# Patient Record
Sex: MALE | Race: WHITE | NOT HISPANIC OR LATINO | Employment: OTHER | URBAN - METROPOLITAN AREA
[De-identification: names, ages, dates, MRNs, and addresses within clinical notes are randomized per-mention and may not be internally consistent; named-entity substitution may affect disease eponyms.]

---

## 2017-01-29 ENCOUNTER — RESULTS ENCOUNTER (OUTPATIENT)
Dept: FAMILY MEDICINE CLINIC | Facility: CLINIC | Age: 62
End: 2017-01-29

## 2017-01-29 DIAGNOSIS — E78.5 HYPERLIPIDEMIA: ICD-10-CM

## 2017-01-29 DIAGNOSIS — I10 ESSENTIAL HYPERTENSION: ICD-10-CM

## 2017-01-29 DIAGNOSIS — R73.01 IMPAIRED FASTING GLUCOSE: ICD-10-CM

## 2017-02-26 DIAGNOSIS — I10 ESSENTIAL HYPERTENSION: ICD-10-CM

## 2017-02-27 RX ORDER — IRBESARTAN 300 MG/1
TABLET ORAL
Qty: 90 TABLET | Refills: 1 | OUTPATIENT
Start: 2017-02-27

## 2017-02-27 RX ORDER — METOPROLOL SUCCINATE 100 MG/1
TABLET, EXTENDED RELEASE ORAL
Qty: 180 TABLET | Refills: 1 | OUTPATIENT
Start: 2017-02-27

## 2017-03-05 LAB
ALBUMIN SERPL-MCNC: 4.2 G/DL (ref 3.6–4.8)
ALBUMIN/GLOB SERPL: 1.4 {RATIO} (ref 1.1–2.5)
ALP SERPL-CCNC: 97 IU/L (ref 39–117)
ALT SERPL-CCNC: 28 IU/L (ref 0–44)
AST SERPL-CCNC: 21 IU/L (ref 0–40)
BASOPHILS # BLD AUTO: 0 X10E3/UL (ref 0–0.2)
BASOPHILS NFR BLD AUTO: 1 %
BILIRUB SERPL-MCNC: 0.4 MG/DL (ref 0–1.2)
BUN SERPL-MCNC: 17 MG/DL (ref 8–27)
BUN/CREAT SERPL: 21 (ref 10–22)
CALCIUM SERPL-MCNC: 9.1 MG/DL (ref 8.6–10.2)
CHLORIDE SERPL-SCNC: 98 MMOL/L (ref 96–106)
CHOLEST SERPL-MCNC: 167 MG/DL (ref 100–199)
CO2 SERPL-SCNC: 23 MMOL/L (ref 18–29)
CREAT SERPL-MCNC: 0.82 MG/DL (ref 0.76–1.27)
EOSINOPHIL # BLD AUTO: 0.2 X10E3/UL (ref 0–0.4)
EOSINOPHIL NFR BLD AUTO: 3 %
ERYTHROCYTE [DISTWIDTH] IN BLOOD BY AUTOMATED COUNT: 13.6 % (ref 12.3–15.4)
GLOBULIN SER CALC-MCNC: 2.9 G/DL (ref 1.5–4.5)
GLUCOSE SERPL-MCNC: 108 MG/DL (ref 65–99)
HCT VFR BLD AUTO: 47.4 % (ref 37.5–51)
HDLC SERPL-MCNC: 29 MG/DL
HGB BLD-MCNC: 16.6 G/DL (ref 12.6–17.7)
IMM GRANULOCYTES # BLD: 0 X10E3/UL (ref 0–0.1)
IMM GRANULOCYTES NFR BLD: 0 %
LDLC SERPL CALC-MCNC: 90 MG/DL (ref 0–99)
LDLC/HDLC SERPL: 3.1 RATIO UNITS (ref 0–3.6)
LYMPHOCYTES # BLD AUTO: 2 X10E3/UL (ref 0.7–3.1)
LYMPHOCYTES NFR BLD AUTO: 26 %
MCH RBC QN AUTO: 32.4 PG (ref 26.6–33)
MCHC RBC AUTO-ENTMCNC: 35 G/DL (ref 31.5–35.7)
MCV RBC AUTO: 93 FL (ref 79–97)
MONOCYTES # BLD AUTO: 0.8 X10E3/UL (ref 0.1–0.9)
MONOCYTES NFR BLD AUTO: 10 %
NEUTROPHILS # BLD AUTO: 4.8 X10E3/UL (ref 1.4–7)
NEUTROPHILS NFR BLD AUTO: 60 %
PLATELET # BLD AUTO: 197 X10E3/UL (ref 150–379)
POTASSIUM SERPL-SCNC: 4.5 MMOL/L (ref 3.5–5.2)
PROT SERPL-MCNC: 7.1 G/DL (ref 6–8.5)
RBC # BLD AUTO: 5.12 X10E6/UL (ref 4.14–5.8)
SODIUM SERPL-SCNC: 135 MMOL/L (ref 134–144)
TRIGL SERPL-MCNC: 241 MG/DL (ref 0–149)
TSH SERPL DL<=0.005 MIU/L-ACNC: 1.93 UIU/ML (ref 0.45–4.5)
VLDLC SERPL CALC-MCNC: 48 MG/DL (ref 5–40)
WBC # BLD AUTO: 7.9 X10E3/UL (ref 3.4–10.8)

## 2017-03-08 ENCOUNTER — OFFICE VISIT (OUTPATIENT)
Dept: FAMILY MEDICINE CLINIC | Facility: CLINIC | Age: 62
End: 2017-03-08

## 2017-03-08 VITALS
RESPIRATION RATE: 16 BRPM | OXYGEN SATURATION: 96 % | TEMPERATURE: 97.7 F | DIASTOLIC BLOOD PRESSURE: 100 MMHG | WEIGHT: 214 LBS | SYSTOLIC BLOOD PRESSURE: 164 MMHG | BODY MASS INDEX: 31.6 KG/M2 | HEART RATE: 65 BPM

## 2017-03-08 DIAGNOSIS — R39.198 ABNORMALITY OF URINATION: ICD-10-CM

## 2017-03-08 DIAGNOSIS — E78.2 MIXED HYPERLIPIDEMIA: ICD-10-CM

## 2017-03-08 DIAGNOSIS — I10 ESSENTIAL HYPERTENSION: Primary | ICD-10-CM

## 2017-03-08 DIAGNOSIS — Z85.46 HISTORY OF PROSTATE CANCER: ICD-10-CM

## 2017-03-08 PROCEDURE — 99214 OFFICE O/P EST MOD 30 MIN: CPT | Performed by: NURSE PRACTITIONER

## 2017-03-08 RX ORDER — METOPROLOL SUCCINATE 100 MG/1
100 TABLET, EXTENDED RELEASE ORAL 2 TIMES DAILY
Qty: 180 TABLET | Refills: 1 | Status: SHIPPED | OUTPATIENT
Start: 2017-03-08 | End: 2017-09-05 | Stop reason: SDUPTHER

## 2017-03-08 RX ORDER — AMLODIPINE BESYLATE 5 MG/1
5 TABLET ORAL DAILY
Qty: 90 TABLET | Refills: 1 | Status: SHIPPED | OUTPATIENT
Start: 2017-03-08 | End: 2017-09-05 | Stop reason: SDUPTHER

## 2017-03-08 RX ORDER — ATORVASTATIN CALCIUM 10 MG/1
10 TABLET, FILM COATED ORAL NIGHTLY
Qty: 90 TABLET | Refills: 1 | Status: SHIPPED | OUTPATIENT
Start: 2017-03-08 | End: 2017-09-05 | Stop reason: SDUPTHER

## 2017-03-08 RX ORDER — LISINOPRIL AND HYDROCHLOROTHIAZIDE 20; 12.5 MG/1; MG/1
1 TABLET ORAL DAILY
Qty: 30 TABLET | Refills: 0 | Status: SHIPPED | OUTPATIENT
Start: 2017-03-08 | End: 2017-04-06 | Stop reason: SDUPTHER

## 2017-03-08 NOTE — PROGRESS NOTES
Subjective   Han Garcia is a 61 y.o. male.     History of Present Illness   Han Garcia 61 y.o. male who presents today for routine follow up check and medication refills.  he has a history of   Patient Active Problem List   Diagnosis   • Essential hypertension   • Hyperlipidemia   • Impaired fasting glucose   • History of prostate cancer   • Tobacco abuse   .  Since the last visit, he has overall felt well.  he has been compliant with current meds.  he denies medication side effects.  Patient's blood pressure not well controlled on current regimen. Blood pressure at home has remained elevated in the 150s/90s-100s.  He states he had stopped taking irbesartan and amlodipine for a week and felt better, but blood pressure remained about the same.  He restarted medications 3 days ago.  He has tried other medications in the past but cannot remember which ones.  He would like try changing medications.      Patient also reports unusual sensation after urination at times.  States it has been going on for a couple of weeks.  He denies pain but feels as if he pushes air out of the urethra after urinating.  This does not occur every time but occasionally.  He denies cloudiness to urine or foul odor. Denies increased frequency, urgency or difficulty with urination.  He has seen urologist Dr. Aparicio in the past for prostatectomy but has not followed up with anyone since Dr. Aparicio retired a few years ago.   The following portions of the patient's history were reviewed and updated as appropriate: allergies, current medications, past family history, past medical history, past social history, past surgical history and problem list.    Review of Systems   Constitutional: Positive for fatigue.   Respiratory: Negative for cough and shortness of breath.    Cardiovascular: Negative for chest pain and palpitations.   Skin: Negative for rash.   Psychiatric/Behavioral: Negative for dysphoric mood and sleep disturbance. The  patient is not nervous/anxious.        Objective   Physical Exam   Constitutional: He is oriented to person, place, and time. He appears well-developed and well-nourished.   Neck: Carotid bruit is not present.   Cardiovascular: Normal rate and regular rhythm.    Pulmonary/Chest: Effort normal and breath sounds normal.   Neurological: He is oriented to person, place, and time.   Skin: Skin is warm and dry.   Psychiatric: He has a normal mood and affect. His behavior is normal. Judgment and thought content normal.   Nursing note and vitals reviewed.      Assessment/Plan   Han was seen today for hypertension, hyperlipidemia and urine issues.    Diagnoses and all orders for this visit:    Essential hypertension  -     metoprolol succinate XL (TOPROL-XL) 100 MG 24 hr tablet; Take 1 tablet by mouth 2 (Two) Times a Day for 180 days.  -     lisinopril-hydrochlorothiazide (ZESTORETIC) 20-12.5 MG per tablet; Take 1 tablet by mouth Daily.  -     amLODIPine (NORVASC) 5 MG tablet; Take 1 tablet by mouth Daily.    Mixed hyperlipidemia  -     atorvastatin (LIPITOR) 10 MG tablet; Take 1 tablet by mouth Every Night for 180 days.    History of prostate cancer  -     PSA    Abnormality of urination  -     Urinalysis with microscope          Changed irbesartan to lisinopril/hctz.  Patient will keep on amlodipine and metoprolol.  Will f/u in 4 weeks for recheck.

## 2017-03-09 LAB
APPEARANCE UR: CLEAR
BACTERIA #/AREA URNS HPF: ABNORMAL /HPF
BILIRUB UR QL STRIP: NEGATIVE
CASTS URNS MICRO: ABNORMAL
COLOR UR: YELLOW
EPI CELLS #/AREA URNS HPF: ABNORMAL /HPF
GLUCOSE UR QL: NEGATIVE
HGB UR QL STRIP: (no result)
KETONES UR QL STRIP: NEGATIVE
LEUKOCYTE ESTERASE UR QL STRIP: (no result)
NITRITE UR QL STRIP: NEGATIVE
PH UR STRIP: 6 [PH] (ref 5–8)
PROT UR QL STRIP: NEGATIVE
PSA SERPL-MCNC: <0.014 NG/ML (ref 0–4)
RBC #/AREA URNS HPF: ABNORMAL /HPF
SP GR UR: 1.01 (ref 1–1.03)
UROBILINOGEN UR STRIP-MCNC: (no result) MG/DL
WBC #/AREA URNS HPF: ABNORMAL /HPF

## 2017-03-10 ENCOUNTER — TELEPHONE (OUTPATIENT)
Dept: FAMILY MEDICINE CLINIC | Facility: CLINIC | Age: 62
End: 2017-03-10

## 2017-03-10 DIAGNOSIS — N39.0 ACUTE UTI: Primary | ICD-10-CM

## 2017-03-10 RX ORDER — CIPROFLOXACIN 250 MG/1
250 TABLET, FILM COATED ORAL 2 TIMES DAILY
Qty: 14 TABLET | Refills: 0 | Status: SHIPPED | OUTPATIENT
Start: 2017-03-10 | End: 2017-04-06

## 2017-03-10 NOTE — TELEPHONE ENCOUNTER
----- Message from NIRU Richards sent at 3/9/2017  5:15 PM EST -----  Urine showed WBC and bacteria.  Will treat with cipro 250mg BID x 7 days.  Repeat urinalysis 1 week after finishing antibiotic.

## 2017-03-21 LAB
APPEARANCE UR: CLEAR
BACTERIA #/AREA URNS HPF: ABNORMAL /HPF
BILIRUB UR QL STRIP: NEGATIVE
CASTS URNS MICRO: ABNORMAL
COLOR UR: YELLOW
EPI CELLS #/AREA URNS HPF: ABNORMAL /HPF
GLUCOSE UR QL: NEGATIVE
HGB UR QL STRIP: NEGATIVE
KETONES UR QL STRIP: NEGATIVE
LEUKOCYTE ESTERASE UR QL STRIP: (no result)
NITRITE UR QL STRIP: NEGATIVE
PH UR STRIP: 5.5 [PH] (ref 5–8)
PROT UR QL STRIP: NEGATIVE
RBC #/AREA URNS HPF: ABNORMAL /HPF
SP GR UR: 1.02 (ref 1–1.03)
UROBILINOGEN UR STRIP-MCNC: (no result) MG/DL
WBC #/AREA URNS HPF: ABNORMAL /HPF

## 2017-03-22 ENCOUNTER — TELEPHONE (OUTPATIENT)
Dept: FAMILY MEDICINE CLINIC | Facility: CLINIC | Age: 62
End: 2017-03-22

## 2017-04-06 ENCOUNTER — OFFICE VISIT (OUTPATIENT)
Dept: FAMILY MEDICINE CLINIC | Facility: CLINIC | Age: 62
End: 2017-04-06

## 2017-04-06 VITALS
SYSTOLIC BLOOD PRESSURE: 130 MMHG | OXYGEN SATURATION: 97 % | RESPIRATION RATE: 16 BRPM | HEIGHT: 69 IN | DIASTOLIC BLOOD PRESSURE: 78 MMHG | TEMPERATURE: 97.9 F | WEIGHT: 211 LBS | BODY MASS INDEX: 31.25 KG/M2 | HEART RATE: 69 BPM

## 2017-04-06 DIAGNOSIS — I10 ESSENTIAL HYPERTENSION: ICD-10-CM

## 2017-04-06 PROCEDURE — 99213 OFFICE O/P EST LOW 20 MIN: CPT | Performed by: NURSE PRACTITIONER

## 2017-04-06 RX ORDER — LISINOPRIL AND HYDROCHLOROTHIAZIDE 20; 12.5 MG/1; MG/1
1 TABLET ORAL DAILY
Qty: 90 TABLET | Refills: 1 | Status: SHIPPED | OUTPATIENT
Start: 2017-04-06 | End: 2017-09-05 | Stop reason: SDUPTHER

## 2017-08-29 DIAGNOSIS — E78.2 MIXED HYPERLIPIDEMIA: ICD-10-CM

## 2017-08-29 DIAGNOSIS — I10 ESSENTIAL HYPERTENSION: ICD-10-CM

## 2017-08-29 RX ORDER — AMLODIPINE BESYLATE 5 MG/1
TABLET ORAL
Qty: 90 TABLET | Refills: 1 | OUTPATIENT
Start: 2017-08-29

## 2017-08-29 RX ORDER — METOPROLOL SUCCINATE 100 MG/1
TABLET, EXTENDED RELEASE ORAL
Qty: 180 TABLET | Refills: 1 | OUTPATIENT
Start: 2017-08-29

## 2017-08-29 RX ORDER — ATORVASTATIN CALCIUM 10 MG/1
TABLET, FILM COATED ORAL
Qty: 90 TABLET | Refills: 1 | OUTPATIENT
Start: 2017-08-29

## 2017-09-05 ENCOUNTER — OFFICE VISIT (OUTPATIENT)
Dept: FAMILY MEDICINE CLINIC | Facility: CLINIC | Age: 62
End: 2017-09-05

## 2017-09-05 VITALS
HEIGHT: 69 IN | HEART RATE: 70 BPM | BODY MASS INDEX: 29.47 KG/M2 | OXYGEN SATURATION: 97 % | RESPIRATION RATE: 18 BRPM | DIASTOLIC BLOOD PRESSURE: 71 MMHG | WEIGHT: 199 LBS | TEMPERATURE: 97.7 F | SYSTOLIC BLOOD PRESSURE: 125 MMHG

## 2017-09-05 DIAGNOSIS — E78.2 MIXED HYPERLIPIDEMIA: ICD-10-CM

## 2017-09-05 DIAGNOSIS — M25.561 CHRONIC PAIN OF RIGHT KNEE: Primary | ICD-10-CM

## 2017-09-05 DIAGNOSIS — I10 ESSENTIAL HYPERTENSION: ICD-10-CM

## 2017-09-05 DIAGNOSIS — G89.29 CHRONIC PAIN OF RIGHT KNEE: Primary | ICD-10-CM

## 2017-09-05 PROCEDURE — 99214 OFFICE O/P EST MOD 30 MIN: CPT | Performed by: NURSE PRACTITIONER

## 2017-09-05 RX ORDER — LISINOPRIL AND HYDROCHLOROTHIAZIDE 20; 12.5 MG/1; MG/1
1 TABLET ORAL DAILY
Qty: 90 TABLET | Refills: 1 | Status: SHIPPED | OUTPATIENT
Start: 2017-09-05 | End: 2018-03-08 | Stop reason: SDUPTHER

## 2017-09-05 RX ORDER — ATORVASTATIN CALCIUM 10 MG/1
10 TABLET, FILM COATED ORAL NIGHTLY
Qty: 90 TABLET | Refills: 1 | Status: SHIPPED | OUTPATIENT
Start: 2017-09-05 | End: 2018-03-08 | Stop reason: SDUPTHER

## 2017-09-05 RX ORDER — MELOXICAM 7.5 MG/1
7.5 TABLET ORAL DAILY
Qty: 30 TABLET | Refills: 1 | Status: SHIPPED | OUTPATIENT
Start: 2017-09-05 | End: 2019-02-01

## 2017-09-05 RX ORDER — AMLODIPINE BESYLATE 5 MG/1
5 TABLET ORAL DAILY
Qty: 90 TABLET | Refills: 1 | Status: SHIPPED | OUTPATIENT
Start: 2017-09-05 | End: 2018-03-08 | Stop reason: SDUPTHER

## 2017-09-05 RX ORDER — METOPROLOL SUCCINATE 100 MG/1
100 TABLET, EXTENDED RELEASE ORAL 2 TIMES DAILY
Qty: 180 TABLET | Refills: 1 | Status: SHIPPED | OUTPATIENT
Start: 2017-09-05 | End: 2018-03-08 | Stop reason: SDUPTHER

## 2017-09-05 NOTE — PROGRESS NOTES
"Subjective   Han Garcia is a 61 y.o. male.     History of Present Illness   Chief Complaint:   Chief Complaint   Patient presents with   • Hypertension     med refill   • Hyperlipidemia       Han Garcia 61 y.o. male who presents today for Medical Management of the below listed issues and medication refills.  he has a problem list of   Patient Active Problem List   Diagnosis   • Essential hypertension   • Hyperlipidemia   • Impaired fasting glucose   • History of prostate cancer   • Tobacco abuse   .  Since the last visit, he has overall felt well.  he has been compliant with   Current Outpatient Prescriptions:   •  amLODIPine (NORVASC) 5 MG tablet, Take 1 tablet by mouth Daily., Disp: 90 tablet, Rfl: 1  •  aspirin 81 MG tablet, Take 81 mg by mouth daily., Disp: , Rfl:   •  lisinopril-hydrochlorothiazide (ZESTORETIC) 20-12.5 MG per tablet, Take 1 tablet by mouth Daily., Disp: 90 tablet, Rfl: 1  •  atorvastatin (LIPITOR) 10 MG tablet, Take 1 tablet by mouth Every Night for 180 days., Disp: 90 tablet, Rfl: 1  •  meloxicam (MOBIC) 7.5 MG tablet, Take 1 tablet by mouth Daily., Disp: 30 tablet, Rfl: 1  •  metoprolol succinate XL (TOPROL-XL) 100 MG 24 hr tablet, Take 1 tablet by mouth 2 (Two) Times a Day for 180 days., Disp: 180 tablet, Rfl: 1.  he denies medication side effects.    All of the chronic condition(s) listed above are stable w/o issues.    /71  Pulse 70  Temp 97.7 °F (36.5 °C)  Resp 18  Ht 69\" (175.3 cm)  Wt 199 lb (90.3 kg)  SpO2 97%  BMI 29.39 kg/m2    Results for orders placed or performed in visit on 03/10/17   Microscopic Examination   Result Value Ref Range    WBC, UA 3-5 (A) /hpf    RBC, UA 0-2 /hpf    Epithelial Cells (non renal) 0-2 /hpf    Cast Type Comment     Bacteria, UA Comment None Seen /hpf   Urinalysis with microscopic   Result Value Ref Range    Specific Gravity, UA 1.019 1.005 - 1.030    pH, UA 5.5 5.0 - 8.0    Color, UA Yellow     Appearance, UA Clear Clear    " Leukocytes, UA See below: (A) Negative    Protein Negative Negative    Glucose, UA Negative Negative    Ketones Negative Negative    Blood, UA Negative Negative    Bilirubin, UA Negative Negative    Urobilinogen, UA Comment     Nitrite, UA Negative Negative     Patient also reports right knee pain x a few months.  Denies known injury but does a lot of walking and bending down for work.  He has taken motrin which has helped some.  Has seen [facility] for plantar fasciitis in the past but has not seen them for knee pain. Reports pain above the patella.   The following portions of the patient's history were reviewed and updated as appropriate: allergies, current medications, past family history, past medical history, past social history, past surgical history and problem list.    Review of Systems   Constitutional: Negative for fatigue.   Respiratory: Negative for cough and shortness of breath.    Cardiovascular: Negative for chest pain and palpitations.   Skin: Negative for rash.   Psychiatric/Behavioral: Negative for dysphoric mood and sleep disturbance. The patient is not nervous/anxious.        Objective   Physical Exam   Constitutional: He is oriented to person, place, and time. He appears well-developed and well-nourished.   Neck: Carotid bruit is not present.   Cardiovascular: Normal rate and regular rhythm.    Pulmonary/Chest: Effort normal and breath sounds normal.   Musculoskeletal:        Right knee: He exhibits normal range of motion, no swelling, no effusion and no bony tenderness. Tenderness found. No medial joint line, no lateral joint line, no MCL, no LCL and no patellar tendon tenderness noted.   Tenderness to fossa and at quadriceps tendon   Neurological: He is oriented to person, place, and time.   Skin: Skin is warm and dry.   Psychiatric: He has a normal mood and affect. His behavior is normal. Judgment and thought content normal.   Nursing note and vitals reviewed.      Assessment/Plan    Han was seen today for hypertension and hyperlipidemia.    Diagnoses and all orders for this visit:    Chronic pain of right knee  -     meloxicam (MOBIC) 7.5 MG tablet; Take 1 tablet by mouth Daily.    Essential hypertension  -     amLODIPine (NORVASC) 5 MG tablet; Take 1 tablet by mouth Daily.  -     lisinopril-hydrochlorothiazide (ZESTORETIC) 20-12.5 MG per tablet; Take 1 tablet by mouth Daily.  -     metoprolol succinate XL (TOPROL-XL) 100 MG 24 hr tablet; Take 1 tablet by mouth 2 (Two) Times a Day for 180 days.  -     Comprehensive metabolic panel  -     Lipid panel  -     CBC and Differential  -     TSH    Mixed hyperlipidemia  -     atorvastatin (LIPITOR) 10 MG tablet; Take 1 tablet by mouth Every Night for 180 days.  -     Comprehensive metabolic panel  -     Lipid panel  -     CBC and Differential  -     TSH

## 2017-09-24 LAB
ALBUMIN SERPL-MCNC: 4.5 G/DL (ref 3.6–4.8)
ALBUMIN/GLOB SERPL: 1.7 {RATIO} (ref 1.2–2.2)
ALP SERPL-CCNC: 85 IU/L (ref 39–117)
ALT SERPL-CCNC: 31 IU/L (ref 0–44)
AST SERPL-CCNC: 18 IU/L (ref 0–40)
BASOPHILS # BLD AUTO: 0 X10E3/UL (ref 0–0.2)
BASOPHILS NFR BLD AUTO: 1 %
BILIRUB SERPL-MCNC: 0.5 MG/DL (ref 0–1.2)
BUN SERPL-MCNC: 14 MG/DL (ref 8–27)
BUN/CREAT SERPL: 16 (ref 10–24)
CALCIUM SERPL-MCNC: 9.5 MG/DL (ref 8.6–10.2)
CHLORIDE SERPL-SCNC: 97 MMOL/L (ref 96–106)
CHOLEST SERPL-MCNC: 130 MG/DL (ref 100–199)
CO2 SERPL-SCNC: 23 MMOL/L (ref 18–29)
CREAT SERPL-MCNC: 0.86 MG/DL (ref 0.76–1.27)
EOSINOPHIL # BLD AUTO: 0.2 X10E3/UL (ref 0–0.4)
EOSINOPHIL NFR BLD AUTO: 2 %
ERYTHROCYTE [DISTWIDTH] IN BLOOD BY AUTOMATED COUNT: 13.8 % (ref 12.3–15.4)
GLOBULIN SER CALC-MCNC: 2.6 G/DL (ref 1.5–4.5)
GLUCOSE SERPL-MCNC: 104 MG/DL (ref 65–99)
HCT VFR BLD AUTO: 45.4 % (ref 37.5–51)
HDLC SERPL-MCNC: 33 MG/DL
HGB BLD-MCNC: 15.5 G/DL (ref 12.6–17.7)
IMM GRANULOCYTES # BLD: 0 X10E3/UL (ref 0–0.1)
IMM GRANULOCYTES NFR BLD: 0 %
LDLC SERPL CALC-MCNC: 55 MG/DL (ref 0–99)
LYMPHOCYTES # BLD AUTO: 1.9 X10E3/UL (ref 0.7–3.1)
LYMPHOCYTES NFR BLD AUTO: 23 %
MCH RBC QN AUTO: 32.1 PG (ref 26.6–33)
MCHC RBC AUTO-ENTMCNC: 34.1 G/DL (ref 31.5–35.7)
MCV RBC AUTO: 94 FL (ref 79–97)
MONOCYTES # BLD AUTO: 0.6 X10E3/UL (ref 0.1–0.9)
MONOCYTES NFR BLD AUTO: 8 %
NEUTROPHILS # BLD AUTO: 5.3 X10E3/UL (ref 1.4–7)
NEUTROPHILS NFR BLD AUTO: 66 %
PLATELET # BLD AUTO: 192 X10E3/UL (ref 150–379)
POTASSIUM SERPL-SCNC: 4.5 MMOL/L (ref 3.5–5.2)
PROT SERPL-MCNC: 7.1 G/DL (ref 6–8.5)
RBC # BLD AUTO: 4.83 X10E6/UL (ref 4.14–5.8)
SODIUM SERPL-SCNC: 137 MMOL/L (ref 134–144)
TRIGL SERPL-MCNC: 208 MG/DL (ref 0–149)
TSH SERPL DL<=0.005 MIU/L-ACNC: 1.46 UIU/ML (ref 0.45–4.5)
VLDLC SERPL CALC-MCNC: 42 MG/DL (ref 5–40)
WBC # BLD AUTO: 8.2 X10E3/UL (ref 3.4–10.8)

## 2018-03-08 ENCOUNTER — OFFICE VISIT (OUTPATIENT)
Dept: FAMILY MEDICINE CLINIC | Facility: CLINIC | Age: 63
End: 2018-03-08

## 2018-03-08 VITALS
SYSTOLIC BLOOD PRESSURE: 120 MMHG | DIASTOLIC BLOOD PRESSURE: 78 MMHG | HEART RATE: 73 BPM | RESPIRATION RATE: 16 BRPM | HEIGHT: 69 IN | BODY MASS INDEX: 31.4 KG/M2 | TEMPERATURE: 98.5 F | WEIGHT: 212 LBS | OXYGEN SATURATION: 95 %

## 2018-03-08 DIAGNOSIS — Z72.0 TOBACCO ABUSE: ICD-10-CM

## 2018-03-08 DIAGNOSIS — R30.0 DYSURIA: ICD-10-CM

## 2018-03-08 DIAGNOSIS — R73.01 IMPAIRED FASTING GLUCOSE: Primary | ICD-10-CM

## 2018-03-08 DIAGNOSIS — I10 ESSENTIAL HYPERTENSION: ICD-10-CM

## 2018-03-08 DIAGNOSIS — E78.2 MIXED HYPERLIPIDEMIA: ICD-10-CM

## 2018-03-08 DIAGNOSIS — Z85.46 HISTORY OF PROSTATE CANCER: ICD-10-CM

## 2018-03-08 PROCEDURE — 99214 OFFICE O/P EST MOD 30 MIN: CPT | Performed by: PHYSICIAN ASSISTANT

## 2018-03-08 RX ORDER — LISINOPRIL AND HYDROCHLOROTHIAZIDE 20; 12.5 MG/1; MG/1
1 TABLET ORAL DAILY
Qty: 90 TABLET | Refills: 1 | Status: SHIPPED | OUTPATIENT
Start: 2018-03-08 | End: 2018-08-28 | Stop reason: SDUPTHER

## 2018-03-08 RX ORDER — METOPROLOL SUCCINATE 100 MG/1
100 TABLET, EXTENDED RELEASE ORAL 2 TIMES DAILY
Qty: 180 TABLET | Refills: 1 | Status: SHIPPED | OUTPATIENT
Start: 2018-03-08 | End: 2018-08-28 | Stop reason: SDUPTHER

## 2018-03-08 RX ORDER — LISINOPRIL AND HYDROCHLOROTHIAZIDE 20; 12.5 MG/1; MG/1
TABLET ORAL
Qty: 90 TABLET | Refills: 1 | OUTPATIENT
Start: 2018-03-08

## 2018-03-08 RX ORDER — AMLODIPINE BESYLATE 5 MG/1
5 TABLET ORAL DAILY
Qty: 90 TABLET | Refills: 1 | Status: SHIPPED | OUTPATIENT
Start: 2018-03-08 | End: 2018-08-28 | Stop reason: SDUPTHER

## 2018-03-08 RX ORDER — ATORVASTATIN CALCIUM 10 MG/1
10 TABLET, FILM COATED ORAL NIGHTLY
Qty: 90 TABLET | Refills: 1 | Status: SHIPPED | OUTPATIENT
Start: 2018-03-08 | End: 2018-08-28 | Stop reason: SDUPTHER

## 2018-03-08 RX ORDER — LEVOFLOXACIN 500 MG/1
500 TABLET, FILM COATED ORAL DAILY
Qty: 10 TABLET | Refills: 0 | Status: SHIPPED | OUTPATIENT
Start: 2018-03-08 | End: 2019-01-02

## 2018-03-08 NOTE — PROGRESS NOTES
Subjective   Han Garcia is a 62 y.o. male.     History of Present Illness   Han Garcia 62 y.o. male who presents today for routine follow up check and medication refills.  he has a history of   Patient Active Problem List   Diagnosis   • Essential hypertension   • Hyperlipidemia   • Impaired fasting glucose   • History of prostate cancer   • Tobacco abuse   .  Since the last visit, he has overall felt well.  He has Hypertenision and is well controlled on medication, Impaired fasting glucose and will continue close lab follow up to watch for DMII and Hyperlipidemia and is well controlled on medication.  he has been compliant with current medications have reviewed them.  The patient denies medication side effects.    Also having dysuria and frequency; had Cipro last year for UTI; did not leave urine and will do Levaquin for this and sinus infx; also has penile d/c  Stop antibiotic if tendon pain develops.  This medication can cause tendon rupture, though rare.    Results for orders placed or performed in visit on 09/05/17   Comprehensive metabolic panel   Result Value Ref Range    Glucose 104 (H) 65 - 99 mg/dL    BUN 14 8 - 27 mg/dL    Creatinine 0.86 0.76 - 1.27 mg/dL    eGFR Non African Am 94 >59 mL/min/1.73    eGFR African Am 109 >59 mL/min/1.73    BUN/Creatinine Ratio 16 10 - 24    Sodium 137 134 - 144 mmol/L    Potassium 4.5 3.5 - 5.2 mmol/L    Chloride 97 96 - 106 mmol/L    Total CO2 23 18 - 29 mmol/L    Calcium 9.5 8.6 - 10.2 mg/dL    Total Protein 7.1 6.0 - 8.5 g/dL    Albumin 4.5 3.6 - 4.8 g/dL    Globulin 2.6 1.5 - 4.5 g/dL    A/G Ratio 1.7 1.2 - 2.2    Total Bilirubin 0.5 0.0 - 1.2 mg/dL    Alkaline Phosphatase 85 39 - 117 IU/L    AST (SGOT) 18 0 - 40 IU/L    ALT (SGPT) 31 0 - 44 IU/L   Lipid panel   Result Value Ref Range    Total Cholesterol 130 100 - 199 mg/dL    Triglycerides 208 (H) 0 - 149 mg/dL    HDL Cholesterol 33 (L) >39 mg/dL    VLDL Cholesterol 42 (H) 5 - 40 mg/dL    LDL Cholesterol   55 0 - 99 mg/dL   TSH   Result Value Ref Range    TSH 1.460 0.450 - 4.500 uIU/mL   CBC and Differential   Result Value Ref Range    WBC 8.2 3.4 - 10.8 x10E3/uL    RBC 4.83 4.14 - 5.80 x10E6/uL    Hemoglobin 15.5 12.6 - 17.7 g/dL    Hematocrit 45.4 37.5 - 51.0 %    MCV 94 79 - 97 fL    MCH 32.1 26.6 - 33.0 pg    MCHC 34.1 31.5 - 35.7 g/dL    RDW 13.8 12.3 - 15.4 %    Platelets 192 150 - 379 x10E3/uL    Neutrophil Rel % 66 %    Lymphocyte Rel % 23 %    Monocyte Rel % 8 %    Eosinophil Rel % 2 %    Basophil Rel % 1 %    Neutrophils Absolute 5.3 1.4 - 7.0 x10E3/uL    Lymphocytes Absolute 1.9 0.7 - 3.1 x10E3/uL    Monocytes Absolute 0.6 0.1 - 0.9 x10E3/uL    Eosinophils Absolute 0.2 0.0 - 0.4 x10E3/uL    Basophils Absolute 0.0 0.0 - 0.2 x10E3/uL    Immature Granulocyte Rel % 0 %    Immature Grans Absolute 0.0 0.0 - 0.1 x10E3/uL     PSA due  Needs A1Cl  Stop NSAID if GI upset or any signs tarry or blood in stools  Long term use of NSAIDs can lead to heart disease and strokes, as well as GI bleeding/ulcers, and cause kidney disease. Advise labs to monitor renal functions to be done at least every 6 months.  He needs Prevnar 13 and will get next time  Suggest colonoscopy  Han Garcia 62 y.o. male who presents for evaluation of upper respiratory congestion. Symptoms include sore throat, post nasal drip and cough described as productive of clear sputum.  Onset of symptoms was 3 weeks ago, unchanged since that time. Patient denies shortness of breath, wheezing, fever.   Evaluation to date: none Treatment to date:  none.       The following portions of the patient's history were reviewed and updated as appropriate: allergies, current medications, past family history, past medical history, past social history, past surgical history and problem list.    Review of Systems   Constitutional: Positive for fatigue. Negative for activity change and unexpected weight change.   HENT: Positive for congestion, postnasal drip and sore  throat. Negative for ear pain.    Eyes: Negative for pain and discharge.   Respiratory: Positive for cough. Negative for chest tightness, shortness of breath and wheezing.    Cardiovascular: Negative for chest pain and palpitations.   Gastrointestinal: Negative for abdominal pain, diarrhea and vomiting.   Endocrine: Negative.    Genitourinary: Positive for dysuria and enuresis.   Musculoskeletal: Negative for joint swelling.   Skin: Negative for color change, rash and wound.   Allergic/Immunologic: Negative.    Neurological: Negative for seizures and syncope.   Psychiatric/Behavioral: Negative.        Objective   Physical Exam   Constitutional: He is oriented to person, place, and time. He appears well-developed and well-nourished.  Non-toxic appearance. No distress.   HENT:   Head: Normocephalic and atraumatic. Hair is normal.   Right Ear: External ear normal. No drainage, swelling or tenderness. Tympanic membrane is retracted.   Left Ear: External ear normal. No drainage, swelling or tenderness. Tympanic membrane is retracted.   Nose: Mucosal edema present. No epistaxis.   Mouth/Throat: Uvula is midline and mucous membranes are normal. No oral lesions. No uvula swelling. Posterior oropharyngeal erythema present. No oropharyngeal exudate.   Eyes: Conjunctivae and EOM are normal. Pupils are equal, round, and reactive to light. Right eye exhibits no discharge. Left eye exhibits no discharge. No scleral icterus.   Neck: Normal range of motion. Neck supple.   Cardiovascular: Normal rate, regular rhythm and normal heart sounds.  Exam reveals no gallop.    No murmur heard.  Pulmonary/Chest: Breath sounds normal. No stridor. No respiratory distress. He has no wheezes. He has no rales. He exhibits no tenderness.   Abdominal: Soft. There is no tenderness.   Lymphadenopathy:     He has no cervical adenopathy.   Neurological: He is alert and oriented to person, place, and time. He exhibits normal muscle tone.   Skin: Skin is  warm and dry. No rash noted. He is not diaphoretic.   Psychiatric: He has a normal mood and affect. His behavior is normal. Judgment and thought content normal.   Nursing note and vitals reviewed.      Assessment/Plan   Han was seen today for hypertension.    Diagnoses and all orders for this visit:    Impaired fasting glucose    Mixed hyperlipidemia    Essential hypertension    History of prostate cancer    Tobacco abuse

## 2018-03-08 NOTE — PATIENT INSTRUCTIONS
Low glycemic index diet  Exercise 30 minutes most days of the week  Make sure you get results on any labs or tests we ordered today  We discussed medications and how to take them as prescribed  Sleep 6-8 hours each night if possible  If you have not signed up for [employer], please activate your code ASAP from your After Visit Summary today    LDL goal <100  LDL goal if heart disease <70  HDL goal >60  Triglyceride goal <150  BP goal =<130/80  Fasting glucose <100    For throat pain:  Can gargle with 1/2 Maalox and 1/2 Benadryl liquid ---mix, gargle and spit Take Tylenol for fever or aches  Rest as needed  Call not better in 7 days  Increase fluids  Call if worse  Can use generic Afrin nasal spray up to 5 days for nasal congestion  Finish antibiotic course of therapy  appt if any urinary C/o after Levaquin

## 2018-03-09 LAB
ALBUMIN SERPL-MCNC: 4.4 G/DL (ref 3.5–5.2)
ALBUMIN/GLOB SERPL: 1.6 G/DL
ALP SERPL-CCNC: 81 U/L (ref 39–117)
ALT SERPL-CCNC: 39 U/L (ref 1–41)
AST SERPL-CCNC: 21 U/L (ref 1–40)
BILIRUB SERPL-MCNC: 0.3 MG/DL (ref 0.1–1.2)
BUN SERPL-MCNC: 17 MG/DL (ref 8–23)
BUN/CREAT SERPL: 21.3 (ref 7–25)
CALCIUM SERPL-MCNC: 10 MG/DL (ref 8.6–10.5)
CHLORIDE SERPL-SCNC: 98 MMOL/L (ref 98–107)
CO2 SERPL-SCNC: 29.4 MMOL/L (ref 22–29)
CREAT SERPL-MCNC: 0.8 MG/DL (ref 0.76–1.27)
GFR SERPLBLD CREATININE-BSD FMLA CKD-EPI: 119 ML/MIN/1.73
GFR SERPLBLD CREATININE-BSD FMLA CKD-EPI: 98 ML/MIN/1.73
GLOBULIN SER CALC-MCNC: 2.8 GM/DL
GLUCOSE SERPL-MCNC: 109 MG/DL (ref 65–99)
HBA1C MFR BLD: 5.76 % (ref 4.8–5.6)
POTASSIUM SERPL-SCNC: 4.8 MMOL/L (ref 3.5–5.2)
PROT SERPL-MCNC: 7.2 G/DL (ref 6–8.5)
PSA SERPL-MCNC: <0.014 NG/ML (ref 0–4)
SODIUM SERPL-SCNC: 140 MMOL/L (ref 136–145)

## 2018-08-28 DIAGNOSIS — E78.2 MIXED HYPERLIPIDEMIA: ICD-10-CM

## 2018-08-28 DIAGNOSIS — I10 ESSENTIAL HYPERTENSION: ICD-10-CM

## 2018-08-28 RX ORDER — ATORVASTATIN CALCIUM 10 MG/1
10 TABLET, FILM COATED ORAL NIGHTLY
Qty: 90 TABLET | Refills: 1 | Status: SHIPPED | OUTPATIENT
Start: 2018-08-28 | End: 2019-01-02 | Stop reason: SDUPTHER

## 2018-08-28 RX ORDER — METOPROLOL SUCCINATE 100 MG/1
TABLET, EXTENDED RELEASE ORAL
Qty: 180 TABLET | Refills: 0 | Status: SHIPPED | OUTPATIENT
Start: 2018-08-28 | End: 2018-11-22 | Stop reason: SDUPTHER

## 2018-08-28 RX ORDER — AMLODIPINE BESYLATE 5 MG/1
5 TABLET ORAL DAILY
Qty: 90 TABLET | Refills: 0 | Status: SHIPPED | OUTPATIENT
Start: 2018-08-28 | End: 2018-11-22 | Stop reason: SDUPTHER

## 2018-08-28 RX ORDER — LISINOPRIL AND HYDROCHLOROTHIAZIDE 20; 12.5 MG/1; MG/1
1 TABLET ORAL DAILY
Qty: 90 TABLET | Refills: 0 | Status: SHIPPED | OUTPATIENT
Start: 2018-08-28 | End: 2018-11-22 | Stop reason: SDUPTHER

## 2018-11-22 DIAGNOSIS — I10 ESSENTIAL HYPERTENSION: ICD-10-CM

## 2018-11-22 RX ORDER — METOPROLOL SUCCINATE 100 MG/1
TABLET, EXTENDED RELEASE ORAL
Qty: 60 TABLET | Refills: 0 | Status: SHIPPED | OUTPATIENT
Start: 2018-11-22 | End: 2019-01-02 | Stop reason: SDUPTHER

## 2018-11-22 RX ORDER — LISINOPRIL AND HYDROCHLOROTHIAZIDE 20; 12.5 MG/1; MG/1
TABLET ORAL
Qty: 30 TABLET | Refills: 0 | Status: SHIPPED | OUTPATIENT
Start: 2018-11-22 | End: 2019-01-02 | Stop reason: SDUPTHER

## 2018-11-22 RX ORDER — AMLODIPINE BESYLATE 5 MG/1
TABLET ORAL
Qty: 90 TABLET | Refills: 0 | Status: SHIPPED | OUTPATIENT
Start: 2018-11-22 | End: 2019-01-02 | Stop reason: SDUPTHER

## 2019-01-02 ENCOUNTER — OFFICE VISIT (OUTPATIENT)
Dept: FAMILY MEDICINE CLINIC | Facility: CLINIC | Age: 64
End: 2019-01-02

## 2019-01-02 VITALS
TEMPERATURE: 98.7 F | HEART RATE: 64 BPM | RESPIRATION RATE: 16 BRPM | HEIGHT: 69 IN | WEIGHT: 212 LBS | DIASTOLIC BLOOD PRESSURE: 64 MMHG | OXYGEN SATURATION: 98 % | BODY MASS INDEX: 31.4 KG/M2 | SYSTOLIC BLOOD PRESSURE: 110 MMHG

## 2019-01-02 DIAGNOSIS — E78.2 MIXED HYPERLIPIDEMIA: ICD-10-CM

## 2019-01-02 DIAGNOSIS — G89.29 CHRONIC PAIN OF RIGHT KNEE: ICD-10-CM

## 2019-01-02 DIAGNOSIS — I10 ESSENTIAL HYPERTENSION: Primary | ICD-10-CM

## 2019-01-02 DIAGNOSIS — M25.561 CHRONIC PAIN OF RIGHT KNEE: ICD-10-CM

## 2019-01-02 PROCEDURE — 99214 OFFICE O/P EST MOD 30 MIN: CPT | Performed by: NURSE PRACTITIONER

## 2019-01-02 RX ORDER — LISINOPRIL AND HYDROCHLOROTHIAZIDE 20; 12.5 MG/1; MG/1
1 TABLET ORAL DAILY
Qty: 90 TABLET | Refills: 1 | Status: SHIPPED | OUTPATIENT
Start: 2019-01-02 | End: 2019-06-05

## 2019-01-02 RX ORDER — AMLODIPINE BESYLATE 5 MG/1
5 TABLET ORAL DAILY
Qty: 90 TABLET | Refills: 1 | Status: SHIPPED | OUTPATIENT
Start: 2019-01-02 | End: 2019-05-22

## 2019-01-02 RX ORDER — ATORVASTATIN CALCIUM 10 MG/1
10 TABLET, FILM COATED ORAL NIGHTLY
Qty: 90 TABLET | Refills: 1 | Status: SHIPPED | OUTPATIENT
Start: 2019-01-02 | End: 2019-07-09

## 2019-01-02 RX ORDER — METOPROLOL SUCCINATE 100 MG/1
100 TABLET, EXTENDED RELEASE ORAL 2 TIMES DAILY
Qty: 180 TABLET | Refills: 1 | Status: SHIPPED | OUTPATIENT
Start: 2019-01-02 | End: 2019-06-05

## 2019-01-02 NOTE — PROGRESS NOTES
Subjective   Han Garcia is a 63 y.o. male.     History of Present Illness   Han Garcia 63 y.o. male who presents today for routine follow up check and medication refills.  he has a history of   Patient Active Problem List   Diagnosis   • Essential hypertension   • Hyperlipidemia   • Impaired fasting glucose   • History of prostate cancer   • Tobacco abuse   .  Since the last visit, he has overall felt well.  He has Hypertenision and is well controlled on medication and Hyperlipidemia and is well controlled on medication.  he has been compliant with current medications have reviewed them.  The patient denies medication side effects.    Results for orders placed or performed in visit on 03/08/18   Comprehensive Metabolic Panel   Result Value Ref Range    Glucose 109 (H) 65 - 99 mg/dL    BUN 17 8 - 23 mg/dL    Creatinine 0.80 0.76 - 1.27 mg/dL    eGFR Non African Am 98 >60 mL/min/1.73    eGFR African Am 119 >60 mL/min/1.73    BUN/Creatinine Ratio 21.3 7.0 - 25.0    Sodium 140 136 - 145 mmol/L    Potassium 4.8 3.5 - 5.2 mmol/L    Chloride 98 98 - 107 mmol/L    Total CO2 29.4 (H) 22.0 - 29.0 mmol/L    Calcium 10.0 8.6 - 10.5 mg/dL    Total Protein 7.2 6.0 - 8.5 g/dL    Albumin 4.40 3.50 - 5.20 g/dL    Globulin 2.8 gm/dL    A/G Ratio 1.6 g/dL    Total Bilirubin 0.3 0.1 - 1.2 mg/dL    Alkaline Phosphatase 81 39 - 117 U/L    AST (SGOT) 21 1 - 40 U/L    ALT (SGPT) 39 1 - 41 U/L   PSA Screen   Result Value Ref Range    PSA <0.014 0.000 - 4.000 ng/mL   Hemoglobin A1c   Result Value Ref Range    Hemoglobin A1C 5.76 (H) 4.80 - 5.60 %     Refuses colorectal cancer screen including cologuard.      Patient states he is still having right knee pain.  He tried to contact ortho for appt but has not heard back. States meloxicam he was previously given has not helped so he is taking naproxen.   The following portions of the patient's history were reviewed and updated as appropriate: allergies, current medications, past family  history, past medical history, past social history, past surgical history and problem list.    Review of Systems   Constitutional: Negative for fatigue.   Respiratory: Negative for cough and shortness of breath.    Cardiovascular: Negative for chest pain and palpitations.   Endocrine: Negative for cold intolerance, heat intolerance, polydipsia, polyphagia and polyuria.   Musculoskeletal: Positive for arthralgias. Negative for joint swelling.   Skin: Negative for rash.   Psychiatric/Behavioral: Negative for dysphoric mood and sleep disturbance. The patient is not nervous/anxious.        Objective   Physical Exam   Constitutional: He is oriented to person, place, and time. He appears well-developed and well-nourished.   Neck: Carotid bruit is not present.   Cardiovascular: Normal rate and regular rhythm.   Pulmonary/Chest: Effort normal and breath sounds normal.   Musculoskeletal:        Right knee: He exhibits normal range of motion and no swelling. Tenderness found.   Neurological: He is oriented to person, place, and time.   Skin: Skin is warm and dry.   Psychiatric: He has a normal mood and affect. His behavior is normal. Judgment and thought content normal.   Nursing note and vitals reviewed.      Assessment/Plan   Han was seen today for hypertension, hyperlipidemia and knee pain.    Diagnoses and all orders for this visit:    Essential hypertension  -     lisinopril-hydrochlorothiazide (PRINZIDE,ZESTORETIC) 20-12.5 MG per tablet; Take 1 tablet by mouth Daily.  -     metoprolol succinate XL (TOPROL-XL) 100 MG 24 hr tablet; Take 1 tablet by mouth 2 (Two) Times a Day.  -     amLODIPine (NORVASC) 5 MG tablet; Take 1 tablet by mouth Daily.  -     Comprehensive metabolic panel  -     Lipid panel  -     CBC and Differential  -     TSH    Mixed hyperlipidemia  -     atorvastatin (LIPITOR) 10 MG tablet; Take 1 tablet by mouth Every Night for 180 days. For cholesterol  -     Comprehensive metabolic panel  -     Lipid  panel  -     CBC and Differential  -     TSH    Chronic pain of right knee  -     Ambulatory Referral to Orthopedic Surgery

## 2019-02-01 ENCOUNTER — OFFICE VISIT (OUTPATIENT)
Dept: ORTHOPEDIC SURGERY | Facility: CLINIC | Age: 64
End: 2019-02-01

## 2019-02-01 VITALS — HEIGHT: 69 IN | BODY MASS INDEX: 31.4 KG/M2 | TEMPERATURE: 98.6 F | WEIGHT: 212 LBS

## 2019-02-01 DIAGNOSIS — M25.561 CHRONIC PAIN OF RIGHT KNEE: Primary | ICD-10-CM

## 2019-02-01 DIAGNOSIS — G89.29 CHRONIC PAIN OF RIGHT KNEE: Primary | ICD-10-CM

## 2019-02-01 DIAGNOSIS — M17.11 ARTHRITIS OF RIGHT KNEE: ICD-10-CM

## 2019-02-01 PROCEDURE — 73562 X-RAY EXAM OF KNEE 3: CPT | Performed by: ORTHOPAEDIC SURGERY

## 2019-02-01 PROCEDURE — 20610 DRAIN/INJ JOINT/BURSA W/O US: CPT | Performed by: ORTHOPAEDIC SURGERY

## 2019-02-01 PROCEDURE — 99243 OFF/OP CNSLTJ NEW/EST LOW 30: CPT | Performed by: ORTHOPAEDIC SURGERY

## 2019-02-01 RX ORDER — LIDOCAINE HYDROCHLORIDE 10 MG/ML
4 INJECTION, SOLUTION EPIDURAL; INFILTRATION; INTRACAUDAL; PERINEURAL
Status: COMPLETED | OUTPATIENT
Start: 2019-02-01 | End: 2019-02-01

## 2019-02-01 RX ORDER — METHYLPREDNISOLONE ACETATE 80 MG/ML
80 INJECTION, SUSPENSION INTRA-ARTICULAR; INTRALESIONAL; INTRAMUSCULAR; SOFT TISSUE
Status: COMPLETED | OUTPATIENT
Start: 2019-02-01 | End: 2019-02-01

## 2019-02-01 RX ADMIN — METHYLPREDNISOLONE ACETATE 80 MG: 80 INJECTION, SUSPENSION INTRA-ARTICULAR; INTRALESIONAL; INTRAMUSCULAR; SOFT TISSUE at 10:28

## 2019-02-01 RX ADMIN — LIDOCAINE HYDROCHLORIDE 4 ML: 10 INJECTION, SOLUTION EPIDURAL; INFILTRATION; INTRACAUDAL; PERINEURAL at 10:28

## 2019-02-01 NOTE — PROGRESS NOTES
Patient Name: Han Garcia   YOB: 1955  Referring Primary Care Physician: Jaiden Hoover MD  BMI: Body mass index is 31.31 kg/m².    Chief Complaint:    Chief Complaint   Patient presents with   • Right Knee - Establish Care        Subjective:    HPI:   Han Garcia is a pleasant 63 y.o. year old who presents today for evaluation of   Chief Complaint   Patient presents with   • Right Knee - Establish Care    (Location). Duration: This has been going on for years. Timing: The pain is intermittent. and persistent. Context or causative factors: unknown.  Relieving Factors:  In the past has tried OTC meds/NSAIDS. Denies locking or catching    This problem is new to this examiner.     Medications:   Home Medications:  Current Outpatient Medications on File Prior to Visit   Medication Sig   • amLODIPine (NORVASC) 5 MG tablet Take 1 tablet by mouth Daily.   • aspirin 81 MG tablet Take 81 mg by mouth daily.   • atorvastatin (LIPITOR) 10 MG tablet Take 1 tablet by mouth Every Night for 180 days. For cholesterol   • lisinopril-hydrochlorothiazide (PRINZIDE,ZESTORETIC) 20-12.5 MG per tablet Take 1 tablet by mouth Daily.   • metoprolol succinate XL (TOPROL-XL) 100 MG 24 hr tablet Take 1 tablet by mouth 2 (Two) Times a Day.   • [DISCONTINUED] meloxicam (MOBIC) 7.5 MG tablet Take 1 tablet by mouth Daily.     No current facility-administered medications on file prior to visit.      Current Medications:  Scheduled Meds:  Continuous Infusions:  No current facility-administered medications for this visit.   PRN Meds:.    I have reviewed the patient's medical history in detail and updated the computerized patient record.  Review and summarization of old records includes:    Past Medical History:   Diagnosis Date   • Elevated PSA    • Hepatitis    • Hyperlipidemia    • Hypertension    • Prostate cancer (CMS/HCC)         Past Surgical History:   Procedure Laterality Date   • HERNIA REPAIR     • PROSTATE SURGERY    "       Social History     Occupational History   • Not on file   Tobacco Use   • Smoking status: Current Every Day Smoker   • Smokeless tobacco: Never Used   Substance and Sexual Activity   • Alcohol use: Yes   • Drug use: Not on file   • Sexual activity: Not on file      Social History     Social History Narrative   • Not on file        Family History   Problem Relation Age of Onset   • Hypertension Mother    • Stroke Mother        ROS: 14 point review of systems was performed and all other systems were reviewed and are negative except for documented findings in HPI and today's encounter.     Allergies: No Known Allergies  Constitutional:  Denies fever, shaking or chills   Eyes:  Denies change in visual acuity   HENT:  Denies nasal congestion or sore throat   Respiratory:  Denies cough or shortness of breath   Cardiovascular:  Denies chest pain or severe LE edema   GI:  Denies abdominal pain, nausea, vomiting, bloody stools or diarrhea   Musculoskeletal:  Numbness, tingling, pain, or loss of motor function only as noted above in history of present illness.  : Denies painful urination or hematuria  Integument:  Denies rash, lesion or ulceration   Neurologic:  Denies headache or focal weakness  Endocrine:  Denies lymphadenopathy  Psych:  Denies confusion or change in mental status   Hem:  Denies active bleeding    Subjective     Objective:    Physical Exam: 63 y.o. male  Wt Readings from Last 3 Encounters:   02/01/19 96.2 kg (212 lb)   01/02/19 96.2 kg (212 lb)   03/08/18 96.2 kg (212 lb)     Ht Readings from Last 3 Encounters:   02/01/19 175.3 cm (69\")   01/02/19 175.3 cm (69\")   03/08/18 175.3 cm (69\")     Body mass index is 31.31 kg/m².    Vitals:    02/01/19 1016   Temp: 98.6 °F (37 °C)       Vital signs reviewed.   General Appearance:    Alert, cooperative, in no acute distress                  Eyes: conjunctiva clear  ENT: external ears and nose atraumatic  CV: no peripheral edema  Resp: normal respiratory " effort  Skin: no rashes or wounds; normal turgor  Psych: mood and affect appropriate  Lymph: no nodes appreciated  Neuro: gross sensation intact  Vascular:  Palpable peripheral pulse in noted extremity  Musculoskeletal Extremities: KNEE Exam: medial joint line tenderness with crepitation, synovitis, swelling, and joint effusion right knee.mcm neg      Radiology:   Imaging done today and discussed at length with the patient:    Indication: pain related symptoms,  Views: 3V AP, LAT & 40 degree PA right knee(s)   Findings: advanced arthritis  Comparison views: not available      Assessment:     ICD-10-CM ICD-9-CM   1. Chronic pain of right knee M25.561 719.46    G89.29 338.29   2. Arthritis of right knee M17.11 716.96        Large Joint Arthrocentesis: R knee  Date/Time: 2/1/2019 10:28 AM  Consent given by: patient  Site marked: site marked  Timeout: Immediately prior to procedure a time out was called to verify the correct patient, procedure, equipment, support staff and site/side marked as required   Supporting Documentation  Indications: pain and joint swelling   Procedure Details  Location: knee - R knee  Preparation: Patient was prepped and draped in the usual sterile fashion  Needle size: 22 G  Approach: anterolateral  Medications administered: 80 mg methylPREDNISolone acetate 80 MG/ML; 4 mL lidocaine PF 1% 1 %  Patient tolerance: patient tolerated the procedure well with no immediate complications             Plan: Biomechanics of pertinent body area discussed.  Risks, benefits, alternatives, comparisons, and complications of accepted medicines, injections, recommendations, surgical procedures, and therapies explained and education provided in laymen's terms. The patient was given the opportunity to ask questions and they were answerved to their satisfaction.   Natural history and expected course of this patient's diagnosis discussed along with evaluation of therapies. Questions answered.  Cortisone Injection  for DIAGNOSTIC and THERAPUTIC purposes.  Cryotherapy/brachy therapy as indicated with instructions.   Biomechanics and proper use of ambulatory aids:  crutches, walker, cane, &/or trekking poles.   rec PT if not significantly better in about 3 weeks.  Could call.      2/1/2019

## 2019-02-03 LAB
ALBUMIN SERPL-MCNC: 4.4 G/DL (ref 3.6–4.8)
ALBUMIN/GLOB SERPL: 1.6 {RATIO} (ref 1.2–2.2)
ALP SERPL-CCNC: 96 IU/L (ref 39–117)
ALT SERPL-CCNC: 26 IU/L (ref 0–44)
AST SERPL-CCNC: 14 IU/L (ref 0–40)
BASOPHILS # BLD AUTO: 0.1 X10E3/UL (ref 0–0.2)
BASOPHILS NFR BLD AUTO: 1 %
BILIRUB SERPL-MCNC: 0.5 MG/DL (ref 0–1.2)
BUN SERPL-MCNC: 19 MG/DL (ref 8–27)
BUN/CREAT SERPL: 20 (ref 10–24)
CALCIUM SERPL-MCNC: 9.2 MG/DL (ref 8.6–10.2)
CHLORIDE SERPL-SCNC: 97 MMOL/L (ref 96–106)
CHOLEST SERPL-MCNC: 120 MG/DL (ref 100–199)
CO2 SERPL-SCNC: 25 MMOL/L (ref 20–29)
CREAT SERPL-MCNC: 0.94 MG/DL (ref 0.76–1.27)
EOSINOPHIL # BLD AUTO: 0.3 X10E3/UL (ref 0–0.4)
EOSINOPHIL NFR BLD AUTO: 2 %
ERYTHROCYTE [DISTWIDTH] IN BLOOD BY AUTOMATED COUNT: 13.7 % (ref 12.3–15.4)
GLOBULIN SER CALC-MCNC: 2.7 G/DL (ref 1.5–4.5)
GLUCOSE SERPL-MCNC: 109 MG/DL (ref 65–99)
HCT VFR BLD AUTO: 45.1 % (ref 37.5–51)
HDLC SERPL-MCNC: 31 MG/DL
HGB BLD-MCNC: 15.3 G/DL (ref 13–17.7)
IMM GRANULOCYTES # BLD AUTO: 0 X10E3/UL (ref 0–0.1)
IMM GRANULOCYTES NFR BLD AUTO: 0 %
LDLC SERPL CALC-MCNC: 59 MG/DL (ref 0–99)
LYMPHOCYTES # BLD AUTO: 2.1 X10E3/UL (ref 0.7–3.1)
LYMPHOCYTES NFR BLD AUTO: 16 %
MCH RBC QN AUTO: 31.8 PG (ref 26.6–33)
MCHC RBC AUTO-ENTMCNC: 33.9 G/DL (ref 31.5–35.7)
MCV RBC AUTO: 94 FL (ref 79–97)
MONOCYTES # BLD AUTO: 1.1 X10E3/UL (ref 0.1–0.9)
MONOCYTES NFR BLD AUTO: 8 %
NEUTROPHILS # BLD AUTO: 9.7 X10E3/UL (ref 1.4–7)
NEUTROPHILS NFR BLD AUTO: 73 %
PLATELET # BLD AUTO: 250 X10E3/UL (ref 150–379)
POTASSIUM SERPL-SCNC: 4.9 MMOL/L (ref 3.5–5.2)
PROT SERPL-MCNC: 7.1 G/DL (ref 6–8.5)
RBC # BLD AUTO: 4.81 X10E6/UL (ref 4.14–5.8)
SODIUM SERPL-SCNC: 138 MMOL/L (ref 134–144)
TRIGL SERPL-MCNC: 152 MG/DL (ref 0–149)
TSH SERPL DL<=0.005 MIU/L-ACNC: 1.4 UIU/ML (ref 0.45–4.5)
VLDLC SERPL CALC-MCNC: 30 MG/DL (ref 5–40)
WBC # BLD AUTO: 13.4 X10E3/UL (ref 3.4–10.8)

## 2019-05-22 ENCOUNTER — OFFICE VISIT (OUTPATIENT)
Dept: FAMILY MEDICINE CLINIC | Facility: CLINIC | Age: 64
End: 2019-05-22

## 2019-05-22 VITALS
RESPIRATION RATE: 16 BRPM | HEIGHT: 69 IN | WEIGHT: 197 LBS | HEART RATE: 64 BPM | OXYGEN SATURATION: 99 % | SYSTOLIC BLOOD PRESSURE: 118 MMHG | DIASTOLIC BLOOD PRESSURE: 74 MMHG | BODY MASS INDEX: 29.18 KG/M2

## 2019-05-22 DIAGNOSIS — I95.1 ORTHOSTATIC HYPOTENSION: ICD-10-CM

## 2019-05-22 DIAGNOSIS — J18.9 PNEUMONIA OF LEFT LOWER LOBE DUE TO INFECTIOUS ORGANISM: ICD-10-CM

## 2019-05-22 DIAGNOSIS — J01.90 ACUTE SINUSITIS, RECURRENCE NOT SPECIFIED, UNSPECIFIED LOCATION: Primary | ICD-10-CM

## 2019-05-22 PROCEDURE — 99214 OFFICE O/P EST MOD 30 MIN: CPT | Performed by: NURSE PRACTITIONER

## 2019-05-22 RX ORDER — AMOXICILLIN AND CLAVULANATE POTASSIUM 875; 125 MG/1; MG/1
1 TABLET, FILM COATED ORAL 2 TIMES DAILY
Qty: 20 TABLET | Refills: 0 | Status: SHIPPED | OUTPATIENT
Start: 2019-05-22 | End: 2019-06-01

## 2019-06-05 ENCOUNTER — OFFICE VISIT (OUTPATIENT)
Dept: FAMILY MEDICINE CLINIC | Facility: CLINIC | Age: 64
End: 2019-06-05

## 2019-06-05 VITALS
RESPIRATION RATE: 16 BRPM | HEART RATE: 86 BPM | HEIGHT: 69 IN | WEIGHT: 189 LBS | BODY MASS INDEX: 27.99 KG/M2 | SYSTOLIC BLOOD PRESSURE: 100 MMHG | DIASTOLIC BLOOD PRESSURE: 60 MMHG | OXYGEN SATURATION: 99 %

## 2019-06-05 DIAGNOSIS — J18.9 PNEUMONIA OF LEFT LOWER LOBE DUE TO INFECTIOUS ORGANISM: Primary | ICD-10-CM

## 2019-06-05 DIAGNOSIS — I10 ESSENTIAL HYPERTENSION: ICD-10-CM

## 2019-06-05 PROCEDURE — 99213 OFFICE O/P EST LOW 20 MIN: CPT | Performed by: NURSE PRACTITIONER

## 2019-06-05 RX ORDER — L. ACIDOPH/L. PLANTAR/L. RHAMN 1B CELL
1 CAPSULE,DELAYED RELEASE (ENTERIC COATED) ORAL DAILY
Qty: 30 CAPSULE | Refills: 0 | Status: SHIPPED | OUTPATIENT
Start: 2019-06-05 | End: 2019-07-09

## 2019-06-05 RX ORDER — LISINOPRIL AND HYDROCHLOROTHIAZIDE 20; 12.5 MG/1; MG/1
0.5 TABLET ORAL DAILY
Qty: 90 TABLET | Refills: 1
Start: 2019-06-05 | End: 2019-07-09

## 2019-06-05 RX ORDER — PREDNISONE 20 MG/1
20 TABLET ORAL 2 TIMES DAILY
Qty: 10 TABLET | Refills: 0 | Status: SHIPPED | OUTPATIENT
Start: 2019-06-05 | End: 2019-06-10

## 2019-06-05 NOTE — PROGRESS NOTES
Subjective   Han Garcia is a 63 y.o. male.     History of Present Illness   Patient presents to office to f/u on blood pressure. At last visit he was told to hold amlodipine due to low BP and f/u in 1-2 weeks. Patient states he has actually had to hold all his BP medications some days due to low readings. He had reading of 149/90 yesterday at 11 am so took lisinopril/hctz and metoprolol. His BP at 7 pm was 97/63.  He states he started to feel overly fatigued again about an hour later.     Reports cough is improved. Denies shortness of breath. Reports he still has some head congestion and some left ear pressure.   The following portions of the patient's history were reviewed and updated as appropriate: allergies, current medications, past family history, past medical history, past social history, past surgical history and problem list.    Review of Systems   Constitutional: Positive for fatigue. Negative for chills and fever.   HENT: Positive for congestion and sinus pressure. Negative for ear discharge, ear pain and sinus pain.    Respiratory: Negative for cough, chest tightness, shortness of breath and wheezing.    Neurological: Positive for dizziness and light-headedness. Negative for headaches.       Objective   Physical Exam   Constitutional: He is oriented to person, place, and time. He appears well-developed and well-nourished.   Cardiovascular: Normal rate and regular rhythm.   Pulmonary/Chest: Effort normal and breath sounds normal.   Neurological: He is oriented to person, place, and time.   Skin: Skin is warm and dry.   Psychiatric: He has a normal mood and affect. His behavior is normal. Judgment and thought content normal.   Nursing note and vitals reviewed.      Assessment/Plan   Han was seen today for follow-up, pneumonia, fatigue and weight loss.    Diagnoses and all orders for this visit:    Pneumonia of left lower lobe due to infectious organism (CMS/[facility])  Comments:  improved    Essential  hypertension  -     lisinopril-hydrochlorothiazide (PRINZIDE,ZESTORETIC) 20-12.5 MG per tablet; Take 0.5 tablets by mouth Daily.    Other orders  -     Probiotic Product (PROBIOTIC PEARLS) capsule; Take 1 capsule by mouth Daily.  -     predniSONE (DELTASONE) 20 MG tablet; Take 1 tablet by mouth 2 (Two) Times a Day for 5 days.        Take 1/2 tablet of lisinopril/hctz and hold all other BP medication. F/u in 1-2 weeks for recheck.

## 2019-06-14 ENCOUNTER — OFFICE VISIT (OUTPATIENT)
Dept: FAMILY MEDICINE CLINIC | Facility: CLINIC | Age: 64
End: 2019-06-14

## 2019-06-14 VITALS
RESPIRATION RATE: 16 BRPM | HEIGHT: 69 IN | DIASTOLIC BLOOD PRESSURE: 74 MMHG | WEIGHT: 196 LBS | HEART RATE: 112 BPM | OXYGEN SATURATION: 100 % | BODY MASS INDEX: 29.03 KG/M2 | SYSTOLIC BLOOD PRESSURE: 122 MMHG

## 2019-06-14 DIAGNOSIS — I10 ESSENTIAL HYPERTENSION: ICD-10-CM

## 2019-06-14 PROCEDURE — 99213 OFFICE O/P EST LOW 20 MIN: CPT | Performed by: NURSE PRACTITIONER

## 2019-06-14 NOTE — PROGRESS NOTES
Subjective   Han Garcia is a 63 y.o. male.     History of Present Illness   Patient presents to office to f/u on blood pressure. At last visit he was instructed to cut lisinopril/hctz in half.  Patient states he has been taking as prescribed. He reports feeling well and denies side effects.     Patient reports cough has resolved. Denies shortness of breath.  Reports feeling that his left ear is clogged again.   The following portions of the patient's history were reviewed and updated as appropriate: allergies, current medications, past family history, past medical history, past social history, past surgical history and problem list.    Review of Systems   Constitutional: Negative for chills, fatigue, fever and unexpected weight change.   HENT: Negative for congestion, ear discharge, ear pain, sinus pressure and sinus pain.    Eyes: Negative for visual disturbance.   Respiratory: Negative for cough, chest tightness, shortness of breath and wheezing.    Neurological: Negative for dizziness, light-headedness and headaches.       Objective   Physical Exam   Constitutional: He is oriented to person, place, and time. He appears well-developed and well-nourished.   HENT:   Right Ear: External ear and ear canal normal.   Left Ear: External ear and ear canal normal.   Bilateral cerumen impaction    Cardiovascular: Normal rate and regular rhythm.   Pulmonary/Chest: Effort normal and breath sounds normal.   Neurological: He is oriented to person, place, and time.   Skin: Skin is warm and dry.   Psychiatric: He has a normal mood and affect. His behavior is normal. Judgment and thought content normal.   Nursing note and vitals reviewed.      Assessment/Plan   Han was seen today for follow-up, pneumonia and hypertension.    Diagnoses and all orders for this visit:    Essential hypertension      Continue 1/2 tablet of lisinopril/hctz.  F/u in July for refills and labs.     Ears flushed but unable to remove cerumen. He will  use OTC wax softening drops and flush at home.  He will f/u if unable to remove wax.

## 2019-06-27 ENCOUNTER — OFFICE VISIT (OUTPATIENT)
Dept: FAMILY MEDICINE CLINIC | Facility: CLINIC | Age: 64
End: 2019-06-27

## 2019-06-27 VITALS
HEART RATE: 107 BPM | WEIGHT: 191 LBS | SYSTOLIC BLOOD PRESSURE: 98 MMHG | BODY MASS INDEX: 28.29 KG/M2 | TEMPERATURE: 97.5 F | HEIGHT: 69 IN | OXYGEN SATURATION: 97 % | RESPIRATION RATE: 16 BRPM | DIASTOLIC BLOOD PRESSURE: 54 MMHG

## 2019-06-27 DIAGNOSIS — I95.9 HYPOTENSION, UNSPECIFIED HYPOTENSION TYPE: ICD-10-CM

## 2019-06-27 DIAGNOSIS — R10.31 RLQ ABDOMINAL PAIN: Primary | ICD-10-CM

## 2019-06-27 DIAGNOSIS — R19.7 DIARRHEA, UNSPECIFIED TYPE: ICD-10-CM

## 2019-06-27 PROCEDURE — 99214 OFFICE O/P EST MOD 30 MIN: CPT | Performed by: NURSE PRACTITIONER

## 2019-06-27 NOTE — PROGRESS NOTES
Subjective   Han Garcia is a 63 y.o. male.     History of Present Illness   Han Garcia 63 y.o. male who presents for evaluation of diarrhea and reports having 4-5 stools in the last 24 hours, bloating and RLQ abdominal pain. Symptoms have been present for 4 days .  The condition is aggravated by coughing, straining or sitting up . he is experiencing diarrhea , abdominal pain and bloating.  Alleviating factors are lying still with some help, but still symptoms . Patient denies fever, chills, GI symptoms waking them up, melena, bright red blood in stool, fatty food intolerance, nausea, reflux and vomiting his past medical history is notable for no prior issures.  Patient denies recent travel.        BP low today.  Patient has been taking 1/2 tablet of lisinopril/hctz as instructed.  He has been checking BP at home. Most readings have been 110s/70s with two readings 80s-90s/60s and one reading in 140s.   He denies lightheadedness.   The following portions of the patient's history were reviewed and updated as appropriate: allergies, current medications, past family history, past medical history, past social history, past surgical history and problem list.    Review of Systems   Constitutional: Positive for fatigue. Negative for chills and fever.   Eyes: Negative for visual disturbance.   Respiratory: Negative for shortness of breath.    Cardiovascular: Negative for chest pain and palpitations.   Gastrointestinal: Positive for abdominal pain and diarrhea. Negative for abdominal distention, anal bleeding, blood in stool, constipation, nausea, rectal pain and vomiting.   Genitourinary: Negative for difficulty urinating and dysuria.   Musculoskeletal: Negative for back pain.   Neurological: Negative for dizziness, light-headedness and headaches.   Psychiatric/Behavioral: Negative for agitation and confusion.       Objective   Physical Exam   Constitutional: He is oriented to person, place, and time. He appears  well-developed and well-nourished.   Pulmonary/Chest: Effort normal.   Abdominal: Normal appearance and bowel sounds are normal. There is no tenderness. There is no rigidity, no rebound, no guarding, no tenderness at McBurney's point and negative Cantu's sign.   No tenderness to palpation. Negative McBurney's and Psoas sign.  Patient reported sharp pain when sitting up but not when lying down.    Neurological: He is oriented to person, place, and time.   Skin: Skin is warm and dry.   Psychiatric: He has a normal mood and affect. His behavior is normal. Judgment and thought content normal.   Nursing note and vitals reviewed.      Assessment/Plan   Han was seen today for diarrhea, bloated and rlq pain.    Diagnoses and all orders for this visit:    RLQ abdominal pain  -     US abdomen limited    Diarrhea, unspecified type  -     US abdomen limited    Hypotension, unspecified hypotension type        US RLQ ordered. To ER if symptoms worsen before US scheduled.   Hold lisinopril/hctz, hydrate well. F/u in 4-5 days for recheck.

## 2019-07-01 ENCOUNTER — OFFICE VISIT (OUTPATIENT)
Dept: FAMILY MEDICINE CLINIC | Facility: CLINIC | Age: 64
End: 2019-07-01

## 2019-07-01 VITALS
SYSTOLIC BLOOD PRESSURE: 124 MMHG | RESPIRATION RATE: 16 BRPM | HEART RATE: 109 BPM | WEIGHT: 190 LBS | DIASTOLIC BLOOD PRESSURE: 62 MMHG | HEIGHT: 69 IN | BODY MASS INDEX: 28.14 KG/M2 | OXYGEN SATURATION: 99 %

## 2019-07-01 DIAGNOSIS — R10.31 COLICKY RLQ ABDOMINAL PAIN: ICD-10-CM

## 2019-07-01 DIAGNOSIS — Z12.12 SCREENING FOR COLORECTAL CANCER: ICD-10-CM

## 2019-07-01 DIAGNOSIS — I10 ESSENTIAL HYPERTENSION: ICD-10-CM

## 2019-07-01 DIAGNOSIS — I95.1 ORTHOSTATIC HYPOTENSION: Primary | ICD-10-CM

## 2019-07-01 DIAGNOSIS — E78.2 MIXED HYPERLIPIDEMIA: ICD-10-CM

## 2019-07-01 DIAGNOSIS — Z12.11 SCREENING FOR COLORECTAL CANCER: ICD-10-CM

## 2019-07-01 PROCEDURE — 99213 OFFICE O/P EST LOW 20 MIN: CPT | Performed by: NURSE PRACTITIONER

## 2019-07-01 RX ORDER — AMLODIPINE BESYLATE 5 MG/1
TABLET ORAL
Qty: 90 TABLET | Refills: 1 | OUTPATIENT
Start: 2019-07-01

## 2019-07-01 RX ORDER — LISINOPRIL AND HYDROCHLOROTHIAZIDE 20; 12.5 MG/1; MG/1
TABLET ORAL
Qty: 90 TABLET | Refills: 1 | OUTPATIENT
Start: 2019-07-01

## 2019-07-01 RX ORDER — METOPROLOL SUCCINATE 100 MG/1
TABLET, EXTENDED RELEASE ORAL
Qty: 180 TABLET | Refills: 1 | OUTPATIENT
Start: 2019-07-01

## 2019-07-01 RX ORDER — ATORVASTATIN CALCIUM 10 MG/1
TABLET, FILM COATED ORAL
Qty: 90 TABLET | Refills: 1 | OUTPATIENT
Start: 2019-07-01

## 2019-07-01 NOTE — PROGRESS NOTES
Subjective   Han Garcia is a 63 y.o. male.     History of Present Illness   Patient presents to office for BP recheck. At last visit, his lisinopril/hctz was held due to low BP readings.  He has been checking at home and feels it has been gradually increasing since. His reading yesterday afternoon was 150s/80s.  He reports feeling overall well except for continued RLQ pain. States it is slightly improved but still present.  He has appt tomorrow with radiology at [facility].  Order will need to be changed to CT to r/o appendicitis.     Patient has declined colonoscopy thus far but finally agrees to get this done.   The following portions of the patient's history were reviewed and updated as appropriate: allergies, current medications, past family history, past medical history, past social history, past surgical history and problem list.    Review of Systems   Constitutional: Negative for chills, fatigue, fever and unexpected weight change.   Eyes: Negative for visual disturbance.   Respiratory: Negative for cough and shortness of breath.    Cardiovascular: Negative for chest pain and palpitations.   Gastrointestinal: Positive for abdominal pain and diarrhea. Negative for abdominal distention, anal bleeding, blood in stool, constipation, nausea, rectal pain and vomiting.   Skin: Negative for rash.   Neurological: Negative for dizziness, light-headedness and headaches.   Psychiatric/Behavioral: Negative for dysphoric mood and sleep disturbance. The patient is not nervous/anxious.        Objective   Physical Exam   Constitutional: He is oriented to person, place, and time. He appears well-developed and well-nourished.   Pulmonary/Chest: Effort normal.   Neurological: He is oriented to person, place, and time.   Skin: Skin is warm and dry.   Psychiatric: He has a normal mood and affect. His behavior is normal. Judgment and thought content normal.   Nursing note and vitals reviewed.      Assessment/Plan    Han was seen today for follow-up and diarrhea.    Diagnoses and all orders for this visit:    Orthostatic hypotension    Screening for colorectal cancer  -     Ambulatory Referral For Screening Colonoscopy    Colicky RLQ abdominal pain  -     CT Abdomen Pelvis With Contrast      Continue to hold lisinopril/hctz.     Check BP morning and evening at home. F/u in 2 weeks for recheck and bring BP monitor to visit.

## 2019-07-02 ENCOUNTER — HOSPITAL ENCOUNTER (OUTPATIENT)
Dept: CT IMAGING | Facility: HOSPITAL | Age: 64
Discharge: HOME | End: 2019-07-02
Admitting: NURSE PRACTITIONER

## 2019-07-02 ENCOUNTER — TELEPHONE (OUTPATIENT)
Dept: FAMILY MEDICINE CLINIC | Facility: CLINIC | Age: 64
End: 2019-07-02

## 2019-07-02 ENCOUNTER — HOSPITAL ENCOUNTER (OUTPATIENT)
Dept: ULTRASOUND IMAGING | Facility: HOSPITAL | Age: 64
End: 2019-07-02

## 2019-07-02 DIAGNOSIS — K22.89 ESOPHAGEAL MASS: ICD-10-CM

## 2019-07-02 DIAGNOSIS — K22.89 ESOPHAGEAL THICKENING: Primary | ICD-10-CM

## 2019-07-02 DIAGNOSIS — R16.0 MASS OF MULTIPLE SITES OF LIVER: ICD-10-CM

## 2019-07-02 PROCEDURE — 74177 CT ABD & PELVIS W/CONTRAST: CPT

## 2019-07-02 PROCEDURE — 25010000002 IOPAMIDOL 61 % SOLUTION: Performed by: NURSE PRACTITIONER

## 2019-07-02 PROCEDURE — 82565 ASSAY OF CREATININE: CPT

## 2019-07-02 RX ADMIN — IOPAMIDOL 85 ML: 612 INJECTION, SOLUTION INTRAVENOUS at 09:04

## 2019-07-02 NOTE — TELEPHONE ENCOUNTER
Attempted to contact patient about CT results.  No answer so left him a message to contact office back tomorrow to discuss.  He will need urgent referral to thoracic surgery and oncology.  I have placed these referrals.

## 2019-07-03 ENCOUNTER — TELEPHONE (OUTPATIENT)
Dept: FAMILY MEDICINE CLINIC | Facility: CLINIC | Age: 64
End: 2019-07-03

## 2019-07-03 DIAGNOSIS — R91.1 LUNG NODULE: Primary | ICD-10-CM

## 2019-07-03 LAB — CREAT BLDA-MCNC: 0.6 MG/DL (ref 0.6–1.3)

## 2019-07-03 NOTE — TELEPHONE ENCOUNTER
Tried calling the patient's cell phone twice.  He answered on the second phone call.  Patient was a little bit irritated that he got a call from Dr. Sanford's office early this morning.  He was not even sure what the phone call was about so he did not set up his appointment.  We discussed the results of the abnormal CT scan and emphasized to him that he needs to get that follow-up appointment.  He will call their office back on Friday and get in to see Dr. Sanford after CT scan of the chest.

## 2019-07-05 ENCOUNTER — TELEPHONE (OUTPATIENT)
Dept: FAMILY MEDICINE CLINIC | Facility: CLINIC | Age: 64
End: 2019-07-05

## 2019-07-08 ENCOUNTER — HOSPITAL ENCOUNTER (OUTPATIENT)
Dept: CT IMAGING | Facility: HOSPITAL | Age: 64
Discharge: HOME | End: 2019-07-08
Admitting: THORACIC SURGERY (CARDIOTHORACIC VASCULAR SURGERY)

## 2019-07-08 DIAGNOSIS — R91.1 LUNG NODULE: ICD-10-CM

## 2019-07-08 DIAGNOSIS — R91.1 LUNG NODULE: Primary | ICD-10-CM

## 2019-07-08 LAB — CREAT BLDA-MCNC: 0.6 MG/DL (ref 0.6–1.3)

## 2019-07-08 PROCEDURE — 82565 ASSAY OF CREATININE: CPT

## 2019-07-08 PROCEDURE — 71260 CT THORAX DX C+: CPT

## 2019-07-08 PROCEDURE — 25010000002 IOPAMIDOL 61 % SOLUTION: Performed by: NURSE PRACTITIONER

## 2019-07-08 RX ADMIN — IOPAMIDOL 75 ML: 612 INJECTION, SOLUTION INTRAVENOUS at 10:10

## 2019-07-09 ENCOUNTER — APPOINTMENT (OUTPATIENT)
Dept: ONCOLOGY | Facility: CLINIC | Age: 64
End: 2019-07-09

## 2019-07-09 ENCOUNTER — CONSULT (OUTPATIENT)
Dept: ONCOLOGY | Facility: CLINIC | Age: 64
End: 2019-07-09

## 2019-07-09 ENCOUNTER — LAB (OUTPATIENT)
Dept: LAB | Facility: HOSPITAL | Age: 64
End: 2019-07-09

## 2019-07-09 VITALS
DIASTOLIC BLOOD PRESSURE: 78 MMHG | OXYGEN SATURATION: 99 % | WEIGHT: 188.9 LBS | BODY MASS INDEX: 27.98 KG/M2 | RESPIRATION RATE: 16 BRPM | HEART RATE: 111 BPM | SYSTOLIC BLOOD PRESSURE: 117 MMHG | TEMPERATURE: 98.6 F | HEIGHT: 69 IN

## 2019-07-09 DIAGNOSIS — Z72.0 TOBACCO ABUSE: Primary | ICD-10-CM

## 2019-07-09 DIAGNOSIS — D49.0 ESOPHAGUS NEOPLASM: ICD-10-CM

## 2019-07-09 DIAGNOSIS — Z72.0 TOBACCO ABUSE: ICD-10-CM

## 2019-07-09 DIAGNOSIS — C78.7 LIVER METASTASES: Primary | ICD-10-CM

## 2019-07-09 DIAGNOSIS — E78.1 PURE HYPERGLYCERIDEMIA: Primary | ICD-10-CM

## 2019-07-09 LAB
ALBUMIN SERPL-MCNC: 3.5 G/DL (ref 3.5–5.2)
ALBUMIN/GLOB SERPL: 0.9 G/DL (ref 1.1–2.4)
ALP SERPL-CCNC: 210 U/L (ref 38–116)
ALT SERPL W P-5'-P-CCNC: 30 U/L (ref 0–41)
ANION GAP SERPL CALCULATED.3IONS-SCNC: 14.3 MMOL/L (ref 5–15)
APTT PPP: 35.6 SECONDS (ref 22.7–35.4)
AST SERPL-CCNC: 47 U/L (ref 0–40)
BASOPHILS # BLD AUTO: 0.04 10*3/MM3 (ref 0–0.2)
BASOPHILS NFR BLD AUTO: 0.3 % (ref 0–1.5)
BILIRUB SERPL-MCNC: 0.6 MG/DL (ref 0.2–1.2)
BUN BLD-MCNC: 11 MG/DL (ref 6–20)
BUN/CREAT SERPL: 17.2 (ref 7.3–30)
CALCIUM SPEC-SCNC: 9.3 MG/DL (ref 8.5–10.2)
CHLORIDE SERPL-SCNC: 96 MMOL/L (ref 98–107)
CO2 SERPL-SCNC: 25.7 MMOL/L (ref 22–29)
CREAT BLD-MCNC: 0.64 MG/DL (ref 0.7–1.3)
DEPRECATED RDW RBC AUTO: 43.1 FL (ref 37–54)
EOSINOPHIL # BLD AUTO: 0.02 10*3/MM3 (ref 0–0.4)
EOSINOPHIL NFR BLD AUTO: 0.1 % (ref 0.3–6.2)
ERYTHROCYTE [DISTWIDTH] IN BLOOD BY AUTOMATED COUNT: 14 % (ref 12.3–15.4)
ERYTHROCYTE [SEDIMENTATION RATE] IN BLOOD: 71 MM/HR (ref 0–20)
FERRITIN SERPL-MCNC: 1371.4 NG/ML (ref 30–400)
FOLATE SERPL-MCNC: 8.94 NG/ML (ref 4.78–24.2)
GFR SERPL CREATININE-BSD FRML MDRD: 127 ML/MIN/1.73
GLOBULIN UR ELPH-MCNC: 3.7 GM/DL (ref 1.8–3.5)
GLUCOSE BLD-MCNC: 100 MG/DL (ref 74–124)
HCT VFR BLD AUTO: 38.3 % (ref 37.5–51)
HGB BLD-MCNC: 12.3 G/DL (ref 13–17.7)
HGB RETIC QN AUTO: 27.2 PG (ref 29.8–36.1)
IMM GRANULOCYTES # BLD AUTO: 0.42 10*3/MM3 (ref 0–0.05)
IMM GRANULOCYTES NFR BLD AUTO: 2.7 % (ref 0–0.5)
IMM RETICS NFR: 14.4 % (ref 3–15.8)
INR PPP: 1.25 (ref 0.9–1.1)
IRON 24H UR-MRATE: 15 MCG/DL (ref 59–158)
IRON SATN MFR SERPL: 12 % (ref 14–48)
LDH SERPL-CCNC: 827 U/L (ref 99–259)
LYMPHOCYTES # BLD AUTO: 1.17 10*3/MM3 (ref 0.7–3.1)
LYMPHOCYTES NFR BLD AUTO: 7.5 % (ref 19.6–45.3)
MCH RBC QN AUTO: 27.2 PG (ref 26.6–33)
MCHC RBC AUTO-ENTMCNC: 32.1 G/DL (ref 31.5–35.7)
MCV RBC AUTO: 84.7 FL (ref 79–97)
MONOCYTES # BLD AUTO: 1.39 10*3/MM3 (ref 0.1–0.9)
MONOCYTES NFR BLD AUTO: 8.9 % (ref 5–12)
NEUTROPHILS # BLD AUTO: 12.64 10*3/MM3 (ref 1.7–7)
NEUTROPHILS NFR BLD AUTO: 80.5 % (ref 42.7–76)
NRBC BLD AUTO-RTO: 0 /100 WBC (ref 0–0.2)
PLATELET # BLD AUTO: 445 10*3/MM3 (ref 140–450)
PMV BLD AUTO: 9.1 FL (ref 6–12)
POTASSIUM BLD-SCNC: 3.8 MMOL/L (ref 3.5–4.7)
PROT SERPL-MCNC: 7.2 G/DL (ref 6.3–8)
PROTHROMBIN TIME: 15.4 SECONDS (ref 11.7–14.2)
RBC # BLD AUTO: 4.52 10*6/MM3 (ref 4.14–5.8)
RETICS/RBC NFR AUTO: 2.07 % (ref 0.7–1.9)
SODIUM BLD-SCNC: 136 MMOL/L (ref 134–145)
TIBC SERPL-MCNC: 127 MCG/DL (ref 249–505)
TRANSFERRIN SERPL-MCNC: 91 MG/DL (ref 200–360)
VIT B12 BLD-MCNC: >2000 PG/ML (ref 211–946)
WBC NRBC COR # BLD: 15.68 10*3/MM3 (ref 3.4–10.8)

## 2019-07-09 PROCEDURE — 82728 ASSAY OF FERRITIN: CPT | Performed by: INTERNAL MEDICINE

## 2019-07-09 PROCEDURE — 85046 RETICYTE/HGB CONCENTRATE: CPT | Performed by: INTERNAL MEDICINE

## 2019-07-09 PROCEDURE — 85730 THROMBOPLASTIN TIME PARTIAL: CPT | Performed by: INTERNAL MEDICINE

## 2019-07-09 PROCEDURE — 84466 ASSAY OF TRANSFERRIN: CPT | Performed by: INTERNAL MEDICINE

## 2019-07-09 PROCEDURE — 36415 COLL VENOUS BLD VENIPUNCTURE: CPT | Performed by: INTERNAL MEDICINE

## 2019-07-09 PROCEDURE — 85652 RBC SED RATE AUTOMATED: CPT | Performed by: INTERNAL MEDICINE

## 2019-07-09 PROCEDURE — 82746 ASSAY OF FOLIC ACID SERUM: CPT | Performed by: INTERNAL MEDICINE

## 2019-07-09 PROCEDURE — 99245 OFF/OP CONSLTJ NEW/EST HI 55: CPT | Performed by: INTERNAL MEDICINE

## 2019-07-09 PROCEDURE — 85610 PROTHROMBIN TIME: CPT | Performed by: INTERNAL MEDICINE

## 2019-07-09 PROCEDURE — 83540 ASSAY OF IRON: CPT | Performed by: INTERNAL MEDICINE

## 2019-07-09 PROCEDURE — 83615 LACTATE (LD) (LDH) ENZYME: CPT | Performed by: INTERNAL MEDICINE

## 2019-07-09 PROCEDURE — 36416 COLLJ CAPILLARY BLOOD SPEC: CPT | Performed by: INTERNAL MEDICINE

## 2019-07-09 PROCEDURE — 85025 COMPLETE CBC W/AUTO DIFF WBC: CPT | Performed by: INTERNAL MEDICINE

## 2019-07-09 PROCEDURE — 80053 COMPREHEN METABOLIC PANEL: CPT | Performed by: INTERNAL MEDICINE

## 2019-07-09 PROCEDURE — 82607 VITAMIN B-12: CPT | Performed by: INTERNAL MEDICINE

## 2019-07-09 NOTE — PROGRESS NOTES
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      .    History of Present Illness This patient is a 63-year-old white male who has been referred to us because he has had significant fatigue and almost 3 pounds weight loss since the time that he developed pneumonia several weeks ago. He was having also recently decrease in appetite and early satiety with no dysphagia or regurgitation or choking. Given these findings the patient was seen by nurse practitioner and subsequently by Neda Hoover MD who requested for the patient to have a CT scan of the abdomen that documented extensive liver metastasis. This also documented very dramatic thickening of the lower third of the esophagus and CT scan of the chest was done documenting similar findings. The patient was scheduled to be seen by me today and also to be seen by Mary Brito MD tomorrow. The patient besides the fatigue has had pain in the right upper quadrant of the abdomen intermittently mild to moderate in intensity not requiring any pain medicine. Recently the patient has developed hypotension and he required stopping all of his blood pressure medications. The patient denies any sputum production at this time but he has an element of shortness of breath upon exercise. He also has symptoms that suggest peripheral arterial disease. The patient has had loose bowel movements since the time that he took Ampicillin for his pneumonia and he has had some urethral discharge that very likely represents a form of urethritis. The patient has not had any fevers or chills. He has not had any rashes in the skin, no  adenopathies and no bone pain. He denies any neurological symptomatology. He has become hard of hearing because of dramatic amount of wax accumulation in the ear canals.         Past Medical History:   Diagnosis Date   • Elevated PSA    • H/O Lung nodule    • Hepatitis    • History of pneumonia    • Hyperlipidemia    • Hypertension    • Prostate cancer (CMS/HCC)         Past Surgical History:   Procedure Laterality Date   • HERNIA REPAIR  1999   • PROSTATE SURGERY  03/2008        Current Outpatient Medications on File Prior to Visit   Medication Sig Dispense Refill   • [DISCONTINUED] aspirin 81 MG tablet Take 81 mg by mouth daily.     • [DISCONTINUED] atorvastatin (LIPITOR) 10 MG tablet Take 1 tablet by mouth Every Night for 180 days. For cholesterol 90 tablet 1   • [DISCONTINUED] lisinopril-hydrochlorothiazide (PRINZIDE,ZESTORETIC) 20-12.5 MG per tablet Take 0.5 tablets by mouth Daily. 90 tablet 1   • [DISCONTINUED] Probiotic Product (PROBIOTIC PEARLS) capsule Take 1 capsule by mouth Daily. 30 capsule 0     No current facility-administered medications on file prior to visit.         ALLERGIES:  No Known Allergies     Social History     Socioeconomic History   • Marital status: Single     Spouse name: Not on file   • Number of children: Not on file   • Years of education: Not on file   • Highest education level: Not on file   Occupational History   • Occupation: [employer]     Employer: [email or web address]   Tobacco Use   • Smoking status: Current Every Day Smoker     Packs/day: 1.50     Years: 40.00     Pack years: 60.00     Types: Cigarettes   • Smokeless tobacco: Never Used   Substance and Sexual Activity   • Alcohol use: Yes     Alcohol/week: 16.8 oz     Types: 28 Cans of beer per week     Comment: 4 beers a day        Family History   Problem Relation Age of Onset   • Hypertension Mother    • Stroke Mother    • Hypertension Other    • Lung disease Other    • Prostate cancer Other         Review of Systems        General: no fever, no chills,  Fatigue,negative 30 lb weight changes,  lack of appetite.  Eyes: no epiphora, xerophthalmia,conjunctivitis, pain, glaucoma, blurred vision, blindness, secretion, photophobia, proptosis, diplopia.  Ears: no otorrhea, tinnitus, otorrhagia, deafness, pain, vertigo.  Nose: no rhinorrhea, no epistaxis, no alteration in perception of odors, no sinuses pressure.  Mouth: no alteration in gums or teeth,  No ulcers, no difficulty with mastication or deglut ion, no odynophagia.  Neck: no masses or pain, no thyroid alterations, no pain in muscles or arteries, no carotid odynia, no crepitation.  Respiratory: no cough, no sputum production,no dyspnea,no trepopnea, no pleuritic pain,no hemoptysis.  Heart: no syncope, no irregularity, no palpitations, no angina,no orthopnea,no paroxysmal nocturnal dyspnea.  Vascular Venous: no tenderness,no edema,no palpable cords,no postphlebitic syndrome, no skin changes no ulcerations.  Vascular Arterial: no distal ischemia, noclaudication, no gangrene, no neuropathic ischemic pain, no skin ulcers, no paleness no cyanosis.  GI: no dysphagia, no odynophagia, no regurgitation, no heartburn,no indigestion,no nausea,no vomiting,no hematemesis ,no melena,no jaundice,no distention, no obstipation,no enterorrhagia,no proctalgia,no anal  lesions, STATED changes in bowel habits.EARLY SATIETY  :  frequency, no hesitancy, no hematuria, STATED discharge,AND  pain.  Musculoskeletal: no muscle or tendon pain or inflammation,no  joint pain, no edema, no functional limitation,no fasciculations, no mass.  Neurologic: no headache, no seizures, noalterations on Craneal nerves, no motor deficit, no sensory deficit, normal coordination, no alteration in memory,normal orientation, calculation,normal writting, verbal and written language.  Skin: no rashes,no pruritus no localized lesions.  Psychiatric: no anxiety, no depression,no agitation, no delusions, proper  "insight.      Objective     Vitals:    07/09/19 1248   BP: 117/78   Pulse: 111   Resp: 16   Temp: 98.6 °F (37 °C)   SpO2: 99%   Weight: 85.7 kg (188 lb 14.4 oz)   Height: 174 cm (68.5\")   PainSc:   7   PainLoc: Comment: rt. side at times     Current Status 7/9/2019   ECOG score 0       Physical Exam    GENERAL:  Well-developed, well-nourished  Patient  in no acute distress.   SKIN:  Warm, dry ,NO rashes,NO purpura ,NO petechiae.  HEENT:  Pupils were equal and reactive to light and accomodation, conjunctivas non injected, no pterigion, normal extraocular movements, normal visual acuity.   Mouth mucosa was moist, no exudates in oropharynx, normal gum line, normal roof of the mouth and pillars, normal papillations of the tongue.EAR CANALS FULL OF WAX NO VISIBLE TM  NECK:  Supple with good range of motion; no thyromegaly or masses, no JVD or bruits, no cervical adenopathies.No carotid arteries pain, no carotid abnormal pulsation , NO arterial dance.  LYMPHATICS:  No cervical, NO supraclavicular, NO axillary,NO epitrochlear , NO inguinal adenopathy.  CHEST:  Normal excursion of both kailash thoraces, normal voice fremitus, no subcutaneous emphysema, normal axillas, no rashes or acanthosis nigricans. Lungs clear to percussion and auscultation, normal breath sounds bilaterally, no wheezing,NO crackles NO ronchi, NO stridor, NO rubs.  CARDIAC AND VASCULAR:  normal rate and regular rhythm, without murmurs,NO rubs NO S3 NO S4 right or left . Normal femoral, popliteal, pedis, brachial and carotid pulses.  ABDOMEN:  Soft, nontender with LIVER NODULAR TENDER 8 CM BRCM organomegaly NO OTHER masses, no ascites, no collateral circulation,no distention,no Brandon sign, MILD abdominal pain, no inguinal hernias,no umbilical hernia, no abdominal bruits. Normal bowel sounds.  GENITAL: Not  Performed.  EXTREMITIES  AND SPINE:  SEVERE FINGER clubbing, NO cyanosis or edema, no deformities or pain .No kyphosis, scoliosis, deformities or pain in " spine, ribs or pelvic bone.POOR CIRCULATION IN FEET MINIMAL PULSES POSTERIOR TIBIALIS AND PEDIS  NEUROLOGICAL:  Patient was awake, alert, oriented to time, person and place.          RECENT LABS:  Hematology WBC   Date Value Ref Range Status   07/09/2019 15.68 (H) 3.40 - 10.80 10*3/mm3 Final   02/02/2019 13.4 (H) 3.4 - 10.8 x10E3/uL Final     RBC   Date Value Ref Range Status   07/09/2019 4.52 4.14 - 5.80 10*6/mm3 Final   02/02/2019 4.81 4.14 - 5.80 x10E6/uL Final     Hemoglobin   Date Value Ref Range Status   07/09/2019 12.3 (L) 13.0 - 17.7 g/dL Final     Hematocrit   Date Value Ref Range Status   07/09/2019 38.3 37.5 - 51.0 % Final     Platelets   Date Value Ref Range Status   07/09/2019 445 140 - 450 10*3/mm3 Final      CT CHEST W CONTRAST-     Radiation dose reduction techniques were utilized, including automated  exposure control and exposure modulation based on body size.     CLINICAL: 63-year-old male with possible esophageal malignancy,  pulmonary nodule, lymphadenopathy and liver lesions.     FINDINGS: There is a 10 cm long segment of distal esophagus which  demonstrates thickening and luminal irregularity. This process does not  appear to extend into the stomach. At one point the wall demonstrates a  focal mass-like area with displacement of the lumen towards the left.  The findings are most worrisome for distal esophageal neoplasm,  infectious/inflammatory process is felt to be less likely.     On image #254 there is a periesophageal lymph node which measures 14 mm.  There are other smaller distal periesophageal lymph nodes with the  largest of these 9 mm.     Located within the right lower lobe on image #268 is an 8 mm  noncalcified pulmonary nodule. There are 2 tiny pleural plaques  demonstrated on the left major fissure, measuring only a few millimeters  in length each, seen on images 178 and 185. The lungs are otherwise  clear. There is bullae formation consistent with emphysema. No  pleural  effusion.     Limited images through the upper abdomen demonstrate numerous liver  lesions.     No lytic or sclerotic bone lesion.     CONCLUSION:  1. Concentric as well as eccentric wall thickening involving the distal  10 cm of the esophagus, just above the GE junction. Findings most  worrisome for esophageal neoplasm.  2. Tiny right lower lobe 8 mm pulmonary nodule and 2 tiny pleural  plaques seen on the left major fissure. These are indeterminant.  3. Paraesophageal lymph nodes adjacent to the distal esophagus.        This report was finalized on 7/9/2019 11:03 AM by Dr. Thom Godinez M.D.     CT ABDOMEN AND PELVIS WITH IV CONTRAST     HISTORY: 63-year-old male with right lower quadrant pain. Diarrhea.  Prostate cancer history.     TECHNIQUE: Radiation dose reduction techniques were utilized, including  automated exposure control and exposure modulation based on body size.   3 mm images were obtained through the abdomen and pelvis after the  administration of IV contrast. There is no previous CT for comparison.     FINDINGS: There is masslike circumferential thickening of the distal  esophagus and there are multiple enlarged nodes adjacently. There are  many variable sized metastases scattered throughout the liver. There is  a confluent lesion in the right hepatic lobe which is best seen in its  entirety on the coronal reconstruction series and measures approximately  12 x 6 cm. The gallbladder appears unremarkable and there is no biliary  dilatation. The spleen, pancreas, adrenals, and kidneys appear  unremarkable. There is extensive colonic diverticulosis without evidence  for diverticulitis. The appendix appears normal. There is a tiny amount  of free fluid within the dependent aspect of the pelvis. There are  extensive atherosclerotic changes throughout the abdominal aorta with  luminal narrowing. Within the visualized lower lung fields, there is an  8 mm right lower lobe pulmonary nodule.      IMPRESSION:  1. There is extensive liver metastases and the primary malignancy is  suspected to be at the distal esophagus. There is masslike  circumferential thickening of the distal esophagus with surrounding  lymphadenopathy. The 8 mm right basilar pulmonary nodule is suspected to  represent a metastasis.  2. There is extensive colonic diverticulosis without evidence for  diverticulitis and there is no appendicitis.     Discussed with NIRU Hernandez.     This report was finalized on 7/3/2019 11:05 AM by Dr. Almaz Rutledge M.D.         Assessment/Plan  In summary this patient is a 63-year-old white male who has been a chronic smoker who has had early satiety for several weeks associated with this lack of appetite, 30 pound weight loss and associated with this dry hacking cough. He had pneumonia not too long ago and he never recovered from this completely with the exception of the time that he was receiving some prednisone for the pneumonia. In any event the patient has developed now some abdominal pain in the right upper quadrant of the abdomen intermittently and he also is experiencing significant degree of fatigue. Given the persistency of the symptomatology a CT scan of the abdomen documented extensive liver metastasis and a very dramatic thickening of the wall of the esophagus in the gastroesophageal junction. A CT scan of the chest also documented very dramatic thickening of the wall of the esophagus in the last 10 cm of the intrathoracic esophagus. There is evidence of mediastinal adenopathies. Spleen, pancreas, kidneys were unremarkable. Bone windows were unremarkable.     The patient has history of prostate cancer in the past. He had prostatectomy, never required any other intervention. His PSA has remained negative normal for years and years. Prostate cancer has nothing to do with his problem.     The patient is a chronic smoker of close to 2 packs of cigarettes a day. He is still smoking heavily and  he has a component of COPD.    The other phenomenon on clinical grounds is that the patient has obvious changes in his legs consistent with peripheral arterial disease. He has very heavy calcification of the abdominal aorta and the iliac arteries. This is not surprising. He probably has an element of claudication.    RECOMMENDATIONS:   1. For cancer related pain given liver enlargement and metastasis the patient will not require any medicine so far but I pointed out to him that it will be okay for him to take a Tylenol here and there depending on needs.  2. I have placed a phone call to talk with Mary Brito MD, Thoracic Surgeon who will see the patient in consultation tomorrow. The question for her will be to proceed with the placement of a port for palliative chemotherapy administration and 2nd if she wants to pursue an upper GI endoscopy to take a tissue diagnosis from the esophagus. If the endoscopy is not her choice we will be glad to proceed with a CT guided liver biopsy.     After the biopsy is done and pathological diagnosis is confirmed of malignancy the patient will proceed with a PET scan.    I sent him back to the lab today to proceed with a chemistry profile, PT, PTT.  3. The patient has leukocytosis. Leads me to believe that this is a leukemoid reaction related to malignancy and I would not be surprised if what we get to see is a squamous cell carcinoma.   4. The patient has anemia. His hemoglobin has dropped 3 g and this is probably consistent with anemia neoplastic disease and why not blood loss. Anemia workup will be initiated today.    I discussed all of these facts with the patient, we will review the CT scan in the PAC system [facility] in detail including liver metastasis and esophageal thickening. We discussed all of the facts with his 2 sisters.    The patient has social isolation, he works for [employer], he lives by himself, he has no children, he is  and he is going to  require quite an amount of support in the future in regard to disability, time out of work for [employer] and so forth as well as rides to come and receive his medicines. He will require  assessment during the next visit.     Finally the patient keeps smoking. I have suggested for him smoking cessation and a referral will be placed for such.    The patient was ready to proceed after a lengthy conversation with him and his sisters.

## 2019-07-10 ENCOUNTER — DOCUMENTATION (OUTPATIENT)
Dept: NUTRITION | Facility: HOSPITAL | Age: 64
End: 2019-07-10

## 2019-07-10 ENCOUNTER — OFFICE VISIT (OUTPATIENT)
Dept: OTHER | Facility: HOSPITAL | Age: 64
End: 2019-07-10

## 2019-07-10 ENCOUNTER — PREP FOR SURGERY (OUTPATIENT)
Dept: SURGERY | Facility: CLINIC | Age: 64
End: 2019-07-10

## 2019-07-10 VITALS
DIASTOLIC BLOOD PRESSURE: 76 MMHG | HEIGHT: 69 IN | SYSTOLIC BLOOD PRESSURE: 124 MMHG | BODY MASS INDEX: 27.8 KG/M2 | TEMPERATURE: 97.2 F | OXYGEN SATURATION: 96 % | WEIGHT: 187.7 LBS | RESPIRATION RATE: 16 BRPM | HEART RATE: 114 BPM

## 2019-07-10 DIAGNOSIS — C15.9 MALIGNANT NEOPLASM OF ESOPHAGUS, UNSPECIFIED LOCATION (HCC): Primary | ICD-10-CM

## 2019-07-10 DIAGNOSIS — C15.5 MALIGNANT NEOPLASM OF LOWER THIRD OF ESOPHAGUS (HCC): Primary | ICD-10-CM

## 2019-07-10 DIAGNOSIS — C78.7 LIVER METASTASES: ICD-10-CM

## 2019-07-10 PROCEDURE — 99204 OFFICE O/P NEW MOD 45 MIN: CPT | Performed by: THORACIC SURGERY (CARDIOTHORACIC VASCULAR SURGERY)

## 2019-07-10 PROCEDURE — G0463 HOSPITAL OUTPT CLINIC VISIT: HCPCS

## 2019-07-10 NOTE — PROGRESS NOTES
"Oncology Nutrition     Patient Name:  Han Garcia  YOB: 1955  MRN: [id number]  Date:  07/10/19  Physician:  Dallas Brito    Type of Cancer Treatment:   To be determined, most likely chemotherapy and possible radiation    Patient Active Problem List   Diagnosis   • Essential hypertension   • Hyperlipidemia   • Impaired fasting glucose   • History of prostate cancer   • Tobacco abuse   • Liver metastases (CMS/HCC)   • Esophagus neoplasm   • Malignant neoplasm of esophagus (CMS/HCC)       No current outpatient medications on file.     No current facility-administered medications for this visit.        Glycemic Risk:   N/a, h/o elevated fasting glucose level    Weight:   Height: 68.5\"  Weight: 187 lbs.  Usual Body Weight: 187 lbs.   BMI: 28.1   Weight has decreased 30 pounds over last few months    Oral Food Intake:  Regular Diet - No Restrictions  Compared to normal intake, current food intake is less than normal    Hydration Status:   How many 8 ounce glass of water of fluid do you drink per day?  8    Enteral Feeding:   n/a    Nutrition Symptoms:   Altered Apetite    Activity:   Normal with no limitations    Needs: 4587-3686 calories,  grams protein, 2300cc fluid     reports that he has been smoking cigarettes.  He has a 60.00 pack-year smoking history. He has never used smokeless tobacco. He reports that he drinks about 16.8 oz of alcohol per week.    Evaluation of Nutritional Risk:   Patient identified to be at nutritional risk and/or for nutritional consultation; will follow patient through course of treatment.   Diagnosis  Weight Change  Nutrition Impact Symptoms  Patient Education     Met with patient and his sister in lung clinic today. He reports a weight loss of 30# in the last few months which has stabilized. He states that he has not had an appetite. He has been essentially forcing himself to eat. He is not having any difficulty swallowing.   He will have a port placed and will " receive chemotherapy, possible radiation.  I reviewed his current intake. He lives alone and cooks, shops for himself. I suggested that he eat snacks between meals and gave him examples. I also gave him samples of CIB and Boost plus to try to use between meals, emphasizing frequent intake of high calorie high protein foods, hydration.   I provided him with a booklet on nutrition during cancer treatment. I will plan to follow up with him during his treatment. Encouraged him to call with any questions.         Electronically signed by:  Vale Amado RD, LD  1:35 PM

## 2019-07-10 NOTE — H&P (VIEW-ONLY)
Subjective   Patient ID: Han Garcia is a 63 y.o. male is being seen for consultation today at the request of Jaiden Hoover MD    History of Present Illness  Dear Colleague,  Han Garcia was seen in our multidisciplinary cancer clinic today in consultation for a newly diagnosed liver metastasis of esophageal thickening on CT scan.  Mr. Sharma is a pleasant 63-year-old gentleman who reports approximately a 25 pound weight loss over the past 3 to 4 months.  He also reports some epigastric pain, decreased appetite, weakness.  He denies significant reflux history or dysphasia.  He states that he recognizes that his weight loss is associated with his loss of appetite and that no food really appeals to him.    The patient is able to perform all of his activities of daily living.  He is able to walk up a flight of stairs without significant shortness of breath.    The patient is a current smoker and has an approximately 60-pack-year history of tobacco abuse.  He has a personal history of prostate cancer status post resection in 2008.  He has a family history of lung cancer in his father who was a smoker.    The following portions of the patient's history were reviewed and updated as appropriate: allergies, current medications, past family history, past medical history, past social history, past surgical history and problem list.  Review of Systems   Constitution: Positive for decreased appetite and weakness.   HENT: Positive for hearing loss.    Eyes: Negative.    Cardiovascular: Negative.    Respiratory: Positive for cough, shortness of breath and snoring.    Endocrine: Positive for heat intolerance.   Hematologic/Lymphatic: Negative.    Skin: Negative.    Musculoskeletal: Positive for arthritis and back pain.   Gastrointestinal: Positive for abdominal pain, change in bowel habit and diarrhea.   Genitourinary: Positive for bladder incontinence and frequency.   Psychiatric/Behavioral: Negative.     Allergic/Immunologic: Negative.    All other systems reviewed and are negative.    Patient Active Problem List   Diagnosis   • Essential hypertension   • Hyperlipidemia   • Impaired fasting glucose   • History of prostate cancer   • Tobacco abuse   • Liver metastases (CMS/HCC)   • Esophagus neoplasm   • Malignant neoplasm of esophagus (CMS/HCC)     Past Medical History:   Diagnosis Date   • Elevated PSA    • Esophageal mass    • H/O Lung nodule    • Hepatitis    • History of pneumonia    • Hyperlipidemia    • Hypertension    • Prostate cancer (CMS/HCC)      Past Surgical History:   Procedure Laterality Date   • HERNIA REPAIR  1999   • PROSTATE SURGERY  03/2008     Family History   Problem Relation Age of Onset   • Hypertension Mother    • Stroke Mother    • Hypertension Other    • Lung disease Other    • Prostate cancer Other      Social History     Socioeconomic History   • Marital status: Single     Spouse name: Not on file   • Number of children: Not on file   • Years of education: Not on file   • Highest education level: Not on file   Occupational History   • Occupation: [employer]     Employer: [email or web address]   Tobacco Use   • Smoking status: Current Every Day Smoker     Packs/day: 1.50     Years: 40.00     Pack years: 60.00     Types: Cigarettes   • Smokeless tobacco: Never Used   Substance and Sexual Activity   • Alcohol use: Yes     Alcohol/week: 16.8 oz     Types: 28 Cans of beer per week     Comment: 4 beers a day     No current outpatient medications on file.  No Known Allergies     Objective   Vitals:    07/10/19 1137   BP: 124/76   Pulse: 114   Resp: 16   Temp: 97.2 °F (36.2 °C)   SpO2: 96%     Physical Exam   Constitutional: He is oriented to person, place, and time. He appears well-developed and well-nourished.   HENT:   Head: Normocephalic and atraumatic.   Nose: Nose normal.   Mouth/Throat: Oropharynx is clear and moist.   Eyes: Conjunctivae and EOM are normal. Pupils are equal, round, and reactive  to light.   Neck: Normal range of motion. Neck supple.   Cardiovascular: Normal rate, regular rhythm, normal heart sounds and intact distal pulses.   Pulmonary/Chest: Effort normal and breath sounds normal.   Abdominal: Soft. Bowel sounds are normal.   Musculoskeletal: Normal range of motion.   Neurological: He is alert and oriented to person, place, and time.   Skin: Skin is warm and dry. Capillary refill takes less than 2 seconds.   Psychiatric: He has a normal mood and affect. His behavior is normal. Judgment and thought content normal.   Nursing note and vitals reviewed.    Independent Review of Radiographic Studies:    I have independently reviewed the CT chest and abdomen performed on 7/8/2019 and 7/2/2019.  There is wall thickening of the distal 10 cm of the esophagus just above the GE junction.  There are small pulmonary nodules less than a centimeter in the right lower lobe.  There are multiple liver metastases.  There is some concerning lymphadenopathy associated with the distal esophagus.  Assessment/Plan   Mr. Sharma is a pleasant 63-year-old gentleman with what appears to be a stage IV esophageal malignancy.  He will need a tissue diagnosis, which will likely most be easily obtained with a upper endoscopy with biopsy of the esophagus.  He will also need a port placement to facilitate treatment with chemotherapy for his stage IV disease.  We discussed the risks and benefits of both EGD with biopsy and port placement today and will plan this on 7/16/2019.    He continues to lose weight and is having trouble with his appetite.  I have recommended that he see our dietitian, Vale, to discuss ways to improve his caloric intake.      Diagnoses and all orders for this visit:    Malignant neoplasm of lower third of esophagus (CMS/HCC)  -     Ambulatory Referral to Multi-Disciplinary Clinic    Liver metastases (CMS/HCC)  -     Ambulatory Referral to Multi-Disciplinary Clinic

## 2019-07-10 NOTE — PROGRESS NOTES
Subjective   Patient ID: Han Garcia is a 63 y.o. male is being seen for consultation today at the request of Jaiden Hoover MD    History of Present Illness  Dear Colleague,  Han Garcia was seen in our multidisciplinary cancer clinic today in consultation for a newly diagnosed liver metastasis of esophageal thickening on CT scan.  Mr. Sharma is a pleasant 63-year-old gentleman who reports approximately a 25 pound weight loss over the past 3 to 4 months.  He also reports some epigastric pain, decreased appetite, weakness.  He denies significant reflux history or dysphasia.  He states that he recognizes that his weight loss is associated with his loss of appetite and that no food really appeals to him.    The patient is able to perform all of his activities of daily living.  He is able to walk up a flight of stairs without significant shortness of breath.    The patient is a current smoker and has an approximately 60-pack-year history of tobacco abuse.  He has a personal history of prostate cancer status post resection in 2008.  He has a family history of lung cancer in his father who was a smoker.    The following portions of the patient's history were reviewed and updated as appropriate: allergies, current medications, past family history, past medical history, past social history, past surgical history and problem list.  Review of Systems   Constitution: Positive for decreased appetite and weakness.   HENT: Positive for hearing loss.    Eyes: Negative.    Cardiovascular: Negative.    Respiratory: Positive for cough, shortness of breath and snoring.    Endocrine: Positive for heat intolerance.   Hematologic/Lymphatic: Negative.    Skin: Negative.    Musculoskeletal: Positive for arthritis and back pain.   Gastrointestinal: Positive for abdominal pain, change in bowel habit and diarrhea.   Genitourinary: Positive for bladder incontinence and frequency.   Psychiatric/Behavioral: Negative.     Allergic/Immunologic: Negative.    All other systems reviewed and are negative.    Patient Active Problem List   Diagnosis   • Essential hypertension   • Hyperlipidemia   • Impaired fasting glucose   • History of prostate cancer   • Tobacco abuse   • Liver metastases (CMS/HCC)   • Esophagus neoplasm   • Malignant neoplasm of esophagus (CMS/HCC)     Past Medical History:   Diagnosis Date   • Elevated PSA    • Esophageal mass    • H/O Lung nodule    • Hepatitis    • History of pneumonia    • Hyperlipidemia    • Hypertension    • Prostate cancer (CMS/HCC)      Past Surgical History:   Procedure Laterality Date   • HERNIA REPAIR  1999   • PROSTATE SURGERY  03/2008     Family History   Problem Relation Age of Onset   • Hypertension Mother    • Stroke Mother    • Hypertension Other    • Lung disease Other    • Prostate cancer Other      Social History     Socioeconomic History   • Marital status: Single     Spouse name: Not on file   • Number of children: Not on file   • Years of education: Not on file   • Highest education level: Not on file   Occupational History   • Occupation: [employer]     Employer: [employer]   Tobacco Use   • Smoking status: Current Every Day Smoker     Packs/day: 1.50     Years: 40.00     Pack years: 60.00     Types: Cigarettes   • Smokeless tobacco: Never Used   Substance and Sexual Activity   • Alcohol use: Yes     Alcohol/week: 16.8 oz     Types: 28 Cans of beer per week     Comment: 4 beers a day     No current outpatient medications on file.  No Known Allergies     Objective   Vitals:    07/10/19 1137   BP: 124/76   Pulse: 114   Resp: 16   Temp: 97.2 °F (36.2 °C)   SpO2: 96%     Physical Exam   Constitutional: He is oriented to person, place, and time. He appears well-developed and well-nourished.   HENT:   Head: Normocephalic and atraumatic.   Nose: Nose normal.   Mouth/Throat: Oropharynx is clear and moist.   Eyes: Conjunctivae and EOM are normal. Pupils are equal, round, and reactive  to light.   Neck: Normal range of motion. Neck supple.   Cardiovascular: Normal rate, regular rhythm, normal heart sounds and intact distal pulses.   Pulmonary/Chest: Effort normal and breath sounds normal.   Abdominal: Soft. Bowel sounds are normal.   Musculoskeletal: Normal range of motion.   Neurological: He is alert and oriented to person, place, and time.   Skin: Skin is warm and dry. Capillary refill takes less than 2 seconds.   Psychiatric: He has a normal mood and affect. His behavior is normal. Judgment and thought content normal.   Nursing note and vitals reviewed.    Independent Review of Radiographic Studies:    I have independently reviewed the CT chest and abdomen performed on 7/8/2019 and 7/2/2019.  There is wall thickening of the distal 10 cm of the esophagus just above the GE junction.  There are small pulmonary nodules less than a centimeter in the right lower lobe.  There are multiple liver metastases.  There is some concerning lymphadenopathy associated with the distal esophagus.  Assessment/Plan   Mr. Sharma is a pleasant 63-year-old gentleman with what appears to be a stage IV esophageal malignancy.  He will need a tissue diagnosis, which will likely most be easily obtained with a upper endoscopy with biopsy of the esophagus.  He will also need a port placement to facilitate treatment with chemotherapy for his stage IV disease.  We discussed the risks and benefits of both EGD with biopsy and port placement today and will plan this on 7/16/2019.    He continues to lose weight and is having trouble with his appetite.  I have recommended that he see our dietitian, Vale, to discuss ways to improve his caloric intake.      Diagnoses and all orders for this visit:    Malignant neoplasm of lower third of esophagus (CMS/HCC)  -     Ambulatory Referral to Multi-Disciplinary Clinic    Liver metastases (CMS/HCC)  -     Ambulatory Referral to Multi-Disciplinary Clinic

## 2019-07-11 ENCOUNTER — TELEPHONE (OUTPATIENT)
Dept: FAMILY MEDICINE CLINIC | Facility: CLINIC | Age: 64
End: 2019-07-11

## 2019-07-11 NOTE — PATIENT INSTRUCTIONS
Pt seen by Dr. Brito and will be scheduled for an EGD and port placement. Pt informed Tanika would call him the information of his appointment date and time. Pt given NCCN  patient education quick guide for Esophageal Cancer and instructed to call nurse navigator with any questions or concerns. Pt given contact cards for Dr. Brito and nurse navigator.

## 2019-07-11 NOTE — TELEPHONE ENCOUNTER
Spoke with samson and stated the oncologist( since his cancer was confirmed ) will take over his [place] paper to disregard  Any fax.  Let pt know I will notified Dr. Carlota Gunderson .( Because fax was sent to her)

## 2019-07-15 ENCOUNTER — HOSPITAL ENCOUNTER (OUTPATIENT)
Dept: PET IMAGING | Facility: HOSPITAL | Age: 64
Discharge: HOME | End: 2019-07-15
Admitting: INTERNAL MEDICINE

## 2019-07-15 ENCOUNTER — HOSPITAL ENCOUNTER (OUTPATIENT)
Dept: PET IMAGING | Facility: HOSPITAL | Age: 64
Discharge: HOME | End: 2019-07-15

## 2019-07-15 ENCOUNTER — PREP FOR SURGERY (OUTPATIENT)
Dept: OTHER | Facility: HOSPITAL | Age: 64
End: 2019-07-15

## 2019-07-15 DIAGNOSIS — Z72.0 TOBACCO ABUSE: ICD-10-CM

## 2019-07-15 DIAGNOSIS — C78.7 LIVER METASTASES: ICD-10-CM

## 2019-07-15 DIAGNOSIS — D49.0 ESOPHAGUS NEOPLASM: ICD-10-CM

## 2019-07-15 DIAGNOSIS — C15.9 PRIMARY ESOPHAGEAL MALIGNANCY (HCC): Primary | ICD-10-CM

## 2019-07-15 LAB — GLUCOSE BLDC GLUCOMTR-MCNC: 111 MG/DL (ref 70–130)

## 2019-07-15 PROCEDURE — 82962 GLUCOSE BLOOD TEST: CPT

## 2019-07-15 PROCEDURE — A9552 F18 FDG: HCPCS | Performed by: INTERNAL MEDICINE

## 2019-07-15 PROCEDURE — 0 FLUDEOXYGLUCOSE F18 SOLUTION: Performed by: INTERNAL MEDICINE

## 2019-07-15 PROCEDURE — 78815 PET IMAGE W/CT SKULL-THIGH: CPT

## 2019-07-15 RX ORDER — SODIUM CHLORIDE 0.9 % (FLUSH) 0.9 %
3 SYRINGE (ML) INJECTION EVERY 12 HOURS SCHEDULED
Status: CANCELLED | OUTPATIENT
Start: 2019-07-16

## 2019-07-15 RX ORDER — SODIUM CHLORIDE 0.9 % (FLUSH) 0.9 %
3-10 SYRINGE (ML) INJECTION AS NEEDED
Status: CANCELLED | OUTPATIENT
Start: 2019-07-16

## 2019-07-15 RX ORDER — CEFAZOLIN SODIUM 2 G/100ML
2 INJECTION, SOLUTION INTRAVENOUS ONCE
Status: CANCELLED | OUTPATIENT
Start: 2019-07-16 | End: 2019-07-15

## 2019-07-15 RX ADMIN — FLUDEOXYGLUCOSE F18 1 DOSE: 300 INJECTION INTRAVENOUS at 09:59

## 2019-07-16 ENCOUNTER — HOSPITAL ENCOUNTER (OUTPATIENT)
Facility: HOSPITAL | Age: 64
Setting detail: HOSPITAL OUTPATIENT SURGERY
Discharge: HOME | End: 2019-07-16
Attending: THORACIC SURGERY (CARDIOTHORACIC VASCULAR SURGERY) | Admitting: THORACIC SURGERY (CARDIOTHORACIC VASCULAR SURGERY)

## 2019-07-16 ENCOUNTER — APPOINTMENT (OUTPATIENT)
Dept: GENERAL RADIOLOGY | Facility: HOSPITAL | Age: 64
End: 2019-07-16

## 2019-07-16 ENCOUNTER — ANESTHESIA EVENT (OUTPATIENT)
Dept: PERIOP | Facility: HOSPITAL | Age: 64
End: 2019-07-16

## 2019-07-16 ENCOUNTER — ANESTHESIA (OUTPATIENT)
Dept: PERIOP | Facility: HOSPITAL | Age: 64
End: 2019-07-16

## 2019-07-16 VITALS
TEMPERATURE: 97.8 F | DIASTOLIC BLOOD PRESSURE: 70 MMHG | RESPIRATION RATE: 16 BRPM | OXYGEN SATURATION: 97 % | WEIGHT: 187.13 LBS | HEART RATE: 94 BPM | HEIGHT: 69 IN | BODY MASS INDEX: 27.72 KG/M2 | SYSTOLIC BLOOD PRESSURE: 101 MMHG

## 2019-07-16 DIAGNOSIS — C15.9 MALIGNANT NEOPLASM OF ESOPHAGUS, UNSPECIFIED LOCATION (HCC): ICD-10-CM

## 2019-07-16 DIAGNOSIS — C15.9 PRIMARY ESOPHAGEAL MALIGNANCY (HCC): ICD-10-CM

## 2019-07-16 PROCEDURE — 43239 EGD BIOPSY SINGLE/MULTIPLE: CPT | Performed by: THORACIC SURGERY (CARDIOTHORACIC VASCULAR SURGERY)

## 2019-07-16 PROCEDURE — 88341 IMHCHEM/IMCYTCHM EA ADD ANTB: CPT | Performed by: THORACIC SURGERY (CARDIOTHORACIC VASCULAR SURGERY)

## 2019-07-16 PROCEDURE — C1788 PORT, INDWELLING, IMP: HCPCS | Performed by: THORACIC SURGERY (CARDIOTHORACIC VASCULAR SURGERY)

## 2019-07-16 PROCEDURE — 25010000002 PROPOFOL 10 MG/ML EMULSION: Performed by: ANESTHESIOLOGY

## 2019-07-16 PROCEDURE — 25010000003 LIDOCAINE 1 % SOLUTION: Performed by: THORACIC SURGERY (CARDIOTHORACIC VASCULAR SURGERY)

## 2019-07-16 PROCEDURE — 88360 TUMOR IMMUNOHISTOCHEM/MANUAL: CPT | Performed by: THORACIC SURGERY (CARDIOTHORACIC VASCULAR SURGERY)

## 2019-07-16 PROCEDURE — 77001 FLUOROGUIDE FOR VEIN DEVICE: CPT | Performed by: THORACIC SURGERY (CARDIOTHORACIC VASCULAR SURGERY)

## 2019-07-16 PROCEDURE — 36561 INSERT TUNNELED CV CATH: CPT | Performed by: THORACIC SURGERY (CARDIOTHORACIC VASCULAR SURGERY)

## 2019-07-16 PROCEDURE — 76000 FLUOROSCOPY <1 HR PHYS/QHP: CPT

## 2019-07-16 PROCEDURE — 25010000002 HEPARIN (PORCINE) PER 1000 UNITS: Performed by: THORACIC SURGERY (CARDIOTHORACIC VASCULAR SURGERY)

## 2019-07-16 PROCEDURE — 88305 TISSUE EXAM BY PATHOLOGIST: CPT | Performed by: THORACIC SURGERY (CARDIOTHORACIC VASCULAR SURGERY)

## 2019-07-16 PROCEDURE — 88342 IMHCHEM/IMCYTCHM 1ST ANTB: CPT | Performed by: THORACIC SURGERY (CARDIOTHORACIC VASCULAR SURGERY)

## 2019-07-16 PROCEDURE — 25010000002 SUCCINYLCHOLINE PER 20 MG: Performed by: ANESTHESIOLOGY

## 2019-07-16 DEVICE — POWERPORT M.R.I. IMPLANTABLE PORT WITH ATTACHABLE 8F CHRONOFLEX OPEN-ENDED SINGLE-LUMEN VENOUS CATHETER INTERMEDIATE KIT (WITH SUTURE PLUGS)
Type: IMPLANTABLE DEVICE | Site: CHEST | Status: FUNCTIONAL
Brand: POWERPORT M.R.I., CHRONOFLEX

## 2019-07-16 RX ORDER — MIDAZOLAM HYDROCHLORIDE 1 MG/ML
1 INJECTION INTRAMUSCULAR; INTRAVENOUS
Status: DISCONTINUED | OUTPATIENT
Start: 2019-07-16 | End: 2019-07-16 | Stop reason: HOSPADM

## 2019-07-16 RX ORDER — DIPHENHYDRAMINE HCL 25 MG
25 CAPSULE ORAL
Status: DISCONTINUED | OUTPATIENT
Start: 2019-07-16 | End: 2019-07-16 | Stop reason: HOSPADM

## 2019-07-16 RX ORDER — PROMETHAZINE HYDROCHLORIDE 25 MG/1
25 SUPPOSITORY RECTAL ONCE AS NEEDED
Status: DISCONTINUED | OUTPATIENT
Start: 2019-07-16 | End: 2019-07-16 | Stop reason: HOSPADM

## 2019-07-16 RX ORDER — PROMETHAZINE HYDROCHLORIDE 25 MG/1
25 TABLET ORAL ONCE AS NEEDED
Status: DISCONTINUED | OUTPATIENT
Start: 2019-07-16 | End: 2019-07-16 | Stop reason: HOSPADM

## 2019-07-16 RX ORDER — SODIUM CHLORIDE, SODIUM LACTATE, POTASSIUM CHLORIDE, CALCIUM CHLORIDE 600; 310; 30; 20 MG/100ML; MG/100ML; MG/100ML; MG/100ML
9 INJECTION, SOLUTION INTRAVENOUS CONTINUOUS
Status: DISCONTINUED | OUTPATIENT
Start: 2019-07-16 | End: 2019-07-16 | Stop reason: HOSPADM

## 2019-07-16 RX ORDER — ONDANSETRON 2 MG/ML
4 INJECTION INTRAMUSCULAR; INTRAVENOUS ONCE AS NEEDED
Status: DISCONTINUED | OUTPATIENT
Start: 2019-07-16 | End: 2019-07-16 | Stop reason: HOSPADM

## 2019-07-16 RX ORDER — FENTANYL CITRATE 50 UG/ML
50 INJECTION, SOLUTION INTRAMUSCULAR; INTRAVENOUS
Status: DISCONTINUED | OUTPATIENT
Start: 2019-07-16 | End: 2019-07-16 | Stop reason: HOSPADM

## 2019-07-16 RX ORDER — SODIUM CHLORIDE 0.9 % (FLUSH) 0.9 %
3-10 SYRINGE (ML) INJECTION AS NEEDED
Status: DISCONTINUED | OUTPATIENT
Start: 2019-07-16 | End: 2019-07-16 | Stop reason: HOSPADM

## 2019-07-16 RX ORDER — HYDRALAZINE HYDROCHLORIDE 20 MG/ML
5 INJECTION INTRAMUSCULAR; INTRAVENOUS
Status: DISCONTINUED | OUTPATIENT
Start: 2019-07-16 | End: 2019-07-16 | Stop reason: HOSPADM

## 2019-07-16 RX ORDER — SUCCINYLCHOLINE CHLORIDE 20 MG/ML
INJECTION INTRAMUSCULAR; INTRAVENOUS AS NEEDED
Status: DISCONTINUED | OUTPATIENT
Start: 2019-07-16 | End: 2019-07-16 | Stop reason: SURG

## 2019-07-16 RX ORDER — PROMETHAZINE HYDROCHLORIDE 25 MG/ML
6.25 INJECTION, SOLUTION INTRAMUSCULAR; INTRAVENOUS
Status: DISCONTINUED | OUTPATIENT
Start: 2019-07-16 | End: 2019-07-16 | Stop reason: HOSPADM

## 2019-07-16 RX ORDER — FAMOTIDINE 10 MG/ML
20 INJECTION, SOLUTION INTRAVENOUS ONCE
Status: COMPLETED | OUTPATIENT
Start: 2019-07-16 | End: 2019-07-16

## 2019-07-16 RX ORDER — HYDROCODONE BITARTRATE AND ACETAMINOPHEN 5; 325 MG/1; MG/1
1-2 TABLET ORAL EVERY 4 HOURS PRN
Qty: 10 TABLET | Refills: 0 | Status: SHIPPED | OUTPATIENT
Start: 2019-07-16 | End: 2019-07-16 | Stop reason: SDUPTHER

## 2019-07-16 RX ORDER — EPHEDRINE SULFATE 50 MG/ML
5 INJECTION, SOLUTION INTRAVENOUS ONCE AS NEEDED
Status: DISCONTINUED | OUTPATIENT
Start: 2019-07-16 | End: 2019-07-16 | Stop reason: HOSPADM

## 2019-07-16 RX ORDER — PROMETHAZINE HYDROCHLORIDE 25 MG/ML
12.5 INJECTION, SOLUTION INTRAMUSCULAR; INTRAVENOUS ONCE AS NEEDED
Status: DISCONTINUED | OUTPATIENT
Start: 2019-07-16 | End: 2019-07-16 | Stop reason: HOSPADM

## 2019-07-16 RX ORDER — HYDROCODONE BITARTRATE AND ACETAMINOPHEN 5; 325 MG/1; MG/1
1-2 TABLET ORAL EVERY 4 HOURS PRN
Qty: 10 TABLET | Refills: 0 | Status: SHIPPED | OUTPATIENT
Start: 2019-07-16 | End: 2019-09-25

## 2019-07-16 RX ORDER — LIDOCAINE HYDROCHLORIDE 10 MG/ML
0.5 INJECTION, SOLUTION EPIDURAL; INFILTRATION; INTRACAUDAL; PERINEURAL ONCE AS NEEDED
Status: DISCONTINUED | OUTPATIENT
Start: 2019-07-16 | End: 2019-07-16 | Stop reason: HOSPADM

## 2019-07-16 RX ORDER — SODIUM CHLORIDE 0.9 % (FLUSH) 0.9 %
1-10 SYRINGE (ML) INJECTION AS NEEDED
Status: DISCONTINUED | OUTPATIENT
Start: 2019-07-16 | End: 2019-07-16 | Stop reason: HOSPADM

## 2019-07-16 RX ORDER — DIPHENHYDRAMINE HYDROCHLORIDE 50 MG/ML
12.5 INJECTION INTRAMUSCULAR; INTRAVENOUS
Status: DISCONTINUED | OUTPATIENT
Start: 2019-07-16 | End: 2019-07-16 | Stop reason: HOSPADM

## 2019-07-16 RX ORDER — HYDROMORPHONE HYDROCHLORIDE 1 MG/ML
0.5 INJECTION, SOLUTION INTRAMUSCULAR; INTRAVENOUS; SUBCUTANEOUS
Status: DISCONTINUED | OUTPATIENT
Start: 2019-07-16 | End: 2019-07-16 | Stop reason: HOSPADM

## 2019-07-16 RX ORDER — LABETALOL HYDROCHLORIDE 5 MG/ML
5 INJECTION, SOLUTION INTRAVENOUS
Status: DISCONTINUED | OUTPATIENT
Start: 2019-07-16 | End: 2019-07-16 | Stop reason: HOSPADM

## 2019-07-16 RX ORDER — CEFAZOLIN SODIUM 2 G/100ML
2 INJECTION, SOLUTION INTRAVENOUS ONCE
Status: DISCONTINUED | OUTPATIENT
Start: 2019-07-16 | End: 2019-07-16 | Stop reason: HOSPADM

## 2019-07-16 RX ORDER — LIDOCAINE HYDROCHLORIDE 10 MG/ML
INJECTION, SOLUTION INFILTRATION; PERINEURAL AS NEEDED
Status: DISCONTINUED | OUTPATIENT
Start: 2019-07-16 | End: 2019-07-16 | Stop reason: HOSPADM

## 2019-07-16 RX ORDER — FLUMAZENIL 0.1 MG/ML
0.2 INJECTION INTRAVENOUS AS NEEDED
Status: DISCONTINUED | OUTPATIENT
Start: 2019-07-16 | End: 2019-07-16 | Stop reason: HOSPADM

## 2019-07-16 RX ORDER — OXYCODONE AND ACETAMINOPHEN 7.5; 325 MG/1; MG/1
1 TABLET ORAL ONCE AS NEEDED
Status: DISCONTINUED | OUTPATIENT
Start: 2019-07-16 | End: 2019-07-16 | Stop reason: HOSPADM

## 2019-07-16 RX ORDER — MIDAZOLAM HYDROCHLORIDE 1 MG/ML
2 INJECTION INTRAMUSCULAR; INTRAVENOUS
Status: DISCONTINUED | OUTPATIENT
Start: 2019-07-16 | End: 2019-07-16 | Stop reason: HOSPADM

## 2019-07-16 RX ORDER — ACETAMINOPHEN 325 MG/1
650 TABLET ORAL ONCE AS NEEDED
Status: DISCONTINUED | OUTPATIENT
Start: 2019-07-16 | End: 2019-07-16 | Stop reason: HOSPADM

## 2019-07-16 RX ORDER — SODIUM CHLORIDE 0.9 % (FLUSH) 0.9 %
3 SYRINGE (ML) INJECTION EVERY 12 HOURS SCHEDULED
Status: DISCONTINUED | OUTPATIENT
Start: 2019-07-16 | End: 2019-07-16 | Stop reason: HOSPADM

## 2019-07-16 RX ORDER — PROPOFOL 10 MG/ML
VIAL (ML) INTRAVENOUS AS NEEDED
Status: DISCONTINUED | OUTPATIENT
Start: 2019-07-16 | End: 2019-07-16 | Stop reason: SURG

## 2019-07-16 RX ORDER — NALOXONE HCL 0.4 MG/ML
0.2 VIAL (ML) INJECTION AS NEEDED
Status: DISCONTINUED | OUTPATIENT
Start: 2019-07-16 | End: 2019-07-16 | Stop reason: HOSPADM

## 2019-07-16 RX ORDER — HYDROCODONE BITARTRATE AND ACETAMINOPHEN 7.5; 325 MG/1; MG/1
1 TABLET ORAL ONCE AS NEEDED
Status: DISCONTINUED | OUTPATIENT
Start: 2019-07-16 | End: 2019-07-16 | Stop reason: HOSPADM

## 2019-07-16 RX ADMIN — SODIUM CHLORIDE, POTASSIUM CHLORIDE, SODIUM LACTATE AND CALCIUM CHLORIDE: 600; 310; 30; 20 INJECTION, SOLUTION INTRAVENOUS at 15:40

## 2019-07-16 RX ADMIN — SODIUM CHLORIDE, POTASSIUM CHLORIDE, SODIUM LACTATE AND CALCIUM CHLORIDE: 600; 310; 30; 20 INJECTION, SOLUTION INTRAVENOUS at 16:16

## 2019-07-16 RX ADMIN — PROPOFOL 200 MG: 10 INJECTION, EMULSION INTRAVENOUS at 15:42

## 2019-07-16 RX ADMIN — SODIUM CHLORIDE, POTASSIUM CHLORIDE, SODIUM LACTATE AND CALCIUM CHLORIDE 9 ML/HR: 600; 310; 30; 20 INJECTION, SOLUTION INTRAVENOUS at 13:51

## 2019-07-16 RX ADMIN — FAMOTIDINE 20 MG: 10 INJECTION INTRAVENOUS at 13:51

## 2019-07-16 RX ADMIN — SUCCINYLCHOLINE CHLORIDE 100 MG: 20 INJECTION, SOLUTION INTRAMUSCULAR; INTRAVENOUS; PARENTERAL at 15:43

## 2019-07-16 NOTE — ANESTHESIA POSTPROCEDURE EVALUATION
"Patient: Han Garcia    Procedure Summary     Date:  07/16/19 Room / Location:  [facility] OR 02 / [facility] MAIN OR    Anesthesia Start:  1527 Anesthesia Stop:  1654    Procedure:  INSERTION VENOUS ACCESS DEVICE WITH FLUORO AND EGD WITH BIOPSY (N/A ) Diagnosis:       Malignant neoplasm of esophagus, unspecified location (CMS/HCC)      (Malignant neoplasm of esophagus, unspecified location (CMS/HCC) [C15.9])    Surgeon:  Mary Brito MD Provider:  Ran Back MD    Anesthesia Type:  general ASA Status:  3          Anesthesia Type: general  Last vitals  BP   111/66 (07/16/19 1746)   Temp   36.6 °C (97.8 °F) (07/16/19 1735)   Pulse   86 (07/16/19 1746)   Resp   16 (07/16/19 1746)     SpO2   94 % (07/16/19 1746)     Post Anesthesia Care and Evaluation    Patient location during evaluation: bedside  Patient participation: complete - patient participated  Level of consciousness: awake and alert  Pain management: adequate  Airway patency: patent  Anesthetic complications: No anesthetic complications    Cardiovascular status: acceptable  Respiratory status: acceptable  Hydration status: acceptable    Comments: /66 (BP Location: Left arm, Patient Position: Lying)   Pulse 86   Temp 36.6 °C (97.8 °F) (Oral)   Resp 16   Ht 175.3 cm (69\")   Wt 84.9 kg (187 lb 2 oz)   SpO2 94%   BMI 27.63 kg/m²       "

## 2019-07-16 NOTE — DISCHARGE INSTRUCTIONS
**SEE PORT INSTRUCTION BOOKLET***    Outpatient Surgery Guidelines, Adult  Outpatient procedures are those for which the person having the procedure is allowed to go home the same day as the procedure. Various procedures are done on an outpatient basis. You should follow some general guidelines if you will be having an outpatient procedure.  AFTER THE  PROCEDURE  After surgery, you will be taken to a recovery area, where your progress will be monitored. If there are no complications, you will be allowed to go home when you are awake, stable, and taking fluids well. You may have numbness around the surgical site. Healing will take some time. You will have tenderness at the surgical site and may have some swelling and bruising. You may also have some nausea.  HOME CARE INSTRUCTIONS  · Do not drive for 24 hours, or as directed by your health care provider. Do not drive while taking prescription pain medicines.  · Do not drink alcohol for 24 hours.  · Do not make important decisions or sign legal documents for 24 hours.  · Plan on having a responsible adult stay with your for 24 hours following your procedure.  · You may resume a normal diet and activities as directed.  · Only take over-the-counter or prescription medicines as directed by your health care provider.  · Follow up with your health care provider as directed.  · If you have sleep apnea, surgery and certain medicines can increase your risk for breathing problems. Follow instructions from your health care provider about wearing your sleep device:  ? Anytime you are sleeping, including during daytime naps.  ? While taking prescription pain medicines, sleeping medicines, or medicines that make you drowsy.  ·   SEEK MEDICAL CARE IF:  · You have increased bleeding (more than a small spot) from the surgical site.  · You have redness, swelling, or increasing pain in the wound.  · You see pus coming from the wound.  · You have a fever > 101.  · You notice a bad smell  coming from the wound or dressing.  · You feel lightheaded or faint.  · You develop a rash.  · You have trouble breathing.  · You develop allergies.  MAKE SURE YOU:  · Understand these instructions.  · Will watch your condition.  · Will get help right away if you are not doing well or get worse.

## 2019-07-16 NOTE — ANESTHESIA PREPROCEDURE EVALUATION
Anesthesia Evaluation     Patient summary reviewed   no history of anesthetic complications:  NPO Solid Status: > 8 hours  NPO Liquid Status: > 2 hours           Airway   Mallampati: III  TM distance: >3 FB  Neck ROM: full  Dental    (+) poor dentition    Comment: Multiple chips/missing.  Denies any loose teeth.     Pulmonary     breath sounds clear to auscultation  (+) a smoker Current Smoked day of surgery,   (-) shortness of breath  Cardiovascular   Exercise tolerance: good (4-7 METS)    Rhythm: regular  Rate: normal    (+) hypertension, hyperlipidemia,   (-) angina, JESUS    ROS comment: 2014 echo: Mild global hypokinesis of the left ventricle is observed.  There is mildly decreased left ventricular systolic function.  The estimated ejection fraction is 45-50%.   There is a trace tricuspid regurgitation.   There is no pericardial effusion.    CT Chest:    CONCLUSION:  1. Concentric as well as eccentric wall thickening involving the distal  10 cm of the esophagus, just above the GE junction. Findings most  worrisome for esophageal neoplasm.  2. Tiny right lower lobe 8 mm pulmonary nodule and 2 tiny pleural  plaques seen on the left major fissure. These are indeterminant.  3. Paraesophageal lymph nodes adjacent to the distal esophagus.    Neuro/Psych  GI/Hepatic/Renal/Endo    (+)   hepatitis (Claims Hep A in childhood that has resolved. ), liver disease ([place]),     Musculoskeletal     Abdominal    Substance History      OB/GYN          Other      history of cancer                    Anesthesia Plan    ASA 3     general     intravenous induction   Anesthetic plan, all risks, benefits, and alternatives have been provided, discussed and informed consent has been obtained with: patient.

## 2019-07-16 NOTE — ANESTHESIA PROCEDURE NOTES
Airway  Urgency: elective    Date/Time: 7/16/2019 3:45 PM  End Time:7/16/2019 3:45 PM  Airway not difficult    General Information and Staff    Patient location during procedure: OR  Anesthesiologist: Karl Smith MD    Indications and Patient Condition  Indications for airway management: airway protection    Preoxygenated: yes  Mask difficulty assessment: 1 - vent by mask    Final Airway Details  Final airway type: endotracheal airway      Successful airway: ETT  Cuffed: yes   Successful intubation technique: direct laryngoscopy  Endotracheal tube insertion site: oral  Blade: Clare  Blade size: 4  ETT size (mm): 7.5  Cormack-Lehane Classification: grade III - view of epiglottis only  Placement verified by: chest auscultation   Measured from: lips  ETT to lips (cm): 22  Number of attempts at approach: 1

## 2019-07-16 NOTE — OP NOTE
INSERTION VENOUS ACCESS DEVICE  Procedure Report    Patient Name:  Han Garcia  YOB: 1955    Date of Surgery:  7/16/2019     Indications:  Mr. Garcia is a pleasant 62 yo gentleman with metastatic esophageal cancer that will need a port for treatment as well as an esophageal biopsy for definitive diagnosis.     Pre-op Diagnosis:   Malignant neoplasm of esophagus, unspecified location (CMS/HCC) [C15.9]       Post-Op Diagnosis Codes:     * Malignant neoplasm of esophagus, unspecified location (CMS/HCC) [C15.9]    Procedure/CPT® Codes:      Procedure(s):  INSERTION VENOUS ACCESS DEVICE WITH FLUORO AND EGD WITH BIOPSY    Staff:  Surgeon(s):  Mary Brito MD         Anesthesia: General    Estimated Blood Loss: minimal    Implants:    Implant Name Type Inv. Item Serial No.  Lot No. LRB No. Used   POWERPORT MRI CATH/POLY INTERMED 1L SCOTT/H 8F PRP - [id number] Implant POWERPORT MRI CATH/POLY INTERMED 1L SCOTT/H 8F PRP  [employer] [id number] Right 1       Specimen:          Specimens     ID Source Type Tests Collected By Collected At Frozen?      A Esophagus Tissue · TISSUE PATHOLOGY EXAM   Mary Brito MD 7/16/19 1630 No     Description: 35CM FROM THE LIP    B Esophagus Tissue · TISSUE PATHOLOGY EXAM   Mary Brito MD 7/16/19 1632 No             Findings: Large fungating tumor at the GE junction, occupying approximately 50% of the circumference.      Complications: None apparent    Description of Procedure: Han Garcia was identified in the preoperative holding area and again his consent for the procedure was verified.  He was transported to the operating room and placed on the operating room table in supine position.  A general anesthetic was successfully administered and an LMA was placed without difficulty.  A timeout was performed to verify correct patient, correct procedure, and correct administration of IV antibiotics per [facility] protocol.  The  patient was prepped and draped in standard sterile fashion.  An ultrasound probe was utilized to access the right IJ vein with an 18-gauge needle and a wire was placed into the vein.  Fluoroscopy was used to confirm placement.  Lidocaine was instilled into the incision site.  An approximately 2 cm incision was made 2 finger breaths below the right clavicle and dissection was carried down to the fascia.  A pocket was constructed using blunt dissection to be big enough for the PowerPort.  A small puncture was made at theIJ insertion site.  The tunneler was used to connect the infraclavicular port site to the right internal jugular puncture site.  The catheter and port were connected and flushed.  The port was secured to the underlying fascia with a silk suture.  The catheter was measured using fluoroscopy.  The introducer trocar was inserted over the wire via Seldinger technique.  The catheter was placed into the trocar under fluoroscopic guidance and the outer sheath of the trocar was removed.  The positioning was confirmed with fluoroscopy and the catheter tip was at the SVC/right atrial junction.  The port was flushed with heparinized saline and the incisions were closed with Vicryl and Monocryl in standard fashion.  Dermabond was applied as dressing and the counts were correct at the end of the case.      The Olympus video endoscope was introduced in the patient's esophagus.  No abnormalities were noted until 35 cm from the lip.  There was a large fungating tumor from 35 to 38 cm occupying approximately 50% of the lumen.  Multiple biopsies were taken of the grossly abnormal tissue.  The endoscope passed through the tumor into the stomach without difficulty, the duodenum was within normal limits as well as the stomach.  There did not appear to be tumor extension into the stomach.  The tumor itself was not bleeding after the biopsies.  The scope was slowly withdrawn and no other abnormalities were noted.    The  patient tolerated this procedure well, was extubated and transported to the recovery room in stable condition.        Mary Brito MD     Date: 7/16/2019  Time: 5:22 PM

## 2019-07-18 ENCOUNTER — OFFICE VISIT (OUTPATIENT)
Dept: ONCOLOGY | Facility: CLINIC | Age: 64
End: 2019-07-18

## 2019-07-18 ENCOUNTER — LAB (OUTPATIENT)
Dept: LAB | Facility: HOSPITAL | Age: 64
End: 2019-07-18

## 2019-07-18 VITALS
OXYGEN SATURATION: 98 % | HEIGHT: 69 IN | RESPIRATION RATE: 12 BRPM | DIASTOLIC BLOOD PRESSURE: 79 MMHG | SYSTOLIC BLOOD PRESSURE: 131 MMHG | WEIGHT: 190.4 LBS | TEMPERATURE: 98.6 F | HEART RATE: 115 BPM | BODY MASS INDEX: 28.2 KG/M2

## 2019-07-18 DIAGNOSIS — A49.9 BACTERIAL URINARY INFECTION: ICD-10-CM

## 2019-07-18 DIAGNOSIS — C78.7 LIVER METASTASES: ICD-10-CM

## 2019-07-18 DIAGNOSIS — C15.5 CANCER OF LOWER THIRD OF ESOPHAGUS (HCC): ICD-10-CM

## 2019-07-18 DIAGNOSIS — N39.0 BACTERIAL URINARY INFECTION: ICD-10-CM

## 2019-07-18 DIAGNOSIS — C15.5 MALIGNANT NEOPLASM OF LOWER THIRD OF ESOPHAGUS (HCC): ICD-10-CM

## 2019-07-18 DIAGNOSIS — N32.1 COLOVESICAL FISTULA: ICD-10-CM

## 2019-07-18 DIAGNOSIS — Z72.0 TOBACCO ABUSE: ICD-10-CM

## 2019-07-18 DIAGNOSIS — D49.0 ESOPHAGUS NEOPLASM: ICD-10-CM

## 2019-07-18 DIAGNOSIS — C78.7 LIVER METASTASES: Primary | ICD-10-CM

## 2019-07-18 LAB
ALBUMIN SERPL-MCNC: 3.1 G/DL (ref 3.5–5.2)
ALBUMIN/GLOB SERPL: 0.9 G/DL (ref 1.1–2.4)
ALP SERPL-CCNC: 187 U/L (ref 38–116)
ALT SERPL W P-5'-P-CCNC: 28 U/L (ref 0–41)
ANION GAP SERPL CALCULATED.3IONS-SCNC: 13.1 MMOL/L (ref 5–15)
AST SERPL-CCNC: 49 U/L (ref 0–40)
BASOPHILS # BLD AUTO: 0.02 10*3/MM3 (ref 0–0.2)
BASOPHILS NFR BLD AUTO: 0.2 % (ref 0–1.5)
BILIRUB SERPL-MCNC: 0.5 MG/DL (ref 0.2–1.2)
BUN BLD-MCNC: 11 MG/DL (ref 6–20)
BUN/CREAT SERPL: 16.9 (ref 7.3–30)
CALCIUM SPEC-SCNC: 8.9 MG/DL (ref 8.5–10.2)
CHLORIDE SERPL-SCNC: 94 MMOL/L (ref 98–107)
CO2 SERPL-SCNC: 25.9 MMOL/L (ref 22–29)
CREAT BLD-MCNC: 0.65 MG/DL (ref 0.7–1.3)
DEPRECATED RDW RBC AUTO: 47.3 FL (ref 37–54)
EOSINOPHIL # BLD AUTO: 0.01 10*3/MM3 (ref 0–0.4)
EOSINOPHIL NFR BLD AUTO: 0.1 % (ref 0.3–6.2)
ERYTHROCYTE [DISTWIDTH] IN BLOOD BY AUTOMATED COUNT: 14.7 % (ref 12.3–15.4)
GFR SERPL CREATININE-BSD FRML MDRD: 124 ML/MIN/1.73
GLOBULIN UR ELPH-MCNC: 3.4 GM/DL (ref 1.8–3.5)
GLUCOSE BLD-MCNC: 136 MG/DL (ref 74–124)
HCT VFR BLD AUTO: 35.2 % (ref 37.5–51)
HGB BLD-MCNC: 10.9 G/DL (ref 13–17.7)
IMM GRANULOCYTES # BLD AUTO: 0.19 10*3/MM3 (ref 0–0.05)
IMM GRANULOCYTES NFR BLD AUTO: 1.4 % (ref 0–0.5)
LYMPHOCYTES # BLD AUTO: 1.04 10*3/MM3 (ref 0.7–3.1)
LYMPHOCYTES NFR BLD AUTO: 7.9 % (ref 19.6–45.3)
MCH RBC QN AUTO: 27.1 PG (ref 26.6–33)
MCHC RBC AUTO-ENTMCNC: 31 G/DL (ref 31.5–35.7)
MCV RBC AUTO: 87.6 FL (ref 79–97)
MONOCYTES # BLD AUTO: 1.22 10*3/MM3 (ref 0.1–0.9)
MONOCYTES NFR BLD AUTO: 9.2 % (ref 5–12)
NEUTROPHILS # BLD AUTO: 10.76 10*3/MM3 (ref 1.7–7)
NEUTROPHILS NFR BLD AUTO: 81.2 % (ref 42.7–76)
NRBC BLD AUTO-RTO: 0 /100 WBC (ref 0–0.2)
PLATELET # BLD AUTO: 254 10*3/MM3 (ref 140–450)
PMV BLD AUTO: 8.7 FL (ref 6–12)
POTASSIUM BLD-SCNC: 4.1 MMOL/L (ref 3.5–4.7)
PROT SERPL-MCNC: 6.5 G/DL (ref 6.3–8)
RBC # BLD AUTO: 4.02 10*6/MM3 (ref 4.14–5.8)
SODIUM BLD-SCNC: 133 MMOL/L (ref 134–145)
WBC NRBC COR # BLD: 13.24 10*3/MM3 (ref 3.4–10.8)

## 2019-07-18 PROCEDURE — 36415 COLL VENOUS BLD VENIPUNCTURE: CPT | Performed by: INTERNAL MEDICINE

## 2019-07-18 PROCEDURE — 80053 COMPREHEN METABOLIC PANEL: CPT | Performed by: INTERNAL MEDICINE

## 2019-07-18 PROCEDURE — 85025 COMPLETE CBC W/AUTO DIFF WBC: CPT | Performed by: INTERNAL MEDICINE

## 2019-07-18 PROCEDURE — 99215 OFFICE O/P EST HI 40 MIN: CPT | Performed by: INTERNAL MEDICINE

## 2019-07-18 RX ORDER — DIPHENHYDRAMINE HYDROCHLORIDE 50 MG/ML
50 INJECTION INTRAMUSCULAR; INTRAVENOUS AS NEEDED
Status: CANCELLED | OUTPATIENT
Start: 2019-07-23

## 2019-07-18 RX ORDER — FAMOTIDINE 10 MG/ML
20 INJECTION, SOLUTION INTRAVENOUS AS NEEDED
Status: CANCELLED | OUTPATIENT
Start: 2019-07-23

## 2019-07-18 RX ORDER — PALONOSETRON 0.05 MG/ML
0.25 INJECTION, SOLUTION INTRAVENOUS ONCE
Status: CANCELLED | OUTPATIENT
Start: 2019-07-23

## 2019-07-18 RX ORDER — FLUOROURACIL 50 MG/ML
400 INJECTION, SOLUTION INTRAVENOUS ONCE
Status: CANCELLED | OUTPATIENT
Start: 2019-07-23

## 2019-07-18 RX ORDER — DEXTROSE MONOHYDRATE 50 MG/ML
250 INJECTION, SOLUTION INTRAVENOUS ONCE
Status: CANCELLED | OUTPATIENT
Start: 2019-07-23

## 2019-07-18 NOTE — PROGRESS NOTES
Subjective     REASON FOR follow up:1.  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    .2.COLOVESICAL FISTULA    History of Present Illness This patient is a 63-year-old white male who has been referred to us because he has had significant fatigue and almost 3 pounds weight loss since the time that he developed pneumonia several weeks ago. He was having also recently decrease in appetite and early satiety with no dysphagia or regurgitation or choking. Given these findings the patient was seen by nurse practitioner and subsequently by Jaiden Hoover MD who requested for the patient to have a CT scan of the abdomen that documented extensive liver metastasis. This also documented very dramatic thickening of the lower third of the esophagus and CT scan of the chest was done documenting similar findings. The patient was scheduled to be seen by me today and also to be seen by Mary Brito MD tomorrow. The patient besides the fatigue has had pain in the right upper quadrant of the abdomen intermittently mild to moderate in intensity not requiring any pain medicine. Recently the patient has developed hypotension and he required stopping all of his blood pressure medications. The patient denies any sputum production at this time but he has an element of shortness of breath upon exercise. He also has symptoms that suggest peripheral arterial disease. The patient has had loose bowel movements since the time that he took Ampicillin for his pneumonia and he has had some urethral discharge that very likely represents a form of urethritis. The patient has not had any fevers or chills. He has not had any rashes in the skin, no adenopathies and no bone pain. He denies any neurological symptomatology. He has become hard of hearing because of dramatic amount of wax accumulation in the ear canals.     The patient returned to the office in company of his 2 sisters in  "order to review the PET scan and the pathology report of his upper GI endoscopy. In the meantime, he has had his port placed. He is not having any major pain pertinent to this and actually the surgical site is healing extremely well. He had no difficulties with his upper GI endoscopy. He also had in the interim his PET scan that will be described below. The patient is not having any fevers or chills. Also we documented that the patient had a colovesical fistula in the CT scan READ by Almaz Rutledge MD. This is also visible in his PET scan. We will review and address this issue at this time given the circumstances and the urgency of his care.     Physically the patient feels about the same. He has not had any fevers or chills. His appetite is still acceptable. He has minimal abdominal pain. He has still bowel activity and he has now that we know probably passage of stool through his urinary tract. This is not pus. It is real fecal matter. He had not too long ago an episode of diverticulitis.     The patient has not had any swelling in his lower extremities. He feels fatigued. He has gone into short-term disability and I think that is a perfect option given the gravity of his situation.        Past Medical History:   Diagnosis Date   • Arthritis    • Elevated PSA    • Esophageal mass    • H/O Lung nodule    • Hepatitis     CHILD--PT STATED \"I THINK IT WAS A.\"   • History of pneumonia    • Hyperlipidemia    • Hypertension    • Liver cancer (CMS/HCC)    • Prostate cancer (CMS/HCC)         Past Surgical History:   Procedure Laterality Date   • HERNIA REPAIR  1999   • PROSTATE SURGERY  03/2008   • VENOUS ACCESS DEVICE (PORT) INSERTION N/A 7/16/2019    Procedure: INSERTION VENOUS ACCESS DEVICE WITH FLUORO AND EGD WITH BIOPSY;  Surgeon: Mary Birto MD;  Location: [facility] OR;  Service: Thoracic        Current Outpatient Medications on File Prior to Visit   Medication Sig Dispense Refill   • HYDROcodone-acetaminophen " (NORCO) 5-325 MG per tablet Take 1-2 tablets by mouth Every 4 (Four) Hours As Needed (Pain). 10 tablet 0     No current facility-administered medications on file prior to visit.         ALLERGIES:  No Known Allergies     Social History     Socioeconomic History   • Marital status: Single     Spouse name: Not on file   • Number of children: Not on file   • Years of education: High school   • Highest education level: Not on file   Occupational History   • Occupation: [employer] Grade     Employer: [employer]   Tobacco Use   • Smoking status: Current Every Day Smoker     Packs/day: 1.00     Years: 40.00     Pack years: 40.00     Types: Cigarettes   • Smokeless tobacco: Never Used   Substance and Sexual Activity   • Alcohol use: Yes     Alcohol/week: 16.8 oz     Types: 28 Cans of beer per week     Comment: NOT RECENTLY   • Drug use: No        Family History   Problem Relation Age of Onset   • Hypertension Mother    • Stroke Mother    • Hypertension Other    • Lung disease Other    • Prostate cancer Other    • Lung cancer Father    • Malig Hyperthermia Neg Hx         Review of Systems         General: no fever, no chills,  fatigue, weight changes,  lack of appetite.  Eyes: no epiphora, xerophthalmia,conjunctivitis, pain, glaucoma, blurred vision, blindness, secretion, photophobia, proptosis, diplopia.  Ears: no otorrhea, tinnitus, otorrhagia, deafness, pain, vertigo.  Nose: no rhinorrhea, no epistaxis, no alteration in perception of odors, no sinuses pressure.  Mouth: no alteration in gums or teeth,  No ulcers, no difficulty with mastication or deglut ion, no odynophagia.  Neck: no masses or pain, no thyroid alterations, no pain in muscles or arteries, no carotid odynia, no crepitation.  Respiratory: no cough, no sputum production,no dyspnea,no trepopnea, no pleuritic pain,no hemoptysis.  Heart: no syncope, no irregularity, no palpitations, no angina,no orthopnea,no paroxysmal nocturnal dyspnea.  Vascular Venous: no  "tenderness,no edema,no palpable cords,no postphlebitic syndrome, no skin changes no ulcerations.  Vascular Arterial: no distal ischemia, noclaudication, no gangrene, no neuropathic ischemic pain, no skin ulcers, no paleness no cyanosis.  GI: no dysphagia, no odynophagia, no regurgitation, no heartburn,no indigestion,no nausea,no vomiting,no hematemesis ,no melena,no jaundice,no distention, no obstipation,no enterorrhagia,no proctalgia,no anal  lesions, no changes in bowel habits.  : no frequency, no hesitancy, no hematuria,  discharge,no  pain.  Musculoskeletal: no muscle or tendon pain or inflammation,no  joint pain, no edema, no functional limitation,no fasciculations, no mass.  Neurologic: no headache, no seizures, noalterations on Craneal nerves, no motor deficit, no sensory deficit, normal coordination, no alteration in memory,normal orientation, calculation,normal writting, verbal and written language.  Skin: no rashes,no pruritus no localized lesions.  Psychiatric: no anxiety, no depression,no agitation, no delusions, proper insight.        Objective     Vitals:    07/18/19 1542   BP: 131/79   Pulse: 115   Resp: 12   Temp: 98.6 °F (37 °C)   TempSrc: Oral   SpO2: 98%   Weight: 86.4 kg (190 lb 6.4 oz)   Height: 174 cm (68.5\")   PainSc: 7  Comment: RT lower abdominal pain     Current Status 7/18/2019   ECOG score 0       Physical Exam    GENERAL:  Well-developed, well-nourished  Patient  in no acute distress.   SKIN:  Warm, dry ,NO rashes,NO purpura ,NO petechiae.  HEENT:  Pupils were equal and reactive to light and accomodation, conjunctivas non injected, no pterigion, normal extraocular movements, normal visual acuity.   Mouth mucosa was moist, no exudates in oropharynx, normal gum line, normal roof of the mouth and pillars, normal papillations of the tongue.  NECK:  Supple with good range of motion; no thyromegaly or masses, no JVD or bruits, no cervical adenopathies.No carotid arteries pain, no carotid " abnormal pulsation , NO arterial dance.  LYMPHATICS:  No cervical, NO supraclavicular, NO axillary,NO epitrochlear , NO inguinal adenopathy.  CHEST:  Normal excursion of both kailash thoraces, normal voice fremitus, no subcutaneous emphysema, normal axillas, no rashes or acanthosis nigricans. Lungs clear to percussion and auscultation, normal breath sounds bilaterally, no wheezing,NO crackles NO ronchi, NO stridor, NO rubs.  CARDIAC AND VASCULAR:  normal rate and regular rhythm, without murmurs,NO rubs NO S3 NO S4 right or left . Normal femoral, popliteal, pedis, brachial and carotid pulses.  ABDOMEN:  Soft, nontender with huge liver organomegaly no other masses, no ascites, no collateral circulation,no distention,no Dhruv sign, no abdominal pain, no inguinal hernias,no umbilical hernia, no abdominal bruits. Normal bowel sounds.  GENITAL: Not  Performed.  EXTREMITIES  AND SPINE:  No clubbing, cyanosis or edema, no deformities or pain .No kyphosis, scoliosis, deformities or pain in spine, ribs or pelvic bone.  NEUROLOGICAL:  Patient was awake, alert, oriented to time, person and place.              RECENT LABS:  Hematology WBC   Date Value Ref Range Status   07/18/2019 13.24 (H) 3.40 - 10.80 10*3/mm3 Final   02/02/2019 13.4 (H) 3.4 - 10.8 x10E3/uL Final     RBC   Date Value Ref Range Status   07/18/2019 4.02 (L) 4.14 - 5.80 10*6/mm3 Final   02/02/2019 4.81 4.14 - 5.80 x10E6/uL Final     Hemoglobin   Date Value Ref Range Status   07/18/2019 10.9 (L) 13.0 - 17.7 g/dL Final     Hematocrit   Date Value Ref Range Status   07/18/2019 35.2 (L) 37.5 - 51.0 % Final     Platelets   Date Value Ref Range Status   07/18/2019 254 140 - 450 10*3/mm3 Final      F-18 FDG PET FROM SKULL BASE TO MID THIGH WITH PET/CT FUSION     HISTORY: 63-year-old male with metastatic esophageal cancer. Initial  staging. Prostate cancer history.     TECHNIQUE: Radiation dose reduction techniques were utilized, including  automated exposure control and  exposure modulation based on body size.   Blood glucose level at time of injection was 111 mg/dL.  5.3 mCi of F-18  FDG were injected and PET was performed from skull base to mid thigh. CT  was obtained for localization and attenuation correction. Time at  injection 9:50 AM. PET start time 11:13 AM. Compared with CT of the  abdomen and pelvis from 07/02/2019 and chest CT from 07/08/2019.     FINDINGS: There is an intensely hypermetabolic mass at the distal  esophagus which measures approximately 8 cm in craniocaudad span which  has a maximal SUV of 19.1. There is very extensive metastatic disease  throughout the liver with large and confluent lesions scattered  throughout all lobes of the liver and the maximal SUV is 16.5. There are  shotty nodes at the madison hepatis and retroperitoneum which are  hypermetabolic. There is fairly intense hypermetabolic activity at a  thickened segment of colon with a maximal SUV of 17.2. There is  extensive sigmoid diverticulosis and there is diverticulosis scattered  throughout the entire colon. There is no evidence for acute  diverticulitis, but there is clearly a fistulous connection between the  thickened sigmoid colon and the urinary bladder. The fistulous tract  contains bubble of air and there is a small amount of air in the  nondependent aspect of the urinary bladder. There are shotty nodes  adjacent to the distal esophagus which have either low-level activity or  are photopenic. There is a subcentimeter left lower paratracheal node  which has a maximal SUV of 3.1. There is a subcentimeter right  paratracheal node which has a maximal SUV of 2.6. The 7 mm right basilar  pulmonary nodule has indeterminate activity. The nodule is in close  proximity to the liver dome where there is extensive hypermetabolic  activity and there is some artifact related to respiration. There is no  suspicious hypermetabolic activity within the neck.     IMPRESSION:  1. Approximately 8 cm distal  esophageal mass is intensely hypermetabolic  and there is very extensive metastatic disease throughout the liver.  There are also hypermetabolic nodes at the madison hepatis and  retroperitoneum which are suspicious for metastatic disease. The 7 mm  right basilar pulmonary nodule is indeterminate and follow-up is  recommended. There is no evidence for metastatic disease within the  neck.  2. There is extensive sigmoid diverticulosis and there is clearly a  colovesicular fistula which is likely related to previous diverticulitis  or chronic diverticulitis.     This report was finalized on 7/16/2019 3:38 PM by Dr. Almaz Rutledge M.D.         Assessment/Plan  In summary this patient is a 63-year-old white male who has been a chronic smoker who has had early satiety for several weeks associated with this lack of appetite, 30 pound weight loss and associated with this dry hacking cough. He had pneumonia not too long ago and he never recovered from this completely with the exception of the time that he was receiving some prednisone for the pneumonia. In any event the patient has developed now some abdominal pain in the right upper quadrant of the abdomen intermittently and he also is experiencing significant degree of fatigue. Given the persistency of the symptomatology a CT scan of the abdomen documented extensive liver metastasis and a very dramatic thickening of the wall of the esophagus in the gastroesophageal junction. A CT scan of the chest also documented very dramatic thickening of the wall of the esophagus in the last 10 cm of the intrathoracic esophagus. There is evidence of mediastinal adenopathies. Spleen, pancreas, kidneys were unremarkable. Bone windows were unremarkable.     The patient has history of prostate cancer in the past. He had prostatectomy, never required any other intervention. His PSA has remained negative normal for years and years. Prostate cancer has nothing to do with his problem.     The  patient is a chronic smoker of close to 2 packs of cigarettes a day. He is still smoking heavily and he has a component of COPD.    The other phenomenon on clinical grounds is that the patient has obvious changes in his legs consistent with peripheral arterial disease. He has very heavy calcification of the abdominal aorta and the iliac arteries. This is not surprising. He probably has an element of claudication.  The patient was reviewed on 07/18/2019. His port site has healed perfectly fine and his upper endoscopy after discussing the case with Dr. Munoz, Pathologist, has documented adenocarcinoma. HER2 analysis in this specimen is pending at this time.     The PET scan has been discussed and visualized in the PACS system at [facility] with the patient and his 2 sisters. Almost 90% of the liver parenchyma is compromised by malignancy and he has very hot activity in the lower third of the esophagus 8 cm with a high SUV. The same is applicable to the degree of his liver metastasis. It is obviously clearly visible the colovesical fistula as well.     At this time, my assessment on the patient is as follows:   1. 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 Almost 90% of the liver is replaced by tumor. The SUV activity in this tumor is very high and tells me that this is growing very fast. The 2nd issue is that the patient has a colovesical fistula. Typically, to correct a colovesical fistula, the patient will take 2 or 3 weeks to heal after he has this corrected by a urologist and general surgeon. In my personal opinion, we do not have 2 or 3 weeks to wait. I think we have to stay in the storm that he will have when he receives chemotherapy or palliation to see if we can get ahead of the game and give him enough time on the planet for the months to come. In absence of any chemotherapy in the next several weeks, this patient will be gone in liver insufficiency in a  relatively short period of time. Given this fact, I have chosen for the patient to come and see our nurse practitioner ASAP. Discuss in detail side effects of FOLFOX and initiate the treatment as early as Monday. Side effects were discussed with him including nausea, vomiting, anemia, leukopenia, thrombocytopenia, peripheral neuropathy and neuropathy with cold liquids. I discussed with him also the need for him to have the INFUSION DEVICE with infusional chemotherapy disconnected 48 hours later. He recognized that the treatment will be given to him every 2 weeks. We will plan at least 3-4 treatments, follow up a repeat CT scan or PET scan preferentially.     Once that we have the handle on the malignancy that he has and hopefully this will be the case, we will address the issue of the colovesical fistula. In the meantime, we will plan to do a urinalysis and culture once a week and if he needs to be on a permanent antibiotic to take care of this problem and minimize any systemic infection, we will take care of this. We do not have time for this patient to be seen by General Surgery and Urology. Again maybe down the road depending on the clinical circumstances this will be addressed. Maybe this will close on its own. I do not know the answer to this question.     I discussed this in detail with him and his 2 sisters. We reviewed in the PACS system at [facility] his PET scan and a copy of the report was provided to them.     The report of the pathology was verbally given to me by Dr. Munoz. Eventually this will be printed and shared with the patient if he wishes. Again, analysis of HER2/emeli is still in the process and this will be discussed with the patient once that the report becomes available. Dr. Munoz also was going to proceed with analysis of the tumor before PSA. Personally I do not think the PSA has anything to do with this and neither his previous history of prostate cancer. This is a  completely different malignancy.     I discussed this with the patient. He was ready to proceed.     The patient will be seen by nurse practitioner for toxicity check and he will be seen by me for his 2nd cycle of treatment.

## 2019-07-18 NOTE — PROGRESS NOTES
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      .    History of Present Illness       Past Medical History:   Diagnosis Date   • Arthritis    • Elevated PSA    • Esophageal mass    • H/O Lung nodule    • Hepatitis     CHILD--PT STATED \"I THINK IT WAS A.\"   • History of pneumonia    • Hyperlipidemia    • Hypertension    • Liver cancer (CMS/HCC)    • Prostate cancer (CMS/HCC)         Past Surgical History:   Procedure Laterality Date   • HERNIA REPAIR  1999   • PROSTATE SURGERY  03/2008   • VENOUS ACCESS DEVICE (PORT) INSERTION N/A 7/16/2019    Procedure: INSERTION VENOUS ACCESS DEVICE WITH FLUORO AND EGD WITH BIOPSY;  Surgeon: Mary Brito MD;  Location: [facility];  Service: Thoracic        Current Outpatient Medications on File Prior to Visit   Medication Sig Dispense Refill   • HYDROcodone-acetaminophen (NORCO) 5-325 MG per tablet Take 1-2 tablets by mouth Every 4 (Four) Hours As Needed (Pain). 10 tablet 0     No current facility-administered medications on file prior to visit.         ALLERGIES:  No Known Allergies     Social History     Socioeconomic History   • Marital status: Single     Spouse name: Not on file   • Number of children: Not on file   • Years of education: High school   • Highest education level: Not on file   Occupational History   • Occupation: [employer]     Employer: [employer]   Tobacco Use   • Smoking status: Current Every Day Smoker     Packs/day: 1.00     Years: 40.00     Pack years: 40.00     Types: Cigarettes   • Smokeless tobacco: Never Used   Substance and Sexual Activity   • Alcohol use: Yes     Alcohol/week: 16.8 " oz     Types: 28 Cans of beer per week     Comment: NOT RECENTLY   • Drug use: No        Family History   Problem Relation Age of Onset   • Hypertension Mother    • Stroke Mother    • Hypertension Other    • Lung disease Other    • Prostate cancer Other    • Lung cancer Father    • Malig Hyperthermia Neg Hx         Review of Systems         General: no fever, no chills, no fatigue,no weight changes, no lack of appetite.  Eyes: no epiphora, xerophthalmia,conjunctivitis, pain, glaucoma, blurred vision, blindness, secretion, photophobia, proptosis, diplopia.  Ears: no otorrhea, tinnitus, otorrhagia, deafness, pain, vertigo.  Nose: no rhinorrhea, no epistaxis, no alteration in perception of odors, no sinuses pressure.  Mouth: no alteration in gums or teeth,  No ulcers, no difficulty with mastication or deglut ion, no odynophagia.  Neck: no masses or pain, no thyroid alterations, no pain in muscles or arteries, no carotid odynia, no crepitation.  Respiratory: no cough, no sputum production,no dyspnea,no trepopnea, no pleuritic pain,no hemoptysis.  Heart: no syncope, no irregularity, no palpitations, no angina,no orthopnea,no paroxysmal nocturnal dyspnea.  Vascular Venous: no tenderness,no edema,no palpable cords,no postphlebitic syndrome, no skin changes no ulcerations.  Vascular Arterial: no distal ischemia, noclaudication, no gangrene, no neuropathic ischemic pain, no skin ulcers, no paleness no cyanosis.  GI: no dysphagia, no odynophagia, no regurgitation, no heartburn,no indigestion,no nausea,no vomiting,no hematemesis ,no melena,no jaundice,no distention, no obstipation,no enterorrhagia,no proctalgia,no anal  lesions, no changes in bowel habits.  : no frequency, no hesitancy, no hematuria, no discharge,no  pain.  Musculoskeletal: no muscle or tendon pain or inflammation,no  joint pain, no edema, no functional limitation,no fasciculations, no mass.  Neurologic: no headache, no seizures, noalterations on Craneal  nerves, no motor deficit, no sensory deficit, normal coordination, no alteration in memory,normal orientation, calculation,normal writting, verbal and written language.  Skin: no rashes,no pruritus no localized lesions.  Psychiatric: no anxiety, no depression,no agitation, no delusions, proper insight.        Objective     There were no vitals filed for this visit.  Current Status 7/9/2019   ECOG score 0       Physical Exam              RECENT LABS:  Hematology WBC   Date Value Ref Range Status   07/09/2019 15.68 (H) 3.40 - 10.80 10*3/mm3 Final   02/02/2019 13.4 (H) 3.4 - 10.8 x10E3/uL Final     RBC   Date Value Ref Range Status   07/09/2019 4.52 4.14 - 5.80 10*6/mm3 Final   02/02/2019 4.81 4.14 - 5.80 x10E6/uL Final     Hemoglobin   Date Value Ref Range Status   07/09/2019 12.3 (L) 13.0 - 17.7 g/dL Final     Hematocrit   Date Value Ref Range Status   07/09/2019 38.3 37.5 - 51.0 % Final     Platelets   Date Value Ref Range Status   07/09/2019 445 140 - 450 10*3/mm3 Final      CT CHEST W CONTRAST-     Radiation dose reduction techniques were utilized, including automated  exposure control and exposure modulation based on body size.     CLINICAL: 63-year-old male with possible esophageal malignancy,  pulmonary nodule, lymphadenopathy and liver lesions.     FINDINGS: There is a 10 cm long segment of distal esophagus which  demonstrates thickening and luminal irregularity. This process does not  appear to extend into the stomach. At one point the wall demonstrates a  focal mass-like area with displacement of the lumen towards the left.  The findings are most worrisome for distal esophageal neoplasm,  infectious/inflammatory process is felt to be less likely.     On image #254 there is a periesophageal lymph node which measures 14 mm.  There are other smaller distal periesophageal lymph nodes with the  largest of these 9 mm.     Located within the right lower lobe on image #268 is an 8 mm  noncalcified pulmonary nodule.  There are 2 tiny pleural plaques  demonstrated on the left major fissure, measuring only a few millimeters  in length each, seen on images 178 and 185. The lungs are otherwise  clear. There is bullae formation consistent with emphysema. No pleural  effusion.     Limited images through the upper abdomen demonstrate numerous liver  lesions.     No lytic or sclerotic bone lesion.     CONCLUSION:  1. Concentric as well as eccentric wall thickening involving the distal  10 cm of the esophagus, just above the GE junction. Findings most  worrisome for esophageal neoplasm.  2. Tiny right lower lobe 8 mm pulmonary nodule and 2 tiny pleural  plaques seen on the left major fissure. These are indeterminant.  3. Paraesophageal lymph nodes adjacent to the distal esophagus.        This report was finalized on 7/9/2019 11:03 AM by Dr. Thom Godinez M.D.     CT ABDOMEN AND PELVIS WITH IV CONTRAST     HISTORY: 63-year-old male with right lower quadrant pain. Diarrhea.  Prostate cancer history.     TECHNIQUE: Radiation dose reduction techniques were utilized, including  automated exposure control and exposure modulation based on body size.   3 mm images were obtained through the abdomen and pelvis after the  administration of IV contrast. There is no previous CT for comparison.     FINDINGS: There is masslike circumferential thickening of the distal  esophagus and there are multiple enlarged nodes adjacently. There are  many variable sized metastases scattered throughout the liver. There is  a confluent lesion in the right hepatic lobe which is best seen in its  entirety on the coronal reconstruction series and measures approximately  12 x 6 cm. The gallbladder appears unremarkable and there is no biliary  dilatation. The spleen, pancreas, adrenals, and kidneys appear  unremarkable. There is extensive colonic diverticulosis without evidence  for diverticulitis. The appendix appears normal. There is a tiny amount  of free fluid  within the dependent aspect of the pelvis. There are  extensive atherosclerotic changes throughout the abdominal aorta with  luminal narrowing. Within the visualized lower lung fields, there is an  8 mm right lower lobe pulmonary nodule.     IMPRESSION:  1. There is extensive liver metastases and the primary malignancy is  suspected to be at the distal esophagus. There is masslike  circumferential thickening of the distal esophagus with surrounding  lymphadenopathy. The 8 mm right basilar pulmonary nodule is suspected to  represent a metastasis.  2. There is extensive colonic diverticulosis without evidence for  diverticulitis and there is no appendicitis.     Discussed with NIRU Hernandez.     This report was finalized on 7/3/2019 11:05 AM by Dr. Almaz Rutledge M.D.         Assessment/Plan  In summary this patient is a 63-year-old white male who has been a chronic smoker who has had early satiety for several weeks associated with this lack of appetite, 30 pound weight loss and associated with this dry hacking cough. He had pneumonia not too long ago and he never recovered from this completely with the exception of the time that he was receiving some prednisone for the pneumonia. In any event the patient has developed now some abdominal pain in the right upper quadrant of the abdomen intermittently and he also is experiencing significant degree of fatigue. Given the persistency of the symptomatology a CT scan of the abdomen documented extensive liver metastasis and a very dramatic thickening of the wall of the esophagus in the gastroesophageal junction. A CT scan of the chest also documented very dramatic thickening of the wall of the esophagus in the last 10 cm of the intrathoracic esophagus. There is evidence of mediastinal adenopathies. Spleen, pancreas, kidneys were unremarkable. Bone windows were unremarkable.     The patient has history of prostate cancer in the past. He had prostatectomy, never required any  other intervention. His PSA has remained negative normal for years and years. Prostate cancer has nothing to do with his problem.     The patient is a chronic smoker of close to 2 packs of cigarettes a day. He is still smoking heavily and he has a component of COPD.    The other phenomenon on clinical grounds is that the patient has obvious changes in his legs consistent with peripheral arterial disease. He has very heavy calcification of the abdominal aorta and the iliac arteries. This is not surprising. He probably has an element of claudication.    RECOMMENDATIONS:   1. For cancer related pain given liver enlargement and metastasis the patient will not require any medicine so far but I pointed out to him that it will be okay for him to take a Tylenol here and there depending on needs.  2. I have placed a phone call to talk with Mary Brito MD, Thoracic Surgeon who will see the patient in consultation tomorrow. The question for her will be to proceed with the placement of a port for palliative chemotherapy administration and 2nd if she wants to pursue an upper GI endoscopy to take a tissue diagnosis from the esophagus. If the endoscopy is not her choice we will be glad to proceed with a CT guided liver biopsy.     After the biopsy is done and pathological diagnosis is confirmed of malignancy the patient will proceed with a PET scan.    I sent him back to the lab today to proceed with a chemistry profile, PT, PTT.  3. The patient has leukocytosis. Leads me to believe that this is a leukemoid reaction related to malignancy and I would not be surprised if what we get to see is a squamous cell carcinoma.   4. The patient has anemia. His hemoglobin has dropped 3 g and this is probably consistent with anemia neoplastic disease and why not blood loss. Anemia workup will be initiated today.    I discussed all of these facts with the patient, we will review the CT scan in the PAC system [facility] in  detail including liver metastasis and esophageal thickening. We discussed all of the facts with his 2 sisters.    The patient has social isolation, he works for [employer], he lives by himself, he has no children, he is  and he is going to require quite an amount of support in the future in regard to disability, time out of work for [employer] and so forth as well as rides to come and receive his medicines. He will require  assessment during the next visit.     Finally the patient keeps smoking. I have suggested for him smoking cessation and a referral will be placed for such.    The patient was ready to proceed after a lengthy conversation with him and his sisters.

## 2019-07-19 ENCOUNTER — OFFICE VISIT (OUTPATIENT)
Dept: ONCOLOGY | Facility: CLINIC | Age: 64
End: 2019-07-19

## 2019-07-19 ENCOUNTER — APPOINTMENT (OUTPATIENT)
Dept: OTHER | Facility: HOSPITAL | Age: 64
End: 2019-07-19

## 2019-07-19 DIAGNOSIS — C15.5 MALIGNANT NEOPLASM OF LOWER THIRD OF ESOPHAGUS (HCC): ICD-10-CM

## 2019-07-19 DIAGNOSIS — C78.7 LIVER METASTASES: Primary | ICD-10-CM

## 2019-07-19 PROCEDURE — G0463 HOSPITAL OUTPT CLINIC VISIT: HCPCS | Performed by: NURSE PRACTITIONER

## 2019-07-19 PROCEDURE — 99215 OFFICE O/P EST HI 40 MIN: CPT | Performed by: NURSE PRACTITIONER

## 2019-07-19 RX ORDER — ONDANSETRON 4 MG/1
4 TABLET, FILM COATED ORAL EVERY 8 HOURS PRN
Qty: 30 TABLET | Refills: 2 | Status: SHIPPED | OUTPATIENT
Start: 2019-07-19 | End: 2022-08-30

## 2019-07-19 NOTE — PROGRESS NOTES
____________________PATIENT EDUCATION____________________    PATIENT EDUCATION:  Today I met with the patient and his wife to discuss the chemotherapy regimen recommended for treatment of his disease.  The patient was given explanation of treatment premed side effects including office policy that prohibits patients to drive if sedating medications are administered, MD explanation given regarding benefits, side effects, toxicities and goals of treatment.  The patient received a Chemotherapy/Biotherapy Plan Summary including diagnosis and specific treatment plan.    SIDE EFFECTS:  Common side effects were discussed with the patient and/or significant other.  Discussion included hair loss/discoloration, anemia/fatigue, infection/chills/fever, appetite, bleeding risk/precautions, constipation, diarrhea, mouth sores, taste alteration, loss of appetite,nausea/vomiting, peripheral neuropathy, skin/nail changes, rash, muscle aches/weakness, photosensitivity, weight gain/loss, hearing loss, dizziness, menopausal symptoms, menstrrual irregularity, sterility, high blood pressure, heart damage, liver damage, lung damage, kidney damage, DVT/PE risk, fluid retention, pleural/pericardial effusion, somnolence, electrolyte/LFT imbalance, vein exercises and/or the possible need for vascular access/port placement.  The patient was advice that although uncommon, leakage of an infused medication from the vein or venous access device (port) may lead to skin breakdown and/or other tissue damage.  The patient was advised that he/she may have pain, bleeding, and/or bruising from the insertion of a needle in their vein or venous access device (port).  The patient was further advised that, in spite of proper technique, infection with redness and irritation may rarely occur at the site where the needle was inserted.  The patient was advised that if complications occur, additional medical treatment is available.    Discussion also included  side effects specific to drugs in the treatment plan, specifically Oxaliplatin, Leucovorin, and 5FU.     Reproductive risks were discussed, including appropriate use of birth control and protection during sexual relations.      A total of 40  minutes were spent with the patient, with 100% of time spent in education and counseling.

## 2019-07-22 DIAGNOSIS — C15.5 MALIGNANT NEOPLASM OF LOWER THIRD OF ESOPHAGUS (HCC): Primary | ICD-10-CM

## 2019-07-23 ENCOUNTER — OFFICE VISIT (OUTPATIENT)
Dept: ONCOLOGY | Facility: CLINIC | Age: 64
End: 2019-07-23

## 2019-07-23 ENCOUNTER — INFUSION (OUTPATIENT)
Dept: ONCOLOGY | Facility: HOSPITAL | Age: 64
End: 2019-07-23

## 2019-07-23 VITALS
SYSTOLIC BLOOD PRESSURE: 131 MMHG | BODY MASS INDEX: 28.21 KG/M2 | HEART RATE: 109 BPM | WEIGHT: 188.3 LBS | DIASTOLIC BLOOD PRESSURE: 77 MMHG | RESPIRATION RATE: 16 BRPM | OXYGEN SATURATION: 99 % | TEMPERATURE: 97.4 F

## 2019-07-23 DIAGNOSIS — N39.0 BACTERIAL URINARY INFECTION: Primary | ICD-10-CM

## 2019-07-23 DIAGNOSIS — C78.7 LIVER METASTASES: ICD-10-CM

## 2019-07-23 DIAGNOSIS — A49.9 BACTERIAL URINARY INFECTION: ICD-10-CM

## 2019-07-23 DIAGNOSIS — C15.5 MALIGNANT NEOPLASM OF LOWER THIRD OF ESOPHAGUS (HCC): Primary | ICD-10-CM

## 2019-07-23 DIAGNOSIS — N32.1 COLOVESICAL FISTULA: ICD-10-CM

## 2019-07-23 DIAGNOSIS — N39.0 BACTERIAL URINARY INFECTION: ICD-10-CM

## 2019-07-23 DIAGNOSIS — A49.9 BACTERIAL URINARY INFECTION: Primary | ICD-10-CM

## 2019-07-23 LAB
ALBUMIN SERPL-MCNC: 2.9 G/DL (ref 3.5–5.2)
ALBUMIN/GLOB SERPL: 0.9 G/DL (ref 1.1–2.4)
ALP SERPL-CCNC: 189 U/L (ref 38–116)
ALT SERPL W P-5'-P-CCNC: 23 U/L (ref 0–41)
ANION GAP SERPL CALCULATED.3IONS-SCNC: 15.7 MMOL/L (ref 5–15)
AST SERPL-CCNC: 40 U/L (ref 0–40)
BACTERIA UR QL AUTO: ABNORMAL /HPF
BASOPHILS # BLD AUTO: 0.03 10*3/MM3 (ref 0–0.2)
BASOPHILS NFR BLD AUTO: 0.2 % (ref 0–1.5)
BILIRUB SERPL-MCNC: 0.4 MG/DL (ref 0.2–1.2)
BILIRUB UR QL STRIP: ABNORMAL
BUN BLD-MCNC: 10 MG/DL (ref 6–20)
BUN/CREAT SERPL: 17.2 (ref 7.3–30)
CALCIUM SPEC-SCNC: 8.5 MG/DL (ref 8.5–10.2)
CHLORIDE SERPL-SCNC: 97 MMOL/L (ref 98–107)
CLARITY UR: ABNORMAL
CO2 SERPL-SCNC: 22.3 MMOL/L (ref 22–29)
COLOR UR: ABNORMAL
CREAT BLD-MCNC: 0.58 MG/DL (ref 0.7–1.3)
DEPRECATED RDW RBC AUTO: 47.1 FL (ref 37–54)
EOSINOPHIL # BLD AUTO: 0.02 10*3/MM3 (ref 0–0.4)
EOSINOPHIL NFR BLD AUTO: 0.2 % (ref 0.3–6.2)
ERYTHROCYTE [DISTWIDTH] IN BLOOD BY AUTOMATED COUNT: 15 % (ref 12.3–15.4)
GFR SERPL CREATININE-BSD FRML MDRD: 142 ML/MIN/1.73
GLOBULIN UR ELPH-MCNC: 3.3 GM/DL (ref 1.8–3.5)
GLUCOSE BLD-MCNC: 239 MG/DL (ref 74–124)
GLUCOSE UR STRIP-MCNC: ABNORMAL MG/DL
HCT VFR BLD AUTO: 34.8 % (ref 37.5–51)
HGB BLD-MCNC: 10.7 G/DL (ref 13–17.7)
HGB UR QL STRIP.AUTO: ABNORMAL
IMM GRANULOCYTES # BLD AUTO: 0.27 10*3/MM3 (ref 0–0.05)
IMM GRANULOCYTES NFR BLD AUTO: 2 % (ref 0–0.5)
KETONES UR QL STRIP: ABNORMAL
LEUKOCYTE ESTERASE UR QL STRIP.AUTO: ABNORMAL
LYMPHOCYTES # BLD AUTO: 1.02 10*3/MM3 (ref 0.7–3.1)
LYMPHOCYTES NFR BLD AUTO: 7.7 % (ref 19.6–45.3)
MCH RBC QN AUTO: 26.4 PG (ref 26.6–33)
MCHC RBC AUTO-ENTMCNC: 30.7 G/DL (ref 31.5–35.7)
MCV RBC AUTO: 85.9 FL (ref 79–97)
MONOCYTES # BLD AUTO: 0.79 10*3/MM3 (ref 0.1–0.9)
MONOCYTES NFR BLD AUTO: 6 % (ref 5–12)
NEUTROPHILS # BLD AUTO: 11.1 10*3/MM3 (ref 1.7–7)
NEUTROPHILS NFR BLD AUTO: 83.9 % (ref 42.7–76)
NITRITE UR QL STRIP: POSITIVE
NRBC BLD AUTO-RTO: 0 /100 WBC (ref 0–0.2)
PH UR STRIP.AUTO: 5.5 [PH] (ref 4.5–8)
PLATELET # BLD AUTO: 319 10*3/MM3 (ref 140–450)
PMV BLD AUTO: 8.9 FL (ref 6–12)
POTASSIUM BLD-SCNC: 3.7 MMOL/L (ref 3.5–4.7)
PROT SERPL-MCNC: 6.2 G/DL (ref 6.3–8)
PROT UR QL STRIP: ABNORMAL
RBC # BLD AUTO: 4.05 10*6/MM3 (ref 4.14–5.8)
RBC # UR: ABNORMAL /HPF
REF LAB TEST METHOD: ABNORMAL
SODIUM BLD-SCNC: 135 MMOL/L (ref 134–145)
SP GR UR STRIP: 1.02 (ref 1–1.03)
SQUAMOUS #/AREA URNS HPF: ABNORMAL /HPF
UROBILINOGEN UR QL STRIP: ABNORMAL
WBC NRBC COR # BLD: 13.23 10*3/MM3 (ref 3.4–10.8)
WBC UR QL AUTO: ABNORMAL /HPF

## 2019-07-23 PROCEDURE — 96368 THER/DIAG CONCURRENT INF: CPT | Performed by: NURSE PRACTITIONER

## 2019-07-23 PROCEDURE — 25010000002 DEXAMETHASONE SODIUM PHOSPHATE 100 MG/10ML SOLUTION: Performed by: INTERNAL MEDICINE

## 2019-07-23 PROCEDURE — 96415 CHEMO IV INFUSION ADDL HR: CPT | Performed by: NURSE PRACTITIONER

## 2019-07-23 PROCEDURE — 80053 COMPREHEN METABOLIC PANEL: CPT | Performed by: INTERNAL MEDICINE

## 2019-07-23 PROCEDURE — 25010000002 LEUCOVORIN CALCIUM PER 50 MG: Performed by: INTERNAL MEDICINE

## 2019-07-23 PROCEDURE — 25010000002 PALONOSETRON PER 25 MCG: Performed by: INTERNAL MEDICINE

## 2019-07-23 PROCEDURE — 25010000002 FLUOROURACIL PER 500 MG: Performed by: NURSE PRACTITIONER

## 2019-07-23 PROCEDURE — 96411 CHEMO IV PUSH ADDL DRUG: CPT | Performed by: NURSE PRACTITIONER

## 2019-07-23 PROCEDURE — 87088 URINE BACTERIA CULTURE: CPT | Performed by: NURSE PRACTITIONER

## 2019-07-23 PROCEDURE — 87186 SC STD MICRODIL/AGAR DIL: CPT | Performed by: NURSE PRACTITIONER

## 2019-07-23 PROCEDURE — 99214 OFFICE O/P EST MOD 30 MIN: CPT | Performed by: NURSE PRACTITIONER

## 2019-07-23 PROCEDURE — 25010000002 OXALIPLATIN PER 0.5 MG: Performed by: INTERNAL MEDICINE

## 2019-07-23 PROCEDURE — 96413 CHEMO IV INFUSION 1 HR: CPT | Performed by: NURSE PRACTITIONER

## 2019-07-23 PROCEDURE — 87086 URINE CULTURE/COLONY COUNT: CPT | Performed by: NURSE PRACTITIONER

## 2019-07-23 PROCEDURE — 85025 COMPLETE CBC W/AUTO DIFF WBC: CPT | Performed by: INTERNAL MEDICINE

## 2019-07-23 PROCEDURE — 96416 CHEMO PROLONG INFUSE W/PUMP: CPT | Performed by: NURSE PRACTITIONER

## 2019-07-23 PROCEDURE — 96375 TX/PRO/DX INJ NEW DRUG ADDON: CPT | Performed by: NURSE PRACTITIONER

## 2019-07-23 PROCEDURE — 81001 URINALYSIS AUTO W/SCOPE: CPT | Performed by: INTERNAL MEDICINE

## 2019-07-23 PROCEDURE — 25010000002 FLUOROURACIL PER 500 MG: Performed by: INTERNAL MEDICINE

## 2019-07-23 RX ORDER — CIPROFLOXACIN 250 MG/1
500 TABLET, FILM COATED ORAL 2 TIMES DAILY
Qty: 60 TABLET | Refills: 1 | Status: SHIPPED | OUTPATIENT
Start: 2019-07-23 | End: 2019-09-17 | Stop reason: SDUPTHER

## 2019-07-23 RX ORDER — DEXTROSE MONOHYDRATE 50 MG/ML
250 INJECTION, SOLUTION INTRAVENOUS ONCE
Status: COMPLETED | OUTPATIENT
Start: 2019-07-23 | End: 2019-07-23

## 2019-07-23 RX ORDER — FLUOROURACIL 50 MG/ML
400 INJECTION, SOLUTION INTRAVENOUS ONCE
Status: COMPLETED | OUTPATIENT
Start: 2019-07-23 | End: 2019-07-23

## 2019-07-23 RX ORDER — PALONOSETRON 0.05 MG/ML
0.25 INJECTION, SOLUTION INTRAVENOUS ONCE
Status: COMPLETED | OUTPATIENT
Start: 2019-07-23 | End: 2019-07-23

## 2019-07-23 RX ADMIN — PALONOSETRON HYDROCHLORIDE 0.25 MG: 0.25 INJECTION, SOLUTION INTRAVENOUS at 12:29

## 2019-07-23 RX ADMIN — FLUOROURACIL 800 MG: 50 INJECTION, SOLUTION INTRAVENOUS at 15:27

## 2019-07-23 RX ADMIN — DEXAMETHASONE SODIUM PHOSPHATE 12 MG: 10 INJECTION, SOLUTION INTRAMUSCULAR; INTRAVENOUS at 12:30

## 2019-07-23 RX ADMIN — DEXTROSE MONOHYDRATE 250 ML: 5 INJECTION, SOLUTION INTRAVENOUS at 12:27

## 2019-07-23 RX ADMIN — OXALIPLATIN 170 MG: 5 INJECTION, SOLUTION, CONCENTRATE INTRAVENOUS at 13:12

## 2019-07-23 RX ADMIN — DEXTROSE MONOHYDRATE 800 MG: 5 INJECTION, SOLUTION INTRAVENOUS at 13:13

## 2019-07-23 RX ADMIN — FLUOROURACIL 4820 MG: 50 INJECTION, SOLUTION INTRAVENOUS at 15:27

## 2019-07-23 NOTE — PROGRESS NOTES
"  Subjective     REASONS FOR FOLLOWUP:   1. 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  Colovesical fistula    HISTORY OF PRESENT ILLNESS:  The patient is a 63 y.o. year old male who is here for follow-up with the above-mentioned history.    History of Present Illness   Mr. Garcia returns to the office today, having undergone formal chemotherapy education, and ready to begin palliative FOLFOX therapy for metastatic esophageal cancer with extensive liver mets.    Thankfully he is having only minimal pain in his right side, noted when he coughs or moves certain directions.  He is not currently using any pain medication and states it is tolerable.  We discussed that hopefully he moves to therapy we will see response and the pain will improve.    He denies other concerns at this time.    Past Medical History:   Diagnosis Date   • Arthritis    • Elevated PSA    • Esophageal mass    • H/O Lung nodule    • Hepatitis     CHILD--PT STATED \"I THINK IT WAS A.\"   • History of pneumonia    • Hyperlipidemia    • Hypertension    • Liver cancer (CMS/HCC)    • Prostate cancer (CMS/HCC)        ONCOLOGIC HISTORY:  (History from previous dates can be found in the separate document.)    Current Outpatient Medications on File Prior to Visit   Medication Sig Dispense Refill   • HYDROcodone-acetaminophen (NORCO) 5-325 MG per tablet Take 1-2 tablets by mouth Every 4 (Four) Hours As Needed (Pain). 10 tablet 0   • ondansetron (ZOFRAN) 4 MG tablet Take 1 tablet by mouth Every 8 (Eight) Hours As Needed for Nausea or Vomiting. 30 tablet 2     No current facility-administered medications on file prior to visit.        ALLERGIES:   No Known Allergies    Social History     Socioeconomic History   • Marital status: Single     Spouse name: Not on file   • Number of children: Not on file   • Years of education: High school   • Highest education level: Not on file   Occupational History   • " Occupation: [email or web address]     Employer: [employer]   Tobacco Use   • Smoking status: Current Every Day Smoker     Packs/day: 1.00     Years: 40.00     Pack years: 40.00     Types: Cigarettes   • Smokeless tobacco: Never Used   Substance and Sexual Activity   • Alcohol use: Yes     Alcohol/week: 16.8 oz     Types: 28 Cans of beer per week     Comment: NOT RECENTLY   • Drug use: No         Cancer-related family history includes Lung cancer in his father; Prostate cancer in his other.     Review of Systems   Constitutional: Negative.    HENT: Negative.    Eyes: Negative.    Respiratory: Negative.    Cardiovascular: Negative.    Gastrointestinal: Positive for abdominal pain (mild, right side).   Genitourinary: Negative.    Musculoskeletal: Negative.    Skin: Negative.    Neurological: Negative.    Hematological: Negative.    Psychiatric/Behavioral: Negative.      A comprehensive 14 point review of systems was performed and was negative except as mentioned.    Objective      Vitals:    07/23/19 1115   BP: 131/77   Pulse: 109   Resp: 16   Temp: 97.4 °F (36.3 °C)   TempSrc: Oral   SpO2: 99%   Weight: 85.4 kg (188 lb 4.8 oz)   PainSc: 0-No pain     Current Status 7/23/2019   ECOG score 1       Physical Exam   Constitutional: He is oriented to person, place, and time. No distress.   Ill-appearing   HENT:   Head: Normocephalic and atraumatic.   Mouth/Throat: No oropharyngeal exudate.   Eyes: EOM are normal. Pupils are equal, round, and reactive to light. No scleral icterus.   Neck: Normal range of motion. Neck supple.   Cardiovascular: Normal rate and regular rhythm.   Pulmonary/Chest: Effort normal and breath sounds normal. No respiratory distress.   Abdominal: Soft. Bowel sounds are normal. He exhibits no ascites. There is hepatomegaly. There is no tenderness.       Liver easily palpable, roughly 11 cm below RCM   Musculoskeletal: Normal range of motion. He exhibits no edema or tenderness.   Neurological: He is alert and  oriented to person, place, and time.   Skin: Skin is warm and dry. He is not diaphoretic.   Psychiatric: He has a normal mood and affect. His behavior is normal.       RECENT LABS:  Results from last 7 days   Lab Units 07/23/19  1047 07/18/19  1525   WBC 10*3/mm3 13.23* 13.24*   NEUTROS ABS 10*3/mm3 11.10* 10.76*   HEMOGLOBIN g/dL 10.7* 10.9*   HEMATOCRIT % 34.8* 35.2*   PLATELETS 10*3/mm3 319 254     Results from last 7 days   Lab Units 07/23/19  1047 07/18/19  1525   SODIUM mmol/L 135 133*   POTASSIUM mmol/L 3.7 4.1   CHLORIDE mmol/L 97* 94*   CO2 mmol/L 22.3 25.9   BUN mg/dL 10 11   CREATININE mg/dL 0.58* 0.65*   CALCIUM mg/dL 8.5 8.9   ALBUMIN g/dL 2.90* 3.10*   BILIRUBIN mg/dL 0.4 0.5   ALK PHOS U/L 189* 187*   ALT (SGPT) U/L 23 28   AST (SGOT) U/L 40 49*   GLUCOSE mg/dL 239* 136*     Brief Urine Lab Results  (Last result in the past 365 days)      Color   Clarity   Blood   Leuk Est   Nitrite   Protein   CREAT   Urine HCG        07/23/19 1047 Dark Yellow Cloudy Moderate (2+) Trace Positive 30 mg/dL (1+)                     Assessment/Plan    1. 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  · Patient with liver easily palpable clinically. Hope to see improvement in this as he moves through treatment.  · Plan FOLFOX today, 7/23/2019.  · Tentatively plan repeat scans after 4 cycles.  2.  Colovesical fistula  · Once #1 under better control, we will address this further.  · In the meantime, per Dr. Haynes, after review of urinalysis today, we will send urine for culture, but for now start the patient on low-dose Cipro 250 mg twice a day at least for the next month.    · He may need referral to general surgery and urology at some point though it may close on its own.  3.  COPD. 2 PPD smoker still.  4.  Peripheral arterial disease with venous stasis changes to the legs. Also calcification of the abdominal aorta and iliac arteries per scans.    PLAN:  1. Proceed  with cycle 1 FOLFOX.  2. Patient to initiate low-dose Cipro 250 mg twice a day, unless culture should come back recommending otherwise.  3. Return in 1 week for NP follow-up and toxicity check with CBC and urinalysis.  4. Return in 2 weeks for NP follow-up and cycle 2 FOLFOX.  5. Review HER-2 status once available.  6. Patient scheduled to see Dr. Haynes back                    Cc:  Amanda Gunderson*

## 2019-07-24 ENCOUNTER — DOCUMENTATION (OUTPATIENT)
Dept: OTHER | Facility: HOSPITAL | Age: 64
End: 2019-07-24

## 2019-07-24 ENCOUNTER — TELEPHONE (OUTPATIENT)
Dept: ONCOLOGY | Facility: HOSPITAL | Age: 64
End: 2019-07-24

## 2019-07-24 NOTE — PROGRESS NOTES
Patient referred for smoking cessation. Message received from Maria L Jurado MA patient declines one on one counseling and declines enrollment in class at this time. He was enouraged to call in the future should he reconsider.

## 2019-07-25 ENCOUNTER — TELEPHONE (OUTPATIENT)
Dept: ONCOLOGY | Facility: HOSPITAL | Age: 64
End: 2019-07-25

## 2019-07-25 ENCOUNTER — INFUSION (OUTPATIENT)
Dept: ONCOLOGY | Facility: HOSPITAL | Age: 64
End: 2019-07-25

## 2019-07-25 ENCOUNTER — DOCUMENTATION (OUTPATIENT)
Dept: ONCOLOGY | Facility: HOSPITAL | Age: 64
End: 2019-07-25

## 2019-07-25 DIAGNOSIS — C78.7 LIVER METASTASES: ICD-10-CM

## 2019-07-25 DIAGNOSIS — C15.5 MALIGNANT NEOPLASM OF LOWER THIRD OF ESOPHAGUS (HCC): Primary | ICD-10-CM

## 2019-07-25 DIAGNOSIS — Z45.2 ENCOUNTER FOR ADJUSTMENT OR MANAGEMENT OF VASCULAR ACCESS DEVICE: ICD-10-CM

## 2019-07-25 LAB — BACTERIA SPEC AEROBE CULT: ABNORMAL

## 2019-07-25 PROCEDURE — 96523 IRRIG DRUG DELIVERY DEVICE: CPT | Performed by: INTERNAL MEDICINE

## 2019-07-25 RX ORDER — SODIUM CHLORIDE 0.9 % (FLUSH) 0.9 %
10 SYRINGE (ML) INJECTION AS NEEDED
Status: CANCELLED | OUTPATIENT
Start: 2019-07-25

## 2019-07-25 RX ORDER — SODIUM CHLORIDE 0.9 % (FLUSH) 0.9 %
10 SYRINGE (ML) INJECTION AS NEEDED
Status: DISCONTINUED | OUTPATIENT
Start: 2019-07-25 | End: 2019-07-25 | Stop reason: HOSPADM

## 2019-07-25 RX ADMIN — Medication 500 UNITS: at 13:39

## 2019-07-25 RX ADMIN — SODIUM CHLORIDE, PRESERVATIVE FREE 10 ML: 5 INJECTION INTRAVENOUS at 13:39

## 2019-07-25 NOTE — TELEPHONE ENCOUNTER
Received urine cultures from the lab.  Reviewed with brendan Young, pt is on cipro already.  No new orders.

## 2019-07-25 NOTE — PROGRESS NOTES
Patient has question re his cipro dose.  Bottle says 250 mg take 2, 2 times a day.  Per Deisi's note patient is to take 250 mg take 1, 2 times a day.   Clarified with Deisi SHANKAR, and patient is to take 1 tablet 2 times a day.  Patient v/u.

## 2019-07-26 DIAGNOSIS — C78.7 LIVER METASTASES: ICD-10-CM

## 2019-07-26 DIAGNOSIS — C15.5 MALIGNANT NEOPLASM OF LOWER THIRD OF ESOPHAGUS (HCC): ICD-10-CM

## 2019-07-26 LAB
CYTO UR: NORMAL
LAB AP CASE REPORT: NORMAL
LAB AP DIAGNOSIS COMMENT: NORMAL
Lab: NORMAL
PATH REPORT.ADDENDUM SPEC: NORMAL
PATH REPORT.FINAL DX SPEC: NORMAL
PATH REPORT.GROSS SPEC: NORMAL

## 2019-07-26 RX ORDER — DEXTROSE MONOHYDRATE 50 MG/ML
250 INJECTION, SOLUTION INTRAVENOUS ONCE
Status: CANCELLED | OUTPATIENT
Start: 2019-08-06

## 2019-07-26 RX ORDER — DIPHENHYDRAMINE HYDROCHLORIDE 50 MG/ML
50 INJECTION INTRAMUSCULAR; INTRAVENOUS AS NEEDED
Status: CANCELLED | OUTPATIENT
Start: 2019-08-06

## 2019-07-26 RX ORDER — FLUOROURACIL 50 MG/ML
400 INJECTION, SOLUTION INTRAVENOUS ONCE
Status: CANCELLED | OUTPATIENT
Start: 2019-08-06

## 2019-07-26 RX ORDER — FAMOTIDINE 10 MG/ML
20 INJECTION, SOLUTION INTRAVENOUS AS NEEDED
Status: CANCELLED | OUTPATIENT
Start: 2019-08-06

## 2019-07-26 RX ORDER — PALONOSETRON 0.05 MG/ML
0.25 INJECTION, SOLUTION INTRAVENOUS ONCE
Status: CANCELLED | OUTPATIENT
Start: 2019-08-06

## 2019-07-30 ENCOUNTER — OFFICE VISIT (OUTPATIENT)
Dept: ONCOLOGY | Facility: CLINIC | Age: 64
End: 2019-07-30

## 2019-07-30 ENCOUNTER — LAB (OUTPATIENT)
Dept: LAB | Facility: HOSPITAL | Age: 64
End: 2019-07-30

## 2019-07-30 ENCOUNTER — APPOINTMENT (OUTPATIENT)
Dept: ONCOLOGY | Facility: CLINIC | Age: 64
End: 2019-07-30

## 2019-07-30 ENCOUNTER — HOSPITAL ENCOUNTER (OUTPATIENT)
Dept: CARDIOLOGY | Facility: HOSPITAL | Age: 64
Discharge: HOME | End: 2019-07-30
Admitting: INTERNAL MEDICINE

## 2019-07-30 VITALS
HEIGHT: 69 IN | RESPIRATION RATE: 18 BRPM | SYSTOLIC BLOOD PRESSURE: 147 MMHG | TEMPERATURE: 98.2 F | HEART RATE: 105 BPM | WEIGHT: 188.4 LBS | DIASTOLIC BLOOD PRESSURE: 87 MMHG | OXYGEN SATURATION: 99 % | BODY MASS INDEX: 27.91 KG/M2

## 2019-07-30 VITALS — DIASTOLIC BLOOD PRESSURE: 62 MMHG | SYSTOLIC BLOOD PRESSURE: 103 MMHG | HEART RATE: 99 BPM

## 2019-07-30 DIAGNOSIS — C78.7 LIVER METASTASES: ICD-10-CM

## 2019-07-30 DIAGNOSIS — C15.5 CANCER OF LOWER THIRD OF ESOPHAGUS (HCC): ICD-10-CM

## 2019-07-30 DIAGNOSIS — Z79.899 HIGH RISK MEDICATION USE: ICD-10-CM

## 2019-07-30 DIAGNOSIS — C15.5 MALIGNANT NEOPLASM OF LOWER THIRD OF ESOPHAGUS (HCC): ICD-10-CM

## 2019-07-30 DIAGNOSIS — Z79.899 HIGH RISK MEDICATION USE: Primary | ICD-10-CM

## 2019-07-30 DIAGNOSIS — N32.1 COLOVESICAL FISTULA: ICD-10-CM

## 2019-07-30 LAB
ALBUMIN SERPL-MCNC: 2.8 G/DL (ref 3.5–5.2)
ALBUMIN/GLOB SERPL: 0.8 G/DL (ref 1.1–2.4)
ALP SERPL-CCNC: 138 U/L (ref 38–116)
ALT SERPL W P-5'-P-CCNC: 13 U/L (ref 0–41)
ANION GAP SERPL CALCULATED.3IONS-SCNC: 13 MMOL/L (ref 5–15)
AST SERPL-CCNC: 14 U/L (ref 0–40)
BACTERIA UR QL AUTO: NEGATIVE /HPF
BASOPHILS # BLD AUTO: 0.02 10*3/MM3 (ref 0–0.2)
BASOPHILS NFR BLD AUTO: 0.2 % (ref 0–1.5)
BILIRUB SERPL-MCNC: 0.4 MG/DL (ref 0.2–1.2)
BILIRUB UR QL STRIP: NEGATIVE
BUN BLD-MCNC: 10 MG/DL (ref 6–20)
BUN/CREAT SERPL: 17.2 (ref 7.3–30)
CALCIUM SPEC-SCNC: 8.4 MG/DL (ref 8.5–10.2)
CHLORIDE SERPL-SCNC: 98 MMOL/L (ref 98–107)
CLARITY UR: CLEAR
CO2 SERPL-SCNC: 24 MMOL/L (ref 22–29)
COLOR UR: YELLOW
CREAT BLD-MCNC: 0.58 MG/DL (ref 0.7–1.3)
DEPRECATED RDW RBC AUTO: 47.4 FL (ref 37–54)
EOSINOPHIL # BLD AUTO: 0.06 10*3/MM3 (ref 0–0.4)
EOSINOPHIL NFR BLD AUTO: 0.5 % (ref 0.3–6.2)
ERYTHROCYTE [DISTWIDTH] IN BLOOD BY AUTOMATED COUNT: 15.2 % (ref 12.3–15.4)
GFR SERPL CREATININE-BSD FRML MDRD: 142 ML/MIN/1.73
GLOBULIN UR ELPH-MCNC: 3.3 GM/DL (ref 1.8–3.5)
GLUCOSE BLD-MCNC: 122 MG/DL (ref 74–124)
GLUCOSE UR STRIP-MCNC: NEGATIVE MG/DL
HCT VFR BLD AUTO: 35.2 % (ref 37.5–51)
HGB BLD-MCNC: 10.8 G/DL (ref 13–17.7)
HGB UR QL STRIP.AUTO: NEGATIVE
HYALINE CASTS UR QL AUTO: NORMAL /LPF
IMM GRANULOCYTES # BLD AUTO: 0.32 10*3/MM3 (ref 0–0.05)
IMM GRANULOCYTES NFR BLD AUTO: 2.6 % (ref 0–0.5)
KETONES UR QL STRIP: NEGATIVE
LEUKOCYTE ESTERASE UR QL STRIP.AUTO: NEGATIVE
LYMPHOCYTES # BLD AUTO: 0.99 10*3/MM3 (ref 0.7–3.1)
LYMPHOCYTES NFR BLD AUTO: 8 % (ref 19.6–45.3)
MCH RBC QN AUTO: 26.2 PG (ref 26.6–33)
MCHC RBC AUTO-ENTMCNC: 30.7 G/DL (ref 31.5–35.7)
MCV RBC AUTO: 85.2 FL (ref 79–97)
MONOCYTES # BLD AUTO: 0.5 10*3/MM3 (ref 0.1–0.9)
MONOCYTES NFR BLD AUTO: 4 % (ref 5–12)
NEUTROPHILS # BLD AUTO: 10.53 10*3/MM3 (ref 1.7–7)
NEUTROPHILS NFR BLD AUTO: 84.7 % (ref 42.7–76)
NITRITE UR QL STRIP: NEGATIVE
NRBC BLD AUTO-RTO: 0 /100 WBC (ref 0–0.2)
PH UR STRIP.AUTO: 5.5 [PH] (ref 4.5–8)
PLATELET # BLD AUTO: 209 10*3/MM3 (ref 140–450)
PMV BLD AUTO: 9.5 FL (ref 6–12)
POTASSIUM BLD-SCNC: 3.6 MMOL/L (ref 3.5–4.7)
PROT SERPL-MCNC: 6.1 G/DL (ref 6.3–8)
PROT UR QL STRIP: ABNORMAL
RBC # BLD AUTO: 4.13 10*6/MM3 (ref 4.14–5.8)
RBC # UR: NORMAL /HPF
REF LAB TEST METHOD: NORMAL
SODIUM BLD-SCNC: 135 MMOL/L (ref 134–145)
SP GR UR STRIP: 1.01 (ref 1–1.03)
SQUAMOUS #/AREA URNS HPF: NORMAL /HPF
UROBILINOGEN UR QL STRIP: ABNORMAL
WBC NRBC COR # BLD: 12.42 10*3/MM3 (ref 3.4–10.8)
WBC UR QL AUTO: NORMAL /HPF

## 2019-07-30 PROCEDURE — 25010000002 PERFLUTREN (DEFINITY) 8.476 MG IN SODIUM CHLORIDE 0.9 % 10 ML INJECTION: Performed by: INTERNAL MEDICINE

## 2019-07-30 PROCEDURE — 93306 TTE W/DOPPLER COMPLETE: CPT

## 2019-07-30 PROCEDURE — 81001 URINALYSIS AUTO W/SCOPE: CPT | Performed by: NURSE PRACTITIONER

## 2019-07-30 PROCEDURE — 36415 COLL VENOUS BLD VENIPUNCTURE: CPT | Performed by: NURSE PRACTITIONER

## 2019-07-30 PROCEDURE — 99213 OFFICE O/P EST LOW 20 MIN: CPT | Performed by: NURSE PRACTITIONER

## 2019-07-30 PROCEDURE — 85025 COMPLETE CBC W/AUTO DIFF WBC: CPT | Performed by: NURSE PRACTITIONER

## 2019-07-30 PROCEDURE — 80053 COMPREHEN METABOLIC PANEL: CPT | Performed by: NURSE PRACTITIONER

## 2019-07-30 PROCEDURE — 0399T ADULT TRANSTHORACIC ECHO COMPLETE W/ CONT IF NECESSARY PER PROTOCOL: CPT | Performed by: INTERNAL MEDICINE

## 2019-07-30 PROCEDURE — 0399T HC MYOCARDL STRAIN IMAG QUAN ASSMT PER SESS: CPT

## 2019-07-30 PROCEDURE — 93306 TTE W/DOPPLER COMPLETE: CPT | Performed by: INTERNAL MEDICINE

## 2019-07-30 RX ADMIN — PERFLUTREN 1.5 ML: 6.52 INJECTION, SUSPENSION INTRAVENOUS at 14:44

## 2019-07-30 NOTE — PROGRESS NOTES
"  Subjective     REASONS FOR FOLLOWUP:   1. 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  Colovesical fistula    HISTORY OF PRESENT ILLNESS:  The patient is a 63 y.o. year old male who is here for follow-up with the above-mentioned history.    History of Present Illness   patient returns today for toxicity check after receiving his first cycle of FOLFOX on 7/23/2019.  Patient reports that overall he felt fairly well, except for Thursday, after he was disconnected,  he had a day of significant fatigue.  He denies issues with nausea, vomiting, diarrhea, or constipation.  He denies fevers or chills.  He continues on Cipro 250 mg twice daily.  Patient denies any urinary symptoms.  He has no other problems or concerns.    Past Medical History:   Diagnosis Date   • Arthritis    • Elevated PSA    • Esophageal mass    • H/O Lung nodule    • Hepatitis     CHILD--PT STATED \"I THINK IT WAS A.\"   • History of pneumonia    • Hyperlipidemia    • Hypertension    • Liver cancer (CMS/HCC)    • Prostate cancer (CMS/HCC)        ONCOLOGIC HISTORY:  (History from previous dates can be found in the separate document.)    Current Outpatient Medications on File Prior to Visit   Medication Sig Dispense Refill   • ciprofloxacin (CIPRO) 250 MG tablet Take 2 tablets by mouth 2 (Two) Times a Day. 1 tablet (Patient taking differently: Take 250 mg by mouth 2 (Two) Times a Day. 1 tablet) 60 tablet 1   • HYDROcodone-acetaminophen (NORCO) 5-325 MG per tablet Take 1-2 tablets by mouth Every 4 (Four) Hours As Needed (Pain). 10 tablet 0   • ondansetron (ZOFRAN) 4 MG tablet Take 1 tablet by mouth Every 8 (Eight) Hours As Needed for Nausea or Vomiting. 30 tablet 2     No current facility-administered medications on file prior to visit.        ALLERGIES:   No Known Allergies    Social History     Socioeconomic History   • Marital status: Single     Spouse name: Not on file   • Number of children: " "Not on file   • Years of education: High school   • Highest education level: Not on file   Occupational History   • Occupation: etaskr     Employer: [employer]   Tobacco Use   • Smoking status: Current Every Day Smoker     Packs/day: 1.00     Years: 40.00     Pack years: 40.00     Types: Cigarettes   • Smokeless tobacco: Never Used   Substance and Sexual Activity   • Alcohol use: Yes     Alcohol/week: 16.8 oz     Types: 28 Cans of beer per week     Comment: NOT RECENTLY   • Drug use: No         Cancer-related family history includes Lung cancer in his father; Prostate cancer in his other.     Review of Systems   Constitutional: Positive for fatigue. Negative for activity change, appetite change, chills and fever.   HENT: Negative for mouth sores, nosebleeds and trouble swallowing.    Respiratory: Negative for cough and shortness of breath.    Cardiovascular: Negative for chest pain and leg swelling.   Gastrointestinal: Positive for abdominal pain (mild right). Negative for constipation, diarrhea, nausea and vomiting.   Genitourinary: Negative for difficulty urinating.   Skin: Negative for rash.   Neurological: Negative for dizziness, weakness and numbness.   Hematological: Negative for adenopathy. Does not bruise/bleed easily.   Psychiatric/Behavioral: Negative for sleep disturbance.       Objective      Vitals:    07/30/19 0910   BP: 147/87   Pulse: 105   Resp: 18   Temp: 98.2 °F (36.8 °C)   TempSrc: Oral   SpO2: 99%   Weight: 85.5 kg (188 lb 6.4 oz)   Height: 174 cm (68.5\")   PainSc:   8   PainLoc: Abdomen  Comment: Rt side abd pain     Current Status 7/30/2019   ECOG score 0       Physical Exam   Constitutional: He is oriented to person, place, and time. No distress.   Ill-appearing   HENT:   Head: Normocephalic and atraumatic.   Mouth/Throat: No oropharyngeal exudate.   Eyes: EOM are normal. Pupils are equal, round, and reactive to light. No scleral icterus.   Neck: Normal range of motion. Neck supple. "   Cardiovascular: Normal rate and regular rhythm.   Pulmonary/Chest: Effort normal and breath sounds normal. No respiratory distress.   Abdominal: Soft. Bowel sounds are normal. He exhibits no ascites. There is hepatomegaly. There is no tenderness.       Liver easily palpable, roughly 11 cm below RCM   Musculoskeletal: Normal range of motion. He exhibits edema (1+ bilateral). He exhibits no tenderness.   Neurological: He is alert and oriented to person, place, and time.   Skin: Skin is warm and dry. He is not diaphoretic.   Psychiatric: He has a normal mood and affect. His behavior is normal.   7/30/2019 physical exam is unchanged from above.    RECENT LABS:  Results from last 7 days   Lab Units 07/30/19  0914 07/23/19  1047   WBC 10*3/mm3 12.42* 13.23*   NEUTROS ABS 10*3/mm3 10.53* 11.10*   HEMOGLOBIN g/dL 10.8* 10.7*   HEMATOCRIT % 35.2* 34.8*   PLATELETS 10*3/mm3 209 319     Results from last 7 days   Lab Units 07/23/19  1047   SODIUM mmol/L 135   POTASSIUM mmol/L 3.7   CHLORIDE mmol/L 97*   CO2 mmol/L 22.3   BUN mg/dL 10   CREATININE mg/dL 0.58*   CALCIUM mg/dL 8.5   ALBUMIN g/dL 2.90*   BILIRUBIN mg/dL 0.4   ALK PHOS U/L 189*   ALT (SGPT) U/L 23   AST (SGOT) U/L 40   GLUCOSE mg/dL 239*     Brief Urine Lab Results  (Last result in the past 365 days)      Color   Clarity   Blood   Leuk Est   Nitrite   Protein   CREAT   Urine HCG        07/30/19 0914 Yellow Clear Negative Negative Negative Trace                     Assessment/Plan    1. 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  · Patient with liver easily palpable clinically. Hope to see improvement in this as he moves through treatment.  · Plan FOLFOX today, 7/23/2019.  · Tentatively plan repeat scans after 4 cycles.  · HER-2 2+, after discussion with Dr. Haynes we will schedule the patient for an echocardiogram and consider adding Herceptin with cycle 2.     2.  Colovesical fistula  · Once #1 under  better control, we will address this further.  · In the meantime, per Dr. Haynes, after review of urinalysis today, we will send urine for culture, but for now start the patient on low-dose Cipro 250 mg twice a day at least for the next month.    · He may need referral to general surgery and urology at some point though it may close on its own.    3.  COPD. 2 PPD smoker still.  Patient reports today that he is trying to cut back and is considering taking a smoking cessation class.    4.  Peripheral arterial disease with venous stasis changes to the legs. Also calcification of the abdominal aorta and iliac arteries per scans.    PLAN:  1. Patient for an echocardiogram.  2. Return in 1 week for reevaluation by nurse practitioner prior to cycle 2 FOLFOX, with consideration of the addition of her Herceptin.  3. Continue Cipro 250 mg twice daily.  4. Return for follow-up visit with Dr. Haynes in 3 weeks for reevaluation when he is due for his third cycle.

## 2019-07-31 LAB
AORTIC ROOT ANNULUS: 2 CM
ASCENDING AORTA: 2.6 CM
BH CV ECHO MEAS - ACS: 2 CM
BH CV ECHO MEAS - AO MAX PG (FULL): 1.6 MMHG
BH CV ECHO MEAS - AO MAX PG: 7.7 MMHG
BH CV ECHO MEAS - AO MEAN PG (FULL): 1.2 MMHG
BH CV ECHO MEAS - AO MEAN PG: 4.2 MMHG
BH CV ECHO MEAS - AO ROOT AREA (BSA CORRECTED): 1.7
BH CV ECHO MEAS - AO ROOT AREA: 9.2 CM^2
BH CV ECHO MEAS - AO ROOT DIAM: 3.4 CM
BH CV ECHO MEAS - AO V2 MAX: 138.5 CM/SEC
BH CV ECHO MEAS - AO V2 MEAN: 94.6 CM/SEC
BH CV ECHO MEAS - AO V2 VTI: 25.6 CM
BH CV ECHO MEAS - AVA(I,A): 3.1 CM^2
BH CV ECHO MEAS - AVA(I,D): 3.1 CM^2
BH CV ECHO MEAS - AVA(V,A): 3 CM^2
BH CV ECHO MEAS - AVA(V,D): 3 CM^2
BH CV ECHO MEAS - BSA(HAYCOCK): 2 M^2
BH CV ECHO MEAS - BSA: 2 M^2
BH CV ECHO MEAS - BZI_BMI: 28.6 KILOGRAMS/M^2
BH CV ECHO MEAS - BZI_METRIC_HEIGHT: 172.7 CM
BH CV ECHO MEAS - BZI_METRIC_WEIGHT: 85.3 KG
BH CV ECHO MEAS - EDV(MOD-SP2): 88 ML
BH CV ECHO MEAS - EDV(MOD-SP4): 102 ML
BH CV ECHO MEAS - EDV(TEICH): 79.7 ML
BH CV ECHO MEAS - EF(CUBED): 79.4 %
BH CV ECHO MEAS - EF(MOD-BP): 63 %
BH CV ECHO MEAS - EF(MOD-SP2): 60.2 %
BH CV ECHO MEAS - EF(MOD-SP4): 65.7 %
BH CV ECHO MEAS - EF(TEICH): 72.1 %
BH CV ECHO MEAS - ESV(MOD-SP2): 35 ML
BH CV ECHO MEAS - ESV(MOD-SP4): 35 ML
BH CV ECHO MEAS - ESV(TEICH): 22.2 ML
BH CV ECHO MEAS - IVS/LVPW: 1
BH CV ECHO MEAS - IVSD: 1.2 CM
BH CV ECHO MEAS - LAT PEAK E' VEL: 8 CM/SEC
BH CV ECHO MEAS - LV DIASTOLIC VOL/BSA (35-75): 51.2 ML/M^2
BH CV ECHO MEAS - LV MASS(C)D: 173.6 GRAMS
BH CV ECHO MEAS - LV MASS(C)DI: 87.2 GRAMS/M^2
BH CV ECHO MEAS - LV MAX PG: 6 MMHG
BH CV ECHO MEAS - LV MEAN PG: 3 MMHG
BH CV ECHO MEAS - LV SYSTOLIC VOL/BSA (12-30): 17.6 ML/M^2
BH CV ECHO MEAS - LV V1 MAX: 122.7 CM/SEC
BH CV ECHO MEAS - LV V1 MEAN: 79.2 CM/SEC
BH CV ECHO MEAS - LV V1 VTI: 22.9 CM
BH CV ECHO MEAS - LVIDD: 4.2 CM
BH CV ECHO MEAS - LVIDS: 2.5 CM
BH CV ECHO MEAS - LVLD AP2: 6.9 CM
BH CV ECHO MEAS - LVLD AP4: 7 CM
BH CV ECHO MEAS - LVLS AP2: 6.2 CM
BH CV ECHO MEAS - LVLS AP4: 6.1 CM
BH CV ECHO MEAS - LVOT AREA (M): 3.5 CM^2
BH CV ECHO MEAS - LVOT AREA: 3.4 CM^2
BH CV ECHO MEAS - LVOT DIAM: 2.1 CM
BH CV ECHO MEAS - LVPWD: 1.2 CM
BH CV ECHO MEAS - MED PEAK E' VEL: 8 CM/SEC
BH CV ECHO MEAS - MV A DUR: 0.1 SEC
BH CV ECHO MEAS - MV A MAX VEL: 100.4 CM/SEC
BH CV ECHO MEAS - MV DEC SLOPE: 267.3 CM/SEC^2
BH CV ECHO MEAS - MV DEC TIME: 0.19 SEC
BH CV ECHO MEAS - MV E MAX VEL: 80.2 CM/SEC
BH CV ECHO MEAS - MV E/A: 0.8
BH CV ECHO MEAS - MV MAX PG: 5.7 MMHG
BH CV ECHO MEAS - MV MEAN PG: 2 MMHG
BH CV ECHO MEAS - MV P1/2T MAX VEL: 76 CM/SEC
BH CV ECHO MEAS - MV P1/2T: 83.3 MSEC
BH CV ECHO MEAS - MV V2 MAX: 119.4 CM/SEC
BH CV ECHO MEAS - MV V2 MEAN: 66.5 CM/SEC
BH CV ECHO MEAS - MV V2 VTI: 22.9 CM
BH CV ECHO MEAS - MVA P1/2T LCG: 2.9 CM^2
BH CV ECHO MEAS - MVA(P1/2T): 2.6 CM^2
BH CV ECHO MEAS - MVA(VTI): 3.4 CM^2
BH CV ECHO MEAS - PA MAX PG (FULL): 3.1 MMHG
BH CV ECHO MEAS - PA MAX PG: 5.6 MMHG
BH CV ECHO MEAS - PA V2 MAX: 118.1 CM/SEC
BH CV ECHO MEAS - PULM A REVS DUR: 0.11 SEC
BH CV ECHO MEAS - PULM A REVS VEL: 38.6 CM/SEC
BH CV ECHO MEAS - PULM DIAS VEL: 47 CM/SEC
BH CV ECHO MEAS - PULM S/D: 1.4
BH CV ECHO MEAS - PULM SYS VEL: 63.8 CM/SEC
BH CV ECHO MEAS - PVA(V,A): 2.2 CM^2
BH CV ECHO MEAS - PVA(V,D): 2.2 CM^2
BH CV ECHO MEAS - QP/QS: 0.61
BH CV ECHO MEAS - RV MAX PG: 2.5 MMHG
BH CV ECHO MEAS - RV MEAN PG: 1.5 MMHG
BH CV ECHO MEAS - RV V1 MAX: 79.1 CM/SEC
BH CV ECHO MEAS - RV V1 MEAN: 58 CM/SEC
BH CV ECHO MEAS - RV V1 VTI: 14.5 CM
BH CV ECHO MEAS - RVOT AREA: 3.3 CM^2
BH CV ECHO MEAS - RVOT DIAM: 2 CM
BH CV ECHO MEAS - SI(AO): 117.9 ML/M^2
BH CV ECHO MEAS - SI(CUBED): 30.1 ML/M^2
BH CV ECHO MEAS - SI(LVOT): 39.4 ML/M^2
BH CV ECHO MEAS - SI(MOD-SP2): 26.6 ML/M^2
BH CV ECHO MEAS - SI(MOD-SP4): 33.7 ML/M^2
BH CV ECHO MEAS - SI(TEICH): 28.9 ML/M^2
BH CV ECHO MEAS - SUP REN AO DIAM: 2.2 CM
BH CV ECHO MEAS - SV(AO): 234.8 ML
BH CV ECHO MEAS - SV(CUBED): 59.9 ML
BH CV ECHO MEAS - SV(LVOT): 78.5 ML
BH CV ECHO MEAS - SV(MOD-SP2): 53 ML
BH CV ECHO MEAS - SV(MOD-SP4): 67 ML
BH CV ECHO MEAS - SV(RVOT): 47.5 ML
BH CV ECHO MEAS - SV(TEICH): 57.4 ML
BH CV ECHO MEAS - TAPSE (>1.6): 1.9 CM2
BH CV ECHO MEASUREMENTS AVERAGE E/E' RATIO: 10.03
BH CV XLRA - RV BASE: 2.9 CM
BH CV XLRA - TDI S': 16 CM/SEC
LEFT ATRIUM VOLUME INDEX: 16 ML/M2
LV EF 2D ECHO EST: 63 %
MAXIMAL PREDICTED HEART RATE: 158 BPM
SINUS: 3.3 CM
STJ: 2.8 CM
STRESS TARGET HR: 134 BPM

## 2019-08-06 ENCOUNTER — OFFICE VISIT (OUTPATIENT)
Dept: ONCOLOGY | Facility: CLINIC | Age: 64
End: 2019-08-06

## 2019-08-06 ENCOUNTER — LAB (OUTPATIENT)
Dept: LAB | Facility: HOSPITAL | Age: 64
End: 2019-08-06

## 2019-08-06 ENCOUNTER — INFUSION (OUTPATIENT)
Dept: ONCOLOGY | Facility: HOSPITAL | Age: 64
End: 2019-08-06

## 2019-08-06 VITALS
SYSTOLIC BLOOD PRESSURE: 152 MMHG | HEIGHT: 69 IN | WEIGHT: 193.1 LBS | HEART RATE: 97 BPM | RESPIRATION RATE: 14 BRPM | DIASTOLIC BLOOD PRESSURE: 89 MMHG | OXYGEN SATURATION: 100 % | TEMPERATURE: 98.3 F | BODY MASS INDEX: 28.6 KG/M2

## 2019-08-06 DIAGNOSIS — C15.5 MALIGNANT NEOPLASM OF LOWER THIRD OF ESOPHAGUS (HCC): ICD-10-CM

## 2019-08-06 DIAGNOSIS — C15.5 MALIGNANT NEOPLASM OF LOWER THIRD OF ESOPHAGUS (HCC): Primary | ICD-10-CM

## 2019-08-06 DIAGNOSIS — C78.7 LIVER METASTASES: ICD-10-CM

## 2019-08-06 LAB
ALBUMIN SERPL-MCNC: 3 G/DL (ref 3.5–5.2)
ALBUMIN/GLOB SERPL: 1 G/DL (ref 1.1–2.4)
ALP SERPL-CCNC: 141 U/L (ref 38–116)
ALT SERPL W P-5'-P-CCNC: 7 U/L (ref 0–41)
ANION GAP SERPL CALCULATED.3IONS-SCNC: 9.6 MMOL/L (ref 5–15)
AST SERPL-CCNC: 15 U/L (ref 0–40)
BACTERIA UR QL AUTO: ABNORMAL /HPF
BASOPHILS # BLD AUTO: 0.02 10*3/MM3 (ref 0–0.2)
BASOPHILS NFR BLD AUTO: 0.3 % (ref 0–1.5)
BILIRUB SERPL-MCNC: 0.4 MG/DL (ref 0.2–1.2)
BILIRUB UR QL STRIP: ABNORMAL
BUN BLD-MCNC: 9 MG/DL (ref 6–20)
BUN/CREAT SERPL: 14.8 (ref 7.3–30)
CALCIUM SPEC-SCNC: 8.3 MG/DL (ref 8.5–10.2)
CHLORIDE SERPL-SCNC: 100 MMOL/L (ref 98–107)
CLARITY UR: ABNORMAL
CO2 SERPL-SCNC: 26.4 MMOL/L (ref 22–29)
COLOR UR: ABNORMAL
CREAT BLD-MCNC: 0.61 MG/DL (ref 0.7–1.3)
DEPRECATED RDW RBC AUTO: 50.4 FL (ref 37–54)
EOSINOPHIL # BLD AUTO: 0.06 10*3/MM3 (ref 0–0.4)
EOSINOPHIL NFR BLD AUTO: 0.8 % (ref 0.3–6.2)
ERYTHROCYTE [DISTWIDTH] IN BLOOD BY AUTOMATED COUNT: 16.8 % (ref 12.3–15.4)
GFR SERPL CREATININE-BSD FRML MDRD: 134 ML/MIN/1.73
GLOBULIN UR ELPH-MCNC: 3.1 GM/DL (ref 1.8–3.5)
GLUCOSE BLD-MCNC: 96 MG/DL (ref 74–124)
GLUCOSE UR STRIP-MCNC: NEGATIVE MG/DL
HCT VFR BLD AUTO: 35.4 % (ref 37.5–51)
HGB BLD-MCNC: 11 G/DL (ref 13–17.7)
HGB UR QL STRIP.AUTO: NEGATIVE
HYALINE CASTS UR QL AUTO: ABNORMAL /LPF
IMM GRANULOCYTES # BLD AUTO: 0.1 10*3/MM3 (ref 0–0.05)
IMM GRANULOCYTES NFR BLD AUTO: 1.3 % (ref 0–0.5)
KETONES UR QL STRIP: NEGATIVE
LEUKOCYTE ESTERASE UR QL STRIP.AUTO: NEGATIVE
LYMPHOCYTES # BLD AUTO: 1.1 10*3/MM3 (ref 0.7–3.1)
LYMPHOCYTES NFR BLD AUTO: 14.7 % (ref 19.6–45.3)
MCH RBC QN AUTO: 26.3 PG (ref 26.6–33)
MCHC RBC AUTO-ENTMCNC: 31.1 G/DL (ref 31.5–35.7)
MCV RBC AUTO: 84.5 FL (ref 79–97)
MONOCYTES # BLD AUTO: 0.78 10*3/MM3 (ref 0.1–0.9)
MONOCYTES NFR BLD AUTO: 10.4 % (ref 5–12)
NEUTROPHILS # BLD AUTO: 5.44 10*3/MM3 (ref 1.7–7)
NEUTROPHILS NFR BLD AUTO: 72.5 % (ref 42.7–76)
NITRITE UR QL STRIP: NEGATIVE
NRBC BLD AUTO-RTO: 0 /100 WBC (ref 0–0.2)
PH UR STRIP.AUTO: 6 [PH] (ref 4.5–8)
PLATELET # BLD AUTO: 202 10*3/MM3 (ref 140–450)
PMV BLD AUTO: 9.4 FL (ref 6–12)
POTASSIUM BLD-SCNC: 4 MMOL/L (ref 3.5–4.7)
PROT SERPL-MCNC: 6.1 G/DL (ref 6.3–8)
PROT UR QL STRIP: ABNORMAL
RBC # BLD AUTO: 4.19 10*6/MM3 (ref 4.14–5.8)
RBC # UR: ABNORMAL /HPF
REF LAB TEST METHOD: ABNORMAL
SODIUM BLD-SCNC: 136 MMOL/L (ref 134–145)
SP GR UR STRIP: 1.02 (ref 1–1.03)
SQUAMOUS #/AREA URNS HPF: ABNORMAL /HPF
UROBILINOGEN UR QL STRIP: ABNORMAL
WBC NRBC COR # BLD: 7.5 10*3/MM3 (ref 3.4–10.8)
WBC UR QL AUTO: ABNORMAL /HPF

## 2019-08-06 PROCEDURE — 25010000002 FLUOROURACIL PER 500 MG: Performed by: NURSE PRACTITIONER

## 2019-08-06 PROCEDURE — 96413 CHEMO IV INFUSION 1 HR: CPT | Performed by: NURSE PRACTITIONER

## 2019-08-06 PROCEDURE — 96415 CHEMO IV INFUSION ADDL HR: CPT | Performed by: NURSE PRACTITIONER

## 2019-08-06 PROCEDURE — 96375 TX/PRO/DX INJ NEW DRUG ADDON: CPT | Performed by: NURSE PRACTITIONER

## 2019-08-06 PROCEDURE — 99214 OFFICE O/P EST MOD 30 MIN: CPT | Performed by: NURSE PRACTITIONER

## 2019-08-06 PROCEDURE — 96416 CHEMO PROLONG INFUSE W/PUMP: CPT | Performed by: NURSE PRACTITIONER

## 2019-08-06 PROCEDURE — 25010000002 PALONOSETRON PER 25 MCG: Performed by: NURSE PRACTITIONER

## 2019-08-06 PROCEDURE — 80053 COMPREHEN METABOLIC PANEL: CPT | Performed by: NURSE PRACTITIONER

## 2019-08-06 PROCEDURE — 96417 CHEMO IV INFUS EACH ADDL SEQ: CPT | Performed by: NURSE PRACTITIONER

## 2019-08-06 PROCEDURE — 25010000002 OXALIPLATIN PER 0.5 MG: Performed by: NURSE PRACTITIONER

## 2019-08-06 PROCEDURE — 25010000002 TRASTUZUMAB PER 10 MG: Performed by: NURSE PRACTITIONER

## 2019-08-06 PROCEDURE — 36415 COLL VENOUS BLD VENIPUNCTURE: CPT | Performed by: NURSE PRACTITIONER

## 2019-08-06 PROCEDURE — 96411 CHEMO IV PUSH ADDL DRUG: CPT | Performed by: NURSE PRACTITIONER

## 2019-08-06 PROCEDURE — 81001 URINALYSIS AUTO W/SCOPE: CPT | Performed by: NURSE PRACTITIONER

## 2019-08-06 PROCEDURE — 25010000002 LEUCOVORIN CALCIUM PER 50 MG: Performed by: NURSE PRACTITIONER

## 2019-08-06 PROCEDURE — 96368 THER/DIAG CONCURRENT INF: CPT | Performed by: NURSE PRACTITIONER

## 2019-08-06 PROCEDURE — 25010000002 DIPHENHYDRAMINE 1 EACH BAG: Performed by: NURSE PRACTITIONER

## 2019-08-06 PROCEDURE — 25010000002 DEXAMETHASONE SODIUM PHOSPHATE 100 MG/10ML SOLUTION: Performed by: NURSE PRACTITIONER

## 2019-08-06 PROCEDURE — 85025 COMPLETE CBC W/AUTO DIFF WBC: CPT | Performed by: NURSE PRACTITIONER

## 2019-08-06 RX ORDER — SODIUM CHLORIDE 9 MG/ML
250 INJECTION, SOLUTION INTRAVENOUS ONCE
Status: COMPLETED | OUTPATIENT
Start: 2019-08-06 | End: 2019-08-06

## 2019-08-06 RX ORDER — FAMOTIDINE 10 MG/ML
20 INJECTION, SOLUTION INTRAVENOUS AS NEEDED
Status: CANCELLED | OUTPATIENT
Start: 2019-08-07

## 2019-08-06 RX ORDER — PALONOSETRON 0.05 MG/ML
0.25 INJECTION, SOLUTION INTRAVENOUS ONCE
Status: CANCELLED | OUTPATIENT
Start: 2019-08-07

## 2019-08-06 RX ORDER — DEXTROSE MONOHYDRATE 50 MG/ML
250 INJECTION, SOLUTION INTRAVENOUS ONCE
Status: CANCELLED | OUTPATIENT
Start: 2019-08-07

## 2019-08-06 RX ORDER — FAMOTIDINE 10 MG/ML
20 INJECTION, SOLUTION INTRAVENOUS 2 TIMES DAILY
Status: CANCELLED
Start: 2019-08-07

## 2019-08-06 RX ORDER — SODIUM CHLORIDE 9 MG/ML
250 INJECTION, SOLUTION INTRAVENOUS ONCE
Status: CANCELLED | OUTPATIENT
Start: 2019-08-07

## 2019-08-06 RX ORDER — DIPHENHYDRAMINE HYDROCHLORIDE 50 MG/ML
50 INJECTION INTRAMUSCULAR; INTRAVENOUS AS NEEDED
Status: CANCELLED | OUTPATIENT
Start: 2019-08-07

## 2019-08-06 RX ORDER — FLUOROURACIL 50 MG/ML
400 INJECTION, SOLUTION INTRAVENOUS ONCE
Status: COMPLETED | OUTPATIENT
Start: 2019-08-06 | End: 2019-08-06

## 2019-08-06 RX ORDER — FLUOROURACIL 50 MG/ML
400 INJECTION, SOLUTION INTRAVENOUS ONCE
Status: CANCELLED | OUTPATIENT
Start: 2019-08-07

## 2019-08-06 RX ORDER — DEXTROSE MONOHYDRATE 50 MG/ML
250 INJECTION, SOLUTION INTRAVENOUS ONCE
Status: COMPLETED | OUTPATIENT
Start: 2019-08-06 | End: 2019-08-06

## 2019-08-06 RX ORDER — PALONOSETRON 0.05 MG/ML
0.25 INJECTION, SOLUTION INTRAVENOUS ONCE
Status: COMPLETED | OUTPATIENT
Start: 2019-08-06 | End: 2019-08-06

## 2019-08-06 RX ORDER — FAMOTIDINE 10 MG/ML
20 INJECTION, SOLUTION INTRAVENOUS 2 TIMES DAILY
Status: DISCONTINUED | OUTPATIENT
Start: 2019-08-06 | End: 2019-08-06 | Stop reason: HOSPADM

## 2019-08-06 RX ADMIN — OXALIPLATIN 170 MG: 5 INJECTION, SOLUTION, CONCENTRATE INTRAVENOUS at 12:29

## 2019-08-06 RX ADMIN — DIPHENHYDRAMINE HYDROCHLORIDE 50 MG: 50 INJECTION INTRAMUSCULAR; INTRAVENOUS at 09:58

## 2019-08-06 RX ADMIN — SODIUM CHLORIDE 250 ML: 9 INJECTION, SOLUTION INTRAVENOUS at 09:55

## 2019-08-06 RX ADMIN — FLUOROURACIL 810 MG: 50 INJECTION, SOLUTION INTRAVENOUS at 14:39

## 2019-08-06 RX ADMIN — LEUCOVORIN CALCIUM 810 MG: 350 INJECTION, POWDER, LYOPHILIZED, FOR SOLUTION INTRAMUSCULAR; INTRAVENOUS at 12:29

## 2019-08-06 RX ADMIN — TRASTUZUMAB 530 MG: 150 INJECTION, POWDER, LYOPHILIZED, FOR SOLUTION INTRAVENOUS at 10:55

## 2019-08-06 RX ADMIN — FLUOROURACIL 4850 MG: 50 INJECTION, SOLUTION INTRAVENOUS at 14:40

## 2019-08-06 RX ADMIN — PALONOSETRON HYDROCHLORIDE 0.25 MG: 0.25 INJECTION, SOLUTION INTRAVENOUS at 10:37

## 2019-08-06 RX ADMIN — DEXAMETHASONE SODIUM PHOSPHATE 12 MG: 10 INJECTION, SOLUTION INTRAMUSCULAR; INTRAVENOUS at 10:16

## 2019-08-06 RX ADMIN — FAMOTIDINE 20 MG: 10 INJECTION INTRAVENOUS at 09:56

## 2019-08-06 RX ADMIN — DEXTROSE MONOHYDRATE 250 ML: 50 INJECTION, SOLUTION INTRAVENOUS at 12:29

## 2019-08-06 NOTE — PROGRESS NOTES
"  Subjective     REASONS FOR FOLLOWUP:   1. 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  Adding Herceptin with cycle 2, EF 63%.  2.  Colovesical fistula    HISTORY OF PRESENT ILLNESS:  The patient is a 63 y.o. year old male who is here for follow-up with the above-mentioned history.    History of Present Illness   patient returns today for follow-up visit and reevaluation in anticipation of his second cycle of FOLFOX.   Patient was found to be HER-2 positive at 2+.  He had an echocardiogram which showed an ejection fraction of 63%.  Patient is otherwise doing well.  He continues on Cipro 250 mg twice daily due to the colovesical fistula.  He continues on probiotics.     Past Medical History:   Diagnosis Date   • Arthritis    • Elevated PSA    • Esophageal mass    • H/O Lung nodule    • Hepatitis     CHILD--PT STATED \"I THINK IT WAS A.\"   • History of pneumonia    • Hyperlipidemia    • Hypertension    • Liver cancer (CMS/HCC)    • Prostate cancer (CMS/HCC)        ONCOLOGIC HISTORY:  (History from previous dates can be found in the separate document.)    Current Outpatient Medications on File Prior to Visit   Medication Sig Dispense Refill   • ciprofloxacin (CIPRO) 250 MG tablet Take 2 tablets by mouth 2 (Two) Times a Day. 1 tablet (Patient taking differently: Take 250 mg by mouth 2 (Two) Times a Day. 1 tablet) 60 tablet 1   • HYDROcodone-acetaminophen (NORCO) 5-325 MG per tablet Take 1-2 tablets by mouth Every 4 (Four) Hours As Needed (Pain). 10 tablet 0   • ondansetron (ZOFRAN) 4 MG tablet Take 1 tablet by mouth Every 8 (Eight) Hours As Needed for Nausea or Vomiting. 30 tablet 2   • Probiotic Product (PROBIOTIC DAILY PO) Take  by mouth 2 (Two) Times a Day.       Current Facility-Administered Medications on File Prior to Visit   Medication Dose Route Frequency Provider Last Rate Last Dose   • dexamethasone (DECADRON) IVPB 12 mg  12 mg Intravenous Once Isa, " NIRU Pratt       • dextrose (D5W) 5 % infusion 250 mL  250 mL Intravenous Once Kristin Moss APRN       • diphenhydrAMINE (BENADRYL) 50 mg in sodium chloride 0.9 % 50 mL IVPB  50 mg Intravenous Once Kristin Moss APRN       • famotidine (PEPCID) injection 20 mg  20 mg Intravenous BID Kristin Moss APRN       • fluorouracil (ADRUCIL) 4,850 mg, sodium chloride 0.9 % chemo infusion - FOR HOME USE  2,400 mg/m2 (Treatment Plan Recorded) Intravenous Once Kristin Moss APRN       • fluorouracil (ADRUCIL) chemo injection 810 mg  400 mg/m2 (Treatment Plan Recorded) Intravenous Once Kristin Moss APRN       • leucovorin 810 mg in dextrose (D5W) 5 % 331 mL IVPB  400 mg/m2 (Treatment Plan Recorded) Intravenous Once Kristin Moss APRN       • OXALIplatin (ELOXATIN) 170 mg in dextrose (D5W) 5 % 284 mL chemo IVPB  85 mg/m2 (Treatment Plan Recorded) Intravenous Once Kristin Moss APRN       • palonosetron (ALOXI) injection 0.25 mg  0.25 mg Intravenous Once Kristin Moss APRN       • sodium chloride 0.9 % infusion 250 mL  250 mL Intravenous Once Kristin Moss APRN       • Trastuzumab (HERCEPTIN) 530 mg in sodium chloride 0.9 % 250 mL chemo IVPB  6 mg/kg (Treatment Plan Recorded) Intravenous Once Kristin Moss APRN           ALLERGIES:   No Known Allergies    Social History     Socioeconomic History   • Marital status: Single     Spouse name: Not on file   • Number of children: Not on file   • Years of education: High school   • Highest education level: Not on file   Occupational History   • Occupation: [employer]     Employer: [employer]   Tobacco Use   • Smoking status: Current Every Day Smoker     Packs/day: 1.00     Years: 40.00     Pack years: 40.00     Types: Cigarettes   • Smokeless tobacco: Never Used   Substance and Sexual Activity   • Alcohol use: Yes     Alcohol/week: 16.8 oz     Types: 28 Cans of beer per week     Comment: NOT RECENTLY   • Drug  "use: No         Cancer-related family history includes Lung cancer in his father; Prostate cancer in his other.     Review of Systems   Constitutional: Positive for fatigue. Negative for activity change, appetite change, chills and fever.   HENT: Negative for mouth sores, nosebleeds and trouble swallowing.    Respiratory: Negative for cough and shortness of breath.    Cardiovascular: Negative for chest pain and leg swelling.   Gastrointestinal: Positive for abdominal pain (mild right). Negative for constipation, diarrhea, nausea and vomiting.   Genitourinary: Negative for difficulty urinating.   Skin: Negative for rash.   Neurological: Negative for dizziness, weakness and numbness.   Hematological: Negative for adenopathy. Does not bruise/bleed easily.   Psychiatric/Behavioral: Negative for sleep disturbance.   8/6/2019 ROS unchanged from above.     Objective      Vitals:    08/06/19 0850   BP: 152/89   Pulse: 97   Resp: 14   Temp: 98.3 °F (36.8 °C)   SpO2: 100%   Weight: 87.6 kg (193 lb 1.6 oz)   Height: 174 cm (68.5\")   PainSc: 0-No pain     Current Status 8/6/2019   ECOG score 0       Physical Exam   Constitutional: He is oriented to person, place, and time. No distress.   Ill-appearing   HENT:   Head: Normocephalic and atraumatic.   Mouth/Throat: No oropharyngeal exudate.   Eyes: EOM are normal. Pupils are equal, round, and reactive to light. No scleral icterus.   Neck: Normal range of motion. Neck supple.   Cardiovascular: Normal rate and regular rhythm.   Pulmonary/Chest: Effort normal and breath sounds normal. No respiratory distress.   Abdominal: Soft. Bowel sounds are normal. He exhibits no ascites. There is hepatomegaly. There is no tenderness.       Liver easily palpable, roughly 11 cm below RCM   Musculoskeletal: Normal range of motion. He exhibits edema (1+ bilateral). He exhibits no tenderness.   Neurological: He is alert and oriented to person, place, and time.   Skin: Skin is warm and dry. He is not " diaphoretic.   Psychiatric: He has a normal mood and affect. His behavior is normal.   8/6/2019 physical exam unchanged from above.    RECENT LABS:  Results from last 7 days   Lab Units 08/06/19  0837   WBC 10*3/mm3 7.50   NEUTROS ABS 10*3/mm3 5.44   HEMOGLOBIN g/dL 11.0*   HEMATOCRIT % 35.4*   PLATELETS 10*3/mm3 202         Brief Urine Lab Results  (Last result in the past 365 days)      Color   Clarity   Blood   Leuk Est   Nitrite   Protein   CREAT   Urine HCG        08/06/19 0837 Kathy Slightly Cloudy Negative Negative Negative 30 mg/dL (1+)               Echocardiogram 7/30/2019  · Left ventricular wall thickness is consistent with mild concentric hypertrophy.  · Estimated EF = 63%.  · Left ventricular systolic function is normal.  · There is mild calcification of the aortic valve.  · Global Longitudinal LV strain = -19.4%.    Assessment/Plan    1. 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  · Patient with liver easily palpable clinically. Hope to see improvement in this as he moves through treatment.  · Plan FOLFOX today, 7/23/2019.  · Tentatively plan repeat scans after 4 cycles.  · HER-2 2+, after discussion with Dr. Haynes we will schedule the patient for an echocardiogram and consider adding Herceptin with cycle 2.  · Echocardiogram with an ejection fraction of 63%.  We will proceed with cycle 2 FOLFOX today with the addition of Herceptin.    2.  Colovesical fistula  · Once #1 under better control, we will address this further.  · In the meantime, per Dr. Haynes, after review of urinalysis today, we will send urine for culture, but for now start the patient on low-dose Cipro 250 mg twice a day at least for the next month.    · He may need referral to general surgery and urology at some point though it may close on its own.    3.  COPD. 2 PPD smoker still.  Patient reports today that he is trying to cut back and is considering taking a smoking  cessation class.    4.  Peripheral arterial disease with venous stasis changes to the legs. Also calcification of the abdominal aorta and iliac arteries per scans.    PLAN:  1. Proceed with cycle 2 FOLFOX today, we will add Herceptin.  2. Continue Cipro 250 mg twice daily.  3. Return for follow-up visit with Dr. Haynes in 2 weeks for reevaluation prior to cycle 3 FOLFOX Herceptin.

## 2019-08-08 ENCOUNTER — DOCUMENTATION (OUTPATIENT)
Dept: NUTRITION | Facility: HOSPITAL | Age: 64
End: 2019-08-08

## 2019-08-08 ENCOUNTER — INFUSION (OUTPATIENT)
Dept: ONCOLOGY | Facility: HOSPITAL | Age: 64
End: 2019-08-08

## 2019-08-08 DIAGNOSIS — Z45.2 ENCOUNTER FOR ADJUSTMENT OR MANAGEMENT OF VASCULAR ACCESS DEVICE: Primary | ICD-10-CM

## 2019-08-08 PROCEDURE — 96523 IRRIG DRUG DELIVERY DEVICE: CPT

## 2019-08-08 RX ORDER — SODIUM CHLORIDE 0.9 % (FLUSH) 0.9 %
10 SYRINGE (ML) INJECTION AS NEEDED
Status: DISCONTINUED | OUTPATIENT
Start: 2019-08-08 | End: 2019-08-08 | Stop reason: HOSPADM

## 2019-08-08 RX ORDER — SODIUM CHLORIDE 0.9 % (FLUSH) 0.9 %
10 SYRINGE (ML) INJECTION AS NEEDED
Status: CANCELLED | OUTPATIENT
Start: 2019-08-08

## 2019-08-08 RX ADMIN — SODIUM CHLORIDE, PRESERVATIVE FREE 10 ML: 5 INJECTION INTRAVENOUS at 12:33

## 2019-08-08 RX ADMIN — Medication 500 UNITS: at 12:33

## 2019-08-08 NOTE — PROGRESS NOTES
[facility] Nutrition Follow Up:     Weight 193#. Patient reports good appetite. Feeling good, gained a few pounds.   Encouraged patient to call with any questions. Will return 8/20 for cycle 3 FOLFOX and herceptin.

## 2019-08-10 ENCOUNTER — TELEPHONE (OUTPATIENT)
Dept: ONCOLOGY | Facility: CLINIC | Age: 64
End: 2019-08-10

## 2019-08-10 ENCOUNTER — APPOINTMENT (OUTPATIENT)
Dept: CARDIOLOGY | Facility: HOSPITAL | Age: 64
End: 2019-08-10

## 2019-08-10 ENCOUNTER — HOSPITAL ENCOUNTER (INPATIENT)
Facility: HOSPITAL | Age: 64
LOS: 1 days | Discharge: HOME | End: 2019-08-11
Attending: EMERGENCY MEDICINE | Admitting: HOSPITALIST

## 2019-08-10 DIAGNOSIS — I82.431 ACUTE DEEP VEIN THROMBOSIS (DVT) OF POPLITEAL VEIN OF RIGHT LOWER EXTREMITY (HCC): ICD-10-CM

## 2019-08-10 DIAGNOSIS — I82.412 ACUTE DEEP VEIN THROMBOSIS (DVT) OF FEMORAL VEIN OF LEFT LOWER EXTREMITY (HCC): Primary | ICD-10-CM

## 2019-08-10 LAB
ALBUMIN SERPL-MCNC: 3.2 G/DL (ref 3.5–5.2)
ALBUMIN/GLOB SERPL: 1.1 G/DL
ALP SERPL-CCNC: 115 U/L (ref 39–117)
ALT SERPL W P-5'-P-CCNC: 12 U/L (ref 1–41)
ANION GAP SERPL CALCULATED.3IONS-SCNC: 10.5 MMOL/L (ref 5–15)
ANISOCYTOSIS BLD QL: NORMAL
APTT PPP: 23.8 SECONDS (ref 22.7–35.4)
AST SERPL-CCNC: 14 U/L (ref 1–40)
BASOPHILS # BLD MANUAL: 0.03 10*3/MM3 (ref 0–0.2)
BASOPHILS NFR BLD AUTO: 1 % (ref 0–1.5)
BH CV LOW VAS LEFT DISTAL FEMORAL SPONT: 1
BH CV LOW VAS LEFT GASTRONEMIUS VESSEL: 1
BH CV LOW VAS LEFT MID FEMORAL SPONT: 1
BH CV LOW VAS LEFT PERONEAL VESSEL: 1
BH CV LOW VAS LEFT POPLITEAL SPONT: 1
BH CV LOW VAS LEFT POSTERIOR TIBIAL VESSEL: 1
BH CV LOW VAS LEFT PROXIMAL FEMORAL SPONT: 1
BH CV LOW VAS RIGHT PERONEAL VESSEL: 1
BH CV LOW VAS RIGHT POPLITEAL SPONT: 1
BH CV LOW VAS RIGHT POSTERIOR TIBIAL VESSEL: 1
BH CV LOW VAS RIGHT VARICOSITY AK VESSEL: 1
BH CV LOWER VASCULAR LEFT COMMON FEMORAL AUGMENT: NORMAL
BH CV LOWER VASCULAR LEFT COMMON FEMORAL COMPETENT: NORMAL
BH CV LOWER VASCULAR LEFT COMMON FEMORAL COMPRESS: NORMAL
BH CV LOWER VASCULAR LEFT COMMON FEMORAL PHASIC: NORMAL
BH CV LOWER VASCULAR LEFT COMMON FEMORAL SPONT: NORMAL
BH CV LOWER VASCULAR LEFT DISTAL FEMORAL COMPRESS: NORMAL
BH CV LOWER VASCULAR LEFT DISTAL FEMORAL THROMBUS: NORMAL
BH CV LOWER VASCULAR LEFT GASTRONEMIUS COMPRESS: NORMAL
BH CV LOWER VASCULAR LEFT GASTRONEMIUS THROMBUS: NORMAL
BH CV LOWER VASCULAR LEFT GREATER SAPH AK COMPRESS: NORMAL
BH CV LOWER VASCULAR LEFT GREATER SAPH BK COMPRESS: NORMAL
BH CV LOWER VASCULAR LEFT MID FEMORAL AUGMENT: NORMAL
BH CV LOWER VASCULAR LEFT MID FEMORAL COMPRESS: NORMAL
BH CV LOWER VASCULAR LEFT MID FEMORAL PHASIC: NORMAL
BH CV LOWER VASCULAR LEFT MID FEMORAL SPONT: NORMAL
BH CV LOWER VASCULAR LEFT MID FEMORAL THROMBUS: NORMAL
BH CV LOWER VASCULAR LEFT PERONEAL COMPRESS: NORMAL
BH CV LOWER VASCULAR LEFT PERONEAL THROMBUS: NORMAL
BH CV LOWER VASCULAR LEFT POPLITEAL AUGMENT: NORMAL
BH CV LOWER VASCULAR LEFT POPLITEAL COMPRESS: NORMAL
BH CV LOWER VASCULAR LEFT POPLITEAL PHASIC: NORMAL
BH CV LOWER VASCULAR LEFT POPLITEAL SPONT: NORMAL
BH CV LOWER VASCULAR LEFT POPLITEAL THROMBUS: NORMAL
BH CV LOWER VASCULAR LEFT POSTERIOR TIBIAL COMPRESS: NORMAL
BH CV LOWER VASCULAR LEFT POSTERIOR TIBIAL THROMBUS: NORMAL
BH CV LOWER VASCULAR LEFT PROXIMAL FEMORAL COMPRESS: NORMAL
BH CV LOWER VASCULAR LEFT PROXIMAL FEMORAL THROMBUS: NORMAL
BH CV LOWER VASCULAR LEFT SAPHENOFEMORAL JUNCTION COMPRESS: NORMAL
BH CV LOWER VASCULAR RIGHT COMMON FEMORAL AUGMENT: NORMAL
BH CV LOWER VASCULAR RIGHT COMMON FEMORAL COMPETENT: NORMAL
BH CV LOWER VASCULAR RIGHT COMMON FEMORAL COMPRESS: NORMAL
BH CV LOWER VASCULAR RIGHT COMMON FEMORAL PHASIC: NORMAL
BH CV LOWER VASCULAR RIGHT COMMON FEMORAL SPONT: NORMAL
BH CV LOWER VASCULAR RIGHT DISTAL FEMORAL COMPRESS: NORMAL
BH CV LOWER VASCULAR RIGHT GASTRONEMIUS COMPRESS: NORMAL
BH CV LOWER VASCULAR RIGHT GREATER SAPH AK COMPRESS: NORMAL
BH CV LOWER VASCULAR RIGHT GREATER SAPH BK COMPRESS: NORMAL
BH CV LOWER VASCULAR RIGHT MID FEMORAL AUGMENT: NORMAL
BH CV LOWER VASCULAR RIGHT MID FEMORAL COMPETENT: NORMAL
BH CV LOWER VASCULAR RIGHT MID FEMORAL COMPRESS: NORMAL
BH CV LOWER VASCULAR RIGHT MID FEMORAL PHASIC: NORMAL
BH CV LOWER VASCULAR RIGHT MID FEMORAL SPONT: NORMAL
BH CV LOWER VASCULAR RIGHT PERONEAL COMPRESS: NORMAL
BH CV LOWER VASCULAR RIGHT PERONEAL THROMBUS: NORMAL
BH CV LOWER VASCULAR RIGHT POPLITEAL AUGMENT: NORMAL
BH CV LOWER VASCULAR RIGHT POPLITEAL COMPRESS: NORMAL
BH CV LOWER VASCULAR RIGHT POPLITEAL PHASIC: NORMAL
BH CV LOWER VASCULAR RIGHT POPLITEAL SPONT: NORMAL
BH CV LOWER VASCULAR RIGHT POPLITEAL THROMBUS: NORMAL
BH CV LOWER VASCULAR RIGHT POSTERIOR TIBIAL COMPRESS: NORMAL
BH CV LOWER VASCULAR RIGHT POSTERIOR TIBIAL THROMBUS: NORMAL
BH CV LOWER VASCULAR RIGHT PROXIMAL FEMORAL COMPRESS: NORMAL
BH CV LOWER VASCULAR RIGHT SAPHENOFEMORAL JUNCTION AUGMENT: NORMAL
BH CV LOWER VASCULAR RIGHT SAPHENOFEMORAL JUNCTION COMPETENT: NORMAL
BH CV LOWER VASCULAR RIGHT SAPHENOFEMORAL JUNCTION COMPRESS: NORMAL
BH CV LOWER VASCULAR RIGHT SAPHENOFEMORAL JUNCTION PHASIC: NORMAL
BH CV LOWER VASCULAR RIGHT SAPHENOFEMORAL JUNCTION SPONT: NORMAL
BH CV LOWER VASCULAR RIGHT VARICOSITY AK COMPRESS: NORMAL
BH CV LOWER VASCULAR RIGHT VARICOSITY AK THROMBUS: NORMAL
BILIRUB SERPL-MCNC: 0.3 MG/DL (ref 0.2–1.2)
BUN BLD-MCNC: 12 MG/DL (ref 8–23)
BUN/CREAT SERPL: 19.4 (ref 7–25)
CALCIUM SPEC-SCNC: 8.4 MG/DL (ref 8.6–10.5)
CHLORIDE SERPL-SCNC: 98 MMOL/L (ref 98–107)
CO2 SERPL-SCNC: 25.5 MMOL/L (ref 22–29)
CREAT BLD-MCNC: 0.62 MG/DL (ref 0.76–1.27)
DEPRECATED RDW RBC AUTO: 51.6 FL (ref 37–54)
ERYTHROCYTE [DISTWIDTH] IN BLOOD BY AUTOMATED COUNT: 16.7 % (ref 12.3–15.4)
GFR SERPL CREATININE-BSD FRML MDRD: 131 ML/MIN/1.73
GLOBULIN UR ELPH-MCNC: 2.9 GM/DL
GLUCOSE BLD-MCNC: 86 MG/DL (ref 65–99)
HCT VFR BLD AUTO: 35.7 % (ref 37.5–51)
HGB BLD-MCNC: 10.5 G/DL (ref 13–17.7)
INR PPP: 1.19 (ref 0.9–1.1)
LYMPHOCYTES # BLD MANUAL: 1.07 10*3/MM3 (ref 0.7–3.1)
LYMPHOCYTES NFR BLD MANUAL: 36.4 % (ref 19.6–45.3)
MCH RBC QN AUTO: 25.3 PG (ref 26.6–33)
MCHC RBC AUTO-ENTMCNC: 29.4 G/DL (ref 31.5–35.7)
MCV RBC AUTO: 86 FL (ref 79–97)
MICROCYTES BLD QL: NORMAL
NEUTROPHILS # BLD AUTO: 1.85 10*3/MM3 (ref 1.7–7)
NEUTROPHILS NFR BLD MANUAL: 62.6 % (ref 42.7–76)
NT-PROBNP SERPL-MCNC: 522.9 PG/ML (ref 5–900)
PLAT MORPH BLD: NORMAL
PLATELET # BLD AUTO: 191 10*3/MM3 (ref 140–450)
PMV BLD AUTO: 9 FL (ref 6–12)
POLYCHROMASIA BLD QL SMEAR: NORMAL
POTASSIUM BLD-SCNC: 3.9 MMOL/L (ref 3.5–5.2)
PROT SERPL-MCNC: 6.1 G/DL (ref 6–8.5)
PROTHROMBIN TIME: 14.8 SECONDS (ref 11.7–14.2)
RBC # BLD AUTO: 4.15 10*6/MM3 (ref 4.14–5.8)
SODIUM BLD-SCNC: 134 MMOL/L (ref 136–145)
WBC MORPH BLD: NORMAL
WBC NRBC COR # BLD: 2.95 10*3/MM3 (ref 3.4–10.8)

## 2019-08-10 PROCEDURE — 99284 EMERGENCY DEPT VISIT MOD MDM: CPT

## 2019-08-10 PROCEDURE — 85730 THROMBOPLASTIN TIME PARTIAL: CPT | Performed by: HOSPITALIST

## 2019-08-10 PROCEDURE — 36415 COLL VENOUS BLD VENIPUNCTURE: CPT | Performed by: HOSPITALIST

## 2019-08-10 PROCEDURE — 85610 PROTHROMBIN TIME: CPT | Performed by: EMERGENCY MEDICINE

## 2019-08-10 PROCEDURE — 85007 BL SMEAR W/DIFF WBC COUNT: CPT | Performed by: EMERGENCY MEDICINE

## 2019-08-10 PROCEDURE — 25010000002 HEPARIN (PORCINE) PER 1000 UNITS: Performed by: EMERGENCY MEDICINE

## 2019-08-10 PROCEDURE — 83880 ASSAY OF NATRIURETIC PEPTIDE: CPT | Performed by: EMERGENCY MEDICINE

## 2019-08-10 PROCEDURE — 85730 THROMBOPLASTIN TIME PARTIAL: CPT | Performed by: EMERGENCY MEDICINE

## 2019-08-10 PROCEDURE — 93970 EXTREMITY STUDY: CPT

## 2019-08-10 PROCEDURE — 85025 COMPLETE CBC W/AUTO DIFF WBC: CPT | Performed by: EMERGENCY MEDICINE

## 2019-08-10 PROCEDURE — 80053 COMPREHEN METABOLIC PANEL: CPT | Performed by: EMERGENCY MEDICINE

## 2019-08-10 RX ORDER — L.ACID,PARA/B.BIFIDUM/S.THERM 8B CELL
1 CAPSULE ORAL DAILY
Status: DISCONTINUED | OUTPATIENT
Start: 2019-08-11 | End: 2019-08-11 | Stop reason: HOSPADM

## 2019-08-10 RX ORDER — ACETAMINOPHEN 325 MG/1
650 TABLET ORAL EVERY 4 HOURS PRN
Status: DISCONTINUED | OUTPATIENT
Start: 2019-08-10 | End: 2019-08-11 | Stop reason: HOSPADM

## 2019-08-10 RX ORDER — HEPARIN SODIUM 5000 [USP'U]/ML
40-80 INJECTION, SOLUTION INTRAVENOUS; SUBCUTANEOUS EVERY 6 HOURS PRN
Status: DISCONTINUED | OUTPATIENT
Start: 2019-08-10 | End: 2019-08-11

## 2019-08-10 RX ORDER — SODIUM CHLORIDE 0.9 % (FLUSH) 0.9 %
10 SYRINGE (ML) INJECTION AS NEEDED
Status: DISCONTINUED | OUTPATIENT
Start: 2019-08-10 | End: 2019-08-10

## 2019-08-10 RX ORDER — HEPARIN SODIUM 10000 [USP'U]/100ML
17.8 INJECTION, SOLUTION INTRAVENOUS
Status: DISCONTINUED | OUTPATIENT
Start: 2019-08-10 | End: 2019-08-11

## 2019-08-10 RX ORDER — HEPARIN SODIUM 5000 [USP'U]/ML
80 INJECTION, SOLUTION INTRAVENOUS; SUBCUTANEOUS ONCE
Status: COMPLETED | OUTPATIENT
Start: 2019-08-10 | End: 2019-08-10

## 2019-08-10 RX ADMIN — HEPARIN SODIUM 17.8 UNITS/KG/HR: 10000 INJECTION, SOLUTION INTRAVENOUS at 18:09

## 2019-08-10 RX ADMIN — HEPARIN SODIUM 6700 UNITS: 5000 INJECTION INTRAVENOUS; SUBCUTANEOUS at 18:09

## 2019-08-10 NOTE — ED TRIAGE NOTES
Pt reports left leg swelling and pain x 2 days. Pt states he has esophageal cancer and had chemo on Tuesday. Pt states PCP told him to come to ER for rule out DVT.

## 2019-08-10 NOTE — TELEPHONE ENCOUNTER
On-call MD note.  Patient reports significant pain in the legs started yesterday patient voiced understanding. associated with swelling.  I advised the patient come to ER to check it out, suspect that he may have acute DVT.     WILDER INIGUEZ M.D., Ph.D.

## 2019-08-10 NOTE — ED PROVIDER NOTES
EMERGENCY DEPARTMENT ENCOUNTER    CHIEF COMPLAINT  Chief Complaint: Leg swelling  History given by: Patient   History limited by: Nothing  Room Number: 18/18  PMD: Jaiden Hoover MD      HPI:  Pt is a 63 y.o. male who presents complaining of worsening L leg swelling that began in the last several weeks. Pt is also complaining of worsening bilateral leg pain. Pt denies chest pain, SOA, fever, chills, change in bowel movements, and change in urine output. Pt reports he currently has esophageal and liver cancer for which he is undergoing chemo and vascular disease in his legs. Pt reports the leg swelling began after chemo but has been intermittent ever since but has worsened and become constant within the last several weeks. Pt reports he called his oncology office this morning due to worsening leg swelling and pain and was advised to come to the ED.     Duration:  Several weeks  Onset: Gradual  Timing: Constant  Location: LLE  Radiation: None  Quality: Swelling  Intensity/Severity: Moderate  Progression: Worsening  Associated Symptoms: Leg pain  Aggravating Factors: None  Alleviating Factors: None  Previous Episodes: Pt reports he has had the swelling since starting chemo but it has been worse in last several weeks  Treatment before arrival: None    PAST MEDICAL HISTORY  Active Ambulatory Problems     Diagnosis Date Noted   • Essential hypertension 03/03/2016   • Hyperlipidemia 03/03/2016   • Impaired fasting glucose 03/03/2016   • History of prostate cancer 03/03/2016   • Tobacco abuse 03/03/2016   • Liver metastases (CMS/HCC) 07/09/2019   • Esophagus neoplasm 07/09/2019   • Malignant neoplasm of esophagus (CMS/HCC) 07/10/2019   • Encounter for adjustment or management of vascular access device 07/25/2019     Resolved Ambulatory Problems     Diagnosis Date Noted   • No Resolved Ambulatory Problems     Past Medical History:   Diagnosis Date   • Arthritis    • Elevated PSA    • Esophageal mass    • H/O Lung nodule     • Hepatitis    • History of pneumonia    • Hyperlipidemia    • Hypertension    • Liver cancer (CMS/HCC)    • Prostate cancer (CMS/HCC)        PAST SURGICAL HISTORY  Past Surgical History:   Procedure Laterality Date   • HERNIA REPAIR  1999   • PROSTATE SURGERY  03/2008   • VENOUS ACCESS DEVICE (PORT) INSERTION N/A 7/16/2019    Procedure: INSERTION VENOUS ACCESS DEVICE WITH FLUORO AND EGD WITH BIOPSY;  Surgeon: Mary Brito MD;  Location: [facility] OR;  Service: Thoracic       FAMILY HISTORY  Family History   Problem Relation Age of Onset   • Hypertension Mother    • Stroke Mother    • Hypertension Other    • Lung disease Other    • Prostate cancer Other    • Lung cancer Father    • Malig Hyperthermia Neg Hx        SOCIAL HISTORY  Social History     Socioeconomic History   • Marital status: Single     Spouse name: Not on file   • Number of children: Not on file   • Years of education: High school   • Highest education level: Not on file   Occupational History   • Occupation: [employer]     Employer: [employer]   Tobacco Use   • Smoking status: Current Every Day Smoker     Packs/day: 1.00     Years: 40.00     Pack years: 40.00     Types: Cigarettes   • Smokeless tobacco: Never Used   Substance and Sexual Activity   • Alcohol use: Yes     Alcohol/week: 16.8 oz     Types: 28 Cans of beer per week     Comment: NOT RECENTLY   • Drug use: No       ALLERGIES  Patient has no known allergies.    REVIEW OF SYSTEMS  Review of Systems   Constitutional: Negative for activity change, appetite change, chills and fever.   HENT: Negative for congestion and sore throat.    Eyes: Negative.    Respiratory: Negative for cough and shortness of breath.    Cardiovascular: Positive for leg swelling (L leg). Negative for chest pain.   Gastrointestinal: Negative for abdominal pain, diarrhea and vomiting.   Endocrine: Negative.    Genitourinary: Negative for decreased urine volume and dysuria.   Musculoskeletal: Negative for neck pain.         Bilateral leg pain   Skin: Negative for rash and wound.   Allergic/Immunologic: Negative.    Neurological: Negative for weakness, numbness and headaches.   Hematological: Negative.    Psychiatric/Behavioral: Negative.    All other systems reviewed and are negative.      PHYSICAL EXAM  ED Triage Vitals [08/10/19 1440]   Temp Heart Rate Resp BP SpO2   98 °F (36.7 °C) 105 18 -- 100 %      Temp src Heart Rate Source Patient Position BP Location FiO2 (%)   Tympanic Monitor -- -- --       Physical Exam   Constitutional: He is oriented to person, place, and time. No distress.   HENT:   Head: Normocephalic and atraumatic.   Eyes: EOM are normal. Pupils are equal, round, and reactive to light.   Neck: Normal range of motion. Neck supple.   Cardiovascular: Normal rate, regular rhythm and normal heart sounds.   Pulmonary/Chest: Effort normal and breath sounds normal. No respiratory distress.   Abdominal: Soft. There is no tenderness. There is no rebound and no guarding.   Musculoskeletal: Normal range of motion. He exhibits no edema.   BLE edema worse on L than R   Neurological: He is alert and oriented to person, place, and time. He has normal sensation and normal strength.   Skin: Skin is warm and dry.   Psychiatric: Mood and affect normal.   Nursing note and vitals reviewed.      LAB RESULTS  Lab Results (last 24 hours)     Procedure Component Value Units Date/Time    CBC & Differential [[id number]] Collected:  08/10/19 1514    Specimen:  Blood Updated:  08/10/19 1551    Narrative:       The following orders were created for panel order CBC & Differential.  Procedure                               Abnormality         Status                     ---------                               -----------         ------                     CBC Auto Differential[[id number]]        Abnormal            Final result                 Please view results for these tests on the individual orders.    Comprehensive Metabolic Panel  [[id number]]  (Abnormal) Collected:  08/10/19 1514    Specimen:  Blood Updated:  08/10/19 1545     Glucose 86 mg/dL      BUN 12 mg/dL      Creatinine 0.62 mg/dL      Sodium 134 mmol/L      Potassium 3.9 mmol/L      Chloride 98 mmol/L      CO2 25.5 mmol/L      Calcium 8.4 mg/dL      Total Protein 6.1 g/dL      Albumin 3.20 g/dL      ALT (SGPT) 12 U/L      AST (SGOT) 14 U/L      Alkaline Phosphatase 115 U/L      Total Bilirubin 0.3 mg/dL      eGFR Non African Amer 131 mL/min/1.73      Globulin 2.9 gm/dL      A/G Ratio 1.1 g/dL      BUN/Creatinine Ratio 19.4     Anion Gap 10.5 mmol/L     Narrative:       GFR Normal >60  Chronic Kidney Disease <60  Kidney Failure <15    BNP [[id number]]  (Normal) Collected:  08/10/19 1514    Specimen:  Blood Updated:  08/10/19 1544     proBNP 522.9 pg/mL     Narrative:       Among patients with dyspnea, NT-proBNP is highly sensitive for the detection of acute congestive heart failure. In addition NT-proBNP of <300 pg/ml effectively rules out acute congestive heart failure with 99% negative predictive value.    CBC Auto Differential [[id number]]  (Abnormal) Collected:  08/10/19 1514    Specimen:  Blood Updated:  08/10/19 1551     WBC 2.95 10*3/mm3      RBC 4.15 10*6/mm3      Hemoglobin 10.5 g/dL      Hematocrit 35.7 %      MCV 86.0 fL      MCH 25.3 pg      MCHC 29.4 g/dL      RDW 16.7 %      RDW-SD 51.6 fl      MPV 9.0 fL      Platelets 191 10*3/mm3     Manual Differential [[id number]] Collected:  08/10/19 1514    Specimen:  Blood Updated:  08/10/19 1551     Neutrophil % 62.6 %      Lymphocyte % 36.4 %      Basophil % 1.0 %      Neutrophils Absolute 1.85 10*3/mm3      Lymphocytes Absolute 1.07 10*3/mm3      Basophils Absolute 0.03 10*3/mm3      Anisocytosis Slight/1+     Microcytes Slight/1+     Polychromasia Slight/1+     WBC Morphology Normal     Platelet Morphology Normal    Protime-INR [[id number]]  (Abnormal) Collected:  08/10/19 1740    Specimen:  Blood Updated:  08/10/19 1803      Protime 14.8 Seconds      INR 1.19    aPTT [[id number]]  (Normal) Collected:  08/10/19 1740    Specimen:  Blood Updated:  08/10/19 1806     PTT 23.8 seconds           I ordered the above labs and reviewed the results      PROCEDURES  Procedures      PROGRESS AND CONSULTS   1717: Spoke with doppler tech who reports pt has bilateral acute DVT's with R in popliteal of calf and R in proximal femoral popiliteal of calf.     1723: Rechecked pt who is resting comfortably and in NAD. Informed pt of doppler results which showed bilateral acute DVT's. Discussed plan to start on blood thinners. Pt understands and agrees with the plan, all questions answered.    1727: Spoke with  ([facility]) who stated he would like me to consult  (oncology) about further care and starting pt no blood thinner and agreed to admit.    1740: Spoke with  (oncology) who stated he would like pt started on heparin    MEDICAL DECISION MAKING  Results were reviewed/discussed with the patient and they were also made aware of online access. Pt also made aware that some labs, such as cultures, will not be resulted during ER visit and follow up with PMD is necessary.     MDM  Number of Diagnoses or Management Options     Amount and/or Complexity of Data Reviewed  Clinical lab tests: ordered and reviewed (Hemoglobin 10.5, otherwise unremarkable and baseline for pt)  Tests in the radiology section of CPT®: ordered and reviewed (Doppler results showed bilateral acute DVT's with R in popliteal of calf and L in proximal femoral popliteal of calf. )  Discussion of test results with the performing providers: yes (Doppler tech)  Decide to obtain previous medical records or to obtain history from someone other than the patient: yes (Epic)  Review and summarize past medical records: yes (Pt had an echo on 7/30/19. Findings:  · Left ventricular wall thickness is consistent with mild concentric   hypertrophy.  · Estimated EF = 63%.  · Left ventricular  systolic function is normal.  · There is mild calcification of the aortic valve.  · Global Longitudinal LV strain = -19.4%.)  Discuss the patient with other providers: yes ((A)  (Oncology))    Patient Progress  Patient progress: stable         DIAGNOSIS  Final diagnoses:   Acute deep vein thrombosis (DVT) of femoral vein of left lower extremity (CMS/HCC)   Acute deep vein thrombosis (DVT) of popliteal vein of right lower extremity (CMS/HCC)       DISPOSITION  ADMISSION    Discussed treatment plan and reason for admission with pt/family and admitting physician.  Pt/family voiced understanding of the plan for admission for further testing/treatment as needed.         Latest Documented Vital Signs:  As of 5:50 PM  BP- 143/78 HR- 86 Temp- 98 °F (36.7 °C) (Tympanic) O2 sat- 99%    --  Documentation assistance provided by suzie Kim for .  Information recorded by the scribe was done at my direction and has been verified and validated by me.            Beverly Kim  08/10/19 5743       Vernon Pierson MD  08/10/19 5834

## 2019-08-10 NOTE — H&P
"    Patient Name:  Han Garcia  YOB: 1955  MRN:  [id number]  Admit Date:  8/10/2019  Patient Care Team:  Jaiden Hoover MD as PCP - General (Family Medicine)  Amanda Gunderson APRN as Referring Physician (Family Medicine)  Alexey Haynes MD as Consulting Physician (Hematology and Oncology)  Mary Brito MD as Surgeon (Thoracic Surgery)      Subjective   History Present Illness     Chief Complaint   Patient presents with   • Leg Swelling       Mr. Garcia is a 63 y.o. smoker with a history of esophageal cancer that presents to [facility] complaining of pain and swelling in both legs.  Swelling is been present for several days but just started having discomfort over the last 1 to 2 days.  Pain is worse when he is standing and trying to ambulate.  He denies any injury to his lower extremities.  No prolonged periods of immobility.  No prior history of DVT.  He denies chest pain and shortness of breath.  No hemoptysis.  No presyncope or syncope.         History of Present Illness  Review of Systems   HENT: Negative.    Eyes: Negative.    Respiratory: Negative.    Cardiovascular: Positive for leg swelling.   Gastrointestinal: Negative.    Endocrine: Negative.    Genitourinary: Negative.    Musculoskeletal: Negative.    Skin: Negative.    Neurological: Positive for weakness.   Hematological: Negative.    Psychiatric/Behavioral: Negative.         Personal History     Past Medical History:   Diagnosis Date   • Arthritis    • Elevated PSA    • Esophageal mass    • H/O Lung nodule    • Hepatitis     CHILD--PT STATED \"I THINK IT WAS A.\"   • History of pneumonia    • Hyperlipidemia    • Hypertension    • Liver cancer (CMS/HCC)    • Prostate cancer (CMS/HCC)      Past Surgical History:   Procedure Laterality Date   • HERNIA REPAIR  1999   • PROSTATE SURGERY  03/2008   • VENOUS ACCESS DEVICE (PORT) INSERTION N/A 7/16/2019    Procedure: INSERTION VENOUS ACCESS DEVICE WITH FLUORO AND " EGD WITH BIOPSY;  Surgeon: Mary Brito MD;  Location: [facility];  Service: Thoracic     Family History   Problem Relation Age of Onset   • Hypertension Mother    • Stroke Mother    • Hypertension Other    • Lung disease Other    • Prostate cancer Other    • Lung cancer Father    • Malig Hyperthermia Neg Hx      Social History     Tobacco Use   • Smoking status: Current Every Day Smoker     Packs/day: 1.00     Years: 40.00     Pack years: 40.00     Types: Cigarettes   • Smokeless tobacco: Never Used   Substance Use Topics   • Alcohol use: Yes     Alcohol/week: 16.8 oz     Types: 28 Cans of beer per week     Comment: NOT RECENTLY   • Drug use: No       (Not in a hospital admission)  Allergies:  No Known Allergies    Objective    Objective     Vital Signs  Temp:  [98 °F (36.7 °C)] 98 °F (36.7 °C)  Heart Rate:  [] 86  Resp:  [18] 18  BP: (139-143)/(78-83) 143/78  SpO2:  [99 %-100 %] 99 %  on   ;   Device (Oxygen Therapy): room air  Body mass index is 27.3 kg/m².    Physical Exam   Constitutional: He is oriented to person, place, and time. He appears well-developed and well-nourished.   HENT:   Head: Normocephalic and atraumatic.   Eyes: Conjunctivae and EOM are normal. No scleral icterus.   Neck: Normal range of motion. Neck supple. No JVD present.   Cardiovascular: Normal rate and regular rhythm.   No murmur heard.  Pulmonary/Chest: Effort normal and breath sounds normal. No respiratory distress.   Abdominal: Soft. Bowel sounds are normal. He exhibits no distension. There is no tenderness.   Musculoskeletal: He exhibits edema.   Neurological: He is alert and oriented to person, place, and time. No cranial nerve deficit.   Skin: Skin is warm and dry.   Psychiatric: He has a normal mood and affect. His behavior is normal.   Vitals reviewed.      Results Review:  I reviewed the patient's new clinical results.  I reviewed the patient's new imaging results and agree with the interpretation.  I reviewed the  patient's other test results and agree with the interpretation  I personally viewed and interpreted the patient's EKG/Telemetry data  Discussed with ED provider.    Lab Results (last 24 hours)     Procedure Component Value Units Date/Time    CBC & Differential [[id number]] Collected:  08/10/19 1514    Specimen:  Blood Updated:  08/10/19 1551    Narrative:       The following orders were created for panel order CBC & Differential.  Procedure                               Abnormality         Status                     ---------                               -----------         ------                     CBC Auto Differential[[id number]]        Abnormal            Final result                 Please view results for these tests on the individual orders.    Comprehensive Metabolic Panel [[id number]]  (Abnormal) Collected:  08/10/19 1514    Specimen:  Blood Updated:  08/10/19 1545     Glucose 86 mg/dL      BUN 12 mg/dL      Creatinine 0.62 mg/dL      Sodium 134 mmol/L      Potassium 3.9 mmol/L      Chloride 98 mmol/L      CO2 25.5 mmol/L      Calcium 8.4 mg/dL      Total Protein 6.1 g/dL      Albumin 3.20 g/dL      ALT (SGPT) 12 U/L      AST (SGOT) 14 U/L      Alkaline Phosphatase 115 U/L      Total Bilirubin 0.3 mg/dL      eGFR Non African Amer 131 mL/min/1.73      Globulin 2.9 gm/dL      A/G Ratio 1.1 g/dL      BUN/Creatinine Ratio 19.4     Anion Gap 10.5 mmol/L     Narrative:       GFR Normal >60  Chronic Kidney Disease <60  Kidney Failure <15    BNP [[id number]]  (Normal) Collected:  08/10/19 1514    Specimen:  Blood Updated:  08/10/19 1544     proBNP 522.9 pg/mL     Narrative:       Among patients with dyspnea, NT-proBNP is highly sensitive for the detection of acute congestive heart failure. In addition NT-proBNP of <300 pg/ml effectively rules out acute congestive heart failure with 99% negative predictive value.    CBC Auto Differential [[id number]]  (Abnormal) Collected:  08/10/19 1514    Specimen:  Blood Updated:   08/10/19 1551     WBC 2.95 10*3/mm3      RBC 4.15 10*6/mm3      Hemoglobin 10.5 g/dL      Hematocrit 35.7 %      MCV 86.0 fL      MCH 25.3 pg      MCHC 29.4 g/dL      RDW 16.7 %      RDW-SD 51.6 fl      MPV 9.0 fL      Platelets 191 10*3/mm3     Manual Differential [[id number]] Collected:  08/10/19 1514    Specimen:  Blood Updated:  08/10/19 1551     Neutrophil % 62.6 %      Lymphocyte % 36.4 %      Basophil % 1.0 %      Neutrophils Absolute 1.85 10*3/mm3      Lymphocytes Absolute 1.07 10*3/mm3      Basophils Absolute 0.03 10*3/mm3      Anisocytosis Slight/1+     Microcytes Slight/1+     Polychromasia Slight/1+     WBC Morphology Normal     Platelet Morphology Normal    Protime-INR [[id number]] Collected:  08/10/19 1740    Specimen:  Blood Updated:  08/10/19 1745    aPTT [[id number]] Collected:  08/10/19 1740    Specimen:  Blood Updated:  08/10/19 1745          Imaging Results (last 24 hours)     ** No results found for the last 24 hours. **          Results for orders placed during the hospital encounter of 07/30/19   Adult Transthoracic Echo Complete W/ Cont if Necessary Per Protocol    Narrative · Left ventricular wall thickness is consistent with mild concentric   hypertrophy.  · Estimated EF = 63%.  · Left ventricular systolic function is normal.  · There is mild calcification of the aortic valve.  · Global Longitudinal LV strain = -19.4%.          No orders to display        Assessment/Plan     Active Hospital Problems    Diagnosis POA   • **Acute deep vein thrombosis (DVT) of femoral vein of left lower extremity (CMS/HCC) [I82.412] Yes   • Acute deep vein thrombosis (DVT) of right popliteal vein (CMS/HCC) [I82.431] Yes   • Malignant neoplasm of esophagus (CMS/HCC) [C15.9] Yes     Added automatically from request for surgery [id number]     • Liver metastases (CMS/HCC) [C78.7] Yes   • Tobacco abuse [Z72.0] Yes   • Essential hypertension [I10] Yes       63 y.o. male admitted with BLE DVT.  He has h/o esophageal  cancer.    · Heparin gtt per Dr. Clark recommendations.  · Oncology to follow.  · Defer further anticoagulation to them.  · Nothing at this point to suggest pulmonary embolus.  · Full code.  · Discussed with patient.      Isaac Pina MD  [facility]  08/10/19  5:57 PM

## 2019-08-10 NOTE — PROGRESS NOTES
Bilateral lower venous doppler complete and preliminary is positive for billateral acute dvt,and acute rt leg stp to Dr. Pierson

## 2019-08-11 VITALS
HEART RATE: 84 BPM | SYSTOLIC BLOOD PRESSURE: 142 MMHG | HEIGHT: 69 IN | TEMPERATURE: 97.6 F | WEIGHT: 184 LBS | BODY MASS INDEX: 27.25 KG/M2 | DIASTOLIC BLOOD PRESSURE: 79 MMHG | OXYGEN SATURATION: 97 % | RESPIRATION RATE: 18 BRPM

## 2019-08-11 PROBLEM — D70.1 CHEMOTHERAPY INDUCED NEUTROPENIA: Status: ACTIVE | Noted: 2019-08-11

## 2019-08-11 PROBLEM — D63.8 ANEMIA, CHRONIC DISEASE: Status: ACTIVE | Noted: 2019-08-11

## 2019-08-11 PROBLEM — T45.1X5A CHEMOTHERAPY INDUCED NEUTROPENIA: Status: ACTIVE | Noted: 2019-08-11

## 2019-08-11 PROBLEM — R52 PAIN: Status: ACTIVE | Noted: 2019-08-11

## 2019-08-11 LAB
ANION GAP SERPL CALCULATED.3IONS-SCNC: 13.2 MMOL/L (ref 5–15)
ANISOCYTOSIS BLD QL: ABNORMAL
ANISOCYTOSIS BLD QL: ABNORMAL
APTT PPP: 179.6 SECONDS (ref 22.7–35.4)
APTT PPP: 52.5 SECONDS (ref 22.7–35.4)
BASOPHILS # BLD MANUAL: 0.03 10*3/MM3 (ref 0–0.2)
BASOPHILS # BLD MANUAL: 0.1 10*3/MM3 (ref 0–0.2)
BASOPHILS NFR BLD AUTO: 1 % (ref 0–1.5)
BASOPHILS NFR BLD AUTO: 3.3 % (ref 0–1.5)
BUN BLD-MCNC: 11 MG/DL (ref 8–23)
BUN/CREAT SERPL: 19.3 (ref 7–25)
C3 FRG RBC-MCNC: ABNORMAL
CALCIUM SPEC-SCNC: 8.7 MG/DL (ref 8.6–10.5)
CHLORIDE SERPL-SCNC: 99 MMOL/L (ref 98–107)
CO2 SERPL-SCNC: 22.8 MMOL/L (ref 22–29)
CREAT BLD-MCNC: 0.57 MG/DL (ref 0.76–1.27)
DEPRECATED RDW RBC AUTO: 51.1 FL (ref 37–54)
DEPRECATED RDW RBC AUTO: 51.3 FL (ref 37–54)
EOSINOPHIL # BLD MANUAL: 0.06 10*3/MM3 (ref 0–0.4)
EOSINOPHIL # BLD MANUAL: 0.07 10*3/MM3 (ref 0–0.4)
EOSINOPHIL NFR BLD MANUAL: 2 % (ref 0.3–6.2)
EOSINOPHIL NFR BLD MANUAL: 2.2 % (ref 0.3–6.2)
ERYTHROCYTE [DISTWIDTH] IN BLOOD BY AUTOMATED COUNT: 16.6 % (ref 12.3–15.4)
ERYTHROCYTE [DISTWIDTH] IN BLOOD BY AUTOMATED COUNT: 16.7 % (ref 12.3–15.4)
GFR SERPL CREATININE-BSD FRML MDRD: 145 ML/MIN/1.73
GLUCOSE BLD-MCNC: 103 MG/DL (ref 65–99)
HCT VFR BLD AUTO: 32 % (ref 37.5–51)
HCT VFR BLD AUTO: 34.5 % (ref 37.5–51)
HGB BLD-MCNC: 10.4 G/DL (ref 13–17.7)
HGB BLD-MCNC: 9.8 G/DL (ref 13–17.7)
HYPOCHROMIA BLD QL: ABNORMAL
HYPOCHROMIA BLD QL: ABNORMAL
LYMPHOCYTES # BLD MANUAL: 1.05 10*3/MM3 (ref 0.7–3.1)
LYMPHOCYTES # BLD MANUAL: 1.65 10*3/MM3 (ref 0.7–3.1)
LYMPHOCYTES NFR BLD MANUAL: 2 % (ref 5–12)
LYMPHOCYTES NFR BLD MANUAL: 34.7 % (ref 19.6–45.3)
LYMPHOCYTES NFR BLD MANUAL: 52.7 % (ref 19.6–45.3)
MCH RBC QN AUTO: 25.5 PG (ref 26.6–33)
MCH RBC QN AUTO: 25.9 PG (ref 26.6–33)
MCHC RBC AUTO-ENTMCNC: 30.1 G/DL (ref 31.5–35.7)
MCHC RBC AUTO-ENTMCNC: 30.6 G/DL (ref 31.5–35.7)
MCV RBC AUTO: 84.6 FL (ref 79–97)
MCV RBC AUTO: 84.7 FL (ref 79–97)
MICROCYTES BLD QL: ABNORMAL
MICROCYTES BLD QL: ABNORMAL
MONOCYTES # BLD AUTO: 0.06 10*3/MM3 (ref 0.1–0.9)
NEUTROPHILS # BLD AUTO: 1.31 10*3/MM3 (ref 1.7–7)
NEUTROPHILS # BLD AUTO: 1.83 10*3/MM3 (ref 1.7–7)
NEUTROPHILS NFR BLD MANUAL: 41.8 % (ref 42.7–76)
NEUTROPHILS NFR BLD MANUAL: 60.2 % (ref 42.7–76)
PLAT MORPH BLD: NORMAL
PLAT MORPH BLD: NORMAL
PLATELET # BLD AUTO: 192 10*3/MM3 (ref 140–450)
PLATELET # BLD AUTO: 215 10*3/MM3 (ref 140–450)
PMV BLD AUTO: 9.3 FL (ref 6–12)
PMV BLD AUTO: 9.7 FL (ref 6–12)
POIKILOCYTOSIS BLD QL SMEAR: ABNORMAL
POLYCHROMASIA BLD QL SMEAR: ABNORMAL
POTASSIUM BLD-SCNC: 4 MMOL/L (ref 3.5–5.2)
RBC # BLD AUTO: 3.78 10*6/MM3 (ref 4.14–5.8)
RBC # BLD AUTO: 4.08 10*6/MM3 (ref 4.14–5.8)
SODIUM BLD-SCNC: 135 MMOL/L (ref 136–145)
WBC MORPH BLD: NORMAL
WBC MORPH BLD: NORMAL
WBC NRBC COR # BLD: 3.04 10*3/MM3 (ref 3.4–10.8)
WBC NRBC COR # BLD: 3.14 10*3/MM3 (ref 3.4–10.8)

## 2019-08-11 PROCEDURE — 80048 BASIC METABOLIC PNL TOTAL CA: CPT | Performed by: HOSPITALIST

## 2019-08-11 PROCEDURE — 97161 PT EVAL LOW COMPLEX 20 MIN: CPT

## 2019-08-11 PROCEDURE — 85025 COMPLETE CBC W/AUTO DIFF WBC: CPT | Performed by: EMERGENCY MEDICINE

## 2019-08-11 PROCEDURE — 85730 THROMBOPLASTIN TIME PARTIAL: CPT | Performed by: EMERGENCY MEDICINE

## 2019-08-11 PROCEDURE — 97110 THERAPEUTIC EXERCISES: CPT

## 2019-08-11 PROCEDURE — 25010000002 ENOXAPARIN PER 10 MG: Performed by: INTERNAL MEDICINE

## 2019-08-11 PROCEDURE — 99254 IP/OBS CNSLTJ NEW/EST MOD 60: CPT | Performed by: INTERNAL MEDICINE

## 2019-08-11 PROCEDURE — 25010000002 HEPARIN (PORCINE) PER 1000 UNITS: Performed by: EMERGENCY MEDICINE

## 2019-08-11 PROCEDURE — 85007 BL SMEAR W/DIFF WBC COUNT: CPT | Performed by: EMERGENCY MEDICINE

## 2019-08-11 RX ORDER — HYDROCODONE BITARTRATE AND ACETAMINOPHEN 5; 325 MG/1; MG/1
1 TABLET ORAL ONCE
Status: COMPLETED | OUTPATIENT
Start: 2019-08-11 | End: 2019-08-11

## 2019-08-11 RX ADMIN — HEPARIN SODIUM 6700 UNITS: 5000 INJECTION INTRAVENOUS; SUBCUTANEOUS at 01:13

## 2019-08-11 RX ADMIN — HYDROCODONE BITARTRATE AND ACETAMINOPHEN 1 TABLET: 5; 325 TABLET ORAL at 12:11

## 2019-08-11 RX ADMIN — Medication 1 CAPSULE: at 08:26

## 2019-08-11 RX ADMIN — ENOXAPARIN SODIUM 80 MG: 80 INJECTION SUBCUTANEOUS at 14:01

## 2019-08-11 RX ADMIN — HEPARIN SODIUM 18.8 UNITS/KG/HR: 10000 INJECTION, SOLUTION INTRAVENOUS at 11:38

## 2019-08-11 NOTE — CONSULTS
"    .     REASON FOR CONSULTATION:     Provide an opinion on any further workup or treatment of newly diagnosed bilateral lower extremity DVT.                             REQUESTING PHYSICIAN: Isaac Pina MD     RECORDS OBTAINED:  Records of the patients history including those obtained from the referring provider were reviewed and summarized in detail.    HISTORY OF PRESENT ILLNESS:  The patient is a 63 y.o. year old male     Who has metastatic esophageal cancer, history of pneumonia, hepatitis, hypertension and prostate cancer, presented to the emergency room yesterday because of swelling of both legs for several days and significant pain in his left foot and unable to walk for about a 2 days.  He denies chest pain no short of breath.He denies  Fever sweating or chills.  No change of bowel movement.  No nausea no vomiting.      This patient has metastatic esophageal cancer, for which he is being undergoing chemotherapy recently, most recent one cycle #2 FOLFOX 6+ Herceptin on 8/6/2019.     Laboratory study in the emergency room reported Hb 10.5 platelets 191,000 WBC 2950 including ANC 1850, normal CMP except marginally decreased sodium 134 and a calcium 8.4.  INR 1.19.  Patient had a venous Doppler study for both lower extremities which reported a significant acute left leg DVT from the proximal femoral vein down to the calf veins.  The right leg also has acute DVT from the popliteal vein down to the calf veins as well as the right leg superficial thrombophlebitis above the knee.      Patient was admitted to hospital and started on intravenous heparin infusion for anticoagulation.    This morning patient reports his leg edema has improved somewhat, however he still has pain in the left leg similar to yesterday.     Past Medical History:   Diagnosis Date   • Arthritis    • Elevated PSA    • Esophageal mass    • H/O Lung nodule    • Hepatitis     CHILD--PT STATED \"I THINK IT WAS A.\"   • History of pneumonia    • " Hyperlipidemia    • Hypertension    • Liver cancer (CMS/HCC)    • Prostate cancer (CMS/HCC)      Past Surgical History:   Procedure Laterality Date   • HERNIA REPAIR  1999   • PROSTATE SURGERY  03/2008   • VENOUS ACCESS DEVICE (PORT) INSERTION N/A 7/16/2019    Procedure: INSERTION VENOUS ACCESS DEVICE WITH FLUORO AND EGD WITH BIOPSY;  Surgeon: Mary Brito MD;  Location: [facility];  Service: Thoracic       HEMATOLOGIC/ONCOLOGIC HISTORY:  (History from previous dates can be found in the separate document.)  See Dr. Haynes note on 7/18/2019, and NIRU Marks's note 8/6/2019    MEDICATIONS    Current Facility-Administered Medications:   •  acetaminophen (TYLENOL) tablet 650 mg, 650 mg, Oral, Q4H PRN, Isaac Pina MD  •  heparin (porcine) 5000 UNIT/ML injection 3,400-6,700 Units, 40-80 Units/kg, Intravenous, Q6H PRN, Vernon Pierson MD, 6,700 Units at 08/11/19 0113  •  heparin 26582 units/250 mL (100 units/mL) in 0.45 % NaCl infusion, 17.8 Units/kg/hr, Intravenous, Titrated, Vernon Pierson MD, Last Rate: 15.77 mL/hr at 08/11/19 0645, 18.8 Units/kg/hr at 08/11/19 0645  •  lactobacillus acidophilus (RISAQUAD) capsule 1 capsule, 1 capsule, Oral, Daily, Isaac Pina MD, 1 capsule at 08/11/19 0826  •  magnesium hydroxide (MILK OF MAGNESIA) suspension 2400 mg/10mL 10 mL, 10 mL, Oral, Daily PRN, Isaac Pina MD    ALLERGIES:   No Known Allergies    SOCIAL HISTORY:       Social History     Socioeconomic History   • Marital status: Single     Spouse name: Not on file   • Number of children: Not on file   • Years of education: High school   • Highest education level: Not on file   Occupational History   • Occupation: [employer]     Employer: [employer]   Tobacco Use   • Smoking status: Current Every Day Smoker     Packs/day: 1.00     Years: 40.00     Pack years: 40.00     Types: Cigarettes   • Smokeless tobacco: Never Used   Substance and Sexual Activity   • Alcohol use: Yes     Alcohol/week: 16.8 oz     " Types: 28 Cans of beer per week     Comment: NOT RECENTLY   • Drug use: No         FAMILY HISTORY:  Family History   Problem Relation Age of Onset   • Hypertension Mother    • Stroke Mother    • Hypertension Other    • Lung disease Other    • Prostate cancer Other    • Lung cancer Father    • Malig Hyperthermia Neg Hx        REVIEW OF SYSTEMS:  Review of Systems   Constitutional: Positive for activity change (Unable to work due to pain in the left leg/foot for 2 days) and fatigue. Negative for appetite change, diaphoresis, fever and unexpected weight change.   HENT: Negative for facial swelling and nosebleeds.    Eyes: Negative for visual disturbance.   Respiratory: Negative for cough and shortness of breath.    Cardiovascular: Positive for leg swelling. Negative for chest pain and palpitations.   Gastrointestinal: Negative for abdominal pain, blood in stool and nausea.   Endocrine: Negative for cold intolerance.   Genitourinary: Negative for dysuria and hematuria.   Musculoskeletal: Positive for joint swelling.        Pain left leg and the foot, not able to walk   Skin: Negative.    Allergic/Immunologic: Positive for immunocompromised state (Undergoing chemotherapy for metastatic cancer).   Neurological: Negative for dizziness, syncope and headaches.   Hematological: Negative for adenopathy. Does not bruise/bleed easily.   Psychiatric/Behavioral: Negative for agitation and confusion.              Vitals:    08/10/19 1919 08/10/19 2032 08/10/19 2353 08/11/19 0738   BP:  176/88 110/62 148/90   BP Location:  Left arm Left arm Left arm   Patient Position:  Lying Lying Lying   Pulse: 81 79 75 80   Resp:  18 18 18   Temp:   98.7 °F (37.1 °C) 98.4 °F (36.9 °C)   TempSrc:  Oral Oral Oral   SpO2: 98% 94%  98%   Weight:  83.5 kg (184 lb)     Height:  175.3 cm (69\")       Current Status 8/6/2019   ECOG score 0      PHYSICAL EXAM:      CONSTITUTIONAL:  Vital signs reviewed.  Well-developed well-nourished male lying in bed.  " No distress, looks comfortable.  EYES:  Conjunctiva and lids unremarkable.  PERRLA  EARS,NOSE,MOUTH,THROAT:  Ears and nose appear unremarkable.  Lips appear unremarkable.  RESPIRATORY:  Normal respiratory effort.  Lungs clear to auscultation bilaterally.  CARDIOVASCULAR:  Normal S1, S2.  No murmurs rubs or gallops.  No significant lower extremity edema.  GASTROINTESTINAL: Abdomen appears unremarkable.  Nontender.  No hepatomegaly.  No splenomegaly.  LYMPHATIC:  No cervical, supraclavicular, axillary lymphadenopathy.  MUSCULOSKELETAL: Significant edema in the left leg below the knee level. There is no cyanosis of the left foot and the toes.  There is also trace edema in the right leg.   Unremarkable digits/nails.  No cyanosis or clubbing.  SKIN:  Warm.  No rashes.  PSYCHIATRIC:  Normal judgment and insight.  Normal mood and affect.      RECENT LABS:        WBC   Date Value Ref Range Status   08/11/2019 3.14 (L) 3.40 - 10.80 10*3/mm3 Final   08/10/2019 3.04 (L) 3.40 - 10.80 10*3/mm3 Final   08/10/2019 2.95 (L) 3.40 - 10.80 10*3/mm3 Final     Hemoglobin   Date Value Ref Range Status   08/11/2019 10.4 (L) 13.0 - 17.7 g/dL Final   08/10/2019 9.8 (L) 13.0 - 17.7 g/dL Final   08/10/2019 10.5 (L) 13.0 - 17.7 g/dL Final     Platelets   Date Value Ref Range Status   08/11/2019 215 140 - 450 10*3/mm3 Final   08/10/2019 192 140 - 450 10*3/mm3 Final   08/10/2019 191 140 - 450 10*3/mm3 Final     Lab Results   Component Value Date    NEUTROABS 1.31 (L) 08/11/2019     Results from last 7 days   Lab Units 08/11/19  0413 08/10/19  1514 08/06/19  0935   SODIUM mmol/L 135* 134* 136   POTASSIUM mmol/L 4.0 3.9 4.0   CHLORIDE mmol/L 99 98 100   CO2 mmol/L 22.8 25.5 26.4   BUN mg/dL 11 12 9   CREATININE mg/dL 0.57* 0.62* 0.61*   CALCIUM mg/dL 8.7 8.4* 8.3*   BILIRUBIN mg/dL  --  0.3 0.4   ALK PHOS U/L  --  115 141*   ALT (SGPT) U/L  --  12 7   AST (SGOT) U/L  --  14 15   GLUCOSE mg/dL 103* 86 96     Results from last 7 days   Lab Units  08/11/19  0413 08/10/19  2351 08/10/19  1740   INR   --   --  1.19*   APTT seconds 179.6* 52.5* 23.8     Lab Results   Component Value Date    CNVNCNKX78 >2,000 (H) 07/09/2019     Lab Results   Component Value Date    FOLATE 8.94 07/09/2019     Lab Results   Component Value Date    IRON 15 (L) 07/09/2019    TIBC 127 (L) 07/09/2019    FERRITIN 1,371.40 (H) 07/09/2019       Venous Doppler study on 8/10/2019.  Interpretation Summary     · Acute right lower extremity superficial thrombophlebitis noted in the varicosity (above knee).  · Acute right lower extremity deep vein thrombosis noted in the popliteal, posterior tibial and peroneal.  · Acute left lower extremity deep vein thrombosis noted in the proximal femoral, mid femoral, distal femoral, popliteal, posterial tibial, peroneal and gastrocnemius/soleal.  · All other veins appeared normal bilaterally.           Assessment/Plan     1.  Patient is 63 years of male who has metastatic esophageal cancer, undergoing chemotherapy with FOLFOX 6+ Herceptin, most recent one on 8/6/2019.     2.  Bilateral lower extremities acute DVT, more significant in the left leg.  This patient has Trousseau's syndrome, and needs lifelong anticoagulation.  Patient currently is on heparin IV infusion since admission.  I discussed with the patient for long-term anticoagulation using Lovenox versus NOAC.  I pointed out Lovenox has better results in cancer patient but he needs twice a day subcutaneous injection.  NOAC has not as much long-term data, but we do use for cancer patients.  Patient prefers to use oral anticoagulation medicine.  We recommended against Coumadin anticoagulation because its inferior efficacy in cancer patients.     After discussion, I recommended to start Eliquis 10 mg every 12 hours for 7 days then switch to lower dose at 5 mg every 12 hours for long-term use.    We will ask care coordinator to check the price of Eliquis to see whether patient can afford  this..    Patient also reports that he has pains in the left leg/foot  which has not improved.  I recommended as needed narcotics.  Patient has hydrocodone 10 tablets from previous portacatheter placement and a setting to use this.  I will give patient 1 dose of Lortab here in the hospital.     I asked patient to use elastic stocking at home.  He can buy at [employer].  His sister is aware of the retail store.    Patient wants to go home.  From hematology point of view, I think he could be discharged once we get that the price issue checked out.      3.  Mild neutropenia secondary to chemotherapy.  This will be followed as outpatient.  Patient is asymptomatic.     4.  Anemia secondary to his metastatic esophageal cancer.  Stable and will be followed as outpatient.     Plan:  1.  Continue heparin gtt for now.   2.  Consult CCP to check the price for Eliquis as outpatient, 10 mg every 12 hours for 7 days then decrease to 5 mg every 12 hours for long-term use.   3.  Patient can be discharged home later today.  At which time patient can be started on 1 dose Eliquis 10 mg before discharge.   4. One dose Norco for pain control.  5.  Patient has follow-up appointment with  8/20/2019 for reevaluation and ongoing chemotherapy.      I discussed with the patient and his sister today.  Patient does not want to do Lovenox injection every 12 hours.     I spoke to his nurse today for patient care.      Thanks for letting us participating in the care of this patient!       Addendum:  His insurance does not cover any of those NOAC, they do cover Coumadin, however I recommend against the Coumadin because it does not work well for patient having Trousseau's syndrome.  His insurance covers 100% of Lovenox so we will start him on weight-based Lovenox 80 mg subcu every 12 hours.  He will be taught here in the hospital for subcu injection before discharge.  We will switch his anticoagulant to Lovenox now.    I explained  to patient and he voiced understanding.      I discussed with care coordinated this morning, and to coordinate the treatment for his outpatient anticoagulation regimen.    Spoke with his nurse also.      WILDER INIGUEZ M.D., Ph.D.    8/11/2019    Dictated using Dragon Dictation.

## 2019-08-11 NOTE — PROGRESS NOTES
Continued Stay Note  [facility]     Patient Name: Han Garcia  MRN: [id number]  Today's Date: 8/11/2019    Admit Date: 8/10/2019    Discharge Plan     Row Name 08/11/19 0994       Plan    Plan  Lovenox covered at 100%, no co-pay. Script ready at [facility], partial fill as they had enough in stock to get through Tuesday's dose. Per Pharmacist Jose Eduardo they will get order in and have remainder of script tomorrow afternoon. CCP available should additional needs arise..........Samaria MCMULLEN     Patient/Family in Agreement with Plan  yes    Plan Comments  S/W Dr. Pelletier regarding medication coverage and MD would like CCP to check coverage for Lovenox, e-script sent to patient's pharmacy. S/W Jose Eduardo at [facility] who states Lovenox covered at 100%, no co-pay and they have enough in stock currently to get patient through Tuesday's dose and order will be in by then with the remainder of script. Called and updated RN, attempted to speak with patient to update and he was unavailable at this time. RN will update patient and notify CCP if any concerns/questions..............Samaria RN     Row Name 08/11/19 0022       Plan    Plan  Check cost of Eliquis- Med not covered by insurance. Per Pharmacist at [facility] a PA can be submitted to have insurance consider non-formulary. RN checking with MD to see how he would like to proceed.......Kiersten MCMULLEN     Patient/Family in Agreement with Plan  yes    Plan Comments  Consult to check coverage/cost for Eliquis. S/W Pharmacist at [facility] ([phone]), states patient's insurance does not cover this medication and covered med in this class is Warfarin. A Prior Auth could be submitted to insurance for them to consider as it is non-formulary, this process would not be completed today. Update to RN who states she will update MD to see how he would like to proceed. CCP available should additional needs arise..........Samaria MCMULLEN         Discharge Codes    No  documentation.             Sada Rothman, RN

## 2019-08-11 NOTE — PLAN OF CARE
Problem: Patient Care Overview  Goal: Plan of Care Review  Outcome: Ongoing (interventions implemented as appropriate)   08/10/19 0746   Coping/Psychosocial   Plan of Care Reviewed With patient   Plan of Care Review   Progress no change   OTHER   Outcome Summary Pt Admit with BLE DVT, Heparin Drip continuous, AO VSS on RA in NSR, on Bedrest,

## 2019-08-11 NOTE — DISCHARGE SUMMARY
[facility]    Date of Discharge:  8/11/2019    PCP: Jaiden Hoover MD    Discharge Diagnosis:   Active Hospital Problems    Diagnosis  POA   • **Acute deep vein thrombosis (DVT) of femoral vein of left lower extremity (CMS/HCC) [I82.412]  Yes   • Chemotherapy induced neutropenia (CMS/HCC) [D70.1, T45.1X5A]  Unknown   • Anemia, chronic disease [D63.8]  Unknown   • Pain [R52]  Unknown   • Acute deep vein thrombosis (DVT) of right popliteal vein (CMS/HCC) [I82.431]  Yes   • Malignant neoplasm of esophagus (CMS/HCC) [C15.9]  Yes   • Liver metastases (CMS/HCC) [C78.7]  Yes   • Tobacco abuse [Z72.0]  Yes   • Essential hypertension [I10]  Yes      Resolved Hospital Problems   No resolved problems to display.      Consults     Date and Time Order Name Status Description    8/10/2019 2034 Inpatient Hematology & Oncology Consult Completed     8/10/2019 1717 Hematology and Oncology (on-call MD unless specified) Completed     8/10/2019 1716 LHA (on-call MD unless specified) Details Completed         Hospital Course  Please see history and physical for details. Patient is a 63 y.o. male with a history of esophageal cancer presented with lower extremity leg pain and swelling.  He was found to have acute right lower extremity superficial thrombophlebitis above-the-knee, acute right lower extremity DVT in the popliteal posterior tibial and peroneal, acute left lower extremity DVT in the proximal femoral, mid femoral, distal femoral, popliteal, posterior tibial, peroneal, gastrocnemius/soleal.  He was initially started on heparin.  His insurance did not cover NOAC but did cover warfarin however given his Trousseau's syndrome hematology did not recommend it as it does not work well for that.  Lovenox was also covered and he is going to be started on weight-based Lovenox at 80 mg subcutaneous every 12 hours.  He is going to be taught in the hospital and be discharged  afterwards.    I discussed the patient's findings and my recommendations with patient, family and nursing staff and Dr. Pelletier    Condition on Discharge: Stable.  He would like to go home today.     Temp:  [97.6 °F (36.4 °C)-98.7 °F (37.1 °C)] 97.6 °F (36.4 °C)  Heart Rate:  [] 84  Resp:  [18] 18  BP: (110-176)/(62-92) 142/79  Body mass index is 27.17 kg/m².    Physical Exam   Constitutional: He is oriented to person, place, and time. No distress.   Cardiovascular: Normal rate and regular rhythm.   Pulmonary/Chest: Effort normal and breath sounds normal. No respiratory distress.   Abdominal: Soft. Bowel sounds are normal. There is no tenderness. There is no rebound and no guarding.   Musculoskeletal: He exhibits edema (trace).   Neurological: He is alert and oriented to person, place, and time.   Skin: Skin is warm and dry.   Psychiatric: He has a normal mood and affect. His behavior is normal.        Discharge Medications      New Medications      Instructions Start Date   enoxaparin 80 MG/0.8ML solution syringe  Commonly known as:  LOVENOX   80 mg, Subcutaneous, Every 12 Hours Scheduled         Changes to Medications      Instructions Start Date   ciprofloxacin 250 MG tablet  Commonly known as:  CIPRO  What changed:    · how much to take  · additional instructions   500 mg, Oral, 2 Times Daily, 1 tablet         Continue These Medications      Instructions Start Date   HYDROcodone-acetaminophen 5-325 MG per tablet  Commonly known as:  NORCO   1-2 tablets, Oral, Every 4 Hours PRN      ondansetron 4 MG tablet  Commonly known as:  ZOFRAN   4 mg, Oral, Every 8 Hours PRN      PROBIOTIC DAILY PO   Oral, Daily            Diet Instructions     Diet: Regular      Discharge Diet:  Regular         Activity Instructions     Activity as Tolerated           Additional Instructions for the Follow-ups that You Need to Schedule     Call MD for problems / concerns.   As directed        Follow-up Information     Jaiden Hoover,  MD Follow up.    Specialty:  Family Medicine  Contact information:  [address] [facility] [address]  [place] [address]  [phone]             Alexey Haynes MD Follow up.    Specialties:  Hematology and Oncology, Oncology, Hematology  Contact information:  [address] [facility] [address]  [place] [id number] [phone]                  Justin Pickens MD  08/11/19  2:14 PM    Discharge time spent greater than 30 minutes.

## 2019-08-11 NOTE — PLAN OF CARE
Problem: Patient Care Overview  Goal: Plan of Care Review   08/11/19 1341   OTHER   Outcome Summary Pt is a 63 y.o. male admitted to [facility] with c/o BLE swelling/ pain/ balance difficulties on 8/10/2019. Work up reveals B LE DVT. Pt presents today with decreased endurance and decreased functional mobility. Pt required supervision for bed mobility, CGA for STS transfers, and CGA for ambulation with RW for 150 ft. Pt will benefit from skilled PT to address the previous deficits and improve overall safety with functional mobility. Pt reports no AD use at baseline. Anticipate pt will D/C to home with some assist.

## 2019-08-11 NOTE — THERAPY EVALUATION
"Patient Name: Han Garcia  : 1955    MRN: [id number]                              Today's Date: 2019       Admit Date: 8/10/2019    Visit Dx:     ICD-10-CM ICD-9-CM   1. Acute deep vein thrombosis (DVT) of femoral vein of left lower extremity (CMS/HCC) I82.412 453.41   2. Acute deep vein thrombosis (DVT) of popliteal vein of right lower extremity (CMS/HCC) I82.431 453.41     Patient Active Problem List   Diagnosis   • Essential hypertension   • Hyperlipidemia   • Impaired fasting glucose   • History of prostate cancer   • Tobacco abuse   • Liver metastases (CMS/HCC)   • Esophagus neoplasm   • Malignant neoplasm of esophagus (CMS/HCC)   • Encounter for adjustment or management of vascular access device   • Acute deep vein thrombosis (DVT) of femoral vein of left lower extremity (CMS/HCC)   • Acute deep vein thrombosis (DVT) of right popliteal vein (CMS/HCC)   • Chemotherapy induced neutropenia (CMS/HCC)   • Anemia, chronic disease   • Pain     Past Medical History:   Diagnosis Date   • Arthritis    • Elevated PSA    • Esophageal mass    • H/O Lung nodule    • Hepatitis     CHILD--PT STATED \"I THINK IT WAS A.\"   • History of pneumonia    • Hyperlipidemia    • Hypertension    • Liver cancer (CMS/HCC)    • Prostate cancer (CMS/HCC)      Past Surgical History:   Procedure Laterality Date   • HERNIA REPAIR     • PROSTATE SURGERY  2008   • VENOUS ACCESS DEVICE (PORT) INSERTION N/A 2019    Procedure: INSERTION VENOUS ACCESS DEVICE WITH FLUORO AND EGD WITH BIOPSY;  Surgeon: Mary Brito MD;  Location: [facility] OR;  Service: Thoracic     General Information     Row Name 19 1333          PT Evaluation Time/Intention    Document Type  evaluation  -RS     Mode of Treatment  individual therapy;physical therapy  -RS     Row Name 19 1333          General Information    Patient Profile Reviewed?  yes  -RS     Prior Level of Function  independent:  -RS     Existing " Precautions/Restrictions  no known precautions/restrictions;other (see comments) pt has DVT, but per RN cleared for OOB activity (anticoagulated)  -RS     Barriers to Rehab  medically complex  -RS     Row Name 08/11/19 1333          Relationship/Environment    Lives With  alone  -RS     Row Name 08/11/19 1333          Resource/Environmental Concerns    Current Living Arrangements  home/apartment/condo  -RS     Row Name 08/11/19 1333          Home Main Entrance    Number of Stairs, Main Entrance  two  -RS     Stair Railings, Main Entrance  none  -RS     Row Name 08/11/19 1333          Stairs Within Home, Primary    Stairs, Within Home, Primary  1 flight to basement, single handrail, laundry in basement  -RS     Row Name 08/11/19 1333          Cognitive Assessment/Intervention- PT/OT    Orientation Status (Cognition)  oriented x 4  -RS     Row Name 08/11/19 1333          Safety Issues, Functional Mobility    Impairments Affecting Function (Mobility)  balance;endurance/activity tolerance;pain  -RS       User Key  (r) = Recorded By, (t) = Taken By, (c) = Cosigned By    Initials Name Provider Type    RS Ольга Bryan, PT Physical Therapist        Mobility     Row Name 08/11/19 1332          Bed Mobility Assessment/Treatment    Bed Mobility Assessment/Treatment  supine-sit;sit-supine  -RS     Supine-Sit [place] (Bed Mobility)  supervision  -RS     Sit-Supine [place] (Bed Mobility)  supervision  -RS     Assistive Device (Bed Mobility)  bed rails;head of bed elevated  -RS     Row Name 08/11/19 1331          Sit-Stand Transfer    Sit-Stand [place] (Transfers)  contact guard  -RS     Assistive Device (Sit-Stand Transfers)  walker, front-wheeled  -RS     Row Name 08/11/19 1335          Gait/Stairs Assessment/Training    [place] Level (Gait)  contact guard  -RS     Assistive Device (Gait)  walker, front-wheeled  -RS     Distance in Feet (Gait)  150 feet  -RS     Deviations/Abnormal Patterns (Gait)   bruce decreased;stride length decreased  -RS     Bilateral Gait Deviations  forward flexed posture;heel strike decreased  -RS     Comment (Gait/Stairs)  pt with decreased stance time L compared to R, reports plantar fascitis pain present at baseline  -RS       User Key  (r) = Recorded By, (t) = Taken By, (c) = Cosigned By    Initials Name Provider Type    RS Ольга Bryan PT Physical Therapist        Obj/Interventions     Row Name 08/11/19 1336          General ROM    GENERAL ROM COMMENTS  WFL BLE based on observed functional mobility  -RS     Row Name 08/11/19 1336          MMT (Manual Muscle Testing)    General MMT Comments  >/=4-/5 based on observed functional mobility  -RS     Row Name 08/11/19 1336          Therapeutic Exercise    Comment (Therapeutic Exercise)  Pt with questions regarding treatment for plantar fascia, discussed ankle pumps, seated gastroc stretch  -RS     Row Name 08/11/19 1336          Static Sitting Balance    Level of [place] (Unsupported Sitting, Static Balance)  independent  -RS     Sitting Position (Unsupported Sitting, Static Balance)  long sitting on bed  -RS     Time Able to Maintain Position (Unsupported Sitting, Static Balance)  more than 5 minutes  -RS     Row Name 08/11/19 1336          Static Standing Balance    Level of [place] (Supported Standing, Static Balance)  supervision  -RS     Time Able to Maintain Position (Supported Standing, Static Balance)  1 to 2 minutes  -RS     Assistive Device Utilized (Supported Standing, Static Balance)  walker, rolling  -RS       User Key  (r) = Recorded By, (t) = Taken By, (c) = Cosigned By    Initials Name Provider Type    RS Ольга Bryan PT Physical Therapist        Goals/Plan     Row Name 08/11/19 1340          Bed Mobility Goal 1 (PT)    Activity/Assistive Device (Bed Mobility Goal 1, PT)  bed mobility activities, all  -RS     [place] Level/Cues Needed (Bed Mobility Goal 1, PT)  independent  -RS     Time Frame  (Bed Mobility Goal 1, PT)  long term goal (LTG);1 week  -RS     Row Name 08/11/19 1340          Transfer Goal 1 (PT)    Activity/Assistive Device (Transfer Goal 1, PT)  transfers, all  -RS     [place] Level/Cues Needed (Transfer Goal 1, PT)  independent  -RS     Time Frame (Transfer Goal 1, PT)  1 week  -RS     Row Name 08/11/19 1340          Gait Training Goal 1 (PT)    Activity/Assistive Device (Gait Training Goal 1, PT)  gait (walking locomotion)  -RS     [place] Level (Gait Training Goal 1, PT)  independent  -RS     Distance (Gait Goal 1, PT)  200 feet, no AD  -RS     Time Frame (Gait Training Goal 1, PT)  long term goal (LTG);1 week  -RS       User Key  (r) = Recorded By, (t) = Taken By, (c) = Cosigned By    Initials Name Provider Type    RS Ольга Bryan, PT Physical Therapist        Clinical Impression     Row Name 08/11/19 0659          Pain Assessment    Additional Documentation  Pain Scale: Numbers Pre/Post-Treatment (Group)  -RS     Row Name 08/11/19 7416          Pain Scale: Numbers Pre/Post-Treatment    Pre/Post Treatment Pain Comment  pt does not rate pain, reports moderate in L ankle (present premorbidly, describes as plantar fascia)  -RS     Pain Intervention(s)  Repositioned;Ambulation/increased activity  -RS     Row Name 08/11/19 1451 08/11/19 0840       Plan of Care Review    Plan of Care Reviewed With  patient;family  -RS  patient  -[place]    Row Name 08/11/19 1339          Physical Therapy Clinical Impression    Patient/Family Goals Statement (PT Clinical Impression)  go home, return to OF  -RS     Criteria for Skilled Interventions Met (PT Clinical Impression)  yes;treatment indicated  -RS     Rehab Potential (PT Clinical Summary)  good, to achieve stated therapy goals  -RS     Predicted Duration of Therapy (PT)  1 week  -RS     Row Name 08/11/19 5701          Positioning and Restraints    Pre-Treatment Position  in bed  -RS     Post Treatment Position  bed  -RS     In Bed   supine;call light within reach;encouraged to call for assist;exit alarm on;with family/caregiver  -RS       User Key  (r) = Recorded By, (t) = Taken By, (c) = Cosigned By    Initials Name Provider Type    Gardenia Pineda, RN Registered Nurse    Ольга Lovell PT Physical Therapist        Outcome Measures     Row Name 08/11/19 1343          How much help from another person do you currently need...    Turning from your back to your side while in flat bed without using bedrails?  4  -RS     Moving from lying on back to sitting on the side of a flat bed without bedrails?  4  -RS     Moving to and from a bed to a chair (including a wheelchair)?  4  -RS     Standing up from a chair using your arms (e.g., wheelchair, bedside chair)?  4  -RS     Climbing 3-5 steps with a railing?  3  -RS     To walk in hospital room?  3  -RS     AM-PAC 6 Clicks Score (PT)  22  -RS     Row Name 08/11/19 1343          Functional Assessment    Outcome Measure Options  AM-PAC 6 Clicks Basic Mobility (PT)  -RS       User Key  (r) = Recorded By, (t) = Taken By, (c) = Cosigned By    Initials Name Provider Type    Ольга Lovell PT Physical Therapist        Physical Therapy Education     Title: PT OT SLP Therapies (Done)     Topic: Physical Therapy (Done)     Point: Mobility training (Done)     Learning Progress Summary           Patient Acceptance, E, VU by  at 8/11/2019  1:41 PM                   Point: Home exercise program (Done)     Learning Progress Summary           Patient Acceptance, E, VU by  at 8/11/2019  1:41 PM                   Point: Body mechanics (Done)     Learning Progress Summary           Patient Acceptance, E, VU by  at 8/11/2019  1:41 PM                   Point: Precautions (Done)     Learning Progress Summary           Patient Acceptance, E, VU by  at 8/11/2019  1:41 PM                               User Key     Initials Effective Dates Name Provider Type [facility] 04/03/19 -  Irvin  ALBERTO Rolon Physical Therapist PT              PT Recommendation and Plan     Outcome Summary/Treatment Plan (PT)  Anticipated Equipment Needs at Discharge (PT): other (see comments)(pt reports sister has straight cane he will be able to use at home)  Anticipated Discharge Disposition (PT): home with assist  Plan of Care Reviewed With: patient, family  Outcome Summary: Pt is a 63 y.o. male admitted to [facility] with c/o BLE swelling/ pain/ balance difficulties on 8/10/2019. Work up reveals B LE DVT. Pt presents today with decreased endurance and decreased functional mobility. Pt required supervision for bed mobility, CGA for STS transfers, and CGA for ambulation with RW for 150 ft. Pt will benefit from skilled PT to address the previous deficits and improve overall safety with functional mobility.  Pt reports no AD use at baseline. Anticipate pt will D/C to home with some assist.     Time Calculation:   PT Charges     Row Name 08/11/19 1343             Time Calculation    Start Time  1310  -RS      Stop Time  1335  -RS      Time Calculation (min)  25 min  -RS        User Key  (r) = Recorded By, (t) = Taken By, (c) = Cosigned By    Initials Name Provider Type    RS Ольга Bryan, ALBERTO Physical Therapist        Therapy Charges for Today     Code Description Service Date Service Provider Modifiers Qty    38945349815 HC PT THER PROC EA 15 MIN 8/11/2019 Ольга Bryan, PT GP 1    94195819244 HC PT EVAL LOW COMPLEXITY 1 8/11/2019 Ольга Bryan PT GP 1          PT G-Codes  Outcome Measure Options: AM-PAC 6 Clicks Basic Mobility (PT)  AM-PAC 6 Clicks Score (PT): 22    Ольга Bryan PT  8/11/2019

## 2019-08-12 ENCOUNTER — TELEPHONE (OUTPATIENT)
Dept: ONCOLOGY | Facility: HOSPITAL | Age: 64
End: 2019-08-12

## 2019-08-12 ENCOUNTER — TRANSITIONAL CARE MANAGEMENT TELEPHONE ENCOUNTER (OUTPATIENT)
Dept: FAMILY MEDICINE CLINIC | Facility: CLINIC | Age: 64
End: 2019-08-12

## 2019-08-12 ENCOUNTER — READMISSION MANAGEMENT (OUTPATIENT)
Dept: CALL CENTER | Facility: HOSPITAL | Age: 64
End: 2019-08-12

## 2019-08-12 NOTE — PAYOR COMM NOTE
"Urban Mota (63 y.o. Male)       PLEASE SEE ATTACHED CLINICAL REVIEW. PT. WAS ADMITTED TO [facility] OPN 8/10/19 TO A MONITORED TELEM FLOOR.    REF#[id number]    PLEASE CALL   OR  [phone] WITH INPT AUTH AND DAYS APPROVED. [facility] IS DRG WITH [facility].    THANK YOU    KIRILL WILKINSON LPN [facility]           Date of Birth Social Security Number Address Home Phone MRN    1955  [id number] [facility] [address] [phone] [id number]    Sabianist Marital Status          Unknown Single       Admission Date Admission Type Admitting Provider Attending Provider Department, Room/Bed    8/10/19 Emergency Isaac Pina MD  [address], S503/1    Discharge Date Discharge Disposition Discharge Destination        8/11/2019 Home or Self Care              Attending Provider:  (none)   Allergies:  No Known Allergies    Isolation:  None   Infection:  None   Code Status:  Prior    Ht:  175.3 cm (69\")   Wt:  83.5 kg (184 lb)    Admission Cmt:  None   Principal Problem:  Acute deep vein thrombosis (DVT) of femoral vein of left lower extremity (CMS/[facility]) [I82.412]                 Active Insurance as of 8/10/2019     Primary Coverage     Payor Plan Insurance Group Employer/Plan Group    [facility] BLUE [facility] PPO [id number]     Payor Plan Address Payor Plan Phone Number Payor Plan Fax Number Effective Dates    [address] [phone]  6/6/2016 - None Entered    [place] [address]       Subscriber Name Subscriber Birth Date Member ID       URBAN MOTA 1955 [id number]                 Emergency Contacts      (Rel.) Home Phone Work Phone Mobile Phone    Karen Costello (Sister) -- -- [phone]               History & Physical      Isaac Pina MD at 8/10/2019  5:57 PM              Patient Name:  Urban Mota  YOB: 1955  MRN:  [id number]  Admit Date:  8/10/2019  Patient Care Team:  Jaiden Hoover MD as PCP - " "General (Family Medicine)  Amanda Gunderson APRN as Referring Physician (Family Medicine)  Alexey Haynes MD as Consulting Physician (Hematology and Oncology)  Mary Brito MD as Surgeon (Thoracic Surgery)      Subjective   History Present Illness     Chief Complaint   Patient presents with   • Leg Swelling       Mr. Garcia is a 63 y.o. smoker with a history of esophageal cancer that presents to [facility] complaining of pain and swelling in both legs.  Swelling is been present for several days but just started having discomfort over the last 1 to 2 days.  Pain is worse when he is standing and trying to ambulate.  He denies any injury to his lower extremities.  No prolonged periods of immobility.  No prior history of DVT.  He denies chest pain and shortness of breath.  No hemoptysis.  No presyncope or syncope.         History of Present Illness  Review of Systems   HENT: Negative.    Eyes: Negative.    Respiratory: Negative.    Cardiovascular: Positive for leg swelling.   Gastrointestinal: Negative.    Endocrine: Negative.    Genitourinary: Negative.    Musculoskeletal: Negative.    Skin: Negative.    Neurological: Positive for weakness.   Hematological: Negative.    Psychiatric/Behavioral: Negative.         Personal History     Past Medical History:   Diagnosis Date   • Arthritis    • Elevated PSA    • Esophageal mass    • H/O Lung nodule    • Hepatitis     CHILD--PT STATED \"I THINK IT WAS A.\"   • History of pneumonia    • Hyperlipidemia    • Hypertension    • Liver cancer (CMS/HCC)    • Prostate cancer (CMS/HCC)      Past Surgical History:   Procedure Laterality Date   • HERNIA REPAIR  1999   • PROSTATE SURGERY  03/2008   • VENOUS ACCESS DEVICE (PORT) INSERTION N/A 7/16/2019    Procedure: INSERTION VENOUS ACCESS DEVICE WITH FLUORO AND EGD WITH BIOPSY;  Surgeon: Mary Brito MD;  Location: [facility];  Service: Thoracic     Family History   Problem Relation Age of Onset   • " Hypertension Mother    • Stroke Mother    • Hypertension Other    • Lung disease Other    • Prostate cancer Other    • Lung cancer Father    • Malig Hyperthermia Neg Hx      Social History     Tobacco Use   • Smoking status: Current Every Day Smoker     Packs/day: 1.00     Years: 40.00     Pack years: 40.00     Types: Cigarettes   • Smokeless tobacco: Never Used   Substance Use Topics   • Alcohol use: Yes     Alcohol/week: 16.8 oz     Types: 28 Cans of beer per week     Comment: NOT RECENTLY   • Drug use: No       (Not in a hospital admission)  Allergies:  No Known Allergies    Objective    Objective     Vital Signs  Temp:  [98 °F (36.7 °C)] 98 °F (36.7 °C)  Heart Rate:  [] 86  Resp:  [18] 18  BP: (139-143)/(78-83) 143/78  SpO2:  [99 %-100 %] 99 %  on   ;   Device (Oxygen Therapy): room air  Body mass index is 27.3 kg/m².    Physical Exam   Constitutional: He is oriented to person, place, and time. He appears well-developed and well-nourished.   HENT:   Head: Normocephalic and atraumatic.   Eyes: Conjunctivae and EOM are normal. No scleral icterus.   Neck: Normal range of motion. Neck supple. No JVD present.   Cardiovascular: Normal rate and regular rhythm.   No murmur heard.  Pulmonary/Chest: Effort normal and breath sounds normal. No respiratory distress.   Abdominal: Soft. Bowel sounds are normal. He exhibits no distension. There is no tenderness.   Musculoskeletal: He exhibits edema.   Neurological: He is alert and oriented to person, place, and time. No cranial nerve deficit.   Skin: Skin is warm and dry.   Psychiatric: He has a normal mood and affect. His behavior is normal.   Vitals reviewed.      Results Review:  I reviewed the patient's new clinical results.  I reviewed the patient's new imaging results and agree with the interpretation.  I reviewed the patient's other test results and agree with the interpretation  I personally viewed and interpreted the patient's EKG/Telemetry data  Discussed with  ED provider.     Assessment/Plan     Active Hospital Problems    Diagnosis POA   • **Acute deep vein thrombosis (DVT) of femoral vein of left lower extremity (CMS/HCC) [I82.412] Yes   • Acute deep vein thrombosis (DVT) of right popliteal vein (CMS/HCC) [I82.431] Yes   • Malignant neoplasm of esophagus (CMS/HCC) [C15.9] Yes     Added automatically from request for surgery [id number]     • Liver metastases (CMS/HCC) [C78.7] Yes   • Tobacco abuse [Z72.0] Yes   • Essential hypertension [I10] Yes       63 y.o. male admitted with BLE DVT.  He has h/o esophageal cancer.    · Heparin gtt per Dr. Clark recommendations.  · Oncology to follow.  · Defer further anticoagulation to them.  · Nothing at this point to suggest pulmonary embolus.  · Full code.  · Discussed with patient.      Isaac Pina MD  [facility]  08/10/19  5:57 PM        Electronically signed by Isaac Pina MD at 8/10/2019 10:47 PM          Emergency Department Notes      Wyatt Jose RN at 8/10/2019  2:41 PM        Pt reports left leg swelling and pain x 2 days. Pt states he has esophageal cancer and had chemo on Tuesday. Pt states PCP told him to come to ER for rule out DVT.     Electronically signed by Wyatt Jose RN at 8/10/2019  2:41 PM     Vernon Pierson MD at 8/10/2019  2:49 PM           EMERGENCY DEPARTMENT ENCOUNTER    CHIEF COMPLAINT  Chief Complaint: Leg swelling  History given by: Patient   History limited by: Nothing  Room Number: 18/18  PMD: Jaiden Hoover MD      HPI:  Pt is a 63 y.o. male who presents complaining of worsening L leg swelling that began in the last several weeks. Pt is also complaining of worsening bilateral leg pain. Pt denies chest pain, SOA, fever, chills, change in bowel movements, and change in urine output. Pt reports he currently has esophageal and liver cancer for which he is undergoing chemo and vascular disease in his legs. Pt reports the leg swelling began after chemo but has been  intermittent ever since but has worsened and become constant within the last several weeks. Pt reports he called his oncology office this morning due to worsening leg swelling and pain and was advised to come to the ED.     Duration:  Several weeks  Onset: Gradual  Timing: Constant  Location: LLE  Radiation: None  Quality: Swelling  Intensity/Severity: Moderate  Progression: Worsening  Associated Symptoms: Leg pain  Aggravating Factors: None  Alleviating Factors: None  Previous Episodes: Pt reports he has had the swelling since starting chemo but it has been worse in last several weeks  Treatment before arrival: None    PAST MEDICAL HISTORY  Active Ambulatory Problems     Diagnosis Date Noted   • Essential hypertension 03/03/2016   • Hyperlipidemia 03/03/2016   • Impaired fasting glucose 03/03/2016   • History of prostate cancer 03/03/2016   • Tobacco abuse 03/03/2016   • Liver metastases (CMS/HCC) 07/09/2019   • Esophagus neoplasm 07/09/2019   • Malignant neoplasm of esophagus (CMS/HCC) 07/10/2019   • Encounter for adjustment or management of vascular access device 07/25/2019     Resolved Ambulatory Problems     Diagnosis Date Noted   • No Resolved Ambulatory Problems     Past Medical History:   Diagnosis Date   • Arthritis    • Elevated PSA    • Esophageal mass    • H/O Lung nodule    • Hepatitis    • History of pneumonia    • Hyperlipidemia    • Hypertension    • Liver cancer (CMS/HCC)    • Prostate cancer (CMS/HCC)        PAST SURGICAL HISTORY  Past Surgical History:   Procedure Laterality Date   • HERNIA REPAIR  1999   • PROSTATE SURGERY  03/2008   • VENOUS ACCESS DEVICE (PORT) INSERTION N/A 7/16/2019    Procedure: INSERTION VENOUS ACCESS DEVICE WITH FLUORO AND EGD WITH BIOPSY;  Surgeon: Mary Brito MD;  Location: [facility];  Service: Thoracic       FAMILY HISTORY  Family History   Problem Relation Age of Onset   • Hypertension Mother    • Stroke Mother    • Hypertension Other    • Lung disease Other     • Prostate cancer Other    • Lung cancer Father    • Malig Hyperthermia Neg Hx        SOCIAL HISTORY  Social History     Socioeconomic History   • Marital status: Single     Spouse name: Not on file   • Number of children: Not on file   • Years of education: High school   • Highest education level: Not on file   Occupational History   • 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    REVIEW OF SYSTEMS  Review of Systems   Constitutional: Negative for activity change, appetite change, chills and fever.   HENT: Negative for congestion and sore throat.    Eyes: Negative.    Respiratory: Negative for cough and shortness of breath.    Cardiovascular: Positive for leg swelling (L leg). Negative for chest pain.   Gastrointestinal: Negative for abdominal pain, diarrhea and vomiting.   Endocrine: Negative.    Genitourinary: Negative for decreased urine volume and dysuria.   Musculoskeletal: Negative for neck pain.        Bilateral leg pain   Skin: Negative for rash and wound.   Allergic/Immunologic: Negative.    Neurological: Negative for weakness, numbness and headaches.   Hematological: Negative.    Psychiatric/Behavioral: Negative.    All other systems reviewed and are negative.      PHYSICAL EXAM  ED Triage Vitals [08/10/19 1440]   Temp Heart Rate Resp BP SpO2   98 °F (36.7 °C) 105 18 -- 100 %      Temp src Heart Rate Source Patient Position BP Location FiO2 (%)   Tympanic Monitor -- -- --       Physical Exam   Constitutional: He is oriented to person, place, and time. No distress.   HENT:   Head: Normocephalic and atraumatic.   Eyes: EOM  are normal. Pupils are equal, round, and reactive to light.   Neck: Normal range of motion. Neck supple.   Cardiovascular: Normal rate, regular rhythm and normal heart sounds.   Pulmonary/Chest: Effort normal and breath sounds normal. No respiratory distress.   Abdominal: Soft. There is no tenderness. There is no rebound and no guarding.   Musculoskeletal: Normal range of motion. He exhibits no edema.   BLE edema worse on L than R   Neurological: He is alert and oriented to person, place, and time. He has normal sensation and normal strength.   Skin: Skin is warm and dry.   Psychiatric: Mood and affect normal.   Nursing note and vitals reviewed.    PROCEDURES  Procedures      PROGRESS AND CONSULTS   1717: Spoke with doppler tech who reports pt has bilateral acute DVT's with R in popliteal of calf and R in proximal femoral popiliteal of calf.     1723: Rechecked pt who is resting comfortably and in NAD. Informed pt of doppler results which showed bilateral acute DVT's. Discussed plan to start on blood thinners. Pt understands and agrees with the plan, all questions answered.    1727: Spoke with  ([facility]) who stated he would like me to consult  (oncology) about further care and starting pt no blood thinner and agreed to admit.    1740: Spoke with  (oncology) who stated he would like pt started on heparin    MEDICAL DECISION MAKING  Results were reviewed/discussed with the patient and they were also made aware of online access. Pt also made aware that some labs, such as cultures, will not be resulted during ER visit and follow up with PMD is necessary.     MDM  Number of Diagnoses or Management Options     Amount and/or Complexity of Data Reviewed  Clinical lab tests: ordered and reviewed (Hemoglobin 10.5, otherwise unremarkable and baseline for pt)  Tests in the radiology section of CPT®:  ordered and reviewed (Doppler results showed bilateral acute DVT's with R in popliteal of calf and L in  proximal femoral popliteal of calf. )  Discussion of test results with the performing providers: yes (Doppler tech)  Decide to obtain previous medical records or to obtain history from someone other than the patient: yes (Epic)  Review and summarize past medical records: yes (Pt had an echo on 7/30/19. Findings:  · Left ventricular wall thickness is consistent with mild concentric   hypertrophy.  · Estimated EF = 63%.  · Left ventricular systolic function is normal.  · There is mild calcification of the aortic valve.  · Global Longitudinal LV strain = -19.4%.)  Discuss the patient with other providers: yes ((A)  (Oncology))    Patient Progress  Patient progress: stable         DIAGNOSIS  Final diagnoses:   Acute deep vein thrombosis (DVT) of femoral vein of left lower extremity (CMS/HCC)   Acute deep vein thrombosis (DVT) of popliteal vein of right lower extremity (CMS/HCC)       DISPOSITION  ADMISSION    Discussed treatment plan and reason for admission with pt/family and admitting physician.  Pt/family voiced understanding of the plan for admission for further testing/treatment as needed.         Latest Documented Vital Signs:  As of 5:50 PM  BP- 143/78 HR- 86 Temp- 98 °F (36.7 °C) (Tympanic) O2 sat- 99%    --  Documentation assistance provided by suzie Kim for .  Information recorded by the scribe was done at my direction and has been verified and validated by me.            Beverly Kim  08/10/19 1750       Vernon Pierson MD  08/10/19 1850      Electronically signed by Vernon Pierson MD at 8/10/2019  6:50 PM       Intake & Output (last 3 days)       08/09 0701 - 08/10 0700 08/10 0701 - 08/11 0700 08/11 0701 - 08/12 0700 08/12 0701 - 08/13 0700    Urine (mL/kg/hr)  1475      Total Output  1475      Net  -1475              Urine Unmeasured Occurrence   1 x     Stool Unmeasured Occurrence   1 x          Lines, Drains & Airways    Active LDAs     Name:   Placement date:    Placement time:   Site:   Days:    Single Lumen Implantable Port 07/16/19 Right Chest   07/16/19    1610    Chest   26         Inactive LDAs     Name:   Placement date:   Placement time:   Removal date:   Removal time:   Site:   Days:    [REMOVED] Peripheral IV 08/10/19 1514 Right Antecubital   08/10/19    1514    08/10/19    2113    Antecubital   less than 1    [REMOVED] Peripheral IV 08/10/19 2113 Left;Posterior Hand   08/10/19    2113    08/11/19    1417    Hand   less than 1                Lab Results (all)     Procedure Component Value Units Date/Time    aPTT [[id number]]  (Abnormal) Collected:  08/11/19 0413    Specimen:  Blood from Hand, Right Updated:  08/11/19 0539     .6 seconds     CBC & Differential [[id number]] Collected:  08/11/19 0413    Specimen:  Blood Updated:  08/11/19 0531    Narrative:       CBC Auto Differential [[id number]]  (Abnormal) Collected:  08/11/19 0413    Specimen:  Blood Updated:  08/11/19 0531     WBC 3.14 10*3/mm3      RBC 4.08 10*6/mm3      Hemoglobin 10.4 g/dL      Hematocrit 34.5 %      MCV 84.6 fL      MCH 25.5 pg      MCHC 30.1 g/dL      RDW 16.7 %      RDW-SD 51.1 fl      MPV 9.3 fL      Platelets 215 10*3/mm3     Manual Differential [[id number]]  (Abnormal) Collected:  08/11/19 0413    Specimen:  Blood Updated:  08/11/19 0531     Neutrophil % 41.8 %      Lymphocyte % 52.7 %      Eosinophil % 2.2 %      Basophil % 3.3 %      Neutrophils Absolute 1.31 10*3/mm3      Lymphocytes Absolute 1.65 10*3/mm3      Eosinophils Absolute 0.07 10*3/mm3      Basophils Absolute 0.10 10*3/mm3      Anisocytosis Slight/1+     Hypochromia Slight/1+     Microcytes Slight/1+     Poikilocytes Slight/1+     WBC Morphology Normal     Platelet Morphology Normal    Basic Metabolic Panel [[id number]]  (Abnormal) Collected:  08/11/19 0413    Specimen:  Blood Updated:  08/11/19 0507     Glucose 103 mg/dL      BUN 11 mg/dL      Creatinine 0.57 mg/dL      Sodium 135 mmol/L      Potassium 4.0 mmol/L       Chloride 99 mmol/L      CO2 22.8 mmol/L      Calcium 8.7 mg/dL      eGFR Non African Amer 145 mL/min/1.73      BUN/Creatinine Ratio 19.3     Anion Gap 13.2 mmol/L     Narrative:       CBC & Differential [[id number]] Collected:  08/10/19 2351    Specimen:  Blood Updated:  08/11/19 0037    Narrative:       CBC Auto Differential [[id number]]  (Abnormal) Collected:  08/10/19 2351    Specimen:  Blood Updated:  08/11/19 0037     WBC 3.04 10*3/mm3      RBC 3.78 10*6/mm3      Hemoglobin 9.8 g/dL      Hematocrit 32.0 %      MCV 84.7 fL      MCH 25.9 pg      MCHC 30.6 g/dL      RDW 16.6 %      RDW-SD 51.3 fl      MPV 9.7 fL      Platelets 192 10*3/mm3     Manual Differential [[id number]]  (Abnormal) Collected:  08/10/19 2351    Specimen:  Blood Updated:  08/11/19 0037     Neutrophil % 60.2 %      Lymphocyte % 34.7 %      Monocyte % 2.0 %      Eosinophil % 2.0 %      Basophil % 1.0 %      Neutrophils Absolute 1.83 10*3/mm3      Lymphocytes Absolute 1.05 10*3/mm3      Monocytes Absolute 0.06 10*3/mm3      Eosinophils Absolute 0.06 10*3/mm3      Basophils Absolute 0.03 10*3/mm3      Anisocytosis Slight/1+     Hypochromia Mod/2+     Microcytes Slight/1+     Polychromasia Mod/2+     RBC Fragments Slight/1+     WBC Morphology Normal     Platelet Morphology Normal    aPTT [[id number]]  (Abnormal) Collected:  08/10/19 2351    Specimen:  Blood Updated:  08/11/19 0034     PTT 52.5 seconds     aPTT [[id number]]  (Normal) Collected:  08/10/19 1740    Specimen:  Blood Updated:  08/10/19 1806     PTT 23.8 seconds     Protime-INR [[id number]]  (Abnormal) Collected:  08/10/19 1740    Specimen:  Blood Updated:  08/10/19 1806     Protime 14.8 Seconds      INR 1.19    CBC & Differential [[id number]] Collected:  08/10/19 1514    Specimen:  Blood Updated:  08/10/19 1551    Narrative:       CBC Auto Differential [[id number]]  (Abnormal) Collected:  08/10/19 1514    Specimen:  Blood Updated:  08/10/19 1551     WBC 2.95 10*3/mm3      RBC 4.15 10*6/mm3       Hemoglobin 10.5 g/dL      Hematocrit 35.7 %      MCV 86.0 fL      MCH 25.3 pg      MCHC 29.4 g/dL      RDW 16.7 %      RDW-SD 51.6 fl      MPV 9.0 fL      Platelets 191 10*3/mm3     Manual Differential [[id number]] Collected:  08/10/19 1514    Specimen:  Blood Updated:  08/10/19 1551     Neutrophil % 62.6 %      Lymphocyte % 36.4 %      Basophil % 1.0 %      Neutrophils Absolute 1.85 10*3/mm3      Lymphocytes Absolute 1.07 10*3/mm3      Basophils Absolute 0.03 10*3/mm3      Anisocytosis Slight/1+     Microcytes Slight/1+     Polychromasia Slight/1+     WBC Morphology Normal     Platelet Morphology Normal    Comprehensive Metabolic Panel [[id number]]  (Abnormal) Collected:  08/10/19 1514    Specimen:  Blood Updated:  08/10/19 1545     Glucose 86 mg/dL      BUN 12 mg/dL      Creatinine 0.62 mg/dL      Sodium 134 mmol/L      Potassium 3.9 mmol/L      Chloride 98 mmol/L      CO2 25.5 mmol/L      Calcium 8.4 mg/dL      Total Protein 6.1 g/dL      Albumin 3.20 g/dL      ALT (SGPT) 12 U/L      AST (SGOT) 14 U/L      Alkaline Phosphatase 115 U/L      Total Bilirubin 0.3 mg/dL      eGFR Non African Amer 131 mL/min/1.73      Globulin 2.9 gm/dL      A/G Ratio 1.1 g/dL      BUN/Creatinine Ratio 19.4     Anion Gap 10.5 mmol/L     Narrative:       BNP [[id number]]  (Normal) Collected:  08/10/19 1514    Specimen:  Blood Updated:  08/10/19 1544     proBNP 522.9 pg/mL     Narrative:               Orders (all)     Start     Ordered    08/11/19 1050  Inpatient Case Management  Consult  Once,   Status:  Canceled     Provider:  (Not yet assigned)    08/11/19 1049    08/11/19 0000  Vital Signs  Every 4 Hours,   Status:  Canceled      08/10/19 2034    08/11/19 0000  Call MD for problems / concerns.      08/11/19 1408    08/11/19 0000  Activity as Tolerated      08/11/19 1408    08/11/19 0000  Diet: Regular      08/11/19 1408    08/10/19 2345  aPTT  Once      08/10/19 1955    08/10/19 2248  PT Consult: Eval & Treat As  Tolerated; Epic said needed to  Once,   Status:  Canceled     Comments:  Reason Why PT Needed: Activity Intolerance    08/10/19 2248    08/10/19 2122  Inpatient Case Management  Consult  Once,   Status:  Canceled     Provider:  (Not yet assigned)    08/10/19 2122    08/10/19 2035  Activity - Ad Marcela  Until Discontinued,   Status:  Canceled      08/10/19 2034    08/10/19 2035  Intake & Output  Every Shift,   Status:  Canceled      08/10/19 2034    08/10/19 2035  Oxygen Therapy- Nasal Cannula; Titrate for SPO2: 90% - 95%  Continuous,   Status:  Canceled      08/10/19 2034    08/10/19 2035  VTE Prophylaxis Not Indicated:  Once      08/10/19 2034    08/10/19 2035  Diet Regular  Diet Effective Now,   Status:  Canceled      08/10/19 2034    08/10/19 2035  Inpatient Hematology & Oncology Consult  Once     Specialty:  Hematology and Oncology  Provider:  Wilberto Pelletier MD PhD    08/10/19 2034    08/10/19 1750  Inpatient Admission  Once      08/10/19 1749    08/10/19 1718  Hematology and Oncology (on-call MD unless specified)  Once     Specialty:  Hematology and Oncology  Provider:  Wilberto Pelletier MD PhD    08/10/19 1717    08/10/19 1717  [facility] (on-call MD unless specified) Details  Once     Specialty:  Hospitalist  Provider:  (Not yet assigned)    08/10/19 1716               Consult Notes (all)      Wilberto Pelletier MD PhD at 8/11/2019  9:53 AM      Consult Orders    1. Inpatient Hematology & Oncology Consult [[id number]] ordered by Isaac Pina MD at 08/10/19 1803                  Subjective  .     REASON FOR CONSULTATION:     Provide an opinion on any further workup or treatment of newly diagnosed bilateral lower extremity DVT.                             REQUESTING PHYSICIAN: Isaac Pina MD     RECORDS OBTAINED:  Records of the patients history including those obtained from the referring provider were reviewed and summarized in detail.    HISTORY OF PRESENT ILLNESS:  The patient is a 63 y.o. year old  "male     Who has metastatic esophageal cancer, history of pneumonia, hepatitis, hypertension and prostate cancer, presented to the emergency room yesterday because of swelling of both legs for several days and significant pain in his left foot and unable to walk for about a 2 days.  He denies chest pain no short of breath.He denies  Fever sweating or chills.  No change of bowel movement.  No nausea no vomiting.      This patient has metastatic esophageal cancer, for which he is being undergoing chemotherapy recently, most recent one cycle #2 FOLFOX 6+ Herceptin on 8/6/2019.     Laboratory study in the emergency room reported Hb 10.5 platelets 191,000 WBC 2950 including ANC 1850, normal CMP except marginally decreased sodium 134 and a calcium 8.4.  INR 1.19.  Patient had a venous Doppler study for both lower extremities which reported a significant acute left leg DVT from the proximal femoral vein down to the calf veins.  The right leg also has acute DVT from the popliteal vein down to the calf veins as well as the right leg superficial thrombophlebitis above the knee.      Patient was admitted to hospital and started on intravenous heparin infusion for anticoagulation.    This morning patient reports his leg edema has improved somewhat, however he still has pain in the left leg similar to yesterday.     Past Medical History:   Diagnosis Date   • Arthritis    • Elevated PSA    • Esophageal mass    • H/O Lung nodule    • Hepatitis     CHILD--PT STATED \"I THINK IT WAS A.\"   • History of pneumonia    • Hyperlipidemia    • Hypertension    • Liver cancer (CMS/HCC)    • Prostate cancer (CMS/HCC)      Past Surgical History:   Procedure Laterality Date   • HERNIA REPAIR  1999   • PROSTATE SURGERY  03/2008   • VENOUS ACCESS DEVICE (PORT) INSERTION N/A 7/16/2019    Procedure: INSERTION VENOUS ACCESS DEVICE WITH FLUORO AND EGD WITH BIOPSY;  Surgeon: Mary Brito MD;  Location: [facility];  Service: Thoracic "       HEMATOLOGIC/ONCOLOGIC HISTORY:  (History from previous dates can be found in the separate document.)  See Dr. Hanyes note on 7/18/2019, and NIRU Marks's note 8/6/2019    MEDICATIONS    Current Facility-Administered Medications:   •  acetaminophen (TYLENOL) tablet 650 mg, 650 mg, Oral, Q4H PRN, Isaac Pina MD  •  heparin (porcine) 5000 UNIT/ML injection 3,400-6,700 Units, 40-80 Units/kg, Intravenous, Q6H PRN, Vernon Pierson MD, 6,700 Units at 08/11/19 0113  •  heparin 49783 units/250 mL (100 units/mL) in 0.45 % NaCl infusion, 17.8 Units/kg/hr, Intravenous, Titrated, Vernon Pierson MD, Last Rate: 15.77 mL/hr at 08/11/19 0645, 18.8 Units/kg/hr at 08/11/19 0645  •  lactobacillus acidophilus (RISAQUAD) capsule 1 capsule, 1 capsule, Oral, Daily, Isaac Pina MD, 1 capsule at 08/11/19 0826  •  magnesium hydroxide (MILK OF MAGNESIA) suspension 2400 mg/10mL 10 mL, 10 mL, Oral, Daily PRN, Isaac Pina MD    ALLERGIES:   No Known Allergies    SOCIAL HISTORY:       Social History     Socioeconomic History   • Marital status: Single     Spouse name: Not on file   • Number of children: Not on file   • Years of education: High school   • Highest education level: Not on file   Occupational History   • Occupation: [employer]     Employer: [employer]   Tobacco Use   • Smoking status: Current Every Day Smoker     Packs/day: 1.00     Years: 40.00     Pack years: 40.00     Types: Cigarettes   • Smokeless tobacco: Never Used   Substance and Sexual Activity   • Alcohol use: Yes     Alcohol/week: 16.8 oz     Types: 28 Cans of beer per week     Comment: NOT RECENTLY   • Drug use: No         FAMILY HISTORY:  Family History   Problem Relation Age of Onset   • Hypertension Mother    • Stroke Mother    • Hypertension Other    • Lung disease Other    • Prostate cancer Other    • Lung cancer Father    • Malig Hyperthermia Neg Hx        REVIEW OF SYSTEMS:  Review of Systems   Constitutional: Positive for activity change  "(Unable to work due to pain in the left leg/foot for 2 days) and fatigue. Negative for appetite change, diaphoresis, fever and unexpected weight change.   HENT: Negative for facial swelling and nosebleeds.    Eyes: Negative for visual disturbance.   Respiratory: Negative for cough and shortness of breath.    Cardiovascular: Positive for leg swelling. Negative for chest pain and palpitations.   Gastrointestinal: Negative for abdominal pain, blood in stool and nausea.   Endocrine: Negative for cold intolerance.   Genitourinary: Negative for dysuria and hematuria.   Musculoskeletal: Positive for joint swelling.        Pain left leg and the foot, not able to walk   Skin: Negative.    Allergic/Immunologic: Positive for immunocompromised state (Undergoing chemotherapy for metastatic cancer).   Neurological: Negative for dizziness, syncope and headaches.   Hematological: Negative for adenopathy. Does not bruise/bleed easily.   Psychiatric/Behavioral: Negative for agitation and confusion.         Objective     Vitals:    08/10/19 1919 08/10/19 2032 08/10/19 2353 08/11/19 0738   BP:  176/88 110/62 148/90   BP Location:  Left arm Left arm Left arm   Patient Position:  Lying Lying Lying   Pulse: 81 79 75 80   Resp:  18 18 18   Temp:   98.7 °F (37.1 °C) 98.4 °F (36.9 °C)   TempSrc:  Oral Oral Oral   SpO2: 98% 94%  98%   Weight:  83.5 kg (184 lb)     Height:  175.3 cm (69\")       Current Status 8/6/2019   ECOG score 0      PHYSICAL EXAM:      CONSTITUTIONAL:  Vital signs reviewed.  Well-developed well-nourished male lying in bed.  No distress, looks comfortable.  EYES:  Conjunctiva and lids unremarkable.  PERRLA  EARS,NOSE,MOUTH,THROAT:  Ears and nose appear unremarkable.  Lips appear unremarkable.  RESPIRATORY:  Normal respiratory effort.  Lungs clear to auscultation bilaterally.  CARDIOVASCULAR:  Normal S1, S2.  No murmurs rubs or gallops.  No significant lower extremity edema.  GASTROINTESTINAL: Abdomen appears unremarkable.  " Nontender.  No hepatomegaly.  No splenomegaly.  LYMPHATIC:  No cervical, supraclavicular, axillary lymphadenopathy.  MUSCULOSKELETAL: Significant edema in the left leg below the knee level. There is no cyanosis of the left foot and the toes.  There is also trace edema in the right leg.   Unremarkable digits/nails.  No cyanosis or clubbing.  SKIN:  Warm.  No rashes.  PSYCHIATRIC:  Normal judgment and insight.  Normal mood and affect.    Venous Doppler study on 8/10/2019.  Interpretation Summary     · Acute right lower extremity superficial thrombophlebitis noted in the varicosity (above knee).  · Acute right lower extremity deep vein thrombosis noted in the popliteal, posterior tibial and peroneal.  · Acute left lower extremity deep vein thrombosis noted in the proximal femoral, mid femoral, distal femoral, popliteal, posterial tibial, peroneal and gastrocnemius/soleal.  · All other veins appeared normal bilaterally.           Assessment/Plan     1.  Patient is 63 years of male who has metastatic esophageal cancer, undergoing chemotherapy with FOLFOX 6+ Herceptin, most recent one on 8/6/2019.     2.  Bilateral lower extremities acute DVT, more significant in the left leg.  This patient has Trousseau's syndrome, and needs lifelong anticoagulation.  Patient currently is on heparin IV infusion since admission.  I discussed with the patient for long-term anticoagulation using Lovenox versus NOAC.  I pointed out Lovenox has better results in cancer patient but he needs twice a day subcutaneous injection.  NOAC has not as much long-term data, but we do use for cancer patients.  Patient prefers to use oral anticoagulation medicine.  We recommended against Coumadin anticoagulation because its inferior efficacy in cancer patients.     After discussion, I recommended to start Eliquis 10 mg every 12 hours for 7 days then switch to lower dose at 5 mg every 12 hours for long-term use.    We will ask care coordinator to check the  price of Eliquis to see whether patient can afford this..    Patient also reports that he has pains in the left leg/foot  which has not improved.  I recommended as needed narcotics.  Patient has hydrocodone 10 tablets from previous portacatheter placement and a setting to use this.  I will give patient 1 dose of Lortab here in the hospital.     I asked patient to use elastic stocking at home.  He can buy at medical supply store.  His sister is aware of the retail store.    Patient wants to go home.  From hematology point of view, I think he could be discharged once we get that the price issue checked out.      3.  Mild neutropenia secondary to chemotherapy.  This will be followed as outpatient.  Patient is asymptomatic.     4.  Anemia secondary to his metastatic esophageal cancer.  Stable and will be followed as outpatient.     Plan:  1.  Continue heparin gtt for now.   2.  Consult CCP to check the price for Eliquis as outpatient, 10 mg every 12 hours for 7 days then decrease to 5 mg every 12 hours for long-term use.   3.  Patient can be discharged home later today.  At which time patient can be started on 1 dose Eliquis 10 mg before discharge.   4. One dose Norco for pain control.  5.  Patient has follow-up appointment with  8/20/2019 for reevaluation and ongoing chemotherapy.      I discussed with the patient and his sister today.  Patient does not want to do Lovenox injection every 12 hours.     I spoke to his nurse today for patient care.      Thanks for letting us participating in the care of this patient!       Addendum:  His insurance does not cover any of those NOAC, they do cover Coumadin, however I recommend against the Coumadin because it does not work well for patient having Trousseau's syndrome.  His insurance covers 100% of Lovenox so we will start him on weight-based Lovenox 80 mg subcu every 12 hours.  He will be taught here in the hospital for subcu injection before discharge.  We will  switch his anticoagulant to Lovenox now.    I explained to patient and he voiced understanding.      I discussed with care coordinated this morning, and to coordinate the treatment for his outpatient anticoagulation regimen.    Spoke with his nurse also.      WILDER INIGUEZ M.D., Ph.D.    8/11/2019    Dictated using Dragon Dictation.       Electronically signed by Wilder Iniguez MD PhD at 8/11/2019 12:46 PM           All medication doses during the admission are shown, including meds that are no longer on order.   Scheduled Meds Sorted by Name   for Han Garcia as of 8/9/19 through 8/11/19     1 Day 3 Days 7 Days 10 Days < Today >    Legend:                          Inactive     Active     Other Encounter    Linked               Medications 08/09/19 08/10/19 08/11/19   apixaban (ELIQUIS) tablet 10 mg   Dose: 10 mg  Freq: Every 12 Hours Scheduled Route: PO  Indications of Use: DVT/PE (active thrombosis)  Start: 08/11/19 1300 End: 08/11/19 1209    Admin Instructions:   Tablet may be crushed and suspended in 60 mL of water or D5W and immediately delivered via NG tube.      1209-D/C'd          enoxaparin (LOVENOX) syringe 80 mg   Dose: 80 mg  Freq: Every 12 Hours Route: SC  Indications of Use: DVT/PE (active thrombosis)  Start: 08/11/19 1330 End: 08/11/19 1726    Admin Instructions:   Give subcutaneous in abdomen only. Do not massage site after injection.      1401   1726-D/C'd        heparin (porcine) 5000 UNIT/ML injection 6,700 Units   Dose: 80 Units/kg  Weight Dosing Info: 83.9 kg  Freq: Once Route: IV  Indications of Use: DVT/PE (active thrombosis)  Start: 08/10/19 1748 End: 08/10/19 1809    Admin Instructions:   **Max Dose of 10,000 units** Bolus for VTE / PE     1809             HYDROcodone-acetaminophen (NORCO) 5-325 MG per tablet 1 tablet   Dose: 1 tablet  Freq: Once Route: PO  Start: 08/11/19 1215 End: 08/11/19 1211    Admin Instructions:   [OSMAR]    Do not exceed 4 grams of acetaminophen in a 24 hr  period.    If given for pain, use the following pain scale:   Mild Pain = Pain Score of 1-3, CPOT 1-2  Moderate Pain = Pain Score of 4-6, CPOT 3-4  Severe Pain = Pain Score of 7-10, CPOT 5-8      1211            lactobacillus acidophilus (RISAQUAD) capsule 1 capsule   Dose: 1 capsule  Freq: Daily Route: PO  Start: 08/11/19 0900 End: 08/11/19 1723 2947   1726-D/C'd        Medications 08/09/19 08/10/19 08/11/19       Continuous Meds Sorted by Name   for Han Garcia as of 8/9/19 through 8/11/19    Legend:                          Inactive     Active     Other Encounter    Linked               Medications 08/09/19 08/10/19 08/11/19   heparin 47653 units/250 mL (100 units/mL) in 0.45 % NaCl infusion   Rate: 14.93 mL/hr Dose: 17.8 Units/kg/hr  Weight Dosing Info: 83.9 kg  Freq: Titrated Route: IV  Indications of Use: DVT/PE (active thrombosis)  Last Dose: 18.8 Units/kg/hr (08/11/19 1138)  Start: 08/10/19 1748 End: 08/11/19 1240    Admin Instructions:   Weight Based Heparin Nomogram: VTE / PE: Initial Heparin Infusion 18 units/kg/hr    aPTT Less Than 60: Bolus 80 units/kg & Increase Dose By 4 units/kg/hr.  aPTT 60-70: Bolus 40 units/kg & Increase Dose By 2 units/kg/hr.  aPTT 71-95: No Bolus, No Dose Change  aPTT : No Bolus, Decrease Dose By 2 units/kg/hr.  aPTT Greater Than 115: No Bolus. Hold Infusion For 1 hour.  Decrease Dose By 3 units/kg/hr. Repeat aPTT 6 Hours After Restarted.  If aPTT Remains Greater Than 115 Stop Heparin Drip & Contact Provider     [phone] 1477 6067 3429 9236-D/C'd          PRN Meds Sorted by Name   for Han Garcia as of 8/9/19 through 8/11/19    Legend:                          Inactive     Active     Other Encounter    Linked               Medications 08/09/19 08/10/19 08/11/19   acetaminophen (TYLENOL) tablet 650 mg   Dose: 650 mg  Freq: Every 4 Hours PRN Route: PO  PRN Reason: Mild Pain   Start: 08/10/19 2034 End: 08/11/19 1726      1726-D/C'd           heparin (porcine) 5000 UNIT/ML injection 3,400-6,700 Units   Dose: 40-80 Units/kg  Weight Dosing Info: 83.9 kg  Freq: Every 6 Hours PRN Route: IV  PRN Comment: Per Heparin Nomogram  Indications of Use: DVT/PE (active thrombosis)  Start: 08/10/19 1742 End: 08/11/19 1240    Admin Instructions:   **Max Dose of 10,000 units** Bolus for VTE / PE:  PTT Less Than 60 sec, Administer 80 units/kg Bolus   PTT 60 - 70 sec, Administer 40 units/kg Bolus      0113   1240-D/C'd        magnesium hydroxide (MILK OF MAGNESIA) suspension 2400 mg/10mL 10 mL   Dose: 10 mL  Freq: Daily PRN Route: PO  PRN Reason: Constipation  Start: 08/10/19 2034 End: 08/11/19 1726 1726-D/C'd          sodium chloride 0.9 % flush 10 mL   Dose: 10 mL  Freq: As Needed Route: IV  PRN Reason: Line Care  Start: 08/10/19 1458 End: 08/10/19 2034     2034-D/C'd

## 2019-08-12 NOTE — OUTREACH NOTE
Patient adamently refuses HOSP fwp with Dr Hoover. States he does not see him very often anymore, and in fact Dr Hoover had patient cancel his last follow up. I stressed to patient the importance of PCP being kept abreast of patient's condition especially after a hospital stay, but he would not schedule appt. I did go over meds and it is noted pt was unaware of change in Cipro regimin. Prior had been taking Cipro 250mg one bid, this has been increased to 250mg 2 bid. Pt did read this in d/c instructions while we were on phone. Pt is closely followed by Dr Haynes and does have appointment with him on 08/20/19. Pt  He is feeling better, legs less achy, wearing support stockings as recommended at [facility].

## 2019-08-12 NOTE — OUTREACH NOTE
Prep Survey      Responses   Facility patient discharged from?  [facility]   Is patient eligible?  Yes   Discharge diagnosis  **Acute deep vein thrombosis (DVT) of femoral vein of left lower    Does the patient have one of the following disease processes/diagnoses(primary or secondary)?  Other   Does the patient have Home health ordered?  No   Is there a DME ordered?  No   Medication alerts for this patient  Lovenox    Prep survey completed?  Yes          Tammie Watts RN

## 2019-08-12 NOTE — TELEPHONE ENCOUNTER
Clarified dose of cipro with pt. Should be on 250mg BID    ----- Message from Sho Dotson sent at 8/12/2019 11:55 AM EDT -----  [phone]    Ciprofloxacin is his med. Should he be taking 1, or 2 of these per day.  They are 250 mg.

## 2019-08-13 ENCOUNTER — TELEPHONE (OUTPATIENT)
Dept: ONCOLOGY | Facility: CLINIC | Age: 64
End: 2019-08-13

## 2019-08-13 ENCOUNTER — READMISSION MANAGEMENT (OUTPATIENT)
Dept: CALL CENTER | Facility: HOSPITAL | Age: 64
End: 2019-08-13

## 2019-08-13 NOTE — OUTREACH NOTE
Medical Week 1 Survey      Responses   Facility patient discharged from?  [place]   Does the patient have one of the following disease processes/diagnoses(primary or secondary)?  Other   Is there a successful TCM telephone encounter documented?  Yes          Aislinn Hernandez RN

## 2019-08-14 DIAGNOSIS — C78.7 LIVER METASTASES: ICD-10-CM

## 2019-08-14 DIAGNOSIS — C15.5 MALIGNANT NEOPLASM OF LOWER THIRD OF ESOPHAGUS (HCC): ICD-10-CM

## 2019-08-16 ENCOUNTER — TELEPHONE (OUTPATIENT)
Dept: ONCOLOGY | Facility: HOSPITAL | Age: 64
End: 2019-08-16

## 2019-08-16 NOTE — TELEPHONE ENCOUNTER
----- Message from Sho Dotson sent at 8/16/2019 11:35 AM EDT -----  [phone]   should he stop blood thinners before next chemo

## 2019-08-16 NOTE — TELEPHONE ENCOUNTER
PT CALLING ASKING IF HE IS TO HOLD HIS LOVENOX INJ'S BEFORE HE COMES HERE. TOLD PT THAT HE WOULD STAY ON LOVENOX INJ'S NOT TO HOLD BEFORE COMING HERE. PT V/U.     PT ALSO ASKING IF HE SHOULD WEAR HIS COMPRESSION STOCKINGS WHEN HE COMES HERE. TOLD PT HE COULD WEAR THEM IF HE WANTS TO. PT V/U.

## 2019-08-19 ENCOUNTER — READMISSION MANAGEMENT (OUTPATIENT)
Dept: CALL CENTER | Facility: HOSPITAL | Age: 64
End: 2019-08-19

## 2019-08-19 NOTE — OUTREACH NOTE
Medical Week 2 Survey      Responses   Facility patient discharged from?  [facility]   Does the patient have one of the following disease processes/diagnoses(primary or secondary)?  Other   Week 2 attempt successful?  Yes   Call start time  1825   Discharge diagnosis  **Acute deep vein thrombosis (DVT) of femoral vein of left lower    Call end time  1826   Meds reviewed with patient/caregiver?  Yes   Is the patient having any side effects they believe may be caused by any medication additions or changes?  No   Does the patient have all medications ordered at discharge?  Yes   Is the patient taking all medications as directed (includes completed medication regime)?  Yes   Does the patient have a primary care provider?   Yes   Has the patient kept scheduled appointments due by today?  Yes   Has home health visited the patient within 72 hours of discharge?  N/A   Psychosocial issues?  No   Comments  Patient reports he is doing much better.    Did the patient receive a copy of their discharge instructions?  Yes   Nursing interventions  Reviewed instructions with patient   What is the patient's perception of their health status since discharge?  Improving   Is the patient/caregiver able to teach back signs and symptoms related to disease process for when to call PCP?  Yes   Is the patient/caregiver able to teach back signs and symptoms related to disease process for when to call 911?  Yes   Is the patient/caregiver able to teach back the hierarchy of who to call/visit for symptoms/problems? PCP, Specialist, Home health nurse, Urgent Care, ED, 911  Yes   Week 2 Call Completed?  Yes          Nakul Hoover RN

## 2019-08-20 ENCOUNTER — OFFICE VISIT (OUTPATIENT)
Dept: ONCOLOGY | Facility: CLINIC | Age: 64
End: 2019-08-20

## 2019-08-20 ENCOUNTER — INFUSION (OUTPATIENT)
Dept: ONCOLOGY | Facility: HOSPITAL | Age: 64
End: 2019-08-20

## 2019-08-20 ENCOUNTER — LAB (OUTPATIENT)
Dept: LAB | Facility: HOSPITAL | Age: 64
End: 2019-08-20

## 2019-08-20 VITALS
HEIGHT: 69 IN | OXYGEN SATURATION: 98 % | SYSTOLIC BLOOD PRESSURE: 177 MMHG | DIASTOLIC BLOOD PRESSURE: 94 MMHG | TEMPERATURE: 97.5 F | HEART RATE: 99 BPM | BODY MASS INDEX: 27.15 KG/M2 | WEIGHT: 183.3 LBS | RESPIRATION RATE: 12 BRPM

## 2019-08-20 DIAGNOSIS — C78.7 LIVER METASTASES: ICD-10-CM

## 2019-08-20 DIAGNOSIS — C15.5 MALIGNANT NEOPLASM OF LOWER THIRD OF ESOPHAGUS (HCC): ICD-10-CM

## 2019-08-20 DIAGNOSIS — I82.431 ACUTE DEEP VEIN THROMBOSIS (DVT) OF RIGHT POPLITEAL VEIN (HCC): Primary | ICD-10-CM

## 2019-08-20 DIAGNOSIS — C15.5 MALIGNANT NEOPLASM OF LOWER THIRD OF ESOPHAGUS (HCC): Primary | ICD-10-CM

## 2019-08-20 LAB
ALBUMIN SERPL-MCNC: 3.7 G/DL (ref 3.5–5.2)
ALBUMIN/GLOB SERPL: 1.2 G/DL (ref 1.1–2.4)
ALP SERPL-CCNC: 110 U/L (ref 38–116)
ALT SERPL W P-5'-P-CCNC: 14 U/L (ref 0–41)
ANION GAP SERPL CALCULATED.3IONS-SCNC: 11 MMOL/L (ref 5–15)
AST SERPL-CCNC: 17 U/L (ref 0–40)
BASOPHILS # BLD AUTO: 0.04 10*3/MM3 (ref 0–0.2)
BASOPHILS NFR BLD AUTO: 0.6 % (ref 0–1.5)
BILIRUB SERPL-MCNC: 0.4 MG/DL (ref 0.2–1.2)
BILIRUB UR QL STRIP: ABNORMAL
BUN BLD-MCNC: 14 MG/DL (ref 6–20)
BUN/CREAT SERPL: 22.2 (ref 7.3–30)
CALCIUM SPEC-SCNC: 9 MG/DL (ref 8.5–10.2)
CHLORIDE SERPL-SCNC: 101 MMOL/L (ref 98–107)
CLARITY UR: CLEAR
CO2 SERPL-SCNC: 25 MMOL/L (ref 22–29)
COLOR UR: ABNORMAL
CREAT BLD-MCNC: 0.63 MG/DL (ref 0.7–1.3)
DEPRECATED RDW RBC AUTO: 60.5 FL (ref 37–54)
EOSINOPHIL # BLD AUTO: 0.12 10*3/MM3 (ref 0–0.4)
EOSINOPHIL NFR BLD AUTO: 1.7 % (ref 0.3–6.2)
ERYTHROCYTE [DISTWIDTH] IN BLOOD BY AUTOMATED COUNT: 20.6 % (ref 12.3–15.4)
GFR SERPL CREATININE-BSD FRML MDRD: 129 ML/MIN/1.73
GLOBULIN UR ELPH-MCNC: 3 GM/DL (ref 1.8–3.5)
GLUCOSE BLD-MCNC: 96 MG/DL (ref 74–124)
GLUCOSE UR STRIP-MCNC: NEGATIVE MG/DL
HCT VFR BLD AUTO: 40.4 % (ref 37.5–51)
HGB BLD-MCNC: 12.6 G/DL (ref 13–17.7)
HGB UR QL STRIP.AUTO: NEGATIVE
IMM GRANULOCYTES # BLD AUTO: 0.11 10*3/MM3 (ref 0–0.05)
IMM GRANULOCYTES NFR BLD AUTO: 1.6 % (ref 0–0.5)
KETONES UR QL STRIP: ABNORMAL
LEUKOCYTE ESTERASE UR QL STRIP.AUTO: NEGATIVE
LYMPHOCYTES # BLD AUTO: 1.37 10*3/MM3 (ref 0.7–3.1)
LYMPHOCYTES NFR BLD AUTO: 19.6 % (ref 19.6–45.3)
MCH RBC QN AUTO: 26.7 PG (ref 26.6–33)
MCHC RBC AUTO-ENTMCNC: 31.2 G/DL (ref 31.5–35.7)
MCV RBC AUTO: 85.6 FL (ref 79–97)
MONOCYTES # BLD AUTO: 0.63 10*3/MM3 (ref 0.1–0.9)
MONOCYTES NFR BLD AUTO: 9 % (ref 5–12)
NEUTROPHILS # BLD AUTO: 4.71 10*3/MM3 (ref 1.7–7)
NEUTROPHILS NFR BLD AUTO: 67.5 % (ref 42.7–76)
NITRITE UR QL STRIP: NEGATIVE
NRBC BLD AUTO-RTO: 0 /100 WBC (ref 0–0.2)
PH UR STRIP.AUTO: 5.5 [PH] (ref 4.5–8)
PLATELET # BLD AUTO: 172 10*3/MM3 (ref 140–450)
PMV BLD AUTO: 9 FL (ref 6–12)
POTASSIUM BLD-SCNC: 3.9 MMOL/L (ref 3.5–4.7)
PROT SERPL-MCNC: 6.7 G/DL (ref 6.3–8)
PROT UR QL STRIP: ABNORMAL
RBC # BLD AUTO: 4.72 10*6/MM3 (ref 4.14–5.8)
SODIUM BLD-SCNC: 137 MMOL/L (ref 134–145)
SP GR UR STRIP: >=1.03 (ref 1–1.03)
UROBILINOGEN UR QL STRIP: ABNORMAL
WBC NRBC COR # BLD: 6.98 10*3/MM3 (ref 3.4–10.8)

## 2019-08-20 PROCEDURE — 99215 OFFICE O/P EST HI 40 MIN: CPT | Performed by: INTERNAL MEDICINE

## 2019-08-20 PROCEDURE — 25010000002 PALONOSETRON PER 25 MCG: Performed by: INTERNAL MEDICINE

## 2019-08-20 PROCEDURE — 81003 URINALYSIS AUTO W/O SCOPE: CPT | Performed by: INTERNAL MEDICINE

## 2019-08-20 PROCEDURE — 96415 CHEMO IV INFUSION ADDL HR: CPT | Performed by: INTERNAL MEDICINE

## 2019-08-20 PROCEDURE — 25010000002 DEXAMETHASONE SODIUM PHOSPHATE 100 MG/10ML SOLUTION: Performed by: INTERNAL MEDICINE

## 2019-08-20 PROCEDURE — 25010000002 DIPHENHYDRAMINE 1 EACH BAG: Performed by: INTERNAL MEDICINE

## 2019-08-20 PROCEDURE — 96416 CHEMO PROLONG INFUSE W/PUMP: CPT | Performed by: INTERNAL MEDICINE

## 2019-08-20 PROCEDURE — 96413 CHEMO IV INFUSION 1 HR: CPT | Performed by: INTERNAL MEDICINE

## 2019-08-20 PROCEDURE — 96375 TX/PRO/DX INJ NEW DRUG ADDON: CPT | Performed by: INTERNAL MEDICINE

## 2019-08-20 PROCEDURE — 25010000002 OXALIPLATIN PER 0.5 MG: Performed by: INTERNAL MEDICINE

## 2019-08-20 PROCEDURE — 25010000002 FLUOROURACIL PER 500 MG: Performed by: INTERNAL MEDICINE

## 2019-08-20 PROCEDURE — 96368 THER/DIAG CONCURRENT INF: CPT | Performed by: INTERNAL MEDICINE

## 2019-08-20 PROCEDURE — 85025 COMPLETE CBC W/AUTO DIFF WBC: CPT

## 2019-08-20 PROCEDURE — 96417 CHEMO IV INFUS EACH ADDL SEQ: CPT | Performed by: INTERNAL MEDICINE

## 2019-08-20 PROCEDURE — 80053 COMPREHEN METABOLIC PANEL: CPT | Performed by: INTERNAL MEDICINE

## 2019-08-20 PROCEDURE — 25010000002 LEUCOVORIN CALCIUM PER 50 MG: Performed by: INTERNAL MEDICINE

## 2019-08-20 PROCEDURE — 25010000002 TRASTUZUMAB PER 10 MG: Performed by: INTERNAL MEDICINE

## 2019-08-20 PROCEDURE — 36415 COLL VENOUS BLD VENIPUNCTURE: CPT

## 2019-08-20 PROCEDURE — 96411 CHEMO IV PUSH ADDL DRUG: CPT | Performed by: INTERNAL MEDICINE

## 2019-08-20 RX ORDER — DEXTROSE MONOHYDRATE 50 MG/ML
250 INJECTION, SOLUTION INTRAVENOUS ONCE
Status: CANCELLED | OUTPATIENT
Start: 2019-08-21

## 2019-08-20 RX ORDER — SODIUM CHLORIDE 9 MG/ML
250 INJECTION, SOLUTION INTRAVENOUS ONCE
Status: CANCELLED | OUTPATIENT
Start: 2019-08-21

## 2019-08-20 RX ORDER — DEXTROSE MONOHYDRATE 50 MG/ML
250 INJECTION, SOLUTION INTRAVENOUS ONCE
Status: COMPLETED | OUTPATIENT
Start: 2019-08-20 | End: 2019-08-20

## 2019-08-20 RX ORDER — PALONOSETRON 0.05 MG/ML
0.25 INJECTION, SOLUTION INTRAVENOUS ONCE
Status: CANCELLED | OUTPATIENT
Start: 2019-08-21

## 2019-08-20 RX ORDER — SODIUM CHLORIDE 9 MG/ML
250 INJECTION, SOLUTION INTRAVENOUS ONCE
Status: COMPLETED | OUTPATIENT
Start: 2019-08-20 | End: 2019-08-20

## 2019-08-20 RX ORDER — DIPHENHYDRAMINE HYDROCHLORIDE 50 MG/ML
50 INJECTION INTRAMUSCULAR; INTRAVENOUS AS NEEDED
Status: CANCELLED | OUTPATIENT
Start: 2019-08-21

## 2019-08-20 RX ORDER — FLUOROURACIL 50 MG/ML
400 INJECTION, SOLUTION INTRAVENOUS ONCE
Status: COMPLETED | OUTPATIENT
Start: 2019-08-20 | End: 2019-08-20

## 2019-08-20 RX ORDER — FAMOTIDINE 10 MG/ML
20 INJECTION, SOLUTION INTRAVENOUS 2 TIMES DAILY
Status: DISCONTINUED | OUTPATIENT
Start: 2019-08-20 | End: 2019-08-20 | Stop reason: HOSPADM

## 2019-08-20 RX ORDER — PALONOSETRON 0.05 MG/ML
0.25 INJECTION, SOLUTION INTRAVENOUS ONCE
Status: COMPLETED | OUTPATIENT
Start: 2019-08-20 | End: 2019-08-20

## 2019-08-20 RX ORDER — FAMOTIDINE 10 MG/ML
20 INJECTION, SOLUTION INTRAVENOUS AS NEEDED
Status: CANCELLED | OUTPATIENT
Start: 2019-08-21

## 2019-08-20 RX ORDER — FAMOTIDINE 10 MG/ML
20 INJECTION, SOLUTION INTRAVENOUS 2 TIMES DAILY
Status: CANCELLED
Start: 2019-08-21

## 2019-08-20 RX ORDER — FLUOROURACIL 50 MG/ML
400 INJECTION, SOLUTION INTRAVENOUS ONCE
Status: CANCELLED | OUTPATIENT
Start: 2019-08-21

## 2019-08-20 RX ADMIN — TRASTUZUMAB 350 MG: 150 INJECTION, POWDER, LYOPHILIZED, FOR SOLUTION INTRAVENOUS at 11:13

## 2019-08-20 RX ADMIN — SODIUM CHLORIDE 250 ML: 9 INJECTION, SOLUTION INTRAVENOUS at 10:31

## 2019-08-20 RX ADMIN — FLUOROURACIL 810 MG: 50 INJECTION, SOLUTION INTRAVENOUS at 13:48

## 2019-08-20 RX ADMIN — DEXAMETHASONE SODIUM PHOSPHATE 12 MG: 10 INJECTION, SOLUTION INTRAMUSCULAR; INTRAVENOUS at 10:51

## 2019-08-20 RX ADMIN — LEUCOVORIN CALCIUM 810 MG: 350 INJECTION, POWDER, LYOPHILIZED, FOR SOLUTION INTRAMUSCULAR; INTRAVENOUS at 11:53

## 2019-08-20 RX ADMIN — OXALIPLATIN 170 MG: 5 INJECTION, SOLUTION, CONCENTRATE INTRAVENOUS at 11:53

## 2019-08-20 RX ADMIN — FAMOTIDINE 20 MG: 10 INJECTION, SOLUTION INTRAVENOUS at 10:48

## 2019-08-20 RX ADMIN — FLUOROURACIL 4850 MG: 50 INJECTION, SOLUTION INTRAVENOUS at 13:48

## 2019-08-20 RX ADMIN — PALONOSETRON HYDROCHLORIDE 0.25 MG: 0.25 INJECTION, SOLUTION INTRAVENOUS at 10:50

## 2019-08-20 RX ADMIN — DEXTROSE MONOHYDRATE 250 ML: 5 INJECTION, SOLUTION INTRAVENOUS at 11:50

## 2019-08-20 RX ADMIN — DIPHENHYDRAMINE HYDROCHLORIDE 50 MG: 50 INJECTION INTRAMUSCULAR; INTRAVENOUS at 10:31

## 2019-08-20 NOTE — PROGRESS NOTES
"  Subjective     REASON FOR follow up:1.  ADENOCARCINOMA  OF THE LOWER THIRD OF THE ESOPHAGUS WITH EXTENSIVE LIVER METASTASIS STAGE IV, HER 2 EMELI POSITIVE    .2.COLOVESICAL FISTULA    3. 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 The patient was recently in the hospital with an episode of deep vein thrombosis bilaterally. He was placed on Lovenox and he will remain on this medicine for the time being. No plan to switch him to oral anticoagulants. The patient has had a dramatic improvement in regard to the swelling in his lower extremities. He still has claudication related to arterial disease. He is still smoking half a pack of cigarettes a day. He has no difficulty swallowing, he has no nausea, vomiting, no peripheral neuropathy. Appetite and weight have remained stable. Bowel function is appropriate. Urination is appropriate. He remains on ciprofloxacin continuously to minimize any urinary tract infection related to his colovesical fistula. He has no cancer related pain and his ECOG performance status is 1. He is no longer working.                 Past Medical History:   Diagnosis Date   • Arthritis    • Elevated PSA    • Esophageal mass    • H/O Lung nodule    • Hepatitis     CHILD--PT STATED \"I THINK IT WAS A.\"   • History of pneumonia    • Hx of blood clots 08/10/2019   • Hyperlipidemia    • Hypertension    • Liver cancer (CMS/HCC)    • Prostate cancer (CMS/HCC)         Past Surgical History:   Procedure Laterality Date   • HERNIA REPAIR  1999   • PROSTATE SURGERY  03/2008   • VENOUS ACCESS DEVICE (PORT) INSERTION N/A 7/16/2019    Procedure: INSERTION VENOUS ACCESS DEVICE WITH FLUORO AND EGD " WITH BIOPSY;  Surgeon: Mary Brito MD;  Location: [facility] OR;  Service: Thoracic        Current Outpatient Medications on File Prior to Visit   Medication Sig Dispense Refill   • ciprofloxacin (CIPRO) 250 MG tablet Take 2 tablets by mouth 2 (Two) Times a Day. 1 tablet (Patient taking differently: Take 250 mg by mouth 2 (Two) Times a Day. 1 tablet) 60 tablet 1   • enoxaparin (LOVENOX) 80 MG/0.8ML solution syringe Inject 0.8 mL under the skin into the appropriate area as directed Every 12 (Twelve) Hours. 48 mL 11   • HYDROcodone-acetaminophen (NORCO) 5-325 MG per tablet Take 1-2 tablets by mouth Every 4 (Four) Hours As Needed (Pain). 10 tablet 0   • ondansetron (ZOFRAN) 4 MG tablet Take 1 tablet by mouth Every 8 (Eight) Hours As Needed for Nausea or Vomiting. 30 tablet 2   • Probiotic Product (PROBIOTIC DAILY PO) Take  by mouth Daily.       No current facility-administered medications on file prior to visit.         ALLERGIES:  No Known Allergies     Social History     Socioeconomic History   • Marital status: Single     Spouse name: Not on file   • Number of children: Not on file   • Years of education: High school   • Highest education level: Not on file   Occupational History   • Occupation: [employer]     Employer: [employer]   Tobacco Use   • Smoking status: Current Every Day Smoker     Packs/day: 1.00     Years: 40.00     Pack years: 40.00     Types: Cigarettes   • Smokeless tobacco: Never Used   Substance and Sexual Activity   • Alcohol use: Yes     Alcohol/week: 16.8 oz     Types: 28 Cans of beer per week     Comment: NOT RECENTLY   • Drug use: No        Family History   Problem Relation Age of Onset   • Hypertension Mother    • Stroke Mother    • Hypertension Other    • Lung disease Other    • Prostate cancer Other    • Lung cancer Father    • Malig Hyperthermia Neg Hx         Review of Systems       General: no fever, no chills, no fatigue,no weight changes, no lack of appetite.  Eyes: no epiphora,  xerophthalmia,conjunctivitis, pain, glaucoma, blurred vision, blindness, secretion, photophobia, proptosis, diplopia.  Ears: no otorrhea, tinnitus, otorrhagia, deafness, pain, vertigo.  Nose: no rhinorrhea, no epistaxis, no alteration in perception of odors, no sinuses pressure.  Mouth: no alteration in gums or teeth,  No ulcers, no difficulty with mastication or deglut ion, no odynophagia.  Neck: no masses or pain, no thyroid alterations, no pain in muscles or arteries, no carotid odynia, no crepitation.  Respiratory: no cough, no sputum production,no dyspnea,no trepopnea, no pleuritic pain,no hemoptysis.  Heart: no syncope, no irregularity, no palpitations, no angina,no orthopnea,no paroxysmal nocturnal dyspnea.  Vascular Venous: no tenderness,less edema,no palpable cords,no postphlebitic syndrome, no skin changes no ulcerations.  Vascular Arterial: no distal ischemia, stated claudication, no gangrene, no neuropathic ischemic pain, no skin ulcers, no paleness no cyanosis.  GI: no dysphagia, no odynophagia, no regurgitation, no heartburn,no indigestion,no nausea,no vomiting,no hematemesis ,no melena,no jaundice,no distention, no obstipation,no enterorrhagia,no proctalgia,no anal  lesions, no changes in bowel habits.  : no frequency, no hesitancy, no hematuria, no discharge,no  pain.  Musculoskeletal: no muscle or tendon pain or inflammation,no  joint pain, no edema, no functional limitation,no fasciculations, no mass.  Neurologic: no headache, no seizures, noalterations on Craneal nerves, no motor deficit, no sensory deficit, normal coordination, no alteration in memory,normal orientation, calculation,normal writting, verbal and written language.  Skin: no rashes,no pruritus no localized lesions.  Psychiatric: no anxiety, no depression,no agitation, no delusions, proper insight.          Objective     Vitals:    08/20/19 0901   BP: 177/94   Pulse: 99   Resp: 12   Temp: 97.5 °F (36.4 °C)   TempSrc: Oral   SpO2:  "98%   Weight: 83.1 kg (183 lb 4.8 oz)   Height: 174 cm (68.5\")   PainSc: 0-No pain     Current Status 8/20/2019   ECOG score 0       Physical Exam    GENERAL:  Well-developed, well-nourished  Patient  in no acute distress.   SKIN:  Warm, dry ,NO rashes,NO purpura ,NO petechiae.  HEENT:  Pupils were equal and reactive to light and accomodation, conjunctivas non injected, no pterigion, normal extraocular movements, normal visual acuity.   Mouth mucosa was moist, no exudates in oropharynx, normal gum line, normal roof of the mouth and pillars, normal papillations of the tongue.  NECK:  Supple with good range of motion; no thyromegaly or masses, no JVD or bruits, no cervical adenopathies.No carotid arteries pain, no carotid abnormal pulsation , NO arterial dance.  LYMPHATICS:  No cervical, NO supraclavicular, NO axillary,NO epitrochlear , NO inguinal adenopathy.  CHEST:  Normal excursion of both kailash thoraces, normal voice fremitus, no subcutaneous emphysema, normal axillas, no rashes or acanthosis nigricans. Lungs clear to percussion and auscultation, normal breath sounds bilaterally, no wheezing,NO crackles NO ronchi, NO stridor, NO rubs.  CARDIAC AND VASCULAR:  normal rate and regular rhythm, without murmurs,NO rubs NO S3 NO S4 right or left . decreased femoral, popliteal, pedis,normal brachial and carotid pulses.  ABDOMEN:  Soft, nontender with decreased liver organomegaly no masses, no ascites, no collateral circulation,no distention,no [place] sign, no abdominal pain, no inguinal hernias,no umbilical hernia, no abdominal bruits. Normal bowel sounds.  GENITAL: Not  Performed.  EXTREMITIES  AND SPINE:  No clubbing, cyanosis or edema, no deformities or pain .No kyphosis, scoliosis, deformities or pain in spine, ribs or pelvic bone.  NEUROLOGICAL:  Patient was awake, alert, oriented to time, person and place.                    RECENT LABS:  Hematology WBC   Date Value Ref Range Status   08/20/2019 6.98 3.40 - 10.80 " 10*3/mm3 Final   02/02/2019 13.4 (H) 3.4 - 10.8 x10E3/uL Final     RBC   Date Value Ref Range Status   08/20/2019 4.72 4.14 - 5.80 10*6/mm3 Final   02/02/2019 4.81 4.14 - 5.80 x10E6/uL Final     Hemoglobin   Date Value Ref Range Status   08/20/2019 12.6 (L) 13.0 - 17.7 g/dL Final     Hematocrit   Date Value Ref Range Status   08/20/2019 40.4 37.5 - 51.0 % Final     Platelets   Date Value Ref Range Status   08/20/2019 172 140 - 450 10*3/mm3 Final          Interpretation Summary DOPPLER LE    · Acute right lower extremity superficial thrombophlebitis noted in the varicosity (above knee).  · Acute right lower extremity deep vein thrombosis noted in the popliteal, posterior tibial and peroneal.  · Acute left lower extremity deep vein thrombosis noted in the proximal femoral, mid femoral, distal femoral, popliteal, posterial tibial, peroneal and gastrocnemius/soleal.  · All other veins appeared normal bilaterally.     Tissue Pathology Exam [[id number]] (Order [id number])   Order   Date: 7/16/2019 Department: [facility] MAIN OR Released By: Claudine Echeverria RN Authorizing: Mary Brito MD   Reprint Order Requisition     Tissue Pathology Exam (Order #[id number]) on 7/16/19       Tissue Pathology Exam: [id number]   Order: [id number]   Collected:  7/16/2019 16:30   Status:  Edited Result - FINAL   Visible to patient:  No (Not Released)   Dx:  Malignant neoplasm of esophagus, unsp...   Component    Addendum 2   Please see the completely scanned HER2 FISH analysis from [facility] below.       Addendum electronically signed by Taty Munoz MD on 7/26/2019 at 1220   Addendum   HER2 (by immunohistochemistry) performed on block 1A for tumor is equivocal (score 2+).      Methods:  [place] Food and Drug Administration (FDA) cleared (specify test/vendor): [place]   Primary Antibody: 4B5     HER2 by FISH will be performed and those results will be reported separately.             Addendum electronically signed by Bishop  Taty ANDUJAR MD on 7/22/2019 at 1509   Final Diagnosis   1. Esophagus, 35 Cm from the Lip, Biopsy:               A. INVASIVE MODERATELY DIFFERENTIATED ADENOCARCINOMA (see Comment).     jab/jse    Electronically signed by Taty Munoz MD on 7/19/2019 at 1642   Comment    Immunohistochemistry was performed on block 1A and the tumor is positive for CK7 (patchy) and negative for CK20 and NKX3.1. These results are consistent with a primary esophageal adenocarcinoma.      HER2 by immunohistochemistry will be performed and reported in an addendum.     This case was shared in internal consultation with Nasreen Lua, Korin, and Mary who concur.     jab/jse    Gross Description    1.  The specimen is received in formalin labeled with the patient's name and further designated '35cm from the lip biopsy' are multiple small fragments of gray-tan tissue. The specimen is submitted for embedding as received.      Microscopic Description    Performed, incorporated in diagnosis.    Resulting Agency [facility]         Specimen Collected: 07/16/19 16:30 Last Resulted: 07/26/19 12:20       Order Details      View Encounter      Lab and Collection Details      Routing      Result History            Scans on Order [id number]     Document on 7/26/2019 12:20 PM by Taty Munoz MD   Scan on 7/26/2019  8:25 AM by Telma Pinzon R: HER2 BY FISHScan on 7/26/2019  8:25 AM by Telma Pinzon R: HER2 BY FISH         7/26/2019 12:20 PM     Component   Addendum 2   Please see the completely scanned HER2 FISH analysis from [facility] below.       Addendum   HER2 (by immunohistochemistry) performed on block 1A for tumor is equivocal (score 2+).      Methods:  [place] Food and Drug Administration (FDA) cleared (specify test/vendor): [place]   Primary Antibody: 4B5     HER2 by FISH will be performed and those results will be reported separately.             Case Report   Surgical Pathology Report                         Case: [id number]                                    Authorizing Provider:  Mary Brito MD        Collected:           07/16/2019 04:30 PM           Ordering Location:     [facility]  Received:            07/16/2019 10:15 PM                                  MAIN OR                                                                       Pathologist:           Taty Munoz MD                                                           Specimen:    Esophagus, 35CM FROM THE LIP                                                            Final Diagnosis   1. Esophagus, 35 Cm from the Lip, Biopsy:               A. INVASIVE MODERATELY DIFFERENTIATED ADENOCARCINOMA (see Comment).     pilar/zan    Comment   Immunohistochemistry was performed on block 1A and the tumor is positive for CK7 (patchy) and negative for CK20 and NKX3.1. These results are consistent with a primary esophageal adenocarcinoma.      HER2 by immunohistochemistry will be performed and reported in an addendum.     This case was shared in internal consultation with Nasreen Lua, Korin, and Mary who concur.     pilar/zan    Gross Description   1.  The specimen is received in formalin labeled with the patient's name and further designated '35cm from the lip biopsy' are multiple small fragments of gray-tan tissue. The specimen is submitted for embedding as received.      Microscopic Description   Performed, incorporated in diagnosis.    Scans on Order [id number]     Document on 7/26/2019 12:20 PM by Taty Munoz MD   Scan on 7/26/2019  8:25 AM by Telma Pinzon R: HER2 BY FISHScan on 7/26/2019  8:25 AM by Telma Pinzon: HER2 BY FISH       Assessment/Plan  In summary this patient is a 63-year-old white male who has been a chronic smoker who has had early satiety for several weeks associated with this lack of appetite, 30 pound weight loss and associated with this dry hacking cough. He had pneumonia not too long ago and he never recovered from this completely with the  exception of the time that he was receiving some prednisone for the pneumonia. In any event the patient has developed now some abdominal pain in the right upper quadrant of the abdomen intermittently and he also is experiencing significant degree of fatigue. Given the persistency of the symptomatology a CT scan of the abdomen documented extensive liver metastasis and a very dramatic thickening of the wall of the esophagus in the gastroesophageal junction. A CT scan of the chest also documented very dramatic thickening of the wall of the esophagus in the last 10 cm of the intrathoracic esophagus. There is evidence of mediastinal adenopathies. Spleen, pancreas, kidneys were unremarkable. Bone windows were unremarkable.     The patient has history of prostate cancer in the past. He had prostatectomy, never required any other intervention. His PSA has remained negative normal for years and years. Prostate cancer has nothing to do with his problem.     The patient is a chronic smoker of close to 2 packs of cigarettes a day. He is still smoking heavily and he has a component of COPD.    The other phenomenon on clinical grounds is that the patient has obvious changes in his legs consistent with peripheral arterial disease. He has very heavy calcification of the abdominal aorta and the iliac arteries. This is not surprising. He probably has an element of claudication.  The patient was reviewed on 07/18/2019. His port site has healed perfectly fine and his upper endoscopy after discussing the case with Dr. Munoz, Pathologist, has documented adenocarcinoma.       The PET scan has been discussed and visualized in the PACS system at [facility] with the patient and his 2 sisters. Almost 90% of the liver parenchyma is compromised by malignancy and he has very hot activity in the lower third of the esophagus 8 cm with a high SUV. The same is applicable to the degree of his liver metastasis. It is obviously clearly  visible the colovesical fistula as well.     At this time, my assessment on the patient is as follows:   1. 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 Almost 90% of the liver is replaced by tumor. The SUV activity in this tumor is very high and tells me that this is growing very fast. The 2nd issue is that the patient has a colovesical fistula. Typically, to correct a colovesical fistula, the patient will take 2 or 3 weeks to heal after he has this corrected by a urologist and general surgeon. In my personal opinion, we do not have 2 or 3 weeks to wait. I think we have to stay in the storm that he will have when he receives chemotherapy or palliation to see if we can get ahead of the game and give him enough time on the planet for the months to come. In absence of any chemotherapy in the next several weeks, this patient will be gone in liver insufficiency in a relatively short period of time. Given this fact, I have chosen for the patient to come and see our nurse practitioner ASAP. Discuss in detail side effects of FOLFOX.     Since the previous visit by nurse practitioner the patient landed in the hospital with bilateral deep vein thrombosis in the lower extremities. He was anticoagulated with Lovenox and he will remain on this medicine for the time being. I discussed the case with Wilberto Pelletier MD in regard anticoagulation with Lovenox and we decided that in the long run the patient will be better off remaining on this medicine given the fact that he has thrombophilia associated with malignancy. It is documented that many patients with thrombophilia of malignancy they do better in the long run if they remain on Lovenox with lesser bleeding episodes and lesser thrombosis episodes.     So far the patient has not had any bleeding on the Lovenox and he has had very dramatic reduction in the swelling and discomfort in the lower extremities. He still has an element of the  peripheral arterial disease that is related to his chronic smoking. He has some element of claudication.     From the point of view of his liver enlargement it is very dramatic to see how the liver has decreased in size in comparison of how the liver was on the clinical examination at the time of the original diagnosis. The patient has no pain, he is eating well, his bowels are functioning, the urination is ongoing and he has not had any recrudescence of his urinary tract infection related to colovesical fistula.    Today his white count, hemoglobin and platelets are normal. His chemistry profile is pending.     RECOMMENDATIONS:  1. He will proceed today with his FOLFOX and Herceptin. He will return in 2 weeks for FOLFOX, 3 weeks for Herceptin and he will recycle the FOLFOX again in a month.  2. He will have a CBC and a CMP every 2 weeks.  3. He will see nurse practitioner in 2 weeks.  4. He will remain on ciprofloxacin continuously 250 mg by mouth twice a day.  5. He will remain on Lovenox at the present dose. He has refills available for a year on this medication. I discussed with him how to adjust the medication in regard administration sites and that way he does not do injection always in the same location.   6. I discussed with the patient the need to proceed with a CT scan of the chest and abdomen in 3 weeks to assess status of his liver metastasis and hopefully that will give us a hint in regard what goes on also in the esophagus.     I am not ready to modify the chemotherapy medicines yet. He probably needs to be on chemotherapy medicine for another 2-3 months before we can think about what to do with the colovesical fistula.    I discussed this with him and his sister. I discussed the case with Wilberto Pelletier MD

## 2019-08-22 ENCOUNTER — INFUSION (OUTPATIENT)
Dept: ONCOLOGY | Facility: HOSPITAL | Age: 64
End: 2019-08-22

## 2019-08-22 DIAGNOSIS — Z45.2 ENCOUNTER FOR ADJUSTMENT OR MANAGEMENT OF VASCULAR ACCESS DEVICE: ICD-10-CM

## 2019-08-22 DIAGNOSIS — C15.5 MALIGNANT NEOPLASM OF LOWER THIRD OF ESOPHAGUS (HCC): Primary | ICD-10-CM

## 2019-08-22 DIAGNOSIS — C78.7 LIVER METASTASES: ICD-10-CM

## 2019-08-22 RX ORDER — SODIUM CHLORIDE 0.9 % (FLUSH) 0.9 %
10 SYRINGE (ML) INJECTION AS NEEDED
Status: CANCELLED | OUTPATIENT
Start: 2019-08-22

## 2019-08-22 RX ORDER — SODIUM CHLORIDE 0.9 % (FLUSH) 0.9 %
10 SYRINGE (ML) INJECTION AS NEEDED
Status: DISCONTINUED | OUTPATIENT
Start: 2019-08-22 | End: 2019-08-22 | Stop reason: HOSPADM

## 2019-08-22 RX ADMIN — SODIUM CHLORIDE, PRESERVATIVE FREE 10 ML: 5 INJECTION INTRAVENOUS at 12:23

## 2019-08-22 RX ADMIN — Medication 500 UNITS: at 12:25

## 2019-08-26 ENCOUNTER — READMISSION MANAGEMENT (OUTPATIENT)
Dept: CALL CENTER | Facility: HOSPITAL | Age: 64
End: 2019-08-26

## 2019-08-26 NOTE — OUTREACH NOTE
Medical Week 3 Survey      Responses   Facility patient discharged from?  [place]   Does the patient have one of the following disease processes/diagnoses(primary or secondary)?  Other   Week 3 attempt successful?  Yes   Call start time  1727   Call end time  1731   Discharge diagnosis  **Acute deep vein thrombosis (DVT) of femoral vein of left lower    Meds reviewed with patient/caregiver?  Yes   Is the patient having any side effects they believe may be caused by any medication additions or changes?  No   Does the patient have all medications ordered at discharge?  Yes   Is the patient taking all medications as directed (includes completed medication regime)?  Yes   Does the patient have a primary care provider?   Yes   Does the patient have an appointment with their PCP within 7 days of discharge?  Yes   What is preventing the patient from scheduling follow up appointments within 7 days of discharge?  Earlier appointment not available   Has the patient kept scheduled appointments due by today?  Yes   Comments  seeing oncologist   Has home health visited the patient within 72 hours of discharge?  N/A   Psychosocial issues?  No   Did the patient receive a copy of their discharge instructions?  Yes   Nursing interventions  Reviewed instructions with patient   What is the patient's perception of their health status since discharge?  Improving   Is the patient/caregiver able to teach back signs and symptoms related to disease process for when to call PCP?  Yes   Is the patient/caregiver able to teach back signs and symptoms related to disease process for when to call 911?  Yes   Is the patient/caregiver able to teach back the hierarchy of who to call/visit for symptoms/problems? PCP, Specialist, Home health nurse, Urgent Care, ED, 911  Yes   Additional teach back comments  still having chemotherapy, has good energy level   Week 3 Call Completed?  Yes   Graduated  Yes   Did the patient feel the follow up calls were  helpful during their recovery period?  Yes   Was the number of calls appropriate?  Yes   Graduated/Revoked comments  states goals met, no need for further contact          Sirisha Davila RN

## 2019-09-04 ENCOUNTER — INFUSION (OUTPATIENT)
Dept: ONCOLOGY | Facility: HOSPITAL | Age: 64
End: 2019-09-04

## 2019-09-04 ENCOUNTER — OFFICE VISIT (OUTPATIENT)
Dept: ONCOLOGY | Facility: CLINIC | Age: 64
End: 2019-09-04

## 2019-09-04 ENCOUNTER — DOCUMENTATION (OUTPATIENT)
Dept: NUTRITION | Facility: HOSPITAL | Age: 64
End: 2019-09-04

## 2019-09-04 VITALS
DIASTOLIC BLOOD PRESSURE: 90 MMHG | HEART RATE: 87 BPM | OXYGEN SATURATION: 97 % | TEMPERATURE: 97.8 F | HEIGHT: 69 IN | SYSTOLIC BLOOD PRESSURE: 159 MMHG | BODY MASS INDEX: 27.15 KG/M2 | WEIGHT: 183.3 LBS | RESPIRATION RATE: 14 BRPM

## 2019-09-04 DIAGNOSIS — C78.7 LIVER METASTASES: ICD-10-CM

## 2019-09-04 DIAGNOSIS — C15.5 MALIGNANT NEOPLASM OF LOWER THIRD OF ESOPHAGUS (HCC): ICD-10-CM

## 2019-09-04 DIAGNOSIS — I82.431 ACUTE DEEP VEIN THROMBOSIS (DVT) OF RIGHT POPLITEAL VEIN (HCC): ICD-10-CM

## 2019-09-04 DIAGNOSIS — C15.5 MALIGNANT NEOPLASM OF LOWER THIRD OF ESOPHAGUS (HCC): Primary | ICD-10-CM

## 2019-09-04 LAB
ALBUMIN SERPL-MCNC: 3.8 G/DL (ref 3.5–5.2)
ALBUMIN/GLOB SERPL: 1.4 G/DL (ref 1.1–2.4)
ALP SERPL-CCNC: 99 U/L (ref 38–116)
ALT SERPL W P-5'-P-CCNC: 26 U/L (ref 0–41)
ANION GAP SERPL CALCULATED.3IONS-SCNC: 12.4 MMOL/L (ref 5–15)
AST SERPL-CCNC: 19 U/L (ref 0–40)
BACTERIA UR QL AUTO: ABNORMAL /HPF
BASOPHILS # BLD AUTO: 0.02 10*3/MM3 (ref 0–0.2)
BASOPHILS NFR BLD AUTO: 0.4 % (ref 0–1.5)
BILIRUB SERPL-MCNC: 0.3 MG/DL (ref 0.2–1.2)
BILIRUB UR QL STRIP: ABNORMAL
BUN BLD-MCNC: 10 MG/DL (ref 6–20)
BUN/CREAT SERPL: 15.9 (ref 7.3–30)
CALCIUM SPEC-SCNC: 9 MG/DL (ref 8.5–10.2)
CHLORIDE SERPL-SCNC: 100 MMOL/L (ref 98–107)
CLARITY UR: CLEAR
CO2 SERPL-SCNC: 25.6 MMOL/L (ref 22–29)
COD CRY URNS QL: ABNORMAL /HPF
COLOR UR: YELLOW
CREAT BLD-MCNC: 0.63 MG/DL (ref 0.7–1.3)
DEPRECATED RDW RBC AUTO: 74.4 FL (ref 37–54)
EOSINOPHIL # BLD AUTO: 0.1 10*3/MM3 (ref 0–0.4)
EOSINOPHIL NFR BLD AUTO: 1.9 % (ref 0.3–6.2)
ERYTHROCYTE [DISTWIDTH] IN BLOOD BY AUTOMATED COUNT: 22.5 % (ref 12.3–15.4)
GFR SERPL CREATININE-BSD FRML MDRD: 129 ML/MIN/1.73
GLOBULIN UR ELPH-MCNC: 2.7 GM/DL (ref 1.8–3.5)
GLUCOSE BLD-MCNC: 95 MG/DL (ref 74–124)
GLUCOSE UR STRIP-MCNC: NEGATIVE MG/DL
HCT VFR BLD AUTO: 42.2 % (ref 37.5–51)
HGB BLD-MCNC: 12.9 G/DL (ref 13–17.7)
HGB UR QL STRIP.AUTO: NEGATIVE
IMM GRANULOCYTES # BLD AUTO: 0.03 10*3/MM3 (ref 0–0.05)
IMM GRANULOCYTES NFR BLD AUTO: 0.6 % (ref 0–0.5)
KETONES UR QL STRIP: ABNORMAL
LEUKOCYTE ESTERASE UR QL STRIP.AUTO: NEGATIVE
LYMPHOCYTES # BLD AUTO: 1.29 10*3/MM3 (ref 0.7–3.1)
LYMPHOCYTES NFR BLD AUTO: 24.6 % (ref 19.6–45.3)
MCH RBC QN AUTO: 28.2 PG (ref 26.6–33)
MCHC RBC AUTO-ENTMCNC: 30.6 G/DL (ref 31.5–35.7)
MCV RBC AUTO: 92.3 FL (ref 79–97)
MONOCYTES # BLD AUTO: 0.54 10*3/MM3 (ref 0.1–0.9)
MONOCYTES NFR BLD AUTO: 10.3 % (ref 5–12)
MUCOUS THREADS URNS QL MICRO: ABNORMAL /HPF
NEUTROPHILS # BLD AUTO: 3.26 10*3/MM3 (ref 1.7–7)
NEUTROPHILS NFR BLD AUTO: 62.2 % (ref 42.7–76)
NITRITE UR QL STRIP: NEGATIVE
NRBC BLD AUTO-RTO: 0 /100 WBC (ref 0–0.2)
PH UR STRIP.AUTO: 5.5 [PH] (ref 4.5–8)
PLATELET # BLD AUTO: 163 10*3/MM3 (ref 140–450)
PMV BLD AUTO: 9.5 FL (ref 6–12)
POTASSIUM BLD-SCNC: 3.5 MMOL/L (ref 3.5–4.7)
PROT SERPL-MCNC: 6.5 G/DL (ref 6.3–8)
PROT UR QL STRIP: ABNORMAL
RBC # BLD AUTO: 4.57 10*6/MM3 (ref 4.14–5.8)
RBC # UR: ABNORMAL /HPF
REF LAB TEST METHOD: ABNORMAL
SODIUM BLD-SCNC: 138 MMOL/L (ref 134–145)
SP GR UR STRIP: 1.02 (ref 1–1.03)
SQUAMOUS #/AREA URNS HPF: ABNORMAL /HPF
UROBILINOGEN UR QL STRIP: ABNORMAL
WBC NRBC COR # BLD: 5.24 10*3/MM3 (ref 3.4–10.8)
WBC UR QL AUTO: ABNORMAL /HPF

## 2019-09-04 PROCEDURE — 99214 OFFICE O/P EST MOD 30 MIN: CPT | Performed by: NURSE PRACTITIONER

## 2019-09-04 PROCEDURE — 96417 CHEMO IV INFUS EACH ADDL SEQ: CPT | Performed by: NURSE PRACTITIONER

## 2019-09-04 PROCEDURE — 25010000002 FLUOROURACIL PER 500 MG: Performed by: NURSE PRACTITIONER

## 2019-09-04 PROCEDURE — 96416 CHEMO PROLONG INFUSE W/PUMP: CPT | Performed by: NURSE PRACTITIONER

## 2019-09-04 PROCEDURE — 85025 COMPLETE CBC W/AUTO DIFF WBC: CPT

## 2019-09-04 PROCEDURE — 96368 THER/DIAG CONCURRENT INF: CPT | Performed by: NURSE PRACTITIONER

## 2019-09-04 PROCEDURE — 96375 TX/PRO/DX INJ NEW DRUG ADDON: CPT | Performed by: NURSE PRACTITIONER

## 2019-09-04 PROCEDURE — 25010000002 DIPHENHYDRAMINE 1 EACH BAG: Performed by: NURSE PRACTITIONER

## 2019-09-04 PROCEDURE — 80053 COMPREHEN METABOLIC PANEL: CPT

## 2019-09-04 PROCEDURE — 96413 CHEMO IV INFUSION 1 HR: CPT | Performed by: NURSE PRACTITIONER

## 2019-09-04 PROCEDURE — 81001 URINALYSIS AUTO W/SCOPE: CPT | Performed by: NURSE PRACTITIONER

## 2019-09-04 PROCEDURE — 25010000002 PALONOSETRON PER 25 MCG: Performed by: NURSE PRACTITIONER

## 2019-09-04 PROCEDURE — 96411 CHEMO IV PUSH ADDL DRUG: CPT | Performed by: NURSE PRACTITIONER

## 2019-09-04 PROCEDURE — 25010000002 LEUCOVORIN CALCIUM PER 50 MG: Performed by: NURSE PRACTITIONER

## 2019-09-04 PROCEDURE — 25010000002 TRASTUZUMAB PER 10 MG: Performed by: NURSE PRACTITIONER

## 2019-09-04 PROCEDURE — 25010000002 OXALIPLATIN PER 0.5 MG: Performed by: NURSE PRACTITIONER

## 2019-09-04 PROCEDURE — 25010000002 DEXAMETHASONE SODIUM PHOSPHATE 100 MG/10ML SOLUTION: Performed by: NURSE PRACTITIONER

## 2019-09-04 PROCEDURE — 96415 CHEMO IV INFUSION ADDL HR: CPT | Performed by: NURSE PRACTITIONER

## 2019-09-04 RX ORDER — PALONOSETRON 0.05 MG/ML
0.25 INJECTION, SOLUTION INTRAVENOUS ONCE
Status: COMPLETED | OUTPATIENT
Start: 2019-09-04 | End: 2019-09-04

## 2019-09-04 RX ORDER — FAMOTIDINE 10 MG/ML
20 INJECTION, SOLUTION INTRAVENOUS AS NEEDED
Status: CANCELLED | OUTPATIENT
Start: 2019-09-04

## 2019-09-04 RX ORDER — DIPHENHYDRAMINE HYDROCHLORIDE 50 MG/ML
50 INJECTION INTRAMUSCULAR; INTRAVENOUS AS NEEDED
Status: CANCELLED | OUTPATIENT
Start: 2019-09-04

## 2019-09-04 RX ORDER — FAMOTIDINE 10 MG/ML
20 INJECTION, SOLUTION INTRAVENOUS 2 TIMES DAILY
Status: CANCELLED
Start: 2019-09-04

## 2019-09-04 RX ORDER — DEXTROSE MONOHYDRATE 50 MG/ML
250 INJECTION, SOLUTION INTRAVENOUS ONCE
Status: CANCELLED | OUTPATIENT
Start: 2019-09-04

## 2019-09-04 RX ORDER — SODIUM CHLORIDE 9 MG/ML
250 INJECTION, SOLUTION INTRAVENOUS ONCE
Status: CANCELLED | OUTPATIENT
Start: 2019-09-04

## 2019-09-04 RX ORDER — DEXTROSE MONOHYDRATE 50 MG/ML
250 INJECTION, SOLUTION INTRAVENOUS ONCE
Status: COMPLETED | OUTPATIENT
Start: 2019-09-04 | End: 2019-09-04

## 2019-09-04 RX ORDER — FLUOROURACIL 50 MG/ML
400 INJECTION, SOLUTION INTRAVENOUS ONCE
Status: CANCELLED | OUTPATIENT
Start: 2019-09-04

## 2019-09-04 RX ORDER — SODIUM CHLORIDE 9 MG/ML
250 INJECTION, SOLUTION INTRAVENOUS ONCE
Status: COMPLETED | OUTPATIENT
Start: 2019-09-04 | End: 2019-09-04

## 2019-09-04 RX ORDER — PALONOSETRON 0.05 MG/ML
0.25 INJECTION, SOLUTION INTRAVENOUS ONCE
Status: CANCELLED | OUTPATIENT
Start: 2019-09-04

## 2019-09-04 RX ORDER — FAMOTIDINE 10 MG/ML
20 INJECTION, SOLUTION INTRAVENOUS 2 TIMES DAILY
Status: DISCONTINUED | OUTPATIENT
Start: 2019-09-04 | End: 2019-09-04 | Stop reason: HOSPADM

## 2019-09-04 RX ORDER — FLUOROURACIL 50 MG/ML
400 INJECTION, SOLUTION INTRAVENOUS ONCE
Status: COMPLETED | OUTPATIENT
Start: 2019-09-04 | End: 2019-09-04

## 2019-09-04 RX ADMIN — SODIUM CHLORIDE 250 ML: 9 INJECTION, SOLUTION INTRAVENOUS at 10:59

## 2019-09-04 RX ADMIN — DIPHENHYDRAMINE HYDROCHLORIDE 50 MG: 50 INJECTION INTRAMUSCULAR; INTRAVENOUS at 11:01

## 2019-09-04 RX ADMIN — DEXTROSE MONOHYDRATE 250 ML: 5 INJECTION, SOLUTION INTRAVENOUS at 12:40

## 2019-09-04 RX ADMIN — TRASTUZUMAB 350 MG: 150 INJECTION, POWDER, LYOPHILIZED, FOR SOLUTION INTRAVENOUS at 12:08

## 2019-09-04 RX ADMIN — LEUCOVORIN CALCIUM 810 MG: 350 INJECTION, POWDER, LYOPHILIZED, FOR SOLUTION INTRAMUSCULAR; INTRAVENOUS at 13:00

## 2019-09-04 RX ADMIN — FAMOTIDINE 20 MG: 10 INJECTION, SOLUTION INTRAVENOUS at 11:00

## 2019-09-04 RX ADMIN — DEXAMETHASONE SODIUM PHOSPHATE 12 MG: 10 INJECTION, SOLUTION INTRAMUSCULAR; INTRAVENOUS at 12:43

## 2019-09-04 RX ADMIN — FLUOROURACIL 810 MG: 50 INJECTION, SOLUTION INTRAVENOUS at 15:14

## 2019-09-04 RX ADMIN — OXALIPLATIN 170 MG: 5 INJECTION, SOLUTION, CONCENTRATE INTRAVENOUS at 13:00

## 2019-09-04 RX ADMIN — FLUOROURACIL 4850 MG: 50 INJECTION, SOLUTION INTRAVENOUS at 15:18

## 2019-09-04 RX ADMIN — PALONOSETRON HYDROCHLORIDE 0.25 MG: 0.25 INJECTION, SOLUTION INTRAVENOUS at 12:40

## 2019-09-04 NOTE — PROGRESS NOTES
"  Subjective     REASON FOR follow up:1.  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  Currently receiving palliative FOLFOX therapy.    .2.COLOVESICAL FISTULA    3. DVT R AND LEFT LE ON ANTICOAGULANT: THROMBOPHILIA OF MALIGNANCY    History of Present Illness Mr. Meek is a 63-year-old gentleman with the above medical history here today for scheduled FOLFOX.  He reports feeling relatively well in the office today.    He denies fever, chills, shortness of breath, bleeding, bruising or swelling.  He does report losing 2 bottom teeth since starting treatment.  He explains that his dentist told him in the past that he would eventually lose these teeth.  He denies bleeding, pain or swelling of the site where the teeth fell out.  Denies mouth irritation or sores.  He denies thrush.    His CBC is stable.  His CMP is stable.  His vital signs are stable.    He continues to take Cipro daily as instructed.  He continues to take Lovenox twice daily as prescribed.    Past Medical History:   Diagnosis Date   • Arthritis    • Elevated PSA    • Esophageal mass    • H/O Lung nodule    • Hepatitis     CHILD--PT STATED \"I THINK IT WAS A.\"   • History of pneumonia    • Hx of blood clots 08/10/2019   • Hyperlipidemia    • Hypertension    • Liver cancer (CMS/HCC)    • Prostate cancer (CMS/HCC)         Past Surgical History:   Procedure Laterality Date   • HERNIA REPAIR  1999   • PROSTATE SURGERY  03/2008   • VENOUS ACCESS DEVICE (PORT) INSERTION N/A 7/16/2019    Procedure: INSERTION VENOUS ACCESS DEVICE WITH FLUORO AND EGD WITH BIOPSY;  Surgeon: Mary Brito MD;  Location: [facility];  Service: Thoracic        Current Outpatient Medications on File Prior to Visit   Medication Sig Dispense Refill   • ciprofloxacin (CIPRO) 250 MG tablet Take 2 tablets by mouth 2 (Two) Times a Day. 1 tablet (Patient taking differently: Take 250 mg by mouth 2 (Two) Times a Day. 1 " tablet) 60 tablet 1   • enoxaparin (LOVENOX) 80 MG/0.8ML solution syringe Inject 0.8 mL under the skin into the appropriate area as directed Every 12 (Twelve) Hours. 48 mL 11   • HYDROcodone-acetaminophen (NORCO) 5-325 MG per tablet Take 1-2 tablets by mouth Every 4 (Four) Hours As Needed (Pain). 10 tablet 0   • ondansetron (ZOFRAN) 4 MG tablet Take 1 tablet by mouth Every 8 (Eight) Hours As Needed for Nausea or Vomiting. 30 tablet 2   • Probiotic Product (PROBIOTIC DAILY PO) Take  by mouth Daily.       No current facility-administered medications on file prior to visit.         ALLERGIES:  No Known Allergies     Social History     Socioeconomic History   • Marital status: Single     Spouse name: Not on file   • Number of children: Not on file   • Years of education: High school   • Highest education level: Not on file   Occupational History   • Occupation: [employer]     Employer: [employer]   Tobacco Use   • Smoking status: Current Every Day Smoker     Packs/day: 1.00     Years: 40.00     Pack years: 40.00     Types: Cigarettes   • Smokeless tobacco: Never Used   Substance and Sexual Activity   • Alcohol use: Yes     Alcohol/week: 16.8 oz     Types: 28 Cans of beer per week     Comment: NOT RECENTLY   • Drug use: No        Family History   Problem Relation Age of Onset   • Hypertension Mother    • Stroke Mother    • Hypertension Other    • Lung disease Other    • Prostate cancer Other    • Lung cancer Father    • Malig Hyperthermia Neg Hx         Review of Systems   Constitutional: Negative.    HENT: Positive for dental problem.         See HPI   Eyes: Negative.    Respiratory: Negative.    Cardiovascular: Negative.    Gastrointestinal: Negative.    Endocrine: Negative.    Genitourinary: Negative.    Musculoskeletal: Negative.    Skin: Negative.    Allergic/Immunologic: Negative.    Neurological: Negative.    Hematological: Negative.    Psychiatric/Behavioral: Negative.                   Objective     There were  no vitals filed for this visit.  Current Status 8/20/2019   ECOG score 0       Physical Exam   Constitutional: He is oriented to person, place, and time. He appears well-developed and well-nourished.   HENT:   Poor dentition.  No ulceration, erythema or irritation of the mouth noted leading noted   Eyes: Conjunctivae and EOM are normal. Pupils are equal, round, and reactive to light.   Neck: Normal range of motion. Neck supple.   Cardiovascular: Normal rate and regular rhythm.   Pulmonary/Chest: Effort normal and breath sounds normal.   Abdominal: Soft. Bowel sounds are normal. He exhibits no mass. There is no tenderness. There is no guarding.   Musculoskeletal: Normal range of motion.   Neurological: He is alert and oriented to person, place, and time.   Skin: Skin is warm and dry. No rash noted.   Psychiatric: He has a normal mood and affect. His behavior is normal.   Nursing note and vitals reviewed.                          RECENT LABS:  Hematology WBC   Date Value Ref Range Status   08/20/2019 6.98 3.40 - 10.80 10*3/mm3 Final   02/02/2019 13.4 (H) 3.4 - 10.8 x10E3/uL Final     RBC   Date Value Ref Range Status   08/20/2019 4.72 4.14 - 5.80 10*6/mm3 Final   02/02/2019 4.81 4.14 - 5.80 x10E6/uL Final     Hemoglobin   Date Value Ref Range Status   08/20/2019 12.6 (L) 13.0 - 17.7 g/dL Final     Hematocrit   Date Value Ref Range Status   08/20/2019 40.4 37.5 - 51.0 % Final     Platelets   Date Value Ref Range Status   08/20/2019 172 140 - 450 10*3/mm3 Final          Interpretation Summary DOPPLER LE    · Acute right lower extremity superficial thrombophlebitis noted in the varicosity (above knee).  · Acute right lower extremity deep vein thrombosis noted in the popliteal, posterior tibial and peroneal.  · Acute left lower extremity deep vein thrombosis noted in the proximal femoral, mid femoral, distal femoral, popliteal, posterial tibial, peroneal and gastrocnemius/soleal.  · All other veins appeared normal  bilaterally.     Tissue Pathology Exam [[id number]] (Order [id number])   Order   Date: 7/16/2019 Department: [facility] MAIN OR Released By: Claudine Echeverria RN Authorizing: Mary Brito MD   Reprint Order Requisition     Tissue Pathology Exam (Order #[id number]) on 7/16/19       Tissue Pathology Exam: [id number]   Order: [id number]   Collected:  7/16/2019 16:30   Status:  Edited Result - FINAL   Visible to patient:  No (Not Released)   Dx:  Malignant neoplasm of esophagus, unsp...   Component    Addendum 2   Please see the completely scanned HER2 FISH analysis from [facility] below.       Addendum electronically signed by Taty Munoz MD on 7/26/2019 at 1220   Addendum   HER2 (by immunohistochemistry) performed on block 1A for tumor is equivocal (score 2+).      Methods:  [place] Food and Drug Administration (FDA) cleared (specify test/vendor): [employer]   Primary Antibody: 4B5     HER2 by FISH will be performed and those results will be reported separately.             Addendum electronically signed by Taty Munoz MD on 7/22/2019 at 1509   Final Diagnosis   1. Esophagus, 35 Cm from the Lip, Biopsy:               A. INVASIVE MODERATELY DIFFERENTIATED ADENOCARCINOMA (see Comment).     jab/jse    Electronically signed by Taty Munoz MD on 7/19/2019 at 1642   Comment    Immunohistochemistry was performed on block 1A and the tumor is positive for CK7 (patchy) and negative for CK20 and NKX3.1. These results are consistent with a primary esophageal adenocarcinoma.      HER2 by immunohistochemistry will be performed and reported in an addendum.     This case was shared in internal consultation with Nasreen Lua, Korin, and Mary who concur.     jab/jse    Gross Description    1.  The specimen is received in formalin labeled with the patient's name and further designated '35cm from the lip biopsy' are multiple small fragments of gray-tan tissue. The specimen is submitted for embedding as received.       Microscopic Description    Performed, incorporated in diagnosis.    Resulting Agency [facility]         Specimen Collected: 07/16/19 16:30 Last Resulted: 07/26/19 12:20       Order Details      View Encounter      Lab and Collection Details      Routing      Result History            Scans on Order [id number]     Document on 7/26/2019 12:20 PM by Taty Munoz MD   Scan on 7/26/2019  8:25 AM by Telma Pinzon R: HER2 BY FISHScan on 7/26/2019  8:25 AM by Telma Pinzon R: HER2 BY FISH         7/26/2019 12:20 PM     Component   Addendum 2   Please see the completely scanned HER2 FISH analysis from [facility] below.       Addendum   HER2 (by immunohistochemistry) performed on block 1A for tumor is equivocal (score 2+).      Methods:  US Food and Drug Administration (FDA) cleared (specify test/vendor): [employer]   Primary Antibody: 4B5     HER2 by FISH will be performed and those results will be reported separately.             Case Report   Surgical Pathology Report                         Case: [id number]                                   Authorizing Provider:  Mary Brito MD        Collected:           07/16/2019 04:30 PM           Ordering Location:     [facility]  Received:            07/16/2019 10:15 PM                                  MAIN OR                                                                       Pathologist:           Taty Munoz MD                                                           Specimen:    Esophagus, 35CM FROM THE LIP                                                            Final Diagnosis   1. Esophagus, 35 Cm from the Lip, Biopsy:               A. INVASIVE MODERATELY DIFFERENTIATED ADENOCARCINOMA (see Comment).     jab/zan    Comment   Immunohistochemistry was performed on block 1A and the tumor is positive for CK7 (patchy) and negative for CK20 and NKX3.1. These results are consistent with a primary esophageal adenocarcinoma.      HER2 by  immunohistochemistry will be performed and reported in an addendum.     This case was shared in internal consultation with Nasreen Lua, Korin, and Mary who concur.     pilar/zan    Gross Description   1.  The specimen is received in formalin labeled with the patient's name and further designated '35cm from the lip biopsy' are multiple small fragments of gray-tan tissue. The specimen is submitted for embedding as received.      Microscopic Description   Performed, incorporated in diagnosis.    Scans on Order [id number]     Document on 7/26/2019 12:20 PM by Taty Munoz MD   Scan on 7/26/2019  8:25 AM by Telma Pinzon R: HER2 BY FISHScan on 7/26/2019  8:25 AM by Telma Pinzon R: HER2 BY FISH       Assessment/Plan    1.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 Almost 90% of the liver is replaced by tumor.  He is here today 9/4/2019 for scheduled FOLFOX.      2. Bilateral deep vein thrombosis in the lower extremities. He was anticoagulated with Lovenox and continues twice daily.  Tolerating Lovenox well.    3.Colovesical fistula.  He has been prescribed Cipro 250 mg twice daily.      RECOMMENDATIONS:  1. He will proceed today with his FOLFOX and Herceptin. .  2. He is scheduled in 2 weeks to see NIRU Marks in anticipation of his next treatment.  3. Continue  Lovenox twice daily as instructed.  4. He will remain on ciprofloxacin continuously 250 mg by mouth twice a day.  5.  He has CT scan scheduled for 9/19/2019, to be reviewed with Dr. Keller on 9/25/2019.    I have asked the patient to call the office with any new or worsening symptoms before the scheduled visit.

## 2019-09-04 NOTE — PROGRESS NOTES
Outpatient Oncology Nutrition Follow Up:     Weight 183#. Stable.  Followed up with patient during infusion today. He is eating smaller frequent meals. He is using carnation instant breakfast, has a fair appetite. Not really having any side effects. Continue to be available as needed.

## 2019-09-06 ENCOUNTER — INFUSION (OUTPATIENT)
Dept: ONCOLOGY | Facility: HOSPITAL | Age: 64
End: 2019-09-06

## 2019-09-06 DIAGNOSIS — C15.5 MALIGNANT NEOPLASM OF LOWER THIRD OF ESOPHAGUS (HCC): Primary | ICD-10-CM

## 2019-09-06 DIAGNOSIS — C78.7 LIVER METASTASES: ICD-10-CM

## 2019-09-06 DIAGNOSIS — Z45.2 ENCOUNTER FOR ADJUSTMENT OR MANAGEMENT OF VASCULAR ACCESS DEVICE: ICD-10-CM

## 2019-09-06 RX ORDER — SODIUM CHLORIDE 0.9 % (FLUSH) 0.9 %
10 SYRINGE (ML) INJECTION AS NEEDED
Status: CANCELLED | OUTPATIENT
Start: 2019-09-06

## 2019-09-06 RX ORDER — SODIUM CHLORIDE 0.9 % (FLUSH) 0.9 %
10 SYRINGE (ML) INJECTION AS NEEDED
Status: DISCONTINUED | OUTPATIENT
Start: 2019-09-06 | End: 2019-09-06 | Stop reason: HOSPADM

## 2019-09-06 RX ADMIN — SODIUM CHLORIDE, PRESERVATIVE FREE 10 ML: 5 INJECTION INTRAVENOUS at 13:05

## 2019-09-06 RX ADMIN — Medication 500 UNITS: at 13:06

## 2019-09-12 DIAGNOSIS — C15.5 MALIGNANT NEOPLASM OF LOWER THIRD OF ESOPHAGUS (HCC): ICD-10-CM

## 2019-09-12 DIAGNOSIS — C78.7 LIVER METASTASES: ICD-10-CM

## 2019-09-17 ENCOUNTER — INFUSION (OUTPATIENT)
Dept: ONCOLOGY | Facility: HOSPITAL | Age: 64
End: 2019-09-17

## 2019-09-17 ENCOUNTER — OFFICE VISIT (OUTPATIENT)
Dept: ONCOLOGY | Facility: CLINIC | Age: 64
End: 2019-09-17

## 2019-09-17 VITALS
DIASTOLIC BLOOD PRESSURE: 88 MMHG | HEART RATE: 90 BPM | TEMPERATURE: 97.3 F | HEIGHT: 69 IN | SYSTOLIC BLOOD PRESSURE: 158 MMHG | RESPIRATION RATE: 16 BRPM | OXYGEN SATURATION: 98 % | WEIGHT: 183.1 LBS | BODY MASS INDEX: 27.12 KG/M2

## 2019-09-17 DIAGNOSIS — C15.5 MALIGNANT NEOPLASM OF LOWER THIRD OF ESOPHAGUS (HCC): Primary | ICD-10-CM

## 2019-09-17 DIAGNOSIS — C15.5 MALIGNANT NEOPLASM OF LOWER THIRD OF ESOPHAGUS (HCC): ICD-10-CM

## 2019-09-17 DIAGNOSIS — I82.412 ACUTE DEEP VEIN THROMBOSIS (DVT) OF FEMORAL VEIN OF LEFT LOWER EXTREMITY (HCC): ICD-10-CM

## 2019-09-17 DIAGNOSIS — C78.7 LIVER METASTASES: ICD-10-CM

## 2019-09-17 DIAGNOSIS — I82.431 ACUTE DEEP VEIN THROMBOSIS (DVT) OF RIGHT POPLITEAL VEIN (HCC): ICD-10-CM

## 2019-09-17 LAB
ALBUMIN SERPL-MCNC: 3.6 G/DL (ref 3.5–5.2)
ALBUMIN/GLOB SERPL: 1.2 G/DL (ref 1.1–2.4)
ALP SERPL-CCNC: 100 U/L (ref 38–116)
ALT SERPL W P-5'-P-CCNC: 21 U/L (ref 0–41)
ANION GAP SERPL CALCULATED.3IONS-SCNC: 11.9 MMOL/L (ref 5–15)
AST SERPL-CCNC: 19 U/L (ref 0–40)
BASOPHILS # BLD AUTO: 0.02 10*3/MM3 (ref 0–0.2)
BASOPHILS NFR BLD AUTO: 0.3 % (ref 0–1.5)
BILIRUB SERPL-MCNC: 0.2 MG/DL (ref 0.2–1.2)
BILIRUB UR QL STRIP: NEGATIVE
BUN BLD-MCNC: 6 MG/DL (ref 6–20)
BUN/CREAT SERPL: 9.8 (ref 7.3–30)
CALCIUM SPEC-SCNC: 8.9 MG/DL (ref 8.5–10.2)
CHLORIDE SERPL-SCNC: 102 MMOL/L (ref 98–107)
CLARITY UR: ABNORMAL
CO2 SERPL-SCNC: 24.1 MMOL/L (ref 22–29)
COLOR UR: YELLOW
CREAT BLD-MCNC: 0.61 MG/DL (ref 0.7–1.3)
DEPRECATED RDW RBC AUTO: 73.3 FL (ref 37–54)
EOSINOPHIL # BLD AUTO: 0.12 10*3/MM3 (ref 0–0.4)
EOSINOPHIL NFR BLD AUTO: 2.1 % (ref 0.3–6.2)
ERYTHROCYTE [DISTWIDTH] IN BLOOD BY AUTOMATED COUNT: 22.2 % (ref 12.3–15.4)
GFR SERPL CREATININE-BSD FRML MDRD: 134 ML/MIN/1.73
GLOBULIN UR ELPH-MCNC: 3.1 GM/DL (ref 1.8–3.5)
GLUCOSE BLD-MCNC: 97 MG/DL (ref 74–124)
GLUCOSE UR STRIP-MCNC: NEGATIVE MG/DL
HCT VFR BLD AUTO: 42 % (ref 37.5–51)
HGB BLD-MCNC: 13 G/DL (ref 13–17.7)
HGB UR QL STRIP.AUTO: NEGATIVE
IMM GRANULOCYTES # BLD AUTO: 0.03 10*3/MM3 (ref 0–0.05)
IMM GRANULOCYTES NFR BLD AUTO: 0.5 % (ref 0–0.5)
KETONES UR QL STRIP: NEGATIVE
LEUKOCYTE ESTERASE UR QL STRIP.AUTO: NEGATIVE
LYMPHOCYTES # BLD AUTO: 1.31 10*3/MM3 (ref 0.7–3.1)
LYMPHOCYTES NFR BLD AUTO: 22.6 % (ref 19.6–45.3)
MCH RBC QN AUTO: 28.5 PG (ref 26.6–33)
MCHC RBC AUTO-ENTMCNC: 31 G/DL (ref 31.5–35.7)
MCV RBC AUTO: 92.1 FL (ref 79–97)
MONOCYTES # BLD AUTO: 0.53 10*3/MM3 (ref 0.1–0.9)
MONOCYTES NFR BLD AUTO: 9.1 % (ref 5–12)
NEUTROPHILS # BLD AUTO: 3.79 10*3/MM3 (ref 1.7–7)
NEUTROPHILS NFR BLD AUTO: 65.4 % (ref 42.7–76)
NITRITE UR QL STRIP: NEGATIVE
NRBC BLD AUTO-RTO: 0 /100 WBC (ref 0–0.2)
PH UR STRIP.AUTO: 5.5 [PH] (ref 4.5–8)
PLATELET # BLD AUTO: 111 10*3/MM3 (ref 140–450)
PMV BLD AUTO: 8.9 FL (ref 6–12)
POTASSIUM BLD-SCNC: 3.8 MMOL/L (ref 3.5–4.7)
PROT SERPL-MCNC: 6.7 G/DL (ref 6.3–8)
PROT UR QL STRIP: ABNORMAL
RBC # BLD AUTO: 4.56 10*6/MM3 (ref 4.14–5.8)
SODIUM BLD-SCNC: 138 MMOL/L (ref 134–145)
SP GR UR STRIP: >=1.03 (ref 1–1.03)
UROBILINOGEN UR QL STRIP: ABNORMAL
WBC NRBC COR # BLD: 5.8 10*3/MM3 (ref 3.4–10.8)

## 2019-09-17 PROCEDURE — 25010000002 LEUCOVORIN CALCIUM PER 50 MG: Performed by: NURSE PRACTITIONER

## 2019-09-17 PROCEDURE — 96416 CHEMO PROLONG INFUSE W/PUMP: CPT | Performed by: NURSE PRACTITIONER

## 2019-09-17 PROCEDURE — 99213 OFFICE O/P EST LOW 20 MIN: CPT | Performed by: NURSE PRACTITIONER

## 2019-09-17 PROCEDURE — 96413 CHEMO IV INFUSION 1 HR: CPT | Performed by: NURSE PRACTITIONER

## 2019-09-17 PROCEDURE — 96415 CHEMO IV INFUSION ADDL HR: CPT | Performed by: NURSE PRACTITIONER

## 2019-09-17 PROCEDURE — 25010000002 OXALIPLATIN PER 0.5 MG: Performed by: NURSE PRACTITIONER

## 2019-09-17 PROCEDURE — 25010000002 DEXAMETHASONE SODIUM PHOSPHATE 100 MG/10ML SOLUTION: Performed by: NURSE PRACTITIONER

## 2019-09-17 PROCEDURE — 96368 THER/DIAG CONCURRENT INF: CPT | Performed by: NURSE PRACTITIONER

## 2019-09-17 PROCEDURE — 85025 COMPLETE CBC W/AUTO DIFF WBC: CPT

## 2019-09-17 PROCEDURE — 81003 URINALYSIS AUTO W/O SCOPE: CPT

## 2019-09-17 PROCEDURE — 25010000002 TRASTUZUMAB PER 10 MG: Performed by: NURSE PRACTITIONER

## 2019-09-17 PROCEDURE — 96411 CHEMO IV PUSH ADDL DRUG: CPT | Performed by: NURSE PRACTITIONER

## 2019-09-17 PROCEDURE — 25010000002 PALONOSETRON PER 25 MCG: Performed by: NURSE PRACTITIONER

## 2019-09-17 PROCEDURE — 80053 COMPREHEN METABOLIC PANEL: CPT

## 2019-09-17 PROCEDURE — 25010000002 DIPHENHYDRAMINE PER 50 MG: Performed by: NURSE PRACTITIONER

## 2019-09-17 PROCEDURE — 25010000002 FLUOROURACIL PER 500 MG: Performed by: NURSE PRACTITIONER

## 2019-09-17 PROCEDURE — 96417 CHEMO IV INFUS EACH ADDL SEQ: CPT | Performed by: NURSE PRACTITIONER

## 2019-09-17 PROCEDURE — 96375 TX/PRO/DX INJ NEW DRUG ADDON: CPT | Performed by: NURSE PRACTITIONER

## 2019-09-17 RX ORDER — PALONOSETRON 0.05 MG/ML
0.25 INJECTION, SOLUTION INTRAVENOUS ONCE
Status: COMPLETED | OUTPATIENT
Start: 2019-09-17 | End: 2019-09-17

## 2019-09-17 RX ORDER — FAMOTIDINE 10 MG/ML
20 INJECTION, SOLUTION INTRAVENOUS AS NEEDED
Status: CANCELLED | OUTPATIENT
Start: 2019-09-17

## 2019-09-17 RX ORDER — DEXTROSE MONOHYDRATE 50 MG/ML
250 INJECTION, SOLUTION INTRAVENOUS ONCE
Status: COMPLETED | OUTPATIENT
Start: 2019-09-17 | End: 2019-09-17

## 2019-09-17 RX ORDER — SODIUM CHLORIDE 9 MG/ML
250 INJECTION, SOLUTION INTRAVENOUS ONCE
Status: COMPLETED | OUTPATIENT
Start: 2019-09-17 | End: 2019-09-17

## 2019-09-17 RX ORDER — FLUOROURACIL 50 MG/ML
400 INJECTION, SOLUTION INTRAVENOUS ONCE
Status: COMPLETED | OUTPATIENT
Start: 2019-09-17 | End: 2019-09-17

## 2019-09-17 RX ORDER — FLUOROURACIL 50 MG/ML
400 INJECTION, SOLUTION INTRAVENOUS ONCE
Status: CANCELLED | OUTPATIENT
Start: 2019-09-17

## 2019-09-17 RX ORDER — CIPROFLOXACIN 250 MG/1
500 TABLET, FILM COATED ORAL 2 TIMES DAILY
Qty: 60 TABLET | Refills: 1 | Status: SHIPPED | OUTPATIENT
Start: 2019-09-17 | End: 2019-11-26 | Stop reason: SDUPTHER

## 2019-09-17 RX ORDER — DEXTROSE MONOHYDRATE 50 MG/ML
250 INJECTION, SOLUTION INTRAVENOUS ONCE
Status: CANCELLED | OUTPATIENT
Start: 2019-09-17

## 2019-09-17 RX ORDER — FAMOTIDINE 10 MG/ML
20 INJECTION, SOLUTION INTRAVENOUS 2 TIMES DAILY
Status: CANCELLED
Start: 2019-09-17

## 2019-09-17 RX ORDER — SODIUM CHLORIDE 9 MG/ML
250 INJECTION, SOLUTION INTRAVENOUS ONCE
Status: CANCELLED | OUTPATIENT
Start: 2019-09-17

## 2019-09-17 RX ORDER — PALONOSETRON 0.05 MG/ML
0.25 INJECTION, SOLUTION INTRAVENOUS ONCE
Status: CANCELLED | OUTPATIENT
Start: 2019-09-17

## 2019-09-17 RX ORDER — FAMOTIDINE 10 MG/ML
20 INJECTION, SOLUTION INTRAVENOUS 2 TIMES DAILY
Status: DISCONTINUED | OUTPATIENT
Start: 2019-09-17 | End: 2019-09-17 | Stop reason: HOSPADM

## 2019-09-17 RX ORDER — DIPHENHYDRAMINE HYDROCHLORIDE 50 MG/ML
50 INJECTION INTRAMUSCULAR; INTRAVENOUS AS NEEDED
Status: CANCELLED | OUTPATIENT
Start: 2019-09-17

## 2019-09-17 RX ADMIN — DEXTROSE MONOHYDRATE 250 ML: 5 INJECTION, SOLUTION INTRAVENOUS at 10:00

## 2019-09-17 RX ADMIN — DEXAMETHASONE SODIUM PHOSPHATE 12 MG: 10 INJECTION, SOLUTION INTRAMUSCULAR; INTRAVENOUS at 11:22

## 2019-09-17 RX ADMIN — OXALIPLATIN 170 MG: 5 INJECTION, SOLUTION, CONCENTRATE INTRAVENOUS at 11:47

## 2019-09-17 RX ADMIN — FLUOROURACIL 810 MG: 50 INJECTION, SOLUTION INTRAVENOUS at 13:54

## 2019-09-17 RX ADMIN — SODIUM CHLORIDE 250 ML: 9 INJECTION, SOLUTION INTRAVENOUS at 10:42

## 2019-09-17 RX ADMIN — FLUOROURACIL 4850 MG: 50 INJECTION, SOLUTION INTRAVENOUS at 13:54

## 2019-09-17 RX ADMIN — DIPHENHYDRAMINE HYDROCHLORIDE 50 MG: 50 INJECTION, SOLUTION INTRAMUSCULAR; INTRAVENOUS at 10:00

## 2019-09-17 RX ADMIN — PALONOSETRON 0.25 MG: 0.05 INJECTION, SOLUTION INTRAVENOUS at 11:19

## 2019-09-17 RX ADMIN — LEUCOVORIN CALCIUM 810 MG: 350 INJECTION, POWDER, LYOPHILIZED, FOR SOLUTION INTRAMUSCULAR; INTRAVENOUS at 11:47

## 2019-09-17 RX ADMIN — FAMOTIDINE 20 MG: 10 INJECTION INTRAVENOUS at 10:31

## 2019-09-17 RX ADMIN — TRASTUZUMAB 350 MG: 150 INJECTION, POWDER, LYOPHILIZED, FOR SOLUTION INTRAVENOUS at 10:40

## 2019-09-17 NOTE — PROGRESS NOTES
"  Subjective     REASON FOR follow up:1.  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  Currently receiving palliative FOLFOX therapy.    .2.COLOVESICAL FISTULA    3. DVT R AND LEFT LE ON ANTICOAGULANT: THROMBOPHILIA OF MALIGNANCY    History of Present Illness Mr. Garcia is a 63-year-old gentleman with the above-mentioned history is here today in anticipation of cycle 5 FOLFOX.  He reports that he has been doing fairly well.  He continues on Cipro 250 mg twice daily.  He also continues on Lovenox injections.  He denies any bleeding issues.  He denies fevers or chills.  He does report some intermittent tingling and numbness in his toes bilaterally.  He states he notices it more in the evenings, but has resolved by morning.  He denies any numbness or tingling in his fingers.  Denies issues with nausea, vomiting, diarrhea, or constipation.      Past Medical History:   Diagnosis Date   • Arthritis    • Elevated PSA    • Esophageal mass    • H/O Lung nodule    • Hepatitis     CHILD--PT STATED \"I THINK IT WAS A.\"   • History of pneumonia    • Hx of blood clots 08/10/2019   • Hyperlipidemia    • Hypertension    • Liver cancer (CMS/HCC)    • Prostate cancer (CMS/HCC)         Past Surgical History:   Procedure Laterality Date   • HERNIA REPAIR  1999   • PROSTATE SURGERY  03/2008   • VENOUS ACCESS DEVICE (PORT) INSERTION N/A 7/16/2019    Procedure: INSERTION VENOUS ACCESS DEVICE WITH FLUORO AND EGD WITH BIOPSY;  Surgeon: Mary Brito MD;  Location: [facility];  Service: Thoracic        Current Outpatient Medications on File Prior to Visit   Medication Sig Dispense Refill   • enoxaparin (LOVENOX) 80 MG/0.8ML solution syringe Inject 0.8 mL under the skin into the appropriate area as directed Every 12 (Twelve) Hours. 48 mL 11   • ondansetron (ZOFRAN) 4 MG tablet Take 1 tablet by mouth Every 8 (Eight) Hours As Needed for Nausea or Vomiting. 30 tablet 2 "   • Probiotic Product (PROBIOTIC DAILY PO) Take  by mouth Daily.     • [DISCONTINUED] ciprofloxacin (CIPRO) 250 MG tablet Take 2 tablets by mouth 2 (Two) Times a Day. 1 tablet (Patient taking differently: Take 250 mg by mouth 2 (Two) Times a Day. 1 tablet) 60 tablet 1   • HYDROcodone-acetaminophen (NORCO) 5-325 MG per tablet Take 1-2 tablets by mouth Every 4 (Four) Hours As Needed (Pain). 10 tablet 0     No current facility-administered medications on file prior to visit.         ALLERGIES:  No Known Allergies     Social History     Socioeconomic History   • Marital status: Single     Spouse name: Not on file   • Number of children: Not on file   • Years of education: High school   • Highest education level: Not on file   Occupational History   • Occupation: [employer]     Employer: [employer]   Tobacco Use   • Smoking status: Current Every Day Smoker     Packs/day: 1.00     Years: 40.00     Pack years: 40.00     Types: Cigarettes   • Smokeless tobacco: Never Used   Substance and Sexual Activity   • Alcohol use: Yes     Alcohol/week: 16.8 oz     Types: 28 Cans of beer per week     Comment: NOT RECENTLY   • Drug use: No        Family History   Problem Relation Age of Onset   • Hypertension Mother    • Stroke Mother    • Hypertension Other    • Lung disease Other    • Prostate cancer Other    • Lung cancer Father    • Malig Hyperthermia Neg Hx         Review of Systems   Constitutional: Negative for activity change, appetite change, chills, fatigue and fever.   HENT: Positive for dental problem. Negative for mouth sores, nosebleeds and trouble swallowing.    Respiratory: Negative for cough and shortness of breath.    Cardiovascular: Negative for chest pain and leg swelling.   Gastrointestinal: Negative for abdominal pain, constipation, diarrhea, nausea and vomiting.   Genitourinary: Negative for difficulty urinating.   Skin: Negative for rash.   Neurological: Negative for dizziness, weakness and numbness.  "  Hematological: Negative for adenopathy. Bruises/bleeds easily.   Psychiatric/Behavioral: Negative for sleep disturbance.                  Objective     Vitals:    09/17/19 0842   BP: 158/88   Pulse: 90   Resp: 16   Temp: 97.3 °F (36.3 °C)   TempSrc: Oral   SpO2: 98%   Weight: 83.1 kg (183 lb 1.6 oz)   Height: 174 cm (68.5\")   PainSc: 0-No pain     Current Status 9/17/2019   ECOG score 0       Physical Exam   Constitutional: He is oriented to person, place, and time. He appears well-developed and well-nourished. No distress.   HENT:   Head: Normocephalic and atraumatic.   Mouth/Throat: Oropharynx is clear and moist and mucous membranes are normal. No oropharyngeal exudate.   Poor dentition   Eyes: Pupils are equal, round, and reactive to light.   Neck: Normal range of motion.   Cardiovascular: Normal rate, regular rhythm and normal heart sounds.   No murmur heard.  Pulmonary/Chest: Effort normal and breath sounds normal. No respiratory distress. He has no wheezes. He has no rhonchi. He has no rales.   Benign right chest medi-port   Abdominal: Soft. Normal appearance and bowel sounds are normal. He exhibits no distension. There is no hepatosplenomegaly.   Musculoskeletal: Normal range of motion. He exhibits no edema.   Neurological: He is alert and oriented to person, place, and time.   Skin: Skin is warm and dry. No rash noted.   Psychiatric: He has a normal mood and affect.   Vitals reviewed.        RECENT LABS:  Hematology WBC   Date Value Ref Range Status   09/17/2019 5.80 3.40 - 10.80 10*3/mm3 Final   02/02/2019 13.4 (H) 3.4 - 10.8 x10E3/uL Final     RBC   Date Value Ref Range Status   09/17/2019 4.56 4.14 - 5.80 10*6/mm3 Final   02/02/2019 4.81 4.14 - 5.80 x10E6/uL Final     Hemoglobin   Date Value Ref Range Status   09/17/2019 13.0 13.0 - 17.7 g/dL Final     Hematocrit   Date Value Ref Range Status   09/17/2019 42.0 37.5 - 51.0 % Final     Platelets   Date Value Ref Range Status   09/17/2019 111 (L) 140 - 450 " 10*3/mm3 Final          Interpretation Summary DOPPLER LE    · Acute right lower extremity superficial thrombophlebitis noted in the varicosity (above knee).  · Acute right lower extremity deep vein thrombosis noted in the popliteal, posterior tibial and peroneal.  · Acute left lower extremity deep vein thrombosis noted in the proximal femoral, mid femoral, distal femoral, popliteal, posterial tibial, peroneal and gastrocnemius/soleal.  · All other veins appeared normal bilaterally.     Tissue Pathology Exam [[id number]] (Order [id number])   Order   Date: 7/16/2019 Department: [facility] MAIN OR Released By: Claudine Echeverria RN Authorizing: Mary Brito MD   Reprint Order Requisition     Tissue Pathology Exam (Order #[id number]) on 7/16/19       Tissue Pathology Exam: [id number]   Order: [id number]   Collected:  7/16/2019 16:30   Status:  Edited Result - FINAL   Visible to patient:  No (Not Released)   Dx:  Malignant neoplasm of esophagus, unsp...   Component    Addendum 2   Please see the completely scanned HER2 FISH analysis from [facility] below.       Addendum electronically signed by Taty Munoz MD on 7/26/2019 at 1220   Addendum   HER2 (by immunohistochemistry) performed on block 1A for tumor is equivocal (score 2+).      Methods:  [place] Food and Drug Administration (FDA) cleared (specify test/vendor): [employer]   Primary Antibody: 4B5     HER2 by FISH will be performed and those results will be reported separately.             Addendum electronically signed by Taty Munoz MD on 7/22/2019 at 1509   Final Diagnosis   1. Esophagus, 35 Cm from the Lip, Biopsy:               A. INVASIVE MODERATELY DIFFERENTIATED ADENOCARCINOMA (see Comment).     jab/jse    Electronically signed by Taty Munoz MD on 7/19/2019 at 1642   Comment    Immunohistochemistry was performed on block 1A and the tumor is positive for CK7 (patchy) and negative for CK20 and NKX3.1. These results are consistent with a  primary esophageal adenocarcinoma.      HER2 by immunohistochemistry will be performed and reported in an addendum.     This case was shared in internal consultation with Nasreen Lua, Korin, and Mary who concur.     pilar/zan    Gross Description    1.  The specimen is received in formalin labeled with the patient's name and further designated '35cm from the lip biopsy' are multiple small fragments of gray-tan tissue. The specimen is submitted for embedding as received.      Microscopic Description    Performed, incorporated in diagnosis.    Resulting Agency [facility]         Specimen Collected: 07/16/19 16:30 Last Resulted: 07/26/19 12:20       Order Details      View Encounter      Lab and Collection Details      Routing      Result History            Scans on Order [id number]     Document on 7/26/2019 12:20 PM by Taty Munoz MD   Scan on 7/26/2019  8:25 AM by Telma Pinzon R: HER2 BY FISHScan on 7/26/2019  8:25 AM by Telma Pinzon R: HER2 BY FISH         7/26/2019 12:20 PM     Component   Addendum 2   Please see the completely scanned HER2 FISH analysis from [facility] below.       Addendum   HER2 (by immunohistochemistry) performed on block 1A for tumor is equivocal (score 2+).      Methods:  [place] Food and Drug Administration (FDA) cleared (specify test/vendor): [place]   Primary Antibody: 4B5     HER2 by FISH will be performed and those results will be reported separately.             Case Report   Surgical Pathology Report                         Case: [id number]                                   Authorizing Provider:  Mary Brito MD        Collected:           07/16/2019 04:30 PM           Ordering Location:     [facility]  Received:            07/16/2019 10:15 PM                                  MAIN OR                                                                       Pathologist:           Taty Munoz MD                                                           Specimen:     Esophagus, 35CM FROM THE LIP                                                            Final Diagnosis   1. Esophagus, 35 Cm from the Lip, Biopsy:               A. INVASIVE MODERATELY DIFFERENTIATED ADENOCARCINOMA (see Comment).     pilar/zan    Comment   Immunohistochemistry was performed on block 1A and the tumor is positive for CK7 (patchy) and negative for CK20 and NKX3.1. These results are consistent with a primary esophageal adenocarcinoma.      HER2 by immunohistochemistry will be performed and reported in an addendum.     This case was shared in internal consultation with Nasreen Lua, Korin, and Mary who concur.     pilar/zan    Gross Description   1.  The specimen is received in formalin labeled with the patient's name and further designated '35cm from the lip biopsy' are multiple small fragments of gray-tan tissue. The specimen is submitted for embedding as received.      Microscopic Description   Performed, incorporated in diagnosis.    Scans on Order [id number]     Document on 7/26/2019 12:20 PM by Taty Munoz MD   Scan on 7/26/2019  8:25 AM by Telma Pinzon R: HER2 BY FISHScan on 7/26/2019  8:25 AM by Telma Pinzon R: HER2 BY FISH       Assessment/Plan    1.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 Almost 90% of the liver is replaced by tumor.  Patient is here 9/17/2019 in anticipation of cycle 5 FOLFOX.  He will be having scans later this week and follow-up with Dr. Haynes next week to review his scan results, and further discuss plan of care.  Overall, he has tolerated treatment quite well.     2. Bilateral deep vein thrombosis in the lower extremities. He was anticoagulated with Lovenox and continues twice daily.  Tolerating Lovenox well.    3. Colovesical fistula.  He has been prescribed Cipro 250 mg twice daily, which he continues.      RECOMMENDATIONS:  1. Proceed with cycle 5 FOLFOX Herceptin today.  2. Continue Lovenox twice daily.  3. Continue Cipro 250  mg twice daily.  4. Patient will have CT scans of the chest and abdomen to evaluate his response later this week.  5. Return in 1 week for follow-up visit with Dr. Haynes to review his scan results, and further discuss plan of care.

## 2019-09-19 ENCOUNTER — HOSPITAL ENCOUNTER (OUTPATIENT)
Dept: PET IMAGING | Facility: HOSPITAL | Age: 64
Discharge: HOME | End: 2019-09-19
Admitting: INTERNAL MEDICINE

## 2019-09-19 ENCOUNTER — INFUSION (OUTPATIENT)
Dept: ONCOLOGY | Facility: HOSPITAL | Age: 64
End: 2019-09-19

## 2019-09-19 DIAGNOSIS — I82.431 ACUTE DEEP VEIN THROMBOSIS (DVT) OF RIGHT POPLITEAL VEIN (HCC): ICD-10-CM

## 2019-09-19 DIAGNOSIS — C15.5 MALIGNANT NEOPLASM OF LOWER THIRD OF ESOPHAGUS (HCC): Primary | ICD-10-CM

## 2019-09-19 DIAGNOSIS — C15.5 MALIGNANT NEOPLASM OF LOWER THIRD OF ESOPHAGUS (HCC): ICD-10-CM

## 2019-09-19 DIAGNOSIS — C78.7 LIVER METASTASES: ICD-10-CM

## 2019-09-19 DIAGNOSIS — Z45.2 ENCOUNTER FOR ADJUSTMENT OR MANAGEMENT OF VASCULAR ACCESS DEVICE: ICD-10-CM

## 2019-09-19 LAB — CREAT BLDA-MCNC: 0.6 MG/DL (ref 0.6–1.3)

## 2019-09-19 PROCEDURE — 82565 ASSAY OF CREATININE: CPT

## 2019-09-19 PROCEDURE — 25010000002 IOPAMIDOL 61 % SOLUTION: Performed by: INTERNAL MEDICINE

## 2019-09-19 PROCEDURE — 74160 CT ABDOMEN W/CONTRAST: CPT

## 2019-09-19 PROCEDURE — 71260 CT THORAX DX C+: CPT

## 2019-09-19 RX ORDER — SODIUM CHLORIDE 0.9 % (FLUSH) 0.9 %
10 SYRINGE (ML) INJECTION AS NEEDED
Status: DISCONTINUED | OUTPATIENT
Start: 2019-09-19 | End: 2019-09-19 | Stop reason: HOSPADM

## 2019-09-19 RX ORDER — SODIUM CHLORIDE 0.9 % (FLUSH) 0.9 %
10 SYRINGE (ML) INJECTION AS NEEDED
Status: CANCELLED | OUTPATIENT
Start: 2019-09-19

## 2019-09-19 RX ADMIN — IOPAMIDOL 85 ML: 612 INJECTION, SOLUTION INTRAVENOUS at 13:39

## 2019-09-19 RX ADMIN — Medication 500 UNITS: at 14:17

## 2019-09-19 RX ADMIN — SODIUM CHLORIDE, PRESERVATIVE FREE 10 ML: 5 INJECTION INTRAVENOUS at 14:17

## 2019-09-25 ENCOUNTER — INFUSION (OUTPATIENT)
Dept: ONCOLOGY | Facility: HOSPITAL | Age: 64
End: 2019-09-25

## 2019-09-25 ENCOUNTER — OFFICE VISIT (OUTPATIENT)
Dept: ONCOLOGY | Facility: CLINIC | Age: 64
End: 2019-09-25

## 2019-09-25 VITALS
WEIGHT: 181.3 LBS | HEART RATE: 96 BPM | DIASTOLIC BLOOD PRESSURE: 108 MMHG | TEMPERATURE: 97.8 F | OXYGEN SATURATION: 98 % | RESPIRATION RATE: 12 BRPM | SYSTOLIC BLOOD PRESSURE: 198 MMHG | HEIGHT: 69 IN | BODY MASS INDEX: 26.85 KG/M2

## 2019-09-25 DIAGNOSIS — I82.412 ACUTE DEEP VEIN THROMBOSIS (DVT) OF FEMORAL VEIN OF LEFT LOWER EXTREMITY (HCC): ICD-10-CM

## 2019-09-25 DIAGNOSIS — C78.7 LIVER METASTASES: ICD-10-CM

## 2019-09-25 DIAGNOSIS — C15.5 MALIGNANT NEOPLASM OF LOWER THIRD OF ESOPHAGUS (HCC): ICD-10-CM

## 2019-09-25 DIAGNOSIS — N32.1 COLOVESICAL FISTULA: ICD-10-CM

## 2019-09-25 DIAGNOSIS — I82.431 ACUTE DEEP VEIN THROMBOSIS (DVT) OF RIGHT POPLITEAL VEIN (HCC): ICD-10-CM

## 2019-09-25 DIAGNOSIS — Z45.2 ENCOUNTER FOR ADJUSTMENT OR MANAGEMENT OF VASCULAR ACCESS DEVICE: ICD-10-CM

## 2019-09-25 DIAGNOSIS — Z45.2 ENCOUNTER FOR ADJUSTMENT OR MANAGEMENT OF VASCULAR ACCESS DEVICE: Primary | ICD-10-CM

## 2019-09-25 DIAGNOSIS — D63.8 ANEMIA, CHRONIC DISEASE: ICD-10-CM

## 2019-09-25 DIAGNOSIS — C15.5 MALIGNANT NEOPLASM OF LOWER THIRD OF ESOPHAGUS (HCC): Primary | ICD-10-CM

## 2019-09-25 LAB
ALBUMIN SERPL-MCNC: 3.9 G/DL (ref 3.5–5.2)
ALBUMIN/GLOB SERPL: 1.3 G/DL (ref 1.1–2.4)
ALP SERPL-CCNC: 96 U/L (ref 38–116)
ALT SERPL W P-5'-P-CCNC: 30 U/L (ref 0–41)
ANION GAP SERPL CALCULATED.3IONS-SCNC: 13.4 MMOL/L (ref 5–15)
AST SERPL-CCNC: 24 U/L (ref 0–40)
BASOPHILS # BLD AUTO: 0.03 10*3/MM3 (ref 0–0.2)
BASOPHILS NFR BLD AUTO: 0.7 % (ref 0–1.5)
BILIRUB SERPL-MCNC: 0.3 MG/DL (ref 0.2–1.2)
BUN BLD-MCNC: 9 MG/DL (ref 6–20)
BUN/CREAT SERPL: 15 (ref 7.3–30)
CALCIUM SPEC-SCNC: 9.2 MG/DL (ref 8.5–10.2)
CHLORIDE SERPL-SCNC: 99 MMOL/L (ref 98–107)
CO2 SERPL-SCNC: 24.6 MMOL/L (ref 22–29)
CREAT BLD-MCNC: 0.6 MG/DL (ref 0.7–1.3)
DEPRECATED RDW RBC AUTO: 69 FL (ref 37–54)
EOSINOPHIL # BLD AUTO: 0.22 10*3/MM3 (ref 0–0.4)
EOSINOPHIL NFR BLD AUTO: 4.8 % (ref 0.3–6.2)
ERYTHROCYTE [DISTWIDTH] IN BLOOD BY AUTOMATED COUNT: 21.4 % (ref 12.3–15.4)
GFR SERPL CREATININE-BSD FRML MDRD: 137 ML/MIN/1.73
GLOBULIN UR ELPH-MCNC: 3 GM/DL (ref 1.8–3.5)
GLUCOSE BLD-MCNC: 125 MG/DL (ref 74–124)
HCT VFR BLD AUTO: 43 % (ref 37.5–51)
HGB BLD-MCNC: 13.5 G/DL (ref 13–17.7)
IMM GRANULOCYTES # BLD AUTO: 0.12 10*3/MM3 (ref 0–0.05)
IMM GRANULOCYTES NFR BLD AUTO: 2.6 % (ref 0–0.5)
LYMPHOCYTES # BLD AUTO: 1.62 10*3/MM3 (ref 0.7–3.1)
LYMPHOCYTES NFR BLD AUTO: 35.2 % (ref 19.6–45.3)
MCH RBC QN AUTO: 28.5 PG (ref 26.6–33)
MCHC RBC AUTO-ENTMCNC: 31.4 G/DL (ref 31.5–35.7)
MCV RBC AUTO: 90.7 FL (ref 79–97)
MONOCYTES # BLD AUTO: 0.46 10*3/MM3 (ref 0.1–0.9)
MONOCYTES NFR BLD AUTO: 10 % (ref 5–12)
NEUTROPHILS # BLD AUTO: 2.15 10*3/MM3 (ref 1.7–7)
NEUTROPHILS NFR BLD AUTO: 46.7 % (ref 42.7–76)
NRBC BLD AUTO-RTO: 0 /100 WBC (ref 0–0.2)
PLATELET # BLD AUTO: 148 10*3/MM3 (ref 140–450)
PMV BLD AUTO: 8.9 FL (ref 6–12)
POTASSIUM BLD-SCNC: 3.4 MMOL/L (ref 3.5–4.7)
PROT SERPL-MCNC: 6.9 G/DL (ref 6.3–8)
RBC # BLD AUTO: 4.74 10*6/MM3 (ref 4.14–5.8)
SODIUM BLD-SCNC: 137 MMOL/L (ref 134–145)
WBC NRBC COR # BLD: 4.6 10*3/MM3 (ref 3.4–10.8)

## 2019-09-25 PROCEDURE — 99215 OFFICE O/P EST HI 40 MIN: CPT | Performed by: INTERNAL MEDICINE

## 2019-09-25 PROCEDURE — 85025 COMPLETE CBC W/AUTO DIFF WBC: CPT | Performed by: INTERNAL MEDICINE

## 2019-09-25 PROCEDURE — 80053 COMPREHEN METABOLIC PANEL: CPT | Performed by: INTERNAL MEDICINE

## 2019-09-25 PROCEDURE — 96523 IRRIG DRUG DELIVERY DEVICE: CPT | Performed by: INTERNAL MEDICINE

## 2019-09-25 PROCEDURE — 99406 BEHAV CHNG SMOKING 3-10 MIN: CPT | Performed by: INTERNAL MEDICINE

## 2019-09-25 RX ORDER — SODIUM CHLORIDE 0.9 % (FLUSH) 0.9 %
10 SYRINGE (ML) INJECTION AS NEEDED
Status: CANCELLED | OUTPATIENT
Start: 2019-09-25

## 2019-09-25 RX ORDER — FLUOROURACIL 50 MG/ML
400 INJECTION, SOLUTION INTRAVENOUS ONCE
Status: CANCELLED | OUTPATIENT
Start: 2019-10-01

## 2019-09-25 RX ORDER — SODIUM CHLORIDE 0.9 % (FLUSH) 0.9 %
10 SYRINGE (ML) INJECTION AS NEEDED
Status: DISCONTINUED | OUTPATIENT
Start: 2019-09-25 | End: 2019-09-25 | Stop reason: HOSPADM

## 2019-09-25 RX ORDER — FAMOTIDINE 10 MG/ML
20 INJECTION, SOLUTION INTRAVENOUS AS NEEDED
Status: CANCELLED | OUTPATIENT
Start: 2019-10-01

## 2019-09-25 RX ORDER — FAMOTIDINE 10 MG/ML
20 INJECTION, SOLUTION INTRAVENOUS 2 TIMES DAILY
Status: CANCELLED
Start: 2019-10-01

## 2019-09-25 RX ORDER — DIPHENHYDRAMINE HYDROCHLORIDE 50 MG/ML
50 INJECTION INTRAMUSCULAR; INTRAVENOUS AS NEEDED
Status: CANCELLED | OUTPATIENT
Start: 2019-10-01

## 2019-09-25 RX ORDER — PALONOSETRON 0.05 MG/ML
0.25 INJECTION, SOLUTION INTRAVENOUS ONCE
Status: CANCELLED | OUTPATIENT
Start: 2019-10-01

## 2019-09-25 RX ORDER — SODIUM CHLORIDE 9 MG/ML
250 INJECTION, SOLUTION INTRAVENOUS ONCE
Status: CANCELLED | OUTPATIENT
Start: 2019-10-01

## 2019-09-25 RX ORDER — DEXTROSE MONOHYDRATE 50 MG/ML
250 INJECTION, SOLUTION INTRAVENOUS ONCE
Status: CANCELLED | OUTPATIENT
Start: 2019-10-01

## 2019-09-25 RX ADMIN — Medication 500 UNITS: at 07:38

## 2019-09-25 RX ADMIN — SODIUM CHLORIDE, PRESERVATIVE FREE 10 ML: 5 INJECTION INTRAVENOUS at 07:38

## 2019-09-25 NOTE — PROGRESS NOTES
"  Subjective     REASON FOR follow up:1.  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  Currently receiving palliative FOLFOX AND HERCEPTIN therapy.    .2.COLOVESICAL FISTULA    3. 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  The patient also has had thrombophilia malignancy and colovesical fistula that was present at the time of diagnosis of his cancer.  He has had UTI related to this and has been chronically suppressed with ciprofloxacin.  In any event, the patient is here today stating he is still smoking a pack of cigarettes daily. His taste for food is not the best and he has minimal tingling in his toes and feet, but no sensory neuropathy in his hands or fingers.  His appetite and weight remain stable.  He has some ageusia.  He has occasional difficulty swallowing pieces of food not sufficiently processed in his mouth.  He has good bowel function, normal urination and no further swelling in his lower extremities.  He has not had any clinical bleeding on Lovenox and has had resolution of blood clots.  His port has not been an issue.  He has not had any cardiac toxicity.  He has had in the meantime CT scans for review today.       The patient otherwise has not had any other new problems.            Past Medical History:   Diagnosis Date   • Arthritis    • Elevated PSA    • Esophageal mass    • H/O Lung nodule    • Hepatitis     CHILD--PT STATED \"I THINK IT WAS A.\"   • History of pneumonia    • Hx of blood clots 08/10/2019   • Hyperlipidemia    • Hypertension    • Liver cancer (CMS/HCC)    • Prostate cancer (CMS/HCC)         Past Surgical History: "   Procedure Laterality Date   • HERNIA REPAIR  1999   • PROSTATE SURGERY  03/2008   • VENOUS ACCESS DEVICE (PORT) INSERTION N/A 7/16/2019    Procedure: INSERTION VENOUS ACCESS DEVICE WITH FLUORO AND EGD WITH BIOPSY;  Surgeon: Mary Brito MD;  Location: [facility] OR;  Service: Thoracic        Current Outpatient Medications on File Prior to Visit   Medication Sig Dispense Refill   • ciprofloxacin (CIPRO) 250 MG tablet Take 2 tablets by mouth 2 (Two) Times a Day. 1 tablet 60 tablet 1   • enoxaparin (LOVENOX) 80 MG/0.8ML solution syringe Inject 0.8 mL under the skin into the appropriate area as directed Every 12 (Twelve) Hours. 48 mL 11   • ondansetron (ZOFRAN) 4 MG tablet Take 1 tablet by mouth Every 8 (Eight) Hours As Needed for Nausea or Vomiting. 30 tablet 2   • Probiotic Product (PROBIOTIC DAILY PO) Take  by mouth Daily.     • [DISCONTINUED] HYDROcodone-acetaminophen (NORCO) 5-325 MG per tablet Take 1-2 tablets by mouth Every 4 (Four) Hours As Needed (Pain). 10 tablet 0     No current facility-administered medications on file prior to visit.         ALLERGIES:  No Known Allergies     Social History     Socioeconomic History   • Marital status: Single     Spouse name: Not on file   • Number of children: Not on file   • Years of education: High school   • Highest education level: Not on file   Occupational History   • Occupation: [employer]     Employer: [employer]   Tobacco Use   • Smoking status: Current Every Day Smoker     Packs/day: 1.00     Years: 40.00     Pack years: 40.00     Types: Cigarettes   • Smokeless tobacco: Never Used   Substance and Sexual Activity   • Alcohol use: Yes     Alcohol/week: 16.8 oz     Types: 28 Cans of beer per week     Comment: NOT RECENTLY   • Drug use: No        Family History   Problem Relation Age of Onset   • Hypertension Mother    • Stroke Mother    • Hypertension Other    • Lung disease Other    • Prostate cancer Other    • Lung cancer Father    • Malig Hyperthermia  Neg Hx          Review of Systems:        General: no fever, no chills, no fatigue,no weight changes, no lack of appetite.  Eyes: no epiphora, xerophthalmia,conjunctivitis, pain, glaucoma, blurred vision, blindness, secretion, photophobia, proptosis, diplopia.  Ears: no otorrhea, tinnitus, otorrhagia, deafness, pain, vertigo.  Nose: no rhinorrhea, no epistaxis, no alteration in perception of odors, no sinuses pressure.  Mouth: no alteration in gums or teeth,  No ulcers, no difficulty with mastication or deglut ion, no odynophagia.AGUSIA  Neck: no masses or pain, no thyroid alterations, no pain in muscles or arteries, no carotid odynia, no crepitation.  Respiratory: no cough, no sputum production,no dyspnea,no trepopnea, no pleuritic pain,no hemoptysis.  Heart: no syncope, no irregularity, no palpitations, no angina,no orthopnea,no paroxysmal nocturnal dyspnea.  Vascular Venous: no tenderness,no edema,no palpable cords,no postphlebitic syndrome, no skin changes no ulcerations.  Vascular Arterial: no distal ischemia, noclaudication, no gangrene, no neuropathic ischemic pain, no skin ulcers, no paleness no cyanosis.  GI: no dysphagia, no odynophagia, no regurgitation, no heartburn,no indigestion,no nausea,no vomiting,no hematemesis ,no melena,no jaundice,no distention, no obstipation,no enterorrhagia,no proctalgia,no anal  lesions, no changes in bowel habits.  : no frequency, no hesitancy, no hematuria, no discharge,no  pain.  Musculoskeletal: no muscle or tendon pain or inflammation,no  joint pain, no edema, no functional limitation,no fasciculations, no mass.  Neurologic: no headache, no seizures, noalterations on Craneal nerves, no motor deficit, no sensory deficit, normal coordination, no alteration in memory,normal orientation, calculation,normal writting, verbal and written language.  Skin: no rashes,no pruritus no localized lesions.  Psychiatric: no anxiety, no depression,no agitation, no delusions, proper  "insight.              Objective     Vitals:    09/25/19 0803   BP: (!) 198/108   Pulse: 96   Resp: 12   Temp: 97.8 °F (36.6 °C)   TempSrc: Oral   SpO2: 98%   Weight: 82.2 kg (181 lb 4.8 oz)   Height: 174 cm (68.5\")   PainSc: 0-No pain     Current Status 9/25/2019   ECOG score 0       Physical Exam:  GENERAL:  Well-developed, well-nourished  Patient  in no acute distress.   SKIN:  Warm, dry ,NO rashes,NO purpura ,NO petechiae.  HEENT:  Pupils were equal and reactive to light and accomodation, conjunctivas non injected, no pterigion, normal extraocular movements, normal visual acuity.   Mouth mucosa was moist, no exudates in oropharynx, normal gum line, normal roof of the mouth and pillars, normal papillations of the tongue.  NECK:  Supple with good range of motion; no thyromegaly or masses, no JVD or bruits, no cervical adenopathies.No carotid arteries pain, no carotid abnormal pulsation , NO arterial dance.  LYMPHATICS:  No cervical, NO supraclavicular, NO axillary,NO epitrochlear , NO inguinal adenopathy.  CHEST:  Normal excursion of both kailash thoraces, normal voice fremitus, no subcutaneous emphysema, normal axillas, no rashes or acanthosis nigricans. Lungs clear to percussion and auscultation, normal breath sounds bilaterally, no wheezing,NO crackles NO ronchi, NO stridor, NO rubs.  CARDIAC AND VASCULAR:  normal rate and regular rhythm, without murmurs,NO rubs NO S3 NO S4 right or left . Normal femoral, popliteal, pedis, brachial and carotid pulses.  ABDOMEN:  Soft, nontender with no organomegaly or masses, no ascites, no collateral circulation,no distention,no Dhruv sign, no abdominal pain, no inguinal hernias,no umbilical hernia, no abdominal bruits. Normal bowel sounds.LIVERNO LONGER PALPABLE  GENITAL: Not  Performed.  EXTREMITIES  AND SPINE:  No clubbing, cyanosis or edema, no deformities or pain .No kyphosis, scoliosis, deformities or pain in spine, ribs or pelvic bone.  NEUROLOGICAL:  Patient was awake, " alert, oriented to time, person and place.                RECENT LABS:  Hematology WBC   Date Value Ref Range Status   09/25/2019 4.60 3.40 - 10.80 10*3/mm3 Final   02/02/2019 13.4 (H) 3.4 - 10.8 x10E3/uL Final     RBC   Date Value Ref Range Status   09/25/2019 4.74 4.14 - 5.80 10*6/mm3 Final   02/02/2019 4.81 4.14 - 5.80 x10E6/uL Final     Hemoglobin   Date Value Ref Range Status   09/25/2019 13.5 13.0 - 17.7 g/dL Final     Hematocrit   Date Value Ref Range Status   09/25/2019 43.0 37.5 - 51.0 % Final     Platelets   Date Value Ref Range Status   09/25/2019 148 140 - 450 10*3/mm3 Final            CT CHEST W CONTRAST-, CT ABDOMEN W CONTRAST-     CLINICAL HISTORY: 63 year old male with history of metastatic esophageal  carcinoma. Patient also has history of prostate carcinoma.     TECHNIQUE: Spiral CT images were obtained through the chest and abdomen  with IV contrast only and were reconstructed in 3 mm thick slices.     Radiation dose reduction techniques were utilized, including automated  exposure control and exposure modulation based on body size.     COMPARISON: PET/CT imaging dated 07/15/2019. CT imaging of the chest  dated 07/08/2019. CT imaging of the abdomen and pelvis dated 07/02/2019.     FINDINGS: Again seen is moderate circumferential wall thickening  involving the distal esophagus immediately superior to the  gastroesophageal junction consistent with esophageal carcinoma. The  overall degree of wall thickening has diminished since the preceding  imaging studies. Multiple mildly enlarged lymph nodes within the  posterior mediastinum adjacent to the esophageal neoplasm of also  diminished in size. No lymphadenopathy is identified elsewhere within  the mediastinum or either hilar region or either axilla. The previous CT  scan demonstrated an 8 mm diameter noncalcified pulmonary nodule in the  inferior aspect of the right lower lobe. This has nearly completely  resolved, which would suggest it represents a  metastatic lesion  responding to therapy. No other lung nodules are identified. There are  no acute infiltrates or pleural effusions.     Numerous hepatic metastatic lesions show significant interval decrease  in size. For instance, a conglomerate mass of adjacent metastatic  lesions in the left lobe of the liver that measured 6.8 x 5.4 cm in  diameter on the previous study now measure only 3.8 x 3.2 cm in  diameter. A large metastatic lesion in the inferior aspect of the right  lobe of the liver that measured 12.2 x 6.3 cm in diameter on the  previous study now measures 8.2 x 5.2 cm. The spleen and pancreas and  kidneys and adrenal glands are unremarkable. A few mildly enlarged  periaortic and pericaval lymph nodes in the retroperitoneum appear  slightly smaller. The visualized small and large bowel are unremarkable  except for multiple diverticuli in the left side of the colon..     IMPRESSION:  Overall significant improvement since the preceding PET/CT  and CT imaging performed in July of this year. The primary esophageal  neoplasm has diminished in size, as have several mildly enlarged lymph  nodes in the mediastinum adjacent to the neoplasm. A single small right  lower lobe pulmonary nodule has nearly completely resolved. Numerous  hepatic metastases also appear significantly smaller.     This report was finalized on 9/20/2019 3:22 PM by Dr. Han Sheth M.D.      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 Almost 90% of the liver WAS replaced by tumor. The patient has been undergoing chemotherapy with FOLFOX and Herceptin and has had excellent response clinically and radiologically.  Today, the patient on clinical examination has had resolution of his giant hepatomegaly that was nodular and mildly tender in the right upper quadrant of his abdomen.  His liver has reduced in size very dramatically.  Most importantly, the patient has had resolution of his  cancer-related pain.  His performance status is ECOG 0.  He is no longer taking pain medicine and developed thrombophilia associated with malignancy that has been treated with Lovenox successfully.  He has some ageusia related to treatment and this will require modification in his diet.  He remains smoking 1 pack of cigarettes daily.  He feels very good.  Otherwise, he has had a rise in blood pressure.  He has been hypertensive since he was 31 years old and now that his liver is cleaning up from cancer his blood pressure is rising again.  I have taken his blood pressure personally and it was 170/105.  He has lisinopril 10 mg at home and he will initiate this at 10 mg daily for the time being.  He will continue rechecking his blood pressure.  If his blood pressure remains elevated he will add metoprolol also that he had at home previously.      From the point of view of his ageusia I asked him to modify his diet for food that has acidity to see if this makes a difference.    From the point of view of pain he is no longer taking pain medication.      From the point of view of his thrombophilia-associated malignancy and vein thrombosis he continues Lovenox twice daily and will remain on this medication for the time being.  I am not planning to change this medicine whatsoever.      I advised him regarding smoking cessation for more than 5 minutes.  As stated, we will be glad to send him to be seen by the smoking cessation nurse.  He does not want to do that, but I pointed out to him that it is crucial regarding treatment of his disease for him to discontinue cigarettes.      I discussed with him the need for him to continue his chemotherapy with FOLFOX/Herceptin every 2 weeks for 2 more months and then reassess him radiologically with a PET scan.    Depending on the circumstances then we will decide when to send him to be seen by general surgery regarding treatment of his colovesical fistula.  So far, urinalysis done  recently has not documented any new bacterial contamination of the urinary tract and he will remain on ciprofloxacin prophylactically 250 mg p.o. b.i.d.    He will resume his chemotherapy next week and in preparation for that and also in 4 weeks from now he will have an echocardiogram to followup on left ventricular ejection fraction while on Herceptin.     I discussed all these facts with the patient and his sisters, reviewed the CT scans in the [facility] and reviewed the pictures with them.  The amount of improvement is very dramatic and goes along with the clinical picture regarding resolution of his giant hepatomegaly.      I encouraged the patient regarding smoking cessation.

## 2019-10-01 ENCOUNTER — INFUSION (OUTPATIENT)
Dept: ONCOLOGY | Facility: HOSPITAL | Age: 64
End: 2019-10-01

## 2019-10-01 VITALS
DIASTOLIC BLOOD PRESSURE: 75 MMHG | SYSTOLIC BLOOD PRESSURE: 118 MMHG | TEMPERATURE: 97.7 F | WEIGHT: 181 LBS | BODY MASS INDEX: 27.12 KG/M2 | OXYGEN SATURATION: 96 % | HEART RATE: 96 BPM

## 2019-10-01 DIAGNOSIS — C78.7 LIVER METASTASES: Primary | ICD-10-CM

## 2019-10-01 DIAGNOSIS — C15.5 MALIGNANT NEOPLASM OF LOWER THIRD OF ESOPHAGUS (HCC): ICD-10-CM

## 2019-10-01 LAB
ALBUMIN SERPL-MCNC: 3.9 G/DL (ref 3.5–5.2)
ALBUMIN/GLOB SERPL: 1.3 G/DL (ref 1.1–2.4)
ALP SERPL-CCNC: 104 U/L (ref 38–116)
ALT SERPL W P-5'-P-CCNC: 30 U/L (ref 0–41)
ANION GAP SERPL CALCULATED.3IONS-SCNC: 11.3 MMOL/L (ref 5–15)
AST SERPL-CCNC: 24 U/L (ref 0–40)
BASOPHILS # BLD AUTO: 0.04 10*3/MM3 (ref 0–0.2)
BASOPHILS NFR BLD AUTO: 0.6 % (ref 0–1.5)
BILIRUB SERPL-MCNC: 0.3 MG/DL (ref 0.2–1.2)
BUN BLD-MCNC: 6 MG/DL (ref 6–20)
BUN/CREAT SERPL: 10.2 (ref 7.3–30)
CALCIUM SPEC-SCNC: 9.4 MG/DL (ref 8.5–10.2)
CHLORIDE SERPL-SCNC: 95 MMOL/L (ref 98–107)
CO2 SERPL-SCNC: 26.7 MMOL/L (ref 22–29)
CREAT BLD-MCNC: 0.59 MG/DL (ref 0.7–1.3)
DEPRECATED RDW RBC AUTO: 72.4 FL (ref 37–54)
EOSINOPHIL # BLD AUTO: 0.12 10*3/MM3 (ref 0–0.4)
EOSINOPHIL NFR BLD AUTO: 1.7 % (ref 0.3–6.2)
ERYTHROCYTE [DISTWIDTH] IN BLOOD BY AUTOMATED COUNT: 22.1 % (ref 12.3–15.4)
GFR SERPL CREATININE-BSD FRML MDRD: 139 ML/MIN/1.73
GLOBULIN UR ELPH-MCNC: 3 GM/DL (ref 1.8–3.5)
GLUCOSE BLD-MCNC: 91 MG/DL (ref 74–124)
HCT VFR BLD AUTO: 42.1 % (ref 37.5–51)
HGB BLD-MCNC: 13.7 G/DL (ref 13–17.7)
IMM GRANULOCYTES # BLD AUTO: 0.06 10*3/MM3 (ref 0–0.05)
IMM GRANULOCYTES NFR BLD AUTO: 0.8 % (ref 0–0.5)
LYMPHOCYTES # BLD AUTO: 1.9 10*3/MM3 (ref 0.7–3.1)
LYMPHOCYTES NFR BLD AUTO: 26.2 % (ref 19.6–45.3)
MCH RBC QN AUTO: 29.5 PG (ref 26.6–33)
MCHC RBC AUTO-ENTMCNC: 32.5 G/DL (ref 31.5–35.7)
MCV RBC AUTO: 90.7 FL (ref 79–97)
MONOCYTES # BLD AUTO: 0.92 10*3/MM3 (ref 0.1–0.9)
MONOCYTES NFR BLD AUTO: 12.7 % (ref 5–12)
NEUTROPHILS # BLD AUTO: 4.21 10*3/MM3 (ref 1.7–7)
NEUTROPHILS NFR BLD AUTO: 58 % (ref 42.7–76)
NRBC BLD AUTO-RTO: 0 /100 WBC (ref 0–0.2)
PLATELET # BLD AUTO: 106 10*3/MM3 (ref 140–450)
PMV BLD AUTO: 8.6 FL (ref 6–12)
POTASSIUM BLD-SCNC: 3.5 MMOL/L (ref 3.5–4.7)
PROT SERPL-MCNC: 6.9 G/DL (ref 6.3–8)
RBC # BLD AUTO: 4.64 10*6/MM3 (ref 4.14–5.8)
SODIUM BLD-SCNC: 133 MMOL/L (ref 134–145)
WBC NRBC COR # BLD: 7.25 10*3/MM3 (ref 3.4–10.8)

## 2019-10-01 PROCEDURE — 96415 CHEMO IV INFUSION ADDL HR: CPT | Performed by: INTERNAL MEDICINE

## 2019-10-01 PROCEDURE — 25010000002 DIPHENHYDRAMINE 1 EACH BAG: Performed by: INTERNAL MEDICINE

## 2019-10-01 PROCEDURE — 25010000002 PALONOSETRON PER 25 MCG: Performed by: INTERNAL MEDICINE

## 2019-10-01 PROCEDURE — 25010000002 DEXAMETHASONE SODIUM PHOSPHATE 100 MG/10ML SOLUTION: Performed by: INTERNAL MEDICINE

## 2019-10-01 PROCEDURE — 85025 COMPLETE CBC W/AUTO DIFF WBC: CPT | Performed by: INTERNAL MEDICINE

## 2019-10-01 PROCEDURE — 25010000002 TRASTUZUMAB PER 10 MG: Performed by: INTERNAL MEDICINE

## 2019-10-01 PROCEDURE — 25010000002 LEUCOVORIN CALCIUM PER 50 MG: Performed by: INTERNAL MEDICINE

## 2019-10-01 PROCEDURE — 25010000002 FLUOROURACIL PER 500 MG: Performed by: INTERNAL MEDICINE

## 2019-10-01 PROCEDURE — 80053 COMPREHEN METABOLIC PANEL: CPT | Performed by: INTERNAL MEDICINE

## 2019-10-01 PROCEDURE — 96375 TX/PRO/DX INJ NEW DRUG ADDON: CPT | Performed by: INTERNAL MEDICINE

## 2019-10-01 PROCEDURE — 25010000002 OXALIPLATIN PER 0.5 MG: Performed by: INTERNAL MEDICINE

## 2019-10-01 PROCEDURE — 96411 CHEMO IV PUSH ADDL DRUG: CPT | Performed by: INTERNAL MEDICINE

## 2019-10-01 PROCEDURE — 96417 CHEMO IV INFUS EACH ADDL SEQ: CPT | Performed by: INTERNAL MEDICINE

## 2019-10-01 PROCEDURE — 96413 CHEMO IV INFUSION 1 HR: CPT | Performed by: INTERNAL MEDICINE

## 2019-10-01 PROCEDURE — 96416 CHEMO PROLONG INFUSE W/PUMP: CPT | Performed by: INTERNAL MEDICINE

## 2019-10-01 PROCEDURE — 96368 THER/DIAG CONCURRENT INF: CPT | Performed by: INTERNAL MEDICINE

## 2019-10-01 RX ORDER — DEXTROSE MONOHYDRATE 50 MG/ML
250 INJECTION, SOLUTION INTRAVENOUS ONCE
Status: COMPLETED | OUTPATIENT
Start: 2019-10-01 | End: 2019-10-01

## 2019-10-01 RX ORDER — LISINOPRIL AND HYDROCHLOROTHIAZIDE 20; 12.5 MG/1; MG/1
1 TABLET ORAL DAILY
COMMUNITY
End: 2019-10-29 | Stop reason: SDUPTHER

## 2019-10-01 RX ORDER — FAMOTIDINE 10 MG/ML
20 INJECTION, SOLUTION INTRAVENOUS 2 TIMES DAILY
Status: DISCONTINUED | OUTPATIENT
Start: 2019-10-01 | End: 2019-10-01 | Stop reason: HOSPADM

## 2019-10-01 RX ORDER — PALONOSETRON 0.05 MG/ML
0.25 INJECTION, SOLUTION INTRAVENOUS ONCE
Status: COMPLETED | OUTPATIENT
Start: 2019-10-01 | End: 2019-10-01

## 2019-10-01 RX ORDER — FLUOROURACIL 50 MG/ML
400 INJECTION, SOLUTION INTRAVENOUS ONCE
Status: COMPLETED | OUTPATIENT
Start: 2019-10-01 | End: 2019-10-01

## 2019-10-01 RX ORDER — SODIUM CHLORIDE 9 MG/ML
250 INJECTION, SOLUTION INTRAVENOUS ONCE
Status: COMPLETED | OUTPATIENT
Start: 2019-10-01 | End: 2019-10-01

## 2019-10-01 RX ADMIN — LEUCOVORIN CALCIUM 810 MG: 350 INJECTION, POWDER, LYOPHILIZED, FOR SOLUTION INTRAMUSCULAR; INTRAVENOUS at 15:37

## 2019-10-01 RX ADMIN — DIPHENHYDRAMINE HYDROCHLORIDE 50 MG: 50 INJECTION INTRAMUSCULAR; INTRAVENOUS at 14:13

## 2019-10-01 RX ADMIN — FLUOROURACIL 4850 MG: 50 INJECTION, SOLUTION INTRAVENOUS at 17:39

## 2019-10-01 RX ADMIN — DEXAMETHASONE SODIUM PHOSPHATE 12 MG: 10 INJECTION, SOLUTION INTRAMUSCULAR; INTRAVENOUS at 15:20

## 2019-10-01 RX ADMIN — SODIUM CHLORIDE 250 ML: 9 INJECTION, SOLUTION INTRAVENOUS at 13:59

## 2019-10-01 RX ADMIN — TRASTUZUMAB 350 MG: 150 INJECTION, POWDER, LYOPHILIZED, FOR SOLUTION INTRAVENOUS at 14:39

## 2019-10-01 RX ADMIN — DEXTROSE MONOHYDRATE 250 ML: 5 INJECTION, SOLUTION INTRAVENOUS at 15:18

## 2019-10-01 RX ADMIN — FAMOTIDINE 20 MG: 10 INJECTION INTRAVENOUS at 14:10

## 2019-10-01 RX ADMIN — OXALIPLATIN 170 MG: 5 INJECTION, SOLUTION, CONCENTRATE INTRAVENOUS at 15:37

## 2019-10-01 RX ADMIN — PALONOSETRON 0.25 MG: 0.05 INJECTION, SOLUTION INTRAVENOUS at 15:18

## 2019-10-01 RX ADMIN — FLUOROURACIL 810 MG: 50 INJECTION, SOLUTION INTRAVENOUS at 17:38

## 2019-10-03 ENCOUNTER — INFUSION (OUTPATIENT)
Dept: ONCOLOGY | Facility: HOSPITAL | Age: 64
End: 2019-10-03

## 2019-10-03 DIAGNOSIS — C15.5 MALIGNANT NEOPLASM OF LOWER THIRD OF ESOPHAGUS (HCC): Primary | ICD-10-CM

## 2019-10-03 DIAGNOSIS — C78.7 LIVER METASTASES: ICD-10-CM

## 2019-10-03 DIAGNOSIS — Z45.2 ENCOUNTER FOR ADJUSTMENT OR MANAGEMENT OF VASCULAR ACCESS DEVICE: ICD-10-CM

## 2019-10-03 RX ORDER — SODIUM CHLORIDE 0.9 % (FLUSH) 0.9 %
10 SYRINGE (ML) INJECTION AS NEEDED
Status: DISCONTINUED | OUTPATIENT
Start: 2019-10-03 | End: 2019-10-03 | Stop reason: HOSPADM

## 2019-10-03 RX ORDER — SODIUM CHLORIDE 0.9 % (FLUSH) 0.9 %
10 SYRINGE (ML) INJECTION AS NEEDED
Status: CANCELLED | OUTPATIENT
Start: 2019-10-03

## 2019-10-03 RX ADMIN — Medication 500 UNITS: at 15:21

## 2019-10-03 RX ADMIN — SODIUM CHLORIDE, PRESERVATIVE FREE 10 ML: 5 INJECTION INTRAVENOUS at 15:21

## 2019-10-11 DIAGNOSIS — C78.7 LIVER METASTASES: ICD-10-CM

## 2019-10-11 DIAGNOSIS — C15.5 MALIGNANT NEOPLASM OF LOWER THIRD OF ESOPHAGUS (HCC): ICD-10-CM

## 2019-10-15 ENCOUNTER — INFUSION (OUTPATIENT)
Dept: ONCOLOGY | Facility: HOSPITAL | Age: 64
End: 2019-10-15

## 2019-10-15 ENCOUNTER — OFFICE VISIT (OUTPATIENT)
Dept: ONCOLOGY | Facility: CLINIC | Age: 64
End: 2019-10-15

## 2019-10-15 VITALS
BODY MASS INDEX: 26.98 KG/M2 | DIASTOLIC BLOOD PRESSURE: 79 MMHG | OXYGEN SATURATION: 97 % | RESPIRATION RATE: 16 BRPM | SYSTOLIC BLOOD PRESSURE: 134 MMHG | TEMPERATURE: 97.6 F | HEART RATE: 94 BPM | WEIGHT: 182.2 LBS | HEIGHT: 69 IN

## 2019-10-15 DIAGNOSIS — N32.1 COLOVESICAL FISTULA: ICD-10-CM

## 2019-10-15 DIAGNOSIS — C15.5 MALIGNANT NEOPLASM OF LOWER THIRD OF ESOPHAGUS (HCC): Primary | ICD-10-CM

## 2019-10-15 DIAGNOSIS — I82.431 ACUTE DEEP VEIN THROMBOSIS (DVT) OF RIGHT POPLITEAL VEIN (HCC): ICD-10-CM

## 2019-10-15 DIAGNOSIS — I82.412 ACUTE DEEP VEIN THROMBOSIS (DVT) OF FEMORAL VEIN OF LEFT LOWER EXTREMITY (HCC): ICD-10-CM

## 2019-10-15 DIAGNOSIS — I10 ESSENTIAL HYPERTENSION: ICD-10-CM

## 2019-10-15 DIAGNOSIS — C78.7 LIVER METASTASES: ICD-10-CM

## 2019-10-15 DIAGNOSIS — D69.6 THROMBOCYTOPENIA (HCC): ICD-10-CM

## 2019-10-15 DIAGNOSIS — C15.5 MALIGNANT NEOPLASM OF LOWER THIRD OF ESOPHAGUS (HCC): ICD-10-CM

## 2019-10-15 LAB
ALBUMIN SERPL-MCNC: 3.9 G/DL (ref 3.5–5.2)
ALBUMIN/GLOB SERPL: 1.3 G/DL (ref 1.1–2.4)
ALP SERPL-CCNC: 103 U/L (ref 38–116)
ALT SERPL W P-5'-P-CCNC: 34 U/L (ref 0–41)
ANION GAP SERPL CALCULATED.3IONS-SCNC: 13.4 MMOL/L (ref 5–15)
AST SERPL-CCNC: 31 U/L (ref 0–40)
BASOPHILS # BLD AUTO: 0.02 10*3/MM3 (ref 0–0.2)
BASOPHILS NFR BLD AUTO: 0.3 % (ref 0–1.5)
BILIRUB SERPL-MCNC: 0.3 MG/DL (ref 0.2–1.2)
BUN BLD-MCNC: 7 MG/DL (ref 6–20)
BUN/CREAT SERPL: 11.5 (ref 7.3–30)
CALCIUM SPEC-SCNC: 9.3 MG/DL (ref 8.5–10.2)
CHLORIDE SERPL-SCNC: 94 MMOL/L (ref 98–107)
CO2 SERPL-SCNC: 24.6 MMOL/L (ref 22–29)
CREAT BLD-MCNC: 0.61 MG/DL (ref 0.7–1.3)
DEPRECATED RDW RBC AUTO: 67.2 FL (ref 37–54)
EOSINOPHIL # BLD AUTO: 0.09 10*3/MM3 (ref 0–0.4)
EOSINOPHIL NFR BLD AUTO: 1.5 % (ref 0.3–6.2)
ERYTHROCYTE [DISTWIDTH] IN BLOOD BY AUTOMATED COUNT: 20.5 % (ref 12.3–15.4)
GFR SERPL CREATININE-BSD FRML MDRD: 134 ML/MIN/1.73
GLOBULIN UR ELPH-MCNC: 3.1 GM/DL (ref 1.8–3.5)
GLUCOSE BLD-MCNC: 104 MG/DL (ref 74–124)
HCT VFR BLD AUTO: 41.9 % (ref 37.5–51)
HGB BLD-MCNC: 14 G/DL (ref 13–17.7)
IMM GRANULOCYTES # BLD AUTO: 0.03 10*3/MM3 (ref 0–0.05)
IMM GRANULOCYTES NFR BLD AUTO: 0.5 % (ref 0–0.5)
LYMPHOCYTES # BLD AUTO: 1.55 10*3/MM3 (ref 0.7–3.1)
LYMPHOCYTES NFR BLD AUTO: 26.2 % (ref 19.6–45.3)
MCH RBC QN AUTO: 30.4 PG (ref 26.6–33)
MCHC RBC AUTO-ENTMCNC: 33.4 G/DL (ref 31.5–35.7)
MCV RBC AUTO: 91.1 FL (ref 79–97)
MONOCYTES # BLD AUTO: 0.63 10*3/MM3 (ref 0.1–0.9)
MONOCYTES NFR BLD AUTO: 10.6 % (ref 5–12)
NEUTROPHILS # BLD AUTO: 3.6 10*3/MM3 (ref 1.7–7)
NEUTROPHILS NFR BLD AUTO: 60.9 % (ref 42.7–76)
NRBC BLD AUTO-RTO: 0 /100 WBC (ref 0–0.2)
PLATELET # BLD AUTO: 91 10*3/MM3 (ref 140–450)
PMV BLD AUTO: 8.8 FL (ref 6–12)
POTASSIUM BLD-SCNC: 3.4 MMOL/L (ref 3.5–4.7)
PROT SERPL-MCNC: 7 G/DL (ref 6.3–8)
RBC # BLD AUTO: 4.6 10*6/MM3 (ref 4.14–5.8)
SODIUM BLD-SCNC: 132 MMOL/L (ref 134–145)
WBC NRBC COR # BLD: 5.92 10*3/MM3 (ref 3.4–10.8)

## 2019-10-15 PROCEDURE — 96417 CHEMO IV INFUS EACH ADDL SEQ: CPT | Performed by: NURSE PRACTITIONER

## 2019-10-15 PROCEDURE — 96416 CHEMO PROLONG INFUSE W/PUMP: CPT | Performed by: NURSE PRACTITIONER

## 2019-10-15 PROCEDURE — 25010000002 OXALIPLATIN PER 0.5 MG: Performed by: NURSE PRACTITIONER

## 2019-10-15 PROCEDURE — 25010000002 TRASTUZUMAB PER 10 MG: Performed by: NURSE PRACTITIONER

## 2019-10-15 PROCEDURE — 96375 TX/PRO/DX INJ NEW DRUG ADDON: CPT | Performed by: NURSE PRACTITIONER

## 2019-10-15 PROCEDURE — 25010000002 LEUCOVORIN CALCIUM PER 50 MG: Performed by: NURSE PRACTITIONER

## 2019-10-15 PROCEDURE — 85025 COMPLETE CBC W/AUTO DIFF WBC: CPT

## 2019-10-15 PROCEDURE — 25010000002 DEXAMETHASONE SODIUM PHOSPHATE 100 MG/10ML SOLUTION: Performed by: NURSE PRACTITIONER

## 2019-10-15 PROCEDURE — 80053 COMPREHEN METABOLIC PANEL: CPT

## 2019-10-15 PROCEDURE — 96411 CHEMO IV PUSH ADDL DRUG: CPT | Performed by: NURSE PRACTITIONER

## 2019-10-15 PROCEDURE — 99214 OFFICE O/P EST MOD 30 MIN: CPT | Performed by: NURSE PRACTITIONER

## 2019-10-15 PROCEDURE — 25010000002 FLUOROURACIL PER 500 MG: Performed by: NURSE PRACTITIONER

## 2019-10-15 PROCEDURE — 96368 THER/DIAG CONCURRENT INF: CPT | Performed by: NURSE PRACTITIONER

## 2019-10-15 PROCEDURE — 96415 CHEMO IV INFUSION ADDL HR: CPT | Performed by: NURSE PRACTITIONER

## 2019-10-15 PROCEDURE — 36415 COLL VENOUS BLD VENIPUNCTURE: CPT

## 2019-10-15 PROCEDURE — 96413 CHEMO IV INFUSION 1 HR: CPT | Performed by: NURSE PRACTITIONER

## 2019-10-15 PROCEDURE — 25010000002 PALONOSETRON PER 25 MCG: Performed by: NURSE PRACTITIONER

## 2019-10-15 PROCEDURE — 25010000002 DIPHENHYDRAMINE PER 50 MG: Performed by: NURSE PRACTITIONER

## 2019-10-15 RX ORDER — PALONOSETRON 0.05 MG/ML
0.25 INJECTION, SOLUTION INTRAVENOUS ONCE
Status: CANCELLED | OUTPATIENT
Start: 2019-10-15

## 2019-10-15 RX ORDER — DEXTROSE MONOHYDRATE 50 MG/ML
250 INJECTION, SOLUTION INTRAVENOUS ONCE
Status: COMPLETED | OUTPATIENT
Start: 2019-10-15 | End: 2019-10-15

## 2019-10-15 RX ORDER — DEXTROSE MONOHYDRATE 50 MG/ML
250 INJECTION, SOLUTION INTRAVENOUS ONCE
Status: CANCELLED | OUTPATIENT
Start: 2019-10-15

## 2019-10-15 RX ORDER — FAMOTIDINE 10 MG/ML
20 INJECTION, SOLUTION INTRAVENOUS AS NEEDED
Status: CANCELLED | OUTPATIENT
Start: 2019-10-15

## 2019-10-15 RX ORDER — FLUOROURACIL 50 MG/ML
400 INJECTION, SOLUTION INTRAVENOUS ONCE
Status: CANCELLED | OUTPATIENT
Start: 2019-10-15

## 2019-10-15 RX ORDER — PALONOSETRON 0.05 MG/ML
0.25 INJECTION, SOLUTION INTRAVENOUS ONCE
Status: COMPLETED | OUTPATIENT
Start: 2019-10-15 | End: 2019-10-15

## 2019-10-15 RX ORDER — FLUOROURACIL 50 MG/ML
400 INJECTION, SOLUTION INTRAVENOUS ONCE
Status: COMPLETED | OUTPATIENT
Start: 2019-10-15 | End: 2019-10-15

## 2019-10-15 RX ORDER — SODIUM CHLORIDE 9 MG/ML
250 INJECTION, SOLUTION INTRAVENOUS ONCE
Status: COMPLETED | OUTPATIENT
Start: 2019-10-15 | End: 2019-10-15

## 2019-10-15 RX ORDER — DIPHENHYDRAMINE HYDROCHLORIDE 50 MG/ML
50 INJECTION INTRAMUSCULAR; INTRAVENOUS AS NEEDED
Status: CANCELLED | OUTPATIENT
Start: 2019-10-15

## 2019-10-15 RX ORDER — SODIUM CHLORIDE 9 MG/ML
250 INJECTION, SOLUTION INTRAVENOUS ONCE
Status: CANCELLED | OUTPATIENT
Start: 2019-10-15

## 2019-10-15 RX ORDER — FAMOTIDINE 10 MG/ML
20 INJECTION, SOLUTION INTRAVENOUS 2 TIMES DAILY
Status: CANCELLED
Start: 2019-10-15

## 2019-10-15 RX ORDER — FAMOTIDINE 10 MG/ML
20 INJECTION, SOLUTION INTRAVENOUS 2 TIMES DAILY
Status: DISCONTINUED | OUTPATIENT
Start: 2019-10-15 | End: 2019-10-15 | Stop reason: HOSPADM

## 2019-10-15 RX ADMIN — PALONOSETRON HYDROCHLORIDE 0.25 MG: 0.25 INJECTION, SOLUTION INTRAVENOUS at 10:24

## 2019-10-15 RX ADMIN — SODIUM CHLORIDE 250 ML: 9 INJECTION, SOLUTION INTRAVENOUS at 09:15

## 2019-10-15 RX ADMIN — FAMOTIDINE 20 MG: 10 INJECTION INTRAVENOUS at 08:50

## 2019-10-15 RX ADMIN — LEUCOVORIN CALCIUM 810 MG: 350 INJECTION, POWDER, LYOPHILIZED, FOR SOLUTION INTRAMUSCULAR; INTRAVENOUS at 10:28

## 2019-10-15 RX ADMIN — DEXAMETHASONE SODIUM PHOSPHATE 12 MG: 10 INJECTION, SOLUTION INTRAMUSCULAR; INTRAVENOUS at 09:21

## 2019-10-15 RX ADMIN — TRASTUZUMAB 350 MG: 150 INJECTION, POWDER, LYOPHILIZED, FOR SOLUTION INTRAVENOUS at 09:45

## 2019-10-15 RX ADMIN — FLUOROURACIL 4850 MG: 50 INJECTION, SOLUTION INTRAVENOUS at 12:40

## 2019-10-15 RX ADMIN — OXALIPLATIN 170 MG: 5 INJECTION, SOLUTION, CONCENTRATE INTRAVENOUS at 10:29

## 2019-10-15 RX ADMIN — DIPHENHYDRAMINE HYDROCHLORIDE 50 MG: 50 INJECTION, SOLUTION INTRAMUSCULAR; INTRAVENOUS at 08:54

## 2019-10-15 RX ADMIN — FLUOROURACIL 810 MG: 50 INJECTION, SOLUTION INTRAVENOUS at 12:39

## 2019-10-15 RX ADMIN — DEXTROSE MONOHYDRATE 250 ML: 50 INJECTION, SOLUTION INTRAVENOUS at 08:42

## 2019-10-15 NOTE — PROGRESS NOTES
Pt to infusion room for treatment , labs noted  Lab Results   Component Value Date    WBC 5.92 10/15/2019    HGB 14.0 10/15/2019    HCT 41.9 10/15/2019    MCV 91.1 10/15/2019    PLT 91 (L) 10/15/2019     Platelet count 91,000  Discussed with Kristin Tuttle NP,  Okay to treat today.

## 2019-10-15 NOTE — PROGRESS NOTES
"  Subjective     REASON FOR follow up:1.  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  Currently receiving palliative FOLFOX / Herceptin therapy.    .2.COLOVESICAL FISTULA    3. DVT R AND LEFT LE ON ANTICOAGULANT: THROMBOPHILIA OF MALIGNANCY    History of Present Illness Mr. Garcia is a 63 year old male with the above mentioned history here today for reevaluation prior to cycle 7 FOLFOX/ Herceptin.  He reports worsening fatigue.  He continues to have issues with taste changes, which is affecting his appetite.  He denies fevers or chills.  He denies any bleeding issues, and continues on Lovenox 80 mg every 12 hours.  Patient also continues on Cipro 250 mg twice daily.    Past Medical History:   Diagnosis Date   • Arthritis    • Elevated PSA    • Esophageal mass    • H/O Lung nodule    • Hepatitis     CHILD--PT STATED \"I THINK IT WAS A.\"   • History of pneumonia    • Hx of blood clots 08/10/2019   • Hyperlipidemia    • Hypertension    • Liver cancer (CMS/HCC)    • Prostate cancer (CMS/HCC)         Past Surgical History:   Procedure Laterality Date   • HERNIA REPAIR  1999   • PROSTATE SURGERY  03/2008   • VENOUS ACCESS DEVICE (PORT) INSERTION N/A 7/16/2019    Procedure: INSERTION VENOUS ACCESS DEVICE WITH FLUORO AND EGD WITH BIOPSY;  Surgeon: Mary Brito MD;  Location: [facility];  Service: Thoracic        Current Outpatient Medications on File Prior to Visit   Medication Sig Dispense Refill   • ciprofloxacin (CIPRO) 250 MG tablet Take 2 tablets by mouth 2 (Two) Times a Day. 1 tablet 60 tablet 1   • enoxaparin (LOVENOX) 80 MG/0.8ML solution syringe Inject 0.8 mL under the skin into the appropriate area as directed Every 12 (Twelve) Hours. 48 mL 11   • lisinopril-hydrochlorothiazide (PRINZIDE,ZESTORETIC) 20-12.5 MG per tablet Take 1 tablet by mouth Daily.     • ondansetron (ZOFRAN) 4 MG tablet Take 1 tablet by mouth Every 8 (Eight) Hours As " Needed for Nausea or Vomiting. 30 tablet 2   • Probiotic Product (PROBIOTIC DAILY PO) Take  by mouth Daily.       Current Facility-Administered Medications on File Prior to Visit   Medication Dose Route Frequency Provider Last Rate Last Dose   • dexamethasone (DECADRON) IVPB 12 mg  12 mg Intravenous Once Kristin Moss APRN       • [COMPLETED] dextrose (D5W) 5 % infusion 250 mL  250 mL Intravenous Once Kristin Moss APRN 20 mL/hr at 10/15/19 0842 250 mL at 10/15/19 0842   • diphenhydrAMINE (BENADRYL) 50 mg in sodium chloride 0.9 % 50 mL IVPB  50 mg Intravenous Once Kristin Moss APRN       • famotidine (PEPCID) injection 20 mg  20 mg Intravenous BID Kristin Moss APRN       • fluorouracil (ADRUCIL) 4,850 mg, sodium chloride 0.9 % 143 mL chemo infusion - FOR HOME USE  2,400 mg/m2 (Treatment Plan Recorded) Intravenous Once Kristin Moss APRN       • fluorouracil (ADRUCIL) chemo injection 810 mg  400 mg/m2 (Treatment Plan Recorded) Intravenous Once Kristin Moss APRN       • leucovorin 810 mg in dextrose (D5W) 5 % 331 mL IVPB  400 mg/m2 (Treatment Plan Recorded) Intravenous Once Kristin Moss APRN       • OXALIplatin (ELOXATIN) 170 mg in dextrose (D5W) 5 % 284 mL chemo IVPB  85 mg/m2 (Treatment Plan Recorded) Intravenous Once Kristin Moss APRN       • palonosetron (ALOXI) injection 0.25 mg  0.25 mg Intravenous Once Kristin Moss APRN       • sodium chloride 0.9 % infusion 250 mL  250 mL Intravenous Once Kristin Moss APRN       • Trastuzumab (HERCEPTIN) 350 mg in sodium chloride 0.9 % 250 mL chemo IVPB  4 mg/kg (Treatment Plan Recorded) Intravenous Once Kristin Moss APRN            ALLERGIES:  No Known Allergies     Social History     Socioeconomic History   • Marital status: Single     Spouse name: Not on file   • Number of children: Not on file   • Years of education: High school   • Highest education level: Not on file   Occupational  "History   • Occupation: [employer]     Employer: [employer]   Tobacco Use   • Smoking status: Current Every Day Smoker     Packs/day: 1.00     Years: 40.00     Pack years: 40.00     Types: Cigarettes   • Smokeless tobacco: Never Used   Substance and Sexual Activity   • Alcohol use: Yes     Alcohol/week: 16.8 oz     Types: 28 Cans of beer per week     Comment: NOT RECENTLY   • Drug use: No        Family History   Problem Relation Age of Onset   • Hypertension Mother    • Stroke Mother    • Hypertension Other    • Lung disease Other    • Prostate cancer Other    • Lung cancer Father    • Malig Hyperthermia Neg Hx         Review of Systems   Constitutional: Positive for fatigue. Negative for activity change, appetite change, chills and fever.   HENT: Negative for mouth sores, nosebleeds and trouble swallowing.         Taste changes   Respiratory: Negative for cough and shortness of breath.    Cardiovascular: Negative for chest pain and leg swelling.   Gastrointestinal: Negative for abdominal pain, constipation, diarrhea, nausea and vomiting.   Genitourinary: Negative for difficulty urinating.   Skin: Negative for rash.   Neurological: Negative for dizziness, weakness and numbness.   Hematological: Negative for adenopathy. Does not bruise/bleed easily.   Psychiatric/Behavioral: Negative for sleep disturbance.        Objective     Vitals:    10/15/19 0755   BP: 134/79   Pulse: 94   Resp: 16   Temp: 97.6 °F (36.4 °C)   TempSrc: Oral   SpO2: 97%   Weight: 82.6 kg (182 lb 3.2 oz)   Height: 174 cm (68.5\")   PainSc: 0-No pain     Current Status 10/15/2019   ECOG score 1       Physical Exam   Constitutional: He is oriented to person, place, and time. He appears well-developed and well-nourished. No distress.   HENT:   Head: Normocephalic and atraumatic.   Mouth/Throat: Oropharynx is clear and moist and mucous membranes are normal. No oropharyngeal exudate.   Eyes: Pupils are equal, round, and reactive to light.   Neck: Normal " range of motion.   Cardiovascular: Normal rate, regular rhythm and normal heart sounds.   No murmur heard.  Pulmonary/Chest: Effort normal and breath sounds normal. No respiratory distress. He has no wheezes. He has no rhonchi. He has no rales.   Benign left chest mediport   Abdominal: Soft. Normal appearance and bowel sounds are normal. He exhibits no distension. There is no hepatosplenomegaly.   Musculoskeletal: Normal range of motion. He exhibits no edema.   Neurological: He is alert and oriented to person, place, and time.   Skin: Skin is warm and dry. No rash noted.   Psychiatric: He has a normal mood and affect.   Vitals reviewed.        RECENT LABS:  Hematology WBC   Date Value Ref Range Status   10/15/2019 5.92 3.40 - 10.80 10*3/mm3 Final   02/02/2019 13.4 (H) 3.4 - 10.8 x10E3/uL Final     RBC   Date Value Ref Range Status   10/15/2019 4.60 4.14 - 5.80 10*6/mm3 Final   02/02/2019 4.81 4.14 - 5.80 x10E6/uL Final     Hemoglobin   Date Value Ref Range Status   10/15/2019 14.0 13.0 - 17.7 g/dL Final     Hematocrit   Date Value Ref Range Status   10/15/2019 41.9 37.5 - 51.0 % Final     Platelets   Date Value Ref Range Status   10/15/2019 91 (L) 140 - 450 10*3/mm3 Final          Interpretation Summary DOPPLER LE    · Acute right lower extremity superficial thrombophlebitis noted in the varicosity (above knee).  · Acute right lower extremity deep vein thrombosis noted in the popliteal, posterior tibial and peroneal.  · Acute left lower extremity deep vein thrombosis noted in the proximal femoral, mid femoral, distal femoral, popliteal, posterial tibial, peroneal and gastrocnemius/soleal.  · All other veins appeared normal bilaterally.     Tissue Pathology Exam [[id number]] (Order [id number])   Order   Date: 7/16/2019 Department: [facility] OR Released By: Claudine Echeverria RN Authorizing: Mary Brito MD   Reprint Order Requisition     Tissue Pathology Exam (Order #[id number]) on 7/16/19        Tissue Pathology Exam: [id number]   Order: [id number]   Collected:  7/16/2019 16:30   Status:  Edited Result - FINAL   Visible to patient:  No (Not Released)   Dx:  Malignant neoplasm of esophagus, unsp...   Component    Addendum 2   Please see the completely scanned HER2 FISH analysis from [facility] below.       Addendum electronically signed by Taty Munoz MD on 7/26/2019 at 1220   Addendum   HER2 (by immunohistochemistry) performed on block 1A for tumor is equivocal (score 2+).      Methods:  [place] Food and Drug Administration (FDA) cleared (specify test/vendor): [employer]   Primary Antibody: 4B5     HER2 by FISH will be performed and those results will be reported separately.          Addendum electronically signed by Taty Munoz MD on 7/22/2019 at 1509   Final Diagnosis   1. Esophagus, 35 Cm from the Lip, Biopsy:               A. INVASIVE MODERATELY DIFFERENTIATED ADENOCARCINOMA (see Comment).     jab/jse    Electronically signed by Taty Munoz MD on 7/19/2019 at 1642   Comment    Immunohistochemistry was performed on block 1A and the tumor is positive for CK7 (patchy) and negative for CK20 and NKX3.1. These results are consistent with a primary esophageal adenocarcinoma.      HER2 by immunohistochemistry will be performed and reported in an addendum.     This case was shared in internal consultation with Nasreen Lua, Korin, and Mary who concur.     jab/jse    Gross Description    1.  The specimen is received in formalin labeled with the patient's name and further designated '35cm from the lip biopsy' are multiple small fragments of gray-tan tissue. The specimen is submitted for embedding as received.      Microscopic Description    Performed, incorporated in diagnosis.    Resulting Agency [facility]         Specimen Collected: 07/16/19 16:30 Last Resulted: 07/26/19 12:20       Order Details      View Encounter      Lab and Collection Details      Routing      Result History            Scans on Order  [id number]     Document on 7/26/2019 12:20 PM by Taty Munoz MD   Scan on 7/26/2019  8:25 AM by Telma Pinzon: HER2 BY FISHScan on 7/26/2019  8:25 AM by Telma Pinzon: HER2 BY FISH         7/26/2019 12:20 PM     Component   Addendum 2   Please see the completely scanned HER2 FISH analysis from [facility] below.       Addendum   HER2 (by immunohistochemistry) performed on block 1A for tumor is equivocal (score 2+).      Methods:  [place] Food and Drug Administration (FDA) cleared (specify test/vendor): [employer]   Primary Antibody: 4B5     HER2 by FISH will be performed and those results will be reported separately.             Case Report   Surgical Pathology Report                         Case: [id number]                                   Authorizing Provider:  Mary Brito MD        Collected:           07/16/2019 04:30 PM           Ordering Location:     [facility]  Received:            07/16/2019 10:15 PM                                  MAIN OR                                                                       Pathologist:           Taty Munoz MD                                                           Specimen:    Esophagus, 35CM FROM THE LIP                                                            Final Diagnosis   1. Esophagus, 35 Cm from the Lip, Biopsy:               A. INVASIVE MODERATELY DIFFERENTIATED ADENOCARCINOMA (see Comment).     jab/jse    Comment   Immunohistochemistry was performed on block 1A and the tumor is positive for CK7 (patchy) and negative for CK20 and NKX3.1. These results are consistent with a primary esophageal adenocarcinoma.      HER2 by immunohistochemistry will be performed and reported in an addendum.     This case was shared in internal consultation with Nasreen Lua, Korin, and Mary who concur.     pilar/isaace    Gross Description   1.  The specimen is received in formalin labeled with the patient's name and further designated '35cm from the lip  biopsy' are multiple small fragments of gray-tan tissue. The specimen is submitted for embedding as received.      Microscopic Description   Performed, incorporated in diagnosis.    Scans on Order [id number]     Document on 7/26/2019 12:20 PM by Taty Munoz MD   Scan on 7/26/2019  8:25 AM by Telma Pinzon R: HER2 BY FISHScan on 7/26/2019  8:25 AM by Telma Pinzon R: HER2 BY FISH       CT CHEST W CONTRAST-, CT ABDOMEN W CONTRAST-  IMPRESSION: 9/19/2019  Overall significant improvement since the preceding PET/CT  and CT imaging performed in July of this year. The primary esophageal neoplasm has diminished in size, as have several mildly enlarged lymph nodes in the mediastinum adjacent to the neoplasm. A single small right lower lobe pulmonary nodule has nearly completely resolved. Numerous hepatic metastases also appear significantly smaller.       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 Almost 90% of the liver is replaced by tumor.    · CT scan done 9/19/19 after 5 cycles of FOLFOX/ Herceptin patient is clinically and radiologically had a good response.    2. Bilateral deep vein thrombosis in the lower extremities. He was anticoagulated with Lovenox and continues twice daily.  Tolerating Lovenox well.    3. Colovesical fistula.  He has been prescribed Cipro 250 mg twice daily, which he continues.    4. Hypertension: BP a 9/25/2019 170/105.  Dr. Haynes recommended the patient resume his lisinopril 10 mg daily.  Blood pressures improved today to 134/79.  We will continue to closely monitor.     5.  Thrombocytopenia: Platelet count is 91,000 today.  He denies any issues with bleeding.  I have reviewed with Dr. Haynes, and we will proceed with treatment today.    RECOMMENDATIONS:  1. Proceed with cycle 7 FOLFOX Herceptin today.  2. Continue Lovenox twice daily.  3. Continue Cipro 250 mg twice daily.  4. Continue lisinopril 10 mg daily.  5. Patient scheduled  for echocardiogram next week.  6. Return in 2 weeks for follow-up visit with Dr. Haynes for evaluation prior to cycle 8 FOLFOX/Herceptin.  7. Patient will continue for 2 more months of treatment and then reassess him radiologically with a PET scan (last scans were done 9/19/2019).  Depending on the patient's response at that time we will decide whether to send him to general surgery regarding treatment of his colovesical fistula.    8. .

## 2019-10-17 ENCOUNTER — INFUSION (OUTPATIENT)
Dept: ONCOLOGY | Facility: HOSPITAL | Age: 64
End: 2019-10-17

## 2019-10-17 DIAGNOSIS — C78.7 LIVER METASTASES: ICD-10-CM

## 2019-10-17 DIAGNOSIS — C15.5 MALIGNANT NEOPLASM OF LOWER THIRD OF ESOPHAGUS (HCC): Primary | ICD-10-CM

## 2019-10-17 DIAGNOSIS — Z45.2 ENCOUNTER FOR ADJUSTMENT OR MANAGEMENT OF VASCULAR ACCESS DEVICE: ICD-10-CM

## 2019-10-17 RX ORDER — SODIUM CHLORIDE 0.9 % (FLUSH) 0.9 %
10 SYRINGE (ML) INJECTION AS NEEDED
Status: DISCONTINUED | OUTPATIENT
Start: 2019-10-17 | End: 2019-10-17 | Stop reason: HOSPADM

## 2019-10-17 RX ORDER — SODIUM CHLORIDE 0.9 % (FLUSH) 0.9 %
10 SYRINGE (ML) INJECTION AS NEEDED
Status: CANCELLED | OUTPATIENT
Start: 2019-10-17

## 2019-10-17 RX ADMIN — Medication 500 UNITS: at 10:22

## 2019-10-17 RX ADMIN — SODIUM CHLORIDE, PRESERVATIVE FREE 10 ML: 5 INJECTION INTRAVENOUS at 10:22

## 2019-10-23 ENCOUNTER — HOSPITAL ENCOUNTER (OUTPATIENT)
Dept: CARDIOLOGY | Facility: HOSPITAL | Age: 64
Discharge: HOME | End: 2019-10-23
Admitting: INTERNAL MEDICINE

## 2019-10-23 VITALS
WEIGHT: 182 LBS | DIASTOLIC BLOOD PRESSURE: 60 MMHG | OXYGEN SATURATION: 98 % | HEART RATE: 100 BPM | BODY MASS INDEX: 27.58 KG/M2 | HEIGHT: 68 IN | SYSTOLIC BLOOD PRESSURE: 116 MMHG

## 2019-10-23 DIAGNOSIS — D63.8 ANEMIA, CHRONIC DISEASE: ICD-10-CM

## 2019-10-23 DIAGNOSIS — I82.431 ACUTE DEEP VEIN THROMBOSIS (DVT) OF RIGHT POPLITEAL VEIN (HCC): ICD-10-CM

## 2019-10-23 DIAGNOSIS — I82.412 ACUTE DEEP VEIN THROMBOSIS (DVT) OF FEMORAL VEIN OF LEFT LOWER EXTREMITY (HCC): ICD-10-CM

## 2019-10-23 DIAGNOSIS — N32.1 COLOVESICAL FISTULA: ICD-10-CM

## 2019-10-23 DIAGNOSIS — Z45.2 ENCOUNTER FOR ADJUSTMENT OR MANAGEMENT OF VASCULAR ACCESS DEVICE: ICD-10-CM

## 2019-10-23 DIAGNOSIS — C78.7 LIVER METASTASES: ICD-10-CM

## 2019-10-23 DIAGNOSIS — C15.5 MALIGNANT NEOPLASM OF LOWER THIRD OF ESOPHAGUS (HCC): ICD-10-CM

## 2019-10-23 LAB
AORTIC ARCH: 3 CM
AORTIC ROOT ANNULUS: 2 CM
ASCENDING AORTA: 3.1 CM
BH CV ECHO MEAS - ACS: 2.3 CM
BH CV ECHO MEAS - AO MAX PG (FULL): 3.7 MMHG
BH CV ECHO MEAS - AO MAX PG: 7.5 MMHG
BH CV ECHO MEAS - AO MEAN PG (FULL): 1.5 MMHG
BH CV ECHO MEAS - AO MEAN PG: 3.2 MMHG
BH CV ECHO MEAS - AO V2 MAX: 137.1 CM/SEC
BH CV ECHO MEAS - AO V2 MEAN: 81.1 CM/SEC
BH CV ECHO MEAS - AO V2 VTI: 18.2 CM
BH CV ECHO MEAS - ASC AORTA: 3.1 CM
BH CV ECHO MEAS - AVA(I,A): 2.1 CM^2
BH CV ECHO MEAS - AVA(I,D): 2.1 CM^2
BH CV ECHO MEAS - AVA(V,A): 2.2 CM^2
BH CV ECHO MEAS - AVA(V,D): 2.2 CM^2
BH CV ECHO MEAS - BSA(HAYCOCK): 2 M^2
BH CV ECHO MEAS - BSA: 2 M^2
BH CV ECHO MEAS - BZI_BMI: 27.7 KILOGRAMS/M^2
BH CV ECHO MEAS - BZI_METRIC_HEIGHT: 172.7 CM
BH CV ECHO MEAS - BZI_METRIC_WEIGHT: 82.6 KG
BH CV ECHO MEAS - EDV(TEICH): 79.6 ML
BH CV ECHO MEAS - EF(CUBED): 70 %
BH CV ECHO MEAS - EF(MOD-BP): 63 %
BH CV ECHO MEAS - EF(TEICH): 62 %
BH CV ECHO MEAS - ESV(TEICH): 30.3 ML
BH CV ECHO MEAS - FS: 33.1 %
BH CV ECHO MEAS - IVS/LVPW: 0.97
BH CV ECHO MEAS - IVSD: 1.1 CM
BH CV ECHO MEAS - LAT PEAK E' VEL: 7 CM/SEC
BH CV ECHO MEAS - LV MASS(C)D: 171.7 GRAMS
BH CV ECHO MEAS - LV MASS(C)DI: 87.5 GRAMS/M^2
BH CV ECHO MEAS - LV MAX PG: 3.8 MMHG
BH CV ECHO MEAS - LV MEAN PG: 1.7 MMHG
BH CV ECHO MEAS - LV V1 MAX: 97.3 CM/SEC
BH CV ECHO MEAS - LV V1 MEAN: 59.4 CM/SEC
BH CV ECHO MEAS - LV V1 VTI: 12.5 CM
BH CV ECHO MEAS - LVIDD: 4.2 CM
BH CV ECHO MEAS - LVIDS: 2.8 CM
BH CV ECHO MEAS - LVOT AREA (M): 3.1 CM^2
BH CV ECHO MEAS - LVOT AREA: 3.1 CM^2
BH CV ECHO MEAS - LVOT DIAM: 2 CM
BH CV ECHO MEAS - LVPWD: 1.2 CM
BH CV ECHO MEAS - MED PEAK E' VEL: 7 CM/SEC
BH CV ECHO MEAS - MV A DUR: 0.11 SEC
BH CV ECHO MEAS - MV A MAX VEL: 96.4 CM/SEC
BH CV ECHO MEAS - MV DEC SLOPE: 205.7 CM/SEC^2
BH CV ECHO MEAS - MV DEC TIME: 0.29 SEC
BH CV ECHO MEAS - MV E MAX VEL: 61.3 CM/SEC
BH CV ECHO MEAS - MV E/A: 0.64
BH CV ECHO MEAS - MV MAX PG: 11.1 MMHG
BH CV ECHO MEAS - MV MEAN PG: 2.6 MMHG
BH CV ECHO MEAS - MV P1/2T MAX VEL: 61.3 CM/SEC
BH CV ECHO MEAS - MV P1/2T: 87.2 MSEC
BH CV ECHO MEAS - MV V2 MAX: 167 CM/SEC
BH CV ECHO MEAS - MV V2 MEAN: 72.5 CM/SEC
BH CV ECHO MEAS - MV V2 VTI: 23 CM
BH CV ECHO MEAS - MVA P1/2T LCG: 3.6 CM^2
BH CV ECHO MEAS - MVA(P1/2T): 2.5 CM^2
BH CV ECHO MEAS - MVA(VTI): 1.7 CM^2
BH CV ECHO MEAS - PA ACC TIME: 0.08 SEC
BH CV ECHO MEAS - PA MAX PG (FULL): 1.9 MMHG
BH CV ECHO MEAS - PA MAX PG: 4.5 MMHG
BH CV ECHO MEAS - PA PR(ACCEL): 43.3 MMHG
BH CV ECHO MEAS - PA V2 MAX: 105.9 CM/SEC
BH CV ECHO MEAS - PULM A REVS DUR: 0.11 SEC
BH CV ECHO MEAS - PULM A REVS VEL: 26.6 CM/SEC
BH CV ECHO MEAS - PULM DIAS VEL: 57 CM/SEC
BH CV ECHO MEAS - PULM S/D: 0.82
BH CV ECHO MEAS - PULM SYS VEL: 46.7 CM/SEC
BH CV ECHO MEAS - PVA(V,A): 2.5 CM^2
BH CV ECHO MEAS - PVA(V,D): 2.5 CM^2
BH CV ECHO MEAS - QP/QS: 1.2
BH CV ECHO MEAS - RAP SYSTOLE: 8 MMHG
BH CV ECHO MEAS - RV MAX PG: 2.6 MMHG
BH CV ECHO MEAS - RV MEAN PG: 1.7 MMHG
BH CV ECHO MEAS - RV V1 MAX: 81 CM/SEC
BH CV ECHO MEAS - RV V1 MEAN: 63.2 CM/SEC
BH CV ECHO MEAS - RV V1 VTI: 13.9 CM
BH CV ECHO MEAS - RVOT AREA: 3.3 CM^2
BH CV ECHO MEAS - RVOT DIAM: 2 CM
BH CV ECHO MEAS - RVSP: 30.3 MMHG
BH CV ECHO MEAS - SI(CUBED): 26.9 ML/M^2
BH CV ECHO MEAS - SI(LVOT): 19.7 ML/M^2
BH CV ECHO MEAS - SI(TEICH): 25.1 ML/M^2
BH CV ECHO MEAS - SUP REN AO DIAM: 1.7 CM
BH CV ECHO MEAS - SV(CUBED): 52.7 ML
BH CV ECHO MEAS - SV(LVOT): 38.7 ML
BH CV ECHO MEAS - SV(RVOT): 45.2 ML
BH CV ECHO MEAS - SV(TEICH): 49.4 ML
BH CV ECHO MEAS - TAPSE (>1.6): 1.9 CM2
BH CV ECHO MEAS - TR MAX VEL: 236.2 CM/SEC
BH CV ECHO MEASUREMENTS AVERAGE E/E' RATIO: 8.76
BH CV XLRA - RV BASE: 2.4 CM
BH CV XLRA - TDI S': 17 CM/SEC
LEFT ATRIUM VOLUME INDEX: 19 ML/M2
LV EF 2D ECHO EST: 63 %
MAXIMAL PREDICTED HEART RATE: 158 BPM
SINUS: 3.5 CM
STJ: 2.7 CM
STRESS TARGET HR: 134 BPM

## 2019-10-23 PROCEDURE — 93306 TTE W/DOPPLER COMPLETE: CPT

## 2019-10-23 PROCEDURE — 0399T HC MYOCARDL STRAIN IMAG QUAN ASSMT PER SESS: CPT

## 2019-10-23 PROCEDURE — 25010000002 PERFLUTREN (DEFINITY) 8.476 MG IN SODIUM CHLORIDE 0.9 % 10 ML INJECTION: Performed by: INTERNAL MEDICINE

## 2019-10-23 PROCEDURE — 93306 TTE W/DOPPLER COMPLETE: CPT | Performed by: INTERNAL MEDICINE

## 2019-10-23 PROCEDURE — 0399T ADULT TRANSTHORACIC ECHO COMPLETE W/ CONT IF NECESSARY PER PROTOCOL: CPT | Performed by: INTERNAL MEDICINE

## 2019-10-23 RX ADMIN — PERFLUTREN 1.5 ML: 6.52 INJECTION, SUSPENSION INTRAVENOUS at 13:23

## 2019-10-25 DIAGNOSIS — C15.5 MALIGNANT NEOPLASM OF LOWER THIRD OF ESOPHAGUS (HCC): ICD-10-CM

## 2019-10-25 DIAGNOSIS — C78.7 LIVER METASTASES: ICD-10-CM

## 2019-10-29 ENCOUNTER — OFFICE VISIT (OUTPATIENT)
Dept: ONCOLOGY | Facility: CLINIC | Age: 64
End: 2019-10-29

## 2019-10-29 ENCOUNTER — INFUSION (OUTPATIENT)
Dept: ONCOLOGY | Facility: HOSPITAL | Age: 64
End: 2019-10-29

## 2019-10-29 VITALS
BODY MASS INDEX: 26.17 KG/M2 | WEIGHT: 176.7 LBS | HEIGHT: 69 IN | TEMPERATURE: 98.3 F | DIASTOLIC BLOOD PRESSURE: 80 MMHG | SYSTOLIC BLOOD PRESSURE: 130 MMHG | HEART RATE: 98 BPM | RESPIRATION RATE: 12 BRPM | OXYGEN SATURATION: 98 %

## 2019-10-29 DIAGNOSIS — C78.7 LIVER METASTASES: ICD-10-CM

## 2019-10-29 DIAGNOSIS — T45.1X5A CHEMOTHERAPY INDUCED NEUTROPENIA (HCC): ICD-10-CM

## 2019-10-29 DIAGNOSIS — C15.5 MALIGNANT NEOPLASM OF LOWER THIRD OF ESOPHAGUS (HCC): Primary | ICD-10-CM

## 2019-10-29 DIAGNOSIS — I10 ESSENTIAL HYPERTENSION: ICD-10-CM

## 2019-10-29 DIAGNOSIS — Z72.0 TOBACCO ABUSE: ICD-10-CM

## 2019-10-29 DIAGNOSIS — N32.1 COLOVESICAL FISTULA: ICD-10-CM

## 2019-10-29 DIAGNOSIS — Z45.2 ENCOUNTER FOR ADJUSTMENT OR MANAGEMENT OF VASCULAR ACCESS DEVICE: ICD-10-CM

## 2019-10-29 DIAGNOSIS — C15.5 MALIGNANT NEOPLASM OF LOWER THIRD OF ESOPHAGUS (HCC): ICD-10-CM

## 2019-10-29 DIAGNOSIS — I82.431 ACUTE DEEP VEIN THROMBOSIS (DVT) OF RIGHT POPLITEAL VEIN (HCC): ICD-10-CM

## 2019-10-29 DIAGNOSIS — D63.8 ANEMIA, CHRONIC DISEASE: ICD-10-CM

## 2019-10-29 DIAGNOSIS — D70.1 CHEMOTHERAPY INDUCED NEUTROPENIA (HCC): ICD-10-CM

## 2019-10-29 LAB
ALBUMIN SERPL-MCNC: 3.8 G/DL (ref 3.5–5.2)
ALBUMIN/GLOB SERPL: 1.2 G/DL (ref 1.1–2.4)
ALP SERPL-CCNC: 105 U/L (ref 38–116)
ALT SERPL W P-5'-P-CCNC: 33 U/L (ref 0–41)
ANION GAP SERPL CALCULATED.3IONS-SCNC: 13.3 MMOL/L (ref 5–15)
AST SERPL-CCNC: 29 U/L (ref 0–40)
BASOPHILS # BLD AUTO: 0.02 10*3/MM3 (ref 0–0.2)
BASOPHILS NFR BLD AUTO: 0.4 % (ref 0–1.5)
BILIRUB SERPL-MCNC: 0.4 MG/DL (ref 0.2–1.2)
BUN BLD-MCNC: 6 MG/DL (ref 6–20)
BUN/CREAT SERPL: 9.8 (ref 7.3–30)
CALCIUM SPEC-SCNC: 9.5 MG/DL (ref 8.5–10.2)
CHLORIDE SERPL-SCNC: 94 MMOL/L (ref 98–107)
CO2 SERPL-SCNC: 24.7 MMOL/L (ref 22–29)
CREAT BLD-MCNC: 0.61 MG/DL (ref 0.7–1.3)
DEPRECATED RDW RBC AUTO: 66 FL (ref 37–54)
EOSINOPHIL # BLD AUTO: 0.07 10*3/MM3 (ref 0–0.4)
EOSINOPHIL NFR BLD AUTO: 1.5 % (ref 0.3–6.2)
ERYTHROCYTE [DISTWIDTH] IN BLOOD BY AUTOMATED COUNT: 19.9 % (ref 12.3–15.4)
GFR SERPL CREATININE-BSD FRML MDRD: 134 ML/MIN/1.73
GLOBULIN UR ELPH-MCNC: 3.1 GM/DL (ref 1.8–3.5)
GLUCOSE BLD-MCNC: 107 MG/DL (ref 74–124)
HCT VFR BLD AUTO: 40.6 % (ref 37.5–51)
HGB BLD-MCNC: 13.5 G/DL (ref 13–17.7)
IMM GRANULOCYTES # BLD AUTO: 0.04 10*3/MM3 (ref 0–0.05)
IMM GRANULOCYTES NFR BLD AUTO: 0.9 % (ref 0–0.5)
LYMPHOCYTES # BLD AUTO: 1.12 10*3/MM3 (ref 0.7–3.1)
LYMPHOCYTES NFR BLD AUTO: 24.1 % (ref 19.6–45.3)
MCH RBC QN AUTO: 30.7 PG (ref 26.6–33)
MCHC RBC AUTO-ENTMCNC: 33.3 G/DL (ref 31.5–35.7)
MCV RBC AUTO: 92.3 FL (ref 79–97)
MONOCYTES # BLD AUTO: 0.52 10*3/MM3 (ref 0.1–0.9)
MONOCYTES NFR BLD AUTO: 11.2 % (ref 5–12)
NEUTROPHILS # BLD AUTO: 2.88 10*3/MM3 (ref 1.7–7)
NEUTROPHILS NFR BLD AUTO: 61.9 % (ref 42.7–76)
NRBC BLD AUTO-RTO: 0 /100 WBC (ref 0–0.2)
PLATELET # BLD AUTO: 117 10*3/MM3 (ref 140–450)
PMV BLD AUTO: 8.7 FL (ref 6–12)
POTASSIUM BLD-SCNC: 3.6 MMOL/L (ref 3.5–4.7)
PROT SERPL-MCNC: 6.9 G/DL (ref 6.3–8)
RBC # BLD AUTO: 4.4 10*6/MM3 (ref 4.14–5.8)
SODIUM BLD-SCNC: 132 MMOL/L (ref 134–145)
WBC NRBC COR # BLD: 4.65 10*3/MM3 (ref 3.4–10.8)

## 2019-10-29 PROCEDURE — 25010000002 TRASTUZUMAB PER 10 MG: Performed by: INTERNAL MEDICINE

## 2019-10-29 PROCEDURE — 25010000002 LEUCOVORIN CALCIUM PER 50 MG: Performed by: INTERNAL MEDICINE

## 2019-10-29 PROCEDURE — 25010000002 DEXAMETHASONE SODIUM PHOSPHATE 100 MG/10ML SOLUTION: Performed by: INTERNAL MEDICINE

## 2019-10-29 PROCEDURE — 96375 TX/PRO/DX INJ NEW DRUG ADDON: CPT | Performed by: INTERNAL MEDICINE

## 2019-10-29 PROCEDURE — 99406 BEHAV CHNG SMOKING 3-10 MIN: CPT | Performed by: INTERNAL MEDICINE

## 2019-10-29 PROCEDURE — 25010000002 OXALIPLATIN PER 0.5 MG: Performed by: INTERNAL MEDICINE

## 2019-10-29 PROCEDURE — 96416 CHEMO PROLONG INFUSE W/PUMP: CPT | Performed by: INTERNAL MEDICINE

## 2019-10-29 PROCEDURE — 25010000002 PALONOSETRON PER 25 MCG: Performed by: INTERNAL MEDICINE

## 2019-10-29 PROCEDURE — 99215 OFFICE O/P EST HI 40 MIN: CPT | Performed by: INTERNAL MEDICINE

## 2019-10-29 PROCEDURE — 80053 COMPREHEN METABOLIC PANEL: CPT

## 2019-10-29 PROCEDURE — 96368 THER/DIAG CONCURRENT INF: CPT | Performed by: INTERNAL MEDICINE

## 2019-10-29 PROCEDURE — 96413 CHEMO IV INFUSION 1 HR: CPT | Performed by: INTERNAL MEDICINE

## 2019-10-29 PROCEDURE — 96411 CHEMO IV PUSH ADDL DRUG: CPT | Performed by: INTERNAL MEDICINE

## 2019-10-29 PROCEDURE — 25010000002 FLUOROURACIL PER 500 MG: Performed by: INTERNAL MEDICINE

## 2019-10-29 PROCEDURE — 85025 COMPLETE CBC W/AUTO DIFF WBC: CPT

## 2019-10-29 PROCEDURE — 96415 CHEMO IV INFUSION ADDL HR: CPT | Performed by: INTERNAL MEDICINE

## 2019-10-29 PROCEDURE — 25010000002 DIPHENHYDRAMINE 1 EACH BAG: Performed by: INTERNAL MEDICINE

## 2019-10-29 PROCEDURE — 96417 CHEMO IV INFUS EACH ADDL SEQ: CPT | Performed by: INTERNAL MEDICINE

## 2019-10-29 RX ORDER — FAMOTIDINE 10 MG/ML
20 INJECTION, SOLUTION INTRAVENOUS 2 TIMES DAILY
Status: DISCONTINUED | OUTPATIENT
Start: 2019-10-29 | End: 2019-10-29 | Stop reason: HOSPADM

## 2019-10-29 RX ORDER — PALONOSETRON 0.05 MG/ML
0.25 INJECTION, SOLUTION INTRAVENOUS ONCE
Status: COMPLETED | OUTPATIENT
Start: 2019-10-29 | End: 2019-10-29

## 2019-10-29 RX ORDER — CLOTRIMAZOLE 10 MG/1
10 LOZENGE ORAL; TOPICAL 3 TIMES DAILY
Qty: 90 TABLET | Refills: 1 | Status: SHIPPED | OUTPATIENT
Start: 2019-10-29 | End: 2019-11-26

## 2019-10-29 RX ORDER — DIPHENHYDRAMINE HYDROCHLORIDE 50 MG/ML
50 INJECTION INTRAMUSCULAR; INTRAVENOUS AS NEEDED
Status: CANCELLED | OUTPATIENT
Start: 2019-10-29

## 2019-10-29 RX ORDER — FLUOROURACIL 50 MG/ML
400 INJECTION, SOLUTION INTRAVENOUS ONCE
Status: COMPLETED | OUTPATIENT
Start: 2019-10-29 | End: 2019-10-29

## 2019-10-29 RX ORDER — DEXTROSE MONOHYDRATE 50 MG/ML
250 INJECTION, SOLUTION INTRAVENOUS ONCE
Status: CANCELLED | OUTPATIENT
Start: 2019-10-29

## 2019-10-29 RX ORDER — SODIUM CHLORIDE 9 MG/ML
250 INJECTION, SOLUTION INTRAVENOUS ONCE
Status: COMPLETED | OUTPATIENT
Start: 2019-10-29 | End: 2019-10-29

## 2019-10-29 RX ORDER — LISINOPRIL AND HYDROCHLOROTHIAZIDE 20; 12.5 MG/1; MG/1
1 TABLET ORAL DAILY
Qty: 90 TABLET | Refills: 2 | Status: SHIPPED | OUTPATIENT
Start: 2019-10-29 | End: 2020-07-14

## 2019-10-29 RX ORDER — SODIUM CHLORIDE 9 MG/ML
250 INJECTION, SOLUTION INTRAVENOUS ONCE
Status: CANCELLED | OUTPATIENT
Start: 2019-10-29

## 2019-10-29 RX ORDER — PALONOSETRON 0.05 MG/ML
0.25 INJECTION, SOLUTION INTRAVENOUS ONCE
Status: CANCELLED | OUTPATIENT
Start: 2019-10-29

## 2019-10-29 RX ORDER — DEXTROSE MONOHYDRATE 50 MG/ML
250 INJECTION, SOLUTION INTRAVENOUS ONCE
Status: COMPLETED | OUTPATIENT
Start: 2019-10-29 | End: 2019-10-29

## 2019-10-29 RX ORDER — FAMOTIDINE 10 MG/ML
20 INJECTION, SOLUTION INTRAVENOUS 2 TIMES DAILY
Status: CANCELLED
Start: 2019-10-29

## 2019-10-29 RX ORDER — FAMOTIDINE 10 MG/ML
20 INJECTION, SOLUTION INTRAVENOUS AS NEEDED
Status: CANCELLED | OUTPATIENT
Start: 2019-10-29

## 2019-10-29 RX ORDER — FLUOROURACIL 50 MG/ML
400 INJECTION, SOLUTION INTRAVENOUS ONCE
Status: CANCELLED | OUTPATIENT
Start: 2019-10-29

## 2019-10-29 RX ADMIN — SODIUM CHLORIDE 250 ML: 9 INJECTION, SOLUTION INTRAVENOUS at 09:08

## 2019-10-29 RX ADMIN — FLUOROURACIL 810 MG: 50 INJECTION, SOLUTION INTRAVENOUS at 12:46

## 2019-10-29 RX ADMIN — DIPHENHYDRAMINE HYDROCHLORIDE 50 MG: 50 INJECTION INTRAMUSCULAR; INTRAVENOUS at 09:13

## 2019-10-29 RX ADMIN — DEXAMETHASONE SODIUM PHOSPHATE 12 MG: 10 INJECTION, SOLUTION INTRAMUSCULAR; INTRAVENOUS at 10:34

## 2019-10-29 RX ADMIN — PALONOSETRON 0.25 MG: 0.05 INJECTION, SOLUTION INTRAVENOUS at 10:32

## 2019-10-29 RX ADMIN — FAMOTIDINE 20 MG: 10 INJECTION INTRAVENOUS at 09:11

## 2019-10-29 RX ADMIN — OXALIPLATIN 170 MG: 100 INJECTION, SOLUTION, CONCENTRATE INTRAVENOUS at 10:54

## 2019-10-29 RX ADMIN — LEUCOVORIN CALCIUM 810 MG: 350 INJECTION, POWDER, LYOPHILIZED, FOR SOLUTION INTRAMUSCULAR; INTRAVENOUS at 10:54

## 2019-10-29 RX ADMIN — FLUOROURACIL 4850 MG: 50 INJECTION, SOLUTION INTRAVENOUS at 12:47

## 2019-10-29 RX ADMIN — TRASTUZUMAB 350 MG: 150 INJECTION, POWDER, LYOPHILIZED, FOR SOLUTION INTRAVENOUS at 09:54

## 2019-10-29 RX ADMIN — DEXTROSE MONOHYDRATE 250 ML: 5 INJECTION, SOLUTION INTRAVENOUS at 10:51

## 2019-10-29 NOTE — PROGRESS NOTES
"  Subjective     REASON FOR follow up:1.  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  Currently receiving palliative FOLFOX AND HERCEPTIN therapy.    .2.COLOVESICAL FISTULA    3. 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 Overall the patient has not had any cancer related pain but he has some fatigue, not too much of an appetite. His mouth is sore and the food is not tasting right. He has continued therapy for his colovesical fistula that has not been acting up or producing any problems. The patient has had normal bowel activity, normal urination. He has some claudication in his lower extremities related to peripheral arterial disease. He has decreased hearing given the fact that he has wax impaction in both ear canals. The patient denies any issues pertinent to bleeding with exception of epistaxis through the right nostril from time to time. He has not had any new episodes of vein thrombosis. He remains on Lovenox. The patient has not had any infections. In the interim he has had his echocardiogram to evaluate left ventricular ejection fraction given the chronic use of Herceptin.                 Past Medical History:   Diagnosis Date   • Arthritis    • Elevated PSA    • Esophageal mass    • H/O Lung nodule    • Hepatitis     CHILD--PT STATED \"I THINK IT WAS A.\"   • History of pneumonia    • Hx of blood clots 08/10/2019   • Hyperlipidemia    • Hypertension    • Liver cancer (CMS/HCC)    • Prostate cancer (CMS/HCC)         Past Surgical History:   Procedure Laterality Date   • HERNIA REPAIR  1999   • PROSTATE SURGERY  03/2008   • VENOUS ACCESS DEVICE (PORT) " INSERTION N/A 7/16/2019    Procedure: INSERTION VENOUS ACCESS DEVICE WITH FLUORO AND EGD WITH BIOPSY;  Surgeon: Mary Brito MD;  Location: [facility];  Service: Thoracic        Current Outpatient Medications on File Prior to Visit   Medication Sig Dispense Refill   • ciprofloxacin (CIPRO) 250 MG tablet Take 2 tablets by mouth 2 (Two) Times a Day. 1 tablet 60 tablet 1   • enoxaparin (LOVENOX) 80 MG/0.8ML solution syringe Inject 0.8 mL under the skin into the appropriate area as directed Every 12 (Twelve) Hours. 48 mL 11   • lisinopril-hydrochlorothiazide (PRINZIDE,ZESTORETIC) 20-12.5 MG per tablet Take 1 tablet by mouth Daily.     • ondansetron (ZOFRAN) 4 MG tablet Take 1 tablet by mouth Every 8 (Eight) Hours As Needed for Nausea or Vomiting. 30 tablet 2   • Probiotic Product (PROBIOTIC DAILY PO) Take  by mouth Daily.       No current facility-administered medications on file prior to visit.         ALLERGIES:  No Known Allergies     Social History     Socioeconomic History   • Marital status: Single     Spouse name: Not on file   • Number of children: Not on file   • Years of education: High school   • Highest education level: Not on file   Occupational History   • Occupation: [employer]     Employer: [employer]   Tobacco Use   • Smoking status: Current Every Day Smoker     Packs/day: 1.00     Years: 40.00     Pack years: 40.00     Types: Cigarettes   • Smokeless tobacco: Never Used   Substance and Sexual Activity   • Alcohol use: Yes     Alcohol/week: 16.8 oz     Types: 28 Cans of beer per week     Comment: NOT RECENTLY   • Drug use: No        Family History   Problem Relation Age of Onset   • Hypertension Mother    • Stroke Mother    • Hypertension Other    • Lung disease Other    • Prostate cancer Other    • Lung cancer Father    • Malig Hyperthermia Neg Hx          Review of Systems:        General: no fever, no chills,  fatigue,no weight changes, no lack of appetite.  Eyes: no epiphora,  xerophthalmia,conjunctivitis, pain, glaucoma, blurred vision, blindness, secretion, photophobia, proptosis, diplopia.  Ears: no otorrhea, tinnitus, otorrhagia, deafness, pain, vertigo.decreased hearing  Nose: no rhinorrhea, no epistaxis, no alteration in perception of odors, no sinuses pressure.  Mouth: no alteration in gums or teeth,  No ulcers, no difficulty with mastication or deglut ion, no odynophagia.  Neck: no masses or pain, no thyroid alterations, no pain in muscles or arteries, no carotid odynia, no crepitation.  Respiratory: no cough, no sputum production,no dyspnea,no trepopnea, no pleuritic pain,no hemoptysis.  Heart: no syncope, no irregularity, no palpitations, no angina,no orthopnea,no paroxysmal nocturnal dyspnea.  Vascular Venous: no tenderness,no edema,no palpable cords,no postphlebitic syndrome, no skin changes no ulcerations.  Vascular Arterial: no distal ischemia, stated claudication, no gangrene, no neuropathic ischemic pain, no skin ulcers, no paleness no cyanosis.  GI: no dysphagia, no odynophagia, no regurgitation, no heartburn,no indigestion,no nausea,no vomiting,no hematemesis ,no melena,no jaundice,no distention, no obstipation,no enterorrhagia,no proctalgia,no anal  lesions, no changes in bowel habits.  : no frequency, no hesitancy, no hematuria, no discharge,no  pain.  Musculoskeletal: no muscle or tendon pain or inflammation,no  joint pain, no edema, no functional limitation,no fasciculations, no mass.  Neurologic: no headache, no seizures, noalterations on Craneal nerves, no motor deficit, feet sensory deficit, normal coordination, no alteration in memory,normal orientation, calculation,normal writting, verbal and written language.  Skin: no rashes,no pruritus no localized lesions.  Psychiatric: no anxiety, no depression,no agitation, no delusions, proper insight.              Objective     Vitals:    10/29/19 0758   BP: 130/80   Pulse: 98   Resp: 12   Temp: 98.3 °F (36.8 °C)  "  TempSrc: Oral   SpO2: 98%   Weight: 80.2 kg (176 lb 11.2 oz)   Height: 174 cm (68.5\")   PainSc: 0-No pain     Current Status 10/29/2019   ECOG score 0       Physical Exam:  GENERAL:  Well-developed, well-nourished  Patient  in no acute distress.   SKIN:  Warm, dry ,NO rashes,NO purpura ,NO petechiae.  HEENT:  Pupils were equal and reactive to light and accomodation, conjunctivas non injected, no pterigion, normal extraocular movements, normal visual acuity.   Mouth mucosa was moist, candida  exudates in oropharynx, normal gum line, normal roof of the mouth and pillars, normal papillations of the tongue.normal nasal mucosa no alteration in septum, bilateral wax accumulation in ear canals  NECK:  Supple with good range of motion; no thyromegaly or masses, no JVD or bruits, no cervical adenopathies.No carotid arteries pain, no carotid abnormal pulsation , NO arterial dance.  LYMPHATICS:  No cervical, NO supraclavicular, NO axillary,NO epitrochlear , NO inguinal adenopathy.  CHEST:  Normal excursion of both kailash thoraces, normal voice fremitus, no subcutaneous emphysema, normal axillas, no rashes or acanthosis nigricans. Lungs clear to percussion and auscultation, normal breath sounds bilaterally, no wheezing,NO crackles NO ronchi, NO stridor, NO rubs.  CARDIAC AND VASCULAR:  normal rate and regular rhythm, without murmurs,NO rubs NO S3 NO S4 right or left . decreased femoral, popliteal, pedis,normal brachial and carotid pulses.  ABDOMEN:  Soft, nontender with no organomegaly or masses, no ascites, no collateral circulation,no distention,no [place] sign, no abdominal pain, no inguinal hernias,no umbilical hernia, no abdominal bruits. Normal bowel sounds.  GENITAL: Not  Performed.  EXTREMITIES  AND SPINE:  hands clubbing, no cyanosis or edema, no deformities or pain .No kyphosis, scoliosis, deformities or pain in spine, ribs or pelvic bone.  NEUROLOGICAL:  Patient was awake, alert, oriented to time, person and " place.                    RECENT LABS:  Hematology WBC   Date Value Ref Range Status   10/29/2019 4.65 3.40 - 10.80 10*3/mm3 Final   02/02/2019 13.4 (H) 3.4 - 10.8 x10E3/uL Final     RBC   Date Value Ref Range Status   10/29/2019 4.40 4.14 - 5.80 10*6/mm3 Final   02/02/2019 4.81 4.14 - 5.80 x10E6/uL Final     Hemoglobin   Date Value Ref Range Status   10/29/2019 13.5 13.0 - 17.7 g/dL Final     Hematocrit   Date Value Ref Range Status   10/29/2019 40.6 37.5 - 51.0 % Final     Platelets   Date Value Ref Range Status   10/29/2019 117 (L) 140 - 450 10*3/mm3 Final              Narrator Report   Interpretation Summary echocardiogram    · Left ventricular wall thickness is consistent with mild concentric hypertrophy.  · Estimated EF = 63%.  · Left ventricular systolic function is normal.  · There is mild calcification of the aortic valve mainly affecting the non coronary cusp(s).  · Global Longitudinal LV strain = -19.6%.            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 Almost 90% of the liver WAS replaced by tumor. The patient has been undergoing chemotherapy with FOLFOX and Herceptin and has had excellent response clinically and radiologically.  Today, the patient on clinical examination has had resolution of his giant hepatomegaly that was nodular and mildly tender in the right upper quadrant of his abdomen.  His liver has reduced in size very dramatically.  Most importantly, the patient has had resolution of his cancer-related pain.  His performance status is ECOG 0.  He is no longer taking pain medicine and developed thrombophilia associated with malignancy that has been treated with Lovenox successfully.  He has some ageusia related to treatment and this will require modification in his diet.  He remains smoking 1 pack of cigarettes daily.  He feels very good.  1. It is very clear that his cancer has receded dramatically given the resolution of his large  hepatomegaly that was present at the time of his original diagnosis. The patient's overall performance status has improved. He is able to swallow with no difficulties and the cancer in the esophagus is not producing any obstruction.  2. The patient has developed candida mucositis. This is related to the chronic use of ciprofloxacin to keep him from developing a new urinary tract infection due to his colovesical fistula.   3. Chronic anticoagulation with Lovenox in the background of thrombophilia of malignancy, deep vein thrombosis in lower extremities. Resolution of the clots, no clinical bleeding.  4. Epistaxis related to dryness in the environment.  5. Bilateral wax accumulation in ear canals.  6. Chronic smoking with claudication in the lower extremities. Decreased pulses femoral, popliteal, posterior tibialis bilaterally.  7. Hypertension under good control. I am prescribing his blood pressure medication.  8. Cardiac function. Left ventricular ejection fraction remains appropriate and Herceptin is not effecting his heart function at all.     RECOMMENDATIONS:  1. The patient will continue his FOLFOX and Herceptin today and in 2 more weeks. He will proceed with a PET scan in 3 weeks and I will review him back in 4 weeks. Maybe at that point if the PET scan shows very dramatic improvement that I am sure that will be the case, we will start to tweak his treatment probably dropping the Oxaliplatin from his care plan and keeping him on 5FU/leucovorin and Herceptin as a form of maintenance.  2. The patient will use Mycelex troches for his oral candida 3 times a day.  3. The patient has been advised to continue taking his blood pressure medicine.   4. The patient has been advised to use saline nasal solution 3 times a day to decrease epistaxis risk.  5. He will be scheduled to be seen by ENT in regard to wax accumulation in his ear canals.   6. The patient has been advised to continue his ciprofloxacin along with  probiotic in the background of colovesical fistula. So far no new urinary tract infections.  7. I insisted in smoking cessation. The patient is not too much into willingness to listen.  5 MINUTES CONVERSATION ABOUT THIS ISSUE  8. I will review him back in 4 weeks as stated above with a PET scan available at that time.  9. CBC, CMP in 2 and 4 weeks.  10. CEA level today.   11. I discussed with the patient and his sister in detail.

## 2019-10-30 DIAGNOSIS — I10 ESSENTIAL HYPERTENSION: ICD-10-CM

## 2019-10-30 DIAGNOSIS — E78.2 MIXED HYPERLIPIDEMIA: ICD-10-CM

## 2019-10-30 RX ORDER — ATORVASTATIN CALCIUM 10 MG/1
TABLET, FILM COATED ORAL
Qty: 90 TABLET | Refills: 1 | OUTPATIENT
Start: 2019-10-30

## 2019-10-30 RX ORDER — AMLODIPINE BESYLATE 5 MG/1
TABLET ORAL
Qty: 90 TABLET | Refills: 1 | OUTPATIENT
Start: 2019-10-30

## 2019-10-30 RX ORDER — METOPROLOL SUCCINATE 100 MG/1
TABLET, EXTENDED RELEASE ORAL
Qty: 180 TABLET | Refills: 1 | OUTPATIENT
Start: 2019-10-30

## 2019-10-31 ENCOUNTER — INFUSION (OUTPATIENT)
Dept: ONCOLOGY | Facility: HOSPITAL | Age: 64
End: 2019-10-31

## 2019-10-31 DIAGNOSIS — C78.7 LIVER METASTASES: ICD-10-CM

## 2019-10-31 DIAGNOSIS — Z45.2 ENCOUNTER FOR ADJUSTMENT OR MANAGEMENT OF VASCULAR ACCESS DEVICE: ICD-10-CM

## 2019-10-31 DIAGNOSIS — C15.5 MALIGNANT NEOPLASM OF LOWER THIRD OF ESOPHAGUS (HCC): Primary | ICD-10-CM

## 2019-10-31 PROCEDURE — 96523 IRRIG DRUG DELIVERY DEVICE: CPT

## 2019-10-31 RX ORDER — SODIUM CHLORIDE 0.9 % (FLUSH) 0.9 %
10 SYRINGE (ML) INJECTION AS NEEDED
Status: DISCONTINUED | OUTPATIENT
Start: 2019-10-31 | End: 2019-10-31 | Stop reason: HOSPADM

## 2019-10-31 RX ORDER — SODIUM CHLORIDE 0.9 % (FLUSH) 0.9 %
10 SYRINGE (ML) INJECTION AS NEEDED
Status: CANCELLED | OUTPATIENT
Start: 2019-10-31

## 2019-10-31 RX ADMIN — SODIUM CHLORIDE, PRESERVATIVE FREE 500 UNITS: 5 INJECTION INTRAVENOUS at 11:01

## 2019-10-31 RX ADMIN — Medication 10 ML: at 11:01

## 2019-11-11 DIAGNOSIS — C78.7 LIVER METASTASES: ICD-10-CM

## 2019-11-11 DIAGNOSIS — C15.5 MALIGNANT NEOPLASM OF LOWER THIRD OF ESOPHAGUS (HCC): ICD-10-CM

## 2019-11-11 RX ORDER — SODIUM CHLORIDE 9 MG/ML
250 INJECTION, SOLUTION INTRAVENOUS ONCE
Status: CANCELLED | OUTPATIENT
Start: 2019-11-12

## 2019-11-11 RX ORDER — PALONOSETRON 0.05 MG/ML
0.25 INJECTION, SOLUTION INTRAVENOUS ONCE
Status: CANCELLED | OUTPATIENT
Start: 2019-11-12

## 2019-11-11 RX ORDER — DEXTROSE MONOHYDRATE 50 MG/ML
250 INJECTION, SOLUTION INTRAVENOUS ONCE
Status: CANCELLED | OUTPATIENT
Start: 2019-11-12

## 2019-11-11 RX ORDER — DIPHENHYDRAMINE HYDROCHLORIDE 50 MG/ML
50 INJECTION INTRAMUSCULAR; INTRAVENOUS AS NEEDED
Status: CANCELLED | OUTPATIENT
Start: 2019-11-12

## 2019-11-11 RX ORDER — FAMOTIDINE 10 MG/ML
20 INJECTION, SOLUTION INTRAVENOUS AS NEEDED
Status: CANCELLED | OUTPATIENT
Start: 2019-11-12

## 2019-11-11 RX ORDER — FLUOROURACIL 50 MG/ML
400 INJECTION, SOLUTION INTRAVENOUS ONCE
Status: CANCELLED | OUTPATIENT
Start: 2019-11-12

## 2019-11-11 RX ORDER — FAMOTIDINE 10 MG/ML
20 INJECTION, SOLUTION INTRAVENOUS 2 TIMES DAILY
Status: CANCELLED | OUTPATIENT
Start: 2019-11-12

## 2019-11-12 ENCOUNTER — INFUSION (OUTPATIENT)
Dept: ONCOLOGY | Facility: HOSPITAL | Age: 64
End: 2019-11-12

## 2019-11-12 VITALS
WEIGHT: 179.4 LBS | HEART RATE: 105 BPM | SYSTOLIC BLOOD PRESSURE: 113 MMHG | OXYGEN SATURATION: 100 % | TEMPERATURE: 97.5 F | DIASTOLIC BLOOD PRESSURE: 73 MMHG | BODY MASS INDEX: 26.88 KG/M2

## 2019-11-12 DIAGNOSIS — C15.5 MALIGNANT NEOPLASM OF LOWER THIRD OF ESOPHAGUS (HCC): Primary | ICD-10-CM

## 2019-11-12 DIAGNOSIS — C78.7 LIVER METASTASES: ICD-10-CM

## 2019-11-12 LAB
ALBUMIN SERPL-MCNC: 3.8 G/DL (ref 3.5–5.2)
ALBUMIN/GLOB SERPL: 1.2 G/DL (ref 1.1–2.4)
ALP SERPL-CCNC: 113 U/L (ref 38–116)
ALT SERPL W P-5'-P-CCNC: 32 U/L (ref 0–41)
ANION GAP SERPL CALCULATED.3IONS-SCNC: 16.6 MMOL/L (ref 5–15)
AST SERPL-CCNC: 29 U/L (ref 0–40)
BASOPHILS # BLD AUTO: 0.01 10*3/MM3 (ref 0–0.2)
BASOPHILS NFR BLD AUTO: 0.2 % (ref 0–1.5)
BILIRUB SERPL-MCNC: 0.5 MG/DL (ref 0.2–1.2)
BUN BLD-MCNC: 9 MG/DL (ref 6–20)
BUN/CREAT SERPL: 15.3 (ref 7.3–30)
CALCIUM SPEC-SCNC: 9.4 MG/DL (ref 8.5–10.2)
CHLORIDE SERPL-SCNC: 91 MMOL/L (ref 98–107)
CO2 SERPL-SCNC: 21.4 MMOL/L (ref 22–29)
CREAT BLD-MCNC: 0.59 MG/DL (ref 0.7–1.3)
DEPRECATED RDW RBC AUTO: 63.3 FL (ref 37–54)
EOSINOPHIL # BLD AUTO: 0.07 10*3/MM3 (ref 0–0.4)
EOSINOPHIL NFR BLD AUTO: 1.7 % (ref 0.3–6.2)
ERYTHROCYTE [DISTWIDTH] IN BLOOD BY AUTOMATED COUNT: 18.6 % (ref 12.3–15.4)
GFR SERPL CREATININE-BSD FRML MDRD: 139 ML/MIN/1.73
GLOBULIN UR ELPH-MCNC: 3.1 GM/DL (ref 1.8–3.5)
GLUCOSE BLD-MCNC: 121 MG/DL (ref 74–124)
HCT VFR BLD AUTO: 38.5 % (ref 37.5–51)
HGB BLD-MCNC: 13.2 G/DL (ref 13–17.7)
IMM GRANULOCYTES # BLD AUTO: 0.04 10*3/MM3 (ref 0–0.05)
IMM GRANULOCYTES NFR BLD AUTO: 1 % (ref 0–0.5)
LYMPHOCYTES # BLD AUTO: 1.07 10*3/MM3 (ref 0.7–3.1)
LYMPHOCYTES NFR BLD AUTO: 25.7 % (ref 19.6–45.3)
MCH RBC QN AUTO: 32 PG (ref 26.6–33)
MCHC RBC AUTO-ENTMCNC: 34.3 G/DL (ref 31.5–35.7)
MCV RBC AUTO: 93.4 FL (ref 79–97)
MONOCYTES # BLD AUTO: 0.63 10*3/MM3 (ref 0.1–0.9)
MONOCYTES NFR BLD AUTO: 15.1 % (ref 5–12)
NEUTROPHILS # BLD AUTO: 2.35 10*3/MM3 (ref 1.7–7)
NEUTROPHILS NFR BLD AUTO: 56.3 % (ref 42.7–76)
NRBC BLD AUTO-RTO: 0 /100 WBC (ref 0–0.2)
PLATELET # BLD AUTO: 103 10*3/MM3 (ref 140–450)
PMV BLD AUTO: 8.9 FL (ref 6–12)
POTASSIUM BLD-SCNC: 3.2 MMOL/L (ref 3.5–4.7)
PROT SERPL-MCNC: 6.9 G/DL (ref 6.3–8)
RBC # BLD AUTO: 4.12 10*6/MM3 (ref 4.14–5.8)
SODIUM BLD-SCNC: 129 MMOL/L (ref 134–145)
WBC NRBC COR # BLD: 4.17 10*3/MM3 (ref 3.4–10.8)

## 2019-11-12 PROCEDURE — 96416 CHEMO PROLONG INFUSE W/PUMP: CPT | Performed by: INTERNAL MEDICINE

## 2019-11-12 PROCEDURE — 25010000002 LEUCOVORIN CALCIUM PER 50 MG: Performed by: INTERNAL MEDICINE

## 2019-11-12 PROCEDURE — 85025 COMPLETE CBC W/AUTO DIFF WBC: CPT | Performed by: INTERNAL MEDICINE

## 2019-11-12 PROCEDURE — 25010000002 DEXAMETHASONE SODIUM PHOSPHATE 100 MG/10ML SOLUTION: Performed by: INTERNAL MEDICINE

## 2019-11-12 PROCEDURE — 96368 THER/DIAG CONCURRENT INF: CPT | Performed by: INTERNAL MEDICINE

## 2019-11-12 PROCEDURE — 25010000002 OXALIPLATIN PER 0.5 MG: Performed by: INTERNAL MEDICINE

## 2019-11-12 PROCEDURE — 25010000002 DIPHENHYDRAMINE 1 EACH BAG: Performed by: INTERNAL MEDICINE

## 2019-11-12 PROCEDURE — 25010000002 TRASTUZUMAB PER 10 MG: Performed by: INTERNAL MEDICINE

## 2019-11-12 PROCEDURE — 96417 CHEMO IV INFUS EACH ADDL SEQ: CPT | Performed by: INTERNAL MEDICINE

## 2019-11-12 PROCEDURE — 96415 CHEMO IV INFUSION ADDL HR: CPT | Performed by: INTERNAL MEDICINE

## 2019-11-12 PROCEDURE — 25010000002 FLUOROURACIL PER 500 MG: Performed by: INTERNAL MEDICINE

## 2019-11-12 PROCEDURE — 96413 CHEMO IV INFUSION 1 HR: CPT | Performed by: INTERNAL MEDICINE

## 2019-11-12 PROCEDURE — 80053 COMPREHEN METABOLIC PANEL: CPT | Performed by: INTERNAL MEDICINE

## 2019-11-12 PROCEDURE — 96411 CHEMO IV PUSH ADDL DRUG: CPT | Performed by: INTERNAL MEDICINE

## 2019-11-12 PROCEDURE — 96375 TX/PRO/DX INJ NEW DRUG ADDON: CPT | Performed by: INTERNAL MEDICINE

## 2019-11-12 PROCEDURE — 25010000002 PALONOSETRON PER 25 MCG: Performed by: INTERNAL MEDICINE

## 2019-11-12 RX ORDER — SODIUM CHLORIDE 9 MG/ML
250 INJECTION, SOLUTION INTRAVENOUS ONCE
Status: COMPLETED | OUTPATIENT
Start: 2019-11-12 | End: 2019-11-12

## 2019-11-12 RX ORDER — FLUOROURACIL 50 MG/ML
400 INJECTION, SOLUTION INTRAVENOUS ONCE
Status: COMPLETED | OUTPATIENT
Start: 2019-11-12 | End: 2019-11-12

## 2019-11-12 RX ORDER — PALONOSETRON 0.05 MG/ML
0.25 INJECTION, SOLUTION INTRAVENOUS ONCE
Status: COMPLETED | OUTPATIENT
Start: 2019-11-12 | End: 2019-11-12

## 2019-11-12 RX ORDER — DEXTROSE MONOHYDRATE 50 MG/ML
250 INJECTION, SOLUTION INTRAVENOUS ONCE
Status: COMPLETED | OUTPATIENT
Start: 2019-11-12 | End: 2019-11-12

## 2019-11-12 RX ORDER — POTASSIUM CHLORIDE 750 MG/1
20 TABLET, FILM COATED, EXTENDED RELEASE ORAL DAILY
Qty: 30 TABLET | Refills: 1 | Status: SHIPPED | OUTPATIENT
Start: 2019-11-12 | End: 2020-02-19

## 2019-11-12 RX ORDER — FAMOTIDINE 10 MG/ML
20 INJECTION, SOLUTION INTRAVENOUS 2 TIMES DAILY
Status: DISCONTINUED | OUTPATIENT
Start: 2019-11-12 | End: 2019-11-12 | Stop reason: HOSPADM

## 2019-11-12 RX ADMIN — FLUOROURACIL 810 MG: 50 INJECTION, SOLUTION INTRAVENOUS at 13:09

## 2019-11-12 RX ADMIN — DEXTROSE MONOHYDRATE 250 ML: 5 INJECTION, SOLUTION INTRAVENOUS at 11:04

## 2019-11-12 RX ADMIN — LEUCOVORIN CALCIUM 810 MG: 350 INJECTION, POWDER, LYOPHILIZED, FOR SOLUTION INTRAMUSCULAR; INTRAVENOUS at 11:04

## 2019-11-12 RX ADMIN — FLUOROURACIL 4850 MG: 50 INJECTION, SOLUTION INTRAVENOUS at 13:09

## 2019-11-12 RX ADMIN — TRASTUZUMAB 350 MG: 150 INJECTION, POWDER, LYOPHILIZED, FOR SOLUTION INTRAVENOUS at 10:29

## 2019-11-12 RX ADMIN — DEXAMETHASONE SODIUM PHOSPHATE 12 MG: 10 INJECTION, SOLUTION INTRAMUSCULAR; INTRAVENOUS at 09:13

## 2019-11-12 RX ADMIN — PALONOSETRON 0.25 MG: 0.05 INJECTION, SOLUTION INTRAVENOUS at 09:30

## 2019-11-12 RX ADMIN — SODIUM CHLORIDE 250 ML: 9 INJECTION, SOLUTION INTRAVENOUS at 09:10

## 2019-11-12 RX ADMIN — FAMOTIDINE 20 MG: 10 INJECTION INTRAVENOUS at 09:10

## 2019-11-12 RX ADMIN — OXALIPLATIN 170 MG: 5 INJECTION, SOLUTION, CONCENTRATE INTRAVENOUS at 11:04

## 2019-11-12 RX ADMIN — DIPHENHYDRAMINE HYDROCHLORIDE 50 MG: 50 INJECTION INTRAMUSCULAR; INTRAVENOUS at 09:30

## 2019-11-12 NOTE — PROGRESS NOTES
"CMP reviewed w/ Dr. Haynes. Pt to start Potassium 20 mEq daily and \"eat potato chips\" per Dr. Haynes. Potassium escribed to pt's pharmacy and pt v/u of all. Also reviewed precautions to take in cold weather following tx w/ oxaliplatin, pt v/u.  "

## 2019-11-14 ENCOUNTER — INFUSION (OUTPATIENT)
Dept: ONCOLOGY | Facility: HOSPITAL | Age: 64
End: 2019-11-14

## 2019-11-14 DIAGNOSIS — C78.7 LIVER METASTASES: ICD-10-CM

## 2019-11-14 DIAGNOSIS — C15.5 MALIGNANT NEOPLASM OF LOWER THIRD OF ESOPHAGUS (HCC): Primary | ICD-10-CM

## 2019-11-14 DIAGNOSIS — Z45.2 ENCOUNTER FOR ADJUSTMENT OR MANAGEMENT OF VASCULAR ACCESS DEVICE: ICD-10-CM

## 2019-11-14 RX ORDER — SODIUM CHLORIDE 0.9 % (FLUSH) 0.9 %
10 SYRINGE (ML) INJECTION AS NEEDED
Status: DISCONTINUED | OUTPATIENT
Start: 2019-11-14 | End: 2019-11-14 | Stop reason: HOSPADM

## 2019-11-14 RX ORDER — SODIUM CHLORIDE 0.9 % (FLUSH) 0.9 %
10 SYRINGE (ML) INJECTION AS NEEDED
Status: CANCELLED | OUTPATIENT
Start: 2019-11-14

## 2019-11-14 RX ADMIN — SODIUM CHLORIDE, PRESERVATIVE FREE 10 ML: 5 INJECTION INTRAVENOUS at 10:58

## 2019-11-14 RX ADMIN — Medication 500 UNITS: at 10:59

## 2019-11-15 ENCOUNTER — TELEPHONE (OUTPATIENT)
Dept: ONCOLOGY | Facility: HOSPITAL | Age: 64
End: 2019-11-15

## 2019-11-15 NOTE — TELEPHONE ENCOUNTER
----- Message from Sho Dotson sent at 11/15/2019  3:24 PM EST -----  [phone]   Had chemo  Tues. And Dr. Haynes  Prescribed  Potassium Cler 10MEQ tabs. They are tearing him up, heartburn.      Pt called stating since he started taking potassium supplements he is having lots of heartburn afterwards. Pt states he is eating when he takes the medication. He is taking [employer] brand zantac as needed only when he gets heartburn. I asked him to try taking zantac in the morning before he takes his pills or eats to see if that will prevent it. He asked if he could come off potassium and just eat more potassium in his diet. I asked him to try over the weekend to take the zantac in the morning, if that does not work he can stop taking it and eat more potassium in his diet and we will recheck on nov 26th. Pt did not want me send in another heart burn medication at this time.

## 2019-11-19 ENCOUNTER — HOSPITAL ENCOUNTER (OUTPATIENT)
Dept: PET IMAGING | Facility: HOSPITAL | Age: 64
Discharge: HOME | End: 2019-11-19

## 2019-11-19 ENCOUNTER — HOSPITAL ENCOUNTER (OUTPATIENT)
Dept: PET IMAGING | Facility: HOSPITAL | Age: 64
Discharge: HOME | End: 2019-11-19
Admitting: INTERNAL MEDICINE

## 2019-11-19 DIAGNOSIS — I82.431 ACUTE DEEP VEIN THROMBOSIS (DVT) OF RIGHT POPLITEAL VEIN (HCC): ICD-10-CM

## 2019-11-19 DIAGNOSIS — C15.5 MALIGNANT NEOPLASM OF LOWER THIRD OF ESOPHAGUS (HCC): ICD-10-CM

## 2019-11-19 DIAGNOSIS — T45.1X5A CHEMOTHERAPY INDUCED NEUTROPENIA (HCC): ICD-10-CM

## 2019-11-19 DIAGNOSIS — Z45.2 ENCOUNTER FOR ADJUSTMENT OR MANAGEMENT OF VASCULAR ACCESS DEVICE: ICD-10-CM

## 2019-11-19 DIAGNOSIS — C78.7 LIVER METASTASES: ICD-10-CM

## 2019-11-19 DIAGNOSIS — D70.1 CHEMOTHERAPY INDUCED NEUTROPENIA (HCC): ICD-10-CM

## 2019-11-19 DIAGNOSIS — N32.1 COLOVESICAL FISTULA: ICD-10-CM

## 2019-11-19 DIAGNOSIS — D63.8 ANEMIA, CHRONIC DISEASE: ICD-10-CM

## 2019-11-19 DIAGNOSIS — Z72.0 TOBACCO ABUSE: ICD-10-CM

## 2019-11-19 LAB — GLUCOSE BLDC GLUCOMTR-MCNC: 113 MG/DL (ref 70–130)

## 2019-11-19 PROCEDURE — 78815 PET IMAGE W/CT SKULL-THIGH: CPT

## 2019-11-19 PROCEDURE — 82962 GLUCOSE BLOOD TEST: CPT

## 2019-11-19 PROCEDURE — 0 FLUDEOXYGLUCOSE F18 SOLUTION: Performed by: INTERNAL MEDICINE

## 2019-11-19 PROCEDURE — A9552 F18 FDG: HCPCS | Performed by: INTERNAL MEDICINE

## 2019-11-19 RX ADMIN — FLUDEOXYGLUCOSE F18 1 DOSE: 300 INJECTION INTRAVENOUS at 07:45

## 2019-11-25 DIAGNOSIS — C78.7 LIVER METASTASES: ICD-10-CM

## 2019-11-25 DIAGNOSIS — C15.5 MALIGNANT NEOPLASM OF LOWER THIRD OF ESOPHAGUS (HCC): ICD-10-CM

## 2019-11-26 ENCOUNTER — OFFICE VISIT (OUTPATIENT)
Dept: ONCOLOGY | Facility: CLINIC | Age: 64
End: 2019-11-26

## 2019-11-26 ENCOUNTER — INFUSION (OUTPATIENT)
Dept: ONCOLOGY | Facility: HOSPITAL | Age: 64
End: 2019-11-26

## 2019-11-26 VITALS
WEIGHT: 173.7 LBS | RESPIRATION RATE: 16 BRPM | HEIGHT: 69 IN | TEMPERATURE: 97.5 F | BODY MASS INDEX: 25.73 KG/M2 | OXYGEN SATURATION: 99 % | DIASTOLIC BLOOD PRESSURE: 72 MMHG | SYSTOLIC BLOOD PRESSURE: 111 MMHG | HEART RATE: 101 BPM

## 2019-11-26 DIAGNOSIS — Z45.2 ENCOUNTER FOR ADJUSTMENT OR MANAGEMENT OF VASCULAR ACCESS DEVICE: ICD-10-CM

## 2019-11-26 DIAGNOSIS — C78.7 LIVER METASTASES: ICD-10-CM

## 2019-11-26 DIAGNOSIS — N32.1 COLOVESICAL FISTULA: ICD-10-CM

## 2019-11-26 DIAGNOSIS — I82.412 ACUTE DEEP VEIN THROMBOSIS (DVT) OF FEMORAL VEIN OF LEFT LOWER EXTREMITY (HCC): ICD-10-CM

## 2019-11-26 DIAGNOSIS — C15.5 MALIGNANT NEOPLASM OF LOWER THIRD OF ESOPHAGUS (HCC): ICD-10-CM

## 2019-11-26 DIAGNOSIS — Z72.0 TOBACCO ABUSE: ICD-10-CM

## 2019-11-26 DIAGNOSIS — Z45.2 ENCOUNTER FOR ADJUSTMENT OR MANAGEMENT OF VASCULAR ACCESS DEVICE: Primary | ICD-10-CM

## 2019-11-26 DIAGNOSIS — Z72.0 TOBACCO ABUSE: Primary | ICD-10-CM

## 2019-11-26 DIAGNOSIS — I82.431 ACUTE DEEP VEIN THROMBOSIS (DVT) OF RIGHT POPLITEAL VEIN (HCC): ICD-10-CM

## 2019-11-26 DIAGNOSIS — C15.5 MALIGNANT NEOPLASM OF LOWER THIRD OF ESOPHAGUS (HCC): Primary | ICD-10-CM

## 2019-11-26 LAB
ALBUMIN SERPL-MCNC: 3.6 G/DL (ref 3.5–5.2)
ALBUMIN/GLOB SERPL: 1.2 G/DL (ref 1.1–2.4)
ALP SERPL-CCNC: 112 U/L (ref 38–116)
ALT SERPL W P-5'-P-CCNC: 35 U/L (ref 0–41)
ANION GAP SERPL CALCULATED.3IONS-SCNC: 13.8 MMOL/L (ref 5–15)
AST SERPL-CCNC: 31 U/L (ref 0–40)
BASOPHILS # BLD AUTO: 0.01 10*3/MM3 (ref 0–0.2)
BASOPHILS NFR BLD AUTO: 0.3 % (ref 0–1.5)
BILIRUB SERPL-MCNC: 0.4 MG/DL (ref 0.2–1.2)
BUN BLD-MCNC: 6 MG/DL (ref 6–20)
BUN/CREAT SERPL: 9.7 (ref 7.3–30)
CALCIUM SPEC-SCNC: 9.3 MG/DL (ref 8.5–10.2)
CHLORIDE SERPL-SCNC: 94 MMOL/L (ref 98–107)
CO2 SERPL-SCNC: 23.2 MMOL/L (ref 22–29)
CREAT BLD-MCNC: 0.62 MG/DL (ref 0.7–1.3)
DEPRECATED RDW RBC AUTO: 64.6 FL (ref 37–54)
EOSINOPHIL # BLD AUTO: 0.08 10*3/MM3 (ref 0–0.4)
EOSINOPHIL NFR BLD AUTO: 2.8 % (ref 0.3–6.2)
ERYTHROCYTE [DISTWIDTH] IN BLOOD BY AUTOMATED COUNT: 18.4 % (ref 12.3–15.4)
GFR SERPL CREATININE-BSD FRML MDRD: 131 ML/MIN/1.73
GLOBULIN UR ELPH-MCNC: 3 GM/DL (ref 1.8–3.5)
GLUCOSE BLD-MCNC: 101 MG/DL (ref 74–124)
HCT VFR BLD AUTO: 36.1 % (ref 37.5–51)
HGB BLD-MCNC: 12.4 G/DL (ref 13–17.7)
IMM GRANULOCYTES # BLD AUTO: 0.04 10*3/MM3 (ref 0–0.05)
IMM GRANULOCYTES NFR BLD AUTO: 1.4 % (ref 0–0.5)
LYMPHOCYTES # BLD AUTO: 0.85 10*3/MM3 (ref 0.7–3.1)
LYMPHOCYTES NFR BLD AUTO: 29.6 % (ref 19.6–45.3)
MCH RBC QN AUTO: 33.2 PG (ref 26.6–33)
MCHC RBC AUTO-ENTMCNC: 34.3 G/DL (ref 31.5–35.7)
MCV RBC AUTO: 96.8 FL (ref 79–97)
MONOCYTES # BLD AUTO: 0.48 10*3/MM3 (ref 0.1–0.9)
MONOCYTES NFR BLD AUTO: 16.7 % (ref 5–12)
NEUTROPHILS # BLD AUTO: 1.41 10*3/MM3 (ref 1.7–7)
NEUTROPHILS NFR BLD AUTO: 49.2 % (ref 42.7–76)
NRBC BLD AUTO-RTO: 0 /100 WBC (ref 0–0.2)
PLATELET # BLD AUTO: 105 10*3/MM3 (ref 140–450)
PMV BLD AUTO: 9 FL (ref 6–12)
POTASSIUM BLD-SCNC: 3.6 MMOL/L (ref 3.5–4.7)
PROT SERPL-MCNC: 6.6 G/DL (ref 6.3–8)
RBC # BLD AUTO: 3.73 10*6/MM3 (ref 4.14–5.8)
SODIUM BLD-SCNC: 131 MMOL/L (ref 134–145)
WBC NRBC COR # BLD: 2.87 10*3/MM3 (ref 3.4–10.8)

## 2019-11-26 PROCEDURE — 80053 COMPREHEN METABOLIC PANEL: CPT

## 2019-11-26 PROCEDURE — 99215 OFFICE O/P EST HI 40 MIN: CPT | Performed by: INTERNAL MEDICINE

## 2019-11-26 PROCEDURE — 85025 COMPLETE CBC W/AUTO DIFF WBC: CPT

## 2019-11-26 RX ORDER — SODIUM CHLORIDE 0.9 % (FLUSH) 0.9 %
10 SYRINGE (ML) INJECTION AS NEEDED
Status: CANCELLED | OUTPATIENT
Start: 2019-11-26

## 2019-11-26 RX ORDER — CIPROFLOXACIN 250 MG/1
500 TABLET, FILM COATED ORAL 2 TIMES DAILY
Qty: 60 TABLET | Refills: 1 | Status: SHIPPED | OUTPATIENT
Start: 2019-11-26 | End: 2020-02-04

## 2019-11-26 RX ORDER — SODIUM CHLORIDE 0.9 % (FLUSH) 0.9 %
10 SYRINGE (ML) INJECTION AS NEEDED
Status: DISCONTINUED | OUTPATIENT
Start: 2019-11-26 | End: 2019-11-26 | Stop reason: HOSPADM

## 2019-11-26 RX ADMIN — Medication 10 ML: at 08:28

## 2019-11-26 RX ADMIN — SODIUM CHLORIDE, PRESERVATIVE FREE 500 UNITS: 5 INJECTION INTRAVENOUS at 08:29

## 2019-11-26 NOTE — PROGRESS NOTES
"  Subjective     REASON FOR follow up:1.  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  Currently receiving palliative FOLFOX AND HERCEPTIN therapy.    .2.COLOVESICAL FISTULA: chronic antibiotic therapy    3. 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 He has had extensive liver metastasis and he has been treated for FOLFOX/Herceptin for the last several weeks. The main problem today includes lack of taste for any food, decreased appetite. He is no longer having any cancer-related pain. 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 He has normal bowel activity, normal urination. No difficulty swallowing. He has not had any cardiac or respiratory issues. In regard to his colovesical fistula he has not had any further evidence of urinary tract infection. He remains on chronic antibiotic therapy. In regard to his thrombophilia associated with malignancy, he remains on Lovenox chronically with no new clots and clinical bleeding. He complains about the administration of the Lovenox but he knows that he has no other choice.                Past Medical History:   Diagnosis Date   • Arthritis    • Elevated PSA    • Esophageal mass    • H/O Lung nodule    • Hepatitis     CHILD--PT STATED \"I THINK IT WAS A.\"   • History of pneumonia    • Hx of blood clots 08/10/2019   • Hyperlipidemia    • Hypertension    • Liver cancer (CMS/HCC)    • Prostate cancer (CMS/HCC)         Past Surgical History:   Procedure Laterality Date   • HERNIA REPAIR  1999   • PROSTATE SURGERY  03/2008   • VENOUS ACCESS DEVICE (PORT) INSERTION N/A 7/16/2019 "    Procedure: INSERTION VENOUS ACCESS DEVICE WITH FLUORO AND EGD WITH BIOPSY;  Surgeon: Mary Brito MD;  Location: [facility] OR;  Service: Thoracic        Current Outpatient Medications on File Prior to Visit   Medication Sig Dispense Refill   • ciprofloxacin (CIPRO) 250 MG tablet Take 2 tablets by mouth 2 (Two) Times a Day. 1 tablet 60 tablet 1   • clotrimazole (MYCELEX) 10 MG obie Take 1 tablet by mouth 3 (Three) Times a Day. 90 tablet 1   • enoxaparin (LOVENOX) 80 MG/0.8ML solution syringe Inject 0.8 mL under the skin into the appropriate area as directed Every 12 (Twelve) Hours. 48 mL 11   • lisinopril-hydrochlorothiazide (PRINZIDE,ZESTORETIC) 20-12.5 MG per tablet Take 1 tablet by mouth Daily. 90 tablet 2   • ondansetron (ZOFRAN) 4 MG tablet Take 1 tablet by mouth Every 8 (Eight) Hours As Needed for Nausea or Vomiting. 30 tablet 2   • potassium chloride (K-DUR) 10 MEQ CR tablet Take 2 tablets by mouth Daily. 30 tablet 1   • Probiotic Product (PROBIOTIC DAILY PO) Take  by mouth Daily.       No current facility-administered medications on file prior to visit.         ALLERGIES:  No Known Allergies     Social History     Socioeconomic History   • Marital status: Single     Spouse name: Not on file   • Number of children: Not on file   • Years of education: High school   • Highest education level: Not on file   Occupational History   • Occupation: [employer]     Employer: [employer]   Tobacco Use   • Smoking status: Current Every Day Smoker     Packs/day: 1.00     Years: 40.00     Pack years: 40.00     Types: Cigarettes   • Smokeless tobacco: Never Used   Substance and Sexual Activity   • Alcohol use: Yes     Alcohol/week: 16.8 oz     Types: 28 Cans of beer per week     Comment: NOT RECENTLY   • Drug use: No        Family History   Problem Relation Age of Onset   • Hypertension Mother    • Stroke Mother    • Hypertension Other    • Lung disease Other    • Prostate cancer Other    • Lung cancer Father    •  Malig Hyperthermia Neg Hx          Review of Systems:            General: no fever, no chills,  fatigue,no weight changes, no lack of appetite.  Eyes: no epiphora, xerophthalmia,conjunctivitis, pain, glaucoma, blurred vision, blindness, secretion, photophobia, proptosis, diplopia.  Ears: no otorrhea, tinnitus, otorrhagia, deafness, pain, vertigo.  Nose: no rhinorrhea, no epistaxis, no alteration in perception of odors, no sinuses pressure.  Mouth: no alteration in gums or teeth,  No ulcers, no difficulty with mastication or deglut ion, no odynophagia.  Neck: no masses or pain, no thyroid alterations, no pain in muscles or arteries, no carotid odynia, no crepitation.  Respiratory: no cough, no sputum production,no dyspnea,no trepopnea, no pleuritic pain,no hemoptysis.  Heart: no syncope, no irregularity, no palpitations, no angina,no orthopnea,no paroxysmal nocturnal dyspnea.  Vascular Venous: no tenderness,no edema,no palpable cords,no postphlebitic syndrome, no skin changes no ulcerations.  Vascular Arterial: no distal ischemia, noclaudication, no gangrene, no neuropathic ischemic pain, no skin ulcers, no paleness no cyanosis.  GI: no dysphagia, no odynophagia, no regurgitation, no heartburn,no indigestion,no nausea,no vomiting,no hematemesis ,no melena,no jaundice,no distention, no obstipation,no enterorrhagia,no proctalgia,no anal  lesions, no changes in bowel habits.  : no frequency, no hesitancy, no hematuria, no discharge,no  pain.  Musculoskeletal: no muscle or tendon pain or inflammation,no  joint pain, no edema, no functional limitation,no fasciculations, no mass.  Neurologic: no headache, no seizures, noalterations on Craneal nerves, no motor deficit, le sensory deficit, normal coordination, no alteration in memory,normal orientation, calculation,normal writting, verbal and written language.  Skin: no rashes,no pruritus no localized lesions.  Psychiatric: no anxiety, no depression,no agitation, no  "delusions, proper insight.            Objective     Vitals:    11/26/19 0756   BP: 111/72   Pulse: 101   Resp: 16   Temp: 97.5 °F (36.4 °C)   SpO2: 99%   Weight: 78.8 kg (173 lb 11.2 oz)   Height: 174 cm (68.5\")   PainSc: 0-No pain     Current Status 11/26/2019   ECOG score 0       Physical Exam:      GENERAL:  Well-developed, well-nourished  Patient  in no acute distress.   SKIN:  Warm, dry ,NO rashes,NO purpura ,NO petechiae.  HEENT:  Pupils were equal and reactive to light and accomodation, conjunctivas non injected, no pterigion, normal extraocular movements, normal visual acuity.   Mouth mucosa was moist, no exudates in oropharynx, normal gum line, normal roof of the mouth and pillars, normal papillations of the tongue.  NECK:  Supple with good range of motion; no thyromegaly or masses, no JVD or bruits, no cervical adenopathies.No carotid arteries pain, no carotid abnormal pulsation , NO arterial dance.  LYMPHATICS:  No cervical, NO supraclavicular, NO axillary,NO epitrochlear , NO inguinal adenopathy.  CHEST:  Normal excursion of both kailash thoraces, normal voice fremitus, no subcutaneous emphysema, normal axillas, no rashes or acanthosis nigricans. Lungs clear to percussion and auscultation, normal breath sounds bilaterally, no wheezing,NO crackles NO ronchi, NO stridor, NO rubs.  CARDIAC AND VASCULAR:  normal rate and regular rhythm, without murmurs,NO rubs NO S3 NO S4 right or left . Normal femoral, popliteal, pedis, brachial and carotid pulses.  ABDOMEN:  Soft, nontender with no organomegaly or masses, no ascites, no collateral circulation,no distention,no Dhruv sign, no abdominal pain, no inguinal hernias,no umbilical hernia, no abdominal bruits. Normal bowel sounds.  GENITAL: Not  Performed.  EXTREMITIES  AND SPINE:  No clubbing, cyanosis or edema, no deformities or pain .No kyphosis, scoliosis, deformities or pain in spine, ribs or pelvic bone.  NEUROLOGICAL:  Patient was awake, alert, oriented to " time, person and place.                  RECENT LABS:  Hematology WBC   Date Value Ref Range Status   11/26/2019 2.87 (L) 3.40 - 10.80 10*3/mm3 Final   02/02/2019 13.4 (H) 3.4 - 10.8 x10E3/uL Final     RBC   Date Value Ref Range Status   11/26/2019 3.73 (L) 4.14 - 5.80 10*6/mm3 Final   02/02/2019 4.81 4.14 - 5.80 x10E6/uL Final     Hemoglobin   Date Value Ref Range Status   11/26/2019 12.4 (L) 13.0 - 17.7 g/dL Final     Hematocrit   Date Value Ref Range Status   11/26/2019 36.1 (L) 37.5 - 51.0 % Final     Platelets   Date Value Ref Range Status   11/26/2019 105 (L) 140 - 450 10*3/mm3 Final                F-18 FDG PET FROM SKULL BASE TO MID THIGH WITH PET/CT FUSION     HISTORY: 64-year-old male with metastatic distal esophageal cancer.  Restaging.     TECHNIQUE: Radiation dose reduction techniques were utilized, including  automated exposure control and exposure modulation based on body size.   Blood glucose level at time of injection was 113 mg/dL.  6.8 mCi of F-18  FDG were injected and PET was performed from skull base to mid thigh. CT  was obtained for localization and attenuation correction. Time at  injection 7:35 AM.  PET start time 9:09 AM.  Compared with CTs from  09/19/2019 and PET/CT from 07/15/2019.     FINDINGS: There is a short segment of distal esophagus which has low  level activity with a maximal SUV of 5.1, previously 19.1 and a  significantly longer segment. There is a heterogeneous pattern of  activity throughout the liver and there is a tiny relatively  hypermetabolic focus within the medial hepatic segment with a maximal  SUV of 5.2. The rest of the extensive hepatic metastases have  essentially resolved. There has been resolution of the hypermetabolic  nodes at the madison hepatis and retroperitoneum. There is a single tiny  focus of hypermetabolic activity at the sigmoid colon, but the rest of  the sigmoid colon has low level activity. Again seen is a colovesicular  fistula.     IMPRESSION:  1.  Dramatic improvement with near complete resolution of the hepatic  metastases, resolution of the hypermetabolic nodes at the [place] hepatis  and retroperitoneum, and there is a much shorter segment of  hypermetabolic activity at the distal esophagus and the activity is much  less intensely hypermetabolic.  2. Persisting colovesicular fistula. The focus of hypermetabolic  activity at the sigmoid colon may represent acute or chronic  diverticulitis. There is very extensive sigmoid diverticulosis.     This report was finalized on 11/21/2019 8:59 AM by Dr. Almaz Rutledge M.D.           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 Almost 90% of the liver WAS replaced by tumor. The patient has been undergoing chemotherapy with FOLFOX and Herceptin and has had excellent response clinically and radiologically.  Today, the patient on clinical examination has had resolution of his giant hepatomegaly that was nodular and mildly tender in the right upper quadrant of his abdomen.  His liver has reduced in size very dramatically.  Most importantly, the patient has had resolution of his cancer-related pain.  His performance status is ECOG 0.  He is no longer taking pain medicine and developed thrombophilia associated with malignancy that has been treated with Lovenox successfully.  He has some ageusia related to treatment and this will require modification in his diet.  He remains smoking 1 pack of cigarettes daily.    The patient was further reviewed on 11/26/2019. I discussed with him and his sister the dramatic changes and improvement in his PET scan. This is one for the book. He has near complete resolution of the abnormal SUV uptake in the lower part of the esophagus and he has near complete resolution of the dramatic uptake in his whole liver parenchyma. There is no other site of disease. Kidney anatomy remains normal as well as his bladder anatomy. The fissure is still visible.      The patient has not had any recent episode of uti. His urinalysis has remained with minimal sediment but no infection.    The patient has leukopenia today and mild anemia. His platelet count is normal.     The status of his cancer is dramatically better. This is very dramatic to see so much improvement both clinically and radiologically speaking. The patient understands that this does not mean a cure; means some improvement that he can take anytime.     Given his neutropenia I have advised him to postpone any form of chemotherapy for at least a week and then subsequently treatment will be given to him 3 weeks later. He will remain on his FOLFOX and Herceptin doses on the same schedule.    I am planning after 2 more treatments to give him a break from treatment and then that will be the time for me to call a urologist and a general surgeon to correct his colovesical fistula.     He will remain on his Lovenox for his thrombophilia at the same dosing.     He will remain on his ciprofloxacin for his colovesical fistula and he will require a new prescription today.     I pointed out to the patient that he must quit smoking. He understands that and he is now more committed. I took 5 minutes of my time to discuss these issues with him and he has more resolution. For this reason a referral has been done to the smoking cessation program.     The patient is aware that the dramatic improvement in his symptoms and signs is something he has to help himself to challenge and smoking is one of the things he needs to quit.     He will have nurse practitioner visit in 1 week and I will see him back in a month from now when he will have the next FOLFOX/Herceptin. He will continue having CBC, CMP next week and 3 weeks later.

## 2019-12-03 ENCOUNTER — INFUSION (OUTPATIENT)
Dept: ONCOLOGY | Facility: HOSPITAL | Age: 64
End: 2019-12-03

## 2019-12-03 VITALS
TEMPERATURE: 98 F | WEIGHT: 174 LBS | SYSTOLIC BLOOD PRESSURE: 110 MMHG | HEART RATE: 78 BPM | DIASTOLIC BLOOD PRESSURE: 71 MMHG | OXYGEN SATURATION: 96 % | BODY MASS INDEX: 26.07 KG/M2

## 2019-12-03 DIAGNOSIS — C15.5 MALIGNANT NEOPLASM OF LOWER THIRD OF ESOPHAGUS (HCC): Primary | ICD-10-CM

## 2019-12-03 DIAGNOSIS — I82.431 ACUTE DEEP VEIN THROMBOSIS (DVT) OF RIGHT POPLITEAL VEIN (HCC): ICD-10-CM

## 2019-12-03 DIAGNOSIS — N32.1 COLOVESICAL FISTULA: ICD-10-CM

## 2019-12-03 DIAGNOSIS — C78.7 LIVER METASTASES: ICD-10-CM

## 2019-12-03 DIAGNOSIS — Z72.0 TOBACCO ABUSE: ICD-10-CM

## 2019-12-03 DIAGNOSIS — I82.412 ACUTE DEEP VEIN THROMBOSIS (DVT) OF FEMORAL VEIN OF LEFT LOWER EXTREMITY (HCC): ICD-10-CM

## 2019-12-03 DIAGNOSIS — Z45.2 ENCOUNTER FOR ADJUSTMENT OR MANAGEMENT OF VASCULAR ACCESS DEVICE: ICD-10-CM

## 2019-12-03 LAB
ALBUMIN SERPL-MCNC: 3.5 G/DL (ref 3.5–5.2)
ALBUMIN/GLOB SERPL: 1.1 G/DL (ref 1.1–2.4)
ALP SERPL-CCNC: 128 U/L (ref 38–116)
ALT SERPL W P-5'-P-CCNC: 30 U/L (ref 0–41)
ANION GAP SERPL CALCULATED.3IONS-SCNC: 14.6 MMOL/L (ref 5–15)
AST SERPL-CCNC: 33 U/L (ref 0–40)
BASOPHILS # BLD MANUAL: 0.06 10*3/MM3 (ref 0–0.2)
BASOPHILS NFR BLD AUTO: 1 % (ref 0–1.5)
BILIRUB SERPL-MCNC: 0.4 MG/DL (ref 0.2–1.2)
BUN BLD-MCNC: 7 MG/DL (ref 6–20)
BUN/CREAT SERPL: 11.7 (ref 7.3–30)
CALCIUM SPEC-SCNC: 9.4 MG/DL (ref 8.5–10.2)
CEA SERPL-MCNC: 41.1 NG/ML
CHLORIDE SERPL-SCNC: 93 MMOL/L (ref 98–107)
CO2 SERPL-SCNC: 23.4 MMOL/L (ref 22–29)
CREAT BLD-MCNC: 0.6 MG/DL (ref 0.7–1.3)
DEPRECATED RDW RBC AUTO: 68 FL (ref 37–54)
EOSINOPHIL # BLD MANUAL: 0.17 10*3/MM3 (ref 0–0.4)
EOSINOPHIL NFR BLD MANUAL: 3 % (ref 0.3–6.2)
ERYTHROCYTE [DISTWIDTH] IN BLOOD BY AUTOMATED COUNT: 18.8 % (ref 12.3–15.4)
GFR SERPL CREATININE-BSD FRML MDRD: 136 ML/MIN/1.73
GLOBULIN UR ELPH-MCNC: 3.2 GM/DL (ref 1.8–3.5)
GLUCOSE BLD-MCNC: 93 MG/DL (ref 74–124)
HCT VFR BLD AUTO: 37.6 % (ref 37.5–51)
HGB BLD-MCNC: 12.7 G/DL (ref 13–17.7)
LYMPHOCYTES # BLD MANUAL: 2.21 10*3/MM3 (ref 0.7–3.1)
LYMPHOCYTES NFR BLD MANUAL: 13 % (ref 5–12)
LYMPHOCYTES NFR BLD MANUAL: 38 % (ref 19.6–45.3)
MCH RBC QN AUTO: 33.2 PG (ref 26.6–33)
MCHC RBC AUTO-ENTMCNC: 33.8 G/DL (ref 31.5–35.7)
MCV RBC AUTO: 98.2 FL (ref 79–97)
METAMYELOCYTES NFR BLD MANUAL: 3 % (ref 0–0)
MONOCYTES # BLD AUTO: 0.76 10*3/MM3 (ref 0.1–0.9)
NEUTROPHILS # BLD AUTO: 2.44 10*3/MM3 (ref 1.7–7)
NEUTROPHILS NFR BLD MANUAL: 37 % (ref 42.7–76)
NEUTS BAND NFR BLD MANUAL: 5 % (ref 0–5)
PLAT MORPH BLD: NORMAL
PLATELET # BLD AUTO: 157 10*3/MM3 (ref 140–450)
PMV BLD AUTO: 8.9 FL (ref 6–12)
POTASSIUM BLD-SCNC: 3.7 MMOL/L (ref 3.5–4.7)
PROT SERPL-MCNC: 6.7 G/DL (ref 6.3–8)
RBC # BLD AUTO: 3.83 10*6/MM3 (ref 4.14–5.8)
RBC MORPH BLD: NORMAL
SODIUM BLD-SCNC: 131 MMOL/L (ref 134–145)
WBC MORPH BLD: NORMAL
WBC NRBC COR # BLD: 5.81 10*3/MM3 (ref 3.4–10.8)

## 2019-12-03 PROCEDURE — 85007 BL SMEAR W/DIFF WBC COUNT: CPT | Performed by: NURSE PRACTITIONER

## 2019-12-03 PROCEDURE — 96416 CHEMO PROLONG INFUSE W/PUMP: CPT | Performed by: NURSE PRACTITIONER

## 2019-12-03 PROCEDURE — 96417 CHEMO IV INFUS EACH ADDL SEQ: CPT | Performed by: NURSE PRACTITIONER

## 2019-12-03 PROCEDURE — 25010000002 DEXAMETHASONE SODIUM PHOSPHATE 100 MG/10ML SOLUTION: Performed by: NURSE PRACTITIONER

## 2019-12-03 PROCEDURE — 25010000002 TRASTUZUMAB PER 10 MG: Performed by: NURSE PRACTITIONER

## 2019-12-03 PROCEDURE — 25010000002 FLUOROURACIL PER 500 MG: Performed by: NURSE PRACTITIONER

## 2019-12-03 PROCEDURE — 96415 CHEMO IV INFUSION ADDL HR: CPT | Performed by: NURSE PRACTITIONER

## 2019-12-03 PROCEDURE — 85025 COMPLETE CBC W/AUTO DIFF WBC: CPT | Performed by: NURSE PRACTITIONER

## 2019-12-03 PROCEDURE — 25010000002 OXALIPLATIN PER 0.5 MG: Performed by: NURSE PRACTITIONER

## 2019-12-03 PROCEDURE — 82378 CARCINOEMBRYONIC ANTIGEN: CPT | Performed by: INTERNAL MEDICINE

## 2019-12-03 PROCEDURE — 96411 CHEMO IV PUSH ADDL DRUG: CPT | Performed by: NURSE PRACTITIONER

## 2019-12-03 PROCEDURE — 96368 THER/DIAG CONCURRENT INF: CPT | Performed by: NURSE PRACTITIONER

## 2019-12-03 PROCEDURE — 25010000002 DIPHENHYDRAMINE 1 EACH BAG: Performed by: NURSE PRACTITIONER

## 2019-12-03 PROCEDURE — 96375 TX/PRO/DX INJ NEW DRUG ADDON: CPT | Performed by: NURSE PRACTITIONER

## 2019-12-03 PROCEDURE — 25010000002 PALONOSETRON PER 25 MCG: Performed by: NURSE PRACTITIONER

## 2019-12-03 PROCEDURE — 80053 COMPREHEN METABOLIC PANEL: CPT | Performed by: NURSE PRACTITIONER

## 2019-12-03 PROCEDURE — 25010000002 LEUCOVORIN CALCIUM PER 50 MG: Performed by: NURSE PRACTITIONER

## 2019-12-03 PROCEDURE — 96413 CHEMO IV INFUSION 1 HR: CPT | Performed by: NURSE PRACTITIONER

## 2019-12-03 RX ORDER — DIPHENHYDRAMINE HYDROCHLORIDE 50 MG/ML
50 INJECTION INTRAMUSCULAR; INTRAVENOUS AS NEEDED
Status: CANCELLED | OUTPATIENT
Start: 2019-12-03

## 2019-12-03 RX ORDER — FAMOTIDINE 10 MG/ML
20 INJECTION, SOLUTION INTRAVENOUS AS NEEDED
Status: CANCELLED | OUTPATIENT
Start: 2019-12-03

## 2019-12-03 RX ORDER — FLUOROURACIL 50 MG/ML
400 INJECTION, SOLUTION INTRAVENOUS ONCE
Status: COMPLETED | OUTPATIENT
Start: 2019-12-03 | End: 2019-12-03

## 2019-12-03 RX ORDER — FAMOTIDINE 10 MG/ML
20 INJECTION, SOLUTION INTRAVENOUS 2 TIMES DAILY
Status: DISCONTINUED | OUTPATIENT
Start: 2019-12-03 | End: 2019-12-03 | Stop reason: HOSPADM

## 2019-12-03 RX ORDER — DEXTROSE MONOHYDRATE 50 MG/ML
250 INJECTION, SOLUTION INTRAVENOUS ONCE
Status: COMPLETED | OUTPATIENT
Start: 2019-12-03 | End: 2019-12-03

## 2019-12-03 RX ORDER — PALONOSETRON 0.05 MG/ML
0.25 INJECTION, SOLUTION INTRAVENOUS ONCE
Status: COMPLETED | OUTPATIENT
Start: 2019-12-03 | End: 2019-12-03

## 2019-12-03 RX ORDER — SODIUM CHLORIDE 9 MG/ML
250 INJECTION, SOLUTION INTRAVENOUS ONCE
Status: COMPLETED | OUTPATIENT
Start: 2019-12-03 | End: 2019-12-03

## 2019-12-03 RX ADMIN — LEUCOVORIN CALCIUM 810 MG: 350 INJECTION, POWDER, LYOPHILIZED, FOR SOLUTION INTRAMUSCULAR; INTRAVENOUS at 10:58

## 2019-12-03 RX ADMIN — DIPHENHYDRAMINE HYDROCHLORIDE 50 MG: 50 INJECTION INTRAMUSCULAR; INTRAVENOUS at 10:03

## 2019-12-03 RX ADMIN — OXALIPLATIN 170 MG: 5 INJECTION, SOLUTION, CONCENTRATE INTRAVENOUS at 10:58

## 2019-12-03 RX ADMIN — PALONOSETRON 0.25 MG: 0.05 INJECTION, SOLUTION INTRAVENOUS at 09:45

## 2019-12-03 RX ADMIN — SODIUM CHLORIDE 250 ML: 9 INJECTION, SOLUTION INTRAVENOUS at 09:47

## 2019-12-03 RX ADMIN — FLUOROURACIL 4850 MG: 50 INJECTION, SOLUTION INTRAVENOUS at 13:07

## 2019-12-03 RX ADMIN — FLUOROURACIL 810 MG: 50 INJECTION, SOLUTION INTRAVENOUS at 13:07

## 2019-12-03 RX ADMIN — FAMOTIDINE 20 MG: 10 INJECTION INTRAVENOUS at 09:45

## 2019-12-03 RX ADMIN — TRASTUZUMAB 350 MG: 150 INJECTION, POWDER, LYOPHILIZED, FOR SOLUTION INTRAVENOUS at 10:21

## 2019-12-03 RX ADMIN — DEXAMETHASONE SODIUM PHOSPHATE 12 MG: 10 INJECTION, SOLUTION INTRAMUSCULAR; INTRAVENOUS at 09:47

## 2019-12-03 RX ADMIN — DEXTROSE MONOHYDRATE 250 ML: 50 INJECTION, SOLUTION INTRAVENOUS at 10:58

## 2019-12-05 ENCOUNTER — INFUSION (OUTPATIENT)
Dept: ONCOLOGY | Facility: HOSPITAL | Age: 64
End: 2019-12-05

## 2019-12-05 DIAGNOSIS — Z45.2 ENCOUNTER FOR ADJUSTMENT OR MANAGEMENT OF VASCULAR ACCESS DEVICE: ICD-10-CM

## 2019-12-05 DIAGNOSIS — C78.7 LIVER METASTASES: ICD-10-CM

## 2019-12-05 DIAGNOSIS — C15.5 MALIGNANT NEOPLASM OF LOWER THIRD OF ESOPHAGUS (HCC): Primary | ICD-10-CM

## 2019-12-05 RX ORDER — SODIUM CHLORIDE 0.9 % (FLUSH) 0.9 %
10 SYRINGE (ML) INJECTION AS NEEDED
Status: CANCELLED | OUTPATIENT
Start: 2019-12-05

## 2019-12-05 RX ORDER — SODIUM CHLORIDE 0.9 % (FLUSH) 0.9 %
10 SYRINGE (ML) INJECTION AS NEEDED
Status: DISCONTINUED | OUTPATIENT
Start: 2019-12-05 | End: 2019-12-05 | Stop reason: HOSPADM

## 2019-12-05 RX ADMIN — Medication 10 ML: at 11:57

## 2019-12-05 RX ADMIN — SODIUM CHLORIDE, PRESERVATIVE FREE 500 UNITS: 5 INJECTION INTRAVENOUS at 11:58

## 2019-12-05 NOTE — PROGRESS NOTES
Patient here for unhook and c/o rash on back with some itching. Some scabbing noted.  States that he has had this for 2 weeks but could see what it looked like.  Dr Haynes notified and assessed patient's rash.

## 2019-12-20 DIAGNOSIS — C78.7 LIVER METASTASES: ICD-10-CM

## 2019-12-20 DIAGNOSIS — C15.5 MALIGNANT NEOPLASM OF LOWER THIRD OF ESOPHAGUS (HCC): ICD-10-CM

## 2019-12-27 ENCOUNTER — OFFICE VISIT (OUTPATIENT)
Dept: ONCOLOGY | Facility: CLINIC | Age: 64
End: 2019-12-27

## 2019-12-27 ENCOUNTER — INFUSION (OUTPATIENT)
Dept: ONCOLOGY | Facility: HOSPITAL | Age: 64
End: 2019-12-27

## 2019-12-27 VITALS
SYSTOLIC BLOOD PRESSURE: 116 MMHG | TEMPERATURE: 98.2 F | DIASTOLIC BLOOD PRESSURE: 77 MMHG | RESPIRATION RATE: 12 BRPM | HEIGHT: 69 IN | HEART RATE: 96 BPM | WEIGHT: 172.2 LBS | OXYGEN SATURATION: 99 % | BODY MASS INDEX: 25.51 KG/M2

## 2019-12-27 DIAGNOSIS — I10 ESSENTIAL HYPERTENSION: ICD-10-CM

## 2019-12-27 DIAGNOSIS — C78.7 LIVER METASTASES: ICD-10-CM

## 2019-12-27 DIAGNOSIS — C15.5 MALIGNANT NEOPLASM OF LOWER THIRD OF ESOPHAGUS (HCC): Primary | ICD-10-CM

## 2019-12-27 DIAGNOSIS — N32.1 COLOVESICAL FISTULA: ICD-10-CM

## 2019-12-27 DIAGNOSIS — C15.5 MALIGNANT NEOPLASM OF LOWER THIRD OF ESOPHAGUS (HCC): ICD-10-CM

## 2019-12-27 DIAGNOSIS — Z72.0 TOBACCO ABUSE: ICD-10-CM

## 2019-12-27 DIAGNOSIS — I82.431 ACUTE DEEP VEIN THROMBOSIS (DVT) OF RIGHT POPLITEAL VEIN (HCC): ICD-10-CM

## 2019-12-27 DIAGNOSIS — Z45.2 ENCOUNTER FOR ADJUSTMENT OR MANAGEMENT OF VASCULAR ACCESS DEVICE: ICD-10-CM

## 2019-12-27 PROBLEM — R52 PAIN: Status: RESOLVED | Noted: 2019-08-11 | Resolved: 2019-12-27

## 2019-12-27 LAB
ALBUMIN SERPL-MCNC: 3.4 G/DL (ref 3.5–5.2)
ALBUMIN/GLOB SERPL: 1 G/DL (ref 1.1–2.4)
ALP SERPL-CCNC: 109 U/L (ref 38–116)
ALT SERPL W P-5'-P-CCNC: 25 U/L (ref 0–41)
ANION GAP SERPL CALCULATED.3IONS-SCNC: 13.7 MMOL/L (ref 5–15)
AST SERPL-CCNC: 31 U/L (ref 0–40)
BILIRUB SERPL-MCNC: 0.3 MG/DL (ref 0.2–1.2)
BUN BLD-MCNC: 7 MG/DL (ref 6–20)
BUN/CREAT SERPL: 11.5 (ref 7.3–30)
CALCIUM SPEC-SCNC: 9.3 MG/DL (ref 8.5–10.2)
CHLORIDE SERPL-SCNC: 97 MMOL/L (ref 98–107)
CO2 SERPL-SCNC: 23.3 MMOL/L (ref 22–29)
CREAT BLD-MCNC: 0.61 MG/DL (ref 0.7–1.3)
DEPRECATED RDW RBC AUTO: 64.3 FL (ref 37–54)
EOSINOPHIL # BLD MANUAL: 0.25 10*3/MM3 (ref 0–0.4)
EOSINOPHIL NFR BLD MANUAL: 3 % (ref 0.3–6.2)
ERYTHROCYTE [DISTWIDTH] IN BLOOD BY AUTOMATED COUNT: 17.1 % (ref 12.3–15.4)
GFR SERPL CREATININE-BSD FRML MDRD: 134 ML/MIN/1.73
GLOBULIN UR ELPH-MCNC: 3.4 GM/DL (ref 1.8–3.5)
GLUCOSE BLD-MCNC: 105 MG/DL (ref 74–124)
HCT VFR BLD AUTO: 34.2 % (ref 37.5–51)
HGB BLD-MCNC: 11.2 G/DL (ref 13–17.7)
LYMPHOCYTES # BLD MANUAL: 1.86 10*3/MM3 (ref 0.7–3.1)
LYMPHOCYTES NFR BLD MANUAL: 22 % (ref 19.6–45.3)
LYMPHOCYTES NFR BLD MANUAL: 5 % (ref 5–12)
MCH RBC QN AUTO: 33.2 PG (ref 26.6–33)
MCHC RBC AUTO-ENTMCNC: 32.7 G/DL (ref 31.5–35.7)
MCV RBC AUTO: 101.5 FL (ref 79–97)
METAMYELOCYTES NFR BLD MANUAL: 8 % (ref 0–0)
MONOCYTES # BLD AUTO: 0.42 10*3/MM3 (ref 0.1–0.9)
MYELOCYTES NFR BLD MANUAL: 2 % (ref 0–0)
NEUTROPHILS # BLD AUTO: 5.07 10*3/MM3 (ref 1.7–7)
NEUTROPHILS NFR BLD MANUAL: 56 % (ref 42.7–76)
NEUTS BAND NFR BLD MANUAL: 4 % (ref 0–5)
PLAT MORPH BLD: NORMAL
PLATELET # BLD AUTO: 186 10*3/MM3 (ref 140–450)
PMV BLD AUTO: 9.3 FL (ref 6–12)
POTASSIUM BLD-SCNC: 3.7 MMOL/L (ref 3.5–4.7)
PROT SERPL-MCNC: 6.8 G/DL (ref 6.3–8)
RBC # BLD AUTO: 3.37 10*6/MM3 (ref 4.14–5.8)
RBC MORPH BLD: NORMAL
SODIUM BLD-SCNC: 134 MMOL/L (ref 134–145)
WBC MORPH BLD: NORMAL
WBC NRBC COR # BLD: 8.45 10*3/MM3 (ref 3.4–10.8)

## 2019-12-27 PROCEDURE — 85007 BL SMEAR W/DIFF WBC COUNT: CPT

## 2019-12-27 PROCEDURE — 85025 COMPLETE CBC W/AUTO DIFF WBC: CPT

## 2019-12-27 PROCEDURE — 80053 COMPREHEN METABOLIC PANEL: CPT

## 2019-12-27 PROCEDURE — 99215 OFFICE O/P EST HI 40 MIN: CPT | Performed by: INTERNAL MEDICINE

## 2019-12-27 RX ORDER — CEPHALEXIN 500 MG/1
500 CAPSULE ORAL 4 TIMES DAILY
Qty: 28 CAPSULE | Refills: 0 | Status: SHIPPED | OUTPATIENT
Start: 2019-12-27 | End: 2020-01-07

## 2019-12-27 NOTE — PROGRESS NOTES
"  Subjective     REASON FOR follow up:1.  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  Currently receiving palliative FOLFOX AND HERCEPTIN therapy.    .2.COLOVESICAL FISTULA: chronic antibiotic therapy    3. DVT R AND LEFT LE ON ANTICOAGULANT LOVENOX: THROMBOPHILIA OF MALIGNANCY    History of Present Illness This patient returns today to the office for followup. In the interim he has developed some erythema and minimal pain in the anterior aspect of the left lower extremity in absence of any trauma. He has not had any fever or chills. 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 Otherwise the patient feels very well. He has no cancer related pain, good appetite, not too much taste, good bowel function, good urination with no urinary tract infections from his colovesical fistula. He has not had any cough or sputum production. Cigarettes are down to 6 cigarettes a day. He is not drinking any alcohol. He has no difficulty swallowing. He has not had any bleeding with the use of Lovenox besides minimal ecchymosis in his abdominal wall. No new clots.                     Past Medical History:   Diagnosis Date   • Arthritis    • Elevated PSA    • Esophageal mass    • H/O Lung nodule    • Hepatitis     CHILD--PT STATED \"I THINK IT WAS A.\"   • History of pneumonia    • Hx of blood clots 08/10/2019   • Hyperlipidemia    • Hypertension    • Liver cancer (CMS/HCC)    • Prostate cancer (CMS/HCC)         Past Surgical History:   Procedure Laterality Date   • HERNIA REPAIR  1999   • PROSTATE SURGERY  03/2008   • VENOUS ACCESS DEVICE (PORT) INSERTION N/A 7/16/2019    Procedure: INSERTION VENOUS ACCESS DEVICE WITH FLUORO AND EGD WITH BIOPSY;  Surgeon: Mary Brito MD;  Location: [facility];  Service: Thoracic        Current Outpatient Medications on File Prior to Visit   Medication Sig " Dispense Refill   • ciprofloxacin (CIPRO) 250 MG tablet Take 2 tablets by mouth 2 (Two) Times a Day. 1 tablet 60 tablet 1   • enoxaparin (LOVENOX) 80 MG/0.8ML solution syringe Inject 0.8 mL under the skin into the appropriate area as directed Every 12 (Twelve) Hours. 48 mL 11   • lisinopril-hydrochlorothiazide (PRINZIDE,ZESTORETIC) 20-12.5 MG per tablet Take 1 tablet by mouth Daily. 90 tablet 2   • ondansetron (ZOFRAN) 4 MG tablet Take 1 tablet by mouth Every 8 (Eight) Hours As Needed for Nausea or Vomiting. 30 tablet 2   • Probiotic Product (PROBIOTIC DAILY PO) Take  by mouth Daily.     • triamcinolone (KENALOG) 0.1 % ointment Apply  topically to the appropriate area as directed 2 (Two) Times a Day. 30 g 2   • potassium chloride (K-DUR) 10 MEQ CR tablet Take 2 tablets by mouth Daily. 30 tablet 1     No current facility-administered medications on file prior to visit.         ALLERGIES:  No Known Allergies     Social History     Socioeconomic History   • Marital status: Single     Spouse name: Not on file   • Number of children: Not on file   • Years of education: High school   • Highest education level: Not on file   Occupational History   • Occupation: [employer]     Employer: [employer]   Tobacco Use   • Smoking status: Current Every Day Smoker     Packs/day: 1.00     Years: 40.00     Pack years: 40.00     Types: Cigarettes   • Smokeless tobacco: Never Used   Substance and Sexual Activity   • Alcohol use: Yes     Alcohol/week: 28.0 standard drinks     Types: 28 Cans of beer per week     Comment: NOT RECENTLY   • Drug use: No        Family History   Problem Relation Age of Onset   • Hypertension Mother    • Stroke Mother    • Hypertension Other    • Lung disease Other    • Prostate cancer Other    • Lung cancer Father    • Malig Hyperthermia Neg Hx          Review of Systems:        General: no fever, no chills, fatigue,no weight changes, no lack of appetite.  Eyes: no epiphora, xerophthalmia,conjunctivitis,  pain, glaucoma, blurred vision, blindness, secretion, photophobia, proptosis, diplopia.  Ears: no otorrhea, tinnitus, otorrhagia, deafness, pain, vertigo.  Nose: no rhinorrhea, no epistaxis, no alteration in perception of odors, no sinuses pressure.  Mouth: no alteration in gums or teeth,  No ulcers, no difficulty with mastication or deglut ion, no odynophagia.  Neck: no masses or pain, no thyroid alterations, no pain in muscles or arteries, no carotid odynia, no crepitation.  Respiratory: no cough, no sputum production,no dyspnea,no trepopnea, no pleuritic pain,no hemoptysis.  Heart: no syncope, no irregularity, no palpitations, no angina,no orthopnea,no paroxysmal nocturnal dyspnea.  Vascular Venous: no tenderness,no edema,no palpable cords,no postphlebitic syndrome, no skin changes no ulcerations.  Vascular Arterial: no distal ischemia, noclaudication, no gangrene, no neuropathic ischemic pain, no skin ulcers, no paleness no cyanosis.  GI: no dysphagia, no odynophagia, no regurgitation, no heartburn,no indigestion,no nausea,no vomiting,no hematemesis ,no melena,no jaundice,no distention, no obstipation,no enterorrhagia,no proctalgia,no anal  lesions, no changes in bowel habits.  : no frequency, no hesitancy, no hematuria, no discharge,no  pain.  Musculoskeletal: no muscle or tendon pain or inflammation,no  joint pain, no edema, no functional limitation,no fasciculations, no mass.  Neurologic: no headache, no seizures, noalterations on Craneal nerves, no motor deficit, no sensory deficit, normal coordination, no alteration in memory,normal orientation, calculation,normal writting, verbal and written language.  Skin: no rashes,no pruritus lle localized lesions.  Psychiatric: no anxiety, no depression,no agitation, no delusions, proper insight.              Objective     Vitals:    12/27/19 0802   BP: 116/77   Pulse: 96   Resp: 12   Temp: 98.2 °F (36.8 °C)   TempSrc: Oral   SpO2: 99%   Weight: 78.1 kg (172 lb 3.2  "oz)   Height: 174 cm (68.5\")   PainSc: 0-No pain     Current Status 12/27/2019   ECOG score 0       Physical Exam:  GENERAL:  Well-developed, well-nourished  Patient  in no acute distress.   SKIN:  Warm, dry ,NO rashes,NO purpura ,NO petechiae.He has an area of erythema with increased local temperature and minor pain in the anterior aspect of the left leg inferiorly that measures close to 10 cm in diameter with dryness in the skin and minimal desquamation, no pustules or blister formation. There is no edema in the calf.       HEENT:  Pupils were equal and reactive to light and accomodation, conjunctivas non injected, no pterigion, normal extraocular movements, normal visual acuity.   Mouth mucosa was moist, no exudates in oropharynx, normal gum line, normal roof of the mouth and pillars, normal papillations of the tongue.  NECK:  Supple with good range of motion; no thyromegaly or masses, no JVD or bruits, no cervical adenopathies.No carotid arteries pain, no carotid abnormal pulsation , NO arterial dance.  LYMPHATICS:  No cervical, NO supraclavicular, NO axillary,NO epitrochlear , NO inguinal adenopathy.  CHEST:  Normal excursion of both kailash thoraces, normal voice fremitus, no subcutaneous emphysema, normal axillas, no rashes or acanthosis nigricans. Lungs clear to percussion and auscultation, normal breath sounds bilaterally, no wheezing,NO crackles NO ronchi, NO stridor, NO rubs.  CARDIAC AND VASCULAR:  normal rate and regular rhythm, without murmurs,NO rubs NO S3 NO S4 right or left . Normal femoral, popliteal, pedis, brachial and carotid pulses.  ABDOMEN:  Soft, nontender with no organomegaly or masses, no ascites, no collateral circulation,no distention,no Norco sign, no abdominal pain, no inguinal hernias,no umbilical hernia, no abdominal bruits. Normal bowel sounds.  GENITAL: Not  Performed.  EXTREMITIES  AND SPINE:  fingers clubbing, no cyanosis or edema, no deformities or pain .No kyphosis, scoliosis, " deformities or pain in spine, ribs or pelvic bone.  NEUROLOGICAL:  Patient was awake, alert, oriented to time, person and place.sensory neuropathy grade 1 toes                          RECENT LABS:  Hematology WBC   Date Value Ref Range Status   12/27/2019 8.45 3.40 - 10.80 10*3/mm3 Final   02/02/2019 13.4 (H) 3.4 - 10.8 x10E3/uL Final     RBC   Date Value Ref Range Status   12/27/2019 3.37 (L) 4.14 - 5.80 10*6/mm3 Final   02/02/2019 4.81 4.14 - 5.80 x10E6/uL Final     Hemoglobin   Date Value Ref Range Status   12/27/2019 11.2 (L) 13.0 - 17.7 g/dL Final     Hematocrit   Date Value Ref Range Status   12/27/2019 34.2 (L) 37.5 - 51.0 % Final     Platelets   Date Value Ref Range Status   12/27/2019 186 140 - 450 10*3/mm3 Final                    Assessment/Plan    1.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 Almost 90% of the liver WAS replaced by tumor. The patient has been undergoing chemotherapy with FOLFOX and Herceptin and has had excellent response clinically and radiologically.  Today, the patient on clinical examination has had resolution of his giant hepatomegaly that was nodular and mildly tender in the right upper quadrant of his abdomen.  His liver has reduced in size very dramatically.  Most importantly, the patient has had resolution of his cancer-related pain.  His performance status is ECOG 0.  He is no longer taking pain medicine and developed thrombophilia associated with malignancy that has been treated with Lovenox successfully.  He has some ageusia related to treatment and this will require modification in his diet.  He remains smoking 1 pack of cigarettes daily.    The patient was further reviewed on 11/26/2019. I discussed with him and his sister the dramatic changes and improvement in his PET scan. This is one for the book. He has near complete resolution of the abnormal SUV uptake in the lower part of the esophagus and he has near complete resolution of the  dramatic uptake in his whole liver parenchyma. There is no other site of disease. Kidney anatomy remains normal as well as his bladder anatomy. The fissure is still visible.     The patient was further reviewed on 12/27/2019. What calls my attention today is the minimal left shift in his white blood count that has been reviewed by the lab technicians and by me under the microscope. The patient has no fever, he has no leukocytosis, his hemoglobin is stable, his platelet count is normal but putting it altogether with the rash that he has in the left leg I do believe that this patient has cellulitis and maybe the white count has shifted related to this. There is nothing to suggest sinusitis, bronchitis, port infections, urinary tract infections, he has no diarrhea and otherwise the patient feels well.     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 This is becoming now permanent, is a grade 1, is not bothersome for him, has no functional implications but this is going to become an issue in the long run.    From the point of view of his cancer of the esophagus as we reviewed during the previous visit and reviewing again today he looks fine. He had a dramatic response to chemotherapy that seems to be that is keeping.     He remains on ciprofloxacin prophylactically for the treatment of his urinary tract infection associated with his colovesical fistula.    His blood pressure is under good control.    RECOMMENDATIONS:  1. We will put his chemotherapy treatment on hold at this time given the fact of the cellulitis in the left lower extremity.   2. He will hold off on the ciprofloxacin for the next 10 days.  3. He will initiate Keflex that will cover more gram positive bacteria in the set back of skin infection in the left lower extremity. He will take 500 mg 4 times a day for the next 7 days.   4. His blood pressure medication will remain ongoing.  5. His anticoagulation  with Lovenox for thrombophilia associated with malignancy will remain the same.  6. I will review him back in 10 days and depending on the clinical circumstances, blood counts and left lower extremity changes will decide how to pursue at that time.  7. Given his peripheral neuropathy very likely we will need to drop off the Eloxatin altogether from his care plan.  8. Disability papers are coming my way. I asked the patient to refer them to me from his work place.

## 2020-01-07 ENCOUNTER — INFUSION (OUTPATIENT)
Dept: ONCOLOGY | Facility: HOSPITAL | Age: 65
End: 2020-01-07

## 2020-01-07 ENCOUNTER — OFFICE VISIT (OUTPATIENT)
Dept: ONCOLOGY | Facility: CLINIC | Age: 65
End: 2020-01-07

## 2020-01-07 VITALS
WEIGHT: 172.2 LBS | HEIGHT: 69 IN | HEART RATE: 96 BPM | BODY MASS INDEX: 25.51 KG/M2 | RESPIRATION RATE: 16 BRPM | DIASTOLIC BLOOD PRESSURE: 71 MMHG | SYSTOLIC BLOOD PRESSURE: 105 MMHG | OXYGEN SATURATION: 96 % | TEMPERATURE: 97.5 F

## 2020-01-07 DIAGNOSIS — I10 ESSENTIAL HYPERTENSION: ICD-10-CM

## 2020-01-07 DIAGNOSIS — C15.5 MALIGNANT NEOPLASM OF LOWER THIRD OF ESOPHAGUS (HCC): Primary | ICD-10-CM

## 2020-01-07 DIAGNOSIS — Z45.2 ENCOUNTER FOR ADJUSTMENT OR MANAGEMENT OF VASCULAR ACCESS DEVICE: ICD-10-CM

## 2020-01-07 DIAGNOSIS — I82.431 ACUTE DEEP VEIN THROMBOSIS (DVT) OF RIGHT POPLITEAL VEIN (HCC): ICD-10-CM

## 2020-01-07 DIAGNOSIS — Z72.0 TOBACCO ABUSE: ICD-10-CM

## 2020-01-07 DIAGNOSIS — C78.7 LIVER METASTASES: ICD-10-CM

## 2020-01-07 DIAGNOSIS — C15.5 MALIGNANT NEOPLASM OF LOWER THIRD OF ESOPHAGUS (HCC): ICD-10-CM

## 2020-01-07 DIAGNOSIS — I82.412 ACUTE DEEP VEIN THROMBOSIS (DVT) OF FEMORAL VEIN OF LEFT LOWER EXTREMITY (HCC): ICD-10-CM

## 2020-01-07 DIAGNOSIS — N32.1 COLOVESICAL FISTULA: ICD-10-CM

## 2020-01-07 LAB
ALBUMIN SERPL-MCNC: 3.7 G/DL (ref 3.5–5.2)
ALBUMIN/GLOB SERPL: 1.1 G/DL (ref 1.1–2.4)
ALP SERPL-CCNC: 103 U/L (ref 38–116)
ALT SERPL W P-5'-P-CCNC: 21 U/L (ref 0–41)
ANION GAP SERPL CALCULATED.3IONS-SCNC: 14 MMOL/L (ref 5–15)
AST SERPL-CCNC: 26 U/L (ref 0–40)
BASOPHILS # BLD AUTO: 0.07 10*3/MM3 (ref 0–0.2)
BASOPHILS NFR BLD AUTO: 0.6 % (ref 0–1.5)
BILIRUB SERPL-MCNC: 0.5 MG/DL (ref 0.2–1.2)
BUN BLD-MCNC: 10 MG/DL (ref 6–20)
BUN/CREAT SERPL: 17.2 (ref 7.3–30)
CALCIUM SPEC-SCNC: 9.2 MG/DL (ref 8.5–10.2)
CHLORIDE SERPL-SCNC: 96 MMOL/L (ref 98–107)
CO2 SERPL-SCNC: 23 MMOL/L (ref 22–29)
CREAT BLD-MCNC: 0.58 MG/DL (ref 0.7–1.3)
DEPRECATED RDW RBC AUTO: 64.5 FL (ref 37–54)
EOSINOPHIL # BLD AUTO: 0.55 10*3/MM3 (ref 0–0.4)
EOSINOPHIL NFR BLD AUTO: 4.7 % (ref 0.3–6.2)
ERYTHROCYTE [DISTWIDTH] IN BLOOD BY AUTOMATED COUNT: 17 % (ref 12.3–15.4)
GFR SERPL CREATININE-BSD FRML MDRD: 142 ML/MIN/1.73
GLOBULIN UR ELPH-MCNC: 3.4 GM/DL (ref 1.8–3.5)
GLUCOSE BLD-MCNC: 100 MG/DL (ref 74–124)
HCT VFR BLD AUTO: 34.7 % (ref 37.5–51)
HGB BLD-MCNC: 11.6 G/DL (ref 13–17.7)
IMM GRANULOCYTES # BLD AUTO: 0.35 10*3/MM3 (ref 0–0.05)
IMM GRANULOCYTES NFR BLD AUTO: 3 % (ref 0–0.5)
LYMPHOCYTES # BLD AUTO: 1.52 10*3/MM3 (ref 0.7–3.1)
LYMPHOCYTES NFR BLD AUTO: 12.9 % (ref 19.6–45.3)
MCH RBC QN AUTO: 34.2 PG (ref 26.6–33)
MCHC RBC AUTO-ENTMCNC: 33.4 G/DL (ref 31.5–35.7)
MCV RBC AUTO: 102.4 FL (ref 79–97)
MONOCYTES # BLD AUTO: 0.94 10*3/MM3 (ref 0.1–0.9)
MONOCYTES NFR BLD AUTO: 8 % (ref 5–12)
NEUTROPHILS # BLD AUTO: 8.35 10*3/MM3 (ref 1.7–7)
NEUTROPHILS NFR BLD AUTO: 70.8 % (ref 42.7–76)
NRBC BLD AUTO-RTO: 0 /100 WBC (ref 0–0.2)
PLATELET # BLD AUTO: 144 10*3/MM3 (ref 140–450)
PMV BLD AUTO: 9.6 FL (ref 6–12)
POTASSIUM BLD-SCNC: 4.3 MMOL/L (ref 3.5–4.7)
PROT SERPL-MCNC: 7.1 G/DL (ref 6.3–8)
RBC # BLD AUTO: 3.39 10*6/MM3 (ref 4.14–5.8)
SODIUM BLD-SCNC: 133 MMOL/L (ref 134–145)
WBC NRBC COR # BLD: 11.78 10*3/MM3 (ref 3.4–10.8)

## 2020-01-07 PROCEDURE — 25010000002 FLUOROURACIL PER 500 MG: Performed by: INTERNAL MEDICINE

## 2020-01-07 PROCEDURE — 25010000002 DEXAMETHASONE SODIUM PHOSPHATE 100 MG/10ML SOLUTION: Performed by: INTERNAL MEDICINE

## 2020-01-07 PROCEDURE — 85025 COMPLETE CBC W/AUTO DIFF WBC: CPT

## 2020-01-07 PROCEDURE — 96375 TX/PRO/DX INJ NEW DRUG ADDON: CPT | Performed by: INTERNAL MEDICINE

## 2020-01-07 PROCEDURE — 25010000002 LEUCOVORIN CALCIUM PER 50 MG: Performed by: INTERNAL MEDICINE

## 2020-01-07 PROCEDURE — 96411 CHEMO IV PUSH ADDL DRUG: CPT | Performed by: INTERNAL MEDICINE

## 2020-01-07 PROCEDURE — 80053 COMPREHEN METABOLIC PANEL: CPT

## 2020-01-07 PROCEDURE — 96416 CHEMO PROLONG INFUSE W/PUMP: CPT | Performed by: INTERNAL MEDICINE

## 2020-01-07 PROCEDURE — 99215 OFFICE O/P EST HI 40 MIN: CPT | Performed by: INTERNAL MEDICINE

## 2020-01-07 PROCEDURE — 25010000002 DIPHENHYDRAMINE 1 EACH BAG: Performed by: INTERNAL MEDICINE

## 2020-01-07 PROCEDURE — 96367 TX/PROPH/DG ADDL SEQ IV INF: CPT | Performed by: INTERNAL MEDICINE

## 2020-01-07 PROCEDURE — 25010000002 TRASTUZUMAB PER 10 MG: Performed by: INTERNAL MEDICINE

## 2020-01-07 PROCEDURE — 25010000002 PALONOSETRON PER 25 MCG: Performed by: INTERNAL MEDICINE

## 2020-01-07 PROCEDURE — 96413 CHEMO IV INFUSION 1 HR: CPT | Performed by: INTERNAL MEDICINE

## 2020-01-07 RX ORDER — DIPHENHYDRAMINE HYDROCHLORIDE 50 MG/ML
50 INJECTION INTRAMUSCULAR; INTRAVENOUS AS NEEDED
Status: CANCELLED | OUTPATIENT
Start: 2020-01-07

## 2020-01-07 RX ORDER — DEXTROSE MONOHYDRATE 50 MG/ML
250 INJECTION, SOLUTION INTRAVENOUS ONCE
Status: DISCONTINUED | OUTPATIENT
Start: 2020-01-07 | End: 2020-01-07 | Stop reason: HOSPADM

## 2020-01-07 RX ORDER — FLUOROURACIL 50 MG/ML
400 INJECTION, SOLUTION INTRAVENOUS ONCE
Status: CANCELLED | OUTPATIENT
Start: 2020-01-07

## 2020-01-07 RX ORDER — DEXTROSE MONOHYDRATE 50 MG/ML
250 INJECTION, SOLUTION INTRAVENOUS ONCE
Status: CANCELLED | OUTPATIENT
Start: 2020-01-07

## 2020-01-07 RX ORDER — SODIUM CHLORIDE 9 MG/ML
250 INJECTION, SOLUTION INTRAVENOUS ONCE
Status: CANCELLED | OUTPATIENT
Start: 2020-01-07

## 2020-01-07 RX ORDER — PALONOSETRON 0.05 MG/ML
0.25 INJECTION, SOLUTION INTRAVENOUS ONCE
Status: CANCELLED | OUTPATIENT
Start: 2020-01-07

## 2020-01-07 RX ORDER — FAMOTIDINE 10 MG/ML
20 INJECTION, SOLUTION INTRAVENOUS 2 TIMES DAILY
Status: DISCONTINUED | OUTPATIENT
Start: 2020-01-07 | End: 2020-01-07 | Stop reason: HOSPADM

## 2020-01-07 RX ORDER — FAMOTIDINE 10 MG/ML
20 INJECTION, SOLUTION INTRAVENOUS AS NEEDED
Status: CANCELLED | OUTPATIENT
Start: 2020-01-07

## 2020-01-07 RX ORDER — SODIUM CHLORIDE 9 MG/ML
250 INJECTION, SOLUTION INTRAVENOUS ONCE
Status: COMPLETED | OUTPATIENT
Start: 2020-01-07 | End: 2020-01-07

## 2020-01-07 RX ORDER — FAMOTIDINE 10 MG/ML
20 INJECTION, SOLUTION INTRAVENOUS 2 TIMES DAILY
Status: CANCELLED | OUTPATIENT
Start: 2020-01-07

## 2020-01-07 RX ORDER — FLUOROURACIL 50 MG/ML
400 INJECTION, SOLUTION INTRAVENOUS ONCE
Status: COMPLETED | OUTPATIENT
Start: 2020-01-07 | End: 2020-01-07

## 2020-01-07 RX ORDER — CLOTRIMAZOLE 10 MG/1
10 LOZENGE ORAL; TOPICAL 3 TIMES DAILY PRN
COMMUNITY
Start: 2019-12-27 | End: 2020-03-17

## 2020-01-07 RX ORDER — PALONOSETRON 0.05 MG/ML
0.25 INJECTION, SOLUTION INTRAVENOUS ONCE
Status: COMPLETED | OUTPATIENT
Start: 2020-01-07 | End: 2020-01-07

## 2020-01-07 RX ADMIN — SODIUM CHLORIDE 250 ML: 9 INJECTION, SOLUTION INTRAVENOUS at 10:58

## 2020-01-07 RX ADMIN — TRASTUZUMAB 350 MG: 150 INJECTION, POWDER, LYOPHILIZED, FOR SOLUTION INTRAVENOUS at 11:34

## 2020-01-07 RX ADMIN — FLUOROURACIL 810 MG: 50 INJECTION, SOLUTION INTRAVENOUS at 12:44

## 2020-01-07 RX ADMIN — DEXAMETHASONE SODIUM PHOSPHATE 12 MG: 10 INJECTION, SOLUTION INTRAMUSCULAR; INTRAVENOUS at 11:01

## 2020-01-07 RX ADMIN — DIPHENHYDRAMINE HYDROCHLORIDE 50 MG: 50 INJECTION INTRAMUSCULAR; INTRAVENOUS at 11:17

## 2020-01-07 RX ADMIN — PALONOSETRON 0.25 MG: 0.05 INJECTION, SOLUTION INTRAVENOUS at 10:59

## 2020-01-07 RX ADMIN — FAMOTIDINE 20 MG: 10 INJECTION INTRAVENOUS at 10:58

## 2020-01-07 RX ADMIN — FLUOROURACIL 4850 MG: 50 INJECTION, SOLUTION INTRAVENOUS at 12:45

## 2020-01-07 RX ADMIN — LEUCOVORIN CALCIUM 810 MG: 350 INJECTION, POWDER, LYOPHILIZED, FOR SOLUTION INTRAMUSCULAR; INTRAVENOUS at 12:12

## 2020-01-07 NOTE — PROGRESS NOTES
"  Subjective     REASON FOR follow up:1.  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  Currently receiving palliative FOLFOX AND HERCEPTIN therapy.    .2.COLOVESICAL FISTULA: chronic antibiotic therapy cipro    3. DVT R AND LEFT LE ON ANTICOAGULANT LOVENOX: THROMBOPHILIA OF MALIGNANCY    History of Present Illness This patient returns today to the office for followup. He is here today in company of his sister stating that his area of cellulitis in the left leg has improved with Keflex that he took for a week. He has no pain, no local inflammation, no erythema. Other than that he has been feeling well, his appetite has been stable, his taste for food is bad because of chemotherapy effect but he has no difficulty swallowing. No abdominal pain. He has not had any issues in regard to his colovesical fistula, he is back on his ciprofloxacin since yesterday. He has not had any fever or chills. 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     Other than that the patient feels well and is able to function fine. He is going to request disability permanently in the long run long term and papers will be sent to me.                         Past Medical History:   Diagnosis Date   • Arthritis    • Elevated PSA    • Esophageal mass    • H/O Lung nodule    • Hepatitis     CHILD--PT STATED \"I THINK IT WAS A.\"   • History of pneumonia    • Hx of blood clots 08/10/2019   • Hyperlipidemia    • Hypertension    • Liver cancer (CMS/HCC)    • Prostate cancer (CMS/HCC)         Past Surgical History:   Procedure Laterality Date   • HERNIA REPAIR  1999   • PROSTATE SURGERY  03/2008   • VENOUS ACCESS DEVICE (PORT) INSERTION N/A 7/16/2019    Procedure: INSERTION VENOUS ACCESS DEVICE WITH FLUORO AND EGD WITH BIOPSY;  Surgeon: Mary Brito MD;  Location: [facility];  Service: " Thoracic        Current Outpatient Medications on File Prior to Visit   Medication Sig Dispense Refill   • ciprofloxacin (CIPRO) 250 MG tablet Take 2 tablets by mouth 2 (Two) Times a Day. 1 tablet 60 tablet 1   • clotrimazole (MYCELEX) 10 MG obie 3 TIMES A DAY     • enoxaparin (LOVENOX) 80 MG/0.8ML solution syringe Inject 0.8 mL under the skin into the appropriate area as directed Every 12 (Twelve) Hours. 48 mL 11   • lisinopril-hydrochlorothiazide (PRINZIDE,ZESTORETIC) 20-12.5 MG per tablet Take 1 tablet by mouth Daily. 90 tablet 2   • ondansetron (ZOFRAN) 4 MG tablet Take 1 tablet by mouth Every 8 (Eight) Hours As Needed for Nausea or Vomiting. 30 tablet 2   • Probiotic Product (PROBIOTIC DAILY PO) Take  by mouth Daily.     • triamcinolone (KENALOG) 0.1 % ointment Apply  topically to the appropriate area as directed 2 (Two) Times a Day. 30 g 2   • potassium chloride (K-DUR) 10 MEQ CR tablet Take 2 tablets by mouth Daily. 30 tablet 1   • [DISCONTINUED] cephalexin (KEFLEX) 500 MG capsule Take 1 capsule by mouth 4 (Four) Times a Day. 28 capsule 0     No current facility-administered medications on file prior to visit.         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     Employer: [employer]   Tobacco Use   • Smoking status: Current Every Day Smoker     Packs/day: 1.00     Years: 40.00     Pack years: 40.00     Types: Cigarettes   • Smokeless tobacco: Never Used   Substance and Sexual Activity   • Alcohol use: Yes     Alcohol/week: 28.0 standard drinks     Types: 28 Cans of beer per week     Comment: NOT RECENTLY   • Drug use: No        Family History   Problem Relation Age of Onset   • Hypertension Mother    • Stroke Mother    • Hypertension Other    • Lung disease  Other    • Prostate cancer Other    • Lung cancer Father    • Malig Hyperthermia Neg Hx          Review of Systems:        General: no fever, no chills, no fatigue,no weight changes, no lack of appetite.  Eyes: no epiphora, xerophthalmia,conjunctivitis, pain, glaucoma, blurred vision, blindness, secretion, photophobia, proptosis, diplopia.  Ears: no otorrhea, tinnitus, otorrhagia, deafness, pain, vertigo.  Nose: no rhinorrhea, no epistaxis, no alteration in perception of odors, no sinuses pressure.  Mouth: no alteration in gums or teeth,  No ulcers, no difficulty with mastication or deglut ion, no odynophagia.  Neck: no masses or pain, no thyroid alterations, no pain in muscles or arteries, no carotid odynia, no crepitation.  Respiratory: no cough, no sputum production,no dyspnea,no trepopnea, no pleuritic pain,no hemoptysis.  Heart: no syncope, no irregularity, no palpitations, no angina,no orthopnea,no paroxysmal nocturnal dyspnea.  Vascular Venous: no tenderness,no edema,no palpable cords,no postphlebitic syndrome, no skin changes no ulcerations.  Vascular Arterial: no distal ischemia, noclaudication, no gangrene, no neuropathic ischemic pain, no skin ulcers, no paleness no cyanosis.  GI: no dysphagia, no odynophagia, no regurgitation, no heartburn,no indigestion,no nausea,no vomiting,no hematemesis ,no melena,no jaundice,no distention, no obstipation,no enterorrhagia,no proctalgia,no anal  lesions, no changes in bowel habits.  : no frequency, no hesitancy, no hematuria, no discharge,no  pain.  Musculoskeletal: no muscle or tendon pain or inflammation,no  joint pain, no edema, no functional limitation,no fasciculations, no mass.  Neurologic: no headache, no seizures, noalterations on Craneal nerves, no motor deficit, feet sensory deficit grade I neuropathy, normal coordination, no alteration in memory,normal orientation, calculation,normal writting, verbal and written language.  Skin: no rashes,no pruritus  "improved localized lesions.  Psychiatric: no anxiety, no depression,no agitation, no delusions, proper insight.                Objective     Vitals:    01/07/20 1016   BP: 105/71   Pulse: 96   Resp: 16   Temp: 97.5 °F (36.4 °C)   TempSrc: Oral   SpO2: 96%   Weight: 78.1 kg (172 lb 3.2 oz)   Height: 174 cm (68.5\")   PainSc: 0-No pain     Current Status 1/7/2020   ECOG score 0       Physical Exam:  GENERAL:  Well-developed, well-nourished  Patient  in no acute distress.   SKIN:  Warm, dry ,NO rashes,NO purpura ,NO petechiae.resolution erythema and pain left leg  HEENT:  Pupils were equal and reactive to light and accomodation, conjunctivas non injected, no pterigion, normal extraocular movements, normal visual acuity.   Mouth mucosa was moist, no exudates in oropharynx, normal gum line, normal roof of the mouth and pillars, normal papillations of the tongue.  NECK:  Supple with good range of motion; no thyromegaly or masses, no JVD or bruits, no cervical adenopathies.No carotid arteries pain, no carotid abnormal pulsation , NO arterial dance.  LYMPHATICS:  No cervical, NO supraclavicular, NO axillary,NO epitrochlear , NO inguinal adenopathy.  CHEST:  Normal excursion of both kailash thoraces, normal voice fremitus, no subcutaneous emphysema, normal axillas, no rashes or acanthosis nigricans. Lungs clear to percussion and auscultation, normal breath sounds bilaterally, no wheezing,NO crackles NO ronchi, NO stridor, NO rubs.  CARDIAC AND VASCULAR:  normal rate and regular rhythm, without murmurs,NO rubs NO S3 NO S4 right or left . Normal femoral, popliteal, pedis, brachial and carotid pulses.  ABDOMEN:  Soft, nontender with no organomegaly or masses, no ascites, no collateral circulation,no distention,no Dhruv sign, no abdominal pain, no inguinal hernias,no umbilical hernia, no abdominal bruits. Normal bowel sounds.  GENITAL: Not  Performed.  EXTREMITIES  AND SPINE:  No clubbing, cyanosis or edema, no deformities or pain " .No kyphosis, scoliosis, deformities or pain in spine, ribs or pelvic bone.  NEUROLOGICAL:  Patient was awake, alert, oriented to time, person and place.grade 1 neuropathy in feet                              RECENT LABS:  Hematology WBC   Date Value Ref Range Status   01/07/2020 11.78 (H) 3.40 - 10.80 10*3/mm3 Final   02/02/2019 13.4 (H) 3.4 - 10.8 x10E3/uL Final     RBC   Date Value Ref Range Status   01/07/2020 3.39 (L) 4.14 - 5.80 10*6/mm3 Final   02/02/2019 4.81 4.14 - 5.80 x10E6/uL Final     Hemoglobin   Date Value Ref Range Status   01/07/2020 11.6 (L) 13.0 - 17.7 g/dL Final     Hematocrit   Date Value Ref Range Status   01/07/2020 34.7 (L) 37.5 - 51.0 % Final     Platelets   Date Value Ref Range Status   01/07/2020 144 140 - 450 10*3/mm3 Final                    Assessment/Plan    1.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 Almost 90% of the liver WAS replaced by tumor. The patient has been undergoing chemotherapy with FOLFOX and Herceptin and has had excellent response clinically and radiologically.  Today, the patient on clinical examination has had resolution of his giant hepatomegaly that was nodular and mildly tender in the right upper quadrant of his abdomen.  His liver has reduced in size very dramatically.  Most importantly, the patient has had resolution of his cancer-related pain.  His performance status is ECOG 0.  He is no longer taking pain medicine and developed thrombophilia associated with malignancy that has been treated with Lovenox successfully.  He has some ageusia related to treatment and this will require modification in his diet.  He remains smoking 1 pack of cigarettes daily.    The patient was further reviewed on 11/26/2019. I discussed with him and his sister the dramatic changes and improvement in his PET scan. This is one for the book. He has near complete resolution of the abnormal SUV uptake in the lower part of the esophagus and he has near  complete resolution of the dramatic uptake in his whole liver parenchyma. There is no other site of disease. Kidney anatomy remains normal as well as his bladder anatomy. The fissure is still visible.     The patient was further reviewed on 12/27/2019. What calls my attention today is the minimal left shift in his white blood count that has been reviewed by the lab technicians and by me under the microscope. The patient has no fever, he has no leukocytosis, his hemoglobin is stable, his platelet count is normal but putting it altogether with the rash that he has in the left leg I do believe that this patient has cellulitis and maybe the white count has shifted related to this. There is nothing to suggest sinusitis, bronchitis, port infections, urinary tract infections, he has no diarrhea and otherwise the patient feels well.     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 This is becoming now permanent, is a grade 1, is not bothersome for him, has no functional implications but this is going to become an issue in the long run.    From the point of view of his cancer of the esophagus as we reviewed during the previous visit and reviewing again today he looks fine. He had a dramatic response to chemotherapy that seems to be that is keeping.     He remains on ciprofloxacin prophylactically for the treatment of his urinary tract infection associated with his colovesical fistula.    His blood pressure is under good control.    RECOMMENDATIONS:  1. The patient will proceed with his chemotherapy today excluding completely from the care plan Eloxatin altogether from now on. The doses of the rest of the medicines will remain the same.   2. He will remain on ciprofloxacin for his colovesical fistula.  3. The patient was advised to proceed with an echocardiogram in a few days to monitor side effects of Herceptin. So far nothing on clinical grounds.  4. The patient was advised  to be seen by Guy Sears MD. I have placed a phone call to talk with him about the colovesical fistula. Probably in the next month or so the patient should be ready to proceed with surgery for this. Dr. Sears will require the help of a Urologist and I let him to make the decision about who he wants to work with.  5. I will review the patient back in 6 weeks, CBC, CMP and CEA level at that time.  6. The disability papers are coming my way. I will be glad to fill them out.  7. Discussed with the patient and his sister in detail.  8. Phone call placed to Dr. Sears to discuss.  9. In regard to his anticoagulation with Lovenox he will remain on this medicine for the time being. In preparation for surgery my recommendation will be to hold off on the Lovenox 24 hours before surgery and put him back on Lovenox 24 hours after surgery. I do not believe that the patient will require an IVC filter placed.

## 2020-01-09 ENCOUNTER — INFUSION (OUTPATIENT)
Dept: ONCOLOGY | Facility: HOSPITAL | Age: 65
End: 2020-01-09

## 2020-01-09 DIAGNOSIS — C15.5 MALIGNANT NEOPLASM OF LOWER THIRD OF ESOPHAGUS (HCC): Primary | ICD-10-CM

## 2020-01-09 DIAGNOSIS — C78.7 LIVER METASTASES: ICD-10-CM

## 2020-01-09 DIAGNOSIS — Z45.2 ENCOUNTER FOR ADJUSTMENT OR MANAGEMENT OF VASCULAR ACCESS DEVICE: ICD-10-CM

## 2020-01-09 RX ORDER — SODIUM CHLORIDE 0.9 % (FLUSH) 0.9 %
10 SYRINGE (ML) INJECTION AS NEEDED
Status: DISCONTINUED | OUTPATIENT
Start: 2020-01-09 | End: 2020-01-09 | Stop reason: HOSPADM

## 2020-01-09 RX ORDER — SODIUM CHLORIDE 0.9 % (FLUSH) 0.9 %
10 SYRINGE (ML) INJECTION AS NEEDED
Status: CANCELLED | OUTPATIENT
Start: 2020-01-09

## 2020-01-09 RX ORDER — HEPARIN SODIUM (PORCINE) LOCK FLUSH IV SOLN 100 UNIT/ML 100 UNIT/ML
500 SOLUTION INTRAVENOUS AS NEEDED
Status: CANCELLED | OUTPATIENT
Start: 2020-01-09

## 2020-01-09 RX ADMIN — Medication 10 ML: at 11:34

## 2020-01-09 RX ADMIN — SODIUM CHLORIDE, PRESERVATIVE FREE 500 UNITS: 5 INJECTION INTRAVENOUS at 11:34

## 2020-01-15 ENCOUNTER — OFFICE VISIT (OUTPATIENT)
Dept: SURGERY | Facility: CLINIC | Age: 65
End: 2020-01-15

## 2020-01-15 VITALS — OXYGEN SATURATION: 99 % | WEIGHT: 172 LBS | BODY MASS INDEX: 27.64 KG/M2 | HEIGHT: 66 IN | HEART RATE: 103 BPM

## 2020-01-15 DIAGNOSIS — N32.1 COLOVESICAL FISTULA: Primary | ICD-10-CM

## 2020-01-15 PROCEDURE — 99244 OFF/OP CNSLTJ NEW/EST MOD 40: CPT | Performed by: SURGERY

## 2020-01-15 NOTE — PROGRESS NOTES
"CC: Evaluation for colovesical fistula     HPI: The patient is a very pleasant 64-year-old male with metastatic esophageal cancer on chemotherapy that is here today referred by Dr. Haynes for evaluation of colovesical fistula.  The patient reports noticing some air while urinating that happen approximately a couple of months ago.  He has not been associated with any dysuria or hematuria.  He also had a UTI approximately a month ago that was treated with antibiotics.  Since then he has been on low-dose antibiotics for UTI prevention.  He reports no other symptoms and has been having bowel movements on a daily basis.  He never have a colonoscopy.  He reports no having episodes of acute diverticulitis.     PMH: Metastatic stage IV esophageal cancer, hypertension, prostate cancer, hepatitis A, DVT.      PSH: Right inguinal hernia repair, open prostatectomy, MediPort insertion    MEDS: Reviewed and reconciled in epic     ALL: No known drug allergies    FH and SH: The patient is a smoker of 1 pack of cigarettes per day and has been doing them for the last 45 years     ROS:   Constitutional: Reports activity and appetite change.  Reports fatigue and weight loss   HEENT: Denies hearing loss and rhinorrhea reports dysphasia  Cardiovascular: denies chest pain, palpitations, edemas.  Respiratory: denies cough, sputum, SOB.  Gastrointestinal: denies N&V, abd pain, diarrhea, constipation.  Genitourinary: denies dysuria, frequency.  Endocrine: denies cold intolerance, lethargy and flushing.  Hem: denies excessive bruising and postop bleeding.  Musculoskeletal: denies weakness, joint swelling, pain or stiffness.  Neuro: Reports dizziness, numbness and weakness  Skin: denies change in nevi, rashes, masses.     PE:   Vitals:    01/15/20 1052   Pulse: 103   SpO2: 99%   Weight: 78 kg (172 lb)   Height: 166.4 cm (65.5\")     Alert and oriented ×3, no acute distress.  Head is normocephalic and atraumatic.  Neck is supple there is no " thyromegaly or lymphadenopathy  Chest is clear bilaterally there is no added sounds  Regular rate and rhythm, no murmurs  Abdomen is soft and nontender, is nondistended, bowel sounds are positive.  There is no rebound or guarding is and there is no peritoneal signs.  Well-healed infraumbilical incision  No clubbing cyanosis or edema in lower or upper extremities    Diagnostic studies:   PET scan 10/29/2019: Almost complete resolution of hepatic metastases.  Persistent colovesical fistula from the mid sigmoid colon    Labs 1/7/2020:   Sodium 133, chloride 96, otherwise unremarkable CMP  White blood cell count 11.7, hemoglobin 11.6    Assessment and plan    The patient is a very pleasant 64-year-old male with a mildly symptomatic colovesical fistula.  I discussed with the patient about further step.  I think he should be evaluated with colonoscopy as well as with cystoscopy.  I placed a referral to urology for this.  I think that after colonoscopy and cystoscopy are done and if there is no evidence of bladder malignancy they will need to discuss with him whether performing sigmoid colectomy will be worth it.  Patient is minimally symptomatic and has stage IV esophageal cancer.  I discussed with him about the need to stop smoking.     -Plan for colonoscopy, patient will need to have a bowel preparation and clear liquid diet the day before  -Referred to urology for cystoscopy  -Continue antibiotics    Discussed with patient increased perioperative risks associated with obesity including increased risks of DVT, infection, seromas, poor wound healing and hernias (with abdominal surgery).    I advised the patient of the risks in continuing to use tobacco.  I provided the patient with smoking cessation educational materials and discussed how to quit smoking.  The patient has expressed the willingness to quit.  I spent approximately 3 minutes in duration providing smoking cessation counseling.     Guy Sears,  MD  General, Minimally Invasive and Endoscopic Surgery  [facility]     [address], [address]  [place], [place], [address]  P: [phone]  F: [phone]

## 2020-01-22 ENCOUNTER — HOSPITAL ENCOUNTER (OUTPATIENT)
Dept: CARDIOLOGY | Facility: HOSPITAL | Age: 65
Discharge: HOME | End: 2020-01-22
Admitting: INTERNAL MEDICINE

## 2020-01-22 VITALS
WEIGHT: 172 LBS | BODY MASS INDEX: 28.66 KG/M2 | HEIGHT: 65 IN | SYSTOLIC BLOOD PRESSURE: 98 MMHG | OXYGEN SATURATION: 98 % | DIASTOLIC BLOOD PRESSURE: 67 MMHG | HEART RATE: 103 BPM

## 2020-01-22 DIAGNOSIS — C78.7 LIVER METASTASES: ICD-10-CM

## 2020-01-22 DIAGNOSIS — Z72.0 TOBACCO ABUSE: ICD-10-CM

## 2020-01-22 DIAGNOSIS — C15.5 MALIGNANT NEOPLASM OF LOWER THIRD OF ESOPHAGUS (HCC): ICD-10-CM

## 2020-01-22 DIAGNOSIS — I82.412 ACUTE DEEP VEIN THROMBOSIS (DVT) OF FEMORAL VEIN OF LEFT LOWER EXTREMITY (HCC): ICD-10-CM

## 2020-01-22 DIAGNOSIS — N32.1 COLOVESICAL FISTULA: ICD-10-CM

## 2020-01-22 PROCEDURE — 93306 TTE W/DOPPLER COMPLETE: CPT

## 2020-01-22 PROCEDURE — 93356 MYOCRD STRAIN IMG SPCKL TRCK: CPT

## 2020-01-22 PROCEDURE — 93306 TTE W/DOPPLER COMPLETE: CPT | Performed by: INTERNAL MEDICINE

## 2020-01-22 PROCEDURE — 25010000002 PERFLUTREN (DEFINITY) 8.476 MG IN SODIUM CHLORIDE (PF) 0.9 % 10 ML INJECTION: Performed by: INTERNAL MEDICINE

## 2020-01-22 PROCEDURE — 93356 MYOCRD STRAIN IMG SPCKL TRCK: CPT | Performed by: INTERNAL MEDICINE

## 2020-01-22 RX ADMIN — PERFLUTREN 2 ML: 6.52 INJECTION, SUSPENSION INTRAVENOUS at 15:01

## 2020-01-23 ENCOUNTER — TELEPHONE (OUTPATIENT)
Dept: ONCOLOGY | Facility: HOSPITAL | Age: 65
End: 2020-01-23

## 2020-01-23 LAB
AORTIC ARCH: 2.7 CM
AORTIC ROOT ANNULUS: 1.9 CM
ASCENDING AORTA: 3 CM
BH CV ECHO MEAS - ACS: 2.3 CM
BH CV ECHO MEAS - AO MAX PG (FULL): 2.6 MMHG
BH CV ECHO MEAS - AO MAX PG: 7.6 MMHG
BH CV ECHO MEAS - AO MEAN PG (FULL): 1 MMHG
BH CV ECHO MEAS - AO MEAN PG: 4 MMHG
BH CV ECHO MEAS - AO ROOT AREA (BSA CORRECTED): 1.9
BH CV ECHO MEAS - AO ROOT AREA: 10.2 CM^2
BH CV ECHO MEAS - AO ROOT DIAM: 3.6 CM
BH CV ECHO MEAS - AO V2 MAX: 138 CM/SEC
BH CV ECHO MEAS - AO V2 MEAN: 95.2 CM/SEC
BH CV ECHO MEAS - AO V2 VTI: 22.7 CM
BH CV ECHO MEAS - ASC AORTA: 3 CM
BH CV ECHO MEAS - AVA(I,A): 2.3 CM^2
BH CV ECHO MEAS - AVA(I,D): 2.3 CM^2
BH CV ECHO MEAS - AVA(V,A): 2.1 CM^2
BH CV ECHO MEAS - AVA(V,D): 2.1 CM^2
BH CV ECHO MEAS - BSA(HAYCOCK): 1.9 M^2
BH CV ECHO MEAS - BSA: 1.9 M^2
BH CV ECHO MEAS - BZI_BMI: 28.6 KILOGRAMS/M^2
BH CV ECHO MEAS - BZI_METRIC_HEIGHT: 165.1 CM
BH CV ECHO MEAS - BZI_METRIC_WEIGHT: 78 KG
BH CV ECHO MEAS - EDV(MOD-SP2): 73 ML
BH CV ECHO MEAS - EDV(MOD-SP4): 93 ML
BH CV ECHO MEAS - EDV(TEICH): 70 ML
BH CV ECHO MEAS - EF(CUBED): 75.6 %
BH CV ECHO MEAS - EF(MOD-BP): 64 %
BH CV ECHO MEAS - EF(MOD-SP2): 60.3 %
BH CV ECHO MEAS - EF(MOD-SP4): 63.4 %
BH CV ECHO MEAS - EF(TEICH): 68.1 %
BH CV ECHO MEAS - ESV(MOD-SP2): 29 ML
BH CV ECHO MEAS - ESV(MOD-SP4): 34 ML
BH CV ECHO MEAS - ESV(TEICH): 22.3 ML
BH CV ECHO MEAS - FS: 37.5 %
BH CV ECHO MEAS - IVS/LVPW: 0.9
BH CV ECHO MEAS - IVSD: 0.9 CM
BH CV ECHO MEAS - LAT PEAK E' VEL: 7 CM/SEC
BH CV ECHO MEAS - LV DIASTOLIC VOL/BSA (35-75): 50.1 ML/M^2
BH CV ECHO MEAS - LV MASS(C)D: 118.2 GRAMS
BH CV ECHO MEAS - LV MASS(C)DI: 63.7 GRAMS/M^2
BH CV ECHO MEAS - LV MAX PG: 5 MMHG
BH CV ECHO MEAS - LV MEAN PG: 3 MMHG
BH CV ECHO MEAS - LV SYSTOLIC VOL/BSA (12-30): 18.3 ML/M^2
BH CV ECHO MEAS - LV V1 MAX: 112 CM/SEC
BH CV ECHO MEAS - LV V1 MEAN: 77.7 CM/SEC
BH CV ECHO MEAS - LV V1 VTI: 20.2 CM
BH CV ECHO MEAS - LVIDD: 4 CM
BH CV ECHO MEAS - LVIDS: 2.5 CM
BH CV ECHO MEAS - LVLD AP2: 6.3 CM
BH CV ECHO MEAS - LVLD AP4: 6.8 CM
BH CV ECHO MEAS - LVLS AP2: 5.4 CM
BH CV ECHO MEAS - LVLS AP4: 5.4 CM
BH CV ECHO MEAS - LVOT AREA (M): 2.5 CM^2
BH CV ECHO MEAS - LVOT AREA: 2.5 CM^2
BH CV ECHO MEAS - LVOT DIAM: 1.8 CM
BH CV ECHO MEAS - LVPWD: 1 CM
BH CV ECHO MEAS - MED PEAK E' VEL: 7 CM/SEC
BH CV ECHO MEAS - MV A DUR: 0.08 SEC
BH CV ECHO MEAS - MV A MAX VEL: 98.1 CM/SEC
BH CV ECHO MEAS - MV DEC SLOPE: 594 CM/SEC^2
BH CV ECHO MEAS - MV DEC TIME: 0.13 SEC
BH CV ECHO MEAS - MV E MAX VEL: 85.9 CM/SEC
BH CV ECHO MEAS - MV E/A: 0.88
BH CV ECHO MEAS - MV MAX PG: 3.6 MMHG
BH CV ECHO MEAS - MV MEAN PG: 2 MMHG
BH CV ECHO MEAS - MV P1/2T MAX VEL: 89.6 CM/SEC
BH CV ECHO MEAS - MV P1/2T: 44.2 MSEC
BH CV ECHO MEAS - MV V2 MAX: 94.7 CM/SEC
BH CV ECHO MEAS - MV V2 MEAN: 64 CM/SEC
BH CV ECHO MEAS - MV V2 VTI: 23.3 CM
BH CV ECHO MEAS - MVA P1/2T LCG: 2.5 CM^2
BH CV ECHO MEAS - MVA(P1/2T): 5 CM^2
BH CV ECHO MEAS - MVA(VTI): 2.2 CM^2
BH CV ECHO MEAS - PA ACC TIME: 0.06 SEC
BH CV ECHO MEAS - PA MAX PG (FULL): 0.97 MMHG
BH CV ECHO MEAS - PA MAX PG: 3.7 MMHG
BH CV ECHO MEAS - PA PR(ACCEL): 50.7 MMHG
BH CV ECHO MEAS - PA V2 MAX: 96.4 CM/SEC
BH CV ECHO MEAS - PULM A REVS DUR: 0.09 SEC
BH CV ECHO MEAS - PULM A REVS VEL: 73.3 CM/SEC
BH CV ECHO MEAS - PULM DIAS VEL: 41.1 CM/SEC
BH CV ECHO MEAS - PULM S/D: 1.5
BH CV ECHO MEAS - PULM SYS VEL: 60 CM/SEC
BH CV ECHO MEAS - PVA(V,A): 2.4 CM^2
BH CV ECHO MEAS - PVA(V,D): 2.4 CM^2
BH CV ECHO MEAS - QP/QS: 0.89
BH CV ECHO MEAS - RV MAX PG: 2.7 MMHG
BH CV ECHO MEAS - RV MEAN PG: 2 MMHG
BH CV ECHO MEAS - RV V1 MAX: 82.9 CM/SEC
BH CV ECHO MEAS - RV V1 MEAN: 59.6 CM/SEC
BH CV ECHO MEAS - RV V1 VTI: 16.2 CM
BH CV ECHO MEAS - RVOT AREA: 2.8 CM^2
BH CV ECHO MEAS - RVOT DIAM: 1.9 CM
BH CV ECHO MEAS - SI(AO): 124.5 ML/M^2
BH CV ECHO MEAS - SI(CUBED): 26.1 ML/M^2
BH CV ECHO MEAS - SI(LVOT): 27.7 ML/M^2
BH CV ECHO MEAS - SI(MOD-SP2): 23.7 ML/M^2
BH CV ECHO MEAS - SI(MOD-SP4): 31.8 ML/M^2
BH CV ECHO MEAS - SI(TEICH): 25.7 ML/M^2
BH CV ECHO MEAS - SUP REN AO DIAM: 1.9 CM
BH CV ECHO MEAS - SV(AO): 231.1 ML
BH CV ECHO MEAS - SV(CUBED): 48.4 ML
BH CV ECHO MEAS - SV(LVOT): 51.4 ML
BH CV ECHO MEAS - SV(MOD-SP2): 44 ML
BH CV ECHO MEAS - SV(MOD-SP4): 59 ML
BH CV ECHO MEAS - SV(RVOT): 45.9 ML
BH CV ECHO MEAS - SV(TEICH): 47.7 ML
BH CV ECHO MEAS - TAPSE (>1.6): 1.9 CM2
BH CV ECHO MEASUREMENTS AVERAGE E/E' RATIO: 12.27
BH CV VAS BP RIGHT ARM: NORMAL MMHG
BH CV XLRA - RV BASE: 2.8 CM
BH CV XLRA - RV LENGTH: 7.1 CM
BH CV XLRA - RV MID: 2.5 CM
BH CV XLRA - TDI S': 16 CM/SEC
LEFT ATRIUM VOLUME INDEX: 17 ML/M2
MAXIMAL PREDICTED HEART RATE: 157 BPM
SINUS: 3.1 CM
STJ: 3.1 CM
STRESS TARGET HR: 133 BPM

## 2020-01-23 NOTE — TELEPHONE ENCOUNTER
----- Message from Alexey Haynes MD sent at 1/23/2020 12:31 PM EST -----   CALL PT I NEED TO KNOW WHEN IS HIS CYSTOSCOPY AND SURGERY TO DECIDE ABOUT NEXT TREAMENT

## 2020-01-23 NOTE — TELEPHONE ENCOUNTER
SEE PREVIOUS NOTE. PER DR. OSBORNE CANCEL CHEMO 1/28. HE STILL WANTS TO SEE PT 2/18 BUT [facility] WON'T GET CHEMO THAT DAY EITHER. CALLED PT AND MADE HIM AWARE AND HE V/U. PT WILL CALL DR. CASTILLO TO SEE IF THEY CAN MOVE UP CYSTOSCOPY AND GO AHEAD AND GET SX SCHEDULED. WILL LET  RN KNOW TO HAVE CHEMO PLACED ON HOLD.

## 2020-01-23 NOTE — TELEPHONE ENCOUNTER
CALLED PT AS REQUESTED (SEE BELOW). PT IS SCHEDULED TO HAVE COLONOSCOPY ON 2/4 PER DR. REAL. HE IS SCHEDULED FOR CYSTOSCOPY ON 2/21 PER DR. CASTILLO. HIS FISTULA SX HAS NOT BEEN SCHEDULED YET BUT THE PT DOES KNOW THAT HE NEEDS TO BE OFF CHEMO FOR AT LEAST 4-6 WKS PRIOR TO THE SX. PT IS SCHEDULED HERE FOR CHEMO ON 1/28 AND TO SEE DR. OSBORNE ON 2/18. WILL LET DR. OSBORNE KNOW TO SEE IF CHEMO TUES SHOULD BE CANCELED. WILL GET BACK TO PT.

## 2020-01-28 ENCOUNTER — APPOINTMENT (OUTPATIENT)
Dept: ONCOLOGY | Facility: HOSPITAL | Age: 65
End: 2020-01-28

## 2020-02-04 ENCOUNTER — HOSPITAL ENCOUNTER (OUTPATIENT)
Facility: HOSPITAL | Age: 65
Setting detail: HOSPITAL OUTPATIENT SURGERY
Discharge: HOME | End: 2020-02-04
Attending: SURGERY | Admitting: SURGERY

## 2020-02-04 ENCOUNTER — ANESTHESIA EVENT (OUTPATIENT)
Dept: GASTROENTEROLOGY | Facility: HOSPITAL | Age: 65
End: 2020-02-04

## 2020-02-04 ENCOUNTER — ANESTHESIA (OUTPATIENT)
Dept: GASTROENTEROLOGY | Facility: HOSPITAL | Age: 65
End: 2020-02-04

## 2020-02-04 VITALS
SYSTOLIC BLOOD PRESSURE: 121 MMHG | HEART RATE: 82 BPM | OXYGEN SATURATION: 97 % | HEIGHT: 69 IN | TEMPERATURE: 97.7 F | WEIGHT: 167 LBS | BODY MASS INDEX: 24.73 KG/M2 | RESPIRATION RATE: 16 BRPM | DIASTOLIC BLOOD PRESSURE: 76 MMHG

## 2020-02-04 PROCEDURE — 25010000002 PROPOFOL 10 MG/ML EMULSION: Performed by: ANESTHESIOLOGY

## 2020-02-04 PROCEDURE — 45378 DIAGNOSTIC COLONOSCOPY: CPT | Performed by: SURGERY

## 2020-02-04 RX ORDER — SODIUM CHLORIDE 0.9 % (FLUSH) 0.9 %
3 SYRINGE (ML) INJECTION EVERY 12 HOURS SCHEDULED
Status: DISCONTINUED | OUTPATIENT
Start: 2020-02-04 | End: 2020-02-04 | Stop reason: HOSPADM

## 2020-02-04 RX ORDER — SODIUM CHLORIDE 0.9 % (FLUSH) 0.9 %
10 SYRINGE (ML) INJECTION AS NEEDED
Status: DISCONTINUED | OUTPATIENT
Start: 2020-02-04 | End: 2020-02-04 | Stop reason: HOSPADM

## 2020-02-04 RX ORDER — PROPOFOL 10 MG/ML
VIAL (ML) INTRAVENOUS CONTINUOUS PRN
Status: DISCONTINUED | OUTPATIENT
Start: 2020-02-04 | End: 2020-02-04 | Stop reason: SURG

## 2020-02-04 RX ORDER — PROPOFOL 10 MG/ML
VIAL (ML) INTRAVENOUS AS NEEDED
Status: DISCONTINUED | OUTPATIENT
Start: 2020-02-04 | End: 2020-02-04 | Stop reason: SURG

## 2020-02-04 RX ORDER — LIDOCAINE HYDROCHLORIDE 20 MG/ML
INJECTION, SOLUTION INFILTRATION; PERINEURAL AS NEEDED
Status: DISCONTINUED | OUTPATIENT
Start: 2020-02-04 | End: 2020-02-04 | Stop reason: SURG

## 2020-02-04 RX ORDER — CIPROFLOXACIN 250 MG/1
TABLET, FILM COATED ORAL
Qty: 60 TABLET | Refills: 1 | Status: SHIPPED | OUTPATIENT
Start: 2020-02-04 | End: 2020-05-05 | Stop reason: SDUPTHER

## 2020-02-04 RX ORDER — SODIUM CHLORIDE, SODIUM LACTATE, POTASSIUM CHLORIDE, CALCIUM CHLORIDE 600; 310; 30; 20 MG/100ML; MG/100ML; MG/100ML; MG/100ML
30 INJECTION, SOLUTION INTRAVENOUS CONTINUOUS PRN
Status: DISCONTINUED | OUTPATIENT
Start: 2020-02-04 | End: 2020-02-04 | Stop reason: HOSPADM

## 2020-02-04 RX ADMIN — PROPOFOL 50 MG: 10 INJECTION, EMULSION INTRAVENOUS at 10:43

## 2020-02-04 RX ADMIN — EPHEDRINE SULFATE 10 MG: 50 INJECTION INTRAMUSCULAR; INTRAVENOUS; SUBCUTANEOUS at 10:37

## 2020-02-04 RX ADMIN — PROPOFOL 50 MG: 10 INJECTION, EMULSION INTRAVENOUS at 10:27

## 2020-02-04 RX ADMIN — PROPOFOL 50 MG: 10 INJECTION, EMULSION INTRAVENOUS at 10:37

## 2020-02-04 RX ADMIN — PROPOFOL 75 MG: 10 INJECTION, EMULSION INTRAVENOUS at 10:20

## 2020-02-04 RX ADMIN — LIDOCAINE HYDROCHLORIDE 60 MG: 20 INJECTION, SOLUTION INFILTRATION; PERINEURAL at 10:19

## 2020-02-04 RX ADMIN — PROPOFOL 125 MCG/KG/MIN: 10 INJECTION, EMULSION INTRAVENOUS at 10:20

## 2020-02-04 RX ADMIN — SODIUM CHLORIDE, POTASSIUM CHLORIDE, SODIUM LACTATE AND CALCIUM CHLORIDE 30 ML/HR: 600; 310; 30; 20 INJECTION, SOLUTION INTRAVENOUS at 09:34

## 2020-02-04 RX ADMIN — EPHEDRINE SULFATE 10 MG: 50 INJECTION INTRAMUSCULAR; INTRAVENOUS; SUBCUTANEOUS at 10:27

## 2020-02-04 NOTE — ANESTHESIA POSTPROCEDURE EVALUATION
Patient: Han Garcia    Procedure Summary     Date:  02/04/20 Room / Location:   [facility]    Anesthesia Start:  1017 Anesthesia Stop:  1050    Procedure:  COLONOSCOPY (N/A ) Diagnosis:       Colovesical fistula      (Colovesical fistula [N32.1])    Surgeon:  Guy Sears MD Provider:  Eder Demarco MD    Anesthesia Type:  MAC ASA Status:  3          Anesthesia Type: MAC    Vitals  Vitals Value Taken Time   BP 89/52 2/4/2020 10:50 AM   Temp     Pulse 81 2/4/2020 10:50 AM   Resp 16 2/4/2020 10:50 AM   SpO2 100 % 2/4/2020 10:50 AM           Post Anesthesia Care and Evaluation    Patient location during evaluation: PACU  Patient participation: complete - patient participated  Level of consciousness: awake  Pain score: 1  Pain management: adequate  Airway patency: patent  Anesthetic complications: No anesthetic complications  PONV Status: none  Cardiovascular status: acceptable  Respiratory status: acceptable  Hydration status: acceptable

## 2020-02-04 NOTE — ANESTHESIA PREPROCEDURE EVALUATION
Anesthesia Evaluation                  Airway   No difficulty expected  Dental      Pulmonary    Cardiovascular     Rhythm: regular    (+) hypertension, DVT,       Neuro/Psych  GI/Hepatic/Renal/Endo    (+)   liver disease,     Musculoskeletal     Abdominal    Substance History      OB/GYN          Other      history of cancer active                    Anesthesia Plan    ASA 3     MAC       Anesthetic plan, all risks, benefits, and alternatives have been provided, discussed and informed consent has been obtained with: patient.

## 2020-02-12 ENCOUNTER — TELEPHONE (OUTPATIENT)
Dept: ONCOLOGY | Facility: OTHER | Age: 65
End: 2020-02-12

## 2020-02-12 NOTE — TELEPHONE ENCOUNTER
Alexey Haynes MD    Pt called about needing to speak to someone regarding his disability paperwork etc. Pt would like a call back @ [phone]    Thanks

## 2020-02-18 ENCOUNTER — INFUSION (OUTPATIENT)
Dept: ONCOLOGY | Facility: HOSPITAL | Age: 65
End: 2020-02-18

## 2020-02-18 ENCOUNTER — OFFICE VISIT (OUTPATIENT)
Dept: ONCOLOGY | Facility: CLINIC | Age: 65
End: 2020-02-18

## 2020-02-18 VITALS
HEIGHT: 68 IN | OXYGEN SATURATION: 98 % | DIASTOLIC BLOOD PRESSURE: 72 MMHG | RESPIRATION RATE: 12 BRPM | SYSTOLIC BLOOD PRESSURE: 113 MMHG | TEMPERATURE: 97.9 F | WEIGHT: 176.6 LBS | HEART RATE: 84 BPM | BODY MASS INDEX: 26.76 KG/M2

## 2020-02-18 DIAGNOSIS — Z72.0 TOBACCO ABUSE: ICD-10-CM

## 2020-02-18 DIAGNOSIS — N32.1 COLOVESICAL FISTULA: ICD-10-CM

## 2020-02-18 DIAGNOSIS — Z45.2 ENCOUNTER FOR ADJUSTMENT OR MANAGEMENT OF VASCULAR ACCESS DEVICE: ICD-10-CM

## 2020-02-18 DIAGNOSIS — I82.412 ACUTE DEEP VEIN THROMBOSIS (DVT) OF FEMORAL VEIN OF LEFT LOWER EXTREMITY (HCC): ICD-10-CM

## 2020-02-18 DIAGNOSIS — C78.7 LIVER METASTASES: ICD-10-CM

## 2020-02-18 DIAGNOSIS — C15.5 MALIGNANT NEOPLASM OF LOWER THIRD OF ESOPHAGUS (HCC): ICD-10-CM

## 2020-02-18 DIAGNOSIS — C15.5 MALIGNANT NEOPLASM OF LOWER THIRD OF ESOPHAGUS (HCC): Primary | ICD-10-CM

## 2020-02-18 DIAGNOSIS — Z45.2 ENCOUNTER FOR ADJUSTMENT OR MANAGEMENT OF VASCULAR ACCESS DEVICE: Primary | ICD-10-CM

## 2020-02-18 LAB
ALBUMIN SERPL-MCNC: 3.8 G/DL (ref 3.5–5.2)
ALBUMIN/GLOB SERPL: 1.2 G/DL (ref 1.1–2.4)
ALP SERPL-CCNC: 88 U/L (ref 38–116)
ALT SERPL W P-5'-P-CCNC: 35 U/L (ref 0–41)
ANION GAP SERPL CALCULATED.3IONS-SCNC: 12.5 MMOL/L (ref 5–15)
AST SERPL-CCNC: 31 U/L (ref 0–40)
BASOPHILS # BLD AUTO: 0.03 10*3/MM3 (ref 0–0.2)
BASOPHILS NFR BLD AUTO: 0.4 % (ref 0–1.5)
BILIRUB SERPL-MCNC: 0.2 MG/DL (ref 0.2–1.2)
BUN BLD-MCNC: 14 MG/DL (ref 6–20)
BUN/CREAT SERPL: 23.3 (ref 7.3–30)
CALCIUM SPEC-SCNC: 9.5 MG/DL (ref 8.5–10.2)
CEA SERPL-MCNC: 14.3 NG/ML
CHLORIDE SERPL-SCNC: 102 MMOL/L (ref 98–107)
CO2 SERPL-SCNC: 23.5 MMOL/L (ref 22–29)
CREAT BLD-MCNC: 0.6 MG/DL (ref 0.7–1.3)
DEPRECATED RDW RBC AUTO: 56.8 FL (ref 37–54)
EOSINOPHIL # BLD AUTO: 0.35 10*3/MM3 (ref 0–0.4)
EOSINOPHIL NFR BLD AUTO: 5.2 % (ref 0.3–6.2)
ERYTHROCYTE [DISTWIDTH] IN BLOOD BY AUTOMATED COUNT: 15.1 % (ref 12.3–15.4)
GFR SERPL CREATININE-BSD FRML MDRD: 136 ML/MIN/1.73
GLOBULIN UR ELPH-MCNC: 3.1 GM/DL (ref 1.8–3.5)
GLUCOSE BLD-MCNC: 101 MG/DL (ref 74–124)
HCT VFR BLD AUTO: 33.6 % (ref 37.5–51)
HGB BLD-MCNC: 10.9 G/DL (ref 13–17.7)
IMM GRANULOCYTES # BLD AUTO: 0.15 10*3/MM3 (ref 0–0.05)
IMM GRANULOCYTES NFR BLD AUTO: 2.2 % (ref 0–0.5)
LYMPHOCYTES # BLD AUTO: 1.53 10*3/MM3 (ref 0.7–3.1)
LYMPHOCYTES NFR BLD AUTO: 22.7 % (ref 19.6–45.3)
MCH RBC QN AUTO: 33.5 PG (ref 26.6–33)
MCHC RBC AUTO-ENTMCNC: 32.4 G/DL (ref 31.5–35.7)
MCV RBC AUTO: 103.4 FL (ref 79–97)
MONOCYTES # BLD AUTO: 0.5 10*3/MM3 (ref 0.1–0.9)
MONOCYTES NFR BLD AUTO: 7.4 % (ref 5–12)
NEUTROPHILS # BLD AUTO: 4.18 10*3/MM3 (ref 1.7–7)
NEUTROPHILS NFR BLD AUTO: 62.1 % (ref 42.7–76)
NRBC BLD AUTO-RTO: 0 /100 WBC (ref 0–0.2)
PLATELET # BLD AUTO: 143 10*3/MM3 (ref 140–450)
PMV BLD AUTO: 9.3 FL (ref 6–12)
POTASSIUM BLD-SCNC: 4 MMOL/L (ref 3.5–4.7)
PROT SERPL-MCNC: 6.9 G/DL (ref 6.3–8)
RBC # BLD AUTO: 3.25 10*6/MM3 (ref 4.14–5.8)
SODIUM BLD-SCNC: 138 MMOL/L (ref 134–145)
WBC NRBC COR # BLD: 6.74 10*3/MM3 (ref 3.4–10.8)

## 2020-02-18 PROCEDURE — 96523 IRRIG DRUG DELIVERY DEVICE: CPT

## 2020-02-18 PROCEDURE — 25010000003 HEPARIN LOCK FLUCH PER 10 UNITS: Performed by: INTERNAL MEDICINE

## 2020-02-18 PROCEDURE — 80053 COMPREHEN METABOLIC PANEL: CPT

## 2020-02-18 PROCEDURE — 36591 DRAW BLOOD OFF VENOUS DEVICE: CPT

## 2020-02-18 PROCEDURE — 99215 OFFICE O/P EST HI 40 MIN: CPT | Performed by: INTERNAL MEDICINE

## 2020-02-18 PROCEDURE — 82378 CARCINOEMBRYONIC ANTIGEN: CPT | Performed by: INTERNAL MEDICINE

## 2020-02-18 PROCEDURE — 85025 COMPLETE CBC W/AUTO DIFF WBC: CPT

## 2020-02-18 RX ORDER — GABAPENTIN 300 MG/1
300 CAPSULE ORAL
Qty: 30 CAPSULE | Refills: 2 | Status: SHIPPED | OUTPATIENT
Start: 2020-02-18 | End: 2020-03-10 | Stop reason: SDUPTHER

## 2020-02-18 RX ORDER — SODIUM CHLORIDE 0.9 % (FLUSH) 0.9 %
10 SYRINGE (ML) INJECTION AS NEEDED
Status: CANCELLED | OUTPATIENT
Start: 2020-02-18

## 2020-02-18 RX ORDER — HEPARIN SODIUM (PORCINE) LOCK FLUSH IV SOLN 100 UNIT/ML 100 UNIT/ML
500 SOLUTION INTRAVENOUS AS NEEDED
Status: CANCELLED | OUTPATIENT
Start: 2020-02-18

## 2020-02-18 RX ORDER — SODIUM CHLORIDE 0.9 % (FLUSH) 0.9 %
10 SYRINGE (ML) INJECTION AS NEEDED
Status: DISCONTINUED | OUTPATIENT
Start: 2020-02-18 | End: 2020-02-18 | Stop reason: HOSPADM

## 2020-02-18 RX ORDER — HEPARIN SODIUM (PORCINE) LOCK FLUSH IV SOLN 100 UNIT/ML 100 UNIT/ML
500 SOLUTION INTRAVENOUS AS NEEDED
Status: DISCONTINUED | OUTPATIENT
Start: 2020-02-18 | End: 2020-02-18 | Stop reason: HOSPADM

## 2020-02-18 RX ADMIN — Medication 10 ML: at 10:09

## 2020-02-18 RX ADMIN — SODIUM CHLORIDE, PRESERVATIVE FREE 500 UNITS: 5 INJECTION INTRAVENOUS at 10:09

## 2020-02-18 NOTE — PROGRESS NOTES
"  Subjective     REASON FOR follow up:1.  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  Currently receiving palliative FOLFOX AND HERCEPTIN therapy.    .2.COLOVESICAL FISTULA: chronic antibiotic therapy cipro    3. DVT R AND LEFT LE ON ANTICOAGULANT LOVENOX: THROMBOPHILIA OF MALIGNANCY    History of Present Illness This patient returns today to the office for followup. He is here in company of his sister, he has had a colonoscopy being negative and normal, a cystoscopy being negative normal and he will proceed with another cystoscopy putting dye through the bladder to see if this leaks into his colovesical fistula. Maybe the patient is not going to require any surgery and that is good news. He has not had any hematuria, urinary tract infections or passage of stool through his bladder. The patient's appetite is acceptable he states is back, his energy level is good and he has no difficulty swallowing. He has no abdominal pain. 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 The time for his next urological surgery is in a week from now. He has not had any new episodes of thrombophilia and he remains on Lovenox. He has not had any clinical bleeding. He has not had any cardiovascular or respiratory issues lately. His port remains fully functional.                             Past Medical History:   Diagnosis Date   • Arthritis    • Elevated PSA    • Esophageal cancer (CMS/HCC)    • Esophageal mass    • H/O Lung nodule    • Hepatitis     CHILD--PT STATED \"I THINK IT WAS A.\"   • History of pneumonia    • Hx of blood clots 08/10/2019   • Hyperlipidemia    • Hypertension    • Liver cancer (CMS/HCC)    • Prostate cancer (CMS/HCC)         Past Surgical History:   Procedure Laterality Date   • COLONOSCOPY N/A 2/4/2020    Procedure: COLONOSCOPY;  Surgeon: Orion " Guy Lloyd MD;  Location: [facility] ENDOSCOPY;  Service: General;  Laterality: N/A;  PRE-COLOVESICAL FISTULA  POST-- DIVERTICULOSIS   • CYSTOSCOPY  02/06/2020   • HERNIA REPAIR  1999   • PROSTATE SURGERY  03/2008   • VENOUS ACCESS DEVICE (PORT) INSERTION N/A 7/16/2019    Procedure: INSERTION VENOUS ACCESS DEVICE WITH FLUORO AND EGD WITH BIOPSY;  Surgeon: Mary Brito MD;  Location: [facility] MAIN OR;  Service: Thoracic        Current Outpatient Medications on File Prior to Visit   Medication Sig Dispense Refill   • ciprofloxacin (CIPRO) 250 MG tablet TAKE 2 TABLETS BY MOUTH 2 (TWO) TIMES A DAY. 60 tablet 1   • clotrimazole (MYCELEX) 10 MG obie Take 10 mg by mouth 3 (Three) Times a Day.     • enoxaparin (LOVENOX) 80 MG/0.8ML solution syringe Inject 0.8 mL under the skin into the appropriate area as directed Every 12 (Twelve) Hours. 48 mL 11   • lisinopril-hydrochlorothiazide (PRINZIDE,ZESTORETIC) 20-12.5 MG per tablet Take 1 tablet by mouth Daily. 90 tablet 2   • ondansetron (ZOFRAN) 4 MG tablet Take 1 tablet by mouth Every 8 (Eight) Hours As Needed for Nausea or Vomiting. 30 tablet 2   • Probiotic Product (PROBIOTIC DAILY PO) Take 1 tablet by mouth Daily.     • triamcinolone (KENALOG) 0.1 % ointment Apply  topically to the appropriate area as directed 2 (Two) Times a Day. 30 g 2   • potassium chloride (K-DUR) 10 MEQ CR tablet Take 2 tablets by mouth Daily. 30 tablet 1     No current facility-administered medications on file prior to visit.         ALLERGIES:  No Known Allergies     Social History     Socioeconomic History   • Marital status: Single     Spouse name: Not on file   • Number of children: Not on file   • Years of education: High school   • Highest education level: Not on file   Occupational History   • Occupation: [employer]     Employer: [id number]   Tobacco Use   • Smoking status: Current Every Day Smoker     Packs/day: 1.00     Years: 40.00     Pack years: 40.00     Types: Cigarettes   •  Smokeless tobacco: Never Used   Substance and Sexual Activity   • Alcohol use: Not Currently     Alcohol/week: 28.0 standard drinks     Types: 28 Cans of beer per week     Comment: NOT RECENTLY   • Drug use: No   • Sexual activity: Defer        Family History   Problem Relation Age of Onset   • Hypertension Mother    • Stroke Mother    • Hypertension Other    • Lung disease Other    • Prostate cancer Other    • Lung cancer Father    • Malig Hyperthermia Neg Hx          Review of Systems:        General: no fever, no chills, no fatigue,no weight changes, no lack of appetite.  Eyes: no epiphora, xerophthalmia,conjunctivitis, pain, glaucoma, blurred vision, blindness, secretion, photophobia, proptosis, diplopia.  Ears: no otorrhea, tinnitus, otorrhagia, deafness, pain, vertigo.  Nose: no rhinorrhea, no epistaxis, no alteration in perception of odors, no sinuses pressure.  Mouth: no alteration in gums or teeth,  No ulcers, no difficulty with mastication or deglut ion, no odynophagia.  Neck: no masses or pain, no thyroid alterations, no pain in muscles or arteries, no carotid odynia, no crepitation.  Respiratory: no cough, no sputum production,no dyspnea,no trepopnea, no pleuritic pain,no hemoptysis.  Heart: no syncope, no irregularity, no palpitations, no angina,no orthopnea,no paroxysmal nocturnal dyspnea.  Vascular Venous: no tenderness,no edema,no palpable cords,no postphlebitic syndrome, no skin changes no ulcerations.  Vascular Arterial: no distal ischemia, noclaudication, no gangrene, no neuropathic ischemic pain, no skin ulcers, no paleness no cyanosis.  GI: no dysphagia, no odynophagia, no regurgitation, no heartburn,no indigestion,no nausea,no vomiting,no hematemesis ,no melena,no jaundice,no distention, no obstipation,no enterorrhagia,no proctalgia,no anal  lesions, no changes in bowel habits.  : no frequency, no hesitancy, no hematuria, no discharge,no  pain.  Musculoskeletal: no muscle or tendon pain or  "inflammation,no  joint pain, no edema, no functional limitation,no fasciculations, no mass.  Neurologic: no headache, no seizures, noalterations on Craneal nerves, no motor deficit, stated sensory deficit, normal coordination, no alteration in memory,normal orientation, calculation,normal writting, verbal and written language.  Skin: no rashes,no pruritus no localized lesions.  Psychiatric: no anxiety, no depression,no agitation, no delusions, proper insight.                  Objective     Vitals:    02/18/20 0919   BP: 113/72   Pulse: 84   Resp: 12   Temp: 97.9 °F (36.6 °C)   TempSrc: Oral   SpO2: 98%   Weight: 80.1 kg (176 lb 9.6 oz)   Height: 173.5 cm (68.31\")  Comment: new height   PainSc: 0-No pain     Current Status 2/18/2020   ECOG score 0       Physical Exam:  GENERAL:  Well-developed, well-nourished  Patient  in no acute distress.   SKIN:  Warm, dry ,NO rashes,NO purpura ,NO petechiae.  HEENT:  Pupils were equal and reactive to light and accomodation, conjunctivas non injected, no pterigion, normal extraocular movements, normal visual acuity.   Mouth mucosa was moist, no exudates in oropharynx, normal gum line, normal roof of the mouth and pillars, normal papillations of the tongue.  NECK:  Supple with good range of motion; no thyromegaly or masses, no JVD or bruits, no cervical adenopathies.No carotid arteries pain, no carotid abnormal pulsation , NO arterial dance.  LYMPHATICS:  No cervical, NO supraclavicular, NO axillary,NO epitrochlear , NO inguinal adenopathy.  CHEST:  Normal excursion of both kailash thoraces, normal voice fremitus, no subcutaneous emphysema, normal axillas, no rashes or acanthosis nigricans. Lungs clear to percussion and auscultation, normal breath sounds bilaterally, no wheezing,NO crackles NO ronchi, NO stridor, NO rubs.  CARDIAC AND VASCULAR:  normal rate and regular rhythm, without murmurs,NO rubs NO S3 NO S4 right or left . Normal femoral, popliteal, pedis, brachial and carotid " pulses.  ABDOMEN:  Soft, nontender with no organomegaly or masses, no ascites, no collateral circulation,no distention,no [place] sign, no abdominal pain, no inguinal hernias,no umbilical hernia, no abdominal bruits. Normal bowel sounds.  GENITAL: Not  Performed.  EXTREMITIES  AND SPINE:  No clubbing, cyanosis or edema, no deformities or pain .No kyphosis, scoliosis, deformities or pain in spine, ribs or pelvic bone.  NEUROLOGICAL:  Patient was awake, alert, oriented to time, person and place.sensory neuropathy in hands and feet grade 1                                  RECENT LABS:  Hematology WBC   Date Value Ref Range Status   02/18/2020 6.74 3.40 - 10.80 10*3/mm3 Final   02/02/2019 13.4 (H) 3.4 - 10.8 x10E3/uL Final     RBC   Date Value Ref Range Status   02/18/2020 3.25 (L) 4.14 - 5.80 10*6/mm3 Final   02/02/2019 4.81 4.14 - 5.80 x10E6/uL Final     Hemoglobin   Date Value Ref Range Status   02/18/2020 10.9 (L) 13.0 - 17.7 g/dL Final     Hematocrit   Date Value Ref Range Status   02/18/2020 33.6 (L) 37.5 - 51.0 % Final     Platelets   Date Value Ref Range Status   02/18/2020 143 140 - 450 10*3/mm3 Final                    Assessment/Plan    1.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 Almost 90% of the liver WAS replaced by tumor. The patient has been undergoing chemotherapy with FOLFOX and Herceptin and has had excellent response clinically and radiologically.  Today, the patient on clinical examination has had resolution of his giant hepatomegaly that was nodular and mildly tender in the right upper quadrant of his abdomen.  His liver has reduced in size very dramatically.  Most importantly, the patient has had resolution of his cancer-related pain.  His performance status is ECOG 0.  He is no longer taking pain medicine and developed thrombophilia associated with malignancy that has been treated with Lovenox successfully.  He has some ageusia related to treatment and this will  require modification in his diet.  He remains smoking 1 pack of cigarettes daily.    The patient was further reviewed on 11/26/2019. I discussed with him and his sister the dramatic changes and improvement in his PET scan. This is one for the book. He has near complete resolution of the abnormal SUV uptake in the lower part of the esophagus and he has near complete resolution of the dramatic uptake in his whole liver parenchyma. There is no other site of disease. Kidney anatomy remains normal as well as his bladder anatomy. The fissure is still visible.       The patient returned on 02/18/2020 to be reviewed. He has had his colonoscopy by Guy Sears MD, on 02/04/2020 that has failed to document any opening into his bladder besides diverticula and no other alterations or tumoral lesions. The patient states that he has had a cystoscopy by Cm Witt MD, a report of this is not available, he was told that everything looked normal. He will proceed with another cystoscopy using dye to be placed in the bladder to see if there is any passage of this into the colon in some way or form. I wonder if this fistula has closed altogether, the fact that there are no bladder tumors and no passage of stool through his urination tells us that this has closed. Maybe by the end of the day the patient is not going to require any surgery of any kind. That is good news.     The patient has developed further neuropathy and he is going to require the initiation of Neurontin 300 mg by mouth nightly, prescription sent to his pharmacy today.     Obviously we discontinued Eloxatine from his care plan during the previous visit and he will remain on 5FU/leucovorin and Herceptin.     His white count, hemoglobin and platelets are doing fine. His chemistry profile is doing fine.     In preparation for his surgery next week the patient will hold off the Lovenox 24 hours before the procedure and he will go back on the Lovenox the evening  after the procedure. He will hold off on his blood pressure medication the day of the procedure and resume it the day after the procedure.     The patient has a lot of issues in regard disability and I will ask my  to discuss these issues with me. It seems to be that there is a lot of confrontation in regard to releasing the records of this patient. I find no reason why this cannot be released by the end of the day. The patient is the owner of the records because he is the patient in the first place.     I have copied the records in regard pathology report, PET scans, progress notes, laboratory workup to take to disability people.    Otherwise the patient will return in 2 weeks. I asked them to call next week after the procedure by Cm Witt MD, in case that he indeed needs to have surgery.    Hopefully that will not be the case and the patient will resume his chemotherapy with 5FU, leucovorin and Herceptin in 2 weeks.

## 2020-02-19 ENCOUNTER — APPOINTMENT (OUTPATIENT)
Dept: PREADMISSION TESTING | Facility: HOSPITAL | Age: 65
End: 2020-02-19

## 2020-02-19 VITALS
RESPIRATION RATE: 18 BRPM | SYSTOLIC BLOOD PRESSURE: 108 MMHG | WEIGHT: 178 LBS | DIASTOLIC BLOOD PRESSURE: 65 MMHG | HEART RATE: 95 BPM | HEIGHT: 69 IN | TEMPERATURE: 96.8 F | OXYGEN SATURATION: 100 % | BODY MASS INDEX: 26.36 KG/M2

## 2020-02-19 PROCEDURE — 93010 ELECTROCARDIOGRAM REPORT: CPT | Performed by: INTERNAL MEDICINE

## 2020-02-19 PROCEDURE — 93005 ELECTROCARDIOGRAM TRACING: CPT

## 2020-02-19 NOTE — DISCHARGE INSTRUCTIONS
Take the following medications the morning of surgery with a small sip of water: NONE    ARRIVE AT 9AM    General Instructions:  • Do not eat or drink anything after midnight the night before surgery.  • Infants may have breast milk up to four hours before surgery.  • Infants drinking formula may drink formula up to six hours before surgery.   • Patients who avoid smoking, chewing tobacco and alcohol for 4 weeks prior to surgery have a reduced risk of post-operative complications.  Quit smoking as many days before surgery as you can.  • Do not smoke, use chewing tobacco or drink alcohol the day of surgery.   • If applicable bring your C-PAP/ BI-PAP machine.  • Bring any papers given to you in the doctor’s office.  • Wear clean comfortable clothes.  • Do not wear contact lenses, false eyelashes or make-up.  Bring a case for your glasses.   • Bring crutches or walker if applicable.  • Remove all piercings.  Leave jewelry and any other valuables at home.  • Hair extensions with metal clips must be removed prior to surgery.  • The Pre-Admission Testing nurse will instruct you to bring medications if unable to obtain an accurate list in Pre-Admission Testing.            Preventing a Surgical Site Infection:  • For 2 to 3 days before surgery, avoid shaving with a razor because the razor can irritate skin and make it easier to develop an infection.    • Any areas of open skin can increase the risk of a post-operative wound infection by allowing bacteria to enter and travel throughout the body.  Notify your surgeon if you have any skin wounds / rashes even if it is not near the expected surgical site.  The area will need assessed to determine if surgery should be delayed until it is healed.  • The night prior to surgery sleep in a clean bed with clean clothing.  Do not allow pets to sleep with you.  • Shower on the morning of surgery using a fresh bar of anti-bacterial soap (such as Dial) and clean washcloth.  Dry with a  clean towel and dress in clean clothing.  • Ask your surgeon if you will be receiving antibiotics prior to surgery.  • Make sure you, your family, and all healthcare providers clean their hands with soap and water or an alcohol based hand  before caring for you or your wound.    Day of surgery:  Your arrival time is approximately two hours before your scheduled surgery time.  Upon arrival, a Pre-op nurse and Anesthesiologist will review your health history, obtain vital signs, and answer questions you may have.  The only belongings needed at this time will be your home medications and if applicable your C-PAP/BI-PAP machine.  If you are staying overnight your family can leave the rest of your belongings in the car and bring them to your room later.  A Pre-op nurse will start an IV and you may receive medication in preparation for surgery, including something to help you relax.  Your family will be able to see you in the Pre-op area.  Two visitors at a time will be allowed in the Pre-op room.  While you are in surgery your family should notify the waiting room  if they leave the waiting room area and provide a contact phone number.    Please be aware that surgery does come with discomfort.  We want to make every effort to control your discomfort so please discuss any uncontrolled symptoms with your nurse.   Your doctor will most likely have prescribed pain medications.      If you are going home after surgery you will receive individualized written care instructions before being discharged.  A responsible adult must drive you to and from the hospital on the day of your surgery and stay with you for 24 hours.    If you are staying overnight following surgery, you will be transported to your hospital room following the recovery period.  [facility] has all private rooms.    If you have any questions please call Pre-Admission Testing at [phone].  Deductibles and co-payments are  collected on the day of service. Please be prepared to pay the required co-pay, deductible or deposit on the day of service as defined by your plan.

## 2020-02-26 ENCOUNTER — HOSPITAL ENCOUNTER (EMERGENCY)
Facility: HOSPITAL | Age: 65
Discharge: HOME | End: 2020-02-26
Attending: EMERGENCY MEDICINE | Admitting: EMERGENCY MEDICINE

## 2020-02-26 ENCOUNTER — APPOINTMENT (OUTPATIENT)
Dept: GENERAL RADIOLOGY | Facility: HOSPITAL | Age: 65
End: 2020-02-26

## 2020-02-26 ENCOUNTER — ANESTHESIA EVENT (OUTPATIENT)
Dept: PERIOP | Facility: HOSPITAL | Age: 65
End: 2020-02-26

## 2020-02-26 ENCOUNTER — ANESTHESIA (OUTPATIENT)
Dept: PERIOP | Facility: HOSPITAL | Age: 65
End: 2020-02-26

## 2020-02-26 ENCOUNTER — HOSPITAL ENCOUNTER (OUTPATIENT)
Facility: HOSPITAL | Age: 65
Setting detail: HOSPITAL OUTPATIENT SURGERY
Discharge: HOME | End: 2020-02-26
Attending: UROLOGY | Admitting: UROLOGY

## 2020-02-26 VITALS
SYSTOLIC BLOOD PRESSURE: 128 MMHG | HEART RATE: 78 BPM | RESPIRATION RATE: 16 BRPM | HEIGHT: 69 IN | OXYGEN SATURATION: 100 % | DIASTOLIC BLOOD PRESSURE: 73 MMHG | TEMPERATURE: 98.9 F | BODY MASS INDEX: 26.43 KG/M2

## 2020-02-26 VITALS
HEIGHT: 69 IN | HEART RATE: 65 BPM | TEMPERATURE: 97.5 F | DIASTOLIC BLOOD PRESSURE: 76 MMHG | OXYGEN SATURATION: 100 % | WEIGHT: 179 LBS | SYSTOLIC BLOOD PRESSURE: 134 MMHG | BODY MASS INDEX: 26.51 KG/M2 | RESPIRATION RATE: 18 BRPM

## 2020-02-26 DIAGNOSIS — R33.8 ACUTE URINARY RETENTION: Primary | ICD-10-CM

## 2020-02-26 LAB
ALBUMIN SERPL-MCNC: 4.3 G/DL (ref 3.5–5.2)
ALBUMIN/GLOB SERPL: 1.3 G/DL
ALP SERPL-CCNC: 105 U/L (ref 39–117)
ALT SERPL W P-5'-P-CCNC: 30 U/L (ref 1–41)
ANION GAP SERPL CALCULATED.3IONS-SCNC: 12.1 MMOL/L (ref 5–15)
APTT PPP: 31.3 SECONDS (ref 22.7–35.4)
AST SERPL-CCNC: 25 U/L (ref 1–40)
BACTERIA UR QL AUTO: ABNORMAL /HPF
BASOPHILS # BLD AUTO: 0.05 10*3/MM3 (ref 0–0.2)
BASOPHILS NFR BLD AUTO: 0.5 % (ref 0–1.5)
BILIRUB SERPL-MCNC: 0.3 MG/DL (ref 0.2–1.2)
BILIRUB UR QL STRIP: NEGATIVE
BUN BLD-MCNC: 10 MG/DL (ref 8–23)
BUN/CREAT SERPL: 17.5 (ref 7–25)
CALCIUM SPEC-SCNC: 9.9 MG/DL (ref 8.6–10.5)
CHLORIDE SERPL-SCNC: 100 MMOL/L (ref 98–107)
CLARITY UR: CLEAR
CO2 SERPL-SCNC: 23.9 MMOL/L (ref 22–29)
COLOR UR: YELLOW
CREAT BLD-MCNC: 0.57 MG/DL (ref 0.76–1.27)
DEPRECATED RDW RBC AUTO: 50 FL (ref 37–54)
EOSINOPHIL # BLD AUTO: 0.34 10*3/MM3 (ref 0–0.4)
EOSINOPHIL NFR BLD AUTO: 3.2 % (ref 0.3–6.2)
ERYTHROCYTE [DISTWIDTH] IN BLOOD BY AUTOMATED COUNT: 14.2 % (ref 12.3–15.4)
GFR SERPL CREATININE-BSD FRML MDRD: 144 ML/MIN/1.73
GLOBULIN UR ELPH-MCNC: 3.2 GM/DL
GLUCOSE BLD-MCNC: 90 MG/DL (ref 65–99)
GLUCOSE UR STRIP-MCNC: NEGATIVE MG/DL
HCT VFR BLD AUTO: 36.6 % (ref 37.5–51)
HGB BLD-MCNC: 12.3 G/DL (ref 13–17.7)
HGB UR QL STRIP.AUTO: ABNORMAL
HYALINE CASTS UR QL AUTO: ABNORMAL /LPF
IMM GRANULOCYTES # BLD AUTO: 0.14 10*3/MM3 (ref 0–0.05)
IMM GRANULOCYTES NFR BLD AUTO: 1.3 % (ref 0–0.5)
KETONES UR QL STRIP: NEGATIVE
LEUKOCYTE ESTERASE UR QL STRIP.AUTO: ABNORMAL
LYMPHOCYTES # BLD AUTO: 1.27 10*3/MM3 (ref 0.7–3.1)
LYMPHOCYTES NFR BLD AUTO: 11.8 % (ref 19.6–45.3)
MCH RBC QN AUTO: 32.9 PG (ref 26.6–33)
MCHC RBC AUTO-ENTMCNC: 33.6 G/DL (ref 31.5–35.7)
MCV RBC AUTO: 97.9 FL (ref 79–97)
MONOCYTES # BLD AUTO: 0.6 10*3/MM3 (ref 0.1–0.9)
MONOCYTES NFR BLD AUTO: 5.6 % (ref 5–12)
NEUTROPHILS # BLD AUTO: 8.35 10*3/MM3 (ref 1.7–7)
NEUTROPHILS NFR BLD AUTO: 77.6 % (ref 42.7–76)
NITRITE UR QL STRIP: NEGATIVE
NRBC BLD AUTO-RTO: 0 /100 WBC (ref 0–0.2)
PH UR STRIP.AUTO: 6 [PH] (ref 5–8)
PLATELET # BLD AUTO: 163 10*3/MM3 (ref 140–450)
PMV BLD AUTO: 9.5 FL (ref 6–12)
POTASSIUM BLD-SCNC: 4.2 MMOL/L (ref 3.5–5.2)
PROT SERPL-MCNC: 7.5 G/DL (ref 6–8.5)
PROT UR QL STRIP: NEGATIVE
RBC # BLD AUTO: 3.74 10*6/MM3 (ref 4.14–5.8)
RBC # UR: ABNORMAL /HPF
REF LAB TEST METHOD: ABNORMAL
SODIUM BLD-SCNC: 136 MMOL/L (ref 136–145)
SP GR UR STRIP: 1.01 (ref 1–1.03)
SQUAMOUS #/AREA URNS HPF: ABNORMAL /HPF
UROBILINOGEN UR QL STRIP: ABNORMAL
WBC NRBC COR # BLD: 10.75 10*3/MM3 (ref 3.4–10.8)
WBC UR QL AUTO: ABNORMAL /HPF

## 2020-02-26 PROCEDURE — 25010000002 PHENYLEPHRINE PER 1 ML: Performed by: NURSE ANESTHETIST, CERTIFIED REGISTERED

## 2020-02-26 PROCEDURE — 25010000002 MIDAZOLAM PER 1 MG: Performed by: ANESTHESIOLOGY

## 2020-02-26 PROCEDURE — 99284 EMERGENCY DEPT VISIT MOD MDM: CPT

## 2020-02-26 PROCEDURE — 81001 URINALYSIS AUTO W/SCOPE: CPT | Performed by: NURSE PRACTITIONER

## 2020-02-26 PROCEDURE — 25010000002 LEVOFLOXACIN PER 250 MG: Performed by: UROLOGY

## 2020-02-26 PROCEDURE — 80053 COMPREHEN METABOLIC PANEL: CPT | Performed by: NURSE PRACTITIONER

## 2020-02-26 PROCEDURE — 25010000002 SUCCINYLCHOLINE PER 20 MG: Performed by: NURSE ANESTHETIST, CERTIFIED REGISTERED

## 2020-02-26 PROCEDURE — 25010000002 NEOSTIGMINE PER 0.5 MG: Performed by: NURSE ANESTHETIST, CERTIFIED REGISTERED

## 2020-02-26 PROCEDURE — 25010000002 PROPOFOL 10 MG/ML EMULSION: Performed by: NURSE ANESTHETIST, CERTIFIED REGISTERED

## 2020-02-26 PROCEDURE — 94770: CPT

## 2020-02-26 PROCEDURE — C1769 GUIDE WIRE: HCPCS | Performed by: UROLOGY

## 2020-02-26 PROCEDURE — 25010000002 FENTANYL CITRATE (PF) 100 MCG/2ML SOLUTION: Performed by: ANESTHESIOLOGY

## 2020-02-26 PROCEDURE — 0 IOTHALAMATE 60 % SOLUTION: Performed by: UROLOGY

## 2020-02-26 PROCEDURE — 25010000002 ONDANSETRON PER 1 MG: Performed by: NURSE ANESTHETIST, CERTIFIED REGISTERED

## 2020-02-26 PROCEDURE — 51798 US URINE CAPACITY MEASURE: CPT

## 2020-02-26 PROCEDURE — 85025 COMPLETE CBC W/AUTO DIFF WBC: CPT | Performed by: PHYSICIAN ASSISTANT

## 2020-02-26 PROCEDURE — 74420 UROGRAPHY RTRGR +-KUB: CPT

## 2020-02-26 PROCEDURE — 85730 THROMBOPLASTIN TIME PARTIAL: CPT | Performed by: PHYSICIAN ASSISTANT

## 2020-02-26 PROCEDURE — C1758 CATHETER, URETERAL: HCPCS | Performed by: UROLOGY

## 2020-02-26 PROCEDURE — 51702 INSERT TEMP BLADDER CATH: CPT

## 2020-02-26 RX ORDER — MIDAZOLAM HYDROCHLORIDE 1 MG/ML
1 INJECTION INTRAMUSCULAR; INTRAVENOUS
Status: DISCONTINUED | OUTPATIENT
Start: 2020-02-26 | End: 2020-02-26 | Stop reason: HOSPADM

## 2020-02-26 RX ORDER — LIDOCAINE HYDROCHLORIDE 10 MG/ML
0.5 INJECTION, SOLUTION EPIDURAL; INFILTRATION; INTRACAUDAL; PERINEURAL ONCE AS NEEDED
Status: DISCONTINUED | OUTPATIENT
Start: 2020-02-26 | End: 2020-02-26 | Stop reason: HOSPADM

## 2020-02-26 RX ORDER — ONDANSETRON 2 MG/ML
INJECTION INTRAMUSCULAR; INTRAVENOUS AS NEEDED
Status: DISCONTINUED | OUTPATIENT
Start: 2020-02-26 | End: 2020-02-26 | Stop reason: SURG

## 2020-02-26 RX ORDER — HYDRALAZINE HYDROCHLORIDE 20 MG/ML
5 INJECTION INTRAMUSCULAR; INTRAVENOUS
Status: DISCONTINUED | OUTPATIENT
Start: 2020-02-26 | End: 2020-02-26 | Stop reason: HOSPADM

## 2020-02-26 RX ORDER — PROMETHAZINE HYDROCHLORIDE 25 MG/ML
6.25 INJECTION, SOLUTION INTRAMUSCULAR; INTRAVENOUS
Status: DISCONTINUED | OUTPATIENT
Start: 2020-02-26 | End: 2020-02-26 | Stop reason: HOSPADM

## 2020-02-26 RX ORDER — SODIUM CHLORIDE 0.9 % (FLUSH) 0.9 %
3-10 SYRINGE (ML) INJECTION AS NEEDED
Status: DISCONTINUED | OUTPATIENT
Start: 2020-02-26 | End: 2020-02-26 | Stop reason: HOSPADM

## 2020-02-26 RX ORDER — HYDROMORPHONE HYDROCHLORIDE 1 MG/ML
0.5 INJECTION, SOLUTION INTRAMUSCULAR; INTRAVENOUS; SUBCUTANEOUS
Status: DISCONTINUED | OUTPATIENT
Start: 2020-02-26 | End: 2020-02-26 | Stop reason: HOSPADM

## 2020-02-26 RX ORDER — PROMETHAZINE HYDROCHLORIDE 25 MG/1
25 TABLET ORAL ONCE AS NEEDED
Status: DISCONTINUED | OUTPATIENT
Start: 2020-02-26 | End: 2020-02-26 | Stop reason: HOSPADM

## 2020-02-26 RX ORDER — FENTANYL CITRATE 50 UG/ML
50 INJECTION, SOLUTION INTRAMUSCULAR; INTRAVENOUS
Status: DISCONTINUED | OUTPATIENT
Start: 2020-02-26 | End: 2020-02-26 | Stop reason: HOSPADM

## 2020-02-26 RX ORDER — GLYCOPYRROLATE 0.2 MG/ML
INJECTION INTRAMUSCULAR; INTRAVENOUS AS NEEDED
Status: DISCONTINUED | OUTPATIENT
Start: 2020-02-26 | End: 2020-02-26 | Stop reason: SURG

## 2020-02-26 RX ORDER — NALOXONE HCL 0.4 MG/ML
0.2 VIAL (ML) INJECTION AS NEEDED
Status: DISCONTINUED | OUTPATIENT
Start: 2020-02-26 | End: 2020-02-26 | Stop reason: HOSPADM

## 2020-02-26 RX ORDER — PROPOFOL 10 MG/ML
VIAL (ML) INTRAVENOUS AS NEEDED
Status: DISCONTINUED | OUTPATIENT
Start: 2020-02-26 | End: 2020-02-26 | Stop reason: SURG

## 2020-02-26 RX ORDER — LEVOFLOXACIN 5 MG/ML
500 INJECTION, SOLUTION INTRAVENOUS ONCE
Status: COMPLETED | OUTPATIENT
Start: 2020-02-26 | End: 2020-02-26

## 2020-02-26 RX ORDER — PROMETHAZINE HYDROCHLORIDE 25 MG/ML
12.5 INJECTION, SOLUTION INTRAMUSCULAR; INTRAVENOUS ONCE AS NEEDED
Status: DISCONTINUED | OUTPATIENT
Start: 2020-02-26 | End: 2020-02-26 | Stop reason: HOSPADM

## 2020-02-26 RX ORDER — LABETALOL HYDROCHLORIDE 5 MG/ML
5 INJECTION, SOLUTION INTRAVENOUS
Status: DISCONTINUED | OUTPATIENT
Start: 2020-02-26 | End: 2020-02-26 | Stop reason: HOSPADM

## 2020-02-26 RX ORDER — OXYCODONE AND ACETAMINOPHEN 7.5; 325 MG/1; MG/1
1 TABLET ORAL ONCE AS NEEDED
Status: DISCONTINUED | OUTPATIENT
Start: 2020-02-26 | End: 2020-02-26 | Stop reason: HOSPADM

## 2020-02-26 RX ORDER — LIDOCAINE HYDROCHLORIDE 20 MG/ML
INJECTION, SOLUTION INFILTRATION; PERINEURAL AS NEEDED
Status: DISCONTINUED | OUTPATIENT
Start: 2020-02-26 | End: 2020-02-26 | Stop reason: SURG

## 2020-02-26 RX ORDER — SODIUM CHLORIDE 0.9 % (FLUSH) 0.9 %
10 SYRINGE (ML) INJECTION AS NEEDED
Status: DISCONTINUED | OUTPATIENT
Start: 2020-02-26 | End: 2020-02-27 | Stop reason: HOSPADM

## 2020-02-26 RX ORDER — FLUMAZENIL 0.1 MG/ML
0.2 INJECTION INTRAVENOUS AS NEEDED
Status: DISCONTINUED | OUTPATIENT
Start: 2020-02-26 | End: 2020-02-26 | Stop reason: HOSPADM

## 2020-02-26 RX ORDER — PROMETHAZINE HYDROCHLORIDE 25 MG/1
25 SUPPOSITORY RECTAL ONCE AS NEEDED
Status: DISCONTINUED | OUTPATIENT
Start: 2020-02-26 | End: 2020-02-26 | Stop reason: HOSPADM

## 2020-02-26 RX ORDER — ONDANSETRON 2 MG/ML
4 INJECTION INTRAMUSCULAR; INTRAVENOUS ONCE AS NEEDED
Status: DISCONTINUED | OUTPATIENT
Start: 2020-02-26 | End: 2020-02-26 | Stop reason: HOSPADM

## 2020-02-26 RX ORDER — SODIUM CHLORIDE, SODIUM LACTATE, POTASSIUM CHLORIDE, CALCIUM CHLORIDE 600; 310; 30; 20 MG/100ML; MG/100ML; MG/100ML; MG/100ML
9 INJECTION, SOLUTION INTRAVENOUS CONTINUOUS
Status: DISCONTINUED | OUTPATIENT
Start: 2020-02-26 | End: 2020-02-26 | Stop reason: HOSPADM

## 2020-02-26 RX ORDER — EPHEDRINE SULFATE 50 MG/ML
5 INJECTION, SOLUTION INTRAVENOUS ONCE AS NEEDED
Status: DISCONTINUED | OUTPATIENT
Start: 2020-02-26 | End: 2020-02-26 | Stop reason: HOSPADM

## 2020-02-26 RX ORDER — ACETAMINOPHEN 325 MG/1
650 TABLET ORAL ONCE AS NEEDED
Status: DISCONTINUED | OUTPATIENT
Start: 2020-02-26 | End: 2020-02-26 | Stop reason: HOSPADM

## 2020-02-26 RX ORDER — ROCURONIUM BROMIDE 10 MG/ML
INJECTION, SOLUTION INTRAVENOUS AS NEEDED
Status: DISCONTINUED | OUTPATIENT
Start: 2020-02-26 | End: 2020-02-26 | Stop reason: SURG

## 2020-02-26 RX ORDER — SODIUM CHLORIDE 0.9 % (FLUSH) 0.9 %
3 SYRINGE (ML) INJECTION EVERY 12 HOURS SCHEDULED
Status: DISCONTINUED | OUTPATIENT
Start: 2020-02-26 | End: 2020-02-26 | Stop reason: HOSPADM

## 2020-02-26 RX ORDER — HYDROCODONE BITARTRATE AND ACETAMINOPHEN 7.5; 325 MG/1; MG/1
1 TABLET ORAL ONCE AS NEEDED
Status: DISCONTINUED | OUTPATIENT
Start: 2020-02-26 | End: 2020-02-26 | Stop reason: HOSPADM

## 2020-02-26 RX ORDER — SUCCINYLCHOLINE CHLORIDE 20 MG/ML
INJECTION INTRAMUSCULAR; INTRAVENOUS AS NEEDED
Status: DISCONTINUED | OUTPATIENT
Start: 2020-02-26 | End: 2020-02-26 | Stop reason: SURG

## 2020-02-26 RX ORDER — MAGNESIUM HYDROXIDE 1200 MG/15ML
LIQUID ORAL AS NEEDED
Status: DISCONTINUED | OUTPATIENT
Start: 2020-02-26 | End: 2020-02-26 | Stop reason: HOSPADM

## 2020-02-26 RX ORDER — MIDAZOLAM HYDROCHLORIDE 1 MG/ML
2 INJECTION INTRAMUSCULAR; INTRAVENOUS
Status: DISCONTINUED | OUTPATIENT
Start: 2020-02-26 | End: 2020-02-26 | Stop reason: HOSPADM

## 2020-02-26 RX ORDER — DIPHENHYDRAMINE HCL 25 MG
25 CAPSULE ORAL
Status: DISCONTINUED | OUTPATIENT
Start: 2020-02-26 | End: 2020-02-26 | Stop reason: HOSPADM

## 2020-02-26 RX ORDER — FAMOTIDINE 10 MG/ML
20 INJECTION, SOLUTION INTRAVENOUS ONCE
Status: COMPLETED | OUTPATIENT
Start: 2020-02-26 | End: 2020-02-26

## 2020-02-26 RX ORDER — DIPHENHYDRAMINE HYDROCHLORIDE 50 MG/ML
12.5 INJECTION INTRAMUSCULAR; INTRAVENOUS
Status: DISCONTINUED | OUTPATIENT
Start: 2020-02-26 | End: 2020-02-26 | Stop reason: HOSPADM

## 2020-02-26 RX ADMIN — PROPOFOL 200 MG: 10 INJECTION, EMULSION INTRAVENOUS at 11:17

## 2020-02-26 RX ADMIN — SUCCINYLCHOLINE CHLORIDE 160 MG: 20 INJECTION, SOLUTION INTRAMUSCULAR; INTRAVENOUS; PARENTERAL at 11:17

## 2020-02-26 RX ADMIN — ONDANSETRON HYDROCHLORIDE 4 MG: 2 SOLUTION INTRAMUSCULAR; INTRAVENOUS at 11:15

## 2020-02-26 RX ADMIN — FENTANYL CITRATE 50 MCG: 50 INJECTION INTRAMUSCULAR; INTRAVENOUS at 11:25

## 2020-02-26 RX ADMIN — FENTANYL CITRATE 50 MCG: 50 INJECTION INTRAMUSCULAR; INTRAVENOUS at 11:17

## 2020-02-26 RX ADMIN — PROPOFOL 50 MG: 10 INJECTION, EMULSION INTRAVENOUS at 11:24

## 2020-02-26 RX ADMIN — LEVOFLOXACIN 500 MG: 5 INJECTION, SOLUTION INTRAVENOUS at 10:33

## 2020-02-26 RX ADMIN — SODIUM CHLORIDE, POTASSIUM CHLORIDE, SODIUM LACTATE AND CALCIUM CHLORIDE 9 ML/HR: 600; 310; 30; 20 INJECTION, SOLUTION INTRAVENOUS at 10:33

## 2020-02-26 RX ADMIN — PHENYLEPHRINE HYDROCHLORIDE 100 MCG: 10 INJECTION INTRAVENOUS at 11:24

## 2020-02-26 RX ADMIN — ROCURONIUM BROMIDE 15 MG: 10 INJECTION, SOLUTION INTRAVENOUS at 11:38

## 2020-02-26 RX ADMIN — LIDOCAINE HYDROCHLORIDE 40 MG: 20 INJECTION, SOLUTION INFILTRATION; PERINEURAL at 11:17

## 2020-02-26 RX ADMIN — FAMOTIDINE 20 MG: 10 INJECTION INTRAVENOUS at 10:57

## 2020-02-26 RX ADMIN — NEOSTIGMINE METHYLSULFATE 2.5 MG: 1 INJECTION INTRAMUSCULAR; INTRAVENOUS; SUBCUTANEOUS at 11:50

## 2020-02-26 RX ADMIN — PHENYLEPHRINE HYDROCHLORIDE 100 MCG: 10 INJECTION INTRAVENOUS at 11:30

## 2020-02-26 RX ADMIN — GLYCOPYRROLATE 0.2 MG: 0.2 INJECTION INTRAMUSCULAR; INTRAVENOUS at 11:15

## 2020-02-26 RX ADMIN — PHENYLEPHRINE HYDROCHLORIDE 100 MCG: 10 INJECTION INTRAVENOUS at 11:39

## 2020-02-26 RX ADMIN — GLYCOPYRROLATE 0.4 MG: 0.2 INJECTION INTRAMUSCULAR; INTRAVENOUS at 11:50

## 2020-02-26 RX ADMIN — SUGAMMADEX 200 MG: 100 INJECTION, SOLUTION INTRAVENOUS at 11:52

## 2020-02-26 RX ADMIN — MIDAZOLAM 1 MG: 1 INJECTION INTRAMUSCULAR; INTRAVENOUS at 10:58

## 2020-02-26 RX ADMIN — ROCURONIUM BROMIDE 5 MG: 10 INJECTION, SOLUTION INTRAVENOUS at 11:17

## 2020-02-26 NOTE — ANESTHESIA POSTPROCEDURE EVALUATION
Patient: Han Garcia    Procedure Summary     Date:  02/26/20 Room / Location:  [facility] OR 01 / [facility] MAIN OR    Anesthesia Start:  1113 Anesthesia Stop:  1206    Procedure:  CYSTOSCOPY RETROGRADE (Bilateral Urethra) Diagnosis:       Enterovesical fistula      (Enterovesical fistula)    Surgeon:  Cm Witt MD Provider:  Jackie Good MD    Anesthesia Type:  general ASA Status:  3          Anesthesia Type: general    Vitals  Vitals Value Taken Time   /69 2/26/2020 12:20 PM   Temp 36.8 °C (98.3 °F) 2/26/2020 12:03 PM   Pulse 67 2/26/2020 12:29 PM   Resp 16 2/26/2020 12:20 PM   SpO2 94 % 2/26/2020 12:29 PM   Vitals shown include unvalidated device data.        Post Anesthesia Care and Evaluation    Patient location during evaluation: PACU  Patient participation: complete - patient participated  Level of consciousness: awake and alert  Pain management: adequate  Airway patency: patent  Anesthetic complications: No anesthetic complications    Cardiovascular status: acceptable  Respiratory status: acceptable  Hydration status: acceptable    Comments: --------------------            02/26/20               1220     --------------------   BP:                  Pulse:               Resp:       16       Temp:                SpO2:               --------------------

## 2020-02-26 NOTE — ANESTHESIA PREPROCEDURE EVALUATION
Anesthesia Evaluation     Patient summary reviewed and Nursing notes reviewed                Airway   Mallampati: II  TM distance: <3 FB  Neck ROM: full  Possible difficult intubation  Dental    (+) poor dentition        Pulmonary - normal exam   (+) a smoker Current Abstained day of surgery,   Cardiovascular - normal exam    (+) hypertension less than 2 medications, PVD, DVT resolved, hyperlipidemia,       Neuro/Psych    ROS Comment: Neuropathy  GI/Hepatic/Renal/Endo    (+)   hepatitis A, liver disease,     ROS Comment: Esophageal CA with liver mets    Musculoskeletal     Abdominal  - normal exam   Substance History      OB/GYN          Other   arthritis,    history of cancer      Other Comment: Hx prostate CA and esophageal CA                  Anesthesia Plan    ASA 3     general     intravenous induction     Anesthetic plan, all risks, benefits, and alternatives have been provided, discussed and informed consent has been obtained with: patient.

## 2020-02-26 NOTE — ANESTHESIA PROCEDURE NOTES
Airway  Urgency: elective    Date/Time: 2/26/2020 11:18 AM  Difficult airway (anterior)    General Information and Staff    Patient location during procedure: OR  Anesthesiologist: Jackie Good MD  CRNA: Juliet Plaza CRNA    Indications and Patient Condition  Indications for airway management: airway protection    Preoxygenated: yes  Mask difficulty assessment: 0 - not attempted    Final Airway Details  Final airway type: endotracheal airway      Successful airway: ETT  Cuffed: yes   Successful intubation technique: direct laryngoscopy  Facilitating devices/methods: intubating stylet  Endotracheal tube insertion site: oral  Blade: Rodriguez  Blade size: 2  ETT size (mm): 7.5  Cormack-Lehane Classification: grade I - full view of glottis  Placement verified by: chest auscultation and capnometry   Cuff volume (mL): 8  Measured from: teeth  ETT/EBT  to teeth (cm): 20  Number of attempts at approach: 1  Assessment: lips, teeth, and gum same as pre-op and atraumatic intubation

## 2020-02-27 ENCOUNTER — TELEPHONE (OUTPATIENT)
Dept: ONCOLOGY | Facility: CLINIC | Age: 65
End: 2020-02-27

## 2020-02-27 ENCOUNTER — TELEPHONE (OUTPATIENT)
Dept: SURGERY | Facility: CLINIC | Age: 65
End: 2020-02-27

## 2020-02-27 NOTE — TELEPHONE ENCOUNTER
Pt called today stating that he has a enterovesical fistula and he is needing surgery. He is going to see Dr. Haynes on 3/3 with possible treatment. Pt has already been off treatment 7 weeks. This surgery is invasive and will require 4-6 weeks of recovery time. He is wanting Dr. Haynes' opinion on if he needs to do chemo first or should he go ahead with the surgery. Advised him that Dr. Haynes was off this week but I would send him a message letting him know and they can discuss at his visit next week. He v/u. Message sent to Dr. Haynes.

## 2020-02-27 NOTE — TELEPHONE ENCOUNTER
Patient wants to speak to Dr. Haynes. Said it was concerning his next chemo treatment and surgery. Callback at [phone]

## 2020-02-27 NOTE — TELEPHONE ENCOUNTER
I spoke with the patient regarding the cystoscopy results.  He has a colovesical fistula.  Is clear on the CT scan there is communication with the sigmoid colon.  The patient is minimally symptomatic.  Has had only couple of UTI episodes and some pneumaturia.  I discussed with him about further step.  He has chemotherapy scheduled for next Tuesday and I think he should continue with that for now.  I do not think he is fistula is an emergency that would prevent him to have chemotherapy already scheduled.  I had a very extensive discussion with him and his sister.  In an ideal situation patient to have laparoscopic sigmoid colectomy, possible open with possible bladder repair.  In a patient that needs to have chemotherapy in order to give surviving them probably a sigmoid colectomy with end colostomy will be a more conservative approach that will decrease the risk of having complications.    He now he has very advanced esophageal cancer and that he is he is doing great on chemotherapy right now but this may change.  I recommend that he discuss with Dr. Haynes about his overall prognosis regarding his cancer.  I think that is a prognosis is poor then risking having complications from a very extensive surgery is not worth the risk.  If the chances of surviving for at least 2 to 5 years are good then performing sigmoid colectomy may be worth.  He understand that he has the same risk of colonic perforation than anybody that has diverticulosis.  Colovesicular fistula just now increase the risk of bowel perforation.  Patient to call my office whenever ready to make a decision.  I will see him in the office and discuss further options.  I will try to contact Dr. Haynes to discuss further    He understood

## 2020-03-03 ENCOUNTER — INFUSION (OUTPATIENT)
Dept: ONCOLOGY | Facility: HOSPITAL | Age: 65
End: 2020-03-03

## 2020-03-03 ENCOUNTER — OFFICE VISIT (OUTPATIENT)
Dept: ONCOLOGY | Facility: CLINIC | Age: 65
End: 2020-03-03

## 2020-03-03 VITALS
HEIGHT: 69 IN | DIASTOLIC BLOOD PRESSURE: 69 MMHG | TEMPERATURE: 97.6 F | SYSTOLIC BLOOD PRESSURE: 106 MMHG | HEART RATE: 80 BPM | RESPIRATION RATE: 16 BRPM | WEIGHT: 177.3 LBS | BODY MASS INDEX: 26.26 KG/M2 | OXYGEN SATURATION: 98 %

## 2020-03-03 DIAGNOSIS — I82.412 ACUTE DEEP VEIN THROMBOSIS (DVT) OF FEMORAL VEIN OF LEFT LOWER EXTREMITY (HCC): ICD-10-CM

## 2020-03-03 DIAGNOSIS — D63.8 ANEMIA, CHRONIC DISEASE: ICD-10-CM

## 2020-03-03 DIAGNOSIS — D70.1 CHEMOTHERAPY INDUCED NEUTROPENIA (HCC): ICD-10-CM

## 2020-03-03 DIAGNOSIS — C15.5 MALIGNANT NEOPLASM OF LOWER THIRD OF ESOPHAGUS (HCC): Primary | ICD-10-CM

## 2020-03-03 DIAGNOSIS — T45.1X5A PERIPHERAL NEUROPATHY DUE TO CHEMOTHERAPY (HCC): ICD-10-CM

## 2020-03-03 DIAGNOSIS — Z45.2 ENCOUNTER FOR ADJUSTMENT OR MANAGEMENT OF VASCULAR ACCESS DEVICE: ICD-10-CM

## 2020-03-03 DIAGNOSIS — C15.5 MALIGNANT NEOPLASM OF LOWER THIRD OF ESOPHAGUS (HCC): ICD-10-CM

## 2020-03-03 DIAGNOSIS — I10 ESSENTIAL HYPERTENSION: ICD-10-CM

## 2020-03-03 DIAGNOSIS — G62.0 PERIPHERAL NEUROPATHY DUE TO CHEMOTHERAPY (HCC): ICD-10-CM

## 2020-03-03 DIAGNOSIS — N32.1 COLOVESICAL FISTULA: ICD-10-CM

## 2020-03-03 DIAGNOSIS — T45.1X5A CHEMOTHERAPY INDUCED NEUTROPENIA (HCC): ICD-10-CM

## 2020-03-03 DIAGNOSIS — I82.431 ACUTE DEEP VEIN THROMBOSIS (DVT) OF RIGHT POPLITEAL VEIN (HCC): ICD-10-CM

## 2020-03-03 DIAGNOSIS — C78.7 LIVER METASTASIS: ICD-10-CM

## 2020-03-03 DIAGNOSIS — Z72.0 TOBACCO ABUSE: ICD-10-CM

## 2020-03-03 DIAGNOSIS — C78.7 LIVER METASTASES: ICD-10-CM

## 2020-03-03 PROBLEM — G62.9 NEUROPATHY: Status: ACTIVE | Noted: 2020-03-03

## 2020-03-03 LAB
ALBUMIN SERPL-MCNC: 4.1 G/DL (ref 3.5–5.2)
ALBUMIN/GLOB SERPL: 1.3 G/DL (ref 1.1–2.4)
ALP SERPL-CCNC: 102 U/L (ref 38–116)
ALT SERPL W P-5'-P-CCNC: 17 U/L (ref 0–41)
ANION GAP SERPL CALCULATED.3IONS-SCNC: 14.5 MMOL/L (ref 5–15)
AST SERPL-CCNC: 16 U/L (ref 0–40)
BASOPHILS # BLD AUTO: 0.04 10*3/MM3 (ref 0–0.2)
BASOPHILS NFR BLD AUTO: 0.5 % (ref 0–1.5)
BILIRUB SERPL-MCNC: 0.3 MG/DL (ref 0.2–1.2)
BUN BLD-MCNC: 18 MG/DL (ref 6–20)
BUN/CREAT SERPL: 15.3 (ref 7.3–30)
CALCIUM SPEC-SCNC: 9.6 MG/DL (ref 8.5–10.2)
CEA SERPL-MCNC: 13.8 NG/ML
CHLORIDE SERPL-SCNC: 97 MMOL/L (ref 98–107)
CO2 SERPL-SCNC: 22.5 MMOL/L (ref 22–29)
CREAT BLD-MCNC: 1.18 MG/DL (ref 0.7–1.3)
DEPRECATED RDW RBC AUTO: 52.7 FL (ref 37–54)
EOSINOPHIL # BLD AUTO: 0.37 10*3/MM3 (ref 0–0.4)
EOSINOPHIL NFR BLD AUTO: 4.3 % (ref 0.3–6.2)
ERYTHROCYTE [DISTWIDTH] IN BLOOD BY AUTOMATED COUNT: 14.5 % (ref 12.3–15.4)
GFR SERPL CREATININE-BSD FRML MDRD: 62 ML/MIN/1.73
GLOBULIN UR ELPH-MCNC: 3.2 GM/DL (ref 1.8–3.5)
GLUCOSE BLD-MCNC: 92 MG/DL (ref 74–124)
HCT VFR BLD AUTO: 35.2 % (ref 37.5–51)
HGB BLD-MCNC: 11.7 G/DL (ref 13–17.7)
IMM GRANULOCYTES # BLD AUTO: 0.13 10*3/MM3 (ref 0–0.05)
IMM GRANULOCYTES NFR BLD AUTO: 1.5 % (ref 0–0.5)
LYMPHOCYTES # BLD AUTO: 1.99 10*3/MM3 (ref 0.7–3.1)
LYMPHOCYTES NFR BLD AUTO: 23.3 % (ref 19.6–45.3)
MCH RBC QN AUTO: 33.1 PG (ref 26.6–33)
MCHC RBC AUTO-ENTMCNC: 33.2 G/DL (ref 31.5–35.7)
MCV RBC AUTO: 99.7 FL (ref 79–97)
MONOCYTES # BLD AUTO: 0.62 10*3/MM3 (ref 0.1–0.9)
MONOCYTES NFR BLD AUTO: 7.3 % (ref 5–12)
NEUTROPHILS # BLD AUTO: 5.38 10*3/MM3 (ref 1.7–7)
NEUTROPHILS NFR BLD AUTO: 63.1 % (ref 42.7–76)
NRBC BLD AUTO-RTO: 0 /100 WBC (ref 0–0.2)
PLATELET # BLD AUTO: 168 10*3/MM3 (ref 140–450)
PMV BLD AUTO: 9.3 FL (ref 6–12)
POTASSIUM BLD-SCNC: 4.3 MMOL/L (ref 3.5–4.7)
PROT SERPL-MCNC: 7.3 G/DL (ref 6.3–8)
RBC # BLD AUTO: 3.53 10*6/MM3 (ref 4.14–5.8)
SODIUM BLD-SCNC: 134 MMOL/L (ref 134–145)
WBC NRBC COR # BLD: 8.53 10*3/MM3 (ref 3.4–10.8)

## 2020-03-03 PROCEDURE — 25010000002 LEUCOVORIN CALCIUM PER 50 MG: Performed by: INTERNAL MEDICINE

## 2020-03-03 PROCEDURE — 96411 CHEMO IV PUSH ADDL DRUG: CPT

## 2020-03-03 PROCEDURE — G0463 HOSPITAL OUTPT CLINIC VISIT: HCPCS

## 2020-03-03 PROCEDURE — 80053 COMPREHEN METABOLIC PANEL: CPT

## 2020-03-03 PROCEDURE — 85025 COMPLETE CBC W/AUTO DIFF WBC: CPT

## 2020-03-03 PROCEDURE — 96375 TX/PRO/DX INJ NEW DRUG ADDON: CPT

## 2020-03-03 PROCEDURE — 25010000002 TRASTUZUMAB PER 10 MG: Performed by: INTERNAL MEDICINE

## 2020-03-03 PROCEDURE — 96367 TX/PROPH/DG ADDL SEQ IV INF: CPT

## 2020-03-03 PROCEDURE — 96416 CHEMO PROLONG INFUSE W/PUMP: CPT

## 2020-03-03 PROCEDURE — 96413 CHEMO IV INFUSION 1 HR: CPT

## 2020-03-03 PROCEDURE — 82378 CARCINOEMBRYONIC ANTIGEN: CPT | Performed by: INTERNAL MEDICINE

## 2020-03-03 PROCEDURE — 25010000002 FLUOROURACIL PER 500 MG: Performed by: INTERNAL MEDICINE

## 2020-03-03 PROCEDURE — 25010000002 DEXAMETHASONE PER 1 MG: Performed by: INTERNAL MEDICINE

## 2020-03-03 PROCEDURE — 25010000002 DIPHENHYDRAMINE 1 EACH BAG: Performed by: INTERNAL MEDICINE

## 2020-03-03 PROCEDURE — 25010000002 PALONOSETRON PER 25 MCG: Performed by: INTERNAL MEDICINE

## 2020-03-03 PROCEDURE — 99215 OFFICE O/P EST HI 40 MIN: CPT | Performed by: INTERNAL MEDICINE

## 2020-03-03 RX ORDER — SODIUM CHLORIDE 9 MG/ML
250 INJECTION, SOLUTION INTRAVENOUS ONCE
Status: COMPLETED | OUTPATIENT
Start: 2020-03-03 | End: 2020-03-03

## 2020-03-03 RX ORDER — SODIUM CHLORIDE 9 MG/ML
250 INJECTION, SOLUTION INTRAVENOUS ONCE
Status: CANCELLED | OUTPATIENT
Start: 2020-03-03

## 2020-03-03 RX ORDER — FAMOTIDINE 10 MG/ML
20 INJECTION, SOLUTION INTRAVENOUS AS NEEDED
Status: CANCELLED | OUTPATIENT
Start: 2020-03-03

## 2020-03-03 RX ORDER — DEXTROSE MONOHYDRATE 50 MG/ML
250 INJECTION, SOLUTION INTRAVENOUS ONCE
Status: CANCELLED | OUTPATIENT
Start: 2020-03-03

## 2020-03-03 RX ORDER — FLUOROURACIL 50 MG/ML
400 INJECTION, SOLUTION INTRAVENOUS ONCE
Status: COMPLETED | OUTPATIENT
Start: 2020-03-03 | End: 2020-03-03

## 2020-03-03 RX ORDER — DEXTROSE MONOHYDRATE 50 MG/ML
250 INJECTION, SOLUTION INTRAVENOUS ONCE
Status: DISCONTINUED | OUTPATIENT
Start: 2020-03-03 | End: 2020-03-03 | Stop reason: HOSPADM

## 2020-03-03 RX ORDER — FAMOTIDINE 10 MG/ML
20 INJECTION, SOLUTION INTRAVENOUS 2 TIMES DAILY
Status: CANCELLED | OUTPATIENT
Start: 2020-03-03

## 2020-03-03 RX ORDER — PALONOSETRON 0.05 MG/ML
0.25 INJECTION, SOLUTION INTRAVENOUS ONCE
Status: CANCELLED | OUTPATIENT
Start: 2020-03-03

## 2020-03-03 RX ORDER — PALONOSETRON 0.05 MG/ML
0.25 INJECTION, SOLUTION INTRAVENOUS ONCE
Status: COMPLETED | OUTPATIENT
Start: 2020-03-03 | End: 2020-03-03

## 2020-03-03 RX ORDER — DIPHENHYDRAMINE HYDROCHLORIDE 50 MG/ML
50 INJECTION INTRAMUSCULAR; INTRAVENOUS AS NEEDED
Status: CANCELLED | OUTPATIENT
Start: 2020-03-03

## 2020-03-03 RX ORDER — FLUOROURACIL 50 MG/ML
400 INJECTION, SOLUTION INTRAVENOUS ONCE
Status: CANCELLED | OUTPATIENT
Start: 2020-03-03

## 2020-03-03 RX ORDER — FAMOTIDINE 10 MG/ML
20 INJECTION, SOLUTION INTRAVENOUS 2 TIMES DAILY
Status: DISCONTINUED | OUTPATIENT
Start: 2020-03-03 | End: 2020-03-03 | Stop reason: HOSPADM

## 2020-03-03 RX ADMIN — FLUOROURACIL 4850 MG: 50 INJECTION, SOLUTION INTRAVENOUS at 10:21

## 2020-03-03 RX ADMIN — DEXAMETHASONE SODIUM PHOSPHATE 4 MG: 10 INJECTION INTRAMUSCULAR; INTRAVENOUS at 08:48

## 2020-03-03 RX ADMIN — SODIUM CHLORIDE 250 ML: 9 INJECTION, SOLUTION INTRAVENOUS at 08:50

## 2020-03-03 RX ADMIN — FLUOROURACIL 810 MG: 50 INJECTION, SOLUTION INTRAVENOUS at 10:21

## 2020-03-03 RX ADMIN — PALONOSETRON 0.25 MG: 0.05 INJECTION, SOLUTION INTRAVENOUS at 08:47

## 2020-03-03 RX ADMIN — FAMOTIDINE 20 MG: 10 INJECTION INTRAVENOUS at 08:49

## 2020-03-03 RX ADMIN — TRASTUZUMAB 350 MG: 150 INJECTION, POWDER, LYOPHILIZED, FOR SOLUTION INTRAVENOUS at 09:18

## 2020-03-03 RX ADMIN — SODIUM CHLORIDE 810 MG: 900 INJECTION, SOLUTION INTRAVENOUS at 09:52

## 2020-03-03 RX ADMIN — DIPHENHYDRAMINE HYDROCHLORIDE 50 MG: 50 INJECTION INTRAMUSCULAR; INTRAVENOUS at 08:49

## 2020-03-03 NOTE — PROGRESS NOTES
Subjective     REASON FOR follow up:1.  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  Currently receiving palliative FOLFOX AND HERCEPTIN therapy.    2.COLOVESICAL FISTULA: chronic antibiotic therapy cipro, NO FURTHER PLANS FOR SURGERY AFTER VISIT AND PROCEDURE BY DR GM WITT 1/20    3. DVT R AND LEFT LE ON ANTICOAGULANT LOVENOX: THROMBOPHILIA OF MALIGNANCY    4. 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 NEURONTIN INITIATED.        History of Present Illness This patient returns today to the office for followup. He is here today after he has undergone cystoscopy by Gm Witt MD, and fistulogram that documented a very small fistula between the bladder and the colon. Dr. Witt has sent me a note stating that he does not believe that the patient requires any intervention and this was discussed with Dr. Witt and Guy Sears MD as well. In any event the patient is not having any fevers or chills, no passage of stool in his urination, no urinary tract infection. He had bladder retention after the surgical procedure that subsided after a Marc catheter was placed. He has not had any further problems with urination. The patient is not having any cancer related pain but he has peripheral neuropathy sensory in his feet and hands associated with Oxaliplatin use that obligated me to get of the Oxaliplatin altogether in his care plan. The patient continues doing Lovenox injections anticoagulation for his thrombophilia associated with malignancy with no new clots.     The patient has not had any difficulty swallowing, he has no cancer pain, his appetite is great, his bowel activity is normal. He has not had any cardiovascular issues. He is still smoking half a pack of cigarettes a day. He has not had any other new problems.     -                            Past Medical History:   Diagnosis  "Date   • Arthritis    • Elevated PSA    • Esophageal cancer (CMS/HCC)    • Esophageal mass    • Fistula    • H/O Lung nodule    • Hepatitis     CHILD--PT STATED \"I THINK IT WAS A.\"   • History of pneumonia    • Hx of blood clots 08/10/2019   • Hyperlipidemia    • Hypertension    • Neuropathy    • Prostate cancer (CMS/HCC)    • PVD (peripheral vascular disease) (CMS/HCC)         Past Surgical History:   Procedure Laterality Date   • COLONOSCOPY N/A 2/4/2020    Procedure: COLONOSCOPY;  Surgeon: Guy Sears MD;  Location: [facility] ENDOSCOPY;  Service: General;  Laterality: N/A;  PRE-COLOVESICAL FISTULA  POST-- DIVERTICULOSIS   • CYSTOSCOPY  02/06/2020   • CYSTOSCOPY Bilateral 2/26/2020    Procedure: CYSTOSCOPY RETROGRADE;  Surgeon: Cm Witt MD;  Location: [facility] OR;  Service: Urology;  Laterality: Bilateral;   • HERNIA REPAIR  1999   • PROSTATE SURGERY  03/2008   • VENOUS ACCESS DEVICE (PORT) INSERTION N/A 7/16/2019    Procedure: INSERTION VENOUS ACCESS DEVICE WITH FLUORO AND EGD WITH BIOPSY;  Surgeon: Mary Brito MD;  Location: [facility] OR;  Service: Thoracic        Current Outpatient Medications on File Prior to Visit   Medication Sig Dispense Refill   • ciprofloxacin (CIPRO) 250 MG tablet TAKE 2 TABLETS BY MOUTH 2 (TWO) TIMES A DAY. (Patient taking differently: Take 250 mg by mouth 2 (Two) Times a Day.) 60 tablet 1   • clotrimazole (MYCELEX) 10 MG obie Take 10 mg by mouth 3 (Three) Times a Day As Needed.     • enoxaparin (LOVENOX) 80 MG/0.8ML solution syringe Inject 0.8 mL under the skin into the appropriate area as directed Every 12 (Twelve) Hours. (Patient taking differently: Inject 80 mg under the skin into the appropriate area as directed Every 12 (Twelve) Hours. TO HOLD DAY PRIOR TO SURGERY) 48 mL 11   • gabapentin (NEURONTIN) 300 MG capsule Take 1 capsule by mouth every night at bedtime. 30 capsule 2   • lisinopril-hydrochlorothiazide (PRINZIDE,ZESTORETIC) 20-12.5 MG per " tablet Take 1 tablet by mouth Daily. 90 tablet 2   • ondansetron (ZOFRAN) 4 MG tablet Take 1 tablet by mouth Every 8 (Eight) Hours As Needed for Nausea or Vomiting. 30 tablet 2   • Probiotic Product (PROBIOTIC DAILY PO) Take 1 tablet by mouth Daily.     • triamcinolone (KENALOG) 0.1 % ointment Apply  topically to the appropriate area as directed 2 (Two) Times a Day. (Patient taking differently: Apply 1 application topically to the appropriate area as directed As Needed.) 30 g 2     No current facility-administered medications on file prior to visit.         ALLERGIES:  No Known Allergies     Social History     Socioeconomic History   • Marital status: Single     Spouse name: Not on file   • Number of children: Not on file   • Years of education: High school   • Highest education level: Not on file   Occupational History   • Occupation: [employer]     Employer: [employer]   Tobacco Use   • Smoking status: Current Every Day Smoker     Packs/day: 1.00     Years: 40.00     Pack years: 40.00     Types: Cigarettes   • Smokeless tobacco: Never Used   Substance and Sexual Activity   • Alcohol use: Not Currently     Comment: NOT RECENTLY- none since september   • Drug use: No   • Sexual activity: Defer        Family History   Problem Relation Age of Onset   • Hypertension Mother    • Stroke Mother    • Hypertension Other    • Lung disease Other    • Prostate cancer Other    • Lung cancer Father    • Malig Hyperthermia Neg Hx          Review of Systems:            General: no fever, no chills, no fatigue,no weight changes, no lack of appetite.  Eyes: no epiphora, xerophthalmia,conjunctivitis, pain, glaucoma, blurred vision, blindness, secretion, photophobia, proptosis, diplopia.  Ears: no otorrhea, tinnitus, otorrhagia, deafness, pain, vertigo.  Nose: no rhinorrhea, no epistaxis, no alteration in perception of odors, no sinuses pressure.  Mouth: no alteration in gums or teeth,  No ulcers, no difficulty with mastication or  "deglut ion, no odynophagia.  Neck: no masses or pain, no thyroid alterations, no pain in muscles or arteries, no carotid odynia, no crepitation.  Respiratory: no cough, no sputum production,no dyspnea,no trepopnea, no pleuritic pain,no hemoptysis.  Heart: no syncope, no irregularity, no palpitations, no angina,no orthopnea,no paroxysmal nocturnal dyspnea.  Vascular Venous: no tenderness,no edema,no palpable cords,no postphlebitic syndrome, no skin changes no ulcerations.  Vascular Arterial: no distal ischemia, noclaudication, no gangrene, no neuropathic ischemic pain, no skin ulcers, no paleness no cyanosis.  GI: no dysphagia, no odynophagia, no regurgitation, no heartburn,no indigestion,no nausea,no vomiting,no hematemesis ,no melena,no jaundice,no distention, no obstipation,no enterorrhagia,no proctalgia,no anal  lesions, no changes in bowel habits.  : no frequency, STATED RETENTION AND hesitancy, no hematuria, no discharge,no  pain.  Musculoskeletal: no muscle or tendon pain or inflammation,no  joint pain, no edema, no functional limitation,no fasciculations, no mass.  Neurologic: no headache, no seizures, noalterations on Craneal nerves, no motor deficit, STATED sensory deficit, normal coordination, no alteration in memory,normal orientation, calculation,normal writting, verbal and written language.  Skin: no rashes,no pruritus no localized lesions.  Psychiatric: no anxiety, no depression,no agitation, no delusions, proper insight.                Objective     Vitals:    03/03/20 0746   BP: 106/69   Pulse: 80   Resp: 16   Temp: 97.6 °F (36.4 °C)   TempSrc: Oral   SpO2: 98%   Weight: 80.4 kg (177 lb 4.8 oz)   Height: 175.3 cm (69.02\")   PainSc: 0-No pain     Current Status 3/3/2020   ECOG score 0       Physical Exam:      GENERAL:  Well-developed, well-nourished  Patient  in no acute distress.   SKIN:  Warm, dry ,NO rashes,NO purpura ,NO petechiae.  HEENT:  Pupils were equal and reactive to light and " accomodation, conjunctivas non injected, no pterigion, normal extraocular movements, normal visual acuity.   Mouth mucosa was moist, no exudates in oropharynx, normal gum line, normal roof of the mouth and pillars, normal papillations of the tongue.  NECK:  Supple with good range of motion; no thyromegaly or masses, no JVD or bruits, no cervical adenopathies.No carotid arteries pain, no carotid abnormal pulsation , NO arterial dance.  LYMPHATICS:  No cervical, NO supraclavicular, NO axillary,NO epitrochlear , NO inguinal adenopathy.  CHEST:  Normal excursion of both kailash thoraces, normal voice fremitus, no subcutaneous emphysema, normal axillas, no rashes or acanthosis nigricans. Lungs clear to percussion and auscultation, normal breath sounds bilaterally, no wheezing,NO crackles NO ronchi, NO stridor, NO rubs.  CARDIAC AND VASCULAR:  normal rate and regular rhythm, without murmurs,NO rubs NO S3 NO S4 right or left . Normal femoral, popliteal, pedis, brachial and carotid pulses.  ABDOMEN:  Soft, nontender with no organomegaly or masses, no ascites, no collateral circulation,no distention,no Dhruv sign, no abdominal pain, no inguinal hernias,no umbilical hernia, no abdominal bruits. Normal bowel sounds.  GENITAL: Not  Performed.  EXTREMITIES  AND SPINE:  No clubbing, cyanosis or edema, no deformities or pain .No kyphosis, scoliosis, deformities or pain in spine, ribs or pelvic bone.  NEUROLOGICAL:  Patient was awake, alert, oriented to time, person and place.GRADE 2 SENSORY NEUROPATHY IN HANDS AND FEET                            RECENT LABS:  Hematology WBC   Date Value Ref Range Status   03/03/2020 8.53 3.40 - 10.80 10*3/mm3 Final   02/02/2019 13.4 (H) 3.4 - 10.8 x10E3/uL Final     RBC   Date Value Ref Range Status   03/03/2020 3.53 (L) 4.14 - 5.80 10*6/mm3 Final   02/02/2019 4.81 4.14 - 5.80 x10E6/uL Final     Hemoglobin   Date Value Ref Range Status   03/03/2020 11.7 (L) 13.0 - 17.7 g/dL Final     Hematocrit    Date Value Ref Range Status   03/03/2020 35.2 (L) 37.5 - 51.0 % Final     Platelets   Date Value Ref Range Status   03/03/2020 168 140 - 450 10*3/mm3 Final            INTRAOPERATIVE PORTABLE VIEW ABDOMEN     CLINICAL INFORMATION: Post cystogram and bilateral retrograde pyelogram     FINDINGS: Findings in keeping with known colovesical fistula present  with contrast extravasation from the bladder into the large bowel  moderate to severe bilateral hip osteoporosis present.     Fluoroscopy time 1 minute, 8 images     This report was finalized on 2/26/2020 11:56 AM by Dr. Gaetano Varela M.D.    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 It will likley not close unless surgical resection done but this is in a very difficult place behind the bladder.   He is largely asymptomatic with just pneumaturia. I think repair will be difficult and high risk for bladder injury and need for colostomy I suspect.   I have suggested he f/u with Dr Sears and watch this due to his minimal symptoms.   We will scope his yearly in the office unless symptoms worsen.   I told him to see Dr Sears in 1-2 weeks.   He will likely proceed with chemo next week         Assessment/Plan    1.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 Almost 90% of the liver WAS replaced by tumor. The patient has been undergoing chemotherapy with FOLFOX and Herceptin and has had excellent response clinically and radiologically.  Today, the patient on clinical examination has had resolution of his giant hepatomegaly that was nodular and mildly tender in the right upper quadrant of his abdomen.  His liver has reduced in size very dramatically.  Most importantly, the patient has had resolution of his cancer-related pain.  His  performance status is ECOG 0.  He is no longer taking pain medicine and developed thrombophilia associated with malignancy that has been treated with Lovenox successfully.  He has some ageusia related to treatment and this will require modification in his diet.  He remains smoking 1 pack of cigarettes daily.    The patient was further reviewed on 11/26/2019. I discussed with him and his sister the dramatic changes and improvement in his PET scan. This is one for the book. He has near complete resolution of the abnormal SUV uptake in the lower part of the esophagus and he has near complete resolution of the dramatic uptake in his whole liver parenchyma. There is no other site of disease. Kidney anatomy remains normal as well as his bladder anatomy. The fissure is still visible.       The patient returned on 02/18/2020 to be reviewed. He has had his colonoscopy by Guy Sears MD, on 02/04/2020 that has failed to document any opening into his bladder besides diverticula and no other alterations or tumoral lesions. The patient states that he has had a cystoscopy by Cm Witt MD, a report of this is not available, he was told that everything looked normal. He will proceed with another cystoscopy using dye to be placed in the bladder to see if there is any passage of this into the colon in some way or form. I wonder if this fistula has closed altogether, the fact that there are no bladder tumors and no passage of stool through his urination tells us that this has closed. Maybe by the end of the day the patient is not going to require any surgery of any kind. That is good news.       The patient was further reviewed on 03/03/2020. At this time he has had a cystoscopy and fistulogram by Cm Witt MD, reviewed in the PAC system [facility] and now reviewed by Dr. Witt as well. He does not believe that there is any need for any surgical intervention in this patient at this time due to the  small size of the fistulogram and the lack of recent complications. This will delay the continuation of chemotherapy at least for 6-8 weeks and maybe by the end of the day this will allow the cancer to take back.    He developed a urinary retention after the surgical intervention. This was subsided with a Marc catheter. He has not had any further problems since then.    The patient has not had any new episodes of thrombophilia.    He is planning to have some dental work done and he will require holding off on the anticoagulant for 24 hours before the procedure and resuming this 24 hours after.     He has seen some benefit of the Neurontin in regard to his neuropathy and he still has insomnia and I asked him to raise the Neurontin to 600 mg in the evening only.     I insisted in regard to smoking cessation.     I do believe that the patient is ready for continuation of his chemotherapy with 5FU, leucovorin and Herceptin at this time. He will remain on this regimen every 3 weeks. We dropped off the Oxaliplatin altogether. We will reserve CPT-11 in case that the CEA level rises.     The patient is up date with his echocardiogram and this will be repeated in 5-6 weeks from now.     The patient will continue his chemotherapy as stated above returning for CBC, CMP in 3 weeks, CBC, CMP, CEA level  in 6 weeks and echocardiogram in 5 weeks.     I insisted in smoking cessation.     The patient will continue ciprofloxacin 1 tablet a day for prevention of new urinary tract infection.     As stated he will raise his dose of Neurontin to 2 tablets in the evening.

## 2020-03-05 ENCOUNTER — INFUSION (OUTPATIENT)
Dept: ONCOLOGY | Facility: HOSPITAL | Age: 65
End: 2020-03-05

## 2020-03-05 DIAGNOSIS — C15.5 MALIGNANT NEOPLASM OF LOWER THIRD OF ESOPHAGUS (HCC): Primary | ICD-10-CM

## 2020-03-05 DIAGNOSIS — Z45.2 ENCOUNTER FOR ADJUSTMENT OR MANAGEMENT OF VASCULAR ACCESS DEVICE: ICD-10-CM

## 2020-03-05 DIAGNOSIS — C78.7 LIVER METASTASIS: ICD-10-CM

## 2020-03-05 PROCEDURE — 25010000003 HEPARIN LOCK FLUCH PER 10 UNITS: Performed by: INTERNAL MEDICINE

## 2020-03-05 RX ORDER — SODIUM CHLORIDE 0.9 % (FLUSH) 0.9 %
10 SYRINGE (ML) INJECTION AS NEEDED
Status: DISCONTINUED | OUTPATIENT
Start: 2020-03-05 | End: 2020-03-05 | Stop reason: HOSPADM

## 2020-03-05 RX ORDER — HEPARIN SODIUM (PORCINE) LOCK FLUSH IV SOLN 100 UNIT/ML 100 UNIT/ML
500 SOLUTION INTRAVENOUS AS NEEDED
Status: CANCELLED | OUTPATIENT
Start: 2020-03-05

## 2020-03-05 RX ORDER — SODIUM CHLORIDE 0.9 % (FLUSH) 0.9 %
10 SYRINGE (ML) INJECTION AS NEEDED
Status: CANCELLED | OUTPATIENT
Start: 2020-03-05

## 2020-03-05 RX ORDER — HEPARIN SODIUM (PORCINE) LOCK FLUSH IV SOLN 100 UNIT/ML 100 UNIT/ML
500 SOLUTION INTRAVENOUS AS NEEDED
Status: DISCONTINUED | OUTPATIENT
Start: 2020-03-05 | End: 2020-03-05 | Stop reason: HOSPADM

## 2020-03-05 RX ADMIN — SODIUM CHLORIDE, PRESERVATIVE FREE 10 ML: 5 INJECTION INTRAVENOUS at 08:56

## 2020-03-05 RX ADMIN — Medication 500 UNITS: at 08:56

## 2020-03-10 DIAGNOSIS — C15.5 MALIGNANT NEOPLASM OF LOWER THIRD OF ESOPHAGUS (HCC): ICD-10-CM

## 2020-03-10 DIAGNOSIS — Z45.2 ENCOUNTER FOR ADJUSTMENT OR MANAGEMENT OF VASCULAR ACCESS DEVICE: ICD-10-CM

## 2020-03-10 DIAGNOSIS — N32.1 COLOVESICAL FISTULA: ICD-10-CM

## 2020-03-10 DIAGNOSIS — I82.412 ACUTE DEEP VEIN THROMBOSIS (DVT) OF FEMORAL VEIN OF LEFT LOWER EXTREMITY (HCC): ICD-10-CM

## 2020-03-10 DIAGNOSIS — Z72.0 TOBACCO ABUSE: ICD-10-CM

## 2020-03-10 RX ORDER — GABAPENTIN 300 MG/1
300 CAPSULE ORAL
Qty: 30 CAPSULE | Refills: 2 | Status: SHIPPED | OUTPATIENT
Start: 2020-03-10 | End: 2020-06-30 | Stop reason: SDUPTHER

## 2020-03-10 NOTE — TELEPHONE ENCOUNTER
Alexey Haynes MD    Pt called need a refill for :gabapentin (NEURONTIN) 300 MG capsule     Pt states he was told to take 2 capsules at night and needs a bigger quantity    Thanks

## 2020-03-11 NOTE — TELEPHONE ENCOUNTER
Called and LVM with correct directions on the gabapentin. Pt will now take 600mg (2 capsules in the evening). Called this correction into [facility].

## 2020-03-17 RX ORDER — CLOTRIMAZOLE 10 MG/1
LOZENGE ORAL; TOPICAL
Qty: 90 TABLET | Refills: 1 | Status: SHIPPED | OUTPATIENT
Start: 2020-03-17 | End: 2021-07-09

## 2020-03-19 RX ORDER — FAMOTIDINE 10 MG/ML
20 INJECTION, SOLUTION INTRAVENOUS 2 TIMES DAILY
Status: CANCELLED | OUTPATIENT
Start: 2020-03-19

## 2020-03-19 RX ORDER — DIPHENHYDRAMINE HYDROCHLORIDE 50 MG/ML
50 INJECTION INTRAMUSCULAR; INTRAVENOUS AS NEEDED
Status: CANCELLED | OUTPATIENT
Start: 2020-03-19

## 2020-03-19 RX ORDER — SODIUM CHLORIDE 9 MG/ML
250 INJECTION, SOLUTION INTRAVENOUS ONCE
Status: CANCELLED | OUTPATIENT
Start: 2020-03-19

## 2020-03-19 RX ORDER — DEXTROSE MONOHYDRATE 50 MG/ML
250 INJECTION, SOLUTION INTRAVENOUS ONCE
Status: CANCELLED | OUTPATIENT
Start: 2020-03-19

## 2020-03-19 RX ORDER — FLUOROURACIL 50 MG/ML
400 INJECTION, SOLUTION INTRAVENOUS ONCE
Status: CANCELLED | OUTPATIENT
Start: 2020-03-19

## 2020-03-19 RX ORDER — FAMOTIDINE 10 MG/ML
20 INJECTION, SOLUTION INTRAVENOUS AS NEEDED
Status: CANCELLED | OUTPATIENT
Start: 2020-03-19

## 2020-03-19 RX ORDER — PALONOSETRON 0.05 MG/ML
0.25 INJECTION, SOLUTION INTRAVENOUS ONCE
Status: CANCELLED | OUTPATIENT
Start: 2020-03-19

## 2020-03-24 ENCOUNTER — INFUSION (OUTPATIENT)
Dept: ONCOLOGY | Facility: HOSPITAL | Age: 65
End: 2020-03-24

## 2020-03-24 ENCOUNTER — TELEPHONE (OUTPATIENT)
Dept: ONCOLOGY | Facility: HOSPITAL | Age: 65
End: 2020-03-24

## 2020-03-24 VITALS
SYSTOLIC BLOOD PRESSURE: 111 MMHG | DIASTOLIC BLOOD PRESSURE: 69 MMHG | BODY MASS INDEX: 26.86 KG/M2 | TEMPERATURE: 98.1 F | HEART RATE: 86 BPM | OXYGEN SATURATION: 98 % | WEIGHT: 182 LBS

## 2020-03-24 DIAGNOSIS — C15.5 MALIGNANT NEOPLASM OF LOWER THIRD OF ESOPHAGUS (HCC): Primary | ICD-10-CM

## 2020-03-24 DIAGNOSIS — C78.7 LIVER METASTASES: ICD-10-CM

## 2020-03-24 DIAGNOSIS — C78.7 LIVER METASTASIS: ICD-10-CM

## 2020-03-24 LAB
ALBUMIN SERPL-MCNC: 4.1 G/DL (ref 3.5–5.2)
ALBUMIN/GLOB SERPL: 1.5 G/DL (ref 1.1–2.4)
ALP SERPL-CCNC: 91 U/L (ref 38–116)
ALT SERPL W P-5'-P-CCNC: 43 U/L (ref 0–41)
ANION GAP SERPL CALCULATED.3IONS-SCNC: 13.9 MMOL/L (ref 5–15)
AST SERPL-CCNC: 27 U/L (ref 0–40)
BASOPHILS # BLD AUTO: 0.04 10*3/MM3 (ref 0–0.2)
BASOPHILS NFR BLD AUTO: 0.6 % (ref 0–1.5)
BILIRUB SERPL-MCNC: 0.3 MG/DL (ref 0.2–1.2)
BUN BLD-MCNC: 10 MG/DL (ref 6–20)
BUN/CREAT SERPL: 12.8 (ref 7.3–30)
CALCIUM SPEC-SCNC: 9.5 MG/DL (ref 8.5–10.2)
CHLORIDE SERPL-SCNC: 95 MMOL/L (ref 98–107)
CO2 SERPL-SCNC: 24.1 MMOL/L (ref 22–29)
CREAT BLD-MCNC: 0.78 MG/DL (ref 0.7–1.3)
DEPRECATED RDW RBC AUTO: 51.1 FL (ref 37–54)
EOSINOPHIL # BLD AUTO: 0.2 10*3/MM3 (ref 0–0.4)
EOSINOPHIL NFR BLD AUTO: 3 % (ref 0.3–6.2)
ERYTHROCYTE [DISTWIDTH] IN BLOOD BY AUTOMATED COUNT: 14.5 % (ref 12.3–15.4)
GFR SERPL CREATININE-BSD FRML MDRD: 101 ML/MIN/1.73
GLOBULIN UR ELPH-MCNC: 2.8 GM/DL (ref 1.8–3.5)
GLUCOSE BLD-MCNC: 121 MG/DL (ref 74–124)
HCT VFR BLD AUTO: 34 % (ref 37.5–51)
HGB BLD-MCNC: 11.4 G/DL (ref 13–17.7)
IMM GRANULOCYTES # BLD AUTO: 0.12 10*3/MM3 (ref 0–0.05)
IMM GRANULOCYTES NFR BLD AUTO: 1.8 % (ref 0–0.5)
LYMPHOCYTES # BLD AUTO: 1.6 10*3/MM3 (ref 0.7–3.1)
LYMPHOCYTES NFR BLD AUTO: 23.8 % (ref 19.6–45.3)
MCH RBC QN AUTO: 32.6 PG (ref 26.6–33)
MCHC RBC AUTO-ENTMCNC: 33.5 G/DL (ref 31.5–35.7)
MCV RBC AUTO: 97.1 FL (ref 79–97)
MONOCYTES # BLD AUTO: 0.56 10*3/MM3 (ref 0.1–0.9)
MONOCYTES NFR BLD AUTO: 8.3 % (ref 5–12)
NEUTROPHILS # BLD AUTO: 4.2 10*3/MM3 (ref 1.7–7)
NEUTROPHILS NFR BLD AUTO: 62.5 % (ref 42.7–76)
NRBC BLD AUTO-RTO: 0 /100 WBC (ref 0–0.2)
PLATELET # BLD AUTO: 133 10*3/MM3 (ref 140–450)
PMV BLD AUTO: 9.2 FL (ref 6–12)
POTASSIUM BLD-SCNC: 4.2 MMOL/L (ref 3.5–4.7)
PROT SERPL-MCNC: 6.9 G/DL (ref 6.3–8)
RBC # BLD AUTO: 3.5 10*6/MM3 (ref 4.14–5.8)
SODIUM BLD-SCNC: 133 MMOL/L (ref 134–145)
WBC NRBC COR # BLD: 6.72 10*3/MM3 (ref 3.4–10.8)

## 2020-03-24 PROCEDURE — 25010000002 TRASTUZUMAB PER 10 MG: Performed by: INTERNAL MEDICINE

## 2020-03-24 PROCEDURE — 96413 CHEMO IV INFUSION 1 HR: CPT

## 2020-03-24 PROCEDURE — 25010000002 LEUCOVORIN CALCIUM PER 50 MG: Performed by: INTERNAL MEDICINE

## 2020-03-24 PROCEDURE — 25010000002 DEXAMETHASONE PER 1 MG: Performed by: INTERNAL MEDICINE

## 2020-03-24 PROCEDURE — 80053 COMPREHEN METABOLIC PANEL: CPT

## 2020-03-24 PROCEDURE — 96416 CHEMO PROLONG INFUSE W/PUMP: CPT

## 2020-03-24 PROCEDURE — 96411 CHEMO IV PUSH ADDL DRUG: CPT

## 2020-03-24 PROCEDURE — 25010000002 FLUOROURACIL PER 500 MG: Performed by: INTERNAL MEDICINE

## 2020-03-24 PROCEDURE — 25010000002 DIPHENHYDRAMINE 1 EACH BAG: Performed by: INTERNAL MEDICINE

## 2020-03-24 PROCEDURE — 96375 TX/PRO/DX INJ NEW DRUG ADDON: CPT

## 2020-03-24 PROCEDURE — 85025 COMPLETE CBC W/AUTO DIFF WBC: CPT

## 2020-03-24 PROCEDURE — 25010000002 PALONOSETRON PER 25 MCG: Performed by: INTERNAL MEDICINE

## 2020-03-24 PROCEDURE — 96367 TX/PROPH/DG ADDL SEQ IV INF: CPT

## 2020-03-24 RX ORDER — DEXTROSE MONOHYDRATE 50 MG/ML
250 INJECTION, SOLUTION INTRAVENOUS ONCE
Status: DISCONTINUED | OUTPATIENT
Start: 2020-03-24 | End: 2020-03-24 | Stop reason: HOSPADM

## 2020-03-24 RX ORDER — FAMOTIDINE 10 MG/ML
20 INJECTION, SOLUTION INTRAVENOUS 2 TIMES DAILY
Status: DISCONTINUED | OUTPATIENT
Start: 2020-03-24 | End: 2020-03-24 | Stop reason: HOSPADM

## 2020-03-24 RX ORDER — PALONOSETRON 0.05 MG/ML
0.25 INJECTION, SOLUTION INTRAVENOUS ONCE
Status: COMPLETED | OUTPATIENT
Start: 2020-03-24 | End: 2020-03-24

## 2020-03-24 RX ORDER — SODIUM CHLORIDE 9 MG/ML
250 INJECTION, SOLUTION INTRAVENOUS ONCE
Status: COMPLETED | OUTPATIENT
Start: 2020-03-24 | End: 2020-03-24

## 2020-03-24 RX ORDER — FLUOROURACIL 50 MG/ML
400 INJECTION, SOLUTION INTRAVENOUS ONCE
Status: COMPLETED | OUTPATIENT
Start: 2020-03-24 | End: 2020-03-24

## 2020-03-24 RX ADMIN — LEUCOVORIN CALCIUM 810 MG: 350 INJECTION, POWDER, LYOPHILIZED, FOR SOLUTION INTRAMUSCULAR; INTRAVENOUS at 14:39

## 2020-03-24 RX ADMIN — SODIUM CHLORIDE 250 ML: 9 INJECTION, SOLUTION INTRAVENOUS at 13:46

## 2020-03-24 RX ADMIN — PALONOSETRON 0.25 MG: 0.05 INJECTION, SOLUTION INTRAVENOUS at 13:44

## 2020-03-24 RX ADMIN — DEXAMETHASONE SODIUM PHOSPHATE 4 MG: 10 INJECTION INTRAMUSCULAR; INTRAVENOUS at 13:45

## 2020-03-24 RX ADMIN — FAMOTIDINE 20 MG: 10 INJECTION INTRAVENOUS at 13:45

## 2020-03-24 RX ADMIN — DIPHENHYDRAMINE HYDROCHLORIDE 50 MG: 50 INJECTION INTRAMUSCULAR; INTRAVENOUS at 13:46

## 2020-03-24 RX ADMIN — FLUOROURACIL 4850 MG: 50 INJECTION, SOLUTION INTRAVENOUS at 15:10

## 2020-03-24 RX ADMIN — TRASTUZUMAB 350 MG: 150 INJECTION, POWDER, LYOPHILIZED, FOR SOLUTION INTRAVENOUS at 14:07

## 2020-03-24 RX ADMIN — FLUOROURACIL 810 MG: 50 INJECTION, SOLUTION INTRAVENOUS at 15:10

## 2020-03-24 NOTE — TELEPHONE ENCOUNTER
----- Message from Alexey Haynes MD sent at 3/24/2020  4:15 PM EDT -----  I WILL CHANGE AFTER I SEE HIM THE NEXT TIME  ----- Message -----  From: Fanny Christy RN  Sent: 3/24/2020   1:57 PM EDT  To: Alexey Haynes MD    The patient mentioned that you were going to switch him from lovenox to an oral anticoagulant. I didn't see it mentioned in note. Is there something you want me to send in for the patient to take the place of lovenox or is he to stay on this?

## 2020-03-26 ENCOUNTER — INFUSION (OUTPATIENT)
Dept: ONCOLOGY | Facility: HOSPITAL | Age: 65
End: 2020-03-26

## 2020-03-26 DIAGNOSIS — C15.5 MALIGNANT NEOPLASM OF LOWER THIRD OF ESOPHAGUS (HCC): Primary | ICD-10-CM

## 2020-03-26 DIAGNOSIS — Z45.2 ENCOUNTER FOR ADJUSTMENT OR MANAGEMENT OF VASCULAR ACCESS DEVICE: ICD-10-CM

## 2020-03-26 DIAGNOSIS — C78.7 LIVER METASTASIS: ICD-10-CM

## 2020-03-26 PROCEDURE — 25010000003 HEPARIN LOCK FLUCH PER 10 UNITS: Performed by: INTERNAL MEDICINE

## 2020-03-26 RX ORDER — HEPARIN SODIUM (PORCINE) LOCK FLUSH IV SOLN 100 UNIT/ML 100 UNIT/ML
500 SOLUTION INTRAVENOUS AS NEEDED
Status: DISCONTINUED | OUTPATIENT
Start: 2020-03-26 | End: 2020-03-26 | Stop reason: HOSPADM

## 2020-03-26 RX ORDER — HEPARIN SODIUM (PORCINE) LOCK FLUSH IV SOLN 100 UNIT/ML 100 UNIT/ML
500 SOLUTION INTRAVENOUS AS NEEDED
Status: CANCELLED | OUTPATIENT
Start: 2020-03-26

## 2020-03-26 RX ORDER — SODIUM CHLORIDE 0.9 % (FLUSH) 0.9 %
10 SYRINGE (ML) INJECTION AS NEEDED
Status: CANCELLED | OUTPATIENT
Start: 2020-03-26

## 2020-03-26 RX ORDER — SODIUM CHLORIDE 0.9 % (FLUSH) 0.9 %
10 SYRINGE (ML) INJECTION AS NEEDED
Status: DISCONTINUED | OUTPATIENT
Start: 2020-03-26 | End: 2020-03-26 | Stop reason: HOSPADM

## 2020-03-26 RX ADMIN — SODIUM CHLORIDE, PRESERVATIVE FREE 10 ML: 5 INJECTION INTRAVENOUS at 13:05

## 2020-03-26 RX ADMIN — Medication 500 UNITS: at 13:05

## 2020-04-01 ENCOUNTER — TELEPHONE (OUTPATIENT)
Dept: ONCOLOGY | Facility: CLINIC | Age: 65
End: 2020-04-01

## 2020-04-01 NOTE — TELEPHONE ENCOUNTER
----- Message from Alexey Haynes MD sent at 4/1/2020  1:56 PM EDT -----  Regarding: FW: ECHO R/S?  2 m is fine  ----- Message -----  From: Eli Simms  Sent: 4/1/2020   1:47 PM EDT  To: Alexey Haynes MD  Subject: ECHO R/S?                                        The hospital is moving the patients ECHO that do no need to be right now, this patient is scheduled for 4/7/2020. Please let me know if this has to be this date or if it can be r/s to later & how far if so? Thank you!

## 2020-04-07 ENCOUNTER — APPOINTMENT (OUTPATIENT)
Dept: CARDIOLOGY | Facility: HOSPITAL | Age: 65
End: 2020-04-07

## 2020-04-14 ENCOUNTER — OFFICE VISIT (OUTPATIENT)
Dept: ONCOLOGY | Facility: CLINIC | Age: 65
End: 2020-04-14

## 2020-04-14 ENCOUNTER — INFUSION (OUTPATIENT)
Dept: ONCOLOGY | Facility: HOSPITAL | Age: 65
End: 2020-04-14

## 2020-04-14 ENCOUNTER — DOCUMENTATION (OUTPATIENT)
Dept: ONCOLOGY | Facility: CLINIC | Age: 65
End: 2020-04-14

## 2020-04-14 VITALS
DIASTOLIC BLOOD PRESSURE: 73 MMHG | TEMPERATURE: 97.5 F | HEART RATE: 76 BPM | BODY MASS INDEX: 26.67 KG/M2 | OXYGEN SATURATION: 100 % | SYSTOLIC BLOOD PRESSURE: 116 MMHG | HEIGHT: 69 IN | WEIGHT: 180.1 LBS | RESPIRATION RATE: 20 BRPM

## 2020-04-14 DIAGNOSIS — I10 ESSENTIAL HYPERTENSION: ICD-10-CM

## 2020-04-14 DIAGNOSIS — G62.0 PERIPHERAL NEUROPATHY DUE TO CHEMOTHERAPY (HCC): ICD-10-CM

## 2020-04-14 DIAGNOSIS — I82.412 ACUTE DEEP VEIN THROMBOSIS (DVT) OF FEMORAL VEIN OF LEFT LOWER EXTREMITY (HCC): ICD-10-CM

## 2020-04-14 DIAGNOSIS — T45.1X5A PERIPHERAL NEUROPATHY DUE TO CHEMOTHERAPY (HCC): ICD-10-CM

## 2020-04-14 DIAGNOSIS — T45.1X5A CHEMOTHERAPY INDUCED NEUTROPENIA (HCC): ICD-10-CM

## 2020-04-14 DIAGNOSIS — I82.431 ACUTE DEEP VEIN THROMBOSIS (DVT) OF RIGHT POPLITEAL VEIN (HCC): ICD-10-CM

## 2020-04-14 DIAGNOSIS — N32.1 COLOVESICAL FISTULA: ICD-10-CM

## 2020-04-14 DIAGNOSIS — C78.7 LIVER METASTASIS: ICD-10-CM

## 2020-04-14 DIAGNOSIS — Z72.0 TOBACCO ABUSE: ICD-10-CM

## 2020-04-14 DIAGNOSIS — Z45.2 ENCOUNTER FOR ADJUSTMENT OR MANAGEMENT OF VASCULAR ACCESS DEVICE: ICD-10-CM

## 2020-04-14 DIAGNOSIS — C15.5 MALIGNANT NEOPLASM OF LOWER THIRD OF ESOPHAGUS (HCC): Primary | ICD-10-CM

## 2020-04-14 DIAGNOSIS — D70.1 CHEMOTHERAPY INDUCED NEUTROPENIA (HCC): ICD-10-CM

## 2020-04-14 DIAGNOSIS — D63.8 ANEMIA, CHRONIC DISEASE: ICD-10-CM

## 2020-04-14 DIAGNOSIS — Z79.899 HIGH RISK MEDICATION USE: ICD-10-CM

## 2020-04-14 DIAGNOSIS — C15.5 MALIGNANT NEOPLASM OF LOWER THIRD OF ESOPHAGUS (HCC): ICD-10-CM

## 2020-04-14 DIAGNOSIS — C78.7 LIVER METASTASES: ICD-10-CM

## 2020-04-14 LAB
ALBUMIN SERPL-MCNC: 4.1 G/DL (ref 3.5–5.2)
ALBUMIN/GLOB SERPL: 1.5 G/DL (ref 1.1–2.4)
ALP SERPL-CCNC: 79 U/L (ref 38–116)
ALT SERPL W P-5'-P-CCNC: 44 U/L (ref 0–41)
ANION GAP SERPL CALCULATED.3IONS-SCNC: 12.9 MMOL/L (ref 5–15)
AST SERPL-CCNC: 27 U/L (ref 0–40)
BASOPHILS # BLD AUTO: 0.03 10*3/MM3 (ref 0–0.2)
BASOPHILS NFR BLD AUTO: 0.5 % (ref 0–1.5)
BILIRUB SERPL-MCNC: 0.2 MG/DL (ref 0.2–1.2)
BUN BLD-MCNC: 11 MG/DL (ref 6–20)
BUN/CREAT SERPL: 13.9 (ref 7.3–30)
CALCIUM SPEC-SCNC: 9.6 MG/DL (ref 8.5–10.2)
CEA SERPL-MCNC: 9.24 NG/ML
CHLORIDE SERPL-SCNC: 97 MMOL/L (ref 98–107)
CO2 SERPL-SCNC: 24.1 MMOL/L (ref 22–29)
CREAT BLD-MCNC: 0.79 MG/DL (ref 0.7–1.3)
DEPRECATED RDW RBC AUTO: 54.8 FL (ref 37–54)
EOSINOPHIL # BLD AUTO: 0.25 10*3/MM3 (ref 0–0.4)
EOSINOPHIL NFR BLD AUTO: 4.4 % (ref 0.3–6.2)
ERYTHROCYTE [DISTWIDTH] IN BLOOD BY AUTOMATED COUNT: 15.5 % (ref 12.3–15.4)
GFR SERPL CREATININE-BSD FRML MDRD: 99 ML/MIN/1.73
GLOBULIN UR ELPH-MCNC: 2.8 GM/DL (ref 1.8–3.5)
GLUCOSE BLD-MCNC: 100 MG/DL (ref 74–124)
HCT VFR BLD AUTO: 34.5 % (ref 37.5–51)
HGB BLD-MCNC: 11.4 G/DL (ref 13–17.7)
IMM GRANULOCYTES # BLD AUTO: 0.12 10*3/MM3 (ref 0–0.05)
IMM GRANULOCYTES NFR BLD AUTO: 2.1 % (ref 0–0.5)
LYMPHOCYTES # BLD AUTO: 1.45 10*3/MM3 (ref 0.7–3.1)
LYMPHOCYTES NFR BLD AUTO: 25.4 % (ref 19.6–45.3)
MCH RBC QN AUTO: 32.3 PG (ref 26.6–33)
MCHC RBC AUTO-ENTMCNC: 33 G/DL (ref 31.5–35.7)
MCV RBC AUTO: 97.7 FL (ref 79–97)
MONOCYTES # BLD AUTO: 0.52 10*3/MM3 (ref 0.1–0.9)
MONOCYTES NFR BLD AUTO: 9.1 % (ref 5–12)
NEUTROPHILS # BLD AUTO: 3.33 10*3/MM3 (ref 1.7–7)
NEUTROPHILS NFR BLD AUTO: 58.5 % (ref 42.7–76)
NRBC BLD AUTO-RTO: 0 /100 WBC (ref 0–0.2)
PLATELET # BLD AUTO: 131 10*3/MM3 (ref 140–450)
PMV BLD AUTO: 9.3 FL (ref 6–12)
POTASSIUM BLD-SCNC: 4.1 MMOL/L (ref 3.5–4.7)
PROT SERPL-MCNC: 6.9 G/DL (ref 6.3–8)
RBC # BLD AUTO: 3.53 10*6/MM3 (ref 4.14–5.8)
SODIUM BLD-SCNC: 134 MMOL/L (ref 134–145)
WBC NRBC COR # BLD: 5.7 10*3/MM3 (ref 3.4–10.8)

## 2020-04-14 PROCEDURE — 99214 OFFICE O/P EST MOD 30 MIN: CPT | Performed by: INTERNAL MEDICINE

## 2020-04-14 PROCEDURE — 96411 CHEMO IV PUSH ADDL DRUG: CPT

## 2020-04-14 PROCEDURE — 96413 CHEMO IV INFUSION 1 HR: CPT

## 2020-04-14 PROCEDURE — 25010000002 DEXAMETHASONE PER 1 MG: Performed by: INTERNAL MEDICINE

## 2020-04-14 PROCEDURE — 96375 TX/PRO/DX INJ NEW DRUG ADDON: CPT

## 2020-04-14 PROCEDURE — 80053 COMPREHEN METABOLIC PANEL: CPT

## 2020-04-14 PROCEDURE — 25010000002 DIPHENHYDRAMINE 1 EACH BAG: Performed by: INTERNAL MEDICINE

## 2020-04-14 PROCEDURE — 96367 TX/PROPH/DG ADDL SEQ IV INF: CPT

## 2020-04-14 PROCEDURE — 25010000002 LEUCOVORIN CALCIUM PER 50 MG: Performed by: INTERNAL MEDICINE

## 2020-04-14 PROCEDURE — 25010000002 PALONOSETRON PER 25 MCG: Performed by: INTERNAL MEDICINE

## 2020-04-14 PROCEDURE — 25010000002 TRASTUZUMAB PER 10 MG: Performed by: INTERNAL MEDICINE

## 2020-04-14 PROCEDURE — 25010000002 FLUOROURACIL PER 500 MG: Performed by: INTERNAL MEDICINE

## 2020-04-14 PROCEDURE — 96416 CHEMO PROLONG INFUSE W/PUMP: CPT

## 2020-04-14 PROCEDURE — 82378 CARCINOEMBRYONIC ANTIGEN: CPT | Performed by: INTERNAL MEDICINE

## 2020-04-14 PROCEDURE — 85025 COMPLETE CBC W/AUTO DIFF WBC: CPT

## 2020-04-14 PROCEDURE — 96417 CHEMO IV INFUS EACH ADDL SEQ: CPT

## 2020-04-14 RX ORDER — FAMOTIDINE 10 MG/ML
20 INJECTION, SOLUTION INTRAVENOUS AS NEEDED
Status: CANCELLED | OUTPATIENT
Start: 2020-04-14

## 2020-04-14 RX ORDER — FLUOROURACIL 50 MG/ML
400 INJECTION, SOLUTION INTRAVENOUS ONCE
Status: CANCELLED | OUTPATIENT
Start: 2020-04-14

## 2020-04-14 RX ORDER — SODIUM CHLORIDE 9 MG/ML
250 INJECTION, SOLUTION INTRAVENOUS ONCE
Status: CANCELLED | OUTPATIENT
Start: 2020-04-14

## 2020-04-14 RX ORDER — FLUOROURACIL 50 MG/ML
400 INJECTION, SOLUTION INTRAVENOUS ONCE
Status: COMPLETED | OUTPATIENT
Start: 2020-04-14 | End: 2020-04-14

## 2020-04-14 RX ORDER — FAMOTIDINE 10 MG/ML
20 INJECTION, SOLUTION INTRAVENOUS 2 TIMES DAILY
Status: CANCELLED | OUTPATIENT
Start: 2020-04-14

## 2020-04-14 RX ORDER — DIPHENHYDRAMINE HYDROCHLORIDE 50 MG/ML
50 INJECTION INTRAMUSCULAR; INTRAVENOUS AS NEEDED
Status: CANCELLED | OUTPATIENT
Start: 2020-04-14

## 2020-04-14 RX ORDER — SODIUM CHLORIDE 9 MG/ML
250 INJECTION, SOLUTION INTRAVENOUS ONCE
Status: COMPLETED | OUTPATIENT
Start: 2020-04-14 | End: 2020-04-14

## 2020-04-14 RX ORDER — PALONOSETRON 0.05 MG/ML
0.25 INJECTION, SOLUTION INTRAVENOUS ONCE
Status: COMPLETED | OUTPATIENT
Start: 2020-04-14 | End: 2020-04-14

## 2020-04-14 RX ORDER — PALONOSETRON 0.05 MG/ML
0.25 INJECTION, SOLUTION INTRAVENOUS ONCE
Status: CANCELLED | OUTPATIENT
Start: 2020-04-14

## 2020-04-14 RX ORDER — FAMOTIDINE 10 MG/ML
20 INJECTION, SOLUTION INTRAVENOUS 2 TIMES DAILY
Status: DISCONTINUED | OUTPATIENT
Start: 2020-04-14 | End: 2020-04-14 | Stop reason: HOSPADM

## 2020-04-14 RX ADMIN — DIPHENHYDRAMINE HYDROCHLORIDE 50 MG: 50 INJECTION INTRAMUSCULAR; INTRAVENOUS at 09:38

## 2020-04-14 RX ADMIN — PALONOSETRON 0.25 MG: 0.05 INJECTION, SOLUTION INTRAVENOUS at 09:34

## 2020-04-14 RX ADMIN — SODIUM CHLORIDE 250 ML: 9 INJECTION, SOLUTION INTRAVENOUS at 09:29

## 2020-04-14 RX ADMIN — FLUOROURACIL 4850 MG: 50 INJECTION, SOLUTION INTRAVENOUS at 11:12

## 2020-04-14 RX ADMIN — TRASTUZUMAB 350 MG: 150 INJECTION, POWDER, LYOPHILIZED, FOR SOLUTION INTRAVENOUS at 09:53

## 2020-04-14 RX ADMIN — FAMOTIDINE 20 MG: 10 INJECTION INTRAVENOUS at 09:31

## 2020-04-14 RX ADMIN — LEUCOVORIN CALCIUM 810 MG: 350 INJECTION, POWDER, LYOPHILIZED, FOR SOLUTION INTRAMUSCULAR; INTRAVENOUS at 10:32

## 2020-04-14 RX ADMIN — FLUOROURACIL 810 MG: 50 INJECTION, SOLUTION INTRAVENOUS at 11:12

## 2020-04-14 RX ADMIN — DEXAMETHASONE SODIUM PHOSPHATE 4 MG: 10 INJECTION INTRAMUSCULAR; INTRAVENOUS at 09:36

## 2020-04-14 NOTE — PROGRESS NOTES
Staff message rec fom Dr Haynes asking if pts insurance will cover Xarelto 20 mg.    Pt has an [employer] commercial plan. I contacted [facility] [phone] and spoke to Tangela. I inquired if Xarelto 20 mg would be covered under pts plan and she states the medication will require a PA.     I informed Dr Haynes of this and also let him know that I will work on the PA and once approved I will send the rx to [facility] pharmacy.    I have submitted the PA to [employer] through [employer].    Waiting for a decision.    [facility] [employer] stating the PA has been resolved, no further PA actions are needed.    I have sent the Xarelto rx to [facility] local [facility] pharmacy.

## 2020-04-14 NOTE — PROGRESS NOTES
Subjective     REASON FOR follow up:1.  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  Currently receiving palliative FOLFOX AND HERCEPTIN therapy.    2.COLOVESICAL FISTULA: chronic antibiotic therapy cipro, NO FURTHER PLANS FOR SURGERY AFTER VISIT AND PROCEDURE BY DR GM CASTILLO 1/20    3. DVT R AND LEFT LE ON ANTICOAGULANT LOVENOX: THROMBOPHILIA OF MALIGNANCY    4. 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 NEURONTIN INITIATED.        History of Present Illness This patient returns today to the office for followup. He is here today alone as expected given coronavirus issues and precautions. Overall he continues doing fantastic. The patient's appetite has been great, he has not had any difficulty swallowing, his weight remains stable, he has not had any nausea, vomiting, regurgitation, normal bowel activity, no passage of blood in the stool. He has not had any urinary tract infections or issues pertinent to his colovesical fistula. Since the discontinuation of Oxaliplatin in his chemotherapy regimen the patient has not developed any further peripheral neuropathy and he continues taking his Neurontin at bedtime that minimizes the symptoms. The patient remains fully functional. Unfortunately he continues smoking up to 6-10 cigarettes a day. His sisters have been instrumental in the consecution of food and he is able to go out wearing a mask to minimize the possibility of any coronavirus infection.    Overall his performance status is ECOG 0 and he has not had any other issues. He would like to return to work at some point late in May to minimize the potentiality to lose his insurance altogether.                                   Past Medical History:   Diagnosis Date   • Arthritis    • Elevated PSA    • Esophageal cancer (CMS/HCC)    • Esophageal mass    • Fistula    • H/O Lung nodule    •  "Hepatitis     CHILD--PT STATED \"I THINK IT WAS A.\"   • History of pneumonia    • Hx of blood clots 08/10/2019   • Hyperlipidemia    • Hypertension    • Neuropathy    • Prostate cancer (CMS/HCC)    • PVD (peripheral vascular disease) (CMS/HCC)         Past Surgical History:   Procedure Laterality Date   • COLONOSCOPY N/A 2/4/2020    Procedure: COLONOSCOPY;  Surgeon: Guy Sears MD;  Location: [facility] ENDOSCOPY;  Service: General;  Laterality: N/A;  PRE-COLOVESICAL FISTULA  POST-- DIVERTICULOSIS   • CYSTOSCOPY  02/06/2020   • CYSTOSCOPY Bilateral 2/26/2020    Procedure: CYSTOSCOPY RETROGRADE;  Surgeon: Cm Witt MD;  Location: [facility] MAIN OR;  Service: Urology;  Laterality: Bilateral;   • HERNIA REPAIR  1999   • PROSTATE SURGERY  03/2008   • VENOUS ACCESS DEVICE (PORT) INSERTION N/A 7/16/2019    Procedure: INSERTION VENOUS ACCESS DEVICE WITH FLUORO AND EGD WITH BIOPSY;  Surgeon: Mary Brito MD;  Location: [facility] MAIN OR;  Service: Thoracic        Current Outpatient Medications on File Prior to Visit   Medication Sig Dispense Refill   • ciprofloxacin (CIPRO) 250 MG tablet TAKE 2 TABLETS BY MOUTH 2 (TWO) TIMES A DAY. (Patient taking differently: Take 250 mg by mouth 2 (Two) Times a Day.) 60 tablet 1   • clotrimazole (MYCELEX) 10 MG obie 3 TIMES A DAY 90 tablet 1   • enoxaparin (LOVENOX) 80 MG/0.8ML solution syringe Inject 0.8 mL under the skin into the appropriate area as directed Every 12 (Twelve) Hours. (Patient taking differently: Inject 80 mg under the skin into the appropriate area as directed Every 12 (Twelve) Hours. TO HOLD DAY PRIOR TO SURGERY) 48 mL 11   • gabapentin (NEURONTIN) 300 MG capsule Take 1 capsule by mouth every night at bedtime. 30 capsule 2   • lisinopril-hydrochlorothiazide (PRINZIDE,ZESTORETIC) 20-12.5 MG per tablet Take 1 tablet by mouth Daily. 90 tablet 2   • ondansetron (ZOFRAN) 4 MG tablet Take 1 tablet by mouth Every 8 (Eight) Hours As Needed for Nausea or " Vomiting. 30 tablet 2   • Probiotic Product (PROBIOTIC DAILY PO) Take 1 tablet by mouth Daily.     • triamcinolone (KENALOG) 0.1 % ointment Apply  topically to the appropriate area as directed 2 (Two) Times a Day. (Patient taking differently: Apply 1 application topically to the appropriate area as directed As Needed.) 30 g 2     No current facility-administered medications on file prior to visit.         ALLERGIES:  No Known Allergies     Social History     Socioeconomic History   • Marital status: Single     Spouse name: Not on file   • Number of children: Not on file   • Years of education: High school   • Highest education level: Not on file   Occupational History   • Occupation: [employer]     Employer: [employer]   Tobacco Use   • Smoking status: Current Every Day Smoker     Packs/day: 1.00     Years: 40.00     Pack years: 40.00     Types: Cigarettes   • Smokeless tobacco: Never Used   Substance and Sexual Activity   • Alcohol use: Not Currently     Comment: NOT RECENTLY- none since september   • Drug use: No   • Sexual activity: Defer        Family History   Problem Relation Age of Onset   • Hypertension Mother    • Stroke Mother    • Hypertension Other    • Lung disease Other    • Prostate cancer Other    • Lung cancer Father    • Malig Hyperthermia Neg Hx          Review of Systems:                General: no fever, no chills, no fatigue,no weight changes, no lack of appetite.  Eyes: no epiphora, xerophthalmia,conjunctivitis, pain, glaucoma, blurred vision, blindness, secretion, photophobia, proptosis, diplopia.  Ears: no otorrhea, tinnitus, otorrhagia, deafness, pain, vertigo.  Nose: no rhinorrhea, no epistaxis, no alteration in perception of odors, no sinuses pressure.  Mouth: no alteration in gums or teeth,  No ulcers, no difficulty with mastication or deglut ion, no odynophagia.  Neck: no masses or pain, no thyroid alterations, no pain in muscles or arteries, no carotid odynia, no  "crepitation.  Respiratory: no cough, no sputum production,no dyspnea,no trepopnea, no pleuritic pain,no hemoptysis.  Heart: no syncope, no irregularity, no palpitations, no angina,no orthopnea,no paroxysmal nocturnal dyspnea.  Vascular Venous: no tenderness,no edema,no palpable cords,no postphlebitic syndrome, no skin changes no ulcerations.  Vascular Arterial: no distal ischemia, noclaudication, no gangrene, no neuropathic ischemic pain, no skin ulcers, no paleness no cyanosis.  GI: no dysphagia, no odynophagia, no regurgitation, no heartburn,no indigestion,no nausea,no vomiting,no hematemesis ,no melena,no jaundice,no distention, no obstipation,no enterorrhagia,no proctalgia,no anal  lesions, no changes in bowel habits.  : no frequency, no hesitancy, no hematuria, no discharge,no  pain.  Musculoskeletal: no muscle or tendon pain or inflammation,no  joint pain, no edema, no functional limitation,no fasciculations, no mass.  Neurologic: no headache, no seizures, noalterations on Craneal nerves, no motor deficit, no sensory deficit, normal coordination, no alteration in memory,normal orientation, calculation,normal writting, verbal and written language.  Skin: no rashes,no pruritus no localized lesions.  Psychiatric: no anxiety, no depression,no agitation, no delusions, proper insight.                Objective     Vitals:    04/14/20 0846   BP: 116/73   Pulse: 76   Resp: 20   Temp: 97.5 °F (36.4 °C)   TempSrc: Oral   SpO2: 100%   Weight: 81.7 kg (180 lb 1.6 oz)   Height: 175.3 cm (69.02\")   PainSc: 0-No pain     Current Status 4/14/2020   ECOG score 0       Physical Exam:          GENERAL:  Well-developed, well-nourished  Patient  in no acute distress. WEARING MASK  SKIN:  Warm, dry ,NO rashes,NO purpura ,NO petechiae.  HEENT:  Pupils were equal and reactive to light and accomodation, conjunctivas non injected, no pterigion, normal extraocular movements, normal visual acuity.   Mouth mucosa was moist, no exudates " in oropharynx, normal gum line, normal roof of the mouth and pillars, normal papillations of the tongue.  NECK:  Supple with good range of motion; no thyromegaly or masses, no JVD or bruits, no cervical adenopathies.No carotid arteries pain, no carotid abnormal pulsation , NO arterial dance.  LYMPHATICS:  No cervical, NO supraclavicular, NO axillary,NO epitrochlear , NO inguinal adenopathy.  CHEST:  Normal excursion of both kailash thoraces, normal voice fremitus, no subcutaneous emphysema, normal axillas, no rashes or acanthosis nigricans. normal breath sounds bilaterally, NO wheezing,NO crackles NO ronchi, NO stridor, NO rubs.  CARDIAC   normal rate and regular rhythm, without murmur,NO rubs NO S3 NO S4 right or left . Normal femoral, popliteal, pedis, brachial and carotid pulses.  VASCULAR ARTERIAL: normal carotids,brachial,radial,femoral,popliteal, pedis pulses , no bruits.no paleness or cyanosis, no pain, no edema, no numbness, no gangrene.  VASCULAR VENOUS: no cyanosis, collateral circulation, varicosities, edema, palpable cords, pain, erythema.  ABDOMEN:  Soft, nontender with no hepatomegaly, no splenomegaly,no masses, no ascites, no collateral circulation,no distention,no Pana sign, no abdominal pain, no inguinal hernias,no umbilical hernia, no abdominal bruits. Normal bowel sounds.  GENITAL: Not  Performed.  EXTREMITIES  AND SPINE:  No clubbing, cyanosis or edema, no deformities or pain .No kyphosis, scoliosis, deformities or pain in spine, ribs or pelvic bone.  NEUROLOGICAL:  Patient was awake, alert, oriented to time, person and place.                            RECENT LABS:  Hematology WBC   Date Value Ref Range Status   04/14/2020 5.70 3.40 - 10.80 10*3/mm3 Final   02/02/2019 13.4 (H) 3.4 - 10.8 x10E3/uL Final     RBC   Date Value Ref Range Status   04/14/2020 3.53 (L) 4.14 - 5.80 10*6/mm3 Final   02/02/2019 4.81 4.14 - 5.80 x10E6/uL Final     Hemoglobin   Date Value Ref Range Status   04/14/2020 11.4  (L) 13.0 - 17.7 g/dL Final     Hematocrit   Date Value Ref Range Status   04/14/2020 34.5 (L) 37.5 - 51.0 % Final     Platelets   Date Value Ref Range Status   04/14/2020 131 (L) 140 - 450 10*3/mm3 Final            Component      Latest Ref Rng & Units 12/3/2019 2/18/2020 3/3/2020   CEA      ng/mL 41.10 14.30 13.80             Assessment/Plan    1.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 Almost 90% of the liver WAS replaced by tumor. The patient has been undergoing chemotherapy with FOLFOX and Herceptin and has had excellent response clinically and radiologically.  Today, the patient on clinical examination has had resolution of his giant hepatomegaly that was nodular and mildly tender in the right upper quadrant of his abdomen.  His liver has reduced in size very dramatically.  Most importantly, the patient has had resolution of his cancer-related pain.  His performance status is ECOG 0.  He is no longer taking pain medicine and developed thrombophilia associated with malignancy that has been treated with Lovenox successfully.  He has some ageusia related to treatment and this will require modification in his diet.  He remains smoking 1 pack of cigarettes daily.    The patient was further reviewed on 11/26/2019. I discussed with him and his sister the dramatic changes and improvement in his PET scan. This is one for the book. He has near complete resolution of the abnormal SUV uptake in the lower part of the esophagus and he has near complete resolution of the dramatic uptake in his whole liver parenchyma. There is no other site of disease. Kidney anatomy remains normal as well as his bladder anatomy. The fissure is still visible.       The patient returned on 02/18/2020 to be reviewed. He has had his colonoscopy by Guy Sears MD, on 02/04/2020 that has failed to document any opening into his bladder besides diverticula and no other alterations or tumoral lesions. The patient  states that he has had a cystoscopy by Cm Witt MD, a report of this is not available, he was told that everything looked normal. He will proceed with another cystoscopy using dye to be placed in the bladder to see if there is any passage of this into the colon in some way or form. I wonder if this fistula has closed altogether, the fact that there are no bladder tumors and no passage of stool through his urination tells us that this has closed. Maybe by the end of the day the patient is not going to require any surgery of any kind. That is good news.       The patient was further reviewed on 03/03/2020. At this time he has had a cystoscopy and fistulogram by Cm Witt MD, reviewed in the PAC system [facility] and now reviewed by Dr. Witt as well. He does not believe that there is any need for any surgical intervention in this patient at this time due to the small size of the fistulogram and the lack of recent complications. This will delay the continuation of chemotherapy at least for 6-8 weeks and maybe by the end of the day this will allow the cancer to take back.      The patient was further reviewed on 04/14/2020 in an office visit. He continues doing fantastic and he has minimal symptomatology pertinent to his metastatic cancer to the esophagus. We modified his chemotherapy regimen getting rid of the Oxaliplatin and continued the 5FU/leucovorin and Herceptin with a schedule of every 3 weeks. This has made a big difference in regard to his ability to function.    I pointed out to him that his CEA level has further dropped in the previous visit and this is a good sign in regard to continued controlling of his cancer with the present regimen of medications.    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 He has no need for E-prescription at this time.     In regard to his  thrombophilia associated with malignancy he remains on Lovenox and we will ask Ms. Erin Woody if the patient could qualify for Xarelto to take 20 mg once a day and replace eventually the Lovenox.    In regard to his colovesical fistula he has no symptoms at this time, he has not had any infections and he remains on Cipro 1 tablet a day to minimize any potentiality for another urinary tract infection. I am providing this medicine to him.     In regard to the question if he could return back to work at  at some point in May depending on the clinical circumstances. I think he will be able to return, his work is not heavy, he is on the line of production and is not physically tolling to him. If that is the case he will be able to keep his insurance.    I am planning to see him back in 6 weeks and he will return to see us in 3, 6 and 9 weeks having CBC, CMP and CEA level on each one of those occasions.    Finally I am planning to do an echocardiogram at some point in the next 5 weeks. Everything depends on the coronavirus situation. I find no reason why this could not be done unless that there is limitations in the access given the coronavirus crisis. So far he has no symptoms that would suggest any cardiac decompensation. I will find no problems if the echocardiogram is not done to continue the therapy with Herceptin in the same way.     In regard to his overall health I still fussed at him as much as I could in regard to cigarette smoking. He is not willing to listen.     I DISCUSSED WITH PATIENT IN DETAIL FORMS TO DECREASE CHANCES OF CORONAVIRUS INFECTION INCLUDING ISOLATION, PROPER HAND HIGIENE, AVOID PUBLIC PLACES  WITH CROWDS, FOLLOW  CDC RECOMENDATIONS, AND KEEP PERSONAL AND SOCIAL RESPONSIBILITY.WARE A MASK IN PUBLIC.  PATIENT IS AWARE THIS INFECTION COULD HAVE SEVERE CONSEQUENCES TO PERSONAL HEALTH AND FAMILY RAMIFICATIONS OF THIS.

## 2020-04-16 ENCOUNTER — INFUSION (OUTPATIENT)
Dept: ONCOLOGY | Facility: HOSPITAL | Age: 65
End: 2020-04-16

## 2020-04-16 DIAGNOSIS — Z45.2 ENCOUNTER FOR ADJUSTMENT OR MANAGEMENT OF VASCULAR ACCESS DEVICE: ICD-10-CM

## 2020-04-16 DIAGNOSIS — C78.7 LIVER METASTASIS: ICD-10-CM

## 2020-04-16 DIAGNOSIS — C15.5 MALIGNANT NEOPLASM OF LOWER THIRD OF ESOPHAGUS (HCC): Primary | ICD-10-CM

## 2020-04-16 PROCEDURE — 25010000003 HEPARIN LOCK FLUCH PER 10 UNITS: Performed by: INTERNAL MEDICINE

## 2020-04-16 RX ORDER — HEPARIN SODIUM (PORCINE) LOCK FLUSH IV SOLN 100 UNIT/ML 100 UNIT/ML
500 SOLUTION INTRAVENOUS AS NEEDED
Status: CANCELLED | OUTPATIENT
Start: 2020-04-16

## 2020-04-16 RX ORDER — SODIUM CHLORIDE 0.9 % (FLUSH) 0.9 %
10 SYRINGE (ML) INJECTION AS NEEDED
Status: DISCONTINUED | OUTPATIENT
Start: 2020-04-16 | End: 2020-04-16 | Stop reason: HOSPADM

## 2020-04-16 RX ORDER — SODIUM CHLORIDE 0.9 % (FLUSH) 0.9 %
10 SYRINGE (ML) INJECTION AS NEEDED
Status: CANCELLED | OUTPATIENT
Start: 2020-04-16

## 2020-04-16 RX ORDER — HEPARIN SODIUM (PORCINE) LOCK FLUSH IV SOLN 100 UNIT/ML 100 UNIT/ML
500 SOLUTION INTRAVENOUS AS NEEDED
Status: DISCONTINUED | OUTPATIENT
Start: 2020-04-16 | End: 2020-04-16 | Stop reason: HOSPADM

## 2020-04-16 RX ADMIN — Medication 500 UNITS: at 09:17

## 2020-04-16 RX ADMIN — SODIUM CHLORIDE, PRESERVATIVE FREE 10 ML: 5 INJECTION INTRAVENOUS at 09:17

## 2020-05-05 ENCOUNTER — INFUSION (OUTPATIENT)
Dept: ONCOLOGY | Facility: HOSPITAL | Age: 65
End: 2020-05-05

## 2020-05-05 ENCOUNTER — OFFICE VISIT (OUTPATIENT)
Dept: ONCOLOGY | Facility: CLINIC | Age: 65
End: 2020-05-05

## 2020-05-05 VITALS
TEMPERATURE: 97.6 F | DIASTOLIC BLOOD PRESSURE: 74 MMHG | WEIGHT: 182.4 LBS | RESPIRATION RATE: 16 BRPM | OXYGEN SATURATION: 99 % | SYSTOLIC BLOOD PRESSURE: 128 MMHG | BODY MASS INDEX: 27.02 KG/M2 | HEIGHT: 69 IN | HEART RATE: 87 BPM

## 2020-05-05 DIAGNOSIS — I10 ESSENTIAL HYPERTENSION: ICD-10-CM

## 2020-05-05 DIAGNOSIS — C78.7 LIVER METASTASIS: ICD-10-CM

## 2020-05-05 DIAGNOSIS — C15.5 MALIGNANT NEOPLASM OF LOWER THIRD OF ESOPHAGUS (HCC): ICD-10-CM

## 2020-05-05 DIAGNOSIS — T45.1X5A PERIPHERAL NEUROPATHY DUE TO CHEMOTHERAPY (HCC): ICD-10-CM

## 2020-05-05 DIAGNOSIS — G62.0 PERIPHERAL NEUROPATHY DUE TO CHEMOTHERAPY (HCC): ICD-10-CM

## 2020-05-05 DIAGNOSIS — Z79.899 HIGH RISK MEDICATION USE: ICD-10-CM

## 2020-05-05 DIAGNOSIS — D70.1 CHEMOTHERAPY INDUCED NEUTROPENIA (HCC): ICD-10-CM

## 2020-05-05 DIAGNOSIS — N32.1 COLOVESICAL FISTULA: ICD-10-CM

## 2020-05-05 DIAGNOSIS — C78.7 LIVER METASTASES: ICD-10-CM

## 2020-05-05 DIAGNOSIS — C15.5 MALIGNANT NEOPLASM OF LOWER THIRD OF ESOPHAGUS (HCC): Primary | ICD-10-CM

## 2020-05-05 DIAGNOSIS — Z45.2 ENCOUNTER FOR ADJUSTMENT OR MANAGEMENT OF VASCULAR ACCESS DEVICE: ICD-10-CM

## 2020-05-05 DIAGNOSIS — Z72.0 TOBACCO ABUSE: ICD-10-CM

## 2020-05-05 DIAGNOSIS — I82.412 ACUTE DEEP VEIN THROMBOSIS (DVT) OF FEMORAL VEIN OF LEFT LOWER EXTREMITY (HCC): ICD-10-CM

## 2020-05-05 DIAGNOSIS — T45.1X5A CHEMOTHERAPY INDUCED NEUTROPENIA (HCC): ICD-10-CM

## 2020-05-05 LAB
ALBUMIN SERPL-MCNC: 4.3 G/DL (ref 3.5–5.2)
ALBUMIN/GLOB SERPL: 1.5 G/DL (ref 1.1–2.4)
ALP SERPL-CCNC: 92 U/L (ref 38–116)
ALT SERPL W P-5'-P-CCNC: 44 U/L (ref 0–41)
ANION GAP SERPL CALCULATED.3IONS-SCNC: 12.3 MMOL/L (ref 5–15)
AST SERPL-CCNC: 29 U/L (ref 0–40)
BASOPHILS # BLD AUTO: 0.03 10*3/MM3 (ref 0–0.2)
BASOPHILS NFR BLD AUTO: 0.5 % (ref 0–1.5)
BILIRUB SERPL-MCNC: 0.3 MG/DL (ref 0.2–1.2)
BUN BLD-MCNC: 11 MG/DL (ref 6–20)
BUN/CREAT SERPL: 14.1 (ref 7.3–30)
CALCIUM SPEC-SCNC: 9.6 MG/DL (ref 8.5–10.2)
CEA SERPL-MCNC: 8.66 NG/ML
CHLORIDE SERPL-SCNC: 101 MMOL/L (ref 98–107)
CO2 SERPL-SCNC: 22.7 MMOL/L (ref 22–29)
CREAT BLD-MCNC: 0.78 MG/DL (ref 0.7–1.3)
DEPRECATED RDW RBC AUTO: 58.2 FL (ref 37–54)
EOSINOPHIL # BLD AUTO: 0.34 10*3/MM3 (ref 0–0.4)
EOSINOPHIL NFR BLD AUTO: 5.2 % (ref 0.3–6.2)
ERYTHROCYTE [DISTWIDTH] IN BLOOD BY AUTOMATED COUNT: 16 % (ref 12.3–15.4)
GFR SERPL CREATININE-BSD FRML MDRD: 101 ML/MIN/1.73
GLOBULIN UR ELPH-MCNC: 2.8 GM/DL (ref 1.8–3.5)
GLUCOSE BLD-MCNC: 121 MG/DL (ref 74–124)
HCT VFR BLD AUTO: 35 % (ref 37.5–51)
HGB BLD-MCNC: 11.5 G/DL (ref 13–17.7)
IMM GRANULOCYTES # BLD AUTO: 0.26 10*3/MM3 (ref 0–0.05)
IMM GRANULOCYTES NFR BLD AUTO: 4 % (ref 0–0.5)
LYMPHOCYTES # BLD AUTO: 1.6 10*3/MM3 (ref 0.7–3.1)
LYMPHOCYTES NFR BLD AUTO: 24.4 % (ref 19.6–45.3)
MCH RBC QN AUTO: 32.1 PG (ref 26.6–33)
MCHC RBC AUTO-ENTMCNC: 32.9 G/DL (ref 31.5–35.7)
MCV RBC AUTO: 97.8 FL (ref 79–97)
MONOCYTES # BLD AUTO: 0.59 10*3/MM3 (ref 0.1–0.9)
MONOCYTES NFR BLD AUTO: 9 % (ref 5–12)
NEUTROPHILS # BLD AUTO: 3.73 10*3/MM3 (ref 1.7–7)
NEUTROPHILS NFR BLD AUTO: 56.9 % (ref 42.7–76)
NRBC BLD AUTO-RTO: 0 /100 WBC (ref 0–0.2)
PLATELET # BLD AUTO: 172 10*3/MM3 (ref 140–450)
PMV BLD AUTO: 9.5 FL (ref 6–12)
POTASSIUM BLD-SCNC: 4.1 MMOL/L (ref 3.5–4.7)
PROT SERPL-MCNC: 7.1 G/DL (ref 6.3–8)
RBC # BLD AUTO: 3.58 10*6/MM3 (ref 4.14–5.8)
SODIUM BLD-SCNC: 136 MMOL/L (ref 134–145)
WBC NRBC COR # BLD: 6.55 10*3/MM3 (ref 3.4–10.8)

## 2020-05-05 PROCEDURE — 25010000002 TRASTUZUMAB PER 10 MG: Performed by: NURSE PRACTITIONER

## 2020-05-05 PROCEDURE — 25010000002 DIPHENHYDRAMINE 1 EACH BAG: Performed by: NURSE PRACTITIONER

## 2020-05-05 PROCEDURE — 80053 COMPREHEN METABOLIC PANEL: CPT

## 2020-05-05 PROCEDURE — 25010000002 PALONOSETRON PER 25 MCG: Performed by: NURSE PRACTITIONER

## 2020-05-05 PROCEDURE — 25010000002 LEUCOVORIN CALCIUM PER 50 MG: Performed by: NURSE PRACTITIONER

## 2020-05-05 PROCEDURE — 25010000002 DEXAMETHASONE PER 1 MG: Performed by: NURSE PRACTITIONER

## 2020-05-05 PROCEDURE — 25010000002 FLUOROURACIL PER 500 MG: Performed by: NURSE PRACTITIONER

## 2020-05-05 PROCEDURE — 96375 TX/PRO/DX INJ NEW DRUG ADDON: CPT

## 2020-05-05 PROCEDURE — 82378 CARCINOEMBRYONIC ANTIGEN: CPT | Performed by: INTERNAL MEDICINE

## 2020-05-05 PROCEDURE — 96367 TX/PROPH/DG ADDL SEQ IV INF: CPT

## 2020-05-05 PROCEDURE — 96411 CHEMO IV PUSH ADDL DRUG: CPT

## 2020-05-05 PROCEDURE — 85025 COMPLETE CBC W/AUTO DIFF WBC: CPT

## 2020-05-05 PROCEDURE — 99214 OFFICE O/P EST MOD 30 MIN: CPT | Performed by: NURSE PRACTITIONER

## 2020-05-05 PROCEDURE — 96413 CHEMO IV INFUSION 1 HR: CPT

## 2020-05-05 PROCEDURE — 96416 CHEMO PROLONG INFUSE W/PUMP: CPT

## 2020-05-05 RX ORDER — FAMOTIDINE 10 MG/ML
20 INJECTION, SOLUTION INTRAVENOUS AS NEEDED
Status: CANCELLED | OUTPATIENT
Start: 2020-05-05

## 2020-05-05 RX ORDER — FLUOROURACIL 50 MG/ML
400 INJECTION, SOLUTION INTRAVENOUS ONCE
Status: CANCELLED | OUTPATIENT
Start: 2020-05-05

## 2020-05-05 RX ORDER — PALONOSETRON 0.05 MG/ML
0.25 INJECTION, SOLUTION INTRAVENOUS ONCE
Status: COMPLETED | OUTPATIENT
Start: 2020-05-05 | End: 2020-05-05

## 2020-05-05 RX ORDER — CIPROFLOXACIN 250 MG/1
250 TABLET, FILM COATED ORAL DAILY
Qty: 90 TABLET | Refills: 1 | Status: SHIPPED | OUTPATIENT
Start: 2020-05-05 | End: 2020-11-03 | Stop reason: SDUPTHER

## 2020-05-05 RX ORDER — SODIUM CHLORIDE 9 MG/ML
250 INJECTION, SOLUTION INTRAVENOUS ONCE
Status: COMPLETED | OUTPATIENT
Start: 2020-05-05 | End: 2020-05-05

## 2020-05-05 RX ORDER — FAMOTIDINE 10 MG/ML
20 INJECTION, SOLUTION INTRAVENOUS 2 TIMES DAILY
Status: DISCONTINUED | OUTPATIENT
Start: 2020-05-05 | End: 2020-05-05 | Stop reason: HOSPADM

## 2020-05-05 RX ORDER — FAMOTIDINE 10 MG/ML
20 INJECTION, SOLUTION INTRAVENOUS 2 TIMES DAILY
Status: CANCELLED | OUTPATIENT
Start: 2020-05-05

## 2020-05-05 RX ORDER — SODIUM CHLORIDE 9 MG/ML
250 INJECTION, SOLUTION INTRAVENOUS ONCE
Status: CANCELLED | OUTPATIENT
Start: 2020-05-05

## 2020-05-05 RX ORDER — DIPHENHYDRAMINE HYDROCHLORIDE 50 MG/ML
50 INJECTION INTRAMUSCULAR; INTRAVENOUS AS NEEDED
Status: CANCELLED | OUTPATIENT
Start: 2020-05-05

## 2020-05-05 RX ORDER — FLUOROURACIL 50 MG/ML
400 INJECTION, SOLUTION INTRAVENOUS ONCE
Status: COMPLETED | OUTPATIENT
Start: 2020-05-05 | End: 2020-05-05

## 2020-05-05 RX ORDER — PALONOSETRON 0.05 MG/ML
0.25 INJECTION, SOLUTION INTRAVENOUS ONCE
Status: CANCELLED | OUTPATIENT
Start: 2020-05-05

## 2020-05-05 RX ADMIN — TRASTUZUMAB 350 MG: 150 INJECTION, POWDER, LYOPHILIZED, FOR SOLUTION INTRAVENOUS at 10:11

## 2020-05-05 RX ADMIN — LEUCOVORIN CALCIUM 810 MG: 350 INJECTION, POWDER, LYOPHILIZED, FOR SOLUTION INTRAMUSCULAR; INTRAVENOUS at 10:45

## 2020-05-05 RX ADMIN — PALONSETRON HYDROCHLORIDE 0.25 MG: 0.25 INJECTION, SOLUTION INTRAVENOUS at 09:44

## 2020-05-05 RX ADMIN — SODIUM CHLORIDE 250 ML: 9 INJECTION, SOLUTION INTRAVENOUS at 09:44

## 2020-05-05 RX ADMIN — DEXAMETHASONE SODIUM PHOSPHATE 4 MG: 10 INJECTION INTRAMUSCULAR; INTRAVENOUS at 09:46

## 2020-05-05 RX ADMIN — FLUOROURACIL 4850 MG: 50 INJECTION, SOLUTION INTRAVENOUS at 11:23

## 2020-05-05 RX ADMIN — FLUOROURACIL 810 MG: 50 INJECTION, SOLUTION INTRAVENOUS at 11:17

## 2020-05-05 RX ADMIN — FAMOTIDINE 20 MG: 10 INJECTION INTRAVENOUS at 09:45

## 2020-05-05 RX ADMIN — DIPHENHYDRAMINE HYDROCHLORIDE 25 MG: 50 INJECTION INTRAMUSCULAR; INTRAVENOUS at 09:48

## 2020-05-05 NOTE — PROGRESS NOTES
Pt treatment plan has 50mg IV benadryl scheduled with every treatment. Pt has been tolerating treatment well. No c/o. Reviewed with Janett SHANKAR, OK to decrease to 25mg IV benadryl. Dana to change careplan.

## 2020-05-05 NOTE — PROGRESS NOTES
"  Subjective     REASON FOR follow up:1.    1. 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  Currently receiving palliative FOLFOX AND HERCEPTIN therapy.    2.COLOVESICAL FISTULA: chronic antibiotic therapy cipro, NO FURTHER PLANS FOR SURGERY AFTER VISIT AND PROCEDURE BY DR GM CASTILLO 1/20    3. DVT R AND LEFT LE ON ANTICOAGULANT LOVENOX: THROMBOPHILIA OF MALIGNANCY    4. 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 NEURONTIN INITIATED.        History of Present Illness   Patient is a 64-year-old gentleman with the above medical history here today for scheduled Herceptin FOLFOX.  He continues to tolerate treatment relatively well.  His neuropathy is stable.  He does continue to experience significant neuropathy however he reports his symptoms no worse.  He continues to take gabapentin as prescribed.  He is not requesting refills.    Reports no new symptoms such as fever, chills, shortness of breath, respiratory issues.  His appetite is good.  His vital signs are stable.    He continues to take Cipro 250 mg daily prophylactically for recurrent UTIs.  He has not experienced any infectious symptoms at this time.  He is requesting a refill of his Cipro.    Past Medical History:   Diagnosis Date   • Arthritis    • Elevated PSA    • Esophageal cancer (CMS/HCC)    • Esophageal mass    • Fistula    • H/O Lung nodule    • Hepatitis     CHILD--PT STATED \"I THINK IT WAS A.\"   • History of pneumonia    • Hx of blood clots 08/10/2019   • Hyperlipidemia    • Hypertension    • Neuropathy    • Prostate cancer (CMS/HCC)    • PVD (peripheral vascular disease) (CMS/HCC)         Past Surgical History:   Procedure Laterality Date   • COLONOSCOPY N/A 2/4/2020    Procedure: COLONOSCOPY;  Surgeon: Guy Sears MD;  Location: [facility] ENDOSCOPY;  Service: General;  Laterality: N/A;  PRE-COLOVESICAL " FISTULA  POST-- DIVERTICULOSIS   • CYSTOSCOPY  02/06/2020   • CYSTOSCOPY Bilateral 2/26/2020    Procedure: CYSTOSCOPY RETROGRADE;  Surgeon: Cm Witt MD;  Location: [facility];  Service: Urology;  Laterality: Bilateral;   • HERNIA REPAIR  1999   • PROSTATE SURGERY  03/2008   • VENOUS ACCESS DEVICE (PORT) INSERTION N/A 7/16/2019    Procedure: INSERTION VENOUS ACCESS DEVICE WITH FLUORO AND EGD WITH BIOPSY;  Surgeon: Mary Brito MD;  Location: [facility];  Service: Thoracic        Current Outpatient Medications on File Prior to Visit   Medication Sig Dispense Refill   • ciprofloxacin (CIPRO) 250 MG tablet TAKE 2 TABLETS BY MOUTH 2 (TWO) TIMES A DAY. (Patient taking differently: Take 250 mg by mouth 2 (Two) Times a Day.) 60 tablet 1   • clotrimazole (MYCELEX) 10 MG obie 3 TIMES A DAY 90 tablet 1   • gabapentin (NEURONTIN) 300 MG capsule Take 1 capsule by mouth every night at bedtime. 30 capsule 2   • lisinopril-hydrochlorothiazide (PRINZIDE,ZESTORETIC) 20-12.5 MG per tablet Take 1 tablet by mouth Daily. 90 tablet 2   • ondansetron (ZOFRAN) 4 MG tablet Take 1 tablet by mouth Every 8 (Eight) Hours As Needed for Nausea or Vomiting. 30 tablet 2   • Probiotic Product (PROBIOTIC DAILY PO) Take 1 tablet by mouth Daily.     • rivaroxaban (XARELTO) 20 MG tablet Take 1 tablet by mouth Daily. 30 tablet 6   • triamcinolone (KENALOG) 0.1 % ointment Apply  topically to the appropriate area as directed 2 (Two) Times a Day. (Patient taking differently: Apply 1 application topically to the appropriate area as directed As Needed.) 30 g 2     No current facility-administered medications on file prior to visit.         ALLERGIES:  No Known Allergies     Social History     Socioeconomic History   • Marital status: Single     Spouse name: Not on file   • Number of children: Not on file   • Years of education: High school   • Highest education level: Not on file   Occupational History   • Occupation: Assembly      Employer: [email or web address]   Tobacco Use   • Smoking status: Current Every Day Smoker     Packs/day: 1.00     Years: 40.00     Pack years: 40.00     Types: Cigarettes   • Smokeless tobacco: Never Used   Substance and Sexual Activity   • Alcohol use: Not Currently     Comment: NOT RECENTLY- none since september   • Drug use: No   • Sexual activity: Defer        Family History   Problem Relation Age of Onset   • Hypertension Mother    • Stroke Mother    • Hypertension Other    • Lung disease Other    • Prostate cancer Other    • Lung cancer Father    • Malig Hyperthermia Neg Hx          Review of Systems:  Review of Systems   Constitutional: Positive for fatigue.   HENT:  Negative.    Eyes: Negative.    Respiratory: Negative.    Cardiovascular: Negative.    Gastrointestinal: Negative.    Genitourinary:         See hpi   Musculoskeletal: Negative.    Skin: Negative.    Neurological: Positive for numbness.   Hematological: Negative.    Psychiatric/Behavioral: Negative.            Objective     There were no vitals filed for this visit.  Current Status 4/14/2020   ECOG score 0       Physical Exam   Constitutional: He is oriented to person, place, and time. He appears well-developed and well-nourished.   HENT:   Head: Normocephalic and atraumatic.   Eyes: Pupils are equal, round, and reactive to light. EOM are normal.   Neck: Normal range of motion.   Cardiovascular: Normal rate.   Pulmonary/Chest: Effort normal.   Abdominal: Soft.   Musculoskeletal: Normal range of motion.   Neurological: He is alert and oriented to person, place, and time.   Skin: Skin is warm and dry.   Skin changes secondary to peripheral vascular disease in the lower extremities bilaterally, no swelling or tenderness noted   Psychiatric: He has a normal mood and affect.                 RECENT LABS:  Hematology WBC   Date Value Ref Range Status   04/14/2020 5.70 3.40 - 10.80 10*3/mm3 Final   02/02/2019 13.4 (H) 3.4 - 10.8 x10E3/uL Final     RBC   Date  Value Ref Range Status   04/14/2020 3.53 (L) 4.14 - 5.80 10*6/mm3 Final   02/02/2019 4.81 4.14 - 5.80 x10E6/uL Final     Hemoglobin   Date Value Ref Range Status   04/14/2020 11.4 (L) 13.0 - 17.7 g/dL Final     Hematocrit   Date Value Ref Range Status   04/14/2020 34.5 (L) 37.5 - 51.0 % Final     Platelets   Date Value Ref Range Status   04/14/2020 131 (L) 140 - 450 10*3/mm3 Final            Component      Latest Ref Rng & Units 12/3/2019 2/18/2020 3/3/2020   CEA      ng/mL 41.10 14.30 13.80             Assessment/Plan    1.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 Almost 90% of the liver WAS replaced by tumor. The patient has been undergoing chemotherapy with FOLFOX and Herceptin and has had excellent response clinically and radiologically.  He is here today for scheduled treatment, 5/5/2020.  He continues to tolerate treatment quite well.  He no longer receives oxaliplatin secondary to neuropathy.      2.  Recurrent UTIs.  He does take Cipro 250 mg daily and is requesting a refill today.  He is followed by [facility]m Witt [facility].      3.  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  Oxaliplatin has been omitted from his treatment plan.  He takes gabapentin which provides him mild relief.  His symptoms are persistent but stable.    4.  Thrombophilia associated with malignancy.  He is currently taking Xarelto daily with good tolerance.    5.  Peripheral vascular disease of the lower extremities.  His symptoms are stable.    PLAN:  1.  We will proceed with treatment as scheduled today.  2.  He will return for echocardiogram as scheduled on the 19th 2020, he will then see Dr. Keller on May 26, 2020 in anticipation of his next treatment.  3.  We did discuss elevating his legs as much as possible.  Dr. Haynes has discussed potentially referring the patient to vascular surgery we hope to do as practices open up and he is not at such a severe risk.  4.  He will continue Xarelto as  described.  5.  I have placed a referral to see a  at the patient's request today.  He is very concerned about going back to work and if he is not able to physically return to work ultimately losing his insurance.  6.  I have E scribed Cipro 250 mg #90.    I have asked the patient to call the office with any new or worsening symptoms before his next scheduled visit.

## 2020-05-06 ENCOUNTER — DOCUMENTATION (OUTPATIENT)
Dept: ONCOLOGY | Facility: CLINIC | Age: 65
End: 2020-05-06

## 2020-05-06 NOTE — PROGRESS NOTES
CSW contacted patient regarding concerns related to work and insurance.  Patient asked if he could call CSW back when his sister is present because she understands the issues more than he does.  CSW provided patient with direct office line and encouraged him to call back at his convenience when sister is present.

## 2020-05-07 ENCOUNTER — INFUSION (OUTPATIENT)
Dept: ONCOLOGY | Facility: HOSPITAL | Age: 65
End: 2020-05-07

## 2020-05-07 DIAGNOSIS — C78.7 LIVER METASTASIS: ICD-10-CM

## 2020-05-07 DIAGNOSIS — C15.5 MALIGNANT NEOPLASM OF LOWER THIRD OF ESOPHAGUS (HCC): Primary | ICD-10-CM

## 2020-05-07 DIAGNOSIS — Z45.2 ENCOUNTER FOR ADJUSTMENT OR MANAGEMENT OF VASCULAR ACCESS DEVICE: ICD-10-CM

## 2020-05-07 PROCEDURE — 25010000003 HEPARIN LOCK FLUSH PER 10 UNITS: Performed by: INTERNAL MEDICINE

## 2020-05-07 RX ORDER — HEPARIN SODIUM (PORCINE) LOCK FLUSH IV SOLN 100 UNIT/ML 100 UNIT/ML
500 SOLUTION INTRAVENOUS AS NEEDED
Status: CANCELLED | OUTPATIENT
Start: 2020-05-07

## 2020-05-07 RX ORDER — SODIUM CHLORIDE 0.9 % (FLUSH) 0.9 %
10 SYRINGE (ML) INJECTION AS NEEDED
Status: DISCONTINUED | OUTPATIENT
Start: 2020-05-07 | End: 2020-05-07 | Stop reason: HOSPADM

## 2020-05-07 RX ORDER — HEPARIN SODIUM (PORCINE) LOCK FLUSH IV SOLN 100 UNIT/ML 100 UNIT/ML
500 SOLUTION INTRAVENOUS AS NEEDED
Status: DISCONTINUED | OUTPATIENT
Start: 2020-05-07 | End: 2020-05-07 | Stop reason: HOSPADM

## 2020-05-07 RX ORDER — SODIUM CHLORIDE 0.9 % (FLUSH) 0.9 %
10 SYRINGE (ML) INJECTION AS NEEDED
Status: CANCELLED | OUTPATIENT
Start: 2020-05-07

## 2020-05-07 RX ADMIN — SODIUM CHLORIDE, PRESERVATIVE FREE 10 ML: 5 INJECTION INTRAVENOUS at 09:47

## 2020-05-07 RX ADMIN — Medication 500 UNITS: at 09:47

## 2020-05-08 ENCOUNTER — DOCUMENTATION (OUTPATIENT)
Dept: ONCOLOGY | Facility: CLINIC | Age: 65
End: 2020-05-08

## 2020-05-08 NOTE — PROGRESS NOTES
CSW spoke with patient and his sister regarding questions related to health insurance.  Patient is currently on long term disability through his employer, [email or web address].  If he doesn't return to work after the disability expires, he will become unemployed per [email or web address]'s 12 month rule, at which point he will lose his health insurance.  CSW recommended patient contact [email or web address] HR department to determine what the cost of COBRA coverage will be following the loss of insurance.  Patient will be too young for Medicare and has not been collecting disability for at least 3 years.  Patient will not qualify for Medicaid.  CSW provided patient with information about how to explore plans through [facility] and compare prices.  CSW also recommended patient call his current health plan, [employer], and inquire about enrolling as an individual following lapse of the employer plan.  CSW also provided information for contacting insurance assisters/brokers in his area to assist him with investigating health plans through healthcare.gov.  In addition, CSW provided patient with the contact number for [employer] ([phone]), for when he is ready to enroll in Medicare.    CSW remains available to assist with support/resource as needed.

## 2020-05-19 ENCOUNTER — HOSPITAL ENCOUNTER (OUTPATIENT)
Dept: CARDIOLOGY | Facility: HOSPITAL | Age: 65
Discharge: HOME | End: 2020-05-19
Admitting: INTERNAL MEDICINE

## 2020-05-19 VITALS
BODY MASS INDEX: 26.96 KG/M2 | HEART RATE: 75 BPM | DIASTOLIC BLOOD PRESSURE: 40 MMHG | WEIGHT: 182 LBS | OXYGEN SATURATION: 98 % | HEIGHT: 69 IN | SYSTOLIC BLOOD PRESSURE: 80 MMHG

## 2020-05-19 DIAGNOSIS — I82.412 ACUTE DEEP VEIN THROMBOSIS (DVT) OF FEMORAL VEIN OF LEFT LOWER EXTREMITY (HCC): ICD-10-CM

## 2020-05-19 DIAGNOSIS — T45.1X5A PERIPHERAL NEUROPATHY DUE TO CHEMOTHERAPY (HCC): ICD-10-CM

## 2020-05-19 DIAGNOSIS — D70.1 CHEMOTHERAPY INDUCED NEUTROPENIA (HCC): ICD-10-CM

## 2020-05-19 DIAGNOSIS — N32.1 COLOVESICAL FISTULA: ICD-10-CM

## 2020-05-19 DIAGNOSIS — Z45.2 ENCOUNTER FOR ADJUSTMENT OR MANAGEMENT OF VASCULAR ACCESS DEVICE: ICD-10-CM

## 2020-05-19 DIAGNOSIS — Z72.0 TOBACCO ABUSE: ICD-10-CM

## 2020-05-19 DIAGNOSIS — C78.7 LIVER METASTASIS: ICD-10-CM

## 2020-05-19 DIAGNOSIS — Z79.899 HIGH RISK MEDICATION USE: ICD-10-CM

## 2020-05-19 DIAGNOSIS — I10 ESSENTIAL HYPERTENSION: ICD-10-CM

## 2020-05-19 DIAGNOSIS — T45.1X5A CHEMOTHERAPY INDUCED NEUTROPENIA (HCC): ICD-10-CM

## 2020-05-19 DIAGNOSIS — G62.0 PERIPHERAL NEUROPATHY DUE TO CHEMOTHERAPY (HCC): ICD-10-CM

## 2020-05-19 DIAGNOSIS — C15.5 MALIGNANT NEOPLASM OF LOWER THIRD OF ESOPHAGUS (HCC): ICD-10-CM

## 2020-05-19 LAB
AORTIC ARCH: 3 CM
AORTIC ROOT ANNULUS: 1.9 CM
ASCENDING AORTA: 3.1 CM
BH CV ECHO MEAS - ACS: 2.3 CM
BH CV ECHO MEAS - AO MAX PG (FULL): 2.9 MMHG
BH CV ECHO MEAS - AO MAX PG: 8.6 MMHG
BH CV ECHO MEAS - AO MEAN PG (FULL): 1.9 MMHG
BH CV ECHO MEAS - AO MEAN PG: 4.8 MMHG
BH CV ECHO MEAS - AO ROOT AREA (BSA CORRECTED): 1.8
BH CV ECHO MEAS - AO ROOT AREA: 9.6 CM^2
BH CV ECHO MEAS - AO ROOT DIAM: 3.5 CM
BH CV ECHO MEAS - AO V2 MAX: 146.6 CM/SEC
BH CV ECHO MEAS - AO V2 MEAN: 103.6 CM/SEC
BH CV ECHO MEAS - AO V2 VTI: 27.9 CM
BH CV ECHO MEAS - ASC AORTA: 3.1 CM
BH CV ECHO MEAS - AVA(I,A): 2 CM^2
BH CV ECHO MEAS - AVA(I,D): 2 CM^2
BH CV ECHO MEAS - AVA(V,A): 2 CM^2
BH CV ECHO MEAS - AVA(V,D): 2 CM^2
BH CV ECHO MEAS - BSA(HAYCOCK): 2 M^2
BH CV ECHO MEAS - BSA: 2 M^2
BH CV ECHO MEAS - BZI_BMI: 26.9 KILOGRAMS/M^2
BH CV ECHO MEAS - BZI_METRIC_HEIGHT: 175.3 CM
BH CV ECHO MEAS - BZI_METRIC_WEIGHT: 82.6 KG
BH CV ECHO MEAS - EDV(MOD-SP2): 86 ML
BH CV ECHO MEAS - EDV(MOD-SP4): 103 ML
BH CV ECHO MEAS - EDV(TEICH): 96.3 ML
BH CV ECHO MEAS - EF(CUBED): 63.5 %
BH CV ECHO MEAS - EF(MOD-BP): 63 %
BH CV ECHO MEAS - EF(MOD-SP2): 64 %
BH CV ECHO MEAS - EF(MOD-SP4): 61.2 %
BH CV ECHO MEAS - EF(TEICH): 55.1 %
BH CV ECHO MEAS - ESV(MOD-SP2): 31 ML
BH CV ECHO MEAS - ESV(MOD-SP4): 40 ML
BH CV ECHO MEAS - ESV(TEICH): 43.2 ML
BH CV ECHO MEAS - FS: 28.5 %
BH CV ECHO MEAS - IVS/LVPW: 0.95
BH CV ECHO MEAS - IVSD: 1 CM
BH CV ECHO MEAS - LAT PEAK E' VEL: 9 CM/SEC
BH CV ECHO MEAS - LV DIASTOLIC VOL/BSA (35-75): 51.9 ML/M^2
BH CV ECHO MEAS - LV MASS(C)D: 168.3 GRAMS
BH CV ECHO MEAS - LV MASS(C)DI: 84.8 GRAMS/M^2
BH CV ECHO MEAS - LV MAX PG: 5.7 MMHG
BH CV ECHO MEAS - LV MEAN PG: 2.9 MMHG
BH CV ECHO MEAS - LV SYSTOLIC VOL/BSA (12-30): 20.2 ML/M^2
BH CV ECHO MEAS - LV V1 MAX: 119.8 CM/SEC
BH CV ECHO MEAS - LV V1 MEAN: 79.7 CM/SEC
BH CV ECHO MEAS - LV V1 VTI: 22.5 CM
BH CV ECHO MEAS - LVIDD: 4.6 CM
BH CV ECHO MEAS - LVIDS: 3.3 CM
BH CV ECHO MEAS - LVLD AP2: 7.9 CM
BH CV ECHO MEAS - LVLD AP4: 7.8 CM
BH CV ECHO MEAS - LVLS AP2: 6.9 CM
BH CV ECHO MEAS - LVLS AP4: 6.6 CM
BH CV ECHO MEAS - LVOT AREA (M): 2.5 CM^2
BH CV ECHO MEAS - LVOT AREA: 2.4 CM^2
BH CV ECHO MEAS - LVOT DIAM: 1.8 CM
BH CV ECHO MEAS - LVPWD: 1.1 CM
BH CV ECHO MEAS - MED PEAK E' VEL: 10 CM/SEC
BH CV ECHO MEAS - MV A DUR: 0.08 SEC
BH CV ECHO MEAS - MV A MAX VEL: 83.7 CM/SEC
BH CV ECHO MEAS - MV DEC SLOPE: 528.1 CM/SEC^2
BH CV ECHO MEAS - MV DEC TIME: 0.17 SEC
BH CV ECHO MEAS - MV E MAX VEL: 98.5 CM/SEC
BH CV ECHO MEAS - MV E/A: 1.2
BH CV ECHO MEAS - MV MAX PG: 4.6 MMHG
BH CV ECHO MEAS - MV MEAN PG: 1.7 MMHG
BH CV ECHO MEAS - MV P1/2T MAX VEL: 106 CM/SEC
BH CV ECHO MEAS - MV P1/2T: 58.8 MSEC
BH CV ECHO MEAS - MV V2 MAX: 107.7 CM/SEC
BH CV ECHO MEAS - MV V2 MEAN: 61 CM/SEC
BH CV ECHO MEAS - MV V2 VTI: 32.2 CM
BH CV ECHO MEAS - MVA P1/2T LCG: 2.1 CM^2
BH CV ECHO MEAS - MVA(P1/2T): 3.7 CM^2
BH CV ECHO MEAS - MVA(VTI): 1.7 CM^2
BH CV ECHO MEAS - PA ACC TIME: 0.07 SEC
BH CV ECHO MEAS - PA MAX PG (FULL): 0.69 MMHG
BH CV ECHO MEAS - PA MAX PG: 3.3 MMHG
BH CV ECHO MEAS - PA PR(ACCEL): 45.7 MMHG
BH CV ECHO MEAS - PA V2 MAX: 90.9 CM/SEC
BH CV ECHO MEAS - PULM A REVS DUR: 0.08 SEC
BH CV ECHO MEAS - PULM A REVS VEL: 29.5 CM/SEC
BH CV ECHO MEAS - PULM DIAS VEL: 56.2 CM/SEC
BH CV ECHO MEAS - PULM S/D: 1.2
BH CV ECHO MEAS - PULM SYS VEL: 64.7 CM/SEC
BH CV ECHO MEAS - PVA(V,A): 4.6 CM^2
BH CV ECHO MEAS - PVA(V,D): 4.6 CM^2
BH CV ECHO MEAS - QP/QS: 1.9
BH CV ECHO MEAS - RAP SYSTOLE: 3 MMHG
BH CV ECHO MEAS - RV BASE (<4.1) - OBSOLETE: 3.1 CM
BH CV ECHO MEAS - RV LENGTH (<8.5) - OBSOLETE: 8.1 CM
BH CV ECHO MEAS - RV MAX PG: 2.6 MMHG
BH CV ECHO MEAS - RV MEAN PG: 1.5 MMHG
BH CV ECHO MEAS - RV V1 MAX: 81 CM/SEC
BH CV ECHO MEAS - RV V1 MEAN: 57.7 CM/SEC
BH CV ECHO MEAS - RV V1 VTI: 20.4 CM
BH CV ECHO MEAS - RVOT AREA: 5.2 CM^2
BH CV ECHO MEAS - RVOT DIAM: 2.6 CM
BH CV ECHO MEAS - RVSP: 19.4 MMHG
BH CV ECHO MEAS - SI(AO): 135.5 ML/M^2
BH CV ECHO MEAS - SI(CUBED): 30.7 ML/M^2
BH CV ECHO MEAS - SI(LVOT): 27.6 ML/M^2
BH CV ECHO MEAS - SI(MOD-SP2): 27.7 ML/M^2
BH CV ECHO MEAS - SI(MOD-SP4): 31.7 ML/M^2
BH CV ECHO MEAS - SI(TEICH): 26.7 ML/M^2
BH CV ECHO MEAS - SUP REN AO DIAM: 1.9 CM
BH CV ECHO MEAS - SV(AO): 269 ML
BH CV ECHO MEAS - SV(CUBED): 61 ML
BH CV ECHO MEAS - SV(LVOT): 54.8 ML
BH CV ECHO MEAS - SV(MOD-SP2): 55 ML
BH CV ECHO MEAS - SV(MOD-SP4): 63 ML
BH CV ECHO MEAS - SV(RVOT): 106.1 ML
BH CV ECHO MEAS - SV(TEICH): 53.1 ML
BH CV ECHO MEAS - TAPSE (>1.6): 2.1 CM2
BH CV ECHO MEAS - TR MAX VEL: 202.2 CM/SEC
BH CV ECHO MEASUREMENTS AVERAGE E/E' RATIO: 10.37
BH CV VAS BP RIGHT ARM: NORMAL MMHG
BH CV XLRA - RV BASE: 3.2 CM
BH CV XLRA - RV LENGTH: 8.1 CM
BH CV XLRA - RV MID: 2.8 CM
BH CV XLRA - TDI S': 15 CM/SEC
LEFT ATRIUM VOLUME INDEX: 26 ML/M2
SINUS: 3.2 CM
STJ: 3.2 CM

## 2020-05-19 PROCEDURE — 93306 TTE W/DOPPLER COMPLETE: CPT | Performed by: INTERNAL MEDICINE

## 2020-05-19 PROCEDURE — 93306 TTE W/DOPPLER COMPLETE: CPT

## 2020-05-19 PROCEDURE — 25010000002 PERFLUTREN (DEFINITY) 8.476 MG IN SODIUM CHLORIDE (PF) 0.9 % 10 ML INJECTION: Performed by: INTERNAL MEDICINE

## 2020-05-19 PROCEDURE — 93356 MYOCRD STRAIN IMG SPCKL TRCK: CPT | Performed by: INTERNAL MEDICINE

## 2020-05-19 PROCEDURE — 93356 MYOCRD STRAIN IMG SPCKL TRCK: CPT

## 2020-05-19 RX ADMIN — PERFLUTREN 1.5 ML: 6.52 INJECTION, SUSPENSION INTRAVENOUS at 13:24

## 2020-05-26 ENCOUNTER — INFUSION (OUTPATIENT)
Dept: ONCOLOGY | Facility: HOSPITAL | Age: 65
End: 2020-05-26

## 2020-05-26 ENCOUNTER — TELEMEDICINE (OUTPATIENT)
Dept: ONCOLOGY | Facility: CLINIC | Age: 65
End: 2020-05-26

## 2020-05-26 VITALS
HEART RATE: 74 BPM | HEIGHT: 69 IN | BODY MASS INDEX: 27.22 KG/M2 | TEMPERATURE: 97.7 F | WEIGHT: 183.8 LBS | RESPIRATION RATE: 18 BRPM | SYSTOLIC BLOOD PRESSURE: 111 MMHG | DIASTOLIC BLOOD PRESSURE: 68 MMHG | OXYGEN SATURATION: 99 %

## 2020-05-26 DIAGNOSIS — C78.7 LIVER METASTASES: ICD-10-CM

## 2020-05-26 DIAGNOSIS — T45.1X5A CHEMOTHERAPY INDUCED NEUTROPENIA (HCC): ICD-10-CM

## 2020-05-26 DIAGNOSIS — G62.0 PERIPHERAL NEUROPATHY DUE TO CHEMOTHERAPY (HCC): ICD-10-CM

## 2020-05-26 DIAGNOSIS — C15.5 MALIGNANT NEOPLASM OF LOWER THIRD OF ESOPHAGUS (HCC): Primary | ICD-10-CM

## 2020-05-26 DIAGNOSIS — T45.1X5A PERIPHERAL NEUROPATHY DUE TO CHEMOTHERAPY (HCC): ICD-10-CM

## 2020-05-26 DIAGNOSIS — C15.5 MALIGNANT NEOPLASM OF LOWER THIRD OF ESOPHAGUS (HCC): ICD-10-CM

## 2020-05-26 DIAGNOSIS — C78.7 LIVER METASTASIS: ICD-10-CM

## 2020-05-26 DIAGNOSIS — N32.1 COLOVESICAL FISTULA: ICD-10-CM

## 2020-05-26 DIAGNOSIS — D69.6 THROMBOCYTOPENIA (HCC): ICD-10-CM

## 2020-05-26 DIAGNOSIS — Z72.0 TOBACCO ABUSE: ICD-10-CM

## 2020-05-26 DIAGNOSIS — Z45.2 ENCOUNTER FOR ADJUSTMENT OR MANAGEMENT OF VASCULAR ACCESS DEVICE: ICD-10-CM

## 2020-05-26 DIAGNOSIS — I10 ESSENTIAL HYPERTENSION: ICD-10-CM

## 2020-05-26 DIAGNOSIS — D70.1 CHEMOTHERAPY INDUCED NEUTROPENIA (HCC): ICD-10-CM

## 2020-05-26 DIAGNOSIS — Z79.899 HIGH RISK MEDICATION USE: ICD-10-CM

## 2020-05-26 DIAGNOSIS — I82.412 ACUTE DEEP VEIN THROMBOSIS (DVT) OF FEMORAL VEIN OF LEFT LOWER EXTREMITY (HCC): ICD-10-CM

## 2020-05-26 LAB
ALBUMIN SERPL-MCNC: 4 G/DL (ref 3.5–5.2)
ALBUMIN/GLOB SERPL: 1.4 G/DL (ref 1.1–2.4)
ALP SERPL-CCNC: 92 U/L (ref 38–116)
ALT SERPL W P-5'-P-CCNC: 33 U/L (ref 0–41)
ANION GAP SERPL CALCULATED.3IONS-SCNC: 12.6 MMOL/L (ref 5–15)
AST SERPL-CCNC: 23 U/L (ref 0–40)
BASOPHILS # BLD AUTO: 0.03 10*3/MM3 (ref 0–0.2)
BASOPHILS NFR BLD AUTO: 0.5 % (ref 0–1.5)
BILIRUB SERPL-MCNC: 0.3 MG/DL (ref 0.2–1.2)
BUN BLD-MCNC: 17 MG/DL (ref 6–20)
BUN/CREAT SERPL: 18.1 (ref 7.3–30)
CALCIUM SPEC-SCNC: 9.3 MG/DL (ref 8.5–10.2)
CEA SERPL-MCNC: 6.97 NG/ML
CHLORIDE SERPL-SCNC: 101 MMOL/L (ref 98–107)
CO2 SERPL-SCNC: 22.4 MMOL/L (ref 22–29)
CREAT BLD-MCNC: 0.94 MG/DL (ref 0.7–1.3)
DEPRECATED RDW RBC AUTO: 58.6 FL (ref 37–54)
EOSINOPHIL # BLD AUTO: 0.39 10*3/MM3 (ref 0–0.4)
EOSINOPHIL NFR BLD AUTO: 6.1 % (ref 0.3–6.2)
ERYTHROCYTE [DISTWIDTH] IN BLOOD BY AUTOMATED COUNT: 16.5 % (ref 12.3–15.4)
GFR SERPL CREATININE-BSD FRML MDRD: 81 ML/MIN/1.73
GLOBULIN UR ELPH-MCNC: 2.8 GM/DL (ref 1.8–3.5)
GLUCOSE BLD-MCNC: 99 MG/DL (ref 74–124)
HCT VFR BLD AUTO: 31.7 % (ref 37.5–51)
HGB BLD-MCNC: 10.6 G/DL (ref 13–17.7)
IMM GRANULOCYTES # BLD AUTO: 0.18 10*3/MM3 (ref 0–0.05)
IMM GRANULOCYTES NFR BLD AUTO: 2.8 % (ref 0–0.5)
LYMPHOCYTES # BLD AUTO: 1.61 10*3/MM3 (ref 0.7–3.1)
LYMPHOCYTES NFR BLD AUTO: 25.2 % (ref 19.6–45.3)
MCH RBC QN AUTO: 32.4 PG (ref 26.6–33)
MCHC RBC AUTO-ENTMCNC: 33.4 G/DL (ref 31.5–35.7)
MCV RBC AUTO: 96.9 FL (ref 79–97)
MONOCYTES # BLD AUTO: 0.66 10*3/MM3 (ref 0.1–0.9)
MONOCYTES NFR BLD AUTO: 10.3 % (ref 5–12)
NEUTROPHILS # BLD AUTO: 3.52 10*3/MM3 (ref 1.7–7)
NEUTROPHILS NFR BLD AUTO: 55.1 % (ref 42.7–76)
NRBC BLD AUTO-RTO: 0 /100 WBC (ref 0–0.2)
PLATELET # BLD AUTO: 155 10*3/MM3 (ref 140–450)
PMV BLD AUTO: 8.7 FL (ref 6–12)
POTASSIUM BLD-SCNC: 4.4 MMOL/L (ref 3.5–4.7)
PROT SERPL-MCNC: 6.8 G/DL (ref 6.3–8)
RBC # BLD AUTO: 3.27 10*6/MM3 (ref 4.14–5.8)
SODIUM BLD-SCNC: 136 MMOL/L (ref 134–145)
WBC NRBC COR # BLD: 6.39 10*3/MM3 (ref 3.4–10.8)

## 2020-05-26 PROCEDURE — 85025 COMPLETE CBC W/AUTO DIFF WBC: CPT

## 2020-05-26 PROCEDURE — 25010000002 PALONOSETRON PER 25 MCG: Performed by: INTERNAL MEDICINE

## 2020-05-26 PROCEDURE — 99215 OFFICE O/P EST HI 40 MIN: CPT | Performed by: INTERNAL MEDICINE

## 2020-05-26 PROCEDURE — 80053 COMPREHEN METABOLIC PANEL: CPT

## 2020-05-26 PROCEDURE — 96411 CHEMO IV PUSH ADDL DRUG: CPT

## 2020-05-26 PROCEDURE — 25010000002 DEXAMETHASONE PER 1 MG: Performed by: INTERNAL MEDICINE

## 2020-05-26 PROCEDURE — 82378 CARCINOEMBRYONIC ANTIGEN: CPT | Performed by: INTERNAL MEDICINE

## 2020-05-26 PROCEDURE — 96416 CHEMO PROLONG INFUSE W/PUMP: CPT

## 2020-05-26 PROCEDURE — 25010000002 TRASTUZUMAB PER 10 MG: Performed by: INTERNAL MEDICINE

## 2020-05-26 PROCEDURE — 96375 TX/PRO/DX INJ NEW DRUG ADDON: CPT

## 2020-05-26 PROCEDURE — 25010000002 FLUOROURACIL PER 500 MG: Performed by: INTERNAL MEDICINE

## 2020-05-26 PROCEDURE — 96367 TX/PROPH/DG ADDL SEQ IV INF: CPT

## 2020-05-26 PROCEDURE — 96413 CHEMO IV INFUSION 1 HR: CPT

## 2020-05-26 PROCEDURE — 25010000002 LEUCOVORIN CALCIUM PER 50 MG: Performed by: INTERNAL MEDICINE

## 2020-05-26 PROCEDURE — 25010000002 DIPHENHYDRAMINE 1 EACH BAG: Performed by: INTERNAL MEDICINE

## 2020-05-26 RX ORDER — SODIUM CHLORIDE 9 MG/ML
250 INJECTION, SOLUTION INTRAVENOUS ONCE
Status: COMPLETED | OUTPATIENT
Start: 2020-05-26 | End: 2020-05-26

## 2020-05-26 RX ORDER — DIPHENHYDRAMINE HYDROCHLORIDE 50 MG/ML
50 INJECTION INTRAMUSCULAR; INTRAVENOUS AS NEEDED
Status: CANCELLED | OUTPATIENT
Start: 2020-05-26

## 2020-05-26 RX ORDER — PALONOSETRON 0.05 MG/ML
0.25 INJECTION, SOLUTION INTRAVENOUS ONCE
Status: CANCELLED | OUTPATIENT
Start: 2020-05-26

## 2020-05-26 RX ORDER — SODIUM CHLORIDE 9 MG/ML
250 INJECTION, SOLUTION INTRAVENOUS ONCE
Status: CANCELLED | OUTPATIENT
Start: 2020-05-26

## 2020-05-26 RX ORDER — FAMOTIDINE 10 MG/ML
20 INJECTION, SOLUTION INTRAVENOUS 2 TIMES DAILY
Status: DISCONTINUED | OUTPATIENT
Start: 2020-05-26 | End: 2020-05-26 | Stop reason: HOSPADM

## 2020-05-26 RX ORDER — FAMOTIDINE 10 MG/ML
20 INJECTION, SOLUTION INTRAVENOUS 2 TIMES DAILY
Status: CANCELLED | OUTPATIENT
Start: 2020-05-26

## 2020-05-26 RX ORDER — FLUOROURACIL 50 MG/ML
400 INJECTION, SOLUTION INTRAVENOUS ONCE
Status: COMPLETED | OUTPATIENT
Start: 2020-05-26 | End: 2020-05-26

## 2020-05-26 RX ORDER — FAMOTIDINE 10 MG/ML
20 INJECTION, SOLUTION INTRAVENOUS AS NEEDED
Status: CANCELLED | OUTPATIENT
Start: 2020-05-26

## 2020-05-26 RX ORDER — PALONOSETRON 0.05 MG/ML
0.25 INJECTION, SOLUTION INTRAVENOUS ONCE
Status: COMPLETED | OUTPATIENT
Start: 2020-05-26 | End: 2020-05-26

## 2020-05-26 RX ORDER — FLUOROURACIL 50 MG/ML
400 INJECTION, SOLUTION INTRAVENOUS ONCE
Status: CANCELLED | OUTPATIENT
Start: 2020-05-26

## 2020-05-26 RX ADMIN — TRASTUZUMAB 350 MG: 150 INJECTION, POWDER, LYOPHILIZED, FOR SOLUTION INTRAVENOUS at 09:34

## 2020-05-26 RX ADMIN — DIPHENHYDRAMINE HYDROCHLORIDE 25 MG: 50 INJECTION INTRAMUSCULAR; INTRAVENOUS at 09:14

## 2020-05-26 RX ADMIN — DEXAMETHASONE SODIUM PHOSPHATE 4 MG: 10 INJECTION INTRAMUSCULAR; INTRAVENOUS at 09:31

## 2020-05-26 RX ADMIN — FLUOROURACIL 4850 MG: 50 INJECTION, SOLUTION INTRAVENOUS at 10:42

## 2020-05-26 RX ADMIN — FLUOROURACIL 810 MG: 50 INJECTION, SOLUTION INTRAVENOUS at 10:42

## 2020-05-26 RX ADMIN — LEUCOVORIN CALCIUM 810 MG: 350 INJECTION, POWDER, LYOPHILIZED, FOR SOLUTION INTRAMUSCULAR; INTRAVENOUS at 10:06

## 2020-05-26 RX ADMIN — SODIUM CHLORIDE 250 ML: 9 INJECTION, SOLUTION INTRAVENOUS at 09:14

## 2020-05-26 RX ADMIN — PALONOSETRON HYDROCHLORIDE 0.25 MG: 0.25 INJECTION, SOLUTION INTRAVENOUS at 09:15

## 2020-05-26 RX ADMIN — FAMOTIDINE 20 MG: 10 INJECTION INTRAVENOUS at 09:15

## 2020-05-26 NOTE — PROGRESS NOTES
Subjective     REASON FOR follow up:1.  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  Currently receiving palliative FOLFOX AND HERCEPTIN therapy.    2.COLOVESICAL FISTULA: chronic antibiotic therapy cipro, NO FURTHER PLANS FOR SURGERY AFTER VISIT AND PROCEDURE BY DR GM CASTILLO 1/20    3. DVT R AND LEFT LE ON ANTICOAGULANT LOVENOX: THROMBOPHILIA OF MALIGNANCY    4. 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 NEURONTIN INITIATED.        History of Present Illness I HAVE CALLED THIS PATIENT ON THE PHONE TODAY AND VERBALLY HAS CONSENTED ABOUT VIDEOMEDICINE VISIT     This patient is in the office today to receive his next cycle of treatment with Herceptin, 5FU, and leucovorin in the background of metastatic cancer of the esophagus to the liver that has had a dramatic response. In the past the patient was receiving FOLFIRINOX and we discontinued the Oxaliplatin component of the regimen because of sensory neuropathy grade 2 in his lower extremities. In any event the patient continues having sensory that seems to be getting worsen. He has not had any problems with claudication neither vascular changes in his lower extremities. His appetite remains excellent. He has no difficulty swallowing. He has no abdominal pain or jaundice. His bowel activity remains normal. Urination is not an issue. There is no evidence of new urinary tract infections and there is no activity of the colovesical fistula. He remains on ciprofloxacin prophylactically.     In regard to his history of thrombosis of the veins in the lower extremity, he remains on Xarelto after we were able to obtain for him this medication and stopping the Lovenox. He has not had any clinical bleeding or new thrombosis. The patient has not had any infections. No respiratory problems. He remains into the debate in regard what he wants to do in regard to  "his disability and he will further investigate this through the office management at [employer]. The patient is not requiring any other pain medication to control his neuropathy and again he has no cancer pain. His ECOG performance status has remained 0.                                Past Medical History:   Diagnosis Date   • Arthritis    • Elevated PSA    • Esophageal cancer (CMS/HCC)    • Esophageal mass    • Fistula    • H/O Lung nodule    • Hepatitis     CHILD--PT STATED \"I THINK IT WAS A.\"   • History of pneumonia    • Hx of blood clots 08/10/2019   • Hyperlipidemia    • Hypertension    • Neuropathy    • Prostate cancer (CMS/HCC)    • PVD (peripheral vascular disease) (CMS/HCC)         Past Surgical History:   Procedure Laterality Date   • COLONOSCOPY N/A 2/4/2020    Procedure: COLONOSCOPY;  Surgeon: Guy Sears MD;  Location: [facility] ENDOSCOPY;  Service: General;  Laterality: N/A;  PRE-COLOVESICAL FISTULA  POST-- DIVERTICULOSIS   • CYSTOSCOPY  02/06/2020   • CYSTOSCOPY Bilateral 2/26/2020    Procedure: CYSTOSCOPY RETROGRADE;  Surgeon: Cm Witt MD;  Location: [facility] OR;  Service: Urology;  Laterality: Bilateral;   • HERNIA REPAIR  1999   • PROSTATE SURGERY  03/2008   • VENOUS ACCESS DEVICE (PORT) INSERTION N/A 7/16/2019    Procedure: INSERTION VENOUS ACCESS DEVICE WITH FLUORO AND EGD WITH BIOPSY;  Surgeon: Mary Brito MD;  Location: [facility] OR;  Service: Thoracic        Current Outpatient Medications on File Prior to Visit   Medication Sig Dispense Refill   • ciprofloxacin (CIPRO) 250 MG tablet Take 1 tablet by mouth Daily. 90 tablet 1   • clotrimazole (MYCELEX) 10 MG obie 3 TIMES A DAY 90 tablet 1   • gabapentin (NEURONTIN) 300 MG capsule Take 1 capsule by mouth every night at bedtime. 30 capsule 2   • lisinopril-hydrochlorothiazide (PRINZIDE,ZESTORETIC) 20-12.5 MG per tablet Take 1 tablet by mouth Daily. 90 tablet 2   • ondansetron (ZOFRAN) 4 MG tablet Take 1 " tablet by mouth Every 8 (Eight) Hours As Needed for Nausea or Vomiting. 30 tablet 2   • Probiotic Product (PROBIOTIC DAILY PO) Take 1 tablet by mouth Daily.     • rivaroxaban (XARELTO) 20 MG tablet Take 1 tablet by mouth Daily. 30 tablet 6   • triamcinolone (KENALOG) 0.1 % ointment Apply  topically to the appropriate area as directed 2 (Two) Times a Day. (Patient taking differently: Apply 1 application topically to the appropriate area as directed As Needed.) 30 g 2     No current facility-administered medications on file prior to visit.         ALLERGIES:  No Known Allergies     Social History     Socioeconomic History   • Marital status: Single     Spouse name: Not on file   • Number of children: Not on file   • Years of education: High school   • Highest education level: Not on file   Occupational History   • Occupation: [employer]     Employer: [employer]   Tobacco Use   • Smoking status: Current Every Day Smoker     Packs/day: 1.00     Years: 40.00     Pack years: 40.00     Types: Cigarettes   • Smokeless tobacco: Never Used   Substance and Sexual Activity   • Alcohol use: Not Currently     Comment: NOT RECENTLY- none since september   • Drug use: No   • Sexual activity: Defer        Family History   Problem Relation Age of Onset   • Hypertension Mother    • Stroke Mother    • Hypertension Other    • Lung disease Other    • Prostate cancer Other    • Lung cancer Father    • Malig Hyperthermia Neg Hx          Review of Systems:                General: no fever, no chills, no fatigue,no weight changes, no lack of appetite.  Eyes: no epiphora, xerophthalmia,conjunctivitis, pain, glaucoma, blurred vision, blindness, secretion, photophobia, proptosis, diplopia.  Ears: no otorrhea, tinnitus, otorrhagia, deafness, pain, vertigo.  Nose: no rhinorrhea, no epistaxis, no alteration in perception of odors, no sinuses pressure.  Mouth: no alteration in gums or teeth,  No ulcers, no difficulty with mastication or deglut  "ion, no odynophagia.  Neck: no masses or pain, no thyroid alterations, no pain in muscles or arteries, no carotid odynia, no crepitation.  Respiratory: no cough, no sputum production,no dyspnea,no trepopnea, no pleuritic pain,no hemoptysis.  Heart: no syncope, no irregularity, no palpitations, no angina,no orthopnea,no paroxysmal nocturnal dyspnea.  Vascular Venous: no tenderness,no edema,no palpable cords,no postphlebitic syndrome, no skin changes no ulcerations.  Vascular Arterial: no distal ischemia, noclaudication, no gangrene, no neuropathic ischemic pain, no skin ulcers, no paleness no cyanosis.  GI: no dysphagia, no odynophagia, no regurgitation, no heartburn,no indigestion,no nausea,no vomiting,no hematemesis ,no melena,no jaundice,no distention, no obstipation,no enterorrhagia,no proctalgia,no anal  lesions, no changes in bowel habits.  : no frequency, no hesitancy, no hematuria, no discharge,no  pain.  Musculoskeletal: no muscle or tendon pain or inflammation,no  joint pain, no edema, no functional limitation,no fasciculations, no mass.  Neurologic: no headache, no seizures, noalterations on Craneal nerves, no motor deficit, le pain and sensory deficit, normal coordination, no alteration in memory,normal orientation, calculation,normal writting, verbal and written language.  Skin: no rashes,no pruritus no localized lesions.  Psychiatric: no anxiety, no depression,no agitation, no delusions, proper insight.                Objective     Vitals:    05/26/20 0835   BP: 111/68   Pulse: 74   Resp: 18   Temp: 97.7 °F (36.5 °C)   TempSrc: Oral   SpO2: 99%   Weight: 83.4 kg (183 lb 12.8 oz)   Height: 175.3 cm (69.02\")   PainSc:   5   PainLoc: Leg  Comment: Bilateral legs     Current Status 5/26/2020   ECOG score 0       Physical Exam:        He looks normal on the video with normal color of the skin, no obvious visible adenopathies in the neck. His port is accessed with no difficulties. He has normal quiet " respiration. His weight remains stable. He has no difficulty with his walking. He has no swelling in his lower extremities. He has sensory neuropathy in both feet with pain.                               RECENT LABS:  Hematology WBC   Date Value Ref Range Status   05/26/2020 6.39 3.40 - 10.80 10*3/mm3 Final   02/02/2019 13.4 (H) 3.4 - 10.8 x10E3/uL Final     RBC   Date Value Ref Range Status   05/26/2020 3.27 (L) 4.14 - 5.80 10*6/mm3 Final   02/02/2019 4.81 4.14 - 5.80 x10E6/uL Final     Hemoglobin   Date Value Ref Range Status   05/26/2020 10.6 (L) 13.0 - 17.7 g/dL Final     Hematocrit   Date Value Ref Range Status   05/26/2020 31.7 (L) 37.5 - 51.0 % Final     Platelets   Date Value Ref Range Status   05/26/2020 155 140 - 450 10*3/mm3 Final            Sedation Narrator Report   Interpretation Summary     · Left ventricular systolic function is normal.  · There is calcification of the aortic valve.  · Calculated EF = 63%.  · Global Longitudinal LV strain = -18.2%.  · C/w study 1/7/20, there is no change             Assessment/Plan    1.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 Almost 90% of the liver WAS replaced by tumor. The patient has been undergoing chemotherapy with FOLFOX and Herceptin and has had excellent response clinically and radiologically.  Today, the patient on clinical examination has had resolution of his giant hepatomegaly that was nodular and mildly tender in the right upper quadrant of his abdomen.  His liver has reduced in size very dramatically.  Most importantly, the patient has had resolution of his cancer-related pain.  His performance status is ECOG 0.  He is no longer taking pain medicine and developed thrombophilia associated with malignancy that has been treated with Lovenox successfully.  He has some ageusia related to treatment and this will require modification in his diet.  He remains smoking 1 pack of cigarettes daily.    The patient was further  reviewed on 11/26/2019. I discussed with him and his sister the dramatic changes and improvement in his PET scan. This is one for the book. He has near complete resolution of the abnormal SUV uptake in the lower part of the esophagus and he has near complete resolution of the dramatic uptake in his whole liver parenchyma. There is no other site of disease. Kidney anatomy remains normal as well as his bladder anatomy. The fissure is still visible.       The patient returned on 02/18/2020 to be reviewed. He has had his colonoscopy by Guy Sears MD, on 02/04/2020 that has failed to document any opening into his bladder besides diverticula and no other alterations or tumoral lesions. The patient states that he has had a cystoscopy by Cm Witt MD, a report of this is not available, he was told that everything looked normal. He will proceed with another cystoscopy using dye to be placed in the bladder to see if there is any passage of this into the colon in some way or form. I wonder if this fistula has closed altogether, the fact that there are no bladder tumors and no passage of stool through his urination tells us that this has closed. Maybe by the end of the day the patient is not going to require any surgery of any kind. That is good news.       The patient was further reviewed on 03/03/2020. At this time he has had a cystoscopy and fistulogram by Cm Witt MD, reviewed in the PAC system [facility] and now reviewed by Dr. Witt as well. He does not believe that there is any need for any surgical intervention in this patient at this time due to the small size of the fistulogram and the lack of recent complications. This will delay the continuation of chemotherapy at least for 6-8 weeks and maybe by the end of the day this will allow the cancer to take back.      The patient was further reviewed by Giovanny on 05/26/2020. Overall his condition remains good. The only problem that he  has now is sensory neuropathy in his feet that is worse in absence of giving him anymore Oxaliplatin for a long time. This will require the need for modification of his Neurontin. Other than that he has no dysphagia associated with his cancer of the esophagus, his nutrition is appropriate. He has no nausea, no vomiting. He has some ageusia related to the treatment and he is still finding a food that is compatible with his taste. He thinks that sweets is the best option at this time.     He has not had any difficulty with his colovesical fistula. He has not had any new urinary tract infection, no hematuria and no difficulty with this at this time. He remains on ciprofloxacin.     The patient remains anticoagulated with Xarelto after stopping Lovenox with this with the purpose of taking care of thrombophilia associated with malignancy. He has not had any clinical bleeding.     In regard to his neuropathy in his lower extremities associated with Oxaliplatin the patient will require adjustment in the dose of Neurontin and he will require 300 mg twice a day. Hopefully this will give him some relief that is necessary at this time.     In regard to his echocardiogram recently done showed a left ventricular ejection fraction of 63%, calcification of the aortic valve, otherwise there was no modification with the previous study. Herceptin is not producing any cardiac toxicity.    In regard to his disability the patient is still thinking about what [employer] wants to do with him, if he wants to go on permanent disability or what he wants to do. He will let us know at the next visit in 3-6 weeks.     In regard to teeth extraction that he needs for the lower arcade. In preparation for this I recommended for him smoking cessation to allow the sockets to heal perfect further and more clear and also I advised him that he will need to hold the Xarelto the day before and resume the day after the procedure.     Other than  that the patient will be seen in 3 and 6 weeks to continue with his therapy.     I advised him to remain on his blood pressure medication in the same way.        I DISCUSSED WITH PATIENT IN DETAIL FORMS TO DECREASE CHANCES OF CORONAVIRUS INFECTION INCLUDING ISOLATION, PROPER HAND HIGIENE, AVOID PUBLIC PLACES  WITH CROWDS, FOLLOW  CDC RECOMENDATIONS, AND KEEP PERSONAL AND SOCIAL RESPONSIBILITY, WARE A MASK IN PUBLIC PLACES.  PATIENT IS AWARE THIS INFECTION COULD HAVE SEVERE CONSEQUENCES TO PERSONAL HEALTH AND FAMILY RAMIFICATIONS OF THIS.

## 2020-05-28 ENCOUNTER — INFUSION (OUTPATIENT)
Dept: ONCOLOGY | Facility: HOSPITAL | Age: 65
End: 2020-05-28

## 2020-05-28 DIAGNOSIS — C78.7 LIVER METASTASIS: ICD-10-CM

## 2020-05-28 DIAGNOSIS — C15.5 MALIGNANT NEOPLASM OF LOWER THIRD OF ESOPHAGUS (HCC): Primary | ICD-10-CM

## 2020-05-28 DIAGNOSIS — Z45.2 ENCOUNTER FOR ADJUSTMENT OR MANAGEMENT OF VASCULAR ACCESS DEVICE: ICD-10-CM

## 2020-05-28 PROCEDURE — 25010000003 HEPARIN LOCK FLUSH PER 10 UNITS: Performed by: INTERNAL MEDICINE

## 2020-05-28 RX ORDER — SODIUM CHLORIDE 0.9 % (FLUSH) 0.9 %
10 SYRINGE (ML) INJECTION AS NEEDED
Status: DISCONTINUED | OUTPATIENT
Start: 2020-05-28 | End: 2020-05-28 | Stop reason: HOSPADM

## 2020-05-28 RX ORDER — HEPARIN SODIUM (PORCINE) LOCK FLUSH IV SOLN 100 UNIT/ML 100 UNIT/ML
500 SOLUTION INTRAVENOUS AS NEEDED
Status: DISCONTINUED | OUTPATIENT
Start: 2020-05-28 | End: 2020-05-28 | Stop reason: HOSPADM

## 2020-05-28 RX ORDER — HEPARIN SODIUM (PORCINE) LOCK FLUSH IV SOLN 100 UNIT/ML 100 UNIT/ML
500 SOLUTION INTRAVENOUS AS NEEDED
Status: CANCELLED | OUTPATIENT
Start: 2020-05-28

## 2020-05-28 RX ORDER — SODIUM CHLORIDE 0.9 % (FLUSH) 0.9 %
10 SYRINGE (ML) INJECTION AS NEEDED
Status: CANCELLED | OUTPATIENT
Start: 2020-05-28

## 2020-05-28 RX ADMIN — Medication 500 UNITS: at 08:21

## 2020-05-28 RX ADMIN — SODIUM CHLORIDE, PRESERVATIVE FREE 10 ML: 5 INJECTION INTRAVENOUS at 08:21

## 2020-06-16 ENCOUNTER — TELEPHONE (OUTPATIENT)
Dept: ONCOLOGY | Facility: CLINIC | Age: 65
End: 2020-06-16

## 2020-06-16 ENCOUNTER — OFFICE VISIT (OUTPATIENT)
Dept: ONCOLOGY | Facility: CLINIC | Age: 65
End: 2020-06-16

## 2020-06-16 ENCOUNTER — INFUSION (OUTPATIENT)
Dept: ONCOLOGY | Facility: HOSPITAL | Age: 65
End: 2020-06-16

## 2020-06-16 VITALS
RESPIRATION RATE: 16 BRPM | HEART RATE: 79 BPM | WEIGHT: 186.1 LBS | TEMPERATURE: 97.5 F | BODY MASS INDEX: 28.2 KG/M2 | SYSTOLIC BLOOD PRESSURE: 123 MMHG | DIASTOLIC BLOOD PRESSURE: 73 MMHG | OXYGEN SATURATION: 100 % | HEIGHT: 68 IN

## 2020-06-16 DIAGNOSIS — Z79.899 HIGH RISK MEDICATION USE: ICD-10-CM

## 2020-06-16 DIAGNOSIS — C78.7 LIVER METASTASIS: ICD-10-CM

## 2020-06-16 DIAGNOSIS — C78.7 LIVER METASTASES: ICD-10-CM

## 2020-06-16 DIAGNOSIS — D69.6 THROMBOCYTOPENIA (HCC): ICD-10-CM

## 2020-06-16 DIAGNOSIS — C15.5 MALIGNANT NEOPLASM OF LOWER THIRD OF ESOPHAGUS (HCC): ICD-10-CM

## 2020-06-16 DIAGNOSIS — I10 ESSENTIAL HYPERTENSION: ICD-10-CM

## 2020-06-16 DIAGNOSIS — I82.412 ACUTE DEEP VEIN THROMBOSIS (DVT) OF FEMORAL VEIN OF LEFT LOWER EXTREMITY (HCC): ICD-10-CM

## 2020-06-16 DIAGNOSIS — C15.5 MALIGNANT NEOPLASM OF LOWER THIRD OF ESOPHAGUS (HCC): Primary | ICD-10-CM

## 2020-06-16 DIAGNOSIS — N32.1 COLOVESICAL FISTULA: ICD-10-CM

## 2020-06-16 LAB
ALBUMIN SERPL-MCNC: 3.9 G/DL (ref 3.5–5.2)
ALBUMIN/GLOB SERPL: 1.6 G/DL (ref 1.1–2.4)
ALP SERPL-CCNC: 93 U/L (ref 38–116)
ALT SERPL W P-5'-P-CCNC: 44 U/L (ref 0–41)
ANION GAP SERPL CALCULATED.3IONS-SCNC: 13.2 MMOL/L (ref 5–15)
AST SERPL-CCNC: 31 U/L (ref 0–40)
BASOPHILS # BLD AUTO: 0.03 10*3/MM3 (ref 0–0.2)
BASOPHILS NFR BLD AUTO: 0.5 % (ref 0–1.5)
BILIRUB SERPL-MCNC: 0.2 MG/DL (ref 0.2–1.2)
BUN BLD-MCNC: 9 MG/DL (ref 6–20)
BUN/CREAT SERPL: 12.2 (ref 7.3–30)
CALCIUM SPEC-SCNC: 9.3 MG/DL (ref 8.5–10.2)
CEA SERPL-MCNC: 6.36 NG/ML
CHLORIDE SERPL-SCNC: 101 MMOL/L (ref 98–107)
CO2 SERPL-SCNC: 21.8 MMOL/L (ref 22–29)
CREAT BLD-MCNC: 0.74 MG/DL (ref 0.7–1.3)
DEPRECATED RDW RBC AUTO: 62 FL (ref 37–54)
EOSINOPHIL # BLD AUTO: 0.24 10*3/MM3 (ref 0–0.4)
EOSINOPHIL NFR BLD AUTO: 4 % (ref 0.3–6.2)
ERYTHROCYTE [DISTWIDTH] IN BLOOD BY AUTOMATED COUNT: 17.2 % (ref 12.3–15.4)
GFR SERPL CREATININE-BSD FRML MDRD: 107 ML/MIN/1.73
GLOBULIN UR ELPH-MCNC: 2.4 GM/DL (ref 1.8–3.5)
GLUCOSE BLD-MCNC: 202 MG/DL (ref 74–124)
HCT VFR BLD AUTO: 30.1 % (ref 37.5–51)
HGB BLD-MCNC: 10.3 G/DL (ref 13–17.7)
IMM GRANULOCYTES # BLD AUTO: 0.15 10*3/MM3 (ref 0–0.05)
IMM GRANULOCYTES NFR BLD AUTO: 2.5 % (ref 0–0.5)
LYMPHOCYTES # BLD AUTO: 1.39 10*3/MM3 (ref 0.7–3.1)
LYMPHOCYTES NFR BLD AUTO: 23.4 % (ref 19.6–45.3)
MCH RBC QN AUTO: 33.6 PG (ref 26.6–33)
MCHC RBC AUTO-ENTMCNC: 34.2 G/DL (ref 31.5–35.7)
MCV RBC AUTO: 98 FL (ref 79–97)
MONOCYTES # BLD AUTO: 0.41 10*3/MM3 (ref 0.1–0.9)
MONOCYTES NFR BLD AUTO: 6.9 % (ref 5–12)
NEUTROPHILS # BLD AUTO: 3.72 10*3/MM3 (ref 1.7–7)
NEUTROPHILS NFR BLD AUTO: 62.7 % (ref 42.7–76)
NRBC BLD AUTO-RTO: 0 /100 WBC (ref 0–0.2)
PLATELET # BLD AUTO: 125 10*3/MM3 (ref 140–450)
PMV BLD AUTO: 9.4 FL (ref 6–12)
POTASSIUM BLD-SCNC: 3.9 MMOL/L (ref 3.5–4.7)
PROT SERPL-MCNC: 6.3 G/DL (ref 6.3–8)
RBC # BLD AUTO: 3.07 10*6/MM3 (ref 4.14–5.8)
SODIUM BLD-SCNC: 136 MMOL/L (ref 134–145)
WBC NRBC COR # BLD: 5.94 10*3/MM3 (ref 3.4–10.8)

## 2020-06-16 PROCEDURE — 96411 CHEMO IV PUSH ADDL DRUG: CPT

## 2020-06-16 PROCEDURE — 85025 COMPLETE CBC W/AUTO DIFF WBC: CPT

## 2020-06-16 PROCEDURE — 96367 TX/PROPH/DG ADDL SEQ IV INF: CPT

## 2020-06-16 PROCEDURE — 96416 CHEMO PROLONG INFUSE W/PUMP: CPT

## 2020-06-16 PROCEDURE — 25010000002 PALONOSETRON PER 25 MCG: Performed by: NURSE PRACTITIONER

## 2020-06-16 PROCEDURE — 96366 THER/PROPH/DIAG IV INF ADDON: CPT

## 2020-06-16 PROCEDURE — 96368 THER/DIAG CONCURRENT INF: CPT

## 2020-06-16 PROCEDURE — 99214 OFFICE O/P EST MOD 30 MIN: CPT | Performed by: NURSE PRACTITIONER

## 2020-06-16 PROCEDURE — 25010000002 TRASTUZUMAB PER 10 MG: Performed by: NURSE PRACTITIONER

## 2020-06-16 PROCEDURE — 96375 TX/PRO/DX INJ NEW DRUG ADDON: CPT

## 2020-06-16 PROCEDURE — 25010000002 FLUOROURACIL PER 500 MG: Performed by: NURSE PRACTITIONER

## 2020-06-16 PROCEDURE — 25010000002 DIPHENHYDRAMINE 1 EACH BAG: Performed by: NURSE PRACTITIONER

## 2020-06-16 PROCEDURE — 25010000002 DEXAMETHASONE PER 1 MG: Performed by: NURSE PRACTITIONER

## 2020-06-16 PROCEDURE — 82378 CARCINOEMBRYONIC ANTIGEN: CPT | Performed by: INTERNAL MEDICINE

## 2020-06-16 PROCEDURE — 96413 CHEMO IV INFUSION 1 HR: CPT

## 2020-06-16 PROCEDURE — 25010000002 LEUCOVORIN CALCIUM PER 50 MG: Performed by: NURSE PRACTITIONER

## 2020-06-16 PROCEDURE — 80053 COMPREHEN METABOLIC PANEL: CPT

## 2020-06-16 RX ORDER — FAMOTIDINE 10 MG/ML
20 INJECTION, SOLUTION INTRAVENOUS AS NEEDED
Status: CANCELLED | OUTPATIENT
Start: 2020-06-16

## 2020-06-16 RX ORDER — DIPHENHYDRAMINE HYDROCHLORIDE 50 MG/ML
50 INJECTION INTRAMUSCULAR; INTRAVENOUS AS NEEDED
Status: CANCELLED | OUTPATIENT
Start: 2020-06-16

## 2020-06-16 RX ORDER — PALONOSETRON 0.05 MG/ML
0.25 INJECTION, SOLUTION INTRAVENOUS ONCE
Status: COMPLETED | OUTPATIENT
Start: 2020-06-16 | End: 2020-06-16

## 2020-06-16 RX ORDER — FAMOTIDINE 10 MG/ML
20 INJECTION, SOLUTION INTRAVENOUS 2 TIMES DAILY
Status: DISCONTINUED | OUTPATIENT
Start: 2020-06-16 | End: 2020-06-16 | Stop reason: HOSPADM

## 2020-06-16 RX ORDER — SODIUM CHLORIDE 9 MG/ML
250 INJECTION, SOLUTION INTRAVENOUS ONCE
Status: CANCELLED | OUTPATIENT
Start: 2020-06-16

## 2020-06-16 RX ORDER — FAMOTIDINE 10 MG/ML
20 INJECTION, SOLUTION INTRAVENOUS 2 TIMES DAILY
Status: CANCELLED | OUTPATIENT
Start: 2020-06-16

## 2020-06-16 RX ORDER — FLUOROURACIL 50 MG/ML
400 INJECTION, SOLUTION INTRAVENOUS ONCE
Status: COMPLETED | OUTPATIENT
Start: 2020-06-16 | End: 2020-06-16

## 2020-06-16 RX ORDER — FLUOROURACIL 50 MG/ML
400 INJECTION, SOLUTION INTRAVENOUS ONCE
Status: CANCELLED | OUTPATIENT
Start: 2020-06-16

## 2020-06-16 RX ORDER — SODIUM CHLORIDE 9 MG/ML
250 INJECTION, SOLUTION INTRAVENOUS ONCE
Status: COMPLETED | OUTPATIENT
Start: 2020-06-16 | End: 2020-06-16

## 2020-06-16 RX ORDER — PALONOSETRON 0.05 MG/ML
0.25 INJECTION, SOLUTION INTRAVENOUS ONCE
Status: CANCELLED | OUTPATIENT
Start: 2020-06-16

## 2020-06-16 RX ADMIN — DIPHENHYDRAMINE HYDROCHLORIDE 25 MG: 50 INJECTION INTRAMUSCULAR; INTRAVENOUS at 11:44

## 2020-06-16 RX ADMIN — LEUCOVORIN CALCIUM 810 MG: 350 INJECTION, POWDER, LYOPHILIZED, FOR SOLUTION INTRAMUSCULAR; INTRAVENOUS at 12:39

## 2020-06-16 RX ADMIN — TRASTUZUMAB 350 MG: 150 INJECTION, POWDER, LYOPHILIZED, FOR SOLUTION INTRAVENOUS at 12:05

## 2020-06-16 RX ADMIN — SODIUM CHLORIDE 250 ML: 9 INJECTION, SOLUTION INTRAVENOUS at 11:44

## 2020-06-16 RX ADMIN — FLUOROURACIL 810 MG: 50 INJECTION, SOLUTION INTRAVENOUS at 14:36

## 2020-06-16 RX ADMIN — DEXAMETHASONE SODIUM PHOSPHATE 4 MG: 10 INJECTION INTRAMUSCULAR; INTRAVENOUS at 11:44

## 2020-06-16 RX ADMIN — FAMOTIDINE 20 MG: 10 INJECTION INTRAVENOUS at 11:42

## 2020-06-16 RX ADMIN — PALONOSETRON 0.25 MG: 0.05 INJECTION, SOLUTION INTRAVENOUS at 11:42

## 2020-06-16 RX ADMIN — FLUOROURACIL 4850 MG: 50 INJECTION, SOLUTION INTRAVENOUS at 14:35

## 2020-06-16 NOTE — PROGRESS NOTES
"  Subjective     REASON FOR follow up:1.    1. 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  Currently receiving palliative FOLFOX AND HERCEPTIN therapy.    2.COLOVESICAL FISTULA: chronic antibiotic therapy cipro, NO FURTHER PLANS FOR SURGERY AFTER VISIT AND PROCEDURE BY DR GM CASTILLO 1/20    3. DVT R AND LEFT LE ON ANTICOAGULANT LOVENOX: THROMBOPHILIA OF MALIGNANCY    4. 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 NEURONTIN INITIATED.        History of Present Illness   Patient is a 64-year-old gentleman with the above medical history here today for scheduled Herceptin FOLFOX.  He continues to tolerate treatment relatively well.  His neuropathy is stable.  He does continue to experience significant neuropathy however he reports his symptoms no worse.  He continues to take gabapentin as prescribed.  He is not requesting refills.    Reports no new symptoms such as fever, chills, shortness of breath, respiratory issues.  His appetite is good.  His vital signs are stable.    He continues to take Cipro 250 mg daily prophylactically for recurrent UTIs.  He has not experienced any infectious symptoms at this time.  He is requesting a refill of his Cipro.    Past Medical History:   Diagnosis Date   • Arthritis    • Elevated PSA    • Esophageal cancer (CMS/HCC)    • Esophageal mass    • Fistula    • H/O Lung nodule    • Hepatitis     CHILD--PT STATED \"I THINK IT WAS A.\"   • History of pneumonia    • Hx of blood clots 08/10/2019   • Hyperlipidemia    • Hypertension    • Neuropathy    • Prostate cancer (CMS/HCC)    • PVD (peripheral vascular disease) (CMS/HCC)         Past Surgical History:   Procedure Laterality Date   • COLONOSCOPY N/A 2/4/2020    Procedure: COLONOSCOPY;  Surgeon: Guy Sears MD;  Location: [facility] ENDOSCOPY;  Service: General;  Laterality: N/A;  PRE-COLOVESICAL " FISTULA  POST-- DIVERTICULOSIS   • CYSTOSCOPY  02/06/2020   • CYSTOSCOPY Bilateral 2/26/2020    Procedure: CYSTOSCOPY RETROGRADE;  Surgeon: Cm Witt MD;  Location: [facility];  Service: Urology;  Laterality: Bilateral;   • HERNIA REPAIR  1999   • PROSTATE SURGERY  03/2008   • VENOUS ACCESS DEVICE (PORT) INSERTION N/A 7/16/2019    Procedure: INSERTION VENOUS ACCESS DEVICE WITH FLUORO AND EGD WITH BIOPSY;  Surgeon: Mary Brito MD;  Location: [facility];  Service: Thoracic        Current Outpatient Medications on File Prior to Visit   Medication Sig Dispense Refill   • ciprofloxacin (CIPRO) 250 MG tablet Take 1 tablet by mouth Daily. 90 tablet 1   • clotrimazole (MYCELEX) 10 MG obie 3 TIMES A DAY 90 tablet 1   • gabapentin (NEURONTIN) 300 MG capsule Take 1 capsule by mouth every night at bedtime. 30 capsule 2   • lisinopril-hydrochlorothiazide (PRINZIDE,ZESTORETIC) 20-12.5 MG per tablet Take 1 tablet by mouth Daily. 90 tablet 2   • ondansetron (ZOFRAN) 4 MG tablet Take 1 tablet by mouth Every 8 (Eight) Hours As Needed for Nausea or Vomiting. 30 tablet 2   • Probiotic Product (PROBIOTIC DAILY PO) Take 1 tablet by mouth Daily.     • rivaroxaban (XARELTO) 20 MG tablet Take 1 tablet by mouth Daily. 30 tablet 6   • triamcinolone (KENALOG) 0.1 % ointment Apply  topically to the appropriate area as directed 2 (Two) Times a Day. (Patient taking differently: Apply 1 application topically to the appropriate area as directed As Needed.) 30 g 2     No current facility-administered medications on file prior to visit.         ALLERGIES:  No Known Allergies     Social History     Socioeconomic History   • Marital status: Single     Spouse name: Not on file   • Number of children: Not on file   • Years of education: High school   • Highest education level: Not on file   Occupational History   • Occupation: [employer]     Employer: [employer]   Tobacco Use   • Smoking status: Current Every Day Smoker      "Packs/day: 1.00     Years: 40.00     Pack years: 40.00     Types: Cigarettes   • Smokeless tobacco: Never Used   Substance and Sexual Activity   • Alcohol use: Not Currently     Comment: NOT RECENTLY- none since september   • Drug use: No   • Sexual activity: Defer        Family History   Problem Relation Age of Onset   • Hypertension Mother    • Stroke Mother    • Hypertension Other    • Lung disease Other    • Prostate cancer Other    • Lung cancer Father    • Malig Hyperthermia Neg Hx          Review of Systems:  Review of Systems   Constitutional: Positive for fatigue.   HENT:  Negative.    Eyes: Negative.    Respiratory: Negative.    Cardiovascular: Negative.    Gastrointestinal: Negative.    Genitourinary:         See hpi   Musculoskeletal: Negative.    Skin: Negative.    Neurological: Positive for numbness.   Hematological: Negative.    Psychiatric/Behavioral: Negative.            Objective     Vitals:    06/16/20 1040   BP: 123/73   Pulse: 79   Resp: 16   Temp: 97.5 °F (36.4 °C)   SpO2: 100%   Weight: 84.4 kg (186 lb 1.6 oz)   Height: 173.5 cm (68.31\")   PainSc:   7   PainLoc: Comment: legs and hands,feet     Current Status 6/16/2020   ECOG score 0       Physical Exam   Constitutional: He is oriented to person, place, and time. He appears well-developed and well-nourished.   HENT:   Head: Normocephalic and atraumatic.   Eyes: Pupils are equal, round, and reactive to light. EOM are normal.   Neck: Normal range of motion.   Cardiovascular: Normal rate.   Pulmonary/Chest: Effort normal.   Abdominal: Soft.   Musculoskeletal: Normal range of motion.   Neurological: He is alert and oriented to person, place, and time.   Skin: Skin is warm and dry.   Skin changes secondary to peripheral vascular disease in the lower extremities bilaterally, no swelling or tenderness noted   Psychiatric: He has a normal mood and affect.                 RECENT LABS:  Hematology WBC   Date Value Ref Range Status   06/16/2020 5.94 3.40 " - 10.80 10*3/mm3 Final   02/02/2019 13.4 (H) 3.4 - 10.8 x10E3/uL Final     RBC   Date Value Ref Range Status   06/16/2020 3.07 (L) 4.14 - 5.80 10*6/mm3 Final   02/02/2019 4.81 4.14 - 5.80 x10E6/uL Final     Hemoglobin   Date Value Ref Range Status   06/16/2020 10.3 (L) 13.0 - 17.7 g/dL Final     Hematocrit   Date Value Ref Range Status   06/16/2020 30.1 (L) 37.5 - 51.0 % Final     Platelets   Date Value Ref Range Status   06/16/2020 125 (L) 140 - 450 10*3/mm3 Final            Component      Latest Ref Rng & Units 12/3/2019 2/18/2020 3/3/2020   CEA      ng/mL 41.10 14.30 13.80             Assessment/Plan    1.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 Almost 90% of the liver WAS replaced by tumor. The patient has been undergoing chemotherapy with FOLFOX and Herceptin and has had excellent response clinically and radiologically.  He is here today for scheduled treatment, 5/5/2020.  He continues to tolerate treatment quite well.  He no longer receives oxaliplatin secondary to neuropathy.      2.  Recurrent UTIs.  He does take Cipro 250 mg daily and is requesting a refill today.  He is followed by Dr. Cm Witt of [facility].      3.  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  Oxaliplatin has been omitted from his treatment plan.  He takes gabapentin which provides him mild relief.  His symptoms are persistent but stable.    4.  Thrombophilia associated with malignancy.  He is currently taking Xarelto daily with good tolerance.    5.  Peripheral vascular disease of the lower extremities.  His symptoms are stable.    PLAN:  1.  We will proceed with treatment as scheduled today.  2.  He will return for echocardiogram as scheduled on the 19th 2020, he will then see Dr. Keller on May 26, 2020 in anticipation of his next treatment.  3.  We did discuss elevating his legs as much as possible.  Dr. Haynes has discussed potentially referring the patient to vascular surgery we hope to do as  practices open up and he is not at such a severe risk.  4.  He will continue Xarelto as described.  5.  I have placed a referral to see a  at the patient's request today.  He is very concerned about going back to work and if he is not able to physically return to work ultimately losing his insurance.  6.  I have E scribed Cipro 250 mg #90.    I have asked the patient to call the office with any new or worsening symptoms before his next scheduled visit.

## 2020-06-16 NOTE — TELEPHONE ENCOUNTER
----- Message from Bushra Aly RN sent at 6/16/2020  3:15 PM EDT -----  Pt needs disconnect appt on Thursday at 12:30pm.    Thanks,  Haleigh

## 2020-06-16 NOTE — TELEPHONE ENCOUNTER
Please forward this message to Dr Haynes as he will need to determine if the patient can go forward with dental work. Thank you.

## 2020-06-17 ENCOUNTER — DOCUMENTATION (OUTPATIENT)
Dept: ONCOLOGY | Facility: CLINIC | Age: 65
End: 2020-06-17

## 2020-06-17 NOTE — PROGRESS NOTES
I called Mr Elizondo to discuss proper anticoagulation before his tooth extraction.  I instructed him to discontinue his anticoagulation 2 days prior to procedure, he will then resume anticoagulation the following day.  He verbalizes understanding.

## 2020-06-18 ENCOUNTER — INFUSION (OUTPATIENT)
Dept: ONCOLOGY | Facility: HOSPITAL | Age: 65
End: 2020-06-18

## 2020-06-18 DIAGNOSIS — Z45.2 ENCOUNTER FOR ADJUSTMENT OR MANAGEMENT OF VASCULAR ACCESS DEVICE: ICD-10-CM

## 2020-06-18 DIAGNOSIS — C78.7 LIVER METASTASIS: ICD-10-CM

## 2020-06-18 DIAGNOSIS — C15.5 MALIGNANT NEOPLASM OF LOWER THIRD OF ESOPHAGUS (HCC): Primary | ICD-10-CM

## 2020-06-18 PROCEDURE — 25010000003 HEPARIN LOCK FLUSH PER 10 UNITS: Performed by: INTERNAL MEDICINE

## 2020-06-18 RX ORDER — HEPARIN SODIUM (PORCINE) LOCK FLUSH IV SOLN 100 UNIT/ML 100 UNIT/ML
500 SOLUTION INTRAVENOUS AS NEEDED
Status: CANCELLED | OUTPATIENT
Start: 2020-06-18

## 2020-06-18 RX ORDER — SODIUM CHLORIDE 0.9 % (FLUSH) 0.9 %
10 SYRINGE (ML) INJECTION AS NEEDED
Status: CANCELLED | OUTPATIENT
Start: 2020-06-18

## 2020-06-18 RX ORDER — SODIUM CHLORIDE 0.9 % (FLUSH) 0.9 %
10 SYRINGE (ML) INJECTION AS NEEDED
Status: DISCONTINUED | OUTPATIENT
Start: 2020-06-18 | End: 2020-06-18 | Stop reason: HOSPADM

## 2020-06-18 RX ORDER — HEPARIN SODIUM (PORCINE) LOCK FLUSH IV SOLN 100 UNIT/ML 100 UNIT/ML
500 SOLUTION INTRAVENOUS AS NEEDED
Status: DISCONTINUED | OUTPATIENT
Start: 2020-06-18 | End: 2020-06-18 | Stop reason: HOSPADM

## 2020-06-18 RX ADMIN — SODIUM CHLORIDE, PRESERVATIVE FREE 10 ML: 5 INJECTION INTRAVENOUS at 12:31

## 2020-06-18 RX ADMIN — Medication 500 UNITS: at 12:31

## 2020-06-30 DIAGNOSIS — N32.1 COLOVESICAL FISTULA: ICD-10-CM

## 2020-06-30 DIAGNOSIS — I82.412 ACUTE DEEP VEIN THROMBOSIS (DVT) OF FEMORAL VEIN OF LEFT LOWER EXTREMITY (HCC): ICD-10-CM

## 2020-06-30 DIAGNOSIS — Z45.2 ENCOUNTER FOR ADJUSTMENT OR MANAGEMENT OF VASCULAR ACCESS DEVICE: ICD-10-CM

## 2020-06-30 DIAGNOSIS — Z72.0 TOBACCO ABUSE: ICD-10-CM

## 2020-06-30 DIAGNOSIS — C15.5 MALIGNANT NEOPLASM OF LOWER THIRD OF ESOPHAGUS (HCC): ICD-10-CM

## 2020-06-30 RX ORDER — GABAPENTIN 300 MG/1
600 CAPSULE ORAL
Qty: 60 CAPSULE | Refills: 0 | Status: SHIPPED | OUTPATIENT
Start: 2020-06-30 | End: 2020-07-31 | Stop reason: SDUPTHER

## 2020-06-30 RX ORDER — GABAPENTIN 300 MG/1
CAPSULE ORAL
Qty: 60 CAPSULE | Refills: 2 | OUTPATIENT
Start: 2020-06-30

## 2020-07-07 ENCOUNTER — OFFICE VISIT (OUTPATIENT)
Dept: ONCOLOGY | Facility: CLINIC | Age: 65
End: 2020-07-07

## 2020-07-07 ENCOUNTER — INFUSION (OUTPATIENT)
Dept: ONCOLOGY | Facility: HOSPITAL | Age: 65
End: 2020-07-07

## 2020-07-07 ENCOUNTER — TELEPHONE (OUTPATIENT)
Dept: ONCOLOGY | Facility: CLINIC | Age: 65
End: 2020-07-07

## 2020-07-07 VITALS
BODY MASS INDEX: 28.3 KG/M2 | HEIGHT: 69 IN | DIASTOLIC BLOOD PRESSURE: 64 MMHG | SYSTOLIC BLOOD PRESSURE: 101 MMHG | RESPIRATION RATE: 18 BRPM | TEMPERATURE: 98 F | OXYGEN SATURATION: 97 % | WEIGHT: 191.1 LBS | HEART RATE: 80 BPM

## 2020-07-07 DIAGNOSIS — T45.1X5A CHEMOTHERAPY INDUCED NEUTROPENIA (HCC): ICD-10-CM

## 2020-07-07 DIAGNOSIS — I10 ESSENTIAL HYPERTENSION: ICD-10-CM

## 2020-07-07 DIAGNOSIS — C15.5 MALIGNANT NEOPLASM OF LOWER THIRD OF ESOPHAGUS (HCC): ICD-10-CM

## 2020-07-07 DIAGNOSIS — N32.1 COLOVESICAL FISTULA: ICD-10-CM

## 2020-07-07 DIAGNOSIS — Z72.0 TOBACCO ABUSE: ICD-10-CM

## 2020-07-07 DIAGNOSIS — I82.412 ACUTE DEEP VEIN THROMBOSIS (DVT) OF FEMORAL VEIN OF LEFT LOWER EXTREMITY (HCC): ICD-10-CM

## 2020-07-07 DIAGNOSIS — C78.7 LIVER METASTASES: ICD-10-CM

## 2020-07-07 DIAGNOSIS — Z79.899 HIGH RISK MEDICATION USE: ICD-10-CM

## 2020-07-07 DIAGNOSIS — C78.7 LIVER METASTASIS: ICD-10-CM

## 2020-07-07 DIAGNOSIS — Z45.2 ENCOUNTER FOR ADJUSTMENT OR MANAGEMENT OF VASCULAR ACCESS DEVICE: ICD-10-CM

## 2020-07-07 DIAGNOSIS — D70.1 CHEMOTHERAPY INDUCED NEUTROPENIA (HCC): ICD-10-CM

## 2020-07-07 DIAGNOSIS — T45.1X5A PERIPHERAL NEUROPATHY DUE TO CHEMOTHERAPY (HCC): ICD-10-CM

## 2020-07-07 DIAGNOSIS — C15.5 MALIGNANT NEOPLASM OF LOWER THIRD OF ESOPHAGUS (HCC): Primary | ICD-10-CM

## 2020-07-07 DIAGNOSIS — D69.6 THROMBOCYTOPENIA (HCC): ICD-10-CM

## 2020-07-07 DIAGNOSIS — I82.431 ACUTE DEEP VEIN THROMBOSIS (DVT) OF RIGHT POPLITEAL VEIN (HCC): ICD-10-CM

## 2020-07-07 DIAGNOSIS — G62.0 PERIPHERAL NEUROPATHY DUE TO CHEMOTHERAPY (HCC): ICD-10-CM

## 2020-07-07 LAB
ALBUMIN SERPL-MCNC: 3.9 G/DL (ref 3.5–5.2)
ALBUMIN/GLOB SERPL: 1.7 G/DL (ref 1.1–2.4)
ALP SERPL-CCNC: 96 U/L (ref 38–116)
ALT SERPL W P-5'-P-CCNC: 52 U/L (ref 0–41)
ANION GAP SERPL CALCULATED.3IONS-SCNC: 11.4 MMOL/L (ref 5–15)
AST SERPL-CCNC: 34 U/L (ref 0–40)
BASOPHILS # BLD AUTO: 0.04 10*3/MM3 (ref 0–0.2)
BASOPHILS NFR BLD AUTO: 0.7 % (ref 0–1.5)
BILIRUB SERPL-MCNC: 0.2 MG/DL (ref 0.2–1.2)
BUN SERPL-MCNC: 9 MG/DL (ref 6–20)
BUN/CREAT SERPL: 11.8 (ref 7.3–30)
CALCIUM SPEC-SCNC: 9.1 MG/DL (ref 8.5–10.2)
CEA SERPL-MCNC: 5.73 NG/ML
CHLORIDE SERPL-SCNC: 101 MMOL/L (ref 98–107)
CO2 SERPL-SCNC: 23.6 MMOL/L (ref 22–29)
CREAT SERPL-MCNC: 0.76 MG/DL (ref 0.7–1.3)
DEPRECATED RDW RBC AUTO: 59.6 FL (ref 37–54)
EOSINOPHIL # BLD AUTO: 0.33 10*3/MM3 (ref 0–0.4)
EOSINOPHIL NFR BLD AUTO: 5.9 % (ref 0.3–6.2)
ERYTHROCYTE [DISTWIDTH] IN BLOOD BY AUTOMATED COUNT: 16.5 % (ref 12.3–15.4)
GFR SERPL CREATININE-BSD FRML MDRD: 104 ML/MIN/1.73
GLOBULIN UR ELPH-MCNC: 2.3 GM/DL (ref 1.8–3.5)
GLUCOSE SERPL-MCNC: 133 MG/DL (ref 74–124)
HCT VFR BLD AUTO: 30.2 % (ref 37.5–51)
HGB BLD-MCNC: 10.3 G/DL (ref 13–17.7)
IMM GRANULOCYTES # BLD AUTO: 0.23 10*3/MM3 (ref 0–0.05)
IMM GRANULOCYTES NFR BLD AUTO: 4.1 % (ref 0–0.5)
LYMPHOCYTES # BLD AUTO: 1.39 10*3/MM3 (ref 0.7–3.1)
LYMPHOCYTES NFR BLD AUTO: 25 % (ref 19.6–45.3)
MCH RBC QN AUTO: 33.9 PG (ref 26.6–33)
MCHC RBC AUTO-ENTMCNC: 34.1 G/DL (ref 31.5–35.7)
MCV RBC AUTO: 99.3 FL (ref 79–97)
MONOCYTES # BLD AUTO: 0.54 10*3/MM3 (ref 0.1–0.9)
MONOCYTES NFR BLD AUTO: 9.7 % (ref 5–12)
NEUTROPHILS NFR BLD AUTO: 3.02 10*3/MM3 (ref 1.7–7)
NEUTROPHILS NFR BLD AUTO: 54.6 % (ref 42.7–76)
NRBC BLD AUTO-RTO: 0 /100 WBC (ref 0–0.2)
PLATELET # BLD AUTO: 161 10*3/MM3 (ref 140–450)
PMV BLD AUTO: 9.3 FL (ref 6–12)
POTASSIUM SERPL-SCNC: 3.9 MMOL/L (ref 3.5–4.7)
PROT SERPL-MCNC: 6.2 G/DL (ref 6.3–8)
RBC # BLD AUTO: 3.04 10*6/MM3 (ref 4.14–5.8)
SODIUM SERPL-SCNC: 136 MMOL/L (ref 134–145)
WBC # BLD AUTO: 5.55 10*3/MM3 (ref 3.4–10.8)

## 2020-07-07 PROCEDURE — 96375 TX/PRO/DX INJ NEW DRUG ADDON: CPT

## 2020-07-07 PROCEDURE — 85025 COMPLETE CBC W/AUTO DIFF WBC: CPT

## 2020-07-07 PROCEDURE — 96367 TX/PROPH/DG ADDL SEQ IV INF: CPT

## 2020-07-07 PROCEDURE — 96368 THER/DIAG CONCURRENT INF: CPT

## 2020-07-07 PROCEDURE — 25010000002 DIPHENHYDRAMINE 1 EACH BAG: Performed by: INTERNAL MEDICINE

## 2020-07-07 PROCEDURE — 96413 CHEMO IV INFUSION 1 HR: CPT

## 2020-07-07 PROCEDURE — 82378 CARCINOEMBRYONIC ANTIGEN: CPT | Performed by: INTERNAL MEDICINE

## 2020-07-07 PROCEDURE — 25010000002 FLUOROURACIL PER 500 MG: Performed by: INTERNAL MEDICINE

## 2020-07-07 PROCEDURE — 96416 CHEMO PROLONG INFUSE W/PUMP: CPT

## 2020-07-07 PROCEDURE — 96411 CHEMO IV PUSH ADDL DRUG: CPT

## 2020-07-07 PROCEDURE — 25010000002 PALONOSETRON PER 25 MCG: Performed by: INTERNAL MEDICINE

## 2020-07-07 PROCEDURE — 99215 OFFICE O/P EST HI 40 MIN: CPT | Performed by: INTERNAL MEDICINE

## 2020-07-07 PROCEDURE — 25010000002 LEUCOVORIN CALCIUM PER 50 MG: Performed by: INTERNAL MEDICINE

## 2020-07-07 PROCEDURE — 80053 COMPREHEN METABOLIC PANEL: CPT

## 2020-07-07 PROCEDURE — 25010000002 TRASTUZUMAB PER 10 MG: Performed by: INTERNAL MEDICINE

## 2020-07-07 PROCEDURE — 25010000002 DEXAMETHASONE PER 1 MG: Performed by: INTERNAL MEDICINE

## 2020-07-07 RX ORDER — CEVIMELINE HYDROCHLORIDE 30 MG/1
30 CAPSULE ORAL 3 TIMES DAILY
COMMUNITY
Start: 2020-06-27

## 2020-07-07 RX ORDER — SODIUM CHLORIDE 9 MG/ML
250 INJECTION, SOLUTION INTRAVENOUS ONCE
Status: CANCELLED | OUTPATIENT
Start: 2020-07-07

## 2020-07-07 RX ORDER — HEPARIN SODIUM (PORCINE) LOCK FLUSH IV SOLN 100 UNIT/ML 100 UNIT/ML
500 SOLUTION INTRAVENOUS AS NEEDED
Status: CANCELLED | OUTPATIENT
Start: 2020-07-07

## 2020-07-07 RX ORDER — FLUOROURACIL 50 MG/ML
400 INJECTION, SOLUTION INTRAVENOUS ONCE
Status: CANCELLED | OUTPATIENT
Start: 2020-07-07

## 2020-07-07 RX ORDER — FAMOTIDINE 10 MG/ML
20 INJECTION, SOLUTION INTRAVENOUS AS NEEDED
Status: CANCELLED | OUTPATIENT
Start: 2020-07-07

## 2020-07-07 RX ORDER — DIPHENHYDRAMINE HYDROCHLORIDE 50 MG/ML
50 INJECTION INTRAMUSCULAR; INTRAVENOUS AS NEEDED
Status: CANCELLED | OUTPATIENT
Start: 2020-07-07

## 2020-07-07 RX ORDER — DEXAMETHASONE SODIUM PHOSPHATE 4 MG/ML
4 INJECTION, SOLUTION INTRA-ARTICULAR; INTRALESIONAL; INTRAMUSCULAR; INTRAVENOUS; SOFT TISSUE ONCE
Status: COMPLETED | OUTPATIENT
Start: 2020-07-07 | End: 2020-07-07

## 2020-07-07 RX ORDER — SODIUM CHLORIDE 9 MG/ML
250 INJECTION, SOLUTION INTRAVENOUS ONCE
Status: COMPLETED | OUTPATIENT
Start: 2020-07-07 | End: 2020-07-07

## 2020-07-07 RX ORDER — PALONOSETRON 0.05 MG/ML
0.25 INJECTION, SOLUTION INTRAVENOUS ONCE
Status: COMPLETED | OUTPATIENT
Start: 2020-07-07 | End: 2020-07-07

## 2020-07-07 RX ORDER — FAMOTIDINE 10 MG/ML
20 INJECTION, SOLUTION INTRAVENOUS 2 TIMES DAILY
Status: DISCONTINUED | OUTPATIENT
Start: 2020-07-07 | End: 2020-07-07 | Stop reason: HOSPADM

## 2020-07-07 RX ORDER — FAMOTIDINE 10 MG/ML
20 INJECTION, SOLUTION INTRAVENOUS 2 TIMES DAILY
Status: CANCELLED | OUTPATIENT
Start: 2020-07-07

## 2020-07-07 RX ORDER — PALONOSETRON 0.05 MG/ML
0.25 INJECTION, SOLUTION INTRAVENOUS ONCE
Status: CANCELLED | OUTPATIENT
Start: 2020-07-07

## 2020-07-07 RX ORDER — FLUOROURACIL 50 MG/ML
400 INJECTION, SOLUTION INTRAVENOUS ONCE
Status: COMPLETED | OUTPATIENT
Start: 2020-07-07 | End: 2020-07-07

## 2020-07-07 RX ORDER — SODIUM CHLORIDE 0.9 % (FLUSH) 0.9 %
10 SYRINGE (ML) INJECTION AS NEEDED
Status: CANCELLED | OUTPATIENT
Start: 2020-07-07

## 2020-07-07 RX ADMIN — FAMOTIDINE 20 MG: 10 INJECTION INTRAVENOUS at 11:59

## 2020-07-07 RX ADMIN — LEUCOVORIN CALCIUM 810 MG: 350 INJECTION, POWDER, LYOPHILIZED, FOR SOLUTION INTRAMUSCULAR; INTRAVENOUS at 13:10

## 2020-07-07 RX ADMIN — DIPHENHYDRAMINE HYDROCHLORIDE 25 MG: 50 INJECTION INTRAMUSCULAR; INTRAVENOUS at 11:59

## 2020-07-07 RX ADMIN — FLUOROURACIL 4850 MG: 50 INJECTION, SOLUTION INTRAVENOUS at 13:44

## 2020-07-07 RX ADMIN — DEXAMETHASONE SODIUM PHOSPHATE 4 MG: 4 INJECTION, SOLUTION INTRA-ARTICULAR; INTRALESIONAL; INTRAMUSCULAR; INTRAVENOUS; SOFT TISSUE at 12:13

## 2020-07-07 RX ADMIN — TRASTUZUMAB 350 MG: 150 INJECTION, POWDER, LYOPHILIZED, FOR SOLUTION INTRAVENOUS at 12:33

## 2020-07-07 RX ADMIN — PALONOSETRON 0.25 MG: 0.05 INJECTION, SOLUTION INTRAVENOUS at 11:59

## 2020-07-07 RX ADMIN — FLUOROURACIL 810 MG: 50 INJECTION, SOLUTION INTRAVENOUS at 13:44

## 2020-07-07 RX ADMIN — SODIUM CHLORIDE 250 ML: 900 INJECTION, SOLUTION INTRAVENOUS at 11:58

## 2020-07-07 NOTE — TELEPHONE ENCOUNTER
----- Message from Shirley Ignacio RN sent at 7/7/2020  1:56 PM EDT -----  Regarding: Unhook Appointment  Needs unhook [facility] ball Thursday, 7/9/20, at 12:30 PM.

## 2020-07-07 NOTE — PROGRESS NOTES
Subjective     REASON FOR follow up:1.    1. 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  Currently receiving palliative FOLFOX AND HERCEPTIN therapy.    2.COLOVESICAL FISTULA: chronic antibiotic therapy cipro, NO FURTHER PLANS FOR SURGERY AFTER VISIT AND PROCEDURE BY DR GM CASTILLO 1/20    3. DVT R AND LEFT LE ON ANTICOAGULANT LOVENOX: THROMBOPHILIA OF MALIGNANCY    4. 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 NEURONTIN INITIATED.        History of Present Illness PATIENT WAS CALLED THE DAY BEFORE BY THE OFFICE TO ASK FOR SYMPTOMS THAT COULD BE CONSISTENT WITH CORONAVIRUS INFECTION, AND BEING NEGATIVE WAS SCHEDULED TO BE SEEN IN THE OFFICE TODAY. SIMILAR QUESTIONING TODAY INCLUDING, CHILLS, FEVER, NEW COUGH, SHORTNESS OF BREATH, DIARRHEA,DIFFUSE BODY ACHES  AND CHANGES IN SMELL OR TASTE WERE NEGATIVE.DURING THE VISIT WITH THE PATIENT TODAY , PATIENT HAD FACE MASK, I HAD PROPPER PROTECTIVE EQUIPMENT, AND I DID HAND HYGIENE WITH SOAP AND WATER BEFORE AND AFTER THE VISIT.      This patient returns today to the office after I talked with his dentist several weeks when anticoagulation was put on hold for 3 days and he had 4 teeth removed from the lower gum and a lot of different work in preparation for partial work that he will have further done to try to improve quality of his mastication. He has not had any clinical bleeding and the healing in his mouth is going fine. No infection. He has not had any new infections from his colovesical fistula. He has not had any bleeding or new clots in regard thrombophilia associated with malignancy. He has no difficulty swallowing. His weight remains stable. He has no cancer related pain, normal bowel activity. He has sensory peripheral neuropathy in hands and feet to the point that he is not able to work for [employer] anymore and he is going to go and take a term  "that is break of contract.    Otherwise the patient has not had any new issues.         Past Medical History:   Diagnosis Date   • Arthritis    • Elevated PSA    • Esophageal cancer (CMS/HCC)    • Esophageal mass    • Fistula    • H/O Lung nodule    • Hepatitis     CHILD--PT STATED \"I THINK IT WAS A.\"   • History of pneumonia    • Hx of blood clots 08/10/2019   • Hyperlipidemia    • Hypertension    • Neuropathy    • Prostate cancer (CMS/HCC)    • PVD (peripheral vascular disease) (CMS/HCC)         Past Surgical History:   Procedure Laterality Date   • COLONOSCOPY N/A 2/4/2020    Procedure: COLONOSCOPY;  Surgeon: Guy Sears MD;  Location: [facility] ENDOSCOPY;  Service: General;  Laterality: N/A;  PRE-COLOVESICAL FISTULA  POST-- DIVERTICULOSIS   • CYSTOSCOPY  02/06/2020   • CYSTOSCOPY Bilateral 2/26/2020    Procedure: CYSTOSCOPY RETROGRADE;  Surgeon: Cm Witt MD;  Location: [facility];  Service: Urology;  Laterality: Bilateral;   • HERNIA REPAIR  1999   • PROSTATE SURGERY  03/2008   • VENOUS ACCESS DEVICE (PORT) INSERTION N/A 7/16/2019    Procedure: INSERTION VENOUS ACCESS DEVICE WITH FLUORO AND EGD WITH BIOPSY;  Surgeon: Mary Brito MD;  Location: [facility] OR;  Service: Thoracic        Current Outpatient Medications on File Prior to Visit   Medication Sig Dispense Refill   • cevimeline (EVOXAC) 30 MG capsule Take 30 mg by mouth 3 (Three) Times a Day.     • ciprofloxacin (CIPRO) 250 MG tablet Take 1 tablet by mouth Daily. 90 tablet 1   • clotrimazole (MYCELEX) 10 MG obie 3 TIMES A DAY 90 tablet 1   • gabapentin (NEURONTIN) 300 MG capsule Take 2 capsules by mouth every night at bedtime. 60 capsule 0   • lisinopril-hydrochlorothiazide (PRINZIDE,ZESTORETIC) 20-12.5 MG per tablet Take 1 tablet by mouth Daily. 90 tablet 2   • ondansetron (ZOFRAN) 4 MG tablet Take 1 tablet by mouth Every 8 (Eight) Hours As Needed for Nausea or Vomiting. 30 tablet 2   • Probiotic Product (PROBIOTIC " DAILY PO) Take 1 tablet by mouth Daily.     • rivaroxaban (XARELTO) 20 MG tablet Take 1 tablet by mouth Daily. 30 tablet 6   • triamcinolone (KENALOG) 0.1 % ointment Apply  topically to the appropriate area as directed 2 (Two) Times a Day. (Patient taking differently: Apply 1 application topically to the appropriate area as directed As Needed.) 30 g 2     No current facility-administered medications on file prior to visit.         ALLERGIES:  No Known Allergies     Social History     Socioeconomic History   • Marital status: Single     Spouse name: Not on file   • Number of children: Not on file   • Years of education: High school   • Highest education level: Not on file   Occupational History   • Occupation: [employer]     Employer: [employer]   Tobacco Use   • Smoking status: Current Every Day Smoker     Packs/day: 1.00     Years: 40.00     Pack years: 40.00     Types: Cigarettes   • Smokeless tobacco: Never Used   Substance and Sexual Activity   • Alcohol use: Not Currently     Comment: NOT RECENTLY- none since september   • Drug use: No   • Sexual activity: Defer        Family History   Problem Relation Age of Onset   • Hypertension Mother    • Stroke Mother    • Hypertension Other    • Lung disease Other    • Prostate cancer Other    • Lung cancer Father    • Malig Hyperthermia Neg Hx          Review of Systems:      General: no fever, no chills, no fatigue,no weight changes, no lack of appetite.  Eyes: no epiphora, xerophthalmia,conjunctivitis, pain, glaucoma, blurred vision, blindness, secretion, photophobia, proptosis, diplopia.  Ears: no otorrhea, tinnitus, otorrhagia, deafness, pain, vertigo.  Nose: no rhinorrhea, no epistaxis, no alteration in perception of odors, no sinuses pressure.  Mouth: stated alteration in gums or teeth,  No ulcers, stated difficulty with mastication or deglut ion, no odynophagia.  Neck: no masses or pain, no thyroid alterations, no pain in muscles or arteries, no carotid odynia,  "no crepitation.  Respiratory: no cough, no sputum production,no dyspnea,no trepopnea, no pleuritic pain,no hemoptysis.  Heart: no syncope, no irregularity, no palpitations, no angina,no orthopnea,no paroxysmal nocturnal dyspnea.  Vascular Venous: no tenderness,no edema,no palpable cords,no postphlebitic syndrome, no skin changes no ulcerations.  Vascular Arterial: le distal ischemia, noclaudication, no gangrene, no neuropathic ischemic pain, no skin ulcers, no paleness no cyanosis.  GI: no dysphagia, no odynophagia, no regurgitation, no heartburn,no indigestion,no nausea,no vomiting,no hematemesis ,no melena,no jaundice,no distention, no obstipation,no enterorrhagia,no proctalgia,no anal  lesions, no changes in bowel habits.  : no frequency, no hesitancy, no hematuria, no discharge,no  pain.  Musculoskeletal: no muscle or tendon pain or inflammation,no  joint pain, no edema, no functional limitation,no fasciculations, no mass.  Neurologic: no headache, no seizures, noalterations on Craneal nerves, no motor deficit, le sensory deficit, normal coordination, no alteration in memory,normal orientation, calculation,normal writting, verbal and written language.  Skin: no rashes,no pruritus no localized lesions.  Psychiatric: no anxiety, no depression,no agitation, no delusions, proper insight.        Objective     Vitals:    07/07/20 1118   BP: 101/64   Pulse: 80   Resp: 18   Temp: 98 °F (36.7 °C)   TempSrc: Skin   SpO2: 97%   Weight: 86.7 kg (191 lb 1.6 oz)   Height: 175.3 cm (69.02\")   PainSc: 0-No pain     Current Status 7/7/2020   ECOG score 0       Exam Patient screened negative for depression based on a PHQ-9 score of 1 on 5/5/2020.         GENERAL ASPECT: IN GOOD HEALTH WALKING THROUGH TE OFFICE NO DIFFICULTIES, NO PAIN.PROPER NUTRITION.WELL KEPT PHYSICALLY.  EYES : NOT JAUNDICED, PUPILS WERE EQUAL.  HEART: REGULAR NO MURMURS , NO GALLOPS, NO RUBS.  ABDOMEN: NOT DISTENDED, NO PAIN, NO LIVER OR SPLEEN ENLARGEMENT, " NO ASCITES, NO COLLATERAL CIRCULATION.  EXTREMITIES: NO EDEMA, NO PAIN, finger CLUBBING, NO DEFORMITIES.decreased capillary refill in toes cold feet decreased pulses  NEUROLOGIC: NORMAL CONVERSATION, MEMORY , SPEECH AND GAIT.  SKIN: NO LESIONS.left hand nails onychorhexis        RECENT LABS:  Hematology WBC   Date Value Ref Range Status   07/07/2020 5.55 3.40 - 10.80 10*3/mm3 Final   02/02/2019 13.4 (H) 3.4 - 10.8 x10E3/uL Final     RBC   Date Value Ref Range Status   07/07/2020 3.04 (L) 4.14 - 5.80 10*6/mm3 Final   02/02/2019 4.81 4.14 - 5.80 x10E6/uL Final     Hemoglobin   Date Value Ref Range Status   07/07/2020 10.3 (L) 13.0 - 17.7 g/dL Final     Hematocrit   Date Value Ref Range Status   07/07/2020 30.2 (L) 37.5 - 51.0 % Final     Platelets   Date Value Ref Range Status   07/07/2020 161 140 - 450 10*3/mm3 Final          CEA   Order: [id number]   Status:  Final result   Visible to patient:  Yes (MyChart) Next appt:  None Dx:  Malignant neoplasm of lower third of ...   Specimen Information: Blood        Component  Ref Range & Units 3wk ago 1mo ago 2mo ago   CEA  ng/mL 6.36  6.97  8.66    Resulting Agency  [facility]      Narrative   Performed by:  [facility]   CEA Reference Range:    Non Smokers:   Less than 3 ng/mL  Smokers:       Less than 5 ng/mL  Results may be falsely decreased if patient taking Biotin.      Specimen Collected: 06/16/20 09:58 Last Resulted: 06/16/20 17:18           Interpretation Summary echo    · Left ventricular systolic function is normal.  · There is calcification of the aortic valve.  · Calculated EF = 63%.  · Global Longitudinal LV strain = -18.2%.  · C/w study 1/7/20, there is no change.            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 Almost 90% of the liver WAS replaced by tumor. The patient has been undergoing chemotherapy with FOLFOX and Herceptin and has had excellent response clinically and  radiologically.  He is here today for scheduled treatment, 5/5/2020.  He continues to tolerate treatment quite well.  He no longer receives oxaliplatin secondary to neuropathy.      2.  Recurrent UTI s due to colovesical fistula  He does take Cipro 250 mg daily     3.  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  Oxaliplatin has been omitted from his treatment plan.  He takes gabapentin which provides him mild relief.  His symptoms are persistent but stable.    4.  Thrombophilia associated with malignancy.  He is currently taking Xarelto daily with good tolerance, no new cloths    5.  Peripheral vascular disease of the lower extremities.  His symptoms are stable.    PLAN:  1. For his treatment of his stage IV carcinoma of the esophagus the patient will remain on FOLFIRI and he will proceed with his cycle today. He also will receive Herceptin. The left ventricular ejection fraction has not changed in the echocardiogram in 05/2020 and he will require eventually another echocardiogram in 09/2020. He has no symptoms related to heart issues at this time. He has no symptoms pertinent to the cancer with no dysphagia, no liver enlargement, no jaundice and his CEA level has been the lowest level that we ever measured 6.3 not too long ago.     The patient raised the question in regard when are we going to do another scan. I think under the present circumstances before I see him back in a month he will proceed with a PET scan in 3 weeks.     2. In regard to his sensory peripheral neuropathy not only in his feet but also in his hands and related to his previous use of Oxaliplatin the patient is completely incapacitated to work for any activity. He is able to do simple tasks but even tying his shoes, buttoning a shirt and writing has become a real task. He will not be able to use tools, machines, or computers of any kind. His employer, [employer] has called this a break of contract. He will be able to keep insurance for 18 months and I think  that will carry him until he gets to the time of Medicare. I agree with this completley.     3. From the point of view of his thrombophilia associated with malignancy he will remain on his Xarelto. He has not had any new clots and no clinical bleeding.    4. From the point of view of the neuropathy symptoms Neurontin is sufficient to control the pain and discomfort produced by this.     5. From the point of view of all of the issues pertinent to his mouth care, I discussed this with his dentist. He went off anticoagulants for 3 days, he is back on anticoagulant, he is healing well in his mouth. He has no further issues there. He will go back to see his dentist at some point next week.     6. In regard to his smoking and associated peripheral arterial disease he is not willing to quit. I pointed out to him that it is not going to be surprising if at some point he develops further deterioration in his lower extremities he will require angiogram, vascular surgery assessment and so forth and I pointed out to him that I would not be surprised if he ends up with some amputation or bypass at some point. Again he refuses to stop smoking cigarettes.    I went ahead and scheduled his appointments to come back to see me in a month and review all of these issues. It will be okay for his sister to come with him on that occasion.

## 2020-07-09 ENCOUNTER — INFUSION (OUTPATIENT)
Dept: ONCOLOGY | Facility: HOSPITAL | Age: 65
End: 2020-07-09

## 2020-07-09 DIAGNOSIS — C78.7 LIVER METASTASIS: ICD-10-CM

## 2020-07-09 DIAGNOSIS — Z45.2 ENCOUNTER FOR ADJUSTMENT OR MANAGEMENT OF VASCULAR ACCESS DEVICE: ICD-10-CM

## 2020-07-09 DIAGNOSIS — C15.5 MALIGNANT NEOPLASM OF LOWER THIRD OF ESOPHAGUS (HCC): Primary | ICD-10-CM

## 2020-07-09 PROCEDURE — 25010000003 HEPARIN LOCK FLUSH PER 10 UNITS: Performed by: INTERNAL MEDICINE

## 2020-07-09 RX ORDER — HEPARIN SODIUM (PORCINE) LOCK FLUSH IV SOLN 100 UNIT/ML 100 UNIT/ML
500 SOLUTION INTRAVENOUS AS NEEDED
Status: CANCELLED | OUTPATIENT
Start: 2020-07-09

## 2020-07-09 RX ORDER — SODIUM CHLORIDE 0.9 % (FLUSH) 0.9 %
10 SYRINGE (ML) INJECTION AS NEEDED
Status: CANCELLED | OUTPATIENT
Start: 2020-07-09

## 2020-07-09 RX ORDER — HEPARIN SODIUM (PORCINE) LOCK FLUSH IV SOLN 100 UNIT/ML 100 UNIT/ML
500 SOLUTION INTRAVENOUS AS NEEDED
Status: DISCONTINUED | OUTPATIENT
Start: 2020-07-09 | End: 2020-07-09 | Stop reason: HOSPADM

## 2020-07-09 RX ORDER — SODIUM CHLORIDE 0.9 % (FLUSH) 0.9 %
10 SYRINGE (ML) INJECTION AS NEEDED
Status: DISCONTINUED | OUTPATIENT
Start: 2020-07-09 | End: 2020-07-09 | Stop reason: HOSPADM

## 2020-07-09 RX ADMIN — Medication 10 ML: at 12:29

## 2020-07-09 RX ADMIN — SODIUM CHLORIDE, PRESERVATIVE FREE 500 UNITS: 5 INJECTION INTRAVENOUS at 12:30

## 2020-07-14 RX ORDER — LISINOPRIL AND HYDROCHLOROTHIAZIDE 20; 12.5 MG/1; MG/1
TABLET ORAL
Qty: 90 TABLET | Refills: 2 | Status: SHIPPED | OUTPATIENT
Start: 2020-07-14 | End: 2021-03-29

## 2020-07-31 DIAGNOSIS — N32.1 COLOVESICAL FISTULA: ICD-10-CM

## 2020-07-31 DIAGNOSIS — Z72.0 TOBACCO ABUSE: ICD-10-CM

## 2020-07-31 DIAGNOSIS — I82.412 ACUTE DEEP VEIN THROMBOSIS (DVT) OF FEMORAL VEIN OF LEFT LOWER EXTREMITY (HCC): ICD-10-CM

## 2020-07-31 DIAGNOSIS — C15.5 MALIGNANT NEOPLASM OF LOWER THIRD OF ESOPHAGUS (HCC): ICD-10-CM

## 2020-07-31 DIAGNOSIS — Z45.2 ENCOUNTER FOR ADJUSTMENT OR MANAGEMENT OF VASCULAR ACCESS DEVICE: ICD-10-CM

## 2020-07-31 RX ORDER — GABAPENTIN 300 MG/1
600 CAPSULE ORAL
Qty: 60 CAPSULE | Refills: 0 | Status: SHIPPED | OUTPATIENT
Start: 2020-07-31 | End: 2020-09-01 | Stop reason: SDUPTHER

## 2020-08-03 ENCOUNTER — HOSPITAL ENCOUNTER (OUTPATIENT)
Dept: PET IMAGING | Facility: HOSPITAL | Age: 65
Discharge: HOME | End: 2020-08-03

## 2020-08-03 ENCOUNTER — HOSPITAL ENCOUNTER (OUTPATIENT)
Dept: PET IMAGING | Facility: HOSPITAL | Age: 65
Discharge: HOME | End: 2020-08-03
Admitting: INTERNAL MEDICINE

## 2020-08-03 DIAGNOSIS — T45.1X5A CHEMOTHERAPY INDUCED NEUTROPENIA (HCC): ICD-10-CM

## 2020-08-03 DIAGNOSIS — T45.1X5A PERIPHERAL NEUROPATHY DUE TO CHEMOTHERAPY (HCC): ICD-10-CM

## 2020-08-03 DIAGNOSIS — Z72.0 TOBACCO ABUSE: ICD-10-CM

## 2020-08-03 DIAGNOSIS — D70.1 CHEMOTHERAPY INDUCED NEUTROPENIA (HCC): ICD-10-CM

## 2020-08-03 DIAGNOSIS — Z45.2 ENCOUNTER FOR ADJUSTMENT OR MANAGEMENT OF VASCULAR ACCESS DEVICE: ICD-10-CM

## 2020-08-03 DIAGNOSIS — I82.431 ACUTE DEEP VEIN THROMBOSIS (DVT) OF RIGHT POPLITEAL VEIN (HCC): ICD-10-CM

## 2020-08-03 DIAGNOSIS — G62.0 PERIPHERAL NEUROPATHY DUE TO CHEMOTHERAPY (HCC): ICD-10-CM

## 2020-08-03 DIAGNOSIS — N32.1 COLOVESICAL FISTULA: ICD-10-CM

## 2020-08-03 DIAGNOSIS — C15.5 MALIGNANT NEOPLASM OF LOWER THIRD OF ESOPHAGUS (HCC): ICD-10-CM

## 2020-08-03 DIAGNOSIS — I10 ESSENTIAL HYPERTENSION: ICD-10-CM

## 2020-08-03 DIAGNOSIS — C78.7 LIVER METASTASIS: ICD-10-CM

## 2020-08-03 LAB — GLUCOSE BLDC GLUCOMTR-MCNC: 108 MG/DL (ref 70–130)

## 2020-08-03 PROCEDURE — A9552 F18 FDG: HCPCS | Performed by: INTERNAL MEDICINE

## 2020-08-03 PROCEDURE — 78815 PET IMAGE W/CT SKULL-THIGH: CPT

## 2020-08-03 PROCEDURE — 0 FLUDEOXYGLUCOSE F18 SOLUTION: Performed by: INTERNAL MEDICINE

## 2020-08-03 PROCEDURE — 82962 GLUCOSE BLOOD TEST: CPT

## 2020-08-03 RX ADMIN — FLUDEOXYGLUCOSE F18 1 DOSE: 300 INJECTION INTRAVENOUS at 08:55

## 2020-08-10 ENCOUNTER — INFUSION (OUTPATIENT)
Dept: ONCOLOGY | Facility: HOSPITAL | Age: 65
End: 2020-08-10

## 2020-08-10 ENCOUNTER — OFFICE VISIT (OUTPATIENT)
Dept: ONCOLOGY | Facility: CLINIC | Age: 65
End: 2020-08-10

## 2020-08-10 VITALS
RESPIRATION RATE: 18 BRPM | OXYGEN SATURATION: 97 % | DIASTOLIC BLOOD PRESSURE: 73 MMHG | HEART RATE: 85 BPM | SYSTOLIC BLOOD PRESSURE: 113 MMHG | HEIGHT: 69 IN | TEMPERATURE: 97.7 F | WEIGHT: 193.2 LBS | BODY MASS INDEX: 28.61 KG/M2

## 2020-08-10 DIAGNOSIS — N32.1 COLOVESICAL FISTULA: ICD-10-CM

## 2020-08-10 DIAGNOSIS — T45.1X5A PERIPHERAL NEUROPATHY DUE TO CHEMOTHERAPY (HCC): ICD-10-CM

## 2020-08-10 DIAGNOSIS — I82.431 ACUTE DEEP VEIN THROMBOSIS (DVT) OF RIGHT POPLITEAL VEIN (HCC): ICD-10-CM

## 2020-08-10 DIAGNOSIS — C15.5 MALIGNANT NEOPLASM OF LOWER THIRD OF ESOPHAGUS (HCC): Primary | ICD-10-CM

## 2020-08-10 DIAGNOSIS — T45.1X5A CHEMOTHERAPY INDUCED NEUTROPENIA (HCC): ICD-10-CM

## 2020-08-10 DIAGNOSIS — C78.7 LIVER METASTASIS: ICD-10-CM

## 2020-08-10 DIAGNOSIS — Z79.01 CURRENT USE OF LONG TERM ANTICOAGULATION: ICD-10-CM

## 2020-08-10 DIAGNOSIS — C15.5 MALIGNANT NEOPLASM OF LOWER THIRD OF ESOPHAGUS (HCC): ICD-10-CM

## 2020-08-10 DIAGNOSIS — I10 ESSENTIAL HYPERTENSION: ICD-10-CM

## 2020-08-10 DIAGNOSIS — G62.0 PERIPHERAL NEUROPATHY DUE TO CHEMOTHERAPY (HCC): ICD-10-CM

## 2020-08-10 DIAGNOSIS — Z72.0 TOBACCO ABUSE: ICD-10-CM

## 2020-08-10 DIAGNOSIS — Z45.2 ENCOUNTER FOR ADJUSTMENT OR MANAGEMENT OF VASCULAR ACCESS DEVICE: ICD-10-CM

## 2020-08-10 DIAGNOSIS — C78.7 LIVER METASTASES: ICD-10-CM

## 2020-08-10 DIAGNOSIS — D70.1 CHEMOTHERAPY INDUCED NEUTROPENIA (HCC): ICD-10-CM

## 2020-08-10 LAB
ALBUMIN SERPL-MCNC: 4.1 G/DL (ref 3.5–5.2)
ALBUMIN/GLOB SERPL: 1.5 G/DL (ref 1.1–2.4)
ALP SERPL-CCNC: 99 U/L (ref 38–116)
ALT SERPL W P-5'-P-CCNC: 37 U/L (ref 0–41)
ANION GAP SERPL CALCULATED.3IONS-SCNC: 12.3 MMOL/L (ref 5–15)
AST SERPL-CCNC: 25 U/L (ref 0–40)
BASOPHILS # BLD AUTO: 0.06 10*3/MM3 (ref 0–0.2)
BASOPHILS NFR BLD AUTO: 0.6 % (ref 0–1.5)
BILIRUB SERPL-MCNC: 0.3 MG/DL (ref 0.2–1.2)
BUN SERPL-MCNC: 9 MG/DL (ref 6–20)
BUN/CREAT SERPL: 11.7 (ref 7.3–30)
CALCIUM SPEC-SCNC: 9.6 MG/DL (ref 8.5–10.2)
CEA SERPL-MCNC: 5.67 NG/ML
CHLORIDE SERPL-SCNC: 99 MMOL/L (ref 98–107)
CO2 SERPL-SCNC: 22.7 MMOL/L (ref 22–29)
CREAT SERPL-MCNC: 0.77 MG/DL (ref 0.7–1.3)
DEPRECATED RDW RBC AUTO: 53.7 FL (ref 37–54)
EOSINOPHIL # BLD AUTO: 0.39 10*3/MM3 (ref 0–0.4)
EOSINOPHIL NFR BLD AUTO: 3.8 % (ref 0.3–6.2)
ERYTHROCYTE [DISTWIDTH] IN BLOOD BY AUTOMATED COUNT: 15.1 % (ref 12.3–15.4)
GFR SERPL CREATININE-BSD FRML MDRD: 102 ML/MIN/1.73
GLOBULIN UR ELPH-MCNC: 2.7 GM/DL (ref 1.8–3.5)
GLUCOSE SERPL-MCNC: 117 MG/DL (ref 74–124)
HCT VFR BLD AUTO: 34.9 % (ref 37.5–51)
HGB BLD-MCNC: 11.8 G/DL (ref 13–17.7)
IMM GRANULOCYTES # BLD AUTO: 0.27 10*3/MM3 (ref 0–0.05)
IMM GRANULOCYTES NFR BLD AUTO: 2.6 % (ref 0–0.5)
LYMPHOCYTES # BLD AUTO: 1.54 10*3/MM3 (ref 0.7–3.1)
LYMPHOCYTES NFR BLD AUTO: 15.1 % (ref 19.6–45.3)
MCH RBC QN AUTO: 32.6 PG (ref 26.6–33)
MCHC RBC AUTO-ENTMCNC: 33.8 G/DL (ref 31.5–35.7)
MCV RBC AUTO: 96.4 FL (ref 79–97)
MONOCYTES # BLD AUTO: 0.63 10*3/MM3 (ref 0.1–0.9)
MONOCYTES NFR BLD AUTO: 6.2 % (ref 5–12)
NEUTROPHILS NFR BLD AUTO: 7.3 10*3/MM3 (ref 1.7–7)
NEUTROPHILS NFR BLD AUTO: 71.7 % (ref 42.7–76)
NRBC BLD AUTO-RTO: 0 /100 WBC (ref 0–0.2)
PLATELET # BLD AUTO: 140 10*3/MM3 (ref 140–450)
PMV BLD AUTO: 9.7 FL (ref 6–12)
POTASSIUM SERPL-SCNC: 4 MMOL/L (ref 3.5–4.7)
PROT SERPL-MCNC: 6.8 G/DL (ref 6.3–8)
PSA SERPL-MCNC: <0.014 NG/ML (ref 0–4)
RBC # BLD AUTO: 3.62 10*6/MM3 (ref 4.14–5.8)
SODIUM SERPL-SCNC: 134 MMOL/L (ref 134–145)
WBC # BLD AUTO: 10.19 10*3/MM3 (ref 3.4–10.8)

## 2020-08-10 PROCEDURE — 25010000002 DEXAMETHASONE PER 1 MG: Performed by: INTERNAL MEDICINE

## 2020-08-10 PROCEDURE — 96375 TX/PRO/DX INJ NEW DRUG ADDON: CPT

## 2020-08-10 PROCEDURE — 84153 ASSAY OF PSA TOTAL: CPT | Performed by: INTERNAL MEDICINE

## 2020-08-10 PROCEDURE — 99214 OFFICE O/P EST MOD 30 MIN: CPT | Performed by: INTERNAL MEDICINE

## 2020-08-10 PROCEDURE — 82378 CARCINOEMBRYONIC ANTIGEN: CPT | Performed by: INTERNAL MEDICINE

## 2020-08-10 PROCEDURE — 25010000002 LEUCOVORIN CALCIUM PER 50 MG: Performed by: INTERNAL MEDICINE

## 2020-08-10 PROCEDURE — 96411 CHEMO IV PUSH ADDL DRUG: CPT

## 2020-08-10 PROCEDURE — 80053 COMPREHEN METABOLIC PANEL: CPT

## 2020-08-10 PROCEDURE — 96416 CHEMO PROLONG INFUSE W/PUMP: CPT

## 2020-08-10 PROCEDURE — 25010000002 PALONOSETRON PER 25 MCG: Performed by: INTERNAL MEDICINE

## 2020-08-10 PROCEDURE — 25010000002 DIPHENHYDRAMINE 1 EACH BAG: Performed by: INTERNAL MEDICINE

## 2020-08-10 PROCEDURE — 96367 TX/PROPH/DG ADDL SEQ IV INF: CPT

## 2020-08-10 PROCEDURE — 96413 CHEMO IV INFUSION 1 HR: CPT

## 2020-08-10 PROCEDURE — 85025 COMPLETE CBC W/AUTO DIFF WBC: CPT

## 2020-08-10 PROCEDURE — 25010000002 FLUOROURACIL PER 500 MG: Performed by: INTERNAL MEDICINE

## 2020-08-10 PROCEDURE — 25010000002 TRASTUZUMAB PER 10 MG: Performed by: INTERNAL MEDICINE

## 2020-08-10 RX ORDER — FLUOROURACIL 50 MG/ML
400 INJECTION, SOLUTION INTRAVENOUS ONCE
Status: COMPLETED | OUTPATIENT
Start: 2020-08-10 | End: 2020-08-10

## 2020-08-10 RX ORDER — FAMOTIDINE 10 MG/ML
20 INJECTION, SOLUTION INTRAVENOUS AS NEEDED
Status: CANCELLED | OUTPATIENT
Start: 2020-08-10

## 2020-08-10 RX ORDER — SODIUM CHLORIDE 9 MG/ML
250 INJECTION, SOLUTION INTRAVENOUS ONCE
Status: CANCELLED | OUTPATIENT
Start: 2020-08-10

## 2020-08-10 RX ORDER — PALONOSETRON 0.05 MG/ML
0.25 INJECTION, SOLUTION INTRAVENOUS ONCE
Status: CANCELLED | OUTPATIENT
Start: 2020-08-10

## 2020-08-10 RX ORDER — FAMOTIDINE 10 MG/ML
20 INJECTION, SOLUTION INTRAVENOUS 2 TIMES DAILY
Status: CANCELLED | OUTPATIENT
Start: 2020-08-10

## 2020-08-10 RX ORDER — SODIUM CHLORIDE 9 MG/ML
250 INJECTION, SOLUTION INTRAVENOUS ONCE
Status: COMPLETED | OUTPATIENT
Start: 2020-08-10 | End: 2020-08-10

## 2020-08-10 RX ORDER — PALONOSETRON 0.05 MG/ML
0.25 INJECTION, SOLUTION INTRAVENOUS ONCE
Status: COMPLETED | OUTPATIENT
Start: 2020-08-10 | End: 2020-08-10

## 2020-08-10 RX ORDER — DIPHENHYDRAMINE HYDROCHLORIDE 50 MG/ML
50 INJECTION INTRAMUSCULAR; INTRAVENOUS AS NEEDED
Status: CANCELLED | OUTPATIENT
Start: 2020-08-10

## 2020-08-10 RX ORDER — FAMOTIDINE 10 MG/ML
20 INJECTION, SOLUTION INTRAVENOUS 2 TIMES DAILY
Status: DISCONTINUED | OUTPATIENT
Start: 2020-08-10 | End: 2020-08-10 | Stop reason: HOSPADM

## 2020-08-10 RX ORDER — FLUOROURACIL 50 MG/ML
400 INJECTION, SOLUTION INTRAVENOUS ONCE
Status: CANCELLED | OUTPATIENT
Start: 2020-08-10

## 2020-08-10 RX ADMIN — FLUOROURACIL 4850 MG: 50 INJECTION, SOLUTION INTRAVENOUS at 11:59

## 2020-08-10 RX ADMIN — DEXAMETHASONE SODIUM PHOSPHATE 4 MG: 4 INJECTION, SOLUTION INTRAMUSCULAR; INTRAVENOUS at 10:03

## 2020-08-10 RX ADMIN — LEUCOVORIN CALCIUM 810 MG: 350 INJECTION, POWDER, LYOPHILIZED, FOR SOLUTION INTRAMUSCULAR; INTRAVENOUS at 11:26

## 2020-08-10 RX ADMIN — FAMOTIDINE 20 MG: 10 INJECTION INTRAVENOUS at 09:44

## 2020-08-10 RX ADMIN — FLUOROURACIL 810 MG: 50 INJECTION, SOLUTION INTRAVENOUS at 11:59

## 2020-08-10 RX ADMIN — TRASTUZUMAB 350 MG: 150 INJECTION, POWDER, LYOPHILIZED, FOR SOLUTION INTRAVENOUS at 10:51

## 2020-08-10 RX ADMIN — DIPHENHYDRAMINE HYDROCHLORIDE 25 MG: 50 INJECTION INTRAMUSCULAR; INTRAVENOUS at 09:46

## 2020-08-10 RX ADMIN — PALONOSETRON 0.25 MG: 0.05 INJECTION, SOLUTION INTRAVENOUS at 09:43

## 2020-08-10 RX ADMIN — SODIUM CHLORIDE 250 ML: 9 INJECTION, SOLUTION INTRAVENOUS at 09:43

## 2020-08-10 NOTE — PROGRESS NOTES
"  Subjective     REASON FOR follow up:1.    1. 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  Currently receiving palliative FOLFOX AND HERCEPTIN therapy.    2.COLOVESICAL FISTULA: chronic antibiotic therapy cipro, NO FURTHER PLANS FOR SURGERY AFTER VISIT AND PROCEDURE BY DR GM CASTILLO 1/20    3. DVT R AND LEFT LE ON ANTICOAGULANT LOVENOX: THROMBOPHILIA OF MALIGNANCY    4. 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 NEURONTIN INITIATED.        History of Present Illness PATIENT WAS CALLED THE DAY BEFORE BY THE OFFICE TO ASK FOR SYMPTOMS THAT COULD BE CONSISTENT WITH CORONAVIRUS INFECTION, AND BEING NEGATIVE WAS SCHEDULED TO BE SEEN IN THE OFFICE TODAY. SIMILAR QUESTIONING TODAY INCLUDING, CHILLS, FEVER, NEW COUGH, SHORTNESS OF BREATH, DIARRHEA,DIFFUSE BODY ACHES  AND CHANGES IN SMELL OR TASTE WERE NEGATIVE.DURING THE VISIT WITH THE PATIENT TODAY , PATIENT HAD FACE MASK, I HAD PROPPER PROTECTIVE EQUIPMENT, AND I DID HAND HYGIENE WITH SOAP AND WATER BEFORE AND AFTER THE VISIT.  This patient returns today to the office for follow up stating that he continues doing well. The only problem that he has is minimal peripheral neuropathy sensory in his feet and his legs from time to time. He remains anticoagulated given his thrombophilia associated with malignancy with no clinical bleeding and no new clots. He has occasional hematuria from time to time associated with his colovesical fistula but he has not had any new urinary tract infections. His weight remains stable. He has no cancer related pain. He has no other new symptoms. He continues smoking a pack of cigarettes a day.             Past Medical History:   Diagnosis Date   • Arthritis    • Elevated PSA    • Esophageal cancer (CMS/HCC)    • Esophageal mass    • Fistula    • H/O Lung nodule    • Hepatitis     CHILD--PT STATED \"I THINK IT WAS A.\"   • " History of pneumonia    • Hx of blood clots 08/10/2019   • Hyperlipidemia    • Hypertension    • Neuropathy    • Prostate cancer (CMS/HCC)    • PVD (peripheral vascular disease) (CMS/HCC)         Past Surgical History:   Procedure Laterality Date   • COLONOSCOPY N/A 2/4/2020    Procedure: COLONOSCOPY;  Surgeon: Guy Sears MD;  Location: [facility] ENDOSCOPY;  Service: General;  Laterality: N/A;  PRE-COLOVESICAL FISTULA  POST-- DIVERTICULOSIS   • CYSTOSCOPY  02/06/2020   • CYSTOSCOPY Bilateral 2/26/2020    Procedure: CYSTOSCOPY RETROGRADE;  Surgeon: Cm Witt MD;  Location: [facility] OR;  Service: Urology;  Laterality: Bilateral;   • HERNIA REPAIR  1999   • PROSTATE SURGERY  03/2008   • VENOUS ACCESS DEVICE (PORT) INSERTION N/A 7/16/2019    Procedure: INSERTION VENOUS ACCESS DEVICE WITH FLUORO AND EGD WITH BIOPSY;  Surgeon: Mary Brito MD;  Location: [facility] OR;  Service: Thoracic        Current Outpatient Medications on File Prior to Visit   Medication Sig Dispense Refill   • cevimeline (EVOXAC) 30 MG capsule Take 30 mg by mouth 3 (Three) Times a Day.     • ciprofloxacin (CIPRO) 250 MG tablet Take 1 tablet by mouth Daily. 90 tablet 1   • clotrimazole (MYCELEX) 10 MG obie 3 TIMES A DAY 90 tablet 1   • gabapentin (NEURONTIN) 300 MG capsule Take 2 capsules by mouth every night at bedtime. 60 capsule 0   • lisinopril-hydrochlorothiazide (PRINZIDE,ZESTORETIC) 20-12.5 MG per tablet TAKE 1 TABLET BY MOUTH EVERY DAY 90 tablet 2   • ondansetron (ZOFRAN) 4 MG tablet Take 1 tablet by mouth Every 8 (Eight) Hours As Needed for Nausea or Vomiting. 30 tablet 2   • Probiotic Product (PROBIOTIC DAILY PO) Take 1 tablet by mouth Daily.     • rivaroxaban (XARELTO) 20 MG tablet Take 1 tablet by mouth Daily. 30 tablet 6   • triamcinolone (KENALOG) 0.1 % ointment Apply  topically to the appropriate area as directed 2 (Two) Times a Day. (Patient taking differently: Apply 1 application topically to the  appropriate area as directed As Needed.) 30 g 2     No current facility-administered medications on file prior to visit.         ALLERGIES:  No Known Allergies     Social History     Socioeconomic History   • Marital status: Single     Spouse name: Not on file   • Number of children: Not on file   • Years of education: High school   • Highest education level: Not on file   Occupational History   • Occupation: [employer]     Employer: [employer]   Tobacco Use   • Smoking status: Current Every Day Smoker     Packs/day: 1.00     Years: 40.00     Pack years: 40.00     Types: Cigarettes   • Smokeless tobacco: Never Used   Substance and Sexual Activity   • Alcohol use: Not Currently     Comment: NOT RECENTLY- none since september   • Drug use: No   • Sexual activity: Defer        Family History   Problem Relation Age of Onset   • Hypertension Mother    • Stroke Mother    • Hypertension Other    • Lung disease Other    • Prostate cancer Other    • Lung cancer Father    • Malig Hyperthermia Neg Hx       Current Outpatient Medications on File Prior to Visit   Medication Sig Dispense Refill   • cevimeline (EVOXAC) 30 MG capsule Take 30 mg by mouth 3 (Three) Times a Day.     • ciprofloxacin (CIPRO) 250 MG tablet Take 1 tablet by mouth Daily. 90 tablet 1   • clotrimazole (MYCELEX) 10 MG obie 3 TIMES A DAY 90 tablet 1   • gabapentin (NEURONTIN) 300 MG capsule Take 2 capsules by mouth every night at bedtime. 60 capsule 0   • lisinopril-hydrochlorothiazide (PRINZIDE,ZESTORETIC) 20-12.5 MG per tablet TAKE 1 TABLET BY MOUTH EVERY DAY 90 tablet 2   • ondansetron (ZOFRAN) 4 MG tablet Take 1 tablet by mouth Every 8 (Eight) Hours As Needed for Nausea or Vomiting. 30 tablet 2   • Probiotic Product (PROBIOTIC DAILY PO) Take 1 tablet by mouth Daily.     • rivaroxaban (XARELTO) 20 MG tablet Take 1 tablet by mouth Daily. 30 tablet 6   • triamcinolone (KENALOG) 0.1 % ointment Apply  topically to the appropriate area as directed 2 (Two)  Times a Day. (Patient taking differently: Apply 1 application topically to the appropriate area as directed As Needed.) 30 g 2     No current facility-administered medications on file prior to visit.    No Known Allergies     Review of Systems:  General: no fever, no chills, no fatigue,no weight changes, no lack of appetite.  Eyes: no epiphora, xerophthalmia,conjunctivitis, pain, glaucoma, blurred vision, blindness, secretion, photophobia, proptosis, diplopia.  Ears: no otorrhea, tinnitus, otorrhagia, deafness, pain, vertigo.  Nose: no rhinorrhea, no epistaxis, no alteration in perception of odors, no sinuses pressure.  Mouth: no alteration in gums or teeth,  No ulcers, no difficulty with mastication or deglut ion, no odynophagia.  Neck: no masses or pain, no thyroid alterations, no pain in muscles or arteries, no carotid odynia, no crepitation.  Respiratory: no cough, no sputum production,no dyspnea,no trepopnea, no pleuritic pain,no hemoptysis.  Heart: no syncope, no irregularity, no palpitations, no angina,no orthopnea,no paroxysmal nocturnal dyspnea.  Vascular Venous: no tenderness,no edema,no palpable cords,no postphlebitic syndrome, no skin changes no ulcerations.  Vascular Arterial: no distal ischemia, noclaudication, no gangrene, no neuropathic ischemic pain, no skin ulcers, no paleness no cyanosis.  GI: no dysphagia, no odynophagia, no regurgitation, no heartburn,no indigestion,no nausea,no vomiting,no hematemesis ,no melena,no jaundice,no distention, no obstipation,no enterorrhagia,no proctalgia,no anal  lesions, no changes in bowel habits.  : no frequency, no hesitancy, no hematuria, no discharge,no  pain.  Musculoskeletal: no muscle or tendon pain or inflammation,no  joint pain, no edema, no functional limitation,no fasciculations, no mass.  Neurologic: no headache, no seizures, noalterations on Craneal nerves, no motor deficit, mild le sensory deficit, normal coordination, no alteration in  "memory,normal orientation, calculation,normal writting, verbal and written language.  Skin: no rashes,no pruritus no localized lesions.  Psychiatric: no anxiety, no depression,no agitation, no delusions, proper insight.            Objective     Vitals:    08/10/20 0854   BP: 113/73   Pulse: 85   Resp: 18   Temp: 97.7 °F (36.5 °C)   TempSrc: Temporal   SpO2: 97%   Weight: 87.6 kg (193 lb 3.2 oz)   Height: 175.3 cm (69.02\")   PainSc: 0-No pain     Current Status 8/10/2020   ECOG score 0       Exam Patient screened negative for depression based on a PHQ-9 score of 1 on 5/5/2020.        GENERAL:  Well-developed, well-nourished  Patient  in no acute distress.   SKIN:  Warm, dry ,NO rashes,NO purpura ,NO petechiae.  HEENT:  Pupils were equal and reactive to light and accomodation, conjunctivas non injected, no pterigion, normal extraocular movements, normal visual acuity.   NECK:  Supple with good range of motion; no thyromegaly or masses, no JVD or bruits, no cervical adenopathies.No carotid arteries pain, no carotid abnormal pulsation , NO arterial dance.  LYMPHATICS:  No cervical, NO supraclavicular, NO axillary,NO epitrochlear , NO inguinal adenopathy.  CARDIAC   normal rate and regular rhythm, without murmur,NO rubs NO S3 NO S4 right or left . Normal femoral, popliteal, pedis, brachial and carotid pulses.  VASCULAR ARTERIAL: normal carotids,brachial,radial,femoral,popliteal, pedis pulses , no bruits.no paleness or cyanosis, no pain, no edema, no numbness, no gangrene.  VASCULAR VENOUS: no cyanosis, collateral circulation, varicosities, edema, palpable cords, pain, erythema.  ABDOMEN:  Soft, nontender with no hepatomegaly, no splenomegaly,no masses, no ascites, no collateral circulation,no distention,no Buford sign, no abdominal pain, no inguinal hernias,no umbilical hernia, no abdominal bruits. Normal bowel sounds.  GENITAL: Not  Performed.  EXTREMITIES  AND SPINE:  No clubbing, cyanosis or edema, no deformities or " pain .No kyphosis, scoliosis, deformities or pain in spine, ribs or pelvic bone.  NEUROLOGICAL:  Patient was awake, alert, oriented to time, person and place.          RECENT LABS:  Hematology WBC   Date Value Ref Range Status   08/10/2020 10.19 3.40 - 10.80 10*3/mm3 Final   02/02/2019 13.4 (H) 3.4 - 10.8 x10E3/uL Final     RBC   Date Value Ref Range Status   08/10/2020 3.62 (L) 4.14 - 5.80 10*6/mm3 Final   02/02/2019 4.81 4.14 - 5.80 x10E6/uL Final     Hemoglobin   Date Value Ref Range Status   08/10/2020 11.8 (L) 13.0 - 17.7 g/dL Final     Hematocrit   Date Value Ref Range Status   08/10/2020 34.9 (L) 37.5 - 51.0 % Final     Platelets   Date Value Ref Range Status   08/10/2020 140 140 - 450 10*3/mm3 Final                Component      Latest Ref Rng & Units 12/3/2019 2/18/2020 3/3/2020 4/14/2020           8:31 AM  9:00 AM  7:30 AM  8:27 AM   CEA      ng/mL 41.10 14.30 13.80 9.24     Component      Latest Ref Rng & Units 5/5/2020 5/26/2020 6/16/2020 7/7/2020           8:50 AM  8:14 AM  9:58 AM 10:55 AM   CEA      ng/mL 8.66 6.97 6.36 5.73       F-18 FDG PET FROM SKULL BASE TO MID THIGH WITH PET/CT FUSION     HISTORY: Esophageal cancer, restaging.     TECHNIQUE: Radiation dose reduction techniques were utilized, including  automated exposure control and exposure modulation based on body size.   Blood glucose level at time of injection was 108 mg/dL.  5.6 mCi of F-18  FDG were injected and PET was performed from skull base to mid thigh. CT  was obtained for localization and attenuation correction. Time at  injection 0855. PET start time 1009.      Compared with PET/CT 11/19/2019 and CT chest, abdomen and pelvis  09/19/2019.     FINDINGS:      There is no cervical adenopathy demonstrating FDG uptake significantly  above that of blood pool. The thyroid, submandibular and parotid glands  demonstrate roughly symmetric FDG uptake. Mild irregular thickening of  the distal esophagus and GE junction is present, as before.  The degree  of FDG uptake has decreased, with a max SUV of 4 (previously 5.1).     There is no hilar, mediastinal or axillary adenopathy demonstrating FDG  uptake significantly above that of blood pool.     Right Port-A-Cath tip terminates in the right atrium. Heart is normal in  size. Moderate to extensive coronary artery calcifications present.     Sub-6 mm pulmonary nodule in the right upper lobe are grossly unchanged  since 07/15/2019. There are no new suspicious FDG avid pulmonary  nodules. There is no pulmonary consolidation, pleural effusion or  pneumothorax.     There is no focal FDG avid abnormality within the gallbladder, pancreas,  spleen, adrenal glands or kidneys. The liver has a lobulated appearance  with ill-defined areas of hypoattenuation and heterogenous FDG uptake,  as before. No focal FDG avid abnormality is visualized within the liver.  There is no hydronephrosis.     There is no abdominopelvic adenopathy demonstrating FDG uptake  significantly above that of blood pool.     There is colonic diverticulosis. Long segment of increased FDG uptake is  present within a portion of the sigmoid colon which comes in close  approximation with the bladder with a discrete linear tract extending  from the colon to the bladder. Associated air layers non-dependently in  the bladder, as before. The appendix is unremarkable.     There is no free intraperitoneal air or fluid.     There are no suspicious FDG avid lytic or blastic osseous lesions.     IMPRESSION:  1.  Irregular thickening with mild-to-moderate FDG uptake within the  distal esophagus and GE junction, grossly similar to minimally decreased  in FDG uptake since 08/03/2020.  2.  No findings of FDG avid metastatic disease within the neck or chest.  No focal FDG uptake significantly above that of background liver is  visualized within the previously seen hepatic metastases.  3.  Findings of a colovesical fistula with long segment of moderate to  intense  FDG uptake within the sigmoid colon, as before.  4.  Other findings as above.     This report was finalized on 8/5/2020 7:59 AM by Dr. Gaetano Varela M.D.         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 Almost 90% of the liver WAS replaced by tumor. The patient has been undergoing chemotherapy with 5  fu and leucovorin  and Herceptin and has had excellent response clinically and radiologically. The patient was reviewed on 08/10/2020. I reviewed with the patient in the PAC system [facility] his PET scan that is dramatic. There is minimal uptake in the lower esophagus and most importantly complete resolution of his liver metastasis. Actually the only issue that is pertinent to the PET scan is still the visibility of his colovesical fistula.     In other words the patient has reached the lowest level of CEA level that he ever had. He has an excellent PET scan that shows near complete resolution of his tumoral bulk in the esophagus and in his liver, no lesions on any level and excellent tolerance to the regimen with 5FU/leucovorin and Herceptin.     I pointed out to the patient that I would like for him to remain on the treatment once a month. If he stops smoking I will be able to get rid of the 5FU and leucovorin and keep him on the Herceptin only.    He is due to have a new echocardiogram and this will be scheduled in 3 weeks from now before the next cycle in 4 weeks.     From the point of view of his colovesical fistula he continues having hematuria from time to time but he has not had any new urinary tract infections. He remains on chronic ciprofloxacin preventively. He will remain on this medicine for the time being.     In regard to thrombophilia associated with malignancy the patient remains anticoagulated. He has no clinical bleeding and no new thrombosis.     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     In regard to pain, he has no need for pain medication, he has no cancer pain.     In regard to peripheral arterial disease I pointed out to the patient every cigarette that he puts in the mouth is going to make him a risk of ending up with peripheral arterial disease that is worse, gangrene and the need for amputation. It is up to him what he wants to do with the cigarettes. He also realizes that this is the reason why he has cancer in the first place.    RECOMMENDATIONS: As stated above. Otherwise I will review him back in a couple of months. He will be seen by nurse practitioner in 1 month. We will plan to repeat another set of CT scans at some point in November.     If things continue going in the proper way and after the next CT scans are done if he remains with the same remission eventually we could get rid of the 5FU/leucovorin giving him Herceptin only once a month.     I DISCUSSED WITH PATIENT IN DETAIL FORMS TO DECREASE CHANCES OF CORONAVIRUS INFECTION INCLUDING ISOLATION, PROPER HAND HIGIENE, AVOID PUBLIC PLACES  WITH CROWDS, FOLLOW  CDC RECOMENDATIONS, AND KEEP PERSONAL AND SOCIAL RESPONSIBILITY, WARE A MASK IN PUBLIC PLACES.  PATIENT IS AWARE THIS INFECTION COULD HAVE SEVERE CONSEQUENCES TO PERSONAL HEALTH AND FAMILY RAMIFICATIONS OF THIS.

## 2020-08-12 ENCOUNTER — INFUSION (OUTPATIENT)
Dept: ONCOLOGY | Facility: HOSPITAL | Age: 65
End: 2020-08-12

## 2020-08-12 DIAGNOSIS — C78.7 LIVER METASTASIS: ICD-10-CM

## 2020-08-12 DIAGNOSIS — C15.5 MALIGNANT NEOPLASM OF LOWER THIRD OF ESOPHAGUS (HCC): Primary | ICD-10-CM

## 2020-08-12 PROCEDURE — 96523 IRRIG DRUG DELIVERY DEVICE: CPT

## 2020-09-01 DIAGNOSIS — N32.1 COLOVESICAL FISTULA: ICD-10-CM

## 2020-09-01 DIAGNOSIS — Z45.2 ENCOUNTER FOR ADJUSTMENT OR MANAGEMENT OF VASCULAR ACCESS DEVICE: ICD-10-CM

## 2020-09-01 DIAGNOSIS — I82.412 ACUTE DEEP VEIN THROMBOSIS (DVT) OF FEMORAL VEIN OF LEFT LOWER EXTREMITY (HCC): ICD-10-CM

## 2020-09-01 DIAGNOSIS — Z72.0 TOBACCO ABUSE: ICD-10-CM

## 2020-09-01 DIAGNOSIS — C15.5 MALIGNANT NEOPLASM OF LOWER THIRD OF ESOPHAGUS (HCC): ICD-10-CM

## 2020-09-01 RX ORDER — GABAPENTIN 300 MG/1
600 CAPSULE ORAL
Qty: 60 CAPSULE | Refills: 0 | Status: SHIPPED | OUTPATIENT
Start: 2020-09-01 | End: 2020-09-08 | Stop reason: SDUPTHER

## 2020-09-03 ENCOUNTER — HOSPITAL ENCOUNTER (OUTPATIENT)
Dept: CARDIOLOGY | Facility: HOSPITAL | Age: 65
Discharge: HOME | End: 2020-09-03
Admitting: INTERNAL MEDICINE

## 2020-09-03 VITALS
SYSTOLIC BLOOD PRESSURE: 113 MMHG | HEIGHT: 69 IN | WEIGHT: 193 LBS | DIASTOLIC BLOOD PRESSURE: 73 MMHG | BODY MASS INDEX: 28.58 KG/M2 | HEART RATE: 68 BPM

## 2020-09-03 DIAGNOSIS — C78.7 LIVER METASTASIS: ICD-10-CM

## 2020-09-03 DIAGNOSIS — T45.1X5A PERIPHERAL NEUROPATHY DUE TO CHEMOTHERAPY (HCC): ICD-10-CM

## 2020-09-03 DIAGNOSIS — C15.5 MALIGNANT NEOPLASM OF LOWER THIRD OF ESOPHAGUS (HCC): ICD-10-CM

## 2020-09-03 DIAGNOSIS — N32.1 COLOVESICAL FISTULA: ICD-10-CM

## 2020-09-03 DIAGNOSIS — Z72.0 TOBACCO ABUSE: ICD-10-CM

## 2020-09-03 DIAGNOSIS — G62.0 PERIPHERAL NEUROPATHY DUE TO CHEMOTHERAPY (HCC): ICD-10-CM

## 2020-09-03 DIAGNOSIS — I82.431 ACUTE DEEP VEIN THROMBOSIS (DVT) OF RIGHT POPLITEAL VEIN (HCC): ICD-10-CM

## 2020-09-03 DIAGNOSIS — Z79.01 CURRENT USE OF LONG TERM ANTICOAGULATION: ICD-10-CM

## 2020-09-03 DIAGNOSIS — Z45.2 ENCOUNTER FOR ADJUSTMENT OR MANAGEMENT OF VASCULAR ACCESS DEVICE: ICD-10-CM

## 2020-09-03 LAB
AORTIC ARCH: 2.3 CM
ASCENDING AORTA: 3.9 CM
BH CV ECHO MEAS - ACS: 2.3 CM
BH CV ECHO MEAS - AO MAX PG (FULL): 3.8 MMHG
BH CV ECHO MEAS - AO MAX PG: 8 MMHG
BH CV ECHO MEAS - AO MEAN PG (FULL): 1 MMHG
BH CV ECHO MEAS - AO MEAN PG: 3 MMHG
BH CV ECHO MEAS - AO V2 MAX: 141 CM/SEC
BH CV ECHO MEAS - AO V2 MEAN: 81.2 CM/SEC
BH CV ECHO MEAS - AO V2 VTI: 29.3 CM
BH CV ECHO MEAS - ASC AORTA: 3.9 CM
BH CV ECHO MEAS - AVA(I,A): 1.9 CM^2
BH CV ECHO MEAS - AVA(I,D): 1.9 CM^2
BH CV ECHO MEAS - AVA(V,A): 1.8 CM^2
BH CV ECHO MEAS - AVA(V,D): 1.8 CM^2
BH CV ECHO MEAS - BSA(HAYCOCK): 2.1 M^2
BH CV ECHO MEAS - BSA: 2 M^2
BH CV ECHO MEAS - BZI_BMI: 28.5 KILOGRAMS/M^2
BH CV ECHO MEAS - BZI_METRIC_HEIGHT: 175.3 CM
BH CV ECHO MEAS - BZI_METRIC_WEIGHT: 87.5 KG
BH CV ECHO MEAS - EDV(MOD-SP2): 94 ML
BH CV ECHO MEAS - EDV(MOD-SP4): 95 ML
BH CV ECHO MEAS - EDV(TEICH): 118.2 ML
BH CV ECHO MEAS - EF(CUBED): 80.5 %
BH CV ECHO MEAS - EF(MOD-BP): 62.4 %
BH CV ECHO MEAS - EF(MOD-SP2): 61.7 %
BH CV ECHO MEAS - EF(MOD-SP4): 62.1 %
BH CV ECHO MEAS - EF(TEICH): 72.8 %
BH CV ECHO MEAS - ESV(MOD-SP2): 36 ML
BH CV ECHO MEAS - ESV(MOD-SP4): 36 ML
BH CV ECHO MEAS - ESV(TEICH): 32.2 ML
BH CV ECHO MEAS - FS: 42 %
BH CV ECHO MEAS - IVS/LVPW: 1.1
BH CV ECHO MEAS - IVSD: 1.2 CM
BH CV ECHO MEAS - LAT PEAK E' VEL: 6.7 CM/SEC
BH CV ECHO MEAS - LV DIASTOLIC VOL/BSA (35-75): 46.7 ML/M^2
BH CV ECHO MEAS - LV MASS(C)D: 220.3 GRAMS
BH CV ECHO MEAS - LV MASS(C)DI: 108.3 GRAMS/M^2
BH CV ECHO MEAS - LV MAX PG: 4.2 MMHG
BH CV ECHO MEAS - LV MEAN PG: 2 MMHG
BH CV ECHO MEAS - LV SYSTOLIC VOL/BSA (12-30): 17.7 ML/M^2
BH CV ECHO MEAS - LV V1 MAX: 102 CM/SEC
BH CV ECHO MEAS - LV V1 MEAN: 57.6 CM/SEC
BH CV ECHO MEAS - LV V1 VTI: 22.4 CM
BH CV ECHO MEAS - LVIDD: 5 CM
BH CV ECHO MEAS - LVIDS: 2.9 CM
BH CV ECHO MEAS - LVLD AP2: 7.9 CM
BH CV ECHO MEAS - LVLD AP4: 8.5 CM
BH CV ECHO MEAS - LVLS AP2: 6.7 CM
BH CV ECHO MEAS - LVLS AP4: 6.9 CM
BH CV ECHO MEAS - LVOT AREA (M): 2.5 CM^2
BH CV ECHO MEAS - LVOT AREA: 2.5 CM^2
BH CV ECHO MEAS - LVOT DIAM: 1.8 CM
BH CV ECHO MEAS - LVPWD: 1.1 CM
BH CV ECHO MEAS - MED PEAK E' VEL: 9.9 CM/SEC
BH CV ECHO MEAS - MR MAX PG: 34.1 MMHG
BH CV ECHO MEAS - MR MAX VEL: 292 CM/SEC
BH CV ECHO MEAS - MV A DUR: 0.14 SEC
BH CV ECHO MEAS - MV A MAX VEL: 100 CM/SEC
BH CV ECHO MEAS - MV DEC SLOPE: 510 CM/SEC^2
BH CV ECHO MEAS - MV DEC TIME: 0.2 SEC
BH CV ECHO MEAS - MV E MAX VEL: 117 CM/SEC
BH CV ECHO MEAS - MV E/A: 1.2
BH CV ECHO MEAS - MV MAX PG: 6.4 MMHG
BH CV ECHO MEAS - MV MEAN PG: 3 MMHG
BH CV ECHO MEAS - MV P1/2T MAX VEL: 114 CM/SEC
BH CV ECHO MEAS - MV P1/2T: 65.5 MSEC
BH CV ECHO MEAS - MV V2 MAX: 126 CM/SEC
BH CV ECHO MEAS - MV V2 MEAN: 83.5 CM/SEC
BH CV ECHO MEAS - MV V2 VTI: 34.7 CM
BH CV ECHO MEAS - MVA P1/2T LCG: 1.9 CM^2
BH CV ECHO MEAS - MVA(P1/2T): 3.4 CM^2
BH CV ECHO MEAS - MVA(VTI): 1.6 CM^2
BH CV ECHO MEAS - PA MAX PG (FULL): 1.7 MMHG
BH CV ECHO MEAS - PA MAX PG: 3 MMHG
BH CV ECHO MEAS - PA V2 MAX: 86.9 CM/SEC
BH CV ECHO MEAS - PULM A REVS DUR: 0.11 SEC
BH CV ECHO MEAS - PULM A REVS VEL: 32.6 CM/SEC
BH CV ECHO MEAS - PULM DIAS VEL: 44.1 CM/SEC
BH CV ECHO MEAS - PULM S/D: 1.9
BH CV ECHO MEAS - PULM SYS VEL: 84.2 CM/SEC
BH CV ECHO MEAS - PVA(V,A): 2.3 CM^2
BH CV ECHO MEAS - PVA(V,D): 2.3 CM^2
BH CV ECHO MEAS - QP/QS: 0.69
BH CV ECHO MEAS - RAP SYSTOLE: 3 MMHG
BH CV ECHO MEAS - RV MAX PG: 1.3 MMHG
BH CV ECHO MEAS - RV MEAN PG: 1 MMHG
BH CV ECHO MEAS - RV V1 MAX: 57.4 CM/SEC
BH CV ECHO MEAS - RV V1 MEAN: 36.7 CM/SEC
BH CV ECHO MEAS - RV V1 VTI: 11.4 CM
BH CV ECHO MEAS - RVOT AREA: 3.5 CM^2
BH CV ECHO MEAS - RVOT DIAM: 2.1 CM
BH CV ECHO MEAS - RVSP: 22 MMHG
BH CV ECHO MEAS - SI(CUBED): 49.4 ML/M^2
BH CV ECHO MEAS - SI(LVOT): 28 ML/M^2
BH CV ECHO MEAS - SI(MOD-SP2): 28.5 ML/M^2
BH CV ECHO MEAS - SI(MOD-SP4): 29 ML/M^2
BH CV ECHO MEAS - SI(TEICH): 42.3 ML/M^2
BH CV ECHO MEAS - SUP REN AO DIAM: 1.9 CM
BH CV ECHO MEAS - SV(CUBED): 100.6 ML
BH CV ECHO MEAS - SV(LVOT): 57 ML
BH CV ECHO MEAS - SV(MOD-SP2): 58 ML
BH CV ECHO MEAS - SV(MOD-SP4): 59 ML
BH CV ECHO MEAS - SV(RVOT): 39.5 ML
BH CV ECHO MEAS - SV(TEICH): 86 ML
BH CV ECHO MEAS - TAPSE (>1.6): 2.1 CM
BH CV ECHO MEAS - TR MAX VEL: 216 CM/SEC
BH CV ECHO MEASUREMENTS AVERAGE E/E' RATIO: 14.1
BH CV XLRA - RV BASE: 3.4 CM
BH CV XLRA - RV LENGTH: 7.4 CM
BH CV XLRA - RV MID: 2.2 CM
BH CV XLRA - TDI S': 10.3 CM/SEC
LEFT ATRIUM VOLUME INDEX: 25 ML/M2
MAXIMAL PREDICTED HEART RATE: 157 BPM
SINUS: 3.7 CM
STJ: 3.1 CM
STRESS TARGET HR: 133 BPM

## 2020-09-03 PROCEDURE — 93356 MYOCRD STRAIN IMG SPCKL TRCK: CPT

## 2020-09-03 PROCEDURE — 93306 TTE W/DOPPLER COMPLETE: CPT | Performed by: INTERNAL MEDICINE

## 2020-09-03 PROCEDURE — 93356 MYOCRD STRAIN IMG SPCKL TRCK: CPT | Performed by: INTERNAL MEDICINE

## 2020-09-03 PROCEDURE — 25010000002 PERFLUTREN (DEFINITY) 8.476 MG IN SODIUM CHLORIDE 0.9 % 10 ML INJECTION: Performed by: INTERNAL MEDICINE

## 2020-09-03 PROCEDURE — 93306 TTE W/DOPPLER COMPLETE: CPT

## 2020-09-03 RX ORDER — SODIUM CHLORIDE 0.9 % (FLUSH) 0.9 %
10 SYRINGE (ML) INJECTION AS NEEDED
Status: CANCELLED | OUTPATIENT
Start: 2020-09-03

## 2020-09-03 RX ORDER — HEPARIN SODIUM (PORCINE) LOCK FLUSH IV SOLN 100 UNIT/ML 100 UNIT/ML
500 SOLUTION INTRAVENOUS AS NEEDED
Status: CANCELLED | OUTPATIENT
Start: 2020-09-03

## 2020-09-03 RX ADMIN — PERFLUTREN 1.5 ML: 6.52 INJECTION, SUSPENSION INTRAVENOUS at 10:05

## 2020-09-08 ENCOUNTER — INFUSION (OUTPATIENT)
Dept: ONCOLOGY | Facility: HOSPITAL | Age: 65
End: 2020-09-08

## 2020-09-08 ENCOUNTER — OFFICE VISIT (OUTPATIENT)
Dept: ONCOLOGY | Facility: CLINIC | Age: 65
End: 2020-09-08

## 2020-09-08 VITALS
TEMPERATURE: 97.5 F | SYSTOLIC BLOOD PRESSURE: 117 MMHG | HEART RATE: 88 BPM | RESPIRATION RATE: 20 BRPM | HEIGHT: 69 IN | OXYGEN SATURATION: 97 % | DIASTOLIC BLOOD PRESSURE: 71 MMHG | BODY MASS INDEX: 28.88 KG/M2 | WEIGHT: 195 LBS

## 2020-09-08 DIAGNOSIS — C78.7 LIVER METASTASIS: ICD-10-CM

## 2020-09-08 DIAGNOSIS — I82.412 ACUTE DEEP VEIN THROMBOSIS (DVT) OF FEMORAL VEIN OF LEFT LOWER EXTREMITY (HCC): ICD-10-CM

## 2020-09-08 DIAGNOSIS — N32.1 COLOVESICAL FISTULA: ICD-10-CM

## 2020-09-08 DIAGNOSIS — Z72.0 TOBACCO ABUSE: ICD-10-CM

## 2020-09-08 DIAGNOSIS — G62.0 PERIPHERAL NEUROPATHY DUE TO CHEMOTHERAPY (HCC): ICD-10-CM

## 2020-09-08 DIAGNOSIS — C15.5 MALIGNANT NEOPLASM OF LOWER THIRD OF ESOPHAGUS (HCC): ICD-10-CM

## 2020-09-08 DIAGNOSIS — D70.1 CHEMOTHERAPY INDUCED NEUTROPENIA (HCC): ICD-10-CM

## 2020-09-08 DIAGNOSIS — T45.1X5A CHEMOTHERAPY INDUCED NEUTROPENIA (HCC): ICD-10-CM

## 2020-09-08 DIAGNOSIS — Z79.01 CURRENT USE OF LONG TERM ANTICOAGULATION: ICD-10-CM

## 2020-09-08 DIAGNOSIS — T45.1X5A PERIPHERAL NEUROPATHY DUE TO CHEMOTHERAPY (HCC): ICD-10-CM

## 2020-09-08 DIAGNOSIS — C78.7 LIVER METASTASES: ICD-10-CM

## 2020-09-08 DIAGNOSIS — C15.5 MALIGNANT NEOPLASM OF LOWER THIRD OF ESOPHAGUS (HCC): Primary | ICD-10-CM

## 2020-09-08 DIAGNOSIS — Z45.2 ENCOUNTER FOR ADJUSTMENT OR MANAGEMENT OF VASCULAR ACCESS DEVICE: ICD-10-CM

## 2020-09-08 DIAGNOSIS — I82.431 ACUTE DEEP VEIN THROMBOSIS (DVT) OF RIGHT POPLITEAL VEIN (HCC): ICD-10-CM

## 2020-09-08 LAB
ALBUMIN SERPL-MCNC: 4.1 G/DL (ref 3.5–5.2)
ALBUMIN/GLOB SERPL: 1.6 G/DL (ref 1.1–2.4)
ALP SERPL-CCNC: 99 U/L (ref 38–116)
ALT SERPL W P-5'-P-CCNC: 38 U/L (ref 0–41)
ANION GAP SERPL CALCULATED.3IONS-SCNC: 10.1 MMOL/L (ref 5–15)
AST SERPL-CCNC: 25 U/L (ref 0–40)
BASOPHILS # BLD AUTO: 0.05 10*3/MM3 (ref 0–0.2)
BASOPHILS NFR BLD AUTO: 0.5 % (ref 0–1.5)
BILIRUB SERPL-MCNC: 0.3 MG/DL (ref 0.2–1.2)
BUN SERPL-MCNC: 9 MG/DL (ref 6–20)
BUN/CREAT SERPL: 11.7 (ref 7.3–30)
CALCIUM SPEC-SCNC: 9.3 MG/DL (ref 8.5–10.2)
CHLORIDE SERPL-SCNC: 101 MMOL/L (ref 98–107)
CO2 SERPL-SCNC: 22.9 MMOL/L (ref 22–29)
CREAT SERPL-MCNC: 0.77 MG/DL (ref 0.7–1.3)
DEPRECATED RDW RBC AUTO: 52.9 FL (ref 37–54)
EOSINOPHIL # BLD AUTO: 0.27 10*3/MM3 (ref 0–0.4)
EOSINOPHIL NFR BLD AUTO: 2.7 % (ref 0.3–6.2)
ERYTHROCYTE [DISTWIDTH] IN BLOOD BY AUTOMATED COUNT: 15.2 % (ref 12.3–15.4)
GFR SERPL CREATININE-BSD FRML MDRD: 102 ML/MIN/1.73
GLOBULIN UR ELPH-MCNC: 2.6 GM/DL (ref 1.8–3.5)
GLUCOSE SERPL-MCNC: 112 MG/DL (ref 74–124)
HCT VFR BLD AUTO: 37.4 % (ref 37.5–51)
HGB BLD-MCNC: 12.8 G/DL (ref 13–17.7)
IMM GRANULOCYTES # BLD AUTO: 0.32 10*3/MM3 (ref 0–0.05)
IMM GRANULOCYTES NFR BLD AUTO: 3.2 % (ref 0–0.5)
LYMPHOCYTES # BLD AUTO: 1.64 10*3/MM3 (ref 0.7–3.1)
LYMPHOCYTES NFR BLD AUTO: 16.2 % (ref 19.6–45.3)
MCH RBC QN AUTO: 32.7 PG (ref 26.6–33)
MCHC RBC AUTO-ENTMCNC: 34.2 G/DL (ref 31.5–35.7)
MCV RBC AUTO: 95.4 FL (ref 79–97)
MONOCYTES # BLD AUTO: 0.67 10*3/MM3 (ref 0.1–0.9)
MONOCYTES NFR BLD AUTO: 6.6 % (ref 5–12)
NEUTROPHILS NFR BLD AUTO: 7.18 10*3/MM3 (ref 1.7–7)
NEUTROPHILS NFR BLD AUTO: 70.8 % (ref 42.7–76)
NRBC BLD AUTO-RTO: 0 /100 WBC (ref 0–0.2)
PLATELET # BLD AUTO: 133 10*3/MM3 (ref 140–450)
PMV BLD AUTO: 9.3 FL (ref 6–12)
POTASSIUM SERPL-SCNC: 3.8 MMOL/L (ref 3.5–4.7)
PROT SERPL-MCNC: 6.7 G/DL (ref 6.3–8)
RBC # BLD AUTO: 3.92 10*6/MM3 (ref 4.14–5.8)
SODIUM SERPL-SCNC: 134 MMOL/L (ref 134–145)
WBC # BLD AUTO: 10.13 10*3/MM3 (ref 3.4–10.8)

## 2020-09-08 PROCEDURE — 25010000002 DIPHENHYDRAMINE 1 EACH BAG: Performed by: INTERNAL MEDICINE

## 2020-09-08 PROCEDURE — 96375 TX/PRO/DX INJ NEW DRUG ADDON: CPT

## 2020-09-08 PROCEDURE — 96411 CHEMO IV PUSH ADDL DRUG: CPT

## 2020-09-08 PROCEDURE — 25010000002 LEUCOVORIN CALCIUM PER 50 MG: Performed by: INTERNAL MEDICINE

## 2020-09-08 PROCEDURE — 25010000002 DEXAMETHASONE PER 1 MG: Performed by: INTERNAL MEDICINE

## 2020-09-08 PROCEDURE — 25010000002 TRASTUZUMAB PER 10 MG: Performed by: INTERNAL MEDICINE

## 2020-09-08 PROCEDURE — 25010000002 PALONOSETRON PER 25 MCG: Performed by: INTERNAL MEDICINE

## 2020-09-08 PROCEDURE — 25010000002 FLUOROURACIL PER 500 MG: Performed by: INTERNAL MEDICINE

## 2020-09-08 PROCEDURE — 80053 COMPREHEN METABOLIC PANEL: CPT

## 2020-09-08 PROCEDURE — 96367 TX/PROPH/DG ADDL SEQ IV INF: CPT

## 2020-09-08 PROCEDURE — 99214 OFFICE O/P EST MOD 30 MIN: CPT | Performed by: INTERNAL MEDICINE

## 2020-09-08 PROCEDURE — 96416 CHEMO PROLONG INFUSE W/PUMP: CPT

## 2020-09-08 PROCEDURE — 96413 CHEMO IV INFUSION 1 HR: CPT

## 2020-09-08 PROCEDURE — 85025 COMPLETE CBC W/AUTO DIFF WBC: CPT

## 2020-09-08 RX ORDER — FLUOROURACIL 50 MG/ML
400 INJECTION, SOLUTION INTRAVENOUS ONCE
Status: CANCELLED | OUTPATIENT
Start: 2020-09-08

## 2020-09-08 RX ORDER — FAMOTIDINE 10 MG/ML
20 INJECTION, SOLUTION INTRAVENOUS 2 TIMES DAILY
Status: DISCONTINUED | OUTPATIENT
Start: 2020-09-08 | End: 2020-09-08 | Stop reason: HOSPADM

## 2020-09-08 RX ORDER — GABAPENTIN 300 MG/1
600 CAPSULE ORAL
Qty: 60 CAPSULE | Refills: 0 | Status: SHIPPED | OUTPATIENT
Start: 2020-09-08 | End: 2020-11-03 | Stop reason: SDUPTHER

## 2020-09-08 RX ORDER — FAMOTIDINE 10 MG/ML
20 INJECTION, SOLUTION INTRAVENOUS 2 TIMES DAILY
Status: CANCELLED | OUTPATIENT
Start: 2020-09-08

## 2020-09-08 RX ORDER — PALONOSETRON 0.05 MG/ML
0.25 INJECTION, SOLUTION INTRAVENOUS ONCE
Status: CANCELLED | OUTPATIENT
Start: 2020-09-08

## 2020-09-08 RX ORDER — DIPHENHYDRAMINE HYDROCHLORIDE 50 MG/ML
50 INJECTION INTRAMUSCULAR; INTRAVENOUS AS NEEDED
Status: CANCELLED | OUTPATIENT
Start: 2020-09-08

## 2020-09-08 RX ORDER — SODIUM CHLORIDE 9 MG/ML
250 INJECTION, SOLUTION INTRAVENOUS ONCE
Status: CANCELLED | OUTPATIENT
Start: 2020-09-08

## 2020-09-08 RX ORDER — SODIUM CHLORIDE 9 MG/ML
250 INJECTION, SOLUTION INTRAVENOUS ONCE
Status: COMPLETED | OUTPATIENT
Start: 2020-09-08 | End: 2020-09-08

## 2020-09-08 RX ORDER — FAMOTIDINE 10 MG/ML
20 INJECTION, SOLUTION INTRAVENOUS AS NEEDED
Status: CANCELLED | OUTPATIENT
Start: 2020-09-08

## 2020-09-08 RX ORDER — FLUOROURACIL 50 MG/ML
400 INJECTION, SOLUTION INTRAVENOUS ONCE
Status: COMPLETED | OUTPATIENT
Start: 2020-09-08 | End: 2020-09-08

## 2020-09-08 RX ORDER — PALONOSETRON 0.05 MG/ML
0.25 INJECTION, SOLUTION INTRAVENOUS ONCE
Status: COMPLETED | OUTPATIENT
Start: 2020-09-08 | End: 2020-09-08

## 2020-09-08 RX ADMIN — TRASTUZUMAB 350 MG: 150 INJECTION, POWDER, LYOPHILIZED, FOR SOLUTION INTRAVENOUS at 09:51

## 2020-09-08 RX ADMIN — FAMOTIDINE 20 MG: 10 INJECTION INTRAVENOUS at 09:18

## 2020-09-08 RX ADMIN — FLUOROURACIL 810 MG: 50 INJECTION, SOLUTION INTRAVENOUS at 11:01

## 2020-09-08 RX ADMIN — FLUOROURACIL 4850 MG: 50 INJECTION, SOLUTION INTRAVENOUS at 11:01

## 2020-09-08 RX ADMIN — SODIUM CHLORIDE 810 MG: 900 INJECTION, SOLUTION INTRAVENOUS at 10:26

## 2020-09-08 RX ADMIN — DIPHENHYDRAMINE HYDROCHLORIDE 25 MG: 50 INJECTION INTRAMUSCULAR; INTRAVENOUS at 09:26

## 2020-09-08 RX ADMIN — PALONOSETRON 0.25 MG: 0.05 INJECTION, SOLUTION INTRAVENOUS at 09:20

## 2020-09-08 RX ADMIN — DEXAMETHASONE SODIUM PHOSPHATE 4 MG: 10 INJECTION INTRAMUSCULAR; INTRAVENOUS at 09:24

## 2020-09-08 RX ADMIN — SODIUM CHLORIDE 250 ML: 9 INJECTION, SOLUTION INTRAVENOUS at 09:17

## 2020-09-08 NOTE — PROGRESS NOTES
Subjective     REASON FOR follow up:1.    1. 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  Currently receiving palliative FOLFOX AND HERCEPTIN therapy.    2.COLOVESICAL FISTULA: chronic antibiotic therapy cipro, NO FURTHER PLANS FOR SURGERY AFTER VISIT AND PROCEDURE BY DR GM CASTILLO 1/20    3. 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 NEURONTIN INITIATED.        History of Present Illness PATIENT WAS CALLED THE DAY BEFORE BY THE OFFICE TO ASK FOR SYMPTOMS THAT COULD BE CONSISTENT WITH CORONAVIRUS INFECTION, AND BEING NEGATIVE WAS SCHEDULED TO BE SEEN IN THE OFFICE TODAY. SIMILAR QUESTIONING TODAY INCLUDING, CHILLS, FEVER, NEW COUGH, SHORTNESS OF BREATH, DIARRHEA,DIFFUSE BODY ACHES  AND CHANGES IN SMELL OR TASTE WERE NEGATIVE.THE PATIENT DENIED ANY CONTACT WITH PERSONS WHO WERE POSITIVE FOR COVID, AND PATIENT IS NOT IN CATEGORY OF HIGH RISK BEHAVIOR TO ACQUIRE COVID.    DURING THE VISIT WITH THE PATIENT TODAY , PATIENT HAD FACE MASK, MY MEDICAL ASSISTANT AND I  HAD PROPPER PROTECTIVE EQUIPMENT, AND I DID HAND HYGIENE WITH SOAP AND WATER BEFORE AND AFTER THE VISIT.  This patient returns today to the office for followup. He is here today with no specific complaints besides his neuropathy in his lower extremities, grade 1, for which he takes Neurontin. He has not had any new episodes of thrombosis. In regard to his smoking, he has decreased now to 3 cigarettes a day. That is a major accomplishment. He has not had any issues in regard to his rectovesical fistula and he has not had any new fever or infection. His appetite is excellent. He has no difficulty swallowing. He has no cancer-related pain. He has an ECOG performance status of 0. He has normal bowel activity. Normal urination. No swelling in his lower extremities. No  "clinical bleeding. No new thrombosis.              Past Medical History:   Diagnosis Date   • Arthritis    • Elevated PSA    • Esophageal cancer (CMS/HCC)    • Esophageal mass    • Fistula    • H/O Lung nodule    • Hepatitis     CHILD--PT STATED \"I THINK IT WAS A.\"   • History of pneumonia    • Hx of blood clots 08/10/2019   • Hyperlipidemia    • Hypertension    • Neuropathy    • Prostate cancer (CMS/HCC)    • PVD (peripheral vascular disease) (CMS/HCC)         Past Surgical History:   Procedure Laterality Date   • COLONOSCOPY N/A 2/4/2020    Procedure: COLONOSCOPY;  Surgeon: Guy Sears MD;  Location: [facility] ENDOSCOPY;  Service: General;  Laterality: N/A;  PRE-COLOVESICAL FISTULA  POST-- DIVERTICULOSIS   • CYSTOSCOPY  02/06/2020   • CYSTOSCOPY Bilateral 2/26/2020    Procedure: CYSTOSCOPY RETROGRADE;  Surgeon: Cm Witt MD;  Location: [facility] OR;  Service: Urology;  Laterality: Bilateral;   • HERNIA REPAIR  1999   • PROSTATE SURGERY  03/2008   • VENOUS ACCESS DEVICE (PORT) INSERTION N/A 7/16/2019    Procedure: INSERTION VENOUS ACCESS DEVICE WITH FLUORO AND EGD WITH BIOPSY;  Surgeon: Mary Brito MD;  Location: [facility] OR;  Service: Thoracic        Current Outpatient Medications on File Prior to Visit   Medication Sig Dispense Refill   • cevimeline (EVOXAC) 30 MG capsule Take 30 mg by mouth 3 (Three) Times a Day.     • ciprofloxacin (CIPRO) 250 MG tablet Take 1 tablet by mouth Daily. 90 tablet 1   • clotrimazole (MYCELEX) 10 MG obie 3 TIMES A DAY 90 tablet 1   • gabapentin (NEURONTIN) 300 MG capsule Take 2 capsules by mouth every night at bedtime. 60 capsule 0   • lisinopril-hydrochlorothiazide (PRINZIDE,ZESTORETIC) 20-12.5 MG per tablet TAKE 1 TABLET BY MOUTH EVERY DAY 90 tablet 2   • ondansetron (ZOFRAN) 4 MG tablet Take 1 tablet by mouth Every 8 (Eight) Hours As Needed for Nausea or Vomiting. 30 tablet 2   • Probiotic Product (PROBIOTIC DAILY PO) Take 1 tablet by mouth " Daily.     • rivaroxaban (XARELTO) 20 MG tablet Take 1 tablet by mouth Daily. 30 tablet 6   • triamcinolone (KENALOG) 0.1 % ointment Apply  topically to the appropriate area as directed 2 (Two) Times a Day. (Patient taking differently: Apply 1 application topically to the appropriate area as directed As Needed.) 30 g 2     No current facility-administered medications on file prior to visit.         ALLERGIES:  No Known Allergies     Social History     Socioeconomic History   • Marital status: Single     Spouse name: Not on file   • Number of children: Not on file   • Years of education: High school   • Highest education level: Not on file   Occupational History   • Occupation: Social Recruiting     Employer: [employer]   Tobacco Use   • Smoking status: Current Every Day Smoker     Packs/day: 1.00     Years: 40.00     Pack years: 40.00     Types: Cigarettes   • Smokeless tobacco: Never Used   Substance and Sexual Activity   • Alcohol use: Not Currently     Comment: NOT RECENTLY- none since september   • Drug use: No   • Sexual activity: Defer        Family History   Problem Relation Age of Onset   • Hypertension Mother    • Stroke Mother    • Hypertension Other    • Lung disease Other    • Prostate cancer Other    • Lung cancer Father    • Malig Hyperthermia Neg Hx       Current Outpatient Medications on File Prior to Visit   Medication Sig Dispense Refill   • cevimeline (EVOXAC) 30 MG capsule Take 30 mg by mouth 3 (Three) Times a Day.     • ciprofloxacin (CIPRO) 250 MG tablet Take 1 tablet by mouth Daily. 90 tablet 1   • clotrimazole (MYCELEX) 10 MG obie 3 TIMES A DAY 90 tablet 1   • gabapentin (NEURONTIN) 300 MG capsule Take 2 capsules by mouth every night at bedtime. 60 capsule 0   • lisinopril-hydrochlorothiazide (PRINZIDE,ZESTORETIC) 20-12.5 MG per tablet TAKE 1 TABLET BY MOUTH EVERY DAY 90 tablet 2   • ondansetron (ZOFRAN) 4 MG tablet Take 1 tablet by mouth Every 8 (Eight) Hours As Needed for Nausea or Vomiting. 30  tablet 2   • Probiotic Product (PROBIOTIC DAILY PO) Take 1 tablet by mouth Daily.     • rivaroxaban (XARELTO) 20 MG tablet Take 1 tablet by mouth Daily. 30 tablet 6   • triamcinolone (KENALOG) 0.1 % ointment Apply  topically to the appropriate area as directed 2 (Two) Times a Day. (Patient taking differently: Apply 1 application topically to the appropriate area as directed As Needed.) 30 g 2     No current facility-administered medications on file prior to visit.    No Known Allergies     Review of Systems:  General: no fever, no chills, no fatigue,no weight changes, no lack of appetite.  Eyes: no epiphora, xerophthalmia,conjunctivitis, pain, glaucoma, blurred vision, blindness, secretion, photophobia, proptosis, diplopia.  Ears: no otorrhea, tinnitus, otorrhagia, deafness, pain, vertigo.  Nose: no rhinorrhea, no epistaxis, no alteration in perception of odors, no sinuses pressure.  Mouth: no alteration in gums or teeth,  No ulcers, no difficulty with mastication or deglut ion, no odynophagia.  Neck: no masses or pain, no thyroid alterations, no pain in muscles or arteries, no carotid odynia, no crepitation.  Respiratory: no cough, no sputum production,no dyspnea,no trepopnea, no pleuritic pain,no hemoptysis.  Heart: no syncope, no irregularity, no palpitations, no angina,no orthopnea,no paroxysmal nocturnal dyspnea.  Vascular Venous: no tenderness,no edema,no palpable cords,no postphlebitic syndrome, no skin changes no ulcerations.  Vascular Arterial: no distal ischemia, noclaudication, no gangrene, no neuropathic ischemic pain, no skin ulcers, no paleness no cyanosis.  GI: no dysphagia, no odynophagia, no regurgitation, no heartburn,no indigestion,no nausea,no vomiting,no hematemesis ,no melena,no jaundice,no distention, no obstipation,no enterorrhagia,no proctalgia,no anal  lesions, no changes in bowel habits.  : no frequency, no hesitancy, no hematuria, no discharge,no  pain.  Musculoskeletal: no muscle or  "tendon pain or inflammation,no  joint pain, no edema, no functional limitation,no fasciculations, no mass.  Neurologic: no headache, no seizures, noalterations on Craneal nerves, no motor deficit, le sensory deficit, normal coordination, no alteration in memory,normal orientation, calculation,normal writting, verbal and written language.  Skin: no rashes,no pruritus no localized lesions.  Psychiatric: no anxiety, no depression,no agitation, no delusions, proper insight.              Objective     Vitals:    09/08/20 0815   BP: 117/71   Pulse: 88   Resp: 20   Temp: 97.5 °F (36.4 °C)   TempSrc: Temporal   SpO2: 97%   Weight: 88.5 kg (195 lb)   Height: 175.3 cm (69.02\")   PainSc: 0-No pain     Current Status 9/8/2020   ECOG score 0       Exam   I HAVE PERSONALLY REVIEWED THE HISTORY OF THE PRESENT ILLNESS, PAST MEDICAL HISTORY, FAMILY HISTORY, SOCIAL HISTORY, ALLERGIES, MEDICATIONS STATED ABOVE IN THE OFFICE NOTE FROM TODAY.        GENERAL:  Well-developed, well-nourished  Patient  in no acute distress.   SKIN:  Warm, dry ,NO rashes,NO purpura ,NO petechiae.  HEENT:  Pupils were equal and reactive to light and accomodation, conjunctivas non injected, no pterigion, normal extraocular movements, normal visual acuity.   NECK:  Supple with good range of motion; no thyromegaly or masses, no JVD or bruits, no cervical adenopathies.No carotid arteries pain, no carotid abnormal pulsation , NO arterial dance.  LYMPHATICS:  No cervical, NO supraclavicular, NO axillary,NO epitrochlear , NO inguinal adenopathy.  CARDIAC   normal rate and regular rhythm, without murmur,NO rubs NO S3 NO S4 right or left . Normal femoral, popliteal, pedis, brachial and carotid pulses.  VASCULAR ARTERIAL: normal carotids,brachial,radial,femoral,popliteal, pedis pulses , no bruits.no paleness or cyanosis, no pain, no edema, no numbness, no gangrene.  VASCULAR VENOUS: no cyanosis, collateral circulation, varicosities, edema, palpable cords, pain, " erythema.  ABDOMEN:  Soft, nontender with no hepatomegaly, no splenomegaly,no masses, no ascites, no collateral circulation,no distention,no Monroe sign, no abdominal pain, no inguinal hernias,no umbilical hernia, no abdominal bruits. Normal bowel sounds.  GENITAL: Not  Performed.  EXTREMITIES  AND SPINE:  No clubbing, cyanosis or edema, no deformities or pain .No kyphosis, scoliosis, deformities or pain in spine, ribs or pelvic bone.  NEUROLOGICAL:  Patient was awake, alert, oriented to time, person and place.sensory deficit le                RECENT LABS:  Hematology WBC   Date Value Ref Range Status   09/08/2020 10.13 3.40 - 10.80 10*3/mm3 Final   02/02/2019 13.4 (H) 3.4 - 10.8 x10E3/uL Final     RBC   Date Value Ref Range Status   09/08/2020 3.92 (L) 4.14 - 5.80 10*6/mm3 Final   02/02/2019 4.81 4.14 - 5.80 x10E6/uL Final     Hemoglobin   Date Value Ref Range Status   09/08/2020 12.8 (L) 13.0 - 17.7 g/dL Final     Hematocrit   Date Value Ref Range Status   09/08/2020 37.4 (L) 37.5 - 51.0 % Final     Platelets   Date Value Ref Range Status   09/08/2020 133 (L) 140 - 450 10*3/mm3 Final            Interpretation Summary echo    · Calculated right ventricular systolic pressure from tricuspid regurgitation is 22 mmHg.  · Left ventricular wall thickness is consistent with mild concentric hypertrophy.  · Left ventricular systolic function is normal.  · There is calcification of the aortic valve.  · Calculated EF = 62.4%.  · Global Longitudinal LV strain = -21.5%.  · C/w baseline study 7/30/2019, there is no change. There is no change from study done 5/19/2020.              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 Almost 90% of the liver WAS replaced by tumor. The patient has been undergoing chemotherapy with 5  fu and leucovorin  and Herceptin and has had excellent response clinically and radiologically. The patient was reviewed on 08/10/2020. I reviewed with the  patient in the PAC system [facility] his PET scan that is dramatic. There is minimal uptake in the lower esophagus and most importantly complete resolution of his liver metastasis. Actually the only issue that is pertinent to the PET scan is still the visibility of his colovesical fistula.     Therefore from the point of view of his cancer of the esophagus, metastatic to the liver, he has no symptoms from the primary tumor in the esophagus and he has no symptoms related to his liver metastasis that has resolved altogether. His CEA level is low at 5.7.     Tolerance to the regimen of 5-FU, leucovorin and Herceptin is excellent. He is receiving this once a month and this will remain ongoing for the time being. Somebody will raise the question if eventually we could get rid of the 5-FU and leucovorin and maybe that is a possibility down the road.     In regard to cardiac toxicity from Herceptin, the patient has not developed any. New echocardiogram documents a normal left ventricular ejection fraction. These numbers have not changed in comparison with the original baseline and the one done in 05/2020.     In regard to the sensory peripheral neuropathy of his lower extremities, he remains on Neurontin. The dose that he is receiving is appropriate. He has no need for dose adjustment.     In regard to his thrombophilia associated with malignancy, the patient remains on anticoagulation orally after being on Lovenox for a long time. He has not had any clinical bleeding or new thrombosis.     In regard to his colovesical fistula, he remains on a minimal dose of ciprofloxacin to keep infections from happening. So far he has not encountered any problems with the medicine and neither developed any new symptoms that suggests activation of the fistula.     In regard to his history of prostate cancer, we measured during the previous visit a PSA that was 0.01. This is a very low number that means that his prostate  cancer is not an issue at all.     In regard to disability, I think the patient is completely and permanently disabled. Given his Stage IV malignancy, he will decide what to do along with [employer] about disability from now on.     RECOMMENDATIONS: As stated above. The patient will remain on monthly basis on 5-FU, leucovorin and Herceptin. CBC, CMP and CEA level on monthly basis. Otherwise, we will repeat an echocardiogram in 3 months from now in 12/2020.     The patient was advised in regard to further adjustment in smoking cessation and challenged him to try to get rid of the remaining 3 cigarettes in the next visit or so.        I DISCUSSED WITH PATIENT IN DETAIL FORMS TO DECREASE CHANCES OF CORONAVIRUS INFECTION INCLUDING ISOLATION, PROPER HAND HIGIENE, AVOID PUBLIC PLACES  WITH CROWDS, FOLLOW  CDC RECOMENDATIONS, AND KEEP PERSONAL AND SOCIAL RESPONSIBILITY, WARE A MASK IN PUBLIC PLACES.  PATIENT IS AWARE THIS INFECTION COULD HAVE SEVERE CONSEQUENCES TO PERSONAL HEALTH AND FAMILY RAMIFICATIONS OF THIS.

## 2020-09-10 ENCOUNTER — INFUSION (OUTPATIENT)
Dept: ONCOLOGY | Facility: HOSPITAL | Age: 65
End: 2020-09-10

## 2020-09-10 DIAGNOSIS — C15.5 MALIGNANT NEOPLASM OF LOWER THIRD OF ESOPHAGUS (HCC): Primary | ICD-10-CM

## 2020-09-10 DIAGNOSIS — C78.7 LIVER METASTASIS: ICD-10-CM

## 2020-09-10 DIAGNOSIS — Z45.2 ENCOUNTER FOR ADJUSTMENT OR MANAGEMENT OF VASCULAR ACCESS DEVICE: ICD-10-CM

## 2020-09-10 PROCEDURE — 25010000003 HEPARIN LOCK FLUSH PER 10 UNITS: Performed by: INTERNAL MEDICINE

## 2020-09-10 RX ORDER — SODIUM CHLORIDE 0.9 % (FLUSH) 0.9 %
10 SYRINGE (ML) INJECTION AS NEEDED
Status: CANCELLED | OUTPATIENT
Start: 2020-09-10

## 2020-09-10 RX ORDER — HEPARIN SODIUM (PORCINE) LOCK FLUSH IV SOLN 100 UNIT/ML 100 UNIT/ML
500 SOLUTION INTRAVENOUS AS NEEDED
Status: DISCONTINUED | OUTPATIENT
Start: 2020-09-10 | End: 2020-09-10 | Stop reason: HOSPADM

## 2020-09-10 RX ORDER — SODIUM CHLORIDE 0.9 % (FLUSH) 0.9 %
10 SYRINGE (ML) INJECTION AS NEEDED
Status: DISCONTINUED | OUTPATIENT
Start: 2020-09-10 | End: 2020-09-10 | Stop reason: HOSPADM

## 2020-09-10 RX ORDER — HEPARIN SODIUM (PORCINE) LOCK FLUSH IV SOLN 100 UNIT/ML 100 UNIT/ML
500 SOLUTION INTRAVENOUS AS NEEDED
Status: CANCELLED | OUTPATIENT
Start: 2020-09-10

## 2020-09-10 RX ADMIN — Medication 500 UNITS: at 09:06

## 2020-09-10 RX ADMIN — SODIUM CHLORIDE, PRESERVATIVE FREE 10 ML: 5 INJECTION INTRAVENOUS at 09:06

## 2020-09-11 ENCOUNTER — APPOINTMENT (OUTPATIENT)
Dept: ONCOLOGY | Facility: HOSPITAL | Age: 65
End: 2020-09-11

## 2020-09-16 ENCOUNTER — HOSPITAL ENCOUNTER (EMERGENCY)
Facility: HOSPITAL | Age: 65
Discharge: HOME | End: 2020-09-16
Attending: EMERGENCY MEDICINE | Admitting: EMERGENCY MEDICINE

## 2020-09-16 VITALS
DIASTOLIC BLOOD PRESSURE: 64 MMHG | HEIGHT: 69 IN | SYSTOLIC BLOOD PRESSURE: 116 MMHG | BODY MASS INDEX: 28.88 KG/M2 | HEART RATE: 73 BPM | OXYGEN SATURATION: 96 % | RESPIRATION RATE: 16 BRPM | WEIGHT: 195 LBS | TEMPERATURE: 97.8 F

## 2020-09-16 DIAGNOSIS — R10.13 EPIGASTRIC PAIN: Primary | ICD-10-CM

## 2020-09-16 DIAGNOSIS — D49.0 ESOPHAGEAL NEOPLASM: ICD-10-CM

## 2020-09-16 DIAGNOSIS — R11.2 NON-INTRACTABLE VOMITING WITH NAUSEA, UNSPECIFIED VOMITING TYPE: ICD-10-CM

## 2020-09-16 LAB
ALBUMIN SERPL-MCNC: 4.3 G/DL (ref 3.5–5.2)
ALBUMIN/GLOB SERPL: 1.7 G/DL
ALP SERPL-CCNC: 100 U/L (ref 39–117)
ALT SERPL W P-5'-P-CCNC: 58 U/L (ref 1–41)
ANION GAP SERPL CALCULATED.3IONS-SCNC: 13.6 MMOL/L (ref 5–15)
AST SERPL-CCNC: 36 U/L (ref 1–40)
BASOPHILS # BLD AUTO: 0.04 10*3/MM3 (ref 0–0.2)
BASOPHILS NFR BLD AUTO: 0.4 % (ref 0–1.5)
BILIRUB SERPL-MCNC: 0.3 MG/DL (ref 0–1.2)
BUN SERPL-MCNC: 10 MG/DL (ref 8–23)
BUN/CREAT SERPL: 13.7 (ref 7–25)
CALCIUM SPEC-SCNC: 9.2 MG/DL (ref 8.6–10.5)
CHLORIDE SERPL-SCNC: 97 MMOL/L (ref 98–107)
CO2 SERPL-SCNC: 23.4 MMOL/L (ref 22–29)
CREAT SERPL-MCNC: 0.73 MG/DL (ref 0.76–1.27)
DEPRECATED RDW RBC AUTO: 50.2 FL (ref 37–54)
EOSINOPHIL # BLD AUTO: 0.25 10*3/MM3 (ref 0–0.4)
EOSINOPHIL NFR BLD AUTO: 2.5 % (ref 0.3–6.2)
ERYTHROCYTE [DISTWIDTH] IN BLOOD BY AUTOMATED COUNT: 15 % (ref 12.3–15.4)
GFR SERPL CREATININE-BSD FRML MDRD: 109 ML/MIN/1.73
GLOBULIN UR ELPH-MCNC: 2.6 GM/DL
GLUCOSE SERPL-MCNC: 128 MG/DL (ref 65–99)
HCT VFR BLD AUTO: 35.9 % (ref 37.5–51)
HGB BLD-MCNC: 12.6 G/DL (ref 13–17.7)
HOLD SPECIMEN: NORMAL
HOLD SPECIMEN: NORMAL
IMM GRANULOCYTES # BLD AUTO: 0.2 10*3/MM3 (ref 0–0.05)
IMM GRANULOCYTES NFR BLD AUTO: 2 % (ref 0–0.5)
LIPASE SERPL-CCNC: 12 U/L (ref 13–60)
LYMPHOCYTES # BLD AUTO: 1.01 10*3/MM3 (ref 0.7–3.1)
LYMPHOCYTES NFR BLD AUTO: 10 % (ref 19.6–45.3)
MCH RBC QN AUTO: 32.6 PG (ref 26.6–33)
MCHC RBC AUTO-ENTMCNC: 35.1 G/DL (ref 31.5–35.7)
MCV RBC AUTO: 92.8 FL (ref 79–97)
MONOCYTES # BLD AUTO: 0.48 10*3/MM3 (ref 0.1–0.9)
MONOCYTES NFR BLD AUTO: 4.8 % (ref 5–12)
NEUTROPHILS NFR BLD AUTO: 8.1 10*3/MM3 (ref 1.7–7)
NEUTROPHILS NFR BLD AUTO: 80.3 % (ref 42.7–76)
NRBC BLD AUTO-RTO: 0 /100 WBC (ref 0–0.2)
PLATELET # BLD AUTO: 177 10*3/MM3 (ref 140–450)
PMV BLD AUTO: 10.1 FL (ref 6–12)
POTASSIUM SERPL-SCNC: 3.9 MMOL/L (ref 3.5–5.2)
PROT SERPL-MCNC: 6.9 G/DL (ref 6–8.5)
RBC # BLD AUTO: 3.87 10*6/MM3 (ref 4.14–5.8)
SODIUM SERPL-SCNC: 134 MMOL/L (ref 136–145)
WBC # BLD AUTO: 10.08 10*3/MM3 (ref 3.4–10.8)
WHOLE BLOOD HOLD SPECIMEN: NORMAL
WHOLE BLOOD HOLD SPECIMEN: NORMAL

## 2020-09-16 PROCEDURE — 99284 EMERGENCY DEPT VISIT MOD MDM: CPT

## 2020-09-16 PROCEDURE — 83690 ASSAY OF LIPASE: CPT | Performed by: PHYSICIAN ASSISTANT

## 2020-09-16 PROCEDURE — 80053 COMPREHEN METABOLIC PANEL: CPT | Performed by: PHYSICIAN ASSISTANT

## 2020-09-16 PROCEDURE — 85025 COMPLETE CBC W/AUTO DIFF WBC: CPT | Performed by: PHYSICIAN ASSISTANT

## 2020-09-16 NOTE — ED NOTES
"Patient to er from home with c/o he was eating and felt like something got stuck in lower throat. Patient stated he got sick and started to vomit. Patient reported he vomited up blood. Patient reported hx of liver and throat CA. Patient reported he is getting chemo for this. Patient reported he is on blood thinners.\" patient has mask on in triage along with staff.      Amauri Boggs RN  09/16/20 9701    "

## 2020-09-16 NOTE — ED PROVIDER NOTES
EMERGENCY DEPARTMENT ENCOUNTER    Room Number:  04/04  Date seen:  9/16/2020  Time seen: 16:21 EDT  PCP: Jaiden Hoover MD  Historian: Patient    HPI:  Chief complaint: Abdominal pain  A complete HPI/ROS/PMH/PSH/SH/FH are unobtainable due to:   Context:Han Garcia is a 64 y.o. male, hx of malignant neoplasm of the third esophagus, who presents to the ED with c/o eating a hot dog around 2 PM today when he developed epigastric pressure.  Associated symptoms include nausea and 3 episodes of vomiting.  He says his last episode of vomiting completely resolved his epigastric pain.  In addition, patient reports he noticed blood-tinged phlegm during his third episode of emesis. En route to ED, patient was able to drink a bottle of water with no complications.  Patient denies chest pain, shortness of breath, fever, cough.    Dr. Haynes is patient's oncologist, receives chemotherapy every 4 weeks, last received chemotherapy September 8.    Patient was placed in face mask in first look. Patient was wearing facemask when I entered the room and throughout our encounter. I wore full protective equipment throughout this patient encounter including a face mask, and gloves. Hand hygiene was performed before donning protective equipment and after removal when leaving the room.      MEDICAL RECORD REVIEW      ALLERGIES  Patient has no known allergies.    PAST MEDICAL HISTORY  Active Ambulatory Problems     Diagnosis Date Noted   • Essential hypertension 03/03/2016   • Hyperlipidemia 03/03/2016   • Impaired fasting glucose 03/03/2016   • History of prostate cancer 03/03/2016   • Tobacco abuse 03/03/2016   • Liver metastasis (CMS/HCC) 07/09/2019   • Esophagus neoplasm 07/09/2019   • Malignant neoplasm of lower third of esophagus (CMS/HCC) 07/10/2019   • Encounter for adjustment or management of vascular access device 07/25/2019   • Acute deep vein thrombosis (DVT) of femoral vein of left lower extremity (CMS/HCC) 08/10/2019   •  Acute deep vein thrombosis (DVT) of right popliteal vein (CMS/HCC) 08/10/2019   • Chemotherapy induced neutropenia (CMS/HCC) 08/11/2019   • Anemia, chronic disease 08/11/2019   • Colovesical fistula 09/25/2019   • 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) 03/03/2020   • Current use of long term anticoagulation 08/10/2020     Resolved Ambulatory Problems     Diagnosis Date Noted   • Pain 08/11/2019     Past Medical History:   Diagnosis Date   • Arthritis    • Elevated PSA    • Esophageal cancer (CMS/HCC)    • Esophageal mass    • Fistula    • H/O Lung nodule    • Hepatitis    • History of pneumonia    • Hx of blood clots 08/10/2019   • Hypertension    • Neuropathy    • Prostate cancer (CMS/HCC)    • PVD (peripheral vascular disease) (CMS/HCC)        PAST SURGICAL HISTORY  Past Surgical History:   Procedure Laterality Date   • COLONOSCOPY N/A 2/4/2020    Procedure: COLONOSCOPY;  Surgeon: Guy Sears MD;  Location: [facility] ENDOSCOPY;  Service: General;  Laterality: N/A;  PRE-COLOVESICAL FISTULA  POST-- DIVERTICULOSIS   • CYSTOSCOPY  02/06/2020   • CYSTOSCOPY Bilateral 2/26/2020    Procedure: CYSTOSCOPY RETROGRADE;  Surgeon: Cm Witt MD;  Location: [facility] OR;  Service: Urology;  Laterality: Bilateral;   • HERNIA REPAIR  1999   • PROSTATE SURGERY  03/2008   • VENOUS ACCESS DEVICE (PORT) INSERTION N/A 7/16/2019    Procedure: INSERTION VENOUS ACCESS DEVICE WITH FLUORO AND EGD WITH BIOPSY;  Surgeon: Mary Brito MD;  Location: [facility] OR;  Service: Thoracic       FAMILY HISTORY  Family History   Problem Relation Age of Onset   • Hypertension Mother    • Stroke Mother    • Hypertension Other    • Lung disease Other    • Prostate cancer Other    • Lung cancer Father    • Malig Hyperthermia Neg Hx        SOCIAL HISTORY  Social History     Socioeconomic History   • Marital status: Single     Spouse name: Not on file   • Number of children: Not on file   • Years of education: High  school   • Highest education level: Not on file   Occupational History   • Occupation: [employer]     Employer: [employer]   Tobacco Use   • Smoking status: Current Every Day Smoker     Packs/day: 1.00     Years: 40.00     Pack years: 40.00     Types: Cigarettes   • Smokeless tobacco: Never Used   Substance and Sexual Activity   • Alcohol use: Not Currently     Comment: NOT RECENTLY- none since september   • Drug use: No   • Sexual activity: Defer       REVIEW OF SYSTEMS  Review of Systems    All systems reviewed and negative except for those discussed in HPI.     PHYSICAL EXAM    ED Triage Vitals   Temp Heart Rate Resp BP SpO2   09/16/20 1606 09/16/20 1606 09/16/20 1614 09/16/20 1614 09/16/20 1606   97.8 °F (36.6 °C) 98 16 130/79 99 %      Temp src Heart Rate Source Patient Position BP Location FiO2 (%)   09/16/20 1606 -- 09/16/20 1614 09/16/20 1614 --   Tympanic  Lying Left arm      Physical Exam    I have reviewed the triage vital signs and nursing notes.      GENERAL: not distressed  HENT: nares patent  EYES: no scleral icterus  NECK: no ROM limitations  CV: regular rhythm, regular rate  RESPIRATORY: normal effort, clear to auscultation bilaterally   aBDOMEN: soft; no abdominal tenderness  : deferred  MUSCULOSKELETAL: no deformity  NEURO: alert, moves all extremities, follows commands  SKIN: warm, dry    LAB RESULTS  Recent Results (from the past 24 hour(s))   Light Blue Top    Collection Time: 09/16/20  4:17 PM   Result Value Ref Range    Extra Tube hold for add-on    Green Top (Gel)    Collection Time: 09/16/20  4:17 PM   Result Value Ref Range    Extra Tube Hold for add-ons.    Lavender Top    Collection Time: 09/16/20  4:17 PM   Result Value Ref Range    Extra Tube hold for add-on    Gold Top - SST    Collection Time: 09/16/20  4:17 PM   Result Value Ref Range    Extra Tube Hold for add-ons.    Comprehensive Metabolic Panel    Collection Time: 09/16/20  4:17 PM    Specimen: Blood   Result Value Ref Range     Glucose 128 (H) 65 - 99 mg/dL    BUN 10 8 - 23 mg/dL    Creatinine 0.73 (L) 0.76 - 1.27 mg/dL    Sodium 134 (L) 136 - 145 mmol/L    Potassium 3.9 3.5 - 5.2 mmol/L    Chloride 97 (L) 98 - 107 mmol/L    CO2 23.4 22.0 - 29.0 mmol/L    Calcium 9.2 8.6 - 10.5 mg/dL    Total Protein 6.9 6.0 - 8.5 g/dL    Albumin 4.30 3.50 - 5.20 g/dL    ALT (SGPT) 58 (H) 1 - 41 U/L    AST (SGOT) 36 1 - 40 U/L    Alkaline Phosphatase 100 39 - 117 U/L    Total Bilirubin 0.3 0.0 - 1.2 mg/dL    eGFR Non African Amer 109 >60 mL/min/1.73    Globulin 2.6 gm/dL    A/G Ratio 1.7 g/dL    BUN/Creatinine Ratio 13.7 7.0 - 25.0    Anion Gap 13.6 5.0 - 15.0 mmol/L   Lipase    Collection Time: 09/16/20  4:17 PM    Specimen: Blood   Result Value Ref Range    Lipase 12 (L) 13 - 60 U/L   CBC Auto Differential    Collection Time: 09/16/20  4:17 PM    Specimen: Blood   Result Value Ref Range    WBC 10.08 3.40 - 10.80 10*3/mm3    RBC 3.87 (L) 4.14 - 5.80 10*6/mm3    Hemoglobin 12.6 (L) 13.0 - 17.7 g/dL    Hematocrit 35.9 (L) 37.5 - 51.0 %    MCV 92.8 79.0 - 97.0 fL    MCH 32.6 26.6 - 33.0 pg    MCHC 35.1 31.5 - 35.7 g/dL    RDW 15.0 12.3 - 15.4 %    RDW-SD 50.2 37.0 - 54.0 fl    MPV 10.1 6.0 - 12.0 fL    Platelets 177 140 - 450 10*3/mm3    Neutrophil % 80.3 (H) 42.7 - 76.0 %    Lymphocyte % 10.0 (L) 19.6 - 45.3 %    Monocyte % 4.8 (L) 5.0 - 12.0 %    Eosinophil % 2.5 0.3 - 6.2 %    Basophil % 0.4 0.0 - 1.5 %    Immature Grans % 2.0 (H) 0.0 - 0.5 %    Neutrophils, Absolute 8.10 (H) 1.70 - 7.00 10*3/mm3    Lymphocytes, Absolute 1.01 0.70 - 3.10 10*3/mm3    Monocytes, Absolute 0.48 0.10 - 0.90 10*3/mm3    Eosinophils, Absolute 0.25 0.00 - 0.40 10*3/mm3    Basophils, Absolute 0.04 0.00 - 0.20 10*3/mm3    Immature Grans, Absolute 0.20 (H) 0.00 - 0.05 10*3/mm3    nRBC 0.0 0.0 - 0.2 /100 WBC         RADIOLOGY RESULTS  No orders to display         PROGRESS, DATA ANALYSIS, CONSULTS AND MEDICAL DECISION MAKING  All labs have been independently reviewed by me.  All  radiology studies have been reviewed by me and discussed with radiologist dictating the report. Discussion below represents my analysis of pertinent findings related to patient's condition, differential diagnosis, treatment plan and final disposition.     ED Course as of Sep 16 2311   Wed Sep 16, 2020   1852 I discussed with BIJU Curry on-call for Dr. Keller.  She advised for patient to call Dr. Arora's office tomorrow for close follow-up.  Recommended liquids for the rest the day and no solid foods until he sees Dr. Keller tomorrow.    [SS]   1856 Patient has been in the emergency room for approximately 3 hours and has not had any epigastric pain or nausea vomiting here.  Believe he is safe to be discharged home.  I advised him to call Dr. Haynes office tomorrow and to only have a liquid diet today.  He expresses understanding and all questions addressed at this time.    [SS]      ED Course User Index  [SS] Saundra Truong PA-C       The differential diagnosis include but are not limited to Anmrata-Frankel tear, esophageal spasm, esophageal mass.    Recheck patient and patient has not had any pain or nausea or vomiting here in the emergency room.       Reviewed pt's history and workup with Dr. Stearns.  After a bedside evaluation, Dr. Stearns agrees with the plan of care.    (FOR DISCHARGE)The patient's history, physical exam, and lab findings were discussed with the physician, who also performed a face to face history and physical exam.  I discussed all results and noted any abnormalities with patient.  Discussed absoute need to recheck abnormalities with their family physician.  I answered any of the patient's questions.  Discussed plan for discharge, as there is no emergent indication for admission.  Pt is agreeable and understands need for follow up and repeat testing.  Pt is aware that discharge does not mean that nothing is wrong but it indicates no emergency is present and they must continue care  "with their family physician.  Pt is discharged with instructions to follow up with primary care doctor to have their blood pressure rechecked.           Disposition vitals:  /64   Pulse 73   Temp 97.8 °F (36.6 °C) (Tympanic)   Resp 16   Ht 175.3 cm (69\")   Wt 88.5 kg (195 lb)   SpO2 96%   BMI 28.80 kg/m²       DIAGNOSIS  Final diagnoses:   Epigastric pain   Non-intractable vomiting with nausea, unspecified vomiting type   Esophageal neoplasm       FOLLOW UP   Alexey Haynes MD  [address] [id number] [phone]    Call in 1 day  For a close follow-up    [facility] Emergency Department  [address] [facility] [address] [phone]    As needed, If symptoms worsen         Saundra Truong PA-C  09/16/20 [id number]    "

## 2020-09-16 NOTE — ED PROVIDER NOTES
17:17 EDT  Patient seen and examined with physician assistant.  Briefly patient presents for evaluation of esophageal foreign body.  Patient states he was eating hot dog and then had epigastric pressure and was unable to drink.  Patient then had 3 episodes of vomiting in which he had some blood-tinged sputum.  Patient now able to drink without difficulty.          On exam patient is alert and cooperative in no distress.  Patient's abdomen is soft and nontender.            Most likely has passed esophageal foreign body.  Will discuss with Dr. Haynes.  Drinking fluids here without difficulty.  No vomiting here.      Patient was wearing a face mask when I entered the room and they continued to wear a mask throughout their stay in the ED.  I wore PPE, including gloves, face mask with shield or face mask with goggles whenever I was in the room with patient.       MD ATTESTATION NOTE    The KELL and I have discussed this patient's history, physical exam, and treatment plan.  I have reviewed the documentation and personally had a face to face interaction with the patient. I affirm the documentation and agree with the treatment and plan.  The attached note describes my personal findings.       Mac Stearns MD  09/16/20 0722

## 2020-09-16 NOTE — DISCHARGE INSTRUCTIONS
Please continue with a liquid diet for the rest of the day and avoid solid foods today.  Call Dr. Keller's office tomorrow for close follow-up.

## 2020-09-17 ENCOUNTER — TELEPHONE (OUTPATIENT)
Dept: ONCOLOGY | Facility: CLINIC | Age: 65
End: 2020-09-17

## 2020-09-17 NOTE — TELEPHONE ENCOUNTER
Patient called to let Dr. Haynes know that yesterday when he was eating, he got food lodged in his esophagus, threw up, and threw up blood so he went to the ER and was advised by Dr. Sellers to stay on a liquid diet for the night and to call today to update Dr. Haynes.     Pt's phone# is [phone]

## 2020-09-17 NOTE — TELEPHONE ENCOUNTER
PT. STATES HE GOT CHOKED ON A HOTDOG LAST EVENING AND STARTING SPITTING UP BLOOD SO HE WENT TO THE ER TO BE EVALUATED.  HE WAS TOLD DURING THE EPISODE HE MOST LIKELY BROKE A TINY BLOOD VESSEL AND THAT IS WHY HE HAD SOME BLOOD.  PT. STATES HE DID FINE OVER NIGHT WITH THE CLEAR LQDS AND IS DOING FINE THIS MORNING.  PT. SEES DR. SOBORNE ON 10/6/20.  PT. ADVISED TO CHEW HIS FOOD VERY SLOW AND TO TAKE LITTLE BITES.  ALSO ADVISED PT. TO START ON SOME OTC PRILOSEC ONCE A DAY 30 MINUTES PRIOR TO EATING BREAKFAST.  WILL S/W DR. OSBORNE NEXT WEEK TO SEE IF HE WANTS TO REFER PT. TO GI.  PT. VERBALIZES UNDERSTANDING.

## 2020-09-22 ENCOUNTER — TELEPHONE (OUTPATIENT)
Dept: ONCOLOGY | Facility: CLINIC | Age: 65
End: 2020-09-22

## 2020-09-22 NOTE — TELEPHONE ENCOUNTER
INFORMED PT. I S/W DR. OSBORNE ABOUT THE CHOKING EPISODE HE HAD LAST WEEK AND IF DR. OSBORNE FELT HE NEEDED TO SEE GI.  PT. HASN'T HAD ANYTHING SINCE THAT DAY.  PT. SEES STEPHEN GRANDE NP ON 10/6/20.  STATES HE IS DOING FINE.  HAS BEEN TAKING A PPI SINCE THAT DAY.  DR. OSBORNE DOESN'T FEEL AT THIS TIME HE NEEDS TO SEE GI.  PT V/U.

## 2020-10-05 RX ORDER — RIVAROXABAN 20 MG/1
TABLET, FILM COATED ORAL
Qty: 90 TABLET | Refills: 1 | Status: SHIPPED | OUTPATIENT
Start: 2020-10-05 | End: 2021-03-26

## 2020-10-06 ENCOUNTER — OFFICE VISIT (OUTPATIENT)
Dept: ONCOLOGY | Facility: CLINIC | Age: 65
End: 2020-10-06

## 2020-10-06 ENCOUNTER — INFUSION (OUTPATIENT)
Dept: ONCOLOGY | Facility: HOSPITAL | Age: 65
End: 2020-10-06

## 2020-10-06 VITALS
BODY MASS INDEX: 29.95 KG/M2 | SYSTOLIC BLOOD PRESSURE: 132 MMHG | HEART RATE: 82 BPM | WEIGHT: 202.2 LBS | RESPIRATION RATE: 19 BRPM | HEIGHT: 69 IN | TEMPERATURE: 97 F | DIASTOLIC BLOOD PRESSURE: 78 MMHG | OXYGEN SATURATION: 99 %

## 2020-10-06 DIAGNOSIS — C15.5 MALIGNANT NEOPLASM OF LOWER THIRD OF ESOPHAGUS (HCC): Primary | ICD-10-CM

## 2020-10-06 DIAGNOSIS — Z79.01 CURRENT USE OF LONG TERM ANTICOAGULATION: ICD-10-CM

## 2020-10-06 DIAGNOSIS — C78.7 LIVER METASTASIS: ICD-10-CM

## 2020-10-06 DIAGNOSIS — N32.1 COLOVESICAL FISTULA: ICD-10-CM

## 2020-10-06 DIAGNOSIS — D70.1 CHEMOTHERAPY INDUCED NEUTROPENIA (HCC): ICD-10-CM

## 2020-10-06 DIAGNOSIS — T45.1X5A PERIPHERAL NEUROPATHY DUE TO CHEMOTHERAPY (HCC): ICD-10-CM

## 2020-10-06 DIAGNOSIS — I82.431 ACUTE DEEP VEIN THROMBOSIS (DVT) OF RIGHT POPLITEAL VEIN (HCC): ICD-10-CM

## 2020-10-06 DIAGNOSIS — Z45.2 ENCOUNTER FOR ADJUSTMENT OR MANAGEMENT OF VASCULAR ACCESS DEVICE: ICD-10-CM

## 2020-10-06 DIAGNOSIS — T45.1X5A CHEMOTHERAPY INDUCED NEUTROPENIA (HCC): ICD-10-CM

## 2020-10-06 DIAGNOSIS — Z79.899 HIGH RISK MEDICATION USE: Primary | ICD-10-CM

## 2020-10-06 DIAGNOSIS — C78.7 LIVER METASTASES: ICD-10-CM

## 2020-10-06 DIAGNOSIS — C15.5 MALIGNANT NEOPLASM OF LOWER THIRD OF ESOPHAGUS (HCC): ICD-10-CM

## 2020-10-06 DIAGNOSIS — G62.0 PERIPHERAL NEUROPATHY DUE TO CHEMOTHERAPY (HCC): ICD-10-CM

## 2020-10-06 DIAGNOSIS — Z72.0 TOBACCO ABUSE: ICD-10-CM

## 2020-10-06 LAB
ALBUMIN SERPL-MCNC: 4.1 G/DL (ref 3.5–5.2)
ALBUMIN/GLOB SERPL: 1.5 G/DL (ref 1.1–2.4)
ALP SERPL-CCNC: 106 U/L (ref 38–116)
ALT SERPL W P-5'-P-CCNC: 46 U/L (ref 0–41)
ANION GAP SERPL CALCULATED.3IONS-SCNC: 8.8 MMOL/L (ref 5–15)
AST SERPL-CCNC: 27 U/L (ref 0–40)
BASOPHILS # BLD AUTO: 0.07 10*3/MM3 (ref 0–0.2)
BASOPHILS NFR BLD AUTO: 0.8 % (ref 0–1.5)
BILIRUB SERPL-MCNC: 0.2 MG/DL (ref 0.2–1.2)
BUN SERPL-MCNC: 12 MG/DL (ref 6–20)
BUN/CREAT SERPL: 14.8 (ref 7.3–30)
CALCIUM SPEC-SCNC: 9.4 MG/DL (ref 8.5–10.2)
CEA SERPL-MCNC: 5.74 NG/ML
CHLORIDE SERPL-SCNC: 102 MMOL/L (ref 98–107)
CO2 SERPL-SCNC: 25.2 MMOL/L (ref 22–29)
CREAT SERPL-MCNC: 0.81 MG/DL (ref 0.7–1.3)
DEPRECATED RDW RBC AUTO: 51.7 FL (ref 37–54)
EOSINOPHIL # BLD AUTO: 0.38 10*3/MM3 (ref 0–0.4)
EOSINOPHIL NFR BLD AUTO: 4.1 % (ref 0.3–6.2)
ERYTHROCYTE [DISTWIDTH] IN BLOOD BY AUTOMATED COUNT: 14.7 % (ref 12.3–15.4)
GFR SERPL CREATININE-BSD FRML MDRD: 96 ML/MIN/1.73
GLOBULIN UR ELPH-MCNC: 2.7 GM/DL (ref 1.8–3.5)
GLUCOSE SERPL-MCNC: 110 MG/DL (ref 74–124)
HCT VFR BLD AUTO: 35.1 % (ref 37.5–51)
HGB BLD-MCNC: 11.9 G/DL (ref 13–17.7)
IMM GRANULOCYTES # BLD AUTO: 0.5 10*3/MM3 (ref 0–0.05)
IMM GRANULOCYTES NFR BLD AUTO: 5.4 % (ref 0–0.5)
LYMPHOCYTES # BLD AUTO: 1.76 10*3/MM3 (ref 0.7–3.1)
LYMPHOCYTES NFR BLD AUTO: 19 % (ref 19.6–45.3)
MCH RBC QN AUTO: 32.3 PG (ref 26.6–33)
MCHC RBC AUTO-ENTMCNC: 33.9 G/DL (ref 31.5–35.7)
MCV RBC AUTO: 95.4 FL (ref 79–97)
MONOCYTES # BLD AUTO: 0.78 10*3/MM3 (ref 0.1–0.9)
MONOCYTES NFR BLD AUTO: 8.4 % (ref 5–12)
NEUTROPHILS NFR BLD AUTO: 5.77 10*3/MM3 (ref 1.7–7)
NEUTROPHILS NFR BLD AUTO: 62.3 % (ref 42.7–76)
NRBC BLD AUTO-RTO: 0 /100 WBC (ref 0–0.2)
PLATELET # BLD AUTO: 136 10*3/MM3 (ref 140–450)
PMV BLD AUTO: 9.8 FL (ref 6–12)
POTASSIUM SERPL-SCNC: 4.1 MMOL/L (ref 3.5–4.7)
PROT SERPL-MCNC: 6.8 G/DL (ref 6.3–8)
RBC # BLD AUTO: 3.68 10*6/MM3 (ref 4.14–5.8)
SODIUM SERPL-SCNC: 136 MMOL/L (ref 134–145)
WBC # BLD AUTO: 9.26 10*3/MM3 (ref 3.4–10.8)

## 2020-10-06 PROCEDURE — 82378 CARCINOEMBRYONIC ANTIGEN: CPT | Performed by: INTERNAL MEDICINE

## 2020-10-06 PROCEDURE — 25010000002 FLUOROURACIL PER 500 MG: Performed by: NURSE PRACTITIONER

## 2020-10-06 PROCEDURE — 96413 CHEMO IV INFUSION 1 HR: CPT

## 2020-10-06 PROCEDURE — 25010000002 DIPHENHYDRAMINE 1 EACH BAG: Performed by: NURSE PRACTITIONER

## 2020-10-06 PROCEDURE — 96411 CHEMO IV PUSH ADDL DRUG: CPT

## 2020-10-06 PROCEDURE — 25010000002 LEUCOVORIN CALCIUM PER 50 MG: Performed by: NURSE PRACTITIONER

## 2020-10-06 PROCEDURE — 96375 TX/PRO/DX INJ NEW DRUG ADDON: CPT

## 2020-10-06 PROCEDURE — 96367 TX/PROPH/DG ADDL SEQ IV INF: CPT

## 2020-10-06 PROCEDURE — 85025 COMPLETE CBC W/AUTO DIFF WBC: CPT

## 2020-10-06 PROCEDURE — 96416 CHEMO PROLONG INFUSE W/PUMP: CPT

## 2020-10-06 PROCEDURE — 25010000002 DEXAMETHASONE PER 1 MG: Performed by: INTERNAL MEDICINE

## 2020-10-06 PROCEDURE — 96368 THER/DIAG CONCURRENT INF: CPT

## 2020-10-06 PROCEDURE — 25010000002 TRASTUZUMAB PER 10 MG: Performed by: NURSE PRACTITIONER

## 2020-10-06 PROCEDURE — 80053 COMPREHEN METABOLIC PANEL: CPT

## 2020-10-06 PROCEDURE — 99214 OFFICE O/P EST MOD 30 MIN: CPT | Performed by: NURSE PRACTITIONER

## 2020-10-06 PROCEDURE — 25010000002 PALONOSETRON PER 25 MCG: Performed by: NURSE PRACTITIONER

## 2020-10-06 RX ORDER — FAMOTIDINE 10 MG/ML
20 INJECTION, SOLUTION INTRAVENOUS 2 TIMES DAILY
Status: CANCELLED | OUTPATIENT
Start: 2020-10-06

## 2020-10-06 RX ORDER — SODIUM CHLORIDE 9 MG/ML
250 INJECTION, SOLUTION INTRAVENOUS ONCE
Status: COMPLETED | OUTPATIENT
Start: 2020-10-06 | End: 2020-10-06

## 2020-10-06 RX ORDER — SODIUM CHLORIDE 9 MG/ML
250 INJECTION, SOLUTION INTRAVENOUS ONCE
Status: CANCELLED | OUTPATIENT
Start: 2020-10-06

## 2020-10-06 RX ORDER — DEXAMETHASONE SODIUM PHOSPHATE 4 MG/ML
4 INJECTION, SOLUTION INTRA-ARTICULAR; INTRALESIONAL; INTRAMUSCULAR; INTRAVENOUS; SOFT TISSUE ONCE
Status: COMPLETED | OUTPATIENT
Start: 2020-10-06 | End: 2020-10-06

## 2020-10-06 RX ORDER — FLUOROURACIL 50 MG/ML
400 INJECTION, SOLUTION INTRAVENOUS ONCE
Status: COMPLETED | OUTPATIENT
Start: 2020-10-06 | End: 2020-10-06

## 2020-10-06 RX ORDER — PALONOSETRON 0.05 MG/ML
0.25 INJECTION, SOLUTION INTRAVENOUS ONCE
Status: COMPLETED | OUTPATIENT
Start: 2020-10-06 | End: 2020-10-06

## 2020-10-06 RX ORDER — FAMOTIDINE 10 MG/ML
20 INJECTION, SOLUTION INTRAVENOUS 2 TIMES DAILY
Status: DISCONTINUED | OUTPATIENT
Start: 2020-10-06 | End: 2020-10-06 | Stop reason: HOSPADM

## 2020-10-06 RX ORDER — FLUOROURACIL 50 MG/ML
400 INJECTION, SOLUTION INTRAVENOUS ONCE
Status: CANCELLED | OUTPATIENT
Start: 2020-10-06

## 2020-10-06 RX ORDER — FAMOTIDINE 10 MG/ML
20 INJECTION, SOLUTION INTRAVENOUS AS NEEDED
Status: CANCELLED | OUTPATIENT
Start: 2020-10-06

## 2020-10-06 RX ORDER — PALONOSETRON 0.05 MG/ML
0.25 INJECTION, SOLUTION INTRAVENOUS ONCE
Status: CANCELLED | OUTPATIENT
Start: 2020-10-06

## 2020-10-06 RX ORDER — DIPHENHYDRAMINE HYDROCHLORIDE 50 MG/ML
50 INJECTION INTRAMUSCULAR; INTRAVENOUS AS NEEDED
Status: CANCELLED | OUTPATIENT
Start: 2020-10-06

## 2020-10-06 RX ADMIN — DEXAMETHASONE SODIUM PHOSPHATE 4 MG: 4 INJECTION, SOLUTION INTRAMUSCULAR; INTRAVENOUS at 09:36

## 2020-10-06 RX ADMIN — PALONOSETRON 0.25 MG: 0.05 INJECTION, SOLUTION INTRAVENOUS at 09:19

## 2020-10-06 RX ADMIN — FAMOTIDINE 20 MG: 10 INJECTION INTRAVENOUS at 09:19

## 2020-10-06 RX ADMIN — SODIUM CHLORIDE 250 ML: 9 INJECTION, SOLUTION INTRAVENOUS at 09:18

## 2020-10-06 RX ADMIN — FLUOROURACIL 810 MG: 50 INJECTION, SOLUTION INTRAVENOUS at 10:49

## 2020-10-06 RX ADMIN — SODIUM CHLORIDE 810 MG: 900 INJECTION, SOLUTION INTRAVENOUS at 10:14

## 2020-10-06 RX ADMIN — TRASTUZUMAB 350 MG: 150 INJECTION, POWDER, LYOPHILIZED, FOR SOLUTION INTRAVENOUS at 09:41

## 2020-10-06 RX ADMIN — DIPHENHYDRAMINE HYDROCHLORIDE 25 MG: 50 INJECTION INTRAMUSCULAR; INTRAVENOUS at 09:20

## 2020-10-06 RX ADMIN — FLUOROURACIL 4850 MG: 50 INJECTION, SOLUTION INTRAVENOUS at 10:49

## 2020-10-08 ENCOUNTER — INFUSION (OUTPATIENT)
Dept: ONCOLOGY | Facility: HOSPITAL | Age: 65
End: 2020-10-08

## 2020-10-08 DIAGNOSIS — C78.7 LIVER METASTASIS: ICD-10-CM

## 2020-10-08 DIAGNOSIS — Z45.2 ENCOUNTER FOR ADJUSTMENT OR MANAGEMENT OF VASCULAR ACCESS DEVICE: ICD-10-CM

## 2020-10-08 DIAGNOSIS — C15.5 MALIGNANT NEOPLASM OF LOWER THIRD OF ESOPHAGUS (HCC): Primary | ICD-10-CM

## 2020-10-08 PROCEDURE — 25010000003 HEPARIN LOCK FLUSH PER 10 UNITS: Performed by: INTERNAL MEDICINE

## 2020-10-08 RX ORDER — HEPARIN SODIUM (PORCINE) LOCK FLUSH IV SOLN 100 UNIT/ML 100 UNIT/ML
500 SOLUTION INTRAVENOUS AS NEEDED
Status: CANCELLED | OUTPATIENT
Start: 2020-10-08

## 2020-10-08 RX ORDER — SODIUM CHLORIDE 0.9 % (FLUSH) 0.9 %
10 SYRINGE (ML) INJECTION AS NEEDED
Status: CANCELLED | OUTPATIENT
Start: 2020-10-08

## 2020-10-08 RX ORDER — SODIUM CHLORIDE 0.9 % (FLUSH) 0.9 %
10 SYRINGE (ML) INJECTION AS NEEDED
Status: DISCONTINUED | OUTPATIENT
Start: 2020-10-08 | End: 2020-10-08 | Stop reason: HOSPADM

## 2020-10-08 RX ORDER — HEPARIN SODIUM (PORCINE) LOCK FLUSH IV SOLN 100 UNIT/ML 100 UNIT/ML
500 SOLUTION INTRAVENOUS AS NEEDED
Status: DISCONTINUED | OUTPATIENT
Start: 2020-10-08 | End: 2020-10-08 | Stop reason: HOSPADM

## 2020-10-08 RX ADMIN — SODIUM CHLORIDE, PRESERVATIVE FREE 10 ML: 5 INJECTION INTRAVENOUS at 09:02

## 2020-10-08 RX ADMIN — Medication 500 UNITS: at 09:02

## 2020-10-26 ENCOUNTER — APPOINTMENT (OUTPATIENT)
Dept: CARDIOLOGY | Facility: HOSPITAL | Age: 65
End: 2020-10-26

## 2020-10-27 ENCOUNTER — HOSPITAL ENCOUNTER (OUTPATIENT)
Dept: PET IMAGING | Facility: HOSPITAL | Age: 65
Discharge: HOME | End: 2020-10-27

## 2020-10-27 DIAGNOSIS — C15.5 MALIGNANT NEOPLASM OF LOWER THIRD OF ESOPHAGUS (HCC): ICD-10-CM

## 2020-10-27 LAB — GLUCOSE BLDC GLUCOMTR-MCNC: 113 MG/DL (ref 70–130)

## 2020-10-27 PROCEDURE — A9552 F18 FDG: HCPCS | Performed by: INTERNAL MEDICINE

## 2020-10-27 PROCEDURE — 78815 PET IMAGE W/CT SKULL-THIGH: CPT

## 2020-10-27 PROCEDURE — 0 FLUDEOXYGLUCOSE F18 SOLUTION: Performed by: INTERNAL MEDICINE

## 2020-10-27 PROCEDURE — 82962 GLUCOSE BLOOD TEST: CPT

## 2020-10-27 RX ADMIN — FLUDEOXYGLUCOSE F18 1 DOSE: 300 INJECTION INTRAVENOUS at 08:15

## 2020-11-03 ENCOUNTER — INFUSION (OUTPATIENT)
Dept: ONCOLOGY | Facility: HOSPITAL | Age: 65
End: 2020-11-03

## 2020-11-03 ENCOUNTER — OFFICE VISIT (OUTPATIENT)
Dept: ONCOLOGY | Facility: CLINIC | Age: 65
End: 2020-11-03

## 2020-11-03 VITALS
SYSTOLIC BLOOD PRESSURE: 105 MMHG | WEIGHT: 201.1 LBS | HEIGHT: 69 IN | HEART RATE: 87 BPM | BODY MASS INDEX: 29.79 KG/M2 | RESPIRATION RATE: 19 BRPM | OXYGEN SATURATION: 98 % | TEMPERATURE: 97.3 F | DIASTOLIC BLOOD PRESSURE: 64 MMHG

## 2020-11-03 DIAGNOSIS — Z45.2 ENCOUNTER FOR ADJUSTMENT OR MANAGEMENT OF VASCULAR ACCESS DEVICE: ICD-10-CM

## 2020-11-03 DIAGNOSIS — I82.412 ACUTE DEEP VEIN THROMBOSIS (DVT) OF FEMORAL VEIN OF LEFT LOWER EXTREMITY (HCC): ICD-10-CM

## 2020-11-03 DIAGNOSIS — C78.7 LIVER METASTASIS: ICD-10-CM

## 2020-11-03 DIAGNOSIS — C78.7 LIVER METASTASES: ICD-10-CM

## 2020-11-03 DIAGNOSIS — C15.5 MALIGNANT NEOPLASM OF LOWER THIRD OF ESOPHAGUS (HCC): Primary | ICD-10-CM

## 2020-11-03 DIAGNOSIS — G62.0 PERIPHERAL NEUROPATHY DUE TO CHEMOTHERAPY (HCC): ICD-10-CM

## 2020-11-03 DIAGNOSIS — D70.1 CHEMOTHERAPY INDUCED NEUTROPENIA (HCC): ICD-10-CM

## 2020-11-03 DIAGNOSIS — Z79.01 CURRENT USE OF LONG TERM ANTICOAGULATION: ICD-10-CM

## 2020-11-03 DIAGNOSIS — N32.1 COLOVESICAL FISTULA: ICD-10-CM

## 2020-11-03 DIAGNOSIS — Z72.0 TOBACCO ABUSE: ICD-10-CM

## 2020-11-03 DIAGNOSIS — T45.1X5A CHEMOTHERAPY INDUCED NEUTROPENIA (HCC): ICD-10-CM

## 2020-11-03 DIAGNOSIS — T45.1X5A PERIPHERAL NEUROPATHY DUE TO CHEMOTHERAPY (HCC): ICD-10-CM

## 2020-11-03 DIAGNOSIS — I82.431 ACUTE DEEP VEIN THROMBOSIS (DVT) OF RIGHT POPLITEAL VEIN (HCC): ICD-10-CM

## 2020-11-03 DIAGNOSIS — C15.5 MALIGNANT NEOPLASM OF LOWER THIRD OF ESOPHAGUS (HCC): ICD-10-CM

## 2020-11-03 LAB
ALBUMIN SERPL-MCNC: 4.2 G/DL (ref 3.5–5.2)
ALBUMIN/GLOB SERPL: 1.6 G/DL (ref 1.1–2.4)
ALP SERPL-CCNC: 110 U/L (ref 38–116)
ALT SERPL W P-5'-P-CCNC: 43 U/L (ref 0–41)
ANION GAP SERPL CALCULATED.3IONS-SCNC: 12.7 MMOL/L (ref 5–15)
AST SERPL-CCNC: 24 U/L (ref 0–40)
BASOPHILS # BLD AUTO: 0.08 10*3/MM3 (ref 0–0.2)
BASOPHILS NFR BLD AUTO: 0.8 % (ref 0–1.5)
BILIRUB SERPL-MCNC: 0.2 MG/DL (ref 0.2–1.2)
BUN SERPL-MCNC: 14 MG/DL (ref 6–20)
BUN/CREAT SERPL: 16.3 (ref 7.3–30)
CALCIUM SPEC-SCNC: 9.3 MG/DL (ref 8.5–10.2)
CEA SERPL-MCNC: 5.81 NG/ML
CHLORIDE SERPL-SCNC: 99 MMOL/L (ref 98–107)
CO2 SERPL-SCNC: 22.3 MMOL/L (ref 22–29)
CREAT SERPL-MCNC: 0.86 MG/DL (ref 0.7–1.3)
DEPRECATED RDW RBC AUTO: 51.4 FL (ref 37–54)
EOSINOPHIL # BLD AUTO: 0.38 10*3/MM3 (ref 0–0.4)
EOSINOPHIL NFR BLD AUTO: 3.9 % (ref 0.3–6.2)
ERYTHROCYTE [DISTWIDTH] IN BLOOD BY AUTOMATED COUNT: 14.8 % (ref 12.3–15.4)
GFR SERPL CREATININE-BSD FRML MDRD: 90 ML/MIN/1.73
GLOBULIN UR ELPH-MCNC: 2.7 GM/DL (ref 1.8–3.5)
GLUCOSE SERPL-MCNC: 135 MG/DL (ref 74–124)
HCT VFR BLD AUTO: 36.6 % (ref 37.5–51)
HGB BLD-MCNC: 12.4 G/DL (ref 13–17.7)
IMM GRANULOCYTES # BLD AUTO: 0.4 10*3/MM3 (ref 0–0.05)
IMM GRANULOCYTES NFR BLD AUTO: 4.1 % (ref 0–0.5)
LYMPHOCYTES # BLD AUTO: 1.91 10*3/MM3 (ref 0.7–3.1)
LYMPHOCYTES NFR BLD AUTO: 19.4 % (ref 19.6–45.3)
MCH RBC QN AUTO: 31.6 PG (ref 26.6–33)
MCHC RBC AUTO-ENTMCNC: 33.9 G/DL (ref 31.5–35.7)
MCV RBC AUTO: 93.1 FL (ref 79–97)
MONOCYTES # BLD AUTO: 0.81 10*3/MM3 (ref 0.1–0.9)
MONOCYTES NFR BLD AUTO: 8.2 % (ref 5–12)
NEUTROPHILS NFR BLD AUTO: 6.27 10*3/MM3 (ref 1.7–7)
NEUTROPHILS NFR BLD AUTO: 63.6 % (ref 42.7–76)
NRBC BLD AUTO-RTO: 0 /100 WBC (ref 0–0.2)
PLATELET # BLD AUTO: 169 10*3/MM3 (ref 140–450)
PMV BLD AUTO: 9.4 FL (ref 6–12)
POTASSIUM SERPL-SCNC: 4.1 MMOL/L (ref 3.5–4.7)
PROT SERPL-MCNC: 6.9 G/DL (ref 6.3–8)
RBC # BLD AUTO: 3.93 10*6/MM3 (ref 4.14–5.8)
SODIUM SERPL-SCNC: 134 MMOL/L (ref 134–145)
WBC # BLD AUTO: 9.85 10*3/MM3 (ref 3.4–10.8)

## 2020-11-03 PROCEDURE — 99215 OFFICE O/P EST HI 40 MIN: CPT | Performed by: INTERNAL MEDICINE

## 2020-11-03 PROCEDURE — 85025 COMPLETE CBC W/AUTO DIFF WBC: CPT

## 2020-11-03 PROCEDURE — 25010000002 TRASTUZUMAB PER 10 MG: Performed by: INTERNAL MEDICINE

## 2020-11-03 PROCEDURE — 96411 CHEMO IV PUSH ADDL DRUG: CPT

## 2020-11-03 PROCEDURE — 96416 CHEMO PROLONG INFUSE W/PUMP: CPT

## 2020-11-03 PROCEDURE — 96413 CHEMO IV INFUSION 1 HR: CPT

## 2020-11-03 PROCEDURE — 25010000002 FLUOROURACIL PER 500 MG: Performed by: INTERNAL MEDICINE

## 2020-11-03 PROCEDURE — 96367 TX/PROPH/DG ADDL SEQ IV INF: CPT

## 2020-11-03 PROCEDURE — 25010000002 LEUCOVORIN CALCIUM PER 50 MG: Performed by: INTERNAL MEDICINE

## 2020-11-03 PROCEDURE — 80053 COMPREHEN METABOLIC PANEL: CPT

## 2020-11-03 PROCEDURE — 82378 CARCINOEMBRYONIC ANTIGEN: CPT | Performed by: INTERNAL MEDICINE

## 2020-11-03 PROCEDURE — 25010000002 DIPHENHYDRAMINE 1 EACH BAG: Performed by: INTERNAL MEDICINE

## 2020-11-03 PROCEDURE — 25010000002 DEXAMETHASONE SODIUM PHOSPHATE 100 MG/10ML SOLUTION 10 ML VIAL: Performed by: INTERNAL MEDICINE

## 2020-11-03 PROCEDURE — 25010000002 PALONOSETRON PER 25 MCG: Performed by: INTERNAL MEDICINE

## 2020-11-03 PROCEDURE — 96375 TX/PRO/DX INJ NEW DRUG ADDON: CPT

## 2020-11-03 RX ORDER — CIPROFLOXACIN 250 MG/1
250 TABLET, FILM COATED ORAL DAILY
Qty: 90 TABLET | Refills: 1 | Status: SHIPPED | OUTPATIENT
Start: 2020-11-03 | End: 2021-09-30 | Stop reason: SDUPTHER

## 2020-11-03 RX ORDER — PALONOSETRON 0.05 MG/ML
0.25 INJECTION, SOLUTION INTRAVENOUS ONCE
Status: CANCELLED | OUTPATIENT
Start: 2020-11-03

## 2020-11-03 RX ORDER — SODIUM CHLORIDE 9 MG/ML
250 INJECTION, SOLUTION INTRAVENOUS ONCE
Status: COMPLETED | OUTPATIENT
Start: 2020-11-03 | End: 2020-11-03

## 2020-11-03 RX ORDER — FAMOTIDINE 10 MG/ML
20 INJECTION, SOLUTION INTRAVENOUS 2 TIMES DAILY
Status: CANCELLED | OUTPATIENT
Start: 2020-11-03

## 2020-11-03 RX ORDER — FLUOROURACIL 50 MG/ML
400 INJECTION, SOLUTION INTRAVENOUS ONCE
Status: COMPLETED | OUTPATIENT
Start: 2020-11-03 | End: 2020-11-03

## 2020-11-03 RX ORDER — PALONOSETRON 0.05 MG/ML
0.25 INJECTION, SOLUTION INTRAVENOUS ONCE
Status: COMPLETED | OUTPATIENT
Start: 2020-11-03 | End: 2020-11-03

## 2020-11-03 RX ORDER — FAMOTIDINE 10 MG/ML
20 INJECTION, SOLUTION INTRAVENOUS 2 TIMES DAILY
Status: DISCONTINUED | OUTPATIENT
Start: 2020-11-03 | End: 2020-11-03 | Stop reason: HOSPADM

## 2020-11-03 RX ORDER — FAMOTIDINE 10 MG/ML
20 INJECTION, SOLUTION INTRAVENOUS AS NEEDED
Status: CANCELLED | OUTPATIENT
Start: 2020-11-03

## 2020-11-03 RX ORDER — FLUOROURACIL 50 MG/ML
400 INJECTION, SOLUTION INTRAVENOUS ONCE
Status: CANCELLED | OUTPATIENT
Start: 2020-11-03

## 2020-11-03 RX ORDER — DIPHENHYDRAMINE HYDROCHLORIDE 50 MG/ML
50 INJECTION INTRAMUSCULAR; INTRAVENOUS AS NEEDED
Status: CANCELLED | OUTPATIENT
Start: 2020-11-03

## 2020-11-03 RX ORDER — GABAPENTIN 300 MG/1
600 CAPSULE ORAL
Qty: 60 CAPSULE | Refills: 4 | Status: SHIPPED | OUTPATIENT
Start: 2020-11-03 | End: 2021-04-08

## 2020-11-03 RX ORDER — SODIUM CHLORIDE 9 MG/ML
250 INJECTION, SOLUTION INTRAVENOUS ONCE
Status: CANCELLED | OUTPATIENT
Start: 2020-11-03

## 2020-11-03 RX ADMIN — FAMOTIDINE 20 MG: 10 INJECTION INTRAVENOUS at 09:09

## 2020-11-03 RX ADMIN — PALONOSETRON 0.25 MG: 0.05 INJECTION, SOLUTION INTRAVENOUS at 09:09

## 2020-11-03 RX ADMIN — TRASTUZUMAB 350 MG: 150 INJECTION, POWDER, LYOPHILIZED, FOR SOLUTION INTRAVENOUS at 09:44

## 2020-11-03 RX ADMIN — FLUOROURACIL 810 MG: 50 INJECTION, SOLUTION INTRAVENOUS at 11:06

## 2020-11-03 RX ADMIN — DEXAMETHASONE SODIUM PHOSPHATE 4 MG: 10 INJECTION, SOLUTION INTRAMUSCULAR; INTRAVENOUS at 09:26

## 2020-11-03 RX ADMIN — SODIUM CHLORIDE 810 MG: 900 INJECTION, SOLUTION INTRAVENOUS at 10:34

## 2020-11-03 RX ADMIN — SODIUM CHLORIDE 250 ML: 9 INJECTION, SOLUTION INTRAVENOUS at 09:08

## 2020-11-03 RX ADMIN — FLUOROURACIL 4850 MG: 50 INJECTION, SOLUTION INTRAVENOUS at 11:26

## 2020-11-03 RX ADMIN — DIPHENHYDRAMINE HYDROCHLORIDE 25 MG: 50 INJECTION INTRAMUSCULAR; INTRAVENOUS at 09:10

## 2020-11-03 NOTE — PROGRESS NOTES
Subjective     REASON FOR follow up:1.    1. 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  Currently receiving palliative FOLFOX AND HERCEPTIN therapy.    2.COLOVESICAL FISTULA: chronic antibiotic therapy cipro, NO FURTHER PLANS FOR SURGERY AFTER VISIT AND PROCEDURE BY DR GM CASTILLO 1/20    3. 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 NEURONTIN INITIATED.        History of Present Illness PATIENT WAS CALLED THE DAY BEFORE BY THE OFFICE TO ASK FOR SYMPTOMS THAT COULD BE CONSISTENT WITH CORONAVIRUS INFECTION, AND BEING NEGATIVE WAS SCHEDULED TO BE SEEN IN THE OFFICE TODAY. SIMILAR QUESTIONING TODAY INCLUDING, CHILLS, FEVER, NEW COUGH, SHORTNESS OF BREATH, DIARRHEA,DIFFUSE BODY ACHES  AND CHANGES IN SMELL OR TASTE WERE NEGATIVE.THE PATIENT DENIED ANY CONTACT WITH PERSONS WHO WERE POSITIVE FOR COVID, AND PATIENT IS NOT IN CATEGORY OF HIGH RISK BEHAVIOR TO ACQUIRE COVID.    DURING THE VISIT WITH THE PATIENT TODAY , PATIENT HAD FACE MASK, MY MEDICAL ASSISTANT AND I  HAD PROPPER PROTECTIVE EQUIPMENT, AND I DID HAND HYGIENE WITH SOAP AND WATER BEFORE AND AFTER THE VISIT.    This patient returns today to the office for follow up. He was in the emergency room a few weeks ago with sensation of food obstructed in his esophagus. Finally things went through, he had nausea and vomiting on that occasion and he has not had any other episodes since then. He was eating food that was extremely dry at that time. Because of the concern that maybe his cancer of the esophagus was progressing he has had a new PET scan for review today.  Again he has not had any further issues swallowing, he is chewing his food slowly, he is swallowing slowly and he has not had any regurgitation, heartburn or indigestion. He has not had any cancer related pain. He  "continues taking gabapentin for neuropathy induced by Oxaliplatin. He remains anticoagulated given his previous history of thrombophilia and deep vein thrombosis. His weight remains stable. His bowel function is normal. He has not had any other new issues at this time. He has an ECOG performance status 0 and he has no cancer related pain.     Unfortunately the patient continues smoking sometimes up to 1-1/2 pack of cigarettes a day.                 Past Medical History:   Diagnosis Date   • Arthritis    • Elevated PSA    • Esophageal cancer (CMS/HCC)    • Esophageal mass    • Fistula    • H/O Lung nodule    • Hepatitis     CHILD--PT STATED \"I THINK IT WAS A.\"   • History of pneumonia    • Hx of blood clots 08/10/2019   • Hyperlipidemia    • Hypertension    • Neuropathy    • Prostate cancer (CMS/HCC)    • PVD (peripheral vascular disease) (CMS/HCC)         Past Surgical History:   Procedure Laterality Date   • COLONOSCOPY N/A 2/4/2020    Procedure: COLONOSCOPY;  Surgeon: Guy Sears MD;  Location: [facility] ENDOSCOPY;  Service: General;  Laterality: N/A;  PRE-COLOVESICAL FISTULA  POST-- DIVERTICULOSIS   • CYSTOSCOPY  02/06/2020   • CYSTOSCOPY Bilateral 2/26/2020    Procedure: CYSTOSCOPY RETROGRADE;  Surgeon: Cm Witt MD;  Location: [facility] OR;  Service: Urology;  Laterality: Bilateral;   • HERNIA REPAIR  1999   • PROSTATE SURGERY  03/2008   • VENOUS ACCESS DEVICE (PORT) INSERTION N/A 7/16/2019    Procedure: INSERTION VENOUS ACCESS DEVICE WITH FLUORO AND EGD WITH BIOPSY;  Surgeon: Mary Brito MD;  Location: [facility] OR;  Service: Thoracic        Current Outpatient Medications on File Prior to Visit   Medication Sig Dispense Refill   • cevimeline (EVOXAC) 30 MG capsule Take 30 mg by mouth 3 (Three) Times a Day.     • ciprofloxacin (CIPRO) 250 MG tablet Take 1 tablet by mouth Daily. 90 tablet 1   • clotrimazole (MYCELEX) 10 MG obie 3 TIMES A DAY 90 tablet 1   • gabapentin " (NEURONTIN) 300 MG capsule Take 2 capsules by mouth every night at bedtime. 60 capsule 0   • lisinopril-hydrochlorothiazide (PRINZIDE,ZESTORETIC) 20-12.5 MG per tablet TAKE 1 TABLET BY MOUTH EVERY DAY 90 tablet 2   • ondansetron (ZOFRAN) 4 MG tablet Take 1 tablet by mouth Every 8 (Eight) Hours As Needed for Nausea or Vomiting. 30 tablet 2   • Probiotic Product (PROBIOTIC DAILY PO) Take 1 tablet by mouth Daily.     • triamcinolone (KENALOG) 0.1 % ointment Apply  topically to the appropriate area as directed 2 (Two) Times a Day. (Patient taking differently: Apply 1 application topically to the appropriate area as directed As Needed.) 30 g 2   • Xarelto 20 MG tablet TAKE 1 TABLET BY MOUTH EVERY DAY**STOP LOVENOX** 90 tablet 1     No current facility-administered medications on file prior to visit.         ALLERGIES:  No Known Allergies     Social History     Socioeconomic History   • Marital status: Single     Spouse name: Not on file   • Number of children: Not on file   • Years of education: High school   • Highest education level: Not on file   Occupational History   • Occupation: [employer]     Employer: [employer]   Tobacco Use   • Smoking status: Current Every Day Smoker     Packs/day: 1.00     Years: 40.00     Pack years: 40.00     Types: Cigarettes   • Smokeless tobacco: Never Used   Substance and Sexual Activity   • Alcohol use: Not Currently     Comment: NOT RECENTLY- none since september   • Drug use: No   • Sexual activity: Defer        Family History   Problem Relation Age of Onset   • Hypertension Mother    • Stroke Mother    • Hypertension Other    • Lung disease Other    • Prostate cancer Other    • Lung cancer Father    • Malig Hyperthermia Neg Hx       Current Outpatient Medications on File Prior to Visit   Medication Sig Dispense Refill   • cevimeline (EVOXAC) 30 MG capsule Take 30 mg by mouth 3 (Three) Times a Day.     • ciprofloxacin (CIPRO) 250 MG tablet Take 1 tablet by mouth Daily. 90 tablet 1    • clotrimazole (MYCELEX) 10 MG obie 3 TIMES A DAY 90 tablet 1   • gabapentin (NEURONTIN) 300 MG capsule Take 2 capsules by mouth every night at bedtime. 60 capsule 0   • lisinopril-hydrochlorothiazide (PRINZIDE,ZESTORETIC) 20-12.5 MG per tablet TAKE 1 TABLET BY MOUTH EVERY DAY 90 tablet 2   • ondansetron (ZOFRAN) 4 MG tablet Take 1 tablet by mouth Every 8 (Eight) Hours As Needed for Nausea or Vomiting. 30 tablet 2   • Probiotic Product (PROBIOTIC DAILY PO) Take 1 tablet by mouth Daily.     • triamcinolone (KENALOG) 0.1 % ointment Apply  topically to the appropriate area as directed 2 (Two) Times a Day. (Patient taking differently: Apply 1 application topically to the appropriate area as directed As Needed.) 30 g 2   • Xarelto 20 MG tablet TAKE 1 TABLET BY MOUTH EVERY DAY**STOP LOVENOX** 90 tablet 1     No current facility-administered medications on file prior to visit.    No Known Allergies     Review of Systems:  General: no fever, no chills, no fatigue,no weight changes, no lack of appetite.  Eyes: no epiphora, xerophthalmia,conjunctivitis, pain, glaucoma, blurred vision, blindness, secretion, photophobia, proptosis, diplopia.  Ears: no otorrhea, tinnitus, otorrhagia, deafness, pain, vertigo.  Nose: no rhinorrhea, no epistaxis, no alteration in perception of odors, no sinuses pressure.  Mouth: no alteration in gums or teeth,  No ulcers, no difficulty with mastication or deglut ion, no odynophagia.  Neck: no masses or pain, no thyroid alterations, no pain in muscles or arteries, no carotid odynia, no crepitation.  Respiratory: no cough, no sputum production,no dyspnea,no trepopnea, no pleuritic pain,no hemoptysis.  Heart: no syncope, no irregularity, no palpitations, no angina,no orthopnea,no paroxysmal nocturnal dyspnea.  Vascular Venous: no tenderness,no edema,no palpable cords,no postphlebitic syndrome, no skin changes no ulcerations.  Vascular Arterial: no distal ischemia, noclaudication, no gangrene, no  "neuropathic ischemic pain, no skin ulcers, no paleness no cyanosis.  GI: stated transitory dysphagia, no odynophagia, no regurgitation, no heartburn,no indigestion,no nausea,no vomiting,no hematemesis ,no melena,no jaundice,no distention, no obstipation,no enterorrhagia,no proctalgia,no anal  lesions, no changes in bowel habits.  : no frequency, no hesitancy, no hematuria, no discharge,no  pain.  Musculoskeletal: no muscle or tendon pain or inflammation,no  joint pain, no edema, no functional limitation,no fasciculations, no mass.  Neurologic: no headache, no seizures, noalterations on Craneal nerves, no motor deficit, no sensory deficit, normal coordination, no alteration in memory,normal orientation, calculation,normal writting, verbal and written language.  Skin: no rashes,no pruritus no localized lesions.  Psychiatric: no anxiety, no depression,no agitation, no delusions, proper insight.          Objective     Vitals:    11/03/20 0834   BP: 105/64   Pulse: 87   Resp: 19   Temp: 97.3 °F (36.3 °C)   TempSrc: Temporal   SpO2: 98%   Weight: 91.2 kg (201 lb 1.6 oz)   Height: 175.3 cm (69.02\")   PainSc: 0-No pain     Current Status 11/3/2020   ECOG score 0           Physical exam   I HAVE PERSONALLY REVIEWED THE HISTORY OF THE PRESENT ILLNESS, PAST MEDICAL HISTORY, FAMILY HISTORY, SOCIAL HISTORY, ALLERGIES, MEDICATIONS STATED ABOVE IN THE OFFICE NOTE FROM TODAY.        GENERAL:  Well-developed, well-nourished  Patient  in no acute distress.   SKIN:  Warm, dry ,NO rashes,NO purpura ,NO petechiae.  HEENT:  Pupils were equal and reactive to light and accomodation, conjunctivae noninjected, no pterigion, normal extraocular movements, normal visual acuity.   NECK:  Supple with good range of motion; no thyromegaly or masses, no JVD or bruits, no cervical adenopathies.No carotid artery pain, no carotid abnormal pulsation , NO arterial dance.  LYMPHATICS:  No cervical, NO supraclavicular, NO axillary,NO epitrochlear , NO " inguinal adenopathy.  CARDIAC   normal rate and regular rhythm, without murmur,NO rubs NO S3 NO S4 right or left . Normal femoral, popliteal, pedis, brachial and carotid pulses.  VASCULAR ARTERIAL: normal carotids,brachial,radial,femoral,popliteal, pedis pulses , no bruits.no paleness or cyanosis, no pain, no edema, no numbness, no gangrene.  VASCULAR VENOUS: no cyanosis, collateral circulation, varicosities, edema, palpable cords, pain, erythema.  ABDOMEN:  Soft, nontender with no hepatomegaly, no splenomegaly,no masses, no ascites, no collateral circulation,no distention,no Dhruv sign, no abdominal pain, no inguinal hernias,no umbilical hernia, no abdominal bruits. Normal bowel sounds.  GENITAL: Not  Performed.  EXTREMITIES  AND SPINE:  finger clubbing,no cyanosis or edema, no deformities or pain .No kyphosis, scoliosis, deformities or pain in spine, ribs or pelvic bone.  NEUROLOGICAL:  Patient was awake, alert, oriented to time, person and place.          RECENT LABS:  Hematology WBC   Date Value Ref Range Status   11/03/2020 9.85 3.40 - 10.80 10*3/mm3 Final   02/02/2019 13.4 (H) 3.4 - 10.8 x10E3/uL Final     RBC   Date Value Ref Range Status   11/03/2020 3.93 (L) 4.14 - 5.80 10*6/mm3 Final   02/02/2019 4.81 4.14 - 5.80 x10E6/uL Final     Hemoglobin   Date Value Ref Range Status   11/03/2020 12.4 (L) 13.0 - 17.7 g/dL Final     Hematocrit   Date Value Ref Range Status   11/03/2020 36.6 (L) 37.5 - 51.0 % Final     Platelets   Date Value Ref Range Status   11/03/2020 169 140 - 450 10*3/mm3 Final            F-18 FDG PET FROM SKULL BASE TO MID THIGH WITH PET/CT FUSION     HISTORY: 64-year-old male with metastatic distal esophageal cancer.  Colovesicular fistula. Restaging.     TECHNIQUE: Radiation dose reduction techniques were utilized, including  automated exposure control and exposure modulation based on body size.   Blood glucose level at time of injection was 113 mg/dL.  6.3 mCi of F-18  FDG were injected and  PET was performed from skull base to mid thigh. CT  was obtained for localization and attenuation correction. Time at  injection 8:08 AM. PET start time 9:41 AM. Compared with PET/CT from  08/03/2020.     FINDINGS: There is slightly more intense hypermetabolic activity at the  distal 3rd of the esophagus than previously with a current maximal SUV  of 5.3, previously 3.6. There is no hypermetabolic lymphadenopathy along  the esophagus or within the chest. There is no change in the speckled  pattern of activity throughout the liver. No suspicious hypermetabolic  activity seen within the liver. There is no suspicious hypermetabolic  activity within the abdomen or pelvis. A colovesicular fistula is again  seen between the left posterior bladder wall in the sigmoid colon. There  is no hypermetabolic lymphadenopathy within the pelvis. There is no  suspicious hypermetabolic activity within the neck. There has been  development of hypermetabolic mucosal thickening within the left  maxillary sinus with a maximal SUV of 7.5.     IMPRESSION:  1. The more intense metabolic activity at the distal esophagus may be  radiation related, but this is indeterminate. Correlation with endoscopy  is recommended. There is no hypermetabolic lymphadenopathy or convincing  evidence for metastatic disease.  2. Development of hypermetabolic mucosal thickening within the left  maxillary sinus. Please correlate clinically for acute sinusitis.  3. There is no change in the appearance of the colovesicular fistula.     This report was finalized on 10/29/2020 3:27 PM by Dr. Almaz Rutledge M.D.               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 Almost 90% of the liver WAS replaced by tumor. The patient has been undergoing chemotherapy with 5  fu and leucovorin  and Herceptin and has had excellent response clinically and radiologically. The patient was reviewed on 08/10/2020. I reviewed with the  patient in the PAC system [facility] his PET scan that is dramatic. There is minimal uptake in the lower esophagus and most importantly complete resolution of his liver metastasis. Actually the only issue that is pertinent to the PET scan is still the visibility of his colovesical fistula.     Therefore from the point of view of his cancer of the esophagus, metastatic to the liver, he has no symptoms from the primary tumor in the esophagus and he has no symptoms related to his liver metastasis that has resolved altogether. His CEA level is low at 5.7.     Tolerance to the regimen of 5-FU, leucovorin and Herceptin is excellent. He is receiving this once a month and this will remain ongoing for the time being. Somebody will raise the question if eventually we could get rid of the 5-FU and leucovorin and maybe that is a possibility down the road.     In regard to cardiac toxicity from Herceptin, the patient has not developed any. New echocardiogram documents a normal left ventricular ejection fraction. These numbers have not changed in comparison with the original baseline and the one done in 05/2020.     In regard to the sensory peripheral neuropathy of his lower extremities, he remains on Neurontin. The dose that he is receiving is appropriate. He has no need for dose adjustment.     In regard to his thrombophilia associated with malignancy, the patient remains on anticoagulation orally after being on Lovenox for a long time. He has not had any clinical bleeding or new thrombosis.     In regard to his colovesical fistula, he remains on a minimal dose of ciprofloxacin to keep infections from happening. So far he has not encountered any problems with the medicine and neither developed any new symptoms that suggests activation of the fistula.     In regard to his history of prostate cancer, we measured during the previous visit a PSA that was 0.01. This is a very low number that means that his prostate  cancer is not an issue at all.     In regard to disability, I think the patient is completely and permanently disabled. Given his Stage IV malignancy, he will decide what to do along with [employer] about disability from now on.       The patient was further reviewed on 11/03/2020. I personally reviewed in the PAC system [facility] the PET scan that the patient had and compared this with the PET scan from 08/2020. To my eyes most importantly there is no evidence of liver, pulmonary or bone metastasis. He has minimal activity in the lower 1/3 of the esophagus unchanged from before. He has no pulmonary uptake. Colovesical fistula remains in place. There is no aggravation of this.     The rest of the radiological analysis was negative normal.     The patient is up to date in regard to his echocardiogram.     His neuropathy remains about the same.     The patient continues smoking exaggeratedly. He is not able to quit.     RECOMMENDATIONS:  1. He will proceed today with his chemotherapy 5FU, leucovorin and Herceptin and he will remain on this regimen every 4 weeks.   2. He will require CBC, CMP, CEA level every 4 weeks.   3. He will require at some point before the end of the year new echocardiogram.  4. The patient has been referred to the multidisciplinary clinic for smoking cessation.  5. The patient will remain anticoagulated with his Xarelto for his vein thrombosis and thrombophilia associated with malignancy.     We have renewed today his ciprofloxacin and we have renewed as well today his Neurontin.     I will review him back on monthly basis.     The question has risen if at some point we could get rid of the 5FU and the leucovorin and give him Herceptin maintenance and maybe something to contemplate at some point after the 1st of the year.     I DISCUSSED WITH PATIENT IN DETAIL FORMS TO DECREASE CHANCES OF CORONAVIRUS INFECTION INCLUDING ISOLATION, PROPER HAND HIGIENE, AVOID PUBLIC PLACES   WITH CROWDS, FOLLOW  CDC RECOMENDATIONS, AND KEEP PERSONAL AND SOCIAL RESPONSIBILITY, WARE A MASK IN PUBLIC PLACES.  PATIENT IS AWARE THIS INFECTION COULD HAVE SEVERE CONSEQUENCES TO PERSONAL HEALTH AND FAMILY RAMIFICATIONS OF THIS.

## 2020-11-05 ENCOUNTER — INFUSION (OUTPATIENT)
Dept: ONCOLOGY | Facility: HOSPITAL | Age: 65
End: 2020-11-05

## 2020-11-05 DIAGNOSIS — C78.7 LIVER METASTASIS: ICD-10-CM

## 2020-11-05 DIAGNOSIS — Z45.2 ENCOUNTER FOR ADJUSTMENT OR MANAGEMENT OF VASCULAR ACCESS DEVICE: ICD-10-CM

## 2020-11-05 DIAGNOSIS — C15.5 MALIGNANT NEOPLASM OF LOWER THIRD OF ESOPHAGUS (HCC): Primary | ICD-10-CM

## 2020-11-05 PROCEDURE — 25010000003 HEPARIN LOCK FLUSH PER 10 UNITS: Performed by: INTERNAL MEDICINE

## 2020-11-05 RX ORDER — HEPARIN SODIUM (PORCINE) LOCK FLUSH IV SOLN 100 UNIT/ML 100 UNIT/ML
500 SOLUTION INTRAVENOUS AS NEEDED
Status: DISCONTINUED | OUTPATIENT
Start: 2020-11-05 | End: 2020-11-05 | Stop reason: HOSPADM

## 2020-11-05 RX ORDER — HEPARIN SODIUM (PORCINE) LOCK FLUSH IV SOLN 100 UNIT/ML 100 UNIT/ML
500 SOLUTION INTRAVENOUS AS NEEDED
Status: CANCELLED | OUTPATIENT
Start: 2020-11-05

## 2020-11-05 RX ORDER — SODIUM CHLORIDE 0.9 % (FLUSH) 0.9 %
10 SYRINGE (ML) INJECTION AS NEEDED
Status: CANCELLED | OUTPATIENT
Start: 2020-11-05

## 2020-11-05 RX ORDER — SODIUM CHLORIDE 0.9 % (FLUSH) 0.9 %
10 SYRINGE (ML) INJECTION AS NEEDED
Status: DISCONTINUED | OUTPATIENT
Start: 2020-11-05 | End: 2020-11-05 | Stop reason: HOSPADM

## 2020-11-05 RX ADMIN — SODIUM CHLORIDE, PRESERVATIVE FREE 10 ML: 5 INJECTION INTRAVENOUS at 09:39

## 2020-11-05 RX ADMIN — Medication 500 UNITS: at 09:39

## 2020-11-12 ENCOUNTER — OFFICE VISIT (OUTPATIENT)
Dept: OTHER | Facility: HOSPITAL | Age: 65
End: 2020-11-12

## 2020-11-12 VITALS — RESPIRATION RATE: 20 BRPM

## 2020-11-12 DIAGNOSIS — Z72.0 TOBACCO ABUSE: ICD-10-CM

## 2020-11-12 PROCEDURE — 99214 OFFICE O/P EST MOD 30 MIN: CPT | Performed by: NURSE PRACTITIONER

## 2020-11-12 PROCEDURE — 99407 BEHAV CHNG SMOKING > 10 MIN: CPT | Performed by: NURSE PRACTITIONER

## 2020-11-12 RX ORDER — NICOTINE 21 MG/24HR
1 PATCH, TRANSDERMAL 24 HOURS TRANSDERMAL EVERY 24 HOURS
COMMUNITY
Start: 2020-11-16 | End: 2020-12-17 | Stop reason: SDUPTHER

## 2020-11-12 NOTE — PROGRESS NOTES
[facility] MULTIDISCIPLINARY CLINIC  SMOKING CESSATION TELEHEALTH VISIT    Han Garcia is a pleasant 65 y.o. male referred by Alexey Haynes MD to our Multidisciplinary Clinic for smoking cessation counseling. Being reviewed today by VIDEO to review treatment summary and survivorship care plan.    Patient consent obtained for our telehealth visit today.  You have chosen to receive care through a telehealth visit.  Do you consent to use a video/audio connection for your medical care today? 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 Patient is a current every day smoker. Reports currently smoking 20 cigarettes per day. Has smoked as much as 40 cigarettes per day in the past. Approximately 38 pack year history.   E-cigarette use NONE  Other combustible tobacco use NONE  Smokeless tobacco use NONE  Caffeine 4-5 CUPS/DAY    Social History     Socioeconomic History   • Marital status: Single     Spouse name: Not on file   • Number of children: Not on file   • Years of education: High school   • Highest education level: Not on file   Occupational History   • Occupation: [employer]     Employer: [employer]   Tobacco Use   • Smoking status: Current Every Day Smoker     Packs/day: 1.00     Years: 38.00     Pack years: 38.00     Types: Cigarettes     Start date: 1974   • Smokeless tobacco: Never Used   • Tobacco comment: Quit for a period of 8 years   Substance and Sexual Activity   • Alcohol use: Not Currently     Frequency: Monthly or less     Drinks per session: 1 or 2     Binge frequency: Never     Comment: NOT RECENTLY- none since september   • Drug use: No   • Sexual activity: Defer        Cancer treatment plan: 5-FU, leucovorin and Herceptin   Past medical history   Past Medical History:   Diagnosis Date   • Arthritis    • Elevated PSA    • Esophageal cancer (CMS/HCC)    • Esophageal mass    • Fistula   "  • H/O Lung nodule    • Hepatitis     CHILD--PT STATED \"I THINK IT WAS A.\"   • History of pneumonia    • Hx of blood clots 08/10/2019   • Hyperlipidemia    • Hypertension    • Neuropathy    • Prostate cancer (CMS/HCC)    • PVD (peripheral vascular disease) (CMS/HCC)         Lab Results   Component Value Date    GLUCOSE 135 (H) 11/03/2020    BUN 14 11/03/2020    CREATININE 0.86 11/03/2020    EGFRIFNONA 90 11/03/2020    EGFRIFAFRI 100 02/02/2019    BCR 16.3 11/03/2020    K 4.1 11/03/2020    CO2 22.3 11/03/2020    CALCIUM 9.3 11/03/2020    PROTENTOTREF 7.1 02/02/2019    ALBUMIN 4.20 11/03/2020    LABIL2 1.6 02/02/2019    AST 24 11/03/2020    ALT 43 (H) 11/03/2020     History of seizures NO  Psychiatric History NONE  Patient has quit smoking several times in the past. Quit for about 8 years early 90's. Was going thru divorce and  Relapsed with a friend who was also abstinent for many years and going through a divorce.    Has quit at other times for hours or days. Withdrawal symptoms difficult to describe, just feels like \"he has to have one\".     Patient importance of quitting smoking 10/10. He has had multiple meaningful conversations with Dr Haynes about how smoking continues to impact his health. Patient describes leg aching when walking due to peripheral vascular compromise. Extremely confident he will be able to maintain abstinence once he is able to stop. They are ready to quit smoking within the next 2-4 weeks.  Patient is willing to set a quit date.    Patient identified motivating factors for quitting include preserving his health.     Patient identified potential support resources available including his two sisters, one of which is also a smoker and has expressed interest in quitting smoking herself.     Patient anticipates future challenges/barriers including managing free time.      Strongly encouraged patient to stop smoking. Explained that multiple attempts to quit are often needed before patients can " achieve prolonged periods of time free from tobacco.    I advised the patient of the risks in continuing to use tobacco. In addition to cardiovascular risk there is an increased risk for cancers of the oral cavity and pharynx, larynx, lung, esophagus, pancreas, uterine, cervix, kidney, bladder, stomach, colon, rectum and liver as well as myeloid leukemia. We also discussed that this risk may also extend to development of female breast cancer and advanced stage prostate cancer.       QUIT PLAN (STAR):  Set quit date: Patient is highly motivated and sets his date for Monday 11/16/2020    Tell friends/family: He plans to enlist the support of his sisters    Anticipate challenges: Is able to identify smoking more when driving or reading. Has been putting cigarettes in the kitchen when watching tv and is abstinent for 2-3 hours at a time and will employ this strategy for reading/driving as well.    Remove tobacco from environment: Discussed preparing home by collecting all cigarettes and paraphernalia and removing. Explored ways to ask friends/family to avoid smoking around patient.     We discussed evidence-based approaches to quit smoking including options for pharmacotherapy, nicotine replacement therapy, and supportive counseling in group, individual or phone/web based settings. Discussed that counseling or medications used alone are effective but effectiveness is increased when both methods are used.  Advised that e-cigarettes and/or electronic cigarettes are not recommended for smoking cessation or as a safe alternative to smoking.    Medication plan: He has not used any medications or nicotine replacement in the past. He has been having some intermittent oral candida infections and I recommended avoiding any medication routes that may exacerbate oral or esophageal irritation. He would like to avoid adding another oral medication. We spent some time discussing Nicotine transdermal patch. Based on his current level  of smoking recommend 21 mg transdermal patch daily. Reviewed possible side effects of nicotine patch including local skin irritation, sleep disturbances (insomnia/vivid dreams), headache. Patient plans to purchase this weekend OTC in anticipation of quit date.    Practical counseling: Focused on strategies of distract and delay, mindfulness of need/desire for next cigarette vs habit, exploring alternative activities to smoking. He does enjoy getting outside and thinks this will be a good place to start.    Patient provided with resources to assist with smoking cessation including the 1-800-Quit Now line and Health Department programs.     Follow-up: Scheduled for one week. I have encouraged them to call me with any questions or as needed in the interm.      ICD-10-CM ICD-9-CM   1. Tobacco abuse  Z72.0 305.1       No orders of the defined types were placed in this encounter.        A total of 30 minutes spent with patient, >50% of that time by VIDEO including discussion around symptom management related to cancer treatment-related side effects.  A total of 30 minutes was spent engaged in discussion of cessation of tobacco, setting a quit date of 11/16/20, pharmacotherapy nicotine patch 21 mg, side effects, behavioral counseling.

## 2020-11-19 ENCOUNTER — OFFICE VISIT (OUTPATIENT)
Dept: OTHER | Facility: HOSPITAL | Age: 65
End: 2020-11-19

## 2020-11-19 DIAGNOSIS — Z72.0 TOBACCO ABUSE: Primary | ICD-10-CM

## 2020-11-19 PROCEDURE — 99407 BEHAV CHNG SMOKING > 10 MIN: CPT | Performed by: NURSE PRACTITIONER

## 2020-11-19 NOTE — PROGRESS NOTES
[facility] MULTIDISCIPLINARY CLINIC  SMOKING CESSATION TELEHEALTH VISIT    Han Garcia is a pleasant 65 y.o. male seen today by VIDEO for smoking cessation counseling follow up.    Patient consent obtained for our telehealth visit today.  You have chosen to receive care through a telehealth visit.  Do you consent to use a video/audio connection for your medical care today? YES     HPI:   Han has had some difficulties finding nicotine patches to purchase and several stores he has visited have been completely out. He is discouraged but has begun to reduce total daily cigarettes to about 10 for the last three days (this is down from 20 cigarettes per day). He is doing this through delay and distract techniques and in general has not felt like smoking as much.     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 Patient is a current every day smoker. Reports currently smoking 20 cigarettes per day. Has smoked as much as 40 cigarettes per day in the past. Approximately 38 pack year history.   E-cigarette use NONE  Other combustible tobacco use NONE  Smokeless tobacco use NONE  Caffeine 4-5 CUPS/DAY      Social History     Socioeconomic History   • Marital status: Single     Spouse name: Not on file   • Number of children: Not on file   • Years of education: High school   • Highest education level: Not on file   Occupational History   • Occupation: [employer]     Employer: [employer]   Tobacco Use   • Smoking status: Current Every Day Smoker     Packs/day: 1.00     Years: 38.00     Pack years: 38.00     Types: Cigarettes     Start date: 1974   • Smokeless tobacco: Never Used   • Tobacco comment: Quit for a period of 8 years   Substance and Sexual Activity   • Alcohol use: Not Currently     Frequency: Monthly or less     Drinks per session: 1 or 2     Binge frequency: Never     Comment: NOT RECENTLY-  "none since september   • Drug use: No   • Sexual activity: Defer      Past Medical History:   Diagnosis Date   • Arthritis    • Elevated PSA    • Esophageal cancer (CMS/HCC)    • Esophageal mass    • Fistula    • H/O Lung nodule    • Hepatitis     CHILD--PT STATED \"I THINK IT WAS A.\"   • History of pneumonia    • Hx of blood clots 08/10/2019   • Hyperlipidemia    • Hypertension    • Neuropathy    • Prostate cancer (CMS/HCC)    • PVD (peripheral vascular disease) (CMS/HCC)        Cancer treatment plan: 5-FU, leucovorin and Herceptin   History of seizures NO  Psychiatric History NONE  Patient has quit smoking several times in the past. Quit for about 8 years early 90's. Was going thru divorce and  Relapsed with a friend who was also abstinent for many years and going through a divorce.     Has quit at other times for hours or days. Withdrawal symptoms difficult to describe, just feels like \"he has to have one\".      Patient importance of quitting smoking 10/10. He has had multiple meaningful conversations with Dr Haynes about how smoking continues to impact his health. Patient describes leg aching when walking due to peripheral vascular compromise. Extremely confident he will be able to maintain abstinence once he is able to stop. They are ready to quit smoking within the next 2-4 weeks.  Patient is willing to set a quit date.     Patient identified motivating factors for quitting include preserving his health.      Patient identified potential support resources available including his two sisters, one of which is also a smoker and has expressed interest in quitting smoking herself.      Patient anticipates future challenges/barriers including managing free time.       Strongly encouraged patient to stop smoking. Explained that multiple attempts to quit are often needed before patients can achieve prolonged periods of time free from tobacco.     I advised the patient of the risks in continuing to use tobacco. In " addition to cardiovascular risk there is an increased risk for cancers of the oral cavity and pharynx, larynx, lung, esophagus, pancreas, uterine, cervix, kidney, bladder, stomach, colon, rectum and liver as well as myeloid leukemia. We also discussed that this risk may also extend to development of female breast cancer and advanced stage prostate cancer.        QUIT PLAN (STAR):  Set quit date: Patient set his date for Monday 11/16/2020. He has begun gradually reducing daily cigarettes without medication assistance as he has not been able to find nicotine patches to purchase.     Tell friends/family: He has enlisted the support of his sisters     Anticipate challenges: Is able to identify smoking more when driving or reading. Has been putting cigarettes in the kitchen when watching tv and is abstinent for 2-3 hours at a time. This week he left his cigarettes at home and took an hour drive and felt this was successful.     Remove tobacco from environment: Discussed preparing home by collecting all cigarettes and paraphernalia and removing. Explored ways to ask friends/family to avoid smoking around patient.      We discussed evidence-based approaches to quit smoking including options for pharmacotherapy, nicotine replacement therapy, and supportive counseling in group, individual or phone/web based settings. Discussed that counseling or medications used alone are effective but effectiveness is increased when both methods are used.  Advised that e-cigarettes and/or electronic cigarettes are not recommended for smoking cessation or as a safe alternative to smoking.     Medication plan: The demand is high for nicotine replacement products and he has not yet found patches to purchase. He wants to try a few more stores this weekend. We did discuss an alternate plan with Nicotine gum 4 mg. We reviewed proper use of nicotine gum (chew and park technique, avoid eating and drinking while using gum). Reviewed potential side  effects of nicotine gum including mouth/jaw soreness, dyspepsia, hiccups and hypersalivation. Reviewed side effects typically associated with incorrect chewing technique including lightheadedness, nausea/vomiting and throat/mouth irritation. He will consider this for a back up plan.     Practical counseling: Focused on strategies of distract and delay, mindfulness of need/desire for next cigarette vs habit, exploring alternative activities to smoking. He does enjoy getting outside and thinks this will be a good place to start.     Patient provided with resources to assist with smoking cessation including the 1-800-Quit Now line and Health Department programs.      Follow-up: Scheduled for two weeks. I have encouraged them to call me with any questions or as needed in the interm.      ICD-10-CM ICD-9-CM   1. Tobacco abuse  Z72.0 305.1       No orders of the defined types were placed in this encounter.        A total of 15 minutes spent with patient, 100% of that time in face to face counseling via TELEHEALTH including discussion around symptom management related to cancer treatment-related side effects.

## 2020-12-01 ENCOUNTER — OFFICE VISIT (OUTPATIENT)
Dept: ONCOLOGY | Facility: CLINIC | Age: 65
End: 2020-12-01

## 2020-12-01 ENCOUNTER — INFUSION (OUTPATIENT)
Dept: ONCOLOGY | Facility: HOSPITAL | Age: 65
End: 2020-12-01

## 2020-12-01 ENCOUNTER — TELEPHONE (OUTPATIENT)
Dept: ONCOLOGY | Facility: CLINIC | Age: 65
End: 2020-12-01

## 2020-12-01 VITALS
OXYGEN SATURATION: 98 % | HEART RATE: 87 BPM | RESPIRATION RATE: 16 BRPM | BODY MASS INDEX: 29.93 KG/M2 | WEIGHT: 202.1 LBS | HEIGHT: 69 IN | DIASTOLIC BLOOD PRESSURE: 73 MMHG | TEMPERATURE: 97.5 F | SYSTOLIC BLOOD PRESSURE: 115 MMHG

## 2020-12-01 DIAGNOSIS — Z79.01 CURRENT USE OF LONG TERM ANTICOAGULATION: ICD-10-CM

## 2020-12-01 DIAGNOSIS — Z72.0 TOBACCO ABUSE: ICD-10-CM

## 2020-12-01 DIAGNOSIS — T45.1X5A CHEMOTHERAPY INDUCED NEUTROPENIA (HCC): ICD-10-CM

## 2020-12-01 DIAGNOSIS — Z45.2 ENCOUNTER FOR ADJUSTMENT OR MANAGEMENT OF VASCULAR ACCESS DEVICE: ICD-10-CM

## 2020-12-01 DIAGNOSIS — G62.0 PERIPHERAL NEUROPATHY DUE TO CHEMOTHERAPY (HCC): ICD-10-CM

## 2020-12-01 DIAGNOSIS — T45.1X5A PERIPHERAL NEUROPATHY DUE TO CHEMOTHERAPY (HCC): ICD-10-CM

## 2020-12-01 DIAGNOSIS — C15.5 MALIGNANT NEOPLASM OF LOWER THIRD OF ESOPHAGUS (HCC): Primary | ICD-10-CM

## 2020-12-01 DIAGNOSIS — C15.5 MALIGNANT NEOPLASM OF LOWER THIRD OF ESOPHAGUS (HCC): ICD-10-CM

## 2020-12-01 DIAGNOSIS — N32.1 COLOVESICAL FISTULA: ICD-10-CM

## 2020-12-01 DIAGNOSIS — C78.7 LIVER METASTASIS: ICD-10-CM

## 2020-12-01 DIAGNOSIS — C78.7 LIVER METASTASES: ICD-10-CM

## 2020-12-01 DIAGNOSIS — D70.1 CHEMOTHERAPY INDUCED NEUTROPENIA (HCC): ICD-10-CM

## 2020-12-01 LAB
ALBUMIN SERPL-MCNC: 4.2 G/DL (ref 3.5–5.2)
ALBUMIN/GLOB SERPL: 1.6 G/DL (ref 1.1–2.4)
ALP SERPL-CCNC: 102 U/L (ref 38–116)
ALT SERPL W P-5'-P-CCNC: 47 U/L (ref 0–41)
ANION GAP SERPL CALCULATED.3IONS-SCNC: 9.8 MMOL/L (ref 5–15)
AST SERPL-CCNC: 27 U/L (ref 0–40)
BASOPHILS # BLD AUTO: 0.08 10*3/MM3 (ref 0–0.2)
BASOPHILS NFR BLD AUTO: 0.8 % (ref 0–1.5)
BILIRUB SERPL-MCNC: 0.2 MG/DL (ref 0.2–1.2)
BUN SERPL-MCNC: 14 MG/DL (ref 6–20)
BUN/CREAT SERPL: 17.1 (ref 7.3–30)
CALCIUM SPEC-SCNC: 9.5 MG/DL (ref 8.5–10.2)
CEA SERPL-MCNC: 6.31 NG/ML
CHLORIDE SERPL-SCNC: 98 MMOL/L (ref 98–107)
CO2 SERPL-SCNC: 24.2 MMOL/L (ref 22–29)
CREAT SERPL-MCNC: 0.82 MG/DL (ref 0.7–1.3)
DEPRECATED RDW RBC AUTO: 50.8 FL (ref 37–54)
EOSINOPHIL # BLD AUTO: 0.29 10*3/MM3 (ref 0–0.4)
EOSINOPHIL NFR BLD AUTO: 2.8 % (ref 0.3–6.2)
ERYTHROCYTE [DISTWIDTH] IN BLOOD BY AUTOMATED COUNT: 14.7 % (ref 12.3–15.4)
GFR SERPL CREATININE-BSD FRML MDRD: 95 ML/MIN/1.73
GLOBULIN UR ELPH-MCNC: 2.7 GM/DL (ref 1.8–3.5)
GLUCOSE SERPL-MCNC: 127 MG/DL (ref 74–124)
HCT VFR BLD AUTO: 37.2 % (ref 37.5–51)
HGB BLD-MCNC: 12.6 G/DL (ref 13–17.7)
IMM GRANULOCYTES # BLD AUTO: 0.28 10*3/MM3 (ref 0–0.05)
IMM GRANULOCYTES NFR BLD AUTO: 2.7 % (ref 0–0.5)
LYMPHOCYTES # BLD AUTO: 1.93 10*3/MM3 (ref 0.7–3.1)
LYMPHOCYTES NFR BLD AUTO: 18.9 % (ref 19.6–45.3)
MCH RBC QN AUTO: 31.7 PG (ref 26.6–33)
MCHC RBC AUTO-ENTMCNC: 33.9 G/DL (ref 31.5–35.7)
MCV RBC AUTO: 93.5 FL (ref 79–97)
MONOCYTES # BLD AUTO: 0.75 10*3/MM3 (ref 0.1–0.9)
MONOCYTES NFR BLD AUTO: 7.3 % (ref 5–12)
NEUTROPHILS NFR BLD AUTO: 6.88 10*3/MM3 (ref 1.7–7)
NEUTROPHILS NFR BLD AUTO: 67.5 % (ref 42.7–76)
NRBC BLD AUTO-RTO: 0 /100 WBC (ref 0–0.2)
PLATELET # BLD AUTO: 177 10*3/MM3 (ref 140–450)
PMV BLD AUTO: 9.4 FL (ref 6–12)
POTASSIUM SERPL-SCNC: 4 MMOL/L (ref 3.5–4.7)
PROT SERPL-MCNC: 6.9 G/DL (ref 6.3–8)
RBC # BLD AUTO: 3.98 10*6/MM3 (ref 4.14–5.8)
SODIUM SERPL-SCNC: 132 MMOL/L (ref 134–145)
WBC # BLD AUTO: 10.21 10*3/MM3 (ref 3.4–10.8)

## 2020-12-01 PROCEDURE — 96411 CHEMO IV PUSH ADDL DRUG: CPT

## 2020-12-01 PROCEDURE — 25010000002 TRASTUZUMAB PER 10 MG: Performed by: NURSE PRACTITIONER

## 2020-12-01 PROCEDURE — 25010000002 FLUOROURACIL PER 500 MG: Performed by: NURSE PRACTITIONER

## 2020-12-01 PROCEDURE — 96375 TX/PRO/DX INJ NEW DRUG ADDON: CPT

## 2020-12-01 PROCEDURE — 25010000002 PALONOSETRON PER 25 MCG: Performed by: NURSE PRACTITIONER

## 2020-12-01 PROCEDURE — 25010000002 DIPHENHYDRAMINE 1 EACH BAG: Performed by: NURSE PRACTITIONER

## 2020-12-01 PROCEDURE — 96413 CHEMO IV INFUSION 1 HR: CPT

## 2020-12-01 PROCEDURE — 85025 COMPLETE CBC W/AUTO DIFF WBC: CPT

## 2020-12-01 PROCEDURE — 80053 COMPREHEN METABOLIC PANEL: CPT

## 2020-12-01 PROCEDURE — 99213 OFFICE O/P EST LOW 20 MIN: CPT | Performed by: NURSE PRACTITIONER

## 2020-12-01 PROCEDURE — 25010000002 LEUCOVORIN CALCIUM PER 50 MG: Performed by: NURSE PRACTITIONER

## 2020-12-01 PROCEDURE — 82378 CARCINOEMBRYONIC ANTIGEN: CPT | Performed by: INTERNAL MEDICINE

## 2020-12-01 PROCEDURE — 25010000002 DEXAMETHASONE PER 1 MG: Performed by: NURSE PRACTITIONER

## 2020-12-01 PROCEDURE — 96367 TX/PROPH/DG ADDL SEQ IV INF: CPT

## 2020-12-01 RX ORDER — FAMOTIDINE 10 MG/ML
20 INJECTION, SOLUTION INTRAVENOUS 2 TIMES DAILY
Status: DISCONTINUED | OUTPATIENT
Start: 2020-12-01 | End: 2020-12-01 | Stop reason: HOSPADM

## 2020-12-01 RX ORDER — PALONOSETRON 0.05 MG/ML
0.25 INJECTION, SOLUTION INTRAVENOUS ONCE
Status: CANCELLED | OUTPATIENT
Start: 2020-12-01

## 2020-12-01 RX ORDER — DIPHENHYDRAMINE HYDROCHLORIDE 50 MG/ML
50 INJECTION INTRAMUSCULAR; INTRAVENOUS AS NEEDED
Status: CANCELLED | OUTPATIENT
Start: 2020-12-01

## 2020-12-01 RX ORDER — SODIUM CHLORIDE 9 MG/ML
250 INJECTION, SOLUTION INTRAVENOUS ONCE
Status: COMPLETED | OUTPATIENT
Start: 2020-12-01 | End: 2020-12-01

## 2020-12-01 RX ORDER — FAMOTIDINE 10 MG/ML
20 INJECTION, SOLUTION INTRAVENOUS 2 TIMES DAILY
Status: CANCELLED | OUTPATIENT
Start: 2020-12-01

## 2020-12-01 RX ORDER — FAMOTIDINE 10 MG/ML
20 INJECTION, SOLUTION INTRAVENOUS AS NEEDED
Status: CANCELLED | OUTPATIENT
Start: 2020-12-01

## 2020-12-01 RX ORDER — FLUOROURACIL 50 MG/ML
400 INJECTION, SOLUTION INTRAVENOUS ONCE
Status: CANCELLED | OUTPATIENT
Start: 2020-12-01

## 2020-12-01 RX ORDER — PALONOSETRON 0.05 MG/ML
0.25 INJECTION, SOLUTION INTRAVENOUS ONCE
Status: COMPLETED | OUTPATIENT
Start: 2020-12-01 | End: 2020-12-01

## 2020-12-01 RX ORDER — SODIUM CHLORIDE 9 MG/ML
250 INJECTION, SOLUTION INTRAVENOUS ONCE
Status: CANCELLED | OUTPATIENT
Start: 2020-12-01

## 2020-12-01 RX ORDER — DEXAMETHASONE SODIUM PHOSPHATE 4 MG/ML
4 INJECTION, SOLUTION INTRA-ARTICULAR; INTRALESIONAL; INTRAMUSCULAR; INTRAVENOUS; SOFT TISSUE ONCE
Status: COMPLETED | OUTPATIENT
Start: 2020-12-01 | End: 2020-12-01

## 2020-12-01 RX ORDER — FLUOROURACIL 50 MG/ML
400 INJECTION, SOLUTION INTRAVENOUS ONCE
Status: COMPLETED | OUTPATIENT
Start: 2020-12-01 | End: 2020-12-01

## 2020-12-01 RX ADMIN — DEXAMETHASONE SODIUM PHOSPHATE 4 MG: 4 INJECTION, SOLUTION INTRAMUSCULAR; INTRAVENOUS at 09:33

## 2020-12-01 RX ADMIN — PALONOSETRON 0.25 MG: 0.05 INJECTION, SOLUTION INTRAVENOUS at 09:31

## 2020-12-01 RX ADMIN — TRASTUZUMAB 350 MG: 150 INJECTION, POWDER, LYOPHILIZED, FOR SOLUTION INTRAVENOUS at 10:01

## 2020-12-01 RX ADMIN — SODIUM CHLORIDE 250 ML: 9 INJECTION, SOLUTION INTRAVENOUS at 09:29

## 2020-12-01 RX ADMIN — FAMOTIDINE 20 MG: 10 INJECTION INTRAVENOUS at 09:29

## 2020-12-01 RX ADMIN — FLUOROURACIL 4850 MG: 50 INJECTION, SOLUTION INTRAVENOUS at 11:07

## 2020-12-01 RX ADMIN — DIPHENHYDRAMINE HYDROCHLORIDE 25 MG: 50 INJECTION INTRAMUSCULAR; INTRAVENOUS at 09:35

## 2020-12-01 RX ADMIN — SODIUM CHLORIDE 810 MG: 900 INJECTION, SOLUTION INTRAVENOUS at 10:35

## 2020-12-01 RX ADMIN — FLUOROURACIL 810 MG: 50 INJECTION, SOLUTION INTRAVENOUS at 11:07

## 2020-12-01 NOTE — TELEPHONE ENCOUNTER
----- Message from Gardenia Salazar RN sent at 12/1/2020 11:11 AM EST -----  Pt needs disconnect on Thursday at 11 AM

## 2020-12-03 ENCOUNTER — INFUSION (OUTPATIENT)
Dept: ONCOLOGY | Facility: HOSPITAL | Age: 65
End: 2020-12-03

## 2020-12-03 ENCOUNTER — OFFICE VISIT (OUTPATIENT)
Dept: OTHER | Facility: HOSPITAL | Age: 65
End: 2020-12-03

## 2020-12-03 DIAGNOSIS — C78.7 LIVER METASTASIS: ICD-10-CM

## 2020-12-03 DIAGNOSIS — Z45.2 ENCOUNTER FOR ADJUSTMENT OR MANAGEMENT OF VASCULAR ACCESS DEVICE: ICD-10-CM

## 2020-12-03 DIAGNOSIS — C15.5 MALIGNANT NEOPLASM OF LOWER THIRD OF ESOPHAGUS (HCC): Primary | ICD-10-CM

## 2020-12-03 DIAGNOSIS — Z72.0 TOBACCO ABUSE: Primary | ICD-10-CM

## 2020-12-03 PROCEDURE — 25010000003 HEPARIN LOCK FLUSH PER 10 UNITS: Performed by: INTERNAL MEDICINE

## 2020-12-03 PROCEDURE — 99212-NC PR NO CHARGE CBC OFFICE OUTPATIENT VISIT 10 MINUTES: Performed by: NURSE PRACTITIONER

## 2020-12-03 RX ORDER — SODIUM CHLORIDE 0.9 % (FLUSH) 0.9 %
10 SYRINGE (ML) INJECTION AS NEEDED
Status: CANCELLED | OUTPATIENT
Start: 2020-12-03

## 2020-12-03 RX ORDER — SODIUM CHLORIDE 0.9 % (FLUSH) 0.9 %
10 SYRINGE (ML) INJECTION AS NEEDED
Status: DISCONTINUED | OUTPATIENT
Start: 2020-12-03 | End: 2020-12-03 | Stop reason: HOSPADM

## 2020-12-03 RX ORDER — HEPARIN SODIUM (PORCINE) LOCK FLUSH IV SOLN 100 UNIT/ML 100 UNIT/ML
500 SOLUTION INTRAVENOUS AS NEEDED
Status: DISCONTINUED | OUTPATIENT
Start: 2020-12-03 | End: 2020-12-03 | Stop reason: HOSPADM

## 2020-12-03 RX ORDER — HEPARIN SODIUM (PORCINE) LOCK FLUSH IV SOLN 100 UNIT/ML 100 UNIT/ML
500 SOLUTION INTRAVENOUS AS NEEDED
Status: CANCELLED | OUTPATIENT
Start: 2020-12-03

## 2020-12-03 RX ADMIN — Medication 500 UNITS: at 09:00

## 2020-12-03 RX ADMIN — SODIUM CHLORIDE, PRESERVATIVE FREE 10 ML: 5 INJECTION INTRAVENOUS at 09:00

## 2020-12-03 NOTE — PROGRESS NOTES
"[facility] MULTIDISCIPLINARY CLINIC  SMOKING CESSATION TELEHEALTH FOLLOW-UP VISIT    Han Garcia is a pleasant 65 y.o. male seen today by PHONE for smoking cessation counseling follow up. We were unable to connect by video after several attempts    Patient consent obtained for our telehealth visit today:  You have chosen to receive care through a telehealth visit.  Do you consent to use a video/audio connection for your medical care today? YES    HPI:  Han reports he has not made much progress with further reductions or abstinence from smoking. He is back to smoking 20 cigarettes per day. He has not had much success finding nicotine patches however also he has been minimizing trips to the store related to the pandemic. Finding treatments harder to recover from, feeling fatigued and tired most of the time. Reports he is still eating and drinking without difficulty.    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 Patient is a current every day smoker. Reports currently smoking 20 cigarettes per day. Has smoked as much as 40 cigarettes per day in the past. Approximately 38 pack year history.   E-cigarette use NONE  Other combustible tobacco use NONE  Smokeless tobacco use NONE  Caffeine 4-5 CUPS/DAY    Cancer treatment plan: 5-FU, leucovorin and Herceptin   History of seizures NO  Psychiatric History NONE  Patient has quit smoking several times in the past. Quit for about 8 years early 90's. Was going thru divorce and  Relapsed with a friend who was also abstinent for many years and going through a divorce.    Has quit at other times for hours or days. Withdrawal symptoms difficult to describe, just feels like \"he has to have one\".      Patient importance of quitting smoking 10/10. He has had multiple meaningful conversations with Dr Haynes about how smoking continues to impact his health. " Patient describes leg aching when walking due to peripheral vascular compromise. Extremely confident he will be able to maintain abstinence once he is able to stop. They are ready to quit smoking within the next 2-4 weeks.  Patient is willing to set a quit date.     Patient identified motivating factors for quitting include preserving his health.      Patient identified potential support resources available including his two sisters, one of which is also a smoker and has expressed interest in quitting smoking herself.      Patient anticipates future challenges/barriers including managing free time.       Strongly encouraged patient to stop smoking. Explained that multiple attempts to quit are often needed before patients can achieve prolonged periods of time free from tobacco.     I advised the patient of the risks in continuing to use tobacco. In addition to cardiovascular risk there is an increased risk for cancers of the oral cavity and pharynx, larynx, lung, esophagus, pancreas, uterine, cervix, kidney, bladder, stomach, colon, rectum and liver as well as myeloid leukemia. We also discussed that this risk may also extend to development of female breast cancer and advanced stage prostate cancer.      Social History     Socioeconomic History   • Marital status: Single     Spouse name: Not on file   • Number of children: Not on file   • Years of education: High school   • Highest education level: Not on file   Occupational History   • Occupation: [employer]     Employer: [employer]   Tobacco Use   • Smoking status: Current Every Day Smoker     Packs/day: 1.00     Years: 38.00     Pack years: 38.00     Types: Cigarettes     Start date: 1974   • Smokeless tobacco: Never Used   • Tobacco comment: Quit for a period of 8 years   Substance and Sexual Activity   • Alcohol use: Not Currently     Frequency: Monthly or less     Drinks per session: 1 or 2     Binge frequency: Never     Comment: NOT RECENTLY- none since  "september   • Drug use: No   • Sexual activity: Defer          Lab Results   Component Value Date    GLUCOSE 127 (H) 12/01/2020    BUN 14 12/01/2020    CREATININE 0.82 12/01/2020    EGFRIFNONA 95 12/01/2020    EGFRIFAFRI 100 02/02/2019    BCR 17.1 12/01/2020    K 4.0 12/01/2020    CO2 24.2 12/01/2020    CALCIUM 9.5 12/01/2020    PROTENTOTREF 7.1 02/02/2019    ALBUMIN 4.20 12/01/2020    LABIL2 1.6 02/02/2019    AST 27 12/01/2020    ALT 47 (H) 12/01/2020        Past Medical History:   Diagnosis Date   • Arthritis    • Elevated PSA    • Esophageal cancer (CMS/HCC)    • Esophageal mass    • Fistula    • H/O Lung nodule    • Hepatitis     CHILD--PT STATED \"I THINK IT WAS A.\"   • History of pneumonia    • Hx of blood clots 08/10/2019   • Hyperlipidemia    • Hypertension    • Neuropathy    • Prostate cancer (CMS/HCC)    • PVD (peripheral vascular disease) (CMS/HCC)          QUIT PLAN (STAR):  Set quit date: I asked him if he was ready to re-set a quit date. He is agreeable to a date of 12/18/2020    Tell friends/family: He mentions his sister, also a smoker and considering cessation, has acquired nicotine patches however has not started using them. I suggested he might ask her to share some patches with him to get started. He has follow up scheduled with Dr Haynes on 12/29/2020 and his goal is to be able to report cessation of smoking at that time.    Anticipate challenges: Boredom and isolation are the biggest challenges. Thanksgiving plans with sister and brother in law were cancelled as his brother in law learned of a COVID positive coworker. He recalls smoking less when he was still working. We discussed possibly getting involved in some web based support though [employer] and I will also direct him to [email or web address] offerings of text based support.    Remove tobacco from environment: Strongly encouraged him to round up all tobacco products, lighters, ashtrays into one location and only keep what he needs the next " two week with having these items disposed of before bed the night before the quit attempt    We discussed evidence-based approaches to quit smoking including options for pharmacotherapy, nicotine replacement therapy, and supportive counseling in group, individual or phone/web based settings. Discussed that counseling or medications used alone are effective but effectiveness is increased when both methods are used.  Advised that e-cigarettes and/or electronic cigarettes are not recommended for smoking cessation or as a safe alternative to smoking.    Medication plan: The demand is high for nicotine replacement products and he has not yet found patches to purchase. He wants to try a few more stores this weekend. We did discuss an alternate plan with Nicotine gum 4 mg. We reviewed proper use of nicotine gum (chew and park technique, avoid eating and drinking while using gum). Reviewed potential side effects of nicotine gum including mouth/jaw soreness, dyspepsia, hiccups and hypersalivation. Reviewed side effects typically associated with incorrect chewing technique including lightheadedness, nausea/vomiting and throat/mouth irritation. He will consider this for a back up plan.    Practical counseling: Reviewed strategies for his toolbox including use of hard candy, calling [place] quit line for additional layer of support.    Patient provided with resources to assist with smoking cessation including the 1-800-Quit Now line and Health Department programs.     Follow-up: Two weeks on 12/17/2020. [employer] quit date 12/28/2020. I have encouraged them to call me with any questions or as needed in the interm.      ICD-10-CM ICD-9-CM   1. Tobacco abuse  Z72.0 305.1       No orders of the defined types were placed in this encounter.        A total of 30 minutes was spent engaged in one to one discussion by TELEHEALTH of cessation of tobacco, setting a quit date of 12/18/20, pharmacotherapy nicotine gum, nicotine transdermal  patch, side effects, behavioral counseling and community resources.

## 2020-12-15 ENCOUNTER — HOSPITAL ENCOUNTER (OUTPATIENT)
Dept: CARDIOLOGY | Facility: HOSPITAL | Age: 65
Discharge: HOME | End: 2020-12-15
Admitting: NURSE PRACTITIONER

## 2020-12-15 VITALS
HEIGHT: 69 IN | DIASTOLIC BLOOD PRESSURE: 64 MMHG | WEIGHT: 202 LBS | HEART RATE: 78 BPM | OXYGEN SATURATION: 99 % | SYSTOLIC BLOOD PRESSURE: 142 MMHG | BODY MASS INDEX: 29.92 KG/M2

## 2020-12-15 DIAGNOSIS — C15.5 MALIGNANT NEOPLASM OF LOWER THIRD OF ESOPHAGUS (HCC): ICD-10-CM

## 2020-12-15 DIAGNOSIS — Z79.899 HIGH RISK MEDICATION USE: ICD-10-CM

## 2020-12-15 PROCEDURE — 93356 MYOCRD STRAIN IMG SPCKL TRCK: CPT

## 2020-12-15 PROCEDURE — 93306 TTE W/DOPPLER COMPLETE: CPT

## 2020-12-15 PROCEDURE — 93306 TTE W/DOPPLER COMPLETE: CPT | Performed by: INTERNAL MEDICINE

## 2020-12-15 PROCEDURE — 25010000002 PERFLUTREN (DEFINITY) 8.476 MG IN SODIUM CHLORIDE 0.9 % 10 ML INJECTION: Performed by: NURSE PRACTITIONER

## 2020-12-15 PROCEDURE — 93356 MYOCRD STRAIN IMG SPCKL TRCK: CPT | Performed by: INTERNAL MEDICINE

## 2020-12-15 RX ADMIN — PERFLUTREN 1.5 ML: 6.52 INJECTION, SUSPENSION INTRAVENOUS at 13:09

## 2020-12-16 LAB
AORTIC ARCH: 2.2 CM
ASCENDING AORTA: 3.4 CM
BH CV ECHO MEAS - ACS: 1.9 CM
BH CV ECHO MEAS - AO MAX PG (FULL): 2.9 MMHG
BH CV ECHO MEAS - AO MAX PG: 8.3 MMHG
BH CV ECHO MEAS - AO MEAN PG (FULL): 1.9 MMHG
BH CV ECHO MEAS - AO MEAN PG: 4.4 MMHG
BH CV ECHO MEAS - AO ROOT AREA (BSA CORRECTED): 1.7
BH CV ECHO MEAS - AO ROOT AREA: 10.2 CM^2
BH CV ECHO MEAS - AO ROOT DIAM: 3.6 CM
BH CV ECHO MEAS - AO V2 MAX: 143.9 CM/SEC
BH CV ECHO MEAS - AO V2 MEAN: 100 CM/SEC
BH CV ECHO MEAS - AO V2 VTI: 24.6 CM
BH CV ECHO MEAS - ASC AORTA: 3.4 CM
BH CV ECHO MEAS - BSA(HAYCOCK): 2.1 M^2
BH CV ECHO MEAS - BSA: 2.1 M^2
BH CV ECHO MEAS - BZI_BMI: 29.8 KILOGRAMS/M^2
BH CV ECHO MEAS - BZI_METRIC_HEIGHT: 175.3 CM
BH CV ECHO MEAS - BZI_METRIC_WEIGHT: 91.6 KG
BH CV ECHO MEAS - EDV(MOD-SP2): 84 ML
BH CV ECHO MEAS - EDV(MOD-SP4): 100 ML
BH CV ECHO MEAS - EDV(TEICH): 81.2 ML
BH CV ECHO MEAS - EF(CUBED): 71.8 %
BH CV ECHO MEAS - EF(MOD-BP): 60.6 %
BH CV ECHO MEAS - EF(MOD-SP2): 60.7 %
BH CV ECHO MEAS - EF(MOD-SP4): 61 %
BH CV ECHO MEAS - EF(TEICH): 63.9 %
BH CV ECHO MEAS - ESV(MOD-SP2): 33 ML
BH CV ECHO MEAS - ESV(MOD-SP4): 39 ML
BH CV ECHO MEAS - ESV(TEICH): 29.3 ML
BH CV ECHO MEAS - FS: 34.4 %
BH CV ECHO MEAS - IVS/LVPW: 1.1
BH CV ECHO MEAS - IVSD: 1.2 CM
BH CV ECHO MEAS - LAT PEAK E' VEL: 8.1 CM/SEC
BH CV ECHO MEAS - LV DIASTOLIC VOL/BSA (35-75): 48.2 ML/M^2
BH CV ECHO MEAS - LV MASS(C)D: 178.8 GRAMS
BH CV ECHO MEAS - LV MASS(C)DI: 86.2 GRAMS/M^2
BH CV ECHO MEAS - LV MAX PG: 5.4 MMHG
BH CV ECHO MEAS - LV MEAN PG: 2.5 MMHG
BH CV ECHO MEAS - LV SYSTOLIC VOL/BSA (12-30): 18.8 ML/M^2
BH CV ECHO MEAS - LV V1 MAX: 115.7 CM/SEC
BH CV ECHO MEAS - LV V1 MEAN: 73.9 CM/SEC
BH CV ECHO MEAS - LV V1 VTI: 19.5 CM
BH CV ECHO MEAS - LVIDD: 4.3 CM
BH CV ECHO MEAS - LVIDS: 2.8 CM
BH CV ECHO MEAS - LVLD AP2: 7.3 CM
BH CV ECHO MEAS - LVLD AP4: 7.4 CM
BH CV ECHO MEAS - LVLS AP2: 6.2 CM
BH CV ECHO MEAS - LVLS AP4: 6.5 CM
BH CV ECHO MEAS - LVPWD: 1.2 CM
BH CV ECHO MEAS - MED PEAK E' VEL: 7.6 CM/SEC
BH CV ECHO MEAS - MV A DUR: 0.16 SEC
BH CV ECHO MEAS - MV A MAX VEL: 111.8 CM/SEC
BH CV ECHO MEAS - MV DEC SLOPE: 198.6 CM/SEC^2
BH CV ECHO MEAS - MV DEC TIME: 0.23 SEC
BH CV ECHO MEAS - MV E MAX VEL: 79.3 CM/SEC
BH CV ECHO MEAS - MV E/A: 0.71
BH CV ECHO MEAS - MV MAX PG: 11.5 MMHG
BH CV ECHO MEAS - MV MEAN PG: 2.7 MMHG
BH CV ECHO MEAS - MV P1/2T MAX VEL: 76 CM/SEC
BH CV ECHO MEAS - MV P1/2T: 112.1 MSEC
BH CV ECHO MEAS - MV V2 MAX: 169.7 CM/SEC
BH CV ECHO MEAS - MV V2 MEAN: 73.8 CM/SEC
BH CV ECHO MEAS - MV V2 VTI: 30.2 CM
BH CV ECHO MEAS - MVA P1/2T LCG: 2.9 CM^2
BH CV ECHO MEAS - MVA(P1/2T): 2 CM^2
BH CV ECHO MEAS - PA ACC TIME: 0.07 SEC
BH CV ECHO MEAS - PA MAX PG (FULL): 0.34 MMHG
BH CV ECHO MEAS - PA MAX PG: 3.3 MMHG
BH CV ECHO MEAS - PA PR(ACCEL): 49.3 MMHG
BH CV ECHO MEAS - PA V2 MAX: 90.9 CM/SEC
BH CV ECHO MEAS - PULM A REVS DUR: 0.12 SEC
BH CV ECHO MEAS - PULM A REVS VEL: 38.1 CM/SEC
BH CV ECHO MEAS - PULM DIAS VEL: 35.2 CM/SEC
BH CV ECHO MEAS - PULM S/D: 1.6
BH CV ECHO MEAS - PULM SYS VEL: 57.1 CM/SEC
BH CV ECHO MEAS - PVA(V,A): 3 CM^2
BH CV ECHO MEAS - PVA(V,D): 3 CM^2
BH CV ECHO MEAS - RAP SYSTOLE: 8 MMHG
BH CV ECHO MEAS - RV MAX PG: 3 MMHG
BH CV ECHO MEAS - RV MEAN PG: 1.6 MMHG
BH CV ECHO MEAS - RV V1 MAX: 86.1 CM/SEC
BH CV ECHO MEAS - RV V1 MEAN: 61.3 CM/SEC
BH CV ECHO MEAS - RV V1 VTI: 13.3 CM
BH CV ECHO MEAS - RVOT AREA: 3.2 CM^2
BH CV ECHO MEAS - RVOT DIAM: 2 CM
BH CV ECHO MEAS - RVSP: 19.3 MMHG
BH CV ECHO MEAS - SI(AO): 120.5 ML/M^2
BH CV ECHO MEAS - SI(CUBED): 26.7 ML/M^2
BH CV ECHO MEAS - SI(MOD-SP2): 24.6 ML/M^2
BH CV ECHO MEAS - SI(MOD-SP4): 29.4 ML/M^2
BH CV ECHO MEAS - SI(TEICH): 25 ML/M^2
BH CV ECHO MEAS - SUP REN AO DIAM: 1.9 CM
BH CV ECHO MEAS - SV(AO): 249.9 ML
BH CV ECHO MEAS - SV(CUBED): 55.4 ML
BH CV ECHO MEAS - SV(MOD-SP2): 51 ML
BH CV ECHO MEAS - SV(MOD-SP4): 61 ML
BH CV ECHO MEAS - SV(RVOT): 42.7 ML
BH CV ECHO MEAS - SV(TEICH): 51.8 ML
BH CV ECHO MEAS - TAPSE (>1.6): 1.8 CM
BH CV ECHO MEAS - TR MAX VEL: 168.3 CM/SEC
BH CV ECHO MEASUREMENTS AVERAGE E/E' RATIO: 10.1
BH CV XLRA - RV BASE: 2.2 CM
BH CV XLRA - RV LENGTH: 6.2 CM
BH CV XLRA - RV MID: 2.8 CM
BH CV XLRA - TDI S': 13.3 CM/SEC
LEFT ATRIUM VOLUME INDEX: 21 ML/M2
MAXIMAL PREDICTED HEART RATE: 156 BPM
SINUS: 3.1 CM
STJ: 3.1 CM
STRESS TARGET HR: 133 BPM

## 2020-12-17 ENCOUNTER — OFFICE VISIT (OUTPATIENT)
Dept: OTHER | Facility: HOSPITAL | Age: 65
End: 2020-12-17

## 2020-12-17 VITALS — RESPIRATION RATE: 18 BRPM

## 2020-12-17 DIAGNOSIS — Z72.0 TOBACCO ABUSE: Primary | ICD-10-CM

## 2020-12-17 RX ORDER — NICOTINE 21 MG/24HR
1 PATCH, TRANSDERMAL 24 HOURS TRANSDERMAL EVERY 24 HOURS
Qty: 28 EACH | Refills: 1 | Status: SHIPPED | OUTPATIENT
Start: 2020-12-17 | End: 2022-08-30

## 2020-12-17 NOTE — PROGRESS NOTES
"[facility] MULTIDISCIPLINARY CLINIC  SMOKING CESSATION TELEHEALTH FOLLOW-UP VISIT    Han Garcia is a pleasant 65 y.o. male seen today by VIDEO for smoking cessation counseling follow up.      Patient consent obtained for our telehealth visit today:  You have chosen to receive care through a telehealth visit.  Do you consent to use a video/audio connection for your medical care today? YES    HPI:  Feels he is making progress. Has been counting daily cigarettes. Now smoking 10-12 cigarettes a day for the last week. Bought hard candies and has been using delay and distract techniques successfully. Has begun leaving cigarettes    Tobacco History:  Patient currently undergoing treatment with Dr Haynes for 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 Patient is a current every day smoker. Reports currently smoking 20 cigarettes per day. Has smoked as much as 40 cigarettes per day in the past. Approximately 38 pack year history.   E-cigarette use NONE  Other combustible tobacco use NONE  Smokeless tobacco use NONE  Caffeine 4-5 CUPS/DAY     Cancer treatment plan: 5-FU, leucovorin and Herceptin   History of seizures NO  Psychiatric History NONE  Patient has quit smoking several times in the past. Quit for about 8 years early 90's. Was going thru divorce and  Relapsed with a friend who was also abstinent for many years and going through a divorce.     Has quit at other times for hours or days. Withdrawal symptoms difficult to describe, just feels like \"he has to have one\".      Patient importance of quitting smoking 10/10. He has had multiple meaningful conversations with Dr Haynes about how smoking continues to impact his health. Patient describes leg aching when walking due to peripheral vascular compromise. Extremely confident he will be able to maintain abstinence once he is able to stop. They are ready to quit smoking within the next 2-4 weeks.  Patient is willing to set " a quit date.     Patient identified motivating factors for quitting include preserving his health.      Patient identified potential support resources available including his two sisters, one of which is also a smoker and has expressed interest in quitting smoking herself.      Patient anticipates future challenges/barriers including managing free time.       Strongly encouraged patient to stop smoking. Explained that multiple attempts to quit are often needed before patients can achieve prolonged periods of time free from tobacco.     I advised the patient of the risks in continuing to use tobacco. In addition to cardiovascular risk there is an increased risk for cancers of the oral cavity and pharynx, larynx, lung, esophagus, pancreas, uterine, cervix, kidney, bladder, stomach, colon, rectum and liver as well as myeloid leukemia. We also discussed that this risk may also extend to development of female breast cancer and advanced stage prostate cancer.    Social History     Socioeconomic History   • Marital status: Single     Spouse name: Not on file   • Number of children: Not on file   • Years of education: High school   • Highest education level: Not on file   Occupational History   • Occupation: [employer]     Employer: [employer]   Tobacco Use   • Smoking status: Current Every Day Smoker     Packs/day: 1.00     Years: 38.00     Pack years: 38.00     Types: Cigarettes     Start date: 1974   • Smokeless tobacco: Never Used   • Tobacco comment: Quit for a period of 8 years   Substance and Sexual Activity   • Alcohol use: Not Currently     Frequency: Monthly or less     Drinks per session: 1 or 2     Binge frequency: Never     Comment: NOT RECENTLY- none since september   • Drug use: No   • Sexual activity: Defer          Lab Results   Component Value Date    GLUCOSE 127 (H) 12/01/2020    BUN 14 12/01/2020    CREATININE 0.82 12/01/2020    EGFRIFNONA 95 12/01/2020    EGFRIFAFRI 100 02/02/2019    BCR 17.1  "12/01/2020    K 4.0 12/01/2020    CO2 24.2 12/01/2020    CALCIUM 9.5 12/01/2020    PROTENTOTREF 7.1 02/02/2019    ALBUMIN 4.20 12/01/2020    LABIL2 1.6 02/02/2019    AST 27 12/01/2020    ALT 47 (H) 12/01/2020        Past Medical History:   Diagnosis Date   • Arthritis    • Elevated PSA    • Esophageal cancer (CMS/HCC)    • Esophageal mass    • Fistula    • H/O Lung nodule    • Hepatitis     CHILD--PT STATED \"I THINK IT WAS A.\"   • History of pneumonia    • Hx of blood clots 08/10/2019   • Hyperlipidemia    • Hypertension    • Neuropathy    • Prostate cancer (CMS/HCC)    • PVD (peripheral vascular disease) (CMS/HCC)        QUIT PLAN (STAR):  Set quit date: He had set a quit date of 11/18/2020 but now wants to continue a gradual taper approach each day smoking one cigarette less.     Tell friends/family: He mentions his sister, also a smoker and considering cessation, has acquired nicotine patches however has not started using them. I suggested he might ask her to share some patches with him to get started. He has follow up scheduled with Dr Haynes on 12/29/2020 and his goal is to be able to report cessation of smoking at that time.    Anticipate challenges: Boredom and isolation are the biggest challenges. Thanksgiving plans with sister and brother in law were cancelled as his brother in law learned of a COVID positive coworker. He recalls smoking less when he was still working. We discussed possibly getting involved in some web based support though [employer] and I will also direct him to smokefree.gov's offerings of text based support.    Remove tobacco from environment: Again we reviewed importance of removing cigarettes and triggers from the environment. We discussed that the quit process is difficult enough without temptation.    We discussed evidence-based approaches to quit smoking including options for pharmacotherapy, nicotine replacement therapy, and supportive counseling in group, individual or phone/web " based settings. Discussed that counseling or medications used alone are effective but effectiveness is increased when both methods are used.  Advised that e-cigarettes and/or electronic cigarettes are not recommended for smoking cessation or as a safe alternative to smoking.    Medication plan: He is still having a hard time finding patches reasonably priced. He has not pursued nicotine gum. We talked today about prescribing nicotine transdermal patches 21 mg, I think his insurance will cover. We discussed an alternate plan of utilizing GoodRx, estimated generic price $25 for 28 nicotine patches, 4 mg strength at [facility]. Escribed to pharmacy.    Practical counseling: He is using the hard candy and feels it is helpful. He has been leaving his cigarettes at home to avoid smoking in the car when he goes out.    Patient provided with resources to assist with smoking cessation including the 1-800-Quit Now line and Health Department programs.     Follow-up: January 4. I let him know I will be out of the office over the holiday but checking voicemail and to please call in the meantime if he has any questions.      ICD-10-CM ICD-9-CM   1. Tobacco abuse  Z72.0 305.1       No orders of the defined types were placed in this encounter.        A total of 15 minutes was spent engaged in one to one discussion by TELEHEALTH of cessation of tobacco, setting a quit date of gradual reduction, pharmacotherapy nicotine transdermal patches 21 mg, side effects, behavioral counseling.

## 2020-12-29 ENCOUNTER — INFUSION (OUTPATIENT)
Dept: ONCOLOGY | Facility: HOSPITAL | Age: 65
End: 2020-12-29

## 2020-12-29 ENCOUNTER — OFFICE VISIT (OUTPATIENT)
Dept: ONCOLOGY | Facility: CLINIC | Age: 65
End: 2020-12-29

## 2020-12-29 VITALS
HEART RATE: 88 BPM | HEIGHT: 69 IN | WEIGHT: 203.5 LBS | OXYGEN SATURATION: 97 % | SYSTOLIC BLOOD PRESSURE: 111 MMHG | BODY MASS INDEX: 30.14 KG/M2 | TEMPERATURE: 97.9 F | RESPIRATION RATE: 20 BRPM | DIASTOLIC BLOOD PRESSURE: 69 MMHG

## 2020-12-29 DIAGNOSIS — I10 ESSENTIAL HYPERTENSION: ICD-10-CM

## 2020-12-29 DIAGNOSIS — I82.431 ACUTE DEEP VEIN THROMBOSIS (DVT) OF RIGHT POPLITEAL VEIN (HCC): ICD-10-CM

## 2020-12-29 DIAGNOSIS — C78.7 LIVER METASTASIS: ICD-10-CM

## 2020-12-29 DIAGNOSIS — R73.01 IMPAIRED FASTING GLUCOSE: ICD-10-CM

## 2020-12-29 DIAGNOSIS — Z79.01 CURRENT USE OF LONG TERM ANTICOAGULATION: ICD-10-CM

## 2020-12-29 DIAGNOSIS — Z45.2 ENCOUNTER FOR ADJUSTMENT OR MANAGEMENT OF VASCULAR ACCESS DEVICE: ICD-10-CM

## 2020-12-29 DIAGNOSIS — T45.1X5A PERIPHERAL NEUROPATHY DUE TO CHEMOTHERAPY (HCC): ICD-10-CM

## 2020-12-29 DIAGNOSIS — C15.5 MALIGNANT NEOPLASM OF LOWER THIRD OF ESOPHAGUS (HCC): Primary | ICD-10-CM

## 2020-12-29 DIAGNOSIS — N32.1 COLOVESICAL FISTULA: ICD-10-CM

## 2020-12-29 DIAGNOSIS — Z72.0 TOBACCO ABUSE: ICD-10-CM

## 2020-12-29 DIAGNOSIS — G62.0 PERIPHERAL NEUROPATHY DUE TO CHEMOTHERAPY (HCC): ICD-10-CM

## 2020-12-29 DIAGNOSIS — C15.5 MALIGNANT NEOPLASM OF LOWER THIRD OF ESOPHAGUS (HCC): ICD-10-CM

## 2020-12-29 DIAGNOSIS — C78.7 LIVER METASTASES: ICD-10-CM

## 2020-12-29 LAB
ALBUMIN SERPL-MCNC: 4.2 G/DL (ref 3.5–5.2)
ALBUMIN/GLOB SERPL: 1.4 G/DL (ref 1.1–2.4)
ALP SERPL-CCNC: 99 U/L (ref 38–116)
ALT SERPL W P-5'-P-CCNC: 40 U/L (ref 0–41)
ANION GAP SERPL CALCULATED.3IONS-SCNC: 11.5 MMOL/L (ref 5–15)
AST SERPL-CCNC: 28 U/L (ref 0–40)
BASOPHILS # BLD AUTO: 0.06 10*3/MM3 (ref 0–0.2)
BASOPHILS NFR BLD AUTO: 0.6 % (ref 0–1.5)
BILIRUB SERPL-MCNC: 0.3 MG/DL (ref 0.2–1.2)
BUN SERPL-MCNC: 11 MG/DL (ref 6–20)
BUN/CREAT SERPL: 14.1 (ref 7.3–30)
CALCIUM SPEC-SCNC: 9.4 MG/DL (ref 8.5–10.2)
CHLORIDE SERPL-SCNC: 99 MMOL/L (ref 98–107)
CO2 SERPL-SCNC: 23.5 MMOL/L (ref 22–29)
CREAT SERPL-MCNC: 0.78 MG/DL (ref 0.7–1.3)
DEPRECATED RDW RBC AUTO: 50.1 FL (ref 37–54)
EOSINOPHIL # BLD AUTO: 0.36 10*3/MM3 (ref 0–0.4)
EOSINOPHIL NFR BLD AUTO: 3.4 % (ref 0.3–6.2)
ERYTHROCYTE [DISTWIDTH] IN BLOOD BY AUTOMATED COUNT: 15.1 % (ref 12.3–15.4)
GFR SERPL CREATININE-BSD FRML MDRD: 100 ML/MIN/1.73
GLOBULIN UR ELPH-MCNC: 2.9 GM/DL (ref 1.8–3.5)
GLUCOSE SERPL-MCNC: 160 MG/DL (ref 74–124)
HCT VFR BLD AUTO: 37.4 % (ref 37.5–51)
HGB BLD-MCNC: 12.7 G/DL (ref 13–17.7)
IMM GRANULOCYTES # BLD AUTO: 0.26 10*3/MM3 (ref 0–0.05)
IMM GRANULOCYTES NFR BLD AUTO: 2.5 % (ref 0–0.5)
LYMPHOCYTES # BLD AUTO: 1.76 10*3/MM3 (ref 0.7–3.1)
LYMPHOCYTES NFR BLD AUTO: 16.9 % (ref 19.6–45.3)
MCH RBC QN AUTO: 31.2 PG (ref 26.6–33)
MCHC RBC AUTO-ENTMCNC: 34 G/DL (ref 31.5–35.7)
MCV RBC AUTO: 91.9 FL (ref 79–97)
MONOCYTES # BLD AUTO: 0.71 10*3/MM3 (ref 0.1–0.9)
MONOCYTES NFR BLD AUTO: 6.8 % (ref 5–12)
NEUTROPHILS NFR BLD AUTO: 69.8 % (ref 42.7–76)
NEUTROPHILS NFR BLD AUTO: 7.29 10*3/MM3 (ref 1.7–7)
NRBC BLD AUTO-RTO: 0 /100 WBC (ref 0–0.2)
PLATELET # BLD AUTO: 168 10*3/MM3 (ref 140–450)
PMV BLD AUTO: 9.5 FL (ref 6–12)
POTASSIUM SERPL-SCNC: 3.9 MMOL/L (ref 3.5–4.7)
PROT SERPL-MCNC: 7.1 G/DL (ref 6.3–8)
RBC # BLD AUTO: 4.07 10*6/MM3 (ref 4.14–5.8)
SODIUM SERPL-SCNC: 134 MMOL/L (ref 134–145)
WBC # BLD AUTO: 10.44 10*3/MM3 (ref 3.4–10.8)

## 2020-12-29 PROCEDURE — 96367 TX/PROPH/DG ADDL SEQ IV INF: CPT

## 2020-12-29 PROCEDURE — 85025 COMPLETE CBC W/AUTO DIFF WBC: CPT

## 2020-12-29 PROCEDURE — 25010000002 DIPHENHYDRAMINE 1 EACH BAG: Performed by: INTERNAL MEDICINE

## 2020-12-29 PROCEDURE — 96413 CHEMO IV INFUSION 1 HR: CPT

## 2020-12-29 PROCEDURE — 96411 CHEMO IV PUSH ADDL DRUG: CPT

## 2020-12-29 PROCEDURE — 25010000002 FLUOROURACIL PER 500 MG: Performed by: INTERNAL MEDICINE

## 2020-12-29 PROCEDURE — 25010000002 PALONOSETRON PER 25 MCG: Performed by: INTERNAL MEDICINE

## 2020-12-29 PROCEDURE — 25010000002 DEXAMETHASONE PER 1 MG: Performed by: INTERNAL MEDICINE

## 2020-12-29 PROCEDURE — 99215 OFFICE O/P EST HI 40 MIN: CPT | Performed by: INTERNAL MEDICINE

## 2020-12-29 PROCEDURE — 80053 COMPREHEN METABOLIC PANEL: CPT

## 2020-12-29 PROCEDURE — 25010000002 TRASTUZUMAB PER 10 MG: Performed by: INTERNAL MEDICINE

## 2020-12-29 PROCEDURE — 96416 CHEMO PROLONG INFUSE W/PUMP: CPT

## 2020-12-29 PROCEDURE — 96375 TX/PRO/DX INJ NEW DRUG ADDON: CPT

## 2020-12-29 PROCEDURE — 25010000002 LEUCOVORIN CALCIUM PER 50 MG: Performed by: INTERNAL MEDICINE

## 2020-12-29 RX ORDER — FAMOTIDINE 10 MG/ML
20 INJECTION, SOLUTION INTRAVENOUS 2 TIMES DAILY
Status: CANCELLED | OUTPATIENT
Start: 2020-12-29

## 2020-12-29 RX ORDER — FLUOROURACIL 50 MG/ML
400 INJECTION, SOLUTION INTRAVENOUS ONCE
Status: COMPLETED | OUTPATIENT
Start: 2020-12-29 | End: 2020-12-29

## 2020-12-29 RX ORDER — SODIUM CHLORIDE 9 MG/ML
250 INJECTION, SOLUTION INTRAVENOUS ONCE
Status: COMPLETED | OUTPATIENT
Start: 2020-12-29 | End: 2020-12-29

## 2020-12-29 RX ORDER — DIPHENHYDRAMINE HYDROCHLORIDE 50 MG/ML
50 INJECTION INTRAMUSCULAR; INTRAVENOUS AS NEEDED
Status: CANCELLED | OUTPATIENT
Start: 2020-12-29

## 2020-12-29 RX ORDER — PALONOSETRON 0.05 MG/ML
0.25 INJECTION, SOLUTION INTRAVENOUS ONCE
Status: CANCELLED | OUTPATIENT
Start: 2020-12-29

## 2020-12-29 RX ORDER — PALONOSETRON 0.05 MG/ML
0.25 INJECTION, SOLUTION INTRAVENOUS ONCE
Status: COMPLETED | OUTPATIENT
Start: 2020-12-29 | End: 2020-12-29

## 2020-12-29 RX ORDER — FAMOTIDINE 10 MG/ML
20 INJECTION, SOLUTION INTRAVENOUS 2 TIMES DAILY
Status: DISCONTINUED | OUTPATIENT
Start: 2020-12-29 | End: 2020-12-29 | Stop reason: HOSPADM

## 2020-12-29 RX ORDER — FLUOROURACIL 50 MG/ML
400 INJECTION, SOLUTION INTRAVENOUS ONCE
Status: CANCELLED | OUTPATIENT
Start: 2020-12-29

## 2020-12-29 RX ORDER — FAMOTIDINE 10 MG/ML
20 INJECTION, SOLUTION INTRAVENOUS AS NEEDED
Status: CANCELLED | OUTPATIENT
Start: 2020-12-29

## 2020-12-29 RX ORDER — DEXAMETHASONE SODIUM PHOSPHATE 4 MG/ML
4 INJECTION, SOLUTION INTRA-ARTICULAR; INTRALESIONAL; INTRAMUSCULAR; INTRAVENOUS; SOFT TISSUE ONCE
Status: COMPLETED | OUTPATIENT
Start: 2020-12-29 | End: 2020-12-29

## 2020-12-29 RX ORDER — SODIUM CHLORIDE 9 MG/ML
250 INJECTION, SOLUTION INTRAVENOUS ONCE
Status: CANCELLED | OUTPATIENT
Start: 2020-12-29

## 2020-12-29 RX ADMIN — FAMOTIDINE 20 MG: 10 INJECTION, SOLUTION INTRAVENOUS at 10:41

## 2020-12-29 RX ADMIN — FLUOROURACIL 810 MG: 50 INJECTION, SOLUTION INTRAVENOUS at 12:19

## 2020-12-29 RX ADMIN — TRASTUZUMAB 350 MG: 150 INJECTION, POWDER, LYOPHILIZED, FOR SOLUTION INTRAVENOUS at 11:13

## 2020-12-29 RX ADMIN — PALONOSETRON HYDROCHLORIDE 0.25 MG: 0.25 INJECTION, SOLUTION INTRAVENOUS at 10:39

## 2020-12-29 RX ADMIN — DEXAMETHASONE SODIUM PHOSPHATE 4 MG: 4 INJECTION, SOLUTION INTRAMUSCULAR; INTRAVENOUS at 10:39

## 2020-12-29 RX ADMIN — SODIUM CHLORIDE 250 ML: 9 INJECTION, SOLUTION INTRAVENOUS at 10:18

## 2020-12-29 RX ADMIN — SODIUM CHLORIDE 810 MG: 900 INJECTION, SOLUTION INTRAVENOUS at 11:47

## 2020-12-29 RX ADMIN — FLUOROURACIL 4850 MG: 50 INJECTION, SOLUTION INTRAVENOUS at 12:19

## 2020-12-29 RX ADMIN — DIPHENHYDRAMINE HYDROCHLORIDE 25 MG: 50 INJECTION INTRAMUSCULAR; INTRAVENOUS at 10:43

## 2020-12-29 NOTE — PROGRESS NOTES
Subjective     REASON FOR follow up:1.    1. 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  Currently receiving palliative FOLFOX AND HERCEPTIN therapy.    2.COLOVESICAL FISTULA: chronic antibiotic therapy cipro, NO FURTHER PLANS FOR SURGERY AFTER VISIT AND PROCEDURE BY DR GM CASTILLO 1/20    3. 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 NEURONTIN INITIATED.        History of Present Illness PATIENT WAS CALLED THE DAY BEFORE BY THE OFFICE TO ASK FOR SYMPTOMS THAT COULD BE CONSISTENT WITH CORONAVIRUS INFECTION, AND BEING NEGATIVE WAS SCHEDULED TO BE SEEN IN THE OFFICE TODAY. SIMILAR QUESTIONING TODAY INCLUDING, CHILLS, FEVER, NEW COUGH, SHORTNESS OF BREATH, DIARRHEA,DIFFUSE BODY ACHES  AND CHANGES IN SMELL OR TASTE WERE NEGATIVE.THE PATIENT DENIED ANY CONTACT WITH PERSONS WHO WERE POSITIVE FOR COVID, AND PATIENT IS NOT IN CATEGORY OF HIGH RISK BEHAVIOR TO ACQUIRE COVID.    DURING THE VISIT WITH THE PATIENT TODAY , PATIENT HAD FACE MASK, MY MEDICAL ASSISTANT AND I  HAD PROPPER PROTECTIVE EQUIPMENT, AND I DID HAND HYGIENE WITH SOAP AND WATER BEFORE AND AFTER THE VISIT.    This patient returns today to the office for follow up stating that he has not had any difficulty swallowing, his appetite remains good, his weight is stable and he has no cancer related pain. Bowel activity is normal and no pneumaturia. He has not had any fever or urinary tract infections. His peripheral neuropathy from Oxaliplatin is unchanged. His blood pressure is under good control. He is down to 6 cigarettes a day after talking with Anh Felder RN, in regard to smoking cessation. He already has bought patches for nicotine to start to use them in the next few days. He has still cough, clear sputum production and some shortness of breath associated with  "fatigue. He has no swelling in his lower extremities. No clinical bleeding related to his anticoagulant use and no new thrombosis.                   Past Medical History:   Diagnosis Date   • Arthritis    • Elevated PSA    • Esophageal cancer (CMS/HCC)    • Esophageal mass    • Fistula    • H/O Lung nodule    • Hepatitis     CHILD--PT STATED \"I THINK IT WAS A.\"   • History of pneumonia    • Hx of blood clots 08/10/2019   • Hyperlipidemia    • Hypertension    • Neuropathy    • Prostate cancer (CMS/HCC)    • PVD (peripheral vascular disease) (CMS/HCC)         Past Surgical History:   Procedure Laterality Date   • COLONOSCOPY N/A 2/4/2020    Procedure: COLONOSCOPY;  Surgeon: Guy Sears MD;  Location: [facility] ENDOSCOPY;  Service: General;  Laterality: N/A;  PRE-COLOVESICAL FISTULA  POST-- DIVERTICULOSIS   • CYSTOSCOPY  02/06/2020   • CYSTOSCOPY Bilateral 2/26/2020    Procedure: CYSTOSCOPY RETROGRADE;  Surgeon: Cm Witt MD;  Location: [facility] OR;  Service: Urology;  Laterality: Bilateral;   • HERNIA REPAIR  1999   • PROSTATE SURGERY  03/2008   • VENOUS ACCESS DEVICE (PORT) INSERTION N/A 7/16/2019    Procedure: INSERTION VENOUS ACCESS DEVICE WITH FLUORO AND EGD WITH BIOPSY;  Surgeon: Mary Brito MD;  Location: [facility] OR;  Service: Thoracic        Current Outpatient Medications on File Prior to Visit   Medication Sig Dispense Refill   • cevimeline (EVOXAC) 30 MG capsule Take 30 mg by mouth 3 (Three) Times a Day.     • ciprofloxacin (CIPRO) 250 MG tablet Take 1 tablet by mouth Daily. 90 tablet 1   • clotrimazole (MYCELEX) 10 MG obie 3 TIMES A DAY 90 tablet 1   • gabapentin (NEURONTIN) 300 MG capsule Take 2 capsules by mouth every night at bedtime. 60 capsule 4   • lisinopril-hydrochlorothiazide (PRINZIDE,ZESTORETIC) 20-12.5 MG per tablet TAKE 1 TABLET BY MOUTH EVERY DAY 90 tablet 2   • nicotine (NICODERM CQ) 21 MG/24HR patch Place 1 patch on the skin as directed by provider Daily. " 28 each 1   • ondansetron (ZOFRAN) 4 MG tablet Take 1 tablet by mouth Every 8 (Eight) Hours As Needed for Nausea or Vomiting. 30 tablet 2   • Probiotic Product (PROBIOTIC DAILY PO) Take 1 tablet by mouth Daily.     • triamcinolone (KENALOG) 0.1 % ointment Apply  topically to the appropriate area as directed 2 (Two) Times a Day. (Patient taking differently: Apply 1 application topically to the appropriate area as directed As Needed.) 30 g 2   • Xarelto 20 MG tablet TAKE 1 TABLET BY MOUTH EVERY DAY**STOP LOVENOX** 90 tablet 1     No current facility-administered medications on file prior to visit.         ALLERGIES:  No Known Allergies     Social History     Socioeconomic History   • Marital status: Single     Spouse name: Not on file   • Number of children: Not on file   • Years of education: High school   • Highest education level: Not on file   Occupational History   • Occupation: [employer] - [employer]     Employer: [employer]   Tobacco Use   • Smoking status: Current Every Day Smoker     Packs/day: 1.00     Years: 38.00     Pack years: 38.00     Types: Cigarettes     Start date: 1974   • Smokeless tobacco: Never Used   • Tobacco comment: Quit for a period of 8 years   Substance and Sexual Activity   • Alcohol use: Not Currently     Frequency: Monthly or less     Drinks per session: 1 or 2     Binge frequency: Never     Comment: NOT RECENTLY- none since september   • Drug use: No   • Sexual activity: Defer        Family History   Problem Relation Age of Onset   • Hypertension Mother    • Stroke Mother    • Hypertension Other    • Lung disease Other    • Prostate cancer Other    • Lung cancer Father    • Malig Hyperthermia Neg Hx       Current Outpatient Medications on File Prior to Visit   Medication Sig Dispense Refill   • cevimeline (EVOXAC) 30 MG capsule Take 30 mg by mouth 3 (Three) Times a Day.     • ciprofloxacin (CIPRO) 250 MG tablet Take 1 tablet by mouth Daily. 90 tablet 1   • clotrimazole (MYCELEX) 10 MG obie 3  TIMES A DAY 90 tablet 1   • gabapentin (NEURONTIN) 300 MG capsule Take 2 capsules by mouth every night at bedtime. 60 capsule 4   • lisinopril-hydrochlorothiazide (PRINZIDE,ZESTORETIC) 20-12.5 MG per tablet TAKE 1 TABLET BY MOUTH EVERY DAY 90 tablet 2   • nicotine (NICODERM CQ) 21 MG/24HR patch Place 1 patch on the skin as directed by provider Daily. 28 each 1   • ondansetron (ZOFRAN) 4 MG tablet Take 1 tablet by mouth Every 8 (Eight) Hours As Needed for Nausea or Vomiting. 30 tablet 2   • Probiotic Product (PROBIOTIC DAILY PO) Take 1 tablet by mouth Daily.     • triamcinolone (KENALOG) 0.1 % ointment Apply  topically to the appropriate area as directed 2 (Two) Times a Day. (Patient taking differently: Apply 1 application topically to the appropriate area as directed As Needed.) 30 g 2   • Xarelto 20 MG tablet TAKE 1 TABLET BY MOUTH EVERY DAY**STOP LOVENOX** 90 tablet 1     No current facility-administered medications on file prior to visit.    No Known Allergies     Review of Systems:      General: no fever, no chills,  fatigue,no weight changes, no lack of appetite.  Eyes: no epiphora, xerophthalmia,conjunctivitis, pain, glaucoma, blurred vision, blindness, secretion, photophobia, proptosis, diplopia.  Ears: no otorrhea, tinnitus, otorrhagia, deafness, pain, vertigo.  Nose: no rhinorrhea, no epistaxis, no alteration in perception of odors, no sinuses pressure.  Mouth: no alteration in gums or teeth,  No ulcers, no difficulty with mastication or deglut ion, no odynophagia.  Neck: no masses or pain, no thyroid alterations, no pain in muscles or arteries, no carotid odynia, no crepitation.  Respiratory:  cough, no sputum production, dyspnea,no trepopnea, no pleuritic pain,no hemoptysis.  Heart: no syncope, no irregularity, no palpitations, no angina,no orthopnea,no paroxysmal nocturnal dyspnea.  Vascular Venous: no tenderness,no edema,no palpable cords,no postphlebitic syndrome, no skin changes no  "ulcerations.  Vascular Arterial: no distal ischemia, noclaudication, no gangrene, no neuropathic ischemic pain, no skin ulcers, no paleness no cyanosis.  GI: no dysphagia, no odynophagia, no regurgitation, no heartburn,no indigestion,no nausea,no vomiting,no hematemesis ,no melena,no jaundice,no distention, no obstipation,no enterorrhagia,no proctalgia,no anal  lesions, no changes in bowel habits.  : no frequency, no hesitancy, no hematuria, no discharge,no  pain.  Musculoskeletal: no muscle or tendon pain or inflammation,no  joint pain, no edema, no functional limitation,no fasciculations, no mass.  Neurologic: no headache, no seizures, noalterations on Craneal nerves, no motor deficit, no sensory deficit, normal coordination, no alteration in memory,normal orientation, calculation,normal writting, verbal and written language.  Skin: no rashes,no pruritus no localized lesions.  Psychiatric: no anxiety, no depression,no agitation, no delusions, proper insight.        Objective     Vitals:    12/29/20 0929   BP: 111/69   Pulse: 88   Resp: 20   Temp: 97.9 °F (36.6 °C)   TempSrc: Temporal   SpO2: 97%   Weight: 92.3 kg (203 lb 8 oz)   Height: 175.3 cm (69.02\")   PainSc: 0-No pain     Current Status 12/29/2020   ECOG score 0           Physical exam    I HAVE PERSONALLY REVIEWED THE HISTORY OF THE PRESENT ILLNESS, PAST MEDICAL HISTORY, FAMILY HISTORY, SOCIAL HISTORY, ALLERGIES, MEDICATIONS STATED ABOVE IN THE OFFICE NOTE FROM TODAY.        GENERAL:  Well-developed, well-nourished  Patient  in no acute distress.   SKIN:  Warm, dry ,NO rashes,NO purpura ,NO petechiae.  HEENT:  Pupils were equal and reactive to light and accomodation, conjunctivae noninjected, no pterygium, normal extraocular movements, normal visual acuity.   NECK:  Supple with good range of motion; no thyromegaly or masses, no JVD or bruits, no cervical adenopathies.No carotid artery pain, no carotid abnormal pulsation , NO arterial dance.  LYMPHATICS:  " No cervical, NO supraclavicular, NO axillary,NO epitrochlear , NO inguinal adenopathy.  CARDIAC   normal rate and regular rhythm, without murmur,NO rubs NO S3 NO S4 right or left . Normal femoral, popliteal, pedis, brachial and carotid pulses.  VASCULAR ARTERIAL: normal carotids,brachial,radial,femoral,popliteal, pedis pulses , no bruits.no paleness or cyanosis, no pain, no edema, no numbness, no gangrene.  VASCULAR VENOUS: no cyanosis, collateral circulation, varicosities, edema, palpable cords, pain, erythema.  ABDOMEN:  Soft, nontender with no hepatomegaly, no splenomegaly,no masses, no ascites, no collateral circulation,no distention,no Dhruv sign, no abdominal pain, no inguinal hernias,no umbilical hernia, no abdominal bruits. Normal bowel sounds.  GENITAL: Not  Performed.  EXTREMITIES  AND SPINE:  No clubbing, cyanosis or edema, no deformities or pain .No kyphosis, scoliosis, deformities or pain in spine, ribs or pelvic bone.  NEUROLOGICAL:  Patient was awake, alert, oriented to time, person and place. Sensory neuropathy if toes          RECENT LABS:  Hematology WBC   Date Value Ref Range Status   12/29/2020 10.44 3.40 - 10.80 10*3/mm3 Final   02/02/2019 13.4 (H) 3.4 - 10.8 x10E3/uL Final     RBC   Date Value Ref Range Status   12/29/2020 4.07 (L) 4.14 - 5.80 10*6/mm3 Final   02/02/2019 4.81 4.14 - 5.80 x10E6/uL Final     Hemoglobin   Date Value Ref Range Status   12/29/2020 12.7 (L) 13.0 - 17.7 g/dL Final     Hematocrit   Date Value Ref Range Status   12/29/2020 37.4 (L) 37.5 - 51.0 % Final     Platelets   Date Value Ref Range Status   12/29/2020 168 140 - 450 10*3/mm3 Final              Interpretation Summary echo    · Estimated right ventricular systolic pressure from tricuspid regurgitation is normal (<35 mmHg).  · Left ventricular diastolic function is consistent with (grade I) impaired relaxation.  · Calculated left ventricular EF = 60.6% Estimated left ventricular EF was in agreement with the  calculated left ventricular EF. Left ventricular systolic function is normal.  · Left ventricular wall thickness is consistent with concentric hypertrophy.  · C/w with baseline echo study from 7/30/19, there is no change.     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 Almost 90% of the liver WAS replaced by tumor. The patient has been undergoing chemotherapy with 5  fu and leucovorin  and Herceptin and has had excellent response clinically and radiologically. The patient was reviewed on 08/10/2020. I reviewed with the patient in the PAC system [facility] his PET scan that is dramatic. There is minimal uptake in the lower esophagus and most importantly complete resolution of his liver metastasis. Actually the only issue that is pertinent to the PET scan is still the visibility of his colovesical fistula.     Therefore from the point of view of his cancer of the esophagus, metastatic to the liver, he has no symptoms from the primary tumor in the esophagus and he has no symptoms related to his liver metastasis that has resolved altogether. His CEA level is low at 6.The patient raised the question about the CEA fluctuation. I pointed out to him that a lot of this is also related to his smoking. Smoking per se elevates CEA level. It is up to him if he wants to stop smoking maybe that will be helpful in regard to CEA measurement in the long run.     In regard to cardiac toxicity from Herceptin the patient has not developed any new problems. The echocardiogram documents no changes in ejection fraction.  The numbers are very similar to the original baseline and he has not had any cardiac toxicity from Herceptin.     In regard to his sensory peripheral neuropathy in his feet he remains on Neurontin. The symptoms are minimal at this time. There is no need for modification in the dose of this medicine at this point.     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     In regard to his colovesical fistula he remains on a minimal dose of ciprofloxacin prophylactically and he has not had any new episode of pneumaturia or urinary tract infection with no fever.    In regard to smoking cessation we had a conversation for 10 minutes about this issue. He already has been seen by Anh Felder RN, in this regard. He is down to 6 cigarettes a day and my insistence in this regard to the patient calls for the need for him to have complete smoking cessation given his malignancy, his hypertension, his coronary risk factors, his thrombophilia and all of the comorbidities that he has. He has recognized this already and he is working on the process to be able to initiate his nicotine patches the first of the year.     The total duration of counseling in regard smoking cessation was 7 minutes.    The patient will return to the office on a monthly basis for his 5FU, leucovorin and Herceptin, nurse practitioner visit in 1 and 3 months, visit with me in 2 and 4 months and planning eventually in 3-1/2 months with another radiological analysis including PET scan. We will continue monitoring his CEA level.     He has no need for refills of any of his medicines at this time.                     I DISCUSSED WITH PATIENT IN DETAIL FORMS TO DECREASE CHANCES OF CORONAVIRUS INFECTION INCLUDING ISOLATION, PROPER HAND HIGIENE, AVOID PUBLIC PLACES  WITH CROWDS, FOLLOW  CDC RECOMENDATIONS, AND KEEP PERSONAL AND SOCIAL RESPONSIBILITY, WARE A MASK IN PUBLIC  PLACES.  PATIENT IS AWARE THIS INFECTION COULD HAVE SEVERE CONSEQUENCES TO PERSONAL HEALTH AND FAMILY RAMIFICATIONS OF THIS.

## 2020-12-31 ENCOUNTER — INFUSION (OUTPATIENT)
Dept: ONCOLOGY | Facility: HOSPITAL | Age: 65
End: 2020-12-31

## 2020-12-31 DIAGNOSIS — C15.5 MALIGNANT NEOPLASM OF LOWER THIRD OF ESOPHAGUS (HCC): Primary | ICD-10-CM

## 2020-12-31 DIAGNOSIS — C78.7 LIVER METASTASIS: ICD-10-CM

## 2020-12-31 DIAGNOSIS — Z45.2 ENCOUNTER FOR ADJUSTMENT OR MANAGEMENT OF VASCULAR ACCESS DEVICE: ICD-10-CM

## 2020-12-31 PROCEDURE — 25010000003 HEPARIN LOCK FLUSH PER 10 UNITS: Performed by: INTERNAL MEDICINE

## 2020-12-31 RX ORDER — SODIUM CHLORIDE 0.9 % (FLUSH) 0.9 %
10 SYRINGE (ML) INJECTION AS NEEDED
Status: CANCELLED | OUTPATIENT
Start: 2020-12-31

## 2020-12-31 RX ORDER — SODIUM CHLORIDE 0.9 % (FLUSH) 0.9 %
10 SYRINGE (ML) INJECTION AS NEEDED
Status: DISCONTINUED | OUTPATIENT
Start: 2020-12-31 | End: 2020-12-31 | Stop reason: HOSPADM

## 2020-12-31 RX ORDER — HEPARIN SODIUM (PORCINE) LOCK FLUSH IV SOLN 100 UNIT/ML 100 UNIT/ML
500 SOLUTION INTRAVENOUS AS NEEDED
Status: CANCELLED | OUTPATIENT
Start: 2020-12-31

## 2020-12-31 RX ORDER — HEPARIN SODIUM (PORCINE) LOCK FLUSH IV SOLN 100 UNIT/ML 100 UNIT/ML
500 SOLUTION INTRAVENOUS AS NEEDED
Status: DISCONTINUED | OUTPATIENT
Start: 2020-12-31 | End: 2020-12-31 | Stop reason: HOSPADM

## 2020-12-31 RX ADMIN — Medication 500 UNITS: at 10:19

## 2020-12-31 RX ADMIN — SODIUM CHLORIDE, PRESERVATIVE FREE 10 ML: 5 INJECTION INTRAVENOUS at 10:19

## 2021-01-04 ENCOUNTER — OFFICE VISIT (OUTPATIENT)
Dept: OTHER | Facility: HOSPITAL | Age: 66
End: 2021-01-04

## 2021-01-04 DIAGNOSIS — Z72.0 TOBACCO ABUSE: Primary | ICD-10-CM

## 2021-01-04 PROCEDURE — 99212-NC PR NO CHARGE CBC OFFICE OUTPATIENT VISIT 10 MINUTES: Performed by: NURSE PRACTITIONER

## 2021-01-06 NOTE — PROGRESS NOTES
"[facility] MULTIDISCIPLINARY CLINIC  SMOKING CESSATION TELEHEALTH FOLLOW-UP VISIT    Han Garcia is a pleasant 65 y.o. male seen today by VIDEO for smoking cessation counseling follow up.      Patient consent obtained for our telehealth visit today:  You have chosen to receive care through a telehealth visit.  Do you consent to use a video/audio connection for your medical care today? YES    HPI:  Continues to smoke 10 cigarettes or less per day.  He did  the 21 mg nicotine patches from the pharmacy and thankfully this was covered by his health insurance.  He continues to use daily and distract techniques.  In general feeling well with some mild fatigue.    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 Patient is a current every day smoker. Reports currently smoking 20 cigarettes per day. Has smoked as much as 40 cigarettes per day in the past. Approximately 38 pack year history.   E-cigarette use NONE  Other combustible tobacco use NONE  Smokeless tobacco use NONE  Caffeine 4-5 CUPS/DAY     Cancer treatment plan: 5-FU, leucovorin and Herceptin   History of seizures NO  Psychiatric History NONE  Patient has quit smoking several times in the past. Quit for about 8 years early 90's. Was going thru divorce and  Relapsed with a friend who was also abstinent for many years and going through a divorce.     Has quit at other times for hours or days. Withdrawal symptoms difficult to describe, just feels like \"he has to have one\".      Patient importance of quitting smoking 10/10. He has had multiple meaningful conversations with Dr Haynes about how smoking continues to impact his health. Patient describes leg aching when walking due to peripheral vascular compromise. Extremely confident he will be able to maintain abstinence once he is able to stop. They are ready to quit smoking within the next " 2-4 weeks.  Patient is willing to set a quit date.     Patient identified motivating factors for quitting include preserving his health.      Patient identified potential support resources available including his two sisters, one of which is also a smoker and has expressed interest in quitting smoking herself.      Patient anticipates future challenges/barriers including managing free time.       Strongly encouraged patient to stop smoking. Explained that multiple attempts to quit are often needed before patients can achieve prolonged periods of time free from tobacco.     I advised the patient of the risks in continuing to use tobacco. In addition to cardiovascular risk there is an increased risk for cancers of the oral cavity and pharynx, larynx, lung, esophagus, pancreas, uterine, cervix, kidney, bladder, stomach, colon, rectum and liver as well as myeloid leukemia. We also discussed that this risk may also extend to development of female breast cancer and advanced stage prostate cancer.    Social History     Socioeconomic History   • Marital status: Single     Spouse name: Not on file   • Number of children: Not on file   • Years of education: High school   • Highest education level: Not on file   Occupational History   • Occupation: [employer]     Employer: [employer]   Tobacco Use   • Smoking status: Current Every Day Smoker     Packs/day: 1.00     Years: 38.00     Pack years: 38.00     Types: Cigarettes     Start date: 1974   • Smokeless tobacco: Never Used   • Tobacco comment: Quit for a period of 8 years   Substance and Sexual Activity   • Alcohol use: Not Currently     Frequency: Monthly or less     Drinks per session: 1 or 2     Binge frequency: Never     Comment: NOT RECENTLY- none since september   • Drug use: No   • Sexual activity: Defer          Lab Results   Component Value Date    GLUCOSE 160 (H) 12/29/2020    BUN 11 12/29/2020    CREATININE 0.78 12/29/2020    EGFRIFNONA 100 12/29/2020     "EGFRIFAFRI 100 02/02/2019    BCR 14.1 12/29/2020    K 3.9 12/29/2020    CO2 23.5 12/29/2020    CALCIUM 9.4 12/29/2020    PROTENTOTREF 7.1 02/02/2019    ALBUMIN 4.20 12/29/2020    LABIL2 1.6 02/02/2019    AST 28 12/29/2020    ALT 40 12/29/2020        Past Medical History:   Diagnosis Date   • Arthritis    • Elevated PSA    • Esophageal cancer (CMS/HCC)    • Esophageal mass    • Fistula    • H/O Lung nodule    • Hepatitis     CHILD--PT STATED \"I THINK IT WAS A.\"   • History of pneumonia    • Hx of blood clots 08/10/2019   • Hyperlipidemia    • Hypertension    • Neuropathy    • Prostate cancer (CMS/HCC)    • PVD (peripheral vascular disease) (CMS/HCC)          QUIT PLAN (STAR):  Set quit date: He feels ready to set a quit date of 1/7/2021    Tell friends/family: We again discussed his sister as a potential support and accountability ashley.     Anticipate challenges: Boredom and isolation are the biggest challenges.  We talked about connecting to [employer] again today.    Remove tobacco from environment: We talked about preparing his home ahead of his target quit date including throwing away ashtrays lighters and other triggers.    We discussed evidence-based approaches to quit smoking including options for pharmacotherapy, nicotine replacement therapy, and supportive counseling in group, individual or phone/web based settings. Discussed that counseling or medications used alone are effective but effectiveness is increased when both methods are used.  Advised that e-cigarettes and/or electronic cigarettes are not recommended for smoking cessation or as a safe alternative to smoking.    Medication plan: Does have nicotine transdermal patches 21 mg.  We reviewed use today.    Practical counseling: He will continue cessation coaching in the survivorship clinic.    Patient provided with resources to assist with smoking cessation including the 1-800-Quit Now line and Health Department programs.     Follow-up: 1 week. I " have encouraged them to call me with any questions or as needed in the interm.      ICD-10-CM ICD-9-CM   1. Tobacco abuse  Z72.0 305.1       No orders of the defined types were placed in this encounter.          A total of 15 minutes was spent engaged in one to one discussion by TELEHEALTH of cessation of tobacco, setting a quit date of 1/7/21, pharmacotherapy nicotine patches, side effects, behavioral counseling.

## 2021-01-11 ENCOUNTER — OFFICE VISIT (OUTPATIENT)
Dept: OTHER | Facility: HOSPITAL | Age: 66
End: 2021-01-11

## 2021-01-11 DIAGNOSIS — Z72.0 TOBACCO ABUSE: Primary | ICD-10-CM

## 2021-01-11 PROCEDURE — 99212-NC PR NO CHARGE CBC OFFICE OUTPATIENT VISIT 10 MINUTES: Performed by: NURSE PRACTITIONER

## 2021-01-11 NOTE — PROGRESS NOTES
"[facility] MULTIDISCIPLINARY CLINIC  SMOKING CESSATION TELEHEALTH FOLLOW-UP VISIT    Han Garcia is a pleasant 65 y.o. male seen today by VIDEO for smoking cessation counseling follow up.     Patient consent obtained for our telehealth visit today:  You have chosen to receive care through a telehealth visit.  Do you consent to use a video/audio connection for your medical care today? YES    HPI:  Overall he reports feeling pleased with his progress.  He did initiate nicotine patch 21 mg on 1/7/2021.  He has been smoking about 2 cigarettes a day, typically feeling the increased urge to smoke in the morning time.  He did has some difficulty sleeping and so has been taking the patch off at night.  He feels his chemotherapy treatments are going generally well.  He feels his appetite is fair however he is navigating taste changes.  Denies constipation or diarrhea.  Was seen by the dentist this week who noted some redness around the gumline.  His dentist is apparently to send notes to Dr. Haynes.    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 Patient is a current every day smoker. Reports currently smoking 20 cigarettes per day. Has smoked as much as 40 cigarettes per day in the past. Approximately 38 pack year history.   E-cigarette use NONE  Other combustible tobacco use NONE  Smokeless tobacco use NONE  Caffeine 4-5 CUPS/DAY     Cancer treatment plan: 5-FU, leucovorin and Herceptin   History of seizures NO  Psychiatric History NONE  Patient has quit smoking several times in the past. Quit for about 8 years early 90's. Was going thru divorce and  Relapsed with a friend who was also abstinent for many years and going through a divorce.     Has quit at other times for hours or days. Withdrawal symptoms difficult to describe, just feels like \"he has to have one\".      Patient importance of quitting " smoking 10/10. He has had multiple meaningful conversations with Dr Haynes about how smoking continues to impact his health. Patient describes leg aching when walking due to peripheral vascular compromise. Extremely confident he will be able to maintain abstinence once he is able to stop. They are ready to quit smoking within the next 2-4 weeks.  Patient is willing to set a quit date.     Patient identified motivating factors for quitting include preserving his health.      Patient identified potential support resources available including his two sisters, one of which is also a smoker and has expressed interest in quitting smoking herself.      Patient anticipates future challenges/barriers including managing free time.       Strongly encouraged patient to stop smoking. Explained that multiple attempts to quit are often needed before patients can achieve prolonged periods of time free from tobacco.     I advised the patient of the risks in continuing to use tobacco. In addition to cardiovascular risk there is an increased risk for cancers of the oral cavity and pharynx, larynx, lung, esophagus, pancreas, uterine, cervix, kidney, bladder, stomach, colon, rectum and liver as well as myeloid leukemia. We also discussed that this risk may also extend to development of female breast cancer and advanced stage prostate cancer.    Social History     Socioeconomic History   • Marital status: Single     Spouse name: Not on file   • Number of children: Not on file   • Years of education: High school   • Highest education level: Not on file   Occupational History   • Occupation: [employer]     Employer: [employer]   Tobacco Use   • Smoking status: Current Every Day Smoker     Packs/day: 1.00     Years: 38.00     Pack years: 38.00     Types: Cigarettes     Start date: 1974   • Smokeless tobacco: Never Used   • Tobacco comment: Quit for a period of 8 years   Substance and Sexual Activity   • Alcohol use: Not Currently      "Frequency: Monthly or less     Drinks per session: 1 or 2     Binge frequency: Never     Comment: NOT RECENTLY- none since september   • Drug use: No   • Sexual activity: Defer        Lab Results   Component Value Date    GLUCOSE 160 (H) 12/29/2020    BUN 11 12/29/2020    CREATININE 0.78 12/29/2020    EGFRIFNONA 100 12/29/2020    EGFRIFAFRI 100 02/02/2019    BCR 14.1 12/29/2020    K 3.9 12/29/2020    CO2 23.5 12/29/2020    CALCIUM 9.4 12/29/2020    PROTENTOTREF 7.1 02/02/2019    ALBUMIN 4.20 12/29/2020    LABIL2 1.6 02/02/2019    AST 28 12/29/2020    ALT 40 12/29/2020        Past Medical History:   Diagnosis Date   • Arthritis    • Elevated PSA    • Esophageal cancer (CMS/HCC)    • Esophageal mass    • Fistula    • H/O Lung nodule    • Hepatitis     CHILD--PT STATED \"I THINK IT WAS A.\"   • History of pneumonia    • Hx of blood clots 08/10/2019   • Hyperlipidemia    • Hypertension    • Neuropathy    • Prostate cancer (CMS/HCC)    • PVD (peripheral vascular disease) (CMS/HCC)          QUIT PLAN (STAR):  Set quit date: 1/7/2021 initiated use of nicotine transdermal patches 21 mg    Tell friends/family: We again discussed his sister as a potential support and accountability ashley.     Anticipate challenges: Boredom and isolation are the biggest challenges.  We talked about connecting to [employer] again today.    Remove tobacco from environment: We reviewed again the importance of eliminating all cigarettes ashtrays lighters and other triggers with continued progress towards this goal    We discussed evidence-based approaches to quit smoking including options for pharmacotherapy, nicotine replacement therapy, and supportive counseling in group, individual or phone/web based settings. Discussed that counseling or medications used alone are effective but effectiveness is increased when both methods are used.  Advised that e-cigarettes and/or electronic cigarettes are not recommended for smoking cessation or as a safe " alternative to smoking.    Medication plan: Using nicotine transdermal patches 21 mg.  He is taking them off at night due to sleep disturbances.  Today we talked about using a short acting nicotine replacement first thing in the morning as it will take about an hour after application of the patch to begin to derive any benefit to stave off cravings.    Practical counseling: We will continue cessation coaching in the survivorship clinic.  We reviewed availability of 1 800 quit now.    Patient provided with resources to assist with smoking cessation including the 1-800-Quit Now line and Health Department programs.     Follow-up: 2 weeks. I have encouraged them to call me with any questions or as needed in the interm.      ICD-10-CM ICD-9-CM   1. Tobacco abuse  Z72.0 305.1       No orders of the defined types were placed in this encounter.        A total of 25 minutes was spent engaged in one to one discussion by TELEHEALTH of cessation of tobacco, setting a quit date of 1/7/21, pharmacotherapy nicotine transdermal patch, side effects, behavioral counseling.

## 2021-01-25 ENCOUNTER — OFFICE VISIT (OUTPATIENT)
Dept: OTHER | Facility: HOSPITAL | Age: 66
End: 2021-01-25

## 2021-01-25 VITALS — RESPIRATION RATE: 18 BRPM

## 2021-01-25 DIAGNOSIS — Z72.0 TOBACCO ABUSE: Primary | ICD-10-CM

## 2021-01-25 PROCEDURE — 99212-NC PR NO CHARGE CBC OFFICE OUTPATIENT VISIT 10 MINUTES: Performed by: NURSE PRACTITIONER

## 2021-01-25 NOTE — PROGRESS NOTES
"[facility] MULTIDISCIPLINARY CLINIC  SMOKING CESSATION TELEHEALTH FOLLOW-UP VISIT    Han Garcia is a pleasant 65 y.o. male seen today by VIDEO for smoking cessation counseling follow up    Patient consent obtained for our telehealth visit today:  You have chosen to receive care through a telehealth visit.  Do you consent to use a video/audio connection for your medical care today? YES    HPI:  Feel positively about progress. Continues to smoke < 10 cigarettes/day and continues to have this 50% reduction in smoking. He is trying to get some more walking in his routine to work on managing chemotherapy related fatigue. He made a trip to [place] a few weeks ago and made the entire trip without a cigarette. He is no longer letting himself smoke in his living room and limiting himself to smoking in the kitchen.    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 Patient is a current every day smoker. Reports currently smoking 20 cigarettes per day. Has smoked as much as 40 cigarettes per day in the past. Approximately 38 pack year history.   E-cigarette use NONE  Other combustible tobacco use NONE  Smokeless tobacco use NONE  Caffeine 4-5 CUPS/DAY     Cancer treatment plan: 5-FU, leucovorin and Herceptin   History of seizures NO  Psychiatric History NONE  Patient has quit smoking several times in the past. Quit for about 8 years early 90's. Was going thru divorce and  Relapsed with a friend who was also abstinent for many years and going through a divorce.     Has quit at other times for hours or days. Withdrawal symptoms difficult to describe, just feels like \"he has to have one\".      Patient importance of quitting smoking 10/10. He has had multiple meaningful conversations with Dr Haynes about how smoking continues to impact his health. Patient describes leg aching when walking due to peripheral " vascular compromise. Extremely confident he will be able to maintain abstinence once he is able to stop. They are ready to quit smoking within the next 2-4 weeks.  Patient is willing to set a quit date.     Patient identified motivating factors for quitting include preserving his health.      Patient identified potential support resources available including his two sisters, one of which is also a smoker and has expressed interest in quitting smoking herself.      Patient anticipates future challenges/barriers including managing free time.       Strongly encouraged patient to stop smoking. Explained that multiple attempts to quit are often needed before patients can achieve prolonged periods of time free from tobacco.     I advised the patient of the risks in continuing to use tobacco. In addition to cardiovascular risk there is an increased risk for cancers of the oral cavity and pharynx, larynx, lung, esophagus, pancreas, uterine, cervix, kidney, bladder, stomach, colon, rectum and liver as well as myeloid leukemia. We also discussed that this risk may also extend to development of female breast cancer and advanced stage prostate cancer.    Social History     Socioeconomic History   • Marital status: Single     Spouse name: Not on file   • Number of children: Not on file   • Years of education: High school   • Highest education level: Not on file   Occupational History   • Occupation: [employer]     Employer: [employer]   Tobacco Use   • Smoking status: Current Every Day Smoker     Packs/day: 1.00     Years: 38.00     Pack years: 38.00     Types: Cigarettes     Start date: 1974   • Smokeless tobacco: Never Used   • Tobacco comment: Quit for a period of 8 years   Substance and Sexual Activity   • Alcohol use: Not Currently     Frequency: Monthly or less     Drinks per session: 1 or 2     Binge frequency: Never     Comment: NOT RECENTLY- none since september   • Drug use: No   • Sexual activity: Defer          Lab  "Results   Component Value Date    GLUCOSE 160 (H) 12/29/2020    BUN 11 12/29/2020    CREATININE 0.78 12/29/2020    EGFRIFNONA 100 12/29/2020    EGFRIFAFRI 100 02/02/2019    BCR 14.1 12/29/2020    K 3.9 12/29/2020    CO2 23.5 12/29/2020    CALCIUM 9.4 12/29/2020    PROTENTOTREF 7.1 02/02/2019    ALBUMIN 4.20 12/29/2020    LABIL2 1.6 02/02/2019    AST 28 12/29/2020    ALT 40 12/29/2020        Past Medical History:   Diagnosis Date   • Arthritis    • Elevated PSA    • Esophageal cancer (CMS/HCC)    • Esophageal mass    • Fistula    • H/O Lung nodule    • Hepatitis     CHILD--PT STATED \"I THINK IT WAS A.\"   • History of pneumonia    • Hx of blood clots 08/10/2019   • Hyperlipidemia    • Hypertension    • Neuropathy    • Prostate cancer (CMS/HCC)    • PVD (peripheral vascular disease) (CMS/HCC)          QUIT PLAN (STAR):  Set quit date: 1/7/2021 initiated use of nicotine transdermal patches 21 mg    Tell friends/family: his sister has been a support and accountability ashley.     Anticipate challenges: Boredom and isolation are the biggest challenges.  We talked about connecting to [employer] again today.    Remove tobacco from environment: We reviewed again the importance of eliminating all cigarettes ashtrays lighters and other triggers with continued progress towards this goal, particulalry when ready to set a new quit date, emphasized importance of abstinence. He has recently made the kitchen the only place in his home where smoking is forbidden and we talked about working towards eliminating all smoking inside of the house. His car is also a smoke free environment and he continues to use a car ride as a distraction.    We discussed evidence-based approaches to quit smoking including options for pharmacotherapy, nicotine replacement therapy, and supportive counseling in group, individual or phone/web based settings. Discussed that counseling or medications used alone are effective but effectiveness is increased when " both methods are used.  Advised that e-cigarettes and/or electronic cigarettes are not recommended for smoking cessation or as a safe alternative to smoking.    Medication plan: Discussed current guidelines recommend 21 mg patch based on patient smoking history however he is worried about getting too much nicotine and I told him switching to 14 mg nicotine patch is reasonable but he may still consider using a short acting nicotine replacement first thing in the morning as it will take about an hour after application of the patch to begin to derive any benefit to stave off cravings.    Practical counseling: We will continue cessation coaching in the survivorship clinic.  We reviewed availability of 1 800 quit now.    Patient provided with resources to assist with smoking cessation including the 1-800-Quit Now line and Health Department programs.     Follow-up: 4 weeks. He will continue working on behavioral techniques and consider a new quit date. I have encouraged them to call me with any questions or as needed in the interm.      ICD-10-CM ICD-9-CM   1. Tobacco abuse  Z72.0 305.1       No orders of the defined types were placed in this encounter.          A total of 20 minutes was spent engaged in one to one discussion by TELEHEALTH of cessation of tobacco, setting a quit date pharmacotherapy nicotine transdermal patch, nicotine gum and lozenge, side effects, behavioral counseling.

## 2021-01-26 ENCOUNTER — TELEPHONE (OUTPATIENT)
Dept: ONCOLOGY | Facility: CLINIC | Age: 66
End: 2021-01-26

## 2021-01-26 ENCOUNTER — INFUSION (OUTPATIENT)
Dept: ONCOLOGY | Facility: HOSPITAL | Age: 66
End: 2021-01-26

## 2021-01-26 ENCOUNTER — OFFICE VISIT (OUTPATIENT)
Dept: ONCOLOGY | Facility: CLINIC | Age: 66
End: 2021-01-26

## 2021-01-26 VITALS
HEIGHT: 69 IN | HEART RATE: 85 BPM | WEIGHT: 204.9 LBS | DIASTOLIC BLOOD PRESSURE: 72 MMHG | TEMPERATURE: 97.1 F | BODY MASS INDEX: 30.35 KG/M2 | RESPIRATION RATE: 16 BRPM | SYSTOLIC BLOOD PRESSURE: 117 MMHG | OXYGEN SATURATION: 99 %

## 2021-01-26 DIAGNOSIS — I82.431 ACUTE DEEP VEIN THROMBOSIS (DVT) OF RIGHT POPLITEAL VEIN (HCC): ICD-10-CM

## 2021-01-26 DIAGNOSIS — R73.01 IMPAIRED FASTING GLUCOSE: ICD-10-CM

## 2021-01-26 DIAGNOSIS — Z79.899 HIGH RISK MEDICATION USE: ICD-10-CM

## 2021-01-26 DIAGNOSIS — C15.5 MALIGNANT NEOPLASM OF LOWER THIRD OF ESOPHAGUS (HCC): Primary | ICD-10-CM

## 2021-01-26 DIAGNOSIS — I10 ESSENTIAL HYPERTENSION: ICD-10-CM

## 2021-01-26 DIAGNOSIS — C78.7 LIVER METASTASIS: ICD-10-CM

## 2021-01-26 DIAGNOSIS — C78.7 LIVER METASTASES: ICD-10-CM

## 2021-01-26 DIAGNOSIS — N32.1 COLOVESICAL FISTULA: ICD-10-CM

## 2021-01-26 DIAGNOSIS — C15.5 MALIGNANT NEOPLASM OF LOWER THIRD OF ESOPHAGUS (HCC): ICD-10-CM

## 2021-01-26 DIAGNOSIS — Z45.2 ENCOUNTER FOR ADJUSTMENT OR MANAGEMENT OF VASCULAR ACCESS DEVICE: ICD-10-CM

## 2021-01-26 DIAGNOSIS — G62.0 PERIPHERAL NEUROPATHY DUE TO CHEMOTHERAPY (HCC): ICD-10-CM

## 2021-01-26 DIAGNOSIS — Z72.0 TOBACCO ABUSE: ICD-10-CM

## 2021-01-26 DIAGNOSIS — T45.1X5A PERIPHERAL NEUROPATHY DUE TO CHEMOTHERAPY (HCC): ICD-10-CM

## 2021-01-26 DIAGNOSIS — Z79.01 CURRENT USE OF LONG TERM ANTICOAGULATION: ICD-10-CM

## 2021-01-26 LAB
ALBUMIN SERPL-MCNC: 4.1 G/DL (ref 3.5–5.2)
ALBUMIN/GLOB SERPL: 1.6 G/DL (ref 1.1–2.4)
ALP SERPL-CCNC: 103 U/L (ref 38–116)
ALT SERPL W P-5'-P-CCNC: 42 U/L (ref 0–41)
ANION GAP SERPL CALCULATED.3IONS-SCNC: 12.8 MMOL/L (ref 5–15)
AST SERPL-CCNC: 27 U/L (ref 0–40)
BASOPHILS # BLD AUTO: 0.1 10*3/MM3 (ref 0–0.2)
BASOPHILS NFR BLD AUTO: 0.9 % (ref 0–1.5)
BILIRUB SERPL-MCNC: 0.3 MG/DL (ref 0.2–1.2)
BUN SERPL-MCNC: 13 MG/DL (ref 6–20)
BUN/CREAT SERPL: 16 (ref 7.3–30)
CALCIUM SPEC-SCNC: 9.4 MG/DL (ref 8.5–10.2)
CEA SERPL-MCNC: 7.32 NG/ML
CHLORIDE SERPL-SCNC: 100 MMOL/L (ref 98–107)
CO2 SERPL-SCNC: 22.2 MMOL/L (ref 22–29)
CREAT SERPL-MCNC: 0.81 MG/DL (ref 0.7–1.3)
DEPRECATED RDW RBC AUTO: 51 FL (ref 37–54)
EOSINOPHIL # BLD AUTO: 0.4 10*3/MM3 (ref 0–0.4)
EOSINOPHIL NFR BLD AUTO: 3.5 % (ref 0.3–6.2)
ERYTHROCYTE [DISTWIDTH] IN BLOOD BY AUTOMATED COUNT: 15.1 % (ref 12.3–15.4)
GFR SERPL CREATININE-BSD FRML MDRD: 96 ML/MIN/1.73
GLOBULIN UR ELPH-MCNC: 2.6 GM/DL (ref 1.8–3.5)
GLUCOSE SERPL-MCNC: 134 MG/DL (ref 74–124)
HCT VFR BLD AUTO: 36.8 % (ref 37.5–51)
HGB BLD-MCNC: 12.5 G/DL (ref 13–17.7)
IMM GRANULOCYTES # BLD AUTO: 0.56 10*3/MM3 (ref 0–0.05)
IMM GRANULOCYTES NFR BLD AUTO: 4.9 % (ref 0–0.5)
LYMPHOCYTES # BLD AUTO: 2.13 10*3/MM3 (ref 0.7–3.1)
LYMPHOCYTES NFR BLD AUTO: 18.7 % (ref 19.6–45.3)
MCH RBC QN AUTO: 31.6 PG (ref 26.6–33)
MCHC RBC AUTO-ENTMCNC: 34 G/DL (ref 31.5–35.7)
MCV RBC AUTO: 92.9 FL (ref 79–97)
MONOCYTES # BLD AUTO: 0.84 10*3/MM3 (ref 0.1–0.9)
MONOCYTES NFR BLD AUTO: 7.4 % (ref 5–12)
NEUTROPHILS NFR BLD AUTO: 64.6 % (ref 42.7–76)
NEUTROPHILS NFR BLD AUTO: 7.34 10*3/MM3 (ref 1.7–7)
NRBC BLD AUTO-RTO: 0 /100 WBC (ref 0–0.2)
PLATELET # BLD AUTO: 166 10*3/MM3 (ref 140–450)
PMV BLD AUTO: 9.4 FL (ref 6–12)
POTASSIUM SERPL-SCNC: 4 MMOL/L (ref 3.5–4.7)
PROT SERPL-MCNC: 6.7 G/DL (ref 6.3–8)
RBC # BLD AUTO: 3.96 10*6/MM3 (ref 4.14–5.8)
SODIUM SERPL-SCNC: 135 MMOL/L (ref 134–145)
WBC # BLD AUTO: 11.37 10*3/MM3 (ref 3.4–10.8)

## 2021-01-26 PROCEDURE — 99214 OFFICE O/P EST MOD 30 MIN: CPT | Performed by: NURSE PRACTITIONER

## 2021-01-26 PROCEDURE — 25010000002 PALONOSETRON PER 25 MCG: Performed by: NURSE PRACTITIONER

## 2021-01-26 PROCEDURE — 85025 COMPLETE CBC W/AUTO DIFF WBC: CPT

## 2021-01-26 PROCEDURE — 96375 TX/PRO/DX INJ NEW DRUG ADDON: CPT

## 2021-01-26 PROCEDURE — 96411 CHEMO IV PUSH ADDL DRUG: CPT

## 2021-01-26 PROCEDURE — 25010000002 FLUOROURACIL PER 500 MG: Performed by: NURSE PRACTITIONER

## 2021-01-26 PROCEDURE — 96367 TX/PROPH/DG ADDL SEQ IV INF: CPT

## 2021-01-26 PROCEDURE — 25010000002 LEUCOVORIN CALCIUM PER 50 MG: Performed by: NURSE PRACTITIONER

## 2021-01-26 PROCEDURE — 96413 CHEMO IV INFUSION 1 HR: CPT

## 2021-01-26 PROCEDURE — 25010000002 DEXAMETHASONE PER 1 MG: Performed by: INTERNAL MEDICINE

## 2021-01-26 PROCEDURE — 25010000002 DIPHENHYDRAMINE 1 EACH BAG: Performed by: NURSE PRACTITIONER

## 2021-01-26 PROCEDURE — 25010000002 TRASTUZUMAB PER 10 MG: Performed by: NURSE PRACTITIONER

## 2021-01-26 PROCEDURE — 82378 CARCINOEMBRYONIC ANTIGEN: CPT | Performed by: INTERNAL MEDICINE

## 2021-01-26 PROCEDURE — 96416 CHEMO PROLONG INFUSE W/PUMP: CPT

## 2021-01-26 PROCEDURE — 80053 COMPREHEN METABOLIC PANEL: CPT

## 2021-01-26 RX ORDER — PALONOSETRON 0.05 MG/ML
0.25 INJECTION, SOLUTION INTRAVENOUS ONCE
Status: COMPLETED | OUTPATIENT
Start: 2021-01-26 | End: 2021-01-26

## 2021-01-26 RX ORDER — PALONOSETRON 0.05 MG/ML
0.25 INJECTION, SOLUTION INTRAVENOUS ONCE
Status: CANCELLED | OUTPATIENT
Start: 2021-01-26

## 2021-01-26 RX ORDER — FLUOROURACIL 50 MG/ML
400 INJECTION, SOLUTION INTRAVENOUS ONCE
Status: COMPLETED | OUTPATIENT
Start: 2021-01-26 | End: 2021-01-26

## 2021-01-26 RX ORDER — FLUOROURACIL 50 MG/ML
400 INJECTION, SOLUTION INTRAVENOUS ONCE
Status: CANCELLED | OUTPATIENT
Start: 2021-01-26

## 2021-01-26 RX ORDER — DEXAMETHASONE SODIUM PHOSPHATE 4 MG/ML
4 INJECTION, SOLUTION INTRA-ARTICULAR; INTRALESIONAL; INTRAMUSCULAR; INTRAVENOUS; SOFT TISSUE ONCE
Status: COMPLETED | OUTPATIENT
Start: 2021-01-26 | End: 2021-01-26

## 2021-01-26 RX ORDER — SODIUM CHLORIDE 9 MG/ML
250 INJECTION, SOLUTION INTRAVENOUS ONCE
Status: COMPLETED | OUTPATIENT
Start: 2021-01-26 | End: 2021-01-26

## 2021-01-26 RX ORDER — SODIUM CHLORIDE 9 MG/ML
250 INJECTION, SOLUTION INTRAVENOUS ONCE
Status: CANCELLED | OUTPATIENT
Start: 2021-01-26

## 2021-01-26 RX ORDER — FAMOTIDINE 10 MG/ML
20 INJECTION, SOLUTION INTRAVENOUS 2 TIMES DAILY
Status: CANCELLED | OUTPATIENT
Start: 2021-01-26

## 2021-01-26 RX ORDER — FAMOTIDINE 10 MG/ML
20 INJECTION, SOLUTION INTRAVENOUS AS NEEDED
Status: CANCELLED | OUTPATIENT
Start: 2021-01-26

## 2021-01-26 RX ORDER — FAMOTIDINE 10 MG/ML
20 INJECTION, SOLUTION INTRAVENOUS 2 TIMES DAILY
Status: DISCONTINUED | OUTPATIENT
Start: 2021-01-26 | End: 2021-01-26 | Stop reason: HOSPADM

## 2021-01-26 RX ORDER — DIPHENHYDRAMINE HYDROCHLORIDE 50 MG/ML
50 INJECTION INTRAMUSCULAR; INTRAVENOUS AS NEEDED
Status: CANCELLED | OUTPATIENT
Start: 2021-01-26

## 2021-01-26 RX ADMIN — FLUOROURACIL 810 MG: 50 INJECTION, SOLUTION INTRAVENOUS at 11:10

## 2021-01-26 RX ADMIN — DIPHENHYDRAMINE HYDROCHLORIDE 25 MG: 50 INJECTION INTRAMUSCULAR; INTRAVENOUS at 09:27

## 2021-01-26 RX ADMIN — DEXAMETHASONE SODIUM PHOSPHATE 4 MG: 4 INJECTION, SOLUTION INTRAMUSCULAR; INTRAVENOUS at 09:26

## 2021-01-26 RX ADMIN — PALONOSETRON HYDROCHLORIDE 0.25 MG: 0.25 INJECTION INTRAVENOUS at 09:29

## 2021-01-26 RX ADMIN — SODIUM CHLORIDE 810 MG: 9 INJECTION, SOLUTION INTRAVENOUS at 10:25

## 2021-01-26 RX ADMIN — FLUOROURACIL 4850 MG: 50 INJECTION, SOLUTION INTRAVENOUS at 11:10

## 2021-01-26 RX ADMIN — FAMOTIDINE 20 MG: 10 INJECTION INTRAVENOUS at 09:26

## 2021-01-26 RX ADMIN — TRASTUZUMAB 350 MG: 150 INJECTION, POWDER, LYOPHILIZED, FOR SOLUTION INTRAVENOUS at 09:47

## 2021-01-26 NOTE — PROGRESS NOTES
"  Subjective     REASON FOR follow up:1.    1. 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  Currently receiving palliative FOLFOX AND HERCEPTIN therapy.    2.COLOVESICAL FISTULA: chronic antibiotic therapy cipro, NO FURTHER PLANS FOR SURGERY AFTER VISIT AND PROCEDURE BY DR CM WITT 1/20    3. 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 NEURONTIN INITIATED.        History of Present Illness   Patient returns today overall doing well.  He denies any new pain.  He denies fevers, burning with urination, or swelling.  He is smoking 6 to 10 cigarettes/day and did just talk with NIRU Jolly yesterday.  He was having some lightheadedness with the nicotine patches in the Medicaid plan for this he reports.  He is continue to work on modifications to help him stop smoking.  He denies any increased shortness of breath or change in his cough.  His peripheral neuropathy is unchanged he reports.      Past Medical History:   Diagnosis Date   • Arthritis    • Elevated PSA    • Esophageal cancer (CMS/HCC)    • Esophageal mass    • Fistula    • H/O Lung nodule    • Hepatitis     CHILD--PT STATED \"I THINK IT WAS A.\"   • History of pneumonia    • Hx of blood clots 08/10/2019   • Hyperlipidemia    • Hypertension    • Neuropathy    • Prostate cancer (CMS/HCC)    • PVD (peripheral vascular disease) (CMS/HCC)         Past Surgical History:   Procedure Laterality Date   • COLONOSCOPY N/A 2/4/2020    Procedure: COLONOSCOPY;  Surgeon: Guy Sears MD;  Location: [facility] ENDOSCOPY;  Service: General;  Laterality: N/A;  PRE-COLOVESICAL FISTULA  POST-- DIVERTICULOSIS   • CYSTOSCOPY  02/06/2020   • CYSTOSCOPY Bilateral 2/26/2020    Procedure: CYSTOSCOPY RETROGRADE;  Surgeon: Cm Witt MD;  Location: [facility] OR;  Service: " Urology;  Laterality: Bilateral;   • HERNIA REPAIR  1999   • PROSTATE SURGERY  03/2008   • VENOUS ACCESS DEVICE (PORT) INSERTION N/A 7/16/2019    Procedure: INSERTION VENOUS ACCESS DEVICE WITH FLUORO AND EGD WITH BIOPSY;  Surgeon: Mary Brito MD;  Location: [facility] OR;  Service: Thoracic        Current Outpatient Medications on File Prior to Visit   Medication Sig Dispense Refill   • cevimeline (EVOXAC) 30 MG capsule Take 30 mg by mouth 3 (Three) Times a Day.     • ciprofloxacin (CIPRO) 250 MG tablet Take 1 tablet by mouth Daily. 90 tablet 1   • clotrimazole (MYCELEX) 10 MG obie 3 TIMES A DAY 90 tablet 1   • econazole nitrate (SPECTAZOLE) 1 % cream APPLY TO ALL AFFECTED NAILS AT BEDTIME     • gabapentin (NEURONTIN) 300 MG capsule Take 2 capsules by mouth every night at bedtime. 60 capsule 4   • lisinopril-hydrochlorothiazide (PRINZIDE,ZESTORETIC) 20-12.5 MG per tablet TAKE 1 TABLET BY MOUTH EVERY DAY 90 tablet 2   • nicotine (NICODERM CQ) 21 MG/24HR patch Place 1 patch on the skin as directed by provider Daily. 28 each 1   • ondansetron (ZOFRAN) 4 MG tablet Take 1 tablet by mouth Every 8 (Eight) Hours As Needed for Nausea or Vomiting. 30 tablet 2   • Probiotic Product (PROBIOTIC DAILY PO) Take 1 tablet by mouth Daily.     • triamcinolone (KENALOG) 0.1 % ointment Apply  topically to the appropriate area as directed 2 (Two) Times a Day. (Patient taking differently: Apply 1 application topically to the appropriate area as directed As Needed.) 30 g 2   • Xarelto 20 MG tablet TAKE 1 TABLET BY MOUTH EVERY DAY**STOP LOVENOX** 90 tablet 1     No current facility-administered medications on file prior to visit.         ALLERGIES:  No Known Allergies     Social History     Socioeconomic History   • Marital status: Single     Spouse name: Not on file   • Number of children: Not on file   • Years of education: High school   • Highest education level: Not on file   Occupational History   • Occupation: Assembly      Employer: [employer]   Tobacco Use   • Smoking status: Current Every Day Smoker     Packs/day: 1.00     Years: 38.00     Pack years: 38.00     Types: Cigarettes     Start date: 1974   • Smokeless tobacco: Never Used   • Tobacco comment: Quit for a period of 8 years   Substance and Sexual Activity   • Alcohol use: Not Currently     Frequency: Monthly or less     Drinks per session: 1 or 2     Binge frequency: Never     Comment: NOT RECENTLY- none since september   • Drug use: No   • Sexual activity: Defer        Family History   Problem Relation Age of Onset   • Hypertension Mother    • Stroke Mother    • Hypertension Other    • Lung disease Other    • Prostate cancer Other    • Lung cancer Father    • Malig Hyperthermia Neg Hx       Current Outpatient Medications on File Prior to Visit   Medication Sig Dispense Refill   • cevimeline (EVOXAC) 30 MG capsule Take 30 mg by mouth 3 (Three) Times a Day.     • ciprofloxacin (CIPRO) 250 MG tablet Take 1 tablet by mouth Daily. 90 tablet 1   • clotrimazole (MYCELEX) 10 MG obie 3 TIMES A DAY 90 tablet 1   • econazole nitrate (SPECTAZOLE) 1 % cream APPLY TO ALL AFFECTED NAILS AT BEDTIME     • gabapentin (NEURONTIN) 300 MG capsule Take 2 capsules by mouth every night at bedtime. 60 capsule 4   • lisinopril-hydrochlorothiazide (PRINZIDE,ZESTORETIC) 20-12.5 MG per tablet TAKE 1 TABLET BY MOUTH EVERY DAY 90 tablet 2   • nicotine (NICODERM CQ) 21 MG/24HR patch Place 1 patch on the skin as directed by provider Daily. 28 each 1   • ondansetron (ZOFRAN) 4 MG tablet Take 1 tablet by mouth Every 8 (Eight) Hours As Needed for Nausea or Vomiting. 30 tablet 2   • Probiotic Product (PROBIOTIC DAILY PO) Take 1 tablet by mouth Daily.     • triamcinolone (KENALOG) 0.1 % ointment Apply  topically to the appropriate area as directed 2 (Two) Times a Day. (Patient taking differently: Apply 1 application topically to the appropriate area as directed As Needed.) 30 g 2   • Xarelto 20 MG tablet  "TAKE 1 TABLET BY MOUTH EVERY DAY**STOP LOVENOX** 90 tablet 1     No current facility-administered medications on file prior to visit.    No Known Allergies         Objective     Vitals:    01/26/21 0843   BP: 117/72   Pulse: 85   Resp: 16   Temp: 97.1 °F (36.2 °C)   TempSrc: Temporal   SpO2: 99%   Weight: 92.9 kg (204 lb 14.4 oz)   Height: 175.3 cm (69.02\")   PainSc: 0-No pain     Current Status 12/29/2020   ECOG score 0           Physical exam    GENERAL:  Well-developed, well-nourished  Patient  in no acute distress.   SKIN:  Warm, dry ,NO rashes,NO purpura ,NO petechiae.  HEENT:  Pupils were equal and reactive to light and accomodation, conjunctivae noninjected, normal extraocular movements, normal visual acuity.   NECK:  Supple with good range of motion; no thyromegaly or masses, no JVD or bruits, no cervical adenopathies.  LYMPHATICS:  No cervical, NO supraclavicular adenopathy.  CARDIAC   normal rate and regular rhythm, without murmur,NO rubs   ABDOMEN:  Soft, nontender with no hepatomegaly, no splenomegaly,no masses, no ascites,  Normal bowel sounds.  EXTREMITIES  AND SPINE:  No clubbing, cyanosis or edema, no deformities or pain.  NEUROLOGICAL:  Patient was awake, alert, oriented to time, person and place.           RECENT LABS:  Hematology WBC   Date Value Ref Range Status   01/26/2021 11.37 (H) 3.40 - 10.80 10*3/mm3 Final   02/02/2019 13.4 (H) 3.4 - 10.8 x10E3/uL Final     RBC   Date Value Ref Range Status   01/26/2021 3.96 (L) 4.14 - 5.80 10*6/mm3 Final   02/02/2019 4.81 4.14 - 5.80 x10E6/uL Final     Hemoglobin   Date Value Ref Range Status   01/26/2021 12.5 (L) 13.0 - 17.7 g/dL Final     Hematocrit   Date Value Ref Range Status   01/26/2021 36.8 (L) 37.5 - 51.0 % Final     Platelets   Date Value Ref Range Status   01/26/2021 166 140 - 450 10*3/mm3 Final                    Assessment/Plan    1.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 Almost 90% of the liver WAS replaced " by tumor. The patient has been undergoing chemotherapy with 5  fu and leucovorin  and Herceptin and has had excellent response clinically and radiologically. The patient was reviewed on 08/10/2020. I reviewed with the patient in the PAC system [facility] his PET scan that is dramatic. There is minimal uptake in the lower esophagus and most importantly complete resolution of his liver metastasis. Actually the only issue that is pertinent to the PET scan is still the visibility of his colovesical fistula.     Therefore from the point of view of his cancer of the esophagus, metastatic to the liver, he has no symptoms from the primary tumor in the esophagus and he has no symptoms related to his liver metastasis that has resolved altogether. His CEA level is low at 6.The patient raised the question about the CEA fluctuation. I pointed out to him that a lot of this is also related to his smoking. Smoking per se elevates CEA level. It is up to him if he wants to stop smoking maybe that will be helpful in regard to CEA measurement in the long run.     In regard to cardiac toxicity from Herceptin the patient has not developed any new problems. The echocardiogram documents no changes in ejection fraction. The numbers are very similar to the original baseline and he has not had any cardiac toxicity from Herceptin.     In regard to his sensory peripheral neuropathy in his feet he remains on Neurontin.  Patient reports the symptoms are fairly stable at this time and he does not feel the need to increase his dose.    In regard to his thrombophilia associated with his malignancy he continues on Xarelto daily without signs of bleeding.    In regard to his colovesical fistula he remains on a minimal dose of ciprofloxacin prophylactically and he has not had any new episode of pneumaturia or urinary tract infection with no fever.    Patient reports he is smoking anywhere from 6 to 10 cigarettes/day.  This slightly  increases the last visit.  He reports he followed up with NIRU Jolly yesterday regarding this.  He was having some lightheadedness with the nicotine patches and he reports they have made a game plan for this.  He has made modifications in his daily habits to help reduce the exposure to his cigarettes.    He continues to follow-up on a monthly basis for 5-FU, leucovorin, and Herceptin.  He will follow up with him 1 month with Dr. Haynes in treatment, 2 months with nurse practitioner in treatment, in 3 months with Dr. Haynes once again and treatment.  We will consider repeat scans in 2-1/2 months including a PET scan.    Patient CEA did result following her visit at 7.32, slightly up from 12/1/2020 at 6.31.  This will be reviewed with Dr. Keller.

## 2021-01-26 NOTE — TELEPHONE ENCOUNTER
----- Message from Marga Nova RN sent at 1/26/2021 10:53 AM EST -----  Disconnect Thursday at 1000

## 2021-01-28 ENCOUNTER — INFUSION (OUTPATIENT)
Dept: ONCOLOGY | Facility: HOSPITAL | Age: 66
End: 2021-01-28

## 2021-01-28 ENCOUNTER — TELEPHONE (OUTPATIENT)
Dept: ONCOLOGY | Facility: CLINIC | Age: 66
End: 2021-01-28

## 2021-01-28 DIAGNOSIS — C15.5 MALIGNANT NEOPLASM OF LOWER THIRD OF ESOPHAGUS (HCC): Primary | ICD-10-CM

## 2021-01-28 DIAGNOSIS — C78.7 LIVER METASTASIS: ICD-10-CM

## 2021-01-28 DIAGNOSIS — Z45.2 ENCOUNTER FOR ADJUSTMENT OR MANAGEMENT OF VASCULAR ACCESS DEVICE: ICD-10-CM

## 2021-01-28 PROCEDURE — 25010000003 HEPARIN LOCK FLUSH PER 10 UNITS: Performed by: INTERNAL MEDICINE

## 2021-01-28 RX ORDER — SODIUM CHLORIDE 0.9 % (FLUSH) 0.9 %
10 SYRINGE (ML) INJECTION AS NEEDED
Status: CANCELLED | OUTPATIENT
Start: 2021-01-28

## 2021-01-28 RX ORDER — HEPARIN SODIUM (PORCINE) LOCK FLUSH IV SOLN 100 UNIT/ML 100 UNIT/ML
500 SOLUTION INTRAVENOUS AS NEEDED
Status: CANCELLED | OUTPATIENT
Start: 2021-01-28

## 2021-01-28 RX ORDER — HEPARIN SODIUM (PORCINE) LOCK FLUSH IV SOLN 100 UNIT/ML 100 UNIT/ML
500 SOLUTION INTRAVENOUS AS NEEDED
Status: DISCONTINUED | OUTPATIENT
Start: 2021-01-28 | End: 2021-01-28 | Stop reason: HOSPADM

## 2021-01-28 RX ORDER — SODIUM CHLORIDE 0.9 % (FLUSH) 0.9 %
10 SYRINGE (ML) INJECTION AS NEEDED
Status: DISCONTINUED | OUTPATIENT
Start: 2021-01-28 | End: 2021-01-28 | Stop reason: HOSPADM

## 2021-01-28 RX ADMIN — Medication 500 UNITS: at 10:01

## 2021-01-28 RX ADMIN — SODIUM CHLORIDE, PRESERVATIVE FREE 10 ML: 5 INJECTION INTRAVENOUS at 09:59

## 2021-01-28 NOTE — TELEPHONE ENCOUNTER
Called patient to let him know that his CEA has elevated since the last reading.  It is up to 7.3 currently, most recently 6.3.  Previously however it was in the 5 range.  This was discussed with Dr. Haynes we will repeat the CEA level in 2 months.  If we see its further rising we could consider changing back to every 2-week treatment.  Patient verbalized understanding.  Message sent to scheduling.

## 2021-02-11 ENCOUNTER — APPOINTMENT (OUTPATIENT)
Dept: LAB | Facility: HOSPITAL | Age: 66
End: 2021-02-11

## 2021-02-12 ENCOUNTER — LAB (OUTPATIENT)
Dept: LAB | Facility: HOSPITAL | Age: 66
End: 2021-02-12

## 2021-02-12 DIAGNOSIS — C15.5 MALIGNANT NEOPLASM OF LOWER THIRD OF ESOPHAGUS (HCC): ICD-10-CM

## 2021-02-12 LAB — CEA SERPL-MCNC: 6.68 NG/ML

## 2021-02-12 PROCEDURE — 82378 CARCINOEMBRYONIC ANTIGEN: CPT | Performed by: NURSE PRACTITIONER

## 2021-02-12 PROCEDURE — 36415 COLL VENOUS BLD VENIPUNCTURE: CPT

## 2021-02-22 ENCOUNTER — OFFICE VISIT (OUTPATIENT)
Dept: OTHER | Facility: HOSPITAL | Age: 66
End: 2021-02-22

## 2021-02-22 DIAGNOSIS — Z72.0 TOBACCO ABUSE: Primary | ICD-10-CM

## 2021-02-22 NOTE — PROGRESS NOTES
"[facility] MULTIDISCIPLINARY CLINIC  SMOKING CESSATION TELEHEALTH FOLLOW-UP VISIT    Han Garcia is a pleasant 65 y.o. male seen today by VIDEO for smoking cessation counseling follow up       Patient consent obtained for our telehealth visit today:  You have chosen to receive care through a telehealth visit.  Do you consent to use a video/audio connection for your medical care today? YES    HPI:  Patient continues to smoke cigarettes daily currently smoking about 15 cigarettes a day (up from 5 to 10/day from last visit).  Especially challenged the last 2 weeks due to severe winter weather and lack of access to get out of the house.  Using nicotine patch but not daily.    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 Patient is a current every day smoker. Reports currently smoking 20 cigarettes per day. Has smoked as much as 40 cigarettes per day in the past. Approximately 38 pack year history.   E-cigarette use NONE  Other combustible tobacco use NONE  Smokeless tobacco use NONE  Caffeine 4-5 CUPS/DAY     Cancer treatment plan: 5-FU, leucovorin and Herceptin   History of seizures NO  Psychiatric History NONE  Patient has quit smoking several times in the past. Quit for about 8 years early 90's. Was going thru divorce and  Relapsed with a friend who was also abstinent for many years and going through a divorce.     Has quit at other times for hours or days. Withdrawal symptoms difficult to describe, just feels like \"he has to have one\".      Patient importance of quitting smoking 10/10. He has had multiple meaningful conversations with Dr Haynes about how smoking continues to impact his health. Patient describes leg aching when walking due to peripheral vascular compromise. Extremely confident he will be able to maintain abstinence once he is able to stop. They are ready to quit smoking within " the next 2-4 weeks.  Patient is willing to set a quit date.     Patient identified motivating factors for quitting include preserving his health.      Patient identified potential support resources available including his two sisters, one of which is also a smoker and has expressed interest in quitting smoking herself.      Patient anticipates future challenges/barriers including managing free time.       Strongly encouraged patient to stop smoking. Explained that multiple attempts to quit are often needed before patients can achieve prolonged periods of time free from tobacco.     I advised the patient of the risks in continuing to use tobacco. In addition to cardiovascular risk there is an increased risk for cancers of the oral cavity and pharynx, larynx, lung, esophagus, pancreas, uterine, cervix, kidney, bladder, stomach, colon, rectum and liver as well as myeloid leukemia. We also discussed that this risk may also extend to development of female breast cancer and advanced stage prostate cancer.       Social History     Socioeconomic History   • Marital status: Single     Spouse name: Not on file   • Number of children: Not on file   • Years of education: High school   • Highest education level: Not on file   Occupational History   • Occupation: [employer]     Employer: [employer]   Tobacco Use   • Smoking status: Current Every Day Smoker     Packs/day: 1.00     Years: 38.00     Pack years: 38.00     Types: Cigarettes     Start date: 1974   • Smokeless tobacco: Never Used   • Tobacco comment: Quit for a period of 8 years   Substance and Sexual Activity   • Alcohol use: Not Currently     Frequency: Monthly or less     Drinks per session: 1 or 2     Binge frequency: Never     Comment: NOT RECENTLY- none since september   • Drug use: No   • Sexual activity: Defer            Lab Results   Component Value Date    GLUCOSE 134 (H) 01/26/2021    BUN 13 01/26/2021    CREATININE 0.81 01/26/2021    EGFRIFNONA 96  "01/26/2021    EGFRIFAFRI 100 02/02/2019    BCR 16.0 01/26/2021    K 4.0 01/26/2021    CO2 22.2 01/26/2021    CALCIUM 9.4 01/26/2021    PROTENTOTREF 7.1 02/02/2019    ALBUMIN 4.10 01/26/2021    LABIL2 1.6 02/02/2019    AST 27 01/26/2021    ALT 42 (H) 01/26/2021        Past Medical History:   Diagnosis Date   • Arthritis    • Elevated PSA    • Esophageal cancer (CMS/HCC)    • Esophageal mass    • Fistula    • H/O Lung nodule    • Hepatitis     CHILD--PT STATED \"I THINK IT WAS A.\"   • History of pneumonia    • Hx of blood clots 08/10/2019   • Hyperlipidemia    • Hypertension    • Neuropathy    • Prostate cancer (CMS/HCC)    • PVD (peripheral vascular disease) (CMS/HCC)          QUIT PLAN (STAR):  Set quit date: 1/7/2021 initiated use of nicotine transdermal patches 21 mg.  Today we reviewed the importance of setting a quit date at least 2 weeks into the future to allow ample time for preparations for quit attempt.    Tell friends/family: His sister is been a support and accountability ashley.    Anticipate challenges: Boredom, isolation and the severe winter weather.    Remove tobacco from environment: Today we reviewed the importance of total abstinence for a quit attempt.  His car continues to be a smoke-free environment and he has utilize car rides as a distraction in the past.    We discussed evidence-based approaches to quit smoking including options for pharmacotherapy, nicotine replacement therapy, and supportive counseling in group, individual or phone/web based settings. Discussed that counseling or medications used alone are effective but effectiveness is increased when both methods are used.  Advised that e-cigarettes and/or electronic cigarettes are not recommended for smoking cessation or as a safe alternative to smoking.    Medication plan: Reviewed use of nicotine patch and that for it to to have best efficacy he should be wearing it daily.  We talked again about difficulties he had sleeping overnight " with the patch on.  He has noticed that mornings are more difficult for him to resist the temptation to smoke and so is reconsidering taking the patch off at bedtime and thinking he will try to sleep with the patch on again.  We reviewed today use of a short acting nicotine replacement such as nicotine gum or lozenge immediately upon waking that will provide some coverage for cravings while the nicotine patch delivers therapeutic levels of nicotine.    Practical counseling: We will continue cessation coaching in the survivorship clinic.  Reviewed availability of 1 800 quit now and offered a referral, declines today.  Provided encouragement patient, is taken him 40 years or more to learn how to smoke daily and so he is not going to magically stop.  We talked about the power of writing down a quit date and placing it somewhat prominent to view as well as writing down strategies that he is employing that are helpful for delaying or avoiding smoking.     Patient provided with resources to assist with smoking cessation including the 1-800-Quit Now line and Health Department programs.     Follow-up: 1 month, we are scheduled for March 22 telehealth. I have encouraged them to call me with any questions or as needed in the interm.      ICD-10-CM ICD-9-CM   1. Tobacco abuse  Z72.0 305.1       No orders of the defined types were placed in this encounter.        A total of 30 minutes was spent engaged in one to one discussion by TELEHEALTH of cessation of tobacco, setting a quit date, pharmacotherapy, side effects, behavioral counseling.

## 2021-02-23 ENCOUNTER — TELEPHONE (OUTPATIENT)
Dept: ONCOLOGY | Facility: CLINIC | Age: 66
End: 2021-02-23

## 2021-02-23 ENCOUNTER — INFUSION (OUTPATIENT)
Dept: ONCOLOGY | Facility: HOSPITAL | Age: 66
End: 2021-02-23

## 2021-02-23 ENCOUNTER — OFFICE VISIT (OUTPATIENT)
Dept: ONCOLOGY | Facility: CLINIC | Age: 66
End: 2021-02-23

## 2021-02-23 VITALS
TEMPERATURE: 97.5 F | RESPIRATION RATE: 18 BRPM | SYSTOLIC BLOOD PRESSURE: 106 MMHG | WEIGHT: 207.2 LBS | BODY MASS INDEX: 30.69 KG/M2 | OXYGEN SATURATION: 98 % | HEART RATE: 84 BPM | DIASTOLIC BLOOD PRESSURE: 70 MMHG | HEIGHT: 69 IN

## 2021-02-23 DIAGNOSIS — C78.7 LIVER METASTASES: ICD-10-CM

## 2021-02-23 DIAGNOSIS — C15.5 MALIGNANT NEOPLASM OF LOWER THIRD OF ESOPHAGUS (HCC): Primary | ICD-10-CM

## 2021-02-23 DIAGNOSIS — C15.5 MALIGNANT NEOPLASM OF LOWER THIRD OF ESOPHAGUS (HCC): ICD-10-CM

## 2021-02-23 DIAGNOSIS — C78.7 LIVER METASTASIS: ICD-10-CM

## 2021-02-23 DIAGNOSIS — Z79.01 CURRENT USE OF LONG TERM ANTICOAGULATION: ICD-10-CM

## 2021-02-23 DIAGNOSIS — T45.1X5A PERIPHERAL NEUROPATHY DUE TO CHEMOTHERAPY (HCC): ICD-10-CM

## 2021-02-23 DIAGNOSIS — G62.0 PERIPHERAL NEUROPATHY DUE TO CHEMOTHERAPY (HCC): ICD-10-CM

## 2021-02-23 DIAGNOSIS — N32.1 COLOVESICAL FISTULA: ICD-10-CM

## 2021-02-23 DIAGNOSIS — I82.431 ACUTE DEEP VEIN THROMBOSIS (DVT) OF RIGHT POPLITEAL VEIN (HCC): ICD-10-CM

## 2021-02-23 DIAGNOSIS — Z45.2 ENCOUNTER FOR ADJUSTMENT OR MANAGEMENT OF VASCULAR ACCESS DEVICE: ICD-10-CM

## 2021-02-23 DIAGNOSIS — Z72.0 TOBACCO ABUSE: ICD-10-CM

## 2021-02-23 LAB
ALBUMIN SERPL-MCNC: 4.2 G/DL (ref 3.5–5.2)
ALBUMIN/GLOB SERPL: 1.6 G/DL (ref 1.1–2.4)
ALP SERPL-CCNC: 99 U/L (ref 38–116)
ALT SERPL W P-5'-P-CCNC: 53 U/L (ref 0–41)
ANION GAP SERPL CALCULATED.3IONS-SCNC: 11.8 MMOL/L (ref 5–15)
AST SERPL-CCNC: 33 U/L (ref 0–40)
BASOPHILS # BLD AUTO: 0.07 10*3/MM3 (ref 0–0.2)
BASOPHILS NFR BLD AUTO: 0.7 % (ref 0–1.5)
BILIRUB SERPL-MCNC: 0.3 MG/DL (ref 0.2–1.2)
BUN SERPL-MCNC: 14 MG/DL (ref 6–20)
BUN/CREAT SERPL: 16.7 (ref 7.3–30)
CALCIUM SPEC-SCNC: 9.4 MG/DL (ref 8.5–10.2)
CHLORIDE SERPL-SCNC: 98 MMOL/L (ref 98–107)
CO2 SERPL-SCNC: 23.2 MMOL/L (ref 22–29)
CREAT SERPL-MCNC: 0.84 MG/DL (ref 0.7–1.3)
DEPRECATED RDW RBC AUTO: 52.6 FL (ref 37–54)
EOSINOPHIL # BLD AUTO: 0.28 10*3/MM3 (ref 0–0.4)
EOSINOPHIL NFR BLD AUTO: 2.7 % (ref 0.3–6.2)
ERYTHROCYTE [DISTWIDTH] IN BLOOD BY AUTOMATED COUNT: 15.2 % (ref 12.3–15.4)
GFR SERPL CREATININE-BSD FRML MDRD: 92 ML/MIN/1.73
GLOBULIN UR ELPH-MCNC: 2.7 GM/DL (ref 1.8–3.5)
GLUCOSE SERPL-MCNC: 170 MG/DL (ref 74–124)
HCT VFR BLD AUTO: 37.5 % (ref 37.5–51)
HGB BLD-MCNC: 13.2 G/DL (ref 13–17.7)
IMM GRANULOCYTES # BLD AUTO: 0.39 10*3/MM3 (ref 0–0.05)
IMM GRANULOCYTES NFR BLD AUTO: 3.7 % (ref 0–0.5)
LYMPHOCYTES # BLD AUTO: 1.84 10*3/MM3 (ref 0.7–3.1)
LYMPHOCYTES NFR BLD AUTO: 17.5 % (ref 19.6–45.3)
MCH RBC QN AUTO: 33 PG (ref 26.6–33)
MCHC RBC AUTO-ENTMCNC: 35.2 G/DL (ref 31.5–35.7)
MCV RBC AUTO: 93.8 FL (ref 79–97)
MONOCYTES # BLD AUTO: 0.76 10*3/MM3 (ref 0.1–0.9)
MONOCYTES NFR BLD AUTO: 7.2 % (ref 5–12)
NEUTROPHILS NFR BLD AUTO: 68.2 % (ref 42.7–76)
NEUTROPHILS NFR BLD AUTO: 7.19 10*3/MM3 (ref 1.7–7)
NRBC BLD AUTO-RTO: 0 /100 WBC (ref 0–0.2)
PLATELET # BLD AUTO: 187 10*3/MM3 (ref 140–450)
PMV BLD AUTO: 9.8 FL (ref 6–12)
POTASSIUM SERPL-SCNC: 4.1 MMOL/L (ref 3.5–4.7)
PROT SERPL-MCNC: 6.9 G/DL (ref 6.3–8)
RBC # BLD AUTO: 4 10*6/MM3 (ref 4.14–5.8)
SODIUM SERPL-SCNC: 133 MMOL/L (ref 134–145)
WBC # BLD AUTO: 10.53 10*3/MM3 (ref 3.4–10.8)

## 2021-02-23 PROCEDURE — 25010000002 FLUOROURACIL PER 500 MG: Performed by: INTERNAL MEDICINE

## 2021-02-23 PROCEDURE — 85025 COMPLETE CBC W/AUTO DIFF WBC: CPT

## 2021-02-23 PROCEDURE — 96413 CHEMO IV INFUSION 1 HR: CPT

## 2021-02-23 PROCEDURE — 25010000002 DEXAMETHASONE PER 1 MG: Performed by: INTERNAL MEDICINE

## 2021-02-23 PROCEDURE — 96411 CHEMO IV PUSH ADDL DRUG: CPT

## 2021-02-23 PROCEDURE — 96367 TX/PROPH/DG ADDL SEQ IV INF: CPT

## 2021-02-23 PROCEDURE — 25010000002 LEUCOVORIN CALCIUM PER 50 MG: Performed by: INTERNAL MEDICINE

## 2021-02-23 PROCEDURE — 25010000002 PALONOSETRON PER 25 MCG: Performed by: INTERNAL MEDICINE

## 2021-02-23 PROCEDURE — 25010000002 DIPHENHYDRAMINE 1 EACH BAG: Performed by: INTERNAL MEDICINE

## 2021-02-23 PROCEDURE — 96416 CHEMO PROLONG INFUSE W/PUMP: CPT

## 2021-02-23 PROCEDURE — 99215 OFFICE O/P EST HI 40 MIN: CPT | Performed by: INTERNAL MEDICINE

## 2021-02-23 PROCEDURE — 96375 TX/PRO/DX INJ NEW DRUG ADDON: CPT

## 2021-02-23 PROCEDURE — 25010000002 TRASTUZUMAB PER 10 MG: Performed by: INTERNAL MEDICINE

## 2021-02-23 PROCEDURE — 80053 COMPREHEN METABOLIC PANEL: CPT

## 2021-02-23 PROCEDURE — 96368 THER/DIAG CONCURRENT INF: CPT

## 2021-02-23 RX ORDER — FAMOTIDINE 10 MG/ML
20 INJECTION, SOLUTION INTRAVENOUS 2 TIMES DAILY
Status: DISCONTINUED | OUTPATIENT
Start: 2021-02-23 | End: 2021-02-23 | Stop reason: HOSPADM

## 2021-02-23 RX ORDER — PALONOSETRON 0.05 MG/ML
0.25 INJECTION, SOLUTION INTRAVENOUS ONCE
Status: COMPLETED | OUTPATIENT
Start: 2021-02-23 | End: 2021-02-23

## 2021-02-23 RX ORDER — FLUOROURACIL 50 MG/ML
400 INJECTION, SOLUTION INTRAVENOUS ONCE
Status: COMPLETED | OUTPATIENT
Start: 2021-02-23 | End: 2021-02-23

## 2021-02-23 RX ORDER — DEXAMETHASONE SODIUM PHOSPHATE 4 MG/ML
4 INJECTION, SOLUTION INTRA-ARTICULAR; INTRALESIONAL; INTRAMUSCULAR; INTRAVENOUS; SOFT TISSUE ONCE
Status: COMPLETED | OUTPATIENT
Start: 2021-02-23 | End: 2021-02-23

## 2021-02-23 RX ORDER — FAMOTIDINE 10 MG/ML
20 INJECTION, SOLUTION INTRAVENOUS AS NEEDED
Status: CANCELLED | OUTPATIENT
Start: 2021-02-23

## 2021-02-23 RX ORDER — SODIUM CHLORIDE 9 MG/ML
250 INJECTION, SOLUTION INTRAVENOUS ONCE
Status: COMPLETED | OUTPATIENT
Start: 2021-02-23 | End: 2021-02-23

## 2021-02-23 RX ORDER — FLUOROURACIL 50 MG/ML
400 INJECTION, SOLUTION INTRAVENOUS ONCE
Status: CANCELLED | OUTPATIENT
Start: 2021-02-23

## 2021-02-23 RX ORDER — PALONOSETRON 0.05 MG/ML
0.25 INJECTION, SOLUTION INTRAVENOUS ONCE
Status: CANCELLED | OUTPATIENT
Start: 2021-02-23

## 2021-02-23 RX ORDER — FAMOTIDINE 10 MG/ML
20 INJECTION, SOLUTION INTRAVENOUS 2 TIMES DAILY
Status: CANCELLED | OUTPATIENT
Start: 2021-02-23

## 2021-02-23 RX ORDER — DIPHENHYDRAMINE HYDROCHLORIDE 50 MG/ML
50 INJECTION INTRAMUSCULAR; INTRAVENOUS AS NEEDED
Status: CANCELLED | OUTPATIENT
Start: 2021-02-23

## 2021-02-23 RX ORDER — SODIUM CHLORIDE 9 MG/ML
250 INJECTION, SOLUTION INTRAVENOUS ONCE
Status: CANCELLED | OUTPATIENT
Start: 2021-02-23

## 2021-02-23 RX ADMIN — FLUOROURACIL 810 MG: 50 INJECTION, SOLUTION INTRAVENOUS at 12:36

## 2021-02-23 RX ADMIN — SODIUM CHLORIDE 250 ML: 9 INJECTION, SOLUTION INTRAVENOUS at 10:53

## 2021-02-23 RX ADMIN — SODIUM CHLORIDE 810 MG: 900 INJECTION, SOLUTION INTRAVENOUS at 12:01

## 2021-02-23 RX ADMIN — PALONOSETRON 0.25 MG: 0.05 INJECTION, SOLUTION INTRAVENOUS at 10:53

## 2021-02-23 RX ADMIN — DEXAMETHASONE SODIUM PHOSPHATE 4 MG: 4 INJECTION, SOLUTION INTRAMUSCULAR; INTRAVENOUS at 10:53

## 2021-02-23 RX ADMIN — DIPHENHYDRAMINE HYDROCHLORIDE 25 MG: 50 INJECTION INTRAMUSCULAR; INTRAVENOUS at 10:58

## 2021-02-23 RX ADMIN — FLUOROURACIL 4850 MG: 50 INJECTION, SOLUTION INTRAVENOUS at 12:36

## 2021-02-23 RX ADMIN — FAMOTIDINE 20 MG: 10 INJECTION INTRAVENOUS at 10:53

## 2021-02-23 RX ADMIN — TRASTUZUMAB 350 MG: 150 INJECTION, POWDER, LYOPHILIZED, FOR SOLUTION INTRAVENOUS at 11:22

## 2021-02-23 NOTE — PROGRESS NOTES
Subjective     REASON FOR follow up:1.    1. 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  Currently receiving palliative 5 fu leucovorin  AND HERCEPTIN therapy once a month    2.COLOVESICAL FISTULA: chronic antibiotic therapy cipro, NO FURTHER PLANS FOR SURGERY AFTER VISIT AND PROCEDURE BY DR GM CASTILLO 1/20    3. 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 NEURONTIN INITIATED.        History of Present Illness PATIENT WAS CALLED THE DAY BEFORE BY THE OFFICE TO ASK FOR SYMPTOMS THAT COULD BE CONSISTENT WITH CORONAVIRUS INFECTION, AND BEING NEGATIVE WAS SCHEDULED TO BE SEEN IN THE OFFICE TODAY. SIMILAR QUESTIONING TODAY INCLUDING, CHILLS, FEVER, NEW COUGH, SHORTNESS OF BREATH, DIARRHEA,DIFFUSE BODY ACHES  AND CHANGES IN SMELL OR TASTE WERE NEGATIVE.THE PATIENT DENIED ANY CONTACT WITH PERSONS WHO WERE POSITIVE FOR COVID, AND PATIENT IS NOT IN CATEGORY OF HIGH RISK BEHAVIOR TO ACQUIRE COVID.    DURING THE VISIT WITH THE PATIENT TODAY , PATIENT HAD FACE MASK, MY MEDICAL ASSISTANT AND I  HAD PROPPER PROTECTIVE EQUIPMENT, AND I DID HAND HYGIENE WITH SOAP AND WATER BEFORE AND AFTER THE VISIT.    This patient returns today to the office for follow up. He is in the process of stopping smoking and he is down to 10 cigarettes a day and directed by Anh Felder RN, at the multidisciplinary clinic. He feels gratified that he has been able to accomplish a lot of this. He was smoking almost 2 packs of cigarettes a day 2-3 months ago. Overall he has no difficulty swallowing, appetite remains very good, weight is stable. He has no cancer related pain, normal bowel activity, no diarrhea and no urinary tract infections from the colovesical fistula. No bleeding on any level with the use of anticoagulants in the background of thrombophilia  "associated with malignancy, no new blood clots. He has mild hacking cough from cigarettes, no sputum production, no shortness of breath. No fever or infections. No rashes. Overall he feels very well. He is now on disability.                     Past Medical History:   Diagnosis Date   • Arthritis    • Elevated PSA    • Esophageal cancer (CMS/HCC)    • Esophageal mass    • Fistula    • H/O Lung nodule    • Hepatitis     CHILD--PT STATED \"I THINK IT WAS A.\"   • History of pneumonia    • Hx of blood clots 08/10/2019   • Hyperlipidemia    • Hypertension    • Neuropathy    • Prostate cancer (CMS/HCC)    • PVD (peripheral vascular disease) (CMS/HCC)         Past Surgical History:   Procedure Laterality Date   • COLONOSCOPY N/A 2/4/2020    Procedure: COLONOSCOPY;  Surgeon: Guy Sears MD;  Location: [facility] ENDOSCOPY;  Service: General;  Laterality: N/A;  PRE-COLOVESICAL FISTULA  POST-- DIVERTICULOSIS   • CYSTOSCOPY  02/06/2020   • CYSTOSCOPY Bilateral 2/26/2020    Procedure: CYSTOSCOPY RETROGRADE;  Surgeon: Cm Witt MD;  Location: [facility] OR;  Service: Urology;  Laterality: Bilateral;   • HERNIA REPAIR  1999   • PROSTATE SURGERY  03/2008   • VENOUS ACCESS DEVICE (PORT) INSERTION N/A 7/16/2019    Procedure: INSERTION VENOUS ACCESS DEVICE WITH FLUORO AND EGD WITH BIOPSY;  Surgeon: Mary Brito MD;  Location: [facility];  Service: Thoracic        Current Outpatient Medications on File Prior to Visit   Medication Sig Dispense Refill   • cevimeline (EVOXAC) 30 MG capsule Take 30 mg by mouth 3 (Three) Times a Day.     • ciprofloxacin (CIPRO) 250 MG tablet Take 1 tablet by mouth Daily. 90 tablet 1   • clotrimazole (MYCELEX) 10 MG obie 3 TIMES A DAY 90 tablet 1   • econazole nitrate (SPECTAZOLE) 1 % cream APPLY TO ALL AFFECTED NAILS AT BEDTIME     • gabapentin (NEURONTIN) 300 MG capsule Take 2 capsules by mouth every night at bedtime. 60 capsule 4   • lisinopril-hydrochlorothiazide " (PRINZIDE,ZESTORETIC) 20-12.5 MG per tablet TAKE 1 TABLET BY MOUTH EVERY DAY 90 tablet 2   • nicotine (NICODERM CQ) 21 MG/24HR patch Place 1 patch on the skin as directed by provider Daily. 28 each 1   • ondansetron (ZOFRAN) 4 MG tablet Take 1 tablet by mouth Every 8 (Eight) Hours As Needed for Nausea or Vomiting. 30 tablet 2   • Probiotic Product (PROBIOTIC DAILY PO) Take 1 tablet by mouth Daily.     • triamcinolone (KENALOG) 0.1 % ointment Apply  topically to the appropriate area as directed 2 (Two) Times a Day. (Patient taking differently: Apply 1 application topically to the appropriate area as directed As Needed.) 30 g 2   • Xarelto 20 MG tablet TAKE 1 TABLET BY MOUTH EVERY DAY**STOP LOVENOX** 90 tablet 1     No current facility-administered medications on file prior to visit.         ALLERGIES:  No Known Allergies     Social History     Socioeconomic History   • Marital status: Single     Spouse name: Not on file   • Number of children: Not on file   • Years of education: High school   • Highest education level: Not on file   Occupational History   • Occupation: [employer]     Employer: [email or web address]   Tobacco Use   • Smoking status: Current Every Day Smoker     Packs/day: 1.00     Years: 38.00     Pack years: 38.00     Types: Cigarettes     Start date: 1974   • Smokeless tobacco: Never Used   • Tobacco comment: Quit for a period of 8 years   Substance and Sexual Activity   • Alcohol use: Not Currently     Frequency: Monthly or less     Drinks per session: 1 or 2     Binge frequency: Never     Comment: NOT RECENTLY- none since september   • Drug use: No   • Sexual activity: Defer        Family History   Problem Relation Age of Onset   • Hypertension Mother    • Stroke Mother    • Hypertension Other    • Lung disease Other    • Prostate cancer Other    • Lung cancer Father    • Malig Hyperthermia Neg Hx       Current Outpatient Medications on File Prior to Visit   Medication Sig Dispense Refill   • cevimeline  "(EVOXAC) 30 MG capsule Take 30 mg by mouth 3 (Three) Times a Day.     • ciprofloxacin (CIPRO) 250 MG tablet Take 1 tablet by mouth Daily. 90 tablet 1   • clotrimazole (MYCELEX) 10 MG obie 3 TIMES A DAY 90 tablet 1   • econazole nitrate (SPECTAZOLE) 1 % cream APPLY TO ALL AFFECTED NAILS AT BEDTIME     • gabapentin (NEURONTIN) 300 MG capsule Take 2 capsules by mouth every night at bedtime. 60 capsule 4   • lisinopril-hydrochlorothiazide (PRINZIDE,ZESTORETIC) 20-12.5 MG per tablet TAKE 1 TABLET BY MOUTH EVERY DAY 90 tablet 2   • nicotine (NICODERM CQ) 21 MG/24HR patch Place 1 patch on the skin as directed by provider Daily. 28 each 1   • ondansetron (ZOFRAN) 4 MG tablet Take 1 tablet by mouth Every 8 (Eight) Hours As Needed for Nausea or Vomiting. 30 tablet 2   • Probiotic Product (PROBIOTIC DAILY PO) Take 1 tablet by mouth Daily.     • triamcinolone (KENALOG) 0.1 % ointment Apply  topically to the appropriate area as directed 2 (Two) Times a Day. (Patient taking differently: Apply 1 application topically to the appropriate area as directed As Needed.) 30 g 2   • Xarelto 20 MG tablet TAKE 1 TABLET BY MOUTH EVERY DAY**STOP LOVENOX** 90 tablet 1     No current facility-administered medications on file prior to visit.    No Known Allergies         Objective     Vitals:    02/23/21 0945   BP: 106/70   Pulse: 84   Resp: 18   Temp: 97.5 °F (36.4 °C)   TempSrc: Temporal   SpO2: 98%   Weight: 94 kg (207 lb 3.2 oz)   Height: 175.3 cm (69.02\")   PainSc: 0-No pain     Current Status 2/23/2021   ECOG score 0           Physical exam    I HAVE PERSONALLY REVIEWED THE HISTORY OF THE PRESENT ILLNESS, PAST MEDICAL HISTORY, FAMILY HISTORY, SOCIAL HISTORY, ALLERGIES, MEDICATIONS STATED ABOVE IN THE OFFICE NOTE FROM TODAY.    Patient screened for depression based on a PHQ-9 score of 0 on 1/26/2021.  GENERAL:  Well-developed, well-nourished  Patient  in no acute distress.   SKIN:  Warm, dry ,NO rashes,NO purpura ,NO petechiae.  HEENT:  " Pupils were equal and reactive to light and accomodation, conjunctivae noninjected, no pterygium, normal extraocular movements, normal visual acuity.   NECK:  Supple with good range of motion; no thyromegaly or masses, no JVD or bruits, no cervical adenopathies.No carotid artery pain, no carotid abnormal pulsation , NO arterial dance.  LYMPHATICS:  No cervical, NO supraclavicular, NO axillary,NO epitrochlear , NO inguinal adenopathy.  CARDIAC   normal rate and regular rhythm, without murmur,NO rubs NO S3 NO S4 right or left . Normal femoral, popliteal, pedis, brachial and carotid pulses.  VASCULAR ARTERIAL: normal carotids,brachial,radial,femoral,popliteal, pedis pulses , no bruits.no paleness or cyanosis, no pain, no edema, no numbness, no gangrene.  VASCULAR VENOUS: no cyanosis, collateral circulation, varicosities, edema, palpable cords, pain, erythema.  ABDOMEN:  Soft, nontender with no hepatomegaly, no splenomegaly,no masses, no ascites, no collateral circulation,no distention,no Wayland sign, no abdominal pain, no inguinal hernias,no umbilical hernia, no abdominal bruits. Normal bowel sounds.  GENITAL: Not  Performed.  EXTREMITIES  AND SPINE:  No clubbing, cyanosis or edema, no deformities or pain .No kyphosis, scoliosis, deformities or pain in spine, ribs or pelvic bone.  NEUROLOGICAL:  Patient was awake, alert, oriented to time, person and place.          RECENT LABS:  Hematology WBC   Date Value Ref Range Status   02/23/2021 10.53 3.40 - 10.80 10*3/mm3 Final   02/02/2019 13.4 (H) 3.4 - 10.8 x10E3/uL Final     RBC   Date Value Ref Range Status   02/23/2021 4.00 (L) 4.14 - 5.80 10*6/mm3 Final   02/02/2019 4.81 4.14 - 5.80 x10E6/uL Final     Hemoglobin   Date Value Ref Range Status   02/23/2021 13.2 13.0 - 17.7 g/dL Final     Hematocrit   Date Value Ref Range Status   02/23/2021 37.5 37.5 - 51.0 % Final     Platelets   Date Value Ref Range Status   02/23/2021 187 140 - 450 10*3/mm3 Final                 Assessment/Plan    1.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 Almost 90% of the liver WAS replaced by tumor. The patient has been undergoing chemotherapy with 5  fu and leucovorin  and Herceptin and has had excellent response clinically and radiologically. The patient was reviewed on 08/10/2020. I reviewed with the patient in the PAC system [facility] his PET scan that is dramatic. There is minimal uptake in the lower esophagus and most importantly complete resolution of his liver metastasis. Actually the only issue that is pertinent to the PET scan is still the visibility of his colovesical fistula.     Therefore from the point of view of his cancer of the esophagus, metastatic to the liver, he has no symptoms from the primary tumor in the esophagus and he has no symptoms related to his liver metastasis that has resolved altogether. His CEA level is stable.The patient raised the question about the CEA fluctuation. I pointed out to him that a lot of this is also related to his smoking. Smoking per se elevates CEA level. It is up to him if he wants to stop smoking maybe that will be helpful in regard to CEA measurement in the long run.     In regard to cardiac toxicity from Herceptin the patient has not developed any new problems. The echocardiogram will be requested in 3 weeks      In regard to his sensory peripheral neuropathy in his feet he remains on Neurontin. The symptoms are minimal at this time. There is no need for modification in the dose of this medicine at this point.     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     In regard to his colovesical fistula he remains on a minimal dose of ciprofloxacin prophylactic.He has been taking ciprofloxacin for a long time and I asked him to hold off on this medicine at this time and use it only for 3-4 days at the  most in case that he develops any new symptoms that suggest a new urinary tract infection. I want to minimize the exposure of this medicine to him for a long time.     In regard to smoking cessation the patient has seen Anh Felder RN. He has been able to cut smoking down big time from almost 2 packs of cigarettes a day to 10 cigarettes a day and he continues working on further decline of cigarettes.     I went ahead and scheduled him to have his echocardiogram and CT scan of the chest, abdomen and pelvis in 3 weeks and review with me with a CBC, CMP and a CEA level in 4 weeks,    The patient went ahead and proceed with his chemotherapy treatment today with 5FU/leucovorin and Herceptin. He will remain on the same treatment return in 1 month.     In regard to COVID vaccination I advised him that whenever he signs up and is ready to go to go ahead and proceed independent of the timing of his chemotherapy medicine.    In regard to his blood pressure medication he remains on lisinopril, hydrochlorothiazide, potassium level has been appropriate and blood pressure is under good control.                    I DISCUSSED WITH PATIENT IN DETAIL FORMS TO DECREASE CHANCES OF CORONAVIRUS INFECTION INCLUDING ISOLATION, PROPER HAND HIGIENE, AVOID PUBLIC PLACES  WITH CROWDS, FOLLOW  CDC RECOMENDATIONS, AND KEEP PERSONAL AND SOCIAL RESPONSIBILITY, WARE A MASK IN PUBLIC PLACES.  PATIENT IS AWARE THIS INFECTION COULD HAVE SEVERE CONSEQUENCES TO PERSONAL HEALTH AND FAMILY RAMIFICATIONS OF THIS.

## 2021-02-23 NOTE — TELEPHONE ENCOUNTER
Han is asking for a call back from Linnea regarding his echo and CT. He says he may need to change the times.     # [phone]

## 2021-02-25 ENCOUNTER — INFUSION (OUTPATIENT)
Dept: ONCOLOGY | Facility: HOSPITAL | Age: 66
End: 2021-02-25

## 2021-02-25 DIAGNOSIS — Z45.2 ENCOUNTER FOR ADJUSTMENT OR MANAGEMENT OF VASCULAR ACCESS DEVICE: ICD-10-CM

## 2021-02-25 DIAGNOSIS — C78.7 LIVER METASTASIS: ICD-10-CM

## 2021-02-25 DIAGNOSIS — C15.5 MALIGNANT NEOPLASM OF LOWER THIRD OF ESOPHAGUS (HCC): Primary | ICD-10-CM

## 2021-02-25 PROCEDURE — 25010000003 HEPARIN LOCK FLUSH PER 10 UNITS: Performed by: INTERNAL MEDICINE

## 2021-02-25 RX ORDER — SODIUM CHLORIDE 0.9 % (FLUSH) 0.9 %
10 SYRINGE (ML) INJECTION AS NEEDED
Status: CANCELLED | OUTPATIENT
Start: 2021-02-25

## 2021-02-25 RX ORDER — SODIUM CHLORIDE 0.9 % (FLUSH) 0.9 %
10 SYRINGE (ML) INJECTION AS NEEDED
Status: DISCONTINUED | OUTPATIENT
Start: 2021-02-25 | End: 2021-02-25 | Stop reason: HOSPADM

## 2021-02-25 RX ORDER — HEPARIN SODIUM (PORCINE) LOCK FLUSH IV SOLN 100 UNIT/ML 100 UNIT/ML
500 SOLUTION INTRAVENOUS AS NEEDED
Status: DISCONTINUED | OUTPATIENT
Start: 2021-02-25 | End: 2021-02-25 | Stop reason: HOSPADM

## 2021-02-25 RX ORDER — HEPARIN SODIUM (PORCINE) LOCK FLUSH IV SOLN 100 UNIT/ML 100 UNIT/ML
500 SOLUTION INTRAVENOUS AS NEEDED
Status: CANCELLED | OUTPATIENT
Start: 2021-02-25

## 2021-02-25 RX ADMIN — SODIUM CHLORIDE, PRESERVATIVE FREE 10 ML: 5 INJECTION INTRAVENOUS at 10:33

## 2021-02-25 RX ADMIN — Medication 500 UNITS: at 10:33

## 2021-03-16 ENCOUNTER — HOSPITAL ENCOUNTER (OUTPATIENT)
Dept: CARDIOLOGY | Facility: HOSPITAL | Age: 66
Discharge: HOME | End: 2021-03-16

## 2021-03-16 ENCOUNTER — BULK ORDERING (OUTPATIENT)
Dept: CASE MANAGEMENT | Facility: OTHER | Age: 66
End: 2021-03-16

## 2021-03-16 ENCOUNTER — HOSPITAL ENCOUNTER (OUTPATIENT)
Dept: CT IMAGING | Facility: HOSPITAL | Age: 66
Discharge: HOME | End: 2021-03-16

## 2021-03-16 VITALS
DIASTOLIC BLOOD PRESSURE: 74 MMHG | WEIGHT: 207.23 LBS | BODY MASS INDEX: 30.69 KG/M2 | HEART RATE: 76 BPM | HEIGHT: 69 IN | SYSTOLIC BLOOD PRESSURE: 134 MMHG

## 2021-03-16 DIAGNOSIS — T45.1X5A PERIPHERAL NEUROPATHY DUE TO CHEMOTHERAPY (HCC): ICD-10-CM

## 2021-03-16 DIAGNOSIS — I82.431 ACUTE DEEP VEIN THROMBOSIS (DVT) OF RIGHT POPLITEAL VEIN (HCC): ICD-10-CM

## 2021-03-16 DIAGNOSIS — C78.7 LIVER METASTASIS: ICD-10-CM

## 2021-03-16 DIAGNOSIS — Z72.0 TOBACCO ABUSE: ICD-10-CM

## 2021-03-16 DIAGNOSIS — G62.0 PERIPHERAL NEUROPATHY DUE TO CHEMOTHERAPY (HCC): ICD-10-CM

## 2021-03-16 DIAGNOSIS — N32.1 COLOVESICAL FISTULA: ICD-10-CM

## 2021-03-16 DIAGNOSIS — Z45.2 ENCOUNTER FOR ADJUSTMENT OR MANAGEMENT OF VASCULAR ACCESS DEVICE: ICD-10-CM

## 2021-03-16 DIAGNOSIS — Z79.01 CURRENT USE OF LONG TERM ANTICOAGULATION: ICD-10-CM

## 2021-03-16 DIAGNOSIS — C15.5 MALIGNANT NEOPLASM OF LOWER THIRD OF ESOPHAGUS (HCC): ICD-10-CM

## 2021-03-16 DIAGNOSIS — Z23 IMMUNIZATION DUE: ICD-10-CM

## 2021-03-16 LAB — CREAT BLDA-MCNC: 0.8 MG/DL (ref 0.6–1.3)

## 2021-03-16 PROCEDURE — 74177 CT ABD & PELVIS W/CONTRAST: CPT

## 2021-03-16 PROCEDURE — 93356 MYOCRD STRAIN IMG SPCKL TRCK: CPT | Performed by: INTERNAL MEDICINE

## 2021-03-16 PROCEDURE — 71260 CT THORAX DX C+: CPT

## 2021-03-16 PROCEDURE — 82565 ASSAY OF CREATININE: CPT

## 2021-03-16 PROCEDURE — 93306 TTE W/DOPPLER COMPLETE: CPT | Performed by: INTERNAL MEDICINE

## 2021-03-16 PROCEDURE — 25010000002 IOPAMIDOL 61 % SOLUTION: Performed by: INTERNAL MEDICINE

## 2021-03-16 PROCEDURE — 93356 MYOCRD STRAIN IMG SPCKL TRCK: CPT

## 2021-03-16 PROCEDURE — 25010000002 PERFLUTREN (DEFINITY) 8.476 MG IN SODIUM CHLORIDE (PF) 0.9 % 10 ML INJECTION: Performed by: INTERNAL MEDICINE

## 2021-03-16 PROCEDURE — 93306 TTE W/DOPPLER COMPLETE: CPT

## 2021-03-16 RX ADMIN — IOPAMIDOL 100 ML: 612 INJECTION, SOLUTION INTRAVENOUS at 11:18

## 2021-03-16 RX ADMIN — PERFLUTREN 1.5 ML: 6.52 INJECTION, SUSPENSION INTRAVENOUS at 08:53

## 2021-03-17 LAB
ASCENDING AORTA: 2.9 CM
BH CV ECHO MEAS - ACS: 1.6 CM
BH CV ECHO MEAS - AO MAX PG (FULL): 4.6 MMHG
BH CV ECHO MEAS - AO MAX PG: 9.1 MMHG
BH CV ECHO MEAS - AO MEAN PG (FULL): 2 MMHG
BH CV ECHO MEAS - AO MEAN PG: 4 MMHG
BH CV ECHO MEAS - AO ROOT AREA (BSA CORRECTED): 1.5
BH CV ECHO MEAS - AO ROOT AREA: 8 CM^2
BH CV ECHO MEAS - AO ROOT DIAM: 3.2 CM
BH CV ECHO MEAS - AO V2 MAX: 151 CM/SEC
BH CV ECHO MEAS - AO V2 MEAN: 89.3 CM/SEC
BH CV ECHO MEAS - AO V2 VTI: 26.8 CM
BH CV ECHO MEAS - ASC AORTA: 2.9 CM
BH CV ECHO MEAS - AVA(I,A): 2.2 CM^2
BH CV ECHO MEAS - AVA(I,D): 2.2 CM^2
BH CV ECHO MEAS - AVA(V,A): 2.4 CM^2
BH CV ECHO MEAS - AVA(V,D): 2.4 CM^2
BH CV ECHO MEAS - BSA(HAYCOCK): 2.1 M^2
BH CV ECHO MEAS - BSA: 2.1 M^2
BH CV ECHO MEAS - BZI_BMI: 30.1 KILOGRAMS/M^2
BH CV ECHO MEAS - BZI_METRIC_HEIGHT: 175.3 CM
BH CV ECHO MEAS - BZI_METRIC_WEIGHT: 92.5 KG
BH CV ECHO MEAS - EDV(MOD-SP2): 91 ML
BH CV ECHO MEAS - EDV(MOD-SP4): 104 ML
BH CV ECHO MEAS - EDV(TEICH): 92.4 ML
BH CV ECHO MEAS - EF(CUBED): 73.2 %
BH CV ECHO MEAS - EF(MOD-BP): 60.1 %
BH CV ECHO MEAS - EF(MOD-SP2): 60.4 %
BH CV ECHO MEAS - EF(MOD-SP4): 59.6 %
BH CV ECHO MEAS - EF(TEICH): 65.2 %
BH CV ECHO MEAS - ESV(MOD-SP2): 36 ML
BH CV ECHO MEAS - ESV(MOD-SP4): 42 ML
BH CV ECHO MEAS - ESV(TEICH): 32.2 ML
BH CV ECHO MEAS - FS: 35.6 %
BH CV ECHO MEAS - IVS/LVPW: 1
BH CV ECHO MEAS - IVSD: 1.1 CM
BH CV ECHO MEAS - LAT PEAK E' VEL: 6.6 CM/SEC
BH CV ECHO MEAS - LV DIASTOLIC VOL/BSA (35-75): 49.9 ML/M^2
BH CV ECHO MEAS - LV MASS(C)D: 175 GRAMS
BH CV ECHO MEAS - LV MASS(C)DI: 84 GRAMS/M^2
BH CV ECHO MEAS - LV MAX PG: 4.5 MMHG
BH CV ECHO MEAS - LV MEAN PG: 2 MMHG
BH CV ECHO MEAS - LV SYSTOLIC VOL/BSA (12-30): 20.2 ML/M^2
BH CV ECHO MEAS - LV V1 MAX: 106 CM/SEC
BH CV ECHO MEAS - LV V1 MEAN: 59.7 CM/SEC
BH CV ECHO MEAS - LV V1 VTI: 16.8 CM
BH CV ECHO MEAS - LVIDD: 4.5 CM
BH CV ECHO MEAS - LVIDS: 2.9 CM
BH CV ECHO MEAS - LVLD AP2: 7.1 CM
BH CV ECHO MEAS - LVLD AP4: 6.9 CM
BH CV ECHO MEAS - LVLS AP2: 6.3 CM
BH CV ECHO MEAS - LVLS AP4: 6.1 CM
BH CV ECHO MEAS - LVOT AREA (M): 3.5 CM^2
BH CV ECHO MEAS - LVOT AREA: 3.5 CM^2
BH CV ECHO MEAS - LVOT DIAM: 2.1 CM
BH CV ECHO MEAS - LVPWD: 1.1 CM
BH CV ECHO MEAS - MED PEAK E' VEL: 7.4 CM/SEC
BH CV ECHO MEAS - MV A DUR: 0.11 SEC
BH CV ECHO MEAS - MV A MAX VEL: 94.1 CM/SEC
BH CV ECHO MEAS - MV DEC SLOPE: 514 CM/SEC^2
BH CV ECHO MEAS - MV DEC TIME: 0.24 SEC
BH CV ECHO MEAS - MV E MAX VEL: 88.1 CM/SEC
BH CV ECHO MEAS - MV E/A: 0.94
BH CV ECHO MEAS - MV MAX PG: 5.1 MMHG
BH CV ECHO MEAS - MV MEAN PG: 3 MMHG
BH CV ECHO MEAS - MV P1/2T MAX VEL: 122 CM/SEC
BH CV ECHO MEAS - MV P1/2T: 69.5 MSEC
BH CV ECHO MEAS - MV V2 MAX: 113 CM/SEC
BH CV ECHO MEAS - MV V2 MEAN: 77 CM/SEC
BH CV ECHO MEAS - MV V2 VTI: 30.2 CM
BH CV ECHO MEAS - MVA P1/2T LCG: 1.8 CM^2
BH CV ECHO MEAS - MVA(P1/2T): 3.2 CM^2
BH CV ECHO MEAS - MVA(VTI): 1.9 CM^2
BH CV ECHO MEAS - PA MAX PG (FULL): 4.5 MMHG
BH CV ECHO MEAS - PA MAX PG: 6.2 MMHG
BH CV ECHO MEAS - PA V2 MAX: 124 CM/SEC
BH CV ECHO MEAS - PULM A REVS DUR: 0.08 SEC
BH CV ECHO MEAS - PULM A REVS VEL: 33.1 CM/SEC
BH CV ECHO MEAS - PULM DIAS VEL: 60.8 CM/SEC
BH CV ECHO MEAS - PULM S/D: 1.1
BH CV ECHO MEAS - PULM SYS VEL: 68.2 CM/SEC
BH CV ECHO MEAS - PVA(V,A): 2.4 CM^2
BH CV ECHO MEAS - PVA(V,D): 2.4 CM^2
BH CV ECHO MEAS - QP/QS: 0.86
BH CV ECHO MEAS - RV MAX PG: 1.7 MMHG
BH CV ECHO MEAS - RV MEAN PG: 1 MMHG
BH CV ECHO MEAS - RV V1 MAX: 64.6 CM/SEC
BH CV ECHO MEAS - RV V1 MEAN: 44.5 CM/SEC
BH CV ECHO MEAS - RV V1 VTI: 11.1 CM
BH CV ECHO MEAS - RVOT AREA: 4.5 CM^2
BH CV ECHO MEAS - RVOT DIAM: 2.4 CM
BH CV ECHO MEAS - SI(AO): 103.5 ML/M^2
BH CV ECHO MEAS - SI(CUBED): 32 ML/M^2
BH CV ECHO MEAS - SI(LVOT): 27.9 ML/M^2
BH CV ECHO MEAS - SI(MOD-SP2): 26.4 ML/M^2
BH CV ECHO MEAS - SI(MOD-SP4): 29.8 ML/M^2
BH CV ECHO MEAS - SI(TEICH): 28.9 ML/M^2
BH CV ECHO MEAS - SV(AO): 215.5 ML
BH CV ECHO MEAS - SV(CUBED): 66.7 ML
BH CV ECHO MEAS - SV(LVOT): 58.2 ML
BH CV ECHO MEAS - SV(MOD-SP2): 55 ML
BH CV ECHO MEAS - SV(MOD-SP4): 62 ML
BH CV ECHO MEAS - SV(RVOT): 50.2 ML
BH CV ECHO MEAS - SV(TEICH): 60.2 ML
BH CV ECHO MEAS - TAPSE (>1.6): 1.9 CM
BH CV ECHO MEASUREMENTS AVERAGE E/E' RATIO: 12.59
BH CV XLRA - RV BASE: 3.6 CM
BH CV XLRA - RV LENGTH: 5.7 CM
BH CV XLRA - RV MID: 2.7 CM
BH CV XLRA - TDI S': 14.1 CM/SEC
LEFT ATRIUM VOLUME INDEX: 16 ML/M2
MAXIMAL PREDICTED HEART RATE: 156 BPM
SINUS: 3.2 CM
STJ: 3.2 CM
STRESS TARGET HR: 133 BPM

## 2021-03-22 ENCOUNTER — OFFICE VISIT (OUTPATIENT)
Dept: OTHER | Facility: HOSPITAL | Age: 66
End: 2021-03-22

## 2021-03-22 DIAGNOSIS — Z72.0 TOBACCO ABUSE: Primary | ICD-10-CM

## 2021-03-22 NOTE — PROGRESS NOTES
"  Subjective     REASON FOR follow up:    1. 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  Currently receiving palliative 5 fu leucovorin  AND HERCEPTIN therapy once a month    2.COLOVESICAL FISTULA: chronic antibiotic therapy cipro, NO FURTHER PLANS FOR SURGERY AFTER VISIT AND PROCEDURE BY DR GM CASTILLO 1/20    3. 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 NEURONTIN INITIATED.      History of Present Illness Mr. Garcia is a 65 y.o. with above medical problems who returns today for follow-up with imaging for review.  Specifically patient had repeat CT scans as well as repeat echocardiogram in the setting of ongoing 5-FU/leucovorin plus Herceptin therapy.  Imaging as detailed below.    We did discuss incidental finding on CT scans of significant atherosclerotic disease notably with occlusion of bilateral common femoral arteries with associated significant stenosis within the superficial femoral arteries.  Patient questioned about symptoms.  States he does get pain in his legs when he walks for too long.  Otherwise denies any numbness or tingling or loss of feeling in the legs.  Denies his legs feeling cool.  Denies any pain in the legs.    We also reviewed his echocardiogram showing stable ejection fraction of 60%.    Patient does continue to smoke but has decreased his overall usage greatly, down to 10 cigarettes a day roughly.  He continues to work with [facility] with the smoking cessation clinic.    Patient denies other new concerns today.    Past Medical History:   Diagnosis Date   • Arthritis    • Elevated PSA    • Esophageal cancer (CMS/HCC)    • Esophageal mass    • Fistula    • H/O Lung nodule    • Hepatitis     CHILD--PT STATED \"I THINK IT WAS A.\"   • History of pneumonia    • Hx of blood clots 08/10/2019 "   • Hyperlipidemia    • Hypertension    • Neuropathy    • Prostate cancer (CMS/HCC)    • PVD (peripheral vascular disease) (CMS/HCC)         Past Surgical History:   Procedure Laterality Date   • COLONOSCOPY N/A 2/4/2020    Procedure: COLONOSCOPY;  Surgeon: Guy Sears MD;  Location: [facility] ENDOSCOPY;  Service: General;  Laterality: N/A;  PRE-COLOVESICAL FISTULA  POST-- DIVERTICULOSIS   • CYSTOSCOPY  02/06/2020   • CYSTOSCOPY Bilateral 2/26/2020    Procedure: CYSTOSCOPY RETROGRADE;  Surgeon: Cm Witt MD;  Location: [facility] OR;  Service: Urology;  Laterality: Bilateral;   • HERNIA REPAIR  1999   • PROSTATE SURGERY  03/2008   • VENOUS ACCESS DEVICE (PORT) INSERTION N/A 7/16/2019    Procedure: INSERTION VENOUS ACCESS DEVICE WITH FLUORO AND EGD WITH BIOPSY;  Surgeon: Mary Brito MD;  Location: [facility] OR;  Service: Thoracic        Current Outpatient Medications on File Prior to Visit   Medication Sig Dispense Refill   • cevimeline (EVOXAC) 30 MG capsule Take 30 mg by mouth 3 (Three) Times a Day.     • ciprofloxacin (CIPRO) 250 MG tablet Take 1 tablet by mouth Daily. 90 tablet 1   • clotrimazole (MYCELEX) 10 MG obie 3 TIMES A DAY 90 tablet 1   • econazole nitrate (SPECTAZOLE) 1 % cream APPLY TO ALL AFFECTED NAILS AT BEDTIME     • gabapentin (NEURONTIN) 300 MG capsule Take 2 capsules by mouth every night at bedtime. 60 capsule 4   • lisinopril-hydrochlorothiazide (PRINZIDE,ZESTORETIC) 20-12.5 MG per tablet TAKE 1 TABLET BY MOUTH EVERY DAY 90 tablet 2   • nicotine (NICODERM CQ) 21 MG/24HR patch Place 1 patch on the skin as directed by provider Daily. 28 each 1   • ondansetron (ZOFRAN) 4 MG tablet Take 1 tablet by mouth Every 8 (Eight) Hours As Needed for Nausea or Vomiting. 30 tablet 2   • Probiotic Product (PROBIOTIC DAILY PO) Take 1 tablet by mouth Daily.     • triamcinolone (KENALOG) 0.1 % ointment Apply  topically to the appropriate area as directed 2 (Two) Times a Day.  (Patient taking differently: Apply 1 application topically to the appropriate area as directed As Needed.) 30 g 2   • Xarelto 20 MG tablet TAKE 1 TABLET BY MOUTH EVERY DAY**STOP LOVENOX** 90 tablet 1     Current Facility-Administered Medications on File Prior to Visit   Medication Dose Route Frequency Provider Last Rate Last Admin   • [COMPLETED] dexamethasone (DECADRON) injection 4 mg  4 mg Intravenous Once Bushra Patrick APRN   4 mg at 03/23/21 0846   • [COMPLETED] diphenhydrAMINE (BENADRYL) 25 mg IVPB  25 mg Intravenous Once Bushra Patrick APRN   Stopped at 03/23/21 0858   • [COMPLETED] fluorouracil (ADRUCIL) chemo injection 810 mg  400 mg/m2 (Treatment Plan Recorded) Intravenous Once Bushra Patrick APRN   Stopped at 03/23/21 1013   • [COMPLETED] leucovorin 810 mg in sodium chloride 0.9 % 290.5 mL IVPB  400 mg/m2 (Treatment Plan Recorded) Intravenous Once Bushra Patrick APRN   Stopped at 03/23/21 1009   • [COMPLETED] palonosetron (ALOXI) injection 0.25 mg  0.25 mg Intravenous Once Bushra Patrick APRN   0.25 mg at 03/23/21 0843   • [COMPLETED] sodium chloride 0.9 % infusion 250 mL  250 mL Intravenous Once Bushra Patrick APRN   Stopped at 03/23/21 1013   • [COMPLETED] Trastuzumab (HERCEPTIN) 350 mg in sodium chloride 0.9 % 250 mL chemo IVPB  4 mg/kg (Treatment Plan Recorded) Intravenous Once Bushra Patrick APRN   Stopped at 03/23/21 0931   • [DISCONTINUED] famotidine (PEPCID) injection 20 mg  20 mg Intravenous BID Bushra Patrick APRN   20 mg at 03/23/21 0843   • [DISCONTINUED] fluorouracil (ADRUCIL) 4,850 mg, sodium chloride 0.9 % 143 mL 240 mL chemo infusion - FOR HOME USE  2,400 mg/m2 (Treatment Plan Recorded) Intravenous Once Bushra Patrick APRN   4,850 mg at 03/23/21 1010        ALLERGIES:  No Known Allergies     Social History     Socioeconomic History   • Marital status: Single     Spouse name: Not on file   • Number of children:  Not on file   • Years of education: High school   • Highest education level: Not on file   Tobacco Use   • Smoking status: Current Every Day Smoker     Packs/day: 1.00     Years: 38.00     Pack years: 38.00     Types: Cigarettes     Start date: 1974   • Smokeless tobacco: Never Used   • Tobacco comment: Quit for a period of 8 years   Vaping Use   • Vaping Use: Never used   Substance and Sexual Activity   • Alcohol use: Not Currently     Comment: NOT RECENTLY- none since september   • Drug use: No   • Sexual activity: Defer        Family History   Problem Relation Age of Onset   • Hypertension Mother    • Stroke Mother    • Hypertension Other    • Lung disease Other    • Prostate cancer Other    • Lung cancer Father    • Malig Hyperthermia Neg Hx       Current Outpatient Medications on File Prior to Visit   Medication Sig Dispense Refill   • cevimeline (EVOXAC) 30 MG capsule Take 30 mg by mouth 3 (Three) Times a Day.     • ciprofloxacin (CIPRO) 250 MG tablet Take 1 tablet by mouth Daily. 90 tablet 1   • clotrimazole (MYCELEX) 10 MG obie 3 TIMES A DAY 90 tablet 1   • econazole nitrate (SPECTAZOLE) 1 % cream APPLY TO ALL AFFECTED NAILS AT BEDTIME     • gabapentin (NEURONTIN) 300 MG capsule Take 2 capsules by mouth every night at bedtime. 60 capsule 4   • lisinopril-hydrochlorothiazide (PRINZIDE,ZESTORETIC) 20-12.5 MG per tablet TAKE 1 TABLET BY MOUTH EVERY DAY 90 tablet 2   • nicotine (NICODERM CQ) 21 MG/24HR patch Place 1 patch on the skin as directed by provider Daily. 28 each 1   • ondansetron (ZOFRAN) 4 MG tablet Take 1 tablet by mouth Every 8 (Eight) Hours As Needed for Nausea or Vomiting. 30 tablet 2   • Probiotic Product (PROBIOTIC DAILY PO) Take 1 tablet by mouth Daily.     • triamcinolone (KENALOG) 0.1 % ointment Apply  topically to the appropriate area as directed 2 (Two) Times a Day. (Patient taking differently: Apply 1 application topically to the appropriate area as directed As Needed.) 30 g 2   •  "Xarelto 20 MG tablet TAKE 1 TABLET BY MOUTH EVERY DAY**STOP LOVENOX** 90 tablet 1     Current Facility-Administered Medications on File Prior to Visit   Medication Dose Route Frequency Provider Last Rate Last Admin   • [COMPLETED] dexamethasone (DECADRON) injection 4 mg  4 mg Intravenous Once Bushra Patrick APRN   4 mg at 03/23/21 0846   • [COMPLETED] diphenhydrAMINE (BENADRYL) 25 mg IVPB  25 mg Intravenous Once Bushra Patrick APRLEIDY   Stopped at 03/23/21 0858   • [COMPLETED] fluorouracil (ADRUCIL) chemo injection 810 mg  400 mg/m2 (Treatment Plan Recorded) Intravenous Once Bushra Patrick APRN   Stopped at 03/23/21 1013   • [COMPLETED] leucovorin 810 mg in sodium chloride 0.9 % 290.5 mL IVPB  400 mg/m2 (Treatment Plan Recorded) Intravenous Once Bushra Patrick APRN   Stopped at 03/23/21 1009   • [COMPLETED] palonosetron (ALOXI) injection 0.25 mg  0.25 mg Intravenous Once Bushra Patrick APRN   0.25 mg at 03/23/21 0843   • [COMPLETED] sodium chloride 0.9 % infusion 250 mL  250 mL Intravenous Once Bushra Patrick APRN   Stopped at 03/23/21 1013   • [COMPLETED] Trastuzumab (HERCEPTIN) 350 mg in sodium chloride 0.9 % 250 mL chemo IVPB  4 mg/kg (Treatment Plan Recorded) Intravenous Once Bushra Patrick APRN   Stopped at 03/23/21 0931   • [DISCONTINUED] famotidine (PEPCID) injection 20 mg  20 mg Intravenous BID Bushra Patrick APRN   20 mg at 03/23/21 0843   • [DISCONTINUED] fluorouracil (ADRUCIL) 4,850 mg, sodium chloride 0.9 % 143 mL 240 mL chemo infusion - FOR HOME USE  2,400 mg/m2 (Treatment Plan Recorded) Intravenous Once Bushra Patrick APRN   4,850 mg at 03/23/21 1010   No Known Allergies         Objective     Vitals:    03/23/21 0756   BP: 125/72   Pulse: 92   Resp: 16   Temp: 97.5 °F (36.4 °C)   TempSrc: Temporal   SpO2: 100%   Weight: 94.9 kg (209 lb 4.8 oz)   Height: 175.3 cm (69.02\")   PainSc: 0-No pain     Current Status 3/23/2021   ECOG " score 0       PHYSICAL EXAM:  Patient screened for depression based on a PHQ-9 score of 0 on 1/26/2021.  Constitutional: He is oriented to person, place, and time. He appears well-developed and well-nourished. No distress. Smells strongly of cigarette smoke.  HENT:   Head: Normocephalic and atraumatic.   Mouth/Throat: Oropharynx is clear and moist and mucous membranes are normal. No oropharyngeal exudate.   Eyes: Pupils are equal, round, and reactive to light.   Neck: Normal range of motion.   Cardiovascular: Normal rate, regular rhythm and normal heart sounds.   No murmur heard.  Pulmonary/Chest: Effort normal and lungs clear to auscultation bilaterally.  No respiratory distress. Benign left chest mediport   Abdominal: Soft. Normal appearance and bowel sounds are normal. He exhibits no distension. There is no hepatosplenomegaly.   Musculoskeletal: Normal range of motion. He exhibits no edema.   Neurological: He is alert and oriented to person, place, and time.   Skin: Skin is warm and dry. No rash noted.   Psychiatric: He has a normal mood and affect.   Vitals reviewed.    I have reexamined the patient and the results are consistent with the previously documented exam. Bushra Patrick, [facility]       RECENT LABS:  Results from last 7 days   Lab Units 03/23/21  0741   WBC 10*3/mm3 11.58*   NEUTROS ABS 10*3/mm3 7.40*   HEMOGLOBIN g/dL 12.0*   HEMATOCRIT % 35.5*   PLATELETS 10*3/mm3 170     Results from last 7 days   Lab Units 03/23/21  0741   SODIUM mmol/L 134   POTASSIUM mmol/L 4.1   CHLORIDE mmol/L 99   CO2 mmol/L 23.4   BUN mg/dL 14   CREATININE mg/dL 0.77   CALCIUM mg/dL 9.5   ALBUMIN g/dL 4.10   BILIRUBIN mg/dL 0.2   ALK PHOS U/L 97   ALT (SGPT) U/L 56*   AST (SGOT) U/L 33   GLUCOSE mg/dL 192*         IMAGING personally reviewed:  CT Chest With Contrast Diagnostic (03/16/2021 11:20)  Adult Transthoracic Echo Complete W/ Cont if Necessary Per Protocol (03/16/2021 08:52)        Assessment/Plan    1.Stage IV  adenocarcinoma of the esophagus with extensive liver metastasis.     ? Patient undergoing therapy with 5-FU/leucovorin plus Herceptin currently.  ? PET scan October 2020 showing minimal uptake in the lower esophagus but more importantly complete resolution of liver metastases.  There is visibility still of colovesicular fistula.  ? Continuing 5-FU/leucovorin plus Herceptin monthly.   ? Repeat CT C/A/P 3/16/2021 reviewed with patient today. No evidence of progressive of disease.  Plan to continue same therapy with 5-FU/leucovorin plus Herceptin.     2.  Peripheral neuropathy secondary to previous oxaliplatin therapy.  This is currently stable.  Patient continues with gabapentin 600 mg nightly.     3. Colovesical fistula.    Patient remains on low-dose prophylaxis with Cipro 250 mg 3x/week.  No current issues with this.       4.  Cardiac monitoring due to high risk therapy, specifically Herceptin.  Repeat echocardiogram reviewed today, stable EF.       5.  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  Continues on Xarelto with good tolerance.     6.  History of prostate cancer.  Most recent PSA was 8/10/2020 was <0.014.     7.  Tobacco abuse.  Patient has decreased smoking down from 2 packs a day to around 10 cigarettes most recently through working with smoking cessation clinic, [facility].     8.  Atherosclerotic disease.  CT imaging 3/16/2021 noting occlusion of bilateral common femoral arteries and significant stenosis with in the superficial femoral arteries.  Patient without significant symptoms, without claudication, numbness or tingling.  Does note his legs will hurt if he walks for a lengthy period of time.  Per review with Dr. Keller we will refer [facility] to [facility] for further evaluation.    PLAN:  1.  CT scans and echocardiogram personally reviewed and discussed with patient in detail today.  2.  [facility] for  significant atherosclerotic disease in lower extremities noted on imaging.  3.  Proceed with cycle 26 5-FU/leucovorin plus Herceptin.  4.  Return in 4 weeks for follow-up with Dr. Keller and ongoing therapy.  5.  Continue follow-up with with smoking cessation clinic.    This patient is on drug therapy requiring intensive monitoring for toxicity.  We did more than just assess side effects of treatment today.

## 2021-03-22 NOTE — PROGRESS NOTES
"[facility] MULTIDISCIPLINARY CLINIC  SMOKING CESSATION TELEHEALTH FOLLOW-UP VISIT    Han Garcia is a pleasant 65 y.o. male seen today by [facility] for smoking cessation counseling follow up    Patient consent obtained for our telehealth visit today:  You have chosen to receive care through a telehealth visit.  Do you consent to use a video/audio connection for your medical care today? YES    HPI:  Patient continues to smoke cigarettes daily, currently smoking about 10 cigarettes a day (down from 15/day at last visit).  Continues to struggle with consistent use of patch.    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 Patient is a current every day smoker. Reports currently smoking 20 cigarettes per day. Has smoked as much as 40 cigarettes per day in the past. Approximately 38 pack year history.   E-cigarette use NONE  Other combustible tobacco use NONE  Smokeless tobacco use NONE  Caffeine 4-5 CUPS/DAY     Cancer treatment plan: 5-FU, leucovorin and Herceptin   History of seizures NO  Psychiatric History NONE  Patient has quit smoking several times in the past. Quit for about 8 years early 90's. Was going thru divorce and  Relapsed with a friend who was also abstinent for many years and going through a divorce.     Has quit at other times for hours or days. Withdrawal symptoms difficult to describe, just feels like \"he has to have one\".      Patient importance of quitting smoking 10/10. He has had multiple meaningful conversations with Dr Haynes about how smoking continues to impact his health. Patient describes leg aching when walking due to peripheral vascular compromise. Extremely confident he will be able to maintain abstinence once he is able to stop. They are ready to quit smoking within the next 2-4 weeks.  Patient is willing to set a quit date.     Patient identified motivating factors for " "quitting include preserving his health.      Patient identified potential support resources available including his two sisters, one of which is also a smoker and has expressed interest in quitting smoking herself.      Patient anticipates future challenges/barriers including managing free time.       Social History     Socioeconomic History   • Marital status: Single     Spouse name: Not on file   • Number of children: Not on file   • Years of education: High school   • Highest education level: Not on file   Tobacco Use   • Smoking status: Current Every Day Smoker     Packs/day: 1.00     Years: 38.00     Pack years: 38.00     Types: Cigarettes     Start date: 1974   • Smokeless tobacco: Never Used   • Tobacco comment: Quit for a period of 8 years   Vaping Use   • Vaping Use: Never used   Substance and Sexual Activity   • Alcohol use: Not Currently     Comment: NOT RECENTLY- none since september   • Drug use: No   • Sexual activity: Defer            Lab Results   Component Value Date    GLUCOSE 170 (H) 02/23/2021    BUN 14 02/23/2021    CREATININE 0.80 03/16/2021    EGFRIFNONA 92 02/23/2021    EGFRIFAFRI 100 02/02/2019    BCR 16.7 02/23/2021    K 4.1 02/23/2021    CO2 23.2 02/23/2021    CALCIUM 9.4 02/23/2021    PROTENTOTREF 7.1 02/02/2019    ALBUMIN 4.20 02/23/2021    LABIL2 1.6 02/02/2019    AST 33 02/23/2021    ALT 53 (H) 02/23/2021        Past Medical History:   Diagnosis Date   • Arthritis    • Elevated PSA    • Esophageal cancer (CMS/HCC)    • Esophageal mass    • Fistula    • H/O Lung nodule    • Hepatitis     CHILD--PT STATED \"I THINK IT WAS A.\"   • History of pneumonia    • Hx of blood clots 08/10/2019   • Hyperlipidemia    • Hypertension    • Neuropathy    • Prostate cancer (CMS/HCC)    • PVD (peripheral vascular disease) (CMS/HCC)          QUIT PLAN (STAR):  Set quit date: Today we reviewed the importance of setting a quit date at least 2 weeks into the future to allow ample time for preparations for quit " attempt.  He is not ready to set up another quit date at this time.    Tell friends/family: His sister is been a support and accountability buddy.    Anticipate challenges: Boredom, isolation.  He acknowledges that with the improvements and whether he is getting some time outside    Remove tobacco from environment: Today we reviewed the importance of total abstinence for a quit attempt.  His car continues to be a smoke-free environment and he has utilize car rides as a distraction in the past.    Medication plan: Reviewed use of nicotine patch and that for it to to have best efficacy he should be wearing it daily    Practical counseling: We will continue cessation coaching in the survivorship clinic.  Reviewed availability of 1 800 quit now and offered a referral, declines today.  Provided encouragement particularly around taking advantage of the improved weather getting out of the house and leaving the cigarettes behind.    Patient provided with resources to assist with smoking cessation including the 1-800-Quit Now line and Health Department programs.     Follow-up: 1 month, we are scheduled for a telehealth visit.. I have encouraged them to call me with any questions or as needed in the interm.    No diagnosis found.    No orders of the defined types were placed in this encounter.        A total of 20 minutes was spent engaged in one to one discussion by TELEHEALTH of cessation of tobacco, setting a quit date of, pharmacotherapy, side effects, behavioral counseling.

## 2021-03-23 ENCOUNTER — INFUSION (OUTPATIENT)
Dept: ONCOLOGY | Facility: HOSPITAL | Age: 66
End: 2021-03-23

## 2021-03-23 ENCOUNTER — OFFICE VISIT (OUTPATIENT)
Dept: ONCOLOGY | Facility: CLINIC | Age: 66
End: 2021-03-23

## 2021-03-23 ENCOUNTER — TELEPHONE (OUTPATIENT)
Dept: GENERAL RADIOLOGY | Facility: HOSPITAL | Age: 66
End: 2021-03-23

## 2021-03-23 VITALS
TEMPERATURE: 97.5 F | RESPIRATION RATE: 16 BRPM | HEIGHT: 69 IN | BODY MASS INDEX: 31 KG/M2 | DIASTOLIC BLOOD PRESSURE: 72 MMHG | HEART RATE: 92 BPM | OXYGEN SATURATION: 100 % | SYSTOLIC BLOOD PRESSURE: 125 MMHG | WEIGHT: 209.3 LBS

## 2021-03-23 DIAGNOSIS — G62.0 PERIPHERAL NEUROPATHY DUE TO CHEMOTHERAPY (HCC): ICD-10-CM

## 2021-03-23 DIAGNOSIS — C15.5 MALIGNANT NEOPLASM OF LOWER THIRD OF ESOPHAGUS (HCC): Primary | ICD-10-CM

## 2021-03-23 DIAGNOSIS — T45.1X5A PERIPHERAL NEUROPATHY DUE TO CHEMOTHERAPY (HCC): ICD-10-CM

## 2021-03-23 DIAGNOSIS — Z79.01 CURRENT USE OF LONG TERM ANTICOAGULATION: ICD-10-CM

## 2021-03-23 DIAGNOSIS — C78.7 LIVER METASTASES: ICD-10-CM

## 2021-03-23 DIAGNOSIS — C78.7 LIVER METASTASIS: ICD-10-CM

## 2021-03-23 DIAGNOSIS — Z72.0 TOBACCO ABUSE: ICD-10-CM

## 2021-03-23 DIAGNOSIS — C15.5 MALIGNANT NEOPLASM OF LOWER THIRD OF ESOPHAGUS (HCC): ICD-10-CM

## 2021-03-23 DIAGNOSIS — Z45.2 ENCOUNTER FOR ADJUSTMENT OR MANAGEMENT OF VASCULAR ACCESS DEVICE: ICD-10-CM

## 2021-03-23 DIAGNOSIS — Z79.899 HIGH RISK MEDICATION USE: ICD-10-CM

## 2021-03-23 DIAGNOSIS — N32.1 COLOVESICAL FISTULA: ICD-10-CM

## 2021-03-23 DIAGNOSIS — I82.431 ACUTE DEEP VEIN THROMBOSIS (DVT) OF RIGHT POPLITEAL VEIN (HCC): ICD-10-CM

## 2021-03-23 DIAGNOSIS — I70.90 ATHEROSCLEROTIC OCCLUSIVE DISEASE: Primary | ICD-10-CM

## 2021-03-23 LAB
ALBUMIN SERPL-MCNC: 4.1 G/DL (ref 3.5–5.2)
ALBUMIN/GLOB SERPL: 1.6 G/DL (ref 1.1–2.4)
ALP SERPL-CCNC: 97 U/L (ref 38–116)
ALT SERPL W P-5'-P-CCNC: 56 U/L (ref 0–41)
ANION GAP SERPL CALCULATED.3IONS-SCNC: 11.6 MMOL/L (ref 5–15)
AST SERPL-CCNC: 33 U/L (ref 0–40)
BASOPHILS # BLD AUTO: 0.1 10*3/MM3 (ref 0–0.2)
BASOPHILS NFR BLD AUTO: 0.9 % (ref 0–1.5)
BILIRUB SERPL-MCNC: 0.2 MG/DL (ref 0.2–1.2)
BUN SERPL-MCNC: 14 MG/DL (ref 6–20)
BUN/CREAT SERPL: 18.2 (ref 7.3–30)
CALCIUM SPEC-SCNC: 9.5 MG/DL (ref 8.5–10.2)
CEA SERPL-MCNC: 8.93 NG/ML
CHLORIDE SERPL-SCNC: 99 MMOL/L (ref 98–107)
CO2 SERPL-SCNC: 23.4 MMOL/L (ref 22–29)
CREAT SERPL-MCNC: 0.77 MG/DL (ref 0.7–1.3)
DEPRECATED RDW RBC AUTO: 51.5 FL (ref 37–54)
EOSINOPHIL # BLD AUTO: 0.42 10*3/MM3 (ref 0–0.4)
EOSINOPHIL NFR BLD AUTO: 3.6 % (ref 0.3–6.2)
ERYTHROCYTE [DISTWIDTH] IN BLOOD BY AUTOMATED COUNT: 15 % (ref 12.3–15.4)
GFR SERPL CREATININE-BSD FRML MDRD: 102 ML/MIN/1.73
GLOBULIN UR ELPH-MCNC: 2.6 GM/DL (ref 1.8–3.5)
GLUCOSE SERPL-MCNC: 192 MG/DL (ref 74–124)
HCT VFR BLD AUTO: 35.5 % (ref 37.5–51)
HGB BLD-MCNC: 12 G/DL (ref 13–17.7)
IMM GRANULOCYTES # BLD AUTO: 0.64 10*3/MM3 (ref 0–0.05)
IMM GRANULOCYTES NFR BLD AUTO: 5.5 % (ref 0–0.5)
LYMPHOCYTES # BLD AUTO: 2.27 10*3/MM3 (ref 0.7–3.1)
LYMPHOCYTES NFR BLD AUTO: 19.6 % (ref 19.6–45.3)
MCH RBC QN AUTO: 32.2 PG (ref 26.6–33)
MCHC RBC AUTO-ENTMCNC: 33.8 G/DL (ref 31.5–35.7)
MCV RBC AUTO: 95.2 FL (ref 79–97)
MONOCYTES # BLD AUTO: 0.75 10*3/MM3 (ref 0.1–0.9)
MONOCYTES NFR BLD AUTO: 6.5 % (ref 5–12)
NEUTROPHILS NFR BLD AUTO: 63.9 % (ref 42.7–76)
NEUTROPHILS NFR BLD AUTO: 7.4 10*3/MM3 (ref 1.7–7)
NRBC BLD AUTO-RTO: 0 /100 WBC (ref 0–0.2)
PLATELET # BLD AUTO: 170 10*3/MM3 (ref 140–450)
PMV BLD AUTO: 9.4 FL (ref 6–12)
POTASSIUM SERPL-SCNC: 4.1 MMOL/L (ref 3.5–4.7)
PROT SERPL-MCNC: 6.7 G/DL (ref 6.3–8)
RBC # BLD AUTO: 3.73 10*6/MM3 (ref 4.14–5.8)
SODIUM SERPL-SCNC: 134 MMOL/L (ref 134–145)
WBC # BLD AUTO: 11.58 10*3/MM3 (ref 3.4–10.8)

## 2021-03-23 PROCEDURE — 85025 COMPLETE CBC W/AUTO DIFF WBC: CPT

## 2021-03-23 PROCEDURE — 25010000002 LEUCOVORIN CALCIUM PER 50 MG: Performed by: NURSE PRACTITIONER

## 2021-03-23 PROCEDURE — 96375 TX/PRO/DX INJ NEW DRUG ADDON: CPT

## 2021-03-23 PROCEDURE — 96416 CHEMO PROLONG INFUSE W/PUMP: CPT

## 2021-03-23 PROCEDURE — 96367 TX/PROPH/DG ADDL SEQ IV INF: CPT

## 2021-03-23 PROCEDURE — 25010000002 FLUOROURACIL PER 500 MG: Performed by: NURSE PRACTITIONER

## 2021-03-23 PROCEDURE — 25010000002 DIPHENHYDRAMINE 1 EACH BAG: Performed by: NURSE PRACTITIONER

## 2021-03-23 PROCEDURE — 25010000002 PALONOSETRON PER 25 MCG: Performed by: NURSE PRACTITIONER

## 2021-03-23 PROCEDURE — 96368 THER/DIAG CONCURRENT INF: CPT

## 2021-03-23 PROCEDURE — 82378 CARCINOEMBRYONIC ANTIGEN: CPT | Performed by: INTERNAL MEDICINE

## 2021-03-23 PROCEDURE — 80053 COMPREHEN METABOLIC PANEL: CPT

## 2021-03-23 PROCEDURE — 25010000002 TRASTUZUMAB PER 10 MG: Performed by: NURSE PRACTITIONER

## 2021-03-23 PROCEDURE — 25010000002 DEXAMETHASONE PER 1 MG: Performed by: NURSE PRACTITIONER

## 2021-03-23 PROCEDURE — 99215 OFFICE O/P EST HI 40 MIN: CPT | Performed by: NURSE PRACTITIONER

## 2021-03-23 PROCEDURE — 96413 CHEMO IV INFUSION 1 HR: CPT

## 2021-03-23 PROCEDURE — 96411 CHEMO IV PUSH ADDL DRUG: CPT

## 2021-03-23 RX ORDER — FAMOTIDINE 10 MG/ML
20 INJECTION, SOLUTION INTRAVENOUS AS NEEDED
Status: CANCELLED | OUTPATIENT
Start: 2021-03-23

## 2021-03-23 RX ORDER — DIPHENHYDRAMINE HYDROCHLORIDE 50 MG/ML
50 INJECTION INTRAMUSCULAR; INTRAVENOUS AS NEEDED
Status: CANCELLED | OUTPATIENT
Start: 2021-03-23

## 2021-03-23 RX ORDER — FLUOROURACIL 50 MG/ML
400 INJECTION, SOLUTION INTRAVENOUS ONCE
Status: COMPLETED | OUTPATIENT
Start: 2021-03-23 | End: 2021-03-23

## 2021-03-23 RX ORDER — PALONOSETRON 0.05 MG/ML
0.25 INJECTION, SOLUTION INTRAVENOUS ONCE
Status: CANCELLED | OUTPATIENT
Start: 2021-03-23

## 2021-03-23 RX ORDER — FLUOROURACIL 50 MG/ML
400 INJECTION, SOLUTION INTRAVENOUS ONCE
Status: CANCELLED | OUTPATIENT
Start: 2021-03-23

## 2021-03-23 RX ORDER — DEXAMETHASONE SODIUM PHOSPHATE 4 MG/ML
4 INJECTION, SOLUTION INTRA-ARTICULAR; INTRALESIONAL; INTRAMUSCULAR; INTRAVENOUS; SOFT TISSUE ONCE
Status: CANCELLED
Start: 2021-03-23

## 2021-03-23 RX ORDER — SODIUM CHLORIDE 9 MG/ML
250 INJECTION, SOLUTION INTRAVENOUS ONCE
Status: COMPLETED | OUTPATIENT
Start: 2021-03-23 | End: 2021-03-23

## 2021-03-23 RX ORDER — SODIUM CHLORIDE 9 MG/ML
250 INJECTION, SOLUTION INTRAVENOUS ONCE
Status: CANCELLED | OUTPATIENT
Start: 2021-03-23

## 2021-03-23 RX ORDER — FAMOTIDINE 10 MG/ML
20 INJECTION, SOLUTION INTRAVENOUS 2 TIMES DAILY
Status: DISCONTINUED | OUTPATIENT
Start: 2021-03-23 | End: 2021-03-23 | Stop reason: HOSPADM

## 2021-03-23 RX ORDER — FAMOTIDINE 10 MG/ML
20 INJECTION, SOLUTION INTRAVENOUS 2 TIMES DAILY
Status: CANCELLED | OUTPATIENT
Start: 2021-03-23

## 2021-03-23 RX ORDER — PALONOSETRON 0.05 MG/ML
0.25 INJECTION, SOLUTION INTRAVENOUS ONCE
Status: COMPLETED | OUTPATIENT
Start: 2021-03-23 | End: 2021-03-23

## 2021-03-23 RX ORDER — DEXAMETHASONE SODIUM PHOSPHATE 4 MG/ML
4 INJECTION, SOLUTION INTRA-ARTICULAR; INTRALESIONAL; INTRAMUSCULAR; INTRAVENOUS; SOFT TISSUE ONCE
Status: COMPLETED | OUTPATIENT
Start: 2021-03-23 | End: 2021-03-23

## 2021-03-23 RX ADMIN — FAMOTIDINE 20 MG: 10 INJECTION INTRAVENOUS at 08:43

## 2021-03-23 RX ADMIN — DIPHENHYDRAMINE HYDROCHLORIDE 25 MG: 50 INJECTION INTRAMUSCULAR; INTRAVENOUS at 08:43

## 2021-03-23 RX ADMIN — SODIUM CHLORIDE 250 ML: 9 INJECTION, SOLUTION INTRAVENOUS at 08:42

## 2021-03-23 RX ADMIN — PALONOSETRON 0.25 MG: 0.05 INJECTION, SOLUTION INTRAVENOUS at 08:43

## 2021-03-23 RX ADMIN — FLUOROURACIL 810 MG: 50 INJECTION, SOLUTION INTRAVENOUS at 10:10

## 2021-03-23 RX ADMIN — FLUOROURACIL 4850 MG: 50 INJECTION, SOLUTION INTRAVENOUS at 10:10

## 2021-03-23 RX ADMIN — DEXAMETHASONE SODIUM PHOSPHATE 4 MG: 4 INJECTION, SOLUTION INTRAMUSCULAR; INTRAVENOUS at 08:46

## 2021-03-23 RX ADMIN — SODIUM CHLORIDE 810 MG: 900 INJECTION, SOLUTION INTRAVENOUS at 09:38

## 2021-03-23 RX ADMIN — TRASTUZUMAB 350 MG: 150 INJECTION, POWDER, LYOPHILIZED, FOR SOLUTION INTRAVENOUS at 09:01

## 2021-03-23 NOTE — TELEPHONE ENCOUNTER
----- Message from Gardenia Salazar RN sent at 3/23/2021 10:14 AM EDT -----  Regarding: unhook  Please schedule this patient for an unhook on Thursday at 10am.    Thanks

## 2021-03-25 ENCOUNTER — INFUSION (OUTPATIENT)
Dept: ONCOLOGY | Facility: HOSPITAL | Age: 66
End: 2021-03-25

## 2021-03-25 DIAGNOSIS — Z45.2 ENCOUNTER FOR ADJUSTMENT OR MANAGEMENT OF VASCULAR ACCESS DEVICE: ICD-10-CM

## 2021-03-25 DIAGNOSIS — C78.7 LIVER METASTASIS: ICD-10-CM

## 2021-03-25 DIAGNOSIS — C15.5 MALIGNANT NEOPLASM OF LOWER THIRD OF ESOPHAGUS (HCC): Primary | ICD-10-CM

## 2021-03-25 PROCEDURE — 25010000003 HEPARIN LOCK FLUSH PER 10 UNITS: Performed by: INTERNAL MEDICINE

## 2021-03-25 RX ORDER — SODIUM CHLORIDE 0.9 % (FLUSH) 0.9 %
10 SYRINGE (ML) INJECTION AS NEEDED
Status: DISCONTINUED | OUTPATIENT
Start: 2021-03-25 | End: 2021-03-25 | Stop reason: HOSPADM

## 2021-03-25 RX ORDER — HEPARIN SODIUM (PORCINE) LOCK FLUSH IV SOLN 100 UNIT/ML 100 UNIT/ML
500 SOLUTION INTRAVENOUS AS NEEDED
Status: DISCONTINUED | OUTPATIENT
Start: 2021-03-25 | End: 2021-03-25 | Stop reason: HOSPADM

## 2021-03-25 RX ORDER — HEPARIN SODIUM (PORCINE) LOCK FLUSH IV SOLN 100 UNIT/ML 100 UNIT/ML
500 SOLUTION INTRAVENOUS AS NEEDED
Status: CANCELLED | OUTPATIENT
Start: 2021-03-25

## 2021-03-25 RX ORDER — SODIUM CHLORIDE 0.9 % (FLUSH) 0.9 %
10 SYRINGE (ML) INJECTION AS NEEDED
Status: CANCELLED | OUTPATIENT
Start: 2021-03-25

## 2021-03-25 RX ADMIN — Medication 500 UNITS: at 09:57

## 2021-03-25 RX ADMIN — SODIUM CHLORIDE, PRESERVATIVE FREE 10 ML: 5 INJECTION INTRAVENOUS at 09:57

## 2021-03-26 RX ORDER — RIVAROXABAN 20 MG/1
TABLET, FILM COATED ORAL
Qty: 90 TABLET | Refills: 1 | Status: SHIPPED | OUTPATIENT
Start: 2021-03-26 | End: 2021-08-07 | Stop reason: HOSPADM

## 2021-03-29 RX ORDER — LISINOPRIL AND HYDROCHLOROTHIAZIDE 20; 12.5 MG/1; MG/1
TABLET ORAL
Qty: 90 TABLET | Refills: 0 | Status: SHIPPED | OUTPATIENT
Start: 2021-03-29 | End: 2021-06-22

## 2021-04-07 DIAGNOSIS — Z45.2 ENCOUNTER FOR ADJUSTMENT OR MANAGEMENT OF VASCULAR ACCESS DEVICE: ICD-10-CM

## 2021-04-07 DIAGNOSIS — N32.1 COLOVESICAL FISTULA: ICD-10-CM

## 2021-04-07 DIAGNOSIS — I82.412 ACUTE DEEP VEIN THROMBOSIS (DVT) OF FEMORAL VEIN OF LEFT LOWER EXTREMITY (HCC): ICD-10-CM

## 2021-04-07 DIAGNOSIS — Z72.0 TOBACCO ABUSE: ICD-10-CM

## 2021-04-07 DIAGNOSIS — C15.5 MALIGNANT NEOPLASM OF LOWER THIRD OF ESOPHAGUS (HCC): ICD-10-CM

## 2021-04-08 RX ORDER — GABAPENTIN 300 MG/1
600 CAPSULE ORAL
Qty: 60 CAPSULE | Refills: 4 | Status: SHIPPED | OUTPATIENT
Start: 2021-04-08 | End: 2021-09-09 | Stop reason: SDUPTHER

## 2021-04-19 ENCOUNTER — OFFICE VISIT (OUTPATIENT)
Dept: OTHER | Facility: HOSPITAL | Age: 66
End: 2021-04-19

## 2021-04-19 DIAGNOSIS — Z72.0 TOBACCO ABUSE: Primary | ICD-10-CM

## 2021-04-19 PROCEDURE — 99212-NC PR NO CHARGE CBC OFFICE OUTPATIENT VISIT 10 MINUTES: Performed by: NURSE PRACTITIONER

## 2021-04-19 NOTE — PROGRESS NOTES
[facility] MULTIDISCIPLINARY CLINIC  SMOKING CESSATION TELEHEALTH FOLLOW-UP VISIT     Han Garcia is a pleasant 65 y.o. male seen today by VIDEO for smoking cessation counseling follow up        Patient consent obtained for our telehealth visit today:  You have chosen to receive care through a telehealth visit.  Do you consent to use a video/audio connection for your medical care today? YES    HPI:  Patient continues to smoke cigarettes daily, currently smoking about 15-20 cigarettes a day. Continues to struggle with consistent use of patch. Discouraged with duration of current cancer treatment. Has been trying to take advantage of nicer temps and get outside more.     QUIT PLAN (STAR):  Set quit date: Today we explored patient concerns around treatment. He is not ready to set up another quit date at this time.     Tell friends/family: His sister is been a support and accountability buddy.     Anticipate challenges: Boredom, isolation.  He acknowledges that with the improvements in whether he is getting some time outside     Remove tobacco from environment: Today we reviewed the importance of total abstinence for a quit attempt.  His car continues to be a smoke-free environment and he has utilize car rides as a distraction in the past.     Medication plan: Reviewed use of nicotine patch and that for it to to have best efficacy he should be wearing it daily     Practical counseling: We will continue cessation coaching in the survivorship clinic.  Reviewed availability of 1 800 quit now. Provided encouragement particularly around taking advantage of the improved weather getting out of the house and leaving the cigarettes behind.     Follow-up: 1 month, we are scheduled for a telehealth visit. I will plan to meet him tomorrow in the infusion room to provide some written materials regarding resources at [employer] and [facility]. I have encouraged them to call me with any questions or as needed  in the interm.     No diagnosis found.     No orders of the defined types were placed in this encounter.           A total of 20 minutes was spent engaged in one to one discussion by TELEHEALTH of cessation of tobacco, setting a quit date of, pharmacotherapy, side effects, behavioral counseling.

## 2021-04-20 ENCOUNTER — INFUSION (OUTPATIENT)
Dept: ONCOLOGY | Facility: HOSPITAL | Age: 66
End: 2021-04-20

## 2021-04-20 ENCOUNTER — OFFICE VISIT (OUTPATIENT)
Dept: ONCOLOGY | Facility: CLINIC | Age: 66
End: 2021-04-20

## 2021-04-20 VITALS
RESPIRATION RATE: 19 BRPM | DIASTOLIC BLOOD PRESSURE: 68 MMHG | BODY MASS INDEX: 30.88 KG/M2 | SYSTOLIC BLOOD PRESSURE: 109 MMHG | TEMPERATURE: 97.6 F | WEIGHT: 208.5 LBS | HEIGHT: 69 IN | HEART RATE: 99 BPM | OXYGEN SATURATION: 98 %

## 2021-04-20 DIAGNOSIS — G62.0 PERIPHERAL NEUROPATHY DUE TO CHEMOTHERAPY (HCC): ICD-10-CM

## 2021-04-20 DIAGNOSIS — I82.412 ACUTE DEEP VEIN THROMBOSIS (DVT) OF FEMORAL VEIN OF LEFT LOWER EXTREMITY (HCC): ICD-10-CM

## 2021-04-20 DIAGNOSIS — C78.7 LIVER METASTASIS: ICD-10-CM

## 2021-04-20 DIAGNOSIS — Z79.01 CURRENT USE OF LONG TERM ANTICOAGULATION: ICD-10-CM

## 2021-04-20 DIAGNOSIS — C15.5 MALIGNANT NEOPLASM OF LOWER THIRD OF ESOPHAGUS (HCC): Primary | ICD-10-CM

## 2021-04-20 DIAGNOSIS — C15.5 MALIGNANT NEOPLASM OF LOWER THIRD OF ESOPHAGUS (HCC): ICD-10-CM

## 2021-04-20 DIAGNOSIS — C78.7 LIVER METASTASES: ICD-10-CM

## 2021-04-20 DIAGNOSIS — I10 ESSENTIAL HYPERTENSION: ICD-10-CM

## 2021-04-20 DIAGNOSIS — N32.1 COLOVESICAL FISTULA: ICD-10-CM

## 2021-04-20 DIAGNOSIS — R73.01 IMPAIRED FASTING GLUCOSE: ICD-10-CM

## 2021-04-20 DIAGNOSIS — T45.1X5A PERIPHERAL NEUROPATHY DUE TO CHEMOTHERAPY (HCC): ICD-10-CM

## 2021-04-20 DIAGNOSIS — Z72.0 TOBACCO ABUSE: ICD-10-CM

## 2021-04-20 DIAGNOSIS — Z45.2 ENCOUNTER FOR ADJUSTMENT OR MANAGEMENT OF VASCULAR ACCESS DEVICE: ICD-10-CM

## 2021-04-20 LAB
ALBUMIN SERPL-MCNC: 4 G/DL (ref 3.5–5.2)
ALBUMIN/GLOB SERPL: 1.3 G/DL (ref 1.1–2.4)
ALP SERPL-CCNC: 94 U/L (ref 38–116)
ALT SERPL W P-5'-P-CCNC: 54 U/L (ref 0–41)
ANION GAP SERPL CALCULATED.3IONS-SCNC: 12 MMOL/L (ref 5–15)
AST SERPL-CCNC: 32 U/L (ref 0–40)
BASOPHILS # BLD AUTO: 0.11 10*3/MM3 (ref 0–0.2)
BASOPHILS NFR BLD AUTO: 0.9 % (ref 0–1.5)
BILIRUB SERPL-MCNC: 0.3 MG/DL (ref 0.2–1.2)
BUN SERPL-MCNC: 15 MG/DL (ref 6–20)
BUN/CREAT SERPL: 13 (ref 7.3–30)
CALCIUM SPEC-SCNC: 9.4 MG/DL (ref 8.5–10.2)
CEA SERPL-MCNC: 12.3 NG/ML
CHLORIDE SERPL-SCNC: 99 MMOL/L (ref 98–107)
CHOLEST SERPL-MCNC: 174 MG/DL (ref 0–200)
CO2 SERPL-SCNC: 23 MMOL/L (ref 22–29)
CREAT SERPL-MCNC: 1.15 MG/DL (ref 0.7–1.3)
DEPRECATED RDW RBC AUTO: 51.1 FL (ref 37–54)
EOSINOPHIL # BLD AUTO: 0.42 10*3/MM3 (ref 0–0.4)
EOSINOPHIL NFR BLD AUTO: 3.3 % (ref 0.3–6.2)
ERYTHROCYTE [DISTWIDTH] IN BLOOD BY AUTOMATED COUNT: 14.9 % (ref 12.3–15.4)
GFR SERPL CREATININE-BSD FRML MDRD: 64 ML/MIN/1.73
GLOBULIN UR ELPH-MCNC: 3.1 GM/DL (ref 1.8–3.5)
GLUCOSE SERPL-MCNC: 162 MG/DL (ref 74–124)
HCT VFR BLD AUTO: 35.4 % (ref 37.5–51)
HDLC SERPL-MCNC: 30 MG/DL (ref 40–60)
HGB BLD-MCNC: 11.9 G/DL (ref 13–17.7)
IMM GRANULOCYTES # BLD AUTO: 0.54 10*3/MM3 (ref 0–0.05)
IMM GRANULOCYTES NFR BLD AUTO: 4.3 % (ref 0–0.5)
LDLC SERPL CALC-MCNC: 104 MG/DL (ref 0–100)
LDLC/HDLC SERPL: 3.27 {RATIO}
LYMPHOCYTES # BLD AUTO: 2.14 10*3/MM3 (ref 0.7–3.1)
LYMPHOCYTES NFR BLD AUTO: 17.1 % (ref 19.6–45.3)
MCH RBC QN AUTO: 31.3 PG (ref 26.6–33)
MCHC RBC AUTO-ENTMCNC: 33.6 G/DL (ref 31.5–35.7)
MCV RBC AUTO: 93.2 FL (ref 79–97)
MONOCYTES # BLD AUTO: 0.9 10*3/MM3 (ref 0.1–0.9)
MONOCYTES NFR BLD AUTO: 7.2 % (ref 5–12)
NEUTROPHILS NFR BLD AUTO: 67.2 % (ref 42.7–76)
NEUTROPHILS NFR BLD AUTO: 8.44 10*3/MM3 (ref 1.7–7)
NRBC BLD AUTO-RTO: 0 /100 WBC (ref 0–0.2)
PLATELET # BLD AUTO: 199 10*3/MM3 (ref 140–450)
PMV BLD AUTO: 9.3 FL (ref 6–12)
POTASSIUM SERPL-SCNC: 4.2 MMOL/L (ref 3.5–4.7)
PROT SERPL-MCNC: 7.1 G/DL (ref 6.3–8)
RBC # BLD AUTO: 3.8 10*6/MM3 (ref 4.14–5.8)
SODIUM SERPL-SCNC: 134 MMOL/L (ref 134–145)
TRIGL SERPL-MCNC: 229 MG/DL (ref 0–150)
TSH SERPL DL<=0.05 MIU/L-ACNC: 2.01 UIU/ML (ref 0.27–4.2)
VLDLC SERPL-MCNC: 40 MG/DL (ref 5–40)
WBC # BLD AUTO: 12.55 10*3/MM3 (ref 3.4–10.8)

## 2021-04-20 PROCEDURE — 25010000002 DIPHENHYDRAMINE 1 EACH BAG: Performed by: INTERNAL MEDICINE

## 2021-04-20 PROCEDURE — 25010000002 TRASTUZUMAB PER 10 MG: Performed by: INTERNAL MEDICINE

## 2021-04-20 PROCEDURE — 96411 CHEMO IV PUSH ADDL DRUG: CPT

## 2021-04-20 PROCEDURE — 25010000002 PALONOSETRON PER 25 MCG: Performed by: INTERNAL MEDICINE

## 2021-04-20 PROCEDURE — 96375 TX/PRO/DX INJ NEW DRUG ADDON: CPT

## 2021-04-20 PROCEDURE — 25010000002 DEXAMETHASONE PER 1 MG: Performed by: INTERNAL MEDICINE

## 2021-04-20 PROCEDURE — 82378 CARCINOEMBRYONIC ANTIGEN: CPT | Performed by: INTERNAL MEDICINE

## 2021-04-20 PROCEDURE — 36591 DRAW BLOOD OFF VENOUS DEVICE: CPT

## 2021-04-20 PROCEDURE — 99215 OFFICE O/P EST HI 40 MIN: CPT | Performed by: INTERNAL MEDICINE

## 2021-04-20 PROCEDURE — 96413 CHEMO IV INFUSION 1 HR: CPT

## 2021-04-20 PROCEDURE — 25010000002 FLUOROURACIL PER 500 MG: Performed by: INTERNAL MEDICINE

## 2021-04-20 PROCEDURE — 96416 CHEMO PROLONG INFUSE W/PUMP: CPT

## 2021-04-20 PROCEDURE — 85025 COMPLETE CBC W/AUTO DIFF WBC: CPT

## 2021-04-20 PROCEDURE — 80061 LIPID PANEL: CPT | Performed by: INTERNAL MEDICINE

## 2021-04-20 PROCEDURE — 36415 COLL VENOUS BLD VENIPUNCTURE: CPT | Performed by: INTERNAL MEDICINE

## 2021-04-20 PROCEDURE — 25010000002 LEUCOVORIN CALCIUM PER 50 MG: Performed by: INTERNAL MEDICINE

## 2021-04-20 PROCEDURE — 96367 TX/PROPH/DG ADDL SEQ IV INF: CPT

## 2021-04-20 PROCEDURE — 84443 ASSAY THYROID STIM HORMONE: CPT | Performed by: INTERNAL MEDICINE

## 2021-04-20 PROCEDURE — 80053 COMPREHEN METABOLIC PANEL: CPT

## 2021-04-20 RX ORDER — DEXAMETHASONE SODIUM PHOSPHATE 4 MG/ML
4 INJECTION, SOLUTION INTRA-ARTICULAR; INTRALESIONAL; INTRAMUSCULAR; INTRAVENOUS; SOFT TISSUE ONCE
Status: CANCELLED | OUTPATIENT
Start: 2021-04-20

## 2021-04-20 RX ORDER — FLUOROURACIL 50 MG/ML
400 INJECTION, SOLUTION INTRAVENOUS ONCE
Status: CANCELLED | OUTPATIENT
Start: 2021-04-20

## 2021-04-20 RX ORDER — PALONOSETRON 0.05 MG/ML
0.25 INJECTION, SOLUTION INTRAVENOUS ONCE
Status: CANCELLED | OUTPATIENT
Start: 2021-04-20

## 2021-04-20 RX ORDER — DEXAMETHASONE SODIUM PHOSPHATE 4 MG/ML
4 INJECTION, SOLUTION INTRA-ARTICULAR; INTRALESIONAL; INTRAMUSCULAR; INTRAVENOUS; SOFT TISSUE ONCE
Status: COMPLETED | OUTPATIENT
Start: 2021-04-20 | End: 2021-04-20

## 2021-04-20 RX ORDER — FAMOTIDINE 10 MG/ML
20 INJECTION, SOLUTION INTRAVENOUS AS NEEDED
Status: CANCELLED | OUTPATIENT
Start: 2021-04-20

## 2021-04-20 RX ORDER — SODIUM CHLORIDE 9 MG/ML
250 INJECTION, SOLUTION INTRAVENOUS ONCE
Status: COMPLETED | OUTPATIENT
Start: 2021-04-20 | End: 2021-04-20

## 2021-04-20 RX ORDER — FLUOROURACIL 50 MG/ML
400 INJECTION, SOLUTION INTRAVENOUS ONCE
Status: COMPLETED | OUTPATIENT
Start: 2021-04-20 | End: 2021-04-20

## 2021-04-20 RX ORDER — FAMOTIDINE 10 MG/ML
20 INJECTION, SOLUTION INTRAVENOUS 2 TIMES DAILY
Status: CANCELLED | OUTPATIENT
Start: 2021-04-20

## 2021-04-20 RX ORDER — FAMOTIDINE 10 MG/ML
20 INJECTION, SOLUTION INTRAVENOUS 2 TIMES DAILY
Status: DISCONTINUED | OUTPATIENT
Start: 2021-04-20 | End: 2021-04-20 | Stop reason: HOSPADM

## 2021-04-20 RX ORDER — DIPHENHYDRAMINE HYDROCHLORIDE 50 MG/ML
50 INJECTION INTRAMUSCULAR; INTRAVENOUS AS NEEDED
Status: CANCELLED | OUTPATIENT
Start: 2021-04-20

## 2021-04-20 RX ORDER — PALONOSETRON 0.05 MG/ML
0.25 INJECTION, SOLUTION INTRAVENOUS ONCE
Status: COMPLETED | OUTPATIENT
Start: 2021-04-20 | End: 2021-04-20

## 2021-04-20 RX ORDER — CILOSTAZOL 50 MG/1
50 TABLET ORAL 2 TIMES DAILY
COMMUNITY
Start: 2021-03-24 | End: 2021-08-07 | Stop reason: HOSPADM

## 2021-04-20 RX ORDER — SODIUM CHLORIDE 9 MG/ML
250 INJECTION, SOLUTION INTRAVENOUS ONCE
Status: CANCELLED | OUTPATIENT
Start: 2021-04-20

## 2021-04-20 RX ADMIN — PALONOSETRON 0.25 MG: 0.05 INJECTION, SOLUTION INTRAVENOUS at 09:24

## 2021-04-20 RX ADMIN — DIPHENHYDRAMINE HYDROCHLORIDE 25 MG: 50 INJECTION INTRAMUSCULAR; INTRAVENOUS at 09:28

## 2021-04-20 RX ADMIN — SODIUM CHLORIDE 810 MG: 900 INJECTION, SOLUTION INTRAVENOUS at 10:38

## 2021-04-20 RX ADMIN — FAMOTIDINE 20 MG: 10 INJECTION INTRAVENOUS at 09:24

## 2021-04-20 RX ADMIN — DEXAMETHASONE SODIUM PHOSPHATE 4 MG: 4 INJECTION, SOLUTION INTRA-ARTICULAR; INTRALESIONAL; INTRAMUSCULAR; INTRAVENOUS; SOFT TISSUE at 09:24

## 2021-04-20 RX ADMIN — FLUOROURACIL 4850 MG: 50 INJECTION, SOLUTION INTRAVENOUS at 11:11

## 2021-04-20 RX ADMIN — SODIUM CHLORIDE 250 ML: 9 INJECTION, SOLUTION INTRAVENOUS at 09:28

## 2021-04-20 RX ADMIN — TRASTUZUMAB 350 MG: 150 INJECTION, POWDER, LYOPHILIZED, FOR SOLUTION INTRAVENOUS at 10:04

## 2021-04-20 RX ADMIN — FLUOROURACIL 810 MG: 50 INJECTION, SOLUTION INTRAVENOUS at 11:11

## 2021-04-20 NOTE — PROGRESS NOTES
Subjective     REASON FOR follow up:1.    1. 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  Currently receiving palliative 5 fu leucovorin  AND HERCEPTIN therapy once a month    2.COLOVESICAL FISTULA: chronic antibiotic therapy cipro, NO FURTHER PLANS FOR SURGERY AFTER VISIT AND PROCEDURE BY DR GM CASTILLO 1/20    3. 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 NEURONTIN INITIATED.        History of Present Illness       DURING THE VISIT WITH THE PATIENT TODAY , PATIENT HAD FACE MASK, MY MEDICAL ASSISTANT AND I  HAD PROPPER PROTECTIVE EQUIPMENT, AND I DID HAND HYGIENE WITH SOAP AND WATER BEFORE AND AFTER THE VISIT.      This patient returns today to the office for followup complaining that food does not taste correctly, especially for the meat products. He states that sweets, vegetables and fruits have no problem but he has been living all his life on meat products and he is very upset because he is not able to taste this anymore. He is wondering about what he can eat. His smell is still appropriate. Other than that, he has no difficulty swallowing. His weight is stable. He has no nausea or vomiting and he has no difficulty with bowel function. He had a slight urinary tract infection given his fistula last week and he took 3 days of ciprofloxacin that corrected the problem. His urine is completely clear today. He has not had any abdominal pain or jaundice. He has not had any skeletal pain. He remains anticoagulated with no clinical bleeding and he has not had any new thrombosis. His blood pressure is under good control. He is struggling to quit cigarettes and he knows that he has peripheral arterial disease. He has been seen by vascular surgeon and he has been advised that eventually he will require a bypass if symptoms get any  "worse. Medication, Pletal, was provided. He has not noticed any difference in regard to circulation to his legs. Because he is so fatigued and tired, he is not doing a lot of walking. Obviously this does not trigger claudication in short distances.                  Past Medical History:   Diagnosis Date   • Arthritis    • Elevated PSA    • Esophageal cancer (CMS/HCC)    • Esophageal mass    • Fistula    • H/O Lung nodule    • Hepatitis     CHILD--PT STATED \"I THINK IT WAS A.\"   • History of pneumonia    • Hx of blood clots 08/10/2019   • Hyperlipidemia    • Hypertension    • Neuropathy    • Prostate cancer (CMS/HCC)    • PVD (peripheral vascular disease) (CMS/HCC)         Past Surgical History:   Procedure Laterality Date   • COLONOSCOPY N/A 2/4/2020    Procedure: COLONOSCOPY;  Surgeon: Guy Sears MD;  Location: [facility] ENDOSCOPY;  Service: General;  Laterality: N/A;  PRE-COLOVESICAL FISTULA  POST-- DIVERTICULOSIS   • CYSTOSCOPY  02/06/2020   • CYSTOSCOPY Bilateral 2/26/2020    Procedure: CYSTOSCOPY RETROGRADE;  Surgeon: Cm Witt MD;  Location: [facility] OR;  Service: Urology;  Laterality: Bilateral;   • HERNIA REPAIR  1999   • PROSTATE SURGERY  03/2008   • VENOUS ACCESS DEVICE (PORT) INSERTION N/A 7/16/2019    Procedure: INSERTION VENOUS ACCESS DEVICE WITH FLUORO AND EGD WITH BIOPSY;  Surgeon: Mary Brito MD;  Location: [facility];  Service: Thoracic        Current Outpatient Medications on File Prior to Visit   Medication Sig Dispense Refill   • cevimeline (EVOXAC) 30 MG capsule Take 30 mg by mouth 3 (Three) Times a Day.     • cilostazol (PLETAL) 50 MG tablet      • ciprofloxacin (CIPRO) 250 MG tablet Take 1 tablet by mouth Daily. 90 tablet 1   • clotrimazole (MYCELEX) 10 MG obie 3 TIMES A DAY 90 tablet 1   • econazole nitrate (SPECTAZOLE) 1 % cream APPLY TO ALL AFFECTED NAILS AT BEDTIME     • gabapentin (NEURONTIN) 300 MG capsule TAKE 2 CAPSULES BY MOUTH EVERY NIGHT AT " BEDTIME 60 capsule 4   • lisinopril-hydrochlorothiazide (PRINZIDE,ZESTORETIC) 20-12.5 MG per tablet TAKE 1 TABLET BY MOUTH EVERY DAY 90 tablet 0   • nicotine (NICODERM CQ) 21 MG/24HR patch Place 1 patch on the skin as directed by provider Daily. 28 each 1   • ondansetron (ZOFRAN) 4 MG tablet Take 1 tablet by mouth Every 8 (Eight) Hours As Needed for Nausea or Vomiting. 30 tablet 2   • Probiotic Product (PROBIOTIC DAILY PO) Take 1 tablet by mouth Daily.     • triamcinolone (KENALOG) 0.1 % ointment Apply  topically to the appropriate area as directed 2 (Two) Times a Day. (Patient taking differently: Apply 1 application topically to the appropriate area as directed As Needed.) 30 g 2   • Xarelto 20 MG tablet TAKE 1 TABLET BY MOUTH EVERY DAY**STOP LOVENOX** 90 tablet 1     No current facility-administered medications on file prior to visit.        ALLERGIES:  No Known Allergies     Social History     Socioeconomic History   • Marital status: Single     Spouse name: Not on file   • Number of children: Not on file   • Years of education: High school   • Highest education level: Not on file   Tobacco Use   • Smoking status: Current Every Day Smoker     Packs/day: 1.00     Years: 38.00     Pack years: 38.00     Types: Cigarettes     Start date: 1974   • Smokeless tobacco: Never Used   • Tobacco comment: Quit for a period of 8 years   Vaping Use   • Vaping Use: Never used   Substance and Sexual Activity   • Alcohol use: Not Currently     Comment: NOT RECENTLY- none since september   • Drug use: No   • Sexual activity: Defer        Family History   Problem Relation Age of Onset   • Hypertension Mother    • Stroke Mother    • Hypertension Other    • Lung disease Other    • Prostate cancer Other    • Lung cancer Father    • Malig Hyperthermia Neg Hx       Current Outpatient Medications on File Prior to Visit   Medication Sig Dispense Refill   • cevimeline (EVOXAC) 30 MG capsule Take 30 mg by mouth 3 (Three) Times a Day.     •  "cilostazol (PLETAL) 50 MG tablet      • ciprofloxacin (CIPRO) 250 MG tablet Take 1 tablet by mouth Daily. 90 tablet 1   • clotrimazole (MYCELEX) 10 MG obie 3 TIMES A DAY 90 tablet 1   • econazole nitrate (SPECTAZOLE) 1 % cream APPLY TO ALL AFFECTED NAILS AT BEDTIME     • gabapentin (NEURONTIN) 300 MG capsule TAKE 2 CAPSULES BY MOUTH EVERY NIGHT AT BEDTIME 60 capsule 4   • lisinopril-hydrochlorothiazide (PRINZIDE,ZESTORETIC) 20-12.5 MG per tablet TAKE 1 TABLET BY MOUTH EVERY DAY 90 tablet 0   • nicotine (NICODERM CQ) 21 MG/24HR patch Place 1 patch on the skin as directed by provider Daily. 28 each 1   • ondansetron (ZOFRAN) 4 MG tablet Take 1 tablet by mouth Every 8 (Eight) Hours As Needed for Nausea or Vomiting. 30 tablet 2   • Probiotic Product (PROBIOTIC DAILY PO) Take 1 tablet by mouth Daily.     • triamcinolone (KENALOG) 0.1 % ointment Apply  topically to the appropriate area as directed 2 (Two) Times a Day. (Patient taking differently: Apply 1 application topically to the appropriate area as directed As Needed.) 30 g 2   • Xarelto 20 MG tablet TAKE 1 TABLET BY MOUTH EVERY DAY**STOP LOVENOX** 90 tablet 1     No current facility-administered medications on file prior to visit.   No Known Allergies         Objective     Vitals:    04/20/21 0812   BP: 109/68   Pulse: 99   Resp: 19   Temp: 97.6 °F (36.4 °C)   TempSrc: Temporal   SpO2: 98%   Weight: 94.6 kg (208 lb 8 oz)   Height: 175.3 cm (69.02\")   PainSc: 0-No pain     Current Status 4/20/2021   ECOG score 0           Physical exam      I HAVE PERSONALLY REVIEWED THE HISTORY OF THE PRESENT ILLNESS, PAST MEDICAL HISTORY, FAMILY HISTORY, SOCIAL HISTORY, ALLERGIES, MEDICATIONS STATED ABOVE IN THE OFFICE NOTE FROM TODAY.        GENERAL:  Well-developed, well-nourished  Patient  in no acute distress.   SKIN:  Warm, dry ,NO rashes,NO purpura ,NO petechiae.  HEENT:  Pupils were equal and reactive to light and accomodation, conjunctivae noninjected, no pterygium, normal " extraocular movements, normal visual acuity.   NECK:  Supple with good range of motion; no thyromegaly or masses, no JVD or bruits, no cervical adenopathies.No carotid artery pain, no carotid abnormal pulsation , NO arterial dance.  LYMPHATICS:  No cervical, NO supraclavicular, NO axillary,NO epitrochlear , NO inguinal adenopathy.  CARDIAC   normal rate and regular rhythm, without murmur,NO rubs NO S3 NO S4 right or left . Normal femoral, popliteal, pedis, brachial and carotid pulses.   VASCULAR ARTERIAL: DECREASED femoral,popliteal, pedis pulses , BILATERAL FEMORAL bruits.no paleness or cyanosis, no pain, no edema, no numbness, no gangrene.  VASCULAR VENOUS: no cyanosis, collateral circulation, varicosities, edema, palpable cords, pain, erythema.  ABDOMEN:  Soft, nontender with no hepatomegaly, no splenomegaly,no masses, no ascites, no collateral circulation,no distention,no Carpinteria sign, no abdominal pain, no inguinal hernias,no umbilical hernia, no abdominal bruits. Normal bowel sounds.  GENITAL: Not  Performed.  EXTREMITIES  AND SPINE:  No clubbing, cyanosis or edema, no deformities or pain .No kyphosis, scoliosis, deformities or pain in spine, ribs or pelvic bone.  NEUROLOGICAL:  Patient was awake, alert, oriented to time, person and place.            RECENT LABS:  Hematology WBC   Date Value Ref Range Status   04/20/2021 12.55 (H) 3.40 - 10.80 10*3/mm3 Final   02/02/2019 13.4 (H) 3.4 - 10.8 x10E3/uL Final     RBC   Date Value Ref Range Status   04/20/2021 3.80 (L) 4.14 - 5.80 10*6/mm3 Final   02/02/2019 4.81 4.14 - 5.80 x10E6/uL Final     Hemoglobin   Date Value Ref Range Status   04/20/2021 11.9 (L) 13.0 - 17.7 g/dL Final     Hematocrit   Date Value Ref Range Status   04/20/2021 35.4 (L) 37.5 - 51.0 % Final     Platelets   Date Value Ref Range Status   04/20/2021 199 140 - 450 10*3/mm3 Final        Interpretation Summary echo    · Left ventricular wall thickness is consistent with borderline concentric  hypertrophy.  · Calculated left ventricular EF = 60.1% Estimated left ventricular EF was in agreement with the calculated left ventricular EF. Left ventricular systolic function is normal.  · Left ventricular diastolic function is consistent with (grade II w/high LAP) pseudonormalization.  · C/w baseline study 7/2019 and her most recent study 12/2020, there is no change        CT CHEST, ABDOMEN AND PELVIS WITH CONTRAST     HISTORY: 65-year-old male with follow-up for esophageal neoplasm. The  patient had hepatic metastatic disease and previously has had DVT. For  follow-up.     TECHNIQUE: Axial CT images of the chest abdomen and pelvis were obtained  following administration of intravenous and oral contrast. Coronal and  sagittal reconstructions were then obtained.     COMPARISON: PET/CT dated 10/27/2020     FINDINGS:     CT CHEST: Circumferential low-density wall thickening involving the  distal third of the esophagus extending at the level of the GE junction  is present. There are small periesophageal lymph nodes present with  index node measuring 6 mm on image 78. There is no dilatation of the  proximal esophagus. A superior mediastinal lymph node on image 23 is  without interim change. Subcentimeter subcarinal lymph nodes are also  without interim change. No pleural or pericardial effusion is seen.  Calcified coronary artery disease is present. Mild ectasia of the  ascending thoracic aorta measuring up to 3.5 cm in AP diameter. The  central airways are patent without endobronchial lesion. Mild bronchial  wall thickening is demonstrated bilaterally. Background upper lobe  predominant emphysematous changes are present. No new or suspicious  pulmonary nodules are seen. No pathological axillary lymphadenopathy.     CT ABDOMEN AND PELVIS:Mild diffuse low attenuation of liver most  consistent with steatosis. Several hypoattenuating foci are seen within  the liver, index lesions are seen on image 94, 99, the largest  lesion  within the inferior right hepatic lobe on image 113. Many of these are  associated with associated capsular retraction. I suspect these  represent treated hepatic metastases with fibrosis. The lesions were  compared to prior CT from 09/2019. The spleen is normal. Pancreas is  normal without ductal dilatation. The gallbladder is unremarkable.  Subcentimeter retroperitoneal lymph nodes are without interim change.  Bilateral adrenal gland and kidneys are stable. The urinary bladder is  partially distended and contains nondependent air. Colonic  diverticulosis is again demonstrated with a track seen extending from  the sigmoid colon to the posterior bladder wall. No evidence of bowel  obstruction. Appendix is normal. Marked calcified atherosclerotic plaque  is seen within the abdominal aorta and its branches. Moderate narrowing  of the SMA at its origin caused by predominantly calcified plaque. Large  calcification at the level of the right common femoral artery which  appears to be completely occluded at its origin. The air is seen to  recanalize with occlusion of the right superficial femoral artery. On  the left there is multifocal stenosis within the left external iliac  artery with occlusion of the left common femoral artery as well as  hemodynamically significant stenosis of the left superficial femoral  artery. A lipoma is seen within the third portion of the duodenum.     IMPRESSION:  1. Distal esophageal wall thickening does not appear significantly  changed from the PET/CT with small periesophageal lymph nodes.  Subcentimeter retroperitoneal lymph nodes are without interim change.  The liver demonstrates evidence of treated hepatic metastatic disease  without convincing evidence of new disease.  2. Marked atherosclerotic disease with occlusion of bilateral common  femoral arteries and significant stenosis within the superficial femoral  arteries as above.  3. Colonic diverticulosis with stable linear  track extending from the  sigmoid to the posterior wall of the urinary bladder. Air is again  demonstrated within the bladder lumen.   4. Additional findings as above.     Radiation dose reduction techniques were utilized, including automated  exposure control and exposure modulation based on body size.     This report was finalized on 3/17/2021 1:30 PM by Dr. Court Hairston M.D.        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 Almost 90% of the liver WAS replaced by tumor. The patient has been undergoing chemotherapy with 5  fu and leucovorin  and Herceptin and has had excellent response clinically and radiologically. The patient was reviewed on 08/10/2020. I reviewed with the patient in the PAC system [facility] his PET scan that is dramatic. There is minimal uptake in the lower esophagus and most importantly complete resolution of his liver metastasis. Actually the only issue that is pertinent to the PET scan is still the visibility of his colovesical fistula.     Therefore from the point of view of his cancer of the esophagus, metastatic to the liver, he has no symptoms from the primary tumor in the esophagus and he has no symptoms related to his liver metastasis that has resolved altogether. His CEA level is stable.The patient raised the question about the CEA fluctuation. I pointed out to him that a lot of this is also related to his smoking. Smoking per se elevates CEA level.  Upon further reviewing the patient on 04/20/2021, he has no symptoms pertinent to his metastatic cancer of the esophagus to the liver and he has no difficulty swallowing. There are no side effects of the 5-FU, Leucovorin and Herceptin. The patient's cardiac function remains stable and he has not had any drop in the ejection fraction.     From the point of view of hypertension and cardiac toxicity, again the echocardiogram remains stable. His blood pressure is in the  excellent range and he remains on medicine for this by me.     From the point of view of neuropathy related to previous chemotherapy with oxaliplatin, he remains on Neurontin and he takes this medicine at bedtime. This will remain ongoing.     From the point of view of his deep vein thrombosis, he remains on anticoagulant, Xarelto, and no clinical bleeding and no new thrombosis.     From the point of view of his peripheral arterial disease, the patient has not had any claudication because he does not trigger walking longer distances to trigger symptomatology. He has been seen by Vascular Surgery, recommendation in regard to Pletal utilization and consideration in the future to bypass surgery and stent placement. He is not ready to proceed with this at this point.     In regard to smoking cessation, in spite of being seen by the smoking cessation nurse specialist, NIRU Jacobson, the patient has not quit smoking. Actually he is smoking now more because he suffers severely with his diet and the taste for food. I do not know how much the cigarettes is raising his CEA level, it is difficult to know whether recent radiological assessment of his abdomen documented a relatively quiet cancer in the esophagus and the liver.      The patient is very poor in spirit given the fact that he is not able to eat meat. All his life the most important food product is meat. None of the meats available to him are tasting correctly.  The rest of the other foods are tasting correctly. I cannot correct this problem and I pointed out to him. Getting the benefit of knowing some other patients with taste issues especially during the COVID pandemia, it has been suggested that foods that contain cinnamon, joanna, garlic, mint maybe could add some flavor to things. I suggested for him to work and see and see how far he can go but by the end of the day I think the cigarette also plays a big role in this in my opinion. Again, he is not  willing to stop his smoking. Therefore what can I do? It is up to him how he wants to modify his lifestyle. I suggested some other forms of proteins that are not animal proteins like beans, garbanzos, pea protein, dairy foods, cheese, among others as well as nuts. He will work on this and maybe he will have some satisfaction and comfort with these.     I went ahead and scheduled him to continue his chemotherapy back in 1 month instead of 2 weeks and give him enough time to disperse his anger and frustration about what goes on in the life of him at this point. He says that he will get over it on his own.    ·

## 2021-04-22 ENCOUNTER — INFUSION (OUTPATIENT)
Dept: ONCOLOGY | Facility: HOSPITAL | Age: 66
End: 2021-04-22

## 2021-04-22 DIAGNOSIS — C15.5 MALIGNANT NEOPLASM OF LOWER THIRD OF ESOPHAGUS (HCC): Primary | ICD-10-CM

## 2021-04-22 DIAGNOSIS — Z45.2 ENCOUNTER FOR ADJUSTMENT OR MANAGEMENT OF VASCULAR ACCESS DEVICE: ICD-10-CM

## 2021-04-22 DIAGNOSIS — C78.7 LIVER METASTASIS: ICD-10-CM

## 2021-04-22 PROCEDURE — 25010000002 HEPARIN LOCK FLUSH PER 10 UNITS: Performed by: INTERNAL MEDICINE

## 2021-04-22 RX ORDER — HEPARIN SODIUM (PORCINE) LOCK FLUSH IV SOLN 100 UNIT/ML 100 UNIT/ML
500 SOLUTION INTRAVENOUS AS NEEDED
Status: CANCELLED | OUTPATIENT
Start: 2021-04-22

## 2021-04-22 RX ORDER — HEPARIN SODIUM (PORCINE) LOCK FLUSH IV SOLN 100 UNIT/ML 100 UNIT/ML
500 SOLUTION INTRAVENOUS AS NEEDED
Status: DISCONTINUED | OUTPATIENT
Start: 2021-04-22 | End: 2021-04-22 | Stop reason: HOSPADM

## 2021-04-22 RX ORDER — SODIUM CHLORIDE 0.9 % (FLUSH) 0.9 %
10 SYRINGE (ML) INJECTION AS NEEDED
Status: DISCONTINUED | OUTPATIENT
Start: 2021-04-22 | End: 2021-04-22 | Stop reason: HOSPADM

## 2021-04-22 RX ORDER — SODIUM CHLORIDE 0.9 % (FLUSH) 0.9 %
10 SYRINGE (ML) INJECTION AS NEEDED
Status: CANCELLED | OUTPATIENT
Start: 2021-04-22

## 2021-04-22 RX ADMIN — Medication 500 UNITS: at 09:20

## 2021-04-22 RX ADMIN — SODIUM CHLORIDE, PRESERVATIVE FREE 10 ML: 5 INJECTION INTRAVENOUS at 09:20

## 2021-05-17 ENCOUNTER — OFFICE VISIT (OUTPATIENT)
Dept: OTHER | Facility: HOSPITAL | Age: 66
End: 2021-05-17

## 2021-05-17 DIAGNOSIS — Z72.0 TOBACCO ABUSE: Primary | ICD-10-CM

## 2021-05-17 PROCEDURE — 99212-NC PR NO CHARGE CBC OFFICE OUTPATIENT VISIT 10 MINUTES: Performed by: NURSE PRACTITIONER

## 2021-05-17 NOTE — PROGRESS NOTES
[facility] MULTIDISCIPLINARY CLINIC  SMOKING CESSATION TELEHEALTH FOLLOW-UP VISIT     Han Garcia is a pleasant 65 y.o. male seen today by VIDEO for smoking cessation counseling follow up        Patient consent obtained for our telehealth visit today:  You have chosen to receive care through a telehealth visit.  Do you consent to use a video/audio connection for your medical care today? YES     HPI:  Patient continues to smoke cigarettes daily, has been working on reducing daily cigarette intake.  Currently smoking about 10 cigarettes a day (down from 20 cigarettes a day at last visit).  He reports having had a chance to spend some time outside over the weekend.  He is able to work in 5 to 10-minute increments before vascular pain becomes intolerable.  He is able to rest then resume activities.  Typically does not experience claudication with walking around the store for example.  Anticipating anticancer infusions monthly per Dr. Keller.     QUIT PLAN (STAR):  Set quit date: Today we explored patient concerns around treatment. He is not ready to set up another quit date at this time.  He wants to maintain this most recent reduction in cigarettes and will be willing to revisit the idea of a quit date.     Tell friends/family: His sister is been a support and accountability ashley.     Anticipate challenges: Boredom, isolation.  He has been spending more time outside with the nicer weather.  He has been focusing on delaying cigarettes and trying to distract himself through cravings.  He has found some word search puzzle books at the [employer] which he has found helpful for distracting himself.  We also discussed other items like coloring pages for their benefits of relaxation, distraction, having something to do with his hands.  Has also been playing solitaire on his computer and on his phone.     Remove tobacco from environment: His car continues to be a smoke-free environment and he has utilize  car rides as a distraction in the past.     Medication plan: Reviewed use of nicotine patch and that for it to to have best efficacy he should be wearing it daily.  Currently is not using nicotine patch or any other quit smoking medications but he does have this available when he is ready for another attempt.     Practical counseling: We will continue cessation coaching in the survivorship clinic.  Provided encouragement particularly around taking advantage of the improved weather getting out of the house and leaving the cigarettes behind.     Follow-up: 1 month, we are scheduled for a telehealth visit. I have encouraged them to call me with any questions or as needed in the interm.     No diagnosis found.     No orders of the defined types were placed in this encounter.           A total of 20 minutes was spent engaged in one to one discussion by TELEHEALTH of cessation of tobacco, setting a quit date of, pharmacotherapy, side effects, behavioral counseling.

## 2021-05-18 ENCOUNTER — OFFICE VISIT (OUTPATIENT)
Dept: ONCOLOGY | Facility: CLINIC | Age: 66
End: 2021-05-18

## 2021-05-18 ENCOUNTER — INFUSION (OUTPATIENT)
Dept: ONCOLOGY | Facility: HOSPITAL | Age: 66
End: 2021-05-18

## 2021-05-18 VITALS
HEART RATE: 95 BPM | TEMPERATURE: 98 F | WEIGHT: 207.6 LBS | SYSTOLIC BLOOD PRESSURE: 106 MMHG | HEIGHT: 69 IN | RESPIRATION RATE: 18 BRPM | BODY MASS INDEX: 30.75 KG/M2 | OXYGEN SATURATION: 98 % | DIASTOLIC BLOOD PRESSURE: 68 MMHG

## 2021-05-18 DIAGNOSIS — I10 ESSENTIAL HYPERTENSION: ICD-10-CM

## 2021-05-18 DIAGNOSIS — Z79.01 CHRONIC ANTICOAGULATION: ICD-10-CM

## 2021-05-18 DIAGNOSIS — C78.7 LIVER METASTASIS: ICD-10-CM

## 2021-05-18 DIAGNOSIS — C15.5 MALIGNANT NEOPLASM OF LOWER THIRD OF ESOPHAGUS (HCC): ICD-10-CM

## 2021-05-18 DIAGNOSIS — C78.7 LIVER METASTASES: ICD-10-CM

## 2021-05-18 DIAGNOSIS — I82.412 ACUTE DEEP VEIN THROMBOSIS (DVT) OF FEMORAL VEIN OF LEFT LOWER EXTREMITY (HCC): ICD-10-CM

## 2021-05-18 DIAGNOSIS — Z72.0 TOBACCO ABUSE: ICD-10-CM

## 2021-05-18 DIAGNOSIS — C15.5 MALIGNANT NEOPLASM OF LOWER THIRD OF ESOPHAGUS (HCC): Primary | ICD-10-CM

## 2021-05-18 DIAGNOSIS — I82.431 ACUTE DEEP VEIN THROMBOSIS (DVT) OF RIGHT POPLITEAL VEIN (HCC): ICD-10-CM

## 2021-05-18 DIAGNOSIS — G62.0 PERIPHERAL NEUROPATHY DUE TO CHEMOTHERAPY (HCC): ICD-10-CM

## 2021-05-18 DIAGNOSIS — Z79.01 CURRENT USE OF LONG TERM ANTICOAGULATION: ICD-10-CM

## 2021-05-18 DIAGNOSIS — T45.1X5A PERIPHERAL NEUROPATHY DUE TO CHEMOTHERAPY (HCC): ICD-10-CM

## 2021-05-18 DIAGNOSIS — N32.1 COLOVESICAL FISTULA: ICD-10-CM

## 2021-05-18 DIAGNOSIS — D63.8 ANEMIA, CHRONIC DISEASE: ICD-10-CM

## 2021-05-18 DIAGNOSIS — L98.8 FISTULA: ICD-10-CM

## 2021-05-18 DIAGNOSIS — Z45.2 ENCOUNTER FOR ADJUSTMENT OR MANAGEMENT OF VASCULAR ACCESS DEVICE: ICD-10-CM

## 2021-05-18 DIAGNOSIS — R73.01 IMPAIRED FASTING GLUCOSE: ICD-10-CM

## 2021-05-18 LAB
ALBUMIN SERPL-MCNC: 3.9 G/DL (ref 3.5–5.2)
ALBUMIN/GLOB SERPL: 1.4 G/DL (ref 1.1–2.4)
ALP SERPL-CCNC: 97 U/L (ref 38–116)
ALT SERPL W P-5'-P-CCNC: 52 U/L (ref 0–41)
ANION GAP SERPL CALCULATED.3IONS-SCNC: 11.5 MMOL/L (ref 5–15)
AST SERPL-CCNC: 35 U/L (ref 0–40)
BASOPHILS # BLD AUTO: 0.07 10*3/MM3 (ref 0–0.2)
BASOPHILS NFR BLD AUTO: 0.6 % (ref 0–1.5)
BILIRUB SERPL-MCNC: 0.3 MG/DL (ref 0.2–1.2)
BUN SERPL-MCNC: 13 MG/DL (ref 6–20)
BUN/CREAT SERPL: 14.1 (ref 7.3–30)
CALCIUM SPEC-SCNC: 9.3 MG/DL (ref 8.5–10.2)
CEA SERPL-MCNC: 13.7 NG/ML
CHLORIDE SERPL-SCNC: 99 MMOL/L (ref 98–107)
CO2 SERPL-SCNC: 22.5 MMOL/L (ref 22–29)
CREAT SERPL-MCNC: 0.92 MG/DL (ref 0.7–1.3)
DEPRECATED RDW RBC AUTO: 49.9 FL (ref 37–54)
EOSINOPHIL # BLD AUTO: 0.35 10*3/MM3 (ref 0–0.4)
EOSINOPHIL NFR BLD AUTO: 3.2 % (ref 0.3–6.2)
ERYTHROCYTE [DISTWIDTH] IN BLOOD BY AUTOMATED COUNT: 14.9 % (ref 12.3–15.4)
FERRITIN SERPL-MCNC: 126 NG/ML (ref 30–400)
FOLATE SERPL-MCNC: >20 NG/ML (ref 4.78–24.2)
GFR SERPL CREATININE-BSD FRML MDRD: 83 ML/MIN/1.73
GLOBULIN UR ELPH-MCNC: 2.8 GM/DL (ref 1.8–3.5)
GLUCOSE SERPL-MCNC: 165 MG/DL (ref 74–124)
HCT VFR BLD AUTO: 34 % (ref 37.5–51)
HGB BLD-MCNC: 11.8 G/DL (ref 13–17.7)
HGB RETIC QN AUTO: 34.7 PG (ref 29.8–36.1)
IMM GRANULOCYTES # BLD AUTO: 0.31 10*3/MM3 (ref 0–0.05)
IMM GRANULOCYTES NFR BLD AUTO: 2.8 % (ref 0–0.5)
IMM RETICS NFR: 14.1 % (ref 3–15.8)
IRON 24H UR-MRATE: 51 MCG/DL (ref 59–158)
IRON SATN MFR SERPL: 25 % (ref 14–48)
LYMPHOCYTES # BLD AUTO: 2.02 10*3/MM3 (ref 0.7–3.1)
LYMPHOCYTES NFR BLD AUTO: 18.5 % (ref 19.6–45.3)
MCH RBC QN AUTO: 32 PG (ref 26.6–33)
MCHC RBC AUTO-ENTMCNC: 34.7 G/DL (ref 31.5–35.7)
MCV RBC AUTO: 92.1 FL (ref 79–97)
MONOCYTES # BLD AUTO: 0.75 10*3/MM3 (ref 0.1–0.9)
MONOCYTES NFR BLD AUTO: 6.9 % (ref 5–12)
NEUTROPHILS NFR BLD AUTO: 68 % (ref 42.7–76)
NEUTROPHILS NFR BLD AUTO: 7.42 10*3/MM3 (ref 1.7–7)
NRBC BLD AUTO-RTO: 0 /100 WBC (ref 0–0.2)
PLATELET # BLD AUTO: 175 10*3/MM3 (ref 140–450)
PMV BLD AUTO: 9 FL (ref 6–12)
POTASSIUM SERPL-SCNC: 4.1 MMOL/L (ref 3.5–4.7)
PROT SERPL-MCNC: 6.7 G/DL (ref 6.3–8)
RBC # BLD AUTO: 3.69 10*6/MM3 (ref 4.14–5.8)
RETICS # AUTO: 0.09 10*6/MM3 (ref 0.02–0.13)
RETICS/RBC NFR AUTO: 2.31 % (ref 0.7–1.9)
SODIUM SERPL-SCNC: 133 MMOL/L (ref 134–145)
TIBC SERPL-MCNC: 202 MCG/DL (ref 249–505)
TRANSFERRIN SERPL-MCNC: 144 MG/DL (ref 200–360)
VIT B12 BLD-MCNC: 601 PG/ML (ref 211–946)
WBC # BLD AUTO: 10.92 10*3/MM3 (ref 3.4–10.8)

## 2021-05-18 PROCEDURE — 99214 OFFICE O/P EST MOD 30 MIN: CPT | Performed by: INTERNAL MEDICINE

## 2021-05-18 PROCEDURE — 25010000002 DIPHENHYDRAMINE 1 EACH BAG: Performed by: INTERNAL MEDICINE

## 2021-05-18 PROCEDURE — 96413 CHEMO IV INFUSION 1 HR: CPT

## 2021-05-18 PROCEDURE — 83540 ASSAY OF IRON: CPT | Performed by: INTERNAL MEDICINE

## 2021-05-18 PROCEDURE — 36415 COLL VENOUS BLD VENIPUNCTURE: CPT | Performed by: INTERNAL MEDICINE

## 2021-05-18 PROCEDURE — 25010000002 TRASTUZUMAB PER 10 MG: Performed by: INTERNAL MEDICINE

## 2021-05-18 PROCEDURE — 96367 TX/PROPH/DG ADDL SEQ IV INF: CPT

## 2021-05-18 PROCEDURE — 96368 THER/DIAG CONCURRENT INF: CPT

## 2021-05-18 PROCEDURE — 85025 COMPLETE CBC W/AUTO DIFF WBC: CPT

## 2021-05-18 PROCEDURE — 96375 TX/PRO/DX INJ NEW DRUG ADDON: CPT

## 2021-05-18 PROCEDURE — 82378 CARCINOEMBRYONIC ANTIGEN: CPT | Performed by: INTERNAL MEDICINE

## 2021-05-18 PROCEDURE — 82607 VITAMIN B-12: CPT | Performed by: INTERNAL MEDICINE

## 2021-05-18 PROCEDURE — 96416 CHEMO PROLONG INFUSE W/PUMP: CPT

## 2021-05-18 PROCEDURE — 25010000002 DEXAMETHASONE PER 1 MG: Performed by: INTERNAL MEDICINE

## 2021-05-18 PROCEDURE — 82746 ASSAY OF FOLIC ACID SERUM: CPT | Performed by: INTERNAL MEDICINE

## 2021-05-18 PROCEDURE — 80053 COMPREHEN METABOLIC PANEL: CPT

## 2021-05-18 PROCEDURE — 25010000002 LEUCOVORIN CALCIUM PER 50 MG: Performed by: INTERNAL MEDICINE

## 2021-05-18 PROCEDURE — 96411 CHEMO IV PUSH ADDL DRUG: CPT

## 2021-05-18 PROCEDURE — 25010000002 FLUOROURACIL PER 500 MG: Performed by: INTERNAL MEDICINE

## 2021-05-18 PROCEDURE — 85046 RETICYTE/HGB CONCENTRATE: CPT | Performed by: INTERNAL MEDICINE

## 2021-05-18 PROCEDURE — 25010000002 PALONOSETRON PER 25 MCG: Performed by: INTERNAL MEDICINE

## 2021-05-18 PROCEDURE — 82728 ASSAY OF FERRITIN: CPT | Performed by: INTERNAL MEDICINE

## 2021-05-18 PROCEDURE — 84466 ASSAY OF TRANSFERRIN: CPT | Performed by: INTERNAL MEDICINE

## 2021-05-18 RX ORDER — FAMOTIDINE 10 MG/ML
20 INJECTION, SOLUTION INTRAVENOUS ONCE
Status: COMPLETED | OUTPATIENT
Start: 2021-05-18 | End: 2021-05-18

## 2021-05-18 RX ORDER — FAMOTIDINE 10 MG/ML
20 INJECTION, SOLUTION INTRAVENOUS AS NEEDED
Status: CANCELLED | OUTPATIENT
Start: 2021-05-18

## 2021-05-18 RX ORDER — PALONOSETRON 0.05 MG/ML
0.25 INJECTION, SOLUTION INTRAVENOUS ONCE
Status: COMPLETED | OUTPATIENT
Start: 2021-05-18 | End: 2021-05-18

## 2021-05-18 RX ORDER — DEXAMETHASONE SODIUM PHOSPHATE 4 MG/ML
4 INJECTION, SOLUTION INTRA-ARTICULAR; INTRALESIONAL; INTRAMUSCULAR; INTRAVENOUS; SOFT TISSUE ONCE
Status: CANCELLED | OUTPATIENT
Start: 2021-05-18

## 2021-05-18 RX ORDER — FAMOTIDINE 10 MG/ML
20 INJECTION, SOLUTION INTRAVENOUS 2 TIMES DAILY
Status: CANCELLED | OUTPATIENT
Start: 2021-05-18

## 2021-05-18 RX ORDER — DIPHENHYDRAMINE HYDROCHLORIDE 50 MG/ML
50 INJECTION INTRAMUSCULAR; INTRAVENOUS AS NEEDED
Status: CANCELLED | OUTPATIENT
Start: 2021-05-18

## 2021-05-18 RX ORDER — PALONOSETRON 0.05 MG/ML
0.25 INJECTION, SOLUTION INTRAVENOUS ONCE
Status: CANCELLED | OUTPATIENT
Start: 2021-05-18

## 2021-05-18 RX ORDER — SODIUM CHLORIDE 9 MG/ML
250 INJECTION, SOLUTION INTRAVENOUS ONCE
Status: CANCELLED | OUTPATIENT
Start: 2021-05-18

## 2021-05-18 RX ORDER — FLUOROURACIL 50 MG/ML
400 INJECTION, SOLUTION INTRAVENOUS ONCE
Status: COMPLETED | OUTPATIENT
Start: 2021-05-18 | End: 2021-05-18

## 2021-05-18 RX ORDER — FLUOROURACIL 50 MG/ML
400 INJECTION, SOLUTION INTRAVENOUS ONCE
Status: CANCELLED | OUTPATIENT
Start: 2021-05-18

## 2021-05-18 RX ORDER — SODIUM CHLORIDE 9 MG/ML
250 INJECTION, SOLUTION INTRAVENOUS ONCE
Status: COMPLETED | OUTPATIENT
Start: 2021-05-18 | End: 2021-05-18

## 2021-05-18 RX ORDER — DEXAMETHASONE SODIUM PHOSPHATE 4 MG/ML
4 INJECTION, SOLUTION INTRA-ARTICULAR; INTRALESIONAL; INTRAMUSCULAR; INTRAVENOUS; SOFT TISSUE ONCE
Status: COMPLETED | OUTPATIENT
Start: 2021-05-18 | End: 2021-05-18

## 2021-05-18 RX ADMIN — FLUOROURACIL 4850 MG: 50 INJECTION, SOLUTION INTRAVENOUS at 10:43

## 2021-05-18 RX ADMIN — TRASTUZUMAB 350 MG: 150 INJECTION, POWDER, LYOPHILIZED, FOR SOLUTION INTRAVENOUS at 09:34

## 2021-05-18 RX ADMIN — SODIUM CHLORIDE 810 MG: 900 INJECTION, SOLUTION INTRAVENOUS at 10:09

## 2021-05-18 RX ADMIN — FLUOROURACIL 810 MG: 50 INJECTION, SOLUTION INTRAVENOUS at 10:43

## 2021-05-18 RX ADMIN — PALONOSETRON 0.25 MG: 0.05 INJECTION, SOLUTION INTRAVENOUS at 09:11

## 2021-05-18 RX ADMIN — SODIUM CHLORIDE 250 ML: 9 INJECTION, SOLUTION INTRAVENOUS at 09:11

## 2021-05-18 RX ADMIN — FAMOTIDINE 20 MG: 10 INJECTION, SOLUTION INTRAVENOUS at 09:11

## 2021-05-18 RX ADMIN — DEXAMETHASONE SODIUM PHOSPHATE 4 MG: 4 INJECTION, SOLUTION INTRAMUSCULAR; INTRAVENOUS at 09:11

## 2021-05-18 RX ADMIN — DIPHENHYDRAMINE HYDROCHLORIDE 25 MG: 50 INJECTION INTRAMUSCULAR; INTRAVENOUS at 09:13

## 2021-05-18 NOTE — PROGRESS NOTES
Subjective     REASON FOR follow up:1.    1. 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  Currently receiving palliative 5 fu leucovorin  AND HERCEPTIN therapy once a month    2.COLOVESICAL FISTULA: chronic antibiotic therapy cipro, NO FURTHER PLANS FOR SURGERY AFTER VISIT AND PROCEDURE BY DR GM CASTILLO 1/20    3. 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 NEURONTIN INITIATED.        History of Present Illness       DURING THE VISIT WITH THE PATIENT TODAY , PATIENT HAD FACE MASK, MY MEDICAL ASSISTANT AND I  HAD PROPPER PROTECTIVE EQUIPMENT, AND I DID HAND HYGIENE WITH SOAP AND WATER BEFORE AND AFTER THE VISIT.      This patient returns today to the office for follow up. He is here today in much better spirits than how he was a month ago and his depression and the blues that he had from the wintertime are very much gone. The patient states that he has finally found meat that he is able to eat being the most important element of his diet and is tasty and he is enjoying that part. He has not had any difficulty swallowing, no nausea or vomiting, no abdominal pain, distention, normal bowel activity, no diarrhea, no sores in his mouth, no photosensitivity. In regard to his hypertension he has not had any new issues, he has not had any new urinary tract infections associated with his colovesical fistula, and he has not had any new episodes of vein thrombosis or clinical bleeding undergoing chronic anticoagulation. His peripheral arterial disease is quiet and he is able to function and get to [employer] with no leg pain or claudication. He is starting to have decreased vision and he is going to request a new appointment to be seen by ophthalmologist. He has had cataracts in the past. He remains functional with no  "pain.                        Past Medical History:   Diagnosis Date   • Arthritis    • Elevated PSA    • Esophageal cancer (CMS/HCC)    • Esophageal mass    • Fistula    • H/O Lung nodule    • Hepatitis     CHILD--PT STATED \"I THINK IT WAS A.\"   • History of pneumonia    • Hx of blood clots 08/10/2019   • Hyperlipidemia    • Hypertension    • Neuropathy    • Prostate cancer (CMS/HCC)    • PVD (peripheral vascular disease) (CMS/HCC)         Past Surgical History:   Procedure Laterality Date   • COLONOSCOPY N/A 2/4/2020    Procedure: COLONOSCOPY;  Surgeon: Guy Sears MD;  Location: [facility] ENDOSCOPY;  Service: General;  Laterality: N/A;  PRE-COLOVESICAL FISTULA  POST-- DIVERTICULOSIS   • CYSTOSCOPY  02/06/2020   • CYSTOSCOPY Bilateral 2/26/2020    Procedure: CYSTOSCOPY RETROGRADE;  Surgeon: Cm Witt MD;  Location: [facility] OR;  Service: Urology;  Laterality: Bilateral;   • HERNIA REPAIR  1999   • PROSTATE SURGERY  03/2008   • VENOUS ACCESS DEVICE (PORT) INSERTION N/A 7/16/2019    Procedure: INSERTION VENOUS ACCESS DEVICE WITH FLUORO AND EGD WITH BIOPSY;  Surgeon: Mary Brito MD;  Location: [facility] OR;  Service: Thoracic        Current Outpatient Medications on File Prior to Visit   Medication Sig Dispense Refill   • cevimeline (EVOXAC) 30 MG capsule Take 30 mg by mouth 3 (Three) Times a Day.     • cilostazol (PLETAL) 50 MG tablet      • ciprofloxacin (CIPRO) 250 MG tablet Take 1 tablet by mouth Daily. 90 tablet 1   • clotrimazole (MYCELEX) 10 MG obie 3 TIMES A DAY 90 tablet 1   • econazole nitrate (SPECTAZOLE) 1 % cream APPLY TO ALL AFFECTED NAILS AT BEDTIME     • gabapentin (NEURONTIN) 300 MG capsule TAKE 2 CAPSULES BY MOUTH EVERY NIGHT AT BEDTIME 60 capsule 4   • lisinopril-hydrochlorothiazide (PRINZIDE,ZESTORETIC) 20-12.5 MG per tablet TAKE 1 TABLET BY MOUTH EVERY DAY 90 tablet 0   • nicotine (NICODERM CQ) 21 MG/24HR patch Place 1 patch on the skin as directed by provider " Daily. 28 each 1   • ondansetron (ZOFRAN) 4 MG tablet Take 1 tablet by mouth Every 8 (Eight) Hours As Needed for Nausea or Vomiting. 30 tablet 2   • Probiotic Product (PROBIOTIC DAILY PO) Take 1 tablet by mouth Daily.     • triamcinolone (KENALOG) 0.1 % ointment Apply  topically to the appropriate area as directed 2 (Two) Times a Day. (Patient taking differently: Apply 1 application topically to the appropriate area as directed As Needed.) 30 g 2   • Xarelto 20 MG tablet TAKE 1 TABLET BY MOUTH EVERY DAY**STOP LOVENOX** 90 tablet 1     No current facility-administered medications on file prior to visit.        ALLERGIES:  No Known Allergies     Social History     Socioeconomic History   • Marital status: Single     Spouse name: Not on file   • Number of children: Not on file   • Years of education: High school   • Highest education level: Not on file   Tobacco Use   • Smoking status: Current Every Day Smoker     Packs/day: 1.00     Years: 38.00     Pack years: 38.00     Types: Cigarettes     Start date: 1974   • Smokeless tobacco: Never Used   • Tobacco comment: Quit for a period of 8 years   Vaping Use   • Vaping Use: Never used   Substance and Sexual Activity   • Alcohol use: Not Currently     Comment: NOT RECENTLY- none since september   • Drug use: No   • Sexual activity: Defer        Family History   Problem Relation Age of Onset   • Hypertension Mother    • Stroke Mother    • Hypertension Other    • Lung disease Other    • Prostate cancer Other    • Lung cancer Father    • Malig Hyperthermia Neg Hx       Current Outpatient Medications on File Prior to Visit   Medication Sig Dispense Refill   • cevimeline (EVOXAC) 30 MG capsule Take 30 mg by mouth 3 (Three) Times a Day.     • cilostazol (PLETAL) 50 MG tablet      • ciprofloxacin (CIPRO) 250 MG tablet Take 1 tablet by mouth Daily. 90 tablet 1   • clotrimazole (MYCELEX) 10 MG obie 3 TIMES A DAY 90 tablet 1   • econazole nitrate (SPECTAZOLE) 1 % cream APPLY TO  "ALL AFFECTED NAILS AT BEDTIME     • gabapentin (NEURONTIN) 300 MG capsule TAKE 2 CAPSULES BY MOUTH EVERY NIGHT AT BEDTIME 60 capsule 4   • lisinopril-hydrochlorothiazide (PRINZIDE,ZESTORETIC) 20-12.5 MG per tablet TAKE 1 TABLET BY MOUTH EVERY DAY 90 tablet 0   • nicotine (NICODERM CQ) 21 MG/24HR patch Place 1 patch on the skin as directed by provider Daily. 28 each 1   • ondansetron (ZOFRAN) 4 MG tablet Take 1 tablet by mouth Every 8 (Eight) Hours As Needed for Nausea or Vomiting. 30 tablet 2   • Probiotic Product (PROBIOTIC DAILY PO) Take 1 tablet by mouth Daily.     • triamcinolone (KENALOG) 0.1 % ointment Apply  topically to the appropriate area as directed 2 (Two) Times a Day. (Patient taking differently: Apply 1 application topically to the appropriate area as directed As Needed.) 30 g 2   • Xarelto 20 MG tablet TAKE 1 TABLET BY MOUTH EVERY DAY**STOP LOVENOX** 90 tablet 1     No current facility-administered medications on file prior to visit.   No Known Allergies         Objective     Vitals:    05/18/21 0757   BP: 106/68   Pulse: 95   Resp: 18   Temp: 98 °F (36.7 °C)   TempSrc: Temporal   SpO2: 98%   Weight: 94.2 kg (207 lb 9.6 oz)   Height: 175.3 cm (69.02\")   PainSc: 0-No pain     Current Status 5/18/2021   ECOG score 0           Physical exam        I HAVE PERSONALLY REVIEWED THE HISTORY OF THE PRESENT ILLNESS, PAST MEDICAL HISTORY, FAMILY HISTORY, SOCIAL HISTORY, ALLERGIES, MEDICATIONS STATED ABOVE IN THE OFFICE NOTE FROM TODAY.        GENERAL:  Well-developed, well-nourished  Patient  in no acute distress.   SKIN:  Warm, dry ,NO rashes,NO purpura ,NO petechiae.  HEENT:  Pupils were equal and reactive to light and accomodation, conjunctivae noninjected, no pterygium, normal extraocular movements, bilateral cataracts   NECK:  Supple with good range of motion; no thyromegaly or masses, no JVD or bruits, no cervical adenopathies.No carotid artery pain, no carotid abnormal pulsation , NO arterial " dance.  LYMPHATICS:  No cervical, NO supraclavicular, NO axillary,NO epitrochlear , NO inguinal adenopathy.  CARDIAC   normal rate and regular rhythm, without murmur,NO rubs NO S3 NO S4 right or left .  VASCULAR VENOUS: no cyanosis, collateral circulation, varicosities, edema, palpable cords, pain, erythema.  ABDOMEN:  Soft, nontender with no hepatomegaly, no splenomegaly,no masses, no ascites, no collateral circulation,no distention,no Hdruv sign, no abdominal pain, no inguinal hernias,no umbilical hernia, no abdominal bruits. Normal bowel sounds.  GENITAL: Not  Performed.  EXTREMITIES  AND SPINE:  No clubbing, cyanosis or edema, no deformities or pain .No kyphosis, scoliosis, deformities or pain in spine, ribs or pelvic bone.  NEUROLOGICAL:  Patient was awake, alert, oriented to time, person and place.          RECENT LABS:  Hematology WBC   Date Value Ref Range Status   05/18/2021 10.92 (H) 3.40 - 10.80 10*3/mm3 Final   02/02/2019 13.4 (H) 3.4 - 10.8 x10E3/uL Final     RBC   Date Value Ref Range Status   05/18/2021 3.69 (L) 4.14 - 5.80 10*6/mm3 Final   02/02/2019 4.81 4.14 - 5.80 x10E6/uL Final     Hemoglobin   Date Value Ref Range Status   05/18/2021 11.8 (L) 13.0 - 17.7 g/dL Final     Hematocrit   Date Value Ref Range Status   05/18/2021 34.0 (L) 37.5 - 51.0 % Final     Platelets   Date Value Ref Range Status   05/18/2021 175 140 - 450 10*3/mm3 Final        Component      Latest Ref Rng & Units 11/3/2020 12/1/2020 1/26/2021 2/12/2021   CEA      ng/mL 5.81 6.31 7.32 6.68     Component      Latest Ref Rng & Units 3/23/2021 4/20/2021   CEA      ng/mL 8.93 12.30     Assessment/Plan    1.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 Almost 90% of the liver WAS replaced by tumor. The patient has been undergoing chemotherapy with 5  fu and leucovorin  and Herceptin and has had excellent response clinically and radiologically. The patient was reviewed on 08/10/2020. I reviewed with the  patient in the PAC system [facility] his PET scan that is dramatic. There is minimal uptake in the lower esophagus and most importantly complete resolution of his liver metastasis. Actually the only issue that is pertinent to the PET scan is still the visibility of his colovesical fistula.     Therefore from the point of view of his cancer of the esophagus, metastatic to the liver, he has no symptoms from the primary tumor in the esophagus and he has no symptoms related to his liver metastasis that has resolved altogether. His CEA level is stable.The patient raised the question about the CEA fluctuation. I pointed out to him that a lot of this is also related to his smoking. Smoking per se elevates CEA level.  Upon further reviewing the patient on 04/20/2021, he has no symptoms pertinent to his metastatic cancer of the esophagus to the liver and he has no difficulty swallowing. There are no side effects of the 5-FU, Leucovorin and Herceptin. The patient's cardiac function remains stable and he has not had any drop in the ejection fraction.   I discussed with him on 05/18/2021 the fact that his cancer clinically is very quiet but I am afraid of the rise in his CEA level to 12. This could be an indicator of all of the cigarettes that he is smoking on a daily basis of indicator of cancer escaping control and not visible radiologically through his CT scan. I pointed out to him that he needs to continue making an effort to decrease his smoking and he is now down to 10 cigarettes a day. We will recheck another CEA level today. Given the circumstances of the previous CT scan I do not believe that I need to change the doses or plan of care in regard to his chemotherapy medication administration for the time being. I recommended for him to remain on his 5FU/leucovorin and Herceptin on a monthly basis for now.    ·   From the point of view of hypertension and cardiac toxicity, again the echocardiogram remains  stable. His blood pressure is in the excellent range and he remains on medicine for this by me.   He will remain on his blood pressure medication given by me. He has no need for prescription at this time.    ·   From the point of view of neuropathy related to previous chemotherapy with oxaliplatin, he remains on Neurontin and he takes this medicine at bedtime. This will remain ongoing.   He remains on Neurontin that he takes once or twice a day and being controlled with some element of lesser neuropathy in his feet and able to sleep appropriately.     ·   From the point of view of his deep vein thrombosis, he remains on anticoagulant, Xarelto, and no clinical bleeding and no new thrombosis.   He was advised to remain on his anticoagulant Xarelto. So far no new arterial or venous vascular events. No clinical bleeding, no trauma.     ·   From the point of view of his peripheral arterial disease, the patient has not had any claudication because he does not trigger walking longer distances to trigger symptomatology. He has been seen by Vascular Surgery, recommendation in regard to Pletal utilization and consideration in the future to bypass surgery and stent placement. He is not ready to proceed with this at this point.     In regard to smoking cessation, in spite of being seen by the smoking cessation nurse specialist, NIRU Jacobson, the patient has not quit smoking. Actually he is smoking now more because he suffers severely with his diet and the taste for food. I do not know how much the cigarettes is raising his CEA level, it is difficult to know whether recent radiological assessment of his abdomen documented a relatively quiet cancer in the esophagus and the liver.    In regard to his smoking he is down to 10 cigarettes a day. We will see if this is reflected in some way or the other in his CEA level. He is over the winter blues, he is smoking less, he is eating more because he founds meats that please him  and give him some peace and I think that maybe this will be a way to think about from now on and in the future require finding diet that is appropriate for him that is suitable to his taste and is not going to make him grumpy if he can not have his favorites.     ·   I discussed with him the need to continue his chemotherapy exactly the same way on a monthly basis. He will be seen by nurse practitioner in 1 month and by me in 2 months. Eventually we will plan to repeat his echocardiogram as well.    If the CEA level keeps raising in spite of smoking less this will trigger the need for us to proceed with a PET scan.     I pointed out to him that always we can add medication to his regimen of medicines that he is receiving now including add CPT-11 to what he is receiving or strong consideration will be to discontinue the regimen and give him Enhertu that has been approved for the treatment of his condition.     ·

## 2021-05-19 ENCOUNTER — TELEPHONE (OUTPATIENT)
Dept: ONCOLOGY | Facility: CLINIC | Age: 66
End: 2021-05-19

## 2021-05-19 NOTE — TELEPHONE ENCOUNTER
13.70  12.30  8.93      ABOVE IS CEA LEVEL.  I called the patient today to notify him that his CEA level continues raising from 8.9 to 13.7.  That means that the tumor is taking control and I think we need to do a PET scan before the next treatment.  He agrees with that.  He will be in the office tomorrow to to removal the infusor  of his chemotherapy medicine and then a PET scan will be scheduled to be discussed with him the day that he comes for the next visit.  More than likely, he will require modification of his treatment regimen.

## 2021-05-20 ENCOUNTER — INFUSION (OUTPATIENT)
Dept: ONCOLOGY | Facility: HOSPITAL | Age: 66
End: 2021-05-20

## 2021-05-20 DIAGNOSIS — D49.0 ESOPHAGUS NEOPLASM: ICD-10-CM

## 2021-05-20 DIAGNOSIS — Z85.46 HISTORY OF PROSTATE CANCER: ICD-10-CM

## 2021-05-20 DIAGNOSIS — C78.7 LIVER METASTASIS: Primary | ICD-10-CM

## 2021-05-20 DIAGNOSIS — Z45.2 ENCOUNTER FOR ADJUSTMENT OR MANAGEMENT OF VASCULAR ACCESS DEVICE: ICD-10-CM

## 2021-05-20 DIAGNOSIS — C15.5 MALIGNANT NEOPLASM OF LOWER THIRD OF ESOPHAGUS (HCC): Primary | ICD-10-CM

## 2021-05-20 DIAGNOSIS — C78.7 LIVER METASTASIS: ICD-10-CM

## 2021-05-20 DIAGNOSIS — C15.5 MALIGNANT NEOPLASM OF LOWER THIRD OF ESOPHAGUS (HCC): ICD-10-CM

## 2021-05-20 DIAGNOSIS — R97.0 ELEVATED CEA: ICD-10-CM

## 2021-05-20 PROCEDURE — 25010000002 HEPARIN LOCK FLUSH PER 10 UNITS: Performed by: INTERNAL MEDICINE

## 2021-05-20 RX ORDER — HEPARIN SODIUM (PORCINE) LOCK FLUSH IV SOLN 100 UNIT/ML 100 UNIT/ML
500 SOLUTION INTRAVENOUS AS NEEDED
Status: CANCELLED | OUTPATIENT
Start: 2021-05-20

## 2021-05-20 RX ORDER — SODIUM CHLORIDE 0.9 % (FLUSH) 0.9 %
10 SYRINGE (ML) INJECTION AS NEEDED
Status: DISCONTINUED | OUTPATIENT
Start: 2021-05-20 | End: 2021-05-20 | Stop reason: HOSPADM

## 2021-05-20 RX ORDER — HEPARIN SODIUM (PORCINE) LOCK FLUSH IV SOLN 100 UNIT/ML 100 UNIT/ML
500 SOLUTION INTRAVENOUS AS NEEDED
Status: DISCONTINUED | OUTPATIENT
Start: 2021-05-20 | End: 2021-05-20 | Stop reason: HOSPADM

## 2021-05-20 RX ORDER — SODIUM CHLORIDE 0.9 % (FLUSH) 0.9 %
10 SYRINGE (ML) INJECTION AS NEEDED
Status: CANCELLED | OUTPATIENT
Start: 2021-05-20

## 2021-05-20 RX ADMIN — SODIUM CHLORIDE, PRESERVATIVE FREE 10 ML: 5 INJECTION INTRAVENOUS at 10:55

## 2021-05-20 RX ADMIN — Medication 500 UNITS: at 10:55

## 2021-06-09 ENCOUNTER — HOSPITAL ENCOUNTER (OUTPATIENT)
Dept: PET IMAGING | Facility: HOSPITAL | Age: 66
Discharge: HOME | End: 2021-06-09

## 2021-06-09 DIAGNOSIS — C78.7 LIVER METASTASIS: ICD-10-CM

## 2021-06-09 DIAGNOSIS — C15.5 MALIGNANT NEOPLASM OF LOWER THIRD OF ESOPHAGUS (HCC): ICD-10-CM

## 2021-06-09 DIAGNOSIS — Z85.46 HISTORY OF PROSTATE CANCER: ICD-10-CM

## 2021-06-09 DIAGNOSIS — D49.0 ESOPHAGUS NEOPLASM: ICD-10-CM

## 2021-06-09 DIAGNOSIS — R97.0 ELEVATED CEA: ICD-10-CM

## 2021-06-09 LAB — GLUCOSE BLDC GLUCOMTR-MCNC: 136 MG/DL (ref 70–130)

## 2021-06-09 PROCEDURE — 0 FLUDEOXYGLUCOSE F18 SOLUTION: Performed by: INTERNAL MEDICINE

## 2021-06-09 PROCEDURE — A9552 F18 FDG: HCPCS | Performed by: INTERNAL MEDICINE

## 2021-06-09 PROCEDURE — 78815 PET IMAGE W/CT SKULL-THIGH: CPT

## 2021-06-09 PROCEDURE — 82962 GLUCOSE BLOOD TEST: CPT

## 2021-06-09 RX ADMIN — FLUDEOXYGLUCOSE F18 1 DOSE: 300 INJECTION INTRAVENOUS at 07:43

## 2021-06-14 ENCOUNTER — OFFICE VISIT (OUTPATIENT)
Dept: OTHER | Facility: HOSPITAL | Age: 66
End: 2021-06-14

## 2021-06-14 DIAGNOSIS — Z72.0 TOBACCO ABUSE: Primary | ICD-10-CM

## 2021-06-14 PROCEDURE — 99212-NC PR NO CHARGE CBC OFFICE OUTPATIENT VISIT 10 MINUTES: Performed by: NURSE PRACTITIONER

## 2021-06-14 NOTE — PROGRESS NOTES
"[facility] MULTIDISCIPLINARY CLINIC  SMOKING CESSATION TELEHEALTH FOLLOW-UP VISIT     Han Garcia is a pleasant 65 y.o. male seen today by VIDEO for smoking cessation counseling follow up        Patient consent obtained for our telehealth visit today:  You have chosen to receive care through a telehealth visit.  Do you consent to use a video/audio connection for your medical care today? YES     HPI:  Patient continues to smoke cigarettes daily. Currently smoking about 10 cigarettes a day (down from 20 cigarettes a day at last visit).  He is able to work in 5 to 10-minute increments before vascular pain becomes intolerable.  He is able to rest then resume activities.  Typically does not experience claudication with walking around [employer]. Has been using a foot peddler at home for exercise and is able to do this without pain. Recently learned that CEA levels rising and recent findings on last PET/CT which showed (per report) \"Two adjacent oblong foci of moderately intense hypermetabolism involving the distal esophagus at the GE junction showing significant increase in FDG avidity since the most recent PET/CT study dated 10/27/2020.\"   He has been nauseated. However no difficulties with swallowing or food getting stuck. Denies reflux. No change in bowel movements. Denies pain.     QUIT PLAN (STAR):  Set quit date: Today we explored patient concerns around treatment. He is not ready to set up another quit date at this time.  He wants to maintain this most recent reduction in cigarettes and will be willing to revisit the idea of a quit date once his new treatment recommendations are understood and implemented     Tell friends/family: His sister is been a support and accountability ashley. He has a neighbor whom he enjoys speaking with.      Anticipate challenges: Biggest challenge to day is some worry around implications of PET/CT findings.      Remove tobacco from environment: His car continues to be a " smoke-free environment and he has utilize car rides as a distraction in the past     Medication plan: sabas is not using nicotine patch or any other quit smoking medications but he does have nicotine patches available when he is ready for another attempt.    Practical counseling: We will continue cessation coaching and supportive counseling in the survivorship clinic.  Provided encouragement particularly around taking advantage of the improved weather getting out of the house and leaving the cigarettes behind.     Follow-up: 1 month, we are scheduled for a telehealth visit. I have encouraged them to call me with any questions or as needed in the interm.

## 2021-06-15 ENCOUNTER — OFFICE VISIT (OUTPATIENT)
Dept: ONCOLOGY | Facility: CLINIC | Age: 66
End: 2021-06-15

## 2021-06-15 ENCOUNTER — INFUSION (OUTPATIENT)
Dept: ONCOLOGY | Facility: HOSPITAL | Age: 66
End: 2021-06-15

## 2021-06-15 VITALS
RESPIRATION RATE: 18 BRPM | HEART RATE: 96 BPM | OXYGEN SATURATION: 98 % | DIASTOLIC BLOOD PRESSURE: 66 MMHG | HEIGHT: 69 IN | BODY MASS INDEX: 30.63 KG/M2 | SYSTOLIC BLOOD PRESSURE: 109 MMHG | TEMPERATURE: 98.2 F | WEIGHT: 206.8 LBS

## 2021-06-15 DIAGNOSIS — I82.412 ACUTE DEEP VEIN THROMBOSIS (DVT) OF FEMORAL VEIN OF LEFT LOWER EXTREMITY (HCC): ICD-10-CM

## 2021-06-15 DIAGNOSIS — Z72.0 TOBACCO ABUSE: ICD-10-CM

## 2021-06-15 DIAGNOSIS — C78.7 LIVER METASTASIS: ICD-10-CM

## 2021-06-15 DIAGNOSIS — T45.1X5A PERIPHERAL NEUROPATHY DUE TO CHEMOTHERAPY (HCC): ICD-10-CM

## 2021-06-15 DIAGNOSIS — I10 ESSENTIAL HYPERTENSION: ICD-10-CM

## 2021-06-15 DIAGNOSIS — C15.5 MALIGNANT NEOPLASM OF LOWER THIRD OF ESOPHAGUS (HCC): Primary | ICD-10-CM

## 2021-06-15 DIAGNOSIS — T45.1X5A CHEMOTHERAPY INDUCED NEUTROPENIA (HCC): ICD-10-CM

## 2021-06-15 DIAGNOSIS — Z79.01 CHRONIC ANTICOAGULATION: ICD-10-CM

## 2021-06-15 DIAGNOSIS — Z45.2 ENCOUNTER FOR ADJUSTMENT OR MANAGEMENT OF VASCULAR ACCESS DEVICE: ICD-10-CM

## 2021-06-15 DIAGNOSIS — G62.0 PERIPHERAL NEUROPATHY DUE TO CHEMOTHERAPY (HCC): ICD-10-CM

## 2021-06-15 DIAGNOSIS — D70.1 CHEMOTHERAPY INDUCED NEUTROPENIA (HCC): ICD-10-CM

## 2021-06-15 DIAGNOSIS — C15.5 MALIGNANT NEOPLASM OF LOWER THIRD OF ESOPHAGUS (HCC): ICD-10-CM

## 2021-06-15 DIAGNOSIS — I82.431 ACUTE DEEP VEIN THROMBOSIS (DVT) OF RIGHT POPLITEAL VEIN (HCC): ICD-10-CM

## 2021-06-15 DIAGNOSIS — L98.8 FISTULA: ICD-10-CM

## 2021-06-15 DIAGNOSIS — D63.8 ANEMIA, CHRONIC DISEASE: ICD-10-CM

## 2021-06-15 PROBLEM — N32.1 COLOVESICAL FISTULA: Status: RESOLVED | Noted: 2019-09-25 | Resolved: 2021-06-15

## 2021-06-15 LAB
ALBUMIN SERPL-MCNC: 4.1 G/DL (ref 3.5–5.2)
ALBUMIN/GLOB SERPL: 1.5 G/DL (ref 1.1–2.4)
ALP SERPL-CCNC: 95 U/L (ref 38–116)
ALT SERPL W P-5'-P-CCNC: 37 U/L (ref 0–41)
ANION GAP SERPL CALCULATED.3IONS-SCNC: 11.5 MMOL/L (ref 5–15)
AST SERPL-CCNC: 25 U/L (ref 0–40)
BASOPHILS # BLD AUTO: 0.09 10*3/MM3 (ref 0–0.2)
BASOPHILS NFR BLD AUTO: 0.8 % (ref 0–1.5)
BILIRUB SERPL-MCNC: 0.4 MG/DL (ref 0.2–1.2)
BUN SERPL-MCNC: 12 MG/DL (ref 6–20)
BUN/CREAT SERPL: 13 (ref 7.3–30)
CALCIUM SPEC-SCNC: 9.3 MG/DL (ref 8.5–10.2)
CEA SERPL-MCNC: 15.7 NG/ML
CHLORIDE SERPL-SCNC: 99 MMOL/L (ref 98–107)
CO2 SERPL-SCNC: 23.5 MMOL/L (ref 22–29)
CREAT SERPL-MCNC: 0.92 MG/DL (ref 0.7–1.3)
DEPRECATED RDW RBC AUTO: 49.6 FL (ref 37–54)
EOSINOPHIL # BLD AUTO: 0.41 10*3/MM3 (ref 0–0.4)
EOSINOPHIL NFR BLD AUTO: 3.5 % (ref 0.3–6.2)
ERYTHROCYTE [DISTWIDTH] IN BLOOD BY AUTOMATED COUNT: 14.8 % (ref 12.3–15.4)
GFR SERPL CREATININE-BSD FRML MDRD: 83 ML/MIN/1.73
GLOBULIN UR ELPH-MCNC: 2.7 GM/DL (ref 1.8–3.5)
GLUCOSE SERPL-MCNC: 137 MG/DL (ref 74–124)
HCT VFR BLD AUTO: 34.4 % (ref 37.5–51)
HGB BLD-MCNC: 12.3 G/DL (ref 13–17.7)
IMM GRANULOCYTES # BLD AUTO: 0.5 10*3/MM3 (ref 0–0.05)
IMM GRANULOCYTES NFR BLD AUTO: 4.3 % (ref 0–0.5)
LYMPHOCYTES # BLD AUTO: 2.2 10*3/MM3 (ref 0.7–3.1)
LYMPHOCYTES NFR BLD AUTO: 18.7 % (ref 19.6–45.3)
MCH RBC QN AUTO: 32.6 PG (ref 26.6–33)
MCHC RBC AUTO-ENTMCNC: 35.8 G/DL (ref 31.5–35.7)
MCV RBC AUTO: 91.2 FL (ref 79–97)
MONOCYTES # BLD AUTO: 0.76 10*3/MM3 (ref 0.1–0.9)
MONOCYTES NFR BLD AUTO: 6.5 % (ref 5–12)
NEUTROPHILS NFR BLD AUTO: 66.2 % (ref 42.7–76)
NEUTROPHILS NFR BLD AUTO: 7.78 10*3/MM3 (ref 1.7–7)
NRBC BLD AUTO-RTO: 0 /100 WBC (ref 0–0.2)
PLATELET # BLD AUTO: 187 10*3/MM3 (ref 140–450)
PMV BLD AUTO: 8.9 FL (ref 6–12)
POTASSIUM SERPL-SCNC: 4 MMOL/L (ref 3.5–4.7)
PROT SERPL-MCNC: 6.8 G/DL (ref 6.3–8)
RBC # BLD AUTO: 3.77 10*6/MM3 (ref 4.14–5.8)
SODIUM SERPL-SCNC: 134 MMOL/L (ref 134–145)
WBC # BLD AUTO: 11.74 10*3/MM3 (ref 3.4–10.8)

## 2021-06-15 PROCEDURE — 80053 COMPREHEN METABOLIC PANEL: CPT

## 2021-06-15 PROCEDURE — 85025 COMPLETE CBC W/AUTO DIFF WBC: CPT

## 2021-06-15 PROCEDURE — 36591 DRAW BLOOD OFF VENOUS DEVICE: CPT

## 2021-06-15 PROCEDURE — 82378 CARCINOEMBRYONIC ANTIGEN: CPT | Performed by: INTERNAL MEDICINE

## 2021-06-15 PROCEDURE — 87086 URINE CULTURE/COLONY COUNT: CPT | Performed by: INTERNAL MEDICINE

## 2021-06-15 PROCEDURE — 99215 OFFICE O/P EST HI 40 MIN: CPT | Performed by: INTERNAL MEDICINE

## 2021-06-15 NOTE — PROGRESS NOTES
Subjective     REASON FOR follow up:1.    1. 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  Currently receiving palliative 5 fu leucovorin  AND HERCEPTIN therapy once a month    2.COLOVESICAL FISTULA: chronic antibiotic therapy cipro, NO FURTHER PLANS FOR SURGERY AFTER VISIT AND PROCEDURE BY DR GM CASTILLO 1/20    3. 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 NEURONTIN INITIATED.        History of Present Illness       DURING THE VISIT WITH THE PATIENT TODAY , PATIENT HAD FACE MASK, MY MEDICAL ASSISTANT AND I  HAD PROPPER PROTECTIVE EQUIPMENT, AND I DID HAND HYGIENE WITH SOAP AND WATER BEFORE AND AFTER THE VISIT.      This patient returns to the office today for followup in the company of his sister. He is here to review a PET scan that has been done in the background of carcinoma of the esophagus, metastatic to the liver that has been undergoing treatment with 5-FU, leucovorin and Herceptin. The original treatment included FOLFOX but oxaliplatin was discontinued, given the development of peripheral neuropathy.     At this time the patient is asymptomatic. He has no difficulty swallowing. His appetite is good. He has no regurgitation, heartburn, nausea, or vomiting. The urine is not completely clear today and given the presence of the fistula in the colovesical, he will require a urinalysis and culture today. He is not having any fever or chills. He has no abdominal pain. He continues smoking half a pack of cigarettes a day. He has no swelling in his lower extremities. He remains on Xarelto for his previous history of thrombophilia associated with malignancy and DVT. He has not had any clinical bleeding. Energy level is acceptable. He is able to function independently. He has no requirement for pain medicine. His blood pressure  "is under good control. The depression that he had several weeks ago is mostly over.                           Past Medical History:   Diagnosis Date   • Arthritis    • Elevated PSA    • Esophageal cancer (CMS/HCC)    • Esophageal mass    • Fistula    • H/O Lung nodule    • Hepatitis     CHILD--PT STATED \"I THINK IT WAS A.\"   • History of pneumonia    • Hx of blood clots 08/10/2019   • Hyperlipidemia    • Hypertension    • Neuropathy    • Prostate cancer (CMS/HCC)    • PVD (peripheral vascular disease) (CMS/HCC)         Past Surgical History:   Procedure Laterality Date   • COLONOSCOPY N/A 2/4/2020    Procedure: COLONOSCOPY;  Surgeon: Guy Sears MD;  Location: [facility] ENDOSCOPY;  Service: General;  Laterality: N/A;  PRE-COLOVESICAL FISTULA  POST-- DIVERTICULOSIS   • CYSTOSCOPY  02/06/2020   • CYSTOSCOPY Bilateral 2/26/2020    Procedure: CYSTOSCOPY RETROGRADE;  Surgeon: Cm Witt MD;  Location: [facility] OR;  Service: Urology;  Laterality: Bilateral;   • HERNIA REPAIR  1999   • PROSTATE SURGERY  03/2008   • VENOUS ACCESS DEVICE (PORT) INSERTION N/A 7/16/2019    Procedure: INSERTION VENOUS ACCESS DEVICE WITH FLUORO AND EGD WITH BIOPSY;  Surgeon: Mray Brito MD;  Location: [facility] OR;  Service: Thoracic        Current Outpatient Medications on File Prior to Visit   Medication Sig Dispense Refill   • cevimeline (EVOXAC) 30 MG capsule Take 30 mg by mouth 3 (Three) Times a Day.     • cilostazol (PLETAL) 50 MG tablet      • ciprofloxacin (CIPRO) 250 MG tablet Take 1 tablet by mouth Daily. (Patient taking differently: Take 250 mg by mouth Daily As Needed.) 90 tablet 1   • clotrimazole (MYCELEX) 10 MG obie 3 TIMES A DAY 90 tablet 1   • econazole nitrate (SPECTAZOLE) 1 % cream APPLY TO ALL AFFECTED NAILS AT BEDTIME     • gabapentin (NEURONTIN) 300 MG capsule TAKE 2 CAPSULES BY MOUTH EVERY NIGHT AT BEDTIME 60 capsule 4   • lisinopril-hydrochlorothiazide (PRINZIDE,ZESTORETIC) 20-12.5 MG per " tablet TAKE 1 TABLET BY MOUTH EVERY DAY 90 tablet 0   • ondansetron (ZOFRAN) 4 MG tablet Take 1 tablet by mouth Every 8 (Eight) Hours As Needed for Nausea or Vomiting. 30 tablet 2   • Probiotic Product (PROBIOTIC DAILY PO) Take 1 tablet by mouth Daily.     • triamcinolone (KENALOG) 0.1 % ointment Apply  topically to the appropriate area as directed 2 (Two) Times a Day. (Patient taking differently: Apply 1 application topically to the appropriate area as directed As Needed.) 30 g 2   • Xarelto 20 MG tablet TAKE 1 TABLET BY MOUTH EVERY DAY**STOP LOVENOX** 90 tablet 1   • nicotine (NICODERM CQ) 21 MG/24HR patch Place 1 patch on the skin as directed by provider Daily. 28 each 1     No current facility-administered medications on file prior to visit.        ALLERGIES:  No Known Allergies     Social History     Socioeconomic History   • Marital status: Single     Spouse name: Not on file   • Number of children: Not on file   • Years of education: High school   • Highest education level: Not on file   Tobacco Use   • Smoking status: Current Every Day Smoker     Packs/day: 1.00     Years: 38.00     Pack years: 38.00     Types: Cigarettes     Start date: 1974   • Smokeless tobacco: Never Used   • Tobacco comment: Quit for a period of 8 years. 10-15 per day   Vaping Use   • Vaping Use: Never used   Substance and Sexual Activity   • Alcohol use: Not Currently     Comment: NOT RECENTLY- none since september   • Drug use: No   • Sexual activity: Defer        Family History   Problem Relation Age of Onset   • Hypertension Mother    • Stroke Mother    • Hypertension Other    • Lung disease Other    • Prostate cancer Other    • Lung cancer Father    • Malig Hyperthermia Neg Hx       Current Outpatient Medications on File Prior to Visit   Medication Sig Dispense Refill   • cevimeline (EVOXAC) 30 MG capsule Take 30 mg by mouth 3 (Three) Times a Day.     • cilostazol (PLETAL) 50 MG tablet      • ciprofloxacin (CIPRO) 250 MG tablet  "Take 1 tablet by mouth Daily. (Patient taking differently: Take 250 mg by mouth Daily As Needed.) 90 tablet 1   • clotrimazole (MYCELEX) 10 MG obie 3 TIMES A DAY 90 tablet 1   • econazole nitrate (SPECTAZOLE) 1 % cream APPLY TO ALL AFFECTED NAILS AT BEDTIME     • gabapentin (NEURONTIN) 300 MG capsule TAKE 2 CAPSULES BY MOUTH EVERY NIGHT AT BEDTIME 60 capsule 4   • lisinopril-hydrochlorothiazide (PRINZIDE,ZESTORETIC) 20-12.5 MG per tablet TAKE 1 TABLET BY MOUTH EVERY DAY 90 tablet 0   • ondansetron (ZOFRAN) 4 MG tablet Take 1 tablet by mouth Every 8 (Eight) Hours As Needed for Nausea or Vomiting. 30 tablet 2   • Probiotic Product (PROBIOTIC DAILY PO) Take 1 tablet by mouth Daily.     • triamcinolone (KENALOG) 0.1 % ointment Apply  topically to the appropriate area as directed 2 (Two) Times a Day. (Patient taking differently: Apply 1 application topically to the appropriate area as directed As Needed.) 30 g 2   • Xarelto 20 MG tablet TAKE 1 TABLET BY MOUTH EVERY DAY**STOP LOVENOX** 90 tablet 1   • nicotine (NICODERM CQ) 21 MG/24HR patch Place 1 patch on the skin as directed by provider Daily. 28 each 1     No current facility-administered medications on file prior to visit.   No Known Allergies         Objective     Vitals:    06/15/21 0804   BP: 109/66   Pulse: 96   Resp: 18   Temp: 98.2 °F (36.8 °C)   TempSrc: Temporal   SpO2: 98%   Weight: 93.8 kg (206 lb 12.8 oz)   Height: 175.3 cm (69.02\")   PainSc: 0-No pain     Current Status 6/15/2021   ECOG score 0           Physical exam        I HAVE PERSONALLY REVIEWED THE HISTORY OF THE PRESENT ILLNESS, PAST MEDICAL HISTORY, FAMILY HISTORY, SOCIAL HISTORY, ALLERGIES, MEDICATIONS STATED ABOVE IN THE OFFICE NOTE FROM TODAY.        GENERAL:  Well-developed, well-nourished  Patient  in no acute distress.   SKIN:  Warm, dry ,NO rashes,NO purpura ,NO petechiae.  HEENT:  Pupils were equal and reactive to light and accomodation, conjunctivae noninjected, no pterygium, normal " extraocular movements, bilateral cataracts   NECK:  Supple with good range of motion; no thyromegaly or masses, no JVD or bruits, no cervical adenopathies.No carotid artery pain, no carotid abnormal pulsation , NO arterial dance.  LYMPHATICS:  No cervical, NO supraclavicular, NO axillary,NO epitrochlear , NO inguinal adenopathy.  CARDIAC   normal rate and regular rhythm, without murmur,NO rubs NO S3 NO S4 right or left .  VASCULAR VENOUS: no cyanosis, collateral circulation, varicosities, edema, palpable cords, pain, erythema.  ABDOMEN:  Soft, nontender with no hepatomegaly, no splenomegaly,no masses, no ascites, no collateral circulation,no distention,no Dhruv sign, no abdominal pain, no inguinal hernias,no umbilical hernia, no abdominal bruits. Normal bowel sounds.  GENITAL: Not  Performed.  EXTREMITIES  AND SPINE:  No clubbing, cyanosis or edema, no deformities or pain .No kyphosis, scoliosis, deformities or pain in spine, ribs or pelvic bone.  NEUROLOGICAL:  Patient was awake, alert, oriented to time, person and place.          RECENT LABS:  Hematology WBC   Date Value Ref Range Status   06/15/2021 11.74 (H) 3.40 - 10.80 10*3/mm3 Final   02/02/2019 13.4 (H) 3.4 - 10.8 x10E3/uL Final     RBC   Date Value Ref Range Status   06/15/2021 3.77 (L) 4.14 - 5.80 10*6/mm3 Final   02/02/2019 4.81 4.14 - 5.80 x10E6/uL Final     Hemoglobin   Date Value Ref Range Status   06/15/2021 12.3 (L) 13.0 - 17.7 g/dL Final     Hematocrit   Date Value Ref Range Status   06/15/2021 34.4 (L) 37.5 - 51.0 % Final     Platelets   Date Value Ref Range Status   06/15/2021 187 140 - 450 10*3/mm3 Final         FDG PET/CT IMAGING SKULL BASE TO MID THIGH      HISTORY: 65-year-old male with history of esophageal carcinoma with  known liver metastases. Restaging.     TECHNIQUE: Blood glucose level at time of injection of FDG was 136  mg/dl.  6.1 mCi of FDG was injected. Approximately 90 minutes later, PET  images were obtained. CT images were  acquired for attenuation correction  and anatomic localization.     COMPARISON: CT scan of the chest, abdomen and pelvis dated 03/16/2021.  PET/CT imaging dated 10/27/2020.     FINDINGS: There are 2 adjacent oblong foci of moderately intense  hypermetabolism within the distal esophagus at the GE junction  corresponding to the patient's biopsy-proven esophageal neoplasm that  show significant increase in FDG avidity since the preceding PET/CT  study consistent with enlarging residual neoplasm. These have maximum  measured SUV of up to 11.2 g/mL. Previously there was only mild  hypermetabolism at the GE junction with maximum measured SUV of 5.2  g/mL. In addition, circumferential wall thickening within the distal  esophagus appears more prominent. No foci of pathologic hypermetabolism  are identified elsewhere within the chest. In particular, there is no  hypermetabolic mediastinal or hilar lymphadenopathy. Lung window images  demonstrate no parenchymal nodules. No foci of pathologic  hypermetabolism are identified within the abdomen or pelvis. Multiple  treated hepatic metastases continue to show no discernible  hypermetabolism. There is no hypermetabolic lymphadenopathy in the  abdomen. There is physiologic distribution of the radiopharmaceutical  within the neck that is unchanged.     IMPRESSION:  Two adjacent oblong foci of moderately intense  hypermetabolism involving the distal esophagus at the GE junction  showing significant increase in FDG avidity since the most recent PET/CT  study dated 10/27/2020. Soft tissue thickening involving the distal  esophagus is also more prominent. Enlarging residual neoplasm is  suspected. No foci of pathologic hypermetabolism are identified  elsewhere within the neck, chest, abdomen and pelvis. Known extensive  treated hepatic metastatic disease continues to show no significant  hypermetabolism.     This report was finalized on 6/10/2021 8:50 AM by Dr. Short  SANTOSH Sheth.    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 Almost 90% of the liver WAS replaced by tumor. The patient has been undergoing chemotherapy with 5  fu and leucovorin  and Herceptin and has had excellent response clinically and radiologically. The patient was reviewed on 08/10/2020. I reviewed with the patient in the PAC system [facility] his PET scan that is dramatic. There is minimal uptake in the lower esophagus and most importantly complete resolution of his liver metastasis. Actually the only issue that is pertinent to the PET scan is still the visibility of his colovesical fistula.     Therefore from the point of view of his cancer of the esophagus, metastatic to the liver, he has no symptoms from the primary tumor in the esophagus and he has no symptoms related to his liver metastasis that has resolved altogether. His CEA level is stable.The patient raised the question about the CEA fluctuation. I pointed out to him that a lot of this is also related to his smoking. Smoking per se elevates CEA level.  Upon further reviewing the patient on 04/20/2021, he has no symptoms pertinent to his metastatic cancer of the esophagus to the liver and he has no difficulty swallowing. There are no side effects of the 5-FU, Leucovorin and Herceptin. The patient's cardiac function remains stable and he has not had any drop in the ejection fraction.   I discussed with him on 05/18/2021 the fact that his cancer clinically is very quiet but I am afraid of the rise in his CEA level to 12. This could be an indicator of all of the cigarettes that he is smoking on a daily basis of indicator of cancer escaping control and not visible radiologically through his CT scan. I pointed out to him that he needs to continue making an effort to decrease his smoking and he is now down to 10 cigarettes a day. We will recheck another CEA level today. Given the  circumstances of the previous CT scan I do not believe that I need to change the doses or plan of care in regard to his chemotherapy medication administration for the time being. I recommended for him to remain on his 5FU/leucovorin and Herceptin on a monthly basis for now.  The patient was further reviewed on 06/15/2021 with a new PET scan that documented regrowth of the tumor in the lower esophagus without any new symptomatology, for example dysphagia or odynophagia. No regurgitation. The liver metastasis remains in remission and there are no new areas of disease in the bones or lungs or any other site. Given the excellent performance status I discussed with the patient and his sister today many options of therapy, including going back to FOLFOX regimen along with Herceptin, taking another sample of the esophagus with a new endoscopy and doing analysis of the tissue for [facility] Now that will be my favorite choice, or palliative treatment with radiation therapy and 5-FU continuous infusion that will be toxic to him and he does not prefer to have.     I had a discussion with Mary Brito MD, thoracic surgeon, who has agreed to see the patient tomorrow. I think the endoscopy, the new tissue analysis, and sending the tissue for [facility] will be the way to go. Depending on what we find there, we can modify his treatment altogether. I do not think the patient will have any consequences missing chemotherapy for a couple of weeks or so.      In preparation for the endoscopy and biopsies I asked him to hold off on the Xarelto until he is seen by Dr. Brito tomorrow.     In regard to his colovesical fistula, he has had minimal symptomatology this week, maybe some activity there and I asked him to go back to the lab and take a urine specimen and culture. He knows that if this is abnormal he will need to initiate ciprofloxacin that he has at home.    In regard to his hypertension, this is under good control and I  advised him to remain on his lisinopril and hydrochlorothiazide combination.    In regard to his smoking cessation, he has had conversations with NIRU Jacobson about this. He is using some nicotine CQ patch, here and there and also trying to work with nicotine gum and things of this nature but he has not been able to shake this issue altogether.     Otherwise I will review him back in a couple of weeks with a CBC, CMP, and a CEA level.    This case will be presented in the multidisciplinary thoracic conference by me this Thursday, and I will discuss any further advice to the patient.     I also mentioned to the patient that on the background of HER2-positive cancer of the esophagus the possibility of using a new medicine for HER2-based disease that is called Enhertu is a real possibility and maybe that will be the next step to go through.

## 2021-06-16 ENCOUNTER — OFFICE VISIT (OUTPATIENT)
Dept: OTHER | Facility: HOSPITAL | Age: 66
End: 2021-06-16

## 2021-06-16 ENCOUNTER — PREP FOR SURGERY (OUTPATIENT)
Dept: OTHER | Facility: HOSPITAL | Age: 66
End: 2021-06-16

## 2021-06-16 ENCOUNTER — MDT ASSESSMENT (OUTPATIENT)
Dept: OTHER | Facility: HOSPITAL | Age: 66
End: 2021-06-16

## 2021-06-16 VITALS
HEART RATE: 92 BPM | HEIGHT: 69 IN | WEIGHT: 206 LBS | TEMPERATURE: 98 F | OXYGEN SATURATION: 96 % | BODY MASS INDEX: 30.51 KG/M2 | RESPIRATION RATE: 16 BRPM | DIASTOLIC BLOOD PRESSURE: 69 MMHG | SYSTOLIC BLOOD PRESSURE: 110 MMHG

## 2021-06-16 DIAGNOSIS — C15.5 MALIGNANT NEOPLASM OF LOWER THIRD OF ESOPHAGUS (HCC): Primary | ICD-10-CM

## 2021-06-16 DIAGNOSIS — C78.7 LIVER METASTASES: ICD-10-CM

## 2021-06-16 PROCEDURE — 99214 OFFICE O/P EST MOD 30 MIN: CPT | Performed by: THORACIC SURGERY (CARDIOTHORACIC VASCULAR SURGERY)

## 2021-06-16 RX ORDER — SODIUM CHLORIDE 0.9 % (FLUSH) 0.9 %
3 SYRINGE (ML) INJECTION EVERY 12 HOURS SCHEDULED
Status: CANCELLED | OUTPATIENT
Start: 2021-06-19

## 2021-06-16 RX ORDER — SODIUM CHLORIDE 0.9 % (FLUSH) 0.9 %
3-10 SYRINGE (ML) INJECTION AS NEEDED
Status: CANCELLED | OUTPATIENT
Start: 2021-06-19

## 2021-06-16 NOTE — PROGRESS NOTES
MULTIDISCIPLINARY TREATMENT PLANNING CONFERENCE  DATE: 6/17/2021      SPECIALTY: Thoracic Conference    PRESENTER: Alexey Haynes MD    SITE: Esophagus         Please discuss clinical/working stage, TNM, Stage Group, National Treatment Guidelines, and Prognostic Indicators.       CONFERENCE SUMMARY:  proceed with upper gi endoscopy and biopsy, tissue for path and NGS CARIS target now                          AJCC STAGE: IV        REFERRAL SUPPORT   Psychosocial Assessment:  []                Clinical Trials:  []                Genetic Testing:  []                Geriatric Assessment:  []                Smoking Cessation:  [x]                Nutrition Assessment:  []                Social Work Evaluation/Barriers to Care:  []                Behavioral Oncology Evaluation:  []                Palliative Care:  []      EVIDENCE BASED NATIONAL TREATMENT GUIDELINES:  [x]                   SOCIAL HISTORY:   reports that he has been smoking cigarettes. He started smoking about 47 years ago. He has a 38.00 pack-year smoking history. He has never used smokeless tobacco. He reports previous alcohol use. He reports that he does not use drugs.                  PAST MEDICAL HISTORY:   has a past medical history of Arthritis, Elevated PSA, Esophageal cancer (CMS/HCC), Esophageal mass, Fistula, H/O Lung nodule, Hepatitis, History of pneumonia, blood clots (08/10/2019), Hyperlipidemia, Hypertension, Neuropathy, Prostate cancer (CMS/HCC), and PVD (peripheral vascular disease) (CMS/HCC).                  PAST SURGICAL HISTORY:  @                 IMAGING:  NM Pet Skull Base To Mid Thigh    Result Date: 6/10/2021  Two adjacent oblong foci of moderately intense hypermetabolism involving the distal esophagus at the GE junction showing significant increase in FDG avidity since the most recent PET/CT study dated 10/27/2020. Soft tissue thickening involving the distal esophagus is also more prominent. Enlarging residual neoplasm is  suspected. No foci of pathologic hypermetabolism are identified elsewhere within the neck, chest, abdomen and pelvis. Known extensive treated hepatic metastatic disease continues to show no significant hypermetabolism.  This report was finalized on 6/10/2021 8:50 AM by Dr. Han Sheth M.D.                      SURGICAL PROCEDURE / PATHOLOGY:     7/16/2019: [id number] ([id number])    Final Diagnosis  1. Esophagus, 35 Cm from the Lip, Biopsy:  A. INVASIVE MODERATELY DIFFERENTIATED ADENOCARCINOMA.                 Labs & Biomarkers:      7/16/2019:

## 2021-06-17 ENCOUNTER — TELEPHONE (OUTPATIENT)
Dept: ONCOLOGY | Facility: CLINIC | Age: 66
End: 2021-06-17

## 2021-06-17 ENCOUNTER — PRE-ADMISSION TESTING (OUTPATIENT)
Dept: PREADMISSION TESTING | Facility: HOSPITAL | Age: 66
End: 2021-06-17

## 2021-06-17 VITALS
TEMPERATURE: 98.2 F | DIASTOLIC BLOOD PRESSURE: 69 MMHG | BODY MASS INDEX: 30.66 KG/M2 | RESPIRATION RATE: 20 BRPM | OXYGEN SATURATION: 97 % | HEART RATE: 109 BPM | SYSTOLIC BLOOD PRESSURE: 107 MMHG | WEIGHT: 207 LBS | HEIGHT: 69 IN

## 2021-06-17 DIAGNOSIS — C15.5 MALIGNANT NEOPLASM OF LOWER THIRD OF ESOPHAGUS (HCC): ICD-10-CM

## 2021-06-17 LAB
APTT PPP: 35.3 SECONDS (ref 22.7–35.4)
BACTERIA SPEC AEROBE CULT: ABNORMAL
INR PPP: 1.48 (ref 0.9–1.1)
PROTHROMBIN TIME: 17.7 SECONDS (ref 11.7–14.2)
QT INTERVAL: 351 MS
SARS-COV-2 ORF1AB RESP QL NAA+PROBE: NOT DETECTED

## 2021-06-17 PROCEDURE — 93005 ELECTROCARDIOGRAM TRACING: CPT

## 2021-06-17 PROCEDURE — 93010 ELECTROCARDIOGRAM REPORT: CPT | Performed by: INTERNAL MEDICINE

## 2021-06-17 PROCEDURE — U0004 COV-19 TEST NON-CDC HGH THRU: HCPCS | Performed by: NURSE PRACTITIONER

## 2021-06-17 PROCEDURE — 85610 PROTHROMBIN TIME: CPT

## 2021-06-17 PROCEDURE — 36415 COLL VENOUS BLD VENIPUNCTURE: CPT

## 2021-06-17 PROCEDURE — 85730 THROMBOPLASTIN TIME PARTIAL: CPT

## 2021-06-17 PROCEDURE — C9803 HOPD COVID-19 SPEC COLLECT: HCPCS | Performed by: NURSE PRACTITIONER

## 2021-06-17 RX ORDER — CHLORHEXIDINE GLUCONATE 500 MG/1
1 CLOTH TOPICAL
COMMUNITY
End: 2021-07-09

## 2021-06-17 RX ORDER — ACETAMINOPHEN 500 MG
500 TABLET ORAL EVERY 6 HOURS PRN
COMMUNITY

## 2021-06-17 NOTE — TELEPHONE ENCOUNTER
Call to Mr. Garcia to let him know there is some bacteria in urine and Dr. Haynes was giving instructions for him to take Cipro for 5 days.  Patient verbalized understanding and confirmed that he has Cipro.

## 2021-06-18 ENCOUNTER — ANESTHESIA EVENT (OUTPATIENT)
Dept: PERIOP | Facility: HOSPITAL | Age: 66
End: 2021-06-18

## 2021-06-19 ENCOUNTER — ANESTHESIA (OUTPATIENT)
Dept: PERIOP | Facility: HOSPITAL | Age: 66
End: 2021-06-19

## 2021-06-19 ENCOUNTER — HOSPITAL ENCOUNTER (OUTPATIENT)
Facility: HOSPITAL | Age: 66
Setting detail: HOSPITAL OUTPATIENT SURGERY
Discharge: HOME | End: 2021-06-19
Attending: THORACIC SURGERY (CARDIOTHORACIC VASCULAR SURGERY) | Admitting: THORACIC SURGERY (CARDIOTHORACIC VASCULAR SURGERY)

## 2021-06-19 VITALS
RESPIRATION RATE: 18 BRPM | HEART RATE: 82 BPM | TEMPERATURE: 98 F | SYSTOLIC BLOOD PRESSURE: 133 MMHG | WEIGHT: 202.6 LBS | DIASTOLIC BLOOD PRESSURE: 85 MMHG | BODY MASS INDEX: 30.01 KG/M2 | OXYGEN SATURATION: 95 % | HEIGHT: 69 IN

## 2021-06-19 DIAGNOSIS — C15.5 MALIGNANT NEOPLASM OF LOWER THIRD OF ESOPHAGUS (HCC): ICD-10-CM

## 2021-06-19 PROCEDURE — 25010000002 PROPOFOL 10 MG/ML EMULSION: Performed by: NURSE ANESTHETIST, CERTIFIED REGISTERED

## 2021-06-19 PROCEDURE — 25010000002 ONDANSETRON PER 1 MG: Performed by: NURSE ANESTHETIST, CERTIFIED REGISTERED

## 2021-06-19 PROCEDURE — 25010000002 FENTANYL CITRATE (PF) 50 MCG/ML SOLUTION: Performed by: NURSE ANESTHETIST, CERTIFIED REGISTERED

## 2021-06-19 PROCEDURE — 25010000002 DEXAMETHASONE PER 1 MG: Performed by: NURSE ANESTHETIST, CERTIFIED REGISTERED

## 2021-06-19 PROCEDURE — 43239 EGD BIOPSY SINGLE/MULTIPLE: CPT | Performed by: THORACIC SURGERY (CARDIOTHORACIC VASCULAR SURGERY)

## 2021-06-19 PROCEDURE — C1713 ANCHOR/SCREW BN/BN,TIS/BN: HCPCS | Performed by: THORACIC SURGERY (CARDIOTHORACIC VASCULAR SURGERY)

## 2021-06-19 PROCEDURE — 88305 TISSUE EXAM BY PATHOLOGIST: CPT | Performed by: THORACIC SURGERY (CARDIOTHORACIC VASCULAR SURGERY)

## 2021-06-19 PROCEDURE — 25010000002 SUCCINYLCHOLINE PER 20 MG: Performed by: NURSE ANESTHETIST, CERTIFIED REGISTERED

## 2021-06-19 RX ORDER — EPHEDRINE SULFATE 50 MG/ML
5 INJECTION, SOLUTION INTRAVENOUS ONCE AS NEEDED
Status: DISCONTINUED | OUTPATIENT
Start: 2021-06-19 | End: 2021-06-19 | Stop reason: HOSPADM

## 2021-06-19 RX ORDER — PROMETHAZINE HYDROCHLORIDE 25 MG/1
25 TABLET ORAL ONCE AS NEEDED
Status: DISCONTINUED | OUTPATIENT
Start: 2021-06-19 | End: 2021-06-19 | Stop reason: HOSPADM

## 2021-06-19 RX ORDER — SODIUM CHLORIDE 0.9 % (FLUSH) 0.9 %
3-10 SYRINGE (ML) INJECTION AS NEEDED
Status: DISCONTINUED | OUTPATIENT
Start: 2021-06-19 | End: 2021-06-19 | Stop reason: HOSPADM

## 2021-06-19 RX ORDER — SODIUM CHLORIDE 0.9 % (FLUSH) 0.9 %
3 SYRINGE (ML) INJECTION EVERY 12 HOURS SCHEDULED
Status: DISCONTINUED | OUTPATIENT
Start: 2021-06-19 | End: 2021-06-19 | Stop reason: HOSPADM

## 2021-06-19 RX ORDER — NALOXONE HCL 0.4 MG/ML
0.2 VIAL (ML) INJECTION AS NEEDED
Status: DISCONTINUED | OUTPATIENT
Start: 2021-06-19 | End: 2021-06-19 | Stop reason: HOSPADM

## 2021-06-19 RX ORDER — HYDROCODONE BITARTRATE AND ACETAMINOPHEN 7.5; 325 MG/1; MG/1
1 TABLET ORAL ONCE AS NEEDED
Status: DISCONTINUED | OUTPATIENT
Start: 2021-06-19 | End: 2021-06-19 | Stop reason: HOSPADM

## 2021-06-19 RX ORDER — PROPOFOL 10 MG/ML
VIAL (ML) INTRAVENOUS AS NEEDED
Status: DISCONTINUED | OUTPATIENT
Start: 2021-06-19 | End: 2021-06-19 | Stop reason: SURG

## 2021-06-19 RX ORDER — FAMOTIDINE 10 MG/ML
20 INJECTION, SOLUTION INTRAVENOUS ONCE
Status: DISCONTINUED | OUTPATIENT
Start: 2021-06-19 | End: 2021-06-19

## 2021-06-19 RX ORDER — FLUMAZENIL 0.1 MG/ML
0.2 INJECTION INTRAVENOUS AS NEEDED
Status: DISCONTINUED | OUTPATIENT
Start: 2021-06-19 | End: 2021-06-19 | Stop reason: HOSPADM

## 2021-06-19 RX ORDER — IBUPROFEN 600 MG/1
600 TABLET ORAL ONCE AS NEEDED
Status: DISCONTINUED | OUTPATIENT
Start: 2021-06-19 | End: 2021-06-19 | Stop reason: HOSPADM

## 2021-06-19 RX ORDER — MIDAZOLAM HYDROCHLORIDE 1 MG/ML
1 INJECTION INTRAMUSCULAR; INTRAVENOUS
Status: DISCONTINUED | OUTPATIENT
Start: 2021-06-19 | End: 2021-06-19 | Stop reason: HOSPADM

## 2021-06-19 RX ORDER — FAMOTIDINE 10 MG/ML
20 INJECTION, SOLUTION INTRAVENOUS ONCE
Status: COMPLETED | OUTPATIENT
Start: 2021-06-19 | End: 2021-06-19

## 2021-06-19 RX ORDER — ONDANSETRON 2 MG/ML
INJECTION INTRAMUSCULAR; INTRAVENOUS AS NEEDED
Status: DISCONTINUED | OUTPATIENT
Start: 2021-06-19 | End: 2021-06-19 | Stop reason: SURG

## 2021-06-19 RX ORDER — PROMETHAZINE HYDROCHLORIDE 25 MG/1
25 SUPPOSITORY RECTAL ONCE AS NEEDED
Status: DISCONTINUED | OUTPATIENT
Start: 2021-06-19 | End: 2021-06-19 | Stop reason: HOSPADM

## 2021-06-19 RX ORDER — DIPHENHYDRAMINE HYDROCHLORIDE 50 MG/ML
12.5 INJECTION INTRAMUSCULAR; INTRAVENOUS
Status: DISCONTINUED | OUTPATIENT
Start: 2021-06-19 | End: 2021-06-19 | Stop reason: HOSPADM

## 2021-06-19 RX ORDER — DIPHENHYDRAMINE HCL 25 MG
25 CAPSULE ORAL
Status: DISCONTINUED | OUTPATIENT
Start: 2021-06-19 | End: 2021-06-19 | Stop reason: HOSPADM

## 2021-06-19 RX ORDER — ROCURONIUM BROMIDE 10 MG/ML
INJECTION, SOLUTION INTRAVENOUS AS NEEDED
Status: DISCONTINUED | OUTPATIENT
Start: 2021-06-19 | End: 2021-06-19 | Stop reason: SURG

## 2021-06-19 RX ORDER — OXYCODONE AND ACETAMINOPHEN 10; 325 MG/1; MG/1
1 TABLET ORAL EVERY 4 HOURS PRN
Status: DISCONTINUED | OUTPATIENT
Start: 2021-06-19 | End: 2021-06-19 | Stop reason: HOSPADM

## 2021-06-19 RX ORDER — FENTANYL CITRATE 50 UG/ML
INJECTION, SOLUTION INTRAMUSCULAR; INTRAVENOUS AS NEEDED
Status: DISCONTINUED | OUTPATIENT
Start: 2021-06-19 | End: 2021-06-19 | Stop reason: SURG

## 2021-06-19 RX ORDER — SODIUM CHLORIDE, SODIUM LACTATE, POTASSIUM CHLORIDE, CALCIUM CHLORIDE 600; 310; 30; 20 MG/100ML; MG/100ML; MG/100ML; MG/100ML
9 INJECTION, SOLUTION INTRAVENOUS CONTINUOUS
Status: DISCONTINUED | OUTPATIENT
Start: 2021-06-19 | End: 2021-06-19 | Stop reason: HOSPADM

## 2021-06-19 RX ORDER — FENTANYL CITRATE 50 UG/ML
50 INJECTION, SOLUTION INTRAMUSCULAR; INTRAVENOUS
Status: DISCONTINUED | OUTPATIENT
Start: 2021-06-19 | End: 2021-06-19 | Stop reason: HOSPADM

## 2021-06-19 RX ORDER — ONDANSETRON 2 MG/ML
4 INJECTION INTRAMUSCULAR; INTRAVENOUS ONCE AS NEEDED
Status: DISCONTINUED | OUTPATIENT
Start: 2021-06-19 | End: 2021-06-19 | Stop reason: HOSPADM

## 2021-06-19 RX ORDER — LIDOCAINE HYDROCHLORIDE 20 MG/ML
INJECTION, SOLUTION INFILTRATION; PERINEURAL AS NEEDED
Status: DISCONTINUED | OUTPATIENT
Start: 2021-06-19 | End: 2021-06-19 | Stop reason: SURG

## 2021-06-19 RX ORDER — SUCCINYLCHOLINE CHLORIDE 20 MG/ML
INJECTION INTRAMUSCULAR; INTRAVENOUS AS NEEDED
Status: DISCONTINUED | OUTPATIENT
Start: 2021-06-19 | End: 2021-06-19 | Stop reason: SURG

## 2021-06-19 RX ORDER — DEXAMETHASONE SODIUM PHOSPHATE 10 MG/ML
INJECTION INTRAMUSCULAR; INTRAVENOUS AS NEEDED
Status: DISCONTINUED | OUTPATIENT
Start: 2021-06-19 | End: 2021-06-19 | Stop reason: SURG

## 2021-06-19 RX ORDER — HYDROMORPHONE HYDROCHLORIDE 1 MG/ML
0.5 INJECTION, SOLUTION INTRAMUSCULAR; INTRAVENOUS; SUBCUTANEOUS
Status: DISCONTINUED | OUTPATIENT
Start: 2021-06-19 | End: 2021-06-19 | Stop reason: HOSPADM

## 2021-06-19 RX ORDER — LABETALOL HYDROCHLORIDE 5 MG/ML
5 INJECTION, SOLUTION INTRAVENOUS
Status: DISCONTINUED | OUTPATIENT
Start: 2021-06-19 | End: 2021-06-19 | Stop reason: HOSPADM

## 2021-06-19 RX ADMIN — PROPOFOL 150 MG: 10 INJECTION, EMULSION INTRAVENOUS at 10:50

## 2021-06-19 RX ADMIN — ONDANSETRON 4 MG: 2 INJECTION INTRAMUSCULAR; INTRAVENOUS at 11:05

## 2021-06-19 RX ADMIN — ROCURONIUM BROMIDE 10 MG: 50 INJECTION INTRAVENOUS at 10:50

## 2021-06-19 RX ADMIN — FENTANYL CITRATE 50 MCG: 50 INJECTION INTRAMUSCULAR; INTRAVENOUS at 10:50

## 2021-06-19 RX ADMIN — SODIUM CHLORIDE, POTASSIUM CHLORIDE, SODIUM LACTATE AND CALCIUM CHLORIDE: 600; 310; 30; 20 INJECTION, SOLUTION INTRAVENOUS at 11:25

## 2021-06-19 RX ADMIN — PROPOFOL 50 MG: 10 INJECTION, EMULSION INTRAVENOUS at 11:08

## 2021-06-19 RX ADMIN — SUCCINYLCHOLINE CHLORIDE 160 MG: 20 INJECTION, SOLUTION INTRAMUSCULAR; INTRAVENOUS; PARENTERAL at 10:50

## 2021-06-19 RX ADMIN — PROPOFOL 50 MG: 10 INJECTION, EMULSION INTRAVENOUS at 10:53

## 2021-06-19 RX ADMIN — FAMOTIDINE 20 MG: 10 INJECTION INTRAVENOUS at 10:05

## 2021-06-19 RX ADMIN — FENTANYL CITRATE 50 MCG: 50 INJECTION INTRAMUSCULAR; INTRAVENOUS at 11:04

## 2021-06-19 RX ADMIN — LIDOCAINE HYDROCHLORIDE 60 MG: 20 INJECTION, SOLUTION INFILTRATION; PERINEURAL at 10:50

## 2021-06-19 RX ADMIN — DEXAMETHASONE SODIUM PHOSPHATE 6 MG: 10 INJECTION INTRAMUSCULAR; INTRAVENOUS at 11:05

## 2021-06-19 RX ADMIN — SODIUM CHLORIDE, POTASSIUM CHLORIDE, SODIUM LACTATE AND CALCIUM CHLORIDE 9 ML/HR: 600; 310; 30; 20 INJECTION, SOLUTION INTRAVENOUS at 10:04

## 2021-06-19 NOTE — ANESTHESIA PROCEDURE NOTES
Airway  Urgency: elective    Date/Time: 6/19/2021 10:53 AM  Airway not difficult    General Information and Staff    Patient location during procedure: OR  Anesthesiologist: Adonay Chance MD  CRNA: Naga Apodaca CRNA    Indications and Patient Condition  Indications for airway management: airway protection    Preoxygenated: yes  MILS maintained throughout  Mask difficulty assessment: 2 - vent by mask + OA or adjuvant +/- NMBA    Final Airway Details  Final airway type: endotracheal airway      Successful airway: ETT  Cuffed: yes   Successful intubation technique: direct laryngoscopy  Endotracheal tube insertion site: oral  Blade: Clare  Blade size: 4  ETT size (mm): 7.5  Cormack-Lehane Classification: grade IIa - partial view of glottis  Placement verified by: chest auscultation and capnometry   Measured from: teeth  ETT/EBT  to teeth (cm): 21  Number of attempts at approach: 1  Assessment: lips, teeth, and gum same as pre-op and atraumatic intubation

## 2021-06-19 NOTE — ADDENDUM NOTE
Addendum  created 06/19/21 1313 by Naga Apodaca CRNA    Flowsheet accepted, Intraprocedure Flowsheets edited

## 2021-06-19 NOTE — ANESTHESIA POSTPROCEDURE EVALUATION
"Patient: Han Garcia    Procedure Summary     Date: 06/19/21 Room / Location: [facility] OR 05 / [facility] MAIN OR    Anesthesia Start: 1046 Anesthesia Stop: 1128    Procedure: ESOPHAGOGASTRODUODENOSCOPY WITH BIOPSY (N/A Esophagus) Diagnosis:       Malignant neoplasm of lower third of esophagus (CMS/HCC)      (Malignant neoplasm of lower third of esophagus (CMS/HCC) [C15.5])    Surgeons: Mary Brito MD Provider: Adonay Chance MD    Anesthesia Type: general ASA Status: 3          Anesthesia Type: general    Vitals  Vitals Value Taken Time   /82 06/19/21 1215   Temp 36.6 °C (97.8 °F) 06/19/21 1130   Pulse 83 06/19/21 1215   Resp 18 06/19/21 1215   SpO2 98 % 06/19/21 1220   Vitals shown include unvalidated device data.        Post Anesthesia Care and Evaluation    Patient location during evaluation: bedside  Patient participation: complete - patient participated  Level of consciousness: awake  Pain management: adequate  Airway patency: patent  Anesthetic complications: No anesthetic complications    Cardiovascular status: acceptable  Respiratory status: acceptable  Hydration status: acceptable    Comments: */85   Pulse 82   Temp 36.6 °C (97.8 °F) (Oral)   Resp 18   Ht 175.3 cm (69\")   Wt 91.9 kg (202 lb 9.6 oz)   SpO2 95%   BMI 29.92 kg/m²         "

## 2021-06-19 NOTE — PROGRESS NOTES
"Chief Complaint  Esophageal Cancer    Subjective          Han Garcia presents to [facility] in followup.  History of Present Illness  Mr. 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  He has been undergoing treatment since that time with Dr. Haynes.  He is doing reasonably well.  He denies any significant dysphagia.  He is tolerating a diet and maintaining his weight without difficulty.  He presents after undergoing a repeat PET CT scan for surveillance.  Objective   Vital Signs:   /69   Pulse 92   Temp 98 °F (36.7 °C) (Temporal)   Resp 16   Ht 175.3 cm (69.02\")   Wt 93.4 kg (206 lb)   SpO2 96% Comment: room air  BMI 30.41 kg/m²     Physical Exam  Vitals and nursing note reviewed.   Constitutional:       Appearance: He is well-developed.   HENT:      Head: Normocephalic and atraumatic.      Nose: Nose normal.   Eyes:      Conjunctiva/sclera: Conjunctivae normal.   Pulmonary:      Effort: Pulmonary effort is normal.   Musculoskeletal:         General: Normal range of motion.      Cervical back: Normal range of motion and neck supple.   Skin:     General: Skin is warm and dry.   Neurological:      Mental Status: He is alert and oriented to person, place, and time.   Psychiatric:         Behavior: Behavior normal.         Thought Content: Thought content normal.         Judgment: Judgment normal.        Result Review :       Data reviewed: Radiologic studies :     I have independently reviewed the PET CT scan performed on 6/9/2021 which demonstrates hypermetabolism in the distal esophagus at the GE junction concerning for cancer recurrence.  No other evidence of metastatic disease.     Assessment and Plan      Mr. Garcia is a pleasant 65-year-old gentleman with stage IV esophageal adenocarcinoma status post chemoradiotherapy ending in 2018.  He has a repeat PET CT scan " which demonstrates new hypermetabolism in the distal esophagus with distal esophageal thickening concerning for recurrence of his primary tumor.  He will need an upper endoscopy with biopsy to reevaluate his distal esophagus and for repeat pathology with molecular studies.  This was discussed with the patient today and he would like to proceed as soon as possible.  Diagnoses and all orders for this visit:    1. Malignant neoplasm of lower third of esophagus (CMS/HCC) (Primary)    2. Liver metastases (CMS/HCC)      I spent 38 minutes caring for Han on this date of service. This time includes time spent by me in the following activities:preparing for the visit, reviewing tests, obtaining and/or reviewing a separately obtained history, performing a medically appropriate examination and/or evaluation , counseling and educating the patient/family/caregiver, ordering medications, tests, or procedures, referring and communicating with other health care professionals  and independently interpreting results and communicating that information with the patient/family/caregiver  Follow Up   No follow-ups on file.  Patient was given instructions and counseling regarding his condition or for health maintenance advice. Please see specific information pulled into the AVS if appropriate.

## 2021-06-22 RX ORDER — LISINOPRIL AND HYDROCHLOROTHIAZIDE 20; 12.5 MG/1; MG/1
TABLET ORAL
Qty: 90 TABLET | Refills: 0 | Status: SHIPPED | OUTPATIENT
Start: 2021-06-22 | End: 2021-09-14

## 2021-06-30 ENCOUNTER — OFFICE VISIT (OUTPATIENT)
Dept: OTHER | Facility: HOSPITAL | Age: 66
End: 2021-06-30

## 2021-06-30 VITALS
TEMPERATURE: 98 F | RESPIRATION RATE: 18 BRPM | OXYGEN SATURATION: 96 % | BODY MASS INDEX: 30.38 KG/M2 | HEIGHT: 69 IN | WEIGHT: 205.1 LBS | SYSTOLIC BLOOD PRESSURE: 101 MMHG | HEART RATE: 109 BPM | DIASTOLIC BLOOD PRESSURE: 62 MMHG

## 2021-06-30 DIAGNOSIS — C15.5 MALIGNANT NEOPLASM OF LOWER THIRD OF ESOPHAGUS (HCC): Primary | ICD-10-CM

## 2021-06-30 PROCEDURE — 99215 OFFICE O/P EST HI 40 MIN: CPT | Performed by: THORACIC SURGERY (CARDIOTHORACIC VASCULAR SURGERY)

## 2021-07-01 ENCOUNTER — INFUSION (OUTPATIENT)
Dept: ONCOLOGY | Facility: HOSPITAL | Age: 66
End: 2021-07-01

## 2021-07-01 ENCOUNTER — OFFICE VISIT (OUTPATIENT)
Dept: ONCOLOGY | Facility: CLINIC | Age: 66
End: 2021-07-01

## 2021-07-01 VITALS
SYSTOLIC BLOOD PRESSURE: 113 MMHG | RESPIRATION RATE: 16 BRPM | DIASTOLIC BLOOD PRESSURE: 71 MMHG | HEIGHT: 69 IN | HEART RATE: 97 BPM | TEMPERATURE: 97.3 F | BODY MASS INDEX: 30.2 KG/M2 | OXYGEN SATURATION: 98 % | WEIGHT: 203.9 LBS

## 2021-07-01 DIAGNOSIS — C78.7 LIVER METASTASIS: ICD-10-CM

## 2021-07-01 DIAGNOSIS — C78.7 LIVER METASTASES: ICD-10-CM

## 2021-07-01 DIAGNOSIS — G62.0 PERIPHERAL NEUROPATHY DUE TO CHEMOTHERAPY (HCC): ICD-10-CM

## 2021-07-01 DIAGNOSIS — Z79.01 CHRONIC ANTICOAGULATION: ICD-10-CM

## 2021-07-01 DIAGNOSIS — T45.1X5A PERIPHERAL NEUROPATHY DUE TO CHEMOTHERAPY (HCC): ICD-10-CM

## 2021-07-01 DIAGNOSIS — C15.5 MALIGNANT NEOPLASM OF LOWER THIRD OF ESOPHAGUS (HCC): Primary | ICD-10-CM

## 2021-07-01 DIAGNOSIS — Z45.2 ENCOUNTER FOR ADJUSTMENT OR MANAGEMENT OF VASCULAR ACCESS DEVICE: ICD-10-CM

## 2021-07-01 DIAGNOSIS — D70.1 CHEMOTHERAPY INDUCED NEUTROPENIA (HCC): ICD-10-CM

## 2021-07-01 DIAGNOSIS — I82.412 ACUTE DEEP VEIN THROMBOSIS (DVT) OF FEMORAL VEIN OF LEFT LOWER EXTREMITY (HCC): ICD-10-CM

## 2021-07-01 DIAGNOSIS — T45.1X5A CHEMOTHERAPY INDUCED NEUTROPENIA (HCC): ICD-10-CM

## 2021-07-01 DIAGNOSIS — D63.8 ANEMIA, CHRONIC DISEASE: ICD-10-CM

## 2021-07-01 DIAGNOSIS — N32.1 COLOVESICAL FISTULA: ICD-10-CM

## 2021-07-01 DIAGNOSIS — Z72.0 TOBACCO ABUSE: ICD-10-CM

## 2021-07-01 DIAGNOSIS — E78.2 MIXED HYPERLIPIDEMIA: ICD-10-CM

## 2021-07-01 DIAGNOSIS — I82.431 ACUTE DEEP VEIN THROMBOSIS (DVT) OF RIGHT POPLITEAL VEIN (HCC): ICD-10-CM

## 2021-07-01 LAB
ALBUMIN SERPL-MCNC: 4 G/DL (ref 3.5–5.2)
ALBUMIN/GLOB SERPL: 1.4 G/DL (ref 1.1–2.4)
ALP SERPL-CCNC: 88 U/L (ref 38–116)
ALT SERPL W P-5'-P-CCNC: 27 U/L (ref 0–41)
ANION GAP SERPL CALCULATED.3IONS-SCNC: 11 MMOL/L (ref 5–15)
AST SERPL-CCNC: 19 U/L (ref 0–40)
BASOPHILS # BLD AUTO: 0.06 10*3/MM3 (ref 0–0.2)
BASOPHILS NFR BLD AUTO: 0.6 % (ref 0–1.5)
BILIRUB SERPL-MCNC: 0.4 MG/DL (ref 0.2–1.2)
BILIRUB UR QL STRIP: NEGATIVE
BUN SERPL-MCNC: 17 MG/DL (ref 6–20)
BUN/CREAT SERPL: 17.2 (ref 7.3–30)
CALCIUM SPEC-SCNC: 9.1 MG/DL (ref 8.5–10.2)
CEA SERPL-MCNC: 16.5 NG/ML
CHLORIDE SERPL-SCNC: 99 MMOL/L (ref 98–107)
CLARITY UR: CLEAR
CO2 SERPL-SCNC: 23 MMOL/L (ref 22–29)
COLOR UR: YELLOW
CREAT SERPL-MCNC: 0.99 MG/DL (ref 0.7–1.3)
DEPRECATED RDW RBC AUTO: 49.9 FL (ref 37–54)
EOSINOPHIL # BLD AUTO: 0.41 10*3/MM3 (ref 0–0.4)
EOSINOPHIL NFR BLD AUTO: 4.2 % (ref 0.3–6.2)
ERYTHROCYTE [DISTWIDTH] IN BLOOD BY AUTOMATED COUNT: 14.5 % (ref 12.3–15.4)
GFR SERPL CREATININE-BSD FRML MDRD: 76 ML/MIN/1.73
GLOBULIN UR ELPH-MCNC: 2.8 GM/DL (ref 1.8–3.5)
GLUCOSE SERPL-MCNC: 146 MG/DL (ref 74–124)
GLUCOSE UR STRIP-MCNC: NEGATIVE MG/DL
HCT VFR BLD AUTO: 35.5 % (ref 37.5–51)
HGB BLD-MCNC: 11.7 G/DL (ref 13–17.7)
HGB UR QL STRIP.AUTO: ABNORMAL
IMM GRANULOCYTES # BLD AUTO: 0.15 10*3/MM3 (ref 0–0.05)
IMM GRANULOCYTES NFR BLD AUTO: 1.5 % (ref 0–0.5)
KETONES UR QL STRIP: NEGATIVE
LEUKOCYTE ESTERASE UR QL STRIP.AUTO: NEGATIVE
LYMPHOCYTES # BLD AUTO: 1.77 10*3/MM3 (ref 0.7–3.1)
LYMPHOCYTES NFR BLD AUTO: 18.2 % (ref 19.6–45.3)
MCH RBC QN AUTO: 31 PG (ref 26.6–33)
MCHC RBC AUTO-ENTMCNC: 33 G/DL (ref 31.5–35.7)
MCV RBC AUTO: 93.9 FL (ref 79–97)
MONOCYTES # BLD AUTO: 0.72 10*3/MM3 (ref 0.1–0.9)
MONOCYTES NFR BLD AUTO: 7.4 % (ref 5–12)
NEUTROPHILS NFR BLD AUTO: 6.59 10*3/MM3 (ref 1.7–7)
NEUTROPHILS NFR BLD AUTO: 68.1 % (ref 42.7–76)
NITRITE UR QL STRIP: NEGATIVE
NRBC BLD AUTO-RTO: 0 /100 WBC (ref 0–0.2)
PH UR STRIP.AUTO: 6 [PH] (ref 4.5–8)
PLATELET # BLD AUTO: 191 10*3/MM3 (ref 140–450)
PMV BLD AUTO: 9.5 FL (ref 6–12)
POTASSIUM SERPL-SCNC: 4 MMOL/L (ref 3.5–4.7)
PROT SERPL-MCNC: 6.8 G/DL (ref 6.3–8)
PROT UR QL STRIP: NEGATIVE
RBC # BLD AUTO: 3.78 10*6/MM3 (ref 4.14–5.8)
SODIUM SERPL-SCNC: 133 MMOL/L (ref 134–145)
SP GR UR STRIP: 1.01 (ref 1–1.03)
UROBILINOGEN UR QL STRIP: ABNORMAL
WBC # BLD AUTO: 9.7 10*3/MM3 (ref 3.4–10.8)

## 2021-07-01 PROCEDURE — 96416 CHEMO PROLONG INFUSE W/PUMP: CPT

## 2021-07-01 PROCEDURE — 80053 COMPREHEN METABOLIC PANEL: CPT

## 2021-07-01 PROCEDURE — 96413 CHEMO IV INFUSION 1 HR: CPT

## 2021-07-01 PROCEDURE — 25010000002 PALONOSETRON PER 25 MCG: Performed by: INTERNAL MEDICINE

## 2021-07-01 PROCEDURE — 25010000002 DEXAMETHASONE PER 1 MG: Performed by: INTERNAL MEDICINE

## 2021-07-01 PROCEDURE — 25010000002 TRASTUZUMAB PER 10 MG: Performed by: INTERNAL MEDICINE

## 2021-07-01 PROCEDURE — 96367 TX/PROPH/DG ADDL SEQ IV INF: CPT

## 2021-07-01 PROCEDURE — 82378 CARCINOEMBRYONIC ANTIGEN: CPT | Performed by: INTERNAL MEDICINE

## 2021-07-01 PROCEDURE — 96411 CHEMO IV PUSH ADDL DRUG: CPT

## 2021-07-01 PROCEDURE — 81003 URINALYSIS AUTO W/O SCOPE: CPT | Performed by: INTERNAL MEDICINE

## 2021-07-01 PROCEDURE — 25010000002 LEUCOVORIN CALCIUM PER 50 MG: Performed by: INTERNAL MEDICINE

## 2021-07-01 PROCEDURE — 85025 COMPLETE CBC W/AUTO DIFF WBC: CPT

## 2021-07-01 PROCEDURE — 25010000002 DIPHENHYDRAMINE 1 EACH BAG: Performed by: INTERNAL MEDICINE

## 2021-07-01 PROCEDURE — 96375 TX/PRO/DX INJ NEW DRUG ADDON: CPT

## 2021-07-01 PROCEDURE — 25010000002 FLUOROURACIL PER 500 MG: Performed by: INTERNAL MEDICINE

## 2021-07-01 PROCEDURE — 99215 OFFICE O/P EST HI 40 MIN: CPT | Performed by: INTERNAL MEDICINE

## 2021-07-01 RX ORDER — FLUOROURACIL 50 MG/ML
400 INJECTION, SOLUTION INTRAVENOUS ONCE
Status: COMPLETED | OUTPATIENT
Start: 2021-07-01 | End: 2021-07-01

## 2021-07-01 RX ORDER — DEXAMETHASONE SODIUM PHOSPHATE 4 MG/ML
4 INJECTION, SOLUTION INTRA-ARTICULAR; INTRALESIONAL; INTRAMUSCULAR; INTRAVENOUS; SOFT TISSUE ONCE
Status: CANCELLED | OUTPATIENT
Start: 2021-07-01

## 2021-07-01 RX ORDER — FAMOTIDINE 10 MG/ML
20 INJECTION, SOLUTION INTRAVENOUS ONCE
Status: CANCELLED | OUTPATIENT
Start: 2021-07-01 | End: 2021-07-01

## 2021-07-01 RX ORDER — DIPHENHYDRAMINE HYDROCHLORIDE 50 MG/ML
50 INJECTION INTRAMUSCULAR; INTRAVENOUS AS NEEDED
Status: CANCELLED | OUTPATIENT
Start: 2021-07-01

## 2021-07-01 RX ORDER — FAMOTIDINE 10 MG/ML
20 INJECTION, SOLUTION INTRAVENOUS ONCE
Status: COMPLETED | OUTPATIENT
Start: 2021-07-01 | End: 2021-07-01

## 2021-07-01 RX ORDER — PALONOSETRON 0.05 MG/ML
0.25 INJECTION, SOLUTION INTRAVENOUS ONCE
Status: COMPLETED | OUTPATIENT
Start: 2021-07-01 | End: 2021-07-01

## 2021-07-01 RX ORDER — DEXAMETHASONE SODIUM PHOSPHATE 4 MG/ML
4 INJECTION, SOLUTION INTRA-ARTICULAR; INTRALESIONAL; INTRAMUSCULAR; INTRAVENOUS; SOFT TISSUE ONCE
Status: COMPLETED | OUTPATIENT
Start: 2021-07-01 | End: 2021-07-01

## 2021-07-01 RX ORDER — SODIUM CHLORIDE 9 MG/ML
250 INJECTION, SOLUTION INTRAVENOUS ONCE
Status: CANCELLED | OUTPATIENT
Start: 2021-07-01

## 2021-07-01 RX ORDER — PALONOSETRON 0.05 MG/ML
0.25 INJECTION, SOLUTION INTRAVENOUS ONCE
Status: CANCELLED | OUTPATIENT
Start: 2021-07-01

## 2021-07-01 RX ORDER — SODIUM CHLORIDE 0.9 % (FLUSH) 0.9 %
10 SYRINGE (ML) INJECTION AS NEEDED
Status: CANCELLED | OUTPATIENT
Start: 2021-07-01

## 2021-07-01 RX ORDER — HEPARIN SODIUM (PORCINE) LOCK FLUSH IV SOLN 100 UNIT/ML 100 UNIT/ML
500 SOLUTION INTRAVENOUS AS NEEDED
Status: DISCONTINUED | OUTPATIENT
Start: 2021-07-01 | End: 2021-07-01 | Stop reason: HOSPADM

## 2021-07-01 RX ORDER — HEPARIN SODIUM (PORCINE) LOCK FLUSH IV SOLN 100 UNIT/ML 100 UNIT/ML
500 SOLUTION INTRAVENOUS AS NEEDED
Status: CANCELLED | OUTPATIENT
Start: 2021-07-01

## 2021-07-01 RX ORDER — SODIUM CHLORIDE 9 MG/ML
250 INJECTION, SOLUTION INTRAVENOUS ONCE
Status: COMPLETED | OUTPATIENT
Start: 2021-07-01 | End: 2021-07-01

## 2021-07-01 RX ORDER — FLUOROURACIL 50 MG/ML
400 INJECTION, SOLUTION INTRAVENOUS ONCE
Status: CANCELLED | OUTPATIENT
Start: 2021-07-01

## 2021-07-01 RX ORDER — FAMOTIDINE 10 MG/ML
20 INJECTION, SOLUTION INTRAVENOUS AS NEEDED
Status: CANCELLED | OUTPATIENT
Start: 2021-07-01

## 2021-07-01 RX ADMIN — FLUOROURACIL 810 MG: 50 INJECTION, SOLUTION INTRAVENOUS at 11:25

## 2021-07-01 RX ADMIN — FLUOROURACIL 4850 MG: 50 INJECTION, SOLUTION INTRAVENOUS at 11:25

## 2021-07-01 RX ADMIN — FAMOTIDINE 20 MG: 10 INJECTION INTRAVENOUS at 09:20

## 2021-07-01 RX ADMIN — SODIUM CHLORIDE 810 MG: 900 INJECTION, SOLUTION INTRAVENOUS at 10:49

## 2021-07-01 RX ADMIN — PALONOSETRON HYDROCHLORIDE 0.25 MG: 0.25 INJECTION, SOLUTION INTRAVENOUS at 09:20

## 2021-07-01 RX ADMIN — SODIUM CHLORIDE 250 ML: 9 INJECTION, SOLUTION INTRAVENOUS at 09:20

## 2021-07-01 RX ADMIN — DEXAMETHASONE SODIUM PHOSPHATE 4 MG: 4 INJECTION, SOLUTION INTRAMUSCULAR; INTRAVENOUS at 09:20

## 2021-07-01 RX ADMIN — TRASTUZUMAB 350 MG: 150 INJECTION, POWDER, LYOPHILIZED, FOR SOLUTION INTRAVENOUS at 10:14

## 2021-07-01 RX ADMIN — DIPHENHYDRAMINE HYDROCHLORIDE 25 MG: 50 INJECTION INTRAMUSCULAR; INTRAVENOUS at 09:22

## 2021-07-01 NOTE — PROGRESS NOTES
Subjective     REASON FOR follow up:1.    1. 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  Currently receiving palliative 5 fu leucovorin  AND HERCEPTIN therapy once a month    2.COLOVESICAL FISTULA: chronic antibiotic therapy cipro, NO FURTHER PLANS FOR SURGERY AFTER VISIT AND PROCEDURE BY DR GM CASTILLO 1/20    3. 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 NEURONTIN INITIATED.        History of Present Illness       DURING THE VISIT WITH THE PATIENT TODAY , PATIENT HAD FACE MASK, MY MEDICAL ASSISTANT AND I  HAD PROPPER PROTECTIVE EQUIPMENT, AND I DID HAND HYGIENE WITH SOAP AND WATER BEFORE AND AFTER THE VISIT.      This patient returns today to the office in company of his sister after he has undergone an upper GI endoscopy and biopsy of the esophagus in order to document the status of his cancer in that location and to review tissue that has been sent for Next Generation Sequencing. The patient is here today. He is still having no difficulty swallowing. No pain in the retrosternal area and no regurgitation. We discussed with Dr. Brito, Thoracic Surgery, yesterday approaches to therapy for this local disease in the esophagus including consideration of radiation therapy. He has not been open to this idea too highly. Today it seems he is a little bit more open to this.     The patient was also treated during the previous visit for a UTI by me. We will need to obtain a new urinalysis today. He has not had any manifestations in the aspect of the urine discoloration or frequency or urgency.     The patient also has no pain. He remains on his anticoagulant given his thrombophilia and he has not had any neurological problem. His mind has been clear and clean including benefits that his physical activity has on his overall  "performance status.                              Past Medical History:   Diagnosis Date   • Arthritis    • Cancer (CMS/HCC)     prostate cancer 2008   • Cancer (CMS/HCC)     esophageal   • Chronic anticoagulation     on xarelto   • Elevated PSA    • Esophageal mass    • Fistula     colon and bladder   • H/O Lung nodule    • Hepatitis     CHILD--PT STATED \"I THINK IT WAS A.\"   • History of chemotherapy    • History of pneumonia    • Hx of blood clots 08/10/2019   • Hyperlipidemia    • Hypertension    • Nail fungus    • Neuropathy    • PVD (peripheral vascular disease) (CMS/HCC)    • Recto-bladderneck fistula     on poab for recurrent uti        Past Surgical History:   Procedure Laterality Date   • COLONOSCOPY N/A 2/4/2020    Procedure: COLONOSCOPY;  Surgeon: Guy Sears MD;  Location: [facility]COPY;  Service: General;  Laterality: N/A;  PRE-COLOVESICAL FISTULA  POST-- DIVERTICULOSIS   • CYSTOSCOPY  02/06/2020   • CYSTOSCOPY Bilateral 2/26/2020    Procedure: CYSTOSCOPY RETROGRADE;  Surgeon: Cm Witt MD;  Location: [facility] OR;  Service: Urology;  Laterality: Bilateral;   • ENDOSCOPY     • ENDOSCOPY N/A 6/19/2021    Procedure: ESOPHAGOGASTRODUODENOSCOPY WITH BIOPSY;  Surgeon: Mary Brito MD;  Location: [facility] OR;  Service: Gastroenterology;  Laterality: N/A;   • HERNIA REPAIR Right 1999    inguinal hernia   • PROSTATE SURGERY  03/2008    prostatectectomy   • VENOUS ACCESS DEVICE (PORT) INSERTION N/A 7/16/2019    Procedure: INSERTION VENOUS ACCESS DEVICE WITH FLUORO AND EGD WITH BIOPSY;  Surgeon: Mary Brito MD;  Location: [facility] OR;  Service: Thoracic        Current Outpatient Medications on File Prior to Visit   Medication Sig Dispense Refill   • acetaminophen (TYLENOL) 500 MG tablet Take 500 mg by mouth Every 6 (Six) Hours As Needed for Mild Pain .     • cevimeline (EVOXAC) 30 MG capsule Take 30 mg by mouth 3 (Three) Times a Day.     • Chlorhexidine Gluconate " Cloth 2 % pads Apply 1 application topically. USE AS DIRECTED PREOP     • cilostazol (PLETAL) 50 MG tablet Take 50 mg by mouth 2 (Two) Times a Day.     • ciprofloxacin (CIPRO) 250 MG tablet Take 1 tablet by mouth Daily. 90 tablet 1   • clotrimazole (MYCELEX) 10 MG obie 3 TIMES A DAY 90 tablet 1   • econazole nitrate (SPECTAZOLE) 1 % cream APPLY TO ALL AFFECTED NAILS AT BEDTIME     • gabapentin (NEURONTIN) 300 MG capsule TAKE 2 CAPSULES BY MOUTH EVERY NIGHT AT BEDTIME (Patient taking differently: Take 300 mg by mouth 2 (two) times a day.) 60 capsule 4   • lisinopril-hydrochlorothiazide (PRINZIDE,ZESTORETIC) 20-12.5 MG per tablet TAKE 1 TABLET BY MOUTH EVERY DAY 90 tablet 0   • nicotine (NICODERM CQ) 21 MG/24HR patch Place 1 patch on the skin as directed by provider Daily. 28 each 1   • ondansetron (ZOFRAN) 4 MG tablet Take 1 tablet by mouth Every 8 (Eight) Hours As Needed for Nausea or Vomiting. 30 tablet 2   • Probiotic Product (PROBIOTIC DAILY PO) Take 1 tablet by mouth Daily.     • triamcinolone (KENALOG) 0.1 % ointment Apply  topically to the appropriate area as directed 2 (Two) Times a Day. (Patient taking differently: Apply 1 application topically to the appropriate area as directed As Needed.) 30 g 2   • Xarelto 20 MG tablet TAKE 1 TABLET BY MOUTH EVERY DAY**STOP LOVENOX** (Patient taking differently: Take 20 mg by mouth Every Morning.) 90 tablet 1     No current facility-administered medications on file prior to visit.        ALLERGIES:  No Known Allergies     Social History     Socioeconomic History   • Marital status: Single     Spouse name: Not on file   • Number of children: Not on file   • Years of education: High school   • Highest education level: Not on file   Tobacco Use   • Smoking status: Current Every Day Smoker     Packs/day: 0.50     Years: 38.00     Pack years: 19.00     Types: Cigarettes     Start date: 1974   • Smokeless tobacco: Never Used   • Tobacco comment: Quit for a period of 8 years.  10-15 per day   Vaping Use   • Vaping Use: Never used   Substance and Sexual Activity   • Alcohol use: Not Currently   • Drug use: Never   • Sexual activity: Defer        Family History   Problem Relation Age of Onset   • Hypertension Mother    • Stroke Mother    • Hypertension Other    • Lung disease Other    • Prostate cancer Other    • Lung cancer Father    • Malig Hyperthermia Neg Hx       Current Outpatient Medications on File Prior to Visit   Medication Sig Dispense Refill   • acetaminophen (TYLENOL) 500 MG tablet Take 500 mg by mouth Every 6 (Six) Hours As Needed for Mild Pain .     • cevimeline (EVOXAC) 30 MG capsule Take 30 mg by mouth 3 (Three) Times a Day.     • Chlorhexidine Gluconate Cloth 2 % pads Apply 1 application topically. USE AS DIRECTED PREOP     • cilostazol (PLETAL) 50 MG tablet Take 50 mg by mouth 2 (Two) Times a Day.     • ciprofloxacin (CIPRO) 250 MG tablet Take 1 tablet by mouth Daily. 90 tablet 1   • clotrimazole (MYCELEX) 10 MG obie 3 TIMES A DAY 90 tablet 1   • econazole nitrate (SPECTAZOLE) 1 % cream APPLY TO ALL AFFECTED NAILS AT BEDTIME     • gabapentin (NEURONTIN) 300 MG capsule TAKE 2 CAPSULES BY MOUTH EVERY NIGHT AT BEDTIME (Patient taking differently: Take 300 mg by mouth 2 (two) times a day.) 60 capsule 4   • lisinopril-hydrochlorothiazide (PRINZIDE,ZESTORETIC) 20-12.5 MG per tablet TAKE 1 TABLET BY MOUTH EVERY DAY 90 tablet 0   • nicotine (NICODERM CQ) 21 MG/24HR patch Place 1 patch on the skin as directed by provider Daily. 28 each 1   • ondansetron (ZOFRAN) 4 MG tablet Take 1 tablet by mouth Every 8 (Eight) Hours As Needed for Nausea or Vomiting. 30 tablet 2   • Probiotic Product (PROBIOTIC DAILY PO) Take 1 tablet by mouth Daily.     • triamcinolone (KENALOG) 0.1 % ointment Apply  topically to the appropriate area as directed 2 (Two) Times a Day. (Patient taking differently: Apply 1 application topically to the appropriate area as directed As Needed.) 30 g 2   • Xarelto 20  "MG tablet TAKE 1 TABLET BY MOUTH EVERY DAY**STOP LOVENOX** (Patient taking differently: Take 20 mg by mouth Every Morning.) 90 tablet 1     No current facility-administered medications on file prior to visit.   No Known Allergies         Objective     Vitals:    07/01/21 0827   BP: 113/71   Pulse: 97   Resp: 16   Temp: 97.3 °F (36.3 °C)   TempSrc: Temporal   SpO2: 98%   Weight: 92.5 kg (203 lb 14.4 oz)   Height: 175.3 cm (69.02\")   PainSc: 0-No pain     Current Status 6/15/2021   ECOG score 0           Physical exam        I HAVE PERSONALLY REVIEWED THE HISTORY OF THE PRESENT ILLNESS, PAST MEDICAL HISTORY, FAMILY HISTORY, SOCIAL HISTORY, ALLERGIES, MEDICATIONS STATED ABOVE IN THE OFFICE NOTE FROM TODAY.        GENERAL:  Well-developed, well-nourished  Patient  in no acute distress.   SKIN:  Warm, dry ,NO rashes,NO purpura ,NO petechiae.  HEENT:  Pupils were equal and reactive to light and accomodation, conjunctivae noninjected, no pterygium, normal extraocular movements, normal visual acuity.   NECK:  Supple with good range of motion; no thyromegaly or masses, no JVD or bruits, no cervical adenopathies.No carotid artery pain, no carotid abnormal pulsation , NO arterial dance.  LYMPHATICS:  No cervical, NO supraclavicular, NO axillary,NO epitrochlear , NO inguinal adenopathy.  CARDIAC   normal rate and regular rhythm, without murmur,NO rubs NO S3 NO S4 right or left .   VASCULAR VENOUS: no cyanosis, collateral circulation, varicosities, edema, palpable cords, pain, erythema.  ABDOMEN:  Soft, nontender with no hepatomegaly, no splenomegaly,no masses, no ascites, no collateral circulation,no distention,no [place] sign, no abdominal pain, no inguinal hernias,no umbilical hernia, no abdominal bruits. Normal bowel sounds.  GENITAL: Not  Performed.  EXTREMITIES  AND SPINE:  No clubbing, cyanosis or edema, no deformities or pain .No kyphosis, scoliosis, deformities or pain in spine, ribs or pelvic bone.  NEUROLOGICAL:  " Patient was awake, alert, oriented to time, person and place.        RECENT LABS:  Hematology WBC   Date Value Ref Range Status   07/01/2021 9.70 3.40 - 10.80 10*3/mm3 Final   02/02/2019 13.4 (H) 3.4 - 10.8 x10E3/uL Final     RBC   Date Value Ref Range Status   07/01/2021 3.78 (L) 4.14 - 5.80 10*6/mm3 Final   02/02/2019 4.81 4.14 - 5.80 x10E6/uL Final     Hemoglobin   Date Value Ref Range Status   07/01/2021 11.7 (L) 13.0 - 17.7 g/dL Final     Hematocrit   Date Value Ref Range Status   07/01/2021 35.5 (L) 37.5 - 51.0 % Final     Platelets   Date Value Ref Range Status   07/01/2021 191 140 - 450 10*3/mm3 Final               Assessment/Plan    1.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 Almost 90% of the liver WAS replaced by tumor. The patient has been undergoing chemotherapy with 5  fu and leucovorin  and Herceptin and has had excellent response clinically and radiologically. The patient was reviewed on 08/10/2020. I reviewed with the patient in the PAC system [facility] his PET scan that is dramatic. There is minimal uptake in the lower esophagus and most importantly complete resolution of his liver metastasis. Actually the only issue that is pertinent to the PET scan is still the visibility of his colovesical fistula.     Therefore from the point of view of his cancer of the esophagus, metastatic to the liver, he has no symptoms from the primary tumor in the esophagus and he has no symptoms related to his liver metastasis that has resolved altogether. His CEA level is stable.The patient raised the question about the CEA fluctuation. I pointed out to him that a lot of this is also related to his smoking. Smoking per se elevates CEA level.  Upon further reviewing the patient on 04/20/2021, he has no symptoms pertinent to his metastatic cancer of the esophagus to the liver and he has no difficulty swallowing. There are no side effects of the 5-FU, Leucovorin and  Herceptin. The patient's cardiac function remains stable and he has not had any drop in the ejection fraction.   I discussed with him on 05/18/2021 the fact that his cancer clinically is very quiet but I am afraid of the rise in his CEA level to 12. This could be an indicator of all of the cigarettes that he is smoking on a daily basis of indicator of cancer escaping control and not visible radiologically through his CT scan. I pointed out to him that he needs to continue making an effort to decrease his smoking and he is now down to 10 cigarettes a day. We will recheck another CEA level today. Given the circumstances of the previous CT scan I do not believe that I need to change the doses or plan of care in regard to his chemotherapy medication administration for the time being. I recommended for him to remain on his 5FU/leucovorin and Herceptin on a monthly basis for now.  The patient was further reviewed on 06/15/2021 with a new PET scan that documented regrowth of the tumor in the lower esophagus without any new symptomatology, for example dysphagia or odynophagia. No regurgitation. The liver metastasis remains in remission and there are no new areas of disease in the bones or lungs or any other site. Given the excellent performance status I discussed with the patient and his sister today many options of therapy, including going back to FOLFOX regimen along with Herceptin, taking another sample of the esophagus with a new endoscopy and doing analysis of the tissue for [employer] [facility] that will be my favorite choice, or palliative treatment with radiation therapy and 5-FU continuous infusion that will be toxic to him and he does not prefer to have.     I had a discussion with Mary Brito MD, thoracic surgeon, who has agreed to see the patient tomorrow. I think the endoscopy, the new tissue analysis, and sending the tissue for [employer] [facility] will be the way to go. Depending on what we find there, we can  modify his treatment altogether. I do not think the patient will have any consequences missing chemotherapy for a couple of weeks or so.      In preparation for the endoscopy and biopsies I asked him to hold off on the Xarelto until he is seen by Dr. Brito tomorrow.     In regard to his colovesical fistula, he has had minimal symptomatology this week, maybe some activity there and I asked him to go back to the lab and take a urine specimen and culture. He knows that if this is abnormal he will need to initiate ciprofloxacin that he has at home.    In regard to his hypertension, this is under good control and I advised him to remain on his lisinopril and hydrochlorothiazide combination.    In regard to his smoking cessation, he has had conversations with NIRU Jacobson about this. He is using some nicotine CQ patch, here and there and also trying to work with nicotine gum and things of this nature but he has not been able to shake this issue altogether.     Otherwise I will review him back in a couple of weeks with a CBC, CMP, and a CEA level.    This case will be presented in the multidisciplinary thoracic conference by me this Thursday, and I will discuss any further advice to the patient.     I also mentioned to the patient that on the background of HER2-positive cancer of the esophagus the possibility of using a new medicine for HER2-based disease that is called Enhertu is a real possibility and maybe that will be the next step to go through.    The patient was further reviewed on 07/01/2021. I reviewed with Dr. Brito the endoscopy that shows a noncircumferential abnormality in the lower esophagus that encompasses almost 6 cm in length. It is not blocking off the esophagus and that is why the patient has no symptoms. The pathology shows at least atypical cells consistent with cancer. Further Next Generation Sequencing has been sent for [employer] [facility].     I discussed with the patient the fact that we  have many other modalities of treatment that he could encounter. He prefers to continue his general chemotherapy to control the cancer in his liver using 5-FU, leucovorin and Herceptin today and agree with that. In consideration to be seen by radiation therapy, the patient is willing to listen to the possibility of undergoing continuous 5-FU infusion along with radiation therapy to the esophagus knowing that he will experiencing esophagitis that can give him significant issues for 2-3 weeks. I went ahead and made the appointment for him to be seen by radiation oncology for this purpose only.     Obviously if the Next Generation Sequencing gives us any other hints in regard to how to treat him this could be also utilized including the consideration of using Enhertu in the long run for achieving better control.     In regard to his smoking cessation he is not willing to change this phenomenon at this time. We have had NIRU Jacobson talking with him in this regard but he is not ready to make a decision when to quit.     Finally, I pointed out to him that during the previous visit we documented a urinary tract infection with streptococcus 50,000 colonies that in my appreciation was significant given the fact that he has a colovesical fistula. This was treated with antibiotics. He has not had any discomfort issues pertinent to this anymore. The urine today is completely clear. Nevertheless, I went ahead and sent a urinalysis at least to be sure that there is no need for any other repetitive infection therapy on him.     The patient also will remain on his anticoagulation at this time, his Xarelto for the time being. I have renewed as well his Neurontin. He has no need for pain medicine.

## 2021-07-03 ENCOUNTER — INFUSION (OUTPATIENT)
Dept: ONCOLOGY | Facility: HOSPITAL | Age: 66
End: 2021-07-03

## 2021-07-03 VITALS — SYSTOLIC BLOOD PRESSURE: 116 MMHG | HEART RATE: 95 BPM | DIASTOLIC BLOOD PRESSURE: 67 MMHG

## 2021-07-03 DIAGNOSIS — C78.7 LIVER METASTASIS: ICD-10-CM

## 2021-07-03 DIAGNOSIS — Z45.2 ENCOUNTER FOR ADJUSTMENT OR MANAGEMENT OF VASCULAR ACCESS DEVICE: ICD-10-CM

## 2021-07-03 DIAGNOSIS — C15.5 MALIGNANT NEOPLASM OF LOWER THIRD OF ESOPHAGUS (HCC): Primary | ICD-10-CM

## 2021-07-03 PROCEDURE — 96523 IRRIG DRUG DELIVERY DEVICE: CPT

## 2021-07-03 PROCEDURE — 25010000002 HEPARIN LOCK FLUSH PER 10 UNITS: Performed by: INTERNAL MEDICINE

## 2021-07-03 RX ORDER — HEPARIN SODIUM (PORCINE) LOCK FLUSH IV SOLN 100 UNIT/ML 100 UNIT/ML
500 SOLUTION INTRAVENOUS AS NEEDED
Status: CANCELLED | OUTPATIENT
Start: 2021-07-03

## 2021-07-03 RX ORDER — SODIUM CHLORIDE 0.9 % (FLUSH) 0.9 %
10 SYRINGE (ML) INJECTION AS NEEDED
Status: CANCELLED | OUTPATIENT
Start: 2021-07-03

## 2021-07-03 RX ORDER — HEPARIN SODIUM (PORCINE) LOCK FLUSH IV SOLN 100 UNIT/ML 100 UNIT/ML
500 SOLUTION INTRAVENOUS AS NEEDED
Status: DISCONTINUED | OUTPATIENT
Start: 2021-07-03 | End: 2021-07-03 | Stop reason: HOSPADM

## 2021-07-03 RX ORDER — SODIUM CHLORIDE 0.9 % (FLUSH) 0.9 %
10 SYRINGE (ML) INJECTION AS NEEDED
Status: DISCONTINUED | OUTPATIENT
Start: 2021-07-03 | End: 2021-07-03 | Stop reason: HOSPADM

## 2021-07-03 RX ADMIN — SODIUM CHLORIDE, PRESERVATIVE FREE 10 ML: 5 INJECTION INTRAVENOUS at 09:13

## 2021-07-03 RX ADMIN — Medication 500 UNITS: at 09:13

## 2021-07-09 ENCOUNTER — APPOINTMENT (OUTPATIENT)
Dept: RADIATION ONCOLOGY | Facility: HOSPITAL | Age: 66
End: 2021-07-09

## 2021-07-09 ENCOUNTER — CONSULT (OUTPATIENT)
Dept: RADIATION ONCOLOGY | Facility: HOSPITAL | Age: 66
End: 2021-07-09

## 2021-07-09 VITALS
SYSTOLIC BLOOD PRESSURE: 98 MMHG | OXYGEN SATURATION: 97 % | DIASTOLIC BLOOD PRESSURE: 59 MMHG | HEART RATE: 107 BPM | BODY MASS INDEX: 29.96 KG/M2 | WEIGHT: 203 LBS

## 2021-07-09 DIAGNOSIS — C15.5 MALIGNANT NEOPLASM OF LOWER THIRD OF ESOPHAGUS (HCC): Primary | ICD-10-CM

## 2021-07-09 PROCEDURE — 99205 OFFICE O/P NEW HI 60 MIN: CPT | Performed by: RADIOLOGY

## 2021-07-09 PROCEDURE — G0463 HOSPITAL OUTPT CLINIC VISIT: HCPCS | Performed by: RADIOLOGY

## 2021-07-09 NOTE — PROGRESS NOTES
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   The patient is a 65 y.o.  male with a history of stage IV esphogeal cancer first diagnosed in July 2019 at which time he had extensive liver mets.  He was started on palliative chemotherapy with 5FU leucovorin and herceptin with excellent response including complete resolution of liver metastases on PET scan in August 2020.  He continued chemotherapy. He had a PET scan on 7/1/21 which showed 2 adjacent foci of uptake in distal esophagus at GE junction with significant increase in FDG activity compared to PET from 10/27/20 and increased thickening of distal esophagus and no signficant hypermetabolism in liver or elsewhere to suggest active metastatic disease.      He underwent an EGD with Dr. Brito on 6/19/21 which showed partially circumferential tumor occupying 50% of distal esophagus extending from 34-40cm.  Biopsies were positive for high grade dysplasia with foci suspicious for invasive adenocarcinoma.     He is referred today for evaluation for possible concurrent 5Fu and radiation to the pet positive disease in the esophagus given the good response in the liver. He denies any pain or difficulty with swallowing.  He denies any recent weight loss.  Overall he is feeling well and remains fairly active.        Review of Systems   Constitutional: Positive for activity change, appetite change, fatigue and unexpected weight change.   HENT: Negative.    Eyes: Negative.    Respiratory: Positive for cough.    Cardiovascular: Negative.    Gastrointestinal: Positive for nausea.   Endocrine: Negative.    Genitourinary: Positive for frequency.  "  Musculoskeletal: Positive for arthralgias, back pain, myalgias, neck pain and neck stiffness.   Skin: Negative.    Neurological: Positive for dizziness, weakness, light-headedness and numbness.   Hematological: Bruises/bleeds easily.   Psychiatric/Behavioral: Positive for sleep disturbance.         Past Medical History:   Diagnosis Date   • Arthritis    • Cancer (CMS/HCC)     prostate cancer 2008   • Cancer (CMS/HCC)     esophageal   • Chronic anticoagulation     on xarelto   • Elevated PSA    • Esophageal mass    • Fistula     colon and bladder   • H/O Lung nodule    • Hepatitis     CHILD--PT STATED \"I THINK IT WAS A.\"   • History of chemotherapy    • History of pneumonia    • Hx of blood clots 08/10/2019   • Hyperlipidemia    • Hypertension    • Nail fungus    • Neuropathy    • PVD (peripheral vascular disease) (CMS/HCC)    • Recto-bladderneck fistula     on poab for recurrent uti         Past Surgical History:   Procedure Laterality Date   • COLONOSCOPY N/A 2/4/2020    Procedure: COLONOSCOPY;  Surgeon: Guy Sears MD;  Location: [facility] ENDOSCOPY;  Service: General;  Laterality: N/A;  PRE-COLOVESICAL FISTULA  POST-- DIVERTICULOSIS   • CYSTOSCOPY  02/06/2020   • CYSTOSCOPY Bilateral 2/26/2020    Procedure: CYSTOSCOPY RETROGRADE;  Surgeon: Cm Witt MD;  Location: [facility];  Service: Urology;  Laterality: Bilateral;   • ENDOSCOPY     • ENDOSCOPY N/A 6/19/2021    Procedure: ESOPHAGOGASTRODUODENOSCOPY WITH BIOPSY;  Surgeon: Mary Brito MD;  Location: [facility] OR;  Service: Gastroenterology;  Laterality: N/A;   • HERNIA REPAIR Right 1999    inguinal hernia   • PROSTATE SURGERY  03/2008    prostatectectomy   • VENOUS ACCESS DEVICE (PORT) INSERTION N/A 7/16/2019    Procedure: INSERTION VENOUS ACCESS DEVICE WITH FLUORO AND EGD WITH BIOPSY;  Surgeon: Mary Brito MD;  Location: [facility] OR;  Service: Thoracic         Social History     Socioeconomic History   • Marital " status: Single     Spouse name: Not on file   • Number of children: Not on file   • Years of education: High school   • Highest education level: Not on file   Tobacco Use   • Smoking status: Current Every Day Smoker     Packs/day: 0.50     Years: 38.00     Pack years: 19.00     Types: Cigarettes     Start date: 1974   • Smokeless tobacco: Never Used   • Tobacco comment: Quit for a period of 8 years. 10-15 per day   Vaping Use   • Vaping Use: Never used   Substance and Sexual Activity   • Alcohol use: Not Currently   • Drug use: Never   • Sexual activity: Defer         Family History   Problem Relation Age of Onset   • Hypertension Mother    • Stroke Mother    • Hypertension Other    • Lung disease Other    • Prostate cancer Other    • Lung cancer Father    • Malig Hyperthermia Neg Hx           Objective    Physical Exam  Constitutional:       Appearance: Normal appearance.   HENT:      Head: Atraumatic.   Eyes:      Extraocular Movements: Extraocular movements intact.   Pulmonary:      Effort: Pulmonary effort is normal.   Abdominal:      Palpations: Abdomen is soft.   Musculoskeletal:         General: Normal range of motion.      Cervical back: Normal range of motion.   Neurological:      General: No focal deficit present.      Mental Status: He is alert and oriented to person, place, and time.   Psychiatric:         Mood and Affect: Mood normal.         Behavior: Behavior normal.           Current Outpatient Medications on File Prior to Visit   Medication Sig Dispense Refill   • acetaminophen (TYLENOL) 500 MG tablet Take 500 mg by mouth Every 6 (Six) Hours As Needed for Mild Pain .     • cevimeline (EVOXAC) 30 MG capsule Take 30 mg by mouth 3 (Three) Times a Day.     • Chlorhexidine Gluconate Cloth 2 % pads Apply 1 application topically. USE AS DIRECTED PREOP     • cilostazol (PLETAL) 50 MG tablet Take 50 mg by mouth 2 (Two) Times a Day.     • ciprofloxacin (CIPRO) 250 MG tablet Take 1 tablet by mouth Daily. 90  tablet 1   • clotrimazole (MYCELEX) 10 MG obie 3 TIMES A DAY 90 tablet 1   • econazole nitrate (SPECTAZOLE) 1 % cream APPLY TO ALL AFFECTED NAILS AT BEDTIME     • gabapentin (NEURONTIN) 300 MG capsule TAKE 2 CAPSULES BY MOUTH EVERY NIGHT AT BEDTIME (Patient taking differently: Take 300 mg by mouth 2 (two) times a day.) 60 capsule 4   • lisinopril-hydrochlorothiazide (PRINZIDE,ZESTORETIC) 20-12.5 MG per tablet TAKE 1 TABLET BY MOUTH EVERY DAY 90 tablet 0   • nicotine (NICODERM CQ) 21 MG/24HR patch Place 1 patch on the skin as directed by provider Daily. 28 each 1   • ondansetron (ZOFRAN) 4 MG tablet Take 1 tablet by mouth Every 8 (Eight) Hours As Needed for Nausea or Vomiting. 30 tablet 2   • Probiotic Product (PROBIOTIC DAILY PO) Take 1 tablet by mouth Daily.     • triamcinolone (KENALOG) 0.1 % ointment Apply  topically to the appropriate area as directed 2 (Two) Times a Day. (Patient taking differently: Apply 1 application topically to the appropriate area as directed As Needed.) 30 g 2   • Xarelto 20 MG tablet TAKE 1 TABLET BY MOUTH EVERY DAY**STOP LOVENOX** (Patient taking differently: Take 20 mg by mouth Every Morning.) 90 tablet 1     No current facility-administered medications on file prior to visit.       ALLERGIES:  No Known Allergies    There were no vitals taken for this visit.     Current Status 6/15/2021   ECOG score 0         Assessment/Plan     Han Garcia is a 65 y.o. male with a hx of stage IV esophageal adenocarcinoma with liver mets at diagnosis in 2019 with excellent response to initial chemotherapy now with local recurrence in the lower esophagus and no pet positive disease elsewhere.  I discussed with him my recommendation for concurrent chemo and radiation therapy with continuous infusion 5FU given his excellent response elsewhere.  He is not a candidate for surgery. I discussed with him today the risks, benefits and rationale of radiation therapy to the esophagus/ge junction.   I  discussed both the acute and long term side effects to include but not limited to the following:    Acute:  skin erythema, fatigue, lowered blood counts, pneumonitis resulting in shortness of breath, cough or pain wtih inspiration, esophagitiis resulting in pain or difficulty with swallowing    Late:  permanent skin changes, late pneumonitis or pulmonary fibrosis, esophageal stricture, late cardiac damage and the remote risk of second malignancies.    Han Garcia  voiced understanding.  He is still unsure if he wants to proceed and would like to think about it over the weekend.  I have scheduled him to return next Wednesday, July 14 for re-evaluation and possible CT simulation for treatment planning purposes if he decided to proceed with radiation.  I plan to deliver a dose of approximately 50-55Gy in 25-28 fractions. He knows he can cancel this appointment if he decides not to pursue radiation.     I personally spent greater than 60 minutes today assessing, managing, discussing and documenting my visit with the patient. That time includes review of records, imaging and pathology reports, obtaining my own history, performing a medically appropriate evaluation, counseling and educating the patient, discussing goals, logistics, alternatives and risks of my recommendations, surveillance options and potential outcomes. It also includes the time documenting the clinical information in the EMR and communicating my recommendations to the other involved physicians.               Thank you very much for allowing me to participate in the care of this very pleasant patient.    Sincerely,      Anh Garcia MD

## 2021-07-12 ENCOUNTER — OFFICE VISIT (OUTPATIENT)
Dept: OTHER | Facility: HOSPITAL | Age: 66
End: 2021-07-12

## 2021-07-12 DIAGNOSIS — Z72.0 TOBACCO ABUSE: Primary | ICD-10-CM

## 2021-07-12 PROCEDURE — 99212-NC PR NO CHARGE CBC OFFICE OUTPATIENT VISIT 10 MINUTES: Performed by: NURSE PRACTITIONER

## 2021-07-12 NOTE — PROGRESS NOTES
[facility] MULTIDISCIPLINARY CLINIC  SMOKING CESSATION TELEHEALTH FOLLOW-UP VISIT     Han Garcia is a pleasant 65 y.o. male seen today by VIDEO for smoking cessation counseling follow up        Patient consent obtained for our telehealth visit today:  You have chosen to receive care through a telehealth visit.  Do you consent to use a video/audio connection for your medical care today? YES     HPI:  Patient was seen by his treatment team last week and plans to begin concurrent chemoradiation for local recurrence of adenocarcinoma in the lower esophagus. He has carefully considered his options and tells me today that he has elected to proceed. Patient continues to smoke cigarettes daily. Currently smoking about 10-20 cigarettes a day (down from 20 cigarettes a day at last visit).       QUIT PLAN (STAR):  Set quit date: Today we explored patient concerns around treatment. He is not ready to set up another quit date at this time.  He wants to maintain this most recent reduction in cigarettes and will be willing to revisit the idea of a quit date once his new treatment recommendations are understood and implemented.      Tell friends/family: His sister is been a support and accountability ashley. He has a neighbor whom he enjoys speaking with.      Anticipate challenges: Biggest challenge today is some worry around how well he will tolerate upcoming treatment..      Remove tobacco from environment: His car continues to be a smoke-free environment and he has utilize car rides as a distraction in the past     Medication plan: Currently is not using nicotine patch or any other quit smoking medications but he does have nicotine patches available when he is ready for another attempt. We reviewed today that this would still be a viable option for him when he is ready to resume his efforts, rather than attempt a cold turkey quit.     Practical counseling: He is aware that side effects such as oropharyngeal  irritation and dryness may be worsened by continued smoking as he discussed this with Dr Garcia. We will continue cessation coaching and supportive counseling in the survivorship clinic.  Provided encouragement particularly around taking advantage of the improved weather getting out of the house and leaving the cigarettes behind.     Follow-up: He is scheduled for planning scans with radiation oncology tomorrow. We will plan to schedule next follow up on campus to coincide with his radiation treatment schedule and I will call him next week to set this up. I have encouraged them to call me with any questions or as needed in the interm.

## 2021-07-13 ENCOUNTER — APPOINTMENT (OUTPATIENT)
Dept: ONCOLOGY | Facility: HOSPITAL | Age: 66
End: 2021-07-13

## 2021-07-13 ENCOUNTER — MDT ASSESSMENT (OUTPATIENT)
Dept: OTHER | Facility: HOSPITAL | Age: 66
End: 2021-07-13

## 2021-07-14 ENCOUNTER — OFFICE VISIT (OUTPATIENT)
Dept: RADIATION ONCOLOGY | Facility: HOSPITAL | Age: 66
End: 2021-07-14

## 2021-07-14 VITALS
OXYGEN SATURATION: 98 % | SYSTOLIC BLOOD PRESSURE: 115 MMHG | BODY MASS INDEX: 30.11 KG/M2 | WEIGHT: 204 LBS | HEART RATE: 102 BPM | DIASTOLIC BLOOD PRESSURE: 63 MMHG

## 2021-07-14 DIAGNOSIS — C15.5 MALIGNANT NEOPLASM OF LOWER THIRD OF ESOPHAGUS (HCC): Primary | ICD-10-CM

## 2021-07-14 PROCEDURE — 99213 OFFICE O/P EST LOW 20 MIN: CPT | Performed by: RADIOLOGY

## 2021-07-14 NOTE — PROGRESS NOTES
Subjective     No ref. 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   The patient is a 65 y.o.  male with a history of stage IV esphogeal cancer first diagnosed in July 2019 at which time he had extensive liver mets.  He was started on palliative chemotherapy with 5FU leucovorin and herceptin with excellent response including complete resolution of liver metastases on PET scan in August 2020.  He continued chemotherapy. He had a PET scan on 7/1/21 which showed 2 adjacent foci of uptake in distal esophagus at GE junction with significant increase in FDG activity compared to PET from 10/27/20 and increased thickening of distal esophagus and no signficant hypermetabolism in liver or elsewhere to suggest active metastatic disease.       He underwent an EGD with Dr. Brito on 6/19/21 which showed partially circumferential tumor occupying 50% of distal esophagus extending from 34-40cm.  Biopsies were positive for high grade dysplasia with foci suspicious for invasive adenocarcinoma.      He is referred today for evaluation for possible concurrent 5Fu and radiation to the pet positive disease in the esophagus given the good response in the liver. He denies any pain or difficulty with swallowing.  He denies any recent weight loss.  Overall he is feeling well and remains fairly active.    Interval history 7/14/21:  We discussed his case this morning at our multidisciplinary lung conference.  On review of the PET scan, Dr. Aiken thought there was one area of uptake in the liver suspicious for possible recurrence in the liver as well as the local recurrence in the esophagus.  His tumor was also sent off  "several markers and is still Her 2 positive which makes him a good candidate for enhertu and this was discussed as well.   .      Review of Systems   Constitutional: Positive for fatigue.   Respiratory: Negative.    Gastrointestinal: Negative.    Musculoskeletal: Positive for arthralgias.   Psychiatric/Behavioral: Negative.          Past Medical History:   Diagnosis Date   • Arthritis    • Cancer (CMS/HCC)     prostate cancer 2008   • Cancer (CMS/HCC)     esophageal   • Chronic anticoagulation     on xarelto   • Elevated PSA    • Esophageal mass    • Fistula     colon and bladder   • H/O Lung nodule    • Hepatitis     CHILD--PT STATED \"I THINK IT WAS A.\"   • History of chemotherapy    • History of pneumonia    • Hx of blood clots 08/10/2019   • Hyperlipidemia    • Hypertension    • Nail fungus    • Neuropathy    • PVD (peripheral vascular disease) (CMS/HCC)    • Recto-bladderneck fistula     on poab for recurrent uti         Past Surgical History:   Procedure Laterality Date   • COLONOSCOPY N/A 2/4/2020    Procedure: COLONOSCOPY;  Surgeon: Guy Sears MD;  Location: [facility] ENDOSCOPY;  Service: General;  Laterality: N/A;  PRE-COLOVESICAL FISTULA  POST-- DIVERTICULOSIS   • CYSTOSCOPY  02/06/2020   • CYSTOSCOPY Bilateral 2/26/2020    Procedure: CYSTOSCOPY RETROGRADE;  Surgeon: Cm Witt MD;  Location: [facility] OR;  Service: Urology;  Laterality: Bilateral;   • ENDOSCOPY     • ENDOSCOPY N/A 6/19/2021    Procedure: ESOPHAGOGASTRODUODENOSCOPY WITH BIOPSY;  Surgeon: Mary Brito MD;  Location: [facility] OR;  Service: Gastroenterology;  Laterality: N/A;   • HERNIA REPAIR Right 1999    inguinal hernia   • PROSTATE SURGERY  03/2008    prostatectectomy   • VENOUS ACCESS DEVICE (PORT) INSERTION N/A 7/16/2019    Procedure: INSERTION VENOUS ACCESS DEVICE WITH FLUORO AND EGD WITH BIOPSY;  Surgeon: Mary Brito MD;  Location: [facility] OR;  Service: Thoracic         Social History "     Socioeconomic History   • Marital status: Single     Spouse name: Not on file   • Number of children: Not on file   • Years of education: High school   • Highest education level: Not on file   Tobacco Use   • Smoking status: Current Every Day Smoker     Packs/day: 0.50     Years: 38.00     Pack years: 19.00     Types: Cigarettes     Start date: 1974   • Smokeless tobacco: Never Used   • Tobacco comment: Quit for a period of 8 years. 10-15 per day   Vaping Use   • Vaping Use: Never used   Substance and Sexual Activity   • Alcohol use: Not Currently   • Drug use: Never   • Sexual activity: Defer         Family History   Problem Relation Age of Onset   • Hypertension Mother    • Stroke Mother    • Hypertension Other    • Lung disease Other    • Prostate cancer Other    • Lung cancer Father    • Malig Hyperthermia Neg Hx           Objective    Physical Exam  Constitutional:       Appearance: Normal appearance.   Abdominal:      General: There is distension.   Musculoskeletal:      Cervical back: Normal range of motion.   Neurological:      General: No focal deficit present.      Mental Status: He is alert.   Psychiatric:         Mood and Affect: Mood normal.         Thought Content: Thought content normal.         Judgment: Judgment normal.           Current Outpatient Medications on File Prior to Visit   Medication Sig Dispense Refill   • acetaminophen (TYLENOL) 500 MG tablet Take 500 mg by mouth Every 6 (Six) Hours As Needed for Mild Pain .     • cevimeline (EVOXAC) 30 MG capsule Take 30 mg by mouth 3 (Three) Times a Day.     • cilostazol (PLETAL) 50 MG tablet Take 50 mg by mouth 2 (Two) Times a Day.     • ciprofloxacin (CIPRO) 250 MG tablet Take 1 tablet by mouth Daily. 90 tablet 1   • econazole nitrate (SPECTAZOLE) 1 % cream APPLY TO ALL AFFECTED NAILS AT BEDTIME     • gabapentin (NEURONTIN) 300 MG capsule TAKE 2 CAPSULES BY MOUTH EVERY NIGHT AT BEDTIME (Patient taking differently: Take 300 mg by mouth 2 (two)  times a day.) 60 capsule 4   • lisinopril-hydrochlorothiazide (PRINZIDE,ZESTORETIC) 20-12.5 MG per tablet TAKE 1 TABLET BY MOUTH EVERY DAY 90 tablet 0   • nicotine (NICODERM CQ) 21 MG/24HR patch Place 1 patch on the skin as directed by provider Daily. 28 each 1   • ondansetron (ZOFRAN) 4 MG tablet Take 1 tablet by mouth Every 8 (Eight) Hours As Needed for Nausea or Vomiting. 30 tablet 2   • Probiotic Product (PROBIOTIC DAILY PO) Take 1 tablet by mouth Daily.     • Xarelto 20 MG tablet TAKE 1 TABLET BY MOUTH EVERY DAY**STOP LOVENOX** (Patient taking differently: Take 20 mg by mouth Every Morning.) 90 tablet 1     No current facility-administered medications on file prior to visit.       ALLERGIES:  No Known Allergies    There were no vitals taken for this visit.     Current Status 6/15/2021   ECOG score 0         Assessment/Plan     Han Garcia is a 65 y.o. male with a hx of stage IV esophageal adenocarcinoma with liver mets at diagnosis in 2019 with excellent response to initial chemotherapy, now with local recurrence in the lower esophagus and possibly liver with one hypermetabolic area noted.  After much discussion in our multidisciplinary conference this morning, the consensus recommendation was to proceed with further systemic therapy, possibly enhertu, and evaluate his response.  We can add radiation down the road if he becomes symptomatic. I explained these recommendations to Mr. Garcia and he voiced understanding.   He will see Dr. Haynes back on July 23 to further discuss his systemic options.  I have not scheduled a follow up with me but if he should become symptomatic down the road, he or Dr. Haynes can always contact us and we will get him started asap.          Thank you very much for allowing me to participate in the care of this very pleasant patient.    Sincerely,      Anh Garcia MD

## 2021-07-23 ENCOUNTER — OFFICE VISIT (OUTPATIENT)
Dept: ONCOLOGY | Facility: CLINIC | Age: 66
End: 2021-07-23

## 2021-07-23 ENCOUNTER — OFFICE VISIT (OUTPATIENT)
Dept: OTHER | Facility: HOSPITAL | Age: 66
End: 2021-07-23

## 2021-07-23 ENCOUNTER — APPOINTMENT (OUTPATIENT)
Dept: ONCOLOGY | Facility: HOSPITAL | Age: 66
End: 2021-07-23

## 2021-07-23 ENCOUNTER — LAB (OUTPATIENT)
Dept: LAB | Facility: HOSPITAL | Age: 66
End: 2021-07-23

## 2021-07-23 VITALS
BODY MASS INDEX: 30.6 KG/M2 | SYSTOLIC BLOOD PRESSURE: 113 MMHG | HEART RATE: 99 BPM | RESPIRATION RATE: 16 BRPM | WEIGHT: 206.6 LBS | OXYGEN SATURATION: 98 % | HEIGHT: 69 IN | DIASTOLIC BLOOD PRESSURE: 72 MMHG | TEMPERATURE: 98 F

## 2021-07-23 DIAGNOSIS — I82.431 ACUTE DEEP VEIN THROMBOSIS (DVT) OF RIGHT POPLITEAL VEIN (HCC): ICD-10-CM

## 2021-07-23 DIAGNOSIS — Z72.0 TOBACCO ABUSE: ICD-10-CM

## 2021-07-23 DIAGNOSIS — C78.7 LIVER METASTASIS: ICD-10-CM

## 2021-07-23 DIAGNOSIS — T45.1X5A CHEMOTHERAPY INDUCED NEUTROPENIA (HCC): ICD-10-CM

## 2021-07-23 DIAGNOSIS — Z45.2 ENCOUNTER FOR ADJUSTMENT OR MANAGEMENT OF VASCULAR ACCESS DEVICE: ICD-10-CM

## 2021-07-23 DIAGNOSIS — T45.1X5A PERIPHERAL NEUROPATHY DUE TO CHEMOTHERAPY (HCC): ICD-10-CM

## 2021-07-23 DIAGNOSIS — G62.0 PERIPHERAL NEUROPATHY DUE TO CHEMOTHERAPY (HCC): ICD-10-CM

## 2021-07-23 DIAGNOSIS — E78.2 MIXED HYPERLIPIDEMIA: ICD-10-CM

## 2021-07-23 DIAGNOSIS — I82.412 ACUTE DEEP VEIN THROMBOSIS (DVT) OF FEMORAL VEIN OF LEFT LOWER EXTREMITY (HCC): ICD-10-CM

## 2021-07-23 DIAGNOSIS — L98.8 FISTULA: ICD-10-CM

## 2021-07-23 DIAGNOSIS — N32.1 COLOVESICAL FISTULA: ICD-10-CM

## 2021-07-23 DIAGNOSIS — I10 ESSENTIAL HYPERTENSION: ICD-10-CM

## 2021-07-23 DIAGNOSIS — R73.01 IMPAIRED FASTING GLUCOSE: ICD-10-CM

## 2021-07-23 DIAGNOSIS — D63.8 ANEMIA, CHRONIC DISEASE: ICD-10-CM

## 2021-07-23 DIAGNOSIS — D70.1 CHEMOTHERAPY INDUCED NEUTROPENIA (HCC): ICD-10-CM

## 2021-07-23 DIAGNOSIS — Z79.01 CHRONIC ANTICOAGULATION: ICD-10-CM

## 2021-07-23 DIAGNOSIS — D49.0 ESOPHAGUS NEOPLASM: ICD-10-CM

## 2021-07-23 DIAGNOSIS — Z79.01 CURRENT USE OF LONG TERM ANTICOAGULATION: ICD-10-CM

## 2021-07-23 DIAGNOSIS — C15.5 MALIGNANT NEOPLASM OF LOWER THIRD OF ESOPHAGUS (HCC): ICD-10-CM

## 2021-07-23 DIAGNOSIS — C15.5 MALIGNANT NEOPLASM OF LOWER THIRD OF ESOPHAGUS (HCC): Primary | ICD-10-CM

## 2021-07-23 LAB
ALBUMIN SERPL-MCNC: 3.9 G/DL (ref 3.5–5.2)
ALBUMIN/GLOB SERPL: 1.6 G/DL (ref 1.1–2.4)
ALP SERPL-CCNC: 92 U/L (ref 38–116)
ALT SERPL W P-5'-P-CCNC: 40 U/L (ref 0–41)
ANION GAP SERPL CALCULATED.3IONS-SCNC: 10 MMOL/L (ref 5–15)
AST SERPL-CCNC: 26 U/L (ref 0–40)
BASOPHILS # BLD AUTO: 0.05 10*3/MM3 (ref 0–0.2)
BASOPHILS NFR BLD AUTO: 0.7 % (ref 0–1.5)
BILIRUB SERPL-MCNC: 0.3 MG/DL (ref 0.2–1.2)
BUN SERPL-MCNC: 13 MG/DL (ref 6–20)
BUN/CREAT SERPL: 12.6 (ref 7.3–30)
CALCIUM SPEC-SCNC: 9.1 MG/DL (ref 8.5–10.2)
CEA SERPL-MCNC: 20.4 NG/ML
CHLORIDE SERPL-SCNC: 97 MMOL/L (ref 98–107)
CO2 SERPL-SCNC: 26 MMOL/L (ref 22–29)
CREAT SERPL-MCNC: 1.03 MG/DL (ref 0.7–1.3)
DEPRECATED RDW RBC AUTO: 50.9 FL (ref 37–54)
EOSINOPHIL # BLD AUTO: 0.15 10*3/MM3 (ref 0–0.4)
EOSINOPHIL NFR BLD AUTO: 2 % (ref 0.3–6.2)
ERYTHROCYTE [DISTWIDTH] IN BLOOD BY AUTOMATED COUNT: 15.1 % (ref 12.3–15.4)
GFR SERPL CREATININE-BSD FRML MDRD: 73 ML/MIN/1.73
GLOBULIN UR ELPH-MCNC: 2.4 GM/DL (ref 1.8–3.5)
GLUCOSE SERPL-MCNC: 193 MG/DL (ref 74–124)
HCT VFR BLD AUTO: 32.7 % (ref 37.5–51)
HGB BLD-MCNC: 11 G/DL (ref 13–17.7)
IMM GRANULOCYTES # BLD AUTO: 0.34 10*3/MM3 (ref 0–0.05)
IMM GRANULOCYTES NFR BLD AUTO: 4.4 % (ref 0–0.5)
LYMPHOCYTES # BLD AUTO: 1.46 10*3/MM3 (ref 0.7–3.1)
LYMPHOCYTES NFR BLD AUTO: 19.1 % (ref 19.6–45.3)
MCH RBC QN AUTO: 31.5 PG (ref 26.6–33)
MCHC RBC AUTO-ENTMCNC: 33.6 G/DL (ref 31.5–35.7)
MCV RBC AUTO: 93.7 FL (ref 79–97)
MONOCYTES # BLD AUTO: 0.54 10*3/MM3 (ref 0.1–0.9)
MONOCYTES NFR BLD AUTO: 7.1 % (ref 5–12)
NEUTROPHILS NFR BLD AUTO: 5.11 10*3/MM3 (ref 1.7–7)
NEUTROPHILS NFR BLD AUTO: 66.7 % (ref 42.7–76)
NRBC BLD AUTO-RTO: 0 /100 WBC (ref 0–0.2)
PLATELET # BLD AUTO: 215 10*3/MM3 (ref 140–450)
PMV BLD AUTO: 8.7 FL (ref 6–12)
POTASSIUM SERPL-SCNC: 3.7 MMOL/L (ref 3.5–4.7)
PROT SERPL-MCNC: 6.3 G/DL (ref 6.3–8)
RBC # BLD AUTO: 3.49 10*6/MM3 (ref 4.14–5.8)
SODIUM SERPL-SCNC: 133 MMOL/L (ref 134–145)
TSH SERPL DL<=0.05 MIU/L-ACNC: 1.36 UIU/ML (ref 0.27–4.2)
WBC # BLD AUTO: 7.65 10*3/MM3 (ref 3.4–10.8)

## 2021-07-23 PROCEDURE — 36415 COLL VENOUS BLD VENIPUNCTURE: CPT

## 2021-07-23 PROCEDURE — 85025 COMPLETE CBC W/AUTO DIFF WBC: CPT

## 2021-07-23 PROCEDURE — 82378 CARCINOEMBRYONIC ANTIGEN: CPT | Performed by: INTERNAL MEDICINE

## 2021-07-23 PROCEDURE — 99212-NC PR NO CHARGE CBC OFFICE OUTPATIENT VISIT 10 MINUTES: Performed by: NURSE PRACTITIONER

## 2021-07-23 PROCEDURE — 99214 OFFICE O/P EST MOD 30 MIN: CPT | Performed by: INTERNAL MEDICINE

## 2021-07-23 PROCEDURE — 80053 COMPREHEN METABOLIC PANEL: CPT

## 2021-07-23 PROCEDURE — 84443 ASSAY THYROID STIM HORMONE: CPT | Performed by: INTERNAL MEDICINE

## 2021-07-23 RX ORDER — SODIUM CHLORIDE 9 MG/ML
250 INJECTION, SOLUTION INTRAVENOUS ONCE
Status: CANCELLED | OUTPATIENT
Start: 2021-07-28

## 2021-07-23 NOTE — PROGRESS NOTES
Subjective     REASON FOR follow up:1.    1. 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  Currently receiving palliative 5 fu leucovorin  AND HERCEPTIN therapy once a month    2.COLOVESICAL FISTULA: chronic antibiotic therapy cipro, NO FURTHER PLANS FOR SURGERY AFTER VISIT AND PROCEDURE BY DR GM CASTILLO 1/20    3. 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 NEURONTIN INITIATED.        History of Present Illness       DURING THE VISIT WITH THE PATIENT TODAY , PATIENT HAD FACE MASK, MY MEDICAL ASSISTANT AND I  HAD PROPPER PROTECTIVE EQUIPMENT, AND I DID HAND HYGIENE WITH SOAP AND WATER BEFORE AND AFTER THE VISIT.      This patient returns today to the office for follow up after he has been seen in the multidisciplinary clinic as well by Radiation Oncology in the background of carcinoma of the lower 1/3 of the esophagus with liver metastasis that has started to fail chemotherapy with Herceptin as a single agent. He already has had toxicity from Oxaliplatin when he was taking FOLFOX and this toxicity was a grade 1 neuropathy. In any event the patient has had a new endoscopy and biopsy of the esophagus that documented a tumor that had actually active mutations that are amenable for treatment including the fact that the tumor is PD-L1 positive and has high mutation burden. For this reason Keytruda will be initiated given the fact that he has no contraindications for immunotherapy and he has been advised to stop smoking. Herceptin and 5FU/leucovorin will be discontinued at this point.     Most importantly the patient is not having any pain in the abdomen, no jaundice, no chills, no difficulty swallowing. His appetite is still good, his weight is stable. He had a small urinary tract infection last week that was treated with  "ciprofloxacin by himself for 4 days. The symptoms of this have resolved. He has not had any alterations or new blood clots and he remains on Xarelto with no clinical bleeding. He remains on probiotics.     His neuropathic symptomatology is minimal taking gabapentin at bedtime.                          Past Medical History:   Diagnosis Date   • Arthritis    • Cancer (CMS/HCC)     prostate cancer 2008   • Cancer (CMS/HCC)     esophageal   • Chronic anticoagulation     on xarelto   • Elevated PSA    • Esophageal mass    • Fistula     colon and bladder   • H/O Lung nodule    • Hepatitis     CHILD--PT STATED \"I THINK IT WAS A.\"   • History of chemotherapy    • History of pneumonia    • Hx of blood clots 08/10/2019   • Hyperlipidemia    • Hypertension    • Nail fungus    • Neuropathy    • PVD (peripheral vascular disease) (CMS/HCC)    • Recto-bladderneck fistula     on poab for recurrent uti        Past Surgical History:   Procedure Laterality Date   • COLONOSCOPY N/A 2/4/2020    Procedure: COLONOSCOPY;  Surgeon: Guy Sears MD;  Location: [facility] ENDOSCOPY;  Service: General;  Laterality: N/A;  PRE-COLOVESICAL FISTULA  POST-- DIVERTICULOSIS   • CYSTOSCOPY  02/06/2020   • CYSTOSCOPY Bilateral 2/26/2020    Procedure: CYSTOSCOPY RETROGRADE;  Surgeon: Cm Witt MD;  Location: [facility] OR;  Service: Urology;  Laterality: Bilateral;   • ENDOSCOPY     • ENDOSCOPY N/A 6/19/2021    Procedure: ESOPHAGOGASTRODUODENOSCOPY WITH BIOPSY;  Surgeon: Mary Brito MD;  Location: [facility] OR;  Service: Gastroenterology;  Laterality: N/A;   • HERNIA REPAIR Right 1999    inguinal hernia   • PROSTATE SURGERY  03/2008    prostatectectomy   • VENOUS ACCESS DEVICE (PORT) INSERTION N/A 7/16/2019    Procedure: INSERTION VENOUS ACCESS DEVICE WITH FLUORO AND EGD WITH BIOPSY;  Surgeon: Mary Brito MD;  Location: [facility] OR;  Service: Thoracic        Current Outpatient Medications on File Prior to Visit "   Medication Sig Dispense Refill   • acetaminophen (TYLENOL) 500 MG tablet Take 500 mg by mouth Every 6 (Six) Hours As Needed for Mild Pain .     • cevimeline (EVOXAC) 30 MG capsule Take 30 mg by mouth 3 (Three) Times a Day.     • cilostazol (PLETAL) 50 MG tablet Take 50 mg by mouth 2 (Two) Times a Day.     • ciprofloxacin (CIPRO) 250 MG tablet Take 1 tablet by mouth Daily. 90 tablet 1   • econazole nitrate (SPECTAZOLE) 1 % cream APPLY TO ALL AFFECTED NAILS AT BEDTIME     • gabapentin (NEURONTIN) 300 MG capsule TAKE 2 CAPSULES BY MOUTH EVERY NIGHT AT BEDTIME (Patient taking differently: Take 300 mg by mouth 2 (two) times a day.) 60 capsule 4   • lisinopril-hydrochlorothiazide (PRINZIDE,ZESTORETIC) 20-12.5 MG per tablet TAKE 1 TABLET BY MOUTH EVERY DAY 90 tablet 0   • nicotine (NICODERM CQ) 21 MG/24HR patch Place 1 patch on the skin as directed by provider Daily. 28 each 1   • ondansetron (ZOFRAN) 4 MG tablet Take 1 tablet by mouth Every 8 (Eight) Hours As Needed for Nausea or Vomiting. 30 tablet 2   • Probiotic Product (PROBIOTIC DAILY PO) Take 1 tablet by mouth Daily.     • Xarelto 20 MG tablet TAKE 1 TABLET BY MOUTH EVERY DAY**STOP LOVENOX** (Patient taking differently: Take 20 mg by mouth Every Morning.) 90 tablet 1     No current facility-administered medications on file prior to visit.        ALLERGIES:  No Known Allergies     Social History     Socioeconomic History   • Marital status: Single     Spouse name: Not on file   • Number of children: Not on file   • Years of education: High school   • Highest education level: Not on file   Tobacco Use   • Smoking status: Current Every Day Smoker     Packs/day: 0.50     Years: 38.00     Pack years: 19.00     Types: Cigarettes     Start date: 1974   • Smokeless tobacco: Never Used   • Tobacco comment: Quit for a period of 8 years. 10-15 per day   Vaping Use   • Vaping Use: Never used   Substance and Sexual Activity   • Alcohol use: Not Currently   • Drug use: Never  "  • Sexual activity: Defer        Family History   Problem Relation Age of Onset   • Hypertension Mother    • Stroke Mother    • Hypertension Other    • Lung disease Other    • Prostate cancer Other    • Lung cancer Father    • Malig Hyperthermia Neg Hx       Current Outpatient Medications on File Prior to Visit   Medication Sig Dispense Refill   • acetaminophen (TYLENOL) 500 MG tablet Take 500 mg by mouth Every 6 (Six) Hours As Needed for Mild Pain .     • cevimeline (EVOXAC) 30 MG capsule Take 30 mg by mouth 3 (Three) Times a Day.     • cilostazol (PLETAL) 50 MG tablet Take 50 mg by mouth 2 (Two) Times a Day.     • ciprofloxacin (CIPRO) 250 MG tablet Take 1 tablet by mouth Daily. 90 tablet 1   • econazole nitrate (SPECTAZOLE) 1 % cream APPLY TO ALL AFFECTED NAILS AT BEDTIME     • gabapentin (NEURONTIN) 300 MG capsule TAKE 2 CAPSULES BY MOUTH EVERY NIGHT AT BEDTIME (Patient taking differently: Take 300 mg by mouth 2 (two) times a day.) 60 capsule 4   • lisinopril-hydrochlorothiazide (PRINZIDE,ZESTORETIC) 20-12.5 MG per tablet TAKE 1 TABLET BY MOUTH EVERY DAY 90 tablet 0   • nicotine (NICODERM CQ) 21 MG/24HR patch Place 1 patch on the skin as directed by provider Daily. 28 each 1   • ondansetron (ZOFRAN) 4 MG tablet Take 1 tablet by mouth Every 8 (Eight) Hours As Needed for Nausea or Vomiting. 30 tablet 2   • Probiotic Product (PROBIOTIC DAILY PO) Take 1 tablet by mouth Daily.     • Xarelto 20 MG tablet TAKE 1 TABLET BY MOUTH EVERY DAY**STOP LOVENOX** (Patient taking differently: Take 20 mg by mouth Every Morning.) 90 tablet 1     No current facility-administered medications on file prior to visit.   No Known Allergies         Objective     Vitals:    07/23/21 1247   BP: 113/72   Pulse: 99   Resp: 16   Temp: 98 °F (36.7 °C)   TempSrc: Skin   SpO2: 98%   Weight: 93.7 kg (206 lb 9.6 oz)   Height: 175.3 cm (69.02\")   PainSc: 0-No pain     Current Status 7/23/2021   ECOG score 0           Physical exam        I HAVE " PERSONALLY REVIEWED THE HISTORY OF THE PRESENT ILLNESS, PAST MEDICAL HISTORY, FAMILY HISTORY, SOCIAL HISTORY, ALLERGIES, MEDICATIONS STATED ABOVE IN THE OFFICE NOTE FROM TODAY.        GENERAL:  Well-developed, well-nourished  Patient  in no acute distress.   SKIN:  Warm, dry ,NO rashes,NO purpura ,NO petechiae.  HEENT:  Pupils were equal and reactive to light and accomodation, conjunctivae noninjected, no pterygium, normal extraocular movements, normal visual acuity.   NECK:  Supple with good range of motion; no thyromegaly , no other masses, no JVD or bruits, no cervical adenopathies.No carotid artery pain, no carotid abnormal pulsation , NO arterial dance.  LYMPHATICS:  No cervical, NO supraclavicular, NO axillary,NO epitrochlear , NO inguinal adenopathy.  CARDIAC   normal rate and regular rhythm, without murmur,NO rubs NO S3 NO S4 right or left .  LUNGS: normal breath sounds bilateral, no wheezing, rhonchi, crackles or rubs.  VASCULAR VENOUS: no cyanosis, collateral circulation, varicosities, edema, palpable cords, pain, erythema.  ABDOMEN:  Soft, nontender with no hepatomegaly, no splenomegaly,no masses, no ascites, no collateral circulation,no distention,no [place] sign.  EXTREMITIES  AND SPINE:  No clubbing, cyanosis or edema, no deformities , no pain .No kyphosis, scoliosis, no other deformities, no pain in spine, no pain in ribs , no pain inpelvic bone.  NEUROLOGICAL:  Patient was awake, alert, oriented to time, person and place.            RECENT LABS:  Hematology WBC   Date Value Ref Range Status   07/23/2021 7.65 3.40 - 10.80 10*3/mm3 Final   02/02/2019 13.4 (H) 3.4 - 10.8 x10E3/uL Final     RBC   Date Value Ref Range Status   07/23/2021 3.49 (L) 4.14 - 5.80 10*6/mm3 Final   02/02/2019 4.81 4.14 - 5.80 x10E6/uL Final     Hemoglobin   Date Value Ref Range Status   07/23/2021 11.0 (L) 13.0 - 17.7 g/dL Final     Hematocrit   Date Value Ref Range Status   07/23/2021 32.7 (L) 37.5 - 51.0 % Final     Platelets    Date Value Ref Range Status   07/23/2021 215 140 - 450 10*3/mm3 Final         Tissue Pathology Exam [[id number]] (Order [id number])  Order  Date: 6/19/2021 Department: [facility] MAIN OR Released By: Melodie Barrientos RN Authorizing: Mary Brito MD   Reprint Order Requisition    Tissue Pathology Exam (Order #[id number]) on 6/19/21       Tissue Pathology Exam: [id number]  Order: [id number]  Status:  Edited Result - FINAL   Visible to patient:  Yes (MyChart)   Next appt:  Today at 02:00 PM in Multidisciplinary (Anh Felder, NIRU)   Dx:  Malignant neoplasm of lower third of ...  Specimen Information: A: GE Junction; Tissue    B: Esophagus; Tissue    C: Esophagus; Tissue         0 Result Notes  Component    Addendum 2   Please see the completely scanned [employer] report from [employer] below.    Addendum electronically signed by Taty Munoz MD on 7/12/2021 at 0813   Addendum   A request for [employer]' MI Profile was received on 6-28-21 for patient Han Garcia from Dr. Haynes.  The test is to be performed on tissue from case [id number].  The case report, slides, and blocks for the cited accession were retrieved from archives.  The pathologist whose signature appears below reviewed the original pathology report, examined candidate H&E slides, and selected the block 1A and appropriate to the specifications of the ordered molecular analysis.   An addendum report will be issued when the results of this molecular test are available.     CPT Code: 78174      Addendum electronically signed by Abdi Hernandez MD on 6/28/2021 at 1117   Case Report   Surgical Pathology Report                         Case: [id number]                                   Authorizing Provider:  Mary Brito MD        Collected:           06/19/2021 11:01 AM           Ordering Location:     [facility]  Received:            06/21/2021 05:32 AM                                   MAIN OR                                                                       Pathologist:           Taty Munoz MD                                                           Specimens:   1) - GE Junction, ge junction biopsy at 38 cm                                                        2) - Esophagus, biopsy at 40cm                                                                       3) - Esophagus, biopsy at 34cm                                                             Final Diagnosis   1. Gastroesophageal Junction, 38 cm, Biopsy:                A. SQUAMOCOLUMNAR MUCOSA WITH AT LEAST HIGH-GRADE DYSPLASIA AND FOCI SUSPICIOUS                    FOR INVASIVE ADENOCARCINOMA (SEE COMMENT).     2. Esophagus, 40 cm, Biopsy:                A. COLUMNAR MUCOSA WITH A RARE MINUTE FRAGMENT OF DETACHED        FRAGMENT OF HIGH-GRADE DYSPLASTIC EPIHTELIUM AND ULCERATION       (SEE COMMENT).     3. Esophagus, 34 cm, Biopsy:               A. SQUAMOCOLUMNAR MUCOSA WITH AT LEAST HIGH-GRADE DYSPLASIA AND FOCI SUSPICIOUS                    FOR INVASIVE ADENOCARCINOMA (SEE COMMENT).     pilar/pkm    Electronically signed by Taty Munoz MD on 6/23/2021 at 1644   Comment    All three biopsies have a similar appearances which show predominantly superficial fragments of high grade dysplasia with rare foci suspicious for invasive adenocarcinoma. There is background Gaston's esophagus.     This case was compared to the patient's prior biopsy ([id number]) and the two specimens have similar cytologic appearances, however, this case lacks the deep infiltrative invasive pattern seen in the prior biopsy.     Part 3 of this case was shared in internal consultation with Dr. Hernandez, who concurs.      germanb/pkm    Gross Description    1.The specimen is received in formalin labeled with the patient's name and further designated 'g e junction at 38 cm biopsy' are multiple small fragments of gray-tan tissue. The specimen is submitted  for embedding as received.     2.The specimen is received in formalin labeled with the patient's name and further designated 'esophagus at 40 cm biopsy' are multiple small fragments of gray-tan tissue. The specimen is submitted for embedding as received.     3.The specimen is received in formalin labeled with the patient's name and further designated 'esophagus at 34 cm biopsy' are multiple small fragments of gray-tan tissue. The specimen is submitted for embedding as received.      Microscopic Description    Performed, incorporated in diagnosis.   Resulting Agency [facility]         Specimen Collected: 06/19/21 11:01   Last Resulted: 07/12/21 08:13       Order Details     View Encounter     Lab and Collection Details     Routing     Result History           Scans on Order [id number]    Scan on 7/12/2021 0736 by Emmie Welsh: [employer] [facility] [employer]      Document on 7/12/2021 0813 by Taty Munoz MD          Routing History    Priority Sent On From To Message Type    6/23/2021  4:44 PM Lab, Background User Mary Brito MD Results    6/23/2021  4:44 PM Lab, Background User Jaiden Hoover MD Results   All Reviewers List    Mary Brito MD on 7/12/2021 10:01   Jaiden Hoover MD on 7/12/2021 08:46   Lab Component SmartPhrase Guide    Tissue Pathology Exam (Order #[id number]) on 6/19/21           Assessment/Plan    1.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 Almost 90% of the liver WAS replaced by tumor. The patient has been undergoing chemotherapy with 5  fu and leucovorin  and Herceptin and has had excellent response clinically and radiologically. The patient was reviewed on 08/10/2020. I reviewed with the patient in the PAC system [facility] his PET scan that is dramatic. There is minimal uptake in the lower esophagus and most importantly complete resolution of his liver metastasis. Actually the only issue that is pertinent to the  PET scan is still the visibility of his colovesical fistula.     Therefore from the point of view of his cancer of the esophagus, metastatic to the liver, he has no symptoms from the primary tumor in the esophagus and he has no symptoms related to his liver metastasis that has resolved altogether. His CEA level is stable.The patient raised the question about the CEA fluctuation. I pointed out to him that a lot of this is also related to his smoking. Smoking per se elevates CEA level.  Upon further reviewing the patient on 04/20/2021, he has no symptoms pertinent to his metastatic cancer of the esophagus to the liver and he has no difficulty swallowing. There are no side effects of the 5-FU, Leucovorin and Herceptin. The patient's cardiac function remains stable and he has not had any drop in the ejection fraction.   I discussed with him on 05/18/2021 the fact that his cancer clinically is very quiet but I am afraid of the rise in his CEA level to 12. This could be an indicator of all of the cigarettes that he is smoking on a daily basis of indicator of cancer escaping control and not visible radiologically through his CT scan. I pointed out to him that he needs to continue making an effort to decrease his smoking and he is now down to 10 cigarettes a day. We will recheck another CEA level today. Given the circumstances of the previous CT scan I do not believe that I need to change the doses or plan of care in regard to his chemotherapy medication administration for the time being. I recommended for him to remain on his 5FU/leucovorin and Herceptin on a monthly basis for now.  The patient was further reviewed on 06/15/2021 with a new PET scan that documented regrowth of the tumor in the lower esophagus without any new symptomatology, for example dysphagia or odynophagia. No regurgitation. The liver metastasis remains in remission and there are no new areas of disease in the bones or lungs or any other site. Given the  excellent performance status I discussed with the patient and his sister today many options of therapy, including going back to FOLFOX regimen along with Herceptin, taking another sample of the esophagus with a new endoscopy and doing analysis of the tissue for [employer] that will be my favorite choice, or palliative treatment with radiation therapy and 5-FU continuous infusion that will be toxic to him and he does not prefer to have.     I had a discussion with Mary Brito MD, thoracic surgeon, who has agreed to see the patient tomorrow. I think the endoscopy, the new tissue analysis, and sending the tissue for [employer] will be the way to go. Depending on what we find there, we can modify his treatment altogether. I do not think the patient will have any consequences missing chemotherapy for a couple of weeks or so.      In preparation for the endoscopy and biopsies I asked him to hold off on the Xarelto until he is seen by Dr. Brito tomorrow.     In regard to his colovesical fistula, he has had minimal symptomatology this week, maybe some activity there and I asked him to go back to the lab and take a urine specimen and culture. He knows that if this is abnormal he will need to initiate ciprofloxacin that he has at home.    In regard to his hypertension, this is under good control and I advised him to remain on his lisinopril and hydrochlorothiazide combination.    In regard to his smoking cessation, he has had conversations with [facility] Jacobson about this. He is using some nicotine CQ patch, here and there and also trying to work with nicotine gum and things of this nature but he has not been able to shake this issue altogether.     Otherwise I will review him back in a couple of weeks with a CBC, CMP, and a CEA level.    This case will be presented in the multidisciplinary thoracic conference by me this Thursday, and I will discuss any further advice to the patient.     I also mentioned to  the patient that on the background of HER2-positive cancer of the esophagus the possibility of using a new medicine for HER2-based disease that is called Enhertu is a real possibility and maybe that will be the next step to go through.    The patient was further reviewed on 07/01/2021. I reviewed with Dr. Brito the endoscopy that shows a noncircumferential abnormality in the lower esophagus that encompasses almost 6 cm in length. It is not blocking off the esophagus and that is why the patient has no symptoms. The pathology shows at least atypical cells consistent with cancer. Further Next Generation Sequencing has been sent for [employer].     I discussed with the patient the fact that we have many other modalities of treatment that he could encounter. He prefers to continue his general chemotherapy to control the cancer in his liver using 5-FU, leucovorin and Herceptin today and agree with that. In consideration to be seen by radiation therapy, the patient is willing to listen to the possibility of undergoing continuous 5-FU infusion along with radiation therapy to the esophagus knowing that he will experiencing esophagitis that can give him significant issues for 2-3 weeks. I went ahead and made the appointment for him to be seen by radiation oncology for this purpose only.     Obviously if the Next Generation Sequencing gives us any other hints in regard to how to treat him this could be also utilized including the consideration of using Enhertu in the long run for achieving better control.     In regard to his smoking cessation he is not willing to change this phenomenon at this time. We have had [facility]U Jacobson talking with him in this regard but he is not ready to make a decision when to quit.     Finally, I pointed out to him that during the previous visit we documented a urinary tract infection with streptococcus 50,000 colonies that in my appreciation was significant given the fact that he has  a colovesical fistula. This was treated with antibiotics. He has not had any discomfort issues pertinent to this anymore. The urine today is completely clear. Nevertheless, I went ahead and sent a urinalysis at least to be sure that there is no need for any other repetitive infection therapy on him.     The patient also will remain on his anticoagulation at this time, his Xarelto for the time being. I have renewed as well his Neurontin. He has no need for pain medicine.  The patient was further reviewed on 07/23/2021. He has no new symptomatology pertinent to his cancer of the esophagus with liver metastasis. He has no difficulty swallowing. He has been presented in the multidisciplinary clinic on a couple of occasions and he has been seen by Radiation Oncology. Finally the analysis of [employer] is available now showing that the patient's tumor is PD-L1 positive and has high mutation burden. The tumor remains HER/2 positive. Given these findings I think it is very easy to make a choice in regard stopping the present regimen of chemotherapy and proceed with therapy with Keytruda every 3 weeks for at least 4 cycles and reassess him at that point. In preparation for Keytruda initiation next week and we will get the approval of the medicine by his insurance company the patient will require smoking cessation altogether to minimize modification of cigarettes on his immune system. I insisted in this fact to the patient.     Other than that he will remain on probiotics. We will discontinue the 5FU/leucovorin and Herceptin and will move onto Keytruda. I discussed with him side effects of the Keytruda in detail as below. He will require treatment every 3 weeks with CBC, CMP, TSH every 3 weeks and he will require formal chemotherapy teaching, education and consent for this medicine.     After 4 cycles of this, in other words 3 months he will be reassessed with a new PET scan.     The chances under the present  circumstances that he will improve as long as he quit smoking, it is very hard and maybe he could have long term survivorship.    I begged for him to have smoking cessation. If any time during his time with me we have been talking about this and this is absolutely necessary now. We know that cigarettes kill immune system cells and minimize the benefit of treatments with these medicines.     I discussed this in detail with the patient. I provided him a copy of the report of his [employer], I educated him about the significance of this and I asked him to discuss this with his sister. He will be seen by me again in 1 month. He will come for his infusion next week.     I DISCUSSED WITH PATIENT SIDE EFFECTS OF IMMUNOTHERAPY MEDICATIONS INCLUDING EYE INFLAMMATION, BRAIN EDEMA AND INFLAMMATION, PARALYSIS, NEUROPATHY,MUCOSITIS, ENDOCRINOPATHY MOST COMMONLY THYROID DYSFUNCTION IN THE FORM OF HYPOTHYROIDISM, DYSFUNCTION OF THE ADRENAL GLANDS IN THE FORM OF ADRENAL INSUFFICIENCY, DYSFUNCTION OF THE HYPOPHYSIS GLAND IN THE FORM OF HYPOPITUITARISM, PNEUMONITIS, CARDITIS, HEPATITIS, COLITIS, PANCREATITIS, ARTHRITIS, RASH AMONG OTHERS.    THIS PATIENT DOES NOT HAVE ANY FORM OF AUTOIMMUNE DISEASE LIKE SYSTEMIC LUPUS ERYTHEMATOSUS, RHEUMATOID ARTHRITIS, SCLERODERMA, SJOGREN'S SYNDROME, MULTIPLE SCLEROSIS, PSORIASIS, WEGENER'S GRANULOMATOSIS, GLOMERULONEPHRITIS OF AUTOIMMUNE DISORDERS TO CONTRAINDICATE THE USE OF THESE MEDICATIONS.

## 2021-07-23 NOTE — PROGRESS NOTES
[facility] MULTIDISCIPLINARY CLINIC  SMOKING CESSATION TELEHEALTH FOLLOW-UP VISIT     Han Garcia is a pleasant 65 y.o. male seen today in multidisciplinary clinic for smoking cessation counseling follow up             HPI:  Patient was seen by Dr Haynes just prior to our appointment. Reviewed his plan of care is now every three weeks Keytruda to begin next Thursday. Patient continues to smoke cigarettes daily. Currently smoking about 20 cigarettes a day. Discussed with Dr Haynes at length importance of smoking cessation to derive maximum benefit from treatment regimen.     QUIT PLAN (STAR):  Set quit date: I've asked him to go ahead and set a tentative date, either for total cessation or a plan for gradual reduction and we will discuss next time.     Tell friends/family: His sister is been a support and accountability ashley. He has a neighbor whom he enjoys speaking with.      Anticipate challenges: Biggest challenge today is some worry around how well he will tolerate upcoming treatment..      Remove tobacco from environment: His car continues to be a smoke-free environment and he has utilize car rides as a distraction in the past     Medication plan: Currently is not using nicotine patch or any other quit smoking medications but he does have nicotine patches available when he is ready for another attempt. We reviewed today that this would still be a viable option for him when he is ready to resume his efforts, rather than attempt a cold turkey quit.     Practical counseling: We will continue cessation coaching and supportive counseling in the survivorship clinic. We reviewed availability of 1-800-QUIT-NOW.  Declines web referral today. I did provide him with the five step quit plan work sheet and encouraged him to start jotting down ideas for his quit and place in a prominent place in his house. We will plan to review at next appointment     Follow-up: Next week immediately following chemo  teaching visit on 7/28 in clinic.  I have encouraged them to call me with any questions or as needed in the interm.

## 2021-07-28 ENCOUNTER — OFFICE VISIT (OUTPATIENT)
Dept: OTHER | Facility: HOSPITAL | Age: 66
End: 2021-07-28

## 2021-07-28 ENCOUNTER — APPOINTMENT (OUTPATIENT)
Dept: LAB | Facility: HOSPITAL | Age: 66
End: 2021-07-28

## 2021-07-28 ENCOUNTER — OFFICE VISIT (OUTPATIENT)
Dept: ONCOLOGY | Facility: CLINIC | Age: 66
End: 2021-07-28

## 2021-07-28 VITALS
SYSTOLIC BLOOD PRESSURE: 101 MMHG | BODY MASS INDEX: 30.1 KG/M2 | OXYGEN SATURATION: 98 % | TEMPERATURE: 97.3 F | DIASTOLIC BLOOD PRESSURE: 64 MMHG | WEIGHT: 203.2 LBS | HEIGHT: 69 IN | HEART RATE: 102 BPM | RESPIRATION RATE: 16 BRPM

## 2021-07-28 DIAGNOSIS — C15.5 MALIGNANT NEOPLASM OF LOWER THIRD OF ESOPHAGUS (HCC): Primary | ICD-10-CM

## 2021-07-28 DIAGNOSIS — C78.7 LIVER METASTASIS: ICD-10-CM

## 2021-07-28 DIAGNOSIS — Z72.0 TOBACCO ABUSE: Primary | ICD-10-CM

## 2021-07-28 PROCEDURE — 99407 BEHAV CHNG SMOKING > 10 MIN: CPT | Performed by: NURSE PRACTITIONER

## 2021-07-28 PROCEDURE — 99214 OFFICE O/P EST MOD 30 MIN: CPT | Performed by: NURSE PRACTITIONER

## 2021-07-28 NOTE — PROGRESS NOTES
Subjective     PATIENT NAME:  Han Garcia  YOB: 1955  PATIENTS AGE:  65 y.o.  PATIENTS SEX:  male  DATE OF SERVICE:  07/28/2021  PROVIDER:  NIRU Okeefe      ____________________PATIENT EDUCATION____________________    PATIENT EDUCATION:  Today I met with the patient to discuss the chemotherapy regimen recommended for treatment of his metastatic esophageal cancer.  The patient was given explanation of treatment premed side effects including office policy that prohibits patients to drive if sedating medications are administered, MD explanation given regarding benefits, side effects, toxicities and goals of treatment.  The patient received a Chemotherapy/Biotherapy Plan Summary including diagnosis and specific treatment plan.    SIDE EFFECTS:  Common side effects were discussed with the patient.  Discussion included hair loss/discoloration, anemia/fatigue, infection/chills/fever, appetite, bleeding risk/precautions, constipation, diarrhea,taste alteration, loss of appetite,nausea/vomiting, skin/nail changes, rash, muscle aches/weakness, liver damage, lung damage, kidney damage, DVT/PE risk, fluid retention, pleural/pericardial effusion, somnolence, electrolyte/LFT imbalance, vein exercises and/or the possible need for vascular access/port placement.  The patient was advice that although uncommon, leakage of an infused medication from the vein or venous access device (port) may lead to skin breakdown and/or other tissue damage.  The patient was advised that he/she may have pain, bleeding, and/or bruising from the insertion of a needle in their vein or venous access device (port).  The patient was further advised that, in spite of proper technique, infection with redness and irritation may rarely occur at the site where the needle was inserted.  The patient was advised that if complications occur, additional medical treatment is available.    Discussion also included side effects  specific to drugs in the treatment plan, specifically Keytruda.    We have reviewed rare, but potential immune mediated side effects including shortness of breath, cough, chest pain (pneumonitis), abdominal pain, diarrhea (colitis), thyroiditis (hypothyroid or hyperthyroid), hepatitis and liver dysfunction, nephritis and renal dysfunction.    PHYSICAL EXAM:  The patient is alert and oriented no acute distress, breathing is unlabored.  He has a benign Mediport in his right chest wall without significant lower extremity swelling    I spent 33 minutes caring for Han on this date of service. This time includes time spent by me in the following activities: preparing for the visit, reviewing tests, obtaining and/or reviewing a separately obtained history, counseling and educating the patient/family/caregiver, documenting information in the medical record and care coordination.     Dana Paniagua, APRN   07/28/2021

## 2021-07-28 NOTE — PROGRESS NOTES
[facility] MULTIDISCIPLINARY CLINIC  SMOKING CESSATION FOLLOW-UP VISIT     Han Garcia is a pleasant 65 y.o. male seen today in multidisciplinary clinic for smoking cessation counseling follow up              HPI:  Patient was seen by CBC NP just prior to our appointment for drug therapy teaching appt. Reviewed his plan of care is now every three weeks Keytruda to begin tomorrow. Patient continues to smoke cigarettes daily. Currently smoking about 20 cigarettes a day. He has set a quit date for Monday 8/2/21       QUIT PLAN (STAR):  Set quit date: 8/2/21     Tell friends/family: His sister is been a support and accountability buddy. He has a neighbor whom he enjoys speaking with.      Anticipate challenges: Biggest challenge today is some worry around how well he will tolerate upcoming treatment..      Remove tobacco from environment: We discussed getting rid of all cigarettes from the house on Sunday in preparation. His car continues to be a smoke-free environment and he has utilize car rides as a distraction in the past      Medication plan: Planning to use 21mg nicotine patch. He is planning to reduce the amount of cigarettes over the next few days from 20 down to 10 or less. He thinks he will smoke his last cigarette Monday morning. I've asked him to place the patch as soon as he wakes up that morning, as he will not experience any therapeutic benefit for at least an hour after application. I've advised that is he needs to smoke one cigarette after that is fine, however we did review today my preference is for nicotine replacement (gum or lozenge).     Practical counseling: We will continue cessation coaching and supportive counseling in the survivorship clinic.      Follow-up: By phone on target quit date 8/2/21.  I have encouraged them to call me with any questions or as needed in the interm.    I did introduce concept of survivorship/treatment summary visit so that he can have his records  together in once place and to discuss ongoing concerns around long term effects of treatment, illness and coping. I told he we don't need to schedule that today but we can discuss further at our next appointment.    ADDEND: A total of 20 minutes was spent engaged in one to one discussion face to face of cessation of tobacco, setting a quit date, pharmacotherapy, side effects, behavioral counseling.

## 2021-07-28 NOTE — PROGRESS NOTES
[facility] Hematology/Oncology Treatment Plan Summary    Name: Han Garcia  Acct# [id number]  MD: Dr. Haynes    Diagnosis:     ICD-10-CM ICD-9-CM   1. Malignant neoplasm of lower third of esophagus (CMS/HCC)  C15.5 150.5   2. Liver metastasis (CMS/HCC)  C78.7 197.7     Stage: IV      Goal of chemotherapy: palliative    Treatment Medication(s):   1. Keytruda    Frequency: every 3 weeks    Number of cycles: 4    Starting on: 7/29/2021     Repeat after 4 cycles: CT Scan and PET Scan    Items for home use: Thermometer    Completing Provider: NIRU Okeefe           Date/time: 07/28/2021      Please note: You will be seen by a provider frequently with your treatment plan. This plan may change depending on many factors, if so, this will be discussed with you by your physician.  Last update 12/2020.

## 2021-07-29 ENCOUNTER — INFUSION (OUTPATIENT)
Dept: ONCOLOGY | Facility: HOSPITAL | Age: 66
End: 2021-07-29

## 2021-07-29 VITALS
DIASTOLIC BLOOD PRESSURE: 60 MMHG | HEART RATE: 103 BPM | BODY MASS INDEX: 29.96 KG/M2 | SYSTOLIC BLOOD PRESSURE: 97 MMHG | WEIGHT: 203 LBS | OXYGEN SATURATION: 97 % | TEMPERATURE: 96.9 F

## 2021-07-29 DIAGNOSIS — C15.5 MALIGNANT NEOPLASM OF LOWER THIRD OF ESOPHAGUS (HCC): Primary | ICD-10-CM

## 2021-07-29 DIAGNOSIS — C78.7 LIVER METASTASIS: ICD-10-CM

## 2021-07-29 DIAGNOSIS — D49.0 ESOPHAGUS NEOPLASM: ICD-10-CM

## 2021-07-29 LAB
ALBUMIN SERPL-MCNC: 3.9 G/DL (ref 3.5–5.2)
ALBUMIN/GLOB SERPL: 1.6 G/DL (ref 1.1–2.4)
ALP SERPL-CCNC: 81 U/L (ref 38–116)
ALT SERPL W P-5'-P-CCNC: 29 U/L (ref 0–41)
ANION GAP SERPL CALCULATED.3IONS-SCNC: 11.5 MMOL/L (ref 5–15)
AST SERPL-CCNC: 21 U/L (ref 0–40)
BASOPHILS # BLD AUTO: 0.08 10*3/MM3 (ref 0–0.2)
BASOPHILS NFR BLD AUTO: 0.7 % (ref 0–1.5)
BILIRUB SERPL-MCNC: 0.3 MG/DL (ref 0.2–1.2)
BUN SERPL-MCNC: 23 MG/DL (ref 6–20)
BUN/CREAT SERPL: 24.2 (ref 7.3–30)
CALCIUM SPEC-SCNC: 8.9 MG/DL (ref 8.5–10.2)
CHLORIDE SERPL-SCNC: 98 MMOL/L (ref 98–107)
CO2 SERPL-SCNC: 23.5 MMOL/L (ref 22–29)
CREAT SERPL-MCNC: 0.95 MG/DL (ref 0.7–1.3)
DEPRECATED RDW RBC AUTO: 52.8 FL (ref 37–54)
EOSINOPHIL # BLD AUTO: 0.25 10*3/MM3 (ref 0–0.4)
EOSINOPHIL NFR BLD AUTO: 2.2 % (ref 0.3–6.2)
ERYTHROCYTE [DISTWIDTH] IN BLOOD BY AUTOMATED COUNT: 15.3 % (ref 12.3–15.4)
GFR SERPL CREATININE-BSD FRML MDRD: 80 ML/MIN/1.73
GLOBULIN UR ELPH-MCNC: 2.4 GM/DL (ref 1.8–3.5)
GLUCOSE SERPL-MCNC: 116 MG/DL (ref 74–124)
HCT VFR BLD AUTO: 27.6 % (ref 37.5–51)
HGB BLD-MCNC: 9.5 G/DL (ref 13–17.7)
IMM GRANULOCYTES # BLD AUTO: 0.59 10*3/MM3 (ref 0–0.05)
IMM GRANULOCYTES NFR BLD AUTO: 5.2 % (ref 0–0.5)
LYMPHOCYTES # BLD AUTO: 1.95 10*3/MM3 (ref 0.7–3.1)
LYMPHOCYTES NFR BLD AUTO: 17.2 % (ref 19.6–45.3)
MCH RBC QN AUTO: 32.4 PG (ref 26.6–33)
MCHC RBC AUTO-ENTMCNC: 34.4 G/DL (ref 31.5–35.7)
MCV RBC AUTO: 94.2 FL (ref 79–97)
MONOCYTES # BLD AUTO: 0.81 10*3/MM3 (ref 0.1–0.9)
MONOCYTES NFR BLD AUTO: 7.1 % (ref 5–12)
NEUTROPHILS NFR BLD AUTO: 67.6 % (ref 42.7–76)
NEUTROPHILS NFR BLD AUTO: 7.66 10*3/MM3 (ref 1.7–7)
NRBC BLD AUTO-RTO: 0 /100 WBC (ref 0–0.2)
PLATELET # BLD AUTO: 184 10*3/MM3 (ref 140–450)
PMV BLD AUTO: 8.7 FL (ref 6–12)
POTASSIUM SERPL-SCNC: 4.2 MMOL/L (ref 3.5–4.7)
PROT SERPL-MCNC: 6.3 G/DL (ref 6.3–8)
RBC # BLD AUTO: 2.93 10*6/MM3 (ref 4.14–5.8)
SODIUM SERPL-SCNC: 133 MMOL/L (ref 134–145)
T4 FREE SERPL-MCNC: 1.14 NG/DL (ref 0.93–1.7)
TSH SERPL DL<=0.05 MIU/L-ACNC: 2.06 UIU/ML (ref 0.27–4.2)
WBC # BLD AUTO: 11.34 10*3/MM3 (ref 3.4–10.8)

## 2021-07-29 PROCEDURE — 80053 COMPREHEN METABOLIC PANEL: CPT

## 2021-07-29 PROCEDURE — 84443 ASSAY THYROID STIM HORMONE: CPT | Performed by: INTERNAL MEDICINE

## 2021-07-29 PROCEDURE — 85025 COMPLETE CBC W/AUTO DIFF WBC: CPT

## 2021-07-29 PROCEDURE — 96413 CHEMO IV INFUSION 1 HR: CPT

## 2021-07-29 PROCEDURE — 84439 ASSAY OF FREE THYROXINE: CPT | Performed by: INTERNAL MEDICINE

## 2021-07-29 PROCEDURE — 25010000002 PEMBROLIZUMAB 100 MG/4ML SOLUTION 4 ML VIAL: Performed by: INTERNAL MEDICINE

## 2021-07-29 RX ORDER — SODIUM CHLORIDE 9 MG/ML
250 INJECTION, SOLUTION INTRAVENOUS ONCE
Status: COMPLETED | OUTPATIENT
Start: 2021-07-29 | End: 2021-07-29

## 2021-07-29 RX ADMIN — SODIUM CHLORIDE 200 MG: 9 INJECTION, SOLUTION INTRAVENOUS at 10:08

## 2021-07-29 RX ADMIN — SODIUM CHLORIDE 250 ML: 9 INJECTION, SOLUTION INTRAVENOUS at 10:07

## 2021-07-29 NOTE — PROGRESS NOTES
"Chief Complaint  Esophageal cancer  Subjective          Han Garcia presents to [facility] MULTI-DISCIPLINARY CLINIC in follow-up.  History of Present Illness  Mr. 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  He has been undergoing treatment and recently and presented with hypermetabolism of the distal esophagus requiring EGD with biopsy.  He presents today to discuss the biopsy results.  Objective   Vital Signs:   /62   Pulse 109   Temp 98 °F (36.7 °C) (Temporal)   Resp 18   Ht 175.3 cm (69.02\")   Wt 93 kg (205 lb 1.6 oz)   SpO2 96% Comment: room air  BMI 30.27 kg/m²     Physical Exam  Vitals and nursing note reviewed.   Constitutional:       Appearance: He is well-developed.   HENT:      Head: Normocephalic and atraumatic.      Nose: Nose normal.   Eyes:      Conjunctiva/sclera: Conjunctivae normal.   Pulmonary:      Effort: Pulmonary effort is normal.   Musculoskeletal:         General: Normal range of motion.      Cervical back: Normal range of motion and neck supple.   Skin:     General: Skin is warm and dry.   Neurological:      Mental Status: He is alert and oriented to person, place, and time.   Psychiatric:         Behavior: Behavior normal.         Thought Content: Thought content normal.         Judgment: Judgment normal.        Result Review :     Biopsies with squamocolumnar mucosa with at least high-grade dysplasia and foci suspicious for invasive adenocarcinoma            Assessment and Plan      Mr. Garcia is a pleasant 65-year-old gentleman with an esophageal adenocarcinoma recurrence at the GE junction.  He will plan to follow-up with Dr. Haynes to discuss further treatment options.  I will also plan to refer him to radiation oncology for consideration of radiation to his GE junction where his recurrences.  He will plan to follow-up with me on an as-needed " basis.  Diagnoses and all orders for this visit:    1. Malignant neoplasm of lower third of esophagus (CMS/HCC) (Primary)  -     Ambulatory Referral to Radiation Oncology         I spent approximately 45 minutes caring for the patient today, reviewing his images, pathology results, discussing his case.    Follow Up   No follow-ups on file.  Patient was given instructions and counseling regarding his condition or for health maintenance advice. Please see specific information pulled into the AVS if appropriate.

## 2021-08-02 ENCOUNTER — TELEPHONE (OUTPATIENT)
Dept: ONCOLOGY | Facility: CLINIC | Age: 66
End: 2021-08-02

## 2021-08-02 ENCOUNTER — TELEPHONE (OUTPATIENT)
Dept: OTHER | Facility: HOSPITAL | Age: 66
End: 2021-08-02

## 2021-08-02 ENCOUNTER — OFFICE VISIT (OUTPATIENT)
Dept: ONCOLOGY | Facility: CLINIC | Age: 66
End: 2021-08-02

## 2021-08-02 ENCOUNTER — HOSPITAL ENCOUNTER (INPATIENT)
Facility: HOSPITAL | Age: 66
LOS: 5 days | Discharge: HOME | End: 2021-08-07
Attending: INTERNAL MEDICINE | Admitting: INTERNAL MEDICINE

## 2021-08-02 ENCOUNTER — INFUSION (OUTPATIENT)
Dept: ONCOLOGY | Facility: HOSPITAL | Age: 66
End: 2021-08-02

## 2021-08-02 VITALS
HEART RATE: 102 BPM | DIASTOLIC BLOOD PRESSURE: 74 MMHG | BODY MASS INDEX: 30.42 KG/M2 | WEIGHT: 205.4 LBS | RESPIRATION RATE: 16 BRPM | OXYGEN SATURATION: 99 % | TEMPERATURE: 98.2 F | HEIGHT: 69 IN | SYSTOLIC BLOOD PRESSURE: 129 MMHG

## 2021-08-02 DIAGNOSIS — G62.0 PERIPHERAL NEUROPATHY DUE TO CHEMOTHERAPY (HCC): ICD-10-CM

## 2021-08-02 DIAGNOSIS — Z45.2 ENCOUNTER FOR ADJUSTMENT OR MANAGEMENT OF VASCULAR ACCESS DEVICE: Primary | ICD-10-CM

## 2021-08-02 DIAGNOSIS — K92.2 ACUTE GI BLEEDING: Primary | ICD-10-CM

## 2021-08-02 DIAGNOSIS — I82.431 ACUTE DEEP VEIN THROMBOSIS (DVT) OF RIGHT POPLITEAL VEIN (HCC): ICD-10-CM

## 2021-08-02 DIAGNOSIS — T45.1X5A PERIPHERAL NEUROPATHY DUE TO CHEMOTHERAPY (HCC): ICD-10-CM

## 2021-08-02 DIAGNOSIS — I10 ESSENTIAL HYPERTENSION: ICD-10-CM

## 2021-08-02 DIAGNOSIS — K92.1 GASTROINTESTINAL HEMORRHAGE WITH MELENA: Primary | ICD-10-CM

## 2021-08-02 DIAGNOSIS — C78.7 LIVER METASTASIS: ICD-10-CM

## 2021-08-02 DIAGNOSIS — C15.5 MALIGNANT NEOPLASM OF LOWER THIRD OF ESOPHAGUS (HCC): ICD-10-CM

## 2021-08-02 DIAGNOSIS — Z79.01 CHRONIC ANTICOAGULATION: ICD-10-CM

## 2021-08-02 DIAGNOSIS — Z72.0 TOBACCO ABUSE: ICD-10-CM

## 2021-08-02 DIAGNOSIS — D62 ANEMIA DUE TO ACUTE BLOOD LOSS: ICD-10-CM

## 2021-08-02 LAB
ABO GROUP BLD: NORMAL
ALBUMIN SERPL-MCNC: 3.5 G/DL (ref 3.5–5.2)
ALBUMIN/GLOB SERPL: 1.8 G/DL (ref 1.1–2.4)
ALP SERPL-CCNC: 73 U/L (ref 38–116)
ALT SERPL W P-5'-P-CCNC: 25 U/L (ref 0–41)
ANION GAP SERPL CALCULATED.3IONS-SCNC: 12.6 MMOL/L (ref 5–15)
AST SERPL-CCNC: 21 U/L (ref 0–40)
BASOPHILS # BLD AUTO: 0.06 10*3/MM3 (ref 0–0.2)
BASOPHILS NFR BLD AUTO: 0.4 % (ref 0–1.5)
BILIRUB SERPL-MCNC: 0.2 MG/DL (ref 0.2–1.2)
BLD GP AB SCN SERPL QL: NEGATIVE
BUN SERPL-MCNC: 14 MG/DL (ref 6–20)
BUN/CREAT SERPL: 18.2 (ref 7.3–30)
CALCIUM SPEC-SCNC: 8.1 MG/DL (ref 8.5–10.2)
CHLORIDE SERPL-SCNC: 98 MMOL/L (ref 98–107)
CO2 SERPL-SCNC: 21.4 MMOL/L (ref 22–29)
CREAT SERPL-MCNC: 0.77 MG/DL (ref 0.7–1.3)
DEPRECATED RDW RBC AUTO: 56.2 FL (ref 37–54)
EOSINOPHIL # BLD AUTO: 0.27 10*3/MM3 (ref 0–0.4)
EOSINOPHIL NFR BLD AUTO: 1.9 % (ref 0.3–6.2)
ERYTHROCYTE [DISTWIDTH] IN BLOOD BY AUTOMATED COUNT: 16.5 % (ref 12.3–15.4)
FERRITIN SERPL-MCNC: 36.3 NG/ML (ref 30–400)
GFR SERPL CREATININE-BSD FRML MDRD: 102 ML/MIN/1.73
GLOBULIN UR ELPH-MCNC: 2 GM/DL (ref 1.8–3.5)
GLUCOSE SERPL-MCNC: 116 MG/DL (ref 74–124)
HCT VFR BLD AUTO: 16.8 % (ref 37.5–51)
HGB BLD-MCNC: 5.7 G/DL (ref 13–17.7)
IMM GRANULOCYTES # BLD AUTO: 1.02 10*3/MM3 (ref 0–0.05)
IMM GRANULOCYTES NFR BLD AUTO: 7.2 % (ref 0–0.5)
IRON 24H UR-MRATE: 41 MCG/DL (ref 59–158)
IRON SATN MFR SERPL: 18 % (ref 20–50)
LYMPHOCYTES # BLD AUTO: 1.71 10*3/MM3 (ref 0.7–3.1)
LYMPHOCYTES NFR BLD AUTO: 12.1 % (ref 19.6–45.3)
MCH RBC QN AUTO: 33.5 PG (ref 26.6–33)
MCHC RBC AUTO-ENTMCNC: 33.9 G/DL (ref 31.5–35.7)
MCV RBC AUTO: 98.8 FL (ref 79–97)
MONOCYTES # BLD AUTO: 0.82 10*3/MM3 (ref 0.1–0.9)
MONOCYTES NFR BLD AUTO: 5.8 % (ref 5–12)
NEUTROPHILS NFR BLD AUTO: 10.31 10*3/MM3 (ref 1.7–7)
NEUTROPHILS NFR BLD AUTO: 72.6 % (ref 42.7–76)
NRBC BLD AUTO-RTO: 0.5 /100 WBC (ref 0–0.2)
PLATELET # BLD AUTO: 203 10*3/MM3 (ref 140–450)
PMV BLD AUTO: 9.1 FL (ref 6–12)
POTASSIUM SERPL-SCNC: 3.5 MMOL/L (ref 3.5–4.7)
PROT SERPL-MCNC: 5.5 G/DL (ref 6.3–8)
RBC # BLD AUTO: 1.7 10*6/MM3 (ref 4.14–5.8)
RH BLD: POSITIVE
SARS-COV-2 ORF1AB RESP QL NAA+PROBE: NOT DETECTED
SODIUM SERPL-SCNC: 132 MMOL/L (ref 134–145)
T&S EXPIRATION DATE: NORMAL
TIBC SERPL-MCNC: 231 MCG/DL (ref 298–536)
TRANSFERRIN SERPL-MCNC: 155 MG/DL (ref 200–360)
WBC # BLD AUTO: 14.19 10*3/MM3 (ref 3.4–10.8)

## 2021-08-02 PROCEDURE — 36591 DRAW BLOOD OFF VENOUS DEVICE: CPT

## 2021-08-02 PROCEDURE — 86900 BLOOD TYPING SEROLOGIC ABO: CPT | Performed by: INTERNAL MEDICINE

## 2021-08-02 PROCEDURE — 83540 ASSAY OF IRON: CPT | Performed by: INTERNAL MEDICINE

## 2021-08-02 PROCEDURE — 86900 BLOOD TYPING SEROLOGIC ABO: CPT

## 2021-08-02 PROCEDURE — 86901 BLOOD TYPING SEROLOGIC RH(D): CPT | Performed by: INTERNAL MEDICINE

## 2021-08-02 PROCEDURE — 84466 ASSAY OF TRANSFERRIN: CPT | Performed by: INTERNAL MEDICINE

## 2021-08-02 PROCEDURE — 36430 TRANSFUSION BLD/BLD COMPNT: CPT

## 2021-08-02 PROCEDURE — U0004 COV-19 TEST NON-CDC HGH THRU: HCPCS | Performed by: INTERNAL MEDICINE

## 2021-08-02 PROCEDURE — 86923 COMPATIBILITY TEST ELECTRIC: CPT

## 2021-08-02 PROCEDURE — 25010000002 HEPARIN LOCK FLUSH PER 10 UNITS: Performed by: INTERNAL MEDICINE

## 2021-08-02 PROCEDURE — 86850 RBC ANTIBODY SCREEN: CPT | Performed by: INTERNAL MEDICINE

## 2021-08-02 PROCEDURE — 80053 COMPREHEN METABOLIC PANEL: CPT

## 2021-08-02 PROCEDURE — 99254 IP/OBS CNSLTJ NEW/EST MOD 60: CPT | Performed by: INTERNAL MEDICINE

## 2021-08-02 PROCEDURE — 82728 ASSAY OF FERRITIN: CPT | Performed by: INTERNAL MEDICINE

## 2021-08-02 PROCEDURE — P9016 RBC LEUKOCYTES REDUCED: HCPCS

## 2021-08-02 PROCEDURE — 86920 COMPATIBILITY TEST SPIN: CPT

## 2021-08-02 PROCEDURE — 99215 OFFICE O/P EST HI 40 MIN: CPT | Performed by: NURSE PRACTITIONER

## 2021-08-02 PROCEDURE — 85025 COMPLETE CBC W/AUTO DIFF WBC: CPT

## 2021-08-02 RX ORDER — SODIUM CHLORIDE 0.9 % (FLUSH) 0.9 %
10 SYRINGE (ML) INJECTION AS NEEDED
Status: CANCELLED | OUTPATIENT
Start: 2021-08-02

## 2021-08-02 RX ORDER — UREA 10 %
3 LOTION (ML) TOPICAL NIGHTLY PRN
Status: DISCONTINUED | OUTPATIENT
Start: 2021-08-02 | End: 2021-08-07 | Stop reason: HOSPADM

## 2021-08-02 RX ORDER — ACETAMINOPHEN 650 MG/1
650 SUPPOSITORY RECTAL ONCE
Status: COMPLETED | OUTPATIENT
Start: 2021-08-02 | End: 2021-08-02

## 2021-08-02 RX ORDER — SODIUM CHLORIDE 0.9 % (FLUSH) 0.9 %
10 SYRINGE (ML) INJECTION AS NEEDED
Status: DISCONTINUED | OUTPATIENT
Start: 2021-08-02 | End: 2021-08-02 | Stop reason: HOSPADM

## 2021-08-02 RX ORDER — ACETAMINOPHEN 325 MG/1
650 TABLET ORAL ONCE
Status: COMPLETED | OUTPATIENT
Start: 2021-08-02 | End: 2021-08-02

## 2021-08-02 RX ORDER — NITROGLYCERIN 0.4 MG/1
0.4 TABLET SUBLINGUAL
Status: DISCONTINUED | OUTPATIENT
Start: 2021-08-02 | End: 2021-08-07 | Stop reason: HOSPADM

## 2021-08-02 RX ORDER — HEPARIN SODIUM (PORCINE) LOCK FLUSH IV SOLN 100 UNIT/ML 100 UNIT/ML
500 SOLUTION INTRAVENOUS AS NEEDED
Status: DISCONTINUED | OUTPATIENT
Start: 2021-08-02 | End: 2021-08-02 | Stop reason: HOSPADM

## 2021-08-02 RX ORDER — GABAPENTIN 300 MG/1
300 CAPSULE ORAL EVERY 12 HOURS SCHEDULED
Status: DISCONTINUED | OUTPATIENT
Start: 2021-08-02 | End: 2021-08-07 | Stop reason: HOSPADM

## 2021-08-02 RX ORDER — ONDANSETRON 4 MG/1
4 TABLET, FILM COATED ORAL EVERY 6 HOURS PRN
Status: DISCONTINUED | OUTPATIENT
Start: 2021-08-02 | End: 2021-08-07 | Stop reason: HOSPADM

## 2021-08-02 RX ORDER — HEPARIN SODIUM (PORCINE) LOCK FLUSH IV SOLN 100 UNIT/ML 100 UNIT/ML
500 SOLUTION INTRAVENOUS AS NEEDED
Status: CANCELLED | OUTPATIENT
Start: 2021-08-02

## 2021-08-02 RX ORDER — SODIUM CHLORIDE 9 MG/ML
75 INJECTION, SOLUTION INTRAVENOUS CONTINUOUS
Status: DISCONTINUED | OUTPATIENT
Start: 2021-08-02 | End: 2021-08-06

## 2021-08-02 RX ORDER — ACETAMINOPHEN 325 MG/1
650 TABLET ORAL EVERY 4 HOURS PRN
Status: DISCONTINUED | OUTPATIENT
Start: 2021-08-02 | End: 2021-08-07 | Stop reason: HOSPADM

## 2021-08-02 RX ORDER — ONDANSETRON 2 MG/ML
4 INJECTION INTRAMUSCULAR; INTRAVENOUS EVERY 6 HOURS PRN
Status: DISCONTINUED | OUTPATIENT
Start: 2021-08-02 | End: 2021-08-07 | Stop reason: HOSPADM

## 2021-08-02 RX ORDER — L.ACID,PARA/B.BIFIDUM/S.THERM 8B CELL
1 CAPSULE ORAL DAILY
Status: DISCONTINUED | OUTPATIENT
Start: 2021-08-02 | End: 2021-08-07 | Stop reason: HOSPADM

## 2021-08-02 RX ORDER — ACETAMINOPHEN 160 MG/5ML
650 SOLUTION ORAL ONCE
Status: COMPLETED | OUTPATIENT
Start: 2021-08-02 | End: 2021-08-02

## 2021-08-02 RX ADMIN — Medication 1 CAPSULE: at 23:59

## 2021-08-02 RX ADMIN — ACETAMINOPHEN 650 MG: 325 TABLET, FILM COATED ORAL at 20:08

## 2021-08-02 RX ADMIN — PANTOPRAZOLE SODIUM 8 MG/HR: 40 INJECTION, POWDER, FOR SOLUTION INTRAVENOUS at 23:59

## 2021-08-02 RX ADMIN — SODIUM CHLORIDE, PRESERVATIVE FREE 10 ML: 5 INJECTION INTRAVENOUS at 14:48

## 2021-08-02 RX ADMIN — GABAPENTIN 300 MG: 300 CAPSULE ORAL at 20:09

## 2021-08-02 RX ADMIN — Medication 500 UNITS: at 14:48

## 2021-08-02 NOTE — H&P
"HISTORY AND PHYSICAL   [facility]        Patient Identification:  Name: Han Garcia  Age: 65 y.o.  Sex: male  :  1955  MRN: [id number]                     Primary Care Physician: Jaiden Hoover MD    Chief Complaint:  65 year old gentleman who was seen by oncology today; he has been feeling tired and weak for the last two days and had had dark tarry stools; he takes xarelto because of a history of blood clots; he is being treated for stage 4 esophageal cancer; no fever ro chills    History of Present Illness:   As above    Past Medical History:  Past Medical History:   Diagnosis Date   • Arthritis    • Cancer (CMS/HCC)     prostate cancer    • Cancer (CMS/HCC)     esophageal   • Chronic anticoagulation     on xarelto   • Elevated PSA    • Esophageal mass    • Fistula     colon and bladder   • H/O Lung nodule    • Hepatitis     CHILD--PT STATED \"I THINK IT WAS A.\"   • History of chemotherapy    • History of pneumonia    • Hx of blood clots 08/10/2019   • Hyperlipidemia    • Hypertension    • Nail fungus    • Neuropathy    • PVD (peripheral vascular disease) (CMS/HCC)    • Recto-bladderneck fistula     on poab for recurrent uti     Past Surgical History:  Past Surgical History:   Procedure Laterality Date   • COLONOSCOPY N/A 2020    Procedure: COLONOSCOPY;  Surgeon: Guy Sears MD;  Location: [facility] ENDOSCOPY;  Service: General;  Laterality: N/A;  PRE-COLOVESICAL FISTULA  POST-- DIVERTICULOSIS   • CYSTOSCOPY  2020   • CYSTOSCOPY Bilateral 2020    Procedure: CYSTOSCOPY RETROGRADE;  Surgeon: Cm Wtit MD;  Location: [facility] OR;  Service: Urology;  Laterality: Bilateral;   • ENDOSCOPY     • ENDOSCOPY N/A 2021    Procedure: ESOPHAGOGASTRODUODENOSCOPY WITH BIOPSY;  Surgeon: Mary Brito MD;  Location: [facility] OR;  Service: Gastroenterology;  Laterality: N/A;   • HERNIA REPAIR Right 1999    inguinal hernia   • PROSTATE SURGERY  2008 "    prostatectectomy   • VENOUS ACCESS DEVICE (PORT) INSERTION N/A 7/16/2019    Procedure: INSERTION VENOUS ACCESS DEVICE WITH FLUORO AND EGD WITH BIOPSY;  Surgeon: Mary rBito MD;  Location: [facility];  Service: Thoracic      Home Meds:  Medications Prior to Admission   Medication Sig Dispense Refill Last Dose   • acetaminophen (TYLENOL) 500 MG tablet Take 500 mg by mouth Every 6 (Six) Hours As Needed for Mild Pain .      • cevimeline (EVOXAC) 30 MG capsule Take 30 mg by mouth 3 (Three) Times a Day.      • cilostazol (PLETAL) 50 MG tablet Take 50 mg by mouth 2 (Two) Times a Day.      • ciprofloxacin (CIPRO) 250 MG tablet Take 1 tablet by mouth Daily. 90 tablet 1    • econazole nitrate (SPECTAZOLE) 1 % cream APPLY TO ALL AFFECTED NAILS AT BEDTIME      • gabapentin (NEURONTIN) 300 MG capsule TAKE 2 CAPSULES BY MOUTH EVERY NIGHT AT BEDTIME (Patient taking differently: Take 300 mg by mouth 2 (two) times a day.) 60 capsule 4    • lisinopril-hydrochlorothiazide (PRINZIDE,ZESTORETIC) 20-12.5 MG per tablet TAKE 1 TABLET BY MOUTH EVERY DAY 90 tablet 0    • nicotine (NICODERM CQ) 21 MG/24HR patch Place 1 patch on the skin as directed by provider Daily. 28 each 1    • ondansetron (ZOFRAN) 4 MG tablet Take 1 tablet by mouth Every 8 (Eight) Hours As Needed for Nausea or Vomiting. 30 tablet 2    • Probiotic Product (PROBIOTIC DAILY PO) Take 1 tablet by mouth Daily.      • Xarelto 20 MG tablet TAKE 1 TABLET BY MOUTH EVERY DAY**STOP LOVENOX** (Patient taking differently: Take 20 mg by mouth Every Morning.) 90 tablet 1        Allergies:  No Known Allergies  Immunizations:  Immunization History   Administered Date(s) Administered   • COVID-19 (MODERNA) 03/09/2021, 03/09/2021, 04/06/2021   • Flu Vaccine Intradermal Quad 18-64YR 10/23/2019   • Flucelvax Quad Vial =>4yrs 10/23/2019   • Flulaval/Fluarix/Fluzone Quad 10/20/2020   • Fluzone High Dose =>65 Years ([employer] ONLY) 10/23/2019   • Influenza, Unspecified 10/20/2020   •  Pneumococcal Polysaccharide (PPSV23) 2014   • Pneumococcal, Unspecified 2014   • Tdap 2016     Social History:   Social History     Social History Narrative   • Not on file     Social History     Socioeconomic History   • Marital status: Single     Spouse name: Not on file   • Number of children: Not on file   • Years of education: High school   • Highest education level: Not on file   Tobacco Use   • Smoking status: Current Every Day Smoker     Packs/day: 0.50     Years: 38.00     Pack years: 19.00     Types: Cigarettes     Start date:    • Smokeless tobacco: Never Used   • Tobacco comment: PLANNED QUIT DATE 21; Quit for a period of 8 years. 10-15 per day   Vaping Use   • Vaping Use: Never used   Substance and Sexual Activity   • Alcohol use: Not Currently   • Drug use: Never   • Sexual activity: Defer       Family History:  Family History   Problem Relation Age of Onset   • Hypertension Mother    • Stroke Mother    • Hypertension Other    • Lung disease Other    • Prostate cancer Other    • Lung cancer Father    • Malig Hyperthermia Neg Hx         Review of Systems  See history of present illness and past medical history.  Patient denies headache, dizziness, syncope, falls, trauma, change in vision, change in hearing, change in taste, changes in weight, changes in appetite, focal weakness, numbness, or paresthesia.  Patient denies chest pain, palpitations, dyspnea, orthopnea, PND, cough, sinus pressure, rhinorrhea, epistaxis, hemoptysis, nausea, vomiting,hematemesis, diarrhea, constipation or hematchezia.  Denies cold or heat intolerance, polydipsia, polyuria, polyphagia. Denies hematuria, pyuria, dysuria, hesitancy, frequency or urgency. Denies consumption of raw and under cooked meats foods or change in water source.  Denies fever, chills, sweats, night sweats.  Denies missing any routine medications. Remainder of ROS is negative.    Objective:  T Max 24 hrs: Temp (24hrs), Av.9 °F  "(36.6 °C), Min:97.6 °F (36.4 °C), Max:98.2 °F (36.8 °C)    Vitals Ranges:   Temp:  [97.6 °F (36.4 °C)-98.2 °F (36.8 °C)] 97.6 °F (36.4 °C)  Heart Rate:  [102] 102  Resp:  [16] 16  BP: (129-142)/(65-74) 142/65      Exam:  /65 (BP Location: Left arm, Patient Position: Sitting)   Pulse 102   Temp 97.6 °F (36.4 °C) (Oral)   Resp 16   Ht 175.3 cm (69\")   Wt 93 kg (205 lb)   SpO2 100%   BMI 30.27 kg/m²     General Appearance:    Alert, cooperative, no distress, appears stated age   Head:    Normocephalic, without obvious abnormality, atraumatic   Eyes:    PERRL, conjunctivae/corneas clear, EOM's intact, both eyes   Ears:    Normal external ear canals, both ears   Nose:   Nares normal, septum midline, mucosa normal, no drainage    or sinus tenderness   Throat:   Lips, mucosa, and tongue normal   Neck:   Supple, symmetrical, trachea midline, no adenopathy;     thyroid:  no enlargement/tenderness/nodules; no carotid    bruit or JVD   Back:     Symmetric, no curvature, ROM normal, no CVA tenderness   Lungs:     Decreased breath sounds bilaterally, respirations unlabored   Chest Wall:    No tenderness or deformity    Heart:    Regular rate and rhythm, S1 and S2 normal, no murmur, rub   or gallop   Abdomen:     Soft, nontender, bowel sounds active all four quadrants,     no masses, no hepatomegaly, no splenomegaly   Extremities:   Extremities normal, atraumatic, no cyanosis or edema   Pulses:   2+ and symmetric all extremities   Skin:   Skin color, texture, turgor normal, no rashes or lesions   Lymph nodes:   Cervical, supraclavicular, and axillary nodes normal   Neurologic:   CNII-XII intact, normal strength, sensation intact throughout      .    Data Review:  Labs in chart were reviewed.  WBC   Date Value Ref Range Status   08/02/2021 14.19 (H) 3.40 - 10.80 10*3/mm3 Final     Hemoglobin   Date Value Ref Range Status   08/02/2021 5.7 (C) 13.0 - 17.7 g/dL Final     Hematocrit   Date Value Ref Range Status "   08/02/2021 16.8 (L) 37.5 - 51.0 % Final     Platelets   Date Value Ref Range Status   08/02/2021 203 140 - 450 10*3/mm3 Final     Sodium   Date Value Ref Range Status   08/02/2021 132 (L) 134 - 145 mmol/L Final     Potassium   Date Value Ref Range Status   08/02/2021 3.5 3.5 - 4.7 mmol/L Final     Chloride   Date Value Ref Range Status   08/02/2021 98 98 - 107 mmol/L Final     CO2   Date Value Ref Range Status   08/02/2021 21.4 (L) 22.0 - 29.0 mmol/L Final     BUN   Date Value Ref Range Status   08/02/2021 14 6 - 20 mg/dL Final     Creatinine   Date Value Ref Range Status   08/02/2021 0.77 0.70 - 1.30 mg/dL Final     Glucose   Date Value Ref Range Status   08/02/2021 116 74 - 124 mg/dL Final     Calcium   Date Value Ref Range Status   08/02/2021 8.1 (L) 8.5 - 10.2 mg/dL Final     AST (SGOT)   Date Value Ref Range Status   08/02/2021 21 0 - 40 U/L Final     ALT (SGPT)   Date Value Ref Range Status   08/02/2021 25 0 - 41 U/L Final     Alkaline Phosphatase   Date Value Ref Range Status   08/02/2021 73 38 - 116 U/L Final       Results from last 7 days   Lab Units 07/29/21  0834   TSH uIU/mL 2.060   FREE T4 ng/dL 1.14          Imaging Results (All)     None        Patient Active Problem List   Diagnosis Code   • Essential hypertension I10   • Hyperlipidemia E78.5   • Impaired fasting glucose R73.01   • History of prostate cancer Z85.46   • Tobacco abuse Z72.0   • Liver metastasis (CMS/HCC) C78.7   • Esophagus neoplasm D49.0   • Malignant neoplasm of lower third of esophagus (CMS/HCC) C15.5   • Encounter for adjustment or management of vascular access device Z45.2   • Acute deep vein thrombosis (DVT) of femoral vein of left lower extremity (CMS/HCC) I82.412   • Acute deep vein thrombosis (DVT) of right popliteal vein (CMS/HCC) I82.431   • Chemotherapy induced neutropenia (CMS/HCC) D70.1, T45.1X5A   • Anemia, chronic disease D63.8   • 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

## 2021-08-02 NOTE — PROGRESS NOTES
"  Subjective     REASON FOR FOLLOW UP:    1.  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  2. Colovesical fistula, chronic antibiotic therapy with Cipro.  3.  DVT of the right and left lower extremity, anticoagulated with Xarelto.  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  Acute GI bleed      History of Present Illness    The patient is a 65 y.o. male with the above-mentioned street, who presents the office today for triage visit regarding lightheadedness and dizziness, black stool.  He received his first dose of palliative Keytruda 7/29/2021.  He reports his stool became dark on Thursday of last week.  He currently continues on Xarelto 20 mg daily.  He reports his stool is black in color.  He has some lower upset stomach.  He does have shortness of breath on exertion and recovers with rest.  He also has lightheadedness and dizziness.  He has a chronic cough which is unchanged from his baseline.    Past Medical History:   Diagnosis Date   • Arthritis    • Cancer (CMS/HCC)     prostate cancer 2008   • Cancer (CMS/HCC)     esophageal   • Chronic anticoagulation     on xarelto   • Elevated PSA    • Esophageal mass    • Fistula     colon and bladder   • H/O Lung nodule    • Hepatitis     CHILD--PT STATED \"I THINK IT WAS A.\"   • History of chemotherapy    • History of pneumonia    • Hx of blood clots 08/10/2019   • Hyperlipidemia    • Hypertension    • Nail fungus    • Neuropathy    • PVD (peripheral vascular disease) (CMS/HCC)    • Recto-bladderneck fistula     on poab for recurrent uti        Past Surgical History:   Procedure Laterality Date   • COLONOSCOPY N/A 2/4/2020    Procedure: COLONOSCOPY;  Surgeon: Guy Sears MD;  Location: [facility] ENDOSCOPY;  Service: General;  Laterality: N/A;  PRE-COLOVESICAL FISTULA  POST-- DIVERTICULOSIS   • CYSTOSCOPY  02/06/2020   • CYSTOSCOPY Bilateral 2/26/2020    Procedure: CYSTOSCOPY RETROGRADE;  Surgeon: Eduar" Cm MITCHELL MD;  Location: [facility];  Service: Urology;  Laterality: Bilateral;   • ENDOSCOPY     • ENDOSCOPY N/A 6/19/2021    Procedure: ESOPHAGOGASTRODUODENOSCOPY WITH BIOPSY;  Surgeon: Mary Brito MD;  Location: [facility] OR;  Service: Gastroenterology;  Laterality: N/A;   • HERNIA REPAIR Right 1999    inguinal hernia   • PROSTATE SURGERY  03/2008    prostatectectomy   • VENOUS ACCESS DEVICE (PORT) INSERTION N/A 7/16/2019    Procedure: INSERTION VENOUS ACCESS DEVICE WITH FLUORO AND EGD WITH BIOPSY;  Surgeon: Mary Brito MD;  Location: [facility];  Service: Thoracic        Current Outpatient Medications on File Prior to Visit   Medication Sig Dispense Refill   • acetaminophen (TYLENOL) 500 MG tablet Take 500 mg by mouth Every 6 (Six) Hours As Needed for Mild Pain .     • cevimeline (EVOXAC) 30 MG capsule Take 30 mg by mouth 3 (Three) Times a Day.     • cilostazol (PLETAL) 50 MG tablet Take 50 mg by mouth 2 (Two) Times a Day.     • ciprofloxacin (CIPRO) 250 MG tablet Take 1 tablet by mouth Daily. 90 tablet 1   • econazole nitrate (SPECTAZOLE) 1 % cream APPLY TO ALL AFFECTED NAILS AT BEDTIME     • gabapentin (NEURONTIN) 300 MG capsule TAKE 2 CAPSULES BY MOUTH EVERY NIGHT AT BEDTIME (Patient taking differently: Take 300 mg by mouth 2 (two) times a day.) 60 capsule 4   • lisinopril-hydrochlorothiazide (PRINZIDE,ZESTORETIC) 20-12.5 MG per tablet TAKE 1 TABLET BY MOUTH EVERY DAY 90 tablet 0   • nicotine (NICODERM CQ) 21 MG/24HR patch Place 1 patch on the skin as directed by provider Daily. 28 each 1   • ondansetron (ZOFRAN) 4 MG tablet Take 1 tablet by mouth Every 8 (Eight) Hours As Needed for Nausea or Vomiting. 30 tablet 2   • Probiotic Product (PROBIOTIC DAILY PO) Take 1 tablet by mouth Daily.     • Xarelto 20 MG tablet TAKE 1 TABLET BY MOUTH EVERY DAY**STOP LOVENOX** (Patient taking differently: Take 20 mg by mouth Every Morning.) 90 tablet 1     No current facility-administered medications  on file prior to visit.        ALLERGIES:  No Known Allergies     Social History     Socioeconomic History   • Marital status: Single     Spouse name: Not on file   • Number of children: Not on file   • Years of education: High school   • Highest education level: Not on file   Tobacco Use   • Smoking status: Current Every Day Smoker     Packs/day: 0.50     Years: 38.00     Pack years: 19.00     Types: Cigarettes     Start date: 1974   • Smokeless tobacco: Never Used   • Tobacco comment: PLANNED QUIT DATE 8/2/21; Quit for a period of 8 years. 10-15 per day   Vaping Use   • Vaping Use: Never used   Substance and Sexual Activity   • Alcohol use: Not Currently   • Drug use: Never   • Sexual activity: Defer        Family History   Problem Relation Age of Onset   • Hypertension Mother    • Stroke Mother    • Hypertension Other    • Lung disease Other    • Prostate cancer Other    • Lung cancer Father    • Malig Hyperthermia Neg Hx       Current Outpatient Medications on File Prior to Visit   Medication Sig Dispense Refill   • acetaminophen (TYLENOL) 500 MG tablet Take 500 mg by mouth Every 6 (Six) Hours As Needed for Mild Pain .     • cevimeline (EVOXAC) 30 MG capsule Take 30 mg by mouth 3 (Three) Times a Day.     • cilostazol (PLETAL) 50 MG tablet Take 50 mg by mouth 2 (Two) Times a Day.     • ciprofloxacin (CIPRO) 250 MG tablet Take 1 tablet by mouth Daily. 90 tablet 1   • econazole nitrate (SPECTAZOLE) 1 % cream APPLY TO ALL AFFECTED NAILS AT BEDTIME     • gabapentin (NEURONTIN) 300 MG capsule TAKE 2 CAPSULES BY MOUTH EVERY NIGHT AT BEDTIME (Patient taking differently: Take 300 mg by mouth 2 (two) times a day.) 60 capsule 4   • lisinopril-hydrochlorothiazide (PRINZIDE,ZESTORETIC) 20-12.5 MG per tablet TAKE 1 TABLET BY MOUTH EVERY DAY 90 tablet 0   • nicotine (NICODERM CQ) 21 MG/24HR patch Place 1 patch on the skin as directed by provider Daily. 28 each 1   • ondansetron (ZOFRAN) 4 MG tablet Take 1 tablet by mouth Every  "8 (Eight) Hours As Needed for Nausea or Vomiting. 30 tablet 2   • Probiotic Product (PROBIOTIC DAILY PO) Take 1 tablet by mouth Daily.     • Xarelto 20 MG tablet TAKE 1 TABLET BY MOUTH EVERY DAY**STOP LOVENOX** (Patient taking differently: Take 20 mg by mouth Every Morning.) 90 tablet 1     No current facility-administered medications on file prior to visit.   No Known Allergies       Objective     Vitals:    08/02/21 1513   BP: 129/74   Pulse: 102   Resp: 16   Temp: 98.2 °F (36.8 °C)   SpO2: 99%   Weight: 93.2 kg (205 lb 6.4 oz)   Height: 175 cm (68.91\")     Current Status 7/28/2021   ECOG score 1     Physical exam    GENERAL:  Well-developed, well-nourished in no acute distress. Pallor noted.  SKIN:  Warm, dry without rashes, purpura or petechiae.  HEAD:  Normocephalic.  EYES:  Pupils equal, round.  EOMs intact.  Conjunctivae normal.  EARS:  Hearing intact.  NOSE:  Septum midline.  No excoriations or nasal discharge.  CHEST:  Lungs clear to auscultation. Good airflow.  CARDIAC:  Regular rate and rhythm without murmurs. Normal S1,S2.  ABDOMEN:  Soft, nontender. Bowel sounds present  EXTREMITIES:  No clubbing, cyanosis or edema.  NEUROLOGICAL: No focal neurological deficits.  PSYCHIATRIC:  Normal affect and mood.    I have reexamined the patient and the results are consistent with the previously documented exam. NIRU Okeefe       RECENT LABS:  Results from last 7 days   Lab Units 08/02/21  1449 07/29/21  0834   WBC 10*3/mm3 14.19* 11.34*   NEUTROS ABS 10*3/mm3 10.31* 7.66*   HEMOGLOBIN g/dL 5.7* 9.5*   HEMATOCRIT % 16.8* 27.6*   PLATELETS 10*3/mm3 203 184     Results from last 7 days   Lab Units 08/02/21  1449 07/29/21  0834   SODIUM mmol/L 132* 133*   POTASSIUM mmol/L 3.5 4.2   CHLORIDE mmol/L 98 98   CO2 mmol/L 21.4* 23.5   BUN mg/dL 14 23*   CREATININE mg/dL 0.77 0.95   CALCIUM mg/dL 8.1* 8.9   ALBUMIN g/dL 3.50 3.90   BILIRUBIN mg/dL 0.2 0.3   ALK PHOS U/L 73 81   ALT (SGPT) U/L 25 29   AST " (SGOT) U/L 21 21   GLUCOSE mg/dL 116 116           Assessment/Plan    1.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 Almost 90% of the liver was replaced by tumor.  · HER-2 positive  · Initially treated with FOLFOX initiated July 2019  · Herceptin added with cycle 2  · Following 8 cycles of FOLFOX, oxaliplatin discontinued with continuation of 5-FU, leucovorin, Herceptin. This was continued through 7/7/2020  · Increasing CEA led to PET scan 6/9/2021.  Disease progression noted with growth of the tumor in the lower esophagus.  Continued stability of hepatic metastasis  · Repeat EGD 6/19/2021 for tissue specimen and Next Generation Sequencing.  · Endoscopy that shows a noncircumferential abnormality in the lower esophagus that encompasses almost 6 cm in length. It is not blocking off the esophagus and that is why the patient has no symptoms. The pathology shows at least atypical cells consistent with cancer.  · NGS showing PD-L1 positive and high mutation burden.  The tumor remains HER-2 positive.  Treatment plan for Keytruda every 3 weeks x 4 cycles followed by repeat imaging  · Keytruda initiated 7/29/2021      2. Colovesical fistula.   · He requires intermittent Cipro when he notes stool in his urine  · He is currently without progressive symptoms, reporting his urine is normal in color    3.  Thrombophilia secondary malignancy, DVT  · Currently anticoagulated with Xarelto 20 mg daily    4. Acute GI bleeding  · Dark tarry stool initially began 7/29/2021  · 7/23/2021, hemoglobin of 11.0.  Today, hemoglobin of 5.7  · We will proceed with direct admission to [facility] for evaluation of acute GI blood loss.  Xarelto will clearly be held      PLAN:  1. I have spoken with Dr. Houser, [facility] we will proceed with direct admission to [facility] for acute GI bleeding.  The patient is in need of transfusion support.  2. Xarelto will clearly be held  3. The patient is  currently scheduled for follow-up 8/19/2021 for a second dose of Keytruda.  Follow-up will be pending hospital course      Dana Paniagua, NIRU  08/02/2021

## 2021-08-02 NOTE — TELEPHONE ENCOUNTER
Pt called c/o dizziness, SOA, and a pounding in his ears. States this started Friday and has persisted over the weekend. Also, c/o dark colored stools that began Thursday evening (7/29) Pt denies diarrhea, nausea or vomiting. No fevers and states appetite has been good. Pt is on xarelto. Pt also states he stopped BP meds on 7/29 due to low BP while in office for keytruda. BP this morning was 119/54. Reviewed with Dana MARRUFO. Per Dana, pt needs seen today. Pt agreeable to that. Message sent to appt desk to add pt on to NP schedule today and call pt with appt time.

## 2021-08-02 NOTE — TELEPHONE ENCOUNTER
[facility] MULTIDISCIPLINARY CLINIC  SMOKING CESSATION FOLLOW UP    Follow-up call placed to patient today for scheduled supportive counseling for smoking cessation.    Patient reports he is on the way in to see Dana SHANKAR in [facility] office. Started cipro last Thursday for treatment of UTI. Urinary symptoms resolved but stools now very dark/black. Has pounding headache. Feeling SOB at times. Has noted blood pressures have been progressively lower in office. Has not taken antihypertension medications since Thursday. BP at home today 119/54. He will have an exam and some blood work done today. Advised patient I can call tomorrow to follow up and he is agreeable to this.

## 2021-08-02 NOTE — CONSULTS
"[facility]  Initial Inpatient Consult Note    Referring Provider: A    Reason for Consultation: GI bleed  Subjective     History of present illness:      Thank you for requesting my opinion.    This is a 66yo M, previously unknown to the GI service, directly admitted from his oncologist for melenic stool, dizziness, and lab findings of worsened anemia.  His history is notable for metastatic esophageal cancer and DVT on anticoag.  GI is consulted for GI bleeding.  He had his first dose of Keytruday 7/29 and that day started to notice some dark but formed stool.  This has occurred periodically for him and he says he takes cipro which clears it up, however it did not this time (he has a colovesicular fistula).  He did have looser stool yesterday but denies excessive melenic stool, hematemesis, or other overt bleeding.      Hemoglobin 7/23 was 11, then 7/29 was 9.5, and today is 5.7.  He feels symptomatically dizzy and has dyspnea on exertion    He does take Xarelto for a history of DVT and did take his dose this morning    Last EGD was 6/19/21 with Dr Brito showing progression of his esophageal tumor from 34cm to 40cm from the incisors.      Past Medical History:  Past Medical History:   Diagnosis Date   • Arthritis    • Cancer (CMS/HCC)     prostate cancer 2008   • Cancer (CMS/HCC)     esophageal   • Chronic anticoagulation     on xarelto   • Elevated PSA    • Esophageal mass    • Fistula     colon and bladder   • H/O Lung nodule    • Hepatitis     CHILD--PT STATED \"I THINK IT WAS A.\"   • History of chemotherapy    • History of pneumonia    • Hx of blood clots 08/10/2019   • Hyperlipidemia    • Hypertension    • Nail fungus    • Neuropathy    • PVD (peripheral vascular disease) (CMS/HCC)    • Recto-bladderneck fistula     on poab for recurrent uti       Past Surgical History:  Past Surgical History:   Procedure Laterality Date   • COLONOSCOPY N/A 2/4/2020    Procedure: COLONOSCOPY;  Surgeon: " Guy Sears MD;  Location: [facility] ENDOSCOPY;  Service: General;  Laterality: N/A;  PRE-COLOVESICAL FISTULA  POST-- DIVERTICULOSIS   • CYSTOSCOPY  02/06/2020   • CYSTOSCOPY Bilateral 2/26/2020    Procedure: CYSTOSCOPY RETROGRADE;  Surgeon: Cm Witt MD;  Location: [facility] OR;  Service: Urology;  Laterality: Bilateral;   • ENDOSCOPY     • ENDOSCOPY N/A 6/19/2021    Procedure: ESOPHAGOGASTRODUODENOSCOPY WITH BIOPSY;  Surgeon: Mary Brito MD;  Location: [facility] MAIN OR;  Service: Gastroenterology;  Laterality: N/A;   • HERNIA REPAIR Right 1999    inguinal hernia   • PROSTATE SURGERY  03/2008    prostatectectomy   • VENOUS ACCESS DEVICE (PORT) INSERTION N/A 7/16/2019    Procedure: INSERTION VENOUS ACCESS DEVICE WITH FLUORO AND EGD WITH BIOPSY;  Surgeon: Mary Brito MD;  Location: [facility] OR;  Service: Thoracic        Social History:   Social History     Tobacco Use   • Smoking status: Current Every Day Smoker     Packs/day: 0.50     Years: 38.00     Pack years: 19.00     Types: Cigarettes     Start date: 1974   • Smokeless tobacco: Never Used   • Tobacco comment: PLANNED QUIT DATE 8/2/21; Quit for a period of 8 years. 10-15 per day   Substance Use Topics   • Alcohol use: Not Currently        Family History:  Family History   Problem Relation Age of Onset   • Hypertension Mother    • Stroke Mother    • Hypertension Other    • Lung disease Other    • Prostate cancer Other    • Lung cancer Father    • Malig Hyperthermia Neg Hx        Home Meds:  Medications Prior to Admission   Medication Sig Dispense Refill Last Dose   • acetaminophen (TYLENOL) 500 MG tablet Take 500 mg by mouth Every 6 (Six) Hours As Needed for Mild Pain .      • cevimeline (EVOXAC) 30 MG capsule Take 30 mg by mouth 3 (Three) Times a Day.      • cilostazol (PLETAL) 50 MG tablet Take 50 mg by mouth 2 (Two) Times a Day.      • ciprofloxacin (CIPRO) 250 MG tablet Take 1 tablet by mouth Daily. 90 tablet 1    •  econazole nitrate (SPECTAZOLE) 1 % cream APPLY TO ALL AFFECTED NAILS AT BEDTIME      • gabapentin (NEURONTIN) 300 MG capsule TAKE 2 CAPSULES BY MOUTH EVERY NIGHT AT BEDTIME (Patient taking differently: Take 300 mg by mouth 2 (two) times a day.) 60 capsule 4    • lisinopril-hydrochlorothiazide (PRINZIDE,ZESTORETIC) 20-12.5 MG per tablet TAKE 1 TABLET BY MOUTH EVERY DAY 90 tablet 0    • nicotine (NICODERM CQ) 21 MG/24HR patch Place 1 patch on the skin as directed by provider Daily. 28 each 1    • ondansetron (ZOFRAN) 4 MG tablet Take 1 tablet by mouth Every 8 (Eight) Hours As Needed for Nausea or Vomiting. 30 tablet 2    • Probiotic Product (PROBIOTIC DAILY PO) Take 1 tablet by mouth Daily.      • Xarelto 20 MG tablet TAKE 1 TABLET BY MOUTH EVERY DAY**STOP LOVENOX** (Patient taking differently: Take 20 mg by mouth Every Morning.) 90 tablet 1        Current Meds:        Allergies:  No Known Allergies    Review of Systems  All systems were reviewed and negative except for:  Constitution:  positive for fevers and malaise  Gastrointestinal: positive for  melena     Objective     Vital Signs  Temp:  [97.6 °F (36.4 °C)-98.2 °F (36.8 °C)] 97.6 °F (36.4 °C)  Heart Rate:  [102] 102  Resp:  [16] 16  BP: (129-142)/(65-74) 142/65    Physical Exam:  Constitutional:   Alert, cooperative, in no acute distress, appears stated age, mildly ill appearing but not in extremis   Eyes:           Lids and lashes normal, conjunctivae and sclerae normal,   no icterus   Ears, nose, mouth and throat:  Normal appearance of external ears and nose, no oral l  lesions, no thrush, oral mucosa moist   Respiratory:    Clear to auscultation, respirations regular, even and             unlabored    Cardiovascular:   Regular rhythm and normal rate, normal S1 and S2, no        murmur, no gallop, palpable distal pulses, no lower extremity edema   Gastrointestinal:    Soft, obese, nondistended, nontender to palpation, no guarding, no rebound tenderness,  normal bowel sounds, no palpable masses or organomegaly  Rectal exam: deferred   Musculoskeletal:  Normal station, no atrophy, no tenderness to palpation, normal digits and nails   Skin:  Pale color, no bleeding, bruising, rashes or lesions   Lymphatics:  No palpable cervical or supraclavicular adenopathy   Psychiatric:  Judgement and insight: normal   Orientation to person, place and time: normal   Mood and affect: normal       Results Review:   I reviewed the patient's new clinical results.    Results from last 7 days   Lab Units 08/02/21  1449 07/29/21  0834   WBC 10*3/mm3 14.19* 11.34*   HEMOGLOBIN g/dL 5.7* 9.5*   HEMATOCRIT % 16.8* 27.6*   PLATELETS 10*3/mm3 203 184       Results from last 7 days   Lab Units 08/02/21  1449 07/29/21  0834   SODIUM mmol/L 132* 133*   POTASSIUM mmol/L 3.5 4.2   CHLORIDE mmol/L 98 98   CO2 mmol/L 21.4* 23.5   BUN mg/dL 14 23*   CREATININE mg/dL 0.77 0.95   CALCIUM mg/dL 8.1* 8.9   BILIRUBIN mg/dL 0.2 0.3   ALK PHOS U/L 73 81   ALT (SGPT) U/L 25 29   AST (SGOT) U/L 21 21   GLUCOSE mg/dL 116 116             Lab Results   Lab Value Date/Time    LIPASE 12 (L) 09/16/2020 1617    LIPASE 8 (L) 01/11/2014 1430       Radiology:  Imaging Results (Last 72 Hours)     ** No results found for the last 72 hours. **          Assessment/Plan       GI bleed      Impression  1. Symptomatic acute on chronic blood loss anemia with melena: in this setting, highly suspicious for bleeding from his esophageal cancer that demonstrated ulceration on the biopsies    2. Anticoagulated: for hx of DVT; last dose of xarelto was this morning    3. Esophageal adenocarcinoma with mets to liver    4. Colovesicular fistula: followed by general surgery    Plan  Transfuse as per admitting  Consult to thoracic surgery as they have performed his endoscopy previously; will defer to their preference on performing his EGD  Start ppi gtt  Hold xarelto  Follow Hb  Monitor for further bleeding      I discussed the patients  findings and my recommendations with patient, family and nursing staff    All necessary PPE, including face mask and eye protection, were worn during this encounter.  Hand sanitization was performed both before and after the patient interaction.    Nahomi Villavicencio MD  [facility]      Dictated utilizing Dragon dictation

## 2021-08-03 ENCOUNTER — DOCUMENTATION (OUTPATIENT)
Dept: OTHER | Facility: HOSPITAL | Age: 66
End: 2021-08-03

## 2021-08-03 LAB
ANION GAP SERPL CALCULATED.3IONS-SCNC: 7.8 MMOL/L (ref 5–15)
BH BB BLOOD EXPIRATION DATE: NORMAL
BH BB BLOOD EXPIRATION DATE: NORMAL
BH BB BLOOD TYPE BARCODE: 5100
BH BB BLOOD TYPE BARCODE: 5100
BH BB DISPENSE STATUS: NORMAL
BH BB DISPENSE STATUS: NORMAL
BH BB PRODUCT CODE: NORMAL
BH BB PRODUCT CODE: NORMAL
BH BB UNIT NUMBER: NORMAL
BH BB UNIT NUMBER: NORMAL
BUN SERPL-MCNC: 11 MG/DL (ref 8–23)
BUN/CREAT SERPL: 15.1 (ref 7–25)
CALCIUM SPEC-SCNC: 7.8 MG/DL (ref 8.6–10.5)
CHLORIDE SERPL-SCNC: 106 MMOL/L (ref 98–107)
CO2 SERPL-SCNC: 20.2 MMOL/L (ref 22–29)
CREAT SERPL-MCNC: 0.73 MG/DL (ref 0.76–1.27)
CROSSMATCH INTERPRETATION: NORMAL
CROSSMATCH INTERPRETATION: NORMAL
DEPRECATED RDW RBC AUTO: 62.8 FL (ref 37–54)
ERYTHROCYTE [DISTWIDTH] IN BLOOD BY AUTOMATED COUNT: 18.9 % (ref 12.3–15.4)
GFR SERPL CREATININE-BSD FRML MDRD: 108 ML/MIN/1.73
GLUCOSE SERPL-MCNC: 105 MG/DL (ref 65–99)
HCT VFR BLD AUTO: 22.4 % (ref 37.5–51)
HGB BLD-MCNC: 7.3 G/DL (ref 13–17.7)
MCH RBC QN AUTO: 29.8 PG (ref 26.6–33)
MCHC RBC AUTO-ENTMCNC: 32.6 G/DL (ref 31.5–35.7)
MCV RBC AUTO: 91.4 FL (ref 79–97)
PLATELET # BLD AUTO: 160 10*3/MM3 (ref 140–450)
PMV BLD AUTO: 9.6 FL (ref 6–12)
POTASSIUM SERPL-SCNC: 3.6 MMOL/L (ref 3.5–5.2)
RBC # BLD AUTO: 2.45 10*6/MM3 (ref 4.14–5.8)
SODIUM SERPL-SCNC: 134 MMOL/L (ref 136–145)
UNIT  ABO: NORMAL
UNIT  ABO: NORMAL
UNIT  RH: NORMAL
UNIT  RH: NORMAL
WBC # BLD AUTO: 10.23 10*3/MM3 (ref 3.4–10.8)

## 2021-08-03 PROCEDURE — 86900 BLOOD TYPING SEROLOGIC ABO: CPT

## 2021-08-03 PROCEDURE — 85027 COMPLETE CBC AUTOMATED: CPT | Performed by: INTERNAL MEDICINE

## 2021-08-03 PROCEDURE — 86901 BLOOD TYPING SEROLOGIC RH(D): CPT

## 2021-08-03 PROCEDURE — 25010000002 IRON SUCROSE PER 1 MG: Performed by: INTERNAL MEDICINE

## 2021-08-03 PROCEDURE — 85014 HEMATOCRIT: CPT | Performed by: INTERNAL MEDICINE

## 2021-08-03 PROCEDURE — 80048 BASIC METABOLIC PNL TOTAL CA: CPT | Performed by: INTERNAL MEDICINE

## 2021-08-03 PROCEDURE — 63710000001 DIPHENHYDRAMINE PER 50 MG: Performed by: INTERNAL MEDICINE

## 2021-08-03 PROCEDURE — P9016 RBC LEUKOCYTES REDUCED: HCPCS

## 2021-08-03 PROCEDURE — 85018 HEMOGLOBIN: CPT | Performed by: INTERNAL MEDICINE

## 2021-08-03 PROCEDURE — 99222 1ST HOSP IP/OBS MODERATE 55: CPT | Performed by: THORACIC SURGERY (CARDIOTHORACIC VASCULAR SURGERY)

## 2021-08-03 PROCEDURE — 99253 IP/OBS CNSLTJ NEW/EST LOW 45: CPT | Performed by: INTERNAL MEDICINE

## 2021-08-03 PROCEDURE — 25010000002 FUROSEMIDE PER 20 MG: Performed by: INTERNAL MEDICINE

## 2021-08-03 PROCEDURE — 99232 SBSQ HOSP IP/OBS MODERATE 35: CPT | Performed by: INTERNAL MEDICINE

## 2021-08-03 PROCEDURE — 36430 TRANSFUSION BLD/BLD COMPNT: CPT

## 2021-08-03 RX ORDER — ACETAMINOPHEN 160 MG/5ML
650 SOLUTION ORAL ONCE
Status: COMPLETED | OUTPATIENT
Start: 2021-08-03 | End: 2021-08-03

## 2021-08-03 RX ORDER — ACETAMINOPHEN 650 MG/1
650 SUPPOSITORY RECTAL ONCE
Status: COMPLETED | OUTPATIENT
Start: 2021-08-03 | End: 2021-08-03

## 2021-08-03 RX ORDER — FUROSEMIDE 10 MG/ML
20 INJECTION INTRAMUSCULAR; INTRAVENOUS ONCE
Status: COMPLETED | OUTPATIENT
Start: 2021-08-03 | End: 2021-08-03

## 2021-08-03 RX ORDER — FAMOTIDINE 10 MG/ML
20 INJECTION, SOLUTION INTRAVENOUS DAILY
Status: COMPLETED | OUTPATIENT
Start: 2021-08-03 | End: 2021-08-05

## 2021-08-03 RX ORDER — DIPHENHYDRAMINE HYDROCHLORIDE 50 MG/ML
25 INJECTION INTRAMUSCULAR; INTRAVENOUS ONCE
Status: COMPLETED | OUTPATIENT
Start: 2021-08-03 | End: 2021-08-03

## 2021-08-03 RX ORDER — DIPHENHYDRAMINE HCL 25 MG
25 CAPSULE ORAL ONCE
Status: COMPLETED | OUTPATIENT
Start: 2021-08-03 | End: 2021-08-03

## 2021-08-03 RX ORDER — ACETAMINOPHEN 325 MG/1
650 TABLET ORAL ONCE
Status: COMPLETED | OUTPATIENT
Start: 2021-08-03 | End: 2021-08-03

## 2021-08-03 RX ADMIN — DIPHENHYDRAMINE HYDROCHLORIDE 25 MG: 25 CAPSULE ORAL at 13:16

## 2021-08-03 RX ADMIN — PANTOPRAZOLE SODIUM 8 MG/HR: 40 INJECTION, POWDER, FOR SOLUTION INTRAVENOUS at 21:22

## 2021-08-03 RX ADMIN — PANTOPRAZOLE SODIUM 8 MG/HR: 40 INJECTION, POWDER, FOR SOLUTION INTRAVENOUS at 16:52

## 2021-08-03 RX ADMIN — IRON SUCROSE 300 MG: 20 INJECTION, SOLUTION INTRAVENOUS at 21:36

## 2021-08-03 RX ADMIN — PANTOPRAZOLE SODIUM 8 MG/HR: 40 INJECTION, POWDER, FOR SOLUTION INTRAVENOUS at 10:37

## 2021-08-03 RX ADMIN — FAMOTIDINE 20 MG: 10 INJECTION INTRAVENOUS at 17:17

## 2021-08-03 RX ADMIN — GABAPENTIN 300 MG: 300 CAPSULE ORAL at 08:21

## 2021-08-03 RX ADMIN — FUROSEMIDE 20 MG: 10 INJECTION, SOLUTION INTRAMUSCULAR; INTRAVENOUS at 17:17

## 2021-08-03 RX ADMIN — SODIUM CHLORIDE 75 ML/HR: 9 INJECTION, SOLUTION INTRAVENOUS at 13:22

## 2021-08-03 RX ADMIN — PANTOPRAZOLE SODIUM 8 MG/HR: 40 INJECTION, POWDER, FOR SOLUTION INTRAVENOUS at 04:36

## 2021-08-03 RX ADMIN — Medication 1 CAPSULE: at 08:21

## 2021-08-03 RX ADMIN — ACETAMINOPHEN 650 MG: 325 TABLET, FILM COATED ORAL at 13:16

## 2021-08-03 RX ADMIN — GABAPENTIN 300 MG: 300 CAPSULE ORAL at 20:46

## 2021-08-03 NOTE — PAYOR COMM NOTE
"Urban Mota (65 y.o. Male)    Please see attached clinical review.     REF#[id number]    PLEASE CALL  OR  [phone] WITH INPT AUTH.     THANK YOU    KIRILL WILKINSON LPN CCP    Date of Birth Social Security Number Address Home Phone MRN    1955  [id number] [facility] [phone] [id number]    Anglican Marital Status          None Single       Admission Date Admission Type Admitting Provider Attending Provider Department, Room/Bed    21 Urgent Cathy Houser MD Shah, Aakash, MD [address], [address]    Discharge Date Discharge Disposition Discharge Destination                       Attending Provider: Bulmaro Mcgrath MD    Allergies: No Known Allergies    Isolation: None   Infection: None   Code Status: CPR    Ht: 175.3 cm (69\")   Wt: 93 kg (205 lb)    Admission Cmt: None   Principal Problem: None                Active Insurance as of 2021     Primary Coverage     Payor Plan Insurance Group Employer/Plan Group    [employer] BLUE [employer] BLUE [employer]OSS BLUE SHIELD PPO [id number]     Payor Plan Address Payor Plan Phone Number Payor Plan Fax Number Effective Dates    [address] [phone]  2020 - None Entered    [place] [address]       Subscriber Name Subscriber Birth Date Member ID       URBAN MOTA 1955 [id number]                 Emergency Contacts      (Rel.) Home Phone Work Phone Mobile Phone    Karen Costello (Sister) -- -- [phone]    SAEID PETERSON (Sister) [phone] -- --               History & Physical      Cathy Houser MD at 21 1834          HISTORY AND PHYSICAL   [facility]        Patient Identification:  Name: Urban Mota  Age: 65 y.o.  Sex: male  :  1955  MRN: [id number]                     Primary Care Physician: Jaiden Hoover MD    Chief Complaint:  65 year old gentleman who was seen by oncology today; he has been feeling tired and " "weak for the last two days and had had dark tarry stools; he takes xarelto because of a history of blood clots; he is being treated for stage 4 esophageal cancer; no fever ro chills    History of Present Illness:   As above    Past Medical History:  Past Medical History:   Diagnosis Date   • Arthritis    • Cancer (CMS/HCC)     prostate cancer 2008   • Cancer (CMS/HCC)     esophageal   • Chronic anticoagulation     on xarelto   • Elevated PSA    • Esophageal mass    • Fistula     colon and bladder   • H/O Lung nodule    • Hepatitis     CHILD--PT STATED \"I THINK IT WAS A.\"   • History of chemotherapy    • History of pneumonia    • Hx of blood clots 08/10/2019   • Hyperlipidemia    • Hypertension    • Nail fungus    • Neuropathy    • PVD (peripheral vascular disease) (CMS/HCC)    • Recto-bladderneck fistula     on poab for recurrent uti     Past Surgical History:  Past Surgical History:   Procedure Laterality Date   • COLONOSCOPY N/A 2/4/2020    Procedure: COLONOSCOPY;  Surgeon: Guy Sears MD;  Location: [facility] ENDOSCOPY;  Service: General;  Laterality: N/A;  PRE-COLOVESICAL FISTULA  POST-- DIVERTICULOSIS   • CYSTOSCOPY  02/06/2020   • CYSTOSCOPY Bilateral 2/26/2020    Procedure: CYSTOSCOPY RETROGRADE;  Surgeon: Cm Witt MD;  Location: [facility];  Service: Urology;  Laterality: Bilateral;   • ENDOSCOPY     • ENDOSCOPY N/A 6/19/2021    Procedure: ESOPHAGOGASTRODUODENOSCOPY WITH BIOPSY;  Surgeon: Mary Brito MD;  Location: [facility] OR;  Service: Gastroenterology;  Laterality: N/A;   • HERNIA REPAIR Right 1999    inguinal hernia   • PROSTATE SURGERY  03/2008    prostatectectomy   • VENOUS ACCESS DEVICE (PORT) INSERTION N/A 7/16/2019    Procedure: INSERTION VENOUS ACCESS DEVICE WITH FLUORO AND EGD WITH BIOPSY;  Surgeon: Mary Brito MD;  Location: [facility] OR;  Service: Thoracic      Home Meds:  Medications Prior to Admission   Medication Sig Dispense Refill Last Dose   • " acetaminophen (TYLENOL) 500 MG tablet Take 500 mg by mouth Every 6 (Six) Hours As Needed for Mild Pain .      • cevimeline (EVOXAC) 30 MG capsule Take 30 mg by mouth 3 (Three) Times a Day.      • cilostazol (PLETAL) 50 MG tablet Take 50 mg by mouth 2 (Two) Times a Day.      • ciprofloxacin (CIPRO) 250 MG tablet Take 1 tablet by mouth Daily. 90 tablet 1    • econazole nitrate (SPECTAZOLE) 1 % cream APPLY TO ALL AFFECTED NAILS AT BEDTIME      • gabapentin (NEURONTIN) 300 MG capsule TAKE 2 CAPSULES BY MOUTH EVERY NIGHT AT BEDTIME (Patient taking differently: Take 300 mg by mouth 2 (two) times a day.) 60 capsule 4    • lisinopril-hydrochlorothiazide (PRINZIDE,ZESTORETIC) 20-12.5 MG per tablet TAKE 1 TABLET BY MOUTH EVERY DAY 90 tablet 0    • nicotine (NICODERM CQ) 21 MG/24HR patch Place 1 patch on the skin as directed by provider Daily. 28 each 1    • ondansetron (ZOFRAN) 4 MG tablet Take 1 tablet by mouth Every 8 (Eight) Hours As Needed for Nausea or Vomiting. 30 tablet 2    • Probiotic Product (PROBIOTIC DAILY PO) Take 1 tablet by mouth Daily.      • Xarelto 20 MG tablet TAKE 1 TABLET BY MOUTH EVERY DAY**STOP LOVENOX** (Patient taking differently: Take 20 mg by mouth Every Morning.) 90 tablet 1        Allergies:  No Known Allergies  Immunizations:  Immunization History   Administered Date(s) Administered   • COVID-19 (MODERNA) 03/09/2021, 03/09/2021, 04/06/2021   • Flu Vaccine Intradermal Quad 18-64YR 10/23/2019   • Flucelvax Quad Vial =>4yrs 10/23/2019   • Flulaval/Fluarix/Fluzone Quad 10/20/2020   • Fluzone High Dose =>65 Years ([employer] ONLY) 10/23/2019   • Influenza, Unspecified 10/20/2020   • Pneumococcal Polysaccharide (PPSV23) 01/06/2014   • Pneumococcal, Unspecified 01/06/2014   • Tdap 03/03/2016     Social History:   Social History     Social History Narrative   • Not on file     Social History     Socioeconomic History   • Marital status: Single     Spouse name: Not on file   • Number of children: Not on file    • Years of education: High school   • Highest education level: Not on file   Tobacco Use   • Smoking status: Current Every Day Smoker     Packs/day: 0.50     Years: 38.00     Pack years: 19.00     Types: Cigarettes     Start date:    • Smokeless tobacco: Never Used   • Tobacco comment: PLANNED QUIT DATE 21; Quit for a period of 8 years. 10-15 per day   Vaping Use   • Vaping Use: Never used   Substance and Sexual Activity   • Alcohol use: Not Currently   • Drug use: Never   • Sexual activity: Defer       Family History:  Family History   Problem Relation Age of Onset   • Hypertension Mother    • Stroke Mother    • Hypertension Other    • Lung disease Other    • Prostate cancer Other    • Lung cancer Father    • Malig Hyperthermia Neg Hx         Review of Systems  See history of present illness and past medical history.  Patient denies headache, dizziness, syncope, falls, trauma, change in vision, change in hearing, change in taste, changes in weight, changes in appetite, focal weakness, numbness, or paresthesia.  Patient denies chest pain, palpitations, dyspnea, orthopnea, PND, cough, sinus pressure, rhinorrhea, epistaxis, hemoptysis, nausea, vomiting,hematemesis, diarrhea, constipation or hematchezia.  Denies cold or heat intolerance, polydipsia, polyuria, polyphagia. Denies hematuria, pyuria, dysuria, hesitancy, frequency or urgency. Denies consumption of raw and under cooked meats foods or change in water source.  Denies fever, chills, sweats, night sweats.  Denies missing any routine medications. Remainder of ROS is negative.    Objective:  T Max 24 hrs: Temp (24hrs), Av.9 °F (36.6 °C), Min:97.6 °F (36.4 °C), Max:98.2 °F (36.8 °C)    Vitals Ranges:   Temp:  [97.6 °F (36.4 °C)-98.2 °F (36.8 °C)] 97.6 °F (36.4 °C)  Heart Rate:  [102] 102  Resp:  [16] 16  BP: (129-142)/(65-74) 142/65      Exam:  /65 (BP Location: Left arm, Patient Position: Sitting)   Pulse 102   Temp 97.6 °F (36.4 °C) (Oral)   " Resp 16   Ht 175.3 cm (69\")   Wt 93 kg (205 lb)   SpO2 100%   BMI 30.27 kg/m²     General Appearance:    Alert, cooperative, no distress, appears stated age   Head:    Normocephalic, without obvious abnormality, atraumatic   Eyes:    PERRL, conjunctivae/corneas clear, EOM's intact, both eyes   Ears:    Normal external ear canals, both ears   Nose:   Nares normal, septum midline, mucosa normal, no drainage    or sinus tenderness   Throat:   Lips, mucosa, and tongue normal   Neck:   Supple, symmetrical, trachea midline, no adenopathy;     thyroid:  no enlargement/tenderness/nodules; no carotid    bruit or JVD   Back:     Symmetric, no curvature, ROM normal, no CVA tenderness   Lungs:     Decreased breath sounds bilaterally, respirations unlabored   Chest Wall:    No tenderness or deformity    Heart:    Regular rate and rhythm, S1 and S2 normal, no murmur, rub   or gallop   Abdomen:     Soft, nontender, bowel sounds active all four quadrants,     no masses, no hepatomegaly, no splenomegaly   Extremities:   Extremities normal, atraumatic, no cyanosis or edema   Pulses:   2+ and symmetric all extremities   Skin:   Skin color, texture, turgor normal, no rashes or lesions   Lymph nodes:   Cervical, supraclavicular, and axillary nodes normal   Neurologic:   CNII-XII intact, normal strength, sensation intact throughout      .      Imaging Results (All)     None        Patient Active Problem List   Diagnosis Code   • Essential hypertension I10   • Hyperlipidemia E78.5   • Impaired fasting glucose R73.01   • History of prostate cancer Z85.46   • Tobacco abuse Z72.0   • Liver metastasis (CMS/HCC) C78.7   • Esophagus neoplasm D49.0   • Malignant neoplasm of lower third of esophagus (CMS/HCC) C15.5   • Encounter for adjustment or management of vascular access device Z45.2   • Acute deep vein thrombosis (DVT) of femoral vein of left lower extremity (CMS/HCC) I82.412   • Acute deep vein thrombosis (DVT) of right popliteal " vein (CMS/[facility]) I82.431   • Chemotherapy induced neutropenia (CMS/[facility]) D70.1, T45.1X5A   • Anemia, chronic disease D63.8   • Peripheral neuropathy due to chemotherapy (CMS/[facility]) G62.0, T45.1X5A   • Chronic anticoagulation Z79.01   • GI bleed K92.2       Assessment:    GI bleed  acute blood loss anemia  H.o dvt  Esophageal cancer  Hypertension  Hyponatremia      Plan:  Will transfuse  Ask to see him  Hold xarelto  Trend hgb  Monitor on telemetry  D.w patient and wife as well as oncology arnp    Cathy Houser MD  8/2/2021  18:34 EDT      Electronically signed by Cathy Houser MD at 08/02/21 1838       Emergency Department Notes    No notes of this type exist for this encounter.         Oxygen Therapy (since admission)     Date/Time   SpO2   Device (Oxygen Therapy)   Flow (L/min)   Oxygen Concentration (%)   ETCO2 (mmHg)    08/03/21 0820   --   room air   --   --   --    08/03/21 0716   --   room air   --   --   --    08/03/21 0500   97   --   --   --   --    08/03/21 0212   98   room air   --   --   --    08/03/21 0145   98   room air   --   --   --    08/02/21 2257   99   room air   --   --   --    08/02/21 2227   99   room air   --   --   --    08/02/21 2107   100   room air   --   --   --    08/02/21 2002   --   room air   --   --   --    08/02/21 1717   100   room air   --   --   --            Intake & Output (last 2 days)       08/01 0701 - 08/02 0700 08/02 0701 - 08/03 0700 08/03 0701 - 08/04 0700    P.O.   0    Blood  600     Total Intake(mL/kg)  600 (6.5) 0 (0)    Urine (mL/kg/hr)   550 (1.8)    Total Output   550    Net  +600 -550           Urine Unmeasured Occurrence  0 x         Lines, Drains & Airways    Active LDAs     Name:   Placement date:   Placement time:   Site:   Days:    Peripheral IV 08/02/21 1844 Left Antecubital   08/02/21    1844    Antecubital   less than 1    Single Lumen Implantable Port 07/16/19 Right Chest   07/16/19    1610    Chest   748         Inactive LDAs     None                   Medication Administration Report for Han Garcia as of 08/03/21 1022    Legend:    Given Hold Not Given Due Canceled Entry Other Actions    Time Time (Time) Time  Time-Action       Discontinued     Completed     Future     MAR Hold     Linked           Medications 08/01/21 08/02/21 08/03/21    acetaminophen (TYLENOL) tablet 650 mg  Dose: 650 mg  Freq: Every 4 Hours PRN Route: PO  PRN Reason: Mild Pain   Start: 08/02/21 1717    Admin Instructions:   Do not exceed 4 grams of acetaminophen in a 24 hr period.    If given for pain, use the following pain scale:   Mild Pain = Pain Score of 1-3, CPOT 1-2  Moderate Pain = Pain Score of 4-6, CPOT 3-4  Severe Pain = Pain Score of 7-10, CPOT 5-8  Do not exceed 4 grams of acetaminophen in a 24 hr period. Max dose of 2gm for AST/ALT greater than 120 units/L      If given for pain, use the following pain scale:   Mild Pain = Pain Score of 1-3, CPOT 1-2  Moderate Pain = Pain Score of 4-6, CPOT 3-4  Severe Pain = Pain Score of 7-10, CPOT 5-8           gabapentin (NEURONTIN) capsule 300 mg  Dose: 300 mg  Freq: Every 12 Hours Scheduled Route: PO  Start: 08/02/21 2100    Admin Instructions:        2009 0821 2100            lactobacillus acidophilus (RISAQUAD) capsule 1 capsule  Dose: 1 capsule  Freq: Daily Route: PO  Start: 08/02/21 1945 2359 0821             lisinopril (PRINIVIL,ZESTRIL) 20 mg, hydroCHLOROthiazide (HYDRODIURIL) 12.5 mg for ZESTORETIC 20-12.5  Freq: Every 24 Hours Scheduled Route: PO  Start: 08/02/21 1945    Admin Instructions:   Give both components      (1659)          (0821) [C]             melatonin tablet 3 mg  Dose: 3 mg  Freq: Nightly PRN Route: PO  PRN Reason: Sleep  Start: 08/02/21 1717          nitroglycerin (NITROSTAT) SL tablet 0.4 mg  Dose: 0.4 mg  Freq: Every 5 Minutes PRN Route: SL  PRN Reason: Chest Pain  PRN Comment: Only if SBP Greater Than 100  Start: 08/02/21 1717    Admin Instructions:   If Pain  Unrelieved After 3 Doses Notify MD           ondansetron (ZOFRAN) tablet 4 mg  Dose: 4 mg  Freq: Every 6 Hours PRN Route: PO  PRN Reasons: Nausea,Vomiting  Start: 08/02/21 1717         Or  ondansetron (ZOFRAN) injection 4 mg  Dose: 4 mg  Freq: Every 6 Hours PRN Route: IV  PRN Reasons: Nausea,Vomiting  Start: 08/02/21 1717          pantoprazole (PROTONIX) 40 mg in 100mL NS IVPB  Rate: 20 mL/hr Dose: 8 mg/hr  Freq: Continuous Route: IV  Indications of Use: Gastrointestinal (GI) Bleed  Start: 08/02/21 2015    Admin Instructions:   Break seal and mix to activate vial before use      [id number]             sodium chloride 0.9 % infusion  Rate: 75 mL/hr Dose: 75 mL/hr  Freq: Continuous Route: IV  Start: 08/02/21 8075     9207             Completed Medications  Medications 08/01/21 08/02/21 08/03/21       acetaminophen (TYLENOL) tablet 650 mg  Dose: 650 mg  Freq: Once Route: PO  Start: 08/02/21 1930   End: 08/02/21 2008    Admin Instructions:   Give Prior to First Transfusion  Do not exceed 4 grams of acetaminophen in a 24 hr period. Max dose of 2gm for AST/ALT greater than 120 units/L      If given for pain, use the following pain scale:   Mild Pain = Pain Score of 1-3, CPOT 1-2  Moderate Pain = Pain Score of 4-6, CPOT 3-4  Severe Pain = Pain Score of 7-10, CPOT 5-8      2008             Or  acetaminophen (TYLENOL) 160 MG/5ML solution 650 mg  Dose: 650 mg  Freq: Once Route: PO  Start: 08/02/21 1930   End: 08/02/21 2008    Admin Instructions:   Give Prior to First Transfusion  Do not exceed 4 grams of acetaminophen in a 24 hr period. Max dose of 2gm for AST/ALT greater than 120 units/L      If given for pain, use the following pain scale:   Mild Pain = Pain Score of 1-3, CPOT 1-2  Moderate Pain = Pain Score of 4-6, CPOT 3-4  Severe Pain = Pain Score of 7-10, CPOT 5-8                   Or  acetaminophen (TYLENOL) suppository 650 mg  Dose: 650 mg  Freq: Once Route: RE  Start: 08/02/21 1930   End: 08/02/21 2008     Admin Instructions:   Give Prior to First Transfusion  Do not exceed 4 grams of acetaminophen in a 24 hr period. Max dose of 2gm for AST/ALT greater than 120 units/L      If given for pain, use the following pain scale:   Mild Pain = Pain Score of 1-3, CPOT 1-2  Moderate Pain = Pain Score of 4-6, CPOT 3-4  Severe Pain = Pain Score of 7-10, CPOT 5-8                           Blood Administration Record (From admission, onward)    Completed transfusions     Ordered     Start    08/02/21 1834  Transfuse RBC, 2 Units Infuse Each Unit Over: 3.5H  Transfusion     Released Time Blood Unit Number Status   08/02/21 2236   21  [id number]  [id number] Completed 08/03/21 0148   08/03/21 0150   21  [id number]  [id number] Completed 08/03/21 0505       08/02/21 1833                Lab Results (all)     Procedure Component Value Units Date/Time    Basic Metabolic Panel [[id number]]  (Abnormal) Collected: 08/03/21 0748    Specimen: Blood Updated: 08/03/21 0851     Glucose 105 mg/dL      BUN 11 mg/dL      Creatinine 0.73 mg/dL      Sodium 134 mmol/L      Potassium 3.6 mmol/L      Chloride 106 mmol/L      CO2 20.2 mmol/L      Calcium 7.8 mg/dL      eGFR Non African Amer 108 mL/min/1.73      BUN/Creatinine Ratio 15.1     Anion Gap 7.8 mmol/L     Narrative:      CBC (No Diff) [[id number]]  (Abnormal) Collected: 08/03/21 0748    Specimen: Blood Updated: 08/03/21 0832     WBC 10.23 10*3/mm3      RBC 2.45 10*6/mm3      Hemoglobin 7.3 g/dL      Hematocrit 22.4 %      MCV 91.4 fL      MCH 29.8 pg      MCHC 32.6 g/dL      RDW 18.9 %      RDW-SD 62.8 fl      MPV 9.6 fL      Platelets 160 10*3/mm3     COVID PRE-OP / PRE-PROCEDURE SCREENING ORDER (NO ISOLATION) - Swab, Nasopharynx [[id number]]  (Normal) Collected: 08/02/21 1734    Specimen: Swab from Nasopharynx Updated: 08/02/21 2319    Narrative:      COVID-19,[facility]TIMA PANTHERMATT IN-HOUSE, NP/OP SWAB IN UTM/VTM/SALINE TRANSPORT MEDIA,24 HR TAT - Swab, Nasopharynx [[id number]]  (Normal) Collected:  08/02/21 1734    Specimen: Swab from Nasopharynx Updated: 08/02/21 2312     COVID19 Not Detected    Narrative:      Ferritin [[id number]]  (Normal) Collected: 08/02/21 1832    Specimen: Blood Updated: 08/02/21 1957     Ferritin 36.30 ng/mL     Narrative:      Results may be falsely decreased if patient taking Biotin.      Iron Profile [[id number]]  (Abnormal) Collected: 08/02/21 1832    Specimen: Blood Updated: 08/02/21 1954     Iron 41 mcg/dL      Iron Saturation 18 %      Transferrin 155 mg/dL      TIBC 231 mcg/dL           Orders (all)      Start     Ordered    08/03/21 0001  NPO Diet  Diet Effective Midnight      08/02/21 1718    08/03/21 0000  Hemoglobin & Hematocrit, Blood  Every 8 Hours      08/02/21 1718 08/02/21 2000  Vital Signs  Every 4 Hours     Comments: Per per hospital policy    08/02/21 1718 08/02/21 1945  lisinopril (PRINIVIL,ZESTRIL) 20 mg, hydroCHLOROthiazide (HYDRODIURIL) 12.5 mg for ZESTORETIC 20-12.5  Every 24 Hours Scheduled      08/02/21 1851 08/02/21 1945  lactobacillus acidophilus (RISAQUAD) capsule 1 capsule  Daily      08/02/21 1851 08/02/21 1931  Inpatient Thoracic Surgery Consult  Once     Specialty:  Thoracic Surgery  Provider:  Mary Brito MD    08/02/21 1932 08/02/21 1930  acetaminophen (TYLENOL) tablet 650 mg  Once      08/02/21 1834 08/02/21 1930  acetaminophen (TYLENOL) 160 MG/5ML solution 650 mg  Once      08/02/21 1834 08/02/21 1930  acetaminophen (TYLENOL) suppository 650 mg  Once      08/02/21 1834 08/02/21 1908  Access Port  Once     Comments: If needed for iv access.    08/02/21 1907 08/02/21 1834  Prepare RBC, 2 Units  Blood - Once      08/02/21 1834 08/02/21 1719  Inpatient Gastroenterology Consult  Once     Specialty:  Gastroenterology  Provider:  Kaiser Hodges MD    08/02/21 1718    08/02/21 1718  Inpatient Admission  Once      08/02/21 1718 08/02/21 1718  Code Status and Medical Interventions:  Continuous      08/02/21 8542     08/02/21 1718  Cardiac Monitoring  Continuous,   Status:  Canceled      08/02/21 1718    08/02/21 1718  Telemetry - Maintain IV Access  Continuous      08/02/21 1718    08/02/21 1718  Continuous Cardiac Monitoring  Continuous      08/02/21 1718    08/02/21 1718  May Be Off Telemetry for Tests  Continuous      08/02/21 1718    08/02/21 1718  ACLS Protocol For Life Threatening Dysrhythmias (Unless Code Status Indicates Otherwise)  Continuous      08/02/21 1718    08/02/21 1718  Notify Provider if ACLS Protocol Activated  Until Discontinued      08/02/21 1718    08/02/21 1718  Diet Clear Liquid  Diet Effective Now,   Status:  Canceled      08/02/21 1718    08/02/21 1718  Intake & Output  Every Shift      08/02/21 1718    08/02/21 1718  Inpatient Gastroenterology Consult  Once     Specialty:  Gastroenterology  Provider:  Kaiser Hodges MD    08/02/21 1718    08/02/21 1718  Place Sequential Compression Device  Once      08/02/21 1718    08/02/21 1718  Maintain Sequential Compression Device  Continuous      08/02/21 1718    08/02/21 1718  Inpatient Hematology & Oncology Consult  Once     Specialty:  Hematology and Oncology  Provider:  Alexey Haynes MD    08/02/21 1718    Unscheduled  Telemetry - Pulse Oximetry  Continuous PRN     Comments: If Patient Develops Unresponsiveness, Acute Dyspnea, Cyanosis or Suspected Hypoxemia Start Continuous Pulse Ox Monitoring, Apply Oxygen & Notify Provider    08/02/21 1718    Unscheduled  Oxygen Therapy- Nasal Cannula; Titrate for SPO2: 90% - 95%  Continuous PRN     Comments: If Patient Develops Unresponsiveness, Acute Dyspnea, Cyanosis or Suspected Hypoxemia Start Continuous Pulse Ox Monitoring, Apply Oxygen & Notify Provider    08/02/21 1718    Unscheduled  Up with assistance  As Needed      08/02/21 1718                       Physician Progress Notes (all)      Hang Knapp MD at 08/03/21 0957          [facility]  Inpatient Progress  Note    Reason for Follow Up: GI bleed    Subjective     Interval History:   H&H improved posttransfusion.  Still notes episodes of diarrhea with black liquid stools.  Awaiting thoracic service input given his history of metastatic esophageal cancer and progression of disease.  Suspect anticoagulant therapy for DVT is also a contributing factor.  Unclear as to what his options will be.    Current Facility-Administered Medications:   •  acetaminophen (TYLENOL) tablet 650 mg, 650 mg, Oral, Q4H PRN, Cathy Houser MD  •  gabapentin (NEURONTIN) capsule 300 mg, 300 mg, Oral, Q12H, Cathy Houser MD, 300 mg at 08/03/21 0821  •  lactobacillus acidophilus (RISAQUAD) capsule 1 capsule, 1 capsule, Oral, Daily, Cathy Houser MD, 1 capsule at 08/03/21 0821  •  lisinopril (PRINIVIL,ZESTRIL) 20 mg, hydroCHLOROthiazide (HYDRODIURIL) 12.5 mg for ZESTORETIC 20-12.5, , Oral, Q24H, Cathy Houser MD  •  melatonin tablet 3 mg, 3 mg, Oral, Nightly PRN, Cathy Houser MD  •  nitroglycerin (NITROSTAT) SL tablet 0.4 mg, 0.4 mg, Sublingual, Q5 Min PRN, Cathy Houser MD  •  ondansetron (ZOFRAN) tablet 4 mg, 4 mg, Oral, Q6H PRN **OR** ondansetron (ZOFRAN) injection 4 mg, 4 mg, Intravenous, Q6H PRN, Cathy Houser MD  •  pantoprazole (PROTONIX) 40 mg in 100mL NS IVPB, 8 mg/hr, Intravenous, Continuous, Nahomi Villavicencio MD, Last Rate: 20 mL/hr at 08/03/21 0436, 8 mg/hr at 08/03/21 0436  •  sodium chloride 0.9 % infusion, 75 mL/hr, Intravenous, Continuous, Cathy Houser MD, Last Rate: 75 mL/hr at 08/02/21 1903, 75 mL/hr at 08/02/21 1903  Review of Systems:    All systems were reviewed and negative except for:  Gastrointestinal: positive for  See HPI    Objective     Vital Signs  Temp:  [97.6 °F (36.4 °C)-98.6 °F (37 °C)] 98 °F (36.7 °C)  Heart Rate:  [] 93  Resp:  [16-20] 18  BP: (102-142)/(57-74) 102/63  Body mass index is 30.27 kg/m².    Intake/Output Summary (Last 24  hours) at 8/3/2021 0957  Last data filed at 8/3/2021 0902  Gross per 24 hour   Intake 600 ml   Output 550 ml   Net 50 ml     I/O this shift:  In: 0   Out: 550 [Urine:550]     Physical Exam:   General: patient awake, alert and cooperative   Eyes: Normal lids and lashes, no scleral icterus   Neck: supple, normal ROM   Skin: warm and dry, not jaundiced   Cardiovascular: regular rhythm and rate, no murmurs auscultated   Pulm: clear to auscultation bilaterally, regular and unlabored   Abdomen: soft, nontender, nondistended; normal bowel sounds   Extremities: no rash or edema   Psychiatric: Normal mood and behavior; memory intact     Results Review:     I reviewed the patient's new clinical results.    Results from last 7 days   Lab Units 08/03/21  0748 08/02/21  1449 07/29/21  0834   WBC 10*3/mm3 10.23 14.19* 11.34*   HEMOGLOBIN g/dL 7.3* 5.7* 9.5*   HEMATOCRIT % 22.4* 16.8* 27.6*   PLATELETS 10*3/mm3 160 203 184     Results from last 7 days   Lab Units 08/03/21  0748 08/02/21  1449 07/29/21  0834   SODIUM mmol/L 134* 132* 133*   POTASSIUM mmol/L 3.6 3.5 4.2   CHLORIDE mmol/L 106 98 98   CO2 mmol/L 20.2* 21.4* 23.5   BUN mg/dL 11 14 23*   CREATININE mg/dL 0.73* 0.77 0.95   CALCIUM mg/dL 7.8* 8.1* 8.9   BILIRUBIN mg/dL  --  0.2 0.3   ALK PHOS U/L  --  73 81   ALT (SGPT) U/L  --  25 29   AST (SGOT) U/L  --  21 21   GLUCOSE mg/dL 105* 116 116         Lab Results   Lab Value Date/Time    LIPASE 12 (L) 09/16/2020 1617    LIPASE 8 (L) 01/11/2014 1430       Radiology:  No orders to display       Assessment/Plan     Patient Active Problem List   Diagnosis   • Essential hypertension   • Hyperlipidemia   • Impaired fasting glucose   • History of prostate cancer   • Tobacco abuse   • Liver metastasis (CMS/HCC)   • Esophagus neoplasm   • Malignant neoplasm of lower third of esophagus (CMS/HCC)   • Encounter for adjustment or management of vascular access device   • Acute deep vein thrombosis (DVT) of femoral vein of left lower  extremity (CMS/HCC)   • Acute deep vein thrombosis (DVT) of right popliteal vein (CMS/HCC)   • Chemotherapy induced neutropenia (CMS/HCC)   • Anemia, chronic disease   • 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 Acute blood loss anemia with melena  2. Esophageal adenocarcinoma with liver metastases  3. Anticoagulation therapy secondary to DVTs  4. Colovesicular fistula      Plan:  · Continue PPI  · Hold Xarelto  · Monitor H&H  · Await thoracic input regarding options  I discussed the patients findings and my recommendations with patient.    aHng Knapp MD                Electronically signed by Hang Knapp MD at 08/03/21 1005          Consult Notes (all)      Nahomi Villavicencio MD at 08/02/21 2259      Consult Orders    1. Inpatient Gastroenterology Consult [[id number]] ordered by Cathy Houser MD at 08/02/21 1712               [facility]  Initial Inpatient Consult Note    Referring Provider: [facility]    Reason for Consultation: GI bleed  Subjective     History of present illness:      Thank you for requesting my opinion.    This is a 66yo M, previously unknown to the GI service, directly admitted from his oncologist for melenic stool, dizziness, and lab findings of worsened anemia.  His history is notable for metastatic esophageal cancer and DVT on anticoag.  GI is consulted for GI bleeding.  He had his first dose of Keytruday 7/29 and that day started to notice some dark but formed stool.  This has occurred periodically for him and he says he takes cipro which clears it up, however it did not this time (he has a colovesicular fistula).  He did have looser stool yesterday but denies excessive melenic stool, hematemesis, or other overt bleeding.      Hemoglobin 7/23 was 11, then 7/29 was 9.5, and today is 5.7.  He feels symptomatically dizzy and has dyspnea on exertion    He does take Xarelto for a  "history of DVT and did take his dose this morning    Last EGD was 6/19/21 with Dr Brito showing progression of his esophageal tumor from 34cm to 40cm from the incisors.      Past Medical History:  Past Medical History:   Diagnosis Date   • Arthritis    • Cancer (CMS/HCC)     prostate cancer 2008   • Cancer (CMS/HCC)     esophageal   • Chronic anticoagulation     on xarelto   • Elevated PSA    • Esophageal mass    • Fistula     colon and bladder   • H/O Lung nodule    • Hepatitis     CHILD--PT STATED \"I THINK IT WAS A.\"   • History of chemotherapy    • History of pneumonia    • Hx of blood clots 08/10/2019   • Hyperlipidemia    • Hypertension    • Nail fungus    • Neuropathy    • PVD (peripheral vascular disease) (CMS/HCC)    • Recto-bladderneck fistula     on poab for recurrent uti       Past Surgical History:  Past Surgical History:   Procedure Laterality Date   • COLONOSCOPY N/A 2/4/2020    Procedure: COLONOSCOPY;  Surgeon: Guy Sears MD;  Location: [facility] ENDOSCOPY;  Service: General;  Laterality: N/A;  PRE-COLOVESICAL FISTULA  POST-- DIVERTICULOSIS   • CYSTOSCOPY  02/06/2020   • CYSTOSCOPY Bilateral 2/26/2020    Procedure: CYSTOSCOPY RETROGRADE;  Surgeon: Cm Witt MD;  Location: [facility] OR;  Service: Urology;  Laterality: Bilateral;   • ENDOSCOPY     • ENDOSCOPY N/A 6/19/2021    Procedure: ESOPHAGOGASTRODUODENOSCOPY WITH BIOPSY;  Surgeon: Mary Brito MD;  Location: [facility] OR;  Service: Gastroenterology;  Laterality: N/A;   • HERNIA REPAIR Right 1999    inguinal hernia   • PROSTATE SURGERY  03/2008    prostatectectomy   • VENOUS ACCESS DEVICE (PORT) INSERTION N/A 7/16/2019    Procedure: INSERTION VENOUS ACCESS DEVICE WITH FLUORO AND EGD WITH BIOPSY;  Surgeon: Mary Brito MD;  Location: [facility] OR;  Service: Thoracic        Social History:   Social History     Tobacco Use   • Smoking status: Current Every Day Smoker     Packs/day: 0.50     Years: 38.00     Pack " years: 19.00     Types: Cigarettes     Start date: 1974   • Smokeless tobacco: Never Used   • Tobacco comment: PLANNED QUIT DATE 8/2/21; Quit for a period of 8 years. 10-15 per day   Substance Use Topics   • Alcohol use: Not Currently        Family History:  Family History   Problem Relation Age of Onset   • Hypertension Mother    • Stroke Mother    • Hypertension Other    • Lung disease Other    • Prostate cancer Other    • Lung cancer Father    • Malig Hyperthermia Neg Hx        Home Meds:  Medications Prior to Admission   Medication Sig Dispense Refill Last Dose   • acetaminophen (TYLENOL) 500 MG tablet Take 500 mg by mouth Every 6 (Six) Hours As Needed for Mild Pain .      • cevimeline (EVOXAC) 30 MG capsule Take 30 mg by mouth 3 (Three) Times a Day.      • cilostazol (PLETAL) 50 MG tablet Take 50 mg by mouth 2 (Two) Times a Day.      • ciprofloxacin (CIPRO) 250 MG tablet Take 1 tablet by mouth Daily. 90 tablet 1    • econazole nitrate (SPECTAZOLE) 1 % cream APPLY TO ALL AFFECTED NAILS AT BEDTIME      • gabapentin (NEURONTIN) 300 MG capsule TAKE 2 CAPSULES BY MOUTH EVERY NIGHT AT BEDTIME (Patient taking differently: Take 300 mg by mouth 2 (two) times a day.) 60 capsule 4    • lisinopril-hydrochlorothiazide (PRINZIDE,ZESTORETIC) 20-12.5 MG per tablet TAKE 1 TABLET BY MOUTH EVERY DAY 90 tablet 0    • nicotine (NICODERM CQ) 21 MG/24HR patch Place 1 patch on the skin as directed by provider Daily. 28 each 1    • ondansetron (ZOFRAN) 4 MG tablet Take 1 tablet by mouth Every 8 (Eight) Hours As Needed for Nausea or Vomiting. 30 tablet 2    • Probiotic Product (PROBIOTIC DAILY PO) Take 1 tablet by mouth Daily.      • Xarelto 20 MG tablet TAKE 1 TABLET BY MOUTH EVERY DAY**STOP LOVENOX** (Patient taking differently: Take 20 mg by mouth Every Morning.) 90 tablet 1        Current Meds:        Allergies:  No Known Allergies    Review of Systems  All systems were reviewed and negative except for:  Constitution:  positive for  fevers and malaise  Gastrointestinal: positive for  melena     Objective     Vital Signs  Temp:  [97.6 °F (36.4 °C)-98.2 °F (36.8 °C)] 97.6 °F (36.4 °C)  Heart Rate:  [102] 102  Resp:  [16] 16  BP: (129-142)/(65-74) 142/65    Physical Exam:  Constitutional:   Alert, cooperative, in no acute distress, appears stated age, mildly ill appearing but not in extremis   Eyes:           Lids and lashes normal, conjunctivae and sclerae normal,   no icterus   Ears, nose, mouth and throat:  Normal appearance of external ears and nose, no oral l  lesions, no thrush, oral mucosa moist   Respiratory:    Clear to auscultation, respirations regular, even and             unlabored    Cardiovascular:   Regular rhythm and normal rate, normal S1 and S2, no        murmur, no gallop, palpable distal pulses, no lower extremity edema   Gastrointestinal:    Soft, obese, nondistended, nontender to palpation, no guarding, no rebound tenderness, normal bowel sounds, no palpable masses or organomegaly  Rectal exam: deferred   Musculoskeletal:  Normal station, no atrophy, no tenderness to palpation, normal digits and nails   Skin:  Pale color, no bleeding, bruising, rashes or lesions   Lymphatics:  No palpable cervical or supraclavicular adenopathy   Psychiatric:  Judgement and insight: normal   Orientation to person, place and time: normal   Mood and affect: normal       Results Review:   I reviewed the patient's new clinical results.    Results from last 7 days   Lab Units 08/02/21  1449 07/29/21  0834   WBC 10*3/mm3 14.19* 11.34*   HEMOGLOBIN g/dL 5.7* 9.5*   HEMATOCRIT % 16.8* 27.6*   PLATELETS 10*3/mm3 203 184       Results from last 7 days   Lab Units 08/02/21  1449 07/29/21  0834   SODIUM mmol/L 132* 133*   POTASSIUM mmol/L 3.5 4.2   CHLORIDE mmol/L 98 98   CO2 mmol/L 21.4* 23.5   BUN mg/dL 14 23*   CREATININE mg/dL 0.77 0.95   CALCIUM mg/dL 8.1* 8.9   BILIRUBIN mg/dL 0.2 0.3   ALK PHOS U/L 73 81   ALT (SGPT) U/L 25 29   AST (SGOT) U/L 21  21   GLUCOSE mg/dL 116 116             Lab Results   Lab Value Date/Time    LIPASE 12 (L) 09/16/2020 1617    LIPASE 8 (L) 01/11/2014 1430       Radiology:  Imaging Results (Last 72 Hours)     ** No results found for the last 72 hours. **          Assessment/Plan       GI bleed      Impression  1. Symptomatic acute on chronic blood loss anemia with melena: in this setting, highly suspicious for bleeding from his esophageal cancer that demonstrated ulceration on the biopsies    2. Anticoagulated: for hx of DVT; last dose of xarelto was this morning    3. Esophageal adenocarcinoma with mets to liver    4. Colovesicular fistula: followed by general surgery    Plan  Transfuse as per admitting  Consult to thoracic surgery as they have performed his endoscopy previously; will defer to their preference on performing his EGD  Start ppi gtt  Hold xarelto  Follow Hb  Monitor for further bleeding      I discussed the patients findings and my recommendations with patient, family and nursing staff    All necessary PPE, including face mask and eye protection, were worn during this encounter.  Hand sanitization was performed both before and after the patient interaction.    Nahomi Villavicencio MD  [facility]      Dictated utilizing Dragon dictation      Electronically signed by Nahomi Villavicencio MD at 08/02/21 1396

## 2021-08-03 NOTE — CASE MANAGEMENT/SOCIAL WORK
Discharge Planning Assessment  [facility]     Patient Name: Han Garcia  MRN: [id number]  Today's Date: 8/3/2021    Admit Date: 8/2/2021    Discharge Needs Assessment     Row Name 08/03/21 1829       Living Environment    Lives With  alone    Current Living Arrangements  home/apartment/condo    Primary Care Provided by  self    Provides Primary Care For  no one    Family Caregiver if Needed  sibling(s)    Family Caregiver Names  Brother and sister    Quality of Family Relationships  involved;helpful;supportive    Able to Return to Prior Arrangements  yes       Resource/Environmental Concerns    Resource/Environmental Concerns  none    Transportation Concerns  car, none       Transition Planning    Patient/Family Anticipates Transition to  home    Patient/Family Anticipated Services at Transition  none    Transportation Anticipated  family or friend will provide       Discharge Needs Assessment    Readmission Within the Last 30 Days  no previous admission in last 30 days    Equipment Currently Used at Home  none    Concerns to be Addressed  denies needs/concerns at this time    Anticipated Changes Related to Illness  none    Equipment Needed After Discharge  none    Provided Post Acute Provider List?  N/A    N/A Provider List Comment  patient denies discharge planning needs at time of screening        Discharge Plan     Row Name 08/03/21 2859       Plan    Plan  Return home via sister. Denies needs. Family can assist    Patient/Family in Agreement with Plan  yes    Plan Comments  CCP met with patient at bedside. introduced self, explained role, and verified face sheet. Patient has not seen Jaiden Hoover for PCP in about 2 years as most of his medical needs are addressed via Dr. Haynes but states he can and would see him if needed. He fills prescriptions at [facility] on [address] Patient lives alone in a home with 2 exterior steps and his bedroom is on the main level. His laundry is in the basement but he states he  does not have trouble with the stairs. Patient drives and is IADL. He has no DME, HH or SNF history. At discharge he plans to return home via his sister. He does not anticipate any discharge planning needs and states his brother and sister live about 10 minutes from him and can help accordingly. Deisi Seals RN/CCP        Continued Care and Services - Admitted Since 8/2/2021    Coordination has not been started for this encounter.         Demographic Summary     Row Name 08/03/21 1825       General Information    Admission Type  inpatient    Arrived From  home    Referral Source  admission list    Reason for Consult  discharge planning    Preferred Language  English        Functional Status     Row Name 08/03/21 1829       Functional Status    Usual Activity Tolerance  good    Current Activity Tolerance  moderate       Functional Status, IADL    Medications  independent    Meal Preparation  independent    Housekeeping  independent    Laundry  independent    Shopping  independent        Psychosocial    No documentation.       Abuse/Neglect    No documentation.       Legal    No documentation.       Substance Abuse    No documentation.       Patient Forms    No documentation.           Nahomi Seals

## 2021-08-03 NOTE — PROGRESS NOTES
Name: Han Garcia ADMIT: 2021   : 1955  PCP: Jaiden Hoover MD    MRN: [id number] LOS: 1 days   AGE/SEX: 65 y.o. male  ROOM: [facility]/     Subjective   Subjective   This is a 65-year-old male with a history of esophageal cancer, liver cancer DVT on Xarelto who presented to the ER with GI bleeding dyspnea on exertion, and dizziness.  Evaluation in the ER showed a hemoglobin of 5.7, he was given 2 units PRBCs.    He had his first dose of Keytruda on .    Review of Systems   Constitutional: Positive for fatigue. Negative for chills and fever.   Respiratory: Positive for shortness of breath. Negative for cough.    Cardiovascular: Negative for chest pain and palpitations.   Gastrointestinal: Positive for blood in stool. Negative for constipation.   Neurological: Positive for dizziness and light-headedness.        Objective   Objective   Vital Signs  Temp:  [97.6 °F (36.4 °C)-98.6 °F (37 °C)] 98 °F (36.7 °C)  Heart Rate:  [] 75  Resp:  [16-20] 18  BP: (102-142)/(56-74) 115/69  SpO2:  [97 %-100 %] 98 %  on   ;   Device (Oxygen Therapy): room air  Body mass index is 30.27 kg/m².  Physical Exam  Constitutional:       Appearance: He is ill-appearing.   HENT:      Head: Normocephalic and atraumatic.   Eyes:      Pupils: Pupils are equal, round, and reactive to light.   Cardiovascular:      Rate and Rhythm: Normal rate and regular rhythm.   Pulmonary:      Effort: Pulmonary effort is normal. No respiratory distress.   Abdominal:      General: There is no distension.      Palpations: Abdomen is soft.      Tenderness: There is no abdominal tenderness.   Skin:     General: Skin is warm and dry.      Coloration: Skin is pale.   Neurological:      Mental Status: He is alert and oriented to person, place, and time.         Results Review     I reviewed the patient's new clinical results.  Results from last 7 days   Lab Units 21  0748 21  1449 21  0834   WBC 10*3/mm3 10.23 14.19* 11.34*    HEMOGLOBIN g/dL 7.3* 5.7* 9.5*   PLATELETS 10*3/mm3 160 203 184     Results from last 7 days   Lab Units 08/03/21  0748 08/02/21  1449 07/29/21  0834   SODIUM mmol/L 134* 132* 133*   POTASSIUM mmol/L 3.6 3.5 4.2   CHLORIDE mmol/L 106 98 98   CO2 mmol/L 20.2* 21.4* 23.5   BUN mg/dL 11 14 23*   CREATININE mg/dL 0.73* 0.77 0.95   GLUCOSE mg/dL 105* 116 116   Estimated Creatinine Clearance: 115.1 mL/min (A) (by C-G formula based on SCr of 0.73 mg/dL (L)).  Results from last 7 days   Lab Units 08/02/21  1449 07/29/21  0834   ALBUMIN g/dL 3.50 3.90   BILIRUBIN mg/dL 0.2 0.3   ALK PHOS U/L 73 81   AST (SGOT) U/L 21 21   ALT (SGPT) U/L 25 29     Results from last 7 days   Lab Units 08/03/21  0748 08/02/21  1449 07/29/21  0834   CALCIUM mg/dL 7.8* 8.1* 8.9   ALBUMIN g/dL  --  3.50 3.90       COVID19   Date Value Ref Range Status   08/02/2021 Not Detected Not Detected - Ref. Range Final   06/17/2021 Not Detected Not Detected - Ref. Range Final     No results found for: HGBA1C, POCGLU    NM Pet Skull Base To Mid Thigh  Narrative:  FDG PET/CT IMAGING SKULL BASE TO MID THIGH      HISTORY: 65-year-old male with history of esophageal carcinoma with  known liver metastases. Restaging.     TECHNIQUE: Blood glucose level at time of injection of FDG was 136  mg/dl.  6.1 mCi of FDG was injected. Approximately 90 minutes later, PET  images were obtained. CT images were acquired for attenuation correction  and anatomic localization.     COMPARISON: CT scan of the chest, abdomen and pelvis dated 03/16/2021.  PET/CT imaging dated 10/27/2020.     FINDINGS: There are 2 adjacent oblong foci of moderately intense  hypermetabolism within the distal esophagus at the GE junction  corresponding to the patient's biopsy-proven esophageal neoplasm that  show significant increase in FDG avidity since the preceding PET/CT  study consistent with enlarging residual neoplasm. These have maximum  measured SUV of up to 11.2 g/mL. Previously there was only  mild  hypermetabolism at the GE junction with maximum measured SUV of 5.2  g/mL. In addition, circumferential wall thickening within the distal  esophagus appears more prominent. No foci of pathologic hypermetabolism  are identified elsewhere within the chest. In particular, there is no  hypermetabolic mediastinal or hilar lymphadenopathy. Lung window images  demonstrate no parenchymal nodules. No foci of pathologic  hypermetabolism are identified within the abdomen or pelvis. Multiple  treated hepatic metastases continue to show no discernible  hypermetabolism. There is no hypermetabolic lymphadenopathy in the  abdomen. There is physiologic distribution of the radiopharmaceutical  within the neck that is unchanged.     Impression: Two adjacent oblong foci of moderately intense  hypermetabolism involving the distal esophagus at the GE junction  showing significant increase in FDG avidity since the most recent PET/CT  study dated 10/27/2020. Soft tissue thickening involving the distal  esophagus is also more prominent. Enlarging residual neoplasm is  suspected. No foci of pathologic hypermetabolism are identified  elsewhere within the neck, chest, abdomen and pelvis. Known extensive  treated hepatic metastatic disease continues to show no significant  hypermetabolism.     This report was finalized on 6/10/2021 8:50 AM by Dr. Han Sheth M.D.       Scheduled Medications  famotidine, 20 mg, Intravenous, Daily  furosemide, 20 mg, Intravenous, Once  gabapentin, 300 mg, Oral, Q12H  iron sucrose, 300 mg, Intravenous, Daily  lactobacillus acidophilus, 1 capsule, Oral, Daily  lisinopril-hydroCHLOROthiazide (ZESTORETIC) 20-12.5 mg combo dose, , Oral, Q24H    Infusions  pantoprazole, 8 mg/hr, Last Rate: 8 mg/hr (08/03/21 1037)  sodium chloride, 75 mL/hr, Last Rate: 75 mL/hr (08/03/21 1322)    Diet  NPO Diet       Assessment/Plan     Active Hospital Problems    Diagnosis  POA   • GI bleed [K92.2]  Yes   • Chronic  anticoagulation [Z79.01]  Not Applicable   • 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) [G62.0, T45.1X5A]  Yes   • Anemia, chronic disease [D63.8]  Yes   • Chemotherapy induced neutropenia (CMS/HCC) [D70.1, T45.1X5A]  Yes   • Acute deep vein thrombosis (DVT) of femoral vein of left lower extremity (CMS/HCC) [I82.412]  Yes   • Acute deep vein thrombosis (DVT) of right popliteal vein (CMS/HCC) [I82.431]  Yes   • Esophagus neoplasm [D49.0]  Yes   • Liver metastasis (CMS/HCC) [C78.7]  Yes   • Essential hypertension [I10]  Yes   • History of prostate cancer [Z85.46]  Not Applicable   • Hyperlipidemia [E78.5]  Yes   • Tobacco abuse [Z72.0]  Yes      Resolved Hospital Problems   No resolved problems to display.       65 y.o. male admitted with <principal problem not specified>.    GI bleed/melena  Acute on chronic anemia  Complains of several bouts of dark stools.  Hemoglobin 7/23/2021 was 11.  Hemoglobin is 5.7 upon presentation ER.  Was transfused 2u PRBCs with a dose of Lasix after.  Has a history of esophageal cancer and EGD is typically performed by thoracic surgery.  GI consulted, awaiting thoracic input  IV iron started    Esophageal cancer with mets to liver  Peripheral neuropathy secondary to chemotherapy  Oncology has been consulted  Has undergone several cycles of chemotherapy  Keytruda was started on 7/29/2021  Continue gabapentin    Colovesical fistula  Use intermittent Cipro when you notice since stool in his urine  Currently reports that his urine is normal.    DVT with Xarelto  Last dose on 8/2 AM    · SCDs for DVT prophylaxis.  · Full code.  · Discussed with patient and nursing staff.        Bulmaro Mcgrath MD  [facility]  08/03/21  13:39 EDT  I wore protective equipment throughout this patient encounter including a face mask, gloves and protective eyewear.  Hand hygiene was performed before donning protective equipment and after removal when leaving the room.

## 2021-08-03 NOTE — CONSULTS
"    .     REASON FOR CONSULTATION:     Provide an opinion on any further workup or treatment metastatic esophageal cancer, post Keytruda therapy on 7/29/2021, with acute anemia on top of chronic anemia due to GI bleeding.                             REQUESTING PHYSICIAN: Cathy Houser MD     RECORDS OBTAINED:  Records of the patient's history including those obtained from the referring provider were reviewed and summarized in detail.    HISTORY OF PRESENT ILLNESS:  The patient is a 65 y.o. year old male who is a patient of ours having metastatic esophageal cancer for which he had disease progression and recently started on immunotherapy with Keytruda on 07/29/2021. This patient also has history of hypertension, hyperlipidemia, DVT associated with malignancy, on Xarelto anticoagulation, who presented yesterday to our clinic because of feeling progressive weakness and melena. He was found having severe anemia with hemoglobin of 5.7. He was admitted to [facility] for further evaluation and management.     The patient was given 2 units PRBC transfusion overnight, and this morning hemoglobin improved at 7.3. The patient reports he continues to have dark colored stool this morning. The patient already has been seen by GI service and currently is waiting for thoracic surgery consultation.    The patient reports no fever, sweating or chills. He does have significant fatigue and lightheadedness however no fainting or syncope. Denies fever, sweating, chills.           Past Medical History:   Diagnosis Date   • Arthritis    • Cancer (CMS/HCC)     prostate cancer 2008   • Cancer (CMS/HCC)     esophageal   • Chronic anticoagulation     on xarelto   • Elevated PSA    • Esophageal mass    • Fistula     colon and bladder   • H/O Lung nodule    • Hepatitis     CHILD--PT STATED \"I THINK IT WAS A.\"   • History of chemotherapy    • History of pneumonia    • Hx of blood clots 08/10/2019   • Hyperlipidemia    • " Hypertension    • Nail fungus    • Neuropathy    • PVD (peripheral vascular disease) (CMS/HCC)    • Recto-bladderneck fistula     on poab for recurrent uti     Past Surgical History:   Procedure Laterality Date   • COLONOSCOPY N/A 2/4/2020    Procedure: COLONOSCOPY;  Surgeon: Guy Sears MD;  Location: [facility] ENDOSCOPY;  Service: General;  Laterality: N/A;  PRE-COLOVESICAL FISTULA  POST-- DIVERTICULOSIS   • CYSTOSCOPY  02/06/2020   • CYSTOSCOPY Bilateral 2/26/2020    Procedure: CYSTOSCOPY RETROGRADE;  Surgeon: Cm Witt MD;  Location: [facility] OR;  Service: Urology;  Laterality: Bilateral;   • ENDOSCOPY     • ENDOSCOPY N/A 6/19/2021    Procedure: ESOPHAGOGASTRODUODENOSCOPY WITH BIOPSY;  Surgeon: Mary Brito MD;  Location: [facility] OR;  Service: Gastroenterology;  Laterality: N/A;   • HERNIA REPAIR Right 1999    inguinal hernia   • PROSTATE SURGERY  03/2008    prostatectectomy   • VENOUS ACCESS DEVICE (PORT) INSERTION N/A 7/16/2019    Procedure: INSERTION VENOUS ACCESS DEVICE WITH FLUORO AND EGD WITH BIOPSY;  Surgeon: Mary Brito MD;  Location: [facility] OR;  Service: Thoracic       MEDICATIONS    Current Facility-Administered Medications:   •  acetaminophen (TYLENOL) tablet 650 mg, 650 mg, Oral, Q4H PRN, Cathy Houser MD  •  gabapentin (NEURONTIN) capsule 300 mg, 300 mg, Oral, Q12H, Cathy Houser MD, 300 mg at 08/03/21 0821  •  lactobacillus acidophilus (RISAQUAD) capsule 1 capsule, 1 capsule, Oral, Daily, Cathy Houser MD, 1 capsule at 08/03/21 0821  •  lisinopril (PRINIVIL,ZESTRIL) 20 mg, hydroCHLOROthiazide (HYDRODIURIL) 12.5 mg for ZESTORETIC 20-12.5, , Oral, Q24H, Cathy Houser MD  •  melatonin tablet 3 mg, 3 mg, Oral, Nightly PRN, Cathy Houser MD  •  nitroglycerin (NITROSTAT) SL tablet 0.4 mg, 0.4 mg, Sublingual, Q5 Min PRN, Cathy Houser MD  •  ondansetron (ZOFRAN) tablet 4 mg, 4 mg, Oral, Q6H PRN **OR**  ondansetron (ZOFRAN) injection 4 mg, 4 mg, Intravenous, Q6H PRN, Cathy Houser MD  •  pantoprazole (PROTONIX) 40 mg in 100mL NS IVPB, 8 mg/hr, Intravenous, Continuous, Nahomi Villavicencio MD, Last Rate: 20 mL/hr at 08/03/21 0436, 8 mg/hr at 08/03/21 0436  •  sodium chloride 0.9 % infusion, 75 mL/hr, Intravenous, Continuous, Cathy Houser MD, Last Rate: 75 mL/hr at 08/02/21 1903, 75 mL/hr at 08/02/21 1903    ALLERGIES:   No Known Allergies    SOCIAL HISTORY:       Social History     Socioeconomic History   • Marital status: Single     Spouse name: Not on file   • Number of children: Not on file   • Years of education: High school   • Highest education level: Not on file   Tobacco Use   • Smoking status: Current Every Day Smoker     Packs/day: 0.50     Years: 38.00     Pack years: 19.00     Types: Cigarettes     Start date: 1974   • Smokeless tobacco: Never Used   • Tobacco comment: PLANNED QUIT DATE 8/2/21; Quit for a period of 8 years. 10-15 per day   Vaping Use   • Vaping Use: Never used   Substance and Sexual Activity   • Alcohol use: Not Currently   • Drug use: Never   • Sexual activity: Defer         FAMILY HISTORY:  Family History   Problem Relation Age of Onset   • Hypertension Mother    • Stroke Mother    • Hypertension Other    • Lung disease Other    • Prostate cancer Other    • Lung cancer Father    • Malig Hyperthermia Neg Hx        REVIEW OF SYSTEMS:  Review of Systems   Constitutional: Positive for activity change, appetite change, fatigue and unexpected weight change. Negative for fever.   HENT: Negative for facial swelling, nosebleeds and sore throat.    Eyes: Negative for visual disturbance.   Respiratory: Positive for shortness of breath. Negative for cough.    Cardiovascular: Negative for chest pain and leg swelling.   Gastrointestinal: Positive for blood in stool (Melena). Negative for abdominal distention, abdominal pain, diarrhea, nausea and vomiting.   Endocrine: Negative for  cold intolerance.   Genitourinary: Negative for dysuria and hematuria.   Musculoskeletal: Negative for arthralgias and joint swelling.   Skin: Positive for pallor. Negative for color change.   Allergic/Immunologic: Positive for immunocompromised state (Metastatic esophageal cancer, had a chemotherapy).   Neurological: Positive for light-headedness. Negative for dizziness, syncope and headaches.   Hematological: Negative for adenopathy.   Psychiatric/Behavioral: Negative for agitation and confusion.              Vitals:    08/03/21 0145 08/03/21 0212 08/03/21 0500 08/03/21 0716   BP: 111/60 102/62 107/57 102/63   BP Location: Right arm Right arm  Left arm   Patient Position: Lying Lying  Lying   Pulse: 84 80 81 93   Resp: 18 18 18 18   Temp: 98.4 °F (36.9 °C) 98.6 °F (37 °C) 98.2 °F (36.8 °C) 98 °F (36.7 °C)   TempSrc: Oral Oral  Oral   SpO2: 98% 98% 97%    Weight:       Height:         Current Status 7/28/2021   ECOG score 1      PHYSICAL EXAM:      CONSTITUTIONAL:  Vital signs reviewed.  Well-developed well-nourished male, looks pale lying in bed.  No distress, looks comfortable.  EYES:  Conjunctivae and lids unremarkable.  PERRLA  EARS,NOSE,MOUTH,THROAT:  Ears and nose appear unremarkable.  Lips appear unremarkable.  RESPIRATORY:  Normal respiratory effort.  Lungs clear to auscultation bilaterally.  CARDIOVASCULAR: Regular rhythm and rate.  Normal S1, S2.  No murmurs rubs or gallops.  No significant lower extremity edema.  GASTROINTESTINAL: Abdomen appears unremarkable.  Nontender.  No hepatomegaly.  No splenomegaly.  LYMPHATIC:  No cervical, supraclavicular, axillary lymphadenopathy.  MUSCULOSKELETAL: Unremarkable digits/nails.  No cyanosis or clubbing.  No calf tenderness.  SKIN:  Warm.  No rashes.  PSYCHIATRIC:  Normal judgment and insight.  Normal mood and affect.      RECENT LABS:  Results from last 7 days   Lab Units 08/03/21  0748 08/02/21  1449 07/29/21  0834   WBC 10*3/mm3 10.23 14.19* 11.34*   HEMOGLOBIN  g/dL 7.3* 5.7* 9.5*   HEMATOCRIT % 22.4* 16.8* 27.6*   PLATELETS 10*3/mm3 160 203 184     Results from last 7 days   Lab Units 08/03/21  0748 08/02/21  1449 07/29/21  0834   SODIUM mmol/L 134* 132* 133*   POTASSIUM mmol/L 3.6 3.5 4.2   CHLORIDE mmol/L 106 98 98   CO2 mmol/L 20.2* 21.4* 23.5   BUN mg/dL 11 14 23*   CREATININE mg/dL 0.73* 0.77 0.95   CALCIUM mg/dL 7.8* 8.1* 8.9   BILIRUBIN mg/dL  --  0.2 0.3   ALK PHOS U/L  --  73 81   ALT (SGPT) U/L  --  25 29   AST (SGOT) U/L  --  21 21   GLUCOSE mg/dL 105* 116 116       Lab Results   Component Value Date    IRON 41 (L) 08/02/2021    TIBC 231 (L) 08/02/2021    FERRITIN 36.30 08/02/2021   Iron saturation 18% on 8/2/2021        Assessment/Plan   *  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 Almost 90% of the liver was replaced by tumor.  · HER-2 positive  · Initially treated with FOLFOX initiated July 2019  · Herceptin added with cycle 2  · Following 8 cycles of FOLFOX, oxaliplatin discontinued with continuation of 5-FU, leucovorin, Herceptin. This was continued through 7/7/2020  · Increasing CEA led to PET scan 6/9/2021.  Disease progression noted with growth of the tumor in the lower esophagus.  Continued stability of hepatic metastasis  · Repeat EGD 6/19/2021 for tissue specimen and Next Generation Sequencing.  · Endoscopy that shows a noncircumferential abnormality in the lower esophagus that encompasses almost 6 cm in length. It is not blocking off the esophagus and that is why the patient has no symptoms. The pathology shows at least atypical cells consistent with cancer.  · NGS showing PD-L1 positive and high mutation burden.  The tumor remains HER-2 positive.  Treatment plan for Keytruda every 3 weeks x 4 cycles followed by repeat imaging  · Keytruda initiated 7/29/2021      * Acute GI bleeding  · Dark tarry stool initially began 7/29/2021  · 7/23/2021, hemoglobin of 11.0.     ·  On 8/2/2021 hemoglobin of 5.7  ·  Xarelto has been  on hold.    · The patient is waiting for thoracic surgery consultation, and I expect that this patient will need to have EGD examination to assess his bleeding situation.   · If indeed he has evidence of bleeding from esophageal cancer, we would consider palliative radiation therapy to stop bleeding.       * Anemia:    · Acute GI bleeding on top of chronic bleeding.   · Received 2 units PRBC transfusion, improved hemoglobin 7.3 on 8/3/2021.  The patient already has improved hemoglobin after 2 units PRBC transfusion however he is still symptomatic with fatigue.   · I recommended to transfuse 2 more units PRBC today. We will give the patient Lasix at least after the 1st unit of PRBC to avoid volume overload.     * Iron deficiency. This clearly is due to his GI bleeding. His laboratory study last night on 08/02/2021 reported ferritin 36.3 ng/mL and iron saturation 18% with free iron 41, TIBC 231. This patient had normal iron study on 05/18/2021 with ferritin 126 and iron saturation 25%. He had super therapeutic folate > 20 ng/mL on that day and normal vitamin B12 of 601 pg/mL. I think this patient also will benefit from intravenous iron therapy with all things considered. Certainly he is not a good candidate for oral iron treatment due to his esophageal cancer with active bleeding, and likely will have more issue with nausea, vomiting and abdomen pain.     *  Thrombophilia secondary malignancy, DVT  · Was on anticoagulated with Xarelto 20 mg daily.  · Currently on hold due to acute GI bleeding.      *  Colovesical fistula.   · He requires intermittent Cipro when he notes stool in his urine  · He is currently without progressive symptoms, reporting his urine is normal in color         PLAN:    · Transfuse 2 more units PRBC today.  · Continue to hold Xarelto anticoagulation.  · Give Lasix 20 mg after first unit of PRBC transfusion today.  · Pending thoracic surgery consultation.  Expecting EGD examination.  · If patient  has indeed bleeding from his esophageal cancer, will request radiation oncology consultation for palliative radiation.  · Arrange intravenous iron treatment with Venofer 300 mg daily for 3 days.   · Monitor CBC per protocol.      Discussed with his treating oncologist Dr. Haynes.      I spoke with thoracic surgery team NIRU Greer.      WILDER INIGUEZ M.D., Ph.D.    8/3/2021

## 2021-08-03 NOTE — PROGRESS NOTES
[facility]  Inpatient Progress Note    Reason for Follow Up: GI bleed    Subjective     Interval History:   H&H improved posttransfusion.  Still notes episodes of diarrhea with black liquid stools.  Awaiting thoracic service input given his history of metastatic esophageal cancer and progression of disease.  Suspect anticoagulant therapy for DVT is also a contributing factor.  Unclear as to what his options will be.    Current Facility-Administered Medications:   •  acetaminophen (TYLENOL) tablet 650 mg, 650 mg, Oral, Q4H PRN, Cathy Houser MD  •  gabapentin (NEURONTIN) capsule 300 mg, 300 mg, Oral, Q12H, Cathy Houser MD, 300 mg at 08/03/21 0821  •  lactobacillus acidophilus (RISAQUAD) capsule 1 capsule, 1 capsule, Oral, Daily, Cathy Houser MD, 1 capsule at 08/03/21 0821  •  lisinopril (PRINIVIL,ZESTRIL) 20 mg, hydroCHLOROthiazide (HYDRODIURIL) 12.5 mg for ZESTORETIC 20-12.5, , Oral, Q24H, Cathy Houser MD  •  melatonin tablet 3 mg, 3 mg, Oral, Nightly PRN, Cathy Houser MD  •  nitroglycerin (NITROSTAT) SL tablet 0.4 mg, 0.4 mg, Sublingual, Q5 Min PRN, Cathy Houser MD  •  ondansetron (ZOFRAN) tablet 4 mg, 4 mg, Oral, Q6H PRN **OR** ondansetron (ZOFRAN) injection 4 mg, 4 mg, Intravenous, Q6H PRN, Cathy Houser MD  •  pantoprazole (PROTONIX) 40 mg in 100mL NS IVPB, 8 mg/hr, Intravenous, Continuous, Nahomi Villavicencio MD, Last Rate: 20 mL/hr at 08/03/21 0436, 8 mg/hr at 08/03/21 0436  •  sodium chloride 0.9 % infusion, 75 mL/hr, Intravenous, Continuous, Cathy Houser MD, Last Rate: 75 mL/hr at 08/02/21 1903, 75 mL/hr at 08/02/21 1903  Review of Systems:    All systems were reviewed and negative except for:  Gastrointestinal: positive for  See HPI    Objective     Vital Signs  Temp:  [97.6 °F (36.4 °C)-98.6 °F (37 °C)] 98 °F (36.7 °C)  Heart Rate:  [] 93  Resp:  [16-20] 18  BP: (102-142)/(57-74) 102/63  Body mass  index is 30.27 kg/m².    Intake/Output Summary (Last 24 hours) at 8/3/2021 0957  Last data filed at 8/3/2021 0902  Gross per 24 hour   Intake 600 ml   Output 550 ml   Net 50 ml     I/O this shift:  In: 0   Out: 550 [Urine:550]     Physical Exam:   General: patient awake, alert and cooperative   Eyes: Normal lids and lashes, no scleral icterus   Neck: supple, normal ROM   Skin: warm and dry, not jaundiced   Cardiovascular: regular rhythm and rate, no murmurs auscultated   Pulm: clear to auscultation bilaterally, regular and unlabored   Abdomen: soft, nontender, nondistended; normal bowel sounds   Extremities: no rash or edema   Psychiatric: Normal mood and behavior; memory intact     Results Review:     I reviewed the patient's new clinical results.    Results from last 7 days   Lab Units 08/03/21  0748 08/02/21  1449 07/29/21  0834   WBC 10*3/mm3 10.23 14.19* 11.34*   HEMOGLOBIN g/dL 7.3* 5.7* 9.5*   HEMATOCRIT % 22.4* 16.8* 27.6*   PLATELETS 10*3/mm3 160 203 184     Results from last 7 days   Lab Units 08/03/21  0748 08/02/21  1449 07/29/21  0834   SODIUM mmol/L 134* 132* 133*   POTASSIUM mmol/L 3.6 3.5 4.2   CHLORIDE mmol/L 106 98 98   CO2 mmol/L 20.2* 21.4* 23.5   BUN mg/dL 11 14 23*   CREATININE mg/dL 0.73* 0.77 0.95   CALCIUM mg/dL 7.8* 8.1* 8.9   BILIRUBIN mg/dL  --  0.2 0.3   ALK PHOS U/L  --  73 81   ALT (SGPT) U/L  --  25 29   AST (SGOT) U/L  --  21 21   GLUCOSE mg/dL 105* 116 116         Lab Results   Lab Value Date/Time    LIPASE 12 (L) 09/16/2020 1617    LIPASE 8 (L) 01/11/2014 1430       Radiology:  No orders to display       Assessment/Plan     Patient Active Problem List   Diagnosis   • Essential hypertension   • Hyperlipidemia   • Impaired fasting glucose   • History of prostate cancer   • Tobacco abuse   • Liver metastasis (CMS/HCC)   • Esophagus neoplasm   • Malignant neoplasm of lower third of esophagus (CMS/HCC)   • Encounter for adjustment or management of vascular access device   • Acute deep  vein thrombosis (DVT) of femoral vein of left lower extremity (CMS/HCC)   • Acute deep vein thrombosis (DVT) of right popliteal vein (CMS/HCC)   • Chemotherapy induced neutropenia (CMS/HCC)   • Anemia, chronic disease   • 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 Acute blood loss anemia with melena  2. Esophageal adenocarcinoma with liver metastases  3. Anticoagulation therapy secondary to DVTs  4. Colovesicular fistula      Plan:  · Continue PPI  · Hold Xarelto  · Monitor H&H  · Await thoracic input regarding options  I discussed the patients findings and my recommendations with patient.    Hang Knapp MD

## 2021-08-03 NOTE — CONSULTS
Inpatient Thoracic Surgery Consult  Consult performed by: Nahomi Carson APRN  Consult ordered by: Nahomi Villavicencio MD  Reason for consult: Adenocarcinoma of the esophagus          Patient Care Team:  Jaiden Hoover MD as PCP - General (Family Medicine)  Amanda Gunderson APRN as Referring Physician (Family Medicine)  Alexey Haynes MD as Consulting Physician (Hematology and Oncology)  Mary Brito MD as Surgeon (Thoracic Surgery)  Anh Garcia MD as Consulting Physician (Radiation Oncology)    Chief Complaint: GI Bleed    Subjective     History of Present Illness     Han Garcia is a 65-year-old gentleman who is known to our service due to a 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  He was initially diagnosed in 2018 and has been undergoing treatment.  He recently presented with hypermetabolism of the distal esophagus requiring an EGD with biopsy performed by Dr. Mary Brito.  Biopsies were indicative of squamocolumnar mucosa with at least high-grade dysplasia and foci suspicious of invasive adenocarcinoma.  Dr. Brito refer the patient back to his oncologist to discuss further treatment options as well as radiation oncology for consideration of radiation to his GE junction where his recurrence is.  Patient was seen in his oncology office on 8-21 and presented with lightheadedness, dizziness and dark stool.  He recently received his initial dose of palliative Keytruda on 7/29/2021.  He is currently on Xarelto 20 mg daily.  Patient reports some upset stomach with some shortness of breath on exertion that recovers with rest.  He has a chronic cough.  He was directly admitted from the CBC office by the hospitalist service secondary to acute GI bleeding.  The patient has undergone transfusion.  Xarelto has been held and his been placed on a PPI drip.  We have been consulted for further evaluation of the GI bleed likely secondary to esophageal malignancy.    Review of  "Systems   Constitutional: Positive for fatigue. Negative for fever.   HENT: Negative for trouble swallowing.    Respiratory: Positive for shortness of breath.    Gastrointestinal: Positive for blood in stool and diarrhea. Negative for vomiting.   Endocrine: Negative.    Musculoskeletal: Negative.    Skin: Positive for pallor.   Allergic/Immunologic: Negative.    Neurological: Positive for weakness.   Hematological: Negative.    Psychiatric/Behavioral: Negative.    All other systems reviewed and are negative.       Patient Active Problem List   Diagnosis   • Essential hypertension   • Hyperlipidemia   • Impaired fasting glucose   • History of prostate cancer   • Tobacco abuse   • Liver metastasis (CMS/HCC)   • Esophagus neoplasm   • Malignant neoplasm of lower third of esophagus (CMS/HCC)   • Encounter for adjustment or management of vascular access device   • Acute deep vein thrombosis (DVT) of femoral vein of left lower extremity (CMS/HCC)   • Acute deep vein thrombosis (DVT) of right popliteal vein (CMS/HCC)   • Chemotherapy induced neutropenia (CMS/HCC)   • Anemia, chronic disease   • 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)   • Chronic anticoagulation   • GI bleed     Past Medical History:   Diagnosis Date   • Arthritis    • Cancer (CMS/HCC)     prostate cancer 2008   • Cancer (CMS/HCC)     esophageal   • Chronic anticoagulation     on xarelto   • Elevated PSA    • Esophageal mass    • Fistula     colon and bladder   • H/O Lung nodule    • Hepatitis     CHILD--PT STATED \"I THINK IT WAS A.\"   • History of chemotherapy    • History of pneumonia    • Hx of blood clots 08/10/2019   • Hyperlipidemia    • Hypertension    • Nail fungus    • Neuropathy    • PVD (peripheral vascular disease) (CMS/HCC)    • Recto-bladderneck fistula     on poab for recurrent uti     Past Surgical History:   Procedure Laterality Date   • COLONOSCOPY N/A 2/4/2020    Procedure: COLONOSCOPY;  Surgeon: Guy Sears MD;  " Location: [facility] ENDOSCOPY;  Service: General;  Laterality: N/A;  PRE-COLOVESICAL FISTULA  POST-- DIVERTICULOSIS   • CYSTOSCOPY  02/06/2020   • CYSTOSCOPY Bilateral 2/26/2020    Procedure: CYSTOSCOPY RETROGRADE;  Surgeon: mC Witt MD;  Location: [facility];  Service: Urology;  Laterality: Bilateral;   • ENDOSCOPY     • ENDOSCOPY N/A 6/19/2021    Procedure: ESOPHAGOGASTRODUODENOSCOPY WITH BIOPSY;  Surgeon: Mary Brito MD;  Location: [facility];  Service: Gastroenterology;  Laterality: N/A;   • HERNIA REPAIR Right 1999    inguinal hernia   • PROSTATE SURGERY  03/2008    prostatectectomy   • VENOUS ACCESS DEVICE (PORT) INSERTION N/A 7/16/2019    Procedure: INSERTION VENOUS ACCESS DEVICE WITH FLUORO AND EGD WITH BIOPSY;  Surgeon: Mary Brito MD;  Location: [facility];  Service: Thoracic     Family History   Problem Relation Age of Onset   • Hypertension Mother    • Stroke Mother    • Hypertension Other    • Lung disease Other    • Prostate cancer Other    • Lung cancer Father    • Malig Hyperthermia Neg Hx      Social History     Socioeconomic History   • Marital status: Single     Spouse name: Not on file   • Number of children: Not on file   • Years of education: High school   • Highest education level: Not on file   Tobacco Use   • Smoking status: Current Every Day Smoker     Packs/day: 0.50     Years: 38.00     Pack years: 19.00     Types: Cigarettes     Start date: 1974   • Smokeless tobacco: Never Used   • Tobacco comment: PLANNED QUIT DATE 8/2/21; Quit for a period of 8 years. 10-15 per day   Vaping Use   • Vaping Use: Never used   Substance and Sexual Activity   • Alcohol use: Not Currently   • Drug use: Never   • Sexual activity: Defer     Medications Prior to Admission   Medication Sig Dispense Refill Last Dose   • acetaminophen (TYLENOL) 500 MG tablet Take 500 mg by mouth Every 6 (Six) Hours As Needed for Mild Pain .      • cevimeline (EVOXAC) 30 MG capsule Take 30 mg by  mouth 3 (Three) Times a Day.      • cilostazol (PLETAL) 50 MG tablet Take 50 mg by mouth 2 (Two) Times a Day.      • ciprofloxacin (CIPRO) 250 MG tablet Take 1 tablet by mouth Daily. 90 tablet 1    • econazole nitrate (SPECTAZOLE) 1 % cream 2 (Two) Times a Day.      • gabapentin (NEURONTIN) 300 MG capsule TAKE 2 CAPSULES BY MOUTH EVERY NIGHT AT BEDTIME (Patient taking differently: Take 300 mg by mouth 2 (two) times a day.) 60 capsule 4    • lisinopril-hydrochlorothiazide (PRINZIDE,ZESTORETIC) 20-12.5 MG per tablet TAKE 1 TABLET BY MOUTH EVERY DAY 90 tablet 0    • nicotine (NICODERM CQ) 21 MG/24HR patch Place 1 patch on the skin as directed by provider Daily. (Patient taking differently: Place 1 patch on the skin as directed by provider Daily. Has at home but hasn't started taking yet) 28 each 1    • ondansetron (ZOFRAN) 4 MG tablet Take 1 tablet by mouth Every 8 (Eight) Hours As Needed for Nausea or Vomiting. 30 tablet 2    • Probiotic Product (PROBIOTIC DAILY PO) Take 1 tablet by mouth Daily.      • Xarelto 20 MG tablet TAKE 1 TABLET BY MOUTH EVERY DAY**STOP LOVENOX** (Patient taking differently: Take 20 mg by mouth Every Morning.) 90 tablet 1      No Known Allergies    Objective      Vital Signs  Temp:  [97.6 °F (36.4 °C)-98.6 °F (37 °C)] 98 °F (36.7 °C)  Heart Rate:  [] 93  Resp:  [16-20] 18  BP: (102-142)/(57-74) 102/63    Intake & Output (last day)       08/02 0701 - 08/03 0700 08/03 0701 - 08/04 0700    P.O.  0    Blood 600     Total Intake(mL/kg) 600 (6.5) 0 (0)    Urine (mL/kg/hr)  550 (1.5)    Total Output  550    Net +600 -550          Urine Unmeasured Occurrence 0 x           Physical Exam  Vitals and nursing note reviewed. Exam conducted with a chaperone present.   Constitutional:       Appearance: Normal appearance. He is ill-appearing.   HENT:      Head: Normocephalic and atraumatic.      Mouth/Throat:      Pharynx: Oropharynx is clear.   Eyes:      General: No scleral icterus.      Conjunctiva/sclera: Conjunctivae normal.   Cardiovascular:      Rate and Rhythm: Normal rate and regular rhythm.      Pulses: Normal pulses.      Heart sounds: Normal heart sounds.   Pulmonary:      Effort: Pulmonary effort is normal.      Breath sounds: Normal breath sounds. No wheezing, rhonchi or rales.   Chest:      Chest wall: No tenderness.   Abdominal:      General: Bowel sounds are normal.      Palpations: Abdomen is soft.   Musculoskeletal:      Cervical back: Neck supple.   Skin:     General: Skin is warm and dry.   Neurological:      General: No focal deficit present.      Mental Status: He is alert and oriented to person, place, and time. Mental status is at baseline.   Psychiatric:         Behavior: Behavior normal.         Thought Content: Thought content normal.         Judgment: Judgment normal.         Results Review:    I reviewed the patient's new clinical results.  I reviewed the patient's new imaging results and agree with the interpretation.  I reviewed the patient's other test results and agree with the interpretation  Discussed with patient, RN and Dr. Brito.    Imaging Results (Last 24 Hours)     ** No results found for the last 24 hours. **          Lab Results:  Lab Results (last 24 hours)     Procedure Component Value Units Date/Time    Basic Metabolic Panel [[id number]]  (Abnormal) Collected: 08/03/21 0748    Specimen: Blood Updated: 08/03/21 0851     Glucose 105 mg/dL      BUN 11 mg/dL      Creatinine 0.73 mg/dL      Sodium 134 mmol/L      Potassium 3.6 mmol/L      Chloride 106 mmol/L      CO2 20.2 mmol/L      Calcium 7.8 mg/dL      eGFR Non African Amer 108 mL/min/1.73      BUN/Creatinine Ratio 15.1     Anion Gap 7.8 mmol/L     Narrative:      GFR Normal >60  Chronic Kidney Disease <60  Kidney Failure <15      CBC (No Diff) [[id number]]  (Abnormal) Collected: 08/03/21 0748    Specimen: Blood Updated: 08/03/21 0832     WBC 10.23 10*3/mm3      RBC 2.45 10*6/mm3      Hemoglobin 7.3 g/dL       Hematocrit 22.4 %      MCV 91.4 fL      MCH 29.8 pg      MCHC 32.6 g/dL      RDW 18.9 %      RDW-SD 62.8 fl      MPV 9.6 fL      Platelets 160 10*3/mm3     COVID PRE-OP / PRE-PROCEDURE SCREENING ORDER (NO ISOLATION) - Swab, Nasopharynx [[id number]]  (Normal) Collected: 08/02/21 1734    Specimen: Swab from Nasopharynx Updated: 08/02/21 2312    Narrative:      The following orders were created for panel order COVID PRE-OP / PRE-PROCEDURE SCREENING ORDER (NO ISOLATION) - Swab, Nasopharynx.  Procedure                               Abnormality         Status                     ---------                               -----------         ------                     COVID-19,APTIMA PANTHER,...[[id number]]  Normal              Final result                 Please view results for these tests on the individual orders.    COVID-19,APTIMA PANTHER,MATT IN-HOUSE, NP/OP SWAB IN UTM/VTM/SALINE TRANSPORT MEDIA,24 HR TAT - Swab, Nasopharynx [[id number]]  (Normal) Collected: 08/02/21 1734    Specimen: Swab from Nasopharynx Updated: 08/02/21 2312     COVID19 Not Detected    Narrative:      Fact sheet for providers: https://www.fda.gov/media/167936/download     Fact sheet for patients: https://www.fda.gov/media/745040/download    Test performed by RT PCR.    Ferritin [[id number]]  (Normal) Collected: 08/02/21 1832    Specimen: Blood Updated: 08/02/21 1957     Ferritin 36.30 ng/mL     Narrative:      Results may be falsely decreased if patient taking Biotin.      Iron Profile [[id number]]  (Abnormal) Collected: 08/02/21 1832    Specimen: Blood Updated: 08/02/21 1954     Iron 41 mcg/dL      Iron Saturation 18 %      Transferrin 155 mg/dL      TIBC 231 mcg/dL               Assessment/Plan       GI bleed      Assessment & Plan    I reviewed the patient's PET scan performed on 6/9/2021.  There is moderately intense hypermetabolism along the distal esophagus at the GE junction.  Some soft tissue thickening involving the distal esophagus is more  prominent than previously.  There is treated known extensive hepatic metastatic disease which shows no significant hypermetabolism.    Stage IV adenocarcinoma of the esophagus with extensive liver metastases: Patient is undergoing palliative Keytruda per medical oncology.  Recent EGD by Dr. Brito demonstrated recurrence at the GE junction.    Acute GI bleed: Secondary to #1.  Transfusion per medical oncology team.  Continue PPI. Patient needs EGD for acute GI bleed. Recommend procedure by gastroenterology as patient may need injections, etc for active bleeding.    I discussed the patients findings and our recommendations with patient, nursing staff and Dr. Brito.    Thank you for this consult and allowing us to participate in the care of your patient.  We will follow along with you during this hospitalization.       NIRU Kruse  [facility]  08/03/21  11:03 EDT    Patient was seen and assessed while wearing personal protective equipment including facemask, protective eyewear and gloves.  Hand hygiene performed prior to entering the room and upon exiting with doffing of gloves.

## 2021-08-03 NOTE — PLAN OF CARE
Goal Outcome Evaluation:  Plan of Care Reviewed With: patient            Pt received 2 units of PRBC's this shift and tolerated well, no acute distress noted, safety maintained, continue plan of care

## 2021-08-03 NOTE — PROGRESS NOTES
[facility] MULTIDISCIPLINARY CLINIC  SMOKING CESSATION FOLLOW UP    Follow-up with patient in hospital today to offer supportive counseling for smoking cessation. Discussion around discharge planning as this may be an opportunity to jump start his quit. He has been offered nicotine patch since admission but has declined. I did offer that wearing nicotine patch prior to discharge may offer him better management of craving associated with triggers when returning home to familiar environment and he will consider. Will continue to follow patient progress through inpatient stay.

## 2021-08-04 LAB
ANION GAP SERPL CALCULATED.3IONS-SCNC: 9.2 MMOL/L (ref 5–15)
BASOPHILS # BLD AUTO: 0.05 10*3/MM3 (ref 0–0.2)
BASOPHILS NFR BLD AUTO: 0.6 % (ref 0–1.5)
BH BB BLOOD EXPIRATION DATE: NORMAL
BH BB BLOOD EXPIRATION DATE: NORMAL
BH BB BLOOD TYPE BARCODE: 5100
BH BB BLOOD TYPE BARCODE: 5100
BH BB DISPENSE STATUS: NORMAL
BH BB DISPENSE STATUS: NORMAL
BH BB PRODUCT CODE: NORMAL
BH BB PRODUCT CODE: NORMAL
BH BB UNIT NUMBER: NORMAL
BH BB UNIT NUMBER: NORMAL
BUN SERPL-MCNC: 12 MG/DL (ref 8–23)
BUN/CREAT SERPL: 15.8 (ref 7–25)
CALCIUM SPEC-SCNC: 8.1 MG/DL (ref 8.6–10.5)
CHLORIDE SERPL-SCNC: 110 MMOL/L (ref 98–107)
CO2 SERPL-SCNC: 19.8 MMOL/L (ref 22–29)
CREAT SERPL-MCNC: 0.76 MG/DL (ref 0.76–1.27)
CROSSMATCH INTERPRETATION: NORMAL
CROSSMATCH INTERPRETATION: NORMAL
DEPRECATED RDW RBC AUTO: 57.1 FL (ref 37–54)
EOSINOPHIL # BLD AUTO: 0.25 10*3/MM3 (ref 0–0.4)
EOSINOPHIL NFR BLD AUTO: 3 % (ref 0.3–6.2)
ERYTHROCYTE [DISTWIDTH] IN BLOOD BY AUTOMATED COUNT: 17.6 % (ref 12.3–15.4)
GFR SERPL CREATININE-BSD FRML MDRD: 103 ML/MIN/1.73
GLUCOSE SERPL-MCNC: 85 MG/DL (ref 65–99)
HCT VFR BLD AUTO: 26.3 % (ref 37.5–51)
HCT VFR BLD AUTO: 26.9 % (ref 37.5–51)
HCT VFR BLD AUTO: 27 % (ref 37.5–51)
HGB BLD-MCNC: 9 G/DL (ref 13–17.7)
HGB BLD-MCNC: 9 G/DL (ref 13–17.7)
HGB BLD-MCNC: 9.1 G/DL (ref 13–17.7)
IMM GRANULOCYTES # BLD AUTO: 0.41 10*3/MM3 (ref 0–0.05)
IMM GRANULOCYTES NFR BLD AUTO: 5 % (ref 0–0.5)
LYMPHOCYTES # BLD AUTO: 1.47 10*3/MM3 (ref 0.7–3.1)
LYMPHOCYTES NFR BLD AUTO: 17.8 % (ref 19.6–45.3)
MCH RBC QN AUTO: 30.7 PG (ref 26.6–33)
MCHC RBC AUTO-ENTMCNC: 34.2 G/DL (ref 31.5–35.7)
MCV RBC AUTO: 89.8 FL (ref 79–97)
MONOCYTES # BLD AUTO: 0.49 10*3/MM3 (ref 0.1–0.9)
MONOCYTES NFR BLD AUTO: 5.9 % (ref 5–12)
NEUTROPHILS NFR BLD AUTO: 5.57 10*3/MM3 (ref 1.7–7)
NEUTROPHILS NFR BLD AUTO: 67.7 % (ref 42.7–76)
NRBC BLD AUTO-RTO: 0.4 /100 WBC (ref 0–0.2)
PLATELET # BLD AUTO: 150 10*3/MM3 (ref 140–450)
PMV BLD AUTO: 9.9 FL (ref 6–12)
POTASSIUM SERPL-SCNC: 3.6 MMOL/L (ref 3.5–5.2)
RBC # BLD AUTO: 2.93 10*6/MM3 (ref 4.14–5.8)
SODIUM SERPL-SCNC: 139 MMOL/L (ref 136–145)
UNIT  ABO: NORMAL
UNIT  ABO: NORMAL
UNIT  RH: NORMAL
UNIT  RH: NORMAL
WBC # BLD AUTO: 8.24 10*3/MM3 (ref 3.4–10.8)

## 2021-08-04 PROCEDURE — 77295 3-D RADIOTHERAPY PLAN: CPT | Performed by: RADIOLOGY

## 2021-08-04 PROCEDURE — 99221 1ST HOSP IP/OBS SF/LOW 40: CPT | Performed by: RADIOLOGY

## 2021-08-04 PROCEDURE — 77263 THER RADIOLOGY TX PLNG CPLX: CPT | Performed by: RADIOLOGY

## 2021-08-04 PROCEDURE — 99232 SBSQ HOSP IP/OBS MODERATE 35: CPT | Performed by: THORACIC SURGERY (CARDIOTHORACIC VASCULAR SURGERY)

## 2021-08-04 PROCEDURE — 25010000002 IRON SUCROSE PER 1 MG: Performed by: INTERNAL MEDICINE

## 2021-08-04 PROCEDURE — 85014 HEMATOCRIT: CPT | Performed by: INTERNAL MEDICINE

## 2021-08-04 PROCEDURE — 85025 COMPLETE CBC W/AUTO DIFF WBC: CPT | Performed by: INTERNAL MEDICINE

## 2021-08-04 PROCEDURE — 99232 SBSQ HOSP IP/OBS MODERATE 35: CPT | Performed by: INTERNAL MEDICINE

## 2021-08-04 PROCEDURE — 85018 HEMOGLOBIN: CPT | Performed by: INTERNAL MEDICINE

## 2021-08-04 PROCEDURE — 80048 BASIC METABOLIC PNL TOTAL CA: CPT | Performed by: INTERNAL MEDICINE

## 2021-08-04 PROCEDURE — 77334 RADIATION TREATMENT AID(S): CPT | Performed by: RADIOLOGY

## 2021-08-04 PROCEDURE — 77300 RADIATION THERAPY DOSE PLAN: CPT | Performed by: RADIOLOGY

## 2021-08-04 PROCEDURE — 77290 THER RAD SIMULAJ FIELD CPLX: CPT | Performed by: RADIOLOGY

## 2021-08-04 RX ADMIN — Medication 1 CAPSULE: at 08:20

## 2021-08-04 RX ADMIN — SODIUM CHLORIDE 75 ML/HR: 9 INJECTION, SOLUTION INTRAVENOUS at 14:44

## 2021-08-04 RX ADMIN — PANTOPRAZOLE SODIUM 8 MG/HR: 40 INJECTION, POWDER, FOR SOLUTION INTRAVENOUS at 22:26

## 2021-08-04 RX ADMIN — GABAPENTIN 300 MG: 300 CAPSULE ORAL at 22:25

## 2021-08-04 RX ADMIN — PANTOPRAZOLE SODIUM 8 MG/HR: 40 INJECTION, POWDER, FOR SOLUTION INTRAVENOUS at 17:16

## 2021-08-04 RX ADMIN — IRON SUCROSE 300 MG: 20 INJECTION, SOLUTION INTRAVENOUS at 08:20

## 2021-08-04 RX ADMIN — PANTOPRAZOLE SODIUM 8 MG/HR: 40 INJECTION, POWDER, FOR SOLUTION INTRAVENOUS at 12:34

## 2021-08-04 RX ADMIN — GABAPENTIN 300 MG: 300 CAPSULE ORAL at 08:20

## 2021-08-04 RX ADMIN — PANTOPRAZOLE SODIUM 8 MG/HR: 40 INJECTION, POWDER, FOR SOLUTION INTRAVENOUS at 04:41

## 2021-08-04 RX ADMIN — FAMOTIDINE 20 MG: 10 INJECTION INTRAVENOUS at 08:20

## 2021-08-04 NOTE — PROGRESS NOTES
LOS: 2 days   Patient Care Team:  Jaiden Hoover MD as PCP - General (Family Medicine)  Amanda Gunderson APRN as Referring Physician (Family Medicine)  Alexey Haynes MD as Consulting Physician (Hematology and Oncology)  Mary Brito MD as Surgeon (Thoracic Surgery)  Anh Garcia MD as Consulting Physician (Radiation Oncology)    Chief Complaint: Esophageal cancer, acute GI bleeding.  Iron deficiency anemia.    Interval History:  Patient was started on IV Venofer 8/3/2021.    8/4/2021  Patient reports tolerating IV iron therapy.  Continues to have dark-colored stool.  Hemoglobin improved 9.0 posttransfusion.    A comprehensive 14 point review of systems was performed and was negative except as mentioned.    Vital Signs:  Temp:  [97.3 °F (36.3 °C)-98.2 °F (36.8 °C)] 97.6 °F (36.4 °C)  Heart Rate:  [64-78] 78  Resp:  [18] 18  BP: ()/(52-73) 121/56    Intake/Output Summary (Last 24 hours) at 8/4/2021 1554  Last data filed at 8/4/2021 1444  Gross per 24 hour   Intake 1470 ml   Output 2050 ml   Net -580 ml       Physical Exam:   General Appearance:    No acute distress, alert and oriented x4   Lungs:     Clear to auscultation bilaterally     Heart:    Regular rhythm and normal rate.  No murmurs, gallops, or       rubs.   Abdomen:     Soft, non-tender, non-distended.    Extremities:   Moves all extremities well.  No clubbing, cyanosis, or edema.     Results Review:     Results from last 7 days   Lab Units 08/04/21  0515 08/03/21  2357 08/03/21  0748 08/02/21  1449 08/02/21  1449   WBC 10*3/mm3 8.24  --  10.23  --  14.19*   HEMOGLOBIN g/dL 9.0* 9.0* 7.3*   < > 5.7*   HEMATOCRIT % 26.3* 26.9* 22.4*   < > 16.8*   PLATELETS 10*3/mm3 150  --  160  --  203    < > = values in this interval not displayed.     Lab Results   Component Value Date    NEUTROABS 5.57 08/04/2021     Results from last 7 days   Lab Units 08/04/21  0515 08/03/21  0748 08/02/21  1449 07/29/21  0834 07/29/21  0834   SODIUM mmol/L  139 134* 132*   < > 133*   POTASSIUM mmol/L 3.6 3.6 3.5   < > 4.2   CHLORIDE mmol/L 110* 106 98   < > 98   CO2 mmol/L 19.8* 20.2* 21.4*   < > 23.5   BUN mg/dL 12 11 14   < > 23*   CREATININE mg/dL 0.76 0.73* 0.77   < > 0.95   CALCIUM mg/dL 8.1* 7.8* 8.1*   < > 8.9   BILIRUBIN mg/dL  --   --  0.2  --  0.3   ALK PHOS U/L  --   --  73  --  81   ALT (SGPT) U/L  --   --  25  --  29   AST (SGOT) U/L  --   --  21  --  21   GLUCOSE mg/dL 85 105* 116   < > 116    < > = values in this interval not displayed.                           I reviewed the patient's new clinical results.        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 Almost 90% of the liver was replaced by tumor.  · HER-2 positive  · Initially treated with FOLFOX initiated July 2019  · Herceptin added with cycle 2  · Following 8 cycles of FOLFOX, oxaliplatin discontinued with continuation of 5-FU, leucovorin, Herceptin. This was continued through 7/7/2020  · Increasing CEA led to PET scan 6/9/2021.  Disease progression noted with growth of the tumor in the lower esophagus.  Continued stability of hepatic metastasis  · Repeat EGD 6/19/2021 for tissue specimen and Next Generation Sequencing.  · Endoscopy that shows a noncircumferential abnormality in the lower esophagus that encompasses almost 6 cm in length. It is not blocking off the esophagus and that is why the patient has no symptoms. The pathology shows at least atypical cells consistent with cancer.  · NGS showing PD-L1 positive and high mutation burden.  The tumor remains HER-2 positive.  Treatment plan for Keytruda every 3 weeks x 4 cycles followed by repeat imaging  · Keytruda initiated 7/29/2021.         * Acute GI bleeding  · Dark tarry stool initially began 7/29/2021  · 7/23/2021, hemoglobin of 11.0.     ·  On 8/2/2021 hemoglobin of 5.7  ·  Xarelto has been on hold.    · The patient is waiting for thoracic surgery consultation, and I expect that this  patient will need to have EGD examination to assess his bleeding situation.   · If indeed he has evidence of bleeding from esophageal cancer, we would consider palliative radiation therapy to stop bleeding.  · Patient reports today 8/4/2021, he continues to have dark-colored stool.  Patient will have EGD examination scheduled on 8/5/2021.  Appreciate GI consult.   · Patient was seen by radiation oncology Dr. Wiseman, who is ready to treat patient once EGD confirms bleeding from the esophagus tumor.      * Anemia:    · Acute GI bleeding on top of chronic bleeding.   · Received 2 units PRBC transfusion, improved hemoglobin 7.3 on 8/3/2021.  The patient already has improved hemoglobin after 2 units PRBC transfusion however he is still symptomatic with fatigue.   · I recommended to transfuse 2 more units PRBC 8/3/2021. We will give the patient Lasix at least after the 1st unit of PRBC to avoid volume overload.   · Posttransfusion hemoglobin 9.0 on 8/3/2021.   · Stable Hb 9.0 on 8/4/2021.     * Iron deficiency. This clearly is due to his GI bleeding. His laboratory study last night on 08/02/2021 reported ferritin 36.3 ng/mL and iron saturation 18% with free iron 41, TIBC 231. This patient had normal iron study on 05/18/2021 with ferritin 126 and iron saturation 25%. He had super therapeutic folate > 20 ng/mL on that day and normal vitamin B12 of 601 pg/mL. I think this patient also will benefit from intravenous iron therapy with all things considered. Certainly he is not a good candidate for oral iron treatment due to his esophageal cancer with active bleeding, and likely will have more issue with nausea, vomiting and abdomen pain.   · Intravenous Venofer was started on 8/3/2021.  Patient has been tolerating well with no reaction.     *  Thrombophilia secondary malignancy, DVT  · Was on anticoagulated with Xarelto 20 mg daily.  · Currently on hold due to acute GI bleeding.   · Discussed with the patient again 8/4/2021,  because he is nervous about recurrent DVT.  Nevertheless because of ongoing GI bleeding, I recommended continue to hold Xarelto and he voiced understanding.     *  Colovesical fistula.   · He requires intermittent Cipro when he notes stool in his urine  · He is currently without progressive symptoms, reporting his urine is normal in color           PLAN:    · Continue to hold Xarelto anticoagulation.  · Patient is scheduled to have EGD examination 8/5/2021 by Dr. Shelby.  · If patient has indeed bleeding from his esophageal cancer, Possible started radiation therapy 8/5/2021.    · Continue intravenous iron treatment with Venofer 300 mg daily for 3 days, today 2/3.   · Monitor CBC per protocol.     Discussed with patient at bedside.  Spoke with nursing staff.     I discussed with radiation oncology Dr. Wiseman about her treatment plan.      Wilberto Pelletier MD PhD  08/04/21  15:54 EDT

## 2021-08-04 NOTE — PLAN OF CARE
Goal Outcome Evaluation:  Plan of Care Reviewed With: patient        Progress: improving  Outcome Summary: VSS. Up ad monisha. Pt went to radition to discuss plan. EGD scheuled tomorrow, consent signed, NPO at midnight. If EGD negative, plan for radiaiton. Safety maintained. Will CTM.

## 2021-08-04 NOTE — CONSULTS
DIAGNOSIS and REASON FOR CONSULTATION: metastatic Esophageal Ca, local recurrence in distal esophagus, now with melena and low hemoglobin -  for advice and recommendations for this diagnosis    Referring Provider:  Cathy Houser MD    HISTORY OF PRESENT ILLNESS:  The patient is a 65 y.o. year old male who has been seeing Dr. Garcia in our group for consideration of local radiation therapy to the esophagus.     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  He was started on palliative chemotherapy with 5FU leucovorin and herceptin with excellent response including complete resolution of liver metastases on PET scan in August 2020.  He did well and continued chemotherapy and recent PET scan on 7/1/21 showed 2 adjacent foci of uptake in distal esophagus at GE junction with significant increase in FDG activity compared to PET from 10/27/20 and increased thickening of distal esophagus and no signficant hypermetabolism in liver or elsewhere to suggest active metastatic disease.       He underwent an EGD with Dr. Brito on 6/19/21 which showed partially circumferential tumor occupying 50% of distal esophagus extending from 34-40cm.  Biopsies were positive for high grade dysplasia with foci suspicious for invasive adenocarcinoma. He was considered then for  possible concurrent 5FU and radiation to the pet positive disease in the esophagus given the good response in the liver; however multidisciplinary discussion leaned toward further systemic therapy and holding radiation until patient became symptomatic.     He started treatment with Keytruda on July 29, 2021 and unfortunately presented to the ER after having dark tarry stools and was found to have a hemoglobin of 5.7 at CBC. He has been seen by Dr. Sanford who feels the GI bleed is due to the esophageal mass recently seen on EGD and biopsied. The patient tells me he will have an EGD  tomorrow and I was asked to see the patient at the request of the referring provider noted above for advice and recommendations regarding this diagnosis and consideration of radiation for bleeding.    Clinically, he is doing well. He denies any significant pain and feels much better post transfusion. He tells me his BM last night was still black and tarry. He denies any abdominal pain nor dysphagia, chest pain.     Objective     Past Medical History: he  has a past medical history of Arthritis, Cancer (CMS/HCC), Cancer (CMS/HCC), Chronic anticoagulation, Elevated PSA, Esophageal mass, Fistula, H/O Lung nodule, Hepatitis, History of chemotherapy, History of pneumonia, blood clots (08/10/2019), Hyperlipidemia, Hypertension, Nail fungus, Neuropathy, PVD (peripheral vascular disease) (CMS/HCC), and Recto-bladderneck fistula.    Past Surgical History:  he has a past surgical history that includes Prostate surgery (03/2008); Venous Access Device (Port) (N/A, 7/16/2019); Colonoscopy (N/A, 2/4/2020); Cystoscopy (02/06/2020); Cystoscopy (Bilateral, 2/26/2020); Hernia repair (Right, 1999); Esophagogastroduodenoscopy; and Esophagogastroduodenoscopy (N/A, 6/19/2021).    Meds:    Current Facility-Administered Medications:   •  acetaminophen (TYLENOL) tablet 650 mg, 650 mg, Oral, Q4H PRN, Cathy Houser MD  •  famotidine (PEPCID) injection 20 mg, 20 mg, Intravenous, Daily, Wilberto Pelletier MD PhD, 20 mg at 08/04/21 0820  •  gabapentin (NEURONTIN) capsule 300 mg, 300 mg, Oral, Q12H, Cathy Houser MD, 300 mg at 08/04/21 0820  •  iron sucrose (VENOFER) 300 mg in sodium chloride 0.9 % 250 mL IVPB, 300 mg, Intravenous, Daily, Wilberto Pelletier MD PhD, 300 mg at 08/04/21 0820  •  lactobacillus acidophilus (RISAQUAD) capsule 1 capsule, 1 capsule, Oral, Daily, Cathy Houser MD, 1 capsule at 08/04/21 0820  •  lisinopril (PRINIVIL,ZESTRIL) 20 mg, hydroCHLOROthiazide (HYDRODIURIL) 12.5 mg for ZESTORETIC 20-12.5, ,  Oral, Q24H, Cathy Houser MD  •  melatonin tablet 3 mg, 3 mg, Oral, Nightly PRN, Cathy Houser MD  •  nitroglycerin (NITROSTAT) SL tablet 0.4 mg, 0.4 mg, Sublingual, Q5 Min PRN, Cathy Houser MD  •  ondansetron (ZOFRAN) tablet 4 mg, 4 mg, Oral, Q6H PRN **OR** ondansetron (ZOFRAN) injection 4 mg, 4 mg, Intravenous, Q6H PRN, Cathy Houser MD  •  pantoprazole (PROTONIX) 40 mg in 100mL NS IVPB, 8 mg/hr, Intravenous, Continuous, Nahomi Villavicencio MD, Last Rate: 20 mL/hr at 08/04/21 0441, 8 mg/hr at 08/04/21 0441  •  sodium chloride 0.9 % infusion, 75 mL/hr, Intravenous, Continuous, Cathy Houser MD, Last Rate: 75 mL/hr at 08/03/21 1322, 75 mL/hr at 08/03/21 1322    Allergies:  No Known Allergies    Family History:  his family history includes Hypertension in his mother and another family member; Lung cancer in his father; Lung disease in an other family member; Prostate cancer in an other family member; Stroke in his mother.    Social History:  he  reports that he has been smoking cigarettes. He started smoking about 47 years ago. He has a 19.00 pack-year smoking history. He has never used smokeless tobacco. He reports previous alcohol use. He reports that he does not use drugs.    Pertinent Findings on   Review of Systems   Constitutional: Positive for fatigue. Negative for appetite change, chills, diaphoresis, fever and unexpected weight change.   HENT:   Negative for hearing loss, lump/mass, mouth sores, nosebleeds, sore throat, tinnitus, trouble swallowing and voice change.    Eyes: Negative for eye problems.   Respiratory: Negative for chest tightness, cough, hemoptysis, shortness of breath and wheezing.    Cardiovascular: Negative for chest pain, leg swelling and palpitations.   Gastrointestinal: Positive for blood in stool. Negative for abdominal distention, abdominal pain, constipation, diarrhea, nausea, rectal pain and vomiting.   Endocrine: Negative for hot flashes.    Genitourinary: Negative for bladder incontinence, difficulty urinating, dysuria, frequency, hematuria, nocturia and pelvic pain.    Musculoskeletal: Negative for arthralgias, back pain, flank pain, gait problem, myalgias, neck pain and neck stiffness.   Skin: Negative for itching and rash.   Neurological: Negative for dizziness, extremity weakness, gait problem, headaches, light-headedness, numbness, seizures and speech difficulty.   Hematological: Negative for adenopathy. Does not bruise/bleed easily.   Psychiatric/Behavioral: Negative for confusion, decreased concentration, depression, sleep disturbance and suicidal ideas. The patient is not nervous/anxious.    :  Subjective     Vitals:    08/03/21 2123 08/03/21 2233 08/04/21 0710 08/04/21 0839   BP: 118/62 94/52 112/65 103/58   BP Location:  Right arm Left arm Right arm   Patient Position:  Lying Lying Lying   Pulse: 64 66 71    Resp: 18 18 18    Temp: 97.3 °F (36.3 °C) 97.7 °F (36.5 °C) 97.6 °F (36.4 °C)    TempSrc:  Oral Oral    SpO2: 99% 99%     Weight:       Height:           Performance Status: (1) Restricted in physically strenuous activity, ambulatory and able to do work of light nature    Pertinent Findings on  Physical Exam  Vitals reviewed.   Constitutional:       Appearance: He is well-developed.   HENT:      Head: Normocephalic and atraumatic.   Pulmonary:      Effort: Pulmonary effort is normal.   Abdominal:      Palpations: Abdomen is soft.   Musculoskeletal:         General: Normal range of motion.      Cervical back: Normal range of motion.   Skin:     General: Skin is warm and dry.   Neurological:      Mental Status: He is alert and oriented to person, place, and time.   Psychiatric:         Behavior: Behavior normal.         Thought Content: Thought content normal.         Judgment: Judgment normal.     :       Assessment: Locally recurrent distal esophageal ca now with active bleeding, Hgb on admit of 5 - EGD pending tomorrow    Plan:   The  patient tells me he will have an EGD tomorrow to evaluate esophagus as site of bleeding. Given his history, that is the most likely site. We talked thru the role of the radiation in stopping the blood loss and he expressed understanding. I discussed with the patient a course of palliative radiation therapy directed at the lower esophagus if confirmed on EGD, hoping to deliver approximately 3000 cGy in 10 treatments initially to the area in hopes of stopping the bleeding.     We discussed the acute side effects of nausea, vomiting, decrease in appetite, significant fatigue, dysphagia and reflux, sore throat and weight loss.  We discussed the long-term possibility of esophageal stricture, the effect of radiation therapy on the lung and heart as well as the expected response of the tumor and the options if no treatment is pursued. All questions were answered.    We will await the findings of the EGD tomorrow if patient remains stable but will go ahead and obtain a treatment planning today so we will be ready to begin treatments as soon as area confirmed.       Objective   I spent greater than 30 mins (10910) on the unit and of that time 20 minutes  in counseling and coordination of care, including my review of imaging and pathology; indications, goals, logistics, alternatives and risks - both common and rare - for my recommendations as well as surveillance and potential outcomes.

## 2021-08-04 NOTE — PLAN OF CARE
Goal Outcome Evaluation:  Plan of Care Reviewed With: patient      No acute distress noted this shift, safety maintained, continue plan of care

## 2021-08-04 NOTE — PROGRESS NOTES
[facility]  Inpatient Progress Note    Reason for Follow Up: Melena    Subjective     Interval History:   His last bowel movement was yesterday about 5 PM and it was black.  His hemoglobin is 9.0 today.  He has no abdominal pain or chest pain.    Current Facility-Administered Medications:   •  acetaminophen (TYLENOL) tablet 650 mg, 650 mg, Oral, Q4H PRN, Cathy Houser MD  •  famotidine (PEPCID) injection 20 mg, 20 mg, Intravenous, Daily, Wilberto Pelletier MD PhD, 20 mg at 08/03/21 1717  •  gabapentin (NEURONTIN) capsule 300 mg, 300 mg, Oral, Q12H, Cathy Houser MD, 300 mg at 08/03/21 2046  •  iron sucrose (VENOFER) 300 mg in sodium chloride 0.9 % 250 mL IVPB, 300 mg, Intravenous, Daily, Wilberto Pelletier MD PhD, 300 mg at 08/03/21 2136  •  lactobacillus acidophilus (RISAQUAD) capsule 1 capsule, 1 capsule, Oral, Daily, Cathy Houser MD, 1 capsule at 08/03/21 0821  •  lisinopril (PRINIVIL,ZESTRIL) 20 mg, hydroCHLOROthiazide (HYDRODIURIL) 12.5 mg for ZESTORETIC 20-12.5, , Oral, Q24H, Cathy Houser MD  •  melatonin tablet 3 mg, 3 mg, Oral, Nightly PRN, Cathy Houser MD  •  nitroglycerin (NITROSTAT) SL tablet 0.4 mg, 0.4 mg, Sublingual, Q5 Min PRN, Cathy Houser MD  •  ondansetron (ZOFRAN) tablet 4 mg, 4 mg, Oral, Q6H PRN **OR** ondansetron (ZOFRAN) injection 4 mg, 4 mg, Intravenous, Q6H PRN, Cathy Houser MD  •  pantoprazole (PROTONIX) 40 mg in 100mL NS IVPB, 8 mg/hr, Intravenous, Continuous, Nahomi Villavicencio MD, Last Rate: 20 mL/hr at 08/04/21 0441, 8 mg/hr at 08/04/21 0441  •  sodium chloride 0.9 % infusion, 75 mL/hr, Intravenous, Continuous, StingCathy velasquez MD, Last Rate: 75 mL/hr at 08/03/21 1322, 75 mL/hr at 08/03/21 1322  Review of Systems:    The following systems were reviewed and negative;  constitution, ENT, respiratory, cardiovascular, genitourinary, musculoskeletal and neurological    Objective     Vital Signs  Temp:   [97.3 °F (36.3 °C)-98.2 °F (36.8 °C)] 97.6 °F (36.4 °C)  Heart Rate:  [64-85] 71  Resp:  [18] 18  BP: ()/(52-73) 112/65  Body mass index is 30.27 kg/m².    Intake/Output Summary (Last 24 hours) at 8/4/2021 0757  Last data filed at 8/4/2021 0710  Gross per 24 hour   Intake 750 ml   Output 2050 ml   Net -1300 ml     I/O this shift:  In: -   Out: 500 [Urine:500]     Physical Exam:   General: patient awake, alert and cooperative   Eyes: Normal lids and lashes, no scleral icterus   Neck: supple, normal ROM   Skin: warm and dry, not jaundiced   Cardiovascular: regular rhythm and rate, no murmurs auscultated   Pulm: clear to auscultation bilaterally, regular and unlabored   Abdomen: soft, nontender, nondistended; normal bowel sounds   Extremities: no rash or edema   Psychiatric: Normal mood and behavior; memory intact     Results Review:     I reviewed the patient's new clinical results.    Results from last 7 days   Lab Units 08/04/21  0515 08/03/21  2357 08/03/21  0748 08/02/21  1449 08/02/21  1449   WBC 10*3/mm3 8.24  --  10.23  --  14.19*   HEMOGLOBIN g/dL 9.0* 9.0* 7.3*   < > 5.7*   HEMATOCRIT % 26.3* 26.9* 22.4*   < > 16.8*   PLATELETS 10*3/mm3 150  --  160  --  203    < > = values in this interval not displayed.     Results from last 7 days   Lab Units 08/04/21  0515 08/03/21  0748 08/02/21  1449 07/29/21  0834 07/29/21  0834   SODIUM mmol/L 139 134* 132*   < > 133*   POTASSIUM mmol/L 3.6 3.6 3.5   < > 4.2   CHLORIDE mmol/L 110* 106 98   < > 98   CO2 mmol/L 19.8* 20.2* 21.4*   < > 23.5   BUN mg/dL 12 11 14   < > 23*   CREATININE mg/dL 0.76 0.73* 0.77   < > 0.95   CALCIUM mg/dL 8.1* 7.8* 8.1*   < > 8.9   BILIRUBIN mg/dL  --   --  0.2  --  0.3   ALK PHOS U/L  --   --  73  --  81   ALT (SGPT) U/L  --   --  25  --  29   AST (SGOT) U/L  --   --  21  --  21   GLUCOSE mg/dL 85 105* 116   < > 116    < > = values in this interval not displayed.         Lab Results   Lab Value Date/Time    LIPASE 12 (L) 09/16/2020 9346     LIPASE 8 (L) 01/11/2014 1430       Radiology:  No orders to display       Assessment/Plan     Patient Active Problem List   Diagnosis   • Essential hypertension   • Hyperlipidemia   • Impaired fasting glucose   • History of prostate cancer   • Tobacco abuse   • Liver metastasis (CMS/HCC)   • Esophagus neoplasm   • Malignant neoplasm of lower third of esophagus (CMS/HCC)   • Encounter for adjustment or management of vascular access device   • Acute deep vein thrombosis (DVT) of femoral vein of left lower extremity (CMS/HCC)   • Acute deep vein thrombosis (DVT) of right popliteal vein (CMS/HCC)   • Chemotherapy induced neutropenia (CMS/HCC)   • Anemia, chronic disease   • 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 Acute blood loss anemia with melena  2. Esophageal adenocarcinoma with liver metastases  3. Anticoagulation therapy secondary to DVTs.  His last dose of Xarelto was the morning of August 2, 2021.  4. Colovesicular fistula     Plan:  · Continue PPI  · Hold Xarelto  · Monitor H&H  · If Dr. Brito does not plan to do an EGD certainly GI will be happy to to help define the cause of his melena.  I will assume that the melena is from his esophageal cancer in the setting of being on Xarelto?    ADDENDUM - after discussion with Thoracic Surgery I will do an EGD tomorrow to better define where the GI bleeding is originating from.     I discussed the patients findings and my recommendations with patient and nursing staff.    Naga Shelby MD

## 2021-08-04 NOTE — PROGRESS NOTES
[facility]     LOS: 2 days     Subjective:    CC:No chief complaint on file.    DIET:  Diet Order   Procedures   • Diet Regular; GI Soft   still with dark stool this am  no cp  No soa  Tolerating oral intake    Objective:    Vital Signs:  Temp:  [97.3 °F (36.3 °C)-98.2 °F (36.8 °C)] 97.6 °F (36.4 °C)  Heart Rate:  [64-85] 71  Resp:  [18] 18  BP: ()/(52-73) 103/58    SpO2:  [98 %-99 %] 99 %  on   ;   Device (Oxygen Therapy): room air  Body mass index is 30.27 kg/m².    Physical Exam  Constitutional:       Appearance: Normal appearance.   HENT:      Head: Normocephalic and atraumatic.   Cardiovascular:      Rate and Rhythm: Normal rate and regular rhythm.      Heart sounds: No murmur heard.   No friction rub.   Pulmonary:      Effort: Pulmonary effort is normal.      Breath sounds: Normal breath sounds.   Abdominal:      General: Bowel sounds are normal. There is no distension.      Palpations: Abdomen is soft.      Tenderness: There is no abdominal tenderness.   Skin:     General: Skin is warm and dry.   Neurological:      Mental Status: He is alert.   Psychiatric:         Mood and Affect: Mood normal.         Behavior: Behavior normal.         Results Review:    Glucose   Date Value Ref Range Status   08/04/2021 85 65 - 99 mg/dL Final   08/03/2021 105 (H) 65 - 99 mg/dL Final   08/02/2021 116 74 - 124 mg/dL Final     Results from last 7 days   Lab Units 08/04/21  0515   WBC 10*3/mm3 8.24   HEMOGLOBIN g/dL 9.0*   HEMATOCRIT % 26.3*   PLATELETS 10*3/mm3 150     Results from last 7 days   Lab Units 08/04/21  0515 08/03/21  0748 08/02/21  1449   SODIUM mmol/L 139   < > 132*   POTASSIUM mmol/L 3.6   < > 3.5   CHLORIDE mmol/L 110*   < > 98   CO2 mmol/L 19.8*   < > 21.4*   BUN mg/dL 12   < > 14   CREATININE mg/dL 0.76   < > 0.77   CALCIUM mg/dL 8.1*   < > 8.1*   BILIRUBIN mg/dL  --   --  0.2   ALK PHOS U/L  --   --  73   ALT (SGPT) U/L  --   --  25   AST (SGOT) U/L  --   --  21   GLUCOSE  mg/dL 85   < > 116    < > = values in this interval not displayed.                 Cultures:  No results found for: BLOODCX, URINECX, WOUNDCX, MRSACX, RESPCX, STOOLCX    I have reviewed daily medications and changes in CPOE    Scheduled meds  famotidine, 20 mg, Intravenous, Daily  gabapentin, 300 mg, Oral, Q12H  iron sucrose, 300 mg, Intravenous, Daily  lactobacillus acidophilus, 1 capsule, Oral, Daily  lisinopril-hydroCHLOROthiazide (ZESTORETIC) 20-12.5 mg combo dose, , Oral, Q24H        pantoprazole, 8 mg/hr, Last Rate: 8 mg/hr (08/04/21 0441)  sodium chloride, 75 mL/hr, Last Rate: 75 mL/hr (08/03/21 1322)      PRN meds  •  acetaminophen  •  melatonin  •  nitroglycerin  •  ondansetron **OR** ondansetron        Essential hypertension    Hyperlipidemia    History of prostate cancer    Tobacco abuse    Liver metastasis (CMS/HCC)    Esophagus neoplasm    Acute deep vein thrombosis (DVT) of femoral vein of left lower extremity (CMS/HCC)    Acute deep vein thrombosis (DVT) of right popliteal vein (CMS/HCC)    Chemotherapy induced neutropenia (CMS/HCC)    Anemia, chronic disease    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 male admitted with <principal problem not specified>.     GI bleed/melena  Acute on chronic anemia  Complains of several bouts of dark stools.  Hemoglobin 7/23/2021 was 11.  Hemoglobin is 5.7 on admission. Was transfused 2u PRBCs with a dose of Lasix after.  GI consulted and planning on EGD tomorrow  IV iron started     Esophageal cancer with mets to liver  Peripheral neuropathy secondary to chemotherapy  Oncology has seen  Has undergone several cycles of chemotherapy  Keytruda was started on 7/29/2021  Continue gabapentin     Colovesical fistula  Use intermittent Cipro when you notice since stool in his urine  Currently reports that his urine is normal.     DVT with Xarelto approximately 2 years  Last dose on 8/2  AM     · SCDs for DVT prophylaxis.  · Full code.  · Discussed with patient and nursing staff.        Darren Hansen MD  08/04/21  09:00 EDT

## 2021-08-05 ENCOUNTER — DOCUMENTATION (OUTPATIENT)
Dept: OTHER | Facility: HOSPITAL | Age: 66
End: 2021-08-05

## 2021-08-05 ENCOUNTER — ANESTHESIA (OUTPATIENT)
Dept: GASTROENTEROLOGY | Facility: HOSPITAL | Age: 66
End: 2021-08-05

## 2021-08-05 ENCOUNTER — ANESTHESIA EVENT (OUTPATIENT)
Dept: GASTROENTEROLOGY | Facility: HOSPITAL | Age: 66
End: 2021-08-05

## 2021-08-05 LAB
APTT PPP: 36.9 SECONDS (ref 22.7–35.4)
BASOPHILS # BLD AUTO: 0.04 10*3/MM3 (ref 0–0.2)
BASOPHILS NFR BLD AUTO: 0.5 % (ref 0–1.5)
DEPRECATED RDW RBC AUTO: 56.6 FL (ref 37–54)
EOSINOPHIL # BLD AUTO: 0.35 10*3/MM3 (ref 0–0.4)
EOSINOPHIL NFR BLD AUTO: 4.1 % (ref 0.3–6.2)
ERYTHROCYTE [DISTWIDTH] IN BLOOD BY AUTOMATED COUNT: 17.3 % (ref 12.3–15.4)
HCT VFR BLD AUTO: 25.9 % (ref 37.5–51)
HCT VFR BLD AUTO: 27.1 % (ref 37.5–51)
HCT VFR BLD AUTO: 29.6 % (ref 37.5–51)
HGB BLD-MCNC: 8.7 G/DL (ref 13–17.7)
HGB BLD-MCNC: 8.9 G/DL (ref 13–17.7)
HGB BLD-MCNC: 9.8 G/DL (ref 13–17.7)
IMM GRANULOCYTES # BLD AUTO: 0.37 10*3/MM3 (ref 0–0.05)
IMM GRANULOCYTES NFR BLD AUTO: 4.4 % (ref 0–0.5)
INR PPP: 1.27 (ref 0.9–1.1)
LYMPHOCYTES # BLD AUTO: 1.17 10*3/MM3 (ref 0.7–3.1)
LYMPHOCYTES NFR BLD AUTO: 13.8 % (ref 19.6–45.3)
MAGNESIUM SERPL-MCNC: 1.8 MG/DL (ref 1.6–2.4)
MCH RBC QN AUTO: 29.8 PG (ref 26.6–33)
MCHC RBC AUTO-ENTMCNC: 32.8 G/DL (ref 31.5–35.7)
MCV RBC AUTO: 90.6 FL (ref 79–97)
MONOCYTES # BLD AUTO: 0.61 10*3/MM3 (ref 0.1–0.9)
MONOCYTES NFR BLD AUTO: 7.2 % (ref 5–12)
NEUTROPHILS NFR BLD AUTO: 5.92 10*3/MM3 (ref 1.7–7)
NEUTROPHILS NFR BLD AUTO: 70 % (ref 42.7–76)
NRBC BLD AUTO-RTO: 0.4 /100 WBC (ref 0–0.2)
PLATELET # BLD AUTO: 150 10*3/MM3 (ref 140–450)
PMV BLD AUTO: 9.7 FL (ref 6–12)
POTASSIUM SERPL-SCNC: 3.4 MMOL/L (ref 3.5–5.2)
PROTHROMBIN TIME: 15.7 SECONDS (ref 11.7–14.2)
RBC # BLD AUTO: 2.99 10*6/MM3 (ref 4.14–5.8)
WBC # BLD AUTO: 8.46 10*3/MM3 (ref 3.4–10.8)

## 2021-08-05 PROCEDURE — 36430 TRANSFUSION BLD/BLD COMPNT: CPT

## 2021-08-05 PROCEDURE — 43255 EGD CONTROL BLEEDING ANY: CPT | Performed by: INTERNAL MEDICINE

## 2021-08-05 PROCEDURE — 99232 SBSQ HOSP IP/OBS MODERATE 35: CPT | Performed by: INTERNAL MEDICINE

## 2021-08-05 PROCEDURE — 85730 THROMBOPLASTIN TIME PARTIAL: CPT | Performed by: INTERNAL MEDICINE

## 2021-08-05 PROCEDURE — 77427 RADIATION TX MANAGEMENT X5: CPT | Performed by: RADIOLOGY

## 2021-08-05 PROCEDURE — 85018 HEMOGLOBIN: CPT | Performed by: INTERNAL MEDICINE

## 2021-08-05 PROCEDURE — 84132 ASSAY OF SERUM POTASSIUM: CPT | Performed by: HOSPITALIST

## 2021-08-05 PROCEDURE — 85610 PROTHROMBIN TIME: CPT | Performed by: INTERNAL MEDICINE

## 2021-08-05 PROCEDURE — 77412 RADIATION TX DELIVERY LVL 3: CPT | Performed by: RADIOLOGY

## 2021-08-05 PROCEDURE — 63710000001 DIPHENHYDRAMINE PER 50 MG: Performed by: INTERNAL MEDICINE

## 2021-08-05 PROCEDURE — 86923 COMPATIBILITY TEST ELECTRIC: CPT

## 2021-08-05 PROCEDURE — 85014 HEMATOCRIT: CPT | Performed by: INTERNAL MEDICINE

## 2021-08-05 PROCEDURE — 25010000002 PROPOFOL 10 MG/ML EMULSION: Performed by: REGISTERED NURSE

## 2021-08-05 PROCEDURE — 86900 BLOOD TYPING SEROLOGIC ABO: CPT

## 2021-08-05 PROCEDURE — 25010000002 IRON SUCROSE PER 1 MG: Performed by: INTERNAL MEDICINE

## 2021-08-05 PROCEDURE — 85025 COMPLETE CBC W/AUTO DIFF WBC: CPT | Performed by: INTERNAL MEDICINE

## 2021-08-05 PROCEDURE — 83735 ASSAY OF MAGNESIUM: CPT | Performed by: HOSPITALIST

## 2021-08-05 PROCEDURE — XW0G886 INTRODUCTION OF MINERAL-BASED TOPICAL HEMOSTATIC AGENT INTO UPPER GI, VIA NATURAL OR ARTIFICIAL OPENING ENDOSCOPIC, NEW TECHNOLOGY GROUP 6: ICD-10-PCS | Performed by: INTERNAL MEDICINE

## 2021-08-05 PROCEDURE — 77280 THER RAD SIMULAJ FIELD SMPL: CPT | Performed by: RADIOLOGY

## 2021-08-05 PROCEDURE — P9016 RBC LEUKOCYTES REDUCED: HCPCS

## 2021-08-05 RX ORDER — ACETAMINOPHEN 325 MG/1
650 TABLET ORAL ONCE
Status: COMPLETED | OUTPATIENT
Start: 2021-08-05 | End: 2021-08-05

## 2021-08-05 RX ORDER — LIDOCAINE HYDROCHLORIDE 20 MG/ML
INJECTION, SOLUTION INFILTRATION; PERINEURAL AS NEEDED
Status: DISCONTINUED | OUTPATIENT
Start: 2021-08-05 | End: 2021-08-05 | Stop reason: SURG

## 2021-08-05 RX ORDER — SODIUM CHLORIDE 9 MG/ML
1000 INJECTION, SOLUTION INTRAVENOUS CONTINUOUS
Status: DISCONTINUED | OUTPATIENT
Start: 2021-08-05 | End: 2021-08-06

## 2021-08-05 RX ORDER — FUROSEMIDE 10 MG/ML
20 INJECTION INTRAMUSCULAR; INTRAVENOUS ONCE
Status: COMPLETED | OUTPATIENT
Start: 2021-08-05 | End: 2021-08-06

## 2021-08-05 RX ORDER — POTASSIUM CHLORIDE 750 MG/1
40 TABLET, FILM COATED, EXTENDED RELEASE ORAL AS NEEDED
Status: DISCONTINUED | OUTPATIENT
Start: 2021-08-05 | End: 2021-08-07 | Stop reason: HOSPADM

## 2021-08-05 RX ORDER — PROPOFOL 10 MG/ML
VIAL (ML) INTRAVENOUS AS NEEDED
Status: DISCONTINUED | OUTPATIENT
Start: 2021-08-05 | End: 2021-08-05 | Stop reason: SURG

## 2021-08-05 RX ORDER — POTASSIUM CHLORIDE 1.5 G/1.77G
40 POWDER, FOR SOLUTION ORAL AS NEEDED
Status: DISCONTINUED | OUTPATIENT
Start: 2021-08-05 | End: 2021-08-07 | Stop reason: HOSPADM

## 2021-08-05 RX ORDER — DIPHENHYDRAMINE HCL 25 MG
25 CAPSULE ORAL ONCE
Status: COMPLETED | OUTPATIENT
Start: 2021-08-05 | End: 2021-08-05

## 2021-08-05 RX ADMIN — PANTOPRAZOLE SODIUM 8 MG/HR: 40 INJECTION, POWDER, FOR SOLUTION INTRAVENOUS at 21:43

## 2021-08-05 RX ADMIN — Medication 1 CAPSULE: at 11:51

## 2021-08-05 RX ADMIN — FAMOTIDINE 20 MG: 10 INJECTION INTRAVENOUS at 08:03

## 2021-08-05 RX ADMIN — GABAPENTIN 300 MG: 300 CAPSULE ORAL at 21:03

## 2021-08-05 RX ADMIN — PROPOFOL 180 MCG/KG/MIN: 10 INJECTION, EMULSION INTRAVENOUS at 10:00

## 2021-08-05 RX ADMIN — DIPHENHYDRAMINE HYDROCHLORIDE 25 MG: 25 CAPSULE ORAL at 15:43

## 2021-08-05 RX ADMIN — PROPOFOL 100 MG: 10 INJECTION, EMULSION INTRAVENOUS at 10:00

## 2021-08-05 RX ADMIN — IRON SUCROSE 300 MG: 20 INJECTION, SOLUTION INTRAVENOUS at 11:52

## 2021-08-05 RX ADMIN — LIDOCAINE HYDROCHLORIDE 60 MG: 20 INJECTION, SOLUTION INFILTRATION; PERINEURAL at 10:00

## 2021-08-05 RX ADMIN — SODIUM CHLORIDE 75 ML/HR: 9 INJECTION, SOLUTION INTRAVENOUS at 03:49

## 2021-08-05 RX ADMIN — PANTOPRAZOLE SODIUM 8 MG/HR: 40 INJECTION, POWDER, FOR SOLUTION INTRAVENOUS at 03:48

## 2021-08-05 RX ADMIN — GABAPENTIN 300 MG: 300 CAPSULE ORAL at 11:51

## 2021-08-05 RX ADMIN — POTASSIUM CHLORIDE 40 MEQ: 750 TABLET, EXTENDED RELEASE ORAL at 19:10

## 2021-08-05 RX ADMIN — ACETAMINOPHEN 650 MG: 325 TABLET, FILM COATED ORAL at 15:43

## 2021-08-05 RX ADMIN — SODIUM CHLORIDE 75 ML/HR: 9 INJECTION, SOLUTION INTRAVENOUS at 09:24

## 2021-08-05 NOTE — PROGRESS NOTES
[facility] MULTIDISCIPLINARY CLINIC  SMOKING CESSATION FOLLOW UP     Follow-up with patient in hospital today to offer supportive counseling for smoking cessation. His sister  is present, and patient's RN. Again, discussion around discharge planning as this may be an opportunity to jump start his quit. He is approaching 72 hours without a cigarettes - last cigarette was morning of 8/2/2021. Offered my recommendation again for applying nicotine patch prior to discharge to offer him better management of craving associated with triggers when returning home to familiar environment and he will consider.     He did begin a course of palliative radiation today to the esophageal mass, which was identified as the source of bleeding per EGD with GI this morning.      Will continue to follow patient progress through inpatient stay.

## 2021-08-05 NOTE — CASE MANAGEMENT/SOCIAL WORK
Continued Stay Note  [facility]     Patient Name: Han Garcia  MRN: [id number]  Today's Date: 8/5/2021    Admit Date: 8/2/2021    Discharge Plan     Row Name 08/05/21 1506       Plan    Plan  Return home with family suppport.    Plan Comments  Met with patient at bedside. First round of radiation today. Anticipating discharge on Saturday. Family to transport. Agreeable to Meds to Beds. Will continue to monitor for new or changing discharge needs.        Discharge Codes    No documentation.       Expected Discharge Date and Time     Expected Discharge Date Expected Discharge Time    Aug 7, 2021             Tiffany Moore RN

## 2021-08-05 NOTE — PROGRESS NOTES
EGD findings reviewed - distal esophagus site of bleeding as suspected. Started palliative treatments as planned - will continue tomorrow.

## 2021-08-05 NOTE — PROGRESS NOTES
[facility]     LOS: 3 days     Subjective:    CC:No chief complaint on file.    DIET:  Diet Order   Procedures   • Diet Regular; GI Soft   still with dark stool again this am  Patient denies any chest pain or shortness of air  No nausea vomiting  Tolerating oral intake    Objective:    Vital Signs:  Temp:  [97.2 °F (36.2 °C)-97.9 °F (36.6 °C)] 97.2 °F (36.2 °C)  Heart Rate:  [64-80] 69  Resp:  [17-20] 20  BP: (102-133)/(60-76) 121/67    SpO2:  [97 %-100 %] 99 %  on  Flow (L/min):  [4] 4;   Device (Oxygen Therapy): room air  Body mass index is 30.27 kg/m².    Physical Exam  Constitutional:       Appearance: Normal appearance.   HENT:      Head: Normocephalic and atraumatic.   Cardiovascular:      Rate and Rhythm: Normal rate and regular rhythm.      Heart sounds: No murmur heard.   No friction rub.   Pulmonary:      Effort: Pulmonary effort is normal.      Breath sounds: Normal breath sounds.   Abdominal:      General: Bowel sounds are normal. There is no distension.      Palpations: Abdomen is soft.      Tenderness: There is no abdominal tenderness.   Skin:     General: Skin is warm and dry.   Neurological:      Mental Status: He is alert.   Psychiatric:         Mood and Affect: Mood normal.         Behavior: Behavior normal.         Results Review:    Glucose   Date Value Ref Range Status   08/04/2021 85 65 - 99 mg/dL Final   08/03/2021 105 (H) 65 - 99 mg/dL Final     Results from last 7 days   Lab Units 08/05/21  1518 08/05/21  0458 08/05/21  0458   WBC 10*3/mm3  --   --  8.46   HEMOGLOBIN g/dL 9.8*   < > 8.9*   HEMATOCRIT % 29.6*   < > 27.1*   PLATELETS 10*3/mm3  --   --  150    < > = values in this interval not displayed.     Results from last 7 days   Lab Units 08/04/21  0515 08/03/21  0748 08/02/21  1449   SODIUM mmol/L 139   < > 132*   POTASSIUM mmol/L 3.6   < > 3.5   CHLORIDE mmol/L 110*   < > 98   CO2 mmol/L 19.8*   < > 21.4*   BUN mg/dL 12   < > 14   CREATININE mg/dL 0.76   <  > 0.77   CALCIUM mg/dL 8.1*   < > 8.1*   BILIRUBIN mg/dL  --   --  0.2   ALK PHOS U/L  --   --  73   ALT (SGPT) U/L  --   --  25   AST (SGOT) U/L  --   --  21   GLUCOSE mg/dL 85   < > 116    < > = values in this interval not displayed.     Results from last 7 days   Lab Units 08/05/21  0458   INR  1.27*   APTT seconds 36.9*     Results from last 7 days   Lab Units 08/05/21  1518   MAGNESIUM mg/dL 1.8         Cultures:  No results found for: BLOODCX, URINECX, WOUNDCX, MRSACX, RESPCX, STOOLCX    I have reviewed daily medications and changes in CPOE    Scheduled meds  furosemide, 20 mg, Intravenous, Once  gabapentin, 300 mg, Oral, Q12H  lactobacillus acidophilus, 1 capsule, Oral, Daily  lisinopril-hydroCHLOROthiazide (ZESTORETIC) 20-12.5 mg combo dose, , Oral, Q24H        pantoprazole, 8 mg/hr, Last Rate: 8 mg/hr (08/05/21 0348)  sodium chloride, 1,000 mL  sodium chloride, 75 mL/hr, Last Rate: 75 mL/hr (08/05/21 0953)      PRN meds  •  acetaminophen  •  melatonin  •  nitroglycerin  •  ondansetron **OR** ondansetron        Essential hypertension    Hyperlipidemia    History of prostate cancer    Tobacco abuse    Liver metastasis (CMS/HCC)    Esophagus neoplasm    Acute deep vein thrombosis (DVT) of femoral vein of left lower extremity (CMS/HCC)    Acute deep vein thrombosis (DVT) of right popliteal vein (CMS/HCC)    Chemotherapy induced neutropenia (CMS/HCC)    Anemia, chronic disease    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 male admitted with a history of esophageal cancer and melanotic stools on admission who underwent EGD which showed hemorrhage of esophageal tumor for which patient had hemostatic spray x5.     GI bleed/melena  Acute on chronic anemia  Still with dark bowel movement this a.m. which is to be expected  Hemoglobin 7/23/2021 was 11.  Hemoglobin is 5.7 on admission. Was transfused 2u PRBCs with a dose of Lasix after.  GI  and thoracic surgery, GI performed EGD yesterday as noted above  IV iron infusions given     Esophageal cancer with mets to liver  Peripheral neuropathy secondary to chemotherapy  Oncology has seen and would like to monitor patient until Saturday  Has undergone several cycles of chemotherapy  Keytruda was started on 7/29/2021  Continue gabapentin  Patient started on radiation therapy today     Colovesical fistula  The patient uses intermittent Cipro when you notice since stool in his urine  Currently reports that his urine is normal.     DVT and has been on Xarelto approximately 2 years  Last dose on 8/2 AM     · SCDs for DVT prophylaxis.  · Full code.  · Discussed with patient and nursing staff.          Darren Hansen MD  08/05/21  16:10 EDT

## 2021-08-05 NOTE — PLAN OF CARE
Goal Outcome Evaluation:  Plan of Care Reviewed With: patient        Progress: improving  Outcome Summary: VSS. Up ad monisha. EGD negative. Radiation today to stop tumor bleeding with hemostatic spray. Pt recieving blood and K+ replacement protocol. Radiation scheduled tomorrow. Will continue to monitor patient and monitor labs. Safety maintained.     Correction: EGD negative for bleeding stomach ulcers. Esophageal tumor found to be bleeding. 6 beat run of V-tach. Radiation not scheduled for tomorrow.

## 2021-08-05 NOTE — ANESTHESIA POSTPROCEDURE EVALUATION
"Patient: Han Garcia    Procedure Summary     Date: 08/05/21 Room / Location:  [facility]    Anesthesia Start: 0953 Anesthesia Stop: 1027    Procedure: ESOPHAGOGASTRODUODENOSCOPY HEMOSPRAY (N/A Esophagus) Diagnosis:       Gastrointestinal hemorrhage with melena      (Gastrointestinal hemorrhage with melena [K92.1])    Surgeons: Naga Shelby MD Provider: Han Contreras MD    Anesthesia Type: MAC ASA Status: 3          Anesthesia Type: MAC    Vitals  Vitals Value Taken Time   /63 08/05/21 1038   Temp     Pulse 72 08/05/21 1038   Resp 17 08/05/21 1038   SpO2 98 % 08/05/21 1038           Post Anesthesia Care and Evaluation    Patient location during evaluation: PACU  Patient participation: complete - patient participated  Level of consciousness: awake  Pain score: 0  Pain management: adequate  Airway patency: patent  Anesthetic complications: No anesthetic complications  PONV Status: none  Cardiovascular status: acceptable  Respiratory status: acceptable  Hydration status: acceptable    Comments: /63 (BP Location: Left arm, Patient Position: Lying)   Pulse 72   Temp 36.6 °C (97.9 °F) (Oral)   Resp 17   Ht 175.3 cm (69\")   Wt 93 kg (205 lb)   SpO2 98%   BMI 30.27 kg/m²       "

## 2021-08-05 NOTE — PROGRESS NOTES
LOS: 3 days   Patient Care Team:  Jaiden Hoover MD as PCP - General (Family Medicine)  Amanda Gunderson APRN as Referring Physician (Family Medicine)  Alexey Haynes MD as Consulting Physician (Hematology and Oncology)  Mary Brito MD as Surgeon (Thoracic Surgery)  Sarah Wiseman MD as Consulting Physician (Radiation Oncology)    Chief Complaint: Esophageal cancer, acute GI bleeding.  Iron deficiency anemia.    Interval History:  Patient was started on IV Venofer 8/3/2021.     8/4/2021  Patient reports tolerating IV iron therapy.  Continues to have dark-colored stool.  Hemoglobin improved 9.0 posttransfusion.    8/5/2021,  Patient continues to have dark-colored stool.  Had a EGD examination this morning, reporting bleeding from the esophageal mass.  Local treatment applied during the procedure.  Lab study reporting Hb 8.9.   Patient will be started palliative radiation therapy this afternoon.        A comprehensive 14 point review of systems was performed and was negative except as mentioned.    Vital Signs:  Temp:  [97.3 °F (36.3 °C)-97.9 °F (36.6 °C)] 97.9 °F (36.6 °C)  Heart Rate:  [64-80] 72  Resp:  [17-18] 17  BP: (102-133)/(61-76) 133/66    Intake/Output Summary (Last 24 hours) at 8/5/2021 1313  Last data filed at 8/5/2021 1035  Gross per 24 hour   Intake 760 ml   Output 710 ml   Net 50 ml       Physical Exam:     GENERAL:  Well-developed, well-nourished male in no acute distress.  Still looks pale and chronically ill.  Orientated to time place and the people.  SKIN:  Warm, dry without rashes, purpura or petechiae.  HEENT:  Normocephalic.   LYMPHATICS:  No cervical, supraclavicular adenopathy.  CHEST: Normal respiratory effort.  Lungs clear to auscultation. Good airflow.  CARDIAC:  Regular rate and rhythm without murmurs. Normal S1,S2.  ABDOMEN:  Soft, nontender with no organomegaly or masses.  Bowel sounds normal.  EXTREMITIES:  No clubbing, cyanosis or edema.  NEUROLOGICAL:  Grossly  intact.  No focal neurological deficits.  PSYCHIATRIC:  Normal affect and mood.        Results Review:     Results from last 7 days   Lab Units 08/05/21  0458 08/05/21  0000 08/04/21  1602 08/04/21  0515 08/04/21  0515 08/03/21  2357 08/03/21  0748   WBC 10*3/mm3 8.46  --   --   --  8.24  --  10.23   HEMOGLOBIN g/dL 8.9* 8.7* 9.1*   < > 9.0*   < > 7.3*   HEMATOCRIT % 27.1* 25.9* 27.0*   < > 26.3*   < > 22.4*   PLATELETS 10*3/mm3 150  --   --   --  150  --  160    < > = values in this interval not displayed.     Lab Results   Component Value Date    NEUTROABS 5.92 08/05/2021     Results from last 7 days   Lab Units 08/04/21  0515 08/03/21  0748 08/02/21  1449   SODIUM mmol/L 139 134* 132*   POTASSIUM mmol/L 3.6 3.6 3.5   CHLORIDE mmol/L 110* 106 98   CO2 mmol/L 19.8* 20.2* 21.4*   BUN mg/dL 12 11 14   CREATININE mg/dL 0.76 0.73* 0.77   CALCIUM mg/dL 8.1* 7.8* 8.1*   BILIRUBIN mg/dL  --   --  0.2   ALK PHOS U/L  --   --  73   ALT (SGPT) U/L  --   --  25   AST (SGOT) U/L  --   --  21   GLUCOSE mg/dL 85 105* 116         Results from last 7 days   Lab Units 08/05/21  0458   INR  1.27*   APTT seconds 36.9*                   I reviewed the patient's new clinical results.        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 Almost 90% of the liver was replaced by tumor.  · HER-2 positive  · Initially treated with FOLFOX initiated July 2019  · Herceptin added with cycle 2  · Following 8 cycles of FOLFOX, oxaliplatin discontinued with continuation of 5-FU, leucovorin, Herceptin. This was continued through 7/7/2020  · Increasing CEA led to PET scan 6/9/2021.  Disease progression noted with growth of the tumor in the lower esophagus.  Continued stability of hepatic metastasis  · Repeat EGD 6/19/2021 for tissue specimen and Next Generation Sequencing.  · Endoscopy that shows a noncircumferential abnormality in the lower esophagus that encompasses almost 6 cm in length. It is not  blocking off the esophagus and that is why the patient has no symptoms. The pathology shows at least atypical cells consistent with cancer.  · NGS showing PD-L1 positive and high mutation burden.  The tumor remains HER-2 positive.  Treatment plan for Keytruda every 3 weeks x 4 cycles followed by repeat imaging  · Keytruda initiated 7/29/2021.         * Acute GI bleeding  · Dark tarry stool initially began 7/29/2021  · 7/23/2021, hemoglobin of 11.0.     ·  On 8/2/2021 hemoglobin of 5.7  ·  Xarelto has been on hold.    · The patient is waiting for thoracic surgery consultation, and I expect that this patient will need to have EGD examination to assess his bleeding situation.   · If indeed he has evidence of bleeding from esophageal cancer, we would consider palliative radiation therapy to stop bleeding.  · Patient reports today 8/4/2021, he continues to have dark-colored stool.  Patient will have EGD examination scheduled on 8/5/2021.  Appreciate GI consult.   · Patient was seen 8/4/2021 by radiation oncology Dr. Wiseman, who is ready to treat patient once EGD confirms bleeding from the esophagus tumor.   · Patient had EGD examination on 8/5/2021 by Dr. Shelby, reporting bleeding from the esophageal mass.   · Radiation therapy will be started this afternoon 8/5/2021.     * Anemia:    · Acute GI bleeding on top of chronic bleeding.   · Received 2 units PRBC transfusion, improved hemoglobin 7.3 on 8/3/2021.  The patient already has improved hemoglobin after 2 units PRBC transfusion however he is still symptomatic with fatigue.   · I recommended to transfuse 2 more units PRBC 8/3/2021. We will give the patient Lasix at least after the 1st unit of PRBC to avoid volume overload.   · Posttransfusion hemoglobin 9.0 on 8/3/2021.   · Stable Hb 9.0 on 8/4/2021.  · Hemoglobin 8.9 on 8/5/2021.  Since patient will be starting on radiation therapy, to improve his tolerance, I recommended transfusion 2 units PRBC today.  Continue to  monitor CBC in the morning.      * Iron deficiency. This clearly is due to his GI bleeding. His laboratory study last night on 08/02/2021 reported ferritin 36.3 ng/mL and iron saturation 18% with free iron 41, TIBC 231. This patient had normal iron study on 05/18/2021 with ferritin 126 and iron saturation 25%. He had super therapeutic folate > 20 ng/mL on that day and normal vitamin B12 of 601 pg/mL. I think this patient also will benefit from intravenous iron therapy with all things considered. Certainly he is not a good candidate for oral iron treatment due to his esophageal cancer with active bleeding, and likely will have more issue with nausea, vomiting and abdomen pain.   · Intravenous Venofer was started on 8/3/2021.  Patient has been tolerating well with no reaction.  · Patient is receiving third dose of Venofer 8/5/2021.      *  Thrombophilia secondary malignancy, DVT  · Was on anticoagulated with Xarelto 20 mg daily.  · Currently on hold due to acute GI bleeding.   · Discussed with the patient again 8/4/2021, because he is nervous about recurrent DVT.  Nevertheless because of ongoing GI bleeding, I recommended to continue holding Xarelto and he voiced understanding.     *  Colovesical fistula.   · He requires intermittent Cipro when he notes stool in his urine  · He is currently without progressive symptoms, reporting his urine is normal in color           PLAN:    · Transfused 2 units PRBC today.    · Start palliative radiation therapy today for bleeding from esophageal mass.    · Continue to hold Xarelto anticoagulation.  · Continue intravenous iron treatment with Venofer 300 mg daily for 3 days, today 3/3.   · Monitor CBC per protocol.     Discussed with patient and his wife at bedside.  Spoke with nursing staff.     I communicated with radiation oncologist Dr. Wiseman about his radiation treatment.      Wilberto Pelletier MD PhD  08/05/21  13:13 EDT

## 2021-08-05 NOTE — PLAN OF CARE
Goal Outcome Evaluation:  Plan of Care Reviewed With: patient  No acute distress noted this shift, NPO p MN, safety maintained, continue plan of care

## 2021-08-06 ENCOUNTER — APPOINTMENT (OUTPATIENT)
Dept: CARDIOLOGY | Facility: HOSPITAL | Age: 66
End: 2021-08-06

## 2021-08-06 LAB
ANION GAP SERPL CALCULATED.3IONS-SCNC: 12.5 MMOL/L (ref 5–15)
BASOPHILS # BLD AUTO: 0.05 10*3/MM3 (ref 0–0.2)
BASOPHILS NFR BLD AUTO: 0.5 % (ref 0–1.5)
BH BB BLOOD EXPIRATION DATE: NORMAL
BH BB BLOOD EXPIRATION DATE: NORMAL
BH BB BLOOD TYPE BARCODE: 5100
BH BB BLOOD TYPE BARCODE: 5100
BH BB DISPENSE STATUS: NORMAL
BH BB DISPENSE STATUS: NORMAL
BH BB PRODUCT CODE: NORMAL
BH BB PRODUCT CODE: NORMAL
BH BB UNIT NUMBER: NORMAL
BH BB UNIT NUMBER: NORMAL
BH CV UPPER VENOUS LEFT AXILLARY AUGMENT: NORMAL
BH CV UPPER VENOUS LEFT AXILLARY COMPETENT: NORMAL
BH CV UPPER VENOUS LEFT AXILLARY COMPRESS: NORMAL
BH CV UPPER VENOUS LEFT AXILLARY PHASIC: NORMAL
BH CV UPPER VENOUS LEFT AXILLARY SPONT: NORMAL
BH CV UPPER VENOUS LEFT BASILIC FOREARM COMPRESS: NORMAL
BH CV UPPER VENOUS LEFT BASILIC UPPER COMPRESS: NORMAL
BH CV UPPER VENOUS LEFT BRACHIAL COMPRESS: NORMAL
BH CV UPPER VENOUS LEFT CEPHALIC FOREARM COMPRESS: NORMAL
BH CV UPPER VENOUS LEFT CEPHALIC UPPER COLOR: 1
BH CV UPPER VENOUS LEFT CEPHALIC UPPER COMPRESS: NORMAL
BH CV UPPER VENOUS LEFT CEPHALIC UPPER THROMBUS: NORMAL
BH CV UPPER VENOUS LEFT INTERNAL JUGULAR AUGMENT: NORMAL
BH CV UPPER VENOUS LEFT INTERNAL JUGULAR COMPETENT: NORMAL
BH CV UPPER VENOUS LEFT INTERNAL JUGULAR COMPRESS: NORMAL
BH CV UPPER VENOUS LEFT INTERNAL JUGULAR PHASIC: NORMAL
BH CV UPPER VENOUS LEFT INTERNAL JUGULAR SPONT: NORMAL
BH CV UPPER VENOUS LEFT MED CUBITAL COMPRESS: NORMAL
BH CV UPPER VENOUS LEFT RADIAL COMPRESS: NORMAL
BH CV UPPER VENOUS LEFT SUBCLAVIAN AUGMENT: NORMAL
BH CV UPPER VENOUS LEFT SUBCLAVIAN COMPETENT: NORMAL
BH CV UPPER VENOUS LEFT SUBCLAVIAN COMPRESS: NORMAL
BH CV UPPER VENOUS LEFT SUBCLAVIAN PHASIC: NORMAL
BH CV UPPER VENOUS LEFT SUBCLAVIAN SPONT: NORMAL
BH CV UPPER VENOUS LEFT ULNAR COMPRESS: NORMAL
BH CV UPPER VENOUS RIGHT INTERNAL JUGULAR AUGMENT: NORMAL
BH CV UPPER VENOUS RIGHT INTERNAL JUGULAR COMPETENT: NORMAL
BH CV UPPER VENOUS RIGHT INTERNAL JUGULAR COMPRESS: NORMAL
BH CV UPPER VENOUS RIGHT INTERNAL JUGULAR PHASIC: NORMAL
BH CV UPPER VENOUS RIGHT INTERNAL JUGULAR SPONT: NORMAL
BH CV UPPER VENOUS RIGHT SUBCLAVIAN AUGMENT: NORMAL
BH CV UPPER VENOUS RIGHT SUBCLAVIAN COMPETENT: NORMAL
BH CV UPPER VENOUS RIGHT SUBCLAVIAN COMPRESS: NORMAL
BH CV UPPER VENOUS RIGHT SUBCLAVIAN PHASIC: NORMAL
BH CV UPPER VENOUS RIGHT SUBCLAVIAN SPONT: NORMAL
BUN SERPL-MCNC: 8 MG/DL (ref 8–23)
BUN/CREAT SERPL: 11.1 (ref 7–25)
CALCIUM SPEC-SCNC: 8.4 MG/DL (ref 8.6–10.5)
CHLORIDE SERPL-SCNC: 108 MMOL/L (ref 98–107)
CO2 SERPL-SCNC: 20.5 MMOL/L (ref 22–29)
CREAT SERPL-MCNC: 0.72 MG/DL (ref 0.76–1.27)
CROSSMATCH INTERPRETATION: NORMAL
CROSSMATCH INTERPRETATION: NORMAL
DEPRECATED RDW RBC AUTO: 57.1 FL (ref 37–54)
EOSINOPHIL # BLD AUTO: 0.34 10*3/MM3 (ref 0–0.4)
EOSINOPHIL NFR BLD AUTO: 3.6 % (ref 0.3–6.2)
ERYTHROCYTE [DISTWIDTH] IN BLOOD BY AUTOMATED COUNT: 17.1 % (ref 12.3–15.4)
GFR SERPL CREATININE-BSD FRML MDRD: 110 ML/MIN/1.73
GLUCOSE SERPL-MCNC: 108 MG/DL (ref 65–99)
HCT VFR BLD AUTO: 31.9 % (ref 37.5–51)
HCT VFR BLD AUTO: 35.6 % (ref 37.5–51)
HGB BLD-MCNC: 10.7 G/DL (ref 13–17.7)
HGB BLD-MCNC: 11.4 G/DL (ref 13–17.7)
IMM GRANULOCYTES # BLD AUTO: 0.23 10*3/MM3 (ref 0–0.05)
IMM GRANULOCYTES NFR BLD AUTO: 2.4 % (ref 0–0.5)
LYMPHOCYTES # BLD AUTO: 0.93 10*3/MM3 (ref 0.7–3.1)
LYMPHOCYTES NFR BLD AUTO: 9.7 % (ref 19.6–45.3)
MAGNESIUM SERPL-MCNC: 1.7 MG/DL (ref 1.6–2.4)
MAXIMAL PREDICTED HEART RATE: 156 BPM
MCH RBC QN AUTO: 29.5 PG (ref 26.6–33)
MCHC RBC AUTO-ENTMCNC: 32 G/DL (ref 31.5–35.7)
MCV RBC AUTO: 92.2 FL (ref 79–97)
MONOCYTES # BLD AUTO: 0.55 10*3/MM3 (ref 0.1–0.9)
MONOCYTES NFR BLD AUTO: 5.8 % (ref 5–12)
NEUTROPHILS NFR BLD AUTO: 7.45 10*3/MM3 (ref 1.7–7)
NEUTROPHILS NFR BLD AUTO: 78 % (ref 42.7–76)
NRBC BLD AUTO-RTO: 0.1 /100 WBC (ref 0–0.2)
PLATELET # BLD AUTO: 173 10*3/MM3 (ref 140–450)
PMV BLD AUTO: 10 FL (ref 6–12)
POTASSIUM SERPL-SCNC: 3.6 MMOL/L (ref 3.5–5.2)
RBC # BLD AUTO: 3.86 10*6/MM3 (ref 4.14–5.8)
SODIUM SERPL-SCNC: 141 MMOL/L (ref 136–145)
STRESS TARGET HR: 133 BPM
UNIT  ABO: NORMAL
UNIT  ABO: NORMAL
UNIT  RH: NORMAL
UNIT  RH: NORMAL
WBC # BLD AUTO: 9.55 10*3/MM3 (ref 3.4–10.8)

## 2021-08-06 PROCEDURE — 77412 RADIATION TX DELIVERY LVL 3: CPT | Performed by: RADIOLOGY

## 2021-08-06 PROCEDURE — 25010000002 FUROSEMIDE PER 20 MG: Performed by: INTERNAL MEDICINE

## 2021-08-06 PROCEDURE — 85025 COMPLETE CBC W/AUTO DIFF WBC: CPT | Performed by: HOSPITALIST

## 2021-08-06 PROCEDURE — 93971 EXTREMITY STUDY: CPT

## 2021-08-06 PROCEDURE — 99024 POSTOP FOLLOW-UP VISIT: CPT | Performed by: NURSE PRACTITIONER

## 2021-08-06 PROCEDURE — 80048 BASIC METABOLIC PNL TOTAL CA: CPT | Performed by: HOSPITALIST

## 2021-08-06 PROCEDURE — 83735 ASSAY OF MAGNESIUM: CPT | Performed by: INTERNAL MEDICINE

## 2021-08-06 PROCEDURE — 77014 CHG CT GUIDANCE RADIATION THERAPY FLDS PLACEMENT: CPT | Performed by: RADIOLOGY

## 2021-08-06 PROCEDURE — 25010000002 IRON SUCROSE PER 1 MG: Performed by: INTERNAL MEDICINE

## 2021-08-06 PROCEDURE — 99232 SBSQ HOSP IP/OBS MODERATE 35: CPT | Performed by: INTERNAL MEDICINE

## 2021-08-06 RX ORDER — FAMOTIDINE 20 MG/1
20 TABLET, FILM COATED ORAL ONCE
Status: COMPLETED | OUTPATIENT
Start: 2021-08-06 | End: 2021-08-06

## 2021-08-06 RX ADMIN — GABAPENTIN 300 MG: 300 CAPSULE ORAL at 08:00

## 2021-08-06 RX ADMIN — FUROSEMIDE 20 MG: 10 INJECTION, SOLUTION INTRAMUSCULAR; INTRAVENOUS at 00:24

## 2021-08-06 RX ADMIN — PANTOPRAZOLE SODIUM 8 MG/HR: 40 INJECTION, POWDER, FOR SOLUTION INTRAVENOUS at 23:22

## 2021-08-06 RX ADMIN — FAMOTIDINE 20 MG: 20 TABLET, FILM COATED ORAL at 12:42

## 2021-08-06 RX ADMIN — Medication 1 CAPSULE: at 08:00

## 2021-08-06 RX ADMIN — IRON SUCROSE 300 MG: 20 INJECTION, SOLUTION INTRAVENOUS at 12:42

## 2021-08-06 RX ADMIN — PANTOPRAZOLE SODIUM 8 MG/HR: 40 INJECTION, POWDER, FOR SOLUTION INTRAVENOUS at 08:00

## 2021-08-06 RX ADMIN — GABAPENTIN 300 MG: 300 CAPSULE ORAL at 20:19

## 2021-08-06 RX ADMIN — PANTOPRAZOLE SODIUM 8 MG/HR: 40 INJECTION, POWDER, FOR SOLUTION INTRAVENOUS at 02:39

## 2021-08-06 RX ADMIN — PANTOPRAZOLE SODIUM 8 MG/HR: 40 INJECTION, POWDER, FOR SOLUTION INTRAVENOUS at 17:50

## 2021-08-06 NOTE — PROGRESS NOTES
Name: Han Garcia ADMIT: 2021   : 1955  PCP: Jaiden Hoover MD    MRN: [id number] LOS: 4 days   AGE/SEX: 65 y.o. male  ROOM: [facility]/     Subjective   Subjective   Patient reports tolerating regular diet well.  No abdominal pain.  No nausea or vomiting.  No dysphagia or odynophagia.  No fever or chills.  Normal bowel habits without constipation/diarrhea/bleeding per rectum/melena.    Review of Systems  .  Positive frequency.  No dysuria or hematuria  Cardiovascular/respiratory.  No shortness of breath/no chest pain/no palpitation  CNS.  No dizziness or loss of consciousness     Objective   Objective   Vital Signs  Temp:  [97.2 °F (36.2 °C)-98.3 °F (36.8 °C)] 98.2 °F (36.8 °C)  Heart Rate:  [62-77] 66  Resp:  [17-20] 18  BP: (102-133)/(58-76) 116/64  SpO2:  [93 %-100 %] 98 %  on  Flow (L/min):  [4] 4;   Device (Oxygen Therapy): room air    Intake/Output Summary (Last 24 hours) at 2021 0905  Last data filed at 2021 0805  Gross per 24 hour   Intake 1400 ml   Output 2100 ml   Net -700 ml     Body mass index is 30.27 kg/m².      21  1717   Weight: 93 kg (205 lb)     Physical Exam  General.  Middle-aged gentleman.  Alert oriented x3.  No apparent pain distress or diaphoresis.  Normal mood and affect.  Eyes.  Pupils equal round and reactive.  Intact extraocular musculature.  No pallor or jaundice.  Oral cavity.  Moist mucous membrane.  Neck.  Supple.  No JVD.  No lymphadenopathy or thyromegaly.  Cardiovascular.  Regular rate and rhythm.  No murmurs.  Chest.  Clear to auscultation bilaterally with no added sounds.  Abdomen.  Soft lax.  No tenderness.  No organomegaly.  No guarding or rebound.  Extremities.  +1 bilateral lower extremity edema.  CNS.  No acute focal neurological deficits.    Results Review:      Results from last 7 days   Lab Units 21  1518 21  0515 21  0748 21  1449   SODIUM mmol/L  --  139 134* 132*   POTASSIUM mmol/L 3.4* 3.6 3.6 3.5   CHLORIDE  mmol/L  --  110* 106 98   CO2 mmol/L  --  19.8* 20.2* 21.4*   BUN mg/dL  --  12 11 14   CREATININE mg/dL  --  0.76 0.73* 0.77   GLUCOSE mg/dL  --  85 105* 116   CALCIUM mg/dL  --  8.1* 7.8* 8.1*   AST (SGOT) U/L  --   --   --  21   ALT (SGPT) U/L  --   --   --  25     Estimated Creatinine Clearance: 110.6 mL/min (by C-G formula based on SCr of 0.76 mg/dL).                      Results from last 7 days   Lab Units 08/05/21  1518   MAGNESIUM mg/dL 1.8           Invalid input(s): LDLCALC  Results from last 7 days   Lab Units 08/05/21  2358 08/05/21  1518 08/05/21  0458 08/05/21  0000 08/04/21  1602 08/04/21  0515 08/04/21  0515 08/03/21  2357 08/03/21  0748 08/03/21  0748 08/02/21  1449 08/02/21  1449   WBC 10*3/mm3  --   --  8.46  --   --   --  8.24  --   --  10.23  --  14.19*   HEMOGLOBIN g/dL 10.7* 9.8* 8.9* 8.7* 9.1*  --  9.0* 9.0*   < > 7.3*   < > 5.7*   HEMATOCRIT % 31.9* 29.6* 27.1* 25.9* 27.0*  --  26.3* 26.9*   < > 22.4*   < > 16.8*   PLATELETS 10*3/mm3  --   --  150  --   --   --  150  --   --  160  --  203   MCV fL  --   --  90.6  --   --    < > 89.8  --   --  91.4   < > 98.8*   MCH pg  --   --  29.8  --   --    < > 30.7  --   --  29.8   < > 33.5*   MCHC g/dL  --   --  32.8  --   --    < > 34.2  --   --  32.6   < > 33.9   RDW %  --   --  17.3*  --   --    < > 17.6*  --   --  18.9*   < > 16.5*   RDW-SD fl  --   --  56.6*  --   --    < > 57.1*  --   --  62.8*   < > 56.2*   MPV fL  --   --  9.7  --   --    < > 9.9  --   --  9.6   < > 9.1   NEUTROPHIL % %  --   --  70.0  --   --    < > 67.7  --   --   --    < > 72.6   LYMPHOCYTE % %  --   --  13.8*  --   --    < > 17.8*  --   --   --    < > 12.1*   MONOCYTES % %  --   --  7.2  --   --    < > 5.9  --   --   --    < > 5.8   EOSINOPHIL % %  --   --  4.1  --   --    < > 3.0  --   --   --    < > 1.9   BASOPHIL % %  --   --  0.5  --   --    < > 0.6  --   --   --    < > 0.4   IMM GRAN % %  --   --  4.4*  --   --    < > 5.0*  --   --   --    < > 7.2*   NEUTROS ABS  10*3/mm3  --   --  5.92  --   --    < > 5.57  --   --   --    < > 10.31*   LYMPHS ABS 10*3/mm3  --   --  1.17  --   --    < > 1.47  --   --   --    < > 1.71   MONOS ABS 10*3/mm3  --   --  0.61  --   --    < > 0.49  --   --   --    < > 0.82   EOS ABS 10*3/mm3  --   --  0.35  --   --    < > 0.25  --   --   --    < > 0.27   BASOS ABS 10*3/mm3  --   --  0.04  --   --    < > 0.05  --   --   --    < > 0.06   IMMATURE GRANS (ABS) 10*3/mm3  --   --  0.37*  --   --    < > 0.41*  --   --   --    < > 1.02*   NRBC /100 WBC  --   --  0.4*  --   --    < > 0.4*  --   --   --    < > 0.5*    < > = values in this interval not displayed.     Results from last 7 days   Lab Units 08/05/21  0458   INR  1.27*   APTT seconds 36.9*                                       Imaging:  Imaging Results (Last 24 Hours)     ** No results found for the last 24 hours. 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) [G62.0, T45.1X5A]  Yes   • Anemia, chronic disease [D63.8]  Yes   • Chemotherapy induced neutropenia (CMS/HCC) [D70.1, T45.1X5A]  Yes   • Acute deep vein thrombosis (DVT) of femoral vein of left lower extremity (CMS/HCC) [I82.412]  Yes   • Acute deep vein thrombosis (DVT) of right popliteal vein (CMS/HCC) [I82.431]  Yes   • Esophagus neoplasm [D49.0]  Yes   • Liver metastasis (CMS/HCC) [C78.7]  Yes   • Essential hypertension [I10]  Yes   • History of prostate cancer [Z85.46]  Not Applicable   • Hyperlipidemia [E78.5]  Yes   • Tobacco abuse [Z72.0]  Yes      Resolved Hospital Problems   No resolved problems to display.           · Upper GI bleed leading to acute on chronic blood loss anemia secondary to ulcerated mass of esophageal cancer in a patient with a history of stage IV esophageal cancer.   Patient is s/p EGD on 8 / 5 with a distal esophageal mass hemospray.  Anticoagulation DC'd.  Currently on IV proton pump inhibitors.  Currently on radiation therapy.  Hematology oncology/GI on board.  Hemoglobin is stable.  Hemodynamically stable.  Has a radiation treatment today and if tolerated can be discharged home tomorrow if hemoglobin is stable.  Treatment of esophageal cancer per hematology oncology.  Status post IV iron treatment  · Colovesical fistula On as needed Cipro.  · History of DVT/thrombocytopenia.  Thrombocytopenia resolved.  Xarelto on hold secondary to GI bleed.  Most likely be of Xarelto for at least 2 weeks.  We will leave that to hematology oncology.  · Hypokalemia.  Will check magnesium and substitute  · Peripheral neuropathy secondary to chemotherapy continue Neurontin.  · Hypertension.  Good control.  No angina or congestive heart failure.  We will stop the fluids secondary to the lower extremity edema.  Continue lisinopril/HCTZ.      · Discussed with patient.  · Anticipate discharge tomorrow if okay with hematology oncology/GI and if hemoglobin is stable      Rachelle Sorto MD  [facility]  08/06/21  09:05 EDT

## 2021-08-06 NOTE — PROGRESS NOTES
Patient off floor for radiation. We will see as needed. Please call anytime - [phone].    Thanks,  NIRU Greer  [facility]

## 2021-08-06 NOTE — PROGRESS NOTES
LOS: 4 days   Patient Care Team:  Jaiden Hoover MD as PCP - General (Family Medicine)  Amanda Gunderson APRN as Referring Physician (Family Medicine)  Alexey Haynes MD as Consulting Physician (Hematology and Oncology)  Mary Brito MD as Surgeon (Thoracic Surgery)  Sarah Wiseman MD as Consulting Physician (Radiation Oncology)    Chief Complaint: Esophageal cancer, acute GI bleeding.  Iron deficiency anemia.    Interval History:  Patient was started on IV Venofer 8/3/2021.     8/4/2021  Patient reports tolerating IV iron therapy.  Continues to have dark-colored stool.  Hemoglobin improved 9.0 posttransfusion.    8/5/2021,  Patient continues to have dark-colored stool.  Had a EGD examination this morning, reporting bleeding from the esophageal mass.  Local treatment applied during the procedure.  Lab study reporting Hb 8.9.  Patient was given 2 more units PRBC transfusion due to starting radiation therapy.  Patient will be started palliative radiation therapy this afternoon.    8/6/2021  Patient reports last dark-colored stool.  Feels more energetic.  Lab study showed significant improved hemoglobin 11.4.    A comprehensive 14 point review of systems was performed and was negative except as mentioned.    Vital Signs:  Temp:  [97.2 °F (36.2 °C)-98.3 °F (36.8 °C)] 98.2 °F (36.8 °C)  Heart Rate:  [62-75] 66  Resp:  [18-20] 18  BP: (114-133)/(58-73) 116/64    Intake/Output Summary (Last 24 hours) at 8/6/2021 1106  Last data filed at 8/6/2021 0925  Gross per 24 hour   Intake 1310 ml   Output 2100 ml   Net -790 ml       Physical Exam:     GENERAL:  Well-developed, well-nourished male in no acute distress.  Still looks chronically ill.  Orientated to time place and the people.  Patient is sitting in chair.  SKIN:  Warm, dry without rashes.  HEENT:  Normocephalic.   LYMPHATICS:  No cervical adenopathy.  CHEST: Normal respiratory effort.  Lungs clear to auscultation. Good airflow.  CARDIAC:  Regular rate  and rhythm. Normal S1,S2.  ABDOMEN:  Soft, nontender with no organomegaly or masses.  Bowel sounds normal.  EXTREMITIES:  No clubbing, cyanosis or edema.  NEUROLOGICAL:  Grossly intact.  No focal neurological deficits.  PSYCHIATRIC:  Normal affect and mood.        Results Review:     Results from last 7 days   Lab Units 08/06/21  0829 08/05/21  2358 08/05/21  1518 08/05/21  0458 08/05/21  0458 08/04/21  1602 08/04/21  0515   WBC 10*3/mm3 9.55  --   --   --  8.46  --  8.24   HEMOGLOBIN g/dL 11.4* 10.7* 9.8*   < > 8.9*   < > 9.0*   HEMATOCRIT % 35.6* 31.9* 29.6*   < > 27.1*   < > 26.3*   PLATELETS 10*3/mm3 173  --   --   --  150  --  150    < > = values in this interval not displayed.     Lab Results   Component Value Date    NEUTROABS 7.45 (H) 08/06/2021     Results from last 7 days   Lab Units 08/06/21  0829 08/05/21  1518 08/04/21  0515 08/03/21  0748 08/03/21  0748 08/02/21  1449 08/02/21  1449   SODIUM mmol/L 141  --  139  --  134*   < > 132*   POTASSIUM mmol/L 3.6 3.4* 3.6   < > 3.6   < > 3.5   CHLORIDE mmol/L 108*  --  110*  --  106   < > 98   CO2 mmol/L 20.5*  --  19.8*  --  20.2*   < > 21.4*   BUN mg/dL 8  --  12  --  11   < > 14   CREATININE mg/dL 0.72*  --  0.76  --  0.73*   < > 0.77   CALCIUM mg/dL 8.4*  --  8.1*  --  7.8*   < > 8.1*   BILIRUBIN mg/dL  --   --   --   --   --   --  0.2   ALK PHOS U/L  --   --   --   --   --   --  73   ALT (SGPT) U/L  --   --   --   --   --   --  25   AST (SGOT) U/L  --   --   --   --   --   --  21   GLUCOSE mg/dL 108*  --  85  --  105*   < > 116    < > = values in this interval not displayed.         Results from last 7 days   Lab Units 08/05/21  0458   INR  1.27*   APTT seconds 36.9*         Results from last 7 days   Lab Units 08/06/21  0829   MAGNESIUM mg/dL 1.7           I reviewed the patient's new clinical results.        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 Almost 90% of the liver was replaced by tumor.  · HER-2  positive  · Initially treated with FOLFOX initiated July 2019  · Herceptin added with cycle 2  · Following 8 cycles of FOLFOX, oxaliplatin discontinued with continuation of 5-FU, leucovorin, Herceptin. This was continued through 7/7/2020  · Increasing CEA led to PET scan 6/9/2021.  Disease progression noted with growth of the tumor in the lower esophagus.  Continued stability of hepatic metastasis  · Repeat EGD 6/19/2021 for tissue specimen and Next Generation Sequencing.  · Endoscopy that shows a noncircumferential abnormality in the lower esophagus that encompasses almost 6 cm in length. It is not blocking off the esophagus and that is why the patient has no symptoms. The pathology shows at least atypical cells consistent with cancer.  · NGS showing PD-L1 positive and high mutation burden.  The tumor remains HER-2 positive.  Treatment plan for Keytruda every 3 weeks x 4 cycles followed by repeat imaging  · Keytruda initiated 7/29/2021.  This is scheduled every 3 weeks.        * Acute GI bleeding  · Dark tarry stool initially began 7/29/2021  · 7/23/2021, hemoglobin of 11.0.     ·  On 8/2/2021 hemoglobin of 5.7  ·  Xarelto has been on hold.    · The patient is waiting for thoracic surgery consultation, and I expect that this patient will need to have EGD examination to assess his bleeding situation.   · If indeed he has evidence of bleeding from esophageal cancer, we would consider palliative radiation therapy to stop bleeding.  · Patient reports today 8/4/2021, he continues to have dark-colored stool.  Patient will have EGD examination scheduled on 8/5/2021.  Appreciate GI consult.   · Patient was seen 8/4/2021 by radiation oncology Dr. Wiseman, who is ready to treat patient once EGD confirms bleeding from the esophagus tumor.   · Patient had EGD examination on 8/5/2021 by Dr. Shelby, reporting bleeding from the esophageal mass.   · Radiation therapy was started this afternoon 8/5/2021.  · 8/6/2021, patient reports  his stool is less dark-colored.      * Anemia:    · Acute GI bleeding on top of chronic bleeding.   · Received 2 units PRBC transfusion, improved hemoglobin 7.3 on 8/3/2021.  The patient already has improved hemoglobin after 2 units PRBC transfusion however he is still symptomatic with fatigue.   · I recommended to transfuse 2 more units PRBC 8/3/2021. We will give the patient Lasix at least after the 1st unit of PRBC to avoid volume overload.   · Posttransfusion hemoglobin 9.0 on 8/3/2021.   · Stable Hb 9.0 on 8/4/2021.  · Hemoglobin 8.9 on 8/5/2021.  Since patient will be starting on radiation therapy, to improve his tolerance, I recommended transfusion 2 units PRBC today.  Continue to monitor CBC in the morning.    · 8/6/2021, hemoglobin 11.4.      * Iron deficiency. This clearly is due to his GI bleeding. His laboratory study last night on 08/02/2021 reported ferritin 36.3 ng/mL and iron saturation 18% with free iron 41, TIBC 231. This patient had normal iron study on 05/18/2021 with ferritin 126 and iron saturation 25%. He had super therapeutic folate > 20 ng/mL on that day and normal vitamin B12 of 601 pg/mL. I think this patient also will benefit from intravenous iron therapy with all things considered. Certainly he is not a good candidate for oral iron treatment due to his esophageal cancer with active bleeding, and likely will have more issue with nausea, vomiting and abdomen pain.   · Intravenous Venofer was started on 8/3/2021.  Patient has been tolerating well with no reaction.  · Patient is receiving third dose of Venofer 8/5/2021.   · 86 2021, will give patient another dose Venofer 300 mg.  This will make his total Venofer 1200 mg during this hospitalization.     *  Thrombophilia secondary malignancy, DVT  · Was on anticoagulated with Xarelto 20 mg daily.  · Currently on hold due to acute GI bleeding.   · Discussed with the patient again 8/4/2021, because he is nervous about recurrent DVT.   Nevertheless because of ongoing GI bleeding, I recommended to continue holding Xarelto and he voiced understanding.     *  Colovesical fistula.   · He requires intermittent Cipro when he notes stool in his urine  · He is currently without progressive symptoms, reporting his urine is normal in color           PLAN:    · Continue palliative radiation therapy today for bleeding from esophageal mass.    · Continue to hold Xarelto anticoagulation.  · Continue intravenous iron treatment with Venofer 300 mg today, this will be the fourth and the last dose.     · Monitor CBC per protocol.  If stable hemoglobin tomorrow, he could be discharged.  · Patient has appointment with Dr. Haynes on 8/19/2021 for his second cycle Keytruda.     Discussed with patient at bedside.      I communicated with [facility] Dr. Sorto.      Wilberto Pelletier MD PhD  08/06/21  11:06 EDT

## 2021-08-06 NOTE — PROGRESS NOTES
[facility]  Inpatient Progress Note    Reason for Follow Up:  H/o metastatic distal esophageal adenocarcinoma with mets now with melena.    Subjective     Interval History:   He is still having melena.  He was transfused yesterday.  Radiation therapy also started yesterday.  He tolerated the 8/5/2021 EGD with use of hemospray in an attempt to get the distal esophageal cancerous mass to stop oozing blood.    Current Facility-Administered Medications:   •  acetaminophen (TYLENOL) tablet 650 mg, 650 mg, Oral, Q4H PRN, Cathy Houser MD  •  gabapentin (NEURONTIN) capsule 300 mg, 300 mg, Oral, Q12H, Cathy Houser MD, 300 mg at 08/05/21 2103  •  lactobacillus acidophilus (RISAQUAD) capsule 1 capsule, 1 capsule, Oral, Daily, Cathy Houser MD, 1 capsule at 08/05/21 1151  •  lisinopril (PRINIVIL,ZESTRIL) 20 mg, hydroCHLOROthiazide (HYDRODIURIL) 12.5 mg for ZESTORETIC 20-12.5, , Oral, Q24H, Cathy Houser MD  •  melatonin tablet 3 mg, 3 mg, Oral, Nightly PRN, Cathy Houser MD  •  nitroglycerin (NITROSTAT) SL tablet 0.4 mg, 0.4 mg, Sublingual, Q5 Min PRN, Cathy Houser MD  •  ondansetron (ZOFRAN) tablet 4 mg, 4 mg, Oral, Q6H PRN **OR** ondansetron (ZOFRAN) injection 4 mg, 4 mg, Intravenous, Q6H PRN, Cathy Houser MD  •  pantoprazole (PROTONIX) 40 mg in 100mL NS IVPB, 8 mg/hr, Intravenous, Continuous, Nahomi Villavicencio MD, Last Rate: 20 mL/hr at 08/06/21 0239, 8 mg/hr at 08/06/21 0239  •  potassium chloride (K-DUR,KLOR-CON) ER tablet 40 mEq, 40 mEq, Oral, PRN, Darren Hansen MD, 40 mEq at 08/05/21 1910  •  potassium chloride (KLOR-CON) packet 40 mEq, 40 mEq, Oral, PRN, Darren Hansen MD  •  sodium chloride 0.9 % infusion 1,000 mL, 1,000 mL, Intravenous, Continuous, Naga Shelby MD  •  sodium chloride 0.9 % infusion, 75 mL/hr, Intravenous, Continuous, Cathy Houser MD, Last Rate: 75 mL/hr at 08/05/21 0953, Restarted at 08/05/21  1017  Review of Systems:    The following systems were reviewed and negative;  constitution, ENT, respiratory, cardiovascular, genitourinary, musculoskeletal and neurological    Objective     Vital Signs  Temp:  [97.2 °F (36.2 °C)-98.3 °F (36.8 °C)] 98.2 °F (36.8 °C)  Heart Rate:  [62-77] 66  Resp:  [17-20] 18  BP: (102-133)/(58-76) 116/64  Body mass index is 30.27 kg/m².    Intake/Output Summary (Last 24 hours) at 8/6/2021 0800  Last data filed at 8/6/2021 0711  Gross per 24 hour   Intake 1300 ml   Output 2000 ml   Net -700 ml     I/O this shift:  In: -   Out: 200 [Urine:200]     Physical Exam:   General: patient awake, alert and cooperative   Eyes: Normal lids and lashes, no scleral icterus   Neck: supple, normal ROM   Skin: warm and dry, not jaundiced   Cardiovascular: regular rhythm and rate, no murmurs auscultated   Pulm: clear to auscultation bilaterally, regular and unlabored   Abdomen: soft, nontender, nondistended; normal bowel sounds   Extremities: no rash or edema   Psychiatric: Normal mood and behavior; memory intact     Results Review:     I reviewed the patient's new clinical results.    Results from last 7 days   Lab Units 08/05/21 2358 08/05/21  1518 08/05/21  0458 08/04/21  1602 08/04/21  0515 08/03/21  2357 08/03/21  0748   WBC 10*3/mm3  --   --  8.46  --  8.24  --  10.23   HEMOGLOBIN g/dL 10.7* 9.8* 8.9*   < > 9.0*   < > 7.3*   HEMATOCRIT % 31.9* 29.6* 27.1*   < > 26.3*   < > 22.4*   PLATELETS 10*3/mm3  --   --  150  --  150  --  160    < > = values in this interval not displayed.     Results from last 7 days   Lab Units 08/05/21  1518 08/04/21  0515 08/03/21  0748 08/02/21  1449 08/02/21  1449   SODIUM mmol/L  --  139 134*  --  132*   POTASSIUM mmol/L 3.4* 3.6 3.6   < > 3.5   CHLORIDE mmol/L  --  110* 106  --  98   CO2 mmol/L  --  19.8* 20.2*  --  21.4*   BUN mg/dL  --  12 11  --  14   CREATININE mg/dL  --  0.76 0.73*  --  0.77   CALCIUM mg/dL  --  8.1* 7.8*  --  8.1*   BILIRUBIN mg/dL  --   --    --   --  0.2   ALK PHOS U/L  --   --   --   --  73   ALT (SGPT) U/L  --   --   --   --  25   AST (SGOT) U/L  --   --   --   --  21   GLUCOSE mg/dL  --  85 105*  --  116    < > = values in this interval not displayed.     Results from last 7 days   Lab Units 08/05/21  0458   INR  1.27*     Lab Results   Lab Value Date/Time    LIPASE 12 (L) 09/16/2020 1617    LIPASE 8 (L) 01/11/2014 1430       Radiology:  No orders to display       Assessment/Plan     Patient Active Problem List   Diagnosis   • Essential hypertension   • Hyperlipidemia   • Impaired fasting glucose   • History of prostate cancer   • Tobacco abuse   • Liver metastasis (CMS/HCC)   • Esophagus neoplasm   • Malignant neoplasm of lower third of esophagus (CMS/HCC)   • Encounter for adjustment or management of vascular access device   • Acute deep vein thrombosis (DVT) of femoral vein of left lower extremity (CMS/HCC)   • Acute deep vein thrombosis (DVT) of right popliteal vein (CMS/HCC)   • Chemotherapy induced neutropenia (CMS/HCC)   • Anemia, chronic disease   • 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 Esophageal adenocarcinoma with liver metastases.  2.  Melena while on Xarelto.  His EGD on 8/5/2021 did show that the distal esophageal ulcerated mass was oozing blood.  I used hemospray in an attempt to get this to stop.  He also is now on radiation therapy to this area that was started yesterday.       I would continue monitoring serial H&H's and transfuse as necessary.  I would keep him off of anticoagulation if at all possible.    I discussed the patients findings and my recommendations with patient.    Naga Shelby MD

## 2021-08-06 NOTE — PROGRESS NOTES
Today's preliminary report.  Left upper extremity venous Doppler is positive for acute superficial clot. Report called to Liz mackenzie RN.

## 2021-08-06 NOTE — PLAN OF CARE
Goal Outcome Evaluation:  Plan of Care Reviewed With: patient           Outcome Summary: Mild edmea BLE, fluids D/C. Radiation today. Hemoglobin ~10 after 2 units blood yesterday. Pt c/o pain, redness, & warmth at LAC where previous IV filtrate-sent for doppler, positive for acute superfical clot.  Iron infused. IV protonix infusing. Safety maintained, will continue to monitor.

## 2021-08-07 ENCOUNTER — READMISSION MANAGEMENT (OUTPATIENT)
Dept: CALL CENTER | Facility: HOSPITAL | Age: 66
End: 2021-08-07

## 2021-08-07 VITALS
DIASTOLIC BLOOD PRESSURE: 67 MMHG | SYSTOLIC BLOOD PRESSURE: 132 MMHG | HEART RATE: 68 BPM | WEIGHT: 205 LBS | HEIGHT: 69 IN | TEMPERATURE: 98.4 F | BODY MASS INDEX: 30.36 KG/M2 | RESPIRATION RATE: 20 BRPM | OXYGEN SATURATION: 98 %

## 2021-08-07 PROBLEM — I82.612 SUPERFICIAL VENOUS THROMBOSIS OF LEFT UPPER EXTREMITY: Status: ACTIVE | Noted: 2021-08-07

## 2021-08-07 PROBLEM — T45.1X5A CHEMOTHERAPY INDUCED NEUTROPENIA: Status: RESOLVED | Noted: 2019-08-11 | Resolved: 2021-08-07

## 2021-08-07 PROBLEM — K92.2 GI BLEED: Status: RESOLVED | Noted: 2021-08-02 | Resolved: 2021-08-07

## 2021-08-07 PROBLEM — D70.1 CHEMOTHERAPY INDUCED NEUTROPENIA: Status: RESOLVED | Noted: 2019-08-11 | Resolved: 2021-08-07

## 2021-08-07 LAB
ANION GAP SERPL CALCULATED.3IONS-SCNC: 9.7 MMOL/L (ref 5–15)
BUN SERPL-MCNC: 7 MG/DL (ref 8–23)
BUN/CREAT SERPL: 11.1 (ref 7–25)
CALCIUM SPEC-SCNC: 8.3 MG/DL (ref 8.6–10.5)
CHLORIDE SERPL-SCNC: 108 MMOL/L (ref 98–107)
CO2 SERPL-SCNC: 21.3 MMOL/L (ref 22–29)
CREAT SERPL-MCNC: 0.63 MG/DL (ref 0.76–1.27)
DEPRECATED RDW RBC AUTO: 57.7 FL (ref 37–54)
ERYTHROCYTE [DISTWIDTH] IN BLOOD BY AUTOMATED COUNT: 17.3 % (ref 12.3–15.4)
GFR SERPL CREATININE-BSD FRML MDRD: 128 ML/MIN/1.73
GLUCOSE SERPL-MCNC: 79 MG/DL (ref 65–99)
HCT VFR BLD AUTO: 34.5 % (ref 37.5–51)
HGB BLD-MCNC: 10.8 G/DL (ref 13–17.7)
MCH RBC QN AUTO: 28.7 PG (ref 26.6–33)
MCHC RBC AUTO-ENTMCNC: 31.3 G/DL (ref 31.5–35.7)
MCV RBC AUTO: 91.8 FL (ref 79–97)
PLATELET # BLD AUTO: 168 10*3/MM3 (ref 140–450)
PMV BLD AUTO: 9.9 FL (ref 6–12)
POTASSIUM SERPL-SCNC: 3.5 MMOL/L (ref 3.5–5.2)
RBC # BLD AUTO: 3.76 10*6/MM3 (ref 4.14–5.8)
SODIUM SERPL-SCNC: 139 MMOL/L (ref 136–145)
WBC # BLD AUTO: 7.33 10*3/MM3 (ref 3.4–10.8)

## 2021-08-07 PROCEDURE — 85027 COMPLETE CBC AUTOMATED: CPT | Performed by: INTERNAL MEDICINE

## 2021-08-07 PROCEDURE — 25010000002 HEPARIN LOCK FLUSH PER 10 UNITS: Performed by: INTERNAL MEDICINE

## 2021-08-07 PROCEDURE — 99232 SBSQ HOSP IP/OBS MODERATE 35: CPT | Performed by: INTERNAL MEDICINE

## 2021-08-07 PROCEDURE — 80048 BASIC METABOLIC PNL TOTAL CA: CPT | Performed by: INTERNAL MEDICINE

## 2021-08-07 RX ORDER — HEPARIN SODIUM (PORCINE) LOCK FLUSH IV SOLN 100 UNIT/ML 100 UNIT/ML
500 SOLUTION INTRAVENOUS ONCE
Status: COMPLETED | OUTPATIENT
Start: 2021-08-07 | End: 2021-08-07

## 2021-08-07 RX ORDER — PANTOPRAZOLE SODIUM 40 MG/1
40 TABLET, DELAYED RELEASE ORAL DAILY
Qty: 30 TABLET | Refills: 30 | Status: SHIPPED | OUTPATIENT
Start: 2021-08-07 | End: 2021-08-20 | Stop reason: SDUPTHER

## 2021-08-07 RX ORDER — PANTOPRAZOLE SODIUM 40 MG/1
40 TABLET, DELAYED RELEASE ORAL
Status: DISCONTINUED | OUTPATIENT
Start: 2021-08-07 | End: 2021-08-07 | Stop reason: HOSPADM

## 2021-08-07 RX ORDER — LANOLIN ALCOHOL/MO/W.PET/CERES
325 CREAM (GRAM) TOPICAL
Qty: 90 TABLET | Refills: 3 | Status: SHIPPED | OUTPATIENT
Start: 2021-08-07 | End: 2021-09-30

## 2021-08-07 RX ADMIN — PANTOPRAZOLE SODIUM 8 MG/HR: 40 INJECTION, POWDER, FOR SOLUTION INTRAVENOUS at 04:06

## 2021-08-07 RX ADMIN — Medication 500 UNITS: at 10:47

## 2021-08-07 RX ADMIN — GABAPENTIN 300 MG: 300 CAPSULE ORAL at 09:11

## 2021-08-07 RX ADMIN — LISINOPRIL: 20 TABLET ORAL at 09:11

## 2021-08-07 RX ADMIN — POTASSIUM CHLORIDE 40 MEQ: 750 TABLET, EXTENDED RELEASE ORAL at 06:46

## 2021-08-07 RX ADMIN — Medication 1 CAPSULE: at 09:11

## 2021-08-07 NOTE — PROGRESS NOTES
[facility]  Inpatient Progress Note    Reason for Follow Up: GI bleed with melena secondary to esophageal adenocarcinoma    Subjective     Interval History:   Brown bowel movement this morning.  No further signs of bleeding.  Tolerating a diet without pain or heartburn.    Current Facility-Administered Medications:   •  acetaminophen (TYLENOL) tablet 650 mg, 650 mg, Oral, Q4H PRN, Cathy Houser MD  •  gabapentin (NEURONTIN) capsule 300 mg, 300 mg, Oral, Q12H, Cathy Houser MD, 300 mg at 08/06/21 2019  •  lactobacillus acidophilus (RISAQUAD) capsule 1 capsule, 1 capsule, Oral, Daily, Cathy Houser MD, 1 capsule at 08/06/21 0800  •  lisinopril (PRINIVIL,ZESTRIL) 20 mg, hydroCHLOROthiazide (HYDRODIURIL) 12.5 mg for ZESTORETIC 20-12.5, , Oral, Q24H, Cathy Houser MD  •  melatonin tablet 3 mg, 3 mg, Oral, Nightly PRN, Cathy Houser MD  •  nitroglycerin (NITROSTAT) SL tablet 0.4 mg, 0.4 mg, Sublingual, Q5 Min PRN, Cathy Houser MD  •  ondansetron (ZOFRAN) tablet 4 mg, 4 mg, Oral, Q6H PRN **OR** ondansetron (ZOFRAN) injection 4 mg, 4 mg, Intravenous, Q6H PRN, Cathy Houser MD  •  pantoprazole (PROTONIX) 40 mg in 100 mL NS (VTB), 8 mg/hr, Intravenous, Continuous, Nahomi Villavicencio MD, Last Rate: 20 mL/hr at 08/07/21 0407, 8 mg/hr at 08/07/21 0407  •  potassium chloride (K-DUR,KLOR-CON) ER tablet 40 mEq, 40 mEq, Oral, PRN, Darren Hansen MD, 40 mEq at 08/07/21 0646  •  potassium chloride (KLOR-CON) packet 40 mEq, 40 mEq, Oral, PRN, Darren Hansen MD  Review of Systems:    The following systems were reviewed and negative;  constitution and gastrointestinal    Objective     Vital Signs  Temp:  [97.4 °F (36.3 °C)-98.4 °F (36.9 °C)] 98.4 °F (36.9 °C)  Heart Rate:  [67-79] 68  Resp:  [18-20] 20  BP: (115-154)/(57-88) 132/67  Body mass index is 30.27 kg/m².    Intake/Output Summary (Last 24 hours) at 8/7/2021 0816  Last data filed at  8/7/2021 0717  Gross per 24 hour   Intake 1030 ml   Output 2000 ml   Net -970 ml     I/O this shift:  In: -   Out: 250 [Urine:250]     Physical Exam:   General: patient awake, alert and cooperative   Eyes: Normal lids and lashes, no scleral icterus   Neck: supple, normal ROM   Skin: warm and dry, not jaundiced   Pulm:   regular and unlabored   Abdomen: soft, nontender, nondistended    Extremities: no rash or edema   Psychiatric: Normal mood and behavior; memory intact     Results Review:     I reviewed the patient's new clinical results.    Results from last 7 days   Lab Units 08/07/21  0335 08/06/21  0829 08/05/21  2358 08/05/21  1518 08/05/21  0458   WBC 10*3/mm3 7.33 9.55  --   --  8.46   HEMOGLOBIN g/dL 10.8* 11.4* 10.7*   < > 8.9*   HEMATOCRIT % 34.5* 35.6* 31.9*   < > 27.1*   PLATELETS 10*3/mm3 168 173  --   --  150    < > = values in this interval not displayed.     Results from last 7 days   Lab Units 08/07/21  0335 08/06/21  0829 08/05/21  1518 08/04/21  0515 08/04/21  0515 08/03/21  0748 08/02/21  1449   SODIUM mmol/L 139 141  --   --  139   < > 132*   POTASSIUM mmol/L 3.5 3.6 3.4*   < > 3.6   < > 3.5   CHLORIDE mmol/L 108* 108*  --   --  110*   < > 98   CO2 mmol/L 21.3* 20.5*  --   --  19.8*   < > 21.4*   BUN mg/dL 7* 8  --   --  12   < > 14   CREATININE mg/dL 0.63* 0.72*  --   --  0.76   < > 0.77   CALCIUM mg/dL 8.3* 8.4*  --   --  8.1*   < > 8.1*   BILIRUBIN mg/dL  --   --   --   --   --   --  0.2   ALK PHOS U/L  --   --   --   --   --   --  73   ALT (SGPT) U/L  --   --   --   --   --   --  25   AST (SGOT) U/L  --   --   --   --   --   --  21   GLUCOSE mg/dL 79 108*  --   --  85   < > 116    < > = values in this interval not displayed.     Results from last 7 days   Lab Units 08/05/21  0458   INR  1.27*     Lab Results   Lab Value Date/Time    LIPASE 12 (L) 09/16/2020 1617    LIPASE 8 (L) 01/11/2014 1430       Radiology:  No orders to display       Assessment/Plan     Patient Active Problem List    Diagnosis   • Essential hypertension   • Hyperlipidemia   • Impaired fasting glucose   • History of prostate cancer   • Tobacco abuse   • Liver metastasis (CMS/HCC)   • Esophagus neoplasm   • Malignant neoplasm of lower third of esophagus (CMS/HCC)   • Encounter for adjustment or management of vascular access device   • Acute deep vein thrombosis (DVT) of femoral vein of left lower extremity (CMS/HCC)   • Acute deep vein thrombosis (DVT) of right popliteal vein (CMS/HCC)   • Chemotherapy induced neutropenia (CMS/HCC)   • Anemia, chronic disease   • 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 GI bleed with melena secondary to esophageal adenocarcinoma-treated with hemospray 8/5; XRT started 8/5 XRT started 8/5XRT started 8/5  2. DVT-was on outpatient Xarelto  3. Acute blood loss anemia      Plan:  · hemoglobin relatively stable-continue to follow transfuse as needed  · Continue to hold Xarelto given concerns for additional bleeding  · DC PPI drip, recommend twice daily PPI indefinitely until he has completed radiation  · No further GI recommendations this time-we will sign off but available as needed    I discussed the patients findings and my recommendations with patient.    Michelle Sampson MD

## 2021-08-07 NOTE — DISCHARGE SUMMARY
Patient Name: Han Garcia  : 1955  MRN: [id number]    Date of Admission: 2021  Date of Discharge:  2021  Primary Care Physician: Jaiden Hoover MD      Discharge Diagnoses     Active Hospital Problems    Diagnosis  POA   • Superficial venous thrombosis of left upper extremity [I82.612]  No   • Chronic anticoagulation [Z79.01]  Not Applicable   • 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) [G62.0, T45.1X5A]  Yes   • Anemia, chronic disease [D63.8]  Yes   • Acute deep vein thrombosis (DVT) of femoral vein of left lower extremity (CMS/HCC) [I82.412]  Yes   • Acute deep vein thrombosis (DVT) of right popliteal vein (CMS/HCC) [I82.431]  Yes   • Esophagus neoplasm [D49.0]  Yes   • Liver metastasis (CMS/HCC) [C78.7]  Yes   • Essential hypertension [I10]  Yes   • History of prostate cancer [Z85.46]  Not Applicable   • Hyperlipidemia [E78.5]  Yes   • Tobacco abuse [Z72.0]  Yes      Resolved Hospital Problems    Diagnosis Date Resolved POA   • **GI bleed [K92.2] 2021 Yes   • Chemotherapy induced neutropenia (CMS/HCC) [D70.1, T45.1X5A] 2021 Yes        Hospital Course     Brief admission history and physical.  Please refer to the H&P for full details.  A pleasant 65 years old white gentleman with a past history of prostate cancer/metastatic esophageal cancer/DVT on chronic anticoagulation/enterovesical fistula/hypertension/dyslipidemia/chemo-induced peripheral neuropathy who presents to his oncology office with weakness and fatigue over the last 2 days associated with melena.  No nausea or vomiting no abdominal pain.  No fever or chills.  No chest pain or shortness of breath.  His hemoglobin was noticed to be 5.7 and he was directly admitted.  On physical examination on admission his temperature is 97.9 a pulse of 102 respiratory rate of 16 and a blood pressure 142/65 and O2 sats 100% on room air.  The rest of the examination is remarkable for decreased bilateral air entry.  Otherwise  unremarkable.  Hospital course.  Patient evaluation included a CBC that was normal except white count of 14.19 hemoglobin 5.7.  Chemistries normal except for sodium of 132 and bicarbonate 21.4.  Calcium was 8.1.  Recent TSH was normal.  The diagnosis was that of an upper GI bleed leading to an acute anemia on top of anemia of chronic blood loss in a patient with a history of esophageal cancer stage IV.  He was transfused.  GI consultation was obtained and EGD was done on 8/5/2021 that read revealed a distal esophageal mass that was bleeding hemospray was applied.  Anticoagulation was stopped.  He was placed empirically on IV proton pump inhibitors.  Hematology oncology consult was obtained you recommended radiation therapy for this mass and the patient has started that while in the hospital.  He remained hemodynamically stable.  He has received IV iron during his hospital stay.  His hemoglobin has stabilized during the stay between 10 and 11.  Clinically his melena has resolved.  He was discharged on p.o. Protonix and iron with a follow-up with the radiation therapy and hematology oncology.  He does have a history of colovesical fistula and he is on prophylactic Cipro he did not have any urinary complaints during this hospital stay.  He has a history of lower extremity DVT and thrombocytopenia and during his hospital stay he developed a left upper extremity SVT.  Secondary to the GI bleed his Xarelto and Pletal were DC'd in agreement with the hematology and oncology this most likely will need to be restarted again after couple weeks after bleeding stops after the radiation therapy.  He does have a history of peripheral neuropathy secondary to chemo we continued his Neurontin.  As far as his high blood pressure he had a good blood pressure control with no evidence of angina or congestive heart failure his fluids was stopped and he will continue lisinopril and HCTZ.  At the time of discharge he was asymptomatic and  hemodynamically stable.  He will follow-up with his primary care physician/hematology oncology/radiation oncology/GI.  He was counseled against tobacco abuse and nonsteroidal anti-inflammatory drugs    Consultants     Consult Orders (all) (From admission, onward)     Start     Ordered    08/03/21 1522  Inpatient Radiation Oncology Consult  Once     Comments: Palliative radiation therapy for hemorrhage assumably from esophageal cancer   Specialty:  Radiation Oncology  Provider:  Anh Garcia MD    08/03/21 1522    08/02/21 1931  Inpatient Thoracic Surgery Consult  Once     Specialty:  Thoracic Surgery  Provider:  Mary Brito MD    08/02/21 1932    08/02/21 1719  Inpatient Gastroenterology Consult  Once     Specialty:  Gastroenterology  Provider:  Kaiser Hodges MD    08/02/21 1718    08/02/21 1718  Inpatient Gastroenterology Consult  Once     Specialty:  Gastroenterology  Provider:  Kaiser Hodges MD    08/02/21 1718    08/02/21 1718  Inpatient Hematology & Oncology Consult  Once     Specialty:  Hematology and Oncology  Provider:  Alexey Haynes MD    08/02/21 1718              Procedures     Imaging Results (All)     None          Pertinent Labs     Results from last 7 days   Lab Units 08/07/21  0335 08/06/21  0829 08/05/21  2358 08/05/21  1518 08/05/21  0458 08/05/21  0458 08/04/21  1602 08/04/21  0515   WBC 10*3/mm3 7.33 9.55  --   --   --  8.46  --  8.24   HEMOGLOBIN g/dL 10.8* 11.4* 10.7* 9.8*   < > 8.9*   < > 9.0*   PLATELETS 10*3/mm3 168 173  --   --   --  150  --  150    < > = values in this interval not displayed.     Results from last 7 days   Lab Units 08/07/21  0335 08/06/21  0829 08/05/21  1518 08/04/21  0515 08/03/21  0748 08/03/21  0748   SODIUM mmol/L 139 141  --  139  --  134*   POTASSIUM mmol/L 3.5 3.6 3.4* 3.6   < > 3.6   CHLORIDE mmol/L 108* 108*  --  110*  --  106   CO2 mmol/L 21.3* 20.5*  --  19.8*  --  20.2*   BUN mg/dL 7* 8  --  12  --  11   CREATININE mg/dL 0.63*  0.72*  --  0.76  --  0.73*   GLUCOSE mg/dL 79 108*  --  85  --  105*    < > = values in this interval not displayed.   Estimated Creatinine Clearance: 133.4 mL/min (A) (by C-G formula based on SCr of 0.63 mg/dL (L)).  Results from last 7 days   Lab Units 08/02/21  1449   ALBUMIN g/dL 3.50   BILIRUBIN mg/dL 0.2   ALK PHOS U/L 73   AST (SGOT) U/L 21   ALT (SGPT) U/L 25     Results from last 7 days   Lab Units 08/07/21  0335 08/06/21  0829 08/05/21  1518 08/04/21  0515 08/03/21  0748 08/02/21  1449 08/02/21  1449   CALCIUM mg/dL 8.3* 8.4*  --  8.1* 7.8*   < > 8.1*   ALBUMIN g/dL  --   --   --   --   --   --  3.50   MAGNESIUM mg/dL  --  1.7 1.8  --   --   --   --     < > = values in this interval not displayed.               Invalid input(s): LDLCALC      Imaging Results (Last 24 Hours)     ** No results found for the last 24 hours. **          Test Results Pending at Discharge         Discharge Exam   Physical Exam  Vitals.  Temperature 98.4 a pulse of 68 respiratory rate of 20 and blood pressure 132/67 O2 sats of 98% on room air  General.  Middle-aged gentleman.  Alert oriented x3.  No apparent pain distress or diaphoresis.  Normal mood and affect.  Eyes.  Pupils equal round and reactive.  Intact extraocular musculature.  No pallor or jaundice.  Oral cavity.  Moist mucous membrane.  Neck.  Supple.  No JVD.  No lymphadenopathy or thyromegaly.  Cardiovascular.  Regular rate and rhythm.  No murmurs.  Chest.  Clear to auscultation bilaterally with no added sounds.  Abdomen.  Soft lax.  No tenderness.  No organomegaly.  No guarding or rebound.  Extremities.  +1 bilateral lower extremity edema.  No left upper extremity edema or pain  CNS.  No acute focal neurological deficits.  Discharge Details        Discharge Medications      New Medications      Instructions Start Date   ferrous sulfate 325 (65 FE) MG EC tablet   325 mg, Oral, 3 Times Daily With Meals      pantoprazole 40 MG EC tablet  Commonly known as: Protonix   40  mg, Oral, Daily         Changes to Medications      Instructions Start Date   gabapentin 300 MG capsule  Commonly known as: NEURONTIN  What changed:   · how much to take  · when to take this   600 mg, Oral, Every Night at Bedtime      nicotine 21 MG/24HR patch  Commonly known as: NICODERM CQ  What changed: additional instructions   1 patch, Transdermal, Every 24 Hours         Continue These Medications      Instructions Start Date   acetaminophen 500 MG tablet  Commonly known as: TYLENOL   500 mg, Oral, Every 6 Hours PRN      cevimeline 30 MG capsule  Commonly known as: EVOXAC   30 mg, Oral, 3 Times Daily      ciprofloxacin 250 MG tablet  Commonly known as: CIPRO   250 mg, Oral, Daily      econazole nitrate 1 % cream  Commonly known as: SPECTAZOLE   2 Times Daily      lisinopril-hydrochlorothiazide 20-12.5 MG per tablet  Commonly known as: PRINZIDE,ZESTORETIC   TAKE 1 TABLET BY MOUTH EVERY DAY      ondansetron 4 MG tablet  Commonly known as: ZOFRAN   4 mg, Oral, Every 8 Hours PRN      PROBIOTIC DAILY PO   1 tablet, Oral, Daily         Stop These Medications    cilostazol 50 MG tablet  Commonly known as: PLETAL     Xarelto 20 MG tablet  Generic drug: rivaroxaban            No Known Allergies      Discharge Disposition:  Condition: Stable    Diet:   Diet Order   Procedures   • Diet Regular; GI Soft   Healthy cardiac    Activity:   Activity Instructions     Activity as Tolerated            Counseling : DC smoking    CODE STATUS:    Code Status and Medical Interventions:   Ordered at: 08/02/21 1718     Code Status:    CPR     Medical Interventions (Level of Support Prior to Arrest):    Full       Future Appointments   Date Time Provider Department Center   8/9/2021  1:00 PM  [facility]  [facility]   8/10/2021  1:45 PM Sarah Wiseman MD [facility] None   8/10/2021  1:50 PM [facility]  [facility]   8/11/2021  1:50 PM [facility]  [facility]   8/12/2021  1:50 PM [facility]  [facility]    8/13/2021  1:50 PM [facility]   8/16/2021  1:10 PM [facility]   8/17/2021  1:10 PM [facility]   8/18/2021  1:10 PM [facility]   8/19/2021  9:30 AM [facility] PORT CHAIR  [facility]   8/19/2021 10:00 AM Alexey Haynes MD [facility] [place]   8/19/2021 10:30 AM CHAIR 12 [facility] - SM  [facility]     Additional Instructions for the Follow-ups that You Need to Schedule     Call MD With Problems / Concerns   As directed      Instructions: Call MD or return to ER if abdominal pain/nausea and vomiting/hematemesis/melena/fresh bright blood per rectum/chest pain/dizziness/shortness of breath/swelling of the legs and extremities    Order Comments: Instructions: Call MD or return to ER if abdominal pain/nausea and vomiting/hematemesis/melena/fresh bright blood per rectum/chest pain/dizziness/shortness of breath/swelling of the legs and extremities          Discharge Follow-up with PCP   As directed       Currently Documented PCP:    Jaiden Hoover MD    PCP Phone Number:    [phone]     Follow Up Details: 1 week.  Upper GI bleed secondary to esophageal cancer ulcerated mass/esophageal cancer with metastasis/DVT/SVT/hypertension-anemia         Discharge Follow-up with Specified Provider: GI.; 2 Weeks   As directed      To: GI.    Follow Up: 2 Weeks    Follow Up Details: Bleeding esophageal cancer         Discharge Follow-up with Specified Provider: Hematology oncology; 2 Weeks   As directed      To: Hematology oncology    Follow Up: 2 Weeks    Follow Up Details: Metastatic esophageal cancer/anemia         Discharge Follow-up with Specified Provider: Radiation oncology; 1 Week   As directed      To: Radiation oncology    Follow Up: 1 Week    Follow Up Details: Esophageal cancer on radiotherapy           Follow-up Information     Jaiden Hoover MD .    Specialty: Family Medicine  Why: 1 week.  Upper GI bleed secondary to  esophageal cancer ulcerated mass/esophageal cancer with metastasis/DVT/SVT/hypertension-anemia  Contact information:  [address] [id number] [phone]                     Time Spent on Discharge:  Greater than 30 minutes      Rachelle Sorto MD  [facility]  08/07/21  08:48 EDT

## 2021-08-07 NOTE — PLAN OF CARE
Goal Outcome Evaluation:  Plan of Care Reviewed With: patient        Progress: improving  Outcome Summary: Patient is alert and oriented. Able to make needs known. Denies pain and discomfort. Up ad monisha. Voiding well. Did observe particles in urine. D/T patient hisory of colovesical fistula, inquired if patient having any discomfort. Patient denied. No bleeding or new bruising noted. Vital signs stable. Call light in reach. Safety maintained.

## 2021-08-07 NOTE — OUTREACH NOTE
Prep Survey      Responses   [facility] patient discharged from?  [place]   Is LACE score < 7 ?  No   Emergency Room discharge w/ pulse ox?  No   Eligibility  [facility]   Date of Admission  08/02/21   Date of Discharge  08/07/21   Discharge Disposition  Home or Self Care   Discharge diagnosis  GI bleed    Does the patient have one of the following disease processes/diagnoses(primary or secondary)?  Other   Does the patient have Home health ordered?  No   Is there a DME ordered?  No   Prep survey completed?  Yes          Taty Zhang RN

## 2021-08-09 ENCOUNTER — APPOINTMENT (OUTPATIENT)
Dept: RADIATION ONCOLOGY | Facility: HOSPITAL | Age: 66
End: 2021-08-09

## 2021-08-09 ENCOUNTER — TRANSITIONAL CARE MANAGEMENT TELEPHONE ENCOUNTER (OUTPATIENT)
Dept: CALL CENTER | Facility: HOSPITAL | Age: 66
End: 2021-08-09

## 2021-08-09 PROCEDURE — 77412 RADIATION TX DELIVERY LVL 3: CPT | Performed by: RADIOLOGY

## 2021-08-09 PROCEDURE — 77014 CHG CT GUIDANCE RADIATION THERAPY FLDS PLACEMENT: CPT | Performed by: RADIOLOGY

## 2021-08-09 NOTE — OUTREACH NOTE
Call Center TCM Note      Responses   [facility] patient discharged from?  [place]   Does the patient have one of the following disease processes/diagnoses(primary or secondary)?  Other   TCM attempt successful?  Yes   Call start time  1220   Call end time  1223   Discharge diagnosis  GI bleed    Meds reviewed with patient/caregiver?  Yes   Is the patient having any side effects they believe may be caused by any medication additions or changes?  No   Does the patient have all medications ordered at discharge?  Yes   Is the patient taking all medications as directed (includes completed medication regime)?  Yes   Does the patient have a primary care provider?   Yes   Does the patient have an appointment with their PCP within 7 days of discharge?  No   Comments regarding PCP  PATIENT WAS ON HIS WAY TO [facility] AND DECLINED MAKING A HOSPITAL FOLLOW UP APPOINTMENT AT THIS TIME.    What is preventing the patient from scheduling follow up appointments within 7 days of discharge?  Haven't had time   Nursing Interventions  Educated patient on importance of making appointment, Advised patient to make appointment   Has the patient kept scheduled appointments due by today?  N/A   Has home health visited the patient within 72 hours of discharge?  N/A   Psychosocial issues?  No   Did the patient receive a copy of their discharge instructions?  Yes   Nursing interventions  Reviewed instructions with patient   What is the patient's perception of their health status since discharge?  Improving   Is the patient/caregiver able to teach back signs and symptoms related to disease process for when to call PCP?  Yes   Is the patient/caregiver able to teach back signs and symptoms related to disease process for when to call 911?  Yes   Is the patient/caregiver able to teach back the hierarchy of who to call/visit for symptoms/problems? PCP, Specialist, Home health nurse, Urgent Care, ED, 911  Yes   If the patient is a current  smoker, are they able to teach back resources for cessation?  Smoking cessation support groups   TCM call completed?  Yes          Marita Fink LPN    8/9/2021, 12:23 EDT

## 2021-08-10 ENCOUNTER — TELEPHONE (OUTPATIENT)
Dept: ONCOLOGY | Facility: CLINIC | Age: 66
End: 2021-08-10

## 2021-08-10 ENCOUNTER — RADIATION ONCOLOGY WEEKLY ASSESSMENT (OUTPATIENT)
Dept: RADIATION ONCOLOGY | Facility: HOSPITAL | Age: 66
End: 2021-08-10

## 2021-08-10 DIAGNOSIS — C15.5 MALIGNANT NEOPLASM OF LOWER THIRD OF ESOPHAGUS (HCC): Primary | ICD-10-CM

## 2021-08-10 PROCEDURE — 77412 RADIATION TX DELIVERY LVL 3: CPT | Performed by: RADIOLOGY

## 2021-08-10 PROCEDURE — 77336 RADIATION PHYSICS CONSULT: CPT | Performed by: RADIOLOGY

## 2021-08-10 PROCEDURE — 77014 CHG CT GUIDANCE RADIATION THERAPY FLDS PLACEMENT: CPT | Performed by: RADIOLOGY

## 2021-08-10 NOTE — CASE MANAGEMENT/SOCIAL WORK
Case Management Discharge Note      Final Note: home via private auto    Provided Post Acute Provider List?: N/A  N/A Provider List Comment: patient denies discharge planning needs at time of screening    Selected Continued Care - Discharged on 8/7/2021 Admission date: 8/2/2021 - Discharge disposition: Home or Self Care    Destination    No services have been selected for the patient.              Durable Medical Equipment    No services have been selected for the patient.              Dialysis/Infusion    No services have been selected for the patient.              Home Medical Care    No services have been selected for the patient.              Therapy    No services have been selected for the patient.              Community Resources    No services have been selected for the patient.              Community & DME    No services have been selected for the patient.                  Transportation Services  Private: Car    Final Discharge Disposition Code: 01 - home or self-care

## 2021-08-10 NOTE — TELEPHONE ENCOUNTER
Caller: TYE    Relationship to patient:  [facility]    Best call back number: [phone]    Type of visit: LAB (CBC WITH DIFF)    Requested date: 08/12 AT 1:20PM    Additional notes: PLEASE CALL PATIENT WITH APPT. [phone]

## 2021-08-10 NOTE — PAYOR COMM NOTE
"Urban Mota (65 y.o. Male)     Please see attached clns and dc summary    REF#[id number]    PLEASE CALL  OR  [phone]    THANK YOU    KIRILL WILKINSON LPN CCP    Date of Birth Social Security Number Address Home Phone MRN    1955  [id number] [facility] [address] [phone] [id number]    Protestant Marital Status          None Single       Admission Date Admission Type Admitting Provider Attending Provider Department, Room/Bed    8/2/21 Urgent StinglCathy MD  [address], [address]    Discharge Date Discharge Disposition Discharge Destination        8/7/2021 Home or Self Care              Attending Provider: (none)   Allergies: No Known Allergies    Isolation: None   Infection: None   Code Status: Prior    Ht: 175.3 cm (69\")   Wt: 93 kg (205 lb)    Admission Cmt: None   Principal Problem: GI bleed [K92.2]                 Active Insurance as of 8/2/2021     Primary Coverage     Payor Plan Insurance Group Employer/Plan Group    ANTHEM BLUE CROSS ANTHEM BLUE CROSS BLUE SHIELD O [id number]     Payor Plan Address Payor Plan Phone Number Payor Plan Fax Number Effective Dates    [address] [phone]  7/19/2020 - None Entered    [facility] [id number]       Subscriber Name Subscriber Birth Date Member ID       URBAN MOTA 1955 [id number]                 Emergency Contacts      (Rel.) Home Phone Work Phone Mobile Phone    Pravin, (Sister) -- -- [phone]    SAEID PETERSON (Sister) [phone] -- --            Oxygen Therapy (last 6 days) before discharge     Date/Time   SpO2   Device (Oxygen Therapy)   Flow (L/min)   Oxygen Concentration (%)   ETCO2 (mmHg)    08/07/21 0912   --   room air   --   --   --    08/07/21 0717   --   room air   --   --   --    08/06/21 0711   98   room air   --   --   --    08/05/21 1038   98   room air   --   --   --    08/05/21 1026   100   simple face mask   4   --   --    08/05/21 " 0918   98   room air   --   --   --    08/04/21 0839   --   room air   --   --   --    08/03/21 0820   --   room air   --   --   --     Intake & Output (last 7 days)       08/03 0701 - 08/04 0700 08/04 0701 - 08/05 0700 08/05 0701 - 08/06 0700 08/06 0701 - 08/07 0700 08/07 0701 - 08/08 0700    P.O. 150  160    I.V. (mL/kg)   300 (3.2)      Blood 600  300      IV Piggyback    100     Total Intake(mL/kg) 750 (8.1) 940 (10.1) 1300 (14) 1130 (12.2) 160 (1.7)    Urine (mL/kg/hr) 2100 (0.9) 1210 (0.5) 1800 (0.8) 2050 (0.9) 400 (0.2)    Stool    0 0    Total Output 2100 1210 1800 2050 400    Net -1350 -270 -500 -920 -240             Urine Unmeasured Occurrence 1 x 0 x 2 x 0 x     Stool Unmeasured Occurrence   1 x 1 x 1 x            Medication Administration Report for Han Garcia as of 08/10/21 1130    Legend:    Given Hold Not Given Due Canceled Entry Other Actions    Time Time (Time) Time  Time-Action       Discontinued     Completed     Future     MAR Hold     Linked           Medications 08/01/21 08/02/21 08/03/21 08/04/21 08/05/21 08/06/21 08/07/21   Completed Medications    acetaminophen (TYLENOL) tablet 650 mg  Dose: 650 mg  Freq: Once Route: PO  Start: 08/03/21 1130   End: 08/03/21 1316    Admin Instructions:   Give Prior to First Transfusion  Do not exceed 4 grams of acetaminophen in a 24 hr period. Max dose of 2gm for AST/ALT greater than 120 units/L      If given for pain, use the following pain scale:   Mild Pain = Pain Score of 1-3, CPOT 1-2  Moderate Pain = Pain Score of 4-6, CPOT 3-4  Severe Pain = Pain Score of 7-10, CPOT 5-8       1316              Or  acetaminophen (TYLENOL) 160 MG/5ML solution 650 mg  Dose: 650 mg  Freq: Once Route: PO  Start: 08/03/21 1130   End: 08/03/21 1316    Admin Instructions:   Give Prior to First Transfusion  Do not exceed 4 grams of acetaminophen in a 24 hr period. Max dose of 2gm for AST/ALT greater than 120 units/L      If given for pain, use the following  pain scale:   Mild Pain = Pain Score of 1-3, CPOT 1-2  Moderate Pain = Pain Score of 4-6, CPOT 3-4  Severe Pain = Pain Score of 7-10, CPOT 5-8                     Or  acetaminophen (TYLENOL) suppository 650 mg  Dose: 650 mg  Freq: Once Route: RE  Start: 08/03/21 1130   End: 08/03/21 1316    Admin Instructions:   Give Prior to First Transfusion  Do not exceed 4 grams of acetaminophen in a 24 hr period. Max dose of 2gm for AST/ALT greater than 120 units/L      If given for pain, use the following pain scale:   Mild Pain = Pain Score of 1-3, CPOT 1-2  Moderate Pain = Pain Score of 4-6, CPOT 3-4  Severe Pain = Pain Score of 7-10, CPOT 5-8                      acetaminophen (TYLENOL) tablet 650 mg  Dose: 650 mg  Freq: Once Route: PO  Start: 08/02/21 1930   End: 08/02/21 2008    Admin Instructions:   Give Prior to First Transfusion  Do not exceed 4 grams of acetaminophen in a 24 hr period. Max dose of 2gm for AST/ALT greater than 120 units/L      If given for pain, use the following pain scale:   Mild Pain = Pain Score of 1-3, CPOT 1-2  Moderate Pain = Pain Score of 4-6, CPOT 3-4  Severe Pain = Pain Score of 7-10, CPOT 5-8      2008               Or  acetaminophen (TYLENOL) 160 MG/5ML solution 650 mg  Dose: 650 mg  Freq: Once Route: PO  Start: 08/02/21 1930   End: 08/02/21 2008    Admin Instructions:   Give Prior to First Transfusion  Do not exceed 4 grams of acetaminophen in a 24 hr period. Max dose of 2gm for AST/ALT greater than 120 units/L      If given for pain, use the following pain scale:   Mild Pain = Pain Score of 1-3, CPOT 1-2  Moderate Pain = Pain Score of 4-6, CPOT 3-4  Severe Pain = Pain Score of 7-10, CPOT 5-8                     Or  acetaminophen (TYLENOL) suppository 650 mg  Dose: 650 mg  Freq: Once Route: RE  Start: 08/02/21 1930   End: 08/02/21 2008    Admin Instructions:   Give Prior to First Transfusion  Do not exceed 4 grams of acetaminophen in a 24 hr period. Max dose of 2gm for AST/ALT greater than  120 units/L      If given for pain, use the following pain scale:   Mild Pain = Pain Score of 1-3, CPOT 1-2  Moderate Pain = Pain Score of 4-6, CPOT 3-4  Severe Pain = Pain Score of 7-10, CPOT 5-8                      acetaminophen (TYLENOL) tablet 650 mg  Dose: 650 mg  Freq: Once Route: PO  Start: 08/05/21 1415   End: 08/05/21 1543    Admin Instructions:   Give Prior to First Transfusion  Do not exceed 4 grams of acetaminophen in a 24 hr period. Max dose of 2gm for AST/ALT greater than 120 units/L      If given for pain, use the following pain scale:   Mild Pain = Pain Score of 1-3, CPOT 1-2  Moderate Pain = Pain Score of 4-6, CPOT 3-4  Severe Pain = Pain Score of 7-10, CPOT 5-8         1543             diphenhydrAMINE (BENADRYL) capsule 25 mg  Dose: 25 mg  Freq: Once Route: PO  Start: 08/05/21 1415   End: 08/05/21 1543    Admin Instructions:   Give Prior to First Transfusion  Caution: Look alike/sound alike drug alert. This med may be ordered in other forms and routes. Before giving verify the last time the drug was given by any route/form.           1543             diphenhydrAMINE (BENADRYL) capsule 25 mg  Dose: 25 mg  Freq: Once Route: PO  Start: 08/03/21 1130   End: 08/03/21 1316    Admin Instructions:   Give Prior to First Transfusion  Caution: Look alike/sound alike drug alert. This med may be ordered in other forms and routes. Before giving verify the last time the drug was given by any route/form.         1316              Or  diphenhydrAMINE (BENADRYL) injection 25 mg  Dose: 25 mg  Freq: Once Route: IV  Start: 08/03/21 1130   End: 08/03/21 1316    Admin Instructions:   Give Prior to First Transfusion  Caution: Look alike/sound alike drug alert. This med may be ordered in other forms and routes. Before giving verify the last time the drug was given by any route/form.                        famotidine (PEPCID) injection 20 mg  Dose: 20 mg  Freq: Daily Route: IV  Start: 08/03/21 1415   End: 08/05/21 0803     Admin Instructions:   Premedication for intravenous Venofer infusion  Dilute to 10 mL total volume and give IV push over 2 minutes.       1717        0820        0803             famotidine (PEPCID) tablet 20 mg  Dose: 20 mg  Freq: Once Route: PO  Start: 08/06/21 1200   End: 08/06/21 1242    Admin Instructions:   Premedication for Venofer          1242            furosemide (LASIX) injection 20 mg  Dose: 20 mg  Freq: Once Route: IV  Start: 08/05/21 1415   End: 08/06/21 0024    Admin Instructions:   Give after first unit of PRBC.          0024            furosemide (LASIX) injection 20 mg  Dose: 20 mg  Freq: Once Route: IV  Start: 08/03/21 1130   End: 08/03/21 1717    Admin Instructions:   Give after first unit of PRBC.       1717               heparin injection 500 Units  Dose: 500 Units  Freq: Once Route: IK  Start: 08/07/21 1130   End: 08/07/21 1047          1047           iron sucrose (VENOFER) 300 mg in sodium chloride 0.9 % 250 mL IVPB  Dose: 300 mg  Freq: Once Route: IV  Start: 08/06/21 1200   End: 08/06/21 1242    Admin Instructions:   Monitor blood pressure pre and post infusion.          1242            iron sucrose (VENOFER) 300 mg in sodium chloride 0.9 % 250 mL IVPB  Dose: 300 mg  Freq: Daily Route: IV  Start: 08/03/21 1415   End: 08/05/21 1152    Admin Instructions:   Monitor blood pressure pre and post infusion.       2136 [C]        0820        1152            Discontinued Medications  Medications 08/01/21 08/02/21 08/03/21 08/04/21 08/05/21 08/06/21 08/07/21       acetaminophen (TYLENOL) tablet 650 mg  Dose: 650 mg  Freq: Every 4 Hours PRN Route: PO  PRN Reason: Mild Pain   Start: 08/02/21 1717   End: 08/07/21 1411    Admin Instructions:   Do not exceed 4 grams of acetaminophen in a 24 hr period.    If given for pain, use the following pain scale:   Mild Pain = Pain Score of 1-3, CPOT 1-2  Moderate Pain = Pain Score of 4-6, CPOT 3-4  Severe Pain = Pain Score of 7-10, CPOT 5-8  Do not exceed 4 grams  of acetaminophen in a 24 hr period. Max dose of 2gm for AST/ALT greater than 120 units/L      If given for pain, use the following pain scale:   Mild Pain = Pain Score of 1-3, CPOT 1-2  Moderate Pain = Pain Score of 4-6, CPOT 3-4  Severe Pain = Pain Score of 7-10, CPOT 5-8               gabapentin (NEURONTIN) capsule 300 mg  Dose: 300 mg  Freq: Every 12 Hours Scheduled Route: PO  Start: 08/02/21 2100   End: 08/07/21 1411    Admin Instructions:        2009 0821 2046        0820       2225        1151       2103        0800 2019        0911           lactobacillus acidophilus (RISAQUAD) capsule 1 capsule  Dose: 1 capsule  Freq: Daily Route: PO  Start: 08/02/21 1945   End: 08/07/21 1411 2359        0821        0820        1151        0800        0911           lisinopril (PRINIVIL,ZESTRIL) 20 mg, hydroCHLOROthiazide (HYDRODIURIL) 12.5 mg for ZESTORETIC 20-12.5  Freq: Every 24 Hours Scheduled Route: PO  Start: 08/02/21 1945   End: 08/07/21 1411    Admin Instructions:   Give both components      (7561)        (8221) [C]        (0820)        (1152)        (0803)        0911           melatonin tablet 3 mg  Dose: 3 mg  Freq: Nightly PRN Route: PO  PRN Reason: Sleep  Start: 08/02/21 1717   End: 08/07/21 1411              nitroglycerin (NITROSTAT) SL tablet 0.4 mg  Dose: 0.4 mg  Freq: Every 5 Minutes PRN Route: SL  PRN Reason: Chest Pain  PRN Comment: Only if SBP Greater Than 100  Start: 08/02/21 1717   End: 08/07/21 1411    Admin Instructions:   If Pain Unrelieved After 3 Doses Notify MD               ondansetron (ZOFRAN) tablet 4 mg  Dose: 4 mg  Freq: Every 6 Hours PRN Route: PO  PRN Reasons: Nausea,Vomiting  Start: 08/02/21 1717   End: 08/07/21 1411             Or  ondansetron (ZOFRAN) injection 4 mg  Dose: 4 mg  Freq: Every 6 Hours PRN Route: IV  PRN Reasons: Nausea,Vomiting  Start: 08/02/21 1717   End: 08/07/21 1411              pantoprazole (PROTONIX) 40 mg in 100 mL NS (VTB)  Rate: 20 mL/hr  Dose: 8 mg/hr  Freq: Continuous Route: IV  Indications of Use: Gastrointestinal (GI) Bleed  Indications Comment: .  Start: 08/07/21 0430   End: 08/07/21 1033          0406       0407           pantoprazole (PROTONIX) 40 mg in 100mL NS IVPB  Rate: 20 mL/hr Dose: 8 mg/hr  Freq: Continuous Route: IV  Indications of Use: Gastrointestinal (GI) Bleed  Start: 08/04/21 1545   End: 08/07/21 0331    Admin Instructions:   Break seal and mix to activate vial before use        1716       2226        0348       2143        0239       0800       1750       2322            pantoprazole (PROTONIX) 40 mg in 100mL NS IVPB  Rate: 20 mL/hr Dose: 8 mg/hr  Freq: Continuous Route: IV  Indications of Use: Gastrointestinal (GI) Bleed  Start: 08/02/21 2015   End: 08/04/21 1314    Admin Instructions:   Break seal and mix to activate vial before use      2359        0436       1037       1652       2122        0441       1234              pantoprazole (PROTONIX) 40 mg in sodium chloride 0.9 % 100 mL (0.4 mg/mL) infusion  Rate: 20 mL/hr Dose: 8 mg/hr  Freq: Continuous Route: IV  Indications of Use: Gastrointestinal (GI) Bleed  Start: 08/04/21 1400   End: 08/04/21 1452       (1536)       (1537)              pantoprazole (PROTONIX) EC tablet 40 mg  Dose: 40 mg  Freq: 2 Times Daily Before Meals Route: PO  Start: 08/07/21 1730   End: 08/07/21 1411    Admin Instructions:   Swallow whole; do not crush, split, or chew.               potassium chloride (K-DUR,KLOR-CON) ER tablet 40 mEq  Dose: 40 mEq  Freq: As Needed Route: PO  PRN Comment: Potassium Replacement.  See Admin Instructions  Start: 08/05/21 1614   End: 08/07/21 1411    Admin Instructions:   Potassium 3.1 or Less Give KCl 40 mEq q4h x3 Doses   Potassium 3.2 - 3.6 Give KCl 40 mEq q4h x2 Doses     Check Potassium 4 Hours After Last Dose Given   Check Magnesium if Potassium Level Remains Low After Replacement   DO NOT GIVE if CrCl is Less Than 30 mL/minute or Urine Output Less Than 30  mL/hr  Swallow whole; do not crush, split, or chew.         1910         0646           potassium chloride (KLOR-CON) packet 40 mEq  Dose: 40 mEq  Freq: As Needed Route: PO  PRN Comment: Potassium Replacement, See Admin Instructions  Start: 08/05/21 1614   End: 08/07/21 1411    Admin Instructions:   Potassium 3.1 or Less Give KCl 40 mEq q4h x3 Doses   Potassium 3.2 - 3.6 Give KCl 40 mEq q4h x2 Doses     Check Potassium 4 Hours After Last Dose Given   Check Magnesium if Potassium Level Remains Low After Replacement   DO NOT GIVE if CrCl is Less Than 30 mL/minute or Urine Output Less Than 30 mL/hr               sodium chloride 0.9 % infusion  Rate: 75 mL/hr Dose: 75 mL/hr  Freq: Continuous Route: IV  Start: 08/02/21 1815   End: 08/06/21 0904     1903        1322        1444        0349       0924       0953       1016 [C]       1017             sodium chloride 0.9 % infusion 1,000 mL  Rate: 25 mL/hr Dose: 1000 mL  Freq: Continuous Route: IV  Start: 08/05/21 0925   End: 08/06/21 0904        (1137)                    Blood Administration Record (From admission, onward)    Completed transfusions     Ordered     Start    08/03/21 1031  Transfuse RBC, 2 Units Infuse Each Unit Over: 3.5H  Transfusion     Released Time Blood Unit Number Status   08/03/21 1254   21  [id number]  [id number] Completed 08/03/21 1656   08/03/21 1658   21  [id number]  [id number] Completed 08/03/21 2127       08/03/21 1031    08/02/21 1834  Transfuse RBC, 2 Units Infuse Each Unit Over: 3.5H  Transfusion     Released Time Blood Unit Number Status   08/02/21 2236   21  [id number]  [id number] Completed 08/03/21 0148   08/03/21 0150   21  [id number]  [id number] Completed 08/03/21 0505       08/02/21 1833          Canceled transfusions     Ordered     Start    08/05/21 1315  Transfuse RBC, 2 Units Infuse Each Unit Over: 3.5H  Transfusion,   Status:  Canceled     Released Time Blood Unit Number Status   08/05/21 1548   21  [id number]  [id number]  Transfusing   08/05/21 1826   21  [id number]  [id number] Stopped       08/05/21 1314                   Operative/Procedure Notes (all)      Procedures signed by Naga Shelby MD at 08/05/21 1024   Version 1 of 1     Procedure Orders    1. UPPER GI ENDOSCOPY [[id number]] ordered by Naga Shelby MD at 08/05/21 0947          Scan on 8/5/2021 0947 by Naga Shelby MD: EGD          Electronically signed by Naga Shelby MD at 08/05/21 1024          Physician Progress Notes (all)      Michelle Sampson MD at 08/07/21 0816          [facility]  Inpatient Progress Note    Reason for Follow Up: GI bleed with melena secondary to esophageal adenocarcinoma    Subjective     Interval History:   Brown bowel movement this morning.  No further signs of bleeding.  Tolerating a diet without pain or heartburn.  Objective     Vital Signs  Temp:  [97.4 °F (36.3 °C)-98.4 °F (36.9 °C)] 98.4 °F (36.9 °C)  Heart Rate:  [67-79] 68  Resp:  [18-20] 20  BP: (115-154)/(57-88) 132/67  Body mass index is 30.27 kg/m².    Intake/Output Summary (Last 24 hours) at 8/7/2021 0816  Last data filed at 8/7/2021 0717  Gross per 24 hour   Intake 1030 ml   Output 2000 ml   Net -970 ml     I/O this shift:  In: -   Out: 250 [Urine:250]     Physical Exam:   General: patient awake, alert and cooperative   Eyes: Normal lids and lashes, no scleral icterus   Neck: supple, normal ROM   Skin: warm and dry, not jaundiced   Pulm:   regular and unlabored   Abdomen: soft, nontender, nondistended    Extremities: no rash or edema   Psychiatric: Normal mood and behavior; memory intact     Results Review:     I reviewed the patient's new clinical results.    Results from last 7 days   Lab Units 08/07/21  0335 08/06/21  0829 08/05/21  2358 08/05/21  1518 08/05/21  0458   WBC 10*3/mm3 7.33 9.55  --   --  8.46   HEMOGLOBIN g/dL 10.8* 11.4* 10.7*   < > 8.9*   HEMATOCRIT % 34.5* 35.6* 31.9*   < > 27.1*   PLATELETS 10*3/mm3 168 173  --   --  150    < > =  values in this interval not displayed.     Lab Units 08/07/21  0335 08/06/21  0829 08/05/21  1518 08/04/21  0515 08/04/21  0515   SODIUM mmol/L 139 141  --   --  139   POTASSIUM mmol/L 3.5 3.6 3.4*   < > 3.6   CHLORIDE mmol/L 108* 108*  --   --  110*   CO2 mmol/L 21.3* 20.5*  --   --  19.8*   BUN mg/dL 7* 8  --   --  12   CREATININE mg/dL 0.63* 0.72*  --   --  0.76   CALCIUM mg/dL 8.3* 8.4*  --   --  8.1*   BILIRUBIN mg/dL  --   --   --   --   --    ALK PHOS U/L  --   --   --   --   --    ALT (SGPT) U/L  --   --   --   --   --    AST (SGOT) U/L  --   --   --   --   --    GLUCOSE mg/dL 79 108*  --   --  85     Results from last 7 days   Lab Units 08/05/21  0458   INR  1.27*     Lab Results   Lab Value Date/Time    LIPASE 12 (L) 09/16/2020 1617    LIPASE 8 (L) 01/11/2014 1430       Radiology:  No orders to display       Assessment/Plan     Patient Active Problem List   Diagnosis   • Essential hypertension   • Hyperlipidemia   • Impaired fasting glucose   • History of prostate cancer   • Tobacco abuse   • Liver metastasis (CMS/HCC)   • Esophagus neoplasm   • Malignant neoplasm of lower third of esophagus (CMS/HCC)   • Encounter for adjustment or management of vascular access device   • Acute deep vein thrombosis (DVT) of femoral vein of left lower extremity (CMS/HCC)   • Acute deep vein thrombosis (DVT) of right popliteal vein (CMS/HCC)   • Chemotherapy induced neutropenia (CMS/HCC)   • Anemia, chronic disease   • 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 GI bleed with melena secondary to esophageal adenocarcinoma-treated with hemospray 8/5; XRT started 8/5 XRT started 8/5XRT started 8/5  2. DVT-was on outpatient Xarelto  3. Acute blood loss anemia      Plan:  · hemoglobin relatively stable-continue to follow transfuse as needed  · Continue to hold Xarelto given concerns for additional bleeding  · DC PPI drip,  recommend twice daily PPI indefinitely until he has completed radiation  · No further GI recommendations this time-we will sign off but available as needed    I discussed the patients findings and my recommendations with patient.    Michelle Sampson MD                  Electronically signed by Michelle Sampson MD at 08/07/21 1034     Nahomi Carson APRN at 08/06/21 1532        Patient off floor for radiation. We will see as needed. Please call anytime - [phone].    Thanks,  NIRU Greer  [facility]    Electronically signed by Nahmoi Carson APRN at 08/06/21 1538     Wilberto Pelletier MD PhD at 08/06/21 1105           LOS: 4 days   Patient Care Team:  Jaiden Hoover MD as PCP - General (Family Medicine)  Amanda Gunderson APRN as Referring Physician (Family Medicine)  Alexey Haynes MD as Consulting Physician (Hematology and Oncology)  Mary Brito MD as Surgeon (Thoracic Surgery)  Sarah Wiseman MD as Consulting Physician (Radiation Oncology)    Chief Complaint: Esophageal cancer, acute GI bleeding.  Iron deficiency anemia.    Interval History:  Patient was started on IV Venofer 8/3/2021.     8/4/2021  Patient reports tolerating IV iron therapy.  Continues to have dark-colored stool.  Hemoglobin improved 9.0 posttransfusion.    8/5/2021,  Patient continues to have dark-colored stool.  Had a EGD examination this morning, reporting bleeding from the esophageal mass.  Local treatment applied during the procedure.  Lab study reporting Hb 8.9.  Patient was given 2 more units PRBC transfusion due to starting radiation therapy.  Patient will be started palliative radiation therapy this afternoon.    8/6/2021  Patient reports last dark-colored stool.  Feels more energetic.  Lab study showed significant improved hemoglobin 11.4.    A comprehensive 14 point review of systems was performed and was negative except as mentioned.    Vital Signs:  Temp:  [97.2 °F (36.2 °C)-98.3 °F (36.8  °C)] 98.2 °F (36.8 °C)  Heart Rate:  [62-75] 66  Resp:  [18-20] 18  BP: (114-133)/(58-73) 116/64    Intake/Output Summary (Last 24 hours) at 8/6/2021 1106  Last data filed at 8/6/2021 0925  Gross per 24 hour   Intake 1310 ml   Output 2100 ml   Net -790 ml       Physical Exam:     GENERAL:  Well-developed, well-nourished male in no acute distress.  Still looks chronically ill.  Orientated to time place and the people.  Patient is sitting in chair.  SKIN:  Warm, dry without rashes.  HEENT:  Normocephalic.   LYMPHATICS:  No cervical adenopathy.  CHEST: Normal respiratory effort.  Lungs clear to auscultation. Good airflow.  CARDIAC:  Regular rate and rhythm. Normal S1,S2.  ABDOMEN:  Soft, nontender with no organomegaly or masses.  Bowel sounds normal.  EXTREMITIES:  No clubbing, cyanosis or edema.  NEUROLOGICAL:  Grossly intact.  No focal neurological deficits.  PSYCHIATRIC:  Normal affect and mood.        Component Value Date    NEUTROABS 7.45 (H) 08/06/2021     Results from last 7 days   Lab Units 08/06/21  0829 08/05/21  1518 08/04/21  0515 08/03/21  0748 08/03/21  0748 08/02/21  1449 08/02/21  1449   SODIUM mmol/L 141  --  139  --  134*   < > 132*   POTASSIUM mmol/L 3.6 3.4* 3.6   < > 3.6   < > 3.5   CHLORIDE mmol/L 108*  --  110*  --  106   < > 98   CO2 mmol/L 20.5*  --  19.8*  --  20.2*   < > 21.4*   BUN mg/dL 8  --  12  --  11   < > 14   CREATININE mg/dL 0.72*  --  0.76  --  0.73*   < > 0.77   CALCIUM mg/dL 8.4*  --  8.1*  --  7.8*   < > 8.1*   BILIRUBIN mg/dL  --   --   --   --   --   --  0.2   ALK PHOS U/L  --   --   --   --   --   --  73   ALT (SGPT) U/L  --   --   --   --   --   --  25   AST (SGOT) U/L  --   --   --   --   --   --  21   GLUCOSE mg/dL 108*  --  85  --  105*   < > 116    < > = values in this interval not displayed.         Results from last 7 days   Lab Units 08/05/21  0458   INR  1.27*   APTT seconds 36.9*         Results from last 7 days   Lab Units 08/06/21  0829   MAGNESIUM mg/dL 1.7            I reviewed the patient's new clinical results.        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 Almost 90% of the liver was replaced by tumor.  · HER-2 positive  · Initially treated with FOLFOX initiated July 2019  · Herceptin added with cycle 2  · Following 8 cycles of FOLFOX, oxaliplatin discontinued with continuation of 5-FU, leucovorin, Herceptin. This was continued through 7/7/2020  · Increasing CEA led to PET scan 6/9/2021.  Disease progression noted with growth of the tumor in the lower esophagus.  Continued stability of hepatic metastasis  · Repeat EGD 6/19/2021 for tissue specimen and Next Generation Sequencing.  · Endoscopy that shows a noncircumferential abnormality in the lower esophagus that encompasses almost 6 cm in length. It is not blocking off the esophagus and that is why the patient has no symptoms. The pathology shows at least atypical cells consistent with cancer.  · NGS showing PD-L1 positive and high mutation burden.  The tumor remains HER-2 positive.  Treatment plan for Keytruda every 3 weeks x 4 cycles followed by repeat imaging  · Keytruda initiated 7/29/2021.  This is scheduled every 3 weeks.        * Acute GI bleeding  · Dark tarry stool initially began 7/29/2021  · 7/23/2021, hemoglobin of 11.0.     ·  On 8/2/2021 hemoglobin of 5.7  ·  Xarelto has been on hold.    · The patient is waiting for thoracic surgery consultation, and I expect that this patient will need to have EGD examination to assess his bleeding situation.   · If indeed he has evidence of bleeding from esophageal cancer, we would consider palliative radiation therapy to stop bleeding.  · Patient reports today 8/4/2021, he continues to have dark-colored stool.  Patient will have EGD examination scheduled on 8/5/2021.  Appreciate GI consult.   · Patient was seen 8/4/2021 by radiation oncology Dr. Wiseman, who is ready to treat patient once EGD confirms bleeding from the  esophagus tumor.   · Patient had EGD examination on 8/5/2021 by Dr. Shelby, reporting bleeding from the esophageal mass.   · Radiation therapy was started this afternoon 8/5/2021.  · 8/6/2021, patient reports his stool is less dark-colored.      * Anemia:    · Acute GI bleeding on top of chronic bleeding.   · Received 2 units PRBC transfusion, improved hemoglobin 7.3 on 8/3/2021.  The patient already has improved hemoglobin after 2 units PRBC transfusion however he is still symptomatic with fatigue.   · I recommended to transfuse 2 more units PRBC 8/3/2021. We will give the patient Lasix at least after the 1st unit of PRBC to avoid volume overload.   · Posttransfusion hemoglobin 9.0 on 8/3/2021.   · Stable Hb 9.0 on 8/4/2021.  · Hemoglobin 8.9 on 8/5/2021.  Since patient will be starting on radiation therapy, to improve his tolerance, I recommended transfusion 2 units PRBC today.  Continue to monitor CBC in the morning.    · 8/6/2021, hemoglobin 11.4.      * Iron deficiency. This clearly is due to his GI bleeding. His laboratory study last night on 08/02/2021 reported ferritin 36.3 ng/mL and iron saturation 18% with free iron 41, TIBC 231. This patient had normal iron study on 05/18/2021 with ferritin 126 and iron saturation 25%. He had super therapeutic folate > 20 ng/mL on that day and normal vitamin B12 of 601 pg/mL. I think this patient also will benefit from intravenous iron therapy with all things considered. Certainly he is not a good candidate for oral iron treatment due to his esophageal cancer with active bleeding, and likely will have more issue with nausea, vomiting and abdomen pain.   · Intravenous Venofer was started on 8/3/2021.  Patient has been tolerating well with no reaction.  · Patient is receiving third dose of Venofer 8/5/2021.   · 86 2021, will give patient another dose Venofer 300 mg.  This will make his total Venofer 1200 mg during this hospitalization.     *  Thrombophilia secondary  malignancy, DVT  · Was on anticoagulated with Xarelto 20 mg daily.  · Currently on hold due to acute GI bleeding.   · Discussed with the patient again 2021, because he is nervous about recurrent DVT.  Nevertheless because of ongoing GI bleeding, I recommended to continue holding Xarelto and he voiced understanding.     *  Colovesical fistula.   · He requires intermittent Cipro when he notes stool in his urine  · He is currently without progressive symptoms, reporting his urine is normal in color           PLAN:    · Continue palliative radiation therapy today for bleeding from esophageal mass.    · Continue to hold Xarelto anticoagulation.  · Continue intravenous iron treatment with Venofer 300 mg today, this will be the fourth and the last dose.     · Monitor CBC per protocol.  If stable hemoglobin tomorrow, he could be discharged.  · Patient has appointment with Dr. Haynes on 2021 for his second cycle Keytruda.     Discussed with patient at bedside.      I communicated with A Dr. Sorto.      Wilberto Pelletier MD PhD  21  11:06 EDT        Electronically signed by Wilberto Pelletier MD PhD at 21 1121     Wilberto Pelletier MD PhD at 21 1039        See my note earlier      Electronically signed by Wilberto Pelletier MD PhD at 21 1242     Rachelle Sorto MD at 21 0905              Name: Han Garcia ADMIT: 2021   : 1955  PCP: Jaiden Hoover MD    MRN: [id number] LOS: 4 days   AGE/SEX: 65 y.o. male  ROOM[facility]     Subjective   Subjective   Patient reports tolerating regular diet well.  No abdominal pain.  No nausea or vomiting.  No dysphagia or odynophagia.  No fever or chills.  Normal bowel habits without constipation/diarrhea/bleeding per rectum/melena.    Review of Systems  .  Positive frequency.  No dysuria or hematuria  Cardiovascular/respiratory.  No shortness of breath/no chest pain/no palpitation  CNS.  No dizziness or loss of consciousness    Objective    Objective   Vital Signs  Temp:  [97.2 °F (36.2 °C)-98.3 °F (36.8 °C)] 98.2 °F (36.8 °C)  Heart Rate:  [62-77] 66  Resp:  [17-20] 18  BP: (102-133)/(58-76) 116/64  SpO2:  [93 %-100 %] 98 %  on  Flow (L/min):  [4] 4;   Device (Oxygen Therapy): room air    Intake/Output Summary (Last 24 hours) at 8/6/2021 0905  Last data filed at 8/6/2021 0805  Gross per 24 hour   Intake 1400 ml   Output 2100 ml   Net -700 ml     Body mass index is 30.27 kg/m².      08/02/21  1717   Weight: 93 kg (205 lb)     Physical Exam  General.  Middle-aged gentleman.  Alert oriented x3.  No apparent pain distress or diaphoresis.  Normal mood and affect.  Eyes.  Pupils equal round and reactive.  Intact extraocular musculature.  No pallor or jaundice.  Oral cavity.  Moist mucous membrane.  Neck.  Supple.  No JVD.  No lymphadenopathy or thyromegaly.  Cardiovascular.  Regular rate and rhythm.  No murmurs.  Chest.  Clear to auscultation bilaterally with no added sounds.  Abdomen.  Soft lax.  No tenderness.  No organomegaly.  No guarding or rebound.  Extremities.  +1 bilateral lower extremity edema.  CNS.  No acute focal neurological deficits.                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) [G62.0, T45.1X5A]  Yes   • Anemia, chronic disease [D63.8]  Yes   • Chemotherapy induced neutropenia (CMS/HCC) [D70.1, T45.1X5A]  Yes   • Acute deep vein thrombosis (DVT) of femoral vein of left lower extremity (CMS/HCC) [I82.412]  Yes   • Acute deep vein thrombosis (DVT) of right popliteal vein (CMS/HCC) [I82.431]  Yes   • Esophagus neoplasm [D49.0]  Yes   • Liver metastasis (CMS/HCC) [C78.7]  Yes   • Essential hypertension [I10]  Yes   • History of prostate cancer [Z85.46]  Not Applicable   • Hyperlipidemia [E78.5]  Yes   • Tobacco abuse [Z72.0]  Yes      Resolved Hospital Problems   No resolved  problems to display.           · Upper GI bleed leading to acute on chronic blood loss anemia secondary to ulcerated mass of esophageal cancer in a patient with a history of stage IV esophageal cancer.  Patient is s/p EGD on 8 / 5 with a distal esophageal mass hemospray.  Anticoagulation DC'd.  Currently on IV proton pump inhibitors.  Currently on radiation therapy.  Hematology oncology/GI on board.  Hemoglobin is stable.  Hemodynamically stable.  Has a radiation treatment today and if tolerated can be discharged home tomorrow if hemoglobin is stable.  Treatment of esophageal cancer per hematology oncology.  Status post IV iron treatment  · Colovesical fistula On as needed Cipro.  · History of DVT/thrombocytopenia.  Thrombocytopenia resolved.  Xarelto on hold secondary to GI bleed.  Most likely be of Xarelto for at least 2 weeks.  We will leave that to hematology oncology.  · Hypokalemia.  Will check magnesium and substitute  · Peripheral neuropathy secondary to chemotherapy continue Neurontin.  · Hypertension.  Good control.  No angina or congestive heart failure.  We will stop the fluids secondary to the lower extremity edema.  Continue lisinopril/HCTZ.      · Discussed with patient.  · Anticipate discharge tomorrow if okay with hematology oncology/GI and if hemoglobin is stable      Rachelle Sorto MD  [facility]  08/06/21  09:05 EDT          Electronically signed by Rachelle Sorto MD at 08/07/21 0827     Naga Shelby MD at 08/06/21 0800          [facility], [place], operated by [facility]  Inpatient Progress Note    Reason for Follow Up:  H/o metastatic distal esophageal adenocarcinoma with mets now with melena.    Subjective     Interval History:   He is still having melena.  He was transfused yesterday.  Radiation therapy also started yesterday.  He tolerated the 8/5/2021 EGD with use of hemospray in an attempt to get the distal esophageal cancerous mass to stop oozing blood.   08/05/21  1017  Review of Systems:    The following systems were reviewed and negative;  constitution, ENT, respiratory, cardiovascular, genitourinary, musculoskeletal and neurological    Objective     Vital Signs  Temp:  [97.2 °F (36.2 °C)-98.3 °F (36.8 °C)] 98.2 °F (36.8 °C)  Heart Rate:  [62-77] 66  Resp:  [17-20] 18  BP: (102-133)/(58-76) 116/64  Body mass index is 30.27 kg/m².    Intake/Output Summary (Last 24 hours) at 8/6/2021 0800  Last data filed at 8/6/2021 0711  Gross per 24 hour   Intake 1300 ml   Output 2000 ml   Net -700 ml     I/O this shift:  In: -   Out: 200 [Urine:200]     Physical Exam:   General: patient awake, alert and cooperative   Eyes: Normal lids and lashes, no scleral icterus   Neck: supple, normal ROM   Skin: warm and dry, not jaundiced   Cardiovascular: regular rhythm and rate, no murmurs auscultated   Pulm: clear to auscultation bilaterally, regular and unlabored   Abdomen: soft, nontender, nondistended; normal bowel sounds   Extremities: no rash or edema   Psychiatric: Normal mood and behavior; memory intact     Results Review:     I reviewed the patient's new clinical results.  Assessment/Plan     Patient Active Problem List   Diagnosis   • Essential hypertension   • Hyperlipidemia   • Impaired fasting glucose   • History of prostate cancer   • Tobacco abuse   • Liver metastasis (CMS/HCC)   • Esophagus neoplasm   • Malignant neoplasm of lower third of esophagus (CMS/HCC)   • Encounter for adjustment or management of vascular access device   • Acute deep vein thrombosis (DVT) of femoral vein of left lower extremity (CMS/HCC)   • Acute deep vein thrombosis (DVT) of right popliteal vein (CMS/HCC)   • Chemotherapy induced neutropenia (CMS/HCC)   • Anemia, chronic disease   • 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 Esophageal adenocarcinoma with liver metastases.  2.  Melena while on Xarelto.  His EGD on  8/5/2021 did show that the distal esophageal ulcerated mass was oozing blood.  I used hemospray in an attempt to get this to stop.  He also is now on radiation therapy to this area that was started yesterday.       I would continue monitoring serial H&H's and transfuse as necessary.  I would keep him off of anticoagulation if at all possible.    I discussed the patients findings and my recommendations with patient.    Naga Shelby MD    Electronically signed by Naga Shelby MD at 08/06/21 0804     Sarah Wiseman MD at 08/05/21 1646        EGD findings reviewed - distal esophagus site of bleeding as suspected. Started palliative treatments as planned - will continue tomorrow.     Electronically signed by Sarah Wiseman MD at 08/05/21 1648     Darren Hansen MD at 08/05/21 1610              [facility]     LOS: 3 days     Subjective:    CC:No chief complaint on file.    DIET:  Diet Order   Procedures   • Diet Regular; GI Soft   still with dark stool again this am  Patient denies any chest pain or shortness of air  No nausea vomiting  Tolerating oral intake    Objective:    Vital Signs:  Temp:  [97.2 °F (36.2 °C)-97.9 °F (36.6 °C)] 97.2 °F (36.2 °C)  Heart Rate:  [64-80] 69  Resp:  [17-20] 20  BP: (102-133)/(60-76) 121/67    SpO2:  [97 %-100 %] 99 %  on  Flow (L/min):  [4] 4;   Device (Oxygen Therapy): room air  Body mass index is 30.27 kg/m².    Physical Exam  Constitutional:       Appearance: Normal appearance.   HENT:      Head: Normocephalic and atraumatic.   Cardiovascular:      Rate and Rhythm: Normal rate and regular rhythm.      Heart sounds: No murmur heard.   No friction rub.   Pulmonary:      Effort: Pulmonary effort is normal.      Breath sounds: Normal breath sounds.   Abdominal:      General: Bowel sounds are normal. There is no distension.      Palpations: Abdomen is soft.      Tenderness: There is no abdominal tenderness.   Skin:     General: Skin is warm and dry.    Neurological:      Mental Status: He is alert.   Psychiatric:         Mood and Affect: Mood normal.         Behavior: Behavior normal.       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 male admitted with a history of esophageal cancer and melanotic stools on admission who underwent EGD which showed hemorrhage of esophageal tumor for which patient had hemostatic spray x5.     GI bleed/melena  Acute on chronic anemia  Still with dark bowel movement this a.m. which is to be expected  Hemoglobin 7/23/2021 was 11.  Hemoglobin is 5.7 on admission. Was transfused 2u PRBCs with a dose of Lasix after.  GI and thoracic surgery, GI performed EGD yesterday as noted above  IV iron infusions given     Esophageal cancer with mets to liver  Peripheral neuropathy secondary to chemotherapy  Oncology has seen and would like to monitor patient until Saturday  Has undergone several cycles of chemotherapy  Keytruda was started on 7/29/2021  Continue gabapentin  Patient started on radiation therapy today     Colovesical fistula  The patient uses intermittent Cipro when you notice since stool in his urine  Currently reports that his urine is normal.     DVT and has been on Xarelto approximately 2 years  Last dose on 8/2 AM     · SCDs for DVT prophylaxis.  · Full code.  · Discussed with patient and nursing staff.          Darren Hansen MD  08/05/21  16:10 EDT        Electronically signed by Darren Hansen MD at 08/05/21 3773     Wilberto Pelletier MD PhD at 08/05/21 9834           LOS: 3 days   Patient Care  Team:  Jaiden Hoover MD as PCP - General (Family Medicine)  Amanda Gunderson APRN as Referring Physician (Family Medicine)  Alexey Haynes MD as Consulting Physician (Hematology and Oncology)  Mary Brito MD as Surgeon (Thoracic Surgery)  Sarah Wiseman MD as Consulting Physician (Radiation Oncology)    Chief Complaint: Esophageal cancer, acute GI bleeding.  Iron deficiency anemia.    Interval History:  Patient was started on IV Venofer 8/3/2021.     8/4/2021  Patient reports tolerating IV iron therapy.  Continues to have dark-colored stool.  Hemoglobin improved 9.0 posttransfusion.    8/5/2021,  Patient continues to have dark-colored stool.  Had a EGD examination this morning, reporting bleeding from the esophageal mass.  Local treatment applied during the procedure.  Lab study reporting Hb 8.9.   Patient will be started palliative radiation therapy this afternoon.        A comprehensive 14 point review of systems was performed and was negative except as mentioned.    Vital Signs:  Temp:  [97.3 °F (36.3 °C)-97.9 °F (36.6 °C)] 97.9 °F (36.6 °C)  Heart Rate:  [64-80] 72  Resp:  [17-18] 17  BP: (102-133)/(61-76) 133/66    Intake/Output Summary (Last 24 hours) at 8/5/2021 1313  Last data filed at 8/5/2021 1035  Gross per 24 hour   Intake 760 ml   Output 710 ml   Net 50 ml       Physical Exam:     GENERAL:  Well-developed, well-nourished male in no acute distress.  Still looks pale and chronically ill.  Orientated to time place and the people.  SKIN:  Warm, dry without rashes, purpura or petechiae.  HEENT:  Normocephalic.   LYMPHATICS:  No cervical, supraclavicular adenopathy.  CHEST: Normal respiratory effort.  Lungs clear to auscultation. Good airflow.  CARDIAC:  Regular rate and rhythm without murmurs. Normal S1,S2.  ABDOMEN:  Soft, nontender with no organomegaly or masses.  Bowel sounds normal.  EXTREMITIES:  No clubbing, cyanosis or edema.  NEUROLOGICAL:  Grossly intact.  No focal neurological  deficits.  PSYCHIATRIC:  Normal affect and mood.        I reviewed the patient's new clinical results.        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 Almost 90% of the liver was replaced by tumor.  · HER-2 positive  · Initially treated with FOLFOX initiated July 2019  · Herceptin added with cycle 2  · Following 8 cycles of FOLFOX, oxaliplatin discontinued with continuation of 5-FU, leucovorin, Herceptin. This was continued through 7/7/2020  · Increasing CEA led to PET scan 6/9/2021.  Disease progression noted with growth of the tumor in the lower esophagus.  Continued stability of hepatic metastasis  · Repeat EGD 6/19/2021 for tissue specimen and Next Generation Sequencing.  · Endoscopy that shows a noncircumferential abnormality in the lower esophagus that encompasses almost 6 cm in length. It is not blocking off the esophagus and that is why the patient has no symptoms. The pathology shows at least atypical cells consistent with cancer.  · NGS showing PD-L1 positive and high mutation burden.  The tumor remains HER-2 positive.  Treatment plan for Keytruda every 3 weeks x 4 cycles followed by repeat imaging  · Keytruda initiated 7/29/2021.         * Acute GI bleeding  · Dark tarry stool initially began 7/29/2021  · 7/23/2021, hemoglobin of 11.0.     ·  On 8/2/2021 hemoglobin of 5.7  ·  Xarelto has been on hold.    · The patient is waiting for thoracic surgery consultation, and I expect that this patient will need to have EGD examination to assess his bleeding situation.   · If indeed he has evidence of bleeding from esophageal cancer, we would consider palliative radiation therapy to stop bleeding.  · Patient reports today 8/4/2021, he continues to have dark-colored stool.  Patient will have EGD examination scheduled on 8/5/2021.  Appreciate GI consult.   · Patient was seen 8/4/2021 by radiation oncology Dr. Wiseman, who is ready to treat patient once EGD confirms  bleeding from the esophagus tumor.   · Patient had EGD examination on 8/5/2021 by Dr. Shelby, reporting bleeding from the esophageal mass.   · Radiation therapy will be started this afternoon 8/5/2021.     * Anemia:    · Acute GI bleeding on top of chronic bleeding.   · Received 2 units PRBC transfusion, improved hemoglobin 7.3 on 8/3/2021.  The patient already has improved hemoglobin after 2 units PRBC transfusion however he is still symptomatic with fatigue.   · I recommended to transfuse 2 more units PRBC 8/3/2021. We will give the patient Lasix at least after the 1st unit of PRBC to avoid volume overload.   · Posttransfusion hemoglobin 9.0 on 8/3/2021.   · Stable Hb 9.0 on 8/4/2021.  · Hemoglobin 8.9 on 8/5/2021.  Since patient will be starting on radiation therapy, to improve his tolerance, I recommended transfusion 2 units PRBC today.  Continue to monitor CBC in the morning.      * Iron deficiency. This clearly is due to his GI bleeding. His laboratory study last night on 08/02/2021 reported ferritin 36.3 ng/mL and iron saturation 18% with free iron 41, TIBC 231. This patient had normal iron study on 05/18/2021 with ferritin 126 and iron saturation 25%. He had super therapeutic folate > 20 ng/mL on that day and normal vitamin B12 of 601 pg/mL. I think this patient also will benefit from intravenous iron therapy with all things considered. Certainly he is not a good candidate for oral iron treatment due to his esophageal cancer with active bleeding, and likely will have more issue with nausea, vomiting and abdomen pain.   · Intravenous Venofer was started on 8/3/2021.  Patient has been tolerating well with no reaction.  · Patient is receiving third dose of Venofer 8/5/2021.      *  Thrombophilia secondary malignancy, DVT  · Was on anticoagulated with Xarelto 20 mg daily.  · Currently on hold due to acute GI bleeding.   · Discussed with the patient again 8/4/2021, because he is nervous about recurrent DVT.   Nevertheless because of ongoing GI bleeding, I recommended to continue holding Xarelto and he voiced understanding.     *  Colovesical fistula.   · He requires intermittent Cipro when he notes stool in his urine  · He is currently without progressive symptoms, reporting his urine is normal in color           PLAN:    · Transfused 2 units PRBC today.    · Start palliative radiation therapy today for bleeding from esophageal mass.    · Continue to hold Xarelto anticoagulation.  · Continue intravenous iron treatment with Venofer 300 mg daily for 3 days, today 3/3.   · Monitor CBC per protocol.     Discussed with patient and his wife at bedside.  Spoke with nursing staff.     I communicated with radiation oncologist Dr. Wiseman about his radiation treatment.      Wilberto Pelletier MD PhD  08/05/21  13:13 EDT        Electronically signed by Wilberto Pelletier MD PhD at 08/05/21 1327     Wilberto Pelletier MD PhD at 08/04/21 7064           LOS: 2 days   Patient Care Team:  Jaiden Hoover MD as PCP - General (Family Medicine)  Amanda Gunderson APRN as Referring Physician (Family Medicine)  Alexey Haynes MD as Consulting Physician (Hematology and Oncology)  Mary Brito MD as Surgeon (Thoracic Surgery)  Anh Garcia MD as Consulting Physician (Radiation Oncology)    Chief Complaint: Esophageal cancer, acute GI bleeding.  Iron deficiency anemia.    Interval History:  Patient was started on IV Venofer 8/3/2021.    8/4/2021  Patient reports tolerating IV iron therapy.  Continues to have dark-colored stool.  Hemoglobin improved 9.0 posttransfusion.    A comprehensive 14 point review of systems was performed and was negative except as mentioned.    Vital Signs:  Temp:  [97.3 °F (36.3 °C)-98.2 °F (36.8 °C)] 97.6 °F (36.4 °C)  Heart Rate:  [64-78] 78  Resp:  [18] 18  BP: ()/(52-73) 121/56    Intake/Output Summary (Last 24 hours) at 8/4/2021 1554  Last data filed at 8/4/2021 1444  Gross per 24 hour   Intake  1470 ml   Output 2050 ml   Net -580 ml       Physical Exam:   General Appearance:    No acute distress, alert and oriented x4   Lungs:     Clear to auscultation bilaterally     Heart:    Regular rhythm and normal rate.  No murmurs, gallops, or       rubs.   Abdomen:     Soft, non-tender, non-distended.    Extremities:   Moves all extremities well.  No clubbing, cyanosis, or edema.   I reviewed the patient's new clinical results.        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 Almost 90% of the liver was replaced by tumor.  · HER-2 positive  · Initially treated with FOLFOX initiated July 2019  · Herceptin added with cycle 2  · Following 8 cycles of FOLFOX, oxaliplatin discontinued with continuation of 5-FU, leucovorin, Herceptin. This was continued through 7/7/2020  · Increasing CEA led to PET scan 6/9/2021.  Disease progression noted with growth of the tumor in the lower esophagus.  Continued stability of hepatic metastasis  · Repeat EGD 6/19/2021 for tissue specimen and Next Generation Sequencing.  · Endoscopy that shows a noncircumferential abnormality in the lower esophagus that encompasses almost 6 cm in length. It is not blocking off the esophagus and that is why the patient has no symptoms. The pathology shows at least atypical cells consistent with cancer.  · NGS showing PD-L1 positive and high mutation burden.  The tumor remains HER-2 positive.  Treatment plan for Keytruda every 3 weeks x 4 cycles followed by repeat imaging  · Keytruda initiated 7/29/2021.         * Acute GI bleeding  · Dark tarry stool initially began 7/29/2021  · 7/23/2021, hemoglobin of 11.0.     ·  On 8/2/2021 hemoglobin of 5.7  ·  Xarelto has been on hold.    · The patient is waiting for thoracic surgery consultation, and I expect that this patient will need to have EGD examination to assess his bleeding situation.   · If indeed he has evidence of bleeding from esophageal cancer, we  would consider palliative radiation therapy to stop bleeding.  · Patient reports today 8/4/2021, he continues to have dark-colored stool.  Patient will have EGD examination scheduled on 8/5/2021.  Appreciate GI consult.   · Patient was seen by radiation oncology Dr. Wiseman, who is ready to treat patient once EGD confirms bleeding from the esophagus tumor.      * Anemia:    · Acute GI bleeding on top of chronic bleeding.   · Received 2 units PRBC transfusion, improved hemoglobin 7.3 on 8/3/2021.  The patient already has improved hemoglobin after 2 units PRBC transfusion however he is still symptomatic with fatigue.   · I recommended to transfuse 2 more units PRBC 8/3/2021. We will give the patient Lasix at least after the 1st unit of PRBC to avoid volume overload.   · Posttransfusion hemoglobin 9.0 on 8/3/2021.   · Stable Hb 9.0 on 8/4/2021.     * Iron deficiency. This clearly is due to his GI bleeding. His laboratory study last night on 08/02/2021 reported ferritin 36.3 ng/mL and iron saturation 18% with free iron 41, TIBC 231. This patient had normal iron study on 05/18/2021 with ferritin 126 and iron saturation 25%. He had super therapeutic folate > 20 ng/mL on that day and normal vitamin B12 of 601 pg/mL. I think this patient also will benefit from intravenous iron therapy with all things considered. Certainly he is not a good candidate for oral iron treatment due to his esophageal cancer with active bleeding, and likely will have more issue with nausea, vomiting and abdomen pain.   · Intravenous Venofer was started on 8/3/2021.  Patient has been tolerating well with no reaction.     *  Thrombophilia secondary malignancy, DVT  · Was on anticoagulated with Xarelto 20 mg daily.  · Currently on hold due to acute GI bleeding.   · Discussed with the patient again 8/4/2021, because he is nervous about recurrent DVT.  Nevertheless because of ongoing GI bleeding, I recommended continue to hold Xarelto and he voiced  understanding.     *  Colovesical fistula.   · He requires intermittent Cipro when he notes stool in his urine  · He is currently without progressive symptoms, reporting his urine is normal in color           PLAN:    · Continue to hold Xarelto anticoagulation.  · Patient is scheduled to have EGD examination 8/5/2021 by Dr. Shelby.  · If patient has indeed bleeding from his esophageal cancer, Possible started radiation therapy 8/5/2021.    · Continue intravenous iron treatment with Venofer 300 mg daily for 3 days, today 2/3.   · Monitor CBC per protocol.     Discussed with patient at bedside.  Spoke with nursing staff.     I discussed with radiation oncology Dr. Wiseman about her treatment plan.      Wilberto Pelletier MD PhD  08/04/21  15:54 EDT        Electronically signed by Wilberto Pelletier MD PhD at 08/05/21 1318     Darren Hansen MD at 08/04/21 0900              [facility]     LOS: 2 days     Subjective:    CC:No chief complaint on file.    DIET:  Diet Order   Procedures   • Diet Regular; GI Soft   still with dark stool this am  no cp  No soa  Tolerating oral intake    Objective:    Vital Signs:  Temp:  [97.3 °F (36.3 °C)-98.2 °F (36.8 °C)] 97.6 °F (36.4 °C)  Heart Rate:  [64-85] 71  Resp:  [18] 18  BP: ()/(52-73) 103/58    SpO2:  [98 %-99 %] 99 %  on   ;   Device (Oxygen Therapy): room air  Body mass index is 30.27 kg/m².    Physical Exam  Constitutional:       Appearance: Normal appearance.   HENT:      Head: Normocephalic and atraumatic.   Cardiovascular:      Rate and Rhythm: Normal rate and regular rhythm.      Heart sounds: No murmur heard.   No friction rub.   Pulmonary:      Effort: Pulmonary effort is normal.      Breath sounds: Normal breath sounds.   Abdominal:      General: Bowel sounds are normal. There is no distension.      Palpations: Abdomen is soft.      Tenderness: There is no abdominal tenderness.   Skin:     General: Skin is warm and dry.   Neurological:       Mental Status: He is alert.   Psychiatric:         Mood and Affect: Mood normal.         Behavior: Behavior normal.             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 male admitted with <principal problem not specified>.     GI bleed/melena  Acute on chronic anemia  Complains of several bouts of dark stools.  Hemoglobin 7/23/2021 was 11.  Hemoglobin is 5.7 on admission. Was transfused 2u PRBCs with a dose of Lasix after.  GI consulted and planning on EGD tomorrow  IV iron started     Esophageal cancer with mets to liver  Peripheral neuropathy secondary to chemotherapy  Oncology has seen  Has undergone several cycles of chemotherapy  Keytruda was started on 7/29/2021  Continue gabapentin     Colovesical fistula  Use intermittent Cipro when you notice since stool in his urine  Currently reports that his urine is normal.     DVT with Xarelto approximately 2 years  Last dose on 8/2 AM     · SCDs for DVT prophylaxis.  · Full code.  · Discussed with patient and nursing staff.        Darren Hansen MD  08/04/21  09:00 EDT        Electronically signed by Darren Hansen MD at 08/04/21 0906     Nahomi Carson APRN at 08/04/21 0819     Attestation signed by Abdi Sanford III, MD at 08/04/21 9028    I have seen and examined the patient. I have reviewed the chart and the xrays. The plan of care was discussed with the APRN and the patient/family.                         Chief Complaint: esophageal cancer/GI bleed, follow-up      Subjective:  Symptoms:  Improved.  He  "reports malaise and anxiety.  No shortness of breath or weakness.    Diet:  Poor intake.    Activity level: Normal.    Pain:  He reports no pain.        Vital Signs:  Temp:  [97.3 °F (36.3 °C)-98.2 °F (36.8 °C)] 97.6 °F (36.4 °C)  Heart Rate:  [64-85] 71  Resp:  [18] 18  BP: ()/(52-73) 112/65    Intake & Output (last day)       08/03 0701 - 08/04 0700 08/04 0701 - 08/05 0700    P.O. 150     Blood 600     Total Intake(mL/kg) 750 (8.1)     Urine (mL/kg/hr) 2100 (0.9) 500 (4.1)    Total Output 2100 500    Net -1350 -500          Urine Unmeasured Occurrence 1 x           Objective:  General Appearance:  Ill-appearing, in no acute distress, not in pain and comfortable.    Vital signs: (most recent): Blood pressure 121/56, pulse 78, temperature 97.6 °F (36.4 °C), temperature source Oral, resp. rate 18, height 175.3 cm (69\"), weight 93 kg (205 lb), SpO2 99 %.  Vital signs are normal.  No fever.    Output: Producing urine.    Lungs:  Normal effort and normal respiratory rate.  He is not in respiratory distress.    Heart: Normal rate.  Regular rhythm.    Chest: No chest wall tenderness.    Abdomen: Abdomen is soft.  Hypoactive bowel sounds.   There is no abdominal tenderness.   There is no mass.   Pulses: Distal pulses are intact.    Neurological: Patient is alert and oriented to person, place and time.    Skin:  Warm and dry.                Results Review:     I reviewed the patient's new clinical results.  I reviewed the patient's new imaging results and agree with the interpretation.  I reviewed the patient's other test results and agree with the interpretation  Discussed with patient, RN and Dr. 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  Recent EGD by Dr. Brito demonstrated recurrence at the GE junction.  Plan to initiate radiation as soon as possible to stop blood loss.  Treatment planning today.  Likely initiation after EGD tomorrow.     Acute GI bleed: Secondary to #1.  Transfusion per medical oncology team.  Continue PPI. Plan for EGD tomorrow by Dr. Shelby for further evaluation and to rule out any other cause of upper GI bleed.    NIRU Mckenzie  [facility]  08/04/21  08:19 EDT    Patient was seen and assessed while wearing personal protective equipment including facemask, protective eyewear and gloves.  Hand hygiene performed prior to entering the room and upon exiting with doffing of gloves.        Electronically signed by Abdi Sanford III, MD at 08/04/21 5540     Naga Shelby MD at 08/04/21 0757          [facility]  Inpatient Progress Note    Reason for Follow Up: Melena    Subjective     Interval History:   His last bowel movement was yesterday about 5 PM and it was black.  His hemoglobin is 9.0 today.  He has no abdominal pain or chest pain.  Review of Systems:    The following systems were reviewed and negative;  constitution, ENT, respiratory, cardiovascular, genitourinary, musculoskeletal and neurological    Objective     Vital Signs  Temp:  [97.3 °F (36.3 °C)-98.2 °F (36.8 °C)] 97.6 °F (36.4 °C)  Heart Rate:  [64-85] 71  Resp:  [18] 18  BP: ()/(52-73) 112/65  Body mass index is 30.27 kg/m².    Intake/Output Summary (Last 24 hours) at 8/4/2021 8031  Last data filed at 8/4/2021 0710  Gross per 24 hour   Intake 750 ml   Output 2050 ml    Net -1300 ml     I/O this shift:  In: -   Out: 500 [Urine:500]     Physical Exam:   General: patient awake, alert and cooperative   Eyes: Normal lids and lashes, no scleral icterus   Neck: supple, normal ROM   Skin: warm and dry, not jaundiced   Cardiovascular: regular rhythm and rate, no murmurs auscultated   Pulm: clear to auscultation bilaterally, regular and unlabored   Abdomen: soft, nontender, nondistended; normal bowel sounds   Extremities: no rash or edema   Psychiatric: Normal mood and behavior; memory intact     Results Review:     I reviewed the patient's new clinical results.        Assessment/Plan     Patient Active Problem List   Diagnosis   • Essential hypertension   • Hyperlipidemia   • Impaired fasting glucose   • History of prostate cancer   • Tobacco abuse   • Liver metastasis (CMS/HCC)   • Esophagus neoplasm   • Malignant neoplasm of lower third of esophagus (CMS/HCC)   • Encounter for adjustment or management of vascular access device   • Acute deep vein thrombosis (DVT) of femoral vein of left lower extremity (CMS/HCC)   • Acute deep vein thrombosis (DVT) of right popliteal vein (CMS/HCC)   • Chemotherapy induced neutropenia (CMS/HCC)   • Anemia, chronic disease   • 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 Acute blood loss anemia with melena  2. Esophageal adenocarcinoma with liver metastases  3. Anticoagulation therapy secondary to DVTs.  His last dose of Xarelto was the morning of August 2, 2021.  4. Colovesicular fistula     Plan:  · Continue PPI  · Hold Xarelto  · Monitor H&H  · If Dr. Brito does not plan to do an EGD certainly GI will be happy to to help define the cause of his melena.  I will assume that the melena is from his esophageal cancer in the setting of being on Xarelto?    ADDENDUM - after discussion with Thoracic Surgery I will do an EGD tomorrow to better define where the  GI bleeding is originating from.     I discussed the patients findings and my recommendations with patient and nursing staff.    Naga Shelby MD    Electronically signed by Naga Shelby MD at 21 1009     Bulmaro Mcgrath MD at 21 0658              Name: Han Garcia ADMIT: 2021   : 1955  PCP: Jaiden Hoover MD    MRN: [id number] LOS: 1 days   AGE/SEX: 65 y.o. male  ROOM: [facility]     Subjective   Subjective   This is a 65-year-old male with a history of esophageal cancer, liver cancer DVT on Xarelto who presented to the ER with GI bleeding dyspnea on exertion, and dizziness.  Evaluation in the ER showed a hemoglobin of 5.7, he was given 2 units PRBCs.    He had his first dose of Keytruda on .    Review of Systems   Constitutional: Positive for fatigue. Negative for chills and fever.   Respiratory: Positive for shortness of breath. Negative for cough.    Cardiovascular: Negative for chest pain and palpitations.   Gastrointestinal: Positive for blood in stool. Negative for constipation.   Neurological: Positive for dizziness and light-headedness.       Objective   Objective   Vital Signs  Temp:  [97.6 °F (36.4 °C)-98.6 °F (37 °C)] 98 °F (36.7 °C)  Heart Rate:  [] 75  Resp:  [16-20] 18  BP: (102-142)/(56-74) 115/69  SpO2:  [97 %-100 %] 98 %  on   ;   Device (Oxygen Therapy): room air  Body mass index is 30.27 kg/m².  Physical Exam  Constitutional:       Appearance: He is ill-appearing.   HENT:      Head: Normocephalic and atraumatic.   Eyes:      Pupils: Pupils are equal, round, and reactive to light.   Cardiovascular:      Rate and Rhythm: Normal rate and regular rhythm.   Pulmonary:      Effort: Pulmonary effort is normal. No respiratory distress.   Abdominal:      General: There is no distension.      Palpations: Abdomen is soft.      Tenderness: There is no abdominal tenderness.   Skin:     General: Skin is warm and dry.      Coloration: Skin is pale.   Neurological:      Mental  Status: He is alert and oriented to person, place, and time.       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) [G62.0, T45.1X5A]  Yes   • Anemia, chronic disease [D63.8]  Yes   • Chemotherapy induced neutropenia (CMS/HCC) [D70.1, T45.1X5A]  Yes   • Acute deep vein thrombosis (DVT) of femoral vein of left lower extremity (CMS/HCC) [I82.412]  Yes   • Acute deep vein thrombosis (DVT) of right popliteal vein (CMS/HCC) [I82.431]  Yes   • Esophagus neoplasm [D49.0]  Yes   • Liver metastasis (CMS/HCC) [C78.7]  Yes   • Essential hypertension [I10]  Yes   • History of prostate cancer [Z85.46]  Not Applicable   • Hyperlipidemia [E78.5]  Yes   • Tobacco abuse [Z72.0]  Yes      Resolved Hospital Problems   No resolved problems to display.       65 y.o. male admitted with <principal problem not specified>.    GI bleed/melena  Acute on chronic anemia  Complains of several bouts of dark stools.  Hemoglobin 7/23/2021 was 11.  Hemoglobin is 5.7 upon presentation ER.  Was transfused 2u PRBCs with a dose of Lasix after.  Has a history of esophageal cancer and EGD is typically performed by thoracic surgery.  GI consulted, awaiting thoracic input  IV iron started    Esophageal cancer with mets to liver  Peripheral neuropathy secondary to chemotherapy  Oncology has been consulted  Has undergone several cycles of chemotherapy  Keytruda was started on 7/29/2021  Continue gabapentin    Colovesical fistula  Use intermittent Cipro when you notice since stool in his urine  Currently reports that his urine is normal.    DVT with Xarelto  Last dose on 8/2 AM    · SCDs for DVT prophylaxis.  · Full code.  · Discussed with patient and nursing staff.        Bulmaro Mcgrath MD  [facility]  08/03/21  13:39 EDT  I wore protective equipment throughout this  patient encounter including a face mask, gloves and protective eyewear.  Hand hygiene was performed before donning protective equipment and after removal when leaving the room.          Electronically signed by Bulmaro Mcgrath MD at 08/03/21 3933     Hang Knapp MD at 08/03/21 0939          [facility]  Inpatient Progress Note    Reason for Follow Up: GI bleed    Subjective     Interval History:   H&H improved posttransfusion.  Still notes episodes of diarrhea with black liquid stools.  Awaiting thoracic service input given his history of metastatic esophageal cancer and progression of disease.  Suspect anticoagulant therapy for DVT is also a contributing factor.  Unclear as to what his options will be.  Review of Systems:    All systems were reviewed and negative except for:  Gastrointestinal: positive for  See HPI    Objective     Vital Signs  Temp:  [97.6 °F (36.4 °C)-98.6 °F (37 °C)] 98 °F (36.7 °C)  Heart Rate:  [] 93  Resp:  [16-20] 18  BP: (102-142)/(57-74) 102/63  Body mass index is 30.27 kg/m².    Intake/Output Summary (Last 24 hours) at 8/3/2021 0957  Last data filed at 8/3/2021 0902  Gross per 24 hour   Intake 600 ml   Output 550 ml   Net 50 ml     I/O this shift:  In: 0   Out: 550 [Urine:550]     Physical Exam:   General: patient awake, alert and cooperative   Eyes: Normal lids and lashes, no scleral icterus   Neck: supple, normal ROM   Skin: warm and dry, not jaundiced   Cardiovascular: regular rhythm and rate, no murmurs auscultated   Pulm: clear to auscultation bilaterally, regular and unlabored   Abdomen: soft, nontender, nondistended; normal bowel sounds   Extremities: no rash or edema   Psychiatric: Normal mood and behavior; memory intact         Assessment/Plan     Patient Active Problem List   Diagnosis   • Essential hypertension   • Hyperlipidemia   • Impaired fasting glucose   • History of prostate cancer   • Tobacco abuse   • Liver metastasis (CMS/HCC)   •  Esophagus neoplasm   • Malignant neoplasm of lower third of esophagus (CMS/HCC)   • Encounter for adjustment or management of vascular access device   • Acute deep vein thrombosis (DVT) of femoral vein of left lower extremity (CMS/HCC)   • Acute deep vein thrombosis (DVT) of right popliteal vein (CMS/HCC)   • Chemotherapy induced neutropenia (CMS/HCC)   • Anemia, chronic disease   • 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 Acute blood loss anemia with melena  35. Esophageal adenocarcinoma with liver metastases  36. Anticoagulation therapy secondary to DVTs  37. Colovesicular fistula      Plan:  · Continue PPI  · Hold Xarelto  · Monitor H&H  · Await thoracic input regarding options  I discussed the patients findings and my recommendations with patient.    Hang Knapp MD                Electronically signed by Hang Knapp MD at 08/03/21 1005          Consult Notes (all)      Sarah Wiseman MD at 08/04/21 0840      Consult Orders    1. Inpatient Radiation Oncology Consult [[id number]] ordered by Wilberto Pelletier MD PhD at 08/03/21 1522               DIAGNOSIS and REASON FOR CONSULTATION: metastatic Esophageal Ca, local recurrence in distal esophagus, now with melena and low hemoglobin -  for advice and recommendations for this diagnosis    Referring Provider:  Cathy Houser MD    HISTORY OF PRESENT ILLNESS:  The patient is a 65 y.o. year old male who has been seeing Dr. Garcia in our group for consideration of local radiation therapy to the esophagus.     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  He was started on palliative chemotherapy with 5FU leucovorin and herceptin with excellent response including complete resolution of liver metastases on PET scan in August 2020.  He did well and continued chemotherapy and recent PET scan on  7/1/21 showed 2 adjacent foci of uptake in distal esophagus at GE junction with significant increase in FDG activity compared to PET from 10/27/20 and increased thickening of distal esophagus and no signficant hypermetabolism in liver or elsewhere to suggest active metastatic disease.       He underwent an EGD with Dr. Brito on 6/19/21 which showed partially circumferential tumor occupying 50% of distal esophagus extending from 34-40cm.  Biopsies were positive for high grade dysplasia with foci suspicious for invasive adenocarcinoma. He was considered then for  possible concurrent 5FU and radiation to the pet positive disease in the esophagus given the good response in the liver; however multidisciplinary discussion leaned toward further systemic therapy and holding radiation until patient became symptomatic.     He started treatment with Keytruda on July 29, 2021 and unfortunately presented to the ER after having dark tarry stools and was found to have a hemoglobin of 5.7 at CBC. He has been seen by Dr. Sanford who feels the GI bleed is due to the esophageal mass recently seen on EGD and biopsied. The patient tells me he will have an EGD tomorrow and I was asked to see the patient at the request of the referring provider noted above for advice and recommendations regarding this diagnosis and consideration of radiation for bleeding.    Clinically, he is doing well. He denies any significant pain and feels much better post transfusion. He tells me his BM last night was still black and tarry. He denies any abdominal pain nor dysphagia, chest pain.     Objective     Past Medical History: he  has a past medical history of Arthritis, Cancer (CMS/HCC), Cancer (CMS/HCC), Chronic anticoagulation, Elevated PSA, Esophageal mass, Fistula, H/O Lung nodule, Hepatitis, History of chemotherapy, History of pneumonia, blood clots (08/10/2019), Hyperlipidemia, Hypertension, Nail fungus, Neuropathy, PVD (peripheral vascular disease)  (CMS/[facility]), and Recto-bladderneck fistula.    Past Surgical History:  he has a past surgical history that includes Prostate surgery (03/2008); Venous Access Device (Port) (N/A, 7/16/2019); Colonoscopy (N/A, 2/4/2020); Cystoscopy (02/06/2020); Cystoscopy (Bilateral, 2/26/2020); Hernia repair (Right, 1999); Esophagogastroduodenoscopy; and Esophagogastroduodenoscopy (N/A, 6/19/2021).  Allergies:  No Known Allergies    Family History:  his family history includes Hypertension in his mother and another family member; Lung cancer in his father; Lung disease in an other family member; Prostate cancer in an other family member; Stroke in his mother.    Social History:  he  reports that he has been smoking cigarettes. He started smoking about 47 years ago. He has a 19.00 pack-year smoking history. He has never used smokeless tobacco. He reports previous alcohol use. He reports that he does not use drugs.    Pertinent Findings on   Review of Systems   Constitutional: Positive for fatigue. Negative for appetite change, chills, diaphoresis, fever and unexpected weight change.   HENT:   Negative for hearing loss, lump/mass, mouth sores, nosebleeds, sore throat, tinnitus, trouble swallowing and voice change.    Eyes: Negative for eye problems.   Respiratory: Negative for chest tightness, cough, hemoptysis, shortness of breath and wheezing.    Cardiovascular: Negative for chest pain, leg swelling and palpitations.   Gastrointestinal: Positive for blood in stool. Negative for abdominal distention, abdominal pain, constipation, diarrhea, nausea, rectal pain and vomiting.   Endocrine: Negative for hot flashes.   Genitourinary: Negative for bladder incontinence, difficulty urinating, dysuria, frequency, hematuria, nocturia and pelvic pain.    Musculoskeletal: Negative for arthralgias, back pain, flank pain, gait problem, myalgias, neck pain and neck stiffness.   Skin: Negative for itching and rash.   Neurological: Negative for  dizziness, extremity weakness, gait problem, headaches, light-headedness, numbness, seizures and speech difficulty.   Hematological: Negative for adenopathy. Does not bruise/bleed easily.   Psychiatric/Behavioral: Negative for confusion, decreased concentration, depression, sleep disturbance and suicidal ideas. The patient is not nervous/anxious.    :  Subjective     Vitals:    08/03/21 2123 08/03/21 2233 08/04/21 0710 08/04/21 0839   BP: 118/62 94/52 112/65 103/58   BP Location:  Right arm Left arm Right arm   Patient Position:  Lying Lying Lying   Pulse: 64 66 71    Resp: 18 18 18    Temp: 97.3 °F (36.3 °C) 97.7 °F (36.5 °C) 97.6 °F (36.4 °C)    TempSrc:  Oral Oral    SpO2: 99% 99%     Weight:       Height:           Performance Status: (1) Restricted in physically strenuous activity, ambulatory and able to do work of light nature    Pertinent Findings on  Physical Exam  Vitals reviewed.   Constitutional:       Appearance: He is well-developed.   HENT:      Head: Normocephalic and atraumatic.   Pulmonary:      Effort: Pulmonary effort is normal.   Abdominal:      Palpations: Abdomen is soft.   Musculoskeletal:         General: Normal range of motion.      Cervical back: Normal range of motion.   Skin:     General: Skin is warm and dry.   Neurological:      Mental Status: He is alert and oriented to person, place, and time.   Psychiatric:         Behavior: Behavior normal.         Thought Content: Thought content normal.         Judgment: Judgment normal.     :      Assessment: Locally recurrent distal esophageal ca now with active bleeding, Hgb on admit of 5 - EGD pending tomorrow    Plan:   The patient tells me he will have an EGD tomorrow to evaluate esophagus as site of bleeding. Given his history, that is the most likely site. We talked thru the role of the radiation in stopping the blood loss and he expressed understanding. I discussed with the patient a course of palliative radiation therapy directed at the  lower esophagus if confirmed on EGD, hoping to deliver approximately 3000 cGy in 10 treatments initially to the area in hopes of stopping the bleeding.     We discussed the acute side effects of nausea, vomiting, decrease in appetite, significant fatigue, dysphagia and reflux, sore throat and weight loss.  We discussed the long-term possibility of esophageal stricture, the effect of radiation therapy on the lung and heart as well as the expected response of the tumor and the options if no treatment is pursued. All questions were answered.    We will await the findings of the EGD tomorrow if patient remains stable but will go ahead and obtain a treatment planning today so we will be ready to begin treatments as soon as area confirmed.       Objective   I spent greater than 30 mins (27641) on the unit and of that time 20 minutes  in counseling and coordination of care, including my review of imaging and pathology; indications, goals, logistics, alternatives and risks - both common and rare - for my recommendations as well as surveillance and potential outcomes.      Electronically signed by Sarah Wiseman MD at 08/04/21 1056     Nahomi Carson APRN at 08/03/21 1103      Consult Orders    1. Inpatient Thoracic Surgery Consult [[id number]] ordered by Nahomi Villavicencio MD          Attestation signed by Abdi Sanford III, MD at 08/03/21 1715    GI bleeding is most likely due to his esophageal cancer.  EGD and injection or any other type of intervention will not stop this bleeding.  Patient needs to be treated with primary radiation to the area.  Discussed with Dr. Pelletier of the [facility] oncology group.  He has contacted radiation oncology.  Nothing to offer from a surgical standpoint.  Continue current therapy.    I have seen and examined the patient. I have reviewed the chart and the xrays. The plan of care was discussed with the APRN and the patient/family.                         Inpatient Thoracic Surgery  Consult  Consult performed by: Nahomi Carson APRN  Consult ordered by: Nahomi Villavicencio MD  Reason for consult: Adenocarcinoma of the esophagus          Patient Care Team:  Jaiden Hoover MD as PCP - General (Family Medicine)  Amanda Gunderson APRN as Referring Physician (Family Medicine)  Alexey Haynes MD as Consulting Physician (Hematology and Oncology)  Mary Brito MD as Surgeon (Thoracic Surgery)  Anh Garcia MD as Consulting Physician (Radiation Oncology)    Chief Complaint: GI Bleed    Subjective     History of Present Illness     Han Garcia is a 65-year-old gentleman who is known to our service due to a 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  He was initially diagnosed in 2018 and has been undergoing treatment.  He recently presented with hypermetabolism of the distal esophagus requiring an EGD with biopsy performed by Dr. Mary Brito.  Biopsies were indicative of squamocolumnar mucosa with at least high-grade dysplasia and foci suspicious of invasive adenocarcinoma.  Dr. Brito refer the patient back to his oncologist to discuss further treatment options as well as radiation oncology for consideration of radiation to his GE junction where his recurrence is.  Patient was seen in his oncology office on 8-21 and presented with lightheadedness, dizziness and dark stool.  He recently received his initial dose of palliative Keytruda on 7/29/2021.  He is currently on Xarelto 20 mg daily.  Patient reports some upset stomach with some shortness of breath on exertion that recovers with rest.  He has a chronic cough.  He was directly admitted from the CBC office by the hospitalist service secondary to acute GI bleeding.  The patient has undergone transfusion.  Xarelto has been held and his been placed on a PPI drip.  We have been consulted for further evaluation of the GI bleed likely secondary to esophageal malignancy.    Review of Systems   Constitutional: Positive  "for fatigue. Negative for fever.   HENT: Negative for trouble swallowing.    Respiratory: Positive for shortness of breath.    Gastrointestinal: Positive for blood in stool and diarrhea. Negative for vomiting.   Endocrine: Negative.    Musculoskeletal: Negative.    Skin: Positive for pallor.   Allergic/Immunologic: Negative.    Neurological: Positive for weakness.   Hematological: Negative.    Psychiatric/Behavioral: Negative.    All other systems reviewed and are negative.       Patient Active Problem List   Diagnosis   • Essential hypertension   • Hyperlipidemia   • Impaired fasting glucose   • History of prostate cancer   • Tobacco abuse   • Liver metastasis (CMS/HCC)   • Esophagus neoplasm   • Malignant neoplasm of lower third of esophagus (CMS/HCC)   • Encounter for adjustment or management of vascular access device   • Acute deep vein thrombosis (DVT) of femoral vein of left lower extremity (CMS/HCC)   • Acute deep vein thrombosis (DVT) of right popliteal vein (CMS/HCC)   • Chemotherapy induced neutropenia (CMS/HCC)   • Anemia, chronic disease   • 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)   • Chronic anticoagulation   • GI bleed     Past Medical History:   Diagnosis Date   • Arthritis    • Cancer (CMS/HCC)     prostate cancer 2008   • Cancer (CMS/HCC)     esophageal   • Chronic anticoagulation     on xarelto   • Elevated PSA    • Esophageal mass    • Fistula     colon and bladder   • H/O Lung nodule    • Hepatitis     CHILD--PT STATED \"I THINK IT WAS A.\"   • History of chemotherapy    • History of pneumonia    • Hx of blood clots 08/10/2019   • Hyperlipidemia    • Hypertension    • Nail fungus    • Neuropathy    • PVD (peripheral vascular disease) (CMS/HCC)    • Recto-bladderneck fistula     on poab for recurrent uti     Past Surgical History:   Procedure Laterality Date   • COLONOSCOPY N/A 2/4/2020    Procedure: COLONOSCOPY;  Surgeon: Guy Sears MD;  Location: [facility] ENDOSCOPY;  " Service: General;  Laterality: N/A;  PRE-COLOVESICAL FISTULA  POST-- DIVERTICULOSIS   • CYSTOSCOPY  02/06/2020   • CYSTOSCOPY Bilateral 2/26/2020    Procedure: CYSTOSCOPY RETROGRADE;  Surgeon: Cm Witt MD;  Location: [facility];  Service: Urology;  Laterality: Bilateral;   • ENDOSCOPY     • ENDOSCOPY N/A 6/19/2021    Procedure: ESOPHAGOGASTRODUODENOSCOPY WITH BIOPSY;  Surgeon: Mary Brito MD;  Location: [facility];  Service: Gastroenterology;  Laterality: N/A;   • HERNIA REPAIR Right 1999    inguinal hernia   • PROSTATE SURGERY  03/2008    prostatectectomy   • VENOUS ACCESS DEVICE (PORT) INSERTION N/A 7/16/2019    Procedure: INSERTION VENOUS ACCESS DEVICE WITH FLUORO AND EGD WITH BIOPSY;  Surgeon: Mary Brito MD;  Location: [facility];  Service: Thoracic     Family History   Problem Relation Age of Onset   • Hypertension Mother    • Stroke Mother    • Hypertension Other    • Lung disease Other    • Prostate cancer Other    • Lung cancer Father    • Malig Hyperthermia Neg Hx      Social History     Socioeconomic History   • Marital status: Single     Spouse name: Not on file   • Number of children: Not on file   • Years of education: High school   • Highest education level: Not on file   Tobacco Use   • Smoking status: Current Every Day Smoker     Packs/day: 0.50     Years: 38.00     Pack years: 19.00     Types: Cigarettes     Start date: 1974   • Smokeless tobacco: Never Used   • Tobacco comment: PLANNED QUIT DATE 8/2/21; Quit for a period of 8 years. 10-15 per day   Vaping Use   • Vaping Use: Never used   Substance and Sexual Activity   • Alcohol use: Not Currently   • Drug use: Never   • Sexual activity: Defer     Medications Prior to Admission   Medication Sig Dispense Refill Last Dose   • acetaminophen (TYLENOL) 500 MG tablet Take 500 mg by mouth Every 6 (Six) Hours As Needed for Mild Pain .      • cevimeline (EVOXAC) 30 MG capsule Take 30 mg by mouth 3 (Three) Times a Day.       • cilostazol (PLETAL) 50 MG tablet Take 50 mg by mouth 2 (Two) Times a Day.      • ciprofloxacin (CIPRO) 250 MG tablet Take 1 tablet by mouth Daily. 90 tablet 1    • econazole nitrate (SPECTAZOLE) 1 % cream 2 (Two) Times a Day.      • gabapentin (NEURONTIN) 300 MG capsule TAKE 2 CAPSULES BY MOUTH EVERY NIGHT AT BEDTIME (Patient taking differently: Take 300 mg by mouth 2 (two) times a day.) 60 capsule 4    • lisinopril-hydrochlorothiazide (PRINZIDE,ZESTORETIC) 20-12.5 MG per tablet TAKE 1 TABLET BY MOUTH EVERY DAY 90 tablet 0    • nicotine (NICODERM CQ) 21 MG/24HR patch Place 1 patch on the skin as directed by provider Daily. (Patient taking differently: Place 1 patch on the skin as directed by provider Daily. Has at home but hasn't started taking yet) 28 each 1    • ondansetron (ZOFRAN) 4 MG tablet Take 1 tablet by mouth Every 8 (Eight) Hours As Needed for Nausea or Vomiting. 30 tablet 2    • Probiotic Product (PROBIOTIC DAILY PO) Take 1 tablet by mouth Daily.      • Xarelto 20 MG tablet TAKE 1 TABLET BY MOUTH EVERY DAY**STOP LOVENOX** (Patient taking differently: Take 20 mg by mouth Every Morning.) 90 tablet 1      No Known Allergies    Objective      Vital Signs  Temp:  [97.6 °F (36.4 °C)-98.6 °F (37 °C)] 98 °F (36.7 °C)  Heart Rate:  [] 93  Resp:  [16-20] 18  BP: (102-142)/(57-74) 102/63    Intake & Output (last day)       08/02 0701 - 08/03 0700 08/03 0701 - 08/04 0700    P.O.  0    Blood 600     Total Intake(mL/kg) 600 (6.5) 0 (0)    Urine (mL/kg/hr)  550 (1.5)    Total Output  550    Net +600 -550          Urine Unmeasured Occurrence 0 x           Physical Exam  Vitals and nursing note reviewed. Exam conducted with a chaperone present.   Constitutional:       Appearance: Normal appearance. He is ill-appearing.   HENT:      Head: Normocephalic and atraumatic.      Mouth/Throat:      Pharynx: Oropharynx is clear.   Eyes:      General: No scleral icterus.     Conjunctiva/sclera: Conjunctivae normal.    Cardiovascular:      Rate and Rhythm: Normal rate and regular rhythm.      Pulses: Normal pulses.      Heart sounds: Normal heart sounds.   Pulmonary:      Effort: Pulmonary effort is normal.      Breath sounds: Normal breath sounds. No wheezing, rhonchi or rales.   Chest:      Chest wall: No tenderness.   Abdominal:      General: Bowel sounds are normal.      Palpations: Abdomen is soft.   Musculoskeletal:      Cervical back: Neck supple.   Skin:     General: Skin is warm and dry.   Neurological:      General: No focal deficit present.      Mental Status: He is alert and oriented to person, place, and time. Mental status is at baseline.   Psychiatric:         Behavior: Behavior normal.         Thought Content: Thought content normal.         Judgment: Judgment normal.         Results Review:    I reviewed the patient's new clinical results.  I reviewed the patient's new imaging results and agree with the interpretation.  I reviewed the patient's other test results and agree with the interpretation  Discussed with patient, RN and Dr. Brito.    Assessment/Plan       GI bleed      Assessment & Plan    I reviewed the patient's PET scan performed on 6/9/2021.  There is moderately intense hypermetabolism along the distal esophagus at the GE junction.  Some soft tissue thickening involving the distal esophagus is more prominent than previously.  There is treated known extensive hepatic metastatic disease which shows no significant hypermetabolism.    Stage IV adenocarcinoma of the esophagus with extensive liver metastases: Patient is undergoing palliative Keytruda per medical oncology.  Recent EGD by Dr. Brito demonstrated recurrence at the GE junction.    Acute GI bleed: Secondary to #1.  Transfusion per medical oncology team.  Continue PPI. Patient needs EGD for acute GI bleed. Recommend procedure by gastroenterology as patient may need injections, etc for active bleeding.    I discussed the patients findings and our  recommendations with patient, nursing staff and Dr. Brito.    Thank you for this consult and allowing us to participate in the care of your patient.  We will follow along with you during this hospitalization.       NIRU Kruse  [facility]  08/03/21  11:03 EDT    Patient was seen and assessed while wearing personal protective equipment including facemask, protective eyewear and gloves.  Hand hygiene performed prior to entering the room and upon exiting with doffing of gloves.        Electronically signed by Abdi Sanford III, MD at 08/03/21 3662     Wilberto Pelletier MD PhD at 08/03/21 0998            Subjective  .     REASON FOR CONSULTATION:     Provide an opinion on any further workup or treatment metastatic esophageal cancer, post Keytruda therapy on 7/29/2021, with acute anemia on top of chronic anemia due to GI bleeding.                             REQUESTING PHYSICIAN: Cathy Houser MD     RECORDS OBTAINED:  Records of the patient's history including those obtained from the referring provider were reviewed and summarized in detail.    HISTORY OF PRESENT ILLNESS:  The patient is a 65 y.o. year old male who is a patient of ours having metastatic esophageal cancer for which he had disease progression and recently started on immunotherapy with Keytruda on 07/29/2021. This patient also has history of hypertension, hyperlipidemia, DVT associated with malignancy, on Xarelto anticoagulation, who presented yesterday to our clinic because of feeling progressive weakness and melena. He was found having severe anemia with hemoglobin of 5.7. He was admitted to [facility] for further evaluation and management.     The patient was given 2 units PRBC transfusion overnight, and this morning hemoglobin improved at 7.3. The patient reports he continues to have dark colored stool this morning. The patient already has been seen by GI service and currently is waiting for  thoracic surgery consultation.    The patient reports no fever, sweating or chills. He does have significant fatigue and lightheadedness however no fainting or syncope. Denies fever, sweating, chills.         REVIEW OF SYSTEMS:  Review of Systems   Constitutional: Positive for activity change, appetite change, fatigue and unexpected weight change. Negative for fever.   HENT: Negative for facial swelling, nosebleeds and sore throat.    Eyes: Negative for visual disturbance.   Respiratory: Positive for shortness of breath. Negative for cough.    Cardiovascular: Negative for chest pain and leg swelling.   Gastrointestinal: Positive for blood in stool (Melena). Negative for abdominal distention, abdominal pain, diarrhea, nausea and vomiting.   Endocrine: Negative for cold intolerance.   Genitourinary: Negative for dysuria and hematuria.   Musculoskeletal: Negative for arthralgias and joint swelling.   Skin: Positive for pallor. Negative for color change.   Allergic/Immunologic: Positive for immunocompromised state (Metastatic esophageal cancer, had a chemotherapy).   Neurological: Positive for light-headedness. Negative for dizziness, syncope and headaches.   Hematological: Negative for adenopathy.   Psychiatric/Behavioral: Negative for agitation and confusion.         Objective     Vitals:    08/03/21 0145 08/03/21 0212 08/03/21 0500 08/03/21 0716   BP: 111/60 102/62 107/57 102/63   BP Location: Right arm Right arm  Left arm   Patient Position: Lying Lying  Lying   Pulse: 84 80 81 93   Resp: 18 18 18 18   Temp: 98.4 °F (36.9 °C) 98.6 °F (37 °C) 98.2 °F (36.8 °C) 98 °F (36.7 °C)   TempSrc: Oral Oral  Oral   SpO2: 98% 98% 97%    Weight:       Height:         Current Status 7/28/2021   ECOG score 1      PHYSICAL EXAM:      CONSTITUTIONAL:  Vital signs reviewed.  Well-developed well-nourished male, looks pale lying in bed.  No distress, looks comfortable.  EYES:  Conjunctivae and lids unremarkable.   PERRLA  EARS,NOSE,MOUTH,THROAT:  Ears and nose appear unremarkable.  Lips appear unremarkable.  RESPIRATORY:  Normal respiratory effort.  Lungs clear to auscultation bilaterally.  CARDIOVASCULAR: Regular rhythm and rate.  Normal S1, S2.  No murmurs rubs or gallops.  No significant lower extremity edema.  GASTROINTESTINAL: Abdomen appears unremarkable.  Nontender.  No hepatomegaly.  No splenomegaly.  LYMPHATIC:  No cervical, supraclavicular, axillary lymphadenopathy.  MUSCULOSKELETAL: Unremarkable digits/nails.  No cyanosis or clubbing.  No calf tenderness.  SKIN:  Warm.  No rashes.  PSYCHIATRIC:  Normal judgment and insight.  Normal mood and affect.          Assessment/Plan   *  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 Almost 90% of the liver was replaced by tumor.  · HER-2 positive  · Initially treated with FOLFOX initiated July 2019  · Herceptin added with cycle 2  · Following 8 cycles of FOLFOX, oxaliplatin discontinued with continuation of 5-FU, leucovorin, Herceptin. This was continued through 7/7/2020  · Increasing CEA led to PET scan 6/9/2021.  Disease progression noted with growth of the tumor in the lower esophagus.  Continued stability of hepatic metastasis  · Repeat EGD 6/19/2021 for tissue specimen and Next Generation Sequencing.  · Endoscopy that shows a noncircumferential abnormality in the lower esophagus that encompasses almost 6 cm in length. It is not blocking off the esophagus and that is why the patient has no symptoms. The pathology shows at least atypical cells consistent with cancer.  · NGS showing PD-L1 positive and high mutation burden.  The tumor remains HER-2 positive.  Treatment plan for Keytruda every 3 weeks x 4 cycles followed by repeat imaging  · Keytruda initiated 7/29/2021      * Acute GI bleeding  · Dark tarry stool initially began 7/29/2021  · 7/23/2021, hemoglobin of 11.0.     ·  On 8/2/2021 hemoglobin of 5.7  ·  Xarelto has been on hold.    · The  patient is waiting for thoracic surgery consultation, and I expect that this patient will need to have EGD examination to assess his bleeding situation.   · If indeed he has evidence of bleeding from esophageal cancer, we would consider palliative radiation therapy to stop bleeding.       * Anemia:    · Acute GI bleeding on top of chronic bleeding.   · Received 2 units PRBC transfusion, improved hemoglobin 7.3 on 8/3/2021.  The patient already has improved hemoglobin after 2 units PRBC transfusion however he is still symptomatic with fatigue.   · I recommended to transfuse 2 more units PRBC today. We will give the patient Lasix at least after the 1st unit of PRBC to avoid volume overload.     * Iron deficiency. This clearly is due to his GI bleeding. His laboratory study last night on 08/02/2021 reported ferritin 36.3 ng/mL and iron saturation 18% with free iron 41, TIBC 231. This patient had normal iron study on 05/18/2021 with ferritin 126 and iron saturation 25%. He had super therapeutic folate > 20 ng/mL on that day and normal vitamin B12 of 601 pg/mL. I think this patient also will benefit from intravenous iron therapy with all things considered. Certainly he is not a good candidate for oral iron treatment due to his esophageal cancer with active bleeding, and likely will have more issue with nausea, vomiting and abdomen pain.     *  Thrombophilia secondary malignancy, DVT  · Was on anticoagulated with Xarelto 20 mg daily.  · Currently on hold due to acute GI bleeding.      *  Colovesical fistula.   · He requires intermittent Cipro when he notes stool in his urine  · He is currently without progressive symptoms, reporting his urine is normal in color         PLAN:    · Transfuse 2 more units PRBC today.  · Continue to hold Xarelto anticoagulation.  · Give Lasix 20 mg after first unit of PRBC transfusion today.  · Pending thoracic surgery consultation.  Expecting EGD examination.  · If patient has indeed  bleeding from his esophageal cancer, will request radiation oncology consultation for palliative radiation.  · Arrange intravenous iron treatment with Venofer 300 mg daily for 3 days.   · Monitor CBC per protocol.      Discussed with his treating oncologist Dr. Haynes.      I spoke with thoracic surgery team NIRU Greer.      WILDER PELLETIER M.D., Ph.D.    8/3/2021                 Electronically signed by Wilder Pelletier MD PhD at 08/03/21 1530     Nahomi Villavicencio MD at 08/02/21 1739      Consult Orders    1. Inpatient Gastroenterology Consult [[id number]] ordered by aCthy Houser MD at 08/02/21 1718               [facility]  Initial Inpatient Consult Note    Referring Provider: [facility]    Reason for Consultation: GI bleed  Subjective     History of present illness:      Thank you for requesting my opinion.    This is a 64yo M, previously unknown to the GI service, directly admitted from his oncologist for melenic stool, dizziness, and lab findings of worsened anemia.  His history is notable for metastatic esophageal cancer and DVT on anticoag.  GI is consulted for GI bleeding.  He had his first dose of Keytruday 7/29 and that day started to notice some dark but formed stool.  This has occurred periodically for him and he says he takes cipro which clears it up, however it did not this time (he has a colovesicular fistula).  He did have looser stool yesterday but denies excessive melenic stool, hematemesis, or other overt bleeding.      Hemoglobin 7/23 was 11, then 7/29 was 9.5, and today is 5.7.  He feels symptomatically dizzy and has dyspnea on exertion    He does take Xarelto for a history of DVT and did take his dose this morning    Last EGD was 6/19/21 with Dr Brito showing progression of his esophageal tumor from 34cm to 40cm from the incisors.         Allergies:  No Known Allergies    Review of Systems  All systems were reviewed and negative except for:  Constitution:   positive for fevers and malaise  Gastrointestinal: positive for  melena     Objective     Vital Signs  Temp:  [97.6 °F (36.4 °C)-98.2 °F (36.8 °C)] 97.6 °F (36.4 °C)  Heart Rate:  [102] 102  Resp:  [16] 16  BP: (129-142)/(65-74) 142/65    Physical Exam:  Constitutional:   Alert, cooperative, in no acute distress, appears stated age, mildly ill appearing but not in extremis   Eyes:           Lids and lashes normal, conjunctivae and sclerae normal,   no icterus   Ears, nose, mouth and throat:  Normal appearance of external ears and nose, no oral l  lesions, no thrush, oral mucosa moist   Respiratory:    Clear to auscultation, respirations regular, even and             unlabored    Cardiovascular:   Regular rhythm and normal rate, normal S1 and S2, no        murmur, no gallop, palpable distal pulses, no lower extremity edema   Gastrointestinal:    Soft, obese, nondistended, nontender to palpation, no guarding, no rebound tenderness, normal bowel sounds, no palpable masses or organomegaly  Rectal exam: deferred   Musculoskeletal:  Normal station, no atrophy, no tenderness to palpation, normal digits and nails   Skin:  Pale color, no bleeding, bruising, rashes or lesions   Lymphatics:  No palpable cervical or supraclavicular adenopathy         Assessment/Plan       GI bleed      Impression  1. Symptomatic acute on chronic blood loss anemia with melena: in this setting, highly suspicious for bleeding from his esophageal cancer that demonstrated ulceration on the biopsies    2. Anticoagulated: for hx of DVT; last dose of xarelto was this morning    3. Esophageal adenocarcinoma with mets to liver    4. Colovesicular fistula: followed by general surgery    Plan  Transfuse as per admitting  Consult to thoracic surgery as they have performed his endoscopy previously; will defer to their preference on performing his EGD  Start ppi gtt  Hold xarelto  Follow Hb  Monitor for further bleeding      I discussed the patients findings  and my recommendations with patient, family and nursing staff    All necessary PPE, including face mask and eye protection, were worn during this encounter.  Hand sanitization was performed both before and after the patient interaction.    Nahomi Villavicencio MD  [facility]      Dictated utilizing Dragon dictation      Electronically signed by Nahomi Villavicencio MD at 21          Discharge Summary      Rachelle Sotro MD at 21 0841              Patient Name: Han Garcia  : 1955  MRN: [id number]    Date of Admission: 2021  Date of Discharge:  2021  Primary Care Physician: Jaiden Hoover MD      Discharge Diagnoses     Active Hospital Problems    Diagnosis  POA   • Superficial venous thrombosis of left upper extremity [I82.612]  No   • Chronic anticoagulation [Z79.01]  Not Applicable   • 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) [G62.0, T45.1X5A]  Yes   • Anemia, chronic disease [D63.8]  Yes   • Acute deep vein thrombosis (DVT) of femoral vein of left lower extremity (CMS/HCC) [I82.412]  Yes   • Acute deep vein thrombosis (DVT) of right popliteal vein (CMS/HCC) [I82.431]  Yes   • Esophagus neoplasm [D49.0]  Yes   • Liver metastasis (CMS/HCC) [C78.7]  Yes   • Essential hypertension [I10]  Yes   • History of prostate cancer [Z85.46]  Not Applicable   • Hyperlipidemia [E78.5]  Yes   • Tobacco abuse [Z72.0]  Yes      Resolved Hospital Problems    Diagnosis Date Resolved POA   • **GI bleed [K92.2] 2021 Yes   • Chemotherapy induced neutropenia (CMS/HCC) [D70.1, T45.1X5A] 2021 Yes        Hospital Course     Brief admission history and physical.  Please refer to the H&P for full details.  A pleasant 65 years old white gentleman with a past history of prostate cancer/metastatic esophageal cancer/DVT on chronic anticoagulation/enterovesical fistula/hypertension/dyslipidemia/chemo-induced peripheral neuropathy who presents to his oncology office with  weakness and fatigue over the last 2 days associated with melena.  No nausea or vomiting no abdominal pain.  No fever or chills.  No chest pain or shortness of breath.  His hemoglobin was noticed to be 5.7 and he was directly admitted.  On physical examination on admission his temperature is 97.9 a pulse of 102 respiratory rate of 16 and a blood pressure 142/65 and O2 sats 100% on room air.  The rest of the examination is remarkable for decreased bilateral air entry.  Otherwise unremarkable.  Hospital course.  Patient evaluation included a CBC that was normal except white count of 14.19 hemoglobin 5.7.  Chemistries normal except for sodium of 132 and bicarbonate 21.4.  Calcium was 8.1.  Recent TSH was normal.  The diagnosis was that of an upper GI bleed leading to an acute anemia on top of anemia of chronic blood loss in a patient with a history of esophageal cancer stage IV.  He was transfused.  GI consultation was obtained and EGD was done on 8/5/2021 that read revealed a distal esophageal mass that was bleeding hemospray was applied.  Anticoagulation was stopped.  He was placed empirically on IV proton pump inhibitors.  Hematology oncology consult was obtained you recommended radiation therapy for this mass and the patient has started that while in the hospital.  He remained hemodynamically stable.  He has received IV iron during his hospital stay.  His hemoglobin has stabilized during the stay between 10 and 11.  Clinically his melena has resolved.  He was discharged on p.o. Protonix and iron with a follow-up with the radiation therapy and hematology oncology.  He does have a history of colovesical fistula and he is on prophylactic Cipro he did not have any urinary complaints during this hospital stay.  He has a history of lower extremity DVT and thrombocytopenia and during his hospital stay he developed a left upper extremity SVT.  Secondary to the GI bleed his Xarelto and Pletal were DC'd in agreement with the  hematology and oncology this most likely will need to be restarted again after couple weeks after bleeding stops after the radiation therapy.  He does have a history of peripheral neuropathy secondary to chemo we continued his Neurontin.  As far as his high blood pressure he had a good blood pressure control with no evidence of angina or congestive heart failure his fluids was stopped and he will continue lisinopril and HCTZ.  At the time of discharge he was asymptomatic and hemodynamically stable.  He will follow-up with his primary care physician/hematology oncology/radiation oncology/GI.  He was counseled against tobacco abuse and nonsteroidal anti-inflammatory drugs    Consultants     Consult Orders (all) (From admission, onward)     Start     Ordered    08/03/21 1522  Inpatient Radiation Oncology Consult  Once     Comments: Palliative radiation therapy for hemorrhage assumably from esophageal cancer   Specialty:  Radiation Oncology  Provider:  Anh Garcia MD    08/03/21 1522    08/02/21 1931  Inpatient Thoracic Surgery Consult  Once     Specialty:  Thoracic Surgery  Provider:  Mary Brito MD    08/02/21 1932    08/02/21 1719  Inpatient Gastroenterology Consult  Once     Specialty:  Gastroenterology  Provider:  Kaiser Hodges MD    08/02/21 1718    08/02/21 1718  Inpatient Gastroenterology Consult  Once     Specialty:  Gastroenterology  Provider:  Kaiser Hodges MD    08/02/21 1718    08/02/21 1718  Inpatient Hematology & Oncology Consult  Once     Specialty:  Hematology and Oncology  Provider:  Alexey Haynes MD    08/02/21 1718              Procedures     Imaging Results (All)     None          Pertinent Labs     Results from last 7 days   Lab Units 08/07/21  0335 08/06/21  0829 08/05/21  2358 08/05/21  1518 08/05/21  0458 08/05/21  0458 08/04/21  1602 08/04/21  0515   WBC 10*3/mm3 7.33 9.55  --   --   --  8.46  --  8.24   HEMOGLOBIN g/dL 10.8* 11.4* 10.7* 9.8*   < > 8.9*   < > 9.0*    PLATELETS 10*3/mm3 168 173  --   --   --  150  --  150    < > = values in this interval not displayed.     Results from last 7 days   Lab Units 08/07/21  0335 08/06/21  0829 08/05/21  1518 08/04/21  0515 08/03/21  0748 08/03/21  0748   SODIUM mmol/L 139 141  --  139  --  134*   POTASSIUM mmol/L 3.5 3.6 3.4* 3.6   < > 3.6   CHLORIDE mmol/L 108* 108*  --  110*  --  106   CO2 mmol/L 21.3* 20.5*  --  19.8*  --  20.2*   BUN mg/dL 7* 8  --  12  --  11   CREATININE mg/dL 0.63* 0.72*  --  0.76  --  0.73*   GLUCOSE mg/dL 79 108*  --  85  --  105*    < > = values in this interval not displayed.   Estimated Creatinine Clearance: 133.4 mL/min (A) (by C-G formula based on SCr of 0.63 mg/dL (L)).  Results from last 7 days   Lab Units 08/02/21  1449   ALBUMIN g/dL 3.50   BILIRUBIN mg/dL 0.2   ALK PHOS U/L 73   AST (SGOT) U/L 21   ALT (SGPT) U/L 25     Results from last 7 days   Lab Units 08/07/21 0335 08/06/21  0829 08/05/21  1518 08/04/21  0515 08/03/21  0748 08/02/21  1449 08/02/21  1449   CALCIUM mg/dL 8.3* 8.4*  --  8.1* 7.8*   < > 8.1*   ALBUMIN g/dL  --   --   --   --   --   --  3.50   MAGNESIUM mg/dL  --  1.7 1.8  --   --   --   --     < > = values in this interval not displayed.               Invalid input(s): LDLCALC      Imaging Results (Last 24 Hours)     ** No results found for the last 24 hours. **          Test Results Pending at Discharge         Discharge Exam   Physical Exam  Vitals.  Temperature 98.4 a pulse of 68 respiratory rate of 20 and blood pressure 132/67 O2 sats of 98% on room air  General.  Middle-aged gentleman.  Alert oriented x3.  No apparent pain distress or diaphoresis.  Normal mood and affect.  Eyes.  Pupils equal round and reactive.  Intact extraocular musculature.  No pallor or jaundice.  Oral cavity.  Moist mucous membrane.  Neck.  Supple.  No JVD.  No lymphadenopathy or thyromegaly.  Cardiovascular.  Regular rate and rhythm.  No murmurs.  Chest.  Clear to auscultation bilaterally with no  added sounds.  Abdomen.  Soft lax.  No tenderness.  No organomegaly.  No guarding or rebound.  Extremities.  +1 bilateral lower extremity edema.  No left upper extremity edema or pain  CNS.  No acute focal neurological deficits.  Discharge Details        Discharge Medications      New Medications      Instructions Start Date   ferrous sulfate 325 (65 FE) MG EC tablet   325 mg, Oral, 3 Times Daily With Meals      pantoprazole 40 MG EC tablet  Commonly known as: Protonix   40 mg, Oral, Daily         Changes to Medications      Instructions Start Date   gabapentin 300 MG capsule  Commonly known as: NEURONTIN  What changed:   · how much to take  · when to take this   600 mg, Oral, Every Night at Bedtime      nicotine 21 MG/24HR patch  Commonly known as: NICODERM CQ  What changed: additional instructions   1 patch, Transdermal, Every 24 Hours         Continue These Medications      Instructions Start Date   acetaminophen 500 MG tablet  Commonly known as: TYLENOL   500 mg, Oral, Every 6 Hours PRN      cevimeline 30 MG capsule  Commonly known as: EVOXAC   30 mg, Oral, 3 Times Daily      ciprofloxacin 250 MG tablet  Commonly known as: CIPRO   250 mg, Oral, Daily      econazole nitrate 1 % cream  Commonly known as: SPECTAZOLE   2 Times Daily      lisinopril-hydrochlorothiazide 20-12.5 MG per tablet  Commonly known as: PRINZIDE,ZESTORETIC   TAKE 1 TABLET BY MOUTH EVERY DAY      ondansetron 4 MG tablet  Commonly known as: ZOFRAN   4 mg, Oral, Every 8 Hours PRN      PROBIOTIC DAILY PO   1 tablet, Oral, Daily         Stop These Medications    cilostazol 50 MG tablet  Commonly known as: PLETAL     Xarelto 20 MG tablet  Generic drug: rivaroxaban            No Known Allergies      Discharge Disposition:  Condition: Stable    Diet:   Diet Order   Procedures   • Diet Regular; GI Soft   Healthy cardiac    Activity:   Activity Instructions     Activity as Tolerated            Counseling : RODNEY smoking    CODE STATUS:    Code Status and  Medical Interventions:   Ordered at: 08/02/21 1718     Code Status:    CPR     Medical Interventions (Level of Support Prior to Arrest):    Full       Future Appointments   Date Time Provider Department Center   8/9/2021  1:00 PM  [facility] [address] [facility]   8/10/2021  1:45 PM Sarah Wiseman MD [facility] RO [facility] None   8/10/2021  1:50 PM [facility] [address] [facility]   8/11/2021  1:50 PM [facility] [address] [facility]   8/12/2021  1:50 PM [facility] [address] [facility]   8/13/2021  1:50 PM BH [facility] [address] [facility]   8/16/2021  1:10 PM  [facility] [address] [facility]   8/17/2021  1:10 PM BH [facility] [address] [facility]   8/18/2021  1:10 PM  [facility] [address]  MATT RO MATT   8/19/2021  9:30 AM INFU [facility] PORT CHAIR  INFUS KRE LAG   8/19/2021 10:00 AM Alexey Haynes MD [facility] CBC [facility] [place]   8/19/2021 10:30 AM CHAIR 12 CBC KRE - SM  INFUS KRE LAG     Additional Instructions for the Follow-ups that You Need to Schedule     Call MD With Problems / Concerns   As directed      Instructions: Call MD or return to ER if abdominal pain/nausea and vomiting/hematemesis/melena/fresh bright blood per rectum/chest pain/dizziness/shortness of breath/swelling of the legs and extremities    Order Comments: Instructions: Call MD or return to ER if abdominal pain/nausea and vomiting/hematemesis/melena/fresh bright blood per rectum/chest pain/dizziness/shortness of breath/swelling of the legs and extremities          Discharge Follow-up with PCP   As directed       Currently Documented PCP:    Jaiden Hoover MD    PCP Phone Number:    [phone]     Follow Up Details: 1 week.  Upper GI bleed secondary to esophageal cancer ulcerated mass/esophageal cancer with metastasis/DVT/SVT/hypertension-anemia         Discharge Follow-up with Specified Provider: GI.; 2 Weeks   As directed      To: GI.    Follow Up: 2 Weeks    Follow Up Details: Bleeding esophageal cancer         Discharge Follow-up with Specified Provider:  Hematology oncology; 2 Weeks   As directed      To: Hematology oncology    Follow Up: 2 Weeks    Follow Up Details: Metastatic esophageal cancer/anemia         Discharge Follow-up with Specified Provider: Radiation oncology; 1 Week   As directed      To: Radiation oncology    Follow Up: 1 Week    Follow Up Details: Esophageal cancer on radiotherapy           Follow-up Information     Jaiden Hoover MD .    Specialty: Family Medicine  Why: 1 week.  Upper GI bleed secondary to esophageal cancer ulcerated mass/esophageal cancer with metastasis/DVT/SVT/hypertension-anemia  Contact information:  [address] [address]  [phone]                     Time Spent on Discharge:  Greater than 30 minutes      Rachelle Sorto MD  [facility]  08/07/21  08:48 EDT    Electronically signed by Rachelle Sorto MD at 08/07/21 2781

## 2021-08-10 NOTE — PROGRESS NOTES
Physician Weekly Management Note    Diagnosis:     1. Malignant neoplasm of lower third of esophagus (CMS/HCC)     Stage IVB (cT3, cNX, pM1, G3)    Reason for Visit:   Weekly Status Check (4/10)     Concurrent Chemo:   No    Notes on Treatment course, Films, Medical progress and Plan:  Doing well. States stool was lighter right after EGD, now starting to darken again. Discussed events of EGD to temporize bleeding and how long it will take RT to begin to work. Continue to watch and anticipate next week will start to lighten. Feel great now, no lab appt until 8/19 so will watch - can check H/H sooner if needed. Cont on.    Performance Status:  (1) Restricted in physically strenuous activity, ambulatory and able to do work of light nature  Subjective     ROS - Other than as listed above, as follows:  Constitutional - Normal - no complaints of fatigue, denies lack of appetite, fever, night sweats or change in weight.  Neck - Normal - denies neck masses, muscle weakness, neck pain, decreased range of motion or swelling.  Cardiovascular - Normal - denies arrhythmias, chest pain, dyspnea, edema, orthopnea or palpitations.  Respiratory - Normal - denies cough, dyspnea, hemoptysis, hiccoughs, pleuritic chest pain or wheezing.  Gastrointestinal - Normal - no complaints of constipation, abdominal pain, diarrhea, heartburn/dyspepsia, hematemesis, hemorrhoids, melena or GI bleeding, nausea, pain or cramping or vomiting.     There were no vitals filed for this visit.  Objective     PHYSICAL EXAM - Other than as listed above, as follows:  Constitutional - Normal - no evidence of impaired alertness, inadequate appearance, premature or advanced chronologic age, uncooperativeness, altered mood, affect or disorientation.  Neck - Normal - no evidence of tender or enlarged lymph nodes, neck abnormalities, restricted range of motion or enlarged thyroid.  Chest - Normal - no evidence of chest abnormalities, tender or enlarged lymph  "nodes.  Respiratory - Normal - no evidence of abnormal breat sounds, chest abnormalities on palpation and chest abnormalities on percussion and normal breath sounds.  Hematologic/Lymphatic - Normal - no evidence of tender or enlarged axillary lymph nodes nor tender or enlarged neck nodes.    Technical aspects reviewed:  Weekly OBI approved if applicable? Yes  Weekly port films approved?   Yes  Change requests noted if applicable?  No  Patient setup and plan reviewed?  Yes    Chart Reviewed:  Continue current treatment plan?   Yes  Treatment plan change requested?  No    I have reviewed and marked as \"reviewed\" the current medications, allergies and problem list in the patients EMR.  I have reviewed the patient's medical, surgical  history in detail, reviewed any pertinent lab work and updated the computerized patient record if needed.    Patient's Care Team:  Patient Care Team:  Jaiden Hoover MD as PCP - General (Family Medicine)  Amanda Gunderson APRN as Referring Physician (Family Medicine)  Alexey Haynes MD as Consulting Physician (Hematology and Oncology)  Mary Brito MD as Surgeon (Thoracic Surgery)  Sarah Wiseman MD as Consulting Physician (Radiation Oncology)    "

## 2021-08-11 PROCEDURE — 77412 RADIATION TX DELIVERY LVL 3: CPT | Performed by: RADIOLOGY

## 2021-08-11 PROCEDURE — 77014 CHG CT GUIDANCE RADIATION THERAPY FLDS PLACEMENT: CPT | Performed by: RADIOLOGY

## 2021-08-12 ENCOUNTER — LAB (OUTPATIENT)
Dept: LAB | Facility: HOSPITAL | Age: 66
End: 2021-08-12

## 2021-08-12 DIAGNOSIS — C15.5 MALIGNANT NEOPLASM OF LOWER THIRD OF ESOPHAGUS (HCC): ICD-10-CM

## 2021-08-12 LAB
BASOPHILS # BLD AUTO: 0.06 10*3/MM3 (ref 0–0.2)
BASOPHILS NFR BLD AUTO: 0.9 % (ref 0–1.5)
DEPRECATED RDW RBC AUTO: 56.6 FL (ref 37–54)
EOSINOPHIL # BLD AUTO: 0.25 10*3/MM3 (ref 0–0.4)
EOSINOPHIL NFR BLD AUTO: 3.7 % (ref 0.3–6.2)
ERYTHROCYTE [DISTWIDTH] IN BLOOD BY AUTOMATED COUNT: 17.1 % (ref 12.3–15.4)
HCT VFR BLD AUTO: 38.3 % (ref 37.5–51)
HGB BLD-MCNC: 12.8 G/DL (ref 13–17.7)
IMM GRANULOCYTES # BLD AUTO: 0.07 10*3/MM3 (ref 0–0.05)
IMM GRANULOCYTES NFR BLD AUTO: 1 % (ref 0–0.5)
LYMPHOCYTES # BLD AUTO: 0.91 10*3/MM3 (ref 0.7–3.1)
LYMPHOCYTES NFR BLD AUTO: 13.4 % (ref 19.6–45.3)
MCH RBC QN AUTO: 30 PG (ref 26.6–33)
MCHC RBC AUTO-ENTMCNC: 33.4 G/DL (ref 31.5–35.7)
MCV RBC AUTO: 89.7 FL (ref 79–97)
MONOCYTES # BLD AUTO: 0.5 10*3/MM3 (ref 0.1–0.9)
MONOCYTES NFR BLD AUTO: 7.4 % (ref 5–12)
NEUTROPHILS NFR BLD AUTO: 4.98 10*3/MM3 (ref 1.7–7)
NEUTROPHILS NFR BLD AUTO: 73.6 % (ref 42.7–76)
NRBC BLD AUTO-RTO: 0 /100 WBC (ref 0–0.2)
PLATELET # BLD AUTO: 198 10*3/MM3 (ref 140–450)
PMV BLD AUTO: 9.3 FL (ref 6–12)
RBC # BLD AUTO: 4.27 10*6/MM3 (ref 4.14–5.8)
WBC # BLD AUTO: 6.77 10*3/MM3 (ref 3.4–10.8)

## 2021-08-12 PROCEDURE — 36415 COLL VENOUS BLD VENIPUNCTURE: CPT

## 2021-08-12 PROCEDURE — 77014 CHG CT GUIDANCE RADIATION THERAPY FLDS PLACEMENT: CPT | Performed by: RADIOLOGY

## 2021-08-12 PROCEDURE — 85025 COMPLETE CBC W/AUTO DIFF WBC: CPT

## 2021-08-12 PROCEDURE — 77412 RADIATION TX DELIVERY LVL 3: CPT | Performed by: RADIOLOGY

## 2021-08-12 PROCEDURE — 77427 RADIATION TX MANAGEMENT X5: CPT | Performed by: RADIOLOGY

## 2021-08-12 PROCEDURE — 77387 GUIDANCE FOR RADJ TX DLVR: CPT | Performed by: RADIOLOGY

## 2021-08-13 ENCOUNTER — OFFICE VISIT (OUTPATIENT)
Dept: OTHER | Facility: HOSPITAL | Age: 66
End: 2021-08-13

## 2021-08-13 DIAGNOSIS — C15.5 MALIGNANT NEOPLASM OF LOWER THIRD OF ESOPHAGUS (HCC): ICD-10-CM

## 2021-08-13 DIAGNOSIS — C78.7 LIVER METASTASIS: Primary | ICD-10-CM

## 2021-08-13 DIAGNOSIS — Z72.0 TOBACCO ABUSE: ICD-10-CM

## 2021-08-13 PROCEDURE — 99406 BEHAV CHNG SMOKING 3-10 MIN: CPT | Performed by: NURSE PRACTITIONER

## 2021-08-13 PROCEDURE — 77412 RADIATION TX DELIVERY LVL 3: CPT | Performed by: RADIOLOGY

## 2021-08-13 PROCEDURE — 77014 CHG CT GUIDANCE RADIATION THERAPY FLDS PLACEMENT: CPT | Performed by: RADIOLOGY

## 2021-08-13 NOTE — PROGRESS NOTES
[facility] MULTIDISCIPLINARY CLINIC  SMOKING CESSATION FOLLOW-UP VISIT     Han Garcia is a pleasant 65 y.o. male seen today in multidisciplinary clinic for smoking cessation counseling follow up         HPI:  Patient was seen in radiation department just prior to our appointment for treatment #7/10. He states he will resume every three weeks Keytruda next week 8/19. Has been feeling well. Denies appetite changes. Denies difficulties with swallowing. Denies nausea/vomiting/diarrhea. No changes in bowel or bladder however has noted stools becoming black again. He spoke to Dr Wiseman about this yesterday and a CBC was checked, his Hgb was 12.8 yesterday. Despite his intentions, patient has resumed smoking cigarettes daily. He returned home to a half pack on the kitchen table and this was the ultimate trigger. He unfortunately did not prepare ahead of time with a nicotine patch.  Currently smoking about 10 cigarettes a day.  Very frustrated with this, and still feels it is very important for his health to quit.    He observes today concerns about the multiple issues he is now juggling with the cancer. We began a goals of care discussion today.    I did take a waist circumference measurement today of 124.5 cm     QUIT PLAN (STAR):  Set quit date: Deferred today     Tell friends/family: His sister is been a support and accountability ashley. He has a neighbor whom he enjoys speaking with.      Anticipate challenges: Biggest challenge is beginning his efforts again.       Remove tobacco from environment: His car continues to be a smoke-free environment and he has utilize car rides as a distraction in the past      Medication plan: Planning to use 21mg nicotine patch. He has reduced the daily amount of cigarettes to 10 or less.      Practical counseling: Strongly encouraged a practice quit with the patch in place. We will continue cessation coaching and supportive counseling in the survivorship  clinic.      Follow-up: In clinic next week after Keytruda infusion.  I have encouraged them to call me with any questions or as needed in the interm.      Advance Care Planning     Advance Care Planning     Date of [facility] ACP interview: 8/13/2021  [place] [facility] [place]  [facility] ACP Facilitator: NIRU Blanc  Present for facilitation: Patient    SUMMARY OF ADVANCE CARE PLANNING DISCUSSION:    GOALS OF CARE: Han tells me today he is really hoping to be able to travel soon. He drove [address] to [place] to visit family who lived there at the time in the 70'ss and would love to experience that again, hopefully bringing his sister and brother in law along. He also expresses he has been to [facility] many times in the past and would like to revisit that. He hopes to continue to feel well enough to work on his projects around the house. He expresses he really wants to quit smoking to maximize his treatments and be able to do these things.    We reviewed purpose and goals for advance care planning.    I reviewed with Han qualities to consider when choosing a healthcare agent. Han is undecided about  his healthcare agent. I encouraged Han to discuss his preferences for future care with healthcare agents and others close to him.    Han describes past experiences dealing with being seriously ill: He was hospitalized several years ago with severe pneumonia. His sister ultimately had to make some decisions about his ongoing life sustaining medical care. He was on a ventilator for about a week. He tells me he and his sister are able to talk about that experience now, but his sister did share with him how stressful it was for her to make decisions on his behalf without having talked much about that in the past.    Today we began to explore goals of medical care for severe, permanent brain injury were explored and reviewed a blank [place] living will together.    He expresses he has begun  to talk to his family about making his own  arrangements but this is difficult for his sisters to engage in thinking about right now.    Education materials were provided on: Advance Care Planning and Conversations that Matter Booklet    Advance care/living will document finished during this facilitation? no    Will plan to review at next visit and address any questions at that time.      NIRU Blanc            Last discussed concept of survivorship/treatment summary visit 2021 so that he can have his records together in once place and to discuss ongoing concerns around long term effects of treatment, illness and coping and we can revisit this in the future as appropriate.

## 2021-08-16 PROCEDURE — 77412 RADIATION TX DELIVERY LVL 3: CPT | Performed by: RADIOLOGY

## 2021-08-16 PROCEDURE — 77014 CHG CT GUIDANCE RADIATION THERAPY FLDS PLACEMENT: CPT | Performed by: RADIOLOGY

## 2021-08-17 ENCOUNTER — READMISSION MANAGEMENT (OUTPATIENT)
Dept: CALL CENTER | Facility: HOSPITAL | Age: 66
End: 2021-08-17

## 2021-08-17 PROCEDURE — 77412 RADIATION TX DELIVERY LVL 3: CPT | Performed by: RADIOLOGY

## 2021-08-17 PROCEDURE — 77014 CHG CT GUIDANCE RADIATION THERAPY FLDS PLACEMENT: CPT | Performed by: RADIOLOGY

## 2021-08-17 PROCEDURE — 77336 RADIATION PHYSICS CONSULT: CPT | Performed by: RADIOLOGY

## 2021-08-17 NOTE — OUTREACH NOTE
Medical Week 2 Survey      Responses   [facility] patient discharged from?  [place]   Does the patient have one of the following disease processes/diagnoses(primary or secondary)?  Other   Week 2 attempt successful?  Yes   Call start time  1525   Call end time  1529   Meds reviewed with patient/caregiver?  Yes   Is the patient having any side effects they believe may be caused by any medication additions or changes?  No   Does the patient have all medications ordered at discharge?  Yes   Is the patient taking all medications as directed (includes completed medication regime)?  Yes   Does the patient have a primary care provider?   Yes   Does the patient have an appointment with their PCP within 7 days of discharge?  No   Comments regarding PCP  Decided not to contact his PCP states he was not involved in his care   Has the patient kept scheduled appointments due by today?  N/A   Comments  FU with GI on Friday   Has home health visited the patient within 72 hours of discharge?  N/A   Psychosocial issues?  No   Did the patient receive a copy of their discharge instructions?  Yes   Nursing interventions  Reviewed instructions with patient   What is the patient's perception of their health status since discharge?  Same [STates he feels better]   Is the patient/caregiver able to teach back signs and symptoms related to disease process for when to call PCP?  Yes   Is the patient/caregiver able to teach back signs and symptoms related to disease process for when to call 911?  Yes   Is the patient/caregiver able to teach back the hierarchy of who to call/visit for symptoms/problems? PCP, Specialist, Home health nurse, Urgent Care, ED, 911  Yes   If the patient is a current smoker, are they able to teach back resources for cessation?  Smoking cessation support groups   Week 2 Call Completed?  Yes   Wrap up additional comments  he has been getting out and doing things, not having any symptoms like he had before.            Park Majano RN

## 2021-08-18 ENCOUNTER — DOCUMENTATION (OUTPATIENT)
Dept: RADIATION ONCOLOGY | Facility: HOSPITAL | Age: 66
End: 2021-08-18

## 2021-08-18 ENCOUNTER — RADIATION ONCOLOGY WEEKLY ASSESSMENT (OUTPATIENT)
Dept: RADIATION ONCOLOGY | Facility: HOSPITAL | Age: 66
End: 2021-08-18

## 2021-08-18 DIAGNOSIS — C15.5 MALIGNANT NEOPLASM OF LOWER THIRD OF ESOPHAGUS (HCC): Primary | ICD-10-CM

## 2021-08-18 PROCEDURE — 77412 RADIATION TX DELIVERY LVL 3: CPT | Performed by: RADIOLOGY

## 2021-08-18 PROCEDURE — 77014 CHG CT GUIDANCE RADIATION THERAPY FLDS PLACEMENT: CPT | Performed by: RADIOLOGY

## 2021-08-18 NOTE — PROGRESS NOTES
"Physician Weekly Management Note    Diagnosis:     Diagnosis Plan   1. Malignant neoplasm of lower third of esophagus (CMS/HCC)         RT Details:  fx 10/10 esophageal mass    Notes on Treatment course, Films, Medical progress:  Doing ok, no bleeding, increased fatigue, follow up with dr. Haynes tomorrow    Weekly Management:  Medication reviewed?   Yes  New medications given?   No  Problemlist reviewed?   Yes  Problem added?   No  Issues raised requiring referral to support services - task assigned to:  na    Technical aspects reviewed:  Weekly OBI approved?   Yes  Weekly port films approved?   Yes  Change requests noted on port film?   No  Patient setup and plan reviewed?   Yes    Chart Reviewed:  Continue current treatment plan?   Yes  Treatment plan change requested?   No  CBC reviewed?   Yes  Concurrent Chemo?   No    Objective     Toxicities:   fatigue     Review of Systems   Constitutional: Positive for fatigue.          There were no vitals filed for this visit.    Current Status 7/28/2021   ECOG score 1       Physical Exam  Constitutional:       Appearance: Normal appearance.   Neurological:      Mental Status: He is alert.   Psychiatric:         Mood and Affect: Mood normal.             Problem Summary List    Diagnosis:     Diagnosis Plan   1. Malignant neoplasm of lower third of esophagus (CMS/HCC)       Pathology:   esopahgeal    Past Medical History:   Diagnosis Date   • Arthritis    • Cancer (CMS/HCC)     prostate cancer 2008   • Cancer (CMS/HCC)     esophageal   • Chronic anticoagulation     on xarelto   • Elevated PSA    • Esophageal mass    • Fistula     colon and bladder   • H/O Lung nodule    • Hepatitis     CHILD--PT STATED \"I THINK IT WAS A.\"   • History of chemotherapy    • History of pneumonia    • Hx of blood clots 08/10/2019   • Hyperlipidemia    • Hypertension    • Nail fungus    • Neuropathy    • PVD (peripheral vascular disease) (CMS/HCC)    • Recto-bladderneck fistula     on poab for " recurrent uti         Past Surgical History:   Procedure Laterality Date   • COLONOSCOPY N/A 2/4/2020    Procedure: COLONOSCOPY;  Surgeon: Guy Sears MD;  Location: [facility] ENDOSCOPY;  Service: General;  Laterality: N/A;  PRE-COLOVESICAL FISTULA  POST-- DIVERTICULOSIS   • CYSTOSCOPY  02/06/2020   • CYSTOSCOPY Bilateral 2/26/2020    Procedure: CYSTOSCOPY RETROGRADE;  Surgeon: Cm Witt MD;  Location: [facility] MAIN OR;  Service: Urology;  Laterality: Bilateral;   • ENDOSCOPY     • ENDOSCOPY N/A 6/19/2021    Procedure: ESOPHAGOGASTRODUODENOSCOPY WITH BIOPSY;  Surgeon: Mary Brito MD;  Location: [facility] MAIN OR;  Service: Gastroenterology;  Laterality: N/A;   • ENDOSCOPY N/A 8/5/2021    Procedure: ESOPHAGOGASTRODUODENOSCOPY HEMOSPRAY;  Surgeon: Naga Shelby MD;  Location: [facility] ENDOSCOPY;  Service: Gastroenterology;  Laterality: N/A;  HX OF ESOPHAGEAL  CANCER;MELENA  POST: ESOPHAGEAL BLEEDING; ESOPHAGEAL MASS   • HERNIA REPAIR Right 1999    inguinal hernia   • PROSTATE SURGERY  03/2008    prostatectectomy   • VENOUS ACCESS DEVICE (PORT) INSERTION N/A 7/16/2019    Procedure: INSERTION VENOUS ACCESS DEVICE WITH FLUORO AND EGD WITH BIOPSY;  Surgeon: Mary Brito MD;  Location: [facility] MAIN OR;  Service: Thoracic         Current Outpatient Medications on File Prior to Visit   Medication Sig Dispense Refill   • acetaminophen (TYLENOL) 500 MG tablet Take 500 mg by mouth Every 6 (Six) Hours As Needed for Mild Pain .     • cevimeline (EVOXAC) 30 MG capsule Take 30 mg by mouth 3 (Three) Times a Day.     • ciprofloxacin (CIPRO) 250 MG tablet Take 1 tablet by mouth Daily. 90 tablet 1   • econazole nitrate (SPECTAZOLE) 1 % cream 2 (Two) Times a Day.     • ferrous sulfate 325 (65 FE) MG EC tablet Take 1 tablet by mouth 3 (Three) Times a Day With Meals. 90 tablet 3   • gabapentin (NEURONTIN) 300 MG capsule TAKE 2 CAPSULES BY MOUTH EVERY NIGHT AT BEDTIME (Patient taking differently: Take 300 mg by  mouth 2 (two) times a day.) 60 capsule 4   • lisinopril-hydrochlorothiazide (PRINZIDE,ZESTORETIC) 20-12.5 MG per tablet TAKE 1 TABLET BY MOUTH EVERY DAY 90 tablet 0   • nicotine (NICODERM CQ) 21 MG/24HR patch Place 1 patch on the skin as directed by provider Daily. (Patient taking differently: Place 1 patch on the skin as directed by provider Daily. Has at home but hasn't started taking yet) 28 each 1   • ondansetron (ZOFRAN) 4 MG tablet Take 1 tablet by mouth Every 8 (Eight) Hours As Needed for Nausea or Vomiting. 30 tablet 2   • pantoprazole (Protonix) 40 MG EC tablet Take 1 tablet by mouth Daily. 30 tablet 30   • Probiotic Product (PROBIOTIC DAILY PO) Take 1 tablet by mouth Daily.       No current facility-administered medications on file prior to visit.       No Known Allergies      Referring Provider:    No referring provider defined for this encounter.    Oncologist:  No primary care provider on file.      Seen and approved by:  Anh Garcia MD  08/18/2021

## 2021-08-19 ENCOUNTER — OFFICE VISIT (OUTPATIENT)
Dept: ONCOLOGY | Facility: CLINIC | Age: 66
End: 2021-08-19

## 2021-08-19 ENCOUNTER — INFUSION (OUTPATIENT)
Dept: ONCOLOGY | Facility: HOSPITAL | Age: 66
End: 2021-08-19

## 2021-08-19 ENCOUNTER — OFFICE VISIT (OUTPATIENT)
Dept: OTHER | Facility: HOSPITAL | Age: 66
End: 2021-08-19

## 2021-08-19 VITALS
WEIGHT: 202.1 LBS | SYSTOLIC BLOOD PRESSURE: 92 MMHG | BODY MASS INDEX: 29.93 KG/M2 | TEMPERATURE: 97.8 F | HEIGHT: 69 IN | RESPIRATION RATE: 16 BRPM | DIASTOLIC BLOOD PRESSURE: 59 MMHG | HEART RATE: 89 BPM | OXYGEN SATURATION: 96 %

## 2021-08-19 DIAGNOSIS — C15.5 MALIGNANT NEOPLASM OF LOWER THIRD OF ESOPHAGUS (HCC): Primary | ICD-10-CM

## 2021-08-19 DIAGNOSIS — Z79.01 CHRONIC ANTICOAGULATION: ICD-10-CM

## 2021-08-19 DIAGNOSIS — D49.0 ESOPHAGUS NEOPLASM: ICD-10-CM

## 2021-08-19 DIAGNOSIS — I10 ESSENTIAL HYPERTENSION: ICD-10-CM

## 2021-08-19 DIAGNOSIS — I82.431 ACUTE DEEP VEIN THROMBOSIS (DVT) OF RIGHT POPLITEAL VEIN (HCC): ICD-10-CM

## 2021-08-19 DIAGNOSIS — Z72.0 TOBACCO ABUSE: ICD-10-CM

## 2021-08-19 DIAGNOSIS — T45.1X5A PERIPHERAL NEUROPATHY DUE TO CHEMOTHERAPY (HCC): ICD-10-CM

## 2021-08-19 DIAGNOSIS — C78.7 LIVER METASTASIS: ICD-10-CM

## 2021-08-19 DIAGNOSIS — G62.0 PERIPHERAL NEUROPATHY DUE TO CHEMOTHERAPY (HCC): ICD-10-CM

## 2021-08-19 DIAGNOSIS — I82.612 SUPERFICIAL VENOUS THROMBOSIS OF LEFT UPPER EXTREMITY: ICD-10-CM

## 2021-08-19 DIAGNOSIS — C15.5 MALIGNANT NEOPLASM OF LOWER THIRD OF ESOPHAGUS (HCC): ICD-10-CM

## 2021-08-19 DIAGNOSIS — Z72.0 TOBACCO ABUSE: Primary | ICD-10-CM

## 2021-08-19 DIAGNOSIS — Z45.2 ENCOUNTER FOR ADJUSTMENT OR MANAGEMENT OF VASCULAR ACCESS DEVICE: ICD-10-CM

## 2021-08-19 LAB
ALBUMIN SERPL-MCNC: 3.9 G/DL (ref 3.5–5.2)
ALBUMIN/GLOB SERPL: 1.4 G/DL (ref 1.1–2.4)
ALP SERPL-CCNC: 101 U/L (ref 38–116)
ALT SERPL W P-5'-P-CCNC: 17 U/L (ref 0–41)
ANION GAP SERPL CALCULATED.3IONS-SCNC: 10.8 MMOL/L (ref 5–15)
AST SERPL-CCNC: 19 U/L (ref 0–40)
BASOPHILS # BLD AUTO: 0.05 10*3/MM3 (ref 0–0.2)
BASOPHILS NFR BLD AUTO: 0.8 % (ref 0–1.5)
BILIRUB SERPL-MCNC: 0.4 MG/DL (ref 0.2–1.2)
BUN SERPL-MCNC: 12 MG/DL (ref 6–20)
BUN/CREAT SERPL: 14.1 (ref 7.3–30)
CALCIUM SPEC-SCNC: 9 MG/DL (ref 8.5–10.2)
CEA SERPL-MCNC: 24.6 NG/ML
CHLORIDE SERPL-SCNC: 99 MMOL/L (ref 98–107)
CO2 SERPL-SCNC: 24.2 MMOL/L (ref 22–29)
CREAT SERPL-MCNC: 0.85 MG/DL (ref 0.7–1.3)
DEPRECATED RDW RBC AUTO: 54.7 FL (ref 37–54)
EOSINOPHIL # BLD AUTO: 0.54 10*3/MM3 (ref 0–0.4)
EOSINOPHIL NFR BLD AUTO: 8.3 % (ref 0.3–6.2)
ERYTHROCYTE [DISTWIDTH] IN BLOOD BY AUTOMATED COUNT: 16.3 % (ref 12.3–15.4)
FERRITIN SERPL-MCNC: 590.9 NG/ML (ref 30–400)
GFR SERPL CREATININE-BSD FRML MDRD: 91 ML/MIN/1.73
GLOBULIN UR ELPH-MCNC: 2.7 GM/DL (ref 1.8–3.5)
GLUCOSE SERPL-MCNC: 122 MG/DL (ref 74–124)
HCT VFR BLD AUTO: 42.3 % (ref 37.5–51)
HGB BLD-MCNC: 14 G/DL (ref 13–17.7)
HGB RETIC QN AUTO: 36.2 PG (ref 29.8–36.1)
IMM GRANULOCYTES # BLD AUTO: 0.08 10*3/MM3 (ref 0–0.05)
IMM GRANULOCYTES NFR BLD AUTO: 1.2 % (ref 0–0.5)
IMM RETICS NFR: 6.6 % (ref 3–15.8)
IRON 24H UR-MRATE: 77 MCG/DL (ref 59–158)
IRON SATN MFR SERPL: 44 % (ref 14–48)
LYMPHOCYTES # BLD AUTO: 0.61 10*3/MM3 (ref 0.7–3.1)
LYMPHOCYTES NFR BLD AUTO: 9.4 % (ref 19.6–45.3)
MCH RBC QN AUTO: 30.1 PG (ref 26.6–33)
MCHC RBC AUTO-ENTMCNC: 33.1 G/DL (ref 31.5–35.7)
MCV RBC AUTO: 91 FL (ref 79–97)
MONOCYTES # BLD AUTO: 0.56 10*3/MM3 (ref 0.1–0.9)
MONOCYTES NFR BLD AUTO: 8.7 % (ref 5–12)
NEUTROPHILS NFR BLD AUTO: 4.63 10*3/MM3 (ref 1.7–7)
NEUTROPHILS NFR BLD AUTO: 71.6 % (ref 42.7–76)
NRBC BLD AUTO-RTO: 0 /100 WBC (ref 0–0.2)
PLATELET # BLD AUTO: 135 10*3/MM3 (ref 140–450)
PMV BLD AUTO: 9.7 FL (ref 6–12)
POTASSIUM SERPL-SCNC: 4.1 MMOL/L (ref 3.5–4.7)
PROT SERPL-MCNC: 6.6 G/DL (ref 6.3–8)
RBC # BLD AUTO: 4.65 10*6/MM3 (ref 4.14–5.8)
RETICS # AUTO: 0.07 10*6/MM3 (ref 0.02–0.13)
RETICS/RBC NFR AUTO: 1.49 % (ref 0.7–1.9)
SODIUM SERPL-SCNC: 134 MMOL/L (ref 134–145)
TIBC SERPL-MCNC: 175 MCG/DL (ref 249–505)
TRANSFERRIN SERPL-MCNC: 125 MG/DL (ref 200–360)
TSH SERPL DL<=0.05 MIU/L-ACNC: 1.31 UIU/ML (ref 0.27–4.2)
WBC # BLD AUTO: 6.47 10*3/MM3 (ref 3.4–10.8)

## 2021-08-19 PROCEDURE — 84443 ASSAY THYROID STIM HORMONE: CPT | Performed by: INTERNAL MEDICINE

## 2021-08-19 PROCEDURE — 83540 ASSAY OF IRON: CPT | Performed by: INTERNAL MEDICINE

## 2021-08-19 PROCEDURE — 82728 ASSAY OF FERRITIN: CPT | Performed by: INTERNAL MEDICINE

## 2021-08-19 PROCEDURE — 25010000002 PEMBROLIZUMAB 100 MG/4ML SOLUTION 4 ML VIAL: Performed by: INTERNAL MEDICINE

## 2021-08-19 PROCEDURE — 25010000002 HEPARIN LOCK FLUSH PER 10 UNITS: Performed by: INTERNAL MEDICINE

## 2021-08-19 PROCEDURE — 82378 CARCINOEMBRYONIC ANTIGEN: CPT | Performed by: INTERNAL MEDICINE

## 2021-08-19 PROCEDURE — 36415 COLL VENOUS BLD VENIPUNCTURE: CPT | Performed by: INTERNAL MEDICINE

## 2021-08-19 PROCEDURE — 99212 OFFICE O/P EST SF 10 MIN: CPT | Performed by: NURSE PRACTITIONER

## 2021-08-19 PROCEDURE — 80053 COMPREHEN METABOLIC PANEL: CPT

## 2021-08-19 PROCEDURE — 84466 ASSAY OF TRANSFERRIN: CPT | Performed by: INTERNAL MEDICINE

## 2021-08-19 PROCEDURE — 85046 RETICYTE/HGB CONCENTRATE: CPT | Performed by: INTERNAL MEDICINE

## 2021-08-19 PROCEDURE — 96413 CHEMO IV INFUSION 1 HR: CPT

## 2021-08-19 PROCEDURE — 99215 OFFICE O/P EST HI 40 MIN: CPT | Performed by: INTERNAL MEDICINE

## 2021-08-19 PROCEDURE — 85025 COMPLETE CBC W/AUTO DIFF WBC: CPT

## 2021-08-19 RX ORDER — HEPARIN SODIUM (PORCINE) LOCK FLUSH IV SOLN 100 UNIT/ML 100 UNIT/ML
500 SOLUTION INTRAVENOUS AS NEEDED
Status: CANCELLED | OUTPATIENT
Start: 2021-08-19

## 2021-08-19 RX ORDER — SODIUM CHLORIDE 9 MG/ML
250 INJECTION, SOLUTION INTRAVENOUS ONCE
Status: CANCELLED | OUTPATIENT
Start: 2021-08-19

## 2021-08-19 RX ORDER — HEPARIN SODIUM (PORCINE) LOCK FLUSH IV SOLN 100 UNIT/ML 100 UNIT/ML
500 SOLUTION INTRAVENOUS AS NEEDED
Status: DISCONTINUED | OUTPATIENT
Start: 2021-08-19 | End: 2021-08-19 | Stop reason: HOSPADM

## 2021-08-19 RX ORDER — SODIUM CHLORIDE 0.9 % (FLUSH) 0.9 %
10 SYRINGE (ML) INJECTION AS NEEDED
Status: CANCELLED | OUTPATIENT
Start: 2021-08-19

## 2021-08-19 RX ORDER — SODIUM CHLORIDE 9 MG/ML
250 INJECTION, SOLUTION INTRAVENOUS ONCE
Status: COMPLETED | OUTPATIENT
Start: 2021-08-19 | End: 2021-08-19

## 2021-08-19 RX ADMIN — Medication 500 UNITS: at 11:21

## 2021-08-19 RX ADMIN — SODIUM CHLORIDE 200 MG: 9 INJECTION, SOLUTION INTRAVENOUS at 10:50

## 2021-08-19 RX ADMIN — SODIUM CHLORIDE 250 ML: 9 INJECTION, SOLUTION INTRAVENOUS at 10:50

## 2021-08-19 NOTE — PROGRESS NOTES
Subjective     REASON FOR follow up:1.    1. 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  Currently receiving palliative 5 fu leucovorin  AND HERCEPTIN therapy once a month    2.COLOVESICAL FISTULA: chronic antibiotic therapy cipro, NO FURTHER PLANS FOR SURGERY AFTER VISIT AND PROCEDURE BY DR GM CASTILLO 1/20    3. 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 NEURONTIN INITIATED.        History of Present Illness        DURING THE VISIT WITH THE PATIENT TODAY , PATIENT HAD FACE MASK, MY MEDICAL ASSISTANT AND I  HAD PROPPER PROTECTIVE EQUIPMENT, AND I DID HAND HYGIENE WITH SOAP AND WATER BEFORE AND AFTER THE VISIT.    This patient returns today to the office for follow up after he was admitted to the hospital a few weeks ago with GI bleeding associated with his chronic use of anticoagulant and bleeding tumor in the esophagus. He underwent endoscopy by Naga Shelby MD, documented tumoral lesion. He underwent local therapy for this and since then he has undergone and completed yesterday radiation therapy 10 sessions. At this time the patient's hemoglobin has bounced back to 14. His initial hemoglobin at the time of the admission was 5. The patient has dark stools associated with iron replacement therapy. He has no difficulty swallowing, no heartburn, no indigestion. He has not had any hematemesis. Urination is going normally. He has not had any other episodes of infection in the bladder associated with his colovesical fistula. He has not had any alterations in regard clots in the lower extremities. Now he is no longer receiving anticoagulants. Superficial clot in the left upper extremity has healed and improved. The patient otherwise has not had any other new problems. He has an element of fatigue and he expects that this will get  "worse before it gets better and associated with the radiation treatment and discussed by Anh Garcia with me yesterday.                       Past Medical History:   Diagnosis Date   • Arthritis    • Cancer (CMS/HCC)     prostate cancer 2008   • Cancer (CMS/HCC)     esophageal   • Chronic anticoagulation     on xarelto   • Elevated PSA    • Esophageal mass    • Fistula     colon and bladder   • H/O Lung nodule    • Hepatitis     CHILD--PT STATED \"I THINK IT WAS A.\"   • History of chemotherapy    • History of pneumonia    • Hx of blood clots 08/10/2019   • Hyperlipidemia    • Hypertension    • Nail fungus    • Neuropathy    • PVD (peripheral vascular disease) (CMS/HCC)    • Recto-bladderneck fistula     on poab for recurrent uti        Past Surgical History:   Procedure Laterality Date   • COLONOSCOPY N/A 2/4/2020    Procedure: COLONOSCOPY;  Surgeon: Guy Sears MD;  Location: [facility];  Service: General;  Laterality: N/A;  PRE-COLOVESICAL FISTULA  POST-- DIVERTICULOSIS   • CYSTOSCOPY  02/06/2020   • CYSTOSCOPY Bilateral 2/26/2020    Procedure: CYSTOSCOPY RETROGRADE;  Surgeon: Cm Witt MD;  Location: [facility];  Service: Urology;  Laterality: Bilateral;   • ENDOSCOPY     • ENDOSCOPY N/A 6/19/2021    Procedure: ESOPHAGOGASTRODUODENOSCOPY WITH BIOPSY;  Surgeon: Mary Brito MD;  Location: [facility] OR;  Service: Gastroenterology;  Laterality: N/A;   • ENDOSCOPY N/A 8/5/2021    Procedure: ESOPHAGOGASTRODUODENOSCOPY HEMOSPRAY;  Surgeon: Naga Shelby MD;  Location: [facility];  Service: Gastroenterology;  Laterality: N/A;  HX OF ESOPHAGEAL  CANCER;MELENA  POST: ESOPHAGEAL BLEEDING; ESOPHAGEAL MASS   • HERNIA REPAIR Right 1999    inguinal hernia   • PROSTATE SURGERY  03/2008    prostatectectomy   • VENOUS ACCESS DEVICE (PORT) INSERTION N/A 7/16/2019    Procedure: INSERTION VENOUS ACCESS DEVICE WITH FLUORO AND EGD WITH BIOPSY;  Surgeon: Mary Brito MD;  Location: "  [facility];  Service: Thoracic        Current Outpatient Medications on File Prior to Visit   Medication Sig Dispense Refill   • acetaminophen (TYLENOL) 500 MG tablet Take 500 mg by mouth Every 6 (Six) Hours As Needed for Mild Pain .     • cevimeline (EVOXAC) 30 MG capsule Take 30 mg by mouth 3 (Three) Times a Day.     • ciprofloxacin (CIPRO) 250 MG tablet Take 1 tablet by mouth Daily. 90 tablet 1   • econazole nitrate (SPECTAZOLE) 1 % cream 2 (Two) Times a Day.     • ferrous sulfate 325 (65 FE) MG EC tablet Take 1 tablet by mouth 3 (Three) Times a Day With Meals. 90 tablet 3   • gabapentin (NEURONTIN) 300 MG capsule TAKE 2 CAPSULES BY MOUTH EVERY NIGHT AT BEDTIME (Patient taking differently: Take 300 mg by mouth 2 (two) times a day.) 60 capsule 4   • lisinopril-hydrochlorothiazide (PRINZIDE,ZESTORETIC) 20-12.5 MG per tablet TAKE 1 TABLET BY MOUTH EVERY DAY 90 tablet 0   • ondansetron (ZOFRAN) 4 MG tablet Take 1 tablet by mouth Every 8 (Eight) Hours As Needed for Nausea or Vomiting. 30 tablet 2   • pantoprazole (Protonix) 40 MG EC tablet Take 1 tablet by mouth Daily. 30 tablet 30   • Probiotic Product (PROBIOTIC DAILY PO) Take 1 tablet by mouth Daily.     • nicotine (NICODERM CQ) 21 MG/24HR patch Place 1 patch on the skin as directed by provider Daily. (Patient taking differently: Place 1 patch on the skin as directed by provider Daily. Has at home but hasn't started taking yet) 28 each 1     No current facility-administered medications on file prior to visit.        ALLERGIES:  No Known Allergies     Social History     Socioeconomic History   • Marital status: Single     Spouse name: Not on file   • Number of children: Not on file   • Years of education: High school   • Highest education level: Not on file   Tobacco Use   • Smoking status: Current Every Day Smoker     Packs/day: 0.50     Years: 38.00     Pack years: 19.00     Types: Cigarettes     Start date: 1974   • Smokeless tobacco: Never Used   • Tobacco  comment: PLANNED QUIT DATE 8/2/21; Quit for a period of 8 years. 10-15 per day   Vaping Use   • Vaping Use: Never used   Substance and Sexual Activity   • Alcohol use: Not Currently   • Drug use: Never   • Sexual activity: Defer        Family History   Problem Relation Age of Onset   • Hypertension Mother    • Stroke Mother    • Hypertension Other    • Lung disease Other    • Prostate cancer Other    • Lung cancer Father    • Malig Hyperthermia Neg Hx       Current Outpatient Medications on File Prior to Visit   Medication Sig Dispense Refill   • acetaminophen (TYLENOL) 500 MG tablet Take 500 mg by mouth Every 6 (Six) Hours As Needed for Mild Pain .     • cevimeline (EVOXAC) 30 MG capsule Take 30 mg by mouth 3 (Three) Times a Day.     • ciprofloxacin (CIPRO) 250 MG tablet Take 1 tablet by mouth Daily. 90 tablet 1   • econazole nitrate (SPECTAZOLE) 1 % cream 2 (Two) Times a Day.     • ferrous sulfate 325 (65 FE) MG EC tablet Take 1 tablet by mouth 3 (Three) Times a Day With Meals. 90 tablet 3   • gabapentin (NEURONTIN) 300 MG capsule TAKE 2 CAPSULES BY MOUTH EVERY NIGHT AT BEDTIME (Patient taking differently: Take 300 mg by mouth 2 (two) times a day.) 60 capsule 4   • lisinopril-hydrochlorothiazide (PRINZIDE,ZESTORETIC) 20-12.5 MG per tablet TAKE 1 TABLET BY MOUTH EVERY DAY 90 tablet 0   • ondansetron (ZOFRAN) 4 MG tablet Take 1 tablet by mouth Every 8 (Eight) Hours As Needed for Nausea or Vomiting. 30 tablet 2   • pantoprazole (Protonix) 40 MG EC tablet Take 1 tablet by mouth Daily. 30 tablet 30   • Probiotic Product (PROBIOTIC DAILY PO) Take 1 tablet by mouth Daily.     • nicotine (NICODERM CQ) 21 MG/24HR patch Place 1 patch on the skin as directed by provider Daily. (Patient taking differently: Place 1 patch on the skin as directed by provider Daily. Has at home but hasn't started taking yet) 28 each 1     No current facility-administered medications on file prior to visit.   No Known Allergies         Objective  "    Vitals:    08/19/21 0922   BP: 92/59   Pulse: 89   Resp: 16   Temp: 97.8 °F (36.6 °C)   TempSrc: Skin   SpO2: 96%   Weight: 91.7 kg (202 lb 1.6 oz)   Height: 175.3 cm (69.02\")   PainSc: 0-No pain     Current Status 8/19/2021   ECOG score 0           Physical exam      I HAVE PERSONALLY REVIEWED THE HISTORY OF THE PRESENT ILLNESS, PAST MEDICAL HISTORY, FAMILY HISTORY, SOCIAL HISTORY, ALLERGIES, MEDICATIONS STATED ABOVE IN THE  NOTE FROM TODAY.        GENERAL:  Well-developed, well-nourished  Patient  in no acute distress.   SKIN:  Warm, dry ,NO rashes,NO purpura ,NO petechiae.  HEENT:  Pupils were equal and reactive to light and accomodation, conjunctivae noninjected, no pterygium, normal extraocular movements, normal visual acuity.   NECK:  Supple with good range of motion; no thyromegaly , no other masses, no JVD or bruits, no cervical adenopathies.No carotid artery pain, no carotid abnormal pulsation , NO arterial dance.  LYMPHATICS:  No cervical, NO supraclavicular, NO axillary,NO epitrochlear , NO inguinal adenopathy.  CARDIAC   normal rate and regular rhythm, without murmur,NO rubs NO S3 NO S4 right or left .  LUNGS: normal breath sounds bilateral, no wheezing, rhonchi, crackles or rubs.  VASCULAR VENOUS: no cyanosis, collateral circulation, varicosities, edema, palpable cords, pain, erythema.  ABDOMEN:  Soft, nontender with no hepatomegaly, no splenomegaly,no masses, no ascites, no collateral circulation,no distention,no Dhruv sign.  EXTREMITIES  AND SPINE:  No clubbing, cyanosis or edema, no deformities , no pain .No kyphosis, scoliosis, no other deformities, no pain in spine, no pain in ribs , no pain inpelvic bone.  NEUROLOGICAL:  Patient was awake, alert, oriented to time, person and place.            RECENT LABS:  Hematology WBC   Date Value Ref Range Status   08/19/2021 6.47 3.40 - 10.80 10*3/mm3 Final   02/02/2019 13.4 (H) 3.4 - 10.8 x10E3/uL Final     RBC   Date Value Ref Range Status   08/19/2021 " 4.65 4.14 - 5.80 10*6/mm3 Final   02/02/2019 4.81 4.14 - 5.80 x10E6/uL Final     Hemoglobin   Date Value Ref Range Status   08/19/2021 14.0 13.0 - 17.7 g/dL Final     Hematocrit   Date Value Ref Range Status   08/19/2021 42.3 37.5 - 51.0 % Final     Platelets   Date Value Ref Range Status   08/19/2021 135 (L) 140 - 450 10*3/mm3 Final              Results  [facility] (Order [id number])  Result Information    Status: Final result (Resulted: 8/5/2021 09:47)   Transcription    Type ID Date and Time Dictating Provider   ENDOSCOPY [id number] 8/5/2021 10:22 AM Naga Shelby MD   Signed by Naga Shelby MD on 08/05/21 at 1024   Display only: Transcription ([id number]) on 8/5/2021 10:22 AM by Naga Shelby MD   [facility]  Order: [id number]  Status:  Final result   Visible to patient:  Yes (MyChart)   0 Result Notes        Last Resulted: 08/05/21 09:47       Order Details     View Encounter     Lab and Collection Details     Routing     Result History           Scans on Order [id number]    Scan on 8/5/2021 0947 by Naga Shelby MD: EGD                  Assessment/Plan    1.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 Almost 90% of the liver WAS replaced by tumor. The patient has been undergoing chemotherapy with 5  fu and leucovorin  and Herceptin and has had excellent response clinically and radiologically. The patient was reviewed on 08/10/2020. I reviewed with the patient in the PAC system [facility] his PET scan that is dramatic. There is minimal uptake in the lower esophagus and most importantly complete resolution of his liver metastasis. Actually the only issue that is pertinent to the PET scan is still the visibility of his colovesical fistula.     Therefore from the point of view of his cancer of the esophagus, metastatic to the liver, he has no symptoms from the primary tumor in the esophagus and he has no symptoms related to his liver metastasis that has  resolved altogether. His CEA level is stable.The patient raised the question about the CEA fluctuation. I pointed out to him that a lot of this is also related to his smoking. Smoking per se elevates CEA level.  Upon further reviewing the patient on 04/20/2021, he has no symptoms pertinent to his metastatic cancer of the esophagus to the liver and he has no difficulty swallowing. There are no side effects of the 5-FU, Leucovorin and Herceptin. The patient's cardiac function remains stable and he has not had any drop in the ejection fraction.   I discussed with him on 05/18/2021 the fact that his cancer clinically is very quiet but I am afraid of the rise in his CEA level to 12. This could be an indicator of all of the cigarettes that he is smoking on a daily basis of indicator of cancer escaping control and not visible radiologically through his CT scan. I pointed out to him that he needs to continue making an effort to decrease his smoking and he is now down to 10 cigarettes a day. We will recheck another CEA level today. Given the circumstances of the previous CT scan I do not believe that I need to change the doses or plan of care in regard to his chemotherapy medication administration for the time being. I recommended for him to remain on his 5FU/leucovorin and Herceptin on a monthly basis for now.  The patient was further reviewed on 06/15/2021 with a new PET scan that documented regrowth of the tumor in the lower esophagus without any new symptomatology, for example dysphagia or odynophagia. No regurgitation. The liver metastasis remains in remission and there are no new areas of disease in the bones or lungs or any other site. Given the excellent performance status I discussed with the patient and his sister today many options of therapy, including going back to FOLFOX regimen along with Herceptin, taking another sample of the esophagus with a new endoscopy and doing analysis of the tissue for Caris Target  Now that will be my favorite choice, or palliative treatment with radiation therapy and 5-FU continuous infusion that will be toxic to him and he does not prefer to have.     I had a discussion with Mary Brito MD, thoracic surgeon, who has agreed to see the patient tomorrow. I think the endoscopy, the new tissue analysis, and sending the tissue for Caris Target Now will be the way to go. Depending on what we find there, we can modify his treatment altogether. I do not think the patient will have any consequences missing chemotherapy for a couple of weeks or so.      In preparation for the endoscopy and biopsies I asked him to hold off on the Xarelto until he is seen by Dr. Brito tomorrow.     In regard to his colovesical fistula, he has had minimal symptomatology this week, maybe some activity there and I asked him to go back to the lab and take a urine specimen and culture. He knows that if this is abnormal he will need to initiate ciprofloxacin that he has at home.    In regard to his hypertension, this is under good control and I advised him to remain on his lisinopril and hydrochlorothiazide combination.    In regard to his smoking cessation, he has had conversations with NIRU Jacobson about this. He is using some nicotine CQ patch, here and there and also trying to work with nicotine gum and things of this nature but he has not been able to shake this issue altogether.     Otherwise I will review him back in a couple of weeks with a CBC, CMP, and a CEA level.    This case will be presented in the multidisciplinary thoracic conference by me this Thursday, and I will discuss any further advice to the patient.     I also mentioned to the patient that on the background of HER2-positive cancer of the esophagus the possibility of using a new medicine for HER2-based disease that is called Enhertu is a real possibility and maybe that will be the next step to go through.    The patient was further reviewed on  07/01/2021. I reviewed with Dr. Brito the endoscopy that shows a noncircumferential abnormality in the lower esophagus that encompasses almost 6 cm in length. It is not blocking off the esophagus and that is why the patient has no symptoms. The pathology shows at least atypical cells consistent with cancer. Further Next Generation Sequencing has been sent for Caris Target Now.     I discussed with the patient the fact that we have many other modalities of treatment that he could encounter. He prefers to continue his general chemotherapy to control the cancer in his liver using 5-FU, leucovorin and Herceptin today and agree with that. In consideration to be seen by radiation therapy, the patient is willing to listen to the possibility of undergoing continuous 5-FU infusion along with radiation therapy to the esophagus knowing that he will experiencing esophagitis that can give him significant issues for 2-3 weeks. I went ahead and made the appointment for him to be seen by radiation oncology for this purpose only.     Obviously if the Next Generation Sequencing gives us any other hints in regard to how to treat him this could be also utilized including the consideration of using Enhertu in the long run for achieving better control.     In regard to his smoking cessation he is not willing to change this phenomenon at this time. We have had NIRU Jacobson talking with him in this regard but he is not ready to make a decision when to quit.     Finally, I pointed out to him that during the previous visit we documented a urinary tract infection with streptococcus 50,000 colonies that in my appreciation was significant given the fact that he has a colovesical fistula. This was treated with antibiotics. He has not had any discomfort issues pertinent to this anymore. The urine today is completely clear. Nevertheless, I went ahead and sent a urinalysis at least to be sure that there is no need for any other repetitive  infection therapy on him.     The patient also will remain on his anticoagulation at this time, his Xarelto for the time being. I have renewed as well his Neurontin. He has no need for pain medicine.  The patient was further reviewed on 07/23/2021. He has no new symptomatology pertinent to his cancer of the esophagus with liver metastasis. He has no difficulty swallowing. He has been presented in the multidisciplinary clinic on a couple of occasions and he has been seen by Radiation Oncology. Finally the analysis of Caris Target Now is available now showing that the patient's tumor is PD-L1 positive and has high mutation burden. The tumor remains HER/2 positive. Given these findings I think it is very easy to make a choice in regard stopping the present regimen of chemotherapy and proceed with therapy with Keytruda every 3 weeks for at least 4 cycles and reassess him at that point. In preparation for Keytruda initiation next week and we will get the approval of the medicine by his insurance company the patient will require smoking cessation altogether to minimize modification of cigarettes on his immune system. I insisted in this fact to the patient.     Other than that he will remain on probiotics. We will discontinue the 5FU/leucovorin and Herceptin and will move onto Keytruda. I discussed with him side effects of the Keytruda in detail as below. He will require treatment every 3 weeks with CBC, CMP, TSH every 3 weeks and he will require formal chemotherapy teaching, education and consent for this medicine.     After 4 cycles of this, in other words 3 months he will be reassessed with a new PET scan.     The chances under the present circumstances that he will improve as long as he quit smoking, it is very hard and maybe he could have long term survivorship.    I begged for him to have smoking cessation. If any time during his time with me we have been talking about this and this is absolutely necessary now. We  know that cigarettes kill immune system cells and minimize the benefit of treatments with these medicines.       The patient was further reviewed on 08/19/2021 in regard to his cancer of the esophagus. As stated above he had upper GI bleeding dropping hemoglobin to 5 requiring admission, transfusion, discontinuation of anticoagulation and endoscopy with local therapy by Naga Shelby MD. Since then he has completed 10 sessions of radiation therapy to the esophagus yesterday. He has not had any further bleed. His hemoglobin has bounced back from 5 to 14 and I have advised his this good news today.    I expect that the patient in a few days is going to develop an element of radiation esophagitis and he continues having some fatigue that will improve over time. So far no new issues have happened. I made him aware that if he has difficulty swallowing he will need to let us know, he will need to receive IV fluids in the office.    At least the radiation therapy will take care of the tumor in the esophagus and I do not believe that he will require any other GI endoscopy anymore.    Today the patient will proceed with his Keytruda that is the immunotherapy medicine that we are delivering to him at this time for the treatment of his liver metastasis and also the tumor in the esophagus. I pointed out to him that so far I do not see any side effects of the Keytruda and the Keytruda will improve and increase the benefit of radiation therapy into the primary tumor in the esophagus.     From the point of view of his anemia associated with GI bleeding, again this has been corrected. The hemoglobin today is 14 grams and I asked him to take his iron supplementation only twice a week 1 tablet. He has been taking 3 tablets everyday.     From the point of view of his thrombophilia associated with malignancy, he is no longer receiving any anticoagulants given the recent episode of GI bleeding. We will maintain him off anticoagulants as  much as we can. Hopefully he will not have any other episodes of thrombosis.     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     From the point of view of the Keytruda toxicity so far nothing happened. We will continue monitoring CBC, CMP and TSH periodically.     I will review him back in 3 and 6 weeks. When he comes back for the Keytruda he will continue receiving the medicine as the time goes by.    He understands that most of the benefit of radiation therapy in the tumor in the esophagus will be reached in 1 month and I am planning to give him a PET scan in more of less in 2 months from now when he will have almost 4 infusions of Keytruda under his belt and the completion of radiation therapy to the esophagus.     In regard to smoking cessation he has achieved a lot from 2 packs a day to 10 cigarettes a day. He will see Anh Felder RN, today in regard continuation of the benefit of the therapy for this condition at this point.     ·

## 2021-08-19 NOTE — PROGRESS NOTES
[facility] MULTIDISCIPLINARY CLINIC  SMOKING CESSATION FOLLOW-UP VISIT     Han Garcia is a pleasant 65 y.o. male seen today in multidisciplinary clinic for smoking cessation counseling follow up         HPI:  Completed radiation last week. Having some fatigue which he has discussed with Dr Garcia and Dr Haynes.  Resumed every three weeks Keytruda today. Denies appetite changes. Denies difficulties with swallowing. Denies nausea/vomiting/diarrhea. No changes in bowel or bladder however has noted stools becoming black again. Received instructions from Dr Haynes today to decrease iron supplement to twice a week. Currently smoking about 10 cigarettes a day.         QUIT PLAN (STAR):  Set quit date: Deferred today     Tell friends/family: His sister is been a support and accountability buddy. He has a neighbor whom he enjoys speaking with.      Anticipate challenges: Biggest challenge is beginning his efforts again.       Remove tobacco from environment: His car continues to be a smoke-free environment and he has utilize car rides as a distraction in the past      Medication plan: Planning to use 21mg nicotine patch. He has reduced the daily amount of cigarettes to 10 or less.      Practical counseling: Strongly encouraged a practice quit with the patch in place. We will continue cessation coaching and supportive counseling in the survivorship clinic.      Follow-up: By phone in two weeks.  I have encouraged them to call me with any questions or as needed in the interm.    Other discussion today around management of long term neuropathy symptoms - some anecdotal evidence of vicks vapo rub, some evidence in support of acupuncture. Also discussed fall prevention today, and reinforced importance of exercise for management of fatigue.    Last discussion of survivorship/treatment summary visit 7/28/2021   Last discussion advance care planning 8/13/2021

## 2021-08-20 ENCOUNTER — OFFICE VISIT (OUTPATIENT)
Dept: GASTROENTEROLOGY | Facility: CLINIC | Age: 66
End: 2021-08-20

## 2021-08-20 VITALS — TEMPERATURE: 97.5 F | WEIGHT: 201 LBS | BODY MASS INDEX: 29.77 KG/M2 | HEIGHT: 69 IN

## 2021-08-20 DIAGNOSIS — C15.5 MALIGNANT NEOPLASM OF LOWER THIRD OF ESOPHAGUS (HCC): Primary | ICD-10-CM

## 2021-08-20 DIAGNOSIS — C78.7 LIVER METASTASIS: ICD-10-CM

## 2021-08-20 PROCEDURE — 99214 OFFICE O/P EST MOD 30 MIN: CPT | Performed by: NURSE PRACTITIONER

## 2021-08-20 RX ORDER — PANTOPRAZOLE SODIUM 40 MG/1
40 TABLET, DELAYED RELEASE ORAL 2 TIMES DAILY
Qty: 90 TABLET | Refills: 3 | Status: SHIPPED | OUTPATIENT
Start: 2021-08-20 | End: 2022-08-30 | Stop reason: SDUPTHER

## 2021-08-20 NOTE — PROGRESS NOTES
"Chief Complaint   Patient presents with   • Hospital Follow Up Visit     HPI  Han Garcia is a  65 y.o. 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  He is a patient of Dr. Villavicencio. He is new to me today. He was seen by our service at [facility] on 8/2-8/7/21. Initial consult was for GI bleed. He is followed by Dr. Haynes with oncology treated with hemospray 8/5; XRT started 8/5 XRT started 8/5XRT started 8/5.  He did have an episode of anemia for which he was hospitalized on 8/2/2021 with a hemoglobin of 5.7.  This has rebounded to hemoglobin of 14.  He is on Xarelto.  He underwent EGD with Dr. Shelby while mated which showed a partially obstructing malignant neoplasm in the distal esophagus.  This was sprayed with hemostatic spray.  The stomach and duodenum were normal.  He is compliant with his pantoprazole.    He has no complaints today and is doing well.  He denies dyspepsia, trouble swallowing, abdominal pain, diarrhea, constipation, chest pain, shortness of air.  He recently followed up with Dr. Haynes' office.    He has no interest in stopping smoking at this time.    Past Medical History:   Diagnosis Date   • Arthritis    • Cancer (CMS/HCC)     prostate cancer 2008   • Cancer (CMS/HCC)     esophageal   • Chronic anticoagulation     on xarelto   • Elevated PSA    • Esophageal mass    • Fistula     colon and bladder   • H/O Lung nodule    • Hepatitis     CHILD--PT STATED \"I THINK IT WAS A.\"   • History of chemotherapy    • History of pneumonia    • Hx of blood clots 08/10/2019   • Hyperlipidemia    • Hypertension    • Nail fungus    • Neuropathy    • PVD (peripheral vascular disease) (CMS/HCC)    • Recto-bladderneck fistula     on poab for recurrent uti       Past Surgical History:   Procedure Laterality Date   • COLONOSCOPY N/A 2/4/2020    Procedure: COLONOSCOPY;  Surgeon: Guy Sears MD;  Location: [facility] ENDOSCOPY;  " Service: General;  Laterality: N/A;  PRE-COLOVESICAL FISTULA  POST-- DIVERTICULOSIS   • CYSTOSCOPY  02/06/2020   • CYSTOSCOPY Bilateral 2/26/2020    Procedure: CYSTOSCOPY RETROGRADE;  Surgeon: Cm Witt MD;  [facility];  Service: Urology;  Laterality: Bilateral;   • ENDOSCOPY     • ENDOSCOPY N/A 6/19/2021    Procedure: ESOPHAGOGASTRODUODENOSCOPY WITH BIOPSY;  Surgeon: Mary Brito MD;  Location: [facility] OR;  Service: Gastroenterology;  Laterality: N/A;   • ENDOSCOPY N/A 8/5/2021    Procedure: ESOPHAGOGASTRODUODENOSCOPY HEMOSPRAY;  Surgeon: Naga Shelby MD;  Location: [facility] ENDOSCOPY;  Service: Gastroenterology;  Laterality: N/A;  HX OF ESOPHAGEAL  CANCER;MELENA  POST: ESOPHAGEAL BLEEDING; ESOPHAGEAL MASS   • HERNIA REPAIR Right 1999    inguinal hernia   • PROSTATE SURGERY  03/2008    prostatectectomy   • VENOUS ACCESS DEVICE (PORT) INSERTION N/A 7/16/2019    Procedure: INSERTION VENOUS ACCESS DEVICE WITH FLUORO AND EGD WITH BIOPSY;  Surgeon: Mary Brito MD;  Location: [facility] OR;  Service: Thoracic       Scheduled Meds:    Continuous Infusions:No current facility-administered medications for this visit.      PRN Meds:.    No Known Allergies    Social History     Socioeconomic History   • Marital status: Single     Spouse name: Not on file   • Number of children: Not on file   • Years of education: High school   • Highest education level: Not on file   Tobacco Use   • Smoking status: Current Every Day Smoker     Packs/day: 0.50     Years: 38.00     Pack years: 19.00     Types: Cigarettes     Start date: 1974   • Smokeless tobacco: Never Used   • Tobacco comment: PLANNED QUIT DATE 8/2/21; Quit for a period of 8 years. 10-15 per day   Vaping Use   • Vaping Use: Never used   Substance and Sexual Activity   • Alcohol use: Not Currently   • Drug use: Never   • Sexual activity: Defer       Family History   Problem Relation Age of Onset   • Hypertension Mother    • Stroke Mother     • Hypertension Other    • Lung disease Other    • Prostate cancer Other    • Lung cancer Father    • Malig Hyperthermia Neg Hx        Review of Systems   Constitutional: Negative for appetite change, fever and unexpected weight change.   Respiratory: Negative for shortness of breath.    Cardiovascular: Negative for chest pain.   Gastrointestinal: Negative for abdominal distention, abdominal pain, anal bleeding, blood in stool, constipation, diarrhea, nausea, rectal pain and vomiting.   Skin: Negative for pallor and rash.   Neurological: Negative for dizziness and speech difficulty.   Psychiatric/Behavioral: Negative for suicidal ideas.       Vitals:    08/20/21 1103   Temp: 97.5 °F (36.4 °C)       Physical Exam  Vitals reviewed.   Constitutional:       General: He is awake. He is not in acute distress.     Appearance: Normal appearance. He is well-developed and well-groomed.   HENT:      Head: Normocephalic and atraumatic.      Mouth/Throat:      Mouth: Mucous membranes are moist.   Cardiovascular:      Rate and Rhythm: Normal rate and regular rhythm.      Heart sounds: Normal heart sounds.   Pulmonary:      Effort: Pulmonary effort is normal.      Breath sounds: Normal breath sounds.   Abdominal:      General: Abdomen is flat. Bowel sounds are normal. There is no distension.      Palpations: Abdomen is soft.      Tenderness: There is no abdominal tenderness.   Skin:     General: Skin is warm and dry.   Neurological:      Mental Status: He is alert and oriented to person, place, and time.      Gait: Gait normal.   Psychiatric:         Mood and Affect: Affect normal.         Speech: Speech normal.         Behavior: Behavior is cooperative.         Judgment: Judgment normal.         No radiology results for the last 7 days  Assessment:   1. Malignant neoplasm of lower third of esophagus (CMS/HCC)    2. Liver metastasis (CMS/HCC)    Plan:  He is doing well and following closely with Dr. Haynes for his esophageal  cancer.  He should continue his pantoprazole 40 mg at twice a day dosing.  He can follow-up with Dr. Villavicencio in 3 months.

## 2021-08-25 ENCOUNTER — READMISSION MANAGEMENT (OUTPATIENT)
Dept: CALL CENTER | Facility: HOSPITAL | Age: 66
End: 2021-08-25

## 2021-08-25 NOTE — OUTREACH NOTE
Medical Week 3 Survey      Responses   [facility] patient discharged from?  [place]   Does the patient have one of the following disease processes/diagnoses(primary or secondary)?  Other   Week 3 attempt successful?  Yes   Call start time  1411   Call end time  1412   Discharge diagnosis  GI bleed    Meds reviewed with patient/caregiver?  Yes   Is the patient taking all medications as directed (includes completed medication regime)?  Yes   Has the patient kept scheduled appointments due by today?  Yes   What is the patient's perception of their health status since discharge?  Improving   Week 3 Call Completed?  Yes   Is the patient interested in additional calls from an ambulatory ?  NOTE:  applies to high risk patients requiring additional follow-up.  No   Revoked  No further contact(revokes)-requires comment   Graduated/Revoked comments  Pt states he's doing well          Ericka Flower RN

## 2021-09-08 NOTE — PROGRESS NOTES
"  Subjective     REASON FOR FOLLOW UP:  1.  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  2. Colovesical fistula, chronic antibiotic therapy with Cipro.  3.  DVT of the right and left lower extremity. 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  Acute GI bleed      History of Present Illness    The patient is a 65 y.o. male with the above-mentioned history who returns the office today in anticipation of his third dose of Keytruda.  He is tolerating his Keytruda infusions very well.  He does note some itching on his arms and upper legs with a faint rash.  He has been utilizing a hydrating lotion which has been somewhat helpful.  He reports he is eating and drinking adequately.  His stool is normal in color.  He is not having issues with esophagitis.  He is having no issues with recurrent thrombosis.  He now remains off Xarelto due to previous GI bleed.    Past Medical History:   Diagnosis Date   • Arthritis    • Cancer (CMS/HCC)     prostate cancer 2008   • Cancer (CMS/HCC)     esophageal   • Chronic anticoagulation     on xarelto   • Elevated PSA    • Esophageal mass    • Fistula     colon and bladder   • H/O Lung nodule    • Hepatitis     CHILD--PT STATED \"I THINK IT WAS A.\"   • History of chemotherapy    • History of pneumonia    • Hx of blood clots 08/10/2019   • Hyperlipidemia    • Hypertension    • Nail fungus    • Neuropathy    • PVD (peripheral vascular disease) (CMS/HCC)    • Recto-bladderneck fistula     on poab for recurrent uti        Past Surgical History:   Procedure Laterality Date   • COLONOSCOPY N/A 2/4/2020    Procedure: COLONOSCOPY;  Surgeon: Guy Sears MD;  Location: [facility] ENDOSCOPY;  Service: General;  Laterality: N/A;  PRE-COLOVESICAL FISTULA  POST-- DIVERTICULOSIS   • CYSTOSCOPY  02/06/2020   • CYSTOSCOPY Bilateral 2/26/2020    Procedure: CYSTOSCOPY RETROGRADE;  Surgeon: Cm Witt MD;  Location: [facility] MAIN OR;  " Service: Urology;  Laterality: Bilateral;   • ENDOSCOPY     • ENDOSCOPY N/A 6/19/2021    Procedure: ESOPHAGOGASTRODUODENOSCOPY WITH BIOPSY;  Surgeon: Mary Brito MD;  Location: [facility] MAIN OR;  Service: Gastroenterology;  Laterality: N/A;   • ENDOSCOPY N/A 8/5/2021    Procedure: ESOPHAGOGASTRODUODENOSCOPY HEMOSPRAY;  Surgeon: Naga Shelby MD;  Location: [facility] ENDOSCOPY;  Service: Gastroenterology;  Laterality: N/A;  HX OF ESOPHAGEAL  CANCER;MELENA  POST: ESOPHAGEAL BLEEDING; ESOPHAGEAL MASS   • HERNIA REPAIR Right 1999    inguinal hernia   • PROSTATE SURGERY  03/2008    prostatectectomy   • VENOUS ACCESS DEVICE (PORT) INSERTION N/A 7/16/2019    Procedure: INSERTION VENOUS ACCESS DEVICE WITH FLUORO AND EGD WITH BIOPSY;  Surgeon: Mary Brito MD;  Location: [facility] MAIN OR;  Service: Thoracic        Current Outpatient Medications on File Prior to Visit   Medication Sig Dispense Refill   • acetaminophen (TYLENOL) 500 MG tablet Take 500 mg by mouth Every 6 (Six) Hours As Needed for Mild Pain .     • cevimeline (EVOXAC) 30 MG capsule Take 30 mg by mouth 3 (Three) Times a Day.     • ciprofloxacin (CIPRO) 250 MG tablet Take 1 tablet by mouth Daily. 90 tablet 1   • econazole nitrate (SPECTAZOLE) 1 % cream 2 (Two) Times a Day.     • ferrous sulfate 325 (65 FE) MG EC tablet Take 1 tablet by mouth 3 (Three) Times a Day With Meals. 90 tablet 3   • gabapentin (NEURONTIN) 300 MG capsule TAKE 2 CAPSULES BY MOUTH EVERY NIGHT AT BEDTIME (Patient taking differently: Take 300 mg by mouth 2 (two) times a day.) 60 capsule 4   • lisinopril-hydrochlorothiazide (PRINZIDE,ZESTORETIC) 20-12.5 MG per tablet TAKE 1 TABLET BY MOUTH EVERY DAY 90 tablet 0   • nicotine (NICODERM CQ) 21 MG/24HR patch Place 1 patch on the skin as directed by provider Daily. (Patient taking differently: Place 1 patch on the skin as directed by provider Daily. Has at home but hasn't started taking yet) 28 each 1   • ondansetron (ZOFRAN) 4 MG tablet Take 1  tablet by mouth Every 8 (Eight) Hours As Needed for Nausea or Vomiting. 30 tablet 2   • pantoprazole (Protonix) 40 MG EC tablet Take 1 tablet by mouth 2 (Two) Times a Day. 90 tablet 3   • Probiotic Product (PROBIOTIC DAILY PO) Take 1 tablet by mouth Daily.       No current facility-administered medications on file prior to visit.        ALLERGIES:  No Known Allergies     Social History     Socioeconomic History   • Marital status: Single     Spouse name: Not on file   • Number of children: Not on file   • Years of education: High school   • Highest education level: Not on file   Tobacco Use   • Smoking status: Current Every Day Smoker     Packs/day: 0.50     Years: 38.00     Pack years: 19.00     Types: Cigarettes     Start date: 1974   • Smokeless tobacco: Never Used   • Tobacco comment: PLANNED QUIT DATE 8/2/21; Quit for a period of 8 years. 10-15 per day   Vaping Use   • Vaping Use: Never used   Substance and Sexual Activity   • Alcohol use: Not Currently   • Drug use: Never   • Sexual activity: Defer        Family History   Problem Relation Age of Onset   • Hypertension Mother    • Stroke Mother    • Hypertension Other    • Lung disease Other    • Prostate cancer Other    • Lung cancer Father    • Malig Hyperthermia Neg Hx       Current Outpatient Medications on File Prior to Visit   Medication Sig Dispense Refill   • acetaminophen (TYLENOL) 500 MG tablet Take 500 mg by mouth Every 6 (Six) Hours As Needed for Mild Pain .     • cevimeline (EVOXAC) 30 MG capsule Take 30 mg by mouth 3 (Three) Times a Day.     • ciprofloxacin (CIPRO) 250 MG tablet Take 1 tablet by mouth Daily. 90 tablet 1   • econazole nitrate (SPECTAZOLE) 1 % cream 2 (Two) Times a Day.     • ferrous sulfate 325 (65 FE) MG EC tablet Take 1 tablet by mouth 3 (Three) Times a Day With Meals. 90 tablet 3   • gabapentin (NEURONTIN) 300 MG capsule TAKE 2 CAPSULES BY MOUTH EVERY NIGHT AT BEDTIME (Patient taking differently: Take 300 mg by mouth 2 (two)  "times a day.) 60 capsule 4   • lisinopril-hydrochlorothiazide (PRINZIDE,ZESTORETIC) 20-12.5 MG per tablet TAKE 1 TABLET BY MOUTH EVERY DAY 90 tablet 0   • nicotine (NICODERM CQ) 21 MG/24HR patch Place 1 patch on the skin as directed by provider Daily. (Patient taking differently: Place 1 patch on the skin as directed by provider Daily. Has at home but hasn't started taking yet) 28 each 1   • ondansetron (ZOFRAN) 4 MG tablet Take 1 tablet by mouth Every 8 (Eight) Hours As Needed for Nausea or Vomiting. 30 tablet 2   • pantoprazole (Protonix) 40 MG EC tablet Take 1 tablet by mouth 2 (Two) Times a Day. 90 tablet 3   • Probiotic Product (PROBIOTIC DAILY PO) Take 1 tablet by mouth Daily.       No current facility-administered medications on file prior to visit.   No Known Allergies       Objective     Vitals:    09/09/21 1327   BP: 112/72   Pulse: 98   Resp: 16   Temp: 97.5 °F (36.4 °C)   TempSrc: Temporal   SpO2: 99%   Weight: 92.4 kg (203 lb 11.2 oz)   Height: 175.3 cm (69.02\")   PainSc: 0-No pain     Current Status 9/9/2021   ECOG score 0     Physical exam    GENERAL:  Well-developed, well-nourished in no acute distress.  SKIN:  Warm, dry without rashes, purpura or petechiae.  HEAD:  Normocephalic.  EYES:  Pupils equal, round.  EOMs intact.  Conjunctivae normal.  EARS:  Hearing intact.  NOSE:  Septum midline.  No excoriations or nasal discharge.  CHEST:  Lungs clear to auscultation. Good airflow.  CARDIAC:  Regular rate and rhythm without murmurs. Normal S1,S2.  ABDOMEN:  Soft, nontender. Bowel sounds present  EXTREMITIES:  No clubbing, cyanosis or edema.  NEUROLOGICAL: No focal neurological deficits.  PSYCHIATRIC:  Normal affect and mood.    I have reexamined the patient and the results are consistent with the previously documented exam. NIRU Okeefe     RECENT LABS:  Results from last 7 days   Lab Units 09/09/21  1311   WBC 10*3/mm3 7.81   NEUTROS ABS 10*3/mm3 5.74   HEMOGLOBIN g/dL 13.8   HEMATOCRIT " % 41.7   PLATELETS 10*3/mm3 146     Results from last 7 days   Lab Units 09/09/21  1311   SODIUM mmol/L 133*   POTASSIUM mmol/L 4.0   CHLORIDE mmol/L 98   CO2 mmol/L 24.1   BUN mg/dL 12   CREATININE mg/dL 0.79   CALCIUM mg/dL 8.8   ALBUMIN g/dL 3.80   BILIRUBIN mg/dL 0.3   ALK PHOS U/L 95   ALT (SGPT) U/L 112*   AST (SGOT) U/L 91*   GLUCOSE mg/dL 152*           Assessment/Plan    1.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 Almost 90% of the liver was replaced by tumor.  · HER-2 positive  · Initially treated with FOLFOX initiated July 2019  · Herceptin added with cycle 2  · Following 8 cycles of FOLFOX, oxaliplatin discontinued with continuation of 5-FU, leucovorin, Herceptin. This was continued through 7/7/2020  · Increasing CEA led to PET scan 6/9/2021.  Disease progression noted with growth of the tumor in the lower esophagus.  Continued stability of hepatic metastasis  · Repeat EGD 6/19/2021 for tissue specimen and Next Generation Sequencing.  · Endoscopy that shows a noncircumferential abnormality in the lower esophagus that encompasses almost 6 cm in length. It is not blocking off the esophagus and that is why the patient has no symptoms. The pathology shows at least atypical cells consistent with cancer.  · NGS showing PD-L1 positive and high mutation burden.  The tumor remains HER-2 positive.  Treatment plan for Keytruda every 3 weeks x 4 cycles followed by repeat imaging  · Keytruda initiated 7/29/2021  · Hospitalization 8/2/2021 through 8/7/2021 secondary to acute GI bleeding from known malignancy.  · Completed 10 fractions of radiation to the lower esophagus 8/18/2021 after GI bleed  · He will proceed today with his third cycle of Keytruda  · Will repeat a PET scan after cycle 4    2. Colovesical fistula.   · He requires intermittent Cipro when he notes stool in his urine  · He is currently without progressive symptoms, reporting his urine is normal in color    3.   Thrombophilia secondary malignancy, DVT  · Currently anticoagulated with Xarelto 20 mg daily  · Following GI bleed 8/2/2021, Xarelto has been discontinued  · Plan no evidence of recurrent thrombosis    4. Acute GI bleeding  · Dark tarry stool initially began 7/29/2021  · 7/23/2021, hemoglobin of 11.0.  8/2/2021, hemoglobin of 5.7  · Patient admitted, received transfusion of 4 units packed red blood cells.  EGD identified bleeding mass in the distal esophagus  · Status post radiation to esophageal lesion with no further issues with bleeding  · Hemoglobin today of 13.8    5.  New Grade 1 dermatitis secondary to Keytruda  · Faint pruritic erythematous rash on bilateral forearms and upper thighs.  · Begin triamcinolone twice daily along with emollient cream    6.  New Elevated liver function studies  · Patient denies Tylenol or alcohol use  · It is unclear if this is related to Keytruda, we will proceed and repeat labs in 3 weeks  · Additional labs including GGTP pending today    PLAN:  1. Proceed today with cycle 3 Keytruda  2. Begin triamcinolone twice daily electronically prescribed  3. For pain 300 mg twice daily refilled today #60 no refills  4. Continue emollient cream  5. General labs including GGTP pending today.  The patient understands to avoid Tylenol and alcohol  6. Return in 3 weeks for CBC, CMP, MD follow-up, cycle 4 Keytruda  7. Will repeat PET scan following 4 cycles of Keytruda     The patient continues on high risk medication requiring close monitoring for toxicity.  Today's plan of care and decision to proceed with treatment was discussed reviewed with Dr. Haynes.    Dana Paniagua, APRN  09/09/2021

## 2021-09-09 ENCOUNTER — INFUSION (OUTPATIENT)
Dept: ONCOLOGY | Facility: HOSPITAL | Age: 66
End: 2021-09-09

## 2021-09-09 ENCOUNTER — TELEPHONE (OUTPATIENT)
Dept: ONCOLOGY | Facility: CLINIC | Age: 66
End: 2021-09-09

## 2021-09-09 ENCOUNTER — OFFICE VISIT (OUTPATIENT)
Dept: OTHER | Facility: HOSPITAL | Age: 66
End: 2021-09-09

## 2021-09-09 ENCOUNTER — OFFICE VISIT (OUTPATIENT)
Dept: ONCOLOGY | Facility: CLINIC | Age: 66
End: 2021-09-09

## 2021-09-09 VITALS
OXYGEN SATURATION: 99 % | TEMPERATURE: 97.5 F | HEIGHT: 69 IN | HEART RATE: 98 BPM | DIASTOLIC BLOOD PRESSURE: 72 MMHG | SYSTOLIC BLOOD PRESSURE: 112 MMHG | BODY MASS INDEX: 30.17 KG/M2 | WEIGHT: 203.7 LBS | RESPIRATION RATE: 16 BRPM

## 2021-09-09 DIAGNOSIS — C15.5 MALIGNANT NEOPLASM OF LOWER THIRD OF ESOPHAGUS (HCC): ICD-10-CM

## 2021-09-09 DIAGNOSIS — D49.0 ESOPHAGUS NEOPLASM: ICD-10-CM

## 2021-09-09 DIAGNOSIS — N32.1 COLOVESICAL FISTULA: ICD-10-CM

## 2021-09-09 DIAGNOSIS — C15.5 MALIGNANT NEOPLASM OF LOWER THIRD OF ESOPHAGUS (HCC): Primary | ICD-10-CM

## 2021-09-09 DIAGNOSIS — Z45.2 ENCOUNTER FOR ADJUSTMENT OR MANAGEMENT OF VASCULAR ACCESS DEVICE: ICD-10-CM

## 2021-09-09 DIAGNOSIS — L27.0 DERMATITIS DUE TO DRUG: ICD-10-CM

## 2021-09-09 DIAGNOSIS — C78.7 LIVER METASTASIS: ICD-10-CM

## 2021-09-09 DIAGNOSIS — R79.89 LFTS ABNORMAL: ICD-10-CM

## 2021-09-09 DIAGNOSIS — Z72.0 TOBACCO ABUSE: ICD-10-CM

## 2021-09-09 DIAGNOSIS — Z72.0 TOBACCO ABUSE: Primary | ICD-10-CM

## 2021-09-09 DIAGNOSIS — I82.412 ACUTE DEEP VEIN THROMBOSIS (DVT) OF FEMORAL VEIN OF LEFT LOWER EXTREMITY (HCC): ICD-10-CM

## 2021-09-09 DIAGNOSIS — Z79.899 HIGH RISK MEDICATION USE: ICD-10-CM

## 2021-09-09 LAB
ALBUMIN SERPL-MCNC: 3.8 G/DL (ref 3.5–5.2)
ALBUMIN/GLOB SERPL: 1.5 G/DL (ref 1.1–2.4)
ALP SERPL-CCNC: 95 U/L (ref 38–116)
ALT SERPL W P-5'-P-CCNC: 112 U/L (ref 0–41)
ANION GAP SERPL CALCULATED.3IONS-SCNC: 10.9 MMOL/L (ref 5–15)
AST SERPL-CCNC: 91 U/L (ref 0–40)
BASOPHILS # BLD AUTO: 0.04 10*3/MM3 (ref 0–0.2)
BASOPHILS NFR BLD AUTO: 0.5 % (ref 0–1.5)
BILIRUB SERPL-MCNC: 0.3 MG/DL (ref 0.2–1.2)
BUN SERPL-MCNC: 12 MG/DL (ref 6–20)
BUN/CREAT SERPL: 15.2 (ref 7.3–30)
CALCIUM SPEC-SCNC: 8.8 MG/DL (ref 8.5–10.2)
CHLORIDE SERPL-SCNC: 98 MMOL/L (ref 98–107)
CO2 SERPL-SCNC: 24.1 MMOL/L (ref 22–29)
CREAT SERPL-MCNC: 0.79 MG/DL (ref 0.7–1.3)
DEPRECATED RDW RBC AUTO: 52 FL (ref 37–54)
EOSINOPHIL # BLD AUTO: 0.64 10*3/MM3 (ref 0–0.4)
EOSINOPHIL NFR BLD AUTO: 8.2 % (ref 0.3–6.2)
ERYTHROCYTE [DISTWIDTH] IN BLOOD BY AUTOMATED COUNT: 15.7 % (ref 12.3–15.4)
GFR SERPL CREATININE-BSD FRML MDRD: 99 ML/MIN/1.73
GGT SERPL-CCNC: 69 U/L (ref 8–61)
GLOBULIN UR ELPH-MCNC: 2.5 GM/DL (ref 1.8–3.5)
GLUCOSE SERPL-MCNC: 152 MG/DL (ref 74–124)
HCT VFR BLD AUTO: 41.7 % (ref 37.5–51)
HGB BLD-MCNC: 13.8 G/DL (ref 13–17.7)
IMM GRANULOCYTES # BLD AUTO: 0.11 10*3/MM3 (ref 0–0.05)
IMM GRANULOCYTES NFR BLD AUTO: 1.4 % (ref 0–0.5)
LYMPHOCYTES # BLD AUTO: 0.81 10*3/MM3 (ref 0.7–3.1)
LYMPHOCYTES NFR BLD AUTO: 10.4 % (ref 19.6–45.3)
MCH RBC QN AUTO: 30 PG (ref 26.6–33)
MCHC RBC AUTO-ENTMCNC: 33.1 G/DL (ref 31.5–35.7)
MCV RBC AUTO: 90.7 FL (ref 79–97)
MONOCYTES # BLD AUTO: 0.47 10*3/MM3 (ref 0.1–0.9)
MONOCYTES NFR BLD AUTO: 6 % (ref 5–12)
NEUTROPHILS NFR BLD AUTO: 5.74 10*3/MM3 (ref 1.7–7)
NEUTROPHILS NFR BLD AUTO: 73.5 % (ref 42.7–76)
NRBC BLD AUTO-RTO: 0 /100 WBC (ref 0–0.2)
PLATELET # BLD AUTO: 146 10*3/MM3 (ref 140–450)
PMV BLD AUTO: 9.5 FL (ref 6–12)
POTASSIUM SERPL-SCNC: 4 MMOL/L (ref 3.5–4.7)
PROT SERPL-MCNC: 6.3 G/DL (ref 6.3–8)
RBC # BLD AUTO: 4.6 10*6/MM3 (ref 4.14–5.8)
SODIUM SERPL-SCNC: 133 MMOL/L (ref 134–145)
WBC # BLD AUTO: 7.81 10*3/MM3 (ref 3.4–10.8)

## 2021-09-09 PROCEDURE — 99407 BEHAV CHNG SMOKING > 10 MIN: CPT | Performed by: NURSE PRACTITIONER

## 2021-09-09 PROCEDURE — 36415 COLL VENOUS BLD VENIPUNCTURE: CPT

## 2021-09-09 PROCEDURE — 96413 CHEMO IV INFUSION 1 HR: CPT

## 2021-09-09 PROCEDURE — 80053 COMPREHEN METABOLIC PANEL: CPT

## 2021-09-09 PROCEDURE — 99215 OFFICE O/P EST HI 40 MIN: CPT | Performed by: NURSE PRACTITIONER

## 2021-09-09 PROCEDURE — 25010000002 PEMBROLIZUMAB 100 MG/4ML SOLUTION 4 ML VIAL: Performed by: NURSE PRACTITIONER

## 2021-09-09 PROCEDURE — 25010000002 HEPARIN LOCK FLUSH PER 10 UNITS: Performed by: INTERNAL MEDICINE

## 2021-09-09 PROCEDURE — 85025 COMPLETE CBC W/AUTO DIFF WBC: CPT

## 2021-09-09 PROCEDURE — 82977 ASSAY OF GGT: CPT | Performed by: NURSE PRACTITIONER

## 2021-09-09 RX ORDER — SODIUM CHLORIDE 9 MG/ML
250 INJECTION, SOLUTION INTRAVENOUS ONCE
Status: CANCELLED | OUTPATIENT
Start: 2021-09-09

## 2021-09-09 RX ORDER — SODIUM CHLORIDE 9 MG/ML
250 INJECTION, SOLUTION INTRAVENOUS ONCE
Status: COMPLETED | OUTPATIENT
Start: 2021-09-09 | End: 2021-09-09

## 2021-09-09 RX ORDER — SODIUM CHLORIDE 0.9 % (FLUSH) 0.9 %
10 SYRINGE (ML) INJECTION AS NEEDED
Status: CANCELLED | OUTPATIENT
Start: 2021-09-09

## 2021-09-09 RX ORDER — SODIUM CHLORIDE 0.9 % (FLUSH) 0.9 %
10 SYRINGE (ML) INJECTION AS NEEDED
Status: DISCONTINUED | OUTPATIENT
Start: 2021-09-09 | End: 2021-09-09 | Stop reason: HOSPADM

## 2021-09-09 RX ORDER — HEPARIN SODIUM (PORCINE) LOCK FLUSH IV SOLN 100 UNIT/ML 100 UNIT/ML
500 SOLUTION INTRAVENOUS AS NEEDED
Status: DISCONTINUED | OUTPATIENT
Start: 2021-09-09 | End: 2021-09-09 | Stop reason: HOSPADM

## 2021-09-09 RX ORDER — GABAPENTIN 300 MG/1
300 CAPSULE ORAL 2 TIMES DAILY
Qty: 60 CAPSULE | Refills: 0 | Status: SHIPPED | OUTPATIENT
Start: 2021-09-09 | End: 2021-09-30 | Stop reason: SDUPTHER

## 2021-09-09 RX ORDER — HEPARIN SODIUM (PORCINE) LOCK FLUSH IV SOLN 100 UNIT/ML 100 UNIT/ML
500 SOLUTION INTRAVENOUS AS NEEDED
Status: CANCELLED | OUTPATIENT
Start: 2021-09-09

## 2021-09-09 RX ADMIN — SODIUM CHLORIDE, PRESERVATIVE FREE 10 ML: 5 INJECTION INTRAVENOUS at 15:14

## 2021-09-09 RX ADMIN — SODIUM CHLORIDE 250 ML: 9 INJECTION, SOLUTION INTRAVENOUS at 14:45

## 2021-09-09 RX ADMIN — SODIUM CHLORIDE 200 MG: 9 INJECTION, SOLUTION INTRAVENOUS at 14:45

## 2021-09-09 RX ADMIN — Medication 500 UNITS: at 15:14

## 2021-09-09 NOTE — TELEPHONE ENCOUNTER
Caller: Han Garcia    Relationship: Self    Best call back number: [phone]    Who are you requesting to speak with (clinical staff, provider,  specific staff member):DOUG    What was the call regarding: PATIENT CALLED REQUESTING TO SPEAK TO DOUG REGARDING A BILL THAT HE HAD DISCUSSED WITH HER YESTERDAY.  PATIENT STATES THAT HE WILL BE IN OFFICE TODAY AND WILL ASK TO SPEAK TO HER WHILE HE IS THERE.     Do you require a callback: NO

## 2021-09-09 NOTE — PROGRESS NOTES
[facility] MULTIDISCIPLINARY CLINIC  SMOKING CESSATION FOLLOW-UP VISIT     Han Garcia is a pleasant 65 y.o. male seen today in multidisciplinary clinic for smoking cessation counseling follow up         HPI:  Completed cycle 3 of every three weeks Keytruda today. Denies appetite changes. Denies difficulties with swallowing. Denies nausea/vomiting/diarrhea. No changes in bowel or bladder. Energy comes and goes. Can lay down for about 45 minutes when needed and feel refereshed. At evaluation with CBC noted with rash bilateral arms, thighs with plans to start Rx emollient cream. Noted also today with elevated ALT/AST. Currently smoking about 20 cigarettes a day. Frustrated with this step back and continues to express desire for smoking cessation but affirms that it has been difficult to regroup with so many question marks  around the current status of his cancer, elevated liver function tests.     He did have the chance to speak with Darcie Carpenter regarding his estimated out of pocket responsibility and received some clarification around that.     QUIT PLAN (STAR):  Set quit date: Deferred today. We talked about some significant dates upcoming, his fourth cycle of keytruda, interim imaging, his birthday. I've asked him to reconsider a hard quit date with one of these dates in mind and we will discuss at next follow up.     Tell friends/family: His sister is been a support and accountability ashley. He has a neighbor whom he enjoys speaking with.      Anticipate challenges: Biggest challenge is beginning his efforts again.  Discussed today there is an opportunity to regroup with scans and follow up with Dr Haynes upcoming     Remove tobacco from environment: His car continues to be a smoke-free environment and he has utilize car rides as a distraction in the past      Medication plan: Planning to use 21mg nicotine patch.      Practical counseling:  Strongly encouraged a practice quit with the patch in  place. We will continue cessation coaching and supportive counseling in the survivorship clinic.      Follow-up: Three weeks in clinic after he is evaluated for and receives next cycle of Keytruda. We will review his treatment summary and survivorship care plan at that time  I have encouraged them to call me with any questions or as needed in the interm.     Advance Care Planning     Expanded discussion regarding advance care planning, choosing a healthcare surrogate, and explored past experiences with a loved one with a serious illness, particularly the experience he had making decisions on his mom's behalf at the end of life. Expresses desires to have end of life planning discussions with sisters, however it is difficult for them to engage deeply and he notes some avoidance at times. We talked about arranging discussion as a family at any point in order to have a supported discussion which will allow for deeper understanding of patient goals, values and beliefs.           Other discussion today around recent EOB received for molecular testing on tumor - he has had follow up with Taty Carpenter and this is resolved    Last discussion of survivorship/treatment summary visit 7/28/2021 - We will address this at next follow up.  Last discussion advance care planning 9/9/2021     15 minutes spent in clinic regarding cognitive-behavioral changes, managing cravings, managing side effects of treatment and survivorship issues

## 2021-09-09 NOTE — NURSING NOTE
CMP reviewed with Dana MARRUFO.  She discussed liver enzymes with Dr. Haynes. Ok to treat with Keytruda today. Informed pharmacy and pt v/u. Pt denies any use of alcohol or tylenol recently. He knows we will monitor CMP with next treatment.

## 2021-09-10 ENCOUNTER — TELEPHONE (OUTPATIENT)
Dept: ONCOLOGY | Facility: CLINIC | Age: 66
End: 2021-09-10

## 2021-09-10 NOTE — TELEPHONE ENCOUNTER
Caller: Han Garcia    Relationship: Self    Best call back number: [phone]    What medications are you currently taking:   Current Outpatient Medications on File Prior to Visit   Medication Sig Dispense Refill   • acetaminophen (TYLENOL) 500 MG tablet Take 500 mg by mouth Every 6 (Six) Hours As Needed for Mild Pain .     • cevimeline (EVOXAC) 30 MG capsule Take 30 mg by mouth 3 (Three) Times a Day.     • ciprofloxacin (CIPRO) 250 MG tablet Take 1 tablet by mouth Daily. 90 tablet 1   • econazole nitrate (SPECTAZOLE) 1 % cream 2 (Two) Times a Day.     • ferrous sulfate 325 (65 FE) MG EC tablet Take 1 tablet by mouth 3 (Three) Times a Day With Meals. 90 tablet 3   • gabapentin (NEURONTIN) 300 MG capsule Take 1 capsule by mouth 2 (two) times a day. 60 capsule 0   • lisinopril-hydrochlorothiazide (PRINZIDE,ZESTORETIC) 20-12.5 MG per tablet TAKE 1 TABLET BY MOUTH EVERY DAY 90 tablet 0   • nicotine (NICODERM CQ) 21 MG/24HR patch Place 1 patch on the skin as directed by provider Daily. (Patient taking differently: Place 1 patch on the skin as directed by provider Daily. Has at home but hasn't started taking yet) 28 each 1   • ondansetron (ZOFRAN) 4 MG tablet Take 1 tablet by mouth Every 8 (Eight) Hours As Needed for Nausea or Vomiting. 30 tablet 2   • pantoprazole (Protonix) 40 MG EC tablet Take 1 tablet by mouth 2 (Two) Times a Day. 90 tablet 3   • Probiotic Product (PROBIOTIC DAILY PO) Take 1 tablet by mouth Daily.     • triamcinolone (KENALOG) 0.1 % ointment Apply 1 application topically to the appropriate area as directed 2 (Two) Times a Day. 30 g 1     Current Facility-Administered Medications on File Prior to Visit   Medication Dose Route Frequency Provider Last Rate Last Admin   • [COMPLETED] Pembrolizumab (KEYTRUDA) 200 mg in sodium chloride 0.9 % 108 mL chemo IVPB  200 mg Intravenous Once Dana Paniagua APRN   Stopped at 09/09/21 1512   • [COMPLETED] sodium chloride 0.9 % infusion 250 mL  250  mL Intravenous Once [place] [place]   Stopped at 09/09/21 1514   • [DISCONTINUED] heparin injection 500 Units  500 Units Intravenous PRN Alexey Haynes MD   500 Units at 09/09/21 1514   • [DISCONTINUED] sodium chloride 0.9 % flush 10 mL  10 mL Intravenous PRN Alexey Haynes MD   10 mL at 09/09/21 1514          Which medication are you concerned about: CILOSTAZOL, 50-MG. (PER PATIENT)    Who prescribed you this medication: PATIENT STATED THAT DANA HAD CALLED THIS IN FOR HIM YESTERDAY BUT IT WAS HIS UNDERSTANDING THAT HE SHOULD NOT BE TAKING THIS.      PATIENT THEN REALIZED THAT [facility] HAD LIKELY AUTO FILLED THIS RX FOR HIM.  HE WILL CONTACT [facility] TO VERIFY.     PLEASE CONTACT PATIENT TO VERIFY THAT DANA DID NOT CALL THIS IN FOR HIM.     THANKS

## 2021-09-10 NOTE — TELEPHONE ENCOUNTER
Provider: INDIA    Caller: URBAN    Relationship to Patient: SELF    Phone Number:[phone]    Reason for Call: PT REQUESTED TO SPEAK W DOUG MCCORMICK RN IN REGARDS TO BILLING MATTER SHE PREVIOUSLY ASSISTED HIM WITH. PLEASE CALL PT BACK

## 2021-09-14 RX ORDER — LISINOPRIL AND HYDROCHLOROTHIAZIDE 20; 12.5 MG/1; MG/1
TABLET ORAL
Qty: 90 TABLET | Refills: 0 | Status: SHIPPED | OUTPATIENT
Start: 2021-09-14 | End: 2021-11-19

## 2021-09-14 NOTE — TELEPHONE ENCOUNTER
Lisinopril-Hydrochlorothiazide refill request rec electronically from [facility]. I do not see this mentioned in the last office notes.    I have routed the rx to Dr Haynes for signature if refill is appropriate. Once signed it will be escribed to [facility].

## 2021-09-20 RX ORDER — RIVAROXABAN 20 MG/1
TABLET, FILM COATED ORAL
Qty: 90 TABLET | Refills: 1 | OUTPATIENT
Start: 2021-09-20

## 2021-09-20 NOTE — TELEPHONE ENCOUNTER
Xarelto refill request rec electronically from [facility]. Per last office note from Dana LOCKWOOD, NP-Xarelto was discontinued. Request denied.    Thrombophilia secondary malignancy, DVT  · Currently anticoagulated with Xarelto 20 mg daily  · Following GI bleed 8/2/2021, Xarelto has been discontinued

## 2021-09-30 ENCOUNTER — INFUSION (OUTPATIENT)
Dept: ONCOLOGY | Facility: HOSPITAL | Age: 66
End: 2021-09-30

## 2021-09-30 ENCOUNTER — OFFICE VISIT (OUTPATIENT)
Dept: OTHER | Facility: HOSPITAL | Age: 66
End: 2021-09-30

## 2021-09-30 ENCOUNTER — OFFICE VISIT (OUTPATIENT)
Dept: ONCOLOGY | Facility: CLINIC | Age: 66
End: 2021-09-30

## 2021-09-30 VITALS
TEMPERATURE: 97.5 F | RESPIRATION RATE: 16 BRPM | OXYGEN SATURATION: 98 % | HEIGHT: 69 IN | DIASTOLIC BLOOD PRESSURE: 74 MMHG | WEIGHT: 206.6 LBS | SYSTOLIC BLOOD PRESSURE: 120 MMHG | HEART RATE: 76 BPM | BODY MASS INDEX: 30.6 KG/M2

## 2021-09-30 VITALS — RESPIRATION RATE: 20 BRPM

## 2021-09-30 DIAGNOSIS — Z45.2 ENCOUNTER FOR ADJUSTMENT OR MANAGEMENT OF VASCULAR ACCESS DEVICE: ICD-10-CM

## 2021-09-30 DIAGNOSIS — G62.0 PERIPHERAL NEUROPATHY DUE TO CHEMOTHERAPY (HCC): ICD-10-CM

## 2021-09-30 DIAGNOSIS — I82.412 ACUTE DEEP VEIN THROMBOSIS (DVT) OF FEMORAL VEIN OF LEFT LOWER EXTREMITY (HCC): ICD-10-CM

## 2021-09-30 DIAGNOSIS — D49.0 ESOPHAGUS NEOPLASM: ICD-10-CM

## 2021-09-30 DIAGNOSIS — T45.1X5A PERIPHERAL NEUROPATHY DUE TO CHEMOTHERAPY (HCC): ICD-10-CM

## 2021-09-30 DIAGNOSIS — C78.7 LIVER METASTASIS: ICD-10-CM

## 2021-09-30 DIAGNOSIS — Z79.01 CHRONIC ANTICOAGULATION: ICD-10-CM

## 2021-09-30 DIAGNOSIS — I82.612 SUPERFICIAL VENOUS THROMBOSIS OF LEFT UPPER EXTREMITY: ICD-10-CM

## 2021-09-30 DIAGNOSIS — Z72.0 TOBACCO ABUSE: ICD-10-CM

## 2021-09-30 DIAGNOSIS — C15.5 MALIGNANT NEOPLASM OF LOWER THIRD OF ESOPHAGUS (HCC): ICD-10-CM

## 2021-09-30 DIAGNOSIS — I10 ESSENTIAL HYPERTENSION: ICD-10-CM

## 2021-09-30 DIAGNOSIS — C15.5 MALIGNANT NEOPLASM OF LOWER THIRD OF ESOPHAGUS (HCC): Primary | ICD-10-CM

## 2021-09-30 DIAGNOSIS — R21 RASH: ICD-10-CM

## 2021-09-30 DIAGNOSIS — N32.1 COLOVESICAL FISTULA: ICD-10-CM

## 2021-09-30 DIAGNOSIS — I82.431 ACUTE DEEP VEIN THROMBOSIS (DVT) OF RIGHT POPLITEAL VEIN (HCC): ICD-10-CM

## 2021-09-30 LAB
ALBUMIN SERPL-MCNC: 3.9 G/DL (ref 3.5–5.2)
ALBUMIN/GLOB SERPL: 1.3 G/DL (ref 1.1–2.4)
ALP SERPL-CCNC: 121 U/L (ref 38–116)
ALT SERPL W P-5'-P-CCNC: 56 U/L (ref 0–41)
ANION GAP SERPL CALCULATED.3IONS-SCNC: 10 MMOL/L (ref 5–15)
AST SERPL-CCNC: 34 U/L (ref 0–40)
BASOPHILS # BLD AUTO: 0.06 10*3/MM3 (ref 0–0.2)
BASOPHILS NFR BLD AUTO: 0.7 % (ref 0–1.5)
BILIRUB SERPL-MCNC: 0.5 MG/DL (ref 0.2–1.2)
BUN SERPL-MCNC: 13 MG/DL (ref 6–20)
BUN/CREAT SERPL: 16.9 (ref 7.3–30)
CALCIUM SPEC-SCNC: 9.1 MG/DL (ref 8.5–10.2)
CEA SERPL-MCNC: 32.3 NG/ML
CHLORIDE SERPL-SCNC: 99 MMOL/L (ref 98–107)
CO2 SERPL-SCNC: 25 MMOL/L (ref 22–29)
CREAT SERPL-MCNC: 0.77 MG/DL (ref 0.7–1.3)
DEPRECATED RDW RBC AUTO: 50.3 FL (ref 37–54)
EOSINOPHIL # BLD AUTO: 0.66 10*3/MM3 (ref 0–0.4)
EOSINOPHIL NFR BLD AUTO: 7.3 % (ref 0.3–6.2)
ERYTHROCYTE [DISTWIDTH] IN BLOOD BY AUTOMATED COUNT: 15.2 % (ref 12.3–15.4)
GFR SERPL CREATININE-BSD FRML MDRD: 102 ML/MIN/1.73
GLOBULIN UR ELPH-MCNC: 3 GM/DL (ref 1.8–3.5)
GLUCOSE SERPL-MCNC: 193 MG/DL (ref 74–124)
HCT VFR BLD AUTO: 39.4 % (ref 37.5–51)
HGB BLD-MCNC: 13.2 G/DL (ref 13–17.7)
HGB RETIC QN AUTO: 35.3 PG (ref 29.8–36.1)
IMM GRANULOCYTES # BLD AUTO: 0.18 10*3/MM3 (ref 0–0.05)
IMM GRANULOCYTES NFR BLD AUTO: 2 % (ref 0–0.5)
IMM RETICS NFR: 10.1 % (ref 3–15.8)
LYMPHOCYTES # BLD AUTO: 0.95 10*3/MM3 (ref 0.7–3.1)
LYMPHOCYTES NFR BLD AUTO: 10.6 % (ref 19.6–45.3)
MCH RBC QN AUTO: 30.2 PG (ref 26.6–33)
MCHC RBC AUTO-ENTMCNC: 33.5 G/DL (ref 31.5–35.7)
MCV RBC AUTO: 90.2 FL (ref 79–97)
MONOCYTES # BLD AUTO: 0.57 10*3/MM3 (ref 0.1–0.9)
MONOCYTES NFR BLD AUTO: 6.3 % (ref 5–12)
NEUTROPHILS NFR BLD AUTO: 6.57 10*3/MM3 (ref 1.7–7)
NEUTROPHILS NFR BLD AUTO: 73.1 % (ref 42.7–76)
NRBC BLD AUTO-RTO: 0 /100 WBC (ref 0–0.2)
PLATELET # BLD AUTO: 143 10*3/MM3 (ref 140–450)
PMV BLD AUTO: 9.8 FL (ref 6–12)
POTASSIUM SERPL-SCNC: 3.7 MMOL/L (ref 3.5–4.7)
PROT SERPL-MCNC: 6.9 G/DL (ref 6.3–8)
RBC # BLD AUTO: 4.37 10*6/MM3 (ref 4.14–5.8)
RETICS # AUTO: 0.07 10*6/MM3 (ref 0.02–0.13)
RETICS/RBC NFR AUTO: 1.61 % (ref 0.7–1.9)
SODIUM SERPL-SCNC: 134 MMOL/L (ref 134–145)
TSH SERPL DL<=0.05 MIU/L-ACNC: 2.01 UIU/ML (ref 0.27–4.2)
WBC # BLD AUTO: 8.99 10*3/MM3 (ref 3.4–10.8)

## 2021-09-30 PROCEDURE — 84443 ASSAY THYROID STIM HORMONE: CPT | Performed by: INTERNAL MEDICINE

## 2021-09-30 PROCEDURE — 25010000002 PEMBROLIZUMAB 100 MG/4ML SOLUTION 4 ML VIAL: Performed by: INTERNAL MEDICINE

## 2021-09-30 PROCEDURE — 85046 RETICYTE/HGB CONCENTRATE: CPT

## 2021-09-30 PROCEDURE — 99215 OFFICE O/P EST HI 40 MIN: CPT | Performed by: NURSE PRACTITIONER

## 2021-09-30 PROCEDURE — 99215 OFFICE O/P EST HI 40 MIN: CPT | Performed by: INTERNAL MEDICINE

## 2021-09-30 PROCEDURE — 85025 COMPLETE CBC W/AUTO DIFF WBC: CPT

## 2021-09-30 PROCEDURE — 99406 BEHAV CHNG SMOKING 3-10 MIN: CPT | Performed by: INTERNAL MEDICINE

## 2021-09-30 PROCEDURE — 96413 CHEMO IV INFUSION 1 HR: CPT

## 2021-09-30 PROCEDURE — 80053 COMPREHEN METABOLIC PANEL: CPT

## 2021-09-30 PROCEDURE — 82378 CARCINOEMBRYONIC ANTIGEN: CPT | Performed by: INTERNAL MEDICINE

## 2021-09-30 RX ORDER — CIPROFLOXACIN 250 MG/1
TABLET, FILM COATED ORAL
Qty: 90 TABLET | Refills: 1 | Status: SHIPPED | OUTPATIENT
Start: 2021-09-30 | End: 2021-10-01 | Stop reason: SDUPTHER

## 2021-09-30 RX ORDER — SODIUM CHLORIDE 9 MG/ML
250 INJECTION, SOLUTION INTRAVENOUS ONCE
Status: COMPLETED | OUTPATIENT
Start: 2021-09-30 | End: 2021-09-30

## 2021-09-30 RX ORDER — SODIUM CHLORIDE 9 MG/ML
250 INJECTION, SOLUTION INTRAVENOUS ONCE
Status: CANCELLED | OUTPATIENT
Start: 2021-09-30

## 2021-09-30 RX ORDER — GABAPENTIN 300 MG/1
300 CAPSULE ORAL 2 TIMES DAILY
Qty: 60 CAPSULE | Refills: 3 | Status: SHIPPED | OUTPATIENT
Start: 2021-09-30 | End: 2022-02-09 | Stop reason: SDUPTHER

## 2021-09-30 RX ADMIN — SODIUM CHLORIDE 200 MG: 900 INJECTION INTRAVENOUS at 09:49

## 2021-09-30 RX ADMIN — SODIUM CHLORIDE 250 ML: 9 INJECTION, SOLUTION INTRAVENOUS at 09:49

## 2021-10-01 ENCOUNTER — TELEPHONE (OUTPATIENT)
Dept: ONCOLOGY | Facility: CLINIC | Age: 66
End: 2021-10-01

## 2021-10-01 RX ORDER — CIPROFLOXACIN 250 MG/1
250 TABLET, FILM COATED ORAL DAILY
Qty: 90 TABLET | Refills: 1 | Status: SHIPPED | OUTPATIENT
Start: 2021-10-01 | End: 2022-04-13 | Stop reason: SDUPTHER

## 2021-10-01 NOTE — TELEPHONE ENCOUNTER
Called the patient to let him know that per Dr Haynes there is no need for Iron therapy any longer. Patient v/u.

## 2021-10-14 ENCOUNTER — HOSPITAL ENCOUNTER (OUTPATIENT)
Dept: PET IMAGING | Facility: HOSPITAL | Age: 66
Discharge: HOME | End: 2021-10-14

## 2021-10-14 DIAGNOSIS — G62.0 PERIPHERAL NEUROPATHY DUE TO CHEMOTHERAPY (HCC): ICD-10-CM

## 2021-10-14 DIAGNOSIS — C15.5 MALIGNANT NEOPLASM OF LOWER THIRD OF ESOPHAGUS (HCC): ICD-10-CM

## 2021-10-14 DIAGNOSIS — T45.1X5A PERIPHERAL NEUROPATHY DUE TO CHEMOTHERAPY (HCC): ICD-10-CM

## 2021-10-14 DIAGNOSIS — C78.7 LIVER METASTASIS: ICD-10-CM

## 2021-10-14 DIAGNOSIS — I82.431 ACUTE DEEP VEIN THROMBOSIS (DVT) OF RIGHT POPLITEAL VEIN (HCC): ICD-10-CM

## 2021-10-14 DIAGNOSIS — D49.0 ESOPHAGUS NEOPLASM: ICD-10-CM

## 2021-10-14 DIAGNOSIS — Z72.0 TOBACCO ABUSE: ICD-10-CM

## 2021-10-14 LAB — GLUCOSE BLDC GLUCOMTR-MCNC: 161 MG/DL (ref 70–130)

## 2021-10-14 PROCEDURE — A9552 F18 FDG: HCPCS | Performed by: INTERNAL MEDICINE

## 2021-10-14 PROCEDURE — 0 FLUDEOXYGLUCOSE F18 SOLUTION: Performed by: INTERNAL MEDICINE

## 2021-10-14 PROCEDURE — 82962 GLUCOSE BLOOD TEST: CPT

## 2021-10-14 PROCEDURE — 78815 PET IMAGE W/CT SKULL-THIGH: CPT

## 2021-10-14 RX ADMIN — FLUDEOXYGLUCOSE F18 1 DOSE: 300 INJECTION INTRAVENOUS at 07:25

## 2021-10-21 ENCOUNTER — OFFICE VISIT (OUTPATIENT)
Dept: ONCOLOGY | Facility: CLINIC | Age: 66
End: 2021-10-21

## 2021-10-21 ENCOUNTER — INFUSION (OUTPATIENT)
Dept: ONCOLOGY | Facility: HOSPITAL | Age: 66
End: 2021-10-21

## 2021-10-21 ENCOUNTER — OFFICE VISIT (OUTPATIENT)
Dept: OTHER | Facility: HOSPITAL | Age: 66
End: 2021-10-21

## 2021-10-21 VITALS — RESPIRATION RATE: 20 BRPM

## 2021-10-21 VITALS
HEART RATE: 79 BPM | WEIGHT: 202.5 LBS | HEIGHT: 69 IN | RESPIRATION RATE: 16 BRPM | TEMPERATURE: 97.1 F | DIASTOLIC BLOOD PRESSURE: 78 MMHG | OXYGEN SATURATION: 98 % | BODY MASS INDEX: 29.99 KG/M2 | SYSTOLIC BLOOD PRESSURE: 134 MMHG

## 2021-10-21 DIAGNOSIS — T45.1X5A PERIPHERAL NEUROPATHY DUE TO CHEMOTHERAPY (HCC): ICD-10-CM

## 2021-10-21 DIAGNOSIS — I82.431 ACUTE DEEP VEIN THROMBOSIS (DVT) OF RIGHT POPLITEAL VEIN (HCC): ICD-10-CM

## 2021-10-21 DIAGNOSIS — C15.5 MALIGNANT NEOPLASM OF LOWER THIRD OF ESOPHAGUS (HCC): Primary | ICD-10-CM

## 2021-10-21 DIAGNOSIS — Z51.11 ENCOUNTER FOR ANTINEOPLASTIC CHEMOTHERAPY: ICD-10-CM

## 2021-10-21 DIAGNOSIS — D49.0 ESOPHAGUS NEOPLASM: ICD-10-CM

## 2021-10-21 DIAGNOSIS — G62.0 PERIPHERAL NEUROPATHY DUE TO CHEMOTHERAPY (HCC): ICD-10-CM

## 2021-10-21 DIAGNOSIS — C15.5 MALIGNANT NEOPLASM OF LOWER THIRD OF ESOPHAGUS (HCC): ICD-10-CM

## 2021-10-21 DIAGNOSIS — C78.7 LIVER METASTASIS: ICD-10-CM

## 2021-10-21 DIAGNOSIS — Z45.2 ENCOUNTER FOR ADJUSTMENT OR MANAGEMENT OF VASCULAR ACCESS DEVICE: Primary | ICD-10-CM

## 2021-10-21 DIAGNOSIS — Z72.0 TOBACCO ABUSE: Primary | ICD-10-CM

## 2021-10-21 DIAGNOSIS — Z72.0 TOBACCO ABUSE: ICD-10-CM

## 2021-10-21 DIAGNOSIS — Z45.2 ENCOUNTER FOR ADJUSTMENT OR MANAGEMENT OF VASCULAR ACCESS DEVICE: ICD-10-CM

## 2021-10-21 LAB
ALBUMIN SERPL-MCNC: 4.2 G/DL (ref 3.5–5.2)
ALBUMIN/GLOB SERPL: 1.3 G/DL (ref 1.1–2.4)
ALP SERPL-CCNC: 129 U/L (ref 38–116)
ALT SERPL W P-5'-P-CCNC: 21 U/L (ref 0–41)
ANION GAP SERPL CALCULATED.3IONS-SCNC: 13.8 MMOL/L (ref 5–15)
AST SERPL-CCNC: 20 U/L (ref 0–40)
BASOPHILS # BLD AUTO: 0.07 10*3/MM3 (ref 0–0.2)
BASOPHILS NFR BLD AUTO: 0.7 % (ref 0–1.5)
BILIRUB SERPL-MCNC: 0.5 MG/DL (ref 0.2–1.2)
BUN SERPL-MCNC: 14 MG/DL (ref 6–20)
BUN/CREAT SERPL: 18.7 (ref 7.3–30)
CALCIUM SPEC-SCNC: 9.4 MG/DL (ref 8.5–10.2)
CEA SERPL-MCNC: 51.9 NG/ML
CHLORIDE SERPL-SCNC: 97 MMOL/L (ref 98–107)
CO2 SERPL-SCNC: 23.2 MMOL/L (ref 22–29)
CREAT SERPL-MCNC: 0.75 MG/DL (ref 0.7–1.3)
DEPRECATED RDW RBC AUTO: 48.5 FL (ref 37–54)
EOSINOPHIL # BLD AUTO: 0.43 10*3/MM3 (ref 0–0.4)
EOSINOPHIL NFR BLD AUTO: 4.1 % (ref 0.3–6.2)
ERYTHROCYTE [DISTWIDTH] IN BLOOD BY AUTOMATED COUNT: 14.6 % (ref 12.3–15.4)
GFR SERPL CREATININE-BSD FRML MDRD: 105 ML/MIN/1.73
GLOBULIN UR ELPH-MCNC: 3.2 GM/DL (ref 1.8–3.5)
GLUCOSE SERPL-MCNC: 187 MG/DL (ref 74–124)
HCT VFR BLD AUTO: 40.6 % (ref 37.5–51)
HGB BLD-MCNC: 13.8 G/DL (ref 13–17.7)
IMM GRANULOCYTES # BLD AUTO: 0.25 10*3/MM3 (ref 0–0.05)
IMM GRANULOCYTES NFR BLD AUTO: 2.4 % (ref 0–0.5)
LYMPHOCYTES # BLD AUTO: 0.9 10*3/MM3 (ref 0.7–3.1)
LYMPHOCYTES NFR BLD AUTO: 8.5 % (ref 19.6–45.3)
MCH RBC QN AUTO: 30.5 PG (ref 26.6–33)
MCHC RBC AUTO-ENTMCNC: 34 G/DL (ref 31.5–35.7)
MCV RBC AUTO: 89.8 FL (ref 79–97)
MONOCYTES # BLD AUTO: 0.77 10*3/MM3 (ref 0.1–0.9)
MONOCYTES NFR BLD AUTO: 7.3 % (ref 5–12)
NEUTROPHILS NFR BLD AUTO: 77 % (ref 42.7–76)
NEUTROPHILS NFR BLD AUTO: 8.16 10*3/MM3 (ref 1.7–7)
NRBC BLD AUTO-RTO: 0 /100 WBC (ref 0–0.2)
PLATELET # BLD AUTO: 143 10*3/MM3 (ref 140–450)
PMV BLD AUTO: 9.6 FL (ref 6–12)
POTASSIUM SERPL-SCNC: 3.9 MMOL/L (ref 3.5–4.7)
PROT SERPL-MCNC: 7.4 G/DL (ref 6.3–8)
RBC # BLD AUTO: 4.52 10*6/MM3 (ref 4.14–5.8)
SODIUM SERPL-SCNC: 134 MMOL/L (ref 134–145)
TSH SERPL DL<=0.05 MIU/L-ACNC: 1.9 UIU/ML (ref 0.27–4.2)
WBC # BLD AUTO: 10.58 10*3/MM3 (ref 3.4–10.8)

## 2021-10-21 PROCEDURE — 36591 DRAW BLOOD OFF VENOUS DEVICE: CPT

## 2021-10-21 PROCEDURE — 99215 OFFICE O/P EST HI 40 MIN: CPT | Performed by: NURSE PRACTITIONER

## 2021-10-21 PROCEDURE — 84443 ASSAY THYROID STIM HORMONE: CPT | Performed by: INTERNAL MEDICINE

## 2021-10-21 PROCEDURE — 25010000002 HEPARIN LOCK FLUSH PER 10 UNITS: Performed by: INTERNAL MEDICINE

## 2021-10-21 PROCEDURE — 85025 COMPLETE CBC W/AUTO DIFF WBC: CPT

## 2021-10-21 PROCEDURE — 82378 CARCINOEMBRYONIC ANTIGEN: CPT | Performed by: INTERNAL MEDICINE

## 2021-10-21 PROCEDURE — 80053 COMPREHEN METABOLIC PANEL: CPT

## 2021-10-21 PROCEDURE — 99215 OFFICE O/P EST HI 40 MIN: CPT | Performed by: INTERNAL MEDICINE

## 2021-10-21 RX ORDER — SODIUM CHLORIDE 9 MG/ML
250 INJECTION, SOLUTION INTRAVENOUS ONCE
Status: CANCELLED | OUTPATIENT
Start: 2021-10-21

## 2021-10-21 RX ORDER — HEPARIN SODIUM (PORCINE) LOCK FLUSH IV SOLN 100 UNIT/ML 100 UNIT/ML
500 SOLUTION INTRAVENOUS AS NEEDED
Status: CANCELLED | OUTPATIENT
Start: 2021-10-21

## 2021-10-21 RX ORDER — SODIUM CHLORIDE 0.9 % (FLUSH) 0.9 %
10 SYRINGE (ML) INJECTION AS NEEDED
Status: CANCELLED | OUTPATIENT
Start: 2021-10-21

## 2021-10-21 RX ORDER — HEPARIN SODIUM (PORCINE) LOCK FLUSH IV SOLN 100 UNIT/ML 100 UNIT/ML
500 SOLUTION INTRAVENOUS AS NEEDED
Status: DISCONTINUED | OUTPATIENT
Start: 2021-10-21 | End: 2021-10-21 | Stop reason: HOSPADM

## 2021-10-21 RX ADMIN — Medication 500 UNITS: at 09:41

## 2021-10-21 NOTE — PROGRESS NOTES
[facility] MULTIDISCIPLINARY CLINIC  ONCOLOGY SURVIVORSHIP/OLDER ADULT ASSESSMENT FOLLOW UP  Smoking Cessation/Survivorship Follow up      Han Garcia is a pleasant 65 y.o. male being followed by Alexey Haynes MD  for esophageal adenocarcinoma. Here today in our Multidisciplinary Clinic, for smoking cessation and survivorship follow up.    HPI  Comes in today immediately following appointment at [facility]. Patient met with Dr Haynes and reviewed recent most recent PET of 10/14/2021 which unfortunately showed the known metastatic disease involving the liver demonstrates significant worsening. There was overall improvement in the patient's known primary neoplasm involving the distal esophagus since the preceding PET/CT study dated 06/09/202 and no evidence of metastatic disease was observed elsewhere within the neck, chest abdomen and pelvis. He discussed with Dr Haynes discontinuing Keytruda and currently planned to start Enhertu next week. He is understandably discouraged and frustrated as he has been unable to completely quit all tobacco and this remains his goal.     He did complete cataract surgery in both eyes and reports a marked improvement in vision.    He reports feeling well, without changes in appetite or ability to swallow. Bowel function has been regular with no signs of blood in stool noted. Noting some swelling of his feet and will be seen by vascular for this. His fatigue continues but is mostly unchanged. No changes in patterns of dyspnea. No changes in neuropathy symptoms. He denies pain.      Treatment History:    Oncology/Hematology History   Liver metastasis (HCC)   7/9/2019 Initial Diagnosis    Liver metastases (CMS/HCC)     8/6/2019 - 7/28/2021 Chemotherapy    · Initially treated with FOLFOX initiated July 2019  · Herceptin added with cycle 2  · Following 8 cycles of FOLFOX, oxaliplatin discontinued with continuation of 5-FU, leucovorin, Herceptin. This was continued through  7/1/2021  OP GE Leucovorin / Fluorouracil / Trastuzumab as of 1/7/2020 per Dr Haynes     7/29/2021 - 9/30/2021 Chemotherapy    OP Pembrolizumab 200 mg     10/27/2021 -  Chemotherapy    OP BREAST Fam-Trastuzumab Deruxtecan-nxki     Malignant neoplasm of lower third of esophagus (HCC)   7/2/2019 Imaging    ADDENDUM:     Inadvertently omitted from the report is the presence of a colovesicular  fistula. There is extensive sigmoid diverticulosis and there is a  fistulous tract between the sigmoid colon and the urinary bladder. There  is a moderate amount of air within the urinary bladder. There is no  convincing evidence for acute diverticulitis, but chronic diverticulitis  is suspected.     This report was finalized on 7/16/2019 3:47 PM by Dr. Almaz Rutledge M.D.      Signed by Almaz Rutledge MD on 7/16/2019  3:47 PM   Narrative & Impression   CT ABDOMEN AND PELVIS WITH IV CONTRAST     HISTORY: 63-year-old male with right lower quadrant pain. Diarrhea.  Prostate cancer history.     TECHNIQUE: Radiation dose reduction techniques were utilized, including  automated exposure control and exposure modulation based on body size.   3 mm images were obtained through the abdomen and pelvis after the  administration of IV contrast. There is no previous CT for comparison.     FINDINGS: There is masslike circumferential thickening of the distal  esophagus and there are multiple enlarged nodes adjacently. There are  many variable sized metastases scattered throughout the liver. There is  a confluent lesion in the right hepatic lobe which is best seen in its  entirety on the coronal reconstruction series and measures approximately  12 x 6 cm. The gallbladder appears unremarkable and there is no biliary  dilatation. The spleen, pancreas, adrenals, and kidneys appear  unremarkable. There is extensive colonic diverticulosis without evidence  for diverticulitis. The appendix appears normal. There is a tiny amount  of free fluid within the  dependent aspect of the pelvis. There are  extensive atherosclerotic changes throughout the abdominal aorta with  luminal narrowing. Within the visualized lower lung fields, there is an  8 mm right lower lobe pulmonary nodule.     IMPRESSION:  1. There is extensive liver metastases and the primary malignancy is  suspected to be at the distal esophagus. There is masslike  circumferential thickening of the distal esophagus with surrounding  lymphadenopathy. The 8 mm right basilar pulmonary nodule is suspected to  represent a metastasis.  2. There is extensive colonic diverticulosis without evidence for  diverticulitis and there is no appendicitis.        7/8/2019 Imaging    CT CHEST W CONTRAST-     Radiation dose reduction techniques were utilized, including automated  exposure control and exposure modulation based on body size.     CLINICAL: 63-year-old male with possible esophageal malignancy,  pulmonary nodule, lymphadenopathy and liver lesions.     FINDINGS: There is a 10 cm long segment of distal esophagus which  demonstrates thickening and luminal irregularity. This process does not  appear to extend into the stomach. At one point the wall demonstrates a  focal mass-like area with displacement of the lumen towards the left.  The findings are most worrisome for distal esophageal neoplasm,  infectious/inflammatory process is felt to be less likely.     On image #254 there is a periesophageal lymph node which measures 14 mm.  There are other smaller distal periesophageal lymph nodes with the  largest of these 9 mm.     Located within the right lower lobe on image #268 is an 8 mm  noncalcified pulmonary nodule. There are 2 tiny pleural plaques  demonstrated on the left major fissure, measuring only a few millimeters  in length each, seen on images 178 and 185. The lungs are otherwise  clear. There is bullae formation consistent with emphysema. No pleural  effusion.     Limited images through the upper abdomen  demonstrate numerous liver  lesions.     No lytic or sclerotic bone lesion.     CONCLUSION:  1. Concentric as well as eccentric wall thickening involving the distal  10 cm of the esophagus, just above the GE junction. Findings most  worrisome for esophageal neoplasm.  2. Tiny right lower lobe 8 mm pulmonary nodule and 2 tiny pleural  plaques seen on the left major fissure. These are indeterminant.  3. Paraesophageal lymph nodes adjacent to the distal esophagus.     7/10/2019 Initial Diagnosis    Malignant neoplasm of lower third of esophagus (CMS/HCC)     7/16/2019 Biopsy    INSERTION VENOUS ACCESS DEVICE WITH FLUORO AND EGD WITH BIOPSY    1. Esophagus, 35 Cm from the Lip, Biopsy:               A. INVASIVE MODERATELY DIFFERENTIATED ADENOCARCINOMA (see Comment).    HER-2 amplification present at a level consistent with clinically significant enhancement of HER-2 expression by FISH     8/6/2019 - 7/28/2021 Chemotherapy    · Initially treated with FOLFOX initiated July 2019  · Herceptin added with cycle 2  · Following 8 cycles of FOLFOX, oxaliplatin discontinued with continuation of 5-FU, leucovorin, Herceptin. This was continued through 7/1/2021  OP GE Leucovorin / Fluorouracil / Trastuzumab as of 1/7/2020 per Dr Haynes     9/19/2019 Imaging    CT CHEST W CONTRAST-, CT ABDOMEN W CONTRAST-     CLINICAL HISTORY: 63 year old male with history of metastatic esophageal  carcinoma. Patient also has history of prostate carcinoma.     TECHNIQUE: Spiral CT images were obtained through the chest and abdomen  with IV contrast only and were reconstructed in 3 mm thick slices.     Radiation dose reduction techniques were utilized, including automated  exposure control and exposure modulation based on body size.     COMPARISON: PET/CT imaging dated 07/15/2019. CT imaging of the chest  dated 07/08/2019. CT imaging of the abdomen and pelvis dated 07/02/2019.     FINDINGS: Again seen is moderate circumferential wall  thickening  involving the distal esophagus immediately superior to the  gastroesophageal junction consistent with esophageal carcinoma. The  overall degree of wall thickening has diminished since the preceding  imaging studies. Multiple mildly enlarged lymph nodes within the  posterior mediastinum adjacent to the esophageal neoplasm of also  diminished in size. No lymphadenopathy is identified elsewhere within  the mediastinum or either hilar region or either axilla. The previous CT  scan demonstrated an 8 mm diameter noncalcified pulmonary nodule in the  inferior aspect of the right lower lobe. This has nearly completely  resolved, which would suggest it represents a metastatic lesion  responding to therapy. No other lung nodules are identified. There are  no acute infiltrates or pleural effusions.     Numerous hepatic metastatic lesions show significant interval decrease  in size. For instance, a conglomerate mass of adjacent metastatic  lesions in the left lobe of the liver that measured 6.8 x 5.4 cm in  diameter on the previous study now measure only 3.8 x 3.2 cm in  diameter. A large metastatic lesion in the inferior aspect of the right  lobe of the liver that measured 12.2 x 6.3 cm in diameter on the  previous study now measures 8.2 x 5.2 cm. The spleen and pancreas and  kidneys and adrenal glands are unremarkable. A few mildly enlarged  periaortic and pericaval lymph nodes in the retroperitoneum appear  slightly smaller. The visualized small and large bowel are unremarkable  except for multiple diverticuli in the left side of the colon..     IMPRESSION:  Overall significant improvement since the preceding PET/CT  and CT imaging performed in July of this year. The primary esophageal  neoplasm has diminished in size, as have several mildly enlarged lymph  nodes in the mediastinum adjacent to the neoplasm. A single small right  lower lobe pulmonary nodule has nearly completely resolved. Numerous  hepatic  metastases also appear significantly smaller.     11/19/2019 Imaging    F-18 FDG PET FROM SKULL BASE TO MID THIGH WITH PET/CT FUSION     HISTORY: 64-year-old male with metastatic distal esophageal cancer.  Restaging.     TECHNIQUE: Radiation dose reduction techniques were utilized, including  automated exposure control and exposure modulation based on body size.   Blood glucose level at time of injection was 113 mg/dL.  6.8 mCi of F-18  FDG were injected and PET was performed from skull base to mid thigh. CT  was obtained for localization and attenuation correction. Time at  injection 7:35 AM.  PET start time 9:09 AM.  Compared with CTs from  09/19/2019 and PET/CT from 07/15/2019.     FINDINGS: There is a short segment of distal esophagus which has low  level activity with a maximal SUV of 5.1, previously 19.1 and a  significantly longer segment. There is a heterogeneous pattern of  activity throughout the liver and there is a tiny relatively  hypermetabolic focus within the medial hepatic segment with a maximal  SUV of 5.2. The rest of the extensive hepatic metastases have  essentially resolved. There has been resolution of the hypermetabolic  nodes at the [place] hepatis and retroperitoneum. There is a single tiny  focus of hypermetabolic activity at the sigmoid colon, but the rest of  the sigmoid colon has low level activity. Again seen is a colovesicular  fistula.     IMPRESSION:  1. Dramatic improvement with near complete resolution of the hepatic  metastases, resolution of the hypermetabolic nodes at the [place] hepatis  and retroperitoneum, and there is a much shorter segment of  hypermetabolic activity at the distal esophagus and the activity is much  less intensely hypermetabolic.  2. Persisting colovesicular fistula. The focus of hypermetabolic  activity at the sigmoid colon may represent acute or chronic  diverticulitis. There is very extensive sigmoid diverticulosis     10/27/2020 Imaging    F-18 FDG PET  FROM SKULL BASE TO MID THIGH WITH PET/CT FUSION     HISTORY: 64-year-old male with metastatic distal esophageal cancer.  Colovesicular fistula. Restaging.     TECHNIQUE: Radiation dose reduction techniques were utilized, including  automated exposure control and exposure modulation based on body size.   Blood glucose level at time of injection was 113 mg/dL.  6.3 mCi of F-18  FDG were injected and PET was performed from skull base to mid thigh. CT  was obtained for localization and attenuation correction. Time at  injection 8:08 AM. PET start time 9:41 AM. Compared with PET/CT from  08/03/2020.     FINDINGS: There is slightly more intense hypermetabolic activity at the  distal 3rd of the esophagus than previously with a current maximal SUV  of 5.3, previously 3.6. There is no hypermetabolic lymphadenopathy along  the esophagus or within the chest. There is no change in the speckled  pattern of activity throughout the liver. No suspicious hypermetabolic  activity seen within the liver. There is no suspicious hypermetabolic  activity within the abdomen or pelvis. A colovesicular fistula is again  seen between the left posterior bladder wall in the sigmoid colon. There  is no hypermetabolic lymphadenopathy within the pelvis. There is no  suspicious hypermetabolic activity within the neck. There has been  development of hypermetabolic mucosal thickening within the left  maxillary sinus with a maximal SUV of 7.5.     IMPRESSION:  1. The more intense metabolic activity at the distal esophagus may be  radiation related, but this is indeterminate. Correlation with endoscopy  is recommended. There is no hypermetabolic lymphadenopathy or convincing  evidence for metastatic disease.  2. Development of hypermetabolic mucosal thickening within the left  maxillary sinus. Please correlate clinically for acute sinusitis.  3. There is no change in the appearance of the colovesicular fistula.     3/16/2021 Imaging    CT CHEST,  ABDOMEN AND PELVIS WITH CONTRAST     HISTORY: 65-year-old male with follow-up for esophageal neoplasm. The  patient had hepatic metastatic disease and previously has had DVT. For  follow-up.     TECHNIQUE: Axial CT images of the chest abdomen and pelvis were obtained  following administration of intravenous and oral contrast. Coronal and  sagittal reconstructions were then obtained.     COMPARISON: PET/CT dated 10/27/2020     FINDINGS:     CT CHEST: Circumferential low-density wall thickening involving the  distal third of the esophagus extending at the level of the GE junction  is present. There are small periesophageal lymph nodes present with  index node measuring 6 mm on image 78. There is no dilatation of the  proximal esophagus. A superior mediastinal lymph node on image 23 is  without interim change. Subcentimeter subcarinal lymph nodes are also  without interim change. No pleural or pericardial effusion is seen.  Calcified coronary artery disease is present. Mild ectasia of the  ascending thoracic aorta measuring up to 3.5 cm in AP diameter. The  central airways are patent without endobronchial lesion. Mild bronchial  wall thickening is demonstrated bilaterally. Background upper lobe  predominant emphysematous changes are present. No new or suspicious  pulmonary nodules are seen. No pathological axillary lymphadenopathy.     CT ABDOMEN AND PELVIS:Mild diffuse low attenuation of liver most  consistent with steatosis. Several hypoattenuating foci are seen within  the liver, index lesions are seen on image 94, 99, the largest lesion  within the inferior right hepatic lobe on image 113. Many of these are  associated with associated capsular retraction. I suspect these  represent treated hepatic metastases with fibrosis. The lesions were  compared to prior CT from 09/2019. The spleen is normal. Pancreas is  normal without ductal dilatation. The gallbladder is unremarkable.  Subcentimeter retroperitoneal lymph  nodes are without interim change.  Bilateral adrenal gland and kidneys are stable. The urinary bladder is  partially distended and contains nondependent air. Colonic  diverticulosis is again demonstrated with a track seen extending from  the sigmoid colon to the posterior bladder wall. No evidence of bowel  obstruction. Appendix is normal. Marked calcified atherosclerotic plaque  is seen within the abdominal aorta and its branches. Moderate narrowing  of the SMA at its origin caused by predominantly calcified plaque. Large  calcification at the level of the right common femoral artery which  appears to be completely occluded at its origin. The air is seen to  recanalize with occlusion of the right superficial femoral artery. On  the left there is multifocal stenosis within the left external iliac  artery with occlusion of the left common femoral artery as well as  hemodynamically significant stenosis of the left superficial femoral  artery. A lipoma is seen within the third portion of the duodenum.     IMPRESSION:  1. Distal esophageal wall thickening does not appear significantly  changed from the PET/CT with small periesophageal lymph nodes.  Subcentimeter retroperitoneal lymph nodes are without interim change.  The liver demonstrates evidence of treated hepatic metastatic disease  without convincing evidence of new disease.  2. Marked atherosclerotic disease with occlusion of bilateral common  femoral arteries and significant stenosis within the superficial femoral  arteries as above.  3. Colonic diverticulosis with stable linear track extending from the  sigmoid to the posterior wall of the urinary bladder. Air is again  demonstrated within the bladder lumen.   4. Additional findings as above.     6/9/2021 Imaging    FDG PET/CT IMAGING SKULL BASE TO MID THIGH      HISTORY: 65-year-old male with history of esophageal carcinoma with  known liver metastases. Restaging.     TECHNIQUE: Blood glucose level at time of  injection of FDG was 136  mg/dl.  6.1 mCi of FDG was injected. Approximately 90 minutes later, PET  images were obtained. CT images were acquired for attenuation correction  and anatomic localization.     COMPARISON: CT scan of the chest, abdomen and pelvis dated 03/16/2021.  PET/CT imaging dated 10/27/2020.     FINDINGS: There are 2 adjacent oblong foci of moderately intense  hypermetabolism within the distal esophagus at the GE junction  corresponding to the patient's biopsy-proven esophageal neoplasm that  show significant increase in FDG avidity since the preceding PET/CT  study consistent with enlarging residual neoplasm. These have maximum  measured SUV of up to 11.2 g/mL. Previously there was only mild  hypermetabolism at the GE junction with maximum measured SUV of 5.2  g/mL. In addition, circumferential wall thickening within the distal  esophagus appears more prominent. No foci of pathologic hypermetabolism  are identified elsewhere within the chest. In particular, there is no  hypermetabolic mediastinal or hilar lymphadenopathy. Lung window images  demonstrate no parenchymal nodules. No foci of pathologic  hypermetabolism are identified within the abdomen or pelvis. Multiple  treated hepatic metastases continue to show no discernible  hypermetabolism. There is no hypermetabolic lymphadenopathy in the  abdomen. There is physiologic distribution of the radiopharmaceutical  within the neck that is unchanged.     IMPRESSION:  Two adjacent oblong foci of moderately intense  hypermetabolism involving the distal esophagus at the GE junction  showing significant increase in FDG avidity since the most recent PET/CT  study dated 10/27/2020. Soft tissue thickening involving the distal  esophagus is also more prominent. Enlarging residual neoplasm is  suspected. No foci of pathologic hypermetabolism are identified  elsewhere within the neck, chest, abdomen and pelvis. Known extensive  treated hepatic metastatic  disease continues to show no significant  hypermetabolism.     6/19/2021 Surgery    ESOPHAGOGASTRODUODENOSCOPY WITH BIOPSY    1. Gastroesophageal Junction, 38 cm, Biopsy:                A. SQUAMOCOLUMNAR MUCOSA WITH AT LEAST HIGH-GRADE DYSPLASIA AND FOCI SUSPICIOUS                    FOR INVASIVE ADENOCARCINOMA (SEE COMMENT).     2. Esophagus, 40 cm, Biopsy:                A. COLUMNAR MUCOSA WITH A RARE MINUTE FRAGMENT OF DETACHED        FRAGMENT OF HIGH-GRADE DYSPLASTIC EPIHTELIUM AND ULCERATION       (SEE COMMENT).     3. Esophagus, 34 cm, Biopsy:               A. SQUAMOCOLUMNAR MUCOSA WITH AT LEAST HIGH-GRADE DYSPLASIA AND FOCI SUSPICIOUS                    FOR INVASIVE ADENOCARCINOMA (SEE COMMENT).    Susan next generation sequencing:  PD-L1 Positive, CPS:1  ERBB2 (Her2/Marcy): Amplified  TMB: High, 22 mut/Mb     7/29/2021 - 9/30/2021 Chemotherapy    OP Pembrolizumab 200 mg     8/2/2021 - 8/7/2021 Other Event    Hospitalization 8/2/2021 through 8/7/2021 secondary to acute GI bleeding from known malignancy  · Dark tarry stool initially began 7/29/2021  · 7/23/2021, hemoglobin of 11.0.  8/2/2021, hemoglobin of 5.7  · Patient admitted, received transfusion of 4 units packed red blood cells.  EGD identified bleeding mass in the distal esophagus  · Status post radiation to esophageal lesion with no further issues with bleeding     8/5/2021 - 8/18/2021 Radiation    Radiation OncologyTreatment Course:  Han Garcia received 3000 cGy in 10 fractions to ESOPHAGUS.     8/6/2021 Imaging    Duplex scan of extremity veins  · Acute left upper extremity superficial thrombophlebitis noted in the cephalic (upper arm).  · All other left sided vessels appear normal.     10/14/2021 Imaging    FDG PET/CT IMAGING SKULL BASE TO MID THIGH      HISTORY: 65-year-old male with history of esophageal carcinoma with  known liver metastases. Restaging. Increasing CEA level.     TECHNIQUE: Blood glucose level at time of injection of FDG was  161  mg/dl.  6.0 mCi of FDG was injected. Approximately 90 minutes later, PET  images were obtained. CT images were acquired for attenuation correction  and anatomic localization.     COMPARISON: PET/CT study dated 06/09/2021.     FINDINGS: The patient's primary neoplasm at the gastroesophageal  junction shows less prominent circumferential wall thickening and also  less hypermetabolism. As before, there are 2 oblong foci of  hypermetabolism within the distal esophagus. On the previous study these  had a maximum measured SUV of 11.2 g/mL. Currently the maximum measured  SUV is 6.2 g/mL. The patient's known metastatic disease within the liver  shows significant worsening. The CT images now demonstrate a 3.9 x 3.7  cm diameter low-density mass in the left lobe of the liver that was not  evident on the previous study. This shows mild hypermetabolism with  maximum measured SUV of 6.5 g/mL predominantly within its periphery.  Centrally it appears photopenic and likely chronic. There is also 2.9 cm  diameter low-density metastatic lesion in the anterior segment of the  right lobe of the liver that was not noted on the previous study. This  demonstrates only patchy low-level mild hypermetabolism which is also  new. A third small metastatic lesion in the posterior aspect of the left  lobe of the liver measuring 2.3 cm in diameter is also now evident, and  demonstrates mild patchy hypermetabolism. No other foci of pathologic  hypermetabolism are identified in the abdomen. There is physiologic  distribution of the radiopharmaceutical within the neck and chest and  pelvis. Lung window images demonstrate no parenchymal nodules or  infiltrates. There are numerous diverticuli in the left side of the  colon and sigmoid colon. There is no definite CT evidence of acute  diverticulitis.     IMPRESSION:  Overall improvement in the patient's known primary neoplasm  involving the distal esophagus since the preceding PET/CT study  dated  06/09/2021. However, known metastatic disease involving the liver  demonstrate significant worsening as described in more detail above.  There is no metabolic evidence of metastatic disease elsewhere within  the neck, chest, abdomen and pelvis.     10/27/2021 -  Chemotherapy    OP BREAST Fam-Trastuzumab Deruxtecan-nxki       ONCOLOGY OLDER ADULT FUNCTIONAL ASSESSMENT  FORMULATION AND SUMMARY:    Problems identified:  1. Smoking Cessation: again, expresses frustration regarding inability to achieve complete abstinence. He wants to work towards another attempt. He still has patches to use at home. I have asked him to use ONE patch this week to reacquaint himself with how he will feel and grow some confidence with this treatment modality. We have also reviewed use and side effects of chantix today but he is not ready to pursue.  2. Fatigue: Stable. He continues to try to get out of the house when he can, balancing rest and activity. He is eating well.  3. 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 This is bothersome but stable.  4. Goals of Care: Although he is understandably disappointed at the news of the evolving liver lesions, he remains hopeful to achieve another period of having no evidence of disease and is prepared to begin this new therapy. Discussed today his frustration that now, with worsening disease, he is without most symptoms, and how puzzling this is to him as he was feeling so poorly at the time of diagnosis. We discussed that often, unless organ function or impingement on local structures is occurring, that it is not uncommon to remain without symptoms.       Plan and recommendations:  1. I have asked him to get in some practice with the nicotine patch this week as he ramps up towards a quit attempt.  2. Will follow up in one week to review progress and offer ongoing education and support  3. Continue physical activity as tolerated.     Diagnosis Plan   1. Tobacco abuse          Greater than 40 minutes spent with patient, 30 minutes of that spent face-to-face counseling patient extensively on treatment summary, surveillance for recurrence, late and long-term effects of disease and treatment, nicotine patch, benefits of smoking cessation and impact on cancer survivorship

## 2021-10-21 NOTE — PROGRESS NOTES
Subjective     REASON FOR follow up:1.    1. 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  Currently receiving palliative 5 fu leucovorin  AND HERCEPTIN therapy once a month    2.COLOVESICAL FISTULA: chronic antibiotic therapy cipro, NO FURTHER PLANS FOR SURGERY AFTER VISIT AND PROCEDURE BY DR GM CASTILLO 1/20    3. 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 NEURONTIN INITIATED.        History of Present Illness        DURING THE VISIT WITH THE PATIENT TODAY , PATIENT HAD FACE MASK, MY MEDICAL ASSISTANT AND I  HAD PROPPER PROTECTIVE EQUIPMENT, AND I DID HAND HYGIENE WITH SOAP AND WATER BEFORE AND AFTER THE VISIT.    This patient returns today to the office for follow up of the above diagnosis. He is here today to review a PET scan that has been done with the suspicion that his liver metastasis is getting bigger in the background of Keytruda treatment for his esophageal cancer. He completed several weeks ago palliative radiation therapy to his lesions in the esophagus that were producing GI bleeding. Anticoagulant was discontinued at that point. The patient at this time has no difficulty swallowing. His weight remains stable. He has no abdominal pain, no jaundice, no nausea or vomiting. Good bowel activity. No recent urinary tract infections related to his vesical of colonic fistula. He has no swelling in the lower extremities. Energy level is appropriate. Still neuropathy in his feet and peripheral arterial disease in his feet. No obvious claudication.     Still smoking 1 pack of cigarettes a day.                           Past Medical History:   Diagnosis Date   • Arthritis    • Cancer (HCC)     prostate cancer 2008   • Cancer (HCC)     esophageal   • Chronic anticoagulation     on xarelto   • Elevated PSA    • Esophageal  "mass    • Fistula     colon and bladder   • H/O Lung nodule    • Hepatitis     CHILD--PT STATED \"I THINK IT WAS A.\"   • History of chemotherapy    • History of pneumonia    • Hx of blood clots 08/10/2019   • Hyperlipidemia    • Hypertension    • Nail fungus    • Neuropathy    • PVD (peripheral vascular disease) (HCC)    • Recto-bladderneck fistula     on poab for recurrent uti        Past Surgical History:   Procedure Laterality Date   • COLONOSCOPY N/A 2/4/2020    Procedure: COLONOSCOPY;  Surgeon: Guy Sears MD;  Location: [facility] ENDOSCOPY;  Service: General;  Laterality: N/A;  PRE-COLOVESICAL FISTULA  POST-- DIVERTICULOSIS   • CYSTOSCOPY  02/06/2020   • CYSTOSCOPY Bilateral 2/26/2020    Procedure: CYSTOSCOPY RETROGRADE;  Surgeon: Cm Witt MD;  Location: [facility] OR;  Service: Urology;  Laterality: Bilateral;   • ENDOSCOPY     • ENDOSCOPY N/A 6/19/2021    Procedure: ESOPHAGOGASTRODUODENOSCOPY WITH BIOPSY;  Surgeon: Mary Brito MD;  Location: [facility] OR;  Service: Gastroenterology;  Laterality: N/A;   • ENDOSCOPY N/A 8/5/2021    Procedure: ESOPHAGOGASTRODUODENOSCOPY HEMOSPRAY;  Surgeon: Naga Shelby MD;  Location: [facility] ENDOSCOPY;  Service: Gastroenterology;  Laterality: N/A;  HX OF ESOPHAGEAL  CANCER;MELENA  POST: ESOPHAGEAL BLEEDING; ESOPHAGEAL MASS   • HERNIA REPAIR Right 1999    inguinal hernia   • PROSTATE SURGERY  03/2008    prostatectectomy   • VENOUS ACCESS DEVICE (PORT) INSERTION N/A 7/16/2019    Procedure: INSERTION VENOUS ACCESS DEVICE WITH FLUORO AND EGD WITH BIOPSY;  Surgeon: Mary Brito MD;  Location: [facility] OR;  Service: Thoracic        Current Outpatient Medications on File Prior to Visit   Medication Sig Dispense Refill   • acetaminophen (TYLENOL) 500 MG tablet Take 500 mg by mouth Every 6 (Six) Hours As Needed for Mild Pain .     • cevimeline (EVOXAC) 30 MG capsule Take 30 mg by mouth 3 (Three) Times a Day.     • ciprofloxacin (CIPRO) 250 MG " tablet Take 1 tablet by mouth Daily. Take one every other day 90 tablet 1   • econazole nitrate (SPECTAZOLE) 1 % cream 2 (Two) Times a Day.     • gabapentin (NEURONTIN) 300 MG capsule Take 1 capsule by mouth 2 (two) times a day. 60 capsule 3   • lisinopril-hydrochlorothiazide (PRINZIDE,ZESTORETIC) 20-12.5 MG per tablet TAKE 1 TABLET BY MOUTH EVERY DAY 90 tablet 0   • nicotine (NICODERM CQ) 21 MG/24HR patch Place 1 patch on the skin as directed by provider Daily. (Patient taking differently: Place 1 patch on the skin as directed by provider Daily. Has at home but hasn't started taking yet) 28 each 1   • ondansetron (ZOFRAN) 4 MG tablet Take 1 tablet by mouth Every 8 (Eight) Hours As Needed for Nausea or Vomiting. 30 tablet 2   • pantoprazole (Protonix) 40 MG EC tablet Take 1 tablet by mouth 2 (Two) Times a Day. 90 tablet 3   • Probiotic Product (PROBIOTIC DAILY PO) Take 1 tablet by mouth Daily.     • triamcinolone (KENALOG) 0.1 % ointment Apply 1 application topically to the appropriate area as directed 2 (Two) Times a Day. 30 g 1     No current facility-administered medications on file prior to visit.        ALLERGIES:  No Known Allergies     Social History     Socioeconomic History   • Marital status: Single   • Years of education: High school   Tobacco Use   • Smoking status: Current Every Day Smoker     Packs/day: 1.00     Years: 38.00     Pack years: 38.00     Types: Cigarettes     Start date: 1974   • Smokeless tobacco: Never Used   • Tobacco comment: Quit for a period of 8 years.    Vaping Use   • Vaping Use: Never used   Substance and Sexual Activity   • Alcohol use: Not Currently   • Drug use: Never   • Sexual activity: Defer        Family History   Problem Relation Age of Onset   • Hypertension Mother    • Stroke Mother    • Hypertension Other    • Lung disease Other    • Prostate cancer Other    • Lung cancer Father    • Malig Hyperthermia Neg Hx       Current Outpatient Medications on File Prior to Visit  "  Medication Sig Dispense Refill   • acetaminophen (TYLENOL) 500 MG tablet Take 500 mg by mouth Every 6 (Six) Hours As Needed for Mild Pain .     • cevimeline (EVOXAC) 30 MG capsule Take 30 mg by mouth 3 (Three) Times a Day.     • ciprofloxacin (CIPRO) 250 MG tablet Take 1 tablet by mouth Daily. Take one every other day 90 tablet 1   • econazole nitrate (SPECTAZOLE) 1 % cream 2 (Two) Times a Day.     • gabapentin (NEURONTIN) 300 MG capsule Take 1 capsule by mouth 2 (two) times a day. 60 capsule 3   • lisinopril-hydrochlorothiazide (PRINZIDE,ZESTORETIC) 20-12.5 MG per tablet TAKE 1 TABLET BY MOUTH EVERY DAY 90 tablet 0   • nicotine (NICODERM CQ) 21 MG/24HR patch Place 1 patch on the skin as directed by provider Daily. (Patient taking differently: Place 1 patch on the skin as directed by provider Daily. Has at home but hasn't started taking yet) 28 each 1   • ondansetron (ZOFRAN) 4 MG tablet Take 1 tablet by mouth Every 8 (Eight) Hours As Needed for Nausea or Vomiting. 30 tablet 2   • pantoprazole (Protonix) 40 MG EC tablet Take 1 tablet by mouth 2 (Two) Times a Day. 90 tablet 3   • Probiotic Product (PROBIOTIC DAILY PO) Take 1 tablet by mouth Daily.     • triamcinolone (KENALOG) 0.1 % ointment Apply 1 application topically to the appropriate area as directed 2 (Two) Times a Day. 30 g 1     No current facility-administered medications on file prior to visit.   No Known Allergies         Objective     Vitals:    10/21/21 0852   BP: 134/78   Pulse: 79   Resp: 16   Temp: 97.1 °F (36.2 °C)   TempSrc: Temporal   SpO2: 98%   Weight: 91.9 kg (202 lb 8 oz)   Height: 175.3 cm (69.02\")   PainSc: 0-No pain     Current Status 9/30/2021   ECOG score 0           Physical exam      I HAVE PERSONALLY REVIEWED THE HISTORY OF THE PRESENT ILLNESS, PAST MEDICAL HISTORY, FAMILY HISTORY, SOCIAL HISTORY, ALLERGIES, MEDICATIONS STATED ABOVE IN THE  NOTE FROM TODAY.        GENERAL:  Well-developed, well-nourished  Patient  in no acute " distress.   SKIN:  Warm, dry ,NO rashes,NO purpura ,NO petechiae.  HEENT:  Pupils were equal and reactive to light and accomodation, conjunctivae noninjected, no pterygium, normal extraocular movements, normal visual acuity.   NECK:  Supple with good range of motion; no thyromegaly , no other masses, no JVD or bruits, no cervical adenopathies.No carotid artery pain, no carotid abnormal pulsation , NO arterial dance.  LYMPHATICS:  No cervical, NO supraclavicular, NO axillary,NO epitrochlear , NO inguinal adenopathy.  CARDIAC   normal rate and regular rhythm, without murmur,NO rubs NO S3 NO S4 right or left .  LUNGS: normal breath sounds bilateral, no wheezing, rhonchi, crackles or rubs.  VASCULAR VENOUS: no cyanosis, collateral circulation, varicosities, edema, palpable cords, pain, erythema.  ABDOMEN:  Soft, nontender with no hepatomegaly, no splenomegaly,no masses, no ascites, no collateral circulation,no distention,no [place] sign.  EXTREMITIES  AND SPINE:  No clubbing, cyanosis or edema, no deformities , no pain .No kyphosis, scoliosis, no other deformities, no pain in spine, no pain in ribs , no pain inpelvic bone.He had decrease of temperature in both feet with slow capillary refill and minimal blanching and minimal atrial cyanosis in toes of both lower extremities. He had no ongoing gangrene. He also has desquamation of the skin and probably a ATHLET'S foot.      NEUROLOGICAL:  Patient was awake, alert, oriented to time, person and place.sensory neuropathy in feet              RECENT LABS:  Hematology WBC   Date Value Ref Range Status   10/21/2021 10.58 3.40 - 10.80 10*3/mm3 Final   02/02/2019 13.4 (H) 3.4 - 10.8 x10E3/uL Final     RBC   Date Value Ref Range Status   10/21/2021 4.52 4.14 - 5.80 10*6/mm3 Final   02/02/2019 4.81 4.14 - 5.80 x10E6/uL Final     Hemoglobin   Date Value Ref Range Status   10/21/2021 13.8 13.0 - 17.7 g/dL Final     Hematocrit   Date Value Ref Range Status   10/21/2021 40.6 37.5 - 51.0  % Final     Platelets   Date Value Ref Range Status   10/21/2021 143 140 - 450 10*3/mm3 Final         CEA  10/21/21) 3 wk ago   (9/30/21) 2 mo ago   (8/19/21) 3 mo ago   (7/23/21) 3 mo ago   (7/1/21) 4 mo ago   (6/15/21) 5 mo ago   (5/18/21)    CEA   ng/mL 51.90  32.30  24.60  20.40  16.50  15.70  13.70    Resulting Agency  [facility]            Order: [id number]        FDG PET/CT IMAGING SKULL BASE TO MID THIGH      HISTORY: 65-year-old male with history of esophageal carcinoma with  known liver metastases. Restaging. Increasing CEA level.     TECHNIQUE: Blood glucose level at time of injection of FDG was 161  mg/dl.  6.0 mCi of FDG was injected. Approximately 90 minutes later, PET  images were obtained. CT images were acquired for attenuation correction  and anatomic localization.     COMPARISON: PET/CT study dated 06/09/2021.     FINDINGS: The patient's primary neoplasm at the gastroesophageal  junction shows less prominent circumferential wall thickening and also  less hypermetabolism. As before, there are 2 oblong foci of  hypermetabolism within the distal esophagus. On the previous study these  had a maximum measured SUV of 11.2 g/mL. Currently the maximum measured  SUV is 6.2 g/mL. The patient's known metastatic disease within the liver  shows significant worsening. The CT images now demonstrate a 3.9 x 3.7  cm diameter low-density mass in the left lobe of the liver that was not  evident on the previous study. This shows mild hypermetabolism with  maximum measured SUV of 6.5 g/mL predominantly within its periphery.  Centrally it appears photopenic and likely chronic. There is also 2.9 cm  diameter low-density metastatic lesion in the anterior segment of the  right lobe of the liver that was not noted on the previous study. This  demonstrates only patchy low-level mild hypermetabolism which is also  new. A third small metastatic lesion in the posterior aspect of the left  lobe of the liver  measuring 2.3 cm in diameter is also now evident, and  demonstrates mild patchy hypermetabolism. No other foci of pathologic  hypermetabolism are identified in the abdomen. There is physiologic  distribution of the radiopharmaceutical within the neck and chest and  pelvis. Lung window images demonstrate no parenchymal nodules or  infiltrates. There are numerous diverticuli in the left side of the  colon and sigmoid colon. There is no definite CT evidence of acute  diverticulitis.     IMPRESSION:  Overall improvement in the patient's known primary neoplasm  involving the distal esophagus since the preceding PET/CT study dated  06/09/2021. However, known metastatic disease involving the liver  demonstrate significant worsening as described in more detail above.  There is no metabolic evidence of metastatic disease elsewhere within  the neck, chest, abdomen and pelvis.     This report was finalized on 10/15/2021 10:17 AM by Dr. Han Sheth M.D.               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 Almost 90% of the liver WAS replaced by tumor. The patient has been undergoing chemotherapy with 5  fu and leucovorin  and Herceptin and has had excellent response clinically and radiologically. The patient was reviewed on 08/10/2020. I reviewed with the patient in the PAC system [facility] his PET scan that is dramatic. There is minimal uptake in the lower esophagus and most importantly complete resolution of his liver metastasis. Actually the only issue that is pertinent to the PET scan is still the visibility of his colovesical fistula.     Therefore from the point of view of his cancer of the esophagus, metastatic to the liver, he has no symptoms from the primary tumor in the esophagus and he has no symptoms related to his liver metastasis that has resolved altogether. His CEA level is stable.The patient raised the question about the CEA  fluctuation. I pointed out to him that a lot of this is also related to his smoking. Smoking per se elevates CEA level.  Upon further reviewing the patient on 04/20/2021, he has no symptoms pertinent to his metastatic cancer of the esophagus to the liver and he has no difficulty swallowing. There are no side effects of the 5-FU, Leucovorin and Herceptin. The patient's cardiac function remains stable and he has not had any drop in the ejection fraction.   I discussed with him on 05/18/2021 the fact that his cancer clinically is very quiet but I am afraid of the rise in his CEA level to 12. This could be an indicator of all of the cigarettes that he is smoking on a daily basis of indicator of cancer escaping control and not visible radiologically through his CT scan. I pointed out to him that he needs to continue making an effort to decrease his smoking and he is now down to 10 cigarettes a day. We will recheck another CEA level today. Given the circumstances of the previous CT scan I do not believe that I need to change the doses or plan of care in regard to his chemotherapy medication administration for the time being. I recommended for him to remain on his 5FU/leucovorin and Herceptin on a monthly basis for now.  The patient was further reviewed on 06/15/2021 with a new PET scan that documented regrowth of the tumor in the lower esophagus without any new symptomatology, for example dysphagia or odynophagia. No regurgitation. The liver metastasis remains in remission and there are no new areas of disease in the bones or lungs or any other site. Given the excellent performance status I discussed with the patient and his sister today many options of therapy, including going back to FOLFOX regimen along with Herceptin, taking another sample of the esophagus with a new endoscopy and doing analysis of the tissue for [employer] Now that will be my favorite choice, or palliative treatment with radiation therapy and 5-FU  continuous infusion that will be toxic to him and he does not prefer to have.     I had a discussion with Mary Brito MD, thoracic surgeon, who has agreed to see the patient tomorrow. I think the endoscopy, the new tissue analysis, and sending the tissue for [employer] Now will be the way to go. Depending on what we find there, we can modify his treatment altogether. I do not think the patient will have any consequences missing chemotherapy for a couple of weeks or so.      In preparation for the endoscopy and biopsies I asked him to hold off on the Xarelto until he is seen by Dr. Brito tomorrow.     In regard to his colovesical fistula, he has had minimal symptomatology this week, maybe some activity there and I asked him to go back to the lab and take a urine specimen and culture. He knows that if this is abnormal he will need to initiate ciprofloxacin that he has at home.    In regard to his hypertension, this is under good control and I advised him to remain on his lisinopril and hydrochlorothiazide combination.    In regard to his smoking cessation, he has had conversations with NIRU Jacobson about this. He is using some nicotine CQ patch, here and there and also trying to work with nicotine gum and things of this nature but he has not been able to shake this issue altogether.     Otherwise I will review him back in a couple of weeks with a CBC, CMP, and a CEA level.    This case will be presented in the multidisciplinary thoracic conference by me this Thursday, and I will discuss any further advice to the patient.     I also mentioned to the patient that on the background of HER2-positive cancer of the esophagus the possibility of using a new medicine for HER2-based disease that is called Enhertu is a real possibility and maybe that will be the next step to go through.    The patient was further reviewed on 07/01/2021. I reviewed with Dr. Brito the endoscopy that shows a noncircumferential  abnormality in the lower esophagus that encompasses almost 6 cm in length. It is not blocking off the esophagus and that is why the patient has no symptoms. The pathology shows at least atypical cells consistent with cancer. Further Next Generation Sequencing has been sent for [employer] Now.     I discussed with the patient the fact that we have many other modalities of treatment that he could encounter. He prefers to continue his general chemotherapy to control the cancer in his liver using 5-FU, leucovorin and Herceptin today and agree with that. In consideration to be seen by radiation therapy, the patient is willing to listen to the possibility of undergoing continuous 5-FU infusion along with radiation therapy to the esophagus knowing that he will experiencing esophagitis that can give him significant issues for 2-3 weeks. I went ahead and made the appointment for him to be seen by radiation oncology for this purpose only.     Obviously if the Next Generation Sequencing gives us any other hints in regard to how to treat him this could be also utilized including the consideration of using Enhertu in the long run for achieving better control.     In regard to his smoking cessation he is not willing to change this phenomenon at this time. We have had NIRU Jacobson talking with him in this regard but he is not ready to make a decision when to quit.     Finally, I pointed out to him that during the previous visit we documented a urinary tract infection with streptococcus 50,000 colonies that in my appreciation was significant given the fact that he has a colovesical fistula. This was treated with antibiotics. He has not had any discomfort issues pertinent to this anymore. The urine today is completely clear. Nevertheless, I went ahead and sent a urinalysis at least to be sure that there is no need for any other repetitive infection therapy on him.     The patient also will remain on his anticoagulation at  this time, his Xarelto for the time being. I have renewed as well his Neurontin. He has no need for pain medicine.  The patient was further reviewed on 07/23/2021. He has no new symptomatology pertinent to his cancer of the esophagus with liver metastasis. He has no difficulty swallowing. He has been presented in the multidisciplinary clinic on a couple of occasions and he has been seen by Radiation Oncology. Finally the analysis of [employer] Now is available now showing that the patient's tumor is PD-L1 positive and has high mutation burden. The tumor remains HER/2 positive. Given these findings I think it is very easy to make a choice in regard stopping the present regimen of chemotherapy and proceed with therapy with Keytruda every 3 weeks for at least 4 cycles and reassess him at that point. In preparation for Keytruda initiation next week and we will get the approval of the medicine by his insurance company the patient will require smoking cessation altogether to minimize modification of cigarettes on his immune system. I insisted in this fact to the patient.     Other than that he will remain on probiotics. We will discontinue the 5FU/leucovorin and Herceptin and will move onto Keytruda. I discussed with him side effects of the Keytruda in detail as below. He will require treatment every 3 weeks with CBC, CMP, TSH every 3 weeks and he will require formal chemotherapy teaching, education and consent for this medicine.     After 4 cycles of this, in other words 3 months he will be reassessed with a new PET scan.     The chances under the present circumstances that he will improve as long as he quit smoking, it is very hard and maybe he could have long term survivorship.    I begged for him to have smoking cessation. If any time during his time with me we have been talking about this and this is absolutely necessary now. We know that cigarettes kill immune system cells and minimize the benefit of treatments  with these medicines.       The patient was further reviewed on 08/19/2021 in regard to his cancer of the esophagus. As stated above he had upper GI bleeding dropping hemoglobin to 5 requiring admission, transfusion, discontinuation of anticoagulation and endoscopy with local therapy by Naga Shelby MD. Since then he has completed 10 sessions of radiation therapy to the esophagus yesterday. He has not had any further bleed. His hemoglobin has bounced back from 5 to 14 and I have advised his this good news today.    I expect that the patient in a few days is going to develop an element of radiation esophagitis and he continues having some fatigue that will improve over time. So far no new issues have happened. I made him aware that if he has difficulty swallowing he will need to let us know, he will need to receive IV fluids in the office.    At least the radiation therapy will take care of the tumor in the esophagus and I do not believe that he will require any other GI endoscopy anymore.    Today the patient will proceed with his Keytruda that is the immunotherapy medicine that we are delivering to him at this time for the treatment of his liver metastasis and also the tumor in the esophagus. I pointed out to him that so far I do not see any side effects of the Keytruda and the Keytruda will improve and increase the benefit of radiation therapy into the primary tumor in the esophagus.     From the point of view of his anemia associated with GI bleeding, again this has been corrected. The hemoglobin today is 14 grams and I asked him to take his iron supplementation only twice a week 1 tablet. He has been taking 3 tablets everyday.     From the point of view of his thrombophilia associated with malignancy, he is no longer receiving any anticoagulants given the recent episode of GI bleeding. We will maintain him off anticoagulants as much as we can. Hopefully he will not have any other episodes of thrombosis.     He  will utilize the ciprofloxacin available to him all of the time in case that he has any urinary tract infection associated with his colovesical fistula.     From the point of view of the Keytruda toxicity so far nothing happened. We will continue monitoring CBC, CMP and TSH periodically.     I will review him back in 3 and 6 weeks. When he comes back for the Keytruda he will continue receiving the medicine as the time goes by.    He understands that most of the benefit of radiation therapy in the tumor in the esophagus will be reached in 1 month and I am planning to give him a PET scan in more of less in 2 months from now when he will have almost 4 infusions of Keytruda under his belt and the completion of radiation therapy to the esophagus.     In regard to smoking cessation he has achieved a lot from 2 packs a day to 10 cigarettes a day. He will see Anh Felder RN, today in regard continuation of the benefit of the therapy for this condition at this point.   The patient was further reviewed on 09/30/2021. His Keytruda is still ongoing but the patient has developed dermatitis associated with this. It is a grade 1 toxicity so far and I am wondering if this is extending a little bit and the topical medicine is not going to be sufficient. I will proceed with his Keytruda today but maybe this will be the last administration of this medicine unless that we get shashi and the rash quiets down. Obviously at the completion for Keytruda the patient will require PET scan for assessment not only of the benefit of radiation therapy on his recurrence in the esophagus but also benefit of the Keytruda on his liver metastasis. Therefore, he will be scheduled for this before the next visit. The patient also has been advised that his hemoglobin is acceptable and it will be okay for him to stop his iron supplementation.     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 I have advised him to go  into Cipro 250 mg p.o. daily.     The patient has been advised in regard to the continuation of topical steroids in the skin in areas of involvement. This will remain an ongoing issue along with moisturizer to the skin in the form of Cetaphil.     In regard to his hypertension, he remains on medicines for this by me. His blood pressure is under good control at this time.     In regard to previous anticoagulation, he is no longer receiving any given his GI bleeding. This will remain in observation.     In regard to smoking cessation the patient will further discuss with NIRU Jacobson, plans for further decrease. He is down to 10 cigarettes a day. This is already a major accomplishment in somebody who used to smoke almost 3 packs of cigarettes a day.     The patient will have a new insurance in 11/2021 and I will make the insurance ladies aware of this.     In 3 weeks he will have a CBC, CMP, TSH and the PET scan the week before.  The patient was further reviewed on 10/21/2021. I reviewed with him his PET scan that shows still SUV activity in the lower esophagus but better than before but he has now 3 areas of abnormal uptake in the liver consistent with progressive liver metastasis. The lung anatomy is clear, the bone anatomy is clear. Colovesical fistula was visible still.    On the basis of these changes I do believe that this patient knowing that he has HER/2 positive esophageal cancer needs to change his therapy. Keytruda is not helpful at all and we will discontinue this medicine at this time. I do believe that the inability of Keytruda to overcome his disease process is related to the persistency of smoking and the abnormality related to smoking about bacterial derek. Further analysis recently published in [employer] has confirmed that the benefit of immunotherapy is further boosted by proper amount of derek in the gastrointestinal tract and how cigarettes are damaging to this issue. In any event the  patient's issues in regard to cigarette smoking has been already a problem and he has not been able to quit in spite of all of the support available. Therefore stopping the Keytruda at this point including today the patient will move to Enhertu that has been approved in patients who have cancer of the esophagus with liver metastasis. The dose of this medicine will be the GI dose of this medication that will be properly calculated accordingly. He will have formal education and consent for this medicine. I pointed out the most important side effects of this medicine include cardiac toxicity, anemia, leukopenia, thrombocytopenia and pneumonitis that includes cough, shortness of breath, fever. In preparation to minimize pneumonitis the patient will take prednisone 10 mg on day 2 and 3 after each dose of Enhertu. He will receive this medicine every 3 weeks. We will deliver 3-4 cycles and reassess him not only by tumor markers but also radiologically. Hopefully this will have a positive impact not only in the tumor in the esophagus but also tumors metastatic in his liver.     In regard to his colovesical fistula he is now receiving ciprofloxacin on a daily basis low dose and I advised him to remain on this medicine for the time being.     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     In regard to his hypertension, he remains on lisinopril, hydrochlorothiazide provided by me.     In regard to his previous GI bleeding associated with his cancer and esophagitis we advised the patient to remain on Protonix.     The patient will proceed with his treatment with Enhertu next week, subsequently blood counts on a weekly basis and visit with me in 1 month for the 2nd treatment. He will be educated by nurse practitioner next week about the Enhertu.     In regard finally peripheral arterial disease I am going to send the  patient a prescription for Trental to take 1 tablet twice a day. He has an appointment to be seen by vascular on 12/18/2021. I pointed out to the patient that if he wants to change the route of this process he must modify his cigarettes otherwise there is no hope. He was not able to take medication provided by Vascular Surgery because of side effects.         ·

## 2021-10-26 ENCOUNTER — APPOINTMENT (OUTPATIENT)
Dept: LAB | Facility: HOSPITAL | Age: 66
End: 2021-10-26

## 2021-10-26 ENCOUNTER — OFFICE VISIT (OUTPATIENT)
Dept: ONCOLOGY | Facility: CLINIC | Age: 66
End: 2021-10-26

## 2021-10-26 VITALS
RESPIRATION RATE: 16 BRPM | WEIGHT: 203.8 LBS | OXYGEN SATURATION: 96 % | HEIGHT: 69 IN | DIASTOLIC BLOOD PRESSURE: 73 MMHG | BODY MASS INDEX: 30.18 KG/M2 | HEART RATE: 106 BPM | TEMPERATURE: 97.3 F | SYSTOLIC BLOOD PRESSURE: 116 MMHG

## 2021-10-26 DIAGNOSIS — D49.0 ESOPHAGUS NEOPLASM: ICD-10-CM

## 2021-10-26 DIAGNOSIS — C15.5 MALIGNANT NEOPLASM OF LOWER THIRD OF ESOPHAGUS (HCC): Primary | ICD-10-CM

## 2021-10-26 DIAGNOSIS — C78.7 LIVER METASTASIS: ICD-10-CM

## 2021-10-26 PROCEDURE — 99214 OFFICE O/P EST MOD 30 MIN: CPT | Performed by: NURSE PRACTITIONER

## 2021-10-26 NOTE — PROGRESS NOTES
[facility] Hematology/Oncology Treatment Plan Summary    Name: Han Garcia  Acct# [id number]  MD: Dr. Haynes    Diagnosis:     ICD-10-CM ICD-9-CM   1. Malignant neoplasm of lower third of esophagus (HCC)  C15.5 150.5   2. Liver metastasis (HCC)  C78.7 197.7     Stage: IV      Goal of chemotherapy: palliative    Treatment Medication(s):   1. enhertu    Frequency: every 21 days    Number of cycles: to be determined    Starting on: 10/29/21    Repeat after 3-4 cycles: CT Scan    Items for home use: Senokot-S (for constipation) and Thermometer    Rx written for: [x] Nausea    [] Pre-Chemo   Prednisone 10mg 1 tab daily for 2 days starting the day after treament    Notes:         Completing Provider: NIRU Yeung           Date/time: 10/26/2021      Please note: You will be seen by a provider frequently with your treatment plan. This plan may change depending on many factors, if so, this will be discussed with you by your physician.  Last update 12/2020.

## 2021-10-26 NOTE — PROGRESS NOTES
____________________PATIENT EDUCATION____________________    PATIENT EDUCATION:  Today I met with the patient to discuss the chemotherapy regimen recommended for treatment of his/her Malignant neoplasm of lower third of esophagus (HCC)  - CBC and Differential  - Comprehensive metabolic panel    Liver metastasis (HCC)  - CBC and Differential  - Comprehensive metabolic panel    Esophagus neoplasm  - CBC and Differential  - Comprehensive metabolic panel  .  The patient was given explanation of treatment premed side effects including office policy that prohibits patients to drive if sedating medications are administered, MD explanation given regarding benefits, side effects, toxicities and goals of treatment.  The patient received a Chemotherapy/Biotherapy Plan Summary including diagnosis and explanation of specific treatment plan.    SIDE EFFECTS:  Common side effects were discussed with the patient and/or significant other.  Discussion included where applicable hair loss/discoloration, anemia/fatigue, infection/chills/fever, appetite, bleeding risk/precautions, constipation, diarrhea, mouth sores, taste alteration, loss of appetite, nausea/vomiting, peripheral neuropathy, skin/nail changes, rash, muscle aches/weakness, photosensitivity, weight gain/loss, hearing loss, dizziness, menopausal symptoms, menstrual irregularity, sterility, high blood pressure, heart damage, liver damage, lung damage, kidney damage, DVT/PE risk, fluid retention, pleural/pericardial effusion, somnolence, electrolyte/LFT imbalance, vein exercises and/or the possible need for vascular access/port placement.  The patient was advised that although uncommon, leakage of an infused medication from the vein or venous access device may lead to skin breakdown and/or other tissue damage.  The patient was advised that he/she may have pain, bleeding, and/or bruising from the insertion of a needle in their vein or venous access device (port).  The  patient was further advised that, in spite of proper technique, infection with redness and irritation may rarely occur at the site where the needle was inserted.  The patient was advised that if complications occur, additional medical treatment is available.  Finally, where applicable we have reviewed rare but potential immune mediated side effects including shortness of breath, cough, chest pain (pneumonitis), abdominal pain, diarrhea (colitis), thyroiditis (hypothyroid or hyperthyroid), hepatitis and liver dysfunction, nephritis and renal dysfunction.    Discussion also included side effects specific to drugs in the treatment plan, specifically Enhertu.    Physical Exam  Constitutional:       Appearance: Normal appearance.   Eyes:      General: No scleral icterus.     Extraocular Movements: Extraocular movements intact.   Pulmonary:      Effort: Pulmonary effort is normal. No respiratory distress.   Neurological:      General: No focal deficit present.      Mental Status: He is alert and oriented to person, place, and time.   Psychiatric:         Mood and Affect: Mood normal.         Behavior: Behavior normal.       Patient does have ondansetron at home if he needs this in the future for nausea control.  Prescription was sent in today for prednisone per protocol.  The patient saw Dr. Haynes most recently he was asked to begin Trental however this prescription was not sent into the pharmacy and I will verify with Dr. Haynes he would like to go ahead and initiate this.    I spent 35 minutes caring for Han on this date of service. This time includes time spent by me in the following activities: preparing for the visit, obtaining and/or reviewing a separately obtained history, performing a medically appropriate examination and/or evaluation, counseling and educating the patient/family/caregiver, ordering medications, tests, or procedures and documenting information in the medical record.

## 2021-10-28 ENCOUNTER — HOSPITAL ENCOUNTER (OUTPATIENT)
Dept: CARDIOLOGY | Facility: HOSPITAL | Age: 66
Discharge: HOME | End: 2021-10-28
Admitting: INTERNAL MEDICINE

## 2021-10-28 VITALS
OXYGEN SATURATION: 98 % | DIASTOLIC BLOOD PRESSURE: 70 MMHG | HEIGHT: 69 IN | BODY MASS INDEX: 30.07 KG/M2 | SYSTOLIC BLOOD PRESSURE: 138 MMHG | HEART RATE: 89 BPM | WEIGHT: 203 LBS

## 2021-10-28 DIAGNOSIS — C15.5 MALIGNANT NEOPLASM OF LOWER THIRD OF ESOPHAGUS (HCC): ICD-10-CM

## 2021-10-28 DIAGNOSIS — Z51.11 ENCOUNTER FOR ANTINEOPLASTIC CHEMOTHERAPY: ICD-10-CM

## 2021-10-28 PROCEDURE — 93356 MYOCRD STRAIN IMG SPCKL TRCK: CPT | Performed by: INTERNAL MEDICINE

## 2021-10-28 PROCEDURE — 25010000002 PERFLUTREN (DEFINITY) 8.476 MG IN SODIUM CHLORIDE (PF) 0.9 % 10 ML INJECTION: Performed by: INTERNAL MEDICINE

## 2021-10-28 PROCEDURE — 93306 TTE W/DOPPLER COMPLETE: CPT | Performed by: INTERNAL MEDICINE

## 2021-10-28 PROCEDURE — 93356 MYOCRD STRAIN IMG SPCKL TRCK: CPT

## 2021-10-28 PROCEDURE — 93306 TTE W/DOPPLER COMPLETE: CPT

## 2021-10-28 RX ADMIN — PERFLUTREN 1.5 ML: 6.52 INJECTION, SUSPENSION INTRAVENOUS at 08:41

## 2021-10-29 ENCOUNTER — INFUSION (OUTPATIENT)
Dept: ONCOLOGY | Facility: HOSPITAL | Age: 66
End: 2021-10-29

## 2021-10-29 ENCOUNTER — OFFICE VISIT (OUTPATIENT)
Dept: OTHER | Facility: HOSPITAL | Age: 66
End: 2021-10-29

## 2021-10-29 VITALS
HEART RATE: 111 BPM | OXYGEN SATURATION: 99 % | RESPIRATION RATE: 16 BRPM | BODY MASS INDEX: 29.92 KG/M2 | DIASTOLIC BLOOD PRESSURE: 65 MMHG | WEIGHT: 202.6 LBS | TEMPERATURE: 97.7 F | SYSTOLIC BLOOD PRESSURE: 105 MMHG

## 2021-10-29 DIAGNOSIS — Z72.0 TOBACCO ABUSE: Primary | ICD-10-CM

## 2021-10-29 DIAGNOSIS — D49.0 ESOPHAGUS NEOPLASM: ICD-10-CM

## 2021-10-29 DIAGNOSIS — C15.5 MALIGNANT NEOPLASM OF LOWER THIRD OF ESOPHAGUS (HCC): ICD-10-CM

## 2021-10-29 DIAGNOSIS — C15.5 MALIGNANT NEOPLASM OF LOWER THIRD OF ESOPHAGUS (HCC): Primary | ICD-10-CM

## 2021-10-29 DIAGNOSIS — C78.7 LIVER METASTASIS: ICD-10-CM

## 2021-10-29 LAB
ALBUMIN SERPL-MCNC: 3.9 G/DL (ref 3.5–5.2)
ALBUMIN/GLOB SERPL: 1.2 G/DL (ref 1.1–2.4)
ALP SERPL-CCNC: 128 U/L (ref 38–116)
ALT SERPL W P-5'-P-CCNC: 20 U/L (ref 0–41)
ANION GAP SERPL CALCULATED.3IONS-SCNC: 10 MMOL/L (ref 5–15)
AORTIC ARCH: 1.8 CM
ASCENDING AORTA: 3.4 CM
AST SERPL-CCNC: 19 U/L (ref 0–40)
BASOPHILS # BLD AUTO: 0.07 10*3/MM3 (ref 0–0.2)
BASOPHILS NFR BLD AUTO: 0.7 % (ref 0–1.5)
BH CV ECHO MEAS - ACS: 2.2 CM
BH CV ECHO MEAS - AO MAX PG (FULL): 4.1 MMHG
BH CV ECHO MEAS - AO MAX PG: 7.5 MMHG
BH CV ECHO MEAS - AO MEAN PG (FULL): 2 MMHG
BH CV ECHO MEAS - AO MEAN PG: 4.2 MMHG
BH CV ECHO MEAS - AO ROOT AREA (BSA CORRECTED): 1.7
BH CV ECHO MEAS - AO ROOT AREA: 9.6 CM^2
BH CV ECHO MEAS - AO ROOT DIAM: 3.5 CM
BH CV ECHO MEAS - AO V2 MAX: 136.7 CM/SEC
BH CV ECHO MEAS - AO V2 MEAN: 95.1 CM/SEC
BH CV ECHO MEAS - AO V2 VTI: 25.6 CM
BH CV ECHO MEAS - ASC AORTA: 3.4 CM
BH CV ECHO MEAS - AVA(I,A): 2.3 CM^2
BH CV ECHO MEAS - AVA(I,D): 2.3 CM^2
BH CV ECHO MEAS - AVA(V,A): 2.4 CM^2
BH CV ECHO MEAS - AVA(V,D): 2.4 CM^2
BH CV ECHO MEAS - BSA(HAYCOCK): 2.1 M^2
BH CV ECHO MEAS - BSA: 2.1 M^2
BH CV ECHO MEAS - BZI_BMI: 30 KILOGRAMS/M^2
BH CV ECHO MEAS - BZI_METRIC_HEIGHT: 175.3 CM
BH CV ECHO MEAS - BZI_METRIC_WEIGHT: 92.1 KG
BH CV ECHO MEAS - EDV(MOD-SP2): 111 ML
BH CV ECHO MEAS - EDV(MOD-SP4): 60 ML
BH CV ECHO MEAS - EDV(TEICH): 63.1 ML
BH CV ECHO MEAS - EF(CUBED): 68.9 %
BH CV ECHO MEAS - EF(MOD-BP): 61 %
BH CV ECHO MEAS - EF(MOD-SP2): 60.4 %
BH CV ECHO MEAS - EF(MOD-SP4): 60 %
BH CV ECHO MEAS - EF(TEICH): 61.2 %
BH CV ECHO MEAS - ESV(MOD-SP2): 44 ML
BH CV ECHO MEAS - ESV(MOD-SP4): 24 ML
BH CV ECHO MEAS - ESV(TEICH): 24.5 ML
BH CV ECHO MEAS - FS: 32.2 %
BH CV ECHO MEAS - IVS/LVPW: 1.1
BH CV ECHO MEAS - IVSD: 1.2 CM
BH CV ECHO MEAS - LAT PEAK E' VEL: 6.7 CM/SEC
BH CV ECHO MEAS - LV DIASTOLIC VOL/BSA (35-75): 28.9 ML/M^2
BH CV ECHO MEAS - LV MASS(C)D: 156.3 GRAMS
BH CV ECHO MEAS - LV MASS(C)DI: 75.2 GRAMS/M^2
BH CV ECHO MEAS - LV MAX PG: 3.4 MMHG
BH CV ECHO MEAS - LV MEAN PG: 2.2 MMHG
BH CV ECHO MEAS - LV SYSTOLIC VOL/BSA (12-30): 11.5 ML/M^2
BH CV ECHO MEAS - LV V1 MAX: 92.1 CM/SEC
BH CV ECHO MEAS - LV V1 MEAN: 71.4 CM/SEC
BH CV ECHO MEAS - LV V1 VTI: 17 CM
BH CV ECHO MEAS - LVIDD: 3.8 CM
BH CV ECHO MEAS - LVIDS: 2.6 CM
BH CV ECHO MEAS - LVLD AP2: 7.8 CM
BH CV ECHO MEAS - LVLD AP4: 6.6 CM
BH CV ECHO MEAS - LVLS AP2: 6.5 CM
BH CV ECHO MEAS - LVLS AP4: 6.1 CM
BH CV ECHO MEAS - LVOT AREA (M): 3.5 CM^2
BH CV ECHO MEAS - LVOT AREA: 3.5 CM^2
BH CV ECHO MEAS - LVOT DIAM: 2.1 CM
BH CV ECHO MEAS - LVPWD: 1.2 CM
BH CV ECHO MEAS - MED PEAK E' VEL: 7.7 CM/SEC
BH CV ECHO MEAS - MV A DUR: 0.12 SEC
BH CV ECHO MEAS - MV A MAX VEL: 110.1 CM/SEC
BH CV ECHO MEAS - MV DEC SLOPE: 267 CM/SEC^2
BH CV ECHO MEAS - MV DEC TIME: 0.22 SEC
BH CV ECHO MEAS - MV E MAX VEL: 74.6 CM/SEC
BH CV ECHO MEAS - MV E/A: 0.68
BH CV ECHO MEAS - MV MAX PG: 6.4 MMHG
BH CV ECHO MEAS - MV MEAN PG: 2.4 MMHG
BH CV ECHO MEAS - MV P1/2T MAX VEL: 80.5 CM/SEC
BH CV ECHO MEAS - MV P1/2T: 88.3 MSEC
BH CV ECHO MEAS - MV V2 MAX: 126 CM/SEC
BH CV ECHO MEAS - MV V2 MEAN: 72.3 CM/SEC
BH CV ECHO MEAS - MV V2 VTI: 23.5 CM
BH CV ECHO MEAS - MVA P1/2T LCG: 2.7 CM^2
BH CV ECHO MEAS - MVA(P1/2T): 2.5 CM^2
BH CV ECHO MEAS - MVA(VTI): 2.5 CM^2
BH CV ECHO MEAS - PA ACC TIME: 0.12 SEC
BH CV ECHO MEAS - PA MAX PG (FULL): 1.5 MMHG
BH CV ECHO MEAS - PA MAX PG: 3.1 MMHG
BH CV ECHO MEAS - PA PR(ACCEL): 25.5 MMHG
BH CV ECHO MEAS - PA V2 MAX: 88.2 CM/SEC
BH CV ECHO MEAS - PULM A REVS DUR: 0.1 SEC
BH CV ECHO MEAS - PULM A REVS VEL: 32.6 CM/SEC
BH CV ECHO MEAS - PULM DIAS VEL: 37.3 CM/SEC
BH CV ECHO MEAS - PULM S/D: 1.5
BH CV ECHO MEAS - PULM SYS VEL: 56.1 CM/SEC
BH CV ECHO MEAS - PVA(V,A): 3 CM^2
BH CV ECHO MEAS - PVA(V,D): 3 CM^2
BH CV ECHO MEAS - QP/QS: 0.84
BH CV ECHO MEAS - RV MAX PG: 1.7 MMHG
BH CV ECHO MEAS - RV MEAN PG: 1.1 MMHG
BH CV ECHO MEAS - RV V1 MAX: 64.3 CM/SEC
BH CV ECHO MEAS - RV V1 MEAN: 49.4 CM/SEC
BH CV ECHO MEAS - RV V1 VTI: 12.1 CM
BH CV ECHO MEAS - RVOT AREA: 4.1 CM^2
BH CV ECHO MEAS - RVOT DIAM: 2.3 CM
BH CV ECHO MEAS - SI(AO): 118.8 ML/M^2
BH CV ECHO MEAS - SI(CUBED): 18.6 ML/M^2
BH CV ECHO MEAS - SI(LVOT): 28.5 ML/M^2
BH CV ECHO MEAS - SI(MOD-SP2): 32.2 ML/M^2
BH CV ECHO MEAS - SI(MOD-SP4): 17.3 ML/M^2
BH CV ECHO MEAS - SI(TEICH): 18.6 ML/M^2
BH CV ECHO MEAS - SUP REN AO DIAM: 1.9 CM
BH CV ECHO MEAS - SV(AO): 246.9 ML
BH CV ECHO MEAS - SV(CUBED): 38.7 ML
BH CV ECHO MEAS - SV(LVOT): 59.3 ML
BH CV ECHO MEAS - SV(MOD-SP2): 67 ML
BH CV ECHO MEAS - SV(MOD-SP4): 36 ML
BH CV ECHO MEAS - SV(RVOT): 50.1 ML
BH CV ECHO MEAS - SV(TEICH): 38.6 ML
BH CV ECHO MEAS - TAPSE (>1.6): 2.3 CM
BH CV ECHO MEASUREMENTS AVERAGE E/E' RATIO: 10.36
BH CV XLRA - RV BASE: 3 CM
BH CV XLRA - RV LENGTH: 6.5 CM
BH CV XLRA - RV MID: 2.5 CM
BH CV XLRA - TDI S': 18.2 CM/SEC
BILIRUB SERPL-MCNC: 0.4 MG/DL (ref 0.2–1.2)
BUN SERPL-MCNC: 11 MG/DL (ref 6–20)
BUN/CREAT SERPL: 16.7 (ref 7.3–30)
CALCIUM SPEC-SCNC: 9.2 MG/DL (ref 8.5–10.2)
CHLORIDE SERPL-SCNC: 99 MMOL/L (ref 98–107)
CO2 SERPL-SCNC: 26 MMOL/L (ref 22–29)
CREAT SERPL-MCNC: 0.66 MG/DL (ref 0.7–1.3)
DEPRECATED RDW RBC AUTO: 47.2 FL (ref 37–54)
EOSINOPHIL # BLD AUTO: 0.44 10*3/MM3 (ref 0–0.4)
EOSINOPHIL NFR BLD AUTO: 4.4 % (ref 0.3–6.2)
ERYTHROCYTE [DISTWIDTH] IN BLOOD BY AUTOMATED COUNT: 14.6 % (ref 12.3–15.4)
GFR SERPL CREATININE-BSD FRML MDRD: 122 ML/MIN/1.73
GLOBULIN UR ELPH-MCNC: 3.3 GM/DL (ref 1.8–3.5)
GLUCOSE SERPL-MCNC: 197 MG/DL (ref 74–124)
HCT VFR BLD AUTO: 38.2 % (ref 37.5–51)
HGB BLD-MCNC: 12.9 G/DL (ref 13–17.7)
IMM GRANULOCYTES # BLD AUTO: 0.24 10*3/MM3 (ref 0–0.05)
IMM GRANULOCYTES NFR BLD AUTO: 2.4 % (ref 0–0.5)
LYMPHOCYTES # BLD AUTO: 1.24 10*3/MM3 (ref 0.7–3.1)
LYMPHOCYTES NFR BLD AUTO: 12.5 % (ref 19.6–45.3)
MAXIMAL PREDICTED HEART RATE: 156 BPM
MCH RBC QN AUTO: 30.2 PG (ref 26.6–33)
MCHC RBC AUTO-ENTMCNC: 33.8 G/DL (ref 31.5–35.7)
MCV RBC AUTO: 89.5 FL (ref 79–97)
MONOCYTES # BLD AUTO: 0.7 10*3/MM3 (ref 0.1–0.9)
MONOCYTES NFR BLD AUTO: 7 % (ref 5–12)
NEUTROPHILS NFR BLD AUTO: 7.25 10*3/MM3 (ref 1.7–7)
NEUTROPHILS NFR BLD AUTO: 73 % (ref 42.7–76)
NRBC BLD AUTO-RTO: 0 /100 WBC (ref 0–0.2)
PLATELET # BLD AUTO: 141 10*3/MM3 (ref 140–450)
PMV BLD AUTO: 9.4 FL (ref 6–12)
POTASSIUM SERPL-SCNC: 3.9 MMOL/L (ref 3.5–4.7)
PROT SERPL-MCNC: 7.2 G/DL (ref 6.3–8)
RBC # BLD AUTO: 4.27 10*6/MM3 (ref 4.14–5.8)
SINUS: 3.7 CM
SODIUM SERPL-SCNC: 135 MMOL/L (ref 134–145)
STJ: 3 CM
STRESS TARGET HR: 133 BPM
WBC # BLD AUTO: 9.94 10*3/MM3 (ref 3.4–10.8)

## 2021-10-29 PROCEDURE — 85025 COMPLETE CBC W/AUTO DIFF WBC: CPT

## 2021-10-29 PROCEDURE — 25010000002 FAM-TRASTUZUMAB DERUXTEC-NXKI 100 MG RECONSTITUTED SOLUTION 1 EACH VIAL: Performed by: INTERNAL MEDICINE

## 2021-10-29 PROCEDURE — 96413 CHEMO IV INFUSION 1 HR: CPT

## 2021-10-29 PROCEDURE — 80053 COMPREHEN METABOLIC PANEL: CPT

## 2021-10-29 PROCEDURE — 96415 CHEMO IV INFUSION ADDL HR: CPT

## 2021-10-29 PROCEDURE — 96375 TX/PRO/DX INJ NEW DRUG ADDON: CPT

## 2021-10-29 PROCEDURE — 25010000002 DEXAMETHASONE SODIUM PHOSPHATE 100 MG/10ML SOLUTION: Performed by: INTERNAL MEDICINE

## 2021-10-29 PROCEDURE — 25010000002 PALONOSETRON PER 25 MCG: Performed by: INTERNAL MEDICINE

## 2021-10-29 RX ORDER — FAMOTIDINE 10 MG/ML
20 INJECTION, SOLUTION INTRAVENOUS AS NEEDED
Status: CANCELLED | OUTPATIENT
Start: 2021-10-29

## 2021-10-29 RX ORDER — PALONOSETRON 0.05 MG/ML
0.25 INJECTION, SOLUTION INTRAVENOUS ONCE
Status: CANCELLED | OUTPATIENT
Start: 2021-10-29

## 2021-10-29 RX ORDER — PREDNISONE 10 MG/1
TABLET ORAL
Qty: 10 TABLET | Refills: 3 | Status: SHIPPED | OUTPATIENT
Start: 2021-10-29 | End: 2022-01-12

## 2021-10-29 RX ORDER — PENTOXIFYLLINE 400 MG/1
400 TABLET, EXTENDED RELEASE ORAL
Qty: 90 TABLET | Refills: 1 | Status: SHIPPED | OUTPATIENT
Start: 2021-10-29 | End: 2021-11-22

## 2021-10-29 RX ORDER — DEXTROSE MONOHYDRATE 50 MG/ML
250 INJECTION, SOLUTION INTRAVENOUS ONCE
Status: COMPLETED | OUTPATIENT
Start: 2021-10-29 | End: 2021-10-29

## 2021-10-29 RX ORDER — DEXTROSE MONOHYDRATE 50 MG/ML
250 INJECTION, SOLUTION INTRAVENOUS ONCE
Status: CANCELLED | OUTPATIENT
Start: 2021-10-29

## 2021-10-29 RX ORDER — DIPHENHYDRAMINE HYDROCHLORIDE 50 MG/ML
50 INJECTION INTRAMUSCULAR; INTRAVENOUS AS NEEDED
Status: CANCELLED | OUTPATIENT
Start: 2021-10-29

## 2021-10-29 RX ORDER — PALONOSETRON 0.05 MG/ML
0.25 INJECTION, SOLUTION INTRAVENOUS ONCE
Status: COMPLETED | OUTPATIENT
Start: 2021-10-29 | End: 2021-10-29

## 2021-10-29 RX ADMIN — DEXAMETHASONE SODIUM PHOSPHATE 12 MG: 10 INJECTION, SOLUTION INTRAMUSCULAR; INTRAVENOUS at 09:20

## 2021-10-29 RX ADMIN — DEXTROSE MONOHYDRATE 250 ML: 50 INJECTION, SOLUTION INTRAVENOUS at 09:18

## 2021-10-29 RX ADMIN — FAM-TRASTUZUMAB DERUXTECAN-NXKI 586 MG: 100 INJECTION, POWDER, LYOPHILIZED, FOR SOLUTION INTRAVENOUS at 10:03

## 2021-10-29 RX ADMIN — PALONOSETRON 0.25 MG: 0.05 INJECTION, SOLUTION INTRAVENOUS at 09:20

## 2021-10-29 NOTE — PROGRESS NOTES
[facility] MULTIDISCIPLINARY CLINIC  [facility] CARE COORDINATION NOTE  Smoking Cessation Follow-Up Visit  INFUSION ROOM      Patient seen in [facility] infusion suite RE: follow up  smoking cessation visit.    Spoke with  patient, receiving first Enhertu infusion today. Generally doing well. Explored feelings of uncertainty associated with beginning new treatment protocol. Will plan for in clinic follow up in two weeks after patient lab appointment.    Patient given appointment reminder card and contact information.    Patient encouraged to call the office at any point for additional information, resources or support.         Diagnosis Plan   1. Tobacco abuse     2. Malignant neoplasm of lower third of esophagus (HCC)       10 minutes spent with patient face to face in the following activities: discuss plan of care regarding smoking cessation, education regarding treatment side effects to report, psychosocial support.

## 2021-10-29 NOTE — PROGRESS NOTES
Patient was asking if Dr. Haynes intended to start him on Trental.  Message sent to Dr. Haynes with response for the patient to go ahead and begin the medication.  Prescription sent to his pharmacy for Trental 400 mg 3 times daily to be taken with food.

## 2021-11-01 ENCOUNTER — APPOINTMENT (OUTPATIENT)
Dept: CARDIOLOGY | Facility: HOSPITAL | Age: 66
End: 2021-11-01

## 2021-11-05 ENCOUNTER — CLINICAL SUPPORT (OUTPATIENT)
Dept: ONCOLOGY | Facility: HOSPITAL | Age: 66
End: 2021-11-05

## 2021-11-05 ENCOUNTER — LAB (OUTPATIENT)
Dept: LAB | Facility: HOSPITAL | Age: 66
End: 2021-11-05

## 2021-11-05 DIAGNOSIS — G62.0 PERIPHERAL NEUROPATHY DUE TO CHEMOTHERAPY (HCC): ICD-10-CM

## 2021-11-05 DIAGNOSIS — C15.5 MALIGNANT NEOPLASM OF LOWER THIRD OF ESOPHAGUS (HCC): ICD-10-CM

## 2021-11-05 DIAGNOSIS — C78.7 LIVER METASTASIS: ICD-10-CM

## 2021-11-05 DIAGNOSIS — Z45.2 ENCOUNTER FOR ADJUSTMENT OR MANAGEMENT OF VASCULAR ACCESS DEVICE: ICD-10-CM

## 2021-11-05 DIAGNOSIS — T45.1X5A PERIPHERAL NEUROPATHY DUE TO CHEMOTHERAPY (HCC): ICD-10-CM

## 2021-11-05 DIAGNOSIS — Z72.0 TOBACCO ABUSE: ICD-10-CM

## 2021-11-05 LAB
BASOPHILS # BLD AUTO: 0.04 10*3/MM3 (ref 0–0.2)
BASOPHILS NFR BLD AUTO: 0.3 % (ref 0–1.5)
DEPRECATED RDW RBC AUTO: 43.1 FL (ref 37–54)
EOSINOPHIL # BLD AUTO: 0.59 10*3/MM3 (ref 0–0.4)
EOSINOPHIL NFR BLD AUTO: 4.4 % (ref 0.3–6.2)
ERYTHROCYTE [DISTWIDTH] IN BLOOD BY AUTOMATED COUNT: 13.9 % (ref 12.3–15.4)
HCT VFR BLD AUTO: 38 % (ref 37.5–51)
HGB BLD-MCNC: 13.7 G/DL (ref 13–17.7)
IMM GRANULOCYTES # BLD AUTO: 0.15 10*3/MM3 (ref 0–0.05)
IMM GRANULOCYTES NFR BLD AUTO: 1.1 % (ref 0–0.5)
LYMPHOCYTES # BLD AUTO: 1.14 10*3/MM3 (ref 0.7–3.1)
LYMPHOCYTES NFR BLD AUTO: 8.6 % (ref 19.6–45.3)
MCH RBC QN AUTO: 31.1 PG (ref 26.6–33)
MCHC RBC AUTO-ENTMCNC: 36.1 G/DL (ref 31.5–35.7)
MCV RBC AUTO: 86.4 FL (ref 79–97)
MONOCYTES # BLD AUTO: 0.36 10*3/MM3 (ref 0.1–0.9)
MONOCYTES NFR BLD AUTO: 2.7 % (ref 5–12)
NEUTROPHILS NFR BLD AUTO: 10.98 10*3/MM3 (ref 1.7–7)
NEUTROPHILS NFR BLD AUTO: 82.9 % (ref 42.7–76)
NRBC BLD AUTO-RTO: 0 /100 WBC (ref 0–0.2)
PLATELET # BLD AUTO: 106 10*3/MM3 (ref 140–450)
PMV BLD AUTO: 9.9 FL (ref 6–12)
RBC # BLD AUTO: 4.4 10*6/MM3 (ref 4.14–5.8)
WBC # BLD AUTO: 13.26 10*3/MM3 (ref 3.4–10.8)

## 2021-11-05 PROCEDURE — 36415 COLL VENOUS BLD VENIPUNCTURE: CPT

## 2021-11-05 PROCEDURE — 85025 COMPLETE CBC W/AUTO DIFF WBC: CPT

## 2021-11-05 PROCEDURE — G0463 HOSPITAL OUTPT CLINIC VISIT: HCPCS

## 2021-11-05 NOTE — NURSING NOTE
Lab Results   Component Value Date    WBC 13.26 (H) 11/05/2021    HGB 13.7 11/05/2021    HCT 38.0 11/05/2021    MCV 86.4 11/05/2021     (L) 11/05/2021     CBC RESULTS R/W PT. PT HAD C1 ENHERTU LAST FRI. PT REPORTS FEELING FATIGUED. DENIES ANY OTHER SE'S. PT TOOK STEROIDS AS PRESCRIBED SAT AND SUN. PT HAS [facility] APPTS TO [facility]. COPY OF LABS GIVEN TO PT. PT TO CALL W/ ANY ISSUES.

## 2021-11-12 ENCOUNTER — LAB (OUTPATIENT)
Dept: LAB | Facility: HOSPITAL | Age: 66
End: 2021-11-12

## 2021-11-12 ENCOUNTER — CLINICAL SUPPORT (OUTPATIENT)
Dept: ONCOLOGY | Facility: HOSPITAL | Age: 66
End: 2021-11-12

## 2021-11-12 ENCOUNTER — OFFICE VISIT (OUTPATIENT)
Dept: OTHER | Facility: HOSPITAL | Age: 66
End: 2021-11-12

## 2021-11-12 DIAGNOSIS — C15.5 MALIGNANT NEOPLASM OF LOWER THIRD OF ESOPHAGUS (HCC): Primary | ICD-10-CM

## 2021-11-12 DIAGNOSIS — C78.7 LIVER METASTASIS: ICD-10-CM

## 2021-11-12 DIAGNOSIS — Z45.2 ENCOUNTER FOR ADJUSTMENT OR MANAGEMENT OF VASCULAR ACCESS DEVICE: ICD-10-CM

## 2021-11-12 DIAGNOSIS — T45.1X5A PERIPHERAL NEUROPATHY DUE TO CHEMOTHERAPY (HCC): ICD-10-CM

## 2021-11-12 DIAGNOSIS — G62.0 PERIPHERAL NEUROPATHY DUE TO CHEMOTHERAPY (HCC): ICD-10-CM

## 2021-11-12 DIAGNOSIS — Z72.0 TOBACCO ABUSE: ICD-10-CM

## 2021-11-12 DIAGNOSIS — C15.5 MALIGNANT NEOPLASM OF LOWER THIRD OF ESOPHAGUS (HCC): ICD-10-CM

## 2021-11-12 LAB
BASOPHILS # BLD AUTO: 0.07 10*3/MM3 (ref 0–0.2)
BASOPHILS NFR BLD AUTO: 0.7 % (ref 0–1.5)
DEPRECATED RDW RBC AUTO: 43.3 FL (ref 37–54)
EOSINOPHIL # BLD AUTO: 0.3 10*3/MM3 (ref 0–0.4)
EOSINOPHIL NFR BLD AUTO: 3.1 % (ref 0.3–6.2)
ERYTHROCYTE [DISTWIDTH] IN BLOOD BY AUTOMATED COUNT: 14.1 % (ref 12.3–15.4)
HCT VFR BLD AUTO: 33.1 % (ref 37.5–51)
HGB BLD-MCNC: 11.7 G/DL (ref 13–17.7)
IMM GRANULOCYTES # BLD AUTO: 0.17 10*3/MM3 (ref 0–0.05)
IMM GRANULOCYTES NFR BLD AUTO: 1.7 % (ref 0–0.5)
LYMPHOCYTES # BLD AUTO: 0.72 10*3/MM3 (ref 0.7–3.1)
LYMPHOCYTES NFR BLD AUTO: 7.4 % (ref 19.6–45.3)
MCH RBC QN AUTO: 31.1 PG (ref 26.6–33)
MCHC RBC AUTO-ENTMCNC: 35.3 G/DL (ref 31.5–35.7)
MCV RBC AUTO: 88 FL (ref 79–97)
MONOCYTES # BLD AUTO: 0.56 10*3/MM3 (ref 0.1–0.9)
MONOCYTES NFR BLD AUTO: 5.7 % (ref 5–12)
NEUTROPHILS NFR BLD AUTO: 7.97 10*3/MM3 (ref 1.7–7)
NEUTROPHILS NFR BLD AUTO: 81.4 % (ref 42.7–76)
NRBC BLD AUTO-RTO: 0 /100 WBC (ref 0–0.2)
PLATELET # BLD AUTO: 176 10*3/MM3 (ref 140–450)
PMV BLD AUTO: 9.5 FL (ref 6–12)
RBC # BLD AUTO: 3.76 10*6/MM3 (ref 4.14–5.8)
WBC # BLD AUTO: 9.79 10*3/MM3 (ref 3.4–10.8)

## 2021-11-12 PROCEDURE — 36415 COLL VENOUS BLD VENIPUNCTURE: CPT

## 2021-11-12 PROCEDURE — G0463 HOSPITAL OUTPT CLINIC VISIT: HCPCS

## 2021-11-12 PROCEDURE — 85025 COMPLETE CBC W/AUTO DIFF WBC: CPT

## 2021-11-12 NOTE — PROGRESS NOTES
"  [facility] MULTIDISCIPLINARY CLINIC  [facility] CARE COORDINATION NOTE  Smoking Cessation Follow-Up Visit  [facility]      [facility] seen in [facility] RE: follow up smoking cessation visit.    Spoke with patient. Feeling poorly today. Reports severe fatigue. Sleeping from about 10 pm to 8-9 am with at least a nap during the day. Appetite stable. Bowels regular.     New pain in bilateral heels. Started after Enhertu infusion. Describes as feeling like a \"stone bruise\". Bilateral feet with flaky, sloughing skin. Some splitting of skin at heels. Saw CBC NP today who recommended aquafor. Affecting mobility, ambulation is painful. I provided him with some samples to take home today. Reinforced instructions to apply BID. Deferred continued conversation regarding smoking cessation today.     Left eyelid with some swelling noted medially, which he was told at one point might be due to sleeping on his left side. Denies eye pain, discharge or vision changes. Has follow up with ophthalmology next Thursday scheduled.    We will plan for phone follow up week of 11/22 and discuss next steps at that time.     Patient encouraged to call the office at any point for additional information, resources or support.     Diagnosis Plan   1. Malignant neoplasm of lower third of esophagus (HCC)     2. Tobacco abuse         I spent 10 minutes caring for Han on this date of service. This time includes time spent by me in the following activities: preparing for the visit, reviewing tests, counseling and educating the patient/family/caregiver, documenting information in the medical record and exploring goals for next attempt at smoking cessation which was deferred today          "

## 2021-11-12 NOTE — NURSING NOTE
Pt is here for lab with RN review.  CBC reviewed with pt, counts are stable for this pt at this time. Pt c/o increased fatigue and very dry skin on his feet and cracking skin on his heels. Assessment of heels shows cracked/dry skin but the area of cracking is clean and dry. Pt states his heels are painful but doesn't think the cracking is causing the pain and questioned if it could be the bone or tendon causing pain. R/w Cora SHANKAR who s/w the patient and explained that the dry/hard/cracking skin is the likely cause of his discomfort and recommended applying Aquaphor to his feet and putting socks over to help with the dry skin. Pt returns next week and this will be reevalutated.  Copy of labs given to pt and f/u appt reviewed. Pt is instructed to call the office with any concerns or new symptoms prior to next visit. Pt v/u.     Lab Results   Component Value Date    WBC 9.79 11/12/2021    HGB 11.7 (L) 11/12/2021    HCT 33.1 (L) 11/12/2021    MCV 88.0 11/12/2021     11/12/2021

## 2021-11-19 ENCOUNTER — INFUSION (OUTPATIENT)
Dept: ONCOLOGY | Facility: HOSPITAL | Age: 66
End: 2021-11-19

## 2021-11-19 ENCOUNTER — OFFICE VISIT (OUTPATIENT)
Dept: ONCOLOGY | Facility: CLINIC | Age: 66
End: 2021-11-19

## 2021-11-19 VITALS
SYSTOLIC BLOOD PRESSURE: 90 MMHG | BODY MASS INDEX: 29.55 KG/M2 | RESPIRATION RATE: 20 BRPM | OXYGEN SATURATION: 97 % | DIASTOLIC BLOOD PRESSURE: 48 MMHG | TEMPERATURE: 97.5 F | WEIGHT: 199.5 LBS | HEART RATE: 78 BPM | HEIGHT: 69 IN

## 2021-11-19 DIAGNOSIS — C78.7 LIVER METASTASIS: ICD-10-CM

## 2021-11-19 DIAGNOSIS — D49.0 ESOPHAGUS NEOPLASM: ICD-10-CM

## 2021-11-19 DIAGNOSIS — G62.0 PERIPHERAL NEUROPATHY DUE TO CHEMOTHERAPY (HCC): ICD-10-CM

## 2021-11-19 DIAGNOSIS — Z45.2 ENCOUNTER FOR ADJUSTMENT OR MANAGEMENT OF VASCULAR ACCESS DEVICE: ICD-10-CM

## 2021-11-19 DIAGNOSIS — Z51.11 ENCOUNTER FOR ANTINEOPLASTIC CHEMOTHERAPY: ICD-10-CM

## 2021-11-19 DIAGNOSIS — C15.5 MALIGNANT NEOPLASM OF LOWER THIRD OF ESOPHAGUS (HCC): Primary | ICD-10-CM

## 2021-11-19 DIAGNOSIS — N32.1 COLOVESICAL FISTULA: ICD-10-CM

## 2021-11-19 DIAGNOSIS — T45.1X5A PERIPHERAL NEUROPATHY DUE TO CHEMOTHERAPY (HCC): ICD-10-CM

## 2021-11-19 DIAGNOSIS — Z72.0 TOBACCO ABUSE: ICD-10-CM

## 2021-11-19 DIAGNOSIS — C15.5 MALIGNANT NEOPLASM OF LOWER THIRD OF ESOPHAGUS (HCC): ICD-10-CM

## 2021-11-19 DIAGNOSIS — I10 ESSENTIAL HYPERTENSION: ICD-10-CM

## 2021-11-19 LAB
ALBUMIN SERPL-MCNC: 3.5 G/DL (ref 3.5–5.2)
ALBUMIN/GLOB SERPL: 1.2 G/DL (ref 1.1–2.4)
ALP SERPL-CCNC: 126 U/L (ref 38–116)
ALT SERPL W P-5'-P-CCNC: 32 U/L (ref 0–41)
ANION GAP SERPL CALCULATED.3IONS-SCNC: 12 MMOL/L (ref 5–15)
AST SERPL-CCNC: 25 U/L (ref 0–40)
BASOPHILS # BLD AUTO: 0.08 10*3/MM3 (ref 0–0.2)
BASOPHILS NFR BLD AUTO: 1.3 % (ref 0–1.5)
BILIRUB SERPL-MCNC: 0.5 MG/DL (ref 0.2–1.2)
BUN SERPL-MCNC: 15 MG/DL (ref 6–20)
BUN/CREAT SERPL: 14.4 (ref 7.3–30)
CALCIUM SPEC-SCNC: 9.2 MG/DL (ref 8.5–10.2)
CEA SERPL-MCNC: 255 NG/ML
CHLORIDE SERPL-SCNC: 96 MMOL/L (ref 98–107)
CO2 SERPL-SCNC: 23 MMOL/L (ref 22–29)
CREAT SERPL-MCNC: 1.04 MG/DL (ref 0.7–1.3)
DEPRECATED RDW RBC AUTO: 50.4 FL (ref 37–54)
EOSINOPHIL # BLD AUTO: 0.29 10*3/MM3 (ref 0–0.4)
EOSINOPHIL NFR BLD AUTO: 4.6 % (ref 0.3–6.2)
ERYTHROCYTE [DISTWIDTH] IN BLOOD BY AUTOMATED COUNT: 15.8 % (ref 12.3–15.4)
GFR SERPL CREATININE-BSD FRML MDRD: 72 ML/MIN/1.73
GLOBULIN UR ELPH-MCNC: 3 GM/DL (ref 1.8–3.5)
GLUCOSE SERPL-MCNC: 153 MG/DL (ref 74–124)
HCT VFR BLD AUTO: 31.4 % (ref 37.5–51)
HGB BLD-MCNC: 10.6 G/DL (ref 13–17.7)
IMM GRANULOCYTES # BLD AUTO: 0.24 10*3/MM3 (ref 0–0.05)
IMM GRANULOCYTES NFR BLD AUTO: 3.8 % (ref 0–0.5)
LYMPHOCYTES # BLD AUTO: 1.74 10*3/MM3 (ref 0.7–3.1)
LYMPHOCYTES NFR BLD AUTO: 27.8 % (ref 19.6–45.3)
MCH RBC QN AUTO: 30.3 PG (ref 26.6–33)
MCHC RBC AUTO-ENTMCNC: 33.8 G/DL (ref 31.5–35.7)
MCV RBC AUTO: 89.7 FL (ref 79–97)
MONOCYTES # BLD AUTO: 0.74 10*3/MM3 (ref 0.1–0.9)
MONOCYTES NFR BLD AUTO: 11.8 % (ref 5–12)
NEUTROPHILS NFR BLD AUTO: 3.17 10*3/MM3 (ref 1.7–7)
NEUTROPHILS NFR BLD AUTO: 50.7 % (ref 42.7–76)
NRBC BLD AUTO-RTO: 0 /100 WBC (ref 0–0.2)
PLATELET # BLD AUTO: 205 10*3/MM3 (ref 140–450)
PMV BLD AUTO: 9 FL (ref 6–12)
POTASSIUM SERPL-SCNC: 3.7 MMOL/L (ref 3.5–4.7)
PROT SERPL-MCNC: 6.5 G/DL (ref 6.3–8)
RBC # BLD AUTO: 3.5 10*6/MM3 (ref 4.14–5.8)
SODIUM SERPL-SCNC: 131 MMOL/L (ref 134–145)
WBC NRBC COR # BLD: 6.26 10*3/MM3 (ref 3.4–10.8)

## 2021-11-19 PROCEDURE — 85025 COMPLETE CBC W/AUTO DIFF WBC: CPT

## 2021-11-19 PROCEDURE — 25010000002 PALONOSETRON PER 25 MCG: Performed by: INTERNAL MEDICINE

## 2021-11-19 PROCEDURE — 96413 CHEMO IV INFUSION 1 HR: CPT

## 2021-11-19 PROCEDURE — 96375 TX/PRO/DX INJ NEW DRUG ADDON: CPT

## 2021-11-19 PROCEDURE — 25010000002 HEPARIN LOCK FLUSH PER 10 UNITS: Performed by: INTERNAL MEDICINE

## 2021-11-19 PROCEDURE — 80053 COMPREHEN METABOLIC PANEL: CPT

## 2021-11-19 PROCEDURE — 25010000002 FAM-TRASTUZUMAB DERUXTEC-NXKI 100 MG RECONSTITUTED SOLUTION 1 EACH VIAL: Performed by: INTERNAL MEDICINE

## 2021-11-19 PROCEDURE — 82378 CARCINOEMBRYONIC ANTIGEN: CPT | Performed by: INTERNAL MEDICINE

## 2021-11-19 PROCEDURE — 25010000002 DEXAMETHASONE SODIUM PHOSPHATE 100 MG/10ML SOLUTION: Performed by: INTERNAL MEDICINE

## 2021-11-19 PROCEDURE — 99215 OFFICE O/P EST HI 40 MIN: CPT | Performed by: INTERNAL MEDICINE

## 2021-11-19 RX ORDER — SODIUM CHLORIDE 0.9 % (FLUSH) 0.9 %
10 SYRINGE (ML) INJECTION AS NEEDED
Status: DISCONTINUED | OUTPATIENT
Start: 2021-11-19 | End: 2021-11-19 | Stop reason: HOSPADM

## 2021-11-19 RX ORDER — PALONOSETRON 0.05 MG/ML
0.25 INJECTION, SOLUTION INTRAVENOUS ONCE
Status: COMPLETED | OUTPATIENT
Start: 2021-11-19 | End: 2021-11-19

## 2021-11-19 RX ORDER — DEXTROSE MONOHYDRATE 50 MG/ML
250 INJECTION, SOLUTION INTRAVENOUS ONCE
Status: CANCELLED | OUTPATIENT
Start: 2021-11-19

## 2021-11-19 RX ORDER — PALONOSETRON 0.05 MG/ML
0.25 INJECTION, SOLUTION INTRAVENOUS ONCE
Status: CANCELLED | OUTPATIENT
Start: 2021-11-19

## 2021-11-19 RX ORDER — DEXTROSE MONOHYDRATE 50 MG/ML
250 INJECTION, SOLUTION INTRAVENOUS ONCE
Status: COMPLETED | OUTPATIENT
Start: 2021-11-19 | End: 2021-11-19

## 2021-11-19 RX ORDER — HEPARIN SODIUM (PORCINE) LOCK FLUSH IV SOLN 100 UNIT/ML 100 UNIT/ML
500 SOLUTION INTRAVENOUS AS NEEDED
Status: DISCONTINUED | OUTPATIENT
Start: 2021-11-19 | End: 2021-11-19 | Stop reason: HOSPADM

## 2021-11-19 RX ORDER — SODIUM CHLORIDE 0.9 % (FLUSH) 0.9 %
10 SYRINGE (ML) INJECTION AS NEEDED
Status: CANCELLED | OUTPATIENT
Start: 2021-11-19

## 2021-11-19 RX ORDER — HEPARIN SODIUM (PORCINE) LOCK FLUSH IV SOLN 100 UNIT/ML 100 UNIT/ML
500 SOLUTION INTRAVENOUS AS NEEDED
Status: CANCELLED | OUTPATIENT
Start: 2021-11-19

## 2021-11-19 RX ADMIN — DEXAMETHASONE SODIUM PHOSPHATE 12 MG: 10 INJECTION, SOLUTION INTRAMUSCULAR; INTRAVENOUS at 09:58

## 2021-11-19 RX ADMIN — FAM-TRASTUZUMAB DERUXTECAN-NXKI 586 MG: 100 INJECTION, POWDER, LYOPHILIZED, FOR SOLUTION INTRAVENOUS at 10:41

## 2021-11-19 RX ADMIN — Medication 500 UNITS: at 11:17

## 2021-11-19 RX ADMIN — DEXTROSE MONOHYDRATE 250 ML: 50 INJECTION, SOLUTION INTRAVENOUS at 09:33

## 2021-11-19 RX ADMIN — SODIUM CHLORIDE, PRESERVATIVE FREE 10 ML: 5 INJECTION INTRAVENOUS at 11:16

## 2021-11-19 RX ADMIN — PALONOSETRON 0.25 MG: 0.05 INJECTION, SOLUTION INTRAVENOUS at 09:56

## 2021-11-19 NOTE — PROGRESS NOTES
Subjective     REASON FOR follow up:1.    1. 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  Currently receiving palliative 5 fu leucovorin  AND HERCEPTIN therapy once a month    2.COLOVESICAL FISTULA: chronic antibiotic therapy cipro, NO FURTHER PLANS FOR SURGERY AFTER VISIT AND PROCEDURE BY DR GM CASTILLO 1/20    3. 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 NEURONTIN INITIATED.        History of Present Illness        DURING THE VISIT WITH THE PATIENT TODAY , PATIENT HAD FACE MASK, MY MEDICAL ASSISTANT AND I  HAD PROPPER PROTECTIVE EQUIPMENT, AND I DID HAND HYGIENE WITH SOAP AND WATER BEFORE AND AFTER THE VISIT.      This patient returns today to the office for follow up in company of his sister. He has initiated Enhertu 3 weeks ago in the treatment of his metastatic cancer of the esophagus to the liver after failing multiple other agents including Keytruda administration in the background of PDL positive metastatic disease. In any event the patient tolerated the Enhertu relatively well with more fatigue than expected and no major significant hematological toxicity. He has not noticed an increase in the frequency of the cough or shortness of breath that he normally has associated with his COPD and his chronic smoking. He has not had too much of an appetite. He is drinking liquids okay. He has not had any infections in the urinary tract given his colovesical fistula. His feet have cracked down in regard to skin alterations but no worsening circulation. He remains on Trental 3 times a day that he believes has been useful. His blood pressure has dropped substantially raising the question about symptoms that he had a few days ago when he was pumping gas in the car and he almost passed out. This is probably reflection of  "hypotension. Given this fact we will modify his blood pressure medication today. Please review below.                              Past Medical History:   Diagnosis Date   • Arthritis    • Cancer (HCC)     prostate cancer 2008   • Cancer (HCC)     esophageal   • Chronic anticoagulation     on xarelto   • Elevated PSA    • Esophageal mass    • Fistula     colon and bladder   • H/O Lung nodule    • Hepatitis     CHILD--PT STATED \"I THINK IT WAS A.\"   • History of chemotherapy    • History of pneumonia    • Hx of blood clots 08/10/2019   • Hyperlipidemia    • Hypertension    • Nail fungus    • Neuropathy    • PVD (peripheral vascular disease) (HCC)    • Recto-bladderneck fistula     on poab for recurrent uti        Past Surgical History:   Procedure Laterality Date   • COLONOSCOPY N/A 2/4/2020    Procedure: COLONOSCOPY;  Surgeon: Guy Sears MD;  Location: [facility] ENDOSCOPY;  Service: General;  Laterality: N/A;  PRE-COLOVESICAL FISTULA  POST-- DIVERTICULOSIS   • CYSTOSCOPY  02/06/2020   • CYSTOSCOPY Bilateral 2/26/2020    Procedure: CYSTOSCOPY RETROGRADE;  Surgeon: Cm Witt MD;  Location: [facility];  Service: Urology;  Laterality: Bilateral;   • ENDOSCOPY     • ENDOSCOPY N/A 6/19/2021    Procedure: ESOPHAGOGASTRODUODENOSCOPY WITH BIOPSY;  Surgeon: Mary Brito MD;  [facility] OR;  Service: Gastroenterology;  Laterality: N/A;   • ENDOSCOPY N/A 8/5/2021    Procedure: ESOPHAGOGASTRODUODENOSCOPY HEMOSPRAY;  Surgeon: Naga Shelby MD;  Location: [facility] ENDOSCOPY;  Service: Gastroenterology;  Laterality: N/A;  HX OF ESOPHAGEAL  CANCER;MELENA  POST: ESOPHAGEAL BLEEDING; ESOPHAGEAL MASS   • HERNIA REPAIR Right 1999    inguinal hernia   • PROSTATE SURGERY  03/2008    prostatectectomy   • VENOUS ACCESS DEVICE (PORT) INSERTION N/A 7/16/2019    Procedure: INSERTION VENOUS ACCESS DEVICE WITH FLUORO AND EGD WITH BIOPSY;  Surgeon: Mary Brito MD;  Location: [facility];  Service: " Thoracic        Current Outpatient Medications on File Prior to Visit   Medication Sig Dispense Refill   • acetaminophen (TYLENOL) 500 MG tablet Take 500 mg by mouth Every 6 (Six) Hours As Needed for Mild Pain .     • cevimeline (EVOXAC) 30 MG capsule Take 30 mg by mouth 3 (Three) Times a Day.     • ciprofloxacin (CIPRO) 250 MG tablet Take 1 tablet by mouth Daily. Take one every other day 90 tablet 1   • econazole nitrate (SPECTAZOLE) 1 % cream 2 (Two) Times a Day.     • gabapentin (NEURONTIN) 300 MG capsule Take 1 capsule by mouth 2 (two) times a day. 60 capsule 3   • lisinopril-hydrochlorothiazide (PRINZIDE,ZESTORETIC) 20-12.5 MG per tablet TAKE 1 TABLET BY MOUTH EVERY DAY 90 tablet 0   • nicotine (NICODERM CQ) 21 MG/24HR patch Place 1 patch on the skin as directed by provider Daily. (Patient taking differently: Place 1 patch on the skin as directed by provider Daily. Has at home but hasn't started taking yet) 28 each 1   • ondansetron (ZOFRAN) 4 MG tablet Take 1 tablet by mouth Every 8 (Eight) Hours As Needed for Nausea or Vomiting. 30 tablet 2   • pantoprazole (Protonix) 40 MG EC tablet Take 1 tablet by mouth 2 (Two) Times a Day. 90 tablet 3   • pentoxifylline (TRENtal) 400 MG CR tablet Take 1 tablet by mouth 3 (Three) Times a Day With Meals. 90 tablet 1   • predniSONE (DELTASONE) 10 MG tablet Take 1 tab daily for 2 days beginning the day after treatment 10 tablet 3   • Probiotic Product (PROBIOTIC DAILY PO) Take 1 tablet by mouth Daily.     • triamcinolone (KENALOG) 0.1 % ointment Apply 1 application topically to the appropriate area as directed 2 (Two) Times a Day. 30 g 1     No current facility-administered medications on file prior to visit.        ALLERGIES:  No Known Allergies     Social History     Socioeconomic History   • Marital status: Single   • Years of education: High school   Tobacco Use   • Smoking status: Current Every Day Smoker     Packs/day: 1.00     Years: 38.00     Pack years: 38.00      Types: Cigarettes     Start date: 1974   • Smokeless tobacco: Never Used   • Tobacco comment: Quit for a period of 8 years.    Vaping Use   • Vaping Use: Never used   Substance and Sexual Activity   • Alcohol use: Not Currently   • Drug use: Never   • Sexual activity: Defer        Family History   Problem Relation Age of Onset   • Hypertension Mother    • Stroke Mother    • Hypertension Other    • Lung disease Other    • Prostate cancer Other    • Lung cancer Father    • Malig Hyperthermia Neg Hx       Current Outpatient Medications on File Prior to Visit   Medication Sig Dispense Refill   • acetaminophen (TYLENOL) 500 MG tablet Take 500 mg by mouth Every 6 (Six) Hours As Needed for Mild Pain .     • cevimeline (EVOXAC) 30 MG capsule Take 30 mg by mouth 3 (Three) Times a Day.     • ciprofloxacin (CIPRO) 250 MG tablet Take 1 tablet by mouth Daily. Take one every other day 90 tablet 1   • econazole nitrate (SPECTAZOLE) 1 % cream 2 (Two) Times a Day.     • gabapentin (NEURONTIN) 300 MG capsule Take 1 capsule by mouth 2 (two) times a day. 60 capsule 3   • lisinopril-hydrochlorothiazide (PRINZIDE,ZESTORETIC) 20-12.5 MG per tablet TAKE 1 TABLET BY MOUTH EVERY DAY 90 tablet 0   • nicotine (NICODERM CQ) 21 MG/24HR patch Place 1 patch on the skin as directed by provider Daily. (Patient taking differently: Place 1 patch on the skin as directed by provider Daily. Has at home but hasn't started taking yet) 28 each 1   • ondansetron (ZOFRAN) 4 MG tablet Take 1 tablet by mouth Every 8 (Eight) Hours As Needed for Nausea or Vomiting. 30 tablet 2   • pantoprazole (Protonix) 40 MG EC tablet Take 1 tablet by mouth 2 (Two) Times a Day. 90 tablet 3   • pentoxifylline (TRENtal) 400 MG CR tablet Take 1 tablet by mouth 3 (Three) Times a Day With Meals. 90 tablet 1   • predniSONE (DELTASONE) 10 MG tablet Take 1 tab daily for 2 days beginning the day after treatment 10 tablet 3   • Probiotic Product (PROBIOTIC DAILY PO) Take 1 tablet by  "mouth Daily.     • triamcinolone (KENALOG) 0.1 % ointment Apply 1 application topically to the appropriate area as directed 2 (Two) Times a Day. 30 g 1     No current facility-administered medications on file prior to visit.   No Known Allergies         Objective     Vitals:    11/19/21 0845 11/19/21 0849   BP: 91/58 90/48   Pulse: 90 78   Resp: 20    Temp: 97.5 °F (36.4 °C)    TempSrc: Oral    SpO2: 97%    Weight: 90.5 kg (199 lb 8 oz)    Height: 175.3 cm (69\")    PainSc: 2  Comment: back leg pain      Current Status 9/30/2021   ECOG score 0           Physical exam        I HAVE PERSONALLY REVIEWED THE HISTORY OF THE PRESENT ILLNESS, PAST MEDICAL HISTORY, FAMILY HISTORY, SOCIAL HISTORY, ALLERGIES, MEDICATIONS STATED ABOVE IN THE  NOTE FROM TODAY.        GENERAL:  Well-developed, well-nourished  Patient  in no acute distress. Heavy smell to cigarretes  SKIN:  Warm, dry ,NO rashes,NO purpura ,NO petechiae.  HEENT:  Pupils were equal and reactive to light and accomodation, conjunctivae noninjected, no pterygium, normal extraocular movements, normal visual acuity.   NECK:  Supple with good range of motion; no thyromegaly , no other masses, no JVD or bruits, no cervical adenopathies.No carotid artery pain, no carotid abnormal pulsation , NO arterial dance.  LYMPHATICS:  No cervical, NO supraclavicular, NO axillary,NO epitrochlear , NO inguinal adenopathy.  CARDIAC   normal rate and regular rhythm, without murmur,NO rubs NO S3 NO S4 right or left .  LUNGS: decreased breath sounds bilateral, no wheezing, rhonchi, crackles or rubs.  VASCULAR VENOUS: no cyanosis, collateral circulation, varicosities, edema, palpable cords, pain, erythema.  ABDOMEN:  Soft, nontender with no hepatomegaly, no splenomegaly,no masses, no ascites, no collateral circulation,no distention,no Dhruv sign.  EXTREMITIES  AND SPINE:  No clubbing, cyanosis or edema, no deformities , no pain .No kyphosis, scoliosis, no other deformities, no pain in " spine, no pain in ribs , no pain inpelvic bone.  NEUROLOGICAL:  Patient was awake, alert, oriented to time, person and place.sensory neuropathy in feet              RECENT LABS:  Hematology WBC   Date Value Ref Range Status   11/19/2021 6.26 3.40 - 10.80 10*3/mm3 Final   02/02/2019 13.4 (H) 3.4 - 10.8 x10E3/uL Final     RBC   Date Value Ref Range Status   11/19/2021 3.50 (L) 4.14 - 5.80 10*6/mm3 Final   02/02/2019 4.81 4.14 - 5.80 x10E6/uL Final     Hemoglobin   Date Value Ref Range Status   11/19/2021 10.6 (L) 13.0 - 17.7 g/dL Final     Hematocrit   Date Value Ref Range Status   11/19/2021 31.4 (L) 37.5 - 51.0 % Final     Platelets   Date Value Ref Range Status   11/19/2021 205 140 - 450 10*3/mm3 Final            Interpretation Summary echo    · Left ventricular wall thickness is consistent with mild concentric hypertrophy.  · Calculated left ventricular EF = 61% Estimated left ventricular EF was in agreement with the calculated left ventricular EF. Left ventricular systolic function is normal.  · There is calcification of the aortic valve.  · Left ventricular diastolic function is consistent with (grade I) impaired relaxation.  · Normal global longitudinal LV strain (GLS) = -18.9%.                             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 Almost 90% of the liver WAS replaced by tumor. The patient has been undergoing chemotherapy with 5  fu and leucovorin  and Herceptin and has had excellent response clinically and radiologically. The patient was reviewed on 08/10/2020. I reviewed with the patient in the PAC system [facility] his PET scan that is dramatic. There is minimal uptake in the lower esophagus and most importantly complete resolution of his liver metastasis. Actually the only issue that is pertinent to the PET scan is still the visibility of his colovesical fistula.     Therefore from the point of view of his cancer of the  esophagus, metastatic to the liver, he has no symptoms from the primary tumor in the esophagus and he has no symptoms related to his liver metastasis that has resolved altogether. His CEA level is stable.The patient raised the question about the CEA fluctuation. I pointed out to him that a lot of this is also related to his smoking. Smoking per se elevates CEA level.  Upon further reviewing the patient on 04/20/2021, he has no symptoms pertinent to his metastatic cancer of the esophagus to the liver and he has no difficulty swallowing. There are no side effects of the 5-FU, Leucovorin and Herceptin. The patient's cardiac function remains stable and he has not had any drop in the ejection fraction.   I discussed with him on 05/18/2021 the fact that his cancer clinically is very quiet but I am afraid of the rise in his CEA level to 12. This could be an indicator of all of the cigarettes that he is smoking on a daily basis of indicator of cancer escaping control and not visible radiologically through his CT scan. I pointed out to him that he needs to continue making an effort to decrease his smoking and he is now down to 10 cigarettes a day. We will recheck another CEA level today. Given the circumstances of the previous CT scan I do not believe that I need to change the doses or plan of care in regard to his chemotherapy medication administration for the time being. I recommended for him to remain on his 5FU/leucovorin and Herceptin on a monthly basis for now.  The patient was further reviewed on 06/15/2021 with a new PET scan that documented regrowth of the tumor in the lower esophagus without any new symptomatology, for example dysphagia or odynophagia. No regurgitation. The liver metastasis remains in remission and there are no new areas of disease in the bones or lungs or any other site. Given the excellent performance status I discussed with the patient and his sister today many options of therapy, including  going back to FOLFOX regimen along with Herceptin, taking another sample of the esophagus with a new endoscopy and doing analysis of the tissue for [employer] Now that will be my favorite choice, or palliative treatment with radiation therapy and 5-FU continuous infusion that will be toxic to him and he does not prefer to have.     I had a discussion with Mary Brito MD, thoracic surgeon, who has agreed to see the patient tomorrow. I think the endoscopy, the new tissue analysis, and sending the tissue for [employer] Now will be the way to go. Depending on what we find there, we can modify his treatment altogether. I do not think the patient will have any consequences missing chemotherapy for a couple of weeks or so.      In preparation for the endoscopy and biopsies I asked him to hold off on the Xarelto until he is seen by Dr. Brito tomorrow.     In regard to his colovesical fistula, he has had minimal symptomatology this week, maybe some activity there and I asked him to go back to the lab and take a urine specimen and culture. He knows that if this is abnormal he will need to initiate ciprofloxacin that he has at home.    In regard to his hypertension, this is under good control and I advised him to remain on his lisinopril and hydrochlorothiazide combination.    In regard to his smoking cessation, he has had conversations with NIRU Jacobson about this. He is using some nicotine CQ patch, here and there and also trying to work with nicotine gum and things of this nature but he has not been able to shake this issue altogether.     Otherwise I will review him back in a couple of weeks with a CBC, CMP, and a CEA level.    This case will be presented in the multidisciplinary thoracic conference by me this Thursday, and I will discuss any further advice to the patient.     I also mentioned to the patient that on the background of HER2-positive cancer of the esophagus the possibility of using a new  medicine for HER2-based disease that is called Enhertu is a real possibility and maybe that will be the next step to go through.    The patient was further reviewed on 07/01/2021. I reviewed with Dr. Brito the endoscopy that shows a noncircumferential abnormality in the lower esophagus that encompasses almost 6 cm in length. It is not blocking off the esophagus and that is why the patient has no symptoms. The pathology shows at least atypical cells consistent with cancer. Further Next Generation Sequencing has been sent for [employer] Now.     I discussed with the patient the fact that we have many other modalities of treatment that he could encounter. He prefers to continue his general chemotherapy to control the cancer in his liver using 5-FU, leucovorin and Herceptin today and agree with that. In consideration to be seen by radiation therapy, the patient is willing to listen to the possibility of undergoing continuous 5-FU infusion along with radiation therapy to the esophagus knowing that he will experiencing esophagitis that can give him significant issues for 2-3 weeks. I went ahead and made the appointment for him to be seen by radiation oncology for this purpose only.     Obviously if the Next Generation Sequencing gives us any other hints in regard to how to treat him this could be also utilized including the consideration of using Enhertu in the long run for achieving better control.     In regard to his smoking cessation he is not willing to change this phenomenon at this time. We have had NIRU Jacobson talking with him in this regard but he is not ready to make a decision when to quit.     Finally, I pointed out to him that during the previous visit we documented a urinary tract infection with streptococcus 50,000 colonies that in my appreciation was significant given the fact that he has a colovesical fistula. This was treated with antibiotics. He has not had any discomfort issues pertinent to  this anymore. The urine today is completely clear. Nevertheless, I went ahead and sent a urinalysis at least to be sure that there is no need for any other repetitive infection therapy on him.     The patient also will remain on his anticoagulation at this time, his Xarelto for the time being. I have renewed as well his Neurontin. He has no need for pain medicine.  The patient was further reviewed on 07/23/2021. He has no new symptomatology pertinent to his cancer of the esophagus with liver metastasis. He has no difficulty swallowing. He has been presented in the multidisciplinary clinic on a couple of occasions and he has been seen by Radiation Oncology. Finally the analysis of [employer] Now is available now showing that the patient's tumor is PD-L1 positive and has high mutation burden. The tumor remains HER/2 positive. Given these findings I think it is very easy to make a choice in regard stopping the present regimen of chemotherapy and proceed with therapy with Keytruda every 3 weeks for at least 4 cycles and reassess him at that point. In preparation for Keytruda initiation next week and we will get the approval of the medicine by his insurance company the patient will require smoking cessation altogether to minimize modification of cigarettes on his immune system. I insisted in this fact to the patient.     Other than that he will remain on probiotics. We will discontinue the 5FU/leucovorin and Herceptin and will move onto Keytruda. I discussed with him side effects of the Keytruda in detail as below. He will require treatment every 3 weeks with CBC, CMP, TSH every 3 weeks and he will require formal chemotherapy teaching, education and consent for this medicine.     After 4 cycles of this, in other words 3 months he will be reassessed with a new PET scan.     The chances under the present circumstances that he will improve as long as he quit smoking, it is very hard and maybe he could have long term  survivorship.    I begged for him to have smoking cessation. If any time during his time with me we have been talking about this and this is absolutely necessary now. We know that cigarettes kill immune system cells and minimize the benefit of treatments with these medicines.       The patient was further reviewed on 08/19/2021 in regard to his cancer of the esophagus. As stated above he had upper GI bleeding dropping hemoglobin to 5 requiring admission, transfusion, discontinuation of anticoagulation and endoscopy with local therapy by Naga Shelby MD. Since then he has completed 10 sessions of radiation therapy to the esophagus yesterday. He has not had any further bleed. His hemoglobin has bounced back from 5 to 14 and I have advised his this good news today.    I expect that the patient in a few days is going to develop an element of radiation esophagitis and he continues having some fatigue that will improve over time. So far no new issues have happened. I made him aware that if he has difficulty swallowing he will need to let us know, he will need to receive IV fluids in the office.    At least the radiation therapy will take care of the tumor in the esophagus and I do not believe that he will require any other GI endoscopy anymore.    Today the patient will proceed with his Keytruda that is the immunotherapy medicine that we are delivering to him at this time for the treatment of his liver metastasis and also the tumor in the esophagus. I pointed out to him that so far I do not see any side effects of the Keytruda and the Keytruda will improve and increase the benefit of radiation therapy into the primary tumor in the esophagus.     From the point of view of his anemia associated with GI bleeding, again this has been corrected. The hemoglobin today is 14 grams and I asked him to take his iron supplementation only twice a week 1 tablet. He has been taking 3 tablets everyday.     From the point of view of his  thrombophilia associated with malignancy, he is no longer receiving any anticoagulants given the recent episode of GI bleeding. We will maintain him off anticoagulants as much as we can. Hopefully he will not have any other episodes of thrombosis.     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     From the point of view of the Keytruda toxicity so far nothing happened. We will continue monitoring CBC, CMP and TSH periodically.     I will review him back in 3 and 6 weeks. When he comes back for the Keytruda he will continue receiving the medicine as the time goes by.    He understands that most of the benefit of radiation therapy in the tumor in the esophagus will be reached in 1 month and I am planning to give him a PET scan in more of less in 2 months from now when he will have almost 4 infusions of Keytruda under his belt and the completion of radiation therapy to the esophagus.     In regard to smoking cessation he has achieved a lot from 2 packs a day to 10 cigarettes a day. He will see Anh Felder RN, today in regard continuation of the benefit of the therapy for this condition at this point.   The patient was further reviewed on 09/30/2021. His Keytruda is still ongoing but the patient has developed dermatitis associated with this. It is a grade 1 toxicity so far and I am wondering if this is extending a little bit and the topical medicine is not going to be sufficient. I will proceed with his Keytruda today but maybe this will be the last administration of this medicine unless that we get shashi and the rash quiets down. Obviously at the completion for Keytruda the patient will require PET scan for assessment not only of the benefit of radiation therapy on his recurrence in the esophagus but also benefit of the Keytruda on his liver metastasis. Therefore, he will be scheduled for this before  the next visit. The patient also has been advised that his hemoglobin is acceptable and it will be okay for him to stop his iron supplementation.     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 I have advised him to go into Cipro 250 mg p.o. daily.     The patient has been advised in regard to the continuation of topical steroids in the skin in areas of involvement. This will remain an ongoing issue along with moisturizer to the skin in the form of Cetaphil.     In regard to his hypertension, he remains on medicines for this by me. His blood pressure is under good control at this time.     In regard to previous anticoagulation, he is no longer receiving any given his GI bleeding. This will remain in observation.     In regard to smoking cessation the patient will further discuss with NIRU Jacobson, plans for further decrease. He is down to 10 cigarettes a day. This is already a major accomplishment in somebody who used to smoke almost 3 packs of cigarettes a day.     The patient will have a new insurance in 11/2021 and I will make the insurance ladies aware of this.     In 3 weeks he will have a CBC, CMP, TSH and the PET scan the week before.  The patient was further reviewed on 10/21/2021. I reviewed with him his PET scan that shows still SUV activity in the lower esophagus but better than before but he has now 3 areas of abnormal uptake in the liver consistent with progressive liver metastasis. The lung anatomy is clear, the bone anatomy is clear. Colovesical fistula was visible still.    On the basis of these changes I do believe that this patient knowing that he has HER/2 positive esophageal cancer needs to change his therapy. Keytruda is not helpful at all and we will discontinue this medicine at this time. I do believe that the inability of Keytruda to overcome his disease process is related to the persistency of smoking and the abnormality related to smoking about  bacterial derek. Further analysis recently published in [employer] has confirmed that the benefit of immunotherapy is further boosted by proper amount of derek in the gastrointestinal tract and how cigarettes are damaging to this issue. In any event the patient's issues in regard to cigarette smoking has been already a problem and he has not been able to quit in spite of all of the support available. Therefore stopping the Keytruda at this point including today the patient will move to Enhertu that has been approved in patients who have cancer of the esophagus with liver metastasis. The dose of this medicine will be the GI dose of this medication that will be properly calculated accordingly. He will have formal education and consent for this medicine. I pointed out the most important side effects of this medicine include cardiac toxicity, anemia, leukopenia, thrombocytopenia and pneumonitis that includes cough, shortness of breath, fever. In preparation to minimize pneumonitis the patient will take prednisone 10 mg on day 2 and 3 after each dose of Enhertu. He will receive this medicine every 3 weeks. We will deliver 3-4 cycles and reassess him not only by tumor markers but also radiologically. Hopefully this will have a positive impact not only in the tumor in the esophagus but also tumors metastatic in his liver.   The patient was reviewed on 11/19/2021. Tolerance to the Enhertu 1st cycle was appropriate with more fatigue, no increased cough or shortness of breath and some anorexia. His white count, hemoglobin and platelets were normal and we advised him to proceed with his 2nd Enhertu infusion today. He understands that fatigue will be more prominent, that he could have chance for more hematological toxicity and he is starting to develop minor anemia. His white count and platelet count remain acceptable. I reminded him on many occasions today through the visit and insisted to the sister that if his cough pattern  changes, increasing or his shortness of breath becomes worse he has to notify us immediately to be sure that he will not develop pneumonitis associated with Enhertu. He still will take 10 mg of prednisone tomorrow and Sunday to try to minimize this phenomenon from happening.     The patient will be having blood counts on a weekly basis with nurse visit to be sure to monitor hematological toxicity and he will return in 3 weeks to proceed with the next cycle. After the 3rd cycle the patient will proceed with radiological assessment including PET scan.     ·   2.In regard to his colovesical fistula he is now receiving ciprofloxacin on a daily basis low dose and I advised him to remain on this medicine for the time being.   On 11/19/2021 he has no symptoms or signs of urinary tract infection and he remains on ciprofloxacin prophylactically everyday.     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     4.In regard to his hypertension, he remains on lisinopril, hydrochlorothiazide provided by me.   I reviewed him back on 11/19/2021. His blood pressure is 90/48 in spite of proper hydration. This is probably the effect of the combination of Trental and his blood pressure medication. I advised him to discontinue his blood pressure medication at this time and advised him to have proper hydration. He has a device to check his blood pressure at home. I asked him to check this on a daily basis at least a couple of times a day. If his blood pressure goes up above 130/90 he will take 1/2 of the dose of blood pressure medication that he was taking before. Those tablets have the way to be split. He will require checking blood pressure. If the blood pressure drops again he will hold off blood pressure medication indefinitely.     ·   5.In regard to his previous GI bleeding associated with his cancer and esophagitis we  advised the patient to remain on Protonix.   On 11/19/2021 he has not had any evidence of GI bleeding and the hemoglobin remains stable. The minor drop obeys to toxicity from Enhertu and bone marrow suppression not because of effect of GI bleeding.     ·         6.In regard finally peripheral arterial disease I am going to send the patient a prescription for Trental to take 1 tablet twice a day. He has an appointment to be seen by vascular on 12/18/2021. I pointed out to the patient that if he wants to change the route of this process he must modify his cigarettes otherwise there is no hope. He was not able to take medication provided by Vascular Surgery because of side effects.   In regard to his peripheral arterial disease I advised him on 11/19/2021 to decrease his Trental to 1 tablet twice a day. He is now using topical Cetaphil on his feet to try to improve moisture and decrease cracking.     He is not willing to entertain smoking cessation to help out peripheral arterial disease and we insisted into this in presence of his sister. He says that he is working on it. I do not feel any confidence in seeing this phenomenon anymore and that is a reality that we need to contemplate and just wait.     Finally his echocardiogram before Enhertu initiation documented a stable left ventricular ejection fraction. This will be monitored as long as he remains on Enhertu.           ·

## 2021-11-22 RX ORDER — PENTOXIFYLLINE 400 MG/1
TABLET, EXTENDED RELEASE ORAL
Qty: 90 TABLET | Refills: 0 | Status: SHIPPED | OUTPATIENT
Start: 2021-11-22 | End: 2021-12-14

## 2021-11-23 ENCOUNTER — TELEPHONE (OUTPATIENT)
Dept: OTHER | Facility: HOSPITAL | Age: 66
End: 2021-11-23

## 2021-11-23 NOTE — TELEPHONE ENCOUNTER
[facility] MULTIDISCIPLINARY CLINIC  SURVIVORSHIP SERVICES CARE COORDINATION NOTE  Smoking Cessation Follow-Up Visit  PHONE      Call placed to patient RE: Checking in regarding smoking cessation  Plan.    Spoke with  patient.  Tolerated second Enhertu infusion relatively well.  He does not feel that impacted him nearly as much as his first infusion.  He is still noting some fatigue.  He dozes off easily sitting in his chair but at times when he lays down in bed he feels restless and tosses and turns a great deal before actually falling asleep.  He has had some changes in appetite and he tells me that a 4 pound weight loss was noted at his last CBC visit.  He has not been using [place] instant breakfast in some time.  He is currently smoking 20 cigarettes/day, continues to express desire for smoking cessation.  He is asking about his last CEA which was drawn on 11/19/2021 which had increased to 255 (up from 51.91-month ago).  He reports he continues to use Aquaphor on his feet twice a day and the cracked skin is beginning to heal and he is more comfortable with ambulation.      Fatigue with sleep pattern disruption: He has been doing what he can to remain active.  He is getting up and walking around the house.  He spent some time outside gardening with his brother this weekend.  We did discuss their options for medication management of his insomnia associated with ruminative worry.  We discussed 1 option could be discussing increasing the dose of gabapentin at bedtime.  Currently he is taking 300 mg twice a day, we discussed that perhaps an increase of bedtime dose to 600 mg could prove helpful in reducing the ruminative worry and sleeplessness when laying down.  He will consider this and I have offered to have further discussion with Dr. Haynes to explore this and other options.    Loss of appetite with weight loss: Reviewed importance of small frequent meals, high-calorie high-protein options.   He has not use the [place] instant breakfast high-protein formula in some time I have encouraged him to go ahead and restart that.    Feet continue to heal using Aquaphor twice a day.  He reports much less pain and difficulty with ambulation.  He reports the color of his feet has improved.  He is aware that a major contributor to this problem is vascular changes associated with smoking.    Smoking cessation: Currently smoking 20 cigarettes a day with continued desire for cessation.  He is interested in trying a gradual reduction program again.  I have asked him to document his daily cigarette use x2 days in a log to establish a new baseline of daily cigarette use.  We will discuss the logs and a plan for stepwise reduction at his next follow-up.    Elevated CEA: He was surprised by his last CEA, which was significantly increased from previous.  He is even more surprised that he does not feel worse than he does considering the big jump in the numbers.  We did review that evaluating his progress and disease status is multifaceted with labs being 1 part, his own self-report of signs and symptoms, physicians examination and then ultimately imaging.  He is scheduled to be back for labs next Friday and third infusion of Enhertu on 12/10/2021.    We will plan a brief check-in in the cancer resource center after his labs are drawn next Friday 12 3.      Patient encouraged to call the office at any point for additional information, resources or support.

## 2021-12-03 ENCOUNTER — DOCUMENTATION (OUTPATIENT)
Dept: OTHER | Facility: HOSPITAL | Age: 66
End: 2021-12-03

## 2021-12-03 ENCOUNTER — LAB (OUTPATIENT)
Dept: LAB | Facility: HOSPITAL | Age: 66
End: 2021-12-03

## 2021-12-03 ENCOUNTER — CLINICAL SUPPORT (OUTPATIENT)
Dept: ONCOLOGY | Facility: HOSPITAL | Age: 66
End: 2021-12-03

## 2021-12-03 DIAGNOSIS — Z72.0 TOBACCO ABUSE: ICD-10-CM

## 2021-12-03 DIAGNOSIS — N32.1 COLOVESICAL FISTULA: ICD-10-CM

## 2021-12-03 DIAGNOSIS — I10 ESSENTIAL HYPERTENSION: ICD-10-CM

## 2021-12-03 DIAGNOSIS — Z45.2 ENCOUNTER FOR ADJUSTMENT OR MANAGEMENT OF VASCULAR ACCESS DEVICE: ICD-10-CM

## 2021-12-03 DIAGNOSIS — T45.1X5A PERIPHERAL NEUROPATHY DUE TO CHEMOTHERAPY (HCC): ICD-10-CM

## 2021-12-03 DIAGNOSIS — Z51.11 ENCOUNTER FOR ANTINEOPLASTIC CHEMOTHERAPY: ICD-10-CM

## 2021-12-03 DIAGNOSIS — C15.5 MALIGNANT NEOPLASM OF LOWER THIRD OF ESOPHAGUS (HCC): ICD-10-CM

## 2021-12-03 DIAGNOSIS — G62.0 PERIPHERAL NEUROPATHY DUE TO CHEMOTHERAPY (HCC): ICD-10-CM

## 2021-12-03 DIAGNOSIS — C78.7 LIVER METASTASIS: ICD-10-CM

## 2021-12-03 LAB
BASOPHILS # BLD AUTO: 0.05 10*3/MM3 (ref 0–0.2)
BASOPHILS NFR BLD AUTO: 0.8 % (ref 0–1.5)
DEPRECATED RDW RBC AUTO: 49.1 FL (ref 37–54)
EOSINOPHIL # BLD AUTO: 0.18 10*3/MM3 (ref 0–0.4)
EOSINOPHIL NFR BLD AUTO: 2.9 % (ref 0.3–6.2)
ERYTHROCYTE [DISTWIDTH] IN BLOOD BY AUTOMATED COUNT: 18 % (ref 12.3–15.4)
HCT VFR BLD AUTO: 27.6 % (ref 37.5–51)
HGB BLD-MCNC: 9.7 G/DL (ref 13–17.7)
IMM GRANULOCYTES # BLD AUTO: 0.24 10*3/MM3 (ref 0–0.05)
IMM GRANULOCYTES NFR BLD AUTO: 3.8 % (ref 0–0.5)
LYMPHOCYTES # BLD AUTO: 0.55 10*3/MM3 (ref 0.7–3.1)
LYMPHOCYTES NFR BLD AUTO: 8.8 % (ref 19.6–45.3)
MCH RBC QN AUTO: 32 PG (ref 26.6–33)
MCHC RBC AUTO-ENTMCNC: 35.1 G/DL (ref 31.5–35.7)
MCV RBC AUTO: 91.1 FL (ref 79–97)
MONOCYTES # BLD AUTO: 0.4 10*3/MM3 (ref 0.1–0.9)
MONOCYTES NFR BLD AUTO: 6.4 % (ref 5–12)
NEUTROPHILS NFR BLD AUTO: 4.85 10*3/MM3 (ref 1.7–7)
NEUTROPHILS NFR BLD AUTO: 77.3 % (ref 42.7–76)
NRBC BLD AUTO-RTO: 0 /100 WBC (ref 0–0.2)
PLATELET # BLD AUTO: 157 10*3/MM3 (ref 140–450)
PMV BLD AUTO: 9.4 FL (ref 6–12)
RBC # BLD AUTO: 3.03 10*6/MM3 (ref 4.14–5.8)
WBC NRBC COR # BLD: 6.27 10*3/MM3 (ref 3.4–10.8)

## 2021-12-03 PROCEDURE — 85025 COMPLETE CBC W/AUTO DIFF WBC: CPT

## 2021-12-03 PROCEDURE — 36415 COLL VENOUS BLD VENIPUNCTURE: CPT

## 2021-12-03 RX ORDER — OLANZAPINE 2.5 MG/1
2.5 TABLET ORAL NIGHTLY
Qty: 30 TABLET | Refills: 0 | Status: SHIPPED | OUTPATIENT
Start: 2021-12-03 | End: 2021-12-29 | Stop reason: SDUPTHER

## 2021-12-03 NOTE — NURSING NOTE
Pt here for CBC and RN review. CBC reviewed with pt. Counts are stable for this pt at this time. Hgb dropped slightly to 9.7. Pt c/o fatigue, and stated he is having a hard time sleeping. Pt stated it takes him hours to fall asleep most nights and thinks it could be contributing to the fatigue. Pt wondering if there is anything he could take at night to help him fall asleep. Told pt I would discuss with NP or Dr. Haynes to see what they suggest. Pt v/u. Pt also stated he always has a cough, but he is not coughing up some mucus. Pt denied any color to the mucus, denies fever, and stated he does not feel like his cough has gotten worse. Copy of labs given to pt. Will call pt after discussing sleep aid. Pt v/u.         Lab Results   Component Value Date    WBC 6.27 12/03/2021    HGB 9.7 (L) 12/03/2021    HCT 27.6 (L) 12/03/2021    MCV 91.1 12/03/2021     12/03/2021     Discussed with Dr. Haynes, will send in Zyprexa 2.5 mg QHS to pt's pharmacy for aid in sleep. Called pt, no answer lvm. Will route to Dr. Haynes to sign.

## 2021-12-03 NOTE — PROGRESS NOTES
[facility] MULTIDISCIPLINARY CLINIC  SURVIVORSHIP SERVICES CARE COORDINATION NOTE  Smoking Cessation Follow-Up Visit  CANCER RESOURCE [place]      Patient presents to clinic today to check in regarding smoking cessation and ongoing symptoms.     He was at [facility] today to get some labs drawn and reviewed.     Continues to have multiple symptoms. Fatigue is much more pronounced. He is able to nap at times during the day. He is doing what he can to stay active around the house and keep moving. He continues to have sleep pattern disruption, with difficulty initiating sleep. Reports minimal sleep last night. He spoke with the nurse at [facility] and was recommended benadryl at bedtime (as opposed to tylenol PM). He has tried melatonin in the past but does not tolerate due to severe heartburn. We did review possibility for adjustment of gabapentin to 300 mg am and 600 mg pm and he will consider discussing with Dr Haynes should the benadryl be ineffective.    Appetite remains poor. Did eat some pastries for breakfast yesterday and beans and cornbread for dinner. He finds he is constipated at times since starting the Enhertu and uses senokot as needed. He continues to use metamucil daily recommended by gastroenterology to help with stool bulk and reduce infection risk due to colovesical fistula. Cautioned to ensure he is getting adequate fluids daily while using metamucil. He is reporting a bowel movement every day.    Feet continue to heal using Aquaphor twice a day.  He reports much less pain and difficulty with ambulation.  He reports the color of his feet has improved.     He has reduced his daily cigarettes to 15/day, mostly due to feeling poorly in general and loss of interest.     He affirms feelings of depression. He reports this feels similar to the struggle he had while undergoing 5FU treatments. He feels he is still trying to figure out how to cope with side effects of treatment. He expresses feeling of  "loss around not being able to work, which he stopped soon after his diagnosis. He is also experiencing feelings of loss associated with his current chemo related hair loss and he is planning to shave his head tomorrow. He wonders aloud if there is a \"light at the end of the tunnel\" so to speak, in which he means he is wondering if he will ever achieve enough control of the cancer that he can stop chemo and feel better. He is worried the cancer has gotten worse.    We explored feelings of uncertainty related to elevated CEA and upcoming scans.   We did discuss today help available with feelings of depression, fatigue and loss of appetite via the supportive oncology clinic with Britt MARRUFO or Dr Park Aguillon. His preference is to avoid addition of more medications or additional referral. Advised this can be initiated at any point he desires.     We will plan a brief phone check in next week and will coordinate next visit around his schedule with CBC.    Patient encouraged to call the office at any point for additional information, resources or support.          "

## 2021-12-06 ENCOUNTER — TELEPHONE (OUTPATIENT)
Dept: OTHER | Facility: HOSPITAL | Age: 66
End: 2021-12-06

## 2021-12-06 RX ORDER — LISINOPRIL AND HYDROCHLOROTHIAZIDE 20; 12.5 MG/1; MG/1
TABLET ORAL
Qty: 90 TABLET | Refills: 0 | Status: SHIPPED | OUTPATIENT
Start: 2021-12-06

## 2021-12-06 NOTE — TELEPHONE ENCOUNTER
[facility] MULTIDISCIPLINARY CLINIC  SURVIVORSHIP SERVICES CARE COORDINATION NOTE   Follow-Up Visit  PHONE      Call received from patient RE: questions regarding new prescription: Olanzapine     Patient reports he received a new prescription from Dr Haynes last Friday for Olanzapine to help with sleep. He tried Friday and Saturday night but absolutely no added benefit for sleep noted.     He did try benadryl 50mg/20mL last night (Sunday) though and slept through the night thankfully. Reviewed ok to continue Benadryl in lieu of Olanzapine as long as effective. We did review additional off-label benefits with Olanzapine include help with appetite and anxiety should he desire to resume. We also discussed there may be room for a dose increase if needed to Olanzapine 5 mg at bedtime if Dr Haynes felt this was reasonable and I encouraged him to discuss further as needed.    Patient encouraged to call the office at any point for additional information, resources or support.

## 2021-12-07 NOTE — PROGRESS NOTES
Subjective     REASON FOR follow up:1.    1. 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  Currently receiving palliative 5 fu leucovorin  AND HERCEPTIN therapy once a month    2.COLOVESICAL FISTULA: chronic antibiotic therapy cipro, NO FURTHER PLANS FOR SURGERY AFTER VISIT AND PROCEDURE BY DR GM CASTILLO 1/20    3. 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 NEURONTIN INITIATED.    Esophageal Cancer  Associated symptoms include fatigue.   Fatigue  Associated symptoms include fatigue.       HPI:  The patient returns today for follow-up and evaluation prior to cycle #3 Enhertu.  He is seen today with his sister present.  Not been eating or drinking adequately due to decreased appetite and altered taste.  He is drinking 1 [place] protein drink 1-2 times weekly.  Her meals he does experience some nausea, no vomiting.  This resolves on its own.  He is also noticed some generalized itching of the skin, he has not taken anything for this.  He also continues to experience difficulty sleeping.  He took Zyprexa 2.5 mg for 2 days and discontinued because he did not feel any improvement.  He has been taking Benadryl 50 mg nightly to help with sleep.  He feels very fatigued during the day since he is not sleeping well at night.  He has noticed significant improvement in the dry skin and cracking on his feet with the use of Aquaphor.  He denies fever or chills.  Shortness of breath and cough remained stable.  He denies signs and symptoms of urinary tract infection.    Past Medical History:   Diagnosis Date   • Arthritis    • Cancer (HCC)     prostate cancer 2008   • Cancer (HCC)     esophageal   • Chronic anticoagulation     on xarelto   • Elevated PSA    • Esophageal mass    • Fistula     colon and bladder   • H/O Lung nodule    •  "Hepatitis     CHILD--PT STATED \"I THINK IT WAS A.\"   • History of chemotherapy    • History of pneumonia    • Hx of blood clots 08/10/2019   • Hyperlipidemia    • Hypertension    • Nail fungus    • Neuropathy    • PVD (peripheral vascular disease) (HCC)    • Recto-bladderneck fistula     on poab for recurrent uti        Past Surgical History:   Procedure Laterality Date   • COLONOSCOPY N/A 2/4/2020    Procedure: COLONOSCOPY;  Surgeon: Guy Sears MD;  Location: [facility] ENDOSCOPY;  Service: General;  Laterality: N/A;  PRE-COLOVESICAL FISTULA  POST-- DIVERTICULOSIS   • CYSTOSCOPY  02/06/2020   • CYSTOSCOPY Bilateral 2/26/2020    Procedure: CYSTOSCOPY RETROGRADE;  Surgeon: Cm Witt MD;  Location: [facility] OR;  Service: Urology;  Laterality: Bilateral;   • ENDOSCOPY     • ENDOSCOPY N/A 6/19/2021    Procedure: ESOPHAGOGASTRODUODENOSCOPY WITH BIOPSY;  Surgeon: Mary Brito MD;  Location: [facility] OR;  Service: Gastroenterology;  Laterality: N/A;   • ENDOSCOPY N/A 8/5/2021    Procedure: ESOPHAGOGASTRODUODENOSCOPY HEMOSPRAY;  Surgeon: Naga Shelby MD;  Location: [facility] ENDOSCOPY;  Service: Gastroenterology;  Laterality: N/A;  HX OF ESOPHAGEAL  CANCER;MELENA  POST: ESOPHAGEAL BLEEDING; ESOPHAGEAL MASS   • HERNIA REPAIR Right 1999    inguinal hernia   • PROSTATE SURGERY  03/2008    prostatectectomy   • VENOUS ACCESS DEVICE (PORT) INSERTION N/A 7/16/2019    Procedure: INSERTION VENOUS ACCESS DEVICE WITH FLUORO AND EGD WITH BIOPSY;  Surgeon: Mary Brito MD;  Location: [facility];  Service: Thoracic        Current Outpatient Medications on File Prior to Visit   Medication Sig Dispense Refill   • acetaminophen (TYLENOL) 500 MG tablet Take 500 mg by mouth Every 6 (Six) Hours As Needed for Mild Pain .     • cevimeline (EVOXAC) 30 MG capsule Take 30 mg by mouth 3 (Three) Times a Day.     • ciprofloxacin (CIPRO) 250 MG tablet Take 1 tablet by mouth Daily. Take one every other day 90 " tablet 1   • econazole nitrate (SPECTAZOLE) 1 % cream 2 (Two) Times a Day.     • gabapentin (NEURONTIN) 300 MG capsule Take 1 capsule by mouth 2 (two) times a day. 60 capsule 3   • lisinopril-hydrochlorothiazide (PRINZIDE,ZESTORETIC) 20-12.5 MG per tablet TAKE 1 TABLET BY MOUTH EVERY DAY 90 tablet 0   • nicotine (NICODERM CQ) 21 MG/24HR patch Place 1 patch on the skin as directed by provider Daily. (Patient taking differently: Place 1 patch on the skin as directed by provider Daily. Has at home but hasn't started taking yet) 28 each 1   • OLANZapine (zyPREXA) 2.5 MG tablet Take 1 tablet by mouth Every Night. 30 tablet 0   • ondansetron (ZOFRAN) 4 MG tablet Take 1 tablet by mouth Every 8 (Eight) Hours As Needed for Nausea or Vomiting. 30 tablet 2   • pantoprazole (Protonix) 40 MG EC tablet Take 1 tablet by mouth 2 (Two) Times a Day. 90 tablet 3   • pentoxifylline (TRENtal) 400 MG CR tablet TAKE 1 TABLET BY MOUTH 3 TIMES A DAY WITH MEALS. 90 tablet 0   • predniSONE (DELTASONE) 10 MG tablet Take 1 tab daily for 2 days beginning the day after treatment 10 tablet 3   • Probiotic Product (PROBIOTIC DAILY PO) Take 1 tablet by mouth Daily.     • triamcinolone (KENALOG) 0.1 % ointment Apply 1 application topically to the appropriate area as directed 2 (Two) Times a Day. 30 g 1     No current facility-administered medications on file prior to visit.        ALLERGIES:  No Known Allergies     Social History     Socioeconomic History   • Marital status: Single   • Years of education: High school   Tobacco Use   • Smoking status: Current Every Day Smoker     Packs/day: 1.00     Years: 38.00     Pack years: 38.00     Types: Cigarettes     Start date: 1974   • Smokeless tobacco: Never Used   • Tobacco comment: Quit for a period of 8 years.    Vaping Use   • Vaping Use: Never used   Substance and Sexual Activity   • Alcohol use: Not Currently   • Drug use: Never   • Sexual activity: Defer        Family History   Problem Relation Age  of Onset   • Hypertension Mother    • Stroke Mother    • Hypertension Other    • Lung disease Other    • Prostate cancer Other    • Lung cancer Father    • Malig Hyperthermia Neg Hx       Current Outpatient Medications on File Prior to Visit   Medication Sig Dispense Refill   • acetaminophen (TYLENOL) 500 MG tablet Take 500 mg by mouth Every 6 (Six) Hours As Needed for Mild Pain .     • cevimeline (EVOXAC) 30 MG capsule Take 30 mg by mouth 3 (Three) Times a Day.     • ciprofloxacin (CIPRO) 250 MG tablet Take 1 tablet by mouth Daily. Take one every other day 90 tablet 1   • econazole nitrate (SPECTAZOLE) 1 % cream 2 (Two) Times a Day.     • gabapentin (NEURONTIN) 300 MG capsule Take 1 capsule by mouth 2 (two) times a day. 60 capsule 3   • lisinopril-hydrochlorothiazide (PRINZIDE,ZESTORETIC) 20-12.5 MG per tablet TAKE 1 TABLET BY MOUTH EVERY DAY 90 tablet 0   • nicotine (NICODERM CQ) 21 MG/24HR patch Place 1 patch on the skin as directed by provider Daily. (Patient taking differently: Place 1 patch on the skin as directed by provider Daily. Has at home but hasn't started taking yet) 28 each 1   • OLANZapine (zyPREXA) 2.5 MG tablet Take 1 tablet by mouth Every Night. 30 tablet 0   • ondansetron (ZOFRAN) 4 MG tablet Take 1 tablet by mouth Every 8 (Eight) Hours As Needed for Nausea or Vomiting. 30 tablet 2   • pantoprazole (Protonix) 40 MG EC tablet Take 1 tablet by mouth 2 (Two) Times a Day. 90 tablet 3   • pentoxifylline (TRENtal) 400 MG CR tablet TAKE 1 TABLET BY MOUTH 3 TIMES A DAY WITH MEALS. 90 tablet 0   • predniSONE (DELTASONE) 10 MG tablet Take 1 tab daily for 2 days beginning the day after treatment 10 tablet 3   • Probiotic Product (PROBIOTIC DAILY PO) Take 1 tablet by mouth Daily.     • triamcinolone (KENALOG) 0.1 % ointment Apply 1 application topically to the appropriate area as directed 2 (Two) Times a Day. 30 g 1     No current facility-administered medications on file prior to visit.   No Known  "Allergies     Review of systems as mentioned in the HPI.    Objective     Vitals:    12/10/21 1000   BP: 124/68   Pulse: 104   Resp: 18   Temp: 97.8 °F (36.6 °C)   TempSrc: Temporal   SpO2: 98%   Weight: 89.4 kg (197 lb)   Height: 175.3 cm (69.02\")   PainSc: 0-No pain     Current Status 12/10/2021   ECOG score 0     Physical Exam  Constitutional:       Appearance: Normal appearance. He is not ill-appearing.   HENT:      Head: Normocephalic.      Nose: Nose normal.      Mouth/Throat:      Mouth: Mucous membranes are moist.      Pharynx: Oropharynx is clear. No oropharyngeal exudate.   Eyes:      Extraocular Movements: Extraocular movements intact.      Pupils: Pupils are equal, round, and reactive to light.   Cardiovascular:      Rate and Rhythm: Normal rate and regular rhythm.      Heart sounds: Normal heart sounds. No murmur heard.  No gallop.    Pulmonary:      Effort: Pulmonary effort is normal. No respiratory distress.      Breath sounds: Normal breath sounds. No wheezing.   Abdominal:      General: Bowel sounds are normal. There is no distension.      Palpations: Abdomen is soft.      Tenderness: There is no abdominal tenderness.   Musculoskeletal:         General: Normal range of motion.      Cervical back: Normal range of motion.      Right lower leg: No edema.      Left lower leg: No edema.   Skin:     General: Skin is warm and dry.      Findings: No rash.   Neurological:      General: No focal deficit present.      Mental Status: He is alert and oriented to person, place, and time.   Psychiatric:         Mood and Affect: Mood normal.         Behavior: Behavior normal.       RECENT LABS:  Hematology WBC   Date Value Ref Range Status   12/10/2021 3.56 3.40 - 10.80 10*3/mm3 Final   02/02/2019 13.4 (H) 3.4 - 10.8 x10E3/uL Final     RBC   Date Value Ref Range Status   12/10/2021 3.10 (L) 4.14 - 5.80 10*6/mm3 Final   02/02/2019 4.81 4.14 - 5.80 x10E6/uL Final     Hemoglobin   Date Value Ref Range Status "   12/10/2021 10.2 (L) 13.0 - 17.7 g/dL Final     Hematocrit   Date Value Ref Range Status   12/10/2021 29.1 (L) 37.5 - 51.0 % Final     Platelets   Date Value Ref Range Status   12/10/2021 171 140 - 450 10*3/mm3 Final          Interpretation Summary echo    · Left ventricular wall thickness is consistent with mild concentric hypertrophy.  · Calculated left ventricular EF = 61% Estimated left ventricular EF was in agreement with the calculated left ventricular EF. Left ventricular systolic function is normal.  · There is calcification of the aortic valve.  · Left ventricular diastolic function is consistent with (grade I) impaired relaxation.  · Normal global longitudinal LV strain (GLS) = -18.9%.       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   · Almost 90% of the liver WAS replaced by tumor.   · The patient has been undergoing chemotherapy with 5  fu and leucovorin  and Herceptin and has had excellent response clinically and radiologically.   · 08/10/2020 reviewed with the patient his PET scan that is dramatic. There is minimal uptake in the lower esophagus and most importantly complete resolution of his liver metastasis. Actually the only issue that is pertinent to the PET scan is still the visibility of his colovesical fistula.   · Therefore from the point of view of his cancer of the esophagus, metastatic to the liver, he has no symptoms from the primary tumor in the esophagus and he has no symptoms related to his liver metastasis that has resolved altogether. His CEA level is stable.The patient raised the question about the CEA fluctuation, which we discussed could be related to smoking as smoking can elevate CEA levels.  06/15/2021 with a new PET scan that documented regrowth of the tumor in the lower esophagus without any new symptomatology, for example dysphagia or odynophagia. No regurgitation. The liver metastasis remains in remission and there are no  new areas of disease in the bones or lungs or any other site. Given the excellent performance status, discussed with the patient and his sister today many options of therapy, including going back to FOLFOX regimen along with Herceptin, taking another sample of the esophagus with a new endoscopy and doing analysis of the tissue for Caris Target, or palliative treatment with radiation therapy and 5-FU continuous infusion that will be toxic to him and he does not prefer to have.   Dr. Haynes had discussion with Mray Brito MD, thoracic surgeon, who has agreed to see the patient tomorrow. We will proceed with endoscopy, the new tissue analysis, and sending the tissue for Caris Target Now. Depending on what we find there, we can modify his treatment altogether.  Also mentioned to the patient that on the background of HER2-positive cancer of the esophagus the possibility of using a new medicine for HER2-based disease that is called Enhertu.  07/01/2021 Dr. Haynes reviewed with Dr. Brito the endoscopy that shows a noncircumferential abnormality in the lower esophagus that encompasses almost 6 cm in length. It is not blocking off the esophagus and that is why the patient has no symptoms. The pathology shows at least atypical cells consistent with cancer. Further Next Generation Sequencing has been sent for Caris Target Now.   Dr. Haynes discussed with the patient the fact that we have many other modalities of treatment that he could encounter. He prefers to continue his general chemotherapy to control the cancer in his liver using 5-FU, leucovorin and Herceptin today and agree with that. In consideration to be seen by radiation therapy, the patient is willing to listen to the possibility of undergoing continuous 5-FU infusion along with radiation therapy to the esophagus knowing that he will experiencing esophagitis that can give him significant issues for 2-3 weeks. Went ahead and made the appointment for him to be seen  by radiation oncology for this purpose only.   Obviously if the Next Generation Sequencing gives us any other hints in regard to how to treat him this could be also utilized including the consideration of using Enhertu in the long run for achieving better control.   The analysis of Caris Target Now is available now showing that the patient's tumor is PD-L1 positive and has high mutation burden. The tumor remains HER/2 positive. Given these findings it is very easy to make a choice in regard stopping the present regimen of chemotherapy and proceed with therapy with Keytruda every 3 weeks.  After 4 cycles we will reassess with PET scan.   9/30/2021 His Keytruda is still ongoing but the patient has developed dermatitis associated with this. It is a grade 1 toxicity so far.  Will proceed with his Keytruda today but maybe this will be the last administration of this medicine unless that we get shashi and the rash quiets down. Obviously at the completion for Keytruda the patient will require PET scan for assessment not only of the benefit of radiation therapy on his recurrence in the esophagus but also benefit of the Keytruda on his liver metastasis.   10/21/2021. Dr. Haynes reviewed with him his PET scan that shows still SUV activity in the lower esophagus but better than before but he has now 3 areas of abnormal uptake in the liver consistent with progressive liver metastasis. The lung anatomy is clear, the bone anatomy is clear. Colovesical fistula was visible still.  Therefore discontinue Keytruda and the patient will move to Enhertu that has been approved in patients who have cancer of the esophagus with liver metastasis.   Discussed the most important side effects of this medicine include cardiac toxicity, anemia, leukopenia, thrombocytopenia and pneumonitis that includes cough, shortness of breath, fever. In preparation to minimize pneumonitis the patient will take prednisone 10 mg on day 2 and 3 after each dose of  Enhertu. He will receive this medicine every 3 weeks. We will deliver 3-4 cycles and reassess him not only by tumor markers but also radiologically.   11/19/2021. Tolerance to the Enhertu 1st cycle was appropriate with more fatigue, no increased cough or shortness of breath and some anorexia.   After the 3rd cycle on Enhertu the patient will proceed with radiological assessment including PET scan.   12/10/2021 proceed today with cycle #3 Enhertu.  PET scans will be ordered and performed prior to next cycle.  Last echo performed 10/28/2021, can order 3-month echo at next visit.      *Colovesical fistula  · he has had minimal symptomatology this week, maybe some activity there and we asked him to go back to the lab and take a urine specimen and culture.  · He knows that if this is abnormal he will need to initiate ciprofloxacin that he has at home.  · During the previous visit we documented a urinary tract infection with streptococcus 50,000 colonies that was significant given the fact that he has a colovesical fistula. This was treated with antibiotics. He has not had any discomfort issues pertinent to this anymore.   · He has had more urinary tract infections. Dr. Haynes advised him to go into Cipro 250 mg p.o. daily.   · 12/10/2021, no signs or symptoms of UTI today.    *Hypertension  · This is under good control   · Continue lisinopril and hydrochlorothiazide combination.  · 11/19/2021 blood pressure is 90/48 despiteof proper hydration. This is probably the effect of the combination of Trental and his blood pressure medication. Advised him to discontinue his blood pressure medication at this time and advised him to have proper hydration.   · He has a device to check his blood pressure at home and will check this daily.  If his blood pressure goes up above 130/90 he will take 1/2 of the dose of blood pressure medication that he was taking before.   · 12/10/2021 blood pressure today 124/68.    *Smoking  Cessation  · He has had conversations with NIRU Jacobson about this. He is using some nicotine CQ patch, here and there and also trying to work with nicotine gum and things of this nature but he has not been able to shake this issue altogether.     *GI Bleed  · 08/19/2021, patient is seen for hospital follow up.He had upper GI bleeding dropping hemoglobin to 5 requiring admission, transfusion, discontinuation of anticoagulation and endoscopy with local therapy by Naga Shelby MD. Since then he has completed 10 sessions of radiation therapy to the esophagus yesterday. He has not had any further bleed. His hemoglobin today is 14.  · Patient was on oral iron twice weekly, however hemoglobin improved so discontinued.  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 We will maintain him off anticoagulants as much as we can. Hopefully he will not have any other episodes of thrombosis.     *Dematitis secondary to Keytruda  · Grade 1  · The patient has been advised in regard to the continuation of topical steroids in the skin in areas of involvement. This will remain an ongoing issue along with moisturizer to the skin in the form of Cetaphil.   · Resolved.    *Neuropathy, secondary to FOLFOX  · Continue gabapentin 300 mg twice daily.  · Stable.    *Peripheral arterial disease  · Continue Trental 1 tablet twice a day.   · He has an appointment to be seen by vascular on 12/18/2021.   · He is now using topical Cetaphil on his feet to try to improve moisture and decrease cracking.     *Unintentional weight loss  · Patient is having a difficult time eating adequately due to altered taste and decreased appetite.  He was initiated on Zyprexa 2.5 mg nightly for sleep, however he took this for 2 days and discontinued because he did not feel much benefit.  We discussed how Zyprexa can help with  increased appetite as well, and asked the patient to restart this medication.  He is agreeable, and will start Zyprexa this evening.  · He has been drinking [place] protein drinks 1-2 times weekly, I asked him to increase this to daily.    *Insomnia  · Patient reports having a difficult time sleeping at night, causing daytime fatigue.  He attempted taking Zyprexa 2.5 mg nightly, however only took this for 2 days and then self discontinued because he did not feel any benefit.  He has since been taking Benadryl 50 mg nightly.  I asked him to retry Zyprexa, at least for 1 week to see if he gets any benefit.  In the meantime, he is going to decrease Benadryl to 25 mg nightly.    PLAN:   · Proceed with cycle #3 Enhertu to today.  · Schedule PET scan, to be performed prior to next cycle Enhertu.  Order placed today.    · Return in 3 weeks for MD follow-up and cycle #4 Enhertu, review scans.  · Last echo performed 10/28/2021, schedule 3 month echo at next visit.  · Continue gabapentin 300 mg twice daily for chemotherapy related neuropathy.  · Continue Protonix daily.  · Continue Cipro 250 mg daily.  · Continue Trental 1 tablet twice daily.  · Continue Zyprexa 2.5 mg nightly.  · Increase [place] protein drink to daily.  · Consultation with vascular scheduled for 12/18/2021.    The patient is on high risk medication that requires close monitoring for toxicity.    I spent 45 minutes caring for Han on this date of service. This time includes time spent by me in the following activities: preparing for the visit, reviewing tests, obtaining and/or reviewing a separately obtained history, performing a medically appropriate examination and/or evaluation, counseling and educating the patient/family/caregiver, ordering medications, tests, or procedures, documenting information in the medical record and care coordination.     Cora Vernon, NIRU  12/10/2021

## 2021-12-10 ENCOUNTER — APPOINTMENT (OUTPATIENT)
Dept: ONCOLOGY | Facility: HOSPITAL | Age: 66
End: 2021-12-10

## 2021-12-10 ENCOUNTER — OFFICE VISIT (OUTPATIENT)
Dept: ONCOLOGY | Facility: CLINIC | Age: 66
End: 2021-12-10

## 2021-12-10 ENCOUNTER — INFUSION (OUTPATIENT)
Dept: ONCOLOGY | Facility: HOSPITAL | Age: 66
End: 2021-12-10

## 2021-12-10 VITALS
HEIGHT: 69 IN | WEIGHT: 197 LBS | OXYGEN SATURATION: 98 % | TEMPERATURE: 97.8 F | BODY MASS INDEX: 29.18 KG/M2 | SYSTOLIC BLOOD PRESSURE: 124 MMHG | RESPIRATION RATE: 18 BRPM | HEART RATE: 104 BPM | DIASTOLIC BLOOD PRESSURE: 68 MMHG

## 2021-12-10 DIAGNOSIS — R63.4 UNINTENTIONAL WEIGHT LOSS: ICD-10-CM

## 2021-12-10 DIAGNOSIS — C15.5 MALIGNANT NEOPLASM OF LOWER THIRD OF ESOPHAGUS (HCC): ICD-10-CM

## 2021-12-10 DIAGNOSIS — G62.0 PERIPHERAL NEUROPATHY DUE TO CHEMOTHERAPY (HCC): ICD-10-CM

## 2021-12-10 DIAGNOSIS — C78.7 LIVER METASTASIS: ICD-10-CM

## 2021-12-10 DIAGNOSIS — G47.00 INSOMNIA, UNSPECIFIED TYPE: ICD-10-CM

## 2021-12-10 DIAGNOSIS — D49.0 ESOPHAGUS NEOPLASM: ICD-10-CM

## 2021-12-10 DIAGNOSIS — C15.5 MALIGNANT NEOPLASM OF LOWER THIRD OF ESOPHAGUS (HCC): Primary | ICD-10-CM

## 2021-12-10 DIAGNOSIS — Z79.899 HIGH RISK MEDICATION USE: ICD-10-CM

## 2021-12-10 DIAGNOSIS — T45.1X5A PERIPHERAL NEUROPATHY DUE TO CHEMOTHERAPY (HCC): ICD-10-CM

## 2021-12-10 LAB
ALBUMIN SERPL-MCNC: 3.3 G/DL (ref 3.5–5.2)
ALBUMIN/GLOB SERPL: 1.2 G/DL (ref 1.1–2.4)
ALP SERPL-CCNC: 147 U/L (ref 38–116)
ALT SERPL W P-5'-P-CCNC: 22 U/L (ref 0–41)
ANION GAP SERPL CALCULATED.3IONS-SCNC: 12 MMOL/L (ref 5–15)
AST SERPL-CCNC: 26 U/L (ref 0–40)
BASOPHILS # BLD AUTO: 0.03 10*3/MM3 (ref 0–0.2)
BASOPHILS NFR BLD AUTO: 0.8 % (ref 0–1.5)
BILIRUB SERPL-MCNC: 0.8 MG/DL (ref 0.2–1.2)
BUN SERPL-MCNC: 7 MG/DL (ref 6–20)
BUN/CREAT SERPL: 10 (ref 7.3–30)
CALCIUM SPEC-SCNC: 8.9 MG/DL (ref 8.5–10.2)
CHLORIDE SERPL-SCNC: 97 MMOL/L (ref 98–107)
CO2 SERPL-SCNC: 24 MMOL/L (ref 22–29)
CREAT SERPL-MCNC: 0.7 MG/DL (ref 0.7–1.3)
DEPRECATED RDW RBC AUTO: 68.1 FL (ref 37–54)
EOSINOPHIL # BLD AUTO: 0.2 10*3/MM3 (ref 0–0.4)
EOSINOPHIL NFR BLD AUTO: 5.6 % (ref 0.3–6.2)
ERYTHROCYTE [DISTWIDTH] IN BLOOD BY AUTOMATED COUNT: 20.3 % (ref 12.3–15.4)
GFR SERPL CREATININE-BSD FRML MDRD: 113 ML/MIN/1.73
GLOBULIN UR ELPH-MCNC: 2.8 GM/DL (ref 1.8–3.5)
GLUCOSE SERPL-MCNC: 193 MG/DL (ref 74–124)
HCT VFR BLD AUTO: 29.1 % (ref 37.5–51)
HGB BLD-MCNC: 10.2 G/DL (ref 13–17.7)
IMM GRANULOCYTES # BLD AUTO: 0.08 10*3/MM3 (ref 0–0.05)
IMM GRANULOCYTES NFR BLD AUTO: 2.2 % (ref 0–0.5)
LYMPHOCYTES # BLD AUTO: 0.56 10*3/MM3 (ref 0.7–3.1)
LYMPHOCYTES NFR BLD AUTO: 15.7 % (ref 19.6–45.3)
MCH RBC QN AUTO: 32.9 PG (ref 26.6–33)
MCHC RBC AUTO-ENTMCNC: 35.1 G/DL (ref 31.5–35.7)
MCV RBC AUTO: 93.9 FL (ref 79–97)
MONOCYTES # BLD AUTO: 0.49 10*3/MM3 (ref 0.1–0.9)
MONOCYTES NFR BLD AUTO: 13.8 % (ref 5–12)
NEUTROPHILS NFR BLD AUTO: 2.2 10*3/MM3 (ref 1.7–7)
NEUTROPHILS NFR BLD AUTO: 61.9 % (ref 42.7–76)
NRBC BLD AUTO-RTO: 0 /100 WBC (ref 0–0.2)
PLATELET # BLD AUTO: 171 10*3/MM3 (ref 140–450)
PMV BLD AUTO: 8.9 FL (ref 6–12)
POTASSIUM SERPL-SCNC: 3.3 MMOL/L (ref 3.5–4.7)
PROT SERPL-MCNC: 6.1 G/DL (ref 6.3–8)
RBC # BLD AUTO: 3.1 10*6/MM3 (ref 4.14–5.8)
SODIUM SERPL-SCNC: 133 MMOL/L (ref 134–145)
WBC NRBC COR # BLD: 3.56 10*3/MM3 (ref 3.4–10.8)

## 2021-12-10 PROCEDURE — 96413 CHEMO IV INFUSION 1 HR: CPT

## 2021-12-10 PROCEDURE — 25010000002 FAM-TRASTUZUMAB DERUXTEC-NXKI 100 MG RECONSTITUTED SOLUTION 1 EACH VIAL: Performed by: NURSE PRACTITIONER

## 2021-12-10 PROCEDURE — 99215 OFFICE O/P EST HI 40 MIN: CPT | Performed by: NURSE PRACTITIONER

## 2021-12-10 PROCEDURE — 25010000002 DEXAMETHASONE SODIUM PHOSPHATE 100 MG/10ML SOLUTION: Performed by: NURSE PRACTITIONER

## 2021-12-10 PROCEDURE — 85025 COMPLETE CBC W/AUTO DIFF WBC: CPT

## 2021-12-10 PROCEDURE — 25010000002 PALONOSETRON PER 25 MCG: Performed by: NURSE PRACTITIONER

## 2021-12-10 PROCEDURE — 96375 TX/PRO/DX INJ NEW DRUG ADDON: CPT

## 2021-12-10 PROCEDURE — 80053 COMPREHEN METABOLIC PANEL: CPT

## 2021-12-10 RX ORDER — DEXTROSE MONOHYDRATE 50 MG/ML
250 INJECTION, SOLUTION INTRAVENOUS ONCE
Status: CANCELLED | OUTPATIENT
Start: 2021-12-10

## 2021-12-10 RX ORDER — DEXTROSE MONOHYDRATE 50 MG/ML
250 INJECTION, SOLUTION INTRAVENOUS ONCE
Status: COMPLETED | OUTPATIENT
Start: 2021-12-10 | End: 2021-12-10

## 2021-12-10 RX ORDER — PALONOSETRON 0.05 MG/ML
0.25 INJECTION, SOLUTION INTRAVENOUS ONCE
Status: CANCELLED | OUTPATIENT
Start: 2021-12-10

## 2021-12-10 RX ORDER — PALONOSETRON 0.05 MG/ML
0.25 INJECTION, SOLUTION INTRAVENOUS ONCE
Status: COMPLETED | OUTPATIENT
Start: 2021-12-10 | End: 2021-12-10

## 2021-12-10 RX ADMIN — FAM-TRASTUZUMAB DERUXTECAN-NXKI 586 MG: 100 INJECTION, POWDER, LYOPHILIZED, FOR SOLUTION INTRAVENOUS at 11:38

## 2021-12-10 RX ADMIN — PALONOSETRON 0.25 MG: 0.05 INJECTION, SOLUTION INTRAVENOUS at 10:52

## 2021-12-10 RX ADMIN — DEXTROSE MONOHYDRATE 250 ML: 50 INJECTION, SOLUTION INTRAVENOUS at 10:52

## 2021-12-10 RX ADMIN — DEXAMETHASONE SODIUM PHOSPHATE 12 MG: 10 INJECTION, SOLUTION INTRAMUSCULAR; INTRAVENOUS at 10:52

## 2021-12-14 RX ORDER — PENTOXIFYLLINE 400 MG/1
400 TABLET, EXTENDED RELEASE ORAL 2 TIMES DAILY WITH MEALS
Qty: 60 TABLET | Refills: 0 | Status: SHIPPED | OUTPATIENT
Start: 2021-12-14 | End: 2022-01-24

## 2021-12-14 RX ORDER — PENTOXIFYLLINE 400 MG/1
400 TABLET, EXTENDED RELEASE ORAL 2 TIMES DAILY WITH MEALS
Qty: 90 TABLET | Refills: 0 | Status: SHIPPED | OUTPATIENT
Start: 2021-12-14 | End: 2021-12-14

## 2021-12-17 ENCOUNTER — CLINICAL SUPPORT (OUTPATIENT)
Dept: ONCOLOGY | Facility: HOSPITAL | Age: 66
End: 2021-12-17

## 2021-12-17 ENCOUNTER — LAB (OUTPATIENT)
Dept: LAB | Facility: HOSPITAL | Age: 66
End: 2021-12-17

## 2021-12-17 DIAGNOSIS — I10 ESSENTIAL HYPERTENSION: ICD-10-CM

## 2021-12-17 DIAGNOSIS — C78.7 LIVER METASTASIS: ICD-10-CM

## 2021-12-17 DIAGNOSIS — C15.5 MALIGNANT NEOPLASM OF LOWER THIRD OF ESOPHAGUS (HCC): ICD-10-CM

## 2021-12-17 DIAGNOSIS — Z72.0 TOBACCO ABUSE: ICD-10-CM

## 2021-12-17 DIAGNOSIS — Z51.11 ENCOUNTER FOR ANTINEOPLASTIC CHEMOTHERAPY: ICD-10-CM

## 2021-12-17 DIAGNOSIS — T45.1X5A PERIPHERAL NEUROPATHY DUE TO CHEMOTHERAPY (HCC): ICD-10-CM

## 2021-12-17 DIAGNOSIS — Z45.2 ENCOUNTER FOR ADJUSTMENT OR MANAGEMENT OF VASCULAR ACCESS DEVICE: ICD-10-CM

## 2021-12-17 DIAGNOSIS — G62.0 PERIPHERAL NEUROPATHY DUE TO CHEMOTHERAPY (HCC): ICD-10-CM

## 2021-12-17 DIAGNOSIS — N32.1 COLOVESICAL FISTULA: ICD-10-CM

## 2021-12-17 LAB
BASOPHILS # BLD AUTO: 0.05 10*3/MM3 (ref 0–0.2)
BASOPHILS NFR BLD AUTO: 0.8 % (ref 0–1.5)
DEPRECATED RDW RBC AUTO: 63.5 FL (ref 37–54)
EOSINOPHIL # BLD AUTO: 0.31 10*3/MM3 (ref 0–0.4)
EOSINOPHIL NFR BLD AUTO: 4.9 % (ref 0.3–6.2)
ERYTHROCYTE [DISTWIDTH] IN BLOOD BY AUTOMATED COUNT: 18.4 % (ref 12.3–15.4)
HCT VFR BLD AUTO: 29.3 % (ref 37.5–51)
HGB BLD-MCNC: 10.3 G/DL (ref 13–17.7)
IMM GRANULOCYTES # BLD AUTO: 0.17 10*3/MM3 (ref 0–0.05)
IMM GRANULOCYTES NFR BLD AUTO: 2.7 % (ref 0–0.5)
LYMPHOCYTES # BLD AUTO: 0.55 10*3/MM3 (ref 0.7–3.1)
LYMPHOCYTES NFR BLD AUTO: 8.7 % (ref 19.6–45.3)
MCH RBC QN AUTO: 33.6 PG (ref 26.6–33)
MCHC RBC AUTO-ENTMCNC: 35.2 G/DL (ref 31.5–35.7)
MCV RBC AUTO: 95.4 FL (ref 79–97)
MONOCYTES # BLD AUTO: 0.3 10*3/MM3 (ref 0.1–0.9)
MONOCYTES NFR BLD AUTO: 4.7 % (ref 5–12)
NEUTROPHILS NFR BLD AUTO: 4.95 10*3/MM3 (ref 1.7–7)
NEUTROPHILS NFR BLD AUTO: 78.2 % (ref 42.7–76)
NRBC BLD AUTO-RTO: 0 /100 WBC (ref 0–0.2)
PLATELET # BLD AUTO: 133 10*3/MM3 (ref 140–450)
PMV BLD AUTO: 9.1 FL (ref 6–12)
RBC # BLD AUTO: 3.07 10*6/MM3 (ref 4.14–5.8)
WBC NRBC COR # BLD: 6.33 10*3/MM3 (ref 3.4–10.8)

## 2021-12-17 PROCEDURE — 36415 COLL VENOUS BLD VENIPUNCTURE: CPT

## 2021-12-17 PROCEDURE — 85025 COMPLETE CBC W/AUTO DIFF WBC: CPT

## 2021-12-17 PROCEDURE — G0463 HOSPITAL OUTPT CLINIC VISIT: HCPCS

## 2021-12-17 NOTE — NURSING NOTE
Pt here for CBC and RN review. CBC reviewed with pt. Counts are stable for this pt at this time. Pt stated he is feeling really good today, no c/o. Copy of labs given to pt and f/u appt reviewed. Pt v/u to call with any new concerns.        Lab Results   Component Value Date    WBC 6.33 12/17/2021    HGB 10.3 (L) 12/17/2021    HCT 29.3 (L) 12/17/2021    MCV 95.4 12/17/2021     (L) 12/17/2021

## 2021-12-23 ENCOUNTER — HOSPITAL ENCOUNTER (OUTPATIENT)
Dept: PET IMAGING | Facility: HOSPITAL | Age: 66
Discharge: HOME | End: 2021-12-23

## 2021-12-23 ENCOUNTER — CLINICAL SUPPORT (OUTPATIENT)
Dept: ONCOLOGY | Facility: HOSPITAL | Age: 66
End: 2021-12-23

## 2021-12-23 ENCOUNTER — LAB (OUTPATIENT)
Dept: LAB | Facility: HOSPITAL | Age: 66
End: 2021-12-23

## 2021-12-23 ENCOUNTER — OFFICE VISIT (OUTPATIENT)
Dept: OTHER | Facility: HOSPITAL | Age: 66
End: 2021-12-23

## 2021-12-23 DIAGNOSIS — C15.5 MALIGNANT NEOPLASM OF LOWER THIRD OF ESOPHAGUS (HCC): ICD-10-CM

## 2021-12-23 DIAGNOSIS — Z72.0 TOBACCO ABUSE: ICD-10-CM

## 2021-12-23 DIAGNOSIS — C78.7 LIVER METASTASIS: ICD-10-CM

## 2021-12-23 DIAGNOSIS — T45.1X5A PERIPHERAL NEUROPATHY DUE TO CHEMOTHERAPY (HCC): ICD-10-CM

## 2021-12-23 DIAGNOSIS — Z72.0 TOBACCO ABUSE: Primary | ICD-10-CM

## 2021-12-23 DIAGNOSIS — I10 ESSENTIAL HYPERTENSION: ICD-10-CM

## 2021-12-23 DIAGNOSIS — Z51.11 ENCOUNTER FOR ANTINEOPLASTIC CHEMOTHERAPY: ICD-10-CM

## 2021-12-23 DIAGNOSIS — N32.1 COLOVESICAL FISTULA: ICD-10-CM

## 2021-12-23 DIAGNOSIS — G62.0 PERIPHERAL NEUROPATHY DUE TO CHEMOTHERAPY (HCC): ICD-10-CM

## 2021-12-23 DIAGNOSIS — Z45.2 ENCOUNTER FOR ADJUSTMENT OR MANAGEMENT OF VASCULAR ACCESS DEVICE: ICD-10-CM

## 2021-12-23 LAB
BASOPHILS # BLD AUTO: 0.08 10*3/MM3 (ref 0–0.2)
BASOPHILS NFR BLD AUTO: 1 % (ref 0–1.5)
DEPRECATED RDW RBC AUTO: 62.1 FL (ref 37–54)
EOSINOPHIL # BLD AUTO: 0.27 10*3/MM3 (ref 0–0.4)
EOSINOPHIL NFR BLD AUTO: 3.2 % (ref 0.3–6.2)
ERYTHROCYTE [DISTWIDTH] IN BLOOD BY AUTOMATED COUNT: 19.1 % (ref 12.3–15.4)
GLUCOSE BLDC GLUCOMTR-MCNC: 149 MG/DL (ref 70–130)
HCT VFR BLD AUTO: 27.6 % (ref 37.5–51)
HGB BLD-MCNC: 9.8 G/DL (ref 13–17.7)
IMM GRANULOCYTES # BLD AUTO: 0.11 10*3/MM3 (ref 0–0.05)
IMM GRANULOCYTES NFR BLD AUTO: 1.3 % (ref 0–0.5)
LYMPHOCYTES # BLD AUTO: 0.68 10*3/MM3 (ref 0.7–3.1)
LYMPHOCYTES NFR BLD AUTO: 8.2 % (ref 19.6–45.3)
MCH RBC QN AUTO: 33.6 PG (ref 26.6–33)
MCHC RBC AUTO-ENTMCNC: 35.5 G/DL (ref 31.5–35.7)
MCV RBC AUTO: 94.5 FL (ref 79–97)
MONOCYTES # BLD AUTO: 0.55 10*3/MM3 (ref 0.1–0.9)
MONOCYTES NFR BLD AUTO: 6.6 % (ref 5–12)
NEUTROPHILS NFR BLD AUTO: 6.63 10*3/MM3 (ref 1.7–7)
NEUTROPHILS NFR BLD AUTO: 79.7 % (ref 42.7–76)
NRBC BLD AUTO-RTO: 0 /100 WBC (ref 0–0.2)
PLATELET # BLD AUTO: 147 10*3/MM3 (ref 140–450)
PMV BLD AUTO: 10.8 FL (ref 6–12)
RBC # BLD AUTO: 2.92 10*6/MM3 (ref 4.14–5.8)
WBC NRBC COR # BLD: 8.32 10*3/MM3 (ref 3.4–10.8)

## 2021-12-23 PROCEDURE — G0463 HOSPITAL OUTPT CLINIC VISIT: HCPCS

## 2021-12-23 PROCEDURE — 36415 COLL VENOUS BLD VENIPUNCTURE: CPT

## 2021-12-23 PROCEDURE — 78815 PET IMAGE W/CT SKULL-THIGH: CPT

## 2021-12-23 PROCEDURE — 82962 GLUCOSE BLOOD TEST: CPT

## 2021-12-23 PROCEDURE — 99407 BEHAV CHNG SMOKING > 10 MIN: CPT | Performed by: NURSE PRACTITIONER

## 2021-12-23 PROCEDURE — A9552 F18 FDG: HCPCS | Performed by: NURSE PRACTITIONER

## 2021-12-23 PROCEDURE — 0 FLUDEOXYGLUCOSE F18 SOLUTION: Performed by: NURSE PRACTITIONER

## 2021-12-23 PROCEDURE — 85025 COMPLETE CBC W/AUTO DIFF WBC: CPT

## 2021-12-23 RX ADMIN — FLUDEOXYGLUCOSE F18 1 DOSE: 300 INJECTION INTRAVENOUS at 07:12

## 2021-12-23 NOTE — PROGRESS NOTES
"  [facility] MULTIDISCIPLINARY CLINIC  [facility] CARE COORDINATION NOTE  Smoking Cessation Follow-Up Visit  [facility]      Patient seen in [facility] RE: follow up smoking cessation visit.    Here for PET and labs today. Reports Olanzapine 2.5 mg with benadryl and gabapentin at night has been helping with sleep - however last night was an exception, attributes in part to anticipatory anxiety related to scan. Reviewed that olanzapine dose adjustment to 5 mg with Dr Haynes office at bedtime remains an option if needed.    Appetite improved with olanzapine however nothing is tasting good. Restarted carnation instant breakfast at home daily. Bowels regular, denies black or tarry stools.    Feet continue to heal, all cracks in heels resolved. Saw vascular last week, notes some improvement in lower extremity circulation. Suggested he begin foot soaks to help reduce heel calluses. We talked about epsom salt soaks for that today. Feet get a little dark towards the end of the day. He is propping them up laying on the couch before bed to encourage blood return. Denies pain when sleeping.    Left eyelid swelling unchanged/stable    Smoking 20 cigarettes per day. Has been implementing strategies to restrict smoking everywhere in the home, keeping them in the kitchen. Continues to leave cigarettes at home for short errands. He is noting he tends to smoke more in the mornings. Encouraged to continue with behavioral modifications. Encouraged again a \"practice quit\" approach and wearing patch for a morning or a day. Advised patient I will be out of office and returning 1/10/2022 and will plan follow up at that time.       Diagnosis Plan   1. Tobacco abuse     2. Liver metastasis (HCC)     3. Malignant neoplasm of lower third of esophagus (HCC)         I spent 25 minutes caring for Han on this date of service. This time includes time spent by me in the following activities: " counseling and educating the patient/family/caregiver, referring and communicating with other health care professionals and documenting information in the medical record

## 2021-12-29 RX ORDER — OLANZAPINE 2.5 MG/1
2.5 TABLET ORAL NIGHTLY
Qty: 90 TABLET | Refills: 0 | Status: SHIPPED | OUTPATIENT
Start: 2021-12-29 | End: 2022-08-30

## 2021-12-30 ENCOUNTER — INFUSION (OUTPATIENT)
Dept: ONCOLOGY | Facility: HOSPITAL | Age: 66
End: 2021-12-30

## 2021-12-30 ENCOUNTER — OFFICE VISIT (OUTPATIENT)
Dept: ONCOLOGY | Facility: CLINIC | Age: 66
End: 2021-12-30

## 2021-12-30 VITALS
RESPIRATION RATE: 17 BRPM | HEART RATE: 92 BPM | DIASTOLIC BLOOD PRESSURE: 69 MMHG | HEIGHT: 69 IN | BODY MASS INDEX: 28.58 KG/M2 | SYSTOLIC BLOOD PRESSURE: 121 MMHG | TEMPERATURE: 97.9 F | OXYGEN SATURATION: 99 % | WEIGHT: 193 LBS

## 2021-12-30 DIAGNOSIS — T45.1X5A PERIPHERAL NEUROPATHY DUE TO CHEMOTHERAPY: ICD-10-CM

## 2021-12-30 DIAGNOSIS — G62.0 PERIPHERAL NEUROPATHY DUE TO CHEMOTHERAPY: ICD-10-CM

## 2021-12-30 DIAGNOSIS — D49.0 ESOPHAGUS NEOPLASM: ICD-10-CM

## 2021-12-30 DIAGNOSIS — C15.5 MALIGNANT NEOPLASM OF LOWER THIRD OF ESOPHAGUS: Primary | ICD-10-CM

## 2021-12-30 DIAGNOSIS — E78.2 MIXED HYPERLIPIDEMIA: ICD-10-CM

## 2021-12-30 DIAGNOSIS — C78.7 LIVER METASTASIS: ICD-10-CM

## 2021-12-30 DIAGNOSIS — E11.9 TYPE 2 DIABETES MELLITUS WITHOUT COMPLICATION, WITHOUT LONG-TERM CURRENT USE OF INSULIN: ICD-10-CM

## 2021-12-30 DIAGNOSIS — Z72.0 TOBACCO ABUSE: ICD-10-CM

## 2021-12-30 DIAGNOSIS — D63.8 ANEMIA, CHRONIC DISEASE: ICD-10-CM

## 2021-12-30 DIAGNOSIS — J84.89 PNEUMONITIS, INTERSTITIAL: ICD-10-CM

## 2021-12-30 DIAGNOSIS — N32.1 RECTO-BLADDERNECK FISTULA: ICD-10-CM

## 2021-12-30 DIAGNOSIS — I10 ESSENTIAL HYPERTENSION: ICD-10-CM

## 2021-12-30 DIAGNOSIS — R73.9 HYPERGLYCEMIA, UNSPECIFIED: ICD-10-CM

## 2021-12-30 DIAGNOSIS — C15.5 MALIGNANT NEOPLASM OF LOWER THIRD OF ESOPHAGUS (HCC): ICD-10-CM

## 2021-12-30 PROBLEM — R21 RASH: Status: RESOLVED | Noted: 2021-09-30 | Resolved: 2021-12-30

## 2021-12-30 LAB
ALBUMIN SERPL-MCNC: 3.2 G/DL (ref 3.5–5.2)
ALBUMIN/GLOB SERPL: 1.2 G/DL (ref 1.1–2.4)
ALP SERPL-CCNC: 138 U/L (ref 38–116)
ALT SERPL W P-5'-P-CCNC: 12 U/L (ref 0–41)
ANION GAP SERPL CALCULATED.3IONS-SCNC: 11.9 MMOL/L (ref 5–15)
AST SERPL-CCNC: 22 U/L (ref 0–40)
BASOPHILS # BLD AUTO: 0.06 10*3/MM3 (ref 0–0.2)
BASOPHILS NFR BLD AUTO: 1.5 % (ref 0–1.5)
BILIRUB SERPL-MCNC: 0.7 MG/DL (ref 0.2–1.2)
BUN SERPL-MCNC: 6 MG/DL (ref 6–20)
BUN/CREAT SERPL: 9.8 (ref 7.3–30)
CALCIUM SPEC-SCNC: 8.8 MG/DL (ref 8.5–10.2)
CEA SERPL-MCNC: 227 NG/ML
CHLORIDE SERPL-SCNC: 100 MMOL/L (ref 98–107)
CO2 SERPL-SCNC: 24.1 MMOL/L (ref 22–29)
CREAT SERPL-MCNC: 0.61 MG/DL (ref 0.7–1.3)
DEPRECATED RDW RBC AUTO: 76.2 FL (ref 37–54)
EOSINOPHIL # BLD AUTO: 0.26 10*3/MM3 (ref 0–0.4)
EOSINOPHIL NFR BLD AUTO: 6.3 % (ref 0.3–6.2)
ERYTHROCYTE [DISTWIDTH] IN BLOOD BY AUTOMATED COUNT: 20.9 % (ref 12.3–15.4)
GFR SERPL CREATININE-BSD FRML MDRD: 133 ML/MIN/1.73
GLOBULIN UR ELPH-MCNC: 2.6 GM/DL (ref 1.8–3.5)
GLUCOSE SERPL-MCNC: 152 MG/DL (ref 74–124)
HBA1C MFR BLD: 5.96 % (ref 4.8–5.6)
HCT VFR BLD AUTO: 30.5 % (ref 37.5–51)
HGB BLD-MCNC: 10.3 G/DL (ref 13–17.7)
IMM GRANULOCYTES # BLD AUTO: 0.09 10*3/MM3 (ref 0–0.05)
IMM GRANULOCYTES NFR BLD AUTO: 2.2 % (ref 0–0.5)
LYMPHOCYTES # BLD AUTO: 0.61 10*3/MM3 (ref 0.7–3.1)
LYMPHOCYTES NFR BLD AUTO: 14.9 % (ref 19.6–45.3)
MCH RBC QN AUTO: 33.9 PG (ref 26.6–33)
MCHC RBC AUTO-ENTMCNC: 33.8 G/DL (ref 31.5–35.7)
MCV RBC AUTO: 100.3 FL (ref 79–97)
MONOCYTES # BLD AUTO: 0.62 10*3/MM3 (ref 0.1–0.9)
MONOCYTES NFR BLD AUTO: 15.1 % (ref 5–12)
NEUTROPHILS NFR BLD AUTO: 2.46 10*3/MM3 (ref 1.7–7)
NEUTROPHILS NFR BLD AUTO: 60 % (ref 42.7–76)
NRBC BLD AUTO-RTO: 0 /100 WBC (ref 0–0.2)
PLATELET # BLD AUTO: 242 10*3/MM3 (ref 140–450)
PMV BLD AUTO: 9.2 FL (ref 6–12)
POTASSIUM SERPL-SCNC: 2.9 MMOL/L (ref 3.5–4.7)
PROT SERPL-MCNC: 5.8 G/DL (ref 6.3–8)
RBC # BLD AUTO: 3.04 10*6/MM3 (ref 4.14–5.8)
SODIUM SERPL-SCNC: 136 MMOL/L (ref 134–145)
WBC NRBC COR # BLD: 4.1 10*3/MM3 (ref 3.4–10.8)

## 2021-12-30 PROCEDURE — 36415 COLL VENOUS BLD VENIPUNCTURE: CPT | Performed by: INTERNAL MEDICINE

## 2021-12-30 PROCEDURE — 85025 COMPLETE CBC W/AUTO DIFF WBC: CPT

## 2021-12-30 PROCEDURE — 82378 CARCINOEMBRYONIC ANTIGEN: CPT | Performed by: INTERNAL MEDICINE

## 2021-12-30 PROCEDURE — 83036 HEMOGLOBIN GLYCOSYLATED A1C: CPT | Performed by: INTERNAL MEDICINE

## 2021-12-30 PROCEDURE — 99215 OFFICE O/P EST HI 40 MIN: CPT | Performed by: INTERNAL MEDICINE

## 2021-12-30 PROCEDURE — 80053 COMPREHEN METABOLIC PANEL: CPT

## 2021-12-30 RX ORDER — GLIPIZIDE 5 MG/1
5 TABLET, FILM COATED, EXTENDED RELEASE ORAL DAILY
Qty: 30 TABLET | Refills: 3 | Status: SHIPPED | OUTPATIENT
Start: 2021-12-30 | End: 2022-03-21

## 2021-12-30 RX ORDER — PREDNISONE 10 MG/1
20 TABLET ORAL DAILY
Qty: 60 TABLET | Refills: 1 | Status: SHIPPED | OUTPATIENT
Start: 2021-12-30 | End: 2022-01-12

## 2022-01-10 NOTE — TELEPHONE ENCOUNTER
Spoke w/ pt who has at least 80 days worth of Cipro left. Pt will call if he gets close to running out and Cipro is still on back order. At that point we will switch him to levaquin 250 1/2 tablet Q daily per Dr. Haynes. Pt v/u of plan. Marisol Lam RN

## 2022-01-12 ENCOUNTER — INFUSION (OUTPATIENT)
Dept: ONCOLOGY | Facility: HOSPITAL | Age: 67
End: 2022-01-12

## 2022-01-12 ENCOUNTER — OFFICE VISIT (OUTPATIENT)
Dept: ONCOLOGY | Facility: CLINIC | Age: 67
End: 2022-01-12

## 2022-01-12 VITALS
SYSTOLIC BLOOD PRESSURE: 156 MMHG | WEIGHT: 196 LBS | HEIGHT: 69 IN | HEART RATE: 99 BPM | RESPIRATION RATE: 18 BRPM | BODY MASS INDEX: 29.03 KG/M2 | OXYGEN SATURATION: 96 % | TEMPERATURE: 97.8 F | DIASTOLIC BLOOD PRESSURE: 83 MMHG

## 2022-01-12 DIAGNOSIS — I10 ESSENTIAL HYPERTENSION: ICD-10-CM

## 2022-01-12 DIAGNOSIS — C15.5 MALIGNANT NEOPLASM OF LOWER THIRD OF ESOPHAGUS: Primary | ICD-10-CM

## 2022-01-12 DIAGNOSIS — Z72.0 TOBACCO ABUSE: ICD-10-CM

## 2022-01-12 DIAGNOSIS — D63.8 ANEMIA, CHRONIC DISEASE: ICD-10-CM

## 2022-01-12 DIAGNOSIS — N32.1 RECTO-BLADDERNECK FISTULA: ICD-10-CM

## 2022-01-12 DIAGNOSIS — R73.9 HYPERGLYCEMIA, UNSPECIFIED: ICD-10-CM

## 2022-01-12 DIAGNOSIS — C78.7 LIVER METASTASIS: ICD-10-CM

## 2022-01-12 DIAGNOSIS — E78.2 MIXED HYPERLIPIDEMIA: ICD-10-CM

## 2022-01-12 DIAGNOSIS — T45.1X5A PERIPHERAL NEUROPATHY DUE TO CHEMOTHERAPY: ICD-10-CM

## 2022-01-12 DIAGNOSIS — G62.0 PERIPHERAL NEUROPATHY DUE TO CHEMOTHERAPY: ICD-10-CM

## 2022-01-12 DIAGNOSIS — J84.89 PNEUMONITIS, INTERSTITIAL: ICD-10-CM

## 2022-01-12 DIAGNOSIS — E11.9 TYPE 2 DIABETES MELLITUS WITHOUT COMPLICATION, WITHOUT LONG-TERM CURRENT USE OF INSULIN: ICD-10-CM

## 2022-01-12 DIAGNOSIS — C15.5 MALIGNANT NEOPLASM OF LOWER THIRD OF ESOPHAGUS: ICD-10-CM

## 2022-01-12 DIAGNOSIS — Z45.2 ENCOUNTER FOR ADJUSTMENT OR MANAGEMENT OF VASCULAR ACCESS DEVICE: Primary | ICD-10-CM

## 2022-01-12 DIAGNOSIS — D49.0 ESOPHAGUS NEOPLASM: ICD-10-CM

## 2022-01-12 DIAGNOSIS — R73.01 IMPAIRED FASTING GLUCOSE: ICD-10-CM

## 2022-01-12 PROBLEM — I82.412 ACUTE DEEP VEIN THROMBOSIS (DVT) OF FEMORAL VEIN OF LEFT LOWER EXTREMITY: Status: RESOLVED | Noted: 2019-08-10 | Resolved: 2022-01-12

## 2022-01-12 PROBLEM — I82.431 ACUTE DEEP VEIN THROMBOSIS (DVT) OF RIGHT POPLITEAL VEIN (HCC): Status: RESOLVED | Noted: 2019-08-10 | Resolved: 2022-01-12

## 2022-01-12 LAB
ALBUMIN SERPL-MCNC: 3.3 G/DL (ref 3.5–5.2)
ALBUMIN/GLOB SERPL: 1.2 G/DL (ref 1.1–2.4)
ALP SERPL-CCNC: 205 U/L (ref 38–116)
ALT SERPL W P-5'-P-CCNC: 50 U/L (ref 0–41)
ANION GAP SERPL CALCULATED.3IONS-SCNC: 11.8 MMOL/L (ref 5–15)
AST SERPL-CCNC: 46 U/L (ref 0–40)
BASOPHILS # BLD AUTO: 0.05 10*3/MM3 (ref 0–0.2)
BASOPHILS NFR BLD AUTO: 0.5 % (ref 0–1.5)
BILIRUB SERPL-MCNC: 0.6 MG/DL (ref 0.2–1.2)
BUN SERPL-MCNC: 8 MG/DL (ref 6–20)
BUN/CREAT SERPL: 14.8 (ref 7.3–30)
CALCIUM SPEC-SCNC: 8.9 MG/DL (ref 8.5–10.2)
CHLORIDE SERPL-SCNC: 103 MMOL/L (ref 98–107)
CO2 SERPL-SCNC: 26.2 MMOL/L (ref 22–29)
CREAT SERPL-MCNC: 0.54 MG/DL (ref 0.7–1.3)
DEPRECATED RDW RBC AUTO: 69 FL (ref 37–54)
EOSINOPHIL # BLD AUTO: 0.23 10*3/MM3 (ref 0–0.4)
EOSINOPHIL NFR BLD AUTO: 2.3 % (ref 0.3–6.2)
ERYTHROCYTE [DISTWIDTH] IN BLOOD BY AUTOMATED COUNT: 17.8 % (ref 12.3–15.4)
GFR SERPL CREATININE-BSD FRML MDRD: >150 ML/MIN/1.73
GLOBULIN UR ELPH-MCNC: 2.8 GM/DL (ref 1.8–3.5)
GLUCOSE SERPL-MCNC: 86 MG/DL (ref 74–124)
HCT VFR BLD AUTO: 39.4 % (ref 37.5–51)
HGB BLD-MCNC: 12.6 G/DL (ref 13–17.7)
IMM GRANULOCYTES # BLD AUTO: 0.4 10*3/MM3 (ref 0–0.05)
IMM GRANULOCYTES NFR BLD AUTO: 4.1 % (ref 0–0.5)
LYMPHOCYTES # BLD AUTO: 0.97 10*3/MM3 (ref 0.7–3.1)
LYMPHOCYTES NFR BLD AUTO: 9.9 % (ref 19.6–45.3)
MCH RBC QN AUTO: 33.4 PG (ref 26.6–33)
MCHC RBC AUTO-ENTMCNC: 32 G/DL (ref 31.5–35.7)
MCV RBC AUTO: 104.5 FL (ref 79–97)
MONOCYTES # BLD AUTO: 0.77 10*3/MM3 (ref 0.1–0.9)
MONOCYTES NFR BLD AUTO: 7.9 % (ref 5–12)
NEUTROPHILS NFR BLD AUTO: 7.37 10*3/MM3 (ref 1.7–7)
NEUTROPHILS NFR BLD AUTO: 75.3 % (ref 42.7–76)
NRBC BLD AUTO-RTO: 0 /100 WBC (ref 0–0.2)
PLATELET # BLD AUTO: 117 10*3/MM3 (ref 140–450)
PMV BLD AUTO: 9.8 FL (ref 6–12)
POTASSIUM SERPL-SCNC: 3.1 MMOL/L (ref 3.5–4.7)
PROT SERPL-MCNC: 6.1 G/DL (ref 6.3–8)
RBC # BLD AUTO: 3.77 10*6/MM3 (ref 4.14–5.8)
SODIUM SERPL-SCNC: 141 MMOL/L (ref 134–145)
WBC NRBC COR # BLD: 9.79 10*3/MM3 (ref 3.4–10.8)

## 2022-01-12 PROCEDURE — 25010000002 HEPARIN LOCK FLUSH PER 10 UNITS: Performed by: INTERNAL MEDICINE

## 2022-01-12 PROCEDURE — 36591 DRAW BLOOD OFF VENOUS DEVICE: CPT

## 2022-01-12 PROCEDURE — 85025 COMPLETE CBC W/AUTO DIFF WBC: CPT

## 2022-01-12 PROCEDURE — 99215 OFFICE O/P EST HI 40 MIN: CPT | Performed by: INTERNAL MEDICINE

## 2022-01-12 PROCEDURE — 80053 COMPREHEN METABOLIC PANEL: CPT

## 2022-01-12 RX ORDER — SODIUM CHLORIDE 0.9 % (FLUSH) 0.9 %
10 SYRINGE (ML) INJECTION AS NEEDED
Status: CANCELLED | OUTPATIENT
Start: 2022-01-12

## 2022-01-12 RX ORDER — HEPARIN SODIUM (PORCINE) LOCK FLUSH IV SOLN 100 UNIT/ML 100 UNIT/ML
500 SOLUTION INTRAVENOUS AS NEEDED
Status: DISCONTINUED | OUTPATIENT
Start: 2022-01-12 | End: 2022-01-12 | Stop reason: HOSPADM

## 2022-01-12 RX ORDER — SODIUM CHLORIDE 0.9 % (FLUSH) 0.9 %
10 SYRINGE (ML) INJECTION AS NEEDED
Status: DISCONTINUED | OUTPATIENT
Start: 2022-01-12 | End: 2022-01-12 | Stop reason: HOSPADM

## 2022-01-12 RX ORDER — HEPARIN SODIUM (PORCINE) LOCK FLUSH IV SOLN 100 UNIT/ML 100 UNIT/ML
500 SOLUTION INTRAVENOUS AS NEEDED
Status: CANCELLED | OUTPATIENT
Start: 2022-01-12

## 2022-01-12 RX ORDER — POTASSIUM CHLORIDE 1500 MG/1
20 TABLET, FILM COATED, EXTENDED RELEASE ORAL DAILY
Qty: 30 TABLET | Refills: 0 | Status: SHIPPED | OUTPATIENT
Start: 2022-01-12 | End: 2022-02-09 | Stop reason: SDUPTHER

## 2022-01-12 RX ADMIN — Medication 10 ML: at 08:52

## 2022-01-12 RX ADMIN — Medication 500 UNITS: at 08:52

## 2022-01-12 NOTE — TELEPHONE ENCOUNTER
Left voicemail; asked pt to please call the office back at . Marisol Lam RN     When pt calls back:   Reviewed Dr. Haynes' note with patient. Pt to take 20  Meq of potassium/ day. Pharmacy verified. Pt v/u. Marisol Lam RN

## 2022-01-12 NOTE — PROGRESS NOTES
Subjective     REASON FOR follow up:1.    1. 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  Currently receiving palliative 5 fu leucovorin  AND HERCEPTIN therapy once a month    2.COLOVESICAL FISTULA: chronic antibiotic therapy cipro, NO FURTHER PLANS FOR SURGERY AFTER VISIT AND PROCEDURE BY DR GM CASTILLO 1/20    3. 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 NEURONTIN INITIATED.        DURING THE VISIT WITH THE PATIENT TODAY , PATIENT HAD FACE MASK, MY MEDICAL ASSISTANT AND I  HAD PROPPER PROTECTIVE EQUIPMENT, AND I DID HAND HYGIENE WITH SOAP AND WATER BEFORE AND AFTER THE VISIT.      This patient returns today to the office for followup stating that he has been placed on prednisone and his degree of coughing is less and he has not had any shortness of breath. 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 The patient is not having any fevers or chills. No pleuritic pain and no hemoptysis. He has no difficulty swallowing. His appetite is acceptable. He has found foods that he really has a taste on and work on them. Bowel activity is ongoing. He still has pneumaturia and occasional passage of fecal matter in the urine. He continues taking Cipro every other day as I instructed him. He has not had any peripheral or arterial symptomatology since Trental was initiated. He has no claudication. He still has though neuropathy associated with previous chemotherapy with FOLFOX.     Given the fact that Enhertu produced interstitial pneumonitis on him documented radiologically and undergoing prednisone this medicine will not be utilized on him anymore.                                Past Medical History:   Diagnosis Date   • Arthritis    • Cancer (HCC)     prostate cancer 2008   • Cancer (HCC)   "   esophageal   • Chronic anticoagulation     on xarelto   • Elevated PSA    • Esophageal mass    • Fistula     colon and bladder   • H/O Lung nodule    • Hepatitis     CHILD--PT STATED \"I THINK IT WAS A.\"   • History of chemotherapy    • History of pneumonia    • Hx of blood clots 08/10/2019   • Hyperlipidemia    • Hypertension    • Nail fungus    • Neuropathy    • PVD (peripheral vascular disease) (HCC)    • Recto-bladderneck fistula     on poab for recurrent uti        Past Surgical History:   Procedure Laterality Date   • COLONOSCOPY N/A 2/4/2020    Procedure: COLONOSCOPY;  Surgeon: Guy Sears MD;  Location: [facility] ENDOSCOPY;  Service: General;  Laterality: N/A;  PRE-COLOVESICAL FISTULA  POST-- DIVERTICULOSIS   • CYSTOSCOPY  02/06/2020   • CYSTOSCOPY Bilateral 2/26/2020    Procedure: CYSTOSCOPY RETROGRADE;  Surgeon: Cm Witt MD;  Location: [facility] OR;  Service: Urology;  Laterality: Bilateral;   • ENDOSCOPY     • ENDOSCOPY N/A 6/19/2021    Procedure: ESOPHAGOGASTRODUODENOSCOPY WITH BIOPSY;  Surgeon: Mary Brito MD;  Location: [facility] OR;  Service: Gastroenterology;  Laterality: N/A;   • ENDOSCOPY N/A 8/5/2021    Procedure: ESOPHAGOGASTRODUODENOSCOPY HEMOSPRAY;  Surgeon: Naga Shelby MD;  Location: [facility] ENDOSCOPY;  Service: Gastroenterology;  Laterality: N/A;  HX OF ESOPHAGEAL  CANCER;MELENA  POST: ESOPHAGEAL BLEEDING; ESOPHAGEAL MASS   • HERNIA REPAIR Right 1999    inguinal hernia   • PROSTATE SURGERY  03/2008    prostatectectomy   • VENOUS ACCESS DEVICE (PORT) INSERTION N/A 7/16/2019    Procedure: INSERTION VENOUS ACCESS DEVICE WITH FLUORO AND EGD WITH BIOPSY;  Surgeon: Mary Brito MD;  Location: [facility] OR;  Service: Thoracic        Current Outpatient Medications on File Prior to Visit   Medication Sig Dispense Refill   • acetaminophen (TYLENOL) 500 MG tablet Take 500 mg by mouth Every 6 (Six) Hours As Needed for Mild Pain .     • cevimeline (EVOXAC) 30 MG " capsule Take 30 mg by mouth 3 (Three) Times a Day.     • ciprofloxacin (CIPRO) 250 MG tablet Take 1 tablet by mouth Daily. Take one every other day 90 tablet 1   • econazole nitrate (SPECTAZOLE) 1 % cream 2 (Two) Times a Day.     • gabapentin (NEURONTIN) 300 MG capsule Take 1 capsule by mouth 2 (two) times a day. 60 capsule 3   • glipizide (Glucotrol XL) 5 MG ER tablet Take 1 tablet by mouth Daily. 30 tablet 3   • lisinopril-hydrochlorothiazide (PRINZIDE,ZESTORETIC) 20-12.5 MG per tablet TAKE 1 TABLET BY MOUTH EVERY DAY 90 tablet 0   • nicotine (NICODERM CQ) 21 MG/24HR patch Place 1 patch on the skin as directed by provider Daily. (Patient taking differently: Place 1 patch on the skin as directed by provider Daily. Has at home but hasn't started taking yet) 28 each 1   • OLANZapine (ZyPREXA) 2.5 MG tablet Take 1 tablet by mouth Every Night. 90 tablet 0   • ondansetron (ZOFRAN) 4 MG tablet Take 1 tablet by mouth Every 8 (Eight) Hours As Needed for Nausea or Vomiting. 30 tablet 2   • pantoprazole (Protonix) 40 MG EC tablet Take 1 tablet by mouth 2 (Two) Times a Day. 90 tablet 3   • pentoxifylline (TRENtal) 400 MG CR tablet Take 1 tablet by mouth 2 (Two) Times a Day With Meals. 60 tablet 0   • Probiotic Product (PROBIOTIC DAILY PO) Take 1 tablet by mouth Daily.     • triamcinolone (KENALOG) 0.1 % ointment Apply 1 application topically to the appropriate area as directed 2 (Two) Times a Day. 30 g 1   • [DISCONTINUED] predniSONE (DELTASONE) 10 MG tablet Take 1 tab daily for 2 days beginning the day after treatment 10 tablet 3   • [DISCONTINUED] predniSONE (DELTASONE) 10 MG tablet Take 2 tablets by mouth Daily. Take them at breakfast 60 tablet 1     No current facility-administered medications on file prior to visit.        ALLERGIES:  No Known Allergies     Social History     Socioeconomic History   • Marital status: Single   • Years of education: High school   Tobacco Use   • Smoking status: Current Every Day Smoker      Packs/day: 1.00     Years: 38.00     Pack years: 38.00     Types: Cigarettes     Start date: 1974   • Smokeless tobacco: Never Used   • Tobacco comment: Quit for a period of 8 years.    Vaping Use   • Vaping Use: Never used   Substance and Sexual Activity   • Alcohol use: Not Currently   • Drug use: Never   • Sexual activity: Defer        Family History   Problem Relation Age of Onset   • Hypertension Mother    • Stroke Mother    • Hypertension Other    • Lung disease Other    • Prostate cancer Other    • Lung cancer Father    • Malig Hyperthermia Neg Hx       Current Outpatient Medications on File Prior to Visit   Medication Sig Dispense Refill   • acetaminophen (TYLENOL) 500 MG tablet Take 500 mg by mouth Every 6 (Six) Hours As Needed for Mild Pain .     • cevimeline (EVOXAC) 30 MG capsule Take 30 mg by mouth 3 (Three) Times a Day.     • ciprofloxacin (CIPRO) 250 MG tablet Take 1 tablet by mouth Daily. Take one every other day 90 tablet 1   • econazole nitrate (SPECTAZOLE) 1 % cream 2 (Two) Times a Day.     • gabapentin (NEURONTIN) 300 MG capsule Take 1 capsule by mouth 2 (two) times a day. 60 capsule 3   • glipizide (Glucotrol XL) 5 MG ER tablet Take 1 tablet by mouth Daily. 30 tablet 3   • lisinopril-hydrochlorothiazide (PRINZIDE,ZESTORETIC) 20-12.5 MG per tablet TAKE 1 TABLET BY MOUTH EVERY DAY 90 tablet 0   • nicotine (NICODERM CQ) 21 MG/24HR patch Place 1 patch on the skin as directed by provider Daily. (Patient taking differently: Place 1 patch on the skin as directed by provider Daily. Has at home but hasn't started taking yet) 28 each 1   • OLANZapine (ZyPREXA) 2.5 MG tablet Take 1 tablet by mouth Every Night. 90 tablet 0   • ondansetron (ZOFRAN) 4 MG tablet Take 1 tablet by mouth Every 8 (Eight) Hours As Needed for Nausea or Vomiting. 30 tablet 2   • pantoprazole (Protonix) 40 MG EC tablet Take 1 tablet by mouth 2 (Two) Times a Day. 90 tablet 3   • pentoxifylline (TRENtal) 400 MG CR tablet Take 1 tablet  "by mouth 2 (Two) Times a Day With Meals. 60 tablet 0   • Probiotic Product (PROBIOTIC DAILY PO) Take 1 tablet by mouth Daily.     • triamcinolone (KENALOG) 0.1 % ointment Apply 1 application topically to the appropriate area as directed 2 (Two) Times a Day. 30 g 1   • [DISCONTINUED] predniSONE (DELTASONE) 10 MG tablet Take 1 tab daily for 2 days beginning the day after treatment 10 tablet 3   • [DISCONTINUED] predniSONE (DELTASONE) 10 MG tablet Take 2 tablets by mouth Daily. Take them at breakfast 60 tablet 1     No current facility-administered medications on file prior to visit.   No Known Allergies         Objective     Vitals:    01/12/22 0939   BP: 156/83   Pulse: 99   Resp: 18   Temp: 97.8 °F (36.6 °C)   TempSrc: Temporal   SpO2: 96%   Weight: 88.9 kg (196 lb)   Height: 175.3 cm (69.02\")   PainSc: 0-No pain     Current Status 1/12/2022   ECOG score 0           Physical exam      I HAVE PERSONALLY REVIEWED THE HISTORY OF THE PRESENT ILLNESS, PAST MEDICAL HISTORY, FAMILY HISTORY, SOCIAL HISTORY, ALLERGIES, MEDICATIONS STATED ABOVE IN THE  NOTE FROM TODAY.        GENERAL:  Well-developed, well-nourished  Patient  in no acute distress.   SKIN:  Warm, dry ,NO rashes,NO purpura ,NO petechiae.  HEENT:  Pupils were equal and reactive to light and accomodation, conjunctivae noninjected, no pterygium, normal extraocular movements, normal visual acuity.   NECK:  Supple with good range of motion; no thyromegaly , no other masses, no JVD or bruits, no cervical adenopathies.No carotid artery pain, no carotid abnormal pulsation , NO arterial dance.  LYMPHATICS:  No cervical, NO supraclavicular, NO axillary,NO epitrochlear , NO inguinal adenopathy.  CARDIAC   normal rate and regular rhythm, without murmur,NO rubs NO S3 NO S4 right or left .  LUNGS: normal breath sounds bilateral, no wheezing, rhonchi, crackles or rubs.  VASCULAR VENOUS: no cyanosis, collateral circulation, varicosities, edema, palpable cords, pain, " erythema.  ABDOMEN:  Soft, nontender with no hepatomegaly, no splenomegaly,no masses, no ascites, no collateral circulation,no distention,no [place] sign.  EXTREMITIES  AND SPINE:  No clubbing, cyanosis 2+ BILATERAL edema, no deformities , no pain .No kyphosis, scoliosis, no other deformities, no pain in spine, no pain in ribs , no pain inpelvic bone.  NEUROLOGICAL:  Patient was awake, alert, oriented to time, person and place.SENSORY NEUROPATHY IN FEET                  RECENT LABS:  Hematology WBC   Date Value Ref Range Status   01/12/2022 9.79 3.40 - 10.80 10*3/mm3 Final   02/02/2019 13.4 (H) 3.4 - 10.8 x10E3/uL Final     RBC   Date Value Ref Range Status   01/12/2022 3.77 (L) 4.14 - 5.80 10*6/mm3 Final   02/02/2019 4.81 4.14 - 5.80 x10E6/uL Final     Hemoglobin   Date Value Ref Range Status   01/12/2022 12.6 (L) 13.0 - 17.7 g/dL Final     Hematocrit   Date Value Ref Range Status   01/12/2022 39.4 37.5 - 51.0 % Final     Platelets   Date Value Ref Range Status   01/12/2022 117 (L) 140 - 450 10*3/mm3 Final     Comment:     plts [id number]              Assessment/Plan    1.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 Almost 90% of the liver WAS replaced by tumor. The patient has been undergoing chemotherapy with 5  fu and leucovorin  and Herceptin and has had excellent response clinically and radiologically. The patient was reviewed on 08/10/2020. I reviewed with the patient in the PAC system [facility] his PET scan that is dramatic. There is minimal uptake in the lower esophagus and most importantly complete resolution of his liver metastasis. Actually the only issue that is pertinent to the PET scan is still the visibility of his colovesical fistula.     Therefore from the point of view of his cancer of the esophagus, metastatic to the liver, he has no symptoms from the primary tumor in the esophagus and he has no symptoms related to his liver metastasis that has  resolved altogether. His CEA level is stable.The patient raised the question about the CEA fluctuation. I pointed out to him that a lot of this is also related to his smoking. Smoking per se elevates CEA level.  Upon further reviewing the patient on 04/20/2021, he has no symptoms pertinent to his metastatic cancer of the esophagus to the liver and he has no difficulty swallowing. There are no side effects of the 5-FU, Leucovorin and Herceptin. The patient's cardiac function remains stable and he has not had any drop in the ejection fraction.   I discussed with him on 05/18/2021 the fact that his cancer clinically is very quiet but I am afraid of the rise in his CEA level to 12. This could be an indicator of all of the cigarettes that he is smoking on a daily basis of indicator of cancer escaping control and not visible radiologically through his CT scan. I pointed out to him that he needs to continue making an effort to decrease his smoking and he is now down to 10 cigarettes a day. We will recheck another CEA level today. Given the circumstances of the previous CT scan I do not believe that I need to change the doses or plan of care in regard to his chemotherapy medication administration for the time being. I recommended for him to remain on his 5FU/leucovorin and Herceptin on a monthly basis for now.  The patient was further reviewed on 06/15/2021 with a new PET scan that documented regrowth of the tumor in the lower esophagus without any new symptomatology, for example dysphagia or odynophagia. No regurgitation. The liver metastasis remains in remission and there are no new areas of disease in the bones or lungs or any other site. Given the excellent performance status I discussed with the patient and his sister today many options of therapy, including going back to FOLFOX regimen along with Herceptin, taking another sample of the esophagus with a new endoscopy and doing analysis of the tissue for Caris Target  Now that will be my favorite choice, or palliative treatment with radiation therapy and 5-FU continuous infusion that will be toxic to him and he does not prefer to have.     I had a discussion with Mary Brito MD, thoracic surgeon, who has agreed to see the patient tomorrow. I think the endoscopy, the new tissue analysis, and sending the tissue for Caris Target Now will be the way to go. Depending on what we find there, we can modify his treatment altogether. I do not think the patient will have any consequences missing chemotherapy for a couple of weeks or so.      In preparation for the endoscopy and biopsies I asked him to hold off on the Xarelto until he is seen by Dr. Brito tomorrow.     In regard to his colovesical fistula, he has had minimal symptomatology this week, maybe some activity there and I asked him to go back to the lab and take a urine specimen and culture. He knows that if this is abnormal he will need to initiate ciprofloxacin that he has at home.    In regard to his hypertension, this is under good control and I advised him to remain on his lisinopril and hydrochlorothiazide combination.    In regard to his smoking cessation, he has had conversations with NIRU Jacobson about this. He is using some nicotine CQ patch, here and there and also trying to work with nicotine gum and things of this nature but he has not been able to shake this issue altogether.     Otherwise I will review him back in a couple of weeks with a CBC, CMP, and a CEA level.    This case will be presented in the multidisciplinary thoracic conference by me this Thursday, and I will discuss any further advice to the patient.     I also mentioned to the patient that on the background of HER2-positive cancer of the esophagus the possibility of using a new medicine for HER2-based disease that is called Enhertu is a real possibility and maybe that will be the next step to go through.    The patient was further reviewed on  07/01/2021. I reviewed with Dr. Brito the endoscopy that shows a noncircumferential abnormality in the lower esophagus that encompasses almost 6 cm in length. It is not blocking off the esophagus and that is why the patient has no symptoms. The pathology shows at least atypical cells consistent with cancer. Further Next Generation Sequencing has been sent for Caris Target Now.     I discussed with the patient the fact that we have many other modalities of treatment that he could encounter. He prefers to continue his general chemotherapy to control the cancer in his liver using 5-FU, leucovorin and Herceptin today and agree with that. In consideration to be seen by radiation therapy, the patient is willing to listen to the possibility of undergoing continuous 5-FU infusion along with radiation therapy to the esophagus knowing that he will experiencing esophagitis that can give him significant issues for 2-3 weeks. I went ahead and made the appointment for him to be seen by radiation oncology for this purpose only.     Obviously if the Next Generation Sequencing gives us any other hints in regard to how to treat him this could be also utilized including the consideration of using Enhertu in the long run for achieving better control.     In regard to his smoking cessation he is not willing to change this phenomenon at this time. We have had NIRU Jacobson talking with him in this regard but he is not ready to make a decision when to quit.     Finally, I pointed out to him that during the previous visit we documented a urinary tract infection with streptococcus 50,000 colonies that in my appreciation was significant given the fact that he has a colovesical fistula. This was treated with antibiotics. He has not had any discomfort issues pertinent to this anymore. The urine today is completely clear. Nevertheless, I went ahead and sent a urinalysis at least to be sure that there is no need for any other repetitive  infection therapy on him.     The patient also will remain on his anticoagulation at this time, his Xarelto for the time being. I have renewed as well his Neurontin. He has no need for pain medicine.  The patient was further reviewed on 07/23/2021. He has no new symptomatology pertinent to his cancer of the esophagus with liver metastasis. He has no difficulty swallowing. He has been presented in the multidisciplinary clinic on a couple of occasions and he has been seen by Radiation Oncology. Finally the analysis of Caris Target Now is available now showing that the patient's tumor is PD-L1 positive and has high mutation burden. The tumor remains HER/2 positive. Given these findings I think it is very easy to make a choice in regard stopping the present regimen of chemotherapy and proceed with therapy with Keytruda every 3 weeks for at least 4 cycles and reassess him at that point. In preparation for Keytruda initiation next week and we will get the approval of the medicine by his insurance company the patient will require smoking cessation altogether to minimize modification of cigarettes on his immune system. I insisted in this fact to the patient.     Other than that he will remain on probiotics. We will discontinue the 5FU/leucovorin and Herceptin and will move onto Keytruda. I discussed with him side effects of the Keytruda in detail as below. He will require treatment every 3 weeks with CBC, CMP, TSH every 3 weeks and he will require formal chemotherapy teaching, education and consent for this medicine.     After 4 cycles of this, in other words 3 months he will be reassessed with a new PET scan.     The chances under the present circumstances that he will improve as long as he quit smoking, it is very hard and maybe he could have long term survivorship.    I begged for him to have smoking cessation. If any time during his time with me we have been talking about this and this is absolutely necessary now. We  know that cigarettes kill immune system cells and minimize the benefit of treatments with these medicines.       The patient was further reviewed on 08/19/2021 in regard to his cancer of the esophagus. As stated above he had upper GI bleeding dropping hemoglobin to 5 requiring admission, transfusion, discontinuation of anticoagulation and endoscopy with local therapy by Naga Shelby MD. Since then he has completed 10 sessions of radiation therapy to the esophagus yesterday. He has not had any further bleed. His hemoglobin has bounced back from 5 to 14 and I have advised his this good news today.    I expect that the patient in a few days is going to develop an element of radiation esophagitis and he continues having some fatigue that will improve over time. So far no new issues have happened. I made him aware that if he has difficulty swallowing he will need to let us know, he will need to receive IV fluids in the office.    At least the radiation therapy will take care of the tumor in the esophagus and I do not believe that he will require any other GI endoscopy anymore.    Today the patient will proceed with his Keytruda that is the immunotherapy medicine that we are delivering to him at this time for the treatment of his liver metastasis and also the tumor in the esophagus. I pointed out to him that so far I do not see any side effects of the Keytruda and the Keytruda will improve and increase the benefit of radiation therapy into the primary tumor in the esophagus.     From the point of view of his anemia associated with GI bleeding, again this has been corrected. The hemoglobin today is 14 grams and I asked him to take his iron supplementation only twice a week 1 tablet. He has been taking 3 tablets everyday.     From the point of view of his thrombophilia associated with malignancy, he is no longer receiving any anticoagulants given the recent episode of GI bleeding. We will maintain him off anticoagulants as  much as we can. Hopefully he will not have any other episodes of thrombosis.     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     From the point of view of the Keytruda toxicity so far nothing happened. We will continue monitoring CBC, CMP and TSH periodically.     I will review him back in 3 and 6 weeks. When he comes back for the Keytruda he will continue receiving the medicine as the time goes by.    He understands that most of the benefit of radiation therapy in the tumor in the esophagus will be reached in 1 month and I am planning to give him a PET scan in more of less in 2 months from now when he will have almost 4 infusions of Keytruda under his belt and the completion of radiation therapy to the esophagus.     In regard to smoking cessation he has achieved a lot from 2 packs a day to 10 cigarettes a day. He will see Anh Felder RN, today in regard continuation of the benefit of the therapy for this condition at this point.   The patient was further reviewed on 09/30/2021. His Keytruda is still ongoing but the patient has developed dermatitis associated with this. It is a grade 1 toxicity so far and I am wondering if this is extending a little bit and the topical medicine is not going to be sufficient. I will proceed with his Keytruda today but maybe this will be the last administration of this medicine unless that we get shashi and the rash quiets down. Obviously at the completion for Keytruda the patient will require PET scan for assessment not only of the benefit of radiation therapy on his recurrence in the esophagus but also benefit of the Keytruda on his liver metastasis. Therefore, he will be scheduled for this before the next visit. The patient also has been advised that his hemoglobin is acceptable and it will be okay for him to stop his iron supplementation.     In regard to his  colovesical fistula he has had more urinary tract infections. I have advised him to go into Cipro 250 mg p.o. daily.     The patient has been advised in regard to the continuation of topical steroids in the skin in areas of involvement. This will remain an ongoing issue along with moisturizer to the skin in the form of Cetaphil.     In regard to his hypertension, he remains on medicines for this by me. His blood pressure is under good control at this time.     In regard to previous anticoagulation, he is no longer receiving any given his GI bleeding. This will remain in observation.     In regard to smoking cessation the patient will further discuss with NIRU Jacobson, plans for further decrease. He is down to 10 cigarettes a day. This is already a major accomplishment in somebody who used to smoke almost 3 packs of cigarettes a day.     The patient will have a new insurance in 11/2021 and I will make the insurance ladies aware of this.     In 3 weeks he will have a CBC, CMP, TSH and the PET scan the week before.  The patient was further reviewed on 10/21/2021. I reviewed with him his PET scan that shows still SUV activity in the lower esophagus but better than before but he has now 3 areas of abnormal uptake in the liver consistent with progressive liver metastasis. The lung anatomy is clear, the bone anatomy is clear. Colovesical fistula was visible still.    On the basis of these changes I do believe that this patient knowing that he has HER/2 positive esophageal cancer needs to change his therapy. Keytruda is not helpful at all and we will discontinue this medicine at this time. I do believe that the inability of Keytruda to overcome his disease process is related to the persistency of smoking and the abnormality related to smoking about bacterial derek. Further analysis recently published in [employer] has confirmed that the benefit of immunotherapy is further boosted by proper amount of derek in the  gastrointestinal tract and how cigarettes are damaging to this issue. In any event the patient's issues in regard to cigarette smoking has been already a problem and he has not been able to quit in spite of all of the support available. Therefore stopping the Keytruda at this point including today the patient will move to Enhertu that has been approved in patients who have cancer of the esophagus with liver metastasis. The dose of this medicine will be the GI dose of this medication that will be properly calculated accordingly. He will have formal education and consent for this medicine. I pointed out the most important side effects of this medicine include cardiac toxicity, anemia, leukopenia, thrombocytopenia and pneumonitis that includes cough, shortness of breath, fever. In preparation to minimize pneumonitis the patient will take prednisone 10 mg on day 2 and 3 after each dose of Enhertu. He will receive this medicine every 3 weeks. We will deliver 3-4 cycles and reassess him not only by tumor markers but also radiologically. Hopefully this will have a positive impact not only in the tumor in the esophagus but also tumors metastatic in his liver.   The patient was reviewed on 11/19/2021. Tolerance to the Enhertu 1st cycle was appropriate with more fatigue, no increased cough or shortness of breath and some anorexia. His white count, hemoglobin and platelets were normal and we advised him to proceed with his 2nd Enhertu infusion today. He understands that fatigue will be more prominent, that he could have chance for more hematological toxicity and he is starting to develop minor anemia. His white count and platelet count remain acceptable. I reminded him on many occasions today through the visit and insisted to the sister that if his cough pattern changes, increasing or his shortness of breath becomes worse he has to notify us immediately to be sure that he will not develop pneumonitis associated with Enhertu.  He still will take 10 mg of prednisone tomorrow and Sunday to try to minimize this phenomenon from happening.     The patient will be having blood counts on a weekly basis with nurse visit to be sure to monitor hematological toxicity and he will return in 3 weeks to proceed with the next cycle. After the 3rd cycle the patient will proceed with radiological assessment including PET scan.   The patient was further reviewed on 12/30/2021. On clinical grounds the patient has not had any difficulty swallowing, his liver is not enlarged, nodular or tender, he has no jaundice and his CEA level has dropped further. His PET scan discloses resolution of his periportal lymph nodes and one area of resolution of liver metastasis. He has complete resolution of the tumor in the esophagus. He has still 2 active lesions in the liver with significant SUV activity pertinent to his metastatic disease. Given this fact the patient could be a candidate to further receive Enhertu but my concern is about the radiological analysis that shows minimal pulmonary infiltrate bilaterally. The radiologist suggests a radiation pneumonitis. I do not think that is the case. I think this is probably related to Enhertu interstitial lung disease and for this reason I feel the obligation to stop this medication at this point and to proceed with therapy with prednisone at 20 mg a day for the next 2 weeks until the patient returns back to see me. I made him aware that if the respiratory symptoms including his cough gets any worse or shortness of breath establishes and gets worse he will need to notify us immediately and he will require to be placed in the hospital to receive IV high dose steroid.    The question will be if the patient will be a candidate to receive any further Enhertu or not remains to be seen. Probably will not be the case.     Obviously this above statement opens the door in regard what else to do for his malignancy. Strong consideration  will be to go back to Herceptin and also to consider to go back to FOLFOX, Herceptin like he received initially that gave him such a degree of resolution of symptoms. 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 I discussed this with him and his sister present in the room. I encouraged him to decrease his smoking.   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     In preparation for retaking treatment, I advised him that I would like to go back into FOLFOX, Herceptin every 3 weeks starting next week. This combination of medicines never failed him. It was discontinued because of toxicity and neuropathy. Therefore, I think it is worth it to go ahead and proceed with an echocardiogram and resume chemotherapy administration with this , giving him 3 cycles, each one of them 3 weeks apart and see how things evolve from the point of view tumor markers or radiological analysis of his liver through PET scan. He agrees to proceed.        ·   2.In regard to his colovesical fistula he is now receiving ciprofloxacin on a daily basis low dose and I advised him to remain on this medicine for the time being.   On 11/19/2021 he has no symptoms or signs of urinary tract infection and he remains on ciprofloxacin prophylactically everyday.   On 12/30/2021 his colovesical fistula is still evident on the PET scan. He has gas in the bladder and he has had some hematuria. Therefore I do believe that the patient needs to remain on ciprofloxacin 1 tablet every other day to try to minimize any potential for any  other urinary tract infection related to this. The patient under the present circumstances is not a candidate to have any kind of surgery unless the symptoms get worse or clinical picture changes radically.   His colovesical fistula remains active. He remains taking ciprofloxacin 250 mg p.o. every other day. He has not had any fevers or chills and I advised him to remain on this medicine for the time being. His pharmacy called in regard that they have not had Cipro anymore. The patient has tablets for almost 2 months supply. Therefore, he has no need for Levaquin or any other medicine or intervention at this point.        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   He remains on Neurontin for the treatment of his peripheral neuropathy. I advised him to remain on this medicine for the time being.  On 01/12/2022, I advised the patient to remain on his gabapentin for the treatment of his sensory peripheral neuropathy in his feet.      ·   4.In regard to his hypertension, he remains on lisinopril, hydrochlorothiazide provided by me.   I reviewed him back on 11/19/2021. His blood pressure is 90/48 in spite of proper hydration. This is probably the effect of the combination of Trental and his blood pressure medication. I advised him to discontinue his blood pressure medication at this time and advised him to have proper hydration. He has a device to check his blood pressure at home. I asked him to check this on a daily basis at least a couple of times a day. If his blood pressure goes up above 130/90 he will take 1/2 of the dose of blood pressure medication that he was taking before. Those tablets have the way to be split. He will require checking blood pressure. If the blood pressure drops again he will hold off blood pressure medication indefinitely.   His hypertension has been reviewed on 12/30/2021. I  think his blood pressure today is very good. He will remain on his blood pressure medication for the time being. I find no reason to change dosing.   The patient was advised to remain on his blood pressure medication, Prinzide.        ·   5.In regard to his previous GI bleeding associated with his cancer and esophagitis we advised the patient to remain on Protonix.   On 11/19/2021 he has not had any evidence of GI bleeding and the hemoglobin remains stable. The minor drop obeys to toxicity from Enhertu and bone marrow suppression not because of effect of GI bleeding.   He has not had any other episodes of GI bleeding as per 12/30/2021.   On 01/12/2022 he has not had any other episodes of GI bleeding. He is no longer taking any anticoagulants.        ·         6.In regard finally peripheral arterial disease I am going to send the patient a prescription for Trental to take 1 tablet twice a day. He has an appointment to be seen by vascular on 12/18/2021. I pointed out to the patient that if he wants to change the route of this process he must modify his cigarettes otherwise there is no hope. He was not able to take medication provided by [facility] because of side effects.   In regard to his peripheral arterial disease I advised him on 11/19/2021 to decrease his Trental to 1 tablet twice a day. He is now using topical Cetaphil on his feet to try to improve moisture and decrease cracking.   On 12/30/2021 his peripheral neuropathy actually is better through the Doppler study done by [facility]. I wonder if the trental is the one playing a role in this. I have asked him to remain on the trental for the time being.       He is not willing to entertain smoking cessation to help out peripheral arterial disease and we insisted into this in presence of his sister. He says that he is working on it. I do not feel any confidence in seeing this phenomenon anymore and that is a reality that we need to  contemplate and just wait.   In regard to his peripheral artery disease, the patient is still smoking 15 cigarettes a day. The Trental has made a big difference in regard to his ability to get around and walk. I advised him to remain on Trental.    ·     7.This patient's blood sugar has remained in the 130's, 140's, 150's for the last several visits. His hemoglobin A1C today is 5.9.  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 He will require the utilization of Glucotrol XL 5 mg. I went ahead and sent this prescription to the pharmacy. I pointed out to him that he is eating a diet rich in carbohydrates mostly and I pointed out to him that he needs to eat more vegetables, some fruit and high complex carbohydrates that will minimize issues pertinent to his blood sugar. It is even more important now that he has initiated prednisone as posted.   I reviewed with him on 01/12/2022 his hemoglobin A1c of 5.9. I advised him to remain on a diet that is not too much rich in sugar and stay on his glipizide 5 mg every day. He is very selective in his diet. He finds things that tastes okay and other ones that do not taste okay and I think we have no solution for this definitive problem.      In summary, as stated above the patient will proceed with echocardiogram and chemotherapy with FOLFOX, Herceptin, starting next week every 3 weeks for 3 cycles and reassess him biochemically through tumor markers, CEA, and also through PET scan after that.           ·

## 2022-01-12 NOTE — TELEPHONE ENCOUNTER
Reviewed Dr. Haynes' note w/ patient. Pt to take 20 MEQ/day. Pharm verified. Pt v/u. Marisol Lam RN

## 2022-01-13 ENCOUNTER — HOSPITAL ENCOUNTER (OUTPATIENT)
Dept: CARDIOLOGY | Facility: HOSPITAL | Age: 67
Discharge: HOME | End: 2022-01-13
Admitting: INTERNAL MEDICINE

## 2022-01-13 VITALS
DIASTOLIC BLOOD PRESSURE: 70 MMHG | SYSTOLIC BLOOD PRESSURE: 130 MMHG | WEIGHT: 196 LBS | BODY MASS INDEX: 29.03 KG/M2 | OXYGEN SATURATION: 96 % | HEIGHT: 69 IN | HEART RATE: 91 BPM

## 2022-01-13 DIAGNOSIS — C15.5 MALIGNANT NEOPLASM OF LOWER THIRD OF ESOPHAGUS: ICD-10-CM

## 2022-01-13 DIAGNOSIS — T45.1X5A PERIPHERAL NEUROPATHY DUE TO CHEMOTHERAPY: ICD-10-CM

## 2022-01-13 DIAGNOSIS — G62.0 PERIPHERAL NEUROPATHY DUE TO CHEMOTHERAPY: ICD-10-CM

## 2022-01-13 DIAGNOSIS — Z72.0 TOBACCO ABUSE: ICD-10-CM

## 2022-01-13 DIAGNOSIS — J84.89 PNEUMONITIS, INTERSTITIAL: ICD-10-CM

## 2022-01-13 DIAGNOSIS — D63.8 ANEMIA, CHRONIC DISEASE: ICD-10-CM

## 2022-01-13 DIAGNOSIS — I10 ESSENTIAL HYPERTENSION: ICD-10-CM

## 2022-01-13 DIAGNOSIS — E78.2 MIXED HYPERLIPIDEMIA: ICD-10-CM

## 2022-01-13 DIAGNOSIS — R73.01 IMPAIRED FASTING GLUCOSE: ICD-10-CM

## 2022-01-13 DIAGNOSIS — C78.7 LIVER METASTASIS: ICD-10-CM

## 2022-01-13 DIAGNOSIS — N32.1 RECTO-BLADDERNECK FISTULA: ICD-10-CM

## 2022-01-13 DIAGNOSIS — R73.9 HYPERGLYCEMIA, UNSPECIFIED: ICD-10-CM

## 2022-01-13 PROCEDURE — 25010000002 PERFLUTREN (DEFINITY) 8.476 MG IN SODIUM CHLORIDE (PF) 0.9 % 10 ML INJECTION: Performed by: INTERNAL MEDICINE

## 2022-01-13 PROCEDURE — 93306 TTE W/DOPPLER COMPLETE: CPT | Performed by: INTERNAL MEDICINE

## 2022-01-13 PROCEDURE — 93356 MYOCRD STRAIN IMG SPCKL TRCK: CPT

## 2022-01-13 PROCEDURE — 93356 MYOCRD STRAIN IMG SPCKL TRCK: CPT | Performed by: INTERNAL MEDICINE

## 2022-01-13 PROCEDURE — 93306 TTE W/DOPPLER COMPLETE: CPT

## 2022-01-13 RX ADMIN — PERFLUTREN 1.5 ML: 6.52 INJECTION, SUSPENSION INTRAVENOUS at 08:22

## 2022-01-14 LAB
ASCENDING AORTA: 3.4 CM
BH CV ECHO MEAS - ACS: 1.9 CM
BH CV ECHO MEAS - AO MAX PG (FULL): 3 MMHG
BH CV ECHO MEAS - AO MAX PG: 7.1 MMHG
BH CV ECHO MEAS - AO MEAN PG (FULL): 1.6 MMHG
BH CV ECHO MEAS - AO MEAN PG: 3.7 MMHG
BH CV ECHO MEAS - AO ROOT AREA (BSA CORRECTED): 1.8
BH CV ECHO MEAS - AO ROOT AREA: 11 CM^2
BH CV ECHO MEAS - AO ROOT DIAM: 3.7 CM
BH CV ECHO MEAS - AO V2 MAX: 133 CM/SEC
BH CV ECHO MEAS - AO V2 MEAN: 90.1 CM/SEC
BH CV ECHO MEAS - AO V2 VTI: 25.2 CM
BH CV ECHO MEAS - ASC AORTA: 3.4 CM
BH CV ECHO MEAS - AVA(I,A): 2.7 CM^2
BH CV ECHO MEAS - AVA(I,D): 2.7 CM^2
BH CV ECHO MEAS - AVA(V,A): 2.6 CM^2
BH CV ECHO MEAS - AVA(V,D): 2.6 CM^2
BH CV ECHO MEAS - BSA(HAYCOCK): 2.1 M^2
BH CV ECHO MEAS - BSA: 2 M^2
BH CV ECHO MEAS - BZI_BMI: 28.9 KILOGRAMS/M^2
BH CV ECHO MEAS - BZI_METRIC_HEIGHT: 175.3 CM
BH CV ECHO MEAS - BZI_METRIC_WEIGHT: 88.9 KG
BH CV ECHO MEAS - EDV(MOD-SP2): 109 ML
BH CV ECHO MEAS - EDV(MOD-SP4): 116 ML
BH CV ECHO MEAS - EDV(TEICH): 64.5 ML
BH CV ECHO MEAS - EF(CUBED): 66.5 %
BH CV ECHO MEAS - EF(MOD-BP): 61 %
BH CV ECHO MEAS - EF(MOD-SP2): 59.6 %
BH CV ECHO MEAS - EF(MOD-SP4): 62.9 %
BH CV ECHO MEAS - EF(TEICH): 58.8 %
BH CV ECHO MEAS - ESV(MOD-SP2): 44 ML
BH CV ECHO MEAS - ESV(MOD-SP4): 43 ML
BH CV ECHO MEAS - ESV(TEICH): 26.6 ML
BH CV ECHO MEAS - FS: 30.6 %
BH CV ECHO MEAS - IVS/LVPW: 1
BH CV ECHO MEAS - IVSD: 1.3 CM
BH CV ECHO MEAS - LAT PEAK E' VEL: 7 CM/SEC
BH CV ECHO MEAS - LV DIASTOLIC VOL/BSA (35-75): 56.6 ML/M^2
BH CV ECHO MEAS - LV MASS(C)D: 173.9 GRAMS
BH CV ECHO MEAS - LV MASS(C)DI: 84.9 GRAMS/M^2
BH CV ECHO MEAS - LV MAX PG: 4.1 MMHG
BH CV ECHO MEAS - LV MEAN PG: 2.1 MMHG
BH CV ECHO MEAS - LV SYSTOLIC VOL/BSA (12-30): 21 ML/M^2
BH CV ECHO MEAS - LV V1 MAX: 101.5 CM/SEC
BH CV ECHO MEAS - LV V1 MEAN: 68.9 CM/SEC
BH CV ECHO MEAS - LV V1 VTI: 19.5 CM
BH CV ECHO MEAS - LVIDD: 3.9 CM
BH CV ECHO MEAS - LVIDS: 2.7 CM
BH CV ECHO MEAS - LVLD AP2: 7.8 CM
BH CV ECHO MEAS - LVLD AP4: 7.8 CM
BH CV ECHO MEAS - LVLS AP2: 6.1 CM
BH CV ECHO MEAS - LVLS AP4: 6.5 CM
BH CV ECHO MEAS - LVOT AREA (M): 3.5 CM^2
BH CV ECHO MEAS - LVOT AREA: 3.5 CM^2
BH CV ECHO MEAS - LVOT DIAM: 2.1 CM
BH CV ECHO MEAS - LVPWD: 1.3 CM
BH CV ECHO MEAS - MED PEAK E' VEL: 7 CM/SEC
BH CV ECHO MEAS - MR MAX PG: 44.2 MMHG
BH CV ECHO MEAS - MR MAX VEL: 332.6 CM/SEC
BH CV ECHO MEAS - MV A DUR: 0.1 SEC
BH CV ECHO MEAS - MV A MAX VEL: 98.1 CM/SEC
BH CV ECHO MEAS - MV DEC SLOPE: 534.6 CM/SEC^2
BH CV ECHO MEAS - MV DEC TIME: 0.24 SEC
BH CV ECHO MEAS - MV E MAX VEL: 77.6 CM/SEC
BH CV ECHO MEAS - MV E/A: 0.79
BH CV ECHO MEAS - MV MAX PG: 4.5 MMHG
BH CV ECHO MEAS - MV MEAN PG: 2.3 MMHG
BH CV ECHO MEAS - MV P1/2T MAX VEL: 94.7 CM/SEC
BH CV ECHO MEAS - MV P1/2T: 51.9 MSEC
BH CV ECHO MEAS - MV V2 MAX: 106 CM/SEC
BH CV ECHO MEAS - MV V2 MEAN: 71.9 CM/SEC
BH CV ECHO MEAS - MV V2 VTI: 25.4 CM
BH CV ECHO MEAS - MVA P1/2T LCG: 2.3 CM^2
BH CV ECHO MEAS - MVA(P1/2T): 4.2 CM^2
BH CV ECHO MEAS - MVA(VTI): 2.6 CM^2
BH CV ECHO MEAS - PA ACC TIME: 0.11 SEC
BH CV ECHO MEAS - PA MAX PG (FULL): 2.1 MMHG
BH CV ECHO MEAS - PA MAX PG: 4.7 MMHG
BH CV ECHO MEAS - PA PR(ACCEL): 30.3 MMHG
BH CV ECHO MEAS - PA V2 MAX: 108.6 CM/SEC
BH CV ECHO MEAS - PULM A REVS DUR: 0.08 SEC
BH CV ECHO MEAS - PULM A REVS VEL: 34.7 CM/SEC
BH CV ECHO MEAS - PULM DIAS VEL: 38.6 CM/SEC
BH CV ECHO MEAS - PULM S/D: 1.6
BH CV ECHO MEAS - PULM SYS VEL: 62.6 CM/SEC
BH CV ECHO MEAS - PVA(V,A): 3.1 CM^2
BH CV ECHO MEAS - PVA(V,D): 3.1 CM^2
BH CV ECHO MEAS - QP/QS: 0.86
BH CV ECHO MEAS - RV MAX PG: 2.6 MMHG
BH CV ECHO MEAS - RV MEAN PG: 1.4 MMHG
BH CV ECHO MEAS - RV V1 MAX: 81 CM/SEC
BH CV ECHO MEAS - RV V1 MEAN: 54.8 CM/SEC
BH CV ECHO MEAS - RV V1 VTI: 13.9 CM
BH CV ECHO MEAS - RVOT AREA: 4.1 CM^2
BH CV ECHO MEAS - RVOT DIAM: 2.3 CM
BH CV ECHO MEAS - SI(AO): 135.1 ML/M^2
BH CV ECHO MEAS - SI(CUBED): 18.8 ML/M^2
BH CV ECHO MEAS - SI(LVOT): 32.8 ML/M^2
BH CV ECHO MEAS - SI(MOD-SP2): 31.7 ML/M^2
BH CV ECHO MEAS - SI(MOD-SP4): 35.6 ML/M^2
BH CV ECHO MEAS - SI(TEICH): 18.5 ML/M^2
BH CV ECHO MEAS - SUP REN AO DIAM: 22.4 CM
BH CV ECHO MEAS - SV(AO): 276.7 ML
BH CV ECHO MEAS - SV(CUBED): 38.4 ML
BH CV ECHO MEAS - SV(LVOT): 67.2 ML
BH CV ECHO MEAS - SV(MOD-SP2): 65 ML
BH CV ECHO MEAS - SV(MOD-SP4): 73 ML
BH CV ECHO MEAS - SV(RVOT): 57.6 ML
BH CV ECHO MEAS - SV(TEICH): 37.9 ML
BH CV ECHO MEAS - TAPSE (>1.6): 2.2 CM
BH CV ECHO MEASUREMENTS AVERAGE E/E' RATIO: 11.09
BH CV XLRA - RV BASE: 3.3 CM
BH CV XLRA - RV LENGTH: 5.9 CM
BH CV XLRA - RV MID: 3 CM
BH CV XLRA - TDI S': 15.9 CM/SEC
LEFT ATRIUM VOLUME INDEX: 20 ML/M2
MAXIMAL PREDICTED HEART RATE: 155 BPM
SINUS: 3.7 CM
STJ: 3.2 CM
STRESS TARGET HR: 132 BPM

## 2022-01-17 DIAGNOSIS — C78.7 LIVER METASTASIS: ICD-10-CM

## 2022-01-17 DIAGNOSIS — D49.0 ESOPHAGUS NEOPLASM: ICD-10-CM

## 2022-01-17 DIAGNOSIS — C15.5 MALIGNANT NEOPLASM OF LOWER THIRD OF ESOPHAGUS: Primary | ICD-10-CM

## 2022-01-19 ENCOUNTER — INFUSION (OUTPATIENT)
Dept: ONCOLOGY | Facility: HOSPITAL | Age: 67
End: 2022-01-19

## 2022-01-19 VITALS
SYSTOLIC BLOOD PRESSURE: 151 MMHG | BODY MASS INDEX: 27.76 KG/M2 | OXYGEN SATURATION: 100 % | TEMPERATURE: 96.9 F | RESPIRATION RATE: 18 BRPM | HEART RATE: 109 BPM | DIASTOLIC BLOOD PRESSURE: 74 MMHG | WEIGHT: 188 LBS

## 2022-01-19 DIAGNOSIS — C78.7 LIVER METASTASIS: ICD-10-CM

## 2022-01-19 DIAGNOSIS — C15.5 MALIGNANT NEOPLASM OF LOWER THIRD OF ESOPHAGUS: Primary | ICD-10-CM

## 2022-01-19 DIAGNOSIS — D49.0 ESOPHAGUS NEOPLASM: ICD-10-CM

## 2022-01-19 LAB
ALBUMIN SERPL-MCNC: 3.7 G/DL (ref 3.5–5.2)
ALBUMIN/GLOB SERPL: 1.2 G/DL (ref 1.1–2.4)
ALP SERPL-CCNC: 202 U/L (ref 38–116)
ALT SERPL W P-5'-P-CCNC: 45 U/L (ref 0–41)
ANION GAP SERPL CALCULATED.3IONS-SCNC: 10.5 MMOL/L (ref 5–15)
AST SERPL-CCNC: 41 U/L (ref 0–40)
BASOPHILS # BLD AUTO: 0.04 10*3/MM3 (ref 0–0.2)
BASOPHILS NFR BLD AUTO: 0.4 % (ref 0–1.5)
BILIRUB SERPL-MCNC: 0.7 MG/DL (ref 0.2–1.2)
BUN SERPL-MCNC: 13 MG/DL (ref 6–20)
BUN/CREAT SERPL: 23.2 (ref 7.3–30)
CALCIUM SPEC-SCNC: 9 MG/DL (ref 8.5–10.2)
CHLORIDE SERPL-SCNC: 100 MMOL/L (ref 98–107)
CO2 SERPL-SCNC: 24.5 MMOL/L (ref 22–29)
CREAT SERPL-MCNC: 0.56 MG/DL (ref 0.7–1.3)
DEPRECATED RDW RBC AUTO: 60.6 FL (ref 37–54)
EOSINOPHIL # BLD AUTO: 0.28 10*3/MM3 (ref 0–0.4)
EOSINOPHIL NFR BLD AUTO: 2.9 % (ref 0.3–6.2)
ERYTHROCYTE [DISTWIDTH] IN BLOOD BY AUTOMATED COUNT: 15.9 % (ref 12.3–15.4)
GFR SERPL CREATININE-BSD FRML MDRD: 146 ML/MIN/1.73
GLOBULIN UR ELPH-MCNC: 3.1 GM/DL (ref 1.8–3.5)
GLUCOSE SERPL-MCNC: 92 MG/DL (ref 74–124)
HCT VFR BLD AUTO: 40.8 % (ref 37.5–51)
HGB BLD-MCNC: 13.2 G/DL (ref 13–17.7)
IMM GRANULOCYTES # BLD AUTO: 0.22 10*3/MM3 (ref 0–0.05)
IMM GRANULOCYTES NFR BLD AUTO: 2.3 % (ref 0–0.5)
LYMPHOCYTES # BLD AUTO: 0.83 10*3/MM3 (ref 0.7–3.1)
LYMPHOCYTES NFR BLD AUTO: 8.5 % (ref 19.6–45.3)
MCH RBC QN AUTO: 33 PG (ref 26.6–33)
MCHC RBC AUTO-ENTMCNC: 32.4 G/DL (ref 31.5–35.7)
MCV RBC AUTO: 102 FL (ref 79–97)
MONOCYTES # BLD AUTO: 0.67 10*3/MM3 (ref 0.1–0.9)
MONOCYTES NFR BLD AUTO: 6.9 % (ref 5–12)
NEUTROPHILS NFR BLD AUTO: 7.71 10*3/MM3 (ref 1.7–7)
NEUTROPHILS NFR BLD AUTO: 79 % (ref 42.7–76)
NRBC BLD AUTO-RTO: 0 /100 WBC (ref 0–0.2)
PLATELET # BLD AUTO: 104 10*3/MM3 (ref 140–450)
PMV BLD AUTO: 9.4 FL (ref 6–12)
POTASSIUM SERPL-SCNC: 3.5 MMOL/L (ref 3.5–4.7)
PROT SERPL-MCNC: 6.8 G/DL (ref 6.3–8)
RBC # BLD AUTO: 4 10*6/MM3 (ref 4.14–5.8)
SODIUM SERPL-SCNC: 135 MMOL/L (ref 134–145)
WBC NRBC COR # BLD: 9.75 10*3/MM3 (ref 3.4–10.8)

## 2022-01-19 PROCEDURE — 25010000002 LEUCOVORIN CALCIUM PER 50 MG: Performed by: INTERNAL MEDICINE

## 2022-01-19 PROCEDURE — 96417 CHEMO IV INFUS EACH ADDL SEQ: CPT

## 2022-01-19 PROCEDURE — 80053 COMPREHEN METABOLIC PANEL: CPT

## 2022-01-19 PROCEDURE — 25010000002 FLUOROURACIL PER 500 MG: Performed by: INTERNAL MEDICINE

## 2022-01-19 PROCEDURE — 85025 COMPLETE CBC W/AUTO DIFF WBC: CPT

## 2022-01-19 PROCEDURE — 96368 THER/DIAG CONCURRENT INF: CPT

## 2022-01-19 PROCEDURE — 25010000002 OXALIPLATIN PER 0.5 MG: Performed by: INTERNAL MEDICINE

## 2022-01-19 PROCEDURE — 25010000002 DEXAMETHASONE SODIUM PHOSPHATE 100 MG/10ML SOLUTION: Performed by: INTERNAL MEDICINE

## 2022-01-19 PROCEDURE — 96411 CHEMO IV PUSH ADDL DRUG: CPT

## 2022-01-19 PROCEDURE — 25010000002 FOSAPREPITANT PER 1 MG: Performed by: INTERNAL MEDICINE

## 2022-01-19 PROCEDURE — 96375 TX/PRO/DX INJ NEW DRUG ADDON: CPT

## 2022-01-19 PROCEDURE — 96367 TX/PROPH/DG ADDL SEQ IV INF: CPT

## 2022-01-19 PROCEDURE — 25010000002 DIPHENHYDRAMINE PER 50 MG: Performed by: INTERNAL MEDICINE

## 2022-01-19 PROCEDURE — 96416 CHEMO PROLONG INFUSE W/PUMP: CPT

## 2022-01-19 PROCEDURE — 96413 CHEMO IV INFUSION 1 HR: CPT

## 2022-01-19 PROCEDURE — 25010000002 TRASTUZUMAB-ANNS 420 MG RECONSTITUTED SOLUTION 1 EACH VIAL: Performed by: INTERNAL MEDICINE

## 2022-01-19 PROCEDURE — 96415 CHEMO IV INFUSION ADDL HR: CPT

## 2022-01-19 PROCEDURE — 25010000002 PALONOSETRON PER 25 MCG: Performed by: INTERNAL MEDICINE

## 2022-01-19 RX ORDER — DEXTROSE MONOHYDRATE 50 MG/ML
250 INJECTION, SOLUTION INTRAVENOUS ONCE
Status: COMPLETED | OUTPATIENT
Start: 2022-01-19 | End: 2022-01-19

## 2022-01-19 RX ORDER — SODIUM CHLORIDE 9 MG/ML
250 INJECTION, SOLUTION INTRAVENOUS ONCE
Status: COMPLETED | OUTPATIENT
Start: 2022-01-19 | End: 2022-01-19

## 2022-01-19 RX ORDER — FAMOTIDINE 10 MG/ML
20 INJECTION, SOLUTION INTRAVENOUS ONCE
Status: COMPLETED | OUTPATIENT
Start: 2022-01-19 | End: 2022-01-19

## 2022-01-19 RX ORDER — FAMOTIDINE 10 MG/ML
20 INJECTION, SOLUTION INTRAVENOUS AS NEEDED
Status: CANCELLED | OUTPATIENT
Start: 2022-01-19

## 2022-01-19 RX ORDER — FLUOROURACIL 50 MG/ML
400 INJECTION, SOLUTION INTRAVENOUS ONCE
Status: COMPLETED | OUTPATIENT
Start: 2022-01-19 | End: 2022-01-19

## 2022-01-19 RX ORDER — DEXTROSE MONOHYDRATE 50 MG/ML
250 INJECTION, SOLUTION INTRAVENOUS ONCE
Status: CANCELLED | OUTPATIENT
Start: 2022-01-19

## 2022-01-19 RX ORDER — PALONOSETRON 0.05 MG/ML
0.25 INJECTION, SOLUTION INTRAVENOUS ONCE
Status: CANCELLED | OUTPATIENT
Start: 2022-01-19

## 2022-01-19 RX ORDER — ACETAMINOPHEN 325 MG/1
650 TABLET ORAL ONCE
Status: COMPLETED | OUTPATIENT
Start: 2022-01-19 | End: 2022-01-19

## 2022-01-19 RX ORDER — PALONOSETRON 0.05 MG/ML
0.25 INJECTION, SOLUTION INTRAVENOUS ONCE
Status: COMPLETED | OUTPATIENT
Start: 2022-01-19 | End: 2022-01-19

## 2022-01-19 RX ORDER — SODIUM CHLORIDE 9 MG/ML
250 INJECTION, SOLUTION INTRAVENOUS ONCE
Status: CANCELLED | OUTPATIENT
Start: 2022-01-19

## 2022-01-19 RX ORDER — DIPHENHYDRAMINE HYDROCHLORIDE 50 MG/ML
50 INJECTION INTRAMUSCULAR; INTRAVENOUS AS NEEDED
Status: CANCELLED | OUTPATIENT
Start: 2022-01-19

## 2022-01-19 RX ORDER — FLUOROURACIL 50 MG/ML
400 INJECTION, SOLUTION INTRAVENOUS ONCE
Status: CANCELLED | OUTPATIENT
Start: 2022-01-19

## 2022-01-19 RX ORDER — FAMOTIDINE 10 MG/ML
20 INJECTION, SOLUTION INTRAVENOUS ONCE
Status: CANCELLED
Start: 2022-01-19 | End: 2022-01-19

## 2022-01-19 RX ADMIN — FLUOROURACIL 4900 MG: 50 INJECTION, SOLUTION INTRAVENOUS at 15:19

## 2022-01-19 RX ADMIN — FLUOROURACIL 820 MG: 50 INJECTION, SOLUTION INTRAVENOUS at 15:18

## 2022-01-19 RX ADMIN — ACETAMINOPHEN 650 MG: 325 TABLET ORAL at 15:02

## 2022-01-19 RX ADMIN — DEXAMETHASONE SODIUM PHOSPHATE 12 MG: 10 INJECTION, SOLUTION INTRAMUSCULAR; INTRAVENOUS at 12:52

## 2022-01-19 RX ADMIN — DEXTROSE MONOHYDRATE 820 MG: 50 INJECTION, SOLUTION INTRAVENOUS at 13:15

## 2022-01-19 RX ADMIN — OXALIPLATIN 175 MG: 5 INJECTION, SOLUTION, CONCENTRATE INTRAVENOUS at 13:15

## 2022-01-19 RX ADMIN — SODIUM CHLORIDE 100 ML: 9 INJECTION, SOLUTION INTRAVENOUS at 12:21

## 2022-01-19 RX ADMIN — DEXTROSE MONOHYDRATE 250 ML: 50 INJECTION, SOLUTION INTRAVENOUS at 13:10

## 2022-01-19 RX ADMIN — FAMOTIDINE 20 MG: 10 INJECTION, SOLUTION INTRAVENOUS at 09:59

## 2022-01-19 RX ADMIN — PALONOSETRON HYDROCHLORIDE 0.25 MG: 0.25 INJECTION INTRAVENOUS at 12:20

## 2022-01-19 RX ADMIN — SODIUM CHLORIDE 250 ML: 9 INJECTION, SOLUTION INTRAVENOUS at 09:59

## 2022-01-19 RX ADMIN — TRASTUZUMAB-ANNS 710 MG: 420 INJECTION, POWDER, LYOPHILIZED, FOR SOLUTION INTRAVENOUS at 10:30

## 2022-01-21 ENCOUNTER — INFUSION (OUTPATIENT)
Dept: ONCOLOGY | Facility: HOSPITAL | Age: 67
End: 2022-01-21

## 2022-01-21 DIAGNOSIS — D49.0 ESOPHAGUS NEOPLASM: ICD-10-CM

## 2022-01-21 DIAGNOSIS — C78.7 LIVER METASTASIS: ICD-10-CM

## 2022-01-21 DIAGNOSIS — Z45.2 ENCOUNTER FOR ADJUSTMENT OR MANAGEMENT OF VASCULAR ACCESS DEVICE: ICD-10-CM

## 2022-01-21 DIAGNOSIS — C15.5 MALIGNANT NEOPLASM OF LOWER THIRD OF ESOPHAGUS: Primary | ICD-10-CM

## 2022-01-21 PROCEDURE — 25010000002 HEPARIN LOCK FLUSH PER 10 UNITS: Performed by: INTERNAL MEDICINE

## 2022-01-21 RX ORDER — HEPARIN SODIUM (PORCINE) LOCK FLUSH IV SOLN 100 UNIT/ML 100 UNIT/ML
500 SOLUTION INTRAVENOUS AS NEEDED
Status: DISCONTINUED | OUTPATIENT
Start: 2022-01-21 | End: 2022-01-21 | Stop reason: HOSPADM

## 2022-01-21 RX ORDER — SODIUM CHLORIDE 0.9 % (FLUSH) 0.9 %
10 SYRINGE (ML) INJECTION AS NEEDED
Status: CANCELLED | OUTPATIENT
Start: 2022-01-21

## 2022-01-21 RX ORDER — HEPARIN SODIUM (PORCINE) LOCK FLUSH IV SOLN 100 UNIT/ML 100 UNIT/ML
500 SOLUTION INTRAVENOUS AS NEEDED
Status: CANCELLED | OUTPATIENT
Start: 2022-01-21

## 2022-01-21 RX ADMIN — Medication 500 UNITS: at 13:20

## 2022-01-24 RX ORDER — PENTOXIFYLLINE 400 MG/1
TABLET, EXTENDED RELEASE ORAL
Qty: 90 TABLET | Refills: 0 | Status: SHIPPED | OUTPATIENT
Start: 2022-01-24 | End: 2022-03-02

## 2022-02-07 DIAGNOSIS — C15.5 MALIGNANT NEOPLASM OF LOWER THIRD OF ESOPHAGUS: Primary | ICD-10-CM

## 2022-02-07 DIAGNOSIS — C78.7 LIVER METASTASIS: ICD-10-CM

## 2022-02-07 DIAGNOSIS — D49.0 ESOPHAGUS NEOPLASM: ICD-10-CM

## 2022-02-09 ENCOUNTER — OFFICE VISIT (OUTPATIENT)
Dept: ONCOLOGY | Facility: CLINIC | Age: 67
End: 2022-02-09

## 2022-02-09 ENCOUNTER — INFUSION (OUTPATIENT)
Dept: ONCOLOGY | Facility: HOSPITAL | Age: 67
End: 2022-02-09

## 2022-02-09 VITALS
OXYGEN SATURATION: 100 % | DIASTOLIC BLOOD PRESSURE: 83 MMHG | BODY MASS INDEX: 28.39 KG/M2 | RESPIRATION RATE: 16 BRPM | WEIGHT: 191.7 LBS | SYSTOLIC BLOOD PRESSURE: 144 MMHG | HEIGHT: 69 IN | HEART RATE: 93 BPM | TEMPERATURE: 98.7 F

## 2022-02-09 DIAGNOSIS — C15.5 MALIGNANT NEOPLASM OF LOWER THIRD OF ESOPHAGUS: Primary | ICD-10-CM

## 2022-02-09 DIAGNOSIS — Z72.0 TOBACCO ABUSE: ICD-10-CM

## 2022-02-09 DIAGNOSIS — C78.7 LIVER METASTASIS: ICD-10-CM

## 2022-02-09 DIAGNOSIS — J84.89 PNEUMONITIS, INTERSTITIAL: ICD-10-CM

## 2022-02-09 DIAGNOSIS — T45.1X5A PERIPHERAL NEUROPATHY DUE TO CHEMOTHERAPY: ICD-10-CM

## 2022-02-09 DIAGNOSIS — D49.0 ESOPHAGUS NEOPLASM: ICD-10-CM

## 2022-02-09 DIAGNOSIS — Z45.2 ENCOUNTER FOR ADJUSTMENT OR MANAGEMENT OF VASCULAR ACCESS DEVICE: Primary | ICD-10-CM

## 2022-02-09 DIAGNOSIS — C15.5 MALIGNANT NEOPLASM OF LOWER THIRD OF ESOPHAGUS: ICD-10-CM

## 2022-02-09 DIAGNOSIS — G62.0 PERIPHERAL NEUROPATHY DUE TO CHEMOTHERAPY: ICD-10-CM

## 2022-02-09 DIAGNOSIS — I82.412 ACUTE DEEP VEIN THROMBOSIS (DVT) OF FEMORAL VEIN OF LEFT LOWER EXTREMITY: ICD-10-CM

## 2022-02-09 DIAGNOSIS — N32.1 COLOVESICAL FISTULA: ICD-10-CM

## 2022-02-09 DIAGNOSIS — I10 ESSENTIAL HYPERTENSION: ICD-10-CM

## 2022-02-09 DIAGNOSIS — Z45.2 ENCOUNTER FOR ADJUSTMENT OR MANAGEMENT OF VASCULAR ACCESS DEVICE: ICD-10-CM

## 2022-02-09 LAB
ALBUMIN SERPL-MCNC: 3 G/DL (ref 3.5–5.2)
ALBUMIN/GLOB SERPL: 0.9 G/DL (ref 1.1–2.4)
ALP SERPL-CCNC: 227 U/L (ref 38–116)
ALT SERPL W P-5'-P-CCNC: 48 U/L (ref 0–41)
ANION GAP SERPL CALCULATED.3IONS-SCNC: 9.9 MMOL/L (ref 5–15)
AST SERPL-CCNC: 48 U/L (ref 0–40)
BASOPHILS # BLD AUTO: 0.08 10*3/MM3 (ref 0–0.2)
BASOPHILS NFR BLD AUTO: 2 % (ref 0–1.5)
BILIRUB SERPL-MCNC: 0.4 MG/DL (ref 0.2–1.2)
BUN SERPL-MCNC: 10 MG/DL (ref 6–20)
BUN/CREAT SERPL: 16.9 (ref 7.3–30)
CALCIUM SPEC-SCNC: 8.6 MG/DL (ref 8.5–10.2)
CHLORIDE SERPL-SCNC: 103 MMOL/L (ref 98–107)
CO2 SERPL-SCNC: 24.1 MMOL/L (ref 22–29)
CREAT SERPL-MCNC: 0.59 MG/DL (ref 0.7–1.3)
DEPRECATED RDW RBC AUTO: 57.5 FL (ref 37–54)
EOSINOPHIL # BLD AUTO: 0.31 10*3/MM3 (ref 0–0.4)
EOSINOPHIL NFR BLD AUTO: 7.7 % (ref 0.3–6.2)
ERYTHROCYTE [DISTWIDTH] IN BLOOD BY AUTOMATED COUNT: 15.4 % (ref 12.3–15.4)
GFR SERPL CREATININE-BSD FRML MDRD: 138 ML/MIN/1.73
GLOBULIN UR ELPH-MCNC: 3.4 GM/DL (ref 1.8–3.5)
GLUCOSE SERPL-MCNC: 128 MG/DL (ref 74–124)
HCT VFR BLD AUTO: 37.6 % (ref 37.5–51)
HGB BLD-MCNC: 12.2 G/DL (ref 13–17.7)
IMM GRANULOCYTES # BLD AUTO: 0.24 10*3/MM3 (ref 0–0.05)
IMM GRANULOCYTES NFR BLD AUTO: 5.9 % (ref 0–0.5)
LYMPHOCYTES # BLD AUTO: 1.49 10*3/MM3 (ref 0.7–3.1)
LYMPHOCYTES NFR BLD AUTO: 36.9 % (ref 19.6–45.3)
MCH RBC QN AUTO: 32.4 PG (ref 26.6–33)
MCHC RBC AUTO-ENTMCNC: 32.4 G/DL (ref 31.5–35.7)
MCV RBC AUTO: 99.7 FL (ref 79–97)
MONOCYTES # BLD AUTO: 0.61 10*3/MM3 (ref 0.1–0.9)
MONOCYTES NFR BLD AUTO: 15.1 % (ref 5–12)
NEUTROPHILS NFR BLD AUTO: 1.31 10*3/MM3 (ref 1.7–7)
NEUTROPHILS NFR BLD AUTO: 32.4 % (ref 42.7–76)
NRBC BLD AUTO-RTO: 0 /100 WBC (ref 0–0.2)
PLATELET # BLD AUTO: 172 10*3/MM3 (ref 140–450)
PMV BLD AUTO: 9.4 FL (ref 6–12)
POTASSIUM SERPL-SCNC: 3.7 MMOL/L (ref 3.5–4.7)
PROT SERPL-MCNC: 6.4 G/DL (ref 6.3–8)
RBC # BLD AUTO: 3.77 10*6/MM3 (ref 4.14–5.8)
SODIUM SERPL-SCNC: 137 MMOL/L (ref 134–145)
WBC NRBC COR # BLD: 4.04 10*3/MM3 (ref 3.4–10.8)

## 2022-02-09 PROCEDURE — 25010000002 HEPARIN LOCK FLUSH PER 10 UNITS: Performed by: INTERNAL MEDICINE

## 2022-02-09 PROCEDURE — 80053 COMPREHEN METABOLIC PANEL: CPT

## 2022-02-09 PROCEDURE — 99215 OFFICE O/P EST HI 40 MIN: CPT | Performed by: INTERNAL MEDICINE

## 2022-02-09 PROCEDURE — 36591 DRAW BLOOD OFF VENOUS DEVICE: CPT

## 2022-02-09 PROCEDURE — 85025 COMPLETE CBC W/AUTO DIFF WBC: CPT

## 2022-02-09 RX ORDER — GABAPENTIN 300 MG/1
300 CAPSULE ORAL 2 TIMES DAILY
Qty: 180 CAPSULE | Refills: 0 | Status: SHIPPED | OUTPATIENT
Start: 2022-02-09 | End: 2022-05-09

## 2022-02-09 RX ORDER — HEPARIN SODIUM (PORCINE) LOCK FLUSH IV SOLN 100 UNIT/ML 100 UNIT/ML
500 SOLUTION INTRAVENOUS AS NEEDED
Status: DISCONTINUED | OUTPATIENT
Start: 2022-02-09 | End: 2022-02-09 | Stop reason: HOSPADM

## 2022-02-09 RX ORDER — AMOXICILLIN 500 MG/1
500 CAPSULE ORAL 3 TIMES DAILY
COMMUNITY
Start: 2022-01-26 | End: 2022-02-09

## 2022-02-09 RX ORDER — POTASSIUM CHLORIDE 1500 MG/1
20 TABLET, FILM COATED, EXTENDED RELEASE ORAL DAILY
Qty: 60 TABLET | Refills: 3 | Status: SHIPPED | OUTPATIENT
Start: 2022-02-09 | End: 2022-05-31 | Stop reason: SDUPTHER

## 2022-02-09 RX ORDER — SODIUM CHLORIDE 0.9 % (FLUSH) 0.9 %
10 SYRINGE (ML) INJECTION AS NEEDED
Status: CANCELLED | OUTPATIENT
Start: 2022-02-09

## 2022-02-09 RX ORDER — HYDROCODONE BITARTRATE AND ACETAMINOPHEN 5; 325 MG/1; MG/1
TABLET ORAL SEE ADMIN INSTRUCTIONS
COMMUNITY
Start: 2022-01-26 | End: 2022-04-27

## 2022-02-09 RX ORDER — SODIUM CHLORIDE 0.9 % (FLUSH) 0.9 %
10 SYRINGE (ML) INJECTION AS NEEDED
Status: DISCONTINUED | OUTPATIENT
Start: 2022-02-09 | End: 2022-02-09 | Stop reason: HOSPADM

## 2022-02-09 RX ORDER — HEPARIN SODIUM (PORCINE) LOCK FLUSH IV SOLN 100 UNIT/ML 100 UNIT/ML
500 SOLUTION INTRAVENOUS AS NEEDED
Status: CANCELLED | OUTPATIENT
Start: 2022-02-09

## 2022-02-09 RX ADMIN — Medication 500 UNITS: at 09:06

## 2022-02-09 RX ADMIN — SODIUM CHLORIDE, PRESERVATIVE FREE 10 ML: 5 INJECTION INTRAVENOUS at 09:07

## 2022-02-09 NOTE — PROGRESS NOTES
Subjective     REASON FOR follow up:1.    1. 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  Currently receiving palliative 5 fu leucovorin  AND HERCEPTIN therapy once a month    2.COLOVESICAL FISTULA: chronic antibiotic therapy cipro, NO FURTHER PLANS FOR SURGERY AFTER VISIT AND PROCEDURE BY DR GM CASTILLO 1/20    3. 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 NEURONTIN INITIATED.        DURING THE VISIT WITH THE PATIENT TODAY , PATIENT HAD FACE MASK, MY MEDICAL ASSISTANT AND I  HAD PROPPER PROTECTIVE EQUIPMENT, AND I DID HAND HYGIENE WITH SOAP AND WATER BEFORE AND AFTER THE VISIT.        The patient returns today to the office for follow up and continuation of FOLFOX palliative chemotherapy in the background of metastatic cancer of the esophagus with liver metastasis that failed to respond to Enhertu. Overall the patient took the treatment 2 weeks ago, this is a lot easier to handle than Enhertu. Physically he has been feeling better but he had some element of neuropathy in hands and feet that is about the same of what it was before. This is resolved or improved with some Neurontin that he takes on a regular schedule twice a day. Besides this he has not had any cancer related pain. His appetite has been very good, no difficulty swallowing, no abdominal distention or jaundice. Good bowel activity. His colovesical fistula has not been acting up. He has not had any fever and he remains on ciprofloxacin. His claudication in the left lower extremity is quiet at this time in spite of continued smoking at least 25 cigarettes a day. The patient complains of cough with minimal sputum production, no worsening shortness of breath. He is no longer receiving prednisone given pneumonitis induced by Enhertu.     He remains on Zyprexa. He  "remains on potassium supplementation.                                   Past Medical History:   Diagnosis Date   • Arthritis    • Cancer (HCC)     prostate cancer 2008   • Cancer (HCC)     esophageal   • Chronic anticoagulation     on xarelto   • Elevated PSA    • Esophageal mass    • Fistula     colon and bladder   • H/O Lung nodule    • Hepatitis     CHILD--PT STATED \"I THINK IT WAS A.\"   • History of chemotherapy    • History of pneumonia    • Hx of blood clots 08/10/2019   • Hyperlipidemia    • Hypertension    • Nail fungus    • Neuropathy    • PVD (peripheral vascular disease) (HCC)    • Recto-bladderneck fistula     on poab for recurrent uti        Past Surgical History:   Procedure Laterality Date   • COLONOSCOPY N/A 2/4/2020    Procedure: COLONOSCOPY;  Surgeon: Guy Sears MD;  Location: [facility] ENDOSCOPY;  Service: General;  Laterality: N/A;  PRE-COLOVESICAL FISTULA  POST-- DIVERTICULOSIS   • CYSTOSCOPY  02/06/2020   • CYSTOSCOPY Bilateral 2/26/2020    Procedure: CYSTOSCOPY RETROGRADE;  Surgeon: Cm Witt MD;  Location: [facility] OR;  Service: Urology;  Laterality: Bilateral;   • ENDOSCOPY     • ENDOSCOPY N/A 6/19/2021    Procedure: ESOPHAGOGASTRODUODENOSCOPY WITH BIOPSY;  Surgeon: Mary Brito MD;  Location: [facility] OR;  Service: Gastroenterology;  Laterality: N/A;   • ENDOSCOPY N/A 8/5/2021    Procedure: ESOPHAGOGASTRODUODENOSCOPY HEMOSPRAY;  Surgeon: Naga Shelby MD;  Location: [facility] ENDOSCOPY;  Service: Gastroenterology;  Laterality: N/A;  HX OF ESOPHAGEAL  CANCER;MELENA  POST: ESOPHAGEAL BLEEDING; ESOPHAGEAL MASS   • HERNIA REPAIR Right 1999    inguinal hernia   • PROSTATE SURGERY  03/2008    prostatectectomy   • VENOUS ACCESS DEVICE (PORT) INSERTION N/A 7/16/2019    Procedure: INSERTION VENOUS ACCESS DEVICE WITH FLUORO AND EGD WITH BIOPSY;  Surgeon: Mary Brito MD;  Location: [facility] OR;  Service: Thoracic        Current Outpatient Medications on File " Prior to Visit   Medication Sig Dispense Refill   • acetaminophen (TYLENOL) 500 MG tablet Take 500 mg by mouth Every 6 (Six) Hours As Needed for Mild Pain .     • amoxicillin (AMOXIL) 500 MG capsule Take 500 mg by mouth 3 (Three) Times a Day.     • cevimeline (EVOXAC) 30 MG capsule Take 30 mg by mouth 3 (Three) Times a Day.     • ciprofloxacin (CIPRO) 250 MG tablet Take 1 tablet by mouth Daily. Take one every other day 90 tablet 1   • econazole nitrate (SPECTAZOLE) 1 % cream 2 (Two) Times a Day.     • gabapentin (NEURONTIN) 300 MG capsule Take 1 capsule by mouth 2 (two) times a day. 60 capsule 3   • glipizide (Glucotrol XL) 5 MG ER tablet Take 1 tablet by mouth Daily. 30 tablet 3   • HYDROcodone-acetaminophen (NORCO) 5-325 MG per tablet Take  by mouth See Admin Instructions. Take 1 tablet by mouth every 4-6 hours as needed for pain     • lisinopril-hydrochlorothiazide (PRINZIDE,ZESTORETIC) 20-12.5 MG per tablet TAKE 1 TABLET BY MOUTH EVERY DAY 90 tablet 0   • nicotine (NICODERM CQ) 21 MG/24HR patch Place 1 patch on the skin as directed by provider Daily. (Patient taking differently: Place 1 patch on the skin as directed by provider Daily. Has at home but hasn't started taking yet) 28 each 1   • OLANZapine (ZyPREXA) 2.5 MG tablet Take 1 tablet by mouth Every Night. 90 tablet 0   • ondansetron (ZOFRAN) 4 MG tablet Take 1 tablet by mouth Every 8 (Eight) Hours As Needed for Nausea or Vomiting. 30 tablet 2   • pantoprazole (Protonix) 40 MG EC tablet Take 1 tablet by mouth 2 (Two) Times a Day. 90 tablet 3   • pentoxifylline (TRENtal) 400 MG CR tablet TAKE 1 TABLET BY MOUTH TWICE A DAY WITH MEALS 90 tablet 0   • potassium chloride (K-TAB) 20 MEQ tablet controlled-release ER tablet Take 1 tablet by mouth Daily. 30 tablet 0   • Probiotic Product (PROBIOTIC DAILY PO) Take 1 tablet by mouth Daily.     • triamcinolone (KENALOG) 0.1 % ointment Apply 1 application topically to the appropriate area as directed 2 (Two) Times a Day.  30 g 1     No current facility-administered medications on file prior to visit.        ALLERGIES:  No Known Allergies     Social History     Socioeconomic History   • Marital status: Single   • Years of education: High school   Tobacco Use   • Smoking status: Current Every Day Smoker     Packs/day: 1.00     Years: 38.00     Pack years: 38.00     Types: Cigarettes     Start date: 1974   • Smokeless tobacco: Never Used   • Tobacco comment: Quit for a period of 8 years.    Vaping Use   • Vaping Use: Never used   Substance and Sexual Activity   • Alcohol use: Not Currently   • Drug use: Never   • Sexual activity: Defer        Family History   Problem Relation Age of Onset   • Hypertension Mother    • Stroke Mother    • Hypertension Other    • Lung disease Other    • Prostate cancer Other    • Lung cancer Father    • Malig Hyperthermia Neg Hx       Current Outpatient Medications on File Prior to Visit   Medication Sig Dispense Refill   • acetaminophen (TYLENOL) 500 MG tablet Take 500 mg by mouth Every 6 (Six) Hours As Needed for Mild Pain .     • amoxicillin (AMOXIL) 500 MG capsule Take 500 mg by mouth 3 (Three) Times a Day.     • cevimeline (EVOXAC) 30 MG capsule Take 30 mg by mouth 3 (Three) Times a Day.     • ciprofloxacin (CIPRO) 250 MG tablet Take 1 tablet by mouth Daily. Take one every other day 90 tablet 1   • econazole nitrate (SPECTAZOLE) 1 % cream 2 (Two) Times a Day.     • gabapentin (NEURONTIN) 300 MG capsule Take 1 capsule by mouth 2 (two) times a day. 60 capsule 3   • glipizide (Glucotrol XL) 5 MG ER tablet Take 1 tablet by mouth Daily. 30 tablet 3   • HYDROcodone-acetaminophen (NORCO) 5-325 MG per tablet Take  by mouth See Admin Instructions. Take 1 tablet by mouth every 4-6 hours as needed for pain     • lisinopril-hydrochlorothiazide (PRINZIDE,ZESTORETIC) 20-12.5 MG per tablet TAKE 1 TABLET BY MOUTH EVERY DAY 90 tablet 0   • nicotine (NICODERM CQ) 21 MG/24HR patch Place 1 patch on the skin as directed by  "provider Daily. (Patient taking differently: Place 1 patch on the skin as directed by provider Daily. Has at home but hasn't started taking yet) 28 each 1   • OLANZapine (ZyPREXA) 2.5 MG tablet Take 1 tablet by mouth Every Night. 90 tablet 0   • ondansetron (ZOFRAN) 4 MG tablet Take 1 tablet by mouth Every 8 (Eight) Hours As Needed for Nausea or Vomiting. 30 tablet 2   • pantoprazole (Protonix) 40 MG EC tablet Take 1 tablet by mouth 2 (Two) Times a Day. 90 tablet 3   • pentoxifylline (TRENtal) 400 MG CR tablet TAKE 1 TABLET BY MOUTH TWICE A DAY WITH MEALS 90 tablet 0   • potassium chloride (K-TAB) 20 MEQ tablet controlled-release ER tablet Take 1 tablet by mouth Daily. 30 tablet 0   • Probiotic Product (PROBIOTIC DAILY PO) Take 1 tablet by mouth Daily.     • triamcinolone (KENALOG) 0.1 % ointment Apply 1 application topically to the appropriate area as directed 2 (Two) Times a Day. 30 g 1     No current facility-administered medications on file prior to visit.   No Known Allergies         Objective     Vitals:    02/09/22 0833   BP: 144/83   Pulse: 93   Resp: 16   Temp: 98.7 °F (37.1 °C)   TempSrc: Temporal   SpO2: 100%   Weight: 87 kg (191 lb 11.2 oz)   Height: 175.3 cm (69.02\")   PainSc: 0-No pain     Current Status 1/12/2022   ECOG score 0           Physical exam      I HAVE PERSONALLY REVIEWED THE HISTORY OF THE PRESENT ILLNESS, PAST MEDICAL HISTORY, FAMILY HISTORY, SOCIAL HISTORY, ALLERGIES, MEDICATIONS STATED ABOVE IN THE  NOTE FROM TODAY.        GENERAL:  Well-developed, well-nourished  Patient  in no acute distress.   SKIN:  Warm, dry ,NO rashes,NO purpura ,NO petechiae.  HEENT:  Pupils were equal and reactive to light and accomodation, conjunctivae noninjected, no pterygium, normal extraocular movements, normal visual acuity.   NECK:  Supple with good range of motion; no thyromegaly , no other masses, no JVD or bruits, no cervical adenopathies.No carotid artery pain, no carotid abnormal pulsation , NO " arterial dance.  LYMPHATICS:  No cervical, NO supraclavicular, NO axillary,NO epitrochlear , NO inguinal adenopathy.  CARDIAC   normal rate and regular rhythm, without murmur,NO rubs NO S3 NO S4 right or left .  LUNGS: normal breath sounds bilateral, no wheezing, rhonchi, crackles or rubs.  VASCULAR VENOUS: no cyanosis, collateral circulation, varicosities, edema, palpable cords, pain, erythema.  ABDOMEN:  Soft, nontender with no hepatomegaly, no splenomegaly,no masses, no ascites, no collateral circulation,no distention,no [place] sign.  EXTREMITIES  AND SPINE:  No clubbing, cyanosis or edema, no deformities , no pain .No kyphosis, scoliosis, no other deformities, no pain in spine, no pain in ribs , no pain inpelvic bone.  NEUROLOGICAL:  Patient was awake, alert, oriented to time, person and place.                      RECENT LABS:  Hematology WBC   Date Value Ref Range Status   02/09/2022 4.04 3.40 - 10.80 10*3/mm3 Final   02/02/2019 13.4 (H) 3.4 - 10.8 x10E3/uL Final     RBC   Date Value Ref Range Status   02/09/2022 3.77 (L) 4.14 - 5.80 10*6/mm3 Final   02/02/2019 4.81 4.14 - 5.80 x10E6/uL Final     Hemoglobin   Date Value Ref Range Status   02/09/2022 12.2 (L) 13.0 - 17.7 g/dL Final     Hematocrit   Date Value Ref Range Status   02/09/2022 37.6 37.5 - 51.0 % Final     Platelets   Date Value Ref Range Status   02/09/2022 172 140 - 450 10*3/mm3 Final              Assessment/Plan    1.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 Almost 90% of the liver WAS replaced by tumor. The patient has been undergoing chemotherapy with 5  fu and leucovorin  and Herceptin and has had excellent response clinically and radiologically. The patient was reviewed on 08/10/2020. I reviewed with the patient in the PAC system [facility] his PET scan that is dramatic. There is minimal uptake in the lower esophagus and most importantly complete resolution of his liver metastasis. Actually the  only issue that is pertinent to the PET scan is still the visibility of his colovesical fistula.     Therefore from the point of view of his cancer of the esophagus, metastatic to the liver, he has no symptoms from the primary tumor in the esophagus and he has no symptoms related to his liver metastasis that has resolved altogether. His CEA level is stable.The patient raised the question about the CEA fluctuation. I pointed out to him that a lot of this is also related to his smoking. Smoking per se elevates CEA level.  Upon further reviewing the patient on 04/20/2021, he has no symptoms pertinent to his metastatic cancer of the esophagus to the liver and he has no difficulty swallowing. There are no side effects of the 5-FU, Leucovorin and Herceptin. The patient's cardiac function remains stable and he has not had any drop in the ejection fraction.   I discussed with him on 05/18/2021 the fact that his cancer clinically is very quiet but I am afraid of the rise in his CEA level to 12. This could be an indicator of all of the cigarettes that he is smoking on a daily basis of indicator of cancer escaping control and not visible radiologically through his CT scan. I pointed out to him that he needs to continue making an effort to decrease his smoking and he is now down to 10 cigarettes a day. We will recheck another CEA level today. Given the circumstances of the previous CT scan I do not believe that I need to change the doses or plan of care in regard to his chemotherapy medication administration for the time being. I recommended for him to remain on his 5FU/leucovorin and Herceptin on a monthly basis for now.  The patient was further reviewed on 06/15/2021 with a new PET scan that documented regrowth of the tumor in the lower esophagus without any new symptomatology, for example dysphagia or odynophagia. No regurgitation. The liver metastasis remains in remission and there are no new areas of disease in the bones  or lungs or any other site. Given the excellent performance status I discussed with the patient and his sister today many options of therapy, including going back to FOLFOX regimen along with Herceptin, taking another sample of the esophagus with a new endoscopy and doing analysis of the tissue for [employer] Now that will be my favorite choice, or palliative treatment with radiation therapy and 5-FU continuous infusion that will be toxic to him and he does not prefer to have.     I had a discussion with Mary Brito MD, thoracic surgeon, who has agreed to see the patient tomorrow. I think the endoscopy, the new tissue analysis, and sending the tissue for [employer] Now will be the way to go. Depending on what we find there, we can modify his treatment altogether. I do not think the patient will have any consequences missing chemotherapy for a couple of weeks or so.      In preparation for the endoscopy and biopsies I asked him to hold off on the Xarelto until he is seen by Dr. Brito tomorrow.     In regard to his colovesical fistula, he has had minimal symptomatology this week, maybe some activity there and I asked him to go back to the lab and take a urine specimen and culture. He knows that if this is abnormal he will need to initiate ciprofloxacin that he has at home.    In regard to his hypertension, this is under good control and I advised him to remain on his lisinopril and hydrochlorothiazide combination.    In regard to his smoking cessation, he has had conversations with NIRU Jacobson about this. He is using some nicotine CQ patch, here and there and also trying to work with nicotine gum and things of this nature but he has not been able to shake this issue altogether.     Otherwise I will review him back in a couple of weeks with a CBC, CMP, and a CEA level.    This case will be presented in the multidisciplinary thoracic conference by me this Thursday, and I will discuss any further advice  to the patient.     I also mentioned to the patient that on the background of HER2-positive cancer of the esophagus the possibility of using a new medicine for HER2-based disease that is called Enhertu is a real possibility and maybe that will be the next step to go through.    The patient was further reviewed on 07/01/2021. I reviewed with Dr. Brito the endoscopy that shows a noncircumferential abnormality in the lower esophagus that encompasses almost 6 cm in length. It is not blocking off the esophagus and that is why the patient has no symptoms. The pathology shows at least atypical cells consistent with cancer. Further Next Generation Sequencing has been sent for [employer] Now.     I discussed with the patient the fact that we have many other modalities of treatment that he could encounter. He prefers to continue his general chemotherapy to control the cancer in his liver using 5-FU, leucovorin and Herceptin today and agree with that. In consideration to be seen by radiation therapy, the patient is willing to listen to the possibility of undergoing continuous 5-FU infusion along with radiation therapy to the esophagus knowing that he will experiencing esophagitis that can give him significant issues for 2-3 weeks. I went ahead and made the appointment for him to be seen by radiation oncology for this purpose only.     Obviously if the Next Generation Sequencing gives us any other hints in regard to how to treat him this could be also utilized including the consideration of using Enhertu in the long run for achieving better control.     In regard to his smoking cessation he is not willing to change this phenomenon at this time. We have had NIRU Jacobson talking with him in this regard but he is not ready to make a decision when to quit.     Finally, I pointed out to him that during the previous visit we documented a urinary tract infection with streptococcus 50,000 colonies that in my appreciation was  significant given the fact that he has a colovesical fistula. This was treated with antibiotics. He has not had any discomfort issues pertinent to this anymore. The urine today is completely clear. Nevertheless, I went ahead and sent a urinalysis at least to be sure that there is no need for any other repetitive infection therapy on him.     The patient also will remain on his anticoagulation at this time, his Xarelto for the time being. I have renewed as well his Neurontin. He has no need for pain medicine.  The patient was further reviewed on 07/23/2021. He has no new symptomatology pertinent to his cancer of the esophagus with liver metastasis. He has no difficulty swallowing. He has been presented in the multidisciplinary clinic on a couple of occasions and he has been seen by Radiation Oncology. Finally the analysis of [employer] Now is available now showing that the patient's tumor is PD-L1 positive and has high mutation burden. The tumor remains HER/2 positive. Given these findings I think it is very easy to make a choice in regard stopping the present regimen of chemotherapy and proceed with therapy with Keytruda every 3 weeks for at least 4 cycles and reassess him at that point. In preparation for Keytruda initiation next week and we will get the approval of the medicine by his insurance company the patient will require smoking cessation altogether to minimize modification of cigarettes on his immune system. I insisted in this fact to the patient.     Other than that he will remain on probiotics. We will discontinue the 5FU/leucovorin and Herceptin and will move onto Keytruda. I discussed with him side effects of the Keytruda in detail as below. He will require treatment every 3 weeks with CBC, CMP, TSH every 3 weeks and he will require formal chemotherapy teaching, education and consent for this medicine.     After 4 cycles of this, in other words 3 months he will be reassessed with a new PET scan.      The chances under the present circumstances that he will improve as long as he quit smoking, it is very hard and maybe he could have long term survivorship.    I begged for him to have smoking cessation. If any time during his time with me we have been talking about this and this is absolutely necessary now. We know that cigarettes kill immune system cells and minimize the benefit of treatments with these medicines.       The patient was further reviewed on 08/19/2021 in regard to his cancer of the esophagus. As stated above he had upper GI bleeding dropping hemoglobin to 5 requiring admission, transfusion, discontinuation of anticoagulation and endoscopy with local therapy by Naga Shelby MD. Since then he has completed 10 sessions of radiation therapy to the esophagus yesterday. He has not had any further bleed. His hemoglobin has bounced back from 5 to 14 and I have advised his this good news today.    I expect that the patient in a few days is going to develop an element of radiation esophagitis and he continues having some fatigue that will improve over time. So far no new issues have happened. I made him aware that if he has difficulty swallowing he will need to let us know, he will need to receive IV fluids in the office.    At least the radiation therapy will take care of the tumor in the esophagus and I do not believe that he will require any other GI endoscopy anymore.    Today the patient will proceed with his Keytruda that is the immunotherapy medicine that we are delivering to him at this time for the treatment of his liver metastasis and also the tumor in the esophagus. I pointed out to him that so far I do not see any side effects of the Keytruda and the Keytruda will improve and increase the benefit of radiation therapy into the primary tumor in the esophagus.     From the point of view of his anemia associated with GI bleeding, again this has been corrected. The hemoglobin today is 14 grams and  I asked him to take his iron supplementation only twice a week 1 tablet. He has been taking 3 tablets everyday.     From the point of view of his thrombophilia associated with malignancy, he is no longer receiving any anticoagulants given the recent episode of GI bleeding. We will maintain him off anticoagulants as much as we can. Hopefully he will not have any other episodes of thrombosis.     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     From the point of view of the Keytruda toxicity so far nothing happened. We will continue monitoring CBC, CMP and TSH periodically.     I will review him back in 3 and 6 weeks. When he comes back for the Keytruda he will continue receiving the medicine as the time goes by.    He understands that most of the benefit of radiation therapy in the tumor in the esophagus will be reached in 1 month and I am planning to give him a PET scan in more of less in 2 months from now when he will have almost 4 infusions of Keytruda under his belt and the completion of radiation therapy to the esophagus.     In regard to smoking cessation he has achieved a lot from 2 packs a day to 10 cigarettes a day. He will see Anh Felder RN, today in regard continuation of the benefit of the therapy for this condition at this point.   The patient was further reviewed on 09/30/2021. His Keytruda is still ongoing but the patient has developed dermatitis associated with this. It is a grade 1 toxicity so far and I am wondering if this is extending a little bit and the topical medicine is not going to be sufficient. I will proceed with his Keytruda today but maybe this will be the last administration of this medicine unless that we get shashi and the rash quiets down. Obviously at the completion for Keytruda the patient will require PET scan for assessment not only of the benefit of radiation  therapy on his recurrence in the esophagus but also benefit of the Keytruda on his liver metastasis. Therefore, he will be scheduled for this before the next visit. The patient also has been advised that his hemoglobin is acceptable and it will be okay for him to stop his iron supplementation.     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 I have advised him to go into Cipro 250 mg p.o. daily.     The patient has been advised in regard to the continuation of topical steroids in the skin in areas of involvement. This will remain an ongoing issue along with moisturizer to the skin in the form of Cetaphil.     In regard to his hypertension, he remains on medicines for this by me. His blood pressure is under good control at this time.     In regard to previous anticoagulation, he is no longer receiving any given his GI bleeding. This will remain in observation.     In regard to smoking cessation the patient will further discuss with NIRU Jacobson, plans for further decrease. He is down to 10 cigarettes a day. This is already a major accomplishment in somebody who used to smoke almost 3 packs of cigarettes a day.     The patient will have a new insurance in 11/2021 and I will make the insurance ladies aware of this.     In 3 weeks he will have a CBC, CMP, TSH and the PET scan the week before.  The patient was further reviewed on 10/21/2021. I reviewed with him his PET scan that shows still SUV activity in the lower esophagus but better than before but he has now 3 areas of abnormal uptake in the liver consistent with progressive liver metastasis. The lung anatomy is clear, the bone anatomy is clear. Colovesical fistula was visible still.    On the basis of these changes I do believe that this patient knowing that he has HER/2 positive esophageal cancer needs to change his therapy. Keytruda is not helpful at all and we will discontinue this medicine at this time. I do believe  that the inability of Keytruda to overcome his disease process is related to the persistency of smoking and the abnormality related to smoking about bacterial derek. Further analysis recently published in [employer] has confirmed that the benefit of immunotherapy is further boosted by proper amount of derek in the gastrointestinal tract and how cigarettes are damaging to this issue. In any event the patient's issues in regard to cigarette smoking has been already a problem and he has not been able to quit in spite of all of the support available. Therefore stopping the Keytruda at this point including today the patient will move to Enhertu that has been approved in patients who have cancer of the esophagus with liver metastasis. The dose of this medicine will be the GI dose of this medication that will be properly calculated accordingly. He will have formal education and consent for this medicine. I pointed out the most important side effects of this medicine include cardiac toxicity, anemia, leukopenia, thrombocytopenia and pneumonitis that includes cough, shortness of breath, fever. In preparation to minimize pneumonitis the patient will take prednisone 10 mg on day 2 and 3 after each dose of Enhertu. He will receive this medicine every 3 weeks. We will deliver 3-4 cycles and reassess him not only by tumor markers but also radiologically. Hopefully this will have a positive impact not only in the tumor in the esophagus but also tumors metastatic in his liver.   The patient was reviewed on 11/19/2021. Tolerance to the Enhertu 1st cycle was appropriate with more fatigue, no increased cough or shortness of breath and some anorexia. His white count, hemoglobin and platelets were normal and we advised him to proceed with his 2nd Enhertu infusion today. He understands that fatigue will be more prominent, that he could have chance for more hematological toxicity and he is starting to develop minor anemia. His white count and  platelet count remain acceptable. I reminded him on many occasions today through the visit and insisted to the sister that if his cough pattern changes, increasing or his shortness of breath becomes worse he has to notify us immediately to be sure that he will not develop pneumonitis associated with Enhertu. He still will take 10 mg of prednisone tomorrow and Sunday to try to minimize this phenomenon from happening.     The patient will be having blood counts on a weekly basis with nurse visit to be sure to monitor hematological toxicity and he will return in 3 weeks to proceed with the next cycle. After the 3rd cycle the patient will proceed with radiological assessment including PET scan.   The patient was further reviewed on 12/30/2021. On clinical grounds the patient has not had any difficulty swallowing, his liver is not enlarged, nodular or tender, he has no jaundice and his CEA level has dropped further. His PET scan discloses resolution of his periportal lymph nodes and one area of resolution of liver metastasis. He has complete resolution of the tumor in the esophagus. He has still 2 active lesions in the liver with significant SUV activity pertinent to his metastatic disease. Given this fact the patient could be a candidate to further receive Enhertu but my concern is about the radiological analysis that shows minimal pulmonary infiltrate bilaterally. The radiologist suggests a radiation pneumonitis. I do not think that is the case. I think this is probably related to Enhertu interstitial lung disease and for this reason I feel the obligation to stop this medication at this point and to proceed with therapy with prednisone at 20 mg a day for the next 2 weeks until the patient returns back to see me. I made him aware that if the respiratory symptoms including his cough gets any worse or shortness of breath establishes and gets worse he will need to notify us immediately and he will require to be placed in  the hospital to receive IV high dose steroid.    The question will be if the patient will be a candidate to receive any further Enhertu or not remains to be seen. Probably will not be the case.     Obviously this above statement opens the door in regard what else to do for his malignancy. Strong consideration will be to go back to Herceptin and also to consider to go back to FOLFOX, Herceptin like he received initially that gave him such a degree of resolution of symptoms. 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 I discussed this with him and his sister present in the room. I encouraged him to decrease his smoking.   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     In preparation for retaking treatment, I advised him that I would like to go back into FOLFOX, Herceptin every 3 weeks starting next week. This combination of medicines never failed him. It was discontinued because of toxicity and neuropathy. Therefore, I think it is worth it to go ahead and proceed with an echocardiogram and resume chemotherapy administration with this , giving him 3 cycles, each one of them 3 weeks apart and see how things evolve from the point of view tumor markers or radiological analysis of his liver through PET scan. He agrees to proceed.  On 02/09/2022 I advised the patient to hold off his chemotherapy treatment with FOLFOX because his ANC was 1300 and we will decrease his dose of chemotherapy medicines by 15% for the next round of treatment next week. Hopefully the patient with this dose  adjustment will be able to continue his treatment in a normal schedule of every 2 weeks. We have to watch his neuropathy very close. My advice will be eventually to pull out the Oxaliplatin and continue 5FU and leucovorin for a couple of cycles and eventually reintroduce the Oxaliplatin for 1-2 cycles back and forth. This has to be monitored clinically and also along with his response.           ·   2.In regard to his colovesical fistula he is now receiving ciprofloxacin on a daily basis low dose and I advised him to remain on this medicine for the time being.   On 11/19/2021 he has no symptoms or signs of urinary tract infection and he remains on ciprofloxacin prophylactically everyday.   On 12/30/2021 his colovesical fistula is still evident on the PET scan. He has gas in the bladder and he has had some hematuria. Therefore I do believe that the patient needs to remain on ciprofloxacin 1 tablet every other day to try to minimize any potential for any other urinary tract infection related to this. The patient under the present circumstances is not a candidate to have any kind of surgery unless the symptoms get worse or clinical picture changes radically.   His colovesical fistula remains active. He remains taking ciprofloxacin 250 mg p.o. every other day. He has not had any fevers or chills and I advised him to remain on this medicine for the time being. His pharmacy called in regard that they have not had Cipro anymore. The patient has tablets for almost 2 months supply. Therefore, he has no need for Levaquin or any other medicine or intervention at this point.  His colovesical fistula is not active at this time. He has not had any pneumaturia of echolalia. He remains on ciprofloxacin prophylactically.           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   He remains on Neurontin for the treatment of  his peripheral neuropathy. I advised him to remain on this medicine for the time being.  On 01/12/2022, I advised the patient to remain on his gabapentin for the treatment of his sensory peripheral neuropathy in his feet.  I went ahead and prescribed on 02/09/2022 his doses of gabapentin to take 300 mg twice a day.         ·   4.In regard to his hypertension, he remains on lisinopril, hydrochlorothiazide provided by me.   I reviewed him back on 11/19/2021. His blood pressure is 90/48 in spite of proper hydration. This is probably the effect of the combination of Trental and his blood pressure medication. I advised him to discontinue his blood pressure medication at this time and advised him to have proper hydration. He has a device to check his blood pressure at home. I asked him to check this on a daily basis at least a couple of times a day. If his blood pressure goes up above 130/90 he will take 1/2 of the dose of blood pressure medication that he was taking before. Those tablets have the way to be split. He will require checking blood pressure. If the blood pressure drops again he will hold off blood pressure medication indefinitely.   His hypertension has been reviewed on 12/30/2021. I think his blood pressure today is very good. He will remain on his blood pressure medication for the time being. I find no reason to change dosing.   The patient was advised to remain on his blood pressure medication, Prinzide.  On 02/09/2022 he continues doing his blood pressure medication almost on prn basis to minimize very dramatic drop that he has had before. So far he has not had any syncopal episodes or things of this nature.           ·   5.In regard to his previous GI bleeding associated with his cancer and esophagitis we advised the patient to remain on Protonix.   On 11/19/2021 he has not had any evidence of GI bleeding and the hemoglobin remains stable. The minor drop obeys to toxicity from Enhertu and bone marrow  suppression not because of effect of GI bleeding.   He has not had any other episodes of GI bleeding as per 12/30/2021.   On 01/12/2022 he has not had any other episodes of GI bleeding. He is no longer taking any anticoagulants.  On 02/09/2022 he has not had episodes of melena or enterorrhagia, his hemoglobin is stable. He has not had any use of anticoagulants since GI bleeding.           ·         6.In regard finally peripheral arterial disease I am going to send the patient a prescription for Trental to take 1 tablet twice a day. He has an appointment to be seen by vascular on 12/18/2021. I pointed out to the patient that if he wants to change the route of this process he must modify his cigarettes otherwise there is no hope. He was not able to take medication provided by [facility] because of side effects.   In regard to his peripheral arterial disease I advised him on 11/19/2021 to decrease his Trental to 1 tablet twice a day. He is now using topical Cetaphil on his feet to try to improve moisture and decrease cracking.   On 12/30/2021 his peripheral neuropathy actually is better through the Doppler study done by [facility]. I wonder if the trental is the one playing a role in this. I have asked him to remain on the trental for the time being.       He is not willing to entertain smoking cessation to help out peripheral arterial disease and we insisted into this in presence of his sister. He says that he is working on it. I do not feel any confidence in seeing this phenomenon anymore and that is a reality that we need to contemplate and just wait.   In regard to his peripheral artery disease, the patient is still smoking 15 cigarettes a day. The Trental has made a big difference in regard to his ability to get around and walk. I advised him to remain on Trental.  On 02/09/2022 his peripheral arterial disease is about the same, the hemoglobin is stable, the Trental is still ongoing, it is  beneficial to him.       ·     7.This patient's blood sugar has remained in the 130's, 140's, 150's for the last several visits. His hemoglobin A1C today is 5.9.  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 He will require the utilization of Glucotrol XL 5 mg. I went ahead and sent this prescription to the pharmacy. I pointed out to him that he is eating a diet rich in carbohydrates mostly and I pointed out to him that he needs to eat more vegetables, some fruit and high complex carbohydrates that will minimize issues pertinent to his blood sugar. It is even more important now that he has initiated prednisone as posted.   I reviewed with him on 01/12/2022 his hemoglobin A1c of 5.9. I advised him to remain on a diet that is not too much rich in sugar and stay on his glipizide 5 mg every day. He is very selective in his diet. He finds things that tastes okay and other ones that do not taste okay and I think we have no solution for this definitive problem.  On 02/09/2022 the patient's glucose seems to be under control. He will remain on glipizide 5 mg oral daily.     I will review him back in 3 weeks. He will return for treatment next week and for the dose adjustments were discussed with Amber Lam RN.          ·

## 2022-02-09 NOTE — NURSING NOTE
No treatment today.  Port flushed and de accessed.   Band-Aid applied to site.  Pt discharged ambulating.

## 2022-02-10 NOTE — TELEPHONE ENCOUNTER
Entered in error. Potassium already refilled by [facility] yesterday. Will disregard fax. Marisol Lam RN

## 2022-02-16 ENCOUNTER — INFUSION (OUTPATIENT)
Dept: ONCOLOGY | Facility: HOSPITAL | Age: 67
End: 2022-02-16

## 2022-02-16 VITALS
BODY MASS INDEX: 28.22 KG/M2 | RESPIRATION RATE: 18 BRPM | SYSTOLIC BLOOD PRESSURE: 132 MMHG | DIASTOLIC BLOOD PRESSURE: 75 MMHG | OXYGEN SATURATION: 96 % | TEMPERATURE: 97 F | HEART RATE: 93 BPM | WEIGHT: 191.2 LBS

## 2022-02-16 DIAGNOSIS — C15.5 MALIGNANT NEOPLASM OF LOWER THIRD OF ESOPHAGUS: Primary | ICD-10-CM

## 2022-02-16 DIAGNOSIS — C78.7 LIVER METASTASIS: ICD-10-CM

## 2022-02-16 DIAGNOSIS — D49.0 ESOPHAGUS NEOPLASM: ICD-10-CM

## 2022-02-16 LAB
ALBUMIN SERPL-MCNC: 3.3 G/DL (ref 3.5–5.2)
ALBUMIN/GLOB SERPL: 0.9 G/DL (ref 1.1–2.4)
ALP SERPL-CCNC: 203 U/L (ref 38–116)
ALT SERPL W P-5'-P-CCNC: 35 U/L (ref 0–41)
ANION GAP SERPL CALCULATED.3IONS-SCNC: 10.3 MMOL/L (ref 5–15)
AST SERPL-CCNC: 47 U/L (ref 0–40)
BASOPHILS # BLD AUTO: 0.1 10*3/MM3 (ref 0–0.2)
BASOPHILS NFR BLD AUTO: 1.1 % (ref 0–1.5)
BILIRUB SERPL-MCNC: 0.4 MG/DL (ref 0.2–1.2)
BUN SERPL-MCNC: 7 MG/DL (ref 6–20)
BUN/CREAT SERPL: 12.7 (ref 7.3–30)
CALCIUM SPEC-SCNC: 8.9 MG/DL (ref 8.5–10.2)
CHLORIDE SERPL-SCNC: 104 MMOL/L (ref 98–107)
CO2 SERPL-SCNC: 24.7 MMOL/L (ref 22–29)
CREAT SERPL-MCNC: 0.55 MG/DL (ref 0.7–1.3)
DEPRECATED RDW RBC AUTO: 55.8 FL (ref 37–54)
EOSINOPHIL # BLD AUTO: 0.39 10*3/MM3 (ref 0–0.4)
EOSINOPHIL NFR BLD AUTO: 4.4 % (ref 0.3–6.2)
ERYTHROCYTE [DISTWIDTH] IN BLOOD BY AUTOMATED COUNT: 15.1 % (ref 12.3–15.4)
GFR SERPL CREATININE-BSD FRML MDRD: 149 ML/MIN/1.73
GLOBULIN UR ELPH-MCNC: 3.6 GM/DL (ref 1.8–3.5)
GLUCOSE SERPL-MCNC: 119 MG/DL (ref 74–124)
HCT VFR BLD AUTO: 40.6 % (ref 37.5–51)
HGB BLD-MCNC: 12.9 G/DL (ref 13–17.7)
IMM GRANULOCYTES # BLD AUTO: 0.39 10*3/MM3 (ref 0–0.05)
IMM GRANULOCYTES NFR BLD AUTO: 4.4 % (ref 0–0.5)
LYMPHOCYTES # BLD AUTO: 1.47 10*3/MM3 (ref 0.7–3.1)
LYMPHOCYTES NFR BLD AUTO: 16.7 % (ref 19.6–45.3)
MCH RBC QN AUTO: 31.6 PG (ref 26.6–33)
MCHC RBC AUTO-ENTMCNC: 31.8 G/DL (ref 31.5–35.7)
MCV RBC AUTO: 99.5 FL (ref 79–97)
MONOCYTES # BLD AUTO: 0.66 10*3/MM3 (ref 0.1–0.9)
MONOCYTES NFR BLD AUTO: 7.5 % (ref 5–12)
NEUTROPHILS NFR BLD AUTO: 5.78 10*3/MM3 (ref 1.7–7)
NEUTROPHILS NFR BLD AUTO: 65.9 % (ref 42.7–76)
NRBC BLD AUTO-RTO: 0 /100 WBC (ref 0–0.2)
PLATELET # BLD AUTO: 143 10*3/MM3 (ref 140–450)
PMV BLD AUTO: 9.6 FL (ref 6–12)
POTASSIUM SERPL-SCNC: 3.7 MMOL/L (ref 3.5–4.7)
PROT SERPL-MCNC: 6.9 G/DL (ref 6.3–8)
RBC # BLD AUTO: 4.08 10*6/MM3 (ref 4.14–5.8)
SODIUM SERPL-SCNC: 139 MMOL/L (ref 134–145)
WBC NRBC COR # BLD: 8.79 10*3/MM3 (ref 3.4–10.8)

## 2022-02-16 PROCEDURE — 25010000002 OXALIPLATIN PER 0.5 MG: Performed by: INTERNAL MEDICINE

## 2022-02-16 PROCEDURE — 25010000002 FOSAPREPITANT PER 1 MG: Performed by: INTERNAL MEDICINE

## 2022-02-16 PROCEDURE — 96368 THER/DIAG CONCURRENT INF: CPT

## 2022-02-16 PROCEDURE — 25010000002 DIPHENHYDRAMINE PER 50 MG: Performed by: INTERNAL MEDICINE

## 2022-02-16 PROCEDURE — 25010000002 HYDROCORTISONE SODIUM SUCCINATE 100 MG RECONSTITUTED SOLUTION: Performed by: NURSE PRACTITIONER

## 2022-02-16 PROCEDURE — 25010000002 HYDROCORTISONE SODIUM SUCCINATE 100 MG RECONSTITUTED SOLUTION: Performed by: INTERNAL MEDICINE

## 2022-02-16 PROCEDURE — 96367 TX/PROPH/DG ADDL SEQ IV INF: CPT

## 2022-02-16 PROCEDURE — 25010000002 PROCHLORPERAZINE 10 MG/2ML SOLUTION: Performed by: NURSE PRACTITIONER

## 2022-02-16 PROCEDURE — 25010000002 TRASTUZUMAB-ANNS 420 MG RECONSTITUTED SOLUTION 1 EACH VIAL: Performed by: INTERNAL MEDICINE

## 2022-02-16 PROCEDURE — 25010000002 LEUCOVORIN CALCIUM PER 50 MG: Performed by: INTERNAL MEDICINE

## 2022-02-16 PROCEDURE — 96411 CHEMO IV PUSH ADDL DRUG: CPT

## 2022-02-16 PROCEDURE — 85025 COMPLETE CBC W/AUTO DIFF WBC: CPT

## 2022-02-16 PROCEDURE — 96413 CHEMO IV INFUSION 1 HR: CPT

## 2022-02-16 PROCEDURE — 25010000002 PALONOSETRON PER 25 MCG: Performed by: INTERNAL MEDICINE

## 2022-02-16 PROCEDURE — 25010000002 DEXAMETHASONE SODIUM PHOSPHATE 100 MG/10ML SOLUTION: Performed by: INTERNAL MEDICINE

## 2022-02-16 PROCEDURE — 96416 CHEMO PROLONG INFUSE W/PUMP: CPT

## 2022-02-16 PROCEDURE — 96375 TX/PRO/DX INJ NEW DRUG ADDON: CPT

## 2022-02-16 PROCEDURE — 25010000002 FLUOROURACIL PER 500 MG: Performed by: INTERNAL MEDICINE

## 2022-02-16 PROCEDURE — 96376 TX/PRO/DX INJ SAME DRUG ADON: CPT

## 2022-02-16 PROCEDURE — 80053 COMPREHEN METABOLIC PANEL: CPT

## 2022-02-16 PROCEDURE — 96417 CHEMO IV INFUS EACH ADDL SEQ: CPT

## 2022-02-16 PROCEDURE — 25010000002 DIPHENHYDRAMINE PER 50 MG: Performed by: NURSE PRACTITIONER

## 2022-02-16 RX ORDER — PROCHLORPERAZINE EDISYLATE 5 MG/ML
10 INJECTION INTRAMUSCULAR; INTRAVENOUS ONCE
Status: COMPLETED | OUTPATIENT
Start: 2022-02-16 | End: 2022-02-16

## 2022-02-16 RX ORDER — PALONOSETRON 0.05 MG/ML
0.25 INJECTION, SOLUTION INTRAVENOUS ONCE
Status: CANCELLED | OUTPATIENT
Start: 2022-02-16

## 2022-02-16 RX ORDER — SODIUM CHLORIDE 9 MG/ML
250 INJECTION, SOLUTION INTRAVENOUS ONCE
Status: COMPLETED | OUTPATIENT
Start: 2022-02-16 | End: 2022-02-16

## 2022-02-16 RX ORDER — SODIUM CHLORIDE 9 MG/ML
250 INJECTION, SOLUTION INTRAVENOUS ONCE
Status: CANCELLED | OUTPATIENT
Start: 2022-02-16

## 2022-02-16 RX ORDER — DEXTROSE MONOHYDRATE 50 MG/ML
250 INJECTION, SOLUTION INTRAVENOUS ONCE
Status: CANCELLED | OUTPATIENT
Start: 2022-02-16

## 2022-02-16 RX ORDER — DEXTROSE MONOHYDRATE 50 MG/ML
250 INJECTION, SOLUTION INTRAVENOUS ONCE
Status: COMPLETED | OUTPATIENT
Start: 2022-02-16 | End: 2022-02-16

## 2022-02-16 RX ORDER — FAMOTIDINE 10 MG/ML
20 INJECTION, SOLUTION INTRAVENOUS AS NEEDED
Status: CANCELLED | OUTPATIENT
Start: 2022-02-16

## 2022-02-16 RX ORDER — FAMOTIDINE 10 MG/ML
20 INJECTION, SOLUTION INTRAVENOUS ONCE
Status: COMPLETED | OUTPATIENT
Start: 2022-02-16 | End: 2022-02-16

## 2022-02-16 RX ORDER — PALONOSETRON 0.05 MG/ML
0.25 INJECTION, SOLUTION INTRAVENOUS ONCE
Status: COMPLETED | OUTPATIENT
Start: 2022-02-16 | End: 2022-02-16

## 2022-02-16 RX ORDER — FLUOROURACIL 50 MG/ML
340 INJECTION, SOLUTION INTRAVENOUS ONCE
Status: COMPLETED | OUTPATIENT
Start: 2022-02-16 | End: 2022-02-16

## 2022-02-16 RX ORDER — FAMOTIDINE 10 MG/ML
20 INJECTION, SOLUTION INTRAVENOUS ONCE
Status: CANCELLED
Start: 2022-02-16 | End: 2022-02-16

## 2022-02-16 RX ORDER — FAMOTIDINE 10 MG/ML
20 INJECTION, SOLUTION INTRAVENOUS AS NEEDED
Status: COMPLETED | OUTPATIENT
Start: 2022-02-16 | End: 2022-02-16

## 2022-02-16 RX ORDER — DIPHENHYDRAMINE HYDROCHLORIDE 50 MG/ML
25 INJECTION INTRAMUSCULAR; INTRAVENOUS AS NEEDED
Status: CANCELLED | OUTPATIENT
Start: 2022-02-16

## 2022-02-16 RX ORDER — FLUOROURACIL 50 MG/ML
340 INJECTION, SOLUTION INTRAVENOUS ONCE
Status: CANCELLED | OUTPATIENT
Start: 2022-02-16

## 2022-02-16 RX ADMIN — HYDROCORTISONE SODIUM SUCCINATE 100 MG: 100 INJECTION, POWDER, FOR SOLUTION INTRAMUSCULAR; INTRAVENOUS at 11:40

## 2022-02-16 RX ADMIN — FLUOROURACIL 4180 MG: 50 INJECTION, SOLUTION INTRAVENOUS at 12:54

## 2022-02-16 RX ADMIN — TRASTUZUMAB-ANNS 530 MG: 420 INJECTION, POWDER, LYOPHILIZED, FOR SOLUTION INTRAVENOUS at 10:37

## 2022-02-16 RX ADMIN — PROCHLORPERAZINE EDISYLATE 10 MG: 5 INJECTION INTRAMUSCULAR; INTRAVENOUS at 11:50

## 2022-02-16 RX ADMIN — DEXTROSE MONOHYDRATE 250 ML: 50 INJECTION, SOLUTION INTRAVENOUS at 11:14

## 2022-02-16 RX ADMIN — OXALIPLATIN 150 MG: 100 INJECTION, SOLUTION, CONCENTRATE INTRAVENOUS at 11:16

## 2022-02-16 RX ADMIN — DIPHENHYDRAMINE HYDROCHLORIDE 25 MG: 50 INJECTION, SOLUTION INTRAMUSCULAR; INTRAVENOUS at 09:48

## 2022-02-16 RX ADMIN — DIPHENHYDRAMINE HYDROCHLORIDE 25 MG: 50 INJECTION, SOLUTION INTRAMUSCULAR; INTRAVENOUS at 12:19

## 2022-02-16 RX ADMIN — LEUCOVORIN CALCIUM 700 MG: 350 INJECTION, POWDER, LYOPHILIZED, FOR SUSPENSION INTRAMUSCULAR; INTRAVENOUS at 11:15

## 2022-02-16 RX ADMIN — FAMOTIDINE 20 MG: 10 INJECTION INTRAVENOUS at 09:44

## 2022-02-16 RX ADMIN — FLUOROURACIL 700 MG: 50 INJECTION, SOLUTION INTRAVENOUS at 12:54

## 2022-02-16 RX ADMIN — DEXAMETHASONE SODIUM PHOSPHATE 12 MG: 10 INJECTION, SOLUTION INTRAMUSCULAR; INTRAVENOUS at 09:33

## 2022-02-16 RX ADMIN — FAMOTIDINE 20 MG: 10 INJECTION INTRAVENOUS at 11:43

## 2022-02-16 RX ADMIN — SODIUM CHLORIDE 250 ML: 9 INJECTION, SOLUTION INTRAVENOUS at 09:33

## 2022-02-16 RX ADMIN — SODIUM CHLORIDE 100 ML: 9 INJECTION, SOLUTION INTRAVENOUS at 10:05

## 2022-02-16 RX ADMIN — PALONOSETRON 0.25 MG: 0.05 INJECTION, SOLUTION INTRAVENOUS at 09:44

## 2022-02-16 NOTE — NURSING NOTE
Stopped Oxaliplatin leucovorin, pt coughing, c/o flushing, chest tightness, stomach upset.   Deisi SHANKAR at bedside, 200mg Solucortef given 1140. 20mg Pepcid given 1143.  2L O2 NC 92% 115/55 BP.  1145 Dr. Haynes at bedside, no more oxaliplatin.   Repeat vs 1146  BP 92/57 O2 93% 3L NC.   500mL NS administered.

## 2022-02-18 ENCOUNTER — INFUSION (OUTPATIENT)
Dept: ONCOLOGY | Facility: HOSPITAL | Age: 67
End: 2022-02-18

## 2022-02-18 DIAGNOSIS — C15.5 MALIGNANT NEOPLASM OF LOWER THIRD OF ESOPHAGUS: Primary | ICD-10-CM

## 2022-02-18 DIAGNOSIS — D49.0 ESOPHAGUS NEOPLASM: ICD-10-CM

## 2022-02-18 DIAGNOSIS — C78.7 LIVER METASTASIS: ICD-10-CM

## 2022-02-18 DIAGNOSIS — Z45.2 ENCOUNTER FOR ADJUSTMENT OR MANAGEMENT OF VASCULAR ACCESS DEVICE: ICD-10-CM

## 2022-02-18 PROCEDURE — 25010000002 HEPARIN LOCK FLUSH PER 10 UNITS: Performed by: INTERNAL MEDICINE

## 2022-02-18 RX ORDER — SODIUM CHLORIDE 0.9 % (FLUSH) 0.9 %
10 SYRINGE (ML) INJECTION AS NEEDED
Status: CANCELLED | OUTPATIENT
Start: 2022-02-18

## 2022-02-18 RX ORDER — SODIUM CHLORIDE 0.9 % (FLUSH) 0.9 %
10 SYRINGE (ML) INJECTION AS NEEDED
Status: DISCONTINUED | OUTPATIENT
Start: 2022-02-18 | End: 2022-02-18 | Stop reason: HOSPADM

## 2022-02-18 RX ORDER — HEPARIN SODIUM (PORCINE) LOCK FLUSH IV SOLN 100 UNIT/ML 100 UNIT/ML
500 SOLUTION INTRAVENOUS AS NEEDED
Status: CANCELLED | OUTPATIENT
Start: 2022-02-18

## 2022-02-18 RX ORDER — HEPARIN SODIUM (PORCINE) LOCK FLUSH IV SOLN 100 UNIT/ML 100 UNIT/ML
500 SOLUTION INTRAVENOUS AS NEEDED
Status: DISCONTINUED | OUTPATIENT
Start: 2022-02-18 | End: 2022-02-18 | Stop reason: HOSPADM

## 2022-02-18 RX ADMIN — SODIUM CHLORIDE, PRESERVATIVE FREE 10 ML: 5 INJECTION INTRAVENOUS at 11:19

## 2022-02-18 RX ADMIN — Medication 500 UNITS: at 11:19

## 2022-03-02 ENCOUNTER — OFFICE VISIT (OUTPATIENT)
Dept: ONCOLOGY | Facility: CLINIC | Age: 67
End: 2022-03-02

## 2022-03-02 ENCOUNTER — INFUSION (OUTPATIENT)
Dept: ONCOLOGY | Facility: HOSPITAL | Age: 67
End: 2022-03-02

## 2022-03-02 VITALS
TEMPERATURE: 97.3 F | HEIGHT: 69 IN | RESPIRATION RATE: 16 BRPM | BODY MASS INDEX: 28.24 KG/M2 | WEIGHT: 190.7 LBS | HEART RATE: 86 BPM | SYSTOLIC BLOOD PRESSURE: 147 MMHG | OXYGEN SATURATION: 100 % | DIASTOLIC BLOOD PRESSURE: 81 MMHG

## 2022-03-02 DIAGNOSIS — J84.89 PNEUMONITIS, INTERSTITIAL: ICD-10-CM

## 2022-03-02 DIAGNOSIS — C15.5 MALIGNANT NEOPLASM OF LOWER THIRD OF ESOPHAGUS: Primary | ICD-10-CM

## 2022-03-02 DIAGNOSIS — G62.0 PERIPHERAL NEUROPATHY DUE TO CHEMOTHERAPY: ICD-10-CM

## 2022-03-02 DIAGNOSIS — D49.0 ESOPHAGUS NEOPLASM: ICD-10-CM

## 2022-03-02 DIAGNOSIS — T45.1X5A PERIPHERAL NEUROPATHY DUE TO CHEMOTHERAPY: ICD-10-CM

## 2022-03-02 DIAGNOSIS — C78.7 LIVER METASTASIS: ICD-10-CM

## 2022-03-02 DIAGNOSIS — I10 ESSENTIAL HYPERTENSION: ICD-10-CM

## 2022-03-02 DIAGNOSIS — Z79.899 HIGH RISK MEDICATION USE: ICD-10-CM

## 2022-03-02 DIAGNOSIS — C15.5 MALIGNANT NEOPLASM OF LOWER THIRD OF ESOPHAGUS: ICD-10-CM

## 2022-03-02 DIAGNOSIS — N32.1 COLOVESICAL FISTULA: ICD-10-CM

## 2022-03-02 DIAGNOSIS — Z45.2 ENCOUNTER FOR ADJUSTMENT OR MANAGEMENT OF VASCULAR ACCESS DEVICE: ICD-10-CM

## 2022-03-02 DIAGNOSIS — Z72.0 TOBACCO ABUSE: ICD-10-CM

## 2022-03-02 DIAGNOSIS — I82.412 ACUTE DEEP VEIN THROMBOSIS (DVT) OF FEMORAL VEIN OF LEFT LOWER EXTREMITY: ICD-10-CM

## 2022-03-02 LAB
ALBUMIN SERPL-MCNC: 3.4 G/DL (ref 3.5–5.2)
ALBUMIN/GLOB SERPL: 0.9 G/DL (ref 1.1–2.4)
ALP SERPL-CCNC: 211 U/L (ref 38–116)
ALT SERPL W P-5'-P-CCNC: 71 U/L (ref 0–41)
ANION GAP SERPL CALCULATED.3IONS-SCNC: 8.9 MMOL/L (ref 5–15)
AST SERPL-CCNC: 53 U/L (ref 0–40)
BASOPHILS # BLD AUTO: 0.03 10*3/MM3 (ref 0–0.2)
BASOPHILS NFR BLD AUTO: 0.7 % (ref 0–1.5)
BILIRUB SERPL-MCNC: 0.5 MG/DL (ref 0.2–1.2)
BUN SERPL-MCNC: 9 MG/DL (ref 6–20)
BUN/CREAT SERPL: 14.3 (ref 7.3–30)
CALCIUM SPEC-SCNC: 9.2 MG/DL (ref 8.5–10.2)
CEA SERPL-MCNC: 34.3 NG/ML
CHLORIDE SERPL-SCNC: 104 MMOL/L (ref 98–107)
CO2 SERPL-SCNC: 25.1 MMOL/L (ref 22–29)
CREAT SERPL-MCNC: 0.63 MG/DL (ref 0.7–1.3)
DEPRECATED RDW RBC AUTO: 54 FL (ref 37–54)
EGFRCR SERPLBLD CKD-EPI 2021: 105.6 ML/MIN/1.73
EOSINOPHIL # BLD AUTO: 0.52 10*3/MM3 (ref 0–0.4)
EOSINOPHIL NFR BLD AUTO: 11.6 % (ref 0.3–6.2)
ERYTHROCYTE [DISTWIDTH] IN BLOOD BY AUTOMATED COUNT: 15 % (ref 12.3–15.4)
GLOBULIN UR ELPH-MCNC: 3.6 GM/DL (ref 1.8–3.5)
GLUCOSE SERPL-MCNC: 122 MG/DL (ref 74–124)
HCT VFR BLD AUTO: 39.3 % (ref 37.5–51)
HGB BLD-MCNC: 12.4 G/DL (ref 13–17.7)
IMM GRANULOCYTES # BLD AUTO: 0.01 10*3/MM3 (ref 0–0.05)
IMM GRANULOCYTES NFR BLD AUTO: 0.2 % (ref 0–0.5)
LYMPHOCYTES # BLD AUTO: 1.26 10*3/MM3 (ref 0.7–3.1)
LYMPHOCYTES NFR BLD AUTO: 28.1 % (ref 19.6–45.3)
MCH RBC QN AUTO: 30.6 PG (ref 26.6–33)
MCHC RBC AUTO-ENTMCNC: 31.6 G/DL (ref 31.5–35.7)
MCV RBC AUTO: 97 FL (ref 79–97)
MONOCYTES # BLD AUTO: 0.29 10*3/MM3 (ref 0.1–0.9)
MONOCYTES NFR BLD AUTO: 6.5 % (ref 5–12)
NEUTROPHILS NFR BLD AUTO: 2.37 10*3/MM3 (ref 1.7–7)
NEUTROPHILS NFR BLD AUTO: 52.9 % (ref 42.7–76)
NRBC BLD AUTO-RTO: 0 /100 WBC (ref 0–0.2)
PLATELET # BLD AUTO: 101 10*3/MM3 (ref 140–450)
PMV BLD AUTO: 9.5 FL (ref 6–12)
POTASSIUM SERPL-SCNC: 4 MMOL/L (ref 3.5–4.7)
PROT SERPL-MCNC: 7 G/DL (ref 6.3–8)
RBC # BLD AUTO: 4.05 10*6/MM3 (ref 4.14–5.8)
SODIUM SERPL-SCNC: 138 MMOL/L (ref 134–145)
WBC NRBC COR # BLD: 4.48 10*3/MM3 (ref 3.4–10.8)

## 2022-03-02 PROCEDURE — 25010000002 PALONOSETRON PER 25 MCG: Performed by: NURSE PRACTITIONER

## 2022-03-02 PROCEDURE — 99215 OFFICE O/P EST HI 40 MIN: CPT | Performed by: NURSE PRACTITIONER

## 2022-03-02 PROCEDURE — 25010000002 DEXAMETHASONE SODIUM PHOSPHATE 100 MG/10ML SOLUTION: Performed by: NURSE PRACTITIONER

## 2022-03-02 PROCEDURE — 25010000002 FLUOROURACIL PER 500 MG: Performed by: NURSE PRACTITIONER

## 2022-03-02 PROCEDURE — 85025 COMPLETE CBC W/AUTO DIFF WBC: CPT

## 2022-03-02 PROCEDURE — 96375 TX/PRO/DX INJ NEW DRUG ADDON: CPT

## 2022-03-02 PROCEDURE — 80053 COMPREHEN METABOLIC PANEL: CPT

## 2022-03-02 PROCEDURE — 96367 TX/PROPH/DG ADDL SEQ IV INF: CPT

## 2022-03-02 PROCEDURE — 96409 CHEMO IV PUSH SNGL DRUG: CPT

## 2022-03-02 PROCEDURE — 25010000002 LEUCOVORIN CALCIUM PER 50 MG: Performed by: NURSE PRACTITIONER

## 2022-03-02 PROCEDURE — 25010000002 FOSAPREPITANT PER 1 MG: Performed by: NURSE PRACTITIONER

## 2022-03-02 PROCEDURE — 96416 CHEMO PROLONG INFUSE W/PUMP: CPT

## 2022-03-02 PROCEDURE — 82378 CARCINOEMBRYONIC ANTIGEN: CPT | Performed by: INTERNAL MEDICINE

## 2022-03-02 RX ORDER — FLUOROURACIL 50 MG/ML
340 INJECTION, SOLUTION INTRAVENOUS ONCE
Status: CANCELLED | OUTPATIENT
Start: 2022-03-02

## 2022-03-02 RX ORDER — FAMOTIDINE 10 MG/ML
20 INJECTION, SOLUTION INTRAVENOUS AS NEEDED
Status: CANCELLED | OUTPATIENT
Start: 2022-03-02

## 2022-03-02 RX ORDER — PENTOXIFYLLINE 400 MG/1
TABLET, EXTENDED RELEASE ORAL
Qty: 90 TABLET | Refills: 0 | Status: SHIPPED | OUTPATIENT
Start: 2022-03-02 | End: 2022-05-31

## 2022-03-02 RX ORDER — DEXTROSE MONOHYDRATE 50 MG/ML
250 INJECTION, SOLUTION INTRAVENOUS ONCE
Status: CANCELLED | OUTPATIENT
Start: 2022-03-02

## 2022-03-02 RX ORDER — PALONOSETRON 0.05 MG/ML
0.25 INJECTION, SOLUTION INTRAVENOUS ONCE
Status: CANCELLED | OUTPATIENT
Start: 2022-03-02

## 2022-03-02 RX ORDER — DIPHENHYDRAMINE HYDROCHLORIDE 50 MG/ML
25 INJECTION INTRAMUSCULAR; INTRAVENOUS AS NEEDED
Status: DISCONTINUED | OUTPATIENT
Start: 2022-03-02 | End: 2022-03-02 | Stop reason: HOSPADM

## 2022-03-02 RX ORDER — FLUOROURACIL 50 MG/ML
340 INJECTION, SOLUTION INTRAVENOUS ONCE
Status: COMPLETED | OUTPATIENT
Start: 2022-03-02 | End: 2022-03-02

## 2022-03-02 RX ORDER — FAMOTIDINE 10 MG/ML
20 INJECTION, SOLUTION INTRAVENOUS ONCE
Status: CANCELLED
Start: 2022-03-02 | End: 2022-03-02

## 2022-03-02 RX ORDER — SODIUM CHLORIDE 9 MG/ML
250 INJECTION, SOLUTION INTRAVENOUS ONCE
Status: COMPLETED | OUTPATIENT
Start: 2022-03-02 | End: 2022-03-02

## 2022-03-02 RX ORDER — FAMOTIDINE 10 MG/ML
20 INJECTION, SOLUTION INTRAVENOUS ONCE
Status: DISCONTINUED | OUTPATIENT
Start: 2022-03-02 | End: 2022-03-02 | Stop reason: HOSPADM

## 2022-03-02 RX ORDER — DIPHENHYDRAMINE HYDROCHLORIDE 50 MG/ML
25 INJECTION INTRAMUSCULAR; INTRAVENOUS AS NEEDED
Status: CANCELLED | OUTPATIENT
Start: 2022-03-02

## 2022-03-02 RX ORDER — PALONOSETRON 0.05 MG/ML
0.25 INJECTION, SOLUTION INTRAVENOUS ONCE
Status: COMPLETED | OUTPATIENT
Start: 2022-03-02 | End: 2022-03-02

## 2022-03-02 RX ORDER — SODIUM CHLORIDE 9 MG/ML
250 INJECTION, SOLUTION INTRAVENOUS ONCE
Status: CANCELLED | OUTPATIENT
Start: 2022-03-02

## 2022-03-02 RX ADMIN — DEXTROSE MONOHYDRATE 700 MG: 50 INJECTION, SOLUTION INTRAVENOUS at 10:16

## 2022-03-02 RX ADMIN — FLUOROURACIL 700 MG: 50 INJECTION, SOLUTION INTRAVENOUS at 10:53

## 2022-03-02 RX ADMIN — SODIUM CHLORIDE 250 ML: 9 INJECTION, SOLUTION INTRAVENOUS at 09:21

## 2022-03-02 RX ADMIN — FLUOROURACIL 4180 MG: 50 INJECTION, SOLUTION INTRAVENOUS at 10:53

## 2022-03-02 RX ADMIN — SODIUM CHLORIDE 100 ML: 9 INJECTION, SOLUTION INTRAVENOUS at 09:40

## 2022-03-02 RX ADMIN — PALONOSETRON 0.25 MG: 0.05 INJECTION, SOLUTION INTRAVENOUS at 09:21

## 2022-03-02 RX ADMIN — DEXAMETHASONE SODIUM PHOSPHATE 12 MG: 10 INJECTION, SOLUTION INTRAMUSCULAR; INTRAVENOUS at 09:21

## 2022-03-02 NOTE — PROGRESS NOTES
"  Subjective     REASON FOR FOLLOW UP:  1.  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  2. Colovesical fistula, chronic antibiotic therapy with Cipro.  3.  DVT of the right and left lower extremity. 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  Acute GI bleed      History of Present Illness    The patient is a 66 y.o. male with the above-mentioned history who returns the office today in anticipation of chemotherapy with FOLFOX Herceptin.  When last evaluated by Dr. Haynes, 2/9/2022, treatment was delayed due to borderline neutropenia, ANC 1.3.  He was brought back to the office 2/16/2022 to proceed with treatment.  Unfortunately, he had a significant reaction to oxaliplatin with chest tightness, shortness of breath, limited air movement throughout his chest.  He is not a candidate for further oxaliplatin.    Today, 3/2/2022, he reports he is feeling well.  He tolerated his last dose of chemotherapy reasonably well given that he only received a partial dose of oxaliplatin.  He is without nausea vomiting.  He reports his shortness of breath is overall unchanged.  He is eating and drinking adequately without discomfort.  He has no difficulty swallowing.  His bowels are moving normally.  He denies blood in his stool.    Past Medical History:   Diagnosis Date   • Arthritis    • Cancer (HCC)     prostate cancer 2008   • Cancer (HCC)     esophageal   • Chronic anticoagulation     on xarelto   • Elevated PSA    • Esophageal mass    • Fistula     colon and bladder   • H/O Lung nodule    • Hepatitis     CHILD--PT STATED \"I THINK IT WAS A.\"   • History of chemotherapy    • History of pneumonia    • Hx of blood clots 08/10/2019   • Hyperlipidemia    • Hypertension    • Nail fungus    • Neuropathy    • PVD (peripheral vascular disease) (HCC)    • Recto-bladderneck fistula     on poab for recurrent uti        Past Surgical History:   Procedure Laterality Date   • COLONOSCOPY " N/A 2/4/2020    Procedure: COLONOSCOPY;  Surgeon: Guy Sears MD;  Location: [facility] ENDOSCOPY;  Service: General;  Laterality: N/A;  PRE-COLOVESICAL FISTULA  POST-- DIVERTICULOSIS   • CYSTOSCOPY  02/06/2020   • CYSTOSCOPY Bilateral 2/26/2020    Procedure: CYSTOSCOPY RETROGRADE;  Surgeon: Cm Witt MD;  Location: [facility] MAIN OR;  Service: Urology;  Laterality: Bilateral;   • ENDOSCOPY     • ENDOSCOPY N/A 6/19/2021    Procedure: ESOPHAGOGASTRODUODENOSCOPY WITH BIOPSY;  Surgeon: Mary Brito MD;  Location: [facility] MAIN OR;  Service: Gastroenterology;  Laterality: N/A;   • ENDOSCOPY N/A 8/5/2021    Procedure: ESOPHAGOGASTRODUODENOSCOPY HEMOSPRAY;  Surgeon: Naga Shelby MD;  Location: [facility] ENDOSCOPY;  Service: Gastroenterology;  Laterality: N/A;  HX OF ESOPHAGEAL  CANCER;MELENA  POST: ESOPHAGEAL BLEEDING; ESOPHAGEAL MASS   • HERNIA REPAIR Right 1999    inguinal hernia   • PROSTATE SURGERY  03/2008    prostatectectomy   • VENOUS ACCESS DEVICE (PORT) INSERTION N/A 7/16/2019    Procedure: INSERTION VENOUS ACCESS DEVICE WITH FLUORO AND EGD WITH BIOPSY;  Surgeon: Mary Brito MD;  Location: [facility] OR;  Service: Thoracic        Current Outpatient Medications on File Prior to Visit   Medication Sig Dispense Refill   • acetaminophen (TYLENOL) 500 MG tablet Take 500 mg by mouth Every 6 (Six) Hours As Needed for Mild Pain .     • cevimeline (EVOXAC) 30 MG capsule Take 30 mg by mouth 3 (Three) Times a Day.     • ciprofloxacin (CIPRO) 250 MG tablet Take 1 tablet by mouth Daily. Take one every other day 90 tablet 1   • econazole nitrate (SPECTAZOLE) 1 % cream 2 (Two) Times a Day.     • gabapentin (NEURONTIN) 300 MG capsule Take 1 capsule by mouth 2 (Two) Times a Day. 180 capsule 0   • glipizide (Glucotrol XL) 5 MG ER tablet Take 1 tablet by mouth Daily. 30 tablet 3   • HYDROcodone-acetaminophen (NORCO) 5-325 MG per tablet Take  by mouth See Admin Instructions. Take 1 tablet by mouth every 4-6  hours as needed for pain     • lisinopril-hydrochlorothiazide (PRINZIDE,ZESTORETIC) 20-12.5 MG per tablet TAKE 1 TABLET BY MOUTH EVERY DAY 90 tablet 0   • nicotine (NICODERM CQ) 21 MG/24HR patch Place 1 patch on the skin as directed by provider Daily. (Patient taking differently: Place 1 patch on the skin as directed by provider Daily. Has at home but hasn't started taking yet) 28 each 1   • OLANZapine (ZyPREXA) 2.5 MG tablet Take 1 tablet by mouth Every Night. 90 tablet 0   • ondansetron (ZOFRAN) 4 MG tablet Take 1 tablet by mouth Every 8 (Eight) Hours As Needed for Nausea or Vomiting. 30 tablet 2   • pantoprazole (Protonix) 40 MG EC tablet Take 1 tablet by mouth 2 (Two) Times a Day. 90 tablet 3   • pentoxifylline (TRENtal) 400 MG CR tablet TAKE 1 TABLET BY MOUTH TWICE A DAY WITH MEALS 90 tablet 0   • potassium chloride ER (K-TAB) 20 MEQ tablet controlled-release ER tablet Take 1 tablet by mouth Daily. 60 tablet 3   • Probiotic Product (PROBIOTIC DAILY PO) Take 1 tablet by mouth Daily.     • triamcinolone (KENALOG) 0.1 % ointment Apply 1 application topically to the appropriate area as directed 2 (Two) Times a Day. 30 g 1     No current facility-administered medications on file prior to visit.        ALLERGIES:  No Known Allergies     Social History     Socioeconomic History   • Marital status: Single   • Years of education: High school   Tobacco Use   • Smoking status: Current Every Day Smoker     Packs/day: 1.00     Years: 38.00     Pack years: 38.00     Types: Cigarettes     Start date: 1974   • Smokeless tobacco: Never Used   • Tobacco comment: Quit for a period of 8 years.    Vaping Use   • Vaping Use: Never used   Substance and Sexual Activity   • Alcohol use: Not Currently   • Drug use: Never   • Sexual activity: Defer        Family History   Problem Relation Age of Onset   • Hypertension Mother    • Stroke Mother    • Hypertension Other    • Lung disease Other    • Prostate cancer Other    • Lung cancer  Father    • Malig Hyperthermia Neg Hx       Current Outpatient Medications on File Prior to Visit   Medication Sig Dispense Refill   • acetaminophen (TYLENOL) 500 MG tablet Take 500 mg by mouth Every 6 (Six) Hours As Needed for Mild Pain .     • cevimeline (EVOXAC) 30 MG capsule Take 30 mg by mouth 3 (Three) Times a Day.     • ciprofloxacin (CIPRO) 250 MG tablet Take 1 tablet by mouth Daily. Take one every other day 90 tablet 1   • econazole nitrate (SPECTAZOLE) 1 % cream 2 (Two) Times a Day.     • gabapentin (NEURONTIN) 300 MG capsule Take 1 capsule by mouth 2 (Two) Times a Day. 180 capsule 0   • glipizide (Glucotrol XL) 5 MG ER tablet Take 1 tablet by mouth Daily. 30 tablet 3   • HYDROcodone-acetaminophen (NORCO) 5-325 MG per tablet Take  by mouth See Admin Instructions. Take 1 tablet by mouth every 4-6 hours as needed for pain     • lisinopril-hydrochlorothiazide (PRINZIDE,ZESTORETIC) 20-12.5 MG per tablet TAKE 1 TABLET BY MOUTH EVERY DAY 90 tablet 0   • nicotine (NICODERM CQ) 21 MG/24HR patch Place 1 patch on the skin as directed by provider Daily. (Patient taking differently: Place 1 patch on the skin as directed by provider Daily. Has at home but hasn't started taking yet) 28 each 1   • OLANZapine (ZyPREXA) 2.5 MG tablet Take 1 tablet by mouth Every Night. 90 tablet 0   • ondansetron (ZOFRAN) 4 MG tablet Take 1 tablet by mouth Every 8 (Eight) Hours As Needed for Nausea or Vomiting. 30 tablet 2   • pantoprazole (Protonix) 40 MG EC tablet Take 1 tablet by mouth 2 (Two) Times a Day. 90 tablet 3   • pentoxifylline (TRENtal) 400 MG CR tablet TAKE 1 TABLET BY MOUTH TWICE A DAY WITH MEALS 90 tablet 0   • potassium chloride ER (K-TAB) 20 MEQ tablet controlled-release ER tablet Take 1 tablet by mouth Daily. 60 tablet 3   • Probiotic Product (PROBIOTIC DAILY PO) Take 1 tablet by mouth Daily.     • triamcinolone (KENALOG) 0.1 % ointment Apply 1 application topically to the appropriate area as directed 2 (Two) Times a  "Day. 30 g 1     No current facility-administered medications on file prior to visit.   No Known Allergies       Objective     Vitals:    03/02/22 0831   BP: 147/81   Pulse: 86   Resp: 16   Temp: 97.3 °F (36.3 °C)   TempSrc: Temporal   SpO2: 100%   Weight: 86.5 kg (190 lb 11.2 oz)   Height: 175.3 cm (69.02\")   PainSc: 0-No pain     Current Status 1/12/2022   ECOG score 0     Physical exam    GENERAL:  Well-developed, well-nourished in no acute distress.  SKIN:  Warm, dry without rashes, purpura or petechiae.  HEAD:  Normocephalic.  EYES:  Pupils equal, round.  EOMs intact.  Conjunctivae normal.  EARS:  Hearing intact.  NOSE:  Septum midline.  No excoriations or nasal discharge.  CHEST:  Lungs clear to auscultation. Good airflow.  CARDIAC:  Regular rate and rhythm without murmurs. Normal S1,S2.  ABDOMEN:  Soft, nontender. Bowel sounds present  EXTREMITIES:  No clubbing, cyanosis or edema.  NEUROLOGICAL: No focal neurological deficits.  PSYCHIATRIC:  Normal affect and mood.      I have reexamined the patient and the results are consistent with the previously documented exam. NIRU Okeefe     RECENT LABS:  Results from last 7 days   Lab Units 03/02/22  0816   WBC 10*3/mm3 4.48   NEUTROS ABS 10*3/mm3 2.37   HEMOGLOBIN g/dL 12.4*   HEMATOCRIT % 39.3   PLATELETS 10*3/mm3 101*     Results from last 7 days   Lab Units 03/02/22  0816   SODIUM mmol/L 138   POTASSIUM mmol/L 4.0   CHLORIDE mmol/L 104   CO2 mmol/L 25.1   BUN mg/dL 9   CREATININE mg/dL 0.63*   CALCIUM mg/dL 9.2   ALBUMIN g/dL 3.40*   BILIRUBIN mg/dL 0.5   ALK PHOS U/L 211*   ALT (SGPT) U/L 71*   AST (SGOT) U/L 53*   GLUCOSE mg/dL 122           Assessment/Plan    1.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 Almost 90% of the liver was replaced by tumor.  · HER-2 positive  · Initially treated with FOLFOX initiated July 2019  · Herceptin added with cycle 2  · Following 8 cycles of FOLFOX, oxaliplatin discontinued with " continuation of 5-FU, leucovorin, Herceptin. This was continued through 7/7/2020  · Increasing CEA led to PET scan 6/9/2021.  Disease progression noted with growth of the tumor in the lower esophagus.  Continued stability of hepatic metastasis  · Repeat EGD 6/19/2021 for tissue specimen and Next Generation Sequencing.  · Endoscopy that shows a noncircumferential abnormality in the lower esophagus that encompasses almost 6 cm in length. It is not blocking off the esophagus and that is why the patient has no symptoms. The pathology shows at least atypical cells consistent with cancer.  · NGS showing PD-L1 positive and high mutation burden.  The tumor remains HER-2 positive.  Treatment plan for Keytruda every 3 weeks x 4 cycles followed by repeat imaging  · Keytruda initiated 7/29/2021  · Hospitalization 8/2/2021 through 8/7/2021 secondary to acute GI bleeding from known malignancy.  · Completed 10 fractions of radiation to the lower esophagus 8/18/2021 after GI bleed  · PET scan following four cycles of Keytruda 10/14/2021 with improvement in the patient's known neoplasm involving the distal esophagus. However, known metastatic disease involving the liver is significantly worse. Keytruda discontinued  · Enhertu initiated 10/29/2021  · 12/23/2021 PET scan following three cycles of Enhertu discloses resolution of the tumor in the esophagus. He continues to have two active lesions in the liver with significant SUV activity pertinent to his metastatic disease. Minimal pulmonary infiltrates noted bilaterally. With concerns for Enhertu interstitial lung disease, Enhertu discontinued  · Treatment transitioned to FOLFOX Herceptin  · Borderline neutropenia 2/9/2022 with plans for 15% dose reduction to FOLFOX  · 2/16/2022, significant reaction to oxaliplatin which is discontinued  · Reevaluation today, 3/2/2022, with discontinue of oxaliplatin, the patient will continue on 5-FU, leucovorin, Kanjinti. Of note, the patient  received 6 mg/kg of Kanjinti 2 weeks ago, therefore he cannot receive this medication today. He will receive only 5-FU leucovorin and return in 1 week for Kanjinti 2 mg/kg. In 2 weeks, he will resume his normal schedule of 5-FU, leucovorin, Kanjinti 4 mg/kg every 2 weeks.    2. Colovesical fistula.   · He requires intermittent Cipro when he notes stool in his urine  · He currently reports his symptoms are more progressive with more urine in his rectum. He continues on Cipro and is scheduled for follow-up with urology, Dr. Witt later this month    3.  Thrombophilia secondary malignancy, DVT  · Currently anticoagulated with Xarelto 20 mg daily  · Following GI bleed 8/2/2021, Xarelto has been discontinued  · Plan no evidence of recurrent thrombosis    4. Acute GI bleeding  · Dark tarry stool initially began 7/29/2021  · 7/23/2021, hemoglobin of 11.0.  8/2/2021, hemoglobin of 5.7  · Patient admitted, received transfusion of 4 units packed red blood cells.  EGD identified bleeding mass in the distal esophagus  · Status post radiation to esophageal lesion with no further issues with bleeding  · Hemoglobin today of 12.4    5. Enhertu initiated interstitial lung disease  · The patient completed a prednisone taper  · He is without hypoxia or tachycardia currently. He has only minimal dyspnea on exertion    6. Significant oxaliplatin reaction  · 2/16/2022, significant reaction with shortness of breath, limited air movement, treated with high-dose Solu-Cortef. He is not a candidate for further oxaliplatin    7. Borderline thrombocytopenia  · Today, 3/2/2021, the patient is a platelet count of 101,000. With the discontinuation of oxaliplatin, we can continue with 5-FU leucovorin which will likely not cause significant issues with his platelets    PLAN:  1. Proceed today with 5-FU leucovorin. Oxaliplatin has been discontinued.  2. The patient cannot receive Kanjinti today as he received 6 mg/kg 2 weeks ago (every 3-week  dosing)  3. He will therefore return in 1 week for CBC, Kanjinti alone, 2 mg/kg (1 week dosing)  4. In 2 weeks, the patient will return for CBC, CMP, CEA, MD follow-up with Dr. Haynes, 5-FU, leucovorin, Kanjinti 4 mg/kg (2 week dosing)  5. Plans to continue 5-FU, leucovorin, Kanjinti without oxaliplatin every 2 weeks  6. Continue Cipro  7. Follow-up with urology as scheduled regarding colovesical fistula    The patient continues on high risk medication requiring close monitoring for toxicity. Today's plan of care was discussed reviewed with Dr. Haynes who is in agreement    I spent 40 minutes caring for Han on this date of service. This time includes time spent by me in the following activities: preparing for the visit, reviewing tests, obtaining and/or reviewing a separately obtained history, performing a medically appropriate examination and/or evaluation, counseling and educating the patient/family/caregiver, referring and communicating with other health care professionals, documenting information in the medical record and care coordination.     Dana Paniagua, APRN  03/02/2022

## 2022-03-04 ENCOUNTER — INFUSION (OUTPATIENT)
Dept: ONCOLOGY | Facility: HOSPITAL | Age: 67
End: 2022-03-04

## 2022-03-04 DIAGNOSIS — C78.7 LIVER METASTASIS: ICD-10-CM

## 2022-03-04 DIAGNOSIS — Z45.2 ENCOUNTER FOR ADJUSTMENT OR MANAGEMENT OF VASCULAR ACCESS DEVICE: ICD-10-CM

## 2022-03-04 DIAGNOSIS — C15.5 MALIGNANT NEOPLASM OF LOWER THIRD OF ESOPHAGUS: Primary | ICD-10-CM

## 2022-03-04 DIAGNOSIS — D49.0 ESOPHAGUS NEOPLASM: ICD-10-CM

## 2022-03-04 PROCEDURE — 25010000002 HEPARIN LOCK FLUSH PER 10 UNITS: Performed by: INTERNAL MEDICINE

## 2022-03-04 RX ORDER — SODIUM CHLORIDE 0.9 % (FLUSH) 0.9 %
10 SYRINGE (ML) INJECTION AS NEEDED
Status: CANCELLED | OUTPATIENT
Start: 2022-03-04

## 2022-03-04 RX ORDER — HEPARIN SODIUM (PORCINE) LOCK FLUSH IV SOLN 100 UNIT/ML 100 UNIT/ML
500 SOLUTION INTRAVENOUS AS NEEDED
Status: CANCELLED | OUTPATIENT
Start: 2022-03-04

## 2022-03-04 RX ORDER — HEPARIN SODIUM (PORCINE) LOCK FLUSH IV SOLN 100 UNIT/ML 100 UNIT/ML
500 SOLUTION INTRAVENOUS AS NEEDED
Status: DISCONTINUED | OUTPATIENT
Start: 2022-03-04 | End: 2022-03-04 | Stop reason: HOSPADM

## 2022-03-04 RX ORDER — SODIUM CHLORIDE 0.9 % (FLUSH) 0.9 %
10 SYRINGE (ML) INJECTION AS NEEDED
Status: DISCONTINUED | OUTPATIENT
Start: 2022-03-04 | End: 2022-03-04 | Stop reason: HOSPADM

## 2022-03-04 RX ADMIN — Medication 10 ML: at 08:53

## 2022-03-04 RX ADMIN — Medication 500 UNITS: at 08:53

## 2022-03-09 ENCOUNTER — DOCUMENTATION (OUTPATIENT)
Dept: OTHER | Facility: HOSPITAL | Age: 67
End: 2022-03-09

## 2022-03-09 ENCOUNTER — INFUSION (OUTPATIENT)
Dept: ONCOLOGY | Facility: HOSPITAL | Age: 67
End: 2022-03-09

## 2022-03-09 VITALS
RESPIRATION RATE: 16 BRPM | DIASTOLIC BLOOD PRESSURE: 77 MMHG | OXYGEN SATURATION: 95 % | TEMPERATURE: 97.8 F | WEIGHT: 189.2 LBS | SYSTOLIC BLOOD PRESSURE: 133 MMHG | BODY MASS INDEX: 27.93 KG/M2 | HEART RATE: 100 BPM

## 2022-03-09 DIAGNOSIS — D49.0 ESOPHAGUS NEOPLASM: ICD-10-CM

## 2022-03-09 DIAGNOSIS — C15.5 MALIGNANT NEOPLASM OF LOWER THIRD OF ESOPHAGUS: Primary | ICD-10-CM

## 2022-03-09 DIAGNOSIS — C78.7 LIVER METASTASIS: ICD-10-CM

## 2022-03-09 LAB
BASOPHILS # BLD AUTO: 0.07 10*3/MM3 (ref 0–0.2)
BASOPHILS NFR BLD AUTO: 2.5 % (ref 0–1.5)
DEPRECATED RDW RBC AUTO: 51.7 FL (ref 37–54)
EOSINOPHIL # BLD AUTO: 0.17 10*3/MM3 (ref 0–0.4)
EOSINOPHIL NFR BLD AUTO: 6.2 % (ref 0.3–6.2)
ERYTHROCYTE [DISTWIDTH] IN BLOOD BY AUTOMATED COUNT: 15 % (ref 12.3–15.4)
HCT VFR BLD AUTO: 38.8 % (ref 37.5–51)
HGB BLD-MCNC: 12.9 G/DL (ref 13–17.7)
IMM GRANULOCYTES # BLD AUTO: 0.26 10*3/MM3 (ref 0–0.05)
IMM GRANULOCYTES NFR BLD AUTO: 9.5 % (ref 0–0.5)
LYMPHOCYTES # BLD AUTO: 1.1 10*3/MM3 (ref 0.7–3.1)
LYMPHOCYTES NFR BLD AUTO: 40 % (ref 19.6–45.3)
MCH RBC QN AUTO: 31.2 PG (ref 26.6–33)
MCHC RBC AUTO-ENTMCNC: 33.2 G/DL (ref 31.5–35.7)
MCV RBC AUTO: 93.7 FL (ref 79–97)
MONOCYTES # BLD AUTO: 0.38 10*3/MM3 (ref 0.1–0.9)
MONOCYTES NFR BLD AUTO: 13.8 % (ref 5–12)
NEUTROPHILS NFR BLD AUTO: 0.77 10*3/MM3 (ref 1.7–7)
NEUTROPHILS NFR BLD AUTO: 28 % (ref 42.7–76)
NRBC BLD AUTO-RTO: 0 /100 WBC (ref 0–0.2)
PLATELET # BLD AUTO: 151 10*3/MM3 (ref 140–450)
PMV BLD AUTO: 10 FL (ref 6–12)
RBC # BLD AUTO: 4.14 10*6/MM3 (ref 4.14–5.8)
WBC NRBC COR # BLD: 2.75 10*3/MM3 (ref 3.4–10.8)

## 2022-03-09 PROCEDURE — 25010000002 TRASTUZUMAB-ANNS 420 MG RECONSTITUTED SOLUTION 1 EACH VIAL: Performed by: NURSE PRACTITIONER

## 2022-03-09 PROCEDURE — 96413 CHEMO IV INFUSION 1 HR: CPT

## 2022-03-09 PROCEDURE — 96375 TX/PRO/DX INJ NEW DRUG ADDON: CPT

## 2022-03-09 PROCEDURE — 85025 COMPLETE CBC W/AUTO DIFF WBC: CPT

## 2022-03-09 RX ORDER — SODIUM CHLORIDE 9 MG/ML
250 INJECTION, SOLUTION INTRAVENOUS ONCE
Status: COMPLETED | OUTPATIENT
Start: 2022-03-09 | End: 2022-03-09

## 2022-03-09 RX ORDER — FAMOTIDINE 10 MG/ML
20 INJECTION, SOLUTION INTRAVENOUS ONCE
Status: COMPLETED | OUTPATIENT
Start: 2022-03-09 | End: 2022-03-09

## 2022-03-09 RX ORDER — DIPHENHYDRAMINE HYDROCHLORIDE 50 MG/ML
25 INJECTION INTRAMUSCULAR; INTRAVENOUS AS NEEDED
Status: DISCONTINUED | OUTPATIENT
Start: 2022-03-09 | End: 2022-03-09 | Stop reason: HOSPADM

## 2022-03-09 RX ADMIN — FAMOTIDINE 20 MG: 10 INJECTION INTRAVENOUS at 13:43

## 2022-03-09 RX ADMIN — TRASTUZUMAB-ANNS 180 MG: 420 INJECTION, POWDER, LYOPHILIZED, FOR SOLUTION INTRAVENOUS at 14:28

## 2022-03-09 RX ADMIN — SODIUM CHLORIDE 250 ML: 9 INJECTION, SOLUTION INTRAVENOUS at 13:43

## 2022-03-09 NOTE — PROGRESS NOTES
[facility] MULTIDISCIPLINARY CLINIC  SURVIVORSHIP SERVICES CARE COORDINATION NOTE  Survivorship Treatment Summary and Smoking Cessation Follow-Up Visit  INFUSION ROOM      Patient seen in [facility] infusion suite RE: follow up, smoking cessation/survivorship     Patient here for planned Herceptin infusion today, accompanied by sister Kimberly. Overall doing generally well. Enhertu has been stopped and patient resumed with herceptin. He is feeling generally well, appetite, sleep are stable. Energy is good and he reports staying active, going for walks, staying busy around the house.     Biggest concerns are neuropathy to hands and feet. Feet can be painful. Also reports some worsening of the fistula which is being followed.    Continues to smoke about 20 cigarettes/day. Focusing on physical activity to support his treatment efforts. He will be here next Wednesday for infusion and is agreeable to a check in from me at the infusion center then.    Patient encouraged to call the office at any point for additional information, resources or support.

## 2022-03-11 DIAGNOSIS — C78.7 LIVER METASTASIS: ICD-10-CM

## 2022-03-11 DIAGNOSIS — C15.5 MALIGNANT NEOPLASM OF LOWER THIRD OF ESOPHAGUS: Primary | ICD-10-CM

## 2022-03-11 DIAGNOSIS — D49.0 ESOPHAGUS NEOPLASM: ICD-10-CM

## 2022-03-16 ENCOUNTER — INFUSION (OUTPATIENT)
Dept: ONCOLOGY | Facility: HOSPITAL | Age: 67
End: 2022-03-16

## 2022-03-16 ENCOUNTER — OFFICE VISIT (OUTPATIENT)
Dept: ONCOLOGY | Facility: CLINIC | Age: 67
End: 2022-03-16

## 2022-03-16 VITALS
DIASTOLIC BLOOD PRESSURE: 77 MMHG | TEMPERATURE: 97.1 F | WEIGHT: 192.4 LBS | OXYGEN SATURATION: 98 % | BODY MASS INDEX: 28.5 KG/M2 | SYSTOLIC BLOOD PRESSURE: 136 MMHG | HEART RATE: 81 BPM | HEIGHT: 69 IN | RESPIRATION RATE: 16 BRPM

## 2022-03-16 DIAGNOSIS — D49.0 ESOPHAGUS NEOPLASM: ICD-10-CM

## 2022-03-16 DIAGNOSIS — C15.5 MALIGNANT NEOPLASM OF LOWER THIRD OF ESOPHAGUS: Primary | ICD-10-CM

## 2022-03-16 DIAGNOSIS — C78.7 LIVER METASTASIS: ICD-10-CM

## 2022-03-16 DIAGNOSIS — G62.0 PERIPHERAL NEUROPATHY DUE TO CHEMOTHERAPY: ICD-10-CM

## 2022-03-16 DIAGNOSIS — C78.7 LIVER METASTASIS: Primary | ICD-10-CM

## 2022-03-16 DIAGNOSIS — C15.5 MALIGNANT NEOPLASM OF LOWER THIRD OF ESOPHAGUS: ICD-10-CM

## 2022-03-16 DIAGNOSIS — J84.89 PNEUMONITIS, INTERSTITIAL: ICD-10-CM

## 2022-03-16 DIAGNOSIS — I10 ESSENTIAL HYPERTENSION: ICD-10-CM

## 2022-03-16 DIAGNOSIS — Z72.0 TOBACCO ABUSE: ICD-10-CM

## 2022-03-16 DIAGNOSIS — T45.1X5A PERIPHERAL NEUROPATHY DUE TO CHEMOTHERAPY: ICD-10-CM

## 2022-03-16 DIAGNOSIS — Z45.2 ENCOUNTER FOR ADJUSTMENT OR MANAGEMENT OF VASCULAR ACCESS DEVICE: ICD-10-CM

## 2022-03-16 DIAGNOSIS — T78.2XXD ANAPHYLAXIS, SUBSEQUENT ENCOUNTER: ICD-10-CM

## 2022-03-16 PROBLEM — T78.2XXA ANAPHYLACTIC REACTION: Status: ACTIVE | Noted: 2022-03-16

## 2022-03-16 LAB
ALBUMIN SERPL-MCNC: 3.6 G/DL (ref 3.5–5.2)
ALBUMIN/GLOB SERPL: 1.2 G/DL (ref 1.1–2.4)
ALP SERPL-CCNC: 188 U/L (ref 38–116)
ALT SERPL W P-5'-P-CCNC: 63 U/L (ref 0–41)
ANION GAP SERPL CALCULATED.3IONS-SCNC: 9.2 MMOL/L (ref 5–15)
AST SERPL-CCNC: 45 U/L (ref 0–40)
BASOPHILS # BLD AUTO: 0.04 10*3/MM3 (ref 0–0.2)
BASOPHILS NFR BLD AUTO: 0.6 % (ref 0–1.5)
BILIRUB SERPL-MCNC: 0.5 MG/DL (ref 0.2–1.2)
BUN SERPL-MCNC: 9 MG/DL (ref 6–20)
BUN/CREAT SERPL: 14.5 (ref 7.3–30)
CALCIUM SPEC-SCNC: 9.1 MG/DL (ref 8.5–10.2)
CHLORIDE SERPL-SCNC: 102 MMOL/L (ref 98–107)
CO2 SERPL-SCNC: 24.8 MMOL/L (ref 22–29)
CREAT SERPL-MCNC: 0.62 MG/DL (ref 0.7–1.3)
DEPRECATED RDW RBC AUTO: 54.5 FL (ref 37–54)
EGFRCR SERPLBLD CKD-EPI 2021: 106.1 ML/MIN/1.73
EOSINOPHIL # BLD AUTO: 0.28 10*3/MM3 (ref 0–0.4)
EOSINOPHIL NFR BLD AUTO: 4.4 % (ref 0.3–6.2)
ERYTHROCYTE [DISTWIDTH] IN BLOOD BY AUTOMATED COUNT: 15.7 % (ref 12.3–15.4)
GLOBULIN UR ELPH-MCNC: 3.1 GM/DL (ref 1.8–3.5)
GLUCOSE SERPL-MCNC: 125 MG/DL (ref 74–124)
HCT VFR BLD AUTO: 38.7 % (ref 37.5–51)
HGB BLD-MCNC: 12.6 G/DL (ref 13–17.7)
IMM GRANULOCYTES # BLD AUTO: 0.09 10*3/MM3 (ref 0–0.05)
IMM GRANULOCYTES NFR BLD AUTO: 1.4 % (ref 0–0.5)
LYMPHOCYTES # BLD AUTO: 1.42 10*3/MM3 (ref 0.7–3.1)
LYMPHOCYTES NFR BLD AUTO: 22.3 % (ref 19.6–45.3)
MCH RBC QN AUTO: 31 PG (ref 26.6–33)
MCHC RBC AUTO-ENTMCNC: 32.6 G/DL (ref 31.5–35.7)
MCV RBC AUTO: 95.3 FL (ref 79–97)
MONOCYTES # BLD AUTO: 0.61 10*3/MM3 (ref 0.1–0.9)
MONOCYTES NFR BLD AUTO: 9.6 % (ref 5–12)
NEUTROPHILS NFR BLD AUTO: 3.93 10*3/MM3 (ref 1.7–7)
NEUTROPHILS NFR BLD AUTO: 61.7 % (ref 42.7–76)
NRBC BLD AUTO-RTO: 0 /100 WBC (ref 0–0.2)
PLATELET # BLD AUTO: 106 10*3/MM3 (ref 140–450)
PMV BLD AUTO: 9.5 FL (ref 6–12)
POTASSIUM SERPL-SCNC: 3.8 MMOL/L (ref 3.5–4.7)
PROT SERPL-MCNC: 6.7 G/DL (ref 6.3–8)
RBC # BLD AUTO: 4.06 10*6/MM3 (ref 4.14–5.8)
SODIUM SERPL-SCNC: 136 MMOL/L (ref 134–145)
WBC NRBC COR # BLD: 6.37 10*3/MM3 (ref 3.4–10.8)

## 2022-03-16 PROCEDURE — 96413 CHEMO IV INFUSION 1 HR: CPT

## 2022-03-16 PROCEDURE — 96416 CHEMO PROLONG INFUSE W/PUMP: CPT

## 2022-03-16 PROCEDURE — 96375 TX/PRO/DX INJ NEW DRUG ADDON: CPT

## 2022-03-16 PROCEDURE — 25010000002 DEXAMETHASONE PER 1 MG: Performed by: INTERNAL MEDICINE

## 2022-03-16 PROCEDURE — 85025 COMPLETE CBC W/AUTO DIFF WBC: CPT

## 2022-03-16 PROCEDURE — 25010000002 DIPHENHYDRAMINE PER 50 MG: Performed by: INTERNAL MEDICINE

## 2022-03-16 PROCEDURE — 96411 CHEMO IV PUSH ADDL DRUG: CPT

## 2022-03-16 PROCEDURE — 99214 OFFICE O/P EST MOD 30 MIN: CPT | Performed by: INTERNAL MEDICINE

## 2022-03-16 PROCEDURE — 25010000002 PALONOSETRON PER 25 MCG: Performed by: INTERNAL MEDICINE

## 2022-03-16 PROCEDURE — 96367 TX/PROPH/DG ADDL SEQ IV INF: CPT

## 2022-03-16 PROCEDURE — 25010000002 LEUCOVORIN CALCIUM PER 50 MG: Performed by: INTERNAL MEDICINE

## 2022-03-16 PROCEDURE — 25010000002 TRASTUZUMAB-ANNS 420 MG RECONSTITUTED SOLUTION 1 EACH VIAL: Performed by: INTERNAL MEDICINE

## 2022-03-16 PROCEDURE — 80053 COMPREHEN METABOLIC PANEL: CPT

## 2022-03-16 PROCEDURE — 25010000002 FLUOROURACIL PER 500 MG: Performed by: INTERNAL MEDICINE

## 2022-03-16 RX ORDER — PALONOSETRON 0.05 MG/ML
0.25 INJECTION, SOLUTION INTRAVENOUS ONCE
Status: CANCELLED | OUTPATIENT
Start: 2022-03-16

## 2022-03-16 RX ORDER — DIPHENHYDRAMINE HYDROCHLORIDE 50 MG/ML
25 INJECTION INTRAMUSCULAR; INTRAVENOUS AS NEEDED
Status: COMPLETED | OUTPATIENT
Start: 2022-03-16 | End: 2022-03-16

## 2022-03-16 RX ORDER — SODIUM CHLORIDE 9 MG/ML
250 INJECTION, SOLUTION INTRAVENOUS ONCE
Status: CANCELLED | OUTPATIENT
Start: 2022-03-16

## 2022-03-16 RX ORDER — DIPHENHYDRAMINE HYDROCHLORIDE 50 MG/ML
25 INJECTION INTRAMUSCULAR; INTRAVENOUS AS NEEDED
Status: CANCELLED | OUTPATIENT
Start: 2022-03-16

## 2022-03-16 RX ORDER — FAMOTIDINE 10 MG/ML
20 INJECTION, SOLUTION INTRAVENOUS ONCE
Status: CANCELLED
Start: 2022-03-16 | End: 2022-03-16

## 2022-03-16 RX ORDER — FLUOROURACIL 50 MG/ML
340 INJECTION, SOLUTION INTRAVENOUS ONCE
Status: CANCELLED | OUTPATIENT
Start: 2022-03-16

## 2022-03-16 RX ORDER — FAMOTIDINE 10 MG/ML
20 INJECTION, SOLUTION INTRAVENOUS AS NEEDED
Status: CANCELLED | OUTPATIENT
Start: 2022-03-16

## 2022-03-16 RX ORDER — PALONOSETRON 0.05 MG/ML
0.25 INJECTION, SOLUTION INTRAVENOUS ONCE
Status: COMPLETED | OUTPATIENT
Start: 2022-03-16 | End: 2022-03-16

## 2022-03-16 RX ORDER — FLUOROURACIL 50 MG/ML
340 INJECTION, SOLUTION INTRAVENOUS ONCE
Status: COMPLETED | OUTPATIENT
Start: 2022-03-16 | End: 2022-03-16

## 2022-03-16 RX ORDER — FAMOTIDINE 10 MG/ML
20 INJECTION, SOLUTION INTRAVENOUS ONCE
Status: COMPLETED | OUTPATIENT
Start: 2022-03-16 | End: 2022-03-16

## 2022-03-16 RX ORDER — DEXAMETHASONE SODIUM PHOSPHATE 4 MG/ML
4 INJECTION, SOLUTION INTRA-ARTICULAR; INTRALESIONAL; INTRAMUSCULAR; INTRAVENOUS; SOFT TISSUE ONCE
Status: COMPLETED | OUTPATIENT
Start: 2022-03-16 | End: 2022-03-16

## 2022-03-16 RX ORDER — SODIUM CHLORIDE 9 MG/ML
250 INJECTION, SOLUTION INTRAVENOUS ONCE
Status: COMPLETED | OUTPATIENT
Start: 2022-03-16 | End: 2022-03-16

## 2022-03-16 RX ADMIN — LEUCOVORIN CALCIUM 700 MG: 350 INJECTION, POWDER, LYOPHILIZED, FOR SUSPENSION INTRAMUSCULAR; INTRAVENOUS at 11:18

## 2022-03-16 RX ADMIN — FLUOROURACIL 700 MG: 50 INJECTION, SOLUTION INTRAVENOUS at 11:55

## 2022-03-16 RX ADMIN — FAMOTIDINE 20 MG: 10 INJECTION INTRAVENOUS at 10:23

## 2022-03-16 RX ADMIN — DEXAMETHASONE SODIUM PHOSPHATE 4 MG: 4 INJECTION INTRA-ARTICULAR; INTRALESIONAL; INTRAMUSCULAR; INTRAVENOUS; SOFT TISSUE at 10:23

## 2022-03-16 RX ADMIN — SODIUM CHLORIDE 250 ML: 9 INJECTION, SOLUTION INTRAVENOUS at 10:23

## 2022-03-16 RX ADMIN — FLUOROURACIL 4180 MG: 50 INJECTION, SOLUTION INTRAVENOUS at 11:57

## 2022-03-16 RX ADMIN — DIPHENHYDRAMINE HYDROCHLORIDE 25 MG: 50 INJECTION INTRAMUSCULAR; INTRAVENOUS at 10:27

## 2022-03-16 RX ADMIN — PALONOSETRON 0.25 MG: 0.05 INJECTION, SOLUTION INTRAVENOUS at 10:23

## 2022-03-16 RX ADMIN — TRASTUZUMAB-ANNS 360 MG: 420 INJECTION, POWDER, LYOPHILIZED, FOR SOLUTION INTRAVENOUS at 10:46

## 2022-03-16 NOTE — PROGRESS NOTES
Subjective     REASON FOR follow up:1.    1. 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  Currently receiving palliative 5 fu leucovorin  AND HERCEPTIN therapy once a month    2.COLOVESICAL FISTULA: chronic antibiotic therapy cipro, NO FURTHER PLANS FOR SURGERY AFTER VISIT AND PROCEDURE BY DR GM CASTILLO 1/20    3. 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 NEURONTIN INITIATED.        DURING THE VISIT WITH THE PATIENT TODAY , PATIENT HAD FACE MASK, MY MEDICAL ASSISTANT AND I  HAD PROPPER PROTECTIVE EQUIPMENT, AND I DID HAND HYGIENE WITH SOAP AND WATER BEFORE AND AFTER THE VISIT.    This patient returns today to the office for followup. During the previous visit while receiving chemotherapy with oxaliplatin he had a very severe anaphylactic reaction to the medicine that required a lot of intervention by nurses and myself. We pulled him out of the situation sufficient enough to be able to go home. Thereafter he had some weakness for 1 day and then he has got back to baseline. Actually for the last week he has been feeling terrific. He has only 1 new problem. He has started to notice in the last 2 weeks that he is passing urine at the same time that he has defecation. Also he continues having occasionally fecaluria and pneumaturia. He is known for having a colovesical fistula. He is not having any fever and he continues taking ciprofloxacin 1 tablet every other day for UTI prevention. Otherwise, he has no abdominal pain. He has no difficulty swallowing. He has not developed any jaundice. His appetite and taste actually are very good and he has no new cardiovascular or respiratory issues. His claudication in the lower extremities remains about the same with no changes in the toes. He has not had any fever or  "infection.                                  Past Medical History:   Diagnosis Date    Arthritis     Cancer (HCC)     prostate cancer 2008    Cancer (HCC)     esophageal    Chronic anticoagulation     on xarelto    Elevated PSA     Esophageal mass     Fistula     colon and bladder    H/O Lung nodule     Hepatitis     CHILD--PT STATED \"I THINK IT WAS A.\"    History of chemotherapy     History of pneumonia     Hx of blood clots 08/10/2019    Hyperlipidemia     Hypertension     Nail fungus     Neuropathy     PVD (peripheral vascular disease) (HCC)     Recto-bladderneck fistula     on poab for recurrent uti        Past Surgical History:   Procedure Laterality Date    COLONOSCOPY N/A 2/4/2020    Procedure: COLONOSCOPY;  Surgeon: Guy Sears MD;  Location: [facility] ENDOSCOPY;  Service: General;  Laterality: N/A;  PRE-COLOVESICAL FISTULA  POST-- DIVERTICULOSIS    CYSTOSCOPY  02/06/2020    CYSTOSCOPY Bilateral 2/26/2020    Procedure: CYSTOSCOPY RETROGRADE;  Surgeon: Cm Witt MD;  Location: [facility] OR;  Service: Urology;  Laterality: Bilateral;    ENDOSCOPY      ENDOSCOPY N/A 6/19/2021    Procedure: ESOPHAGOGASTRODUODENOSCOPY WITH BIOPSY;  Surgeon: Mary Brito MD;  Location: [facility] OR;  Service: Gastroenterology;  Laterality: N/A;    ENDOSCOPY N/A 8/5/2021    Procedure: ESOPHAGOGASTRODUODENOSCOPY HEMOSPRAY;  Surgeon: Naga Shelby MD;  Location: [facility] ENDOSCOPY;  Service: Gastroenterology;  Laterality: N/A;  HX OF ESOPHAGEAL  CANCER;MELENA  POST: ESOPHAGEAL BLEEDING; ESOPHAGEAL MASS    HERNIA REPAIR Right 1999    inguinal hernia    PROSTATE SURGERY  03/2008    prostatectectomy    VENOUS ACCESS DEVICE (PORT) INSERTION N/A 7/16/2019    Procedure: INSERTION VENOUS ACCESS DEVICE WITH FLUORO AND EGD WITH BIOPSY;  Surgeon: Mary Brito MD;  Location: [facility] OR;  Service: Thoracic        Current Outpatient Medications on File Prior to Visit   Medication Sig Dispense Refill    " acetaminophen (TYLENOL) 500 MG tablet Take 500 mg by mouth Every 6 (Six) Hours As Needed for Mild Pain .      cevimeline (EVOXAC) 30 MG capsule Take 30 mg by mouth 3 (Three) Times a Day.      ciprofloxacin (CIPRO) 250 MG tablet Take 1 tablet by mouth Daily. Take one every other day 90 tablet 1    econazole nitrate (SPECTAZOLE) 1 % cream 2 (Two) Times a Day.      gabapentin (NEURONTIN) 300 MG capsule Take 1 capsule by mouth 2 (Two) Times a Day. 180 capsule 0    glipizide (Glucotrol XL) 5 MG ER tablet Take 1 tablet by mouth Daily. 30 tablet 3    HYDROcodone-acetaminophen (NORCO) 5-325 MG per tablet Take  by mouth See Admin Instructions. Take 1 tablet by mouth every 4-6 hours as needed for pain      lisinopril-hydrochlorothiazide (PRINZIDE,ZESTORETIC) 20-12.5 MG per tablet TAKE 1 TABLET BY MOUTH EVERY DAY 90 tablet 0    nicotine (NICODERM CQ) 21 MG/24HR patch Place 1 patch on the skin as directed by provider Daily. (Patient taking differently: Place 1 patch on the skin as directed by provider Daily. Has at home but hasn't started taking yet) 28 each 1    OLANZapine (ZyPREXA) 2.5 MG tablet Take 1 tablet by mouth Every Night. 90 tablet 0    ondansetron (ZOFRAN) 4 MG tablet Take 1 tablet by mouth Every 8 (Eight) Hours As Needed for Nausea or Vomiting. 30 tablet 2    pantoprazole (Protonix) 40 MG EC tablet Take 1 tablet by mouth 2 (Two) Times a Day. 90 tablet 3    pentoxifylline (TRENtal) 400 MG CR tablet TAKE 1 TABLET BY MOUTH TWICE A DAY WITH MEALS 90 tablet 0    potassium chloride ER (K-TAB) 20 MEQ tablet controlled-release ER tablet Take 1 tablet by mouth Daily. 60 tablet 3    Probiotic Product (PROBIOTIC DAILY PO) Take 1 tablet by mouth Daily.      triamcinolone (KENALOG) 0.1 % ointment Apply 1 application topically to the appropriate area as directed 2 (Two) Times a Day. 30 g 1     Current Facility-Administered Medications on File Prior to Visit   Medication Dose Route Frequency Provider Last Rate Last Admin     [COMPLETED] dexamethasone (DECADRON) injection 4 mg  4 mg Intravenous Once Alexey Haynes MD   4 mg at 03/16/22 1023    [COMPLETED] diphenhydrAMINE (BENADRYL) injection 25 mg  25 mg Intravenous PRN Alexey Haynes MD   25 mg at 03/16/22 1027    [COMPLETED] famotidine (PEPCID) injection 20 mg  20 mg Intravenous Once Alexey Haynes MD   20 mg at 03/16/22 1023    [COMPLETED] fluorouracil (ADRUCIL) chemo injection 700 mg  340 mg/m2 (Treatment Plan Recorded) Intravenous Once Alexey Haynes MD   Stopped at 03/16/22 1200    [COMPLETED] leucovorin 700 mg in dextrose (D5W) 5 % 285 mL IVPB  340 mg/m2 (Treatment Plan Recorded) Intravenous Once Alexey Haynes MD   Stopped at 03/16/22 1155    [COMPLETED] palonosetron (ALOXI) injection 0.25 mg  0.25 mg Intravenous Once Alexey Haynes MD   0.25 mg at 03/16/22 1023    [COMPLETED] sodium chloride 0.9 % infusion 250 mL  250 mL Intravenous Once Alexey Haynes MD   Stopped at 03/16/22 1200    [COMPLETED] trastuzumab-anns (KANJINTI) 360 mg in sodium chloride 0.9 % 250 mL chemo IVPB  4 mg/kg (Treatment Plan Recorded) Intravenous Once Alexey Haynes MD   Stopped at 03/16/22 1117    [DISCONTINUED] dexamethasone (DECADRON) 4 mg in sodium chloride 0.9 % IVPB  4 mg Intravenous Once Alexey Haynes MD        [DISCONTINUED] fluorouracil (ADRUCIL) 4,180 mg in sodium chloride 0.9 % 240 mL chemo infusion - FOR HOME USE  2,040 mg/m2 (Treatment Plan Recorded) Intravenous Once Alexey Haynes MD   4,180 mg at 03/16/22 1157        ALLERGIES:    Allergies   Allergen Reactions    Oxaliplatin Anaphylaxis        Social History     Socioeconomic History    Marital status: Single    Years of education: High school   Tobacco Use    Smoking status: Current Every Day Smoker     Packs/day: 1.00     Years: 38.00     Pack years: 38.00     Types: Cigarettes     Start date: 1974    Smokeless tobacco: Never Used    Tobacco comment: Quit for a period of 8 years.    Vaping Use    Vaping Use: Never  used   Substance and Sexual Activity    Alcohol use: Not Currently    Drug use: Never    Sexual activity: Defer        Family History   Problem Relation Age of Onset    Hypertension Mother     Stroke Mother     Hypertension Other     Lung disease Other     Prostate cancer Other     Lung cancer Father     Malig Hyperthermia Neg Hx       Current Outpatient Medications on File Prior to Visit   Medication Sig Dispense Refill    acetaminophen (TYLENOL) 500 MG tablet Take 500 mg by mouth Every 6 (Six) Hours As Needed for Mild Pain .      cevimeline (EVOXAC) 30 MG capsule Take 30 mg by mouth 3 (Three) Times a Day.      ciprofloxacin (CIPRO) 250 MG tablet Take 1 tablet by mouth Daily. Take one every other day 90 tablet 1    econazole nitrate (SPECTAZOLE) 1 % cream 2 (Two) Times a Day.      gabapentin (NEURONTIN) 300 MG capsule Take 1 capsule by mouth 2 (Two) Times a Day. 180 capsule 0    glipizide (Glucotrol XL) 5 MG ER tablet Take 1 tablet by mouth Daily. 30 tablet 3    HYDROcodone-acetaminophen (NORCO) 5-325 MG per tablet Take  by mouth See Admin Instructions. Take 1 tablet by mouth every 4-6 hours as needed for pain      lisinopril-hydrochlorothiazide (PRINZIDE,ZESTORETIC) 20-12.5 MG per tablet TAKE 1 TABLET BY MOUTH EVERY DAY 90 tablet 0    nicotine (NICODERM CQ) 21 MG/24HR patch Place 1 patch on the skin as directed by provider Daily. (Patient taking differently: Place 1 patch on the skin as directed by provider Daily. Has at home but hasn't started taking yet) 28 each 1    OLANZapine (ZyPREXA) 2.5 MG tablet Take 1 tablet by mouth Every Night. 90 tablet 0    ondansetron (ZOFRAN) 4 MG tablet Take 1 tablet by mouth Every 8 (Eight) Hours As Needed for Nausea or Vomiting. 30 tablet 2    pantoprazole (Protonix) 40 MG EC tablet Take 1 tablet by mouth 2 (Two) Times a Day. 90 tablet 3    pentoxifylline (TRENtal) 400 MG CR tablet TAKE 1 TABLET BY MOUTH TWICE A DAY WITH MEALS 90 tablet 0    potassium chloride ER (K-TAB) 20 MEQ  tablet controlled-release ER tablet Take 1 tablet by mouth Daily. 60 tablet 3    Probiotic Product (PROBIOTIC DAILY PO) Take 1 tablet by mouth Daily.      triamcinolone (KENALOG) 0.1 % ointment Apply 1 application topically to the appropriate area as directed 2 (Two) Times a Day. 30 g 1     Current Facility-Administered Medications on File Prior to Visit   Medication Dose Route Frequency Provider Last Rate Last Admin    [COMPLETED] dexamethasone (DECADRON) injection 4 mg  4 mg Intravenous Once Alexey Haynes MD   4 mg at 03/16/22 1023    [COMPLETED] diphenhydrAMINE (BENADRYL) injection 25 mg  25 mg Intravenous PRN Alexey Haynes MD   25 mg at 03/16/22 1027    [COMPLETED] famotidine (PEPCID) injection 20 mg  20 mg Intravenous Once Alexey Haynes MD   20 mg at 03/16/22 1023    [COMPLETED] fluorouracil (ADRUCIL) chemo injection 700 mg  340 mg/m2 (Treatment Plan Recorded) Intravenous Once Alexey Haynes MD   Stopped at 03/16/22 1200    [COMPLETED] leucovorin 700 mg in dextrose (D5W) 5 % 285 mL IVPB  340 mg/m2 (Treatment Plan Recorded) Intravenous Once Alexey Haynes MD   Stopped at 03/16/22 1155    [COMPLETED] palonosetron (ALOXI) injection 0.25 mg  0.25 mg Intravenous Once Alexey Haynes MD   0.25 mg at 03/16/22 1023    [COMPLETED] sodium chloride 0.9 % infusion 250 mL  250 mL Intravenous Once Alexey Haynes MD   Stopped at 03/16/22 1200    [COMPLETED] trastuzumab-anns (KANJINTI) 360 mg in sodium chloride 0.9 % 250 mL chemo IVPB  4 mg/kg (Treatment Plan Recorded) Intravenous Once Alexey Haynes MD   Stopped at 03/16/22 1117    [DISCONTINUED] dexamethasone (DECADRON) 4 mg in sodium chloride 0.9 % IVPB  4 mg Intravenous Once Alexey Haynes MD        [DISCONTINUED] fluorouracil (ADRUCIL) 4,180 mg in sodium chloride 0.9 % 240 mL chemo infusion - FOR HOME USE  2,040 mg/m2 (Treatment Plan Recorded) Intravenous Once Alexey Haynes MD   4,180 mg at 03/16/22 1157     Allergies   Allergen Reactions     "Oxaliplatin Anaphylaxis            Objective     Vitals:    03/16/22 0858   BP: 136/77   Pulse: 81   Resp: 16   Temp: 97.1 °F (36.2 °C)   TempSrc: Temporal   SpO2: 98%   Weight: 87.3 kg (192 lb 6.4 oz)   Height: 175.3 cm (69.02\")   PainSc: 0-No pain     Current Status 3/16/2022   ECOG score 0         EXAM: I HAVE PERSONALLY REVIEWED THE HISTORY OF THE PRESENT ILLNESS, PAST MEDICAL HISTORY, FAMILY HISTORY, SOCIAL HISTORY, ALLERGIES, MEDICATIONS STATED ABOVE IN THE  NOTE FROM TODAY.        GENERAL:  Well-developed, well-nourished  Patient  in no acute distress.   SKIN:  Warm, dry ,NO purpura ,NO petechiae, no rash.  HEENT:  Pupils were equal and reactive to light and accomodation, conjunctivae noninjected, no pterygium, normal extraocular movements, normal visual acuity.   NECK:  Supple with good range of motion; no thyromegaly , no other masses, no JVD or bruits,.No carotid artery pain, no carotid abnormal pulsation , NO arterial dance.  LYMPHATICS:  No cervical, NO supraclavicular, NO axillary,NO epitrochlear , NO inguinal adenopathies.  CARDIAC   normal rate , regular rhythm, without murmur,NO rubs NO S3 NO S4   LUNGS: decreased breath sounds bilateral,  wheezing, NO rhonchi, NO crackles ,NO rubs.  VASCULAR VENOUS: no cyanosis, NO collateral circulation, NO varicosities, NO edema, NO palpable cords, NO pain,NO erythema, NO pigmentation of the skin.  ABDOMEN:  Soft, NO pain,no hepatomegaly, no splenomegaly,no masses, no ascites, no collateral circulation,no distention,no Manning sign.  EXTREMITIES  AND SPINE:  finger clubbing, no cyanosis ,no deformities , no pain .No kyphosis,  no pain in spine, no pain in ribs , no pain in pelvic bone.  NEUROLOGICAL:  Patient was awake, alert, oriented to time, person and place.                              RECENT LABS:  Hematology WBC   Date Value Ref Range Status   03/16/2022 6.37 3.40 - 10.80 10*3/mm3 Final   02/02/2019 13.4 (H) 3.4 - 10.8 x10E3/uL Final     RBC   Date Value Ref " Range Status   03/16/2022 4.06 (L) 4.14 - 5.80 10*6/mm3 Final   02/02/2019 4.81 4.14 - 5.80 x10E6/uL Final     Hemoglobin   Date Value Ref Range Status   03/16/2022 12.6 (L) 13.0 - 17.7 g/dL Final     Hematocrit   Date Value Ref Range Status   03/16/2022 38.7 37.5 - 51.0 % Final     Platelets   Date Value Ref Range Status   03/16/2022 106 (L) 140 - 450 10*3/mm3 Final        Result Notes    Component   Ref Range & Units 2 wk ago 2 mo ago 3 mo ago 4 mo ago 5 mo ago 6 mo ago 7 mo ago   CEA   ng/mL 34.30  227.00  255.00  51.90  32.30  24.60  20.40    Resulting Agency  [facility]                Assessment/Plan    1.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 Almost 90% of the liver WAS replaced by tumor. The patient has been undergoing chemotherapy with 5  fu and leucovorin  and Herceptin and has had excellent response clinically and radiologically. The patient was reviewed on 08/10/2020. I reviewed with the patient in the PAC system [facility] his PET scan that is dramatic. There is minimal uptake in the lower esophagus and most importantly complete resolution of his liver metastasis. Actually the only issue that is pertinent to the PET scan is still the visibility of his colovesical fistula.     Therefore from the point of view of his cancer of the esophagus, metastatic to the liver, he has no symptoms from the primary tumor in the esophagus and he has no symptoms related to his liver metastasis that has resolved altogether. His CEA level is stable.The patient raised the question about the CEA fluctuation. I pointed out to him that a lot of this is also related to his smoking. Smoking per se elevates CEA level.  Upon further reviewing the patient on 04/20/2021, he has no symptoms pertinent to his metastatic cancer of the esophagus to the liver and he has no difficulty swallowing. There are no side effects of the 5-FU,  Leucovorin and Herceptin. The patient's cardiac function remains stable and he has not had any drop in the ejection fraction.   I discussed with him on 05/18/2021 the fact that his cancer clinically is very quiet but I am afraid of the rise in his CEA level to 12. This could be an indicator of all of the cigarettes that he is smoking on a daily basis of indicator of cancer escaping control and not visible radiologically through his CT scan. I pointed out to him that he needs to continue making an effort to decrease his smoking and he is now down to 10 cigarettes a day. We will recheck another CEA level today. Given the circumstances of the previous CT scan I do not believe that I need to change the doses or plan of care in regard to his chemotherapy medication administration for the time being. I recommended for him to remain on his 5FU/leucovorin and Herceptin on a monthly basis for now.  The patient was further reviewed on 06/15/2021 with a new PET scan that documented regrowth of the tumor in the lower esophagus without any new symptomatology, for example dysphagia or odynophagia. No regurgitation. The liver metastasis remains in remission and there are no new areas of disease in the bones or lungs or any other site. Given the excellent performance status I discussed with the patient and his sister today many options of therapy, including going back to FOLFOX regimen along with Herceptin, taking another sample of the esophagus with a new endoscopy and doing analysis of the tissue for [employer] Now that will be my favorite choice, or palliative treatment with radiation therapy and 5-FU continuous infusion that will be toxic to him and he does not prefer to have.     I had a discussion with Mary Brito MD, thoracic surgeon, who has agreed to see the patient tomorrow. I think the endoscopy, the new tissue analysis, and sending the tissue for [employer] Now will be the way to go. Depending on what we find  there, we can modify his treatment altogether. I do not think the patient will have any consequences missing chemotherapy for a couple of weeks or so.      In preparation for the endoscopy and biopsies I asked him to hold off on the Xarelto until he is seen by Dr. Brito tomorrow.     In regard to his colovesical fistula, he has had minimal symptomatology this week, maybe some activity there and I asked him to go back to the lab and take a urine specimen and culture. He knows that if this is abnormal he will need to initiate ciprofloxacin that he has at home.    In regard to his hypertension, this is under good control and I advised him to remain on his lisinopril and hydrochlorothiazide combination.    In regard to his smoking cessation, he has had conversations with NIRU Jacobson about this. He is using some nicotine CQ patch, here and there and also trying to work with nicotine gum and things of this nature but he has not been able to shake this issue altogether.     Otherwise I will review him back in a couple of weeks with a CBC, CMP, and a CEA level.    This case will be presented in the multidisciplinary thoracic conference by me this Thursday, and I will discuss any further advice to the patient.     I also mentioned to the patient that on the background of HER2-positive cancer of the esophagus the possibility of using a new medicine for HER2-based disease that is called Enhertu is a real possibility and maybe that will be the next step to go through.    The patient was further reviewed on 07/01/2021. I reviewed with Dr. Brito the endoscopy that shows a noncircumferential abnormality in the lower esophagus that encompasses almost 6 cm in length. It is not blocking off the esophagus and that is why the patient has no symptoms. The pathology shows at least atypical cells consistent with cancer. Further Next Generation Sequencing has been sent for [employer] Now.     I discussed with the patient the  fact that we have many other modalities of treatment that he could encounter. He prefers to continue his general chemotherapy to control the cancer in his liver using 5-FU, leucovorin and Herceptin today and agree with that. In consideration to be seen by radiation therapy, the patient is willing to listen to the possibility of undergoing continuous 5-FU infusion along with radiation therapy to the esophagus knowing that he will experiencing esophagitis that can give him significant issues for 2-3 weeks. I went ahead and made the appointment for him to be seen by radiation oncology for this purpose only.     Obviously if the Next Generation Sequencing gives us any other hints in regard to how to treat him this could be also utilized including the consideration of using Enhertu in the long run for achieving better control.     In regard to his smoking cessation he is not willing to change this phenomenon at this time. We have had NIRU Jacobson talking with him in this regard but he is not ready to make a decision when to quit.     Finally, I pointed out to him that during the previous visit we documented a urinary tract infection with streptococcus 50,000 colonies that in my appreciation was significant given the fact that he has a colovesical fistula. This was treated with antibiotics. He has not had any discomfort issues pertinent to this anymore. The urine today is completely clear. Nevertheless, I went ahead and sent a urinalysis at least to be sure that there is no need for any other repetitive infection therapy on him.     The patient also will remain on his anticoagulation at this time, his Xarelto for the time being. I have renewed as well his Neurontin. He has no need for pain medicine.  The patient was further reviewed on 07/23/2021. He has no new symptomatology pertinent to his cancer of the esophagus with liver metastasis. He has no difficulty swallowing. He has been presented in the  multidisciplinary clinic on a couple of occasions and he has been seen by Radiation Oncology. Finally the analysis of [employer] Now is available now showing that the patient's tumor is PD-L1 positive and has high mutation burden. The tumor remains HER/2 positive. Given these findings I think it is very easy to make a choice in regard stopping the present regimen of chemotherapy and proceed with therapy with Keytruda every 3 weeks for at least 4 cycles and reassess him at that point. In preparation for Keytruda initiation next week and we will get the approval of the medicine by his insurance company the patient will require smoking cessation altogether to minimize modification of cigarettes on his immune system. I insisted in this fact to the patient.     Other than that he will remain on probiotics. We will discontinue the 5FU/leucovorin and Herceptin and will move onto Keytruda. I discussed with him side effects of the Keytruda in detail as below. He will require treatment every 3 weeks with CBC, CMP, TSH every 3 weeks and he will require formal chemotherapy teaching, education and consent for this medicine.     After 4 cycles of this, in other words 3 months he will be reassessed with a new PET scan.     The chances under the present circumstances that he will improve as long as he quit smoking, it is very hard and maybe he could have long term survivorship.    I begged for him to have smoking cessation. If any time during his time with me we have been talking about this and this is absolutely necessary now. We know that cigarettes kill immune system cells and minimize the benefit of treatments with these medicines.       The patient was further reviewed on 08/19/2021 in regard to his cancer of the esophagus. As stated above he had upper GI bleeding dropping hemoglobin to 5 requiring admission, transfusion, discontinuation of anticoagulation and endoscopy with local therapy by Naga Shelby MD. Since then he  has completed 10 sessions of radiation therapy to the esophagus yesterday. He has not had any further bleed. His hemoglobin has bounced back from 5 to 14 and I have advised his this good news today.    I expect that the patient in a few days is going to develop an element of radiation esophagitis and he continues having some fatigue that will improve over time. So far no new issues have happened. I made him aware that if he has difficulty swallowing he will need to let us know, he will need to receive IV fluids in the office.    At least the radiation therapy will take care of the tumor in the esophagus and I do not believe that he will require any other GI endoscopy anymore.    Today the patient will proceed with his Keytruda that is the immunotherapy medicine that we are delivering to him at this time for the treatment of his liver metastasis and also the tumor in the esophagus. I pointed out to him that so far I do not see any side effects of the Keytruda and the Keytruda will improve and increase the benefit of radiation therapy into the primary tumor in the esophagus.     From the point of view of his anemia associated with GI bleeding, again this has been corrected. The hemoglobin today is 14 grams and I asked him to take his iron supplementation only twice a week 1 tablet. He has been taking 3 tablets everyday.     From the point of view of his thrombophilia associated with malignancy, he is no longer receiving any anticoagulants given the recent episode of GI bleeding. We will maintain him off anticoagulants as much as we can. Hopefully he will not have any other episodes of thrombosis.     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     From the point of view of the Keytruda toxicity so far nothing happened. We will continue monitoring CBC, CMP and TSH periodically.     I will review him back in 3  and 6 weeks. When he comes back for the Keytruda he will continue receiving the medicine as the time goes by.    He understands that most of the benefit of radiation therapy in the tumor in the esophagus will be reached in 1 month and I am planning to give him a PET scan in more of less in 2 months from now when he will have almost 4 infusions of Keytruda under his belt and the completion of radiation therapy to the esophagus.     In regard to smoking cessation he has achieved a lot from 2 packs a day to 10 cigarettes a day. He will see Anh Felder RN, today in regard continuation of the benefit of the therapy for this condition at this point.   The patient was further reviewed on 09/30/2021. His Keytruda is still ongoing but the patient has developed dermatitis associated with this. It is a grade 1 toxicity so far and I am wondering if this is extending a little bit and the topical medicine is not going to be sufficient. I will proceed with his Keytruda today but maybe this will be the last administration of this medicine unless that we get shashi and the rash quiets down. Obviously at the completion for Keytruda the patient will require PET scan for assessment not only of the benefit of radiation therapy on his recurrence in the esophagus but also benefit of the Keytruda on his liver metastasis. Therefore, he will be scheduled for this before the next visit. The patient also has been advised that his hemoglobin is acceptable and it will be okay for him to stop his iron supplementation.     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 I have advised him to go into Cipro 250 mg p.o. daily.     The patient has been advised in regard to the continuation of topical steroids in the skin in areas of involvement. This will remain an ongoing issue along with moisturizer to the skin in the form of Cetaphil.     In regard to his hypertension, he remains on medicines for this by me. His  blood pressure is under good control at this time.     In regard to previous anticoagulation, he is no longer receiving any given his GI bleeding. This will remain in observation.     In regard to smoking cessation the patient will further discuss with NIRU Jacobson, plans for further decrease. He is down to 10 cigarettes a day. This is already a major accomplishment in somebody who used to smoke almost 3 packs of cigarettes a day.     The patient will have a new insurance in 11/2021 and I will make the insurance ladies aware of this.     In 3 weeks he will have a CBC, CMP, TSH and the PET scan the week before.  The patient was further reviewed on 10/21/2021. I reviewed with him his PET scan that shows still SUV activity in the lower esophagus but better than before but he has now 3 areas of abnormal uptake in the liver consistent with progressive liver metastasis. The lung anatomy is clear, the bone anatomy is clear. Colovesical fistula was visible still.    On the basis of these changes I do believe that this patient knowing that he has HER/2 positive esophageal cancer needs to change his therapy. Keytruda is not helpful at all and we will discontinue this medicine at this time. I do believe that the inability of Keytruda to overcome his disease process is related to the persistency of smoking and the abnormality related to smoking about bacterial derek. Further analysis recently published in [employer] has confirmed that the benefit of immunotherapy is further boosted by proper amount of derek in the gastrointestinal tract and how cigarettes are damaging to this issue. In any event the patient's issues in regard to cigarette smoking has been already a problem and he has not been able to quit in spite of all of the support available. Therefore stopping the Keytruda at this point including today the patient will move to Enhertu that has been approved in patients who have cancer of the esophagus with liver  metastasis. The dose of this medicine will be the GI dose of this medication that will be properly calculated accordingly. He will have formal education and consent for this medicine. I pointed out the most important side effects of this medicine include cardiac toxicity, anemia, leukopenia, thrombocytopenia and pneumonitis that includes cough, shortness of breath, fever. In preparation to minimize pneumonitis the patient will take prednisone 10 mg on day 2 and 3 after each dose of Enhertu. He will receive this medicine every 3 weeks. We will deliver 3-4 cycles and reassess him not only by tumor markers but also radiologically. Hopefully this will have a positive impact not only in the tumor in the esophagus but also tumors metastatic in his liver.   The patient was reviewed on 11/19/2021. Tolerance to the Enhertu 1st cycle was appropriate with more fatigue, no increased cough or shortness of breath and some anorexia. His white count, hemoglobin and platelets were normal and we advised him to proceed with his 2nd Enhertu infusion today. He understands that fatigue will be more prominent, that he could have chance for more hematological toxicity and he is starting to develop minor anemia. His white count and platelet count remain acceptable. I reminded him on many occasions today through the visit and insisted to the sister that if his cough pattern changes, increasing or his shortness of breath becomes worse he has to notify us immediately to be sure that he will not develop pneumonitis associated with Enhertu. He still will take 10 mg of prednisone tomorrow and Sunday to try to minimize this phenomenon from happening.     The patient will be having blood counts on a weekly basis with nurse visit to be sure to monitor hematological toxicity and he will return in 3 weeks to proceed with the next cycle. After the 3rd cycle the patient will proceed with radiological assessment including PET scan.   The patient was  further reviewed on 12/30/2021. On clinical grounds the patient has not had any difficulty swallowing, his liver is not enlarged, nodular or tender, he has no jaundice and his CEA level has dropped further. His PET scan discloses resolution of his periportal lymph nodes and one area of resolution of liver metastasis. He has complete resolution of the tumor in the esophagus. He has still 2 active lesions in the liver with significant SUV activity pertinent to his metastatic disease. Given this fact the patient could be a candidate to further receive Enhertu but my concern is about the radiological analysis that shows minimal pulmonary infiltrate bilaterally. The radiologist suggests a radiation pneumonitis. I do not think that is the case. I think this is probably related to Enhertu interstitial lung disease and for this reason I feel the obligation to stop this medication at this point and to proceed with therapy with prednisone at 20 mg a day for the next 2 weeks until the patient returns back to see me. I made him aware that if the respiratory symptoms including his cough gets any worse or shortness of breath establishes and gets worse he will need to notify us immediately and he will require to be placed in the hospital to receive IV high dose steroid.    The question will be if the patient will be a candidate to receive any further Enhertu or not remains to be seen. Probably will not be the case.     Obviously this above statement opens the door in regard what else to do for his malignancy. Strong consideration will be to go back to Herceptin and also to consider to go back to FOLFOX, Herceptin like he received initially that gave him such a degree of resolution of symptoms. 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 I discussed this with him and his sister present in the room. I encouraged him to decrease his smoking.   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     In preparation for retaking treatment, I advised him that I would like to go back into FOLFOX, Herceptin every 3 weeks starting next week. This combination of medicines never failed him. It was discontinued because of toxicity and neuropathy. Therefore, I think it is worth it to go ahead and proceed with an echocardiogram and resume chemotherapy administration with this , giving him 3 cycles, each one of them 3 weeks apart and see how things evolve from the point of view tumor markers or radiological analysis of his liver through PET scan. He agrees to proceed.  On 02/09/2022 I advised the patient to hold off his chemotherapy treatment with FOLFOX because his ANC was 1300 and we will decrease his dose of chemotherapy medicines by 15% for the next round of treatment next week. Hopefully the patient with this dose adjustment will be able to continue his treatment in a normal schedule of every 2 weeks. We have to watch his neuropathy very close. My advice will be eventually to pull out the Oxaliplatin and continue 5FU and leucovorin for a couple of cycles and eventually reintroduce the Oxaliplatin for 1-2 cycles back and forth. This has to be monitored clinically and also along with his response.   The patient was reviewed on 03/16/2022. Actually he feels terrific today and for the last 10 days after the anaphylactic reaction to oxaliplatin. He has no cancer-related pain. He has no hepatomegaly. His liver function tests have further improved and surprising enough his CEA level has dramatically dropped as posted above. It was  in the 200 category and now it is in the 30 category, pointing to the fact that I think the oxaliplatin and the FOLFOX regimen indeed has very significant benefit for him along with the Herceptin. Obviously he had an anaphylactic reaction to oxaliplatin and this medicine was discontinued. He will receive today his 5-FU, leucovorin and Herceptin.     I advised him to remain on this regimen for the time being and continue the sequence of events every couple of weeks. I went ahead and scheduled an echocardiogram to follow up on his ejection fraction while on Herceptin.              2.In regard to his colovesical fistula he is now receiving ciprofloxacin on a daily basis low dose and I advised him to remain on this medicine for the time being.   On 11/19/2021 he has no symptoms or signs of urinary tract infection and he remains on ciprofloxacin prophylactically everyday.   On 12/30/2021 his colovesical fistula is still evident on the PET scan. He has gas in the bladder and he has had some hematuria. Therefore I do believe that the patient needs to remain on ciprofloxacin 1 tablet every other day to try to minimize any potential for any other urinary tract infection related to this. The patient under the present circumstances is not a candidate to have any kind of surgery unless the symptoms get worse or clinical picture changes radically.   His colovesical fistula remains active. He remains taking ciprofloxacin 250 mg p.o. every other day. He has not had any fevers or chills and I advised him to remain on this medicine for the time being. His pharmacy called in regard that they have not had Cipro anymore. The patient has tablets for almost 2 months supply. Therefore, he has no need for Levaquin or any other medicine or intervention at this point.  His colovesical fistula is not active at this time. He has not had any pneumaturia of echolalia. He remains on ciprofloxacin prophylactically.   On 03/16/2022 the patient  mentioned to me that he is now passing urine through the rectum. He also has fecaluria and pneumaturia still but in lesser amount. He is not having any fever. He has no pelvic pain. I think his colovesical fistula now in some way has bladder to colon  flow. It used to be fecal matter in the bladder and now it is urine through the anal canal. Obviously this will have less implications from the point of view of infection but obviously has the implications that the patient has to in some way be ready to defecate and urinate at the same time. He is going to see Dr. Cm Witt in a few days in regard to this fact. Given the fact that he has no abdominal pain and no fever, I would like for him to have a CT scan of the chest, abdomen and pelvis anyway that would not only show us the status of his cancer but also the status of his fistula. I prefer this over a PET scan for that purpose. This information will be shared with Dr. Cm Witt.     In the meantime I advised him to continue using his ciprofloxacin 250 mg every other day for prevention of UTI.              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   He remains on Neurontin for the treatment of his peripheral neuropathy. I advised him to remain on this medicine for the time being.  On 01/12/2022, I advised the patient to remain on his gabapentin for the treatment of his sensory peripheral neuropathy in his feet.  I went ahead and prescribed on 02/09/2022 his doses of gabapentin to take 300 mg twice a day.   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 This was not aggravated by the few doses of oxaliplatin that he was able to receive until an anaphylactic reaction.            4.In regard to his hypertension, he remains on lisinopril, hydrochlorothiazide provided by me.   I reviewed him  back on 11/19/2021. His blood pressure is 90/48 in spite of proper hydration. This is probably the effect of the combination of Trental and his blood pressure medication. I advised him to discontinue his blood pressure medication at this time and advised him to have proper hydration. He has a device to check his blood pressure at home. I asked him to check this on a daily basis at least a couple of times a day. If his blood pressure goes up above 130/90 he will take 1/2 of the dose of blood pressure medication that he was taking before. Those tablets have the way to be split. He will require checking blood pressure. If the blood pressure drops again he will hold off blood pressure medication indefinitely.   His hypertension has been reviewed on 12/30/2021. I think his blood pressure today is very good. He will remain on his blood pressure medication for the time being. I find no reason to change dosing.   The patient was advised to remain on his blood pressure medication, Prinzide.  On 02/09/2022 he continues doing his blood pressure medication almost on prn basis to minimize very dramatic drop that he has had before. So far he has not had any syncopal episodes or things of this nature.   On 03/16/2022 the patient is not taking blood pressure medication. Actually his blood pressure has been under relatively good control.              5.In regard to his previous GI bleeding associated with his cancer and esophagitis we advised the patient to remain on Protonix.   On 11/19/2021 he has not had any evidence of GI bleeding and the hemoglobin remains stable. The minor drop obeys to toxicity from Enhertu and bone marrow suppression not because of effect of GI bleeding.   He has not had any other episodes of GI bleeding as per 12/30/2021.   On 01/12/2022 he has not had any other episodes of GI bleeding. He is no longer taking any anticoagulants.  On 02/09/2022 he has not had episodes of melena or enterorrhagia, his  hemoglobin is stable. He has not had any use of anticoagulants since GI bleeding.   On 03/16/2022 the patient has not had any other episodes of GI bleeding. He is no longer taking anticoagulant.                    6.In regard finally peripheral arterial disease I am going to send the patient a prescription for Trental to take 1 tablet twice a day. He has an appointment to be seen by vascular on 12/18/2021. I pointed out to the patient that if he wants to change the route of this process he must modify his cigarettes otherwise there is no hope. He was not able to take medication provided by [facility] because of side effects.   In regard to his peripheral arterial disease I advised him on 11/19/2021 to decrease his Trental to 1 tablet twice a day. He is now using topical Cetaphil on his feet to try to improve moisture and decrease cracking.   On 12/30/2021 his peripheral neuropathy actually is better through the Doppler study done by [facility]. I wonder if the trental is the one playing a role in this. I have asked him to remain on the trental for the time being.       He is not willing to entertain smoking cessation to help out peripheral arterial disease and we insisted into this in presence of his sister. He says that he is working on it. I do not feel any confidence in seeing this phenomenon anymore and that is a reality that we need to contemplate and just wait.   In regard to his peripheral artery disease, the patient is still smoking 15 cigarettes a day. The Trental has made a big difference in regard to his ability to get around and walk. I advised him to remain on Trental.  On 02/09/2022 his peripheral arterial disease is about the same, the hemoglobin is stable, the Trental is still ongoing, it is beneficial to him.   On 03/16/2022 his peripheral arterial disease remains about the same. He has no gangrene. He has an element of claudication upon walking. We advised him to remain on the  same medicines that he is taking. One of them is Trental. I also advised him about smoking cessation. This is not going to change. He remains on 15 cigarettes a day.            7.This patient's blood sugar has remained in the 130's, 140's, 150's for the last several visits. His hemoglobin A1C today is 5.9.  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 He will require the utilization of Glucotrol XL 5 mg. I went ahead and sent this prescription to the pharmacy. I pointed out to him that he is eating a diet rich in carbohydrates mostly and I pointed out to him that he needs to eat more vegetables, some fruit and high complex carbohydrates that will minimize issues pertinent to his blood sugar. It is even more important now that he has initiated prednisone as posted.   I reviewed with him on 01/12/2022 his hemoglobin A1c of 5.9. I advised him to remain on a diet that is not too much rich in sugar and stay on his glipizide 5 mg every day. He is very selective in his diet. He finds things that tastes okay and other ones that do not taste okay and I think we have no solution for this definitive problem.  On 02/09/2022 the patient's glucose seems to be under control. He will remain on glipizide 5 mg oral daily.     I will review him back in 3 weeks. He will return for treatment next week and for the dose adjustments were discussed with Amber Lam RN.  On 03/16/2022 I asked the patient to continue to monitor his blood sugar and to continue taking his minimal dose of Glucotrol.    RECOMMENDATIONS:  As posted above. He will proceed with his chemotherapy today obviously without oxaliplatin, return for chemotherapy in 2 weeks, proceed with a CT scan of the chest, abdomen and pelvis in 10 days from now, CBC, CMP, CEA level today and in 2 weeks again.     I went ahead and scheduled an echocardiogram.

## 2022-03-18 ENCOUNTER — INFUSION (OUTPATIENT)
Dept: ONCOLOGY | Facility: HOSPITAL | Age: 67
End: 2022-03-18

## 2022-03-18 DIAGNOSIS — D49.0 ESOPHAGUS NEOPLASM: ICD-10-CM

## 2022-03-18 DIAGNOSIS — C15.5 MALIGNANT NEOPLASM OF LOWER THIRD OF ESOPHAGUS: Primary | ICD-10-CM

## 2022-03-18 DIAGNOSIS — C78.7 LIVER METASTASIS: ICD-10-CM

## 2022-03-18 DIAGNOSIS — Z45.2 ENCOUNTER FOR ADJUSTMENT OR MANAGEMENT OF VASCULAR ACCESS DEVICE: ICD-10-CM

## 2022-03-18 PROCEDURE — 25010000002 HEPARIN LOCK FLUSH PER 10 UNITS: Performed by: INTERNAL MEDICINE

## 2022-03-18 RX ORDER — HEPARIN SODIUM (PORCINE) LOCK FLUSH IV SOLN 100 UNIT/ML 100 UNIT/ML
500 SOLUTION INTRAVENOUS AS NEEDED
Status: DISCONTINUED | OUTPATIENT
Start: 2022-03-18 | End: 2022-03-18 | Stop reason: HOSPADM

## 2022-03-18 RX ORDER — SODIUM CHLORIDE 0.9 % (FLUSH) 0.9 %
10 SYRINGE (ML) INJECTION AS NEEDED
Status: DISCONTINUED | OUTPATIENT
Start: 2022-03-18 | End: 2022-03-18 | Stop reason: HOSPADM

## 2022-03-18 RX ORDER — HEPARIN SODIUM (PORCINE) LOCK FLUSH IV SOLN 100 UNIT/ML 100 UNIT/ML
500 SOLUTION INTRAVENOUS AS NEEDED
Status: CANCELLED | OUTPATIENT
Start: 2022-03-18

## 2022-03-18 RX ORDER — SODIUM CHLORIDE 0.9 % (FLUSH) 0.9 %
10 SYRINGE (ML) INJECTION AS NEEDED
Status: CANCELLED | OUTPATIENT
Start: 2022-03-18

## 2022-03-18 RX ADMIN — Medication 500 UNITS: at 10:03

## 2022-03-18 RX ADMIN — Medication 10 ML: at 10:03

## 2022-03-21 RX ORDER — GLIPIZIDE 5 MG/1
TABLET, FILM COATED, EXTENDED RELEASE ORAL
Qty: 90 TABLET | Refills: 1 | Status: SHIPPED | OUTPATIENT
Start: 2022-03-21 | End: 2022-09-12

## 2022-03-25 ENCOUNTER — APPOINTMENT (OUTPATIENT)
Dept: ONCOLOGY | Facility: HOSPITAL | Age: 67
End: 2022-03-25

## 2022-03-25 ENCOUNTER — HOSPITAL ENCOUNTER (OUTPATIENT)
Dept: CARDIOLOGY | Facility: HOSPITAL | Age: 67
Discharge: HOME | End: 2022-03-25

## 2022-03-25 ENCOUNTER — HOSPITAL ENCOUNTER (OUTPATIENT)
Dept: PET IMAGING | Facility: HOSPITAL | Age: 67
Discharge: HOME | End: 2022-03-25

## 2022-03-25 VITALS
BODY MASS INDEX: 28.44 KG/M2 | WEIGHT: 192 LBS | SYSTOLIC BLOOD PRESSURE: 122 MMHG | DIASTOLIC BLOOD PRESSURE: 72 MMHG | HEART RATE: 80 BPM | HEIGHT: 69 IN

## 2022-03-25 DIAGNOSIS — C15.5 MALIGNANT NEOPLASM OF LOWER THIRD OF ESOPHAGUS: ICD-10-CM

## 2022-03-25 DIAGNOSIS — Z45.2 ENCOUNTER FOR ADJUSTMENT OR MANAGEMENT OF VASCULAR ACCESS DEVICE: ICD-10-CM

## 2022-03-25 DIAGNOSIS — T45.1X5A PERIPHERAL NEUROPATHY DUE TO CHEMOTHERAPY: ICD-10-CM

## 2022-03-25 DIAGNOSIS — Z72.0 TOBACCO ABUSE: ICD-10-CM

## 2022-03-25 DIAGNOSIS — J84.89 PNEUMONITIS, INTERSTITIAL: ICD-10-CM

## 2022-03-25 DIAGNOSIS — G62.0 PERIPHERAL NEUROPATHY DUE TO CHEMOTHERAPY: ICD-10-CM

## 2022-03-25 DIAGNOSIS — I10 ESSENTIAL HYPERTENSION: ICD-10-CM

## 2022-03-25 DIAGNOSIS — T78.2XXD ANAPHYLAXIS, SUBSEQUENT ENCOUNTER: ICD-10-CM

## 2022-03-25 DIAGNOSIS — C78.7 LIVER METASTASIS: ICD-10-CM

## 2022-03-25 LAB
AORTIC ARCH: 2.1 CM
ASCENDING AORTA: 3.3 CM
BH CV ECHO LEFT VENTRICLE GLOBAL LONGITUDINAL STRAIN: -20.4 %
BH CV ECHO MEAS - ACS: 2.03 CM
BH CV ECHO MEAS - AO MAX PG: 8.3 MMHG
BH CV ECHO MEAS - AO MEAN PG: 4.2 MMHG
BH CV ECHO MEAS - AO ROOT DIAM: 3.6 CM
BH CV ECHO MEAS - AO V2 MAX: 143.9 CM/SEC
BH CV ECHO MEAS - AO V2 VTI: 29 CM
BH CV ECHO MEAS - AVA(I,D): 2.7 CM2
BH CV ECHO MEAS - EDV(CUBED): 88.5 ML
BH CV ECHO MEAS - EDV(MOD-SP2): 137 ML
BH CV ECHO MEAS - EDV(MOD-SP4): 147 ML
BH CV ECHO MEAS - EF(MOD-BP): 62 %
BH CV ECHO MEAS - EF(MOD-SP2): 62 %
BH CV ECHO MEAS - EF(MOD-SP4): 61.2 %
BH CV ECHO MEAS - ESV(CUBED): 28 ML
BH CV ECHO MEAS - ESV(MOD-SP2): 52 ML
BH CV ECHO MEAS - ESV(MOD-SP4): 57 ML
BH CV ECHO MEAS - FS: 31.8 %
BH CV ECHO MEAS - IVS/LVPW: 0.96 CM
BH CV ECHO MEAS - IVSD: 1.19 CM
BH CV ECHO MEAS - LAT PEAK E' VEL: 11.7 CM/SEC
BH CV ECHO MEAS - LV DIASTOLIC VOL/BSA (35-75): 72.4 CM2
BH CV ECHO MEAS - LV MASS(C)D: 198.9 GRAMS
BH CV ECHO MEAS - LV MAX PG: 4.1 MMHG
BH CV ECHO MEAS - LV MEAN PG: 2.1 MMHG
BH CV ECHO MEAS - LV SYSTOLIC VOL/BSA (12-30): 28.1 CM2
BH CV ECHO MEAS - LV V1 MAX: 100.7 CM/SEC
BH CV ECHO MEAS - LV V1 VTI: 23.2 CM
BH CV ECHO MEAS - LVIDD: 4.5 CM
BH CV ECHO MEAS - LVIDS: 3 CM
BH CV ECHO MEAS - LVOT AREA: 3.4 CM2
BH CV ECHO MEAS - LVOT DIAM: 2.07 CM
BH CV ECHO MEAS - LVPWD: 1.24 CM
BH CV ECHO MEAS - MED PEAK E' VEL: 11.7 CM/SEC
BH CV ECHO MEAS - MV A DUR: 0.17 SEC
BH CV ECHO MEAS - MV A MAX VEL: 103.7 CM/SEC
BH CV ECHO MEAS - MV DEC SLOPE: 451.3 CM/SEC2
BH CV ECHO MEAS - MV DEC TIME: 0.18 MSEC
BH CV ECHO MEAS - MV E MAX VEL: 113 CM/SEC
BH CV ECHO MEAS - MV E/A: 1.09
BH CV ECHO MEAS - MV MAX PG: 4.7 MMHG
BH CV ECHO MEAS - MV MEAN PG: 2.09 MMHG
BH CV ECHO MEAS - MV V2 VTI: 28.1 CM
BH CV ECHO MEAS - MVA(VTI): 2.8 CM2
BH CV ECHO MEAS - PA ACC TIME: 0.08 SEC
BH CV ECHO MEAS - PA PR(ACCEL): 44.5 MMHG
BH CV ECHO MEAS - PA V2 MAX: 89.6 CM/SEC
BH CV ECHO MEAS - PULM A REVS DUR: 0.16 SEC
BH CV ECHO MEAS - PULM A REVS VEL: 35.5 CM/SEC
BH CV ECHO MEAS - PULM DIAS VEL: 60.7 CM/SEC
BH CV ECHO MEAS - PULM SYS VEL: 67.8 CM/SEC
BH CV ECHO MEAS - RV MAX PG: 2.22 MMHG
BH CV ECHO MEAS - RV V1 MAX: 74.6 CM/SEC
BH CV ECHO MEAS - RV V1 VTI: 16.1 CM
BH CV ECHO MEAS - RVOT DIAM: 1.95 CM
BH CV ECHO MEAS - SI(MOD-SP2): 41.9 ML/M2
BH CV ECHO MEAS - SI(MOD-SP4): 44.3 ML/M2
BH CV ECHO MEAS - SUP REN AO DIAM: 2.4 CM
BH CV ECHO MEAS - SV(LVOT): 78.2 ML
BH CV ECHO MEAS - SV(MOD-SP2): 85 ML
BH CV ECHO MEAS - SV(MOD-SP4): 90 ML
BH CV ECHO MEAS - SV(RVOT): 48.2 ML
BH CV ECHO MEAS - TAPSE (>1.6): 2.3 CM
BH CV ECHO MEASUREMENTS AVERAGE E/E' RATIO: 9.66
BH CV XLRA - RV BASE: 2.9 CM
BH CV XLRA - RV LENGTH: 7.6 CM
BH CV XLRA - RV MID: 2.22 CM
BH CV XLRA - TDI S': 15.1 CM/SEC
CREAT BLDA-MCNC: 0.7 MG/DL (ref 0.6–1.3)
LEFT ATRIUM VOLUME INDEX: 24.9 ML/M2
MAXIMAL PREDICTED HEART RATE: 155 BPM
SINUS: 3.4 CM
STJ: 2.7 CM
STRESS TARGET HR: 132 BPM

## 2022-03-25 PROCEDURE — 93356 MYOCRD STRAIN IMG SPCKL TRCK: CPT

## 2022-03-25 PROCEDURE — 71260 CT THORAX DX C+: CPT

## 2022-03-25 PROCEDURE — 25010000002 PERFLUTREN (DEFINITY) 8.476 MG IN SODIUM CHLORIDE (PF) 0.9 % 10 ML INJECTION: Performed by: INTERNAL MEDICINE

## 2022-03-25 PROCEDURE — 93356 MYOCRD STRAIN IMG SPCKL TRCK: CPT | Performed by: INTERNAL MEDICINE

## 2022-03-25 PROCEDURE — 82565 ASSAY OF CREATININE: CPT

## 2022-03-25 PROCEDURE — 93306 TTE W/DOPPLER COMPLETE: CPT

## 2022-03-25 PROCEDURE — 93306 TTE W/DOPPLER COMPLETE: CPT | Performed by: INTERNAL MEDICINE

## 2022-03-25 PROCEDURE — 25010000002 IOPAMIDOL 61 % SOLUTION: Performed by: INTERNAL MEDICINE

## 2022-03-25 PROCEDURE — 74177 CT ABD & PELVIS W/CONTRAST: CPT

## 2022-03-25 RX ADMIN — PERFLUTREN 2 ML: 6.52 INJECTION, SUSPENSION INTRAVENOUS at 09:42

## 2022-03-25 RX ADMIN — IOPAMIDOL 85 ML: 612 INJECTION, SOLUTION INTRAVENOUS at 08:00

## 2022-03-30 ENCOUNTER — INFUSION (OUTPATIENT)
Dept: ONCOLOGY | Facility: HOSPITAL | Age: 67
End: 2022-03-30

## 2022-03-30 ENCOUNTER — OFFICE VISIT (OUTPATIENT)
Dept: ONCOLOGY | Facility: CLINIC | Age: 67
End: 2022-03-30

## 2022-03-30 ENCOUNTER — TELEPHONE (OUTPATIENT)
Dept: ONCOLOGY | Facility: CLINIC | Age: 67
End: 2022-03-30

## 2022-03-30 VITALS
HEIGHT: 69 IN | OXYGEN SATURATION: 98 % | TEMPERATURE: 97.3 F | RESPIRATION RATE: 18 BRPM | WEIGHT: 193 LBS | SYSTOLIC BLOOD PRESSURE: 128 MMHG | DIASTOLIC BLOOD PRESSURE: 79 MMHG | BODY MASS INDEX: 28.58 KG/M2 | HEART RATE: 87 BPM

## 2022-03-30 DIAGNOSIS — D49.0 ESOPHAGUS NEOPLASM: ICD-10-CM

## 2022-03-30 DIAGNOSIS — T45.1X5A PERIPHERAL NEUROPATHY DUE TO CHEMOTHERAPY: ICD-10-CM

## 2022-03-30 DIAGNOSIS — J84.89 PNEUMONITIS, INTERSTITIAL: ICD-10-CM

## 2022-03-30 DIAGNOSIS — C78.7 LIVER METASTASIS: ICD-10-CM

## 2022-03-30 DIAGNOSIS — Z72.0 TOBACCO ABUSE: ICD-10-CM

## 2022-03-30 DIAGNOSIS — C15.5 MALIGNANT NEOPLASM OF LOWER THIRD OF ESOPHAGUS: Primary | ICD-10-CM

## 2022-03-30 DIAGNOSIS — N32.1 RECTO-BLADDERNECK FISTULA: ICD-10-CM

## 2022-03-30 DIAGNOSIS — C15.5 MALIGNANT NEOPLASM OF LOWER THIRD OF ESOPHAGUS: ICD-10-CM

## 2022-03-30 DIAGNOSIS — G62.0 PERIPHERAL NEUROPATHY DUE TO CHEMOTHERAPY: ICD-10-CM

## 2022-03-30 DIAGNOSIS — R73.01 IMPAIRED FASTING GLUCOSE: ICD-10-CM

## 2022-03-30 DIAGNOSIS — Z45.2 ENCOUNTER FOR ADJUSTMENT OR MANAGEMENT OF VASCULAR ACCESS DEVICE: ICD-10-CM

## 2022-03-30 DIAGNOSIS — I10 ESSENTIAL HYPERTENSION: ICD-10-CM

## 2022-03-30 DIAGNOSIS — T78.2XXD ANAPHYLAXIS, SUBSEQUENT ENCOUNTER: ICD-10-CM

## 2022-03-30 PROBLEM — T78.2XXA ANAPHYLACTIC REACTION: Status: RESOLVED | Noted: 2022-03-16 | Resolved: 2022-03-30

## 2022-03-30 LAB
ALBUMIN SERPL-MCNC: 3.7 G/DL (ref 3.5–5.2)
ALBUMIN/GLOB SERPL: 1.3 G/DL (ref 1.1–2.4)
ALP SERPL-CCNC: 157 U/L (ref 38–116)
ALT SERPL W P-5'-P-CCNC: 61 U/L (ref 0–41)
ANION GAP SERPL CALCULATED.3IONS-SCNC: 9.3 MMOL/L (ref 5–15)
AST SERPL-CCNC: 51 U/L (ref 0–40)
BASOPHILS # BLD AUTO: 0.03 10*3/MM3 (ref 0–0.2)
BASOPHILS NFR BLD AUTO: 0.5 % (ref 0–1.5)
BILIRUB SERPL-MCNC: 0.6 MG/DL (ref 0.2–1.2)
BUN SERPL-MCNC: 9 MG/DL (ref 6–20)
BUN/CREAT SERPL: 13.8 (ref 7.3–30)
CALCIUM SPEC-SCNC: 9.2 MG/DL (ref 8.5–10.2)
CEA SERPL-MCNC: 17.4 NG/ML
CHLORIDE SERPL-SCNC: 104 MMOL/L (ref 98–107)
CO2 SERPL-SCNC: 24.7 MMOL/L (ref 22–29)
CREAT SERPL-MCNC: 0.65 MG/DL (ref 0.7–1.3)
DEPRECATED RDW RBC AUTO: 55.3 FL (ref 37–54)
EGFRCR SERPLBLD CKD-EPI 2021: 104.6 ML/MIN/1.73
EOSINOPHIL # BLD AUTO: 0.46 10*3/MM3 (ref 0–0.4)
EOSINOPHIL NFR BLD AUTO: 6.9 % (ref 0.3–6.2)
ERYTHROCYTE [DISTWIDTH] IN BLOOD BY AUTOMATED COUNT: 16.4 % (ref 12.3–15.4)
GLOBULIN UR ELPH-MCNC: 2.9 GM/DL (ref 1.8–3.5)
GLUCOSE SERPL-MCNC: 122 MG/DL (ref 74–124)
HCT VFR BLD AUTO: 38 % (ref 37.5–51)
HGB BLD-MCNC: 12.7 G/DL (ref 13–17.7)
IMM GRANULOCYTES # BLD AUTO: 0.13 10*3/MM3 (ref 0–0.05)
IMM GRANULOCYTES NFR BLD AUTO: 2 % (ref 0–0.5)
LYMPHOCYTES # BLD AUTO: 1.32 10*3/MM3 (ref 0.7–3.1)
LYMPHOCYTES NFR BLD AUTO: 19.8 % (ref 19.6–45.3)
MCH RBC QN AUTO: 30.8 PG (ref 26.6–33)
MCHC RBC AUTO-ENTMCNC: 33.4 G/DL (ref 31.5–35.7)
MCV RBC AUTO: 92.2 FL (ref 79–97)
MONOCYTES # BLD AUTO: 0.62 10*3/MM3 (ref 0.1–0.9)
MONOCYTES NFR BLD AUTO: 9.3 % (ref 5–12)
NEUTROPHILS NFR BLD AUTO: 4.09 10*3/MM3 (ref 1.7–7)
NEUTROPHILS NFR BLD AUTO: 61.5 % (ref 42.7–76)
NRBC BLD AUTO-RTO: 0 /100 WBC (ref 0–0.2)
PLATELET # BLD AUTO: 117 10*3/MM3 (ref 140–450)
PMV BLD AUTO: 8.8 FL (ref 6–12)
POTASSIUM SERPL-SCNC: 3.7 MMOL/L (ref 3.5–4.7)
PROT SERPL-MCNC: 6.6 G/DL (ref 6.3–8)
RBC # BLD AUTO: 4.12 10*6/MM3 (ref 4.14–5.8)
SODIUM SERPL-SCNC: 138 MMOL/L (ref 134–145)
WBC NRBC COR # BLD: 6.65 10*3/MM3 (ref 3.4–10.8)

## 2022-03-30 PROCEDURE — 82378 CARCINOEMBRYONIC ANTIGEN: CPT | Performed by: INTERNAL MEDICINE

## 2022-03-30 PROCEDURE — 96411 CHEMO IV PUSH ADDL DRUG: CPT

## 2022-03-30 PROCEDURE — 96375 TX/PRO/DX INJ NEW DRUG ADDON: CPT

## 2022-03-30 PROCEDURE — 25010000002 FLUOROURACIL PER 500 MG: Performed by: INTERNAL MEDICINE

## 2022-03-30 PROCEDURE — 25010000002 DIPHENHYDRAMINE PER 50 MG: Performed by: INTERNAL MEDICINE

## 2022-03-30 PROCEDURE — 25010000002 LEUCOVORIN 500 MG RECONSTITUTED SOLUTION 1 EACH VIAL: Performed by: INTERNAL MEDICINE

## 2022-03-30 PROCEDURE — 80053 COMPREHEN METABOLIC PANEL: CPT

## 2022-03-30 PROCEDURE — 85025 COMPLETE CBC W/AUTO DIFF WBC: CPT

## 2022-03-30 PROCEDURE — 96367 TX/PROPH/DG ADDL SEQ IV INF: CPT

## 2022-03-30 PROCEDURE — 96413 CHEMO IV INFUSION 1 HR: CPT

## 2022-03-30 PROCEDURE — 25010000002 PALONOSETRON PER 25 MCG: Performed by: INTERNAL MEDICINE

## 2022-03-30 PROCEDURE — 25010000002 TRASTUZUMAB-ANNS 420 MG RECONSTITUTED SOLUTION 1 EACH VIAL: Performed by: INTERNAL MEDICINE

## 2022-03-30 PROCEDURE — 99214 OFFICE O/P EST MOD 30 MIN: CPT | Performed by: INTERNAL MEDICINE

## 2022-03-30 PROCEDURE — 25010000002 DEXAMETHASONE PER 1 MG: Performed by: INTERNAL MEDICINE

## 2022-03-30 PROCEDURE — 96416 CHEMO PROLONG INFUSE W/PUMP: CPT

## 2022-03-30 RX ORDER — FAMOTIDINE 10 MG/ML
20 INJECTION, SOLUTION INTRAVENOUS ONCE
Status: CANCELLED
Start: 2022-03-30 | End: 2022-03-30

## 2022-03-30 RX ORDER — SODIUM CHLORIDE 9 MG/ML
250 INJECTION, SOLUTION INTRAVENOUS ONCE
Status: CANCELLED | OUTPATIENT
Start: 2022-03-30

## 2022-03-30 RX ORDER — PALONOSETRON 0.05 MG/ML
0.25 INJECTION, SOLUTION INTRAVENOUS ONCE
Status: COMPLETED | OUTPATIENT
Start: 2022-03-30 | End: 2022-03-30

## 2022-03-30 RX ORDER — FLUOROURACIL 50 MG/ML
340 INJECTION, SOLUTION INTRAVENOUS ONCE
Status: COMPLETED | OUTPATIENT
Start: 2022-03-30 | End: 2022-03-30

## 2022-03-30 RX ORDER — SODIUM CHLORIDE 9 MG/ML
250 INJECTION, SOLUTION INTRAVENOUS ONCE
Status: COMPLETED | OUTPATIENT
Start: 2022-03-30 | End: 2022-03-30

## 2022-03-30 RX ORDER — FAMOTIDINE 10 MG/ML
20 INJECTION, SOLUTION INTRAVENOUS AS NEEDED
Status: CANCELLED | OUTPATIENT
Start: 2022-03-30

## 2022-03-30 RX ORDER — PALONOSETRON 0.05 MG/ML
0.25 INJECTION, SOLUTION INTRAVENOUS ONCE
Status: CANCELLED | OUTPATIENT
Start: 2022-03-30

## 2022-03-30 RX ORDER — DEXAMETHASONE SODIUM PHOSPHATE 4 MG/ML
4 INJECTION, SOLUTION INTRA-ARTICULAR; INTRALESIONAL; INTRAMUSCULAR; INTRAVENOUS; SOFT TISSUE ONCE
Status: COMPLETED | OUTPATIENT
Start: 2022-03-30 | End: 2022-03-30

## 2022-03-30 RX ORDER — FLUOROURACIL 50 MG/ML
340 INJECTION, SOLUTION INTRAVENOUS ONCE
Status: CANCELLED | OUTPATIENT
Start: 2022-03-30

## 2022-03-30 RX ORDER — FAMOTIDINE 10 MG/ML
20 INJECTION, SOLUTION INTRAVENOUS ONCE
Status: COMPLETED | OUTPATIENT
Start: 2022-03-30 | End: 2022-03-30

## 2022-03-30 RX ADMIN — DIPHENHYDRAMINE HYDROCHLORIDE 25 MG: 50 INJECTION INTRAMUSCULAR; INTRAVENOUS at 10:23

## 2022-03-30 RX ADMIN — TRASTUZUMAB-ANNS 360 MG: 420 INJECTION, POWDER, LYOPHILIZED, FOR SOLUTION INTRAVENOUS at 10:52

## 2022-03-30 RX ADMIN — SODIUM CHLORIDE 250 ML: 9 INJECTION, SOLUTION INTRAVENOUS at 10:22

## 2022-03-30 RX ADMIN — PALONOSETRON HYDROCHLORIDE 0.25 MG: 0.25 INJECTION, SOLUTION INTRAVENOUS at 10:23

## 2022-03-30 RX ADMIN — LEUCOVORIN CALCIUM 700 MG: 500 INJECTION, POWDER, LYOPHILIZED, FOR SOLUTION INTRAMUSCULAR; INTRAVENOUS at 11:28

## 2022-03-30 RX ADMIN — FAMOTIDINE 20 MG: 10 INJECTION INTRAVENOUS at 10:23

## 2022-03-30 RX ADMIN — DEXAMETHASONE SODIUM PHOSPHATE 4 MG: 4 INJECTION INTRA-ARTICULAR; INTRALESIONAL; INTRAMUSCULAR; INTRAVENOUS; SOFT TISSUE at 10:23

## 2022-03-30 RX ADMIN — FLUOROURACIL 4180 MG: 50 INJECTION, SOLUTION INTRAVENOUS at 12:01

## 2022-03-30 RX ADMIN — FLUOROURACIL 700 MG: 50 INJECTION, SOLUTION INTRAVENOUS at 12:01

## 2022-03-30 NOTE — TELEPHONE ENCOUNTER
Called and reviewed Dr. Haynes' note with pt. CEA down to 17. Pt v/u. No further needs identified at this time. Marisol Lam RN

## 2022-03-30 NOTE — PROGRESS NOTES
Subjective     REASON FOR follow up:1.    1. 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  Currently receiving palliative 5 fu leucovorin  AND HERCEPTIN therapy once a month    2.COLOVESICAL FISTULA: chronic antibiotic therapy cipro, NO FURTHER PLANS FOR SURGERY AFTER VISIT AND PROCEDURE BY DR GM CASTILLO 1/20    3. 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 NEURONTIN INITIATED.        DURING THE VISIT WITH THE PATIENT TODAY , PATIENT HAD FACE MASK, MY MEDICAL ASSISTANT AND I  HAD PROPPER PROTECTIVE EQUIPMENT, AND I DID HAND HYGIENE WITH SOAP AND WATER BEFORE AND AFTER THE VISIT.      The patient returns today to the office in company of his sister stating that he continues doing very well. His appetite has been good, his taste for food is very good. He has no difficulty swallowing and no cancer related pain. He continues smoking at least 1/2 pack of cigarettes a day. He continues having dry hacking cough, no shortness of breath. His blood pressure is under good control. He has not developed any new clots in the lower extremities. He continues taking Trental for his peripheral arterial disease. He has not had any claudication. His colorectal fistula continues producing urine output through the rectum. He has seen urologist who has talked with him about the impossibility of doing any surgery because he will require to be off of any treatment for chemotherapy at least for 3 months. Obviously the patient understands the consequences of no chemotherapy and what the cancer will do. He has not developed any fever or infection as far as he can tell and he continues taking his ciprofloxacin according to my instructions.                                 Past Medical History:   Diagnosis Date   • Arthritis    • Cancer (HCC)     prostate  "cancer 2008   • Cancer (HCC)     esophageal   • Chronic anticoagulation     on xarelto   • Elevated PSA    • Esophageal mass    • Fistula     colon and bladder   • H/O Lung nodule    • Hepatitis     CHILD--PT STATED \"I THINK IT WAS A.\"   • History of chemotherapy    • History of pneumonia    • Hx of blood clots 08/10/2019   • Hyperlipidemia    • Hypertension    • Nail fungus    • Neuropathy    • PVD (peripheral vascular disease) (HCC)    • Recto-bladderneck fistula     on poab for recurrent uti        Past Surgical History:   Procedure Laterality Date   • COLONOSCOPY N/A 2/4/2020    Procedure: COLONOSCOPY;  Surgeon: Guy Sears MD;  Location: [facility] ENDOSCOPY;  Service: General;  Laterality: N/A;  PRE-COLOVESICAL FISTULA  POST-- DIVERTICULOSIS   • CYSTOSCOPY  02/06/2020   • CYSTOSCOPY Bilateral 2/26/2020    Procedure: CYSTOSCOPY RETROGRADE;  Surgeon: Cm Witt MD;  Location: [facility] OR;  Service: Urology;  Laterality: Bilateral;   • ENDOSCOPY     • ENDOSCOPY N/A 6/19/2021    Procedure: ESOPHAGOGASTRODUODENOSCOPY WITH BIOPSY;  Surgeon: Mary Brito MD;  Location: [facility] OR;  Service: Gastroenterology;  Laterality: N/A;   • ENDOSCOPY N/A 8/5/2021    Procedure: ESOPHAGOGASTRODUODENOSCOPY HEMOSPRAY;  Surgeon: Naga Shelby MD;  Location: [facility] ENDOSCOPY;  Service: Gastroenterology;  Laterality: N/A;  HX OF ESOPHAGEAL  CANCER;MELENA  POST: ESOPHAGEAL BLEEDING; ESOPHAGEAL MASS   • HERNIA REPAIR Right 1999    inguinal hernia   • PROSTATE SURGERY  03/2008    prostatectectomy   • VENOUS ACCESS DEVICE (PORT) INSERTION N/A 7/16/2019    Procedure: INSERTION VENOUS ACCESS DEVICE WITH FLUORO AND EGD WITH BIOPSY;  Surgeon: Mary Brito MD;  Location: [facility] OR;  Service: Thoracic        Current Outpatient Medications on File Prior to Visit   Medication Sig Dispense Refill   • acetaminophen (TYLENOL) 500 MG tablet Take 500 mg by mouth Every 6 (Six) Hours As Needed for Mild Pain .   "   • cevimeline (EVOXAC) 30 MG capsule Take 30 mg by mouth 3 (Three) Times a Day.     • ciprofloxacin (CIPRO) 250 MG tablet Take 1 tablet by mouth Daily. Take one every other day 90 tablet 1   • econazole nitrate (SPECTAZOLE) 1 % cream 2 (Two) Times a Day.     • gabapentin (NEURONTIN) 300 MG capsule Take 1 capsule by mouth 2 (Two) Times a Day. 180 capsule 0   • glipizide (GLUCOTROL XL) 5 MG ER tablet TAKE 1 TABLET BY MOUTH EVERY DAY 90 tablet 1   • HYDROcodone-acetaminophen (NORCO) 5-325 MG per tablet Take  by mouth See Admin Instructions. Take 1 tablet by mouth every 4-6 hours as needed for pain     • lisinopril-hydrochlorothiazide (PRINZIDE,ZESTORETIC) 20-12.5 MG per tablet TAKE 1 TABLET BY MOUTH EVERY DAY 90 tablet 0   • nicotine (NICODERM CQ) 21 MG/24HR patch Place 1 patch on the skin as directed by provider Daily. (Patient taking differently: Place 1 patch on the skin as directed by provider Daily. Has at home but hasn't started taking yet) 28 each 1   • OLANZapine (ZyPREXA) 2.5 MG tablet Take 1 tablet by mouth Every Night. 90 tablet 0   • ondansetron (ZOFRAN) 4 MG tablet Take 1 tablet by mouth Every 8 (Eight) Hours As Needed for Nausea or Vomiting. 30 tablet 2   • pantoprazole (Protonix) 40 MG EC tablet Take 1 tablet by mouth 2 (Two) Times a Day. 90 tablet 3   • pentoxifylline (TRENtal) 400 MG CR tablet TAKE 1 TABLET BY MOUTH TWICE A DAY WITH MEALS 90 tablet 0   • potassium chloride ER (K-TAB) 20 MEQ tablet controlled-release ER tablet Take 1 tablet by mouth Daily. 60 tablet 3   • Probiotic Product (PROBIOTIC DAILY PO) Take 1 tablet by mouth Daily.     • triamcinolone (KENALOG) 0.1 % ointment Apply 1 application topically to the appropriate area as directed 2 (Two) Times a Day. 30 g 1     No current facility-administered medications on file prior to visit.        ALLERGIES:    Allergies   Allergen Reactions   • Oxaliplatin Anaphylaxis        Social History     Socioeconomic History   • Marital status: Single    • Years of education: High school   Tobacco Use   • Smoking status: Current Every Day Smoker     Packs/day: 1.00     Years: 38.00     Pack years: 38.00     Types: Cigarettes     Start date: 1974   • Smokeless tobacco: Never Used   • Tobacco comment: Quit for a period of 8 years.    Vaping Use   • Vaping Use: Never used   Substance and Sexual Activity   • Alcohol use: Not Currently   • Drug use: Never   • Sexual activity: Defer        Family History   Problem Relation Age of Onset   • Hypertension Mother    • Stroke Mother    • Hypertension Other    • Lung disease Other    • Prostate cancer Other    • Lung cancer Father    • Malig Hyperthermia Neg Hx       Current Outpatient Medications on File Prior to Visit   Medication Sig Dispense Refill   • acetaminophen (TYLENOL) 500 MG tablet Take 500 mg by mouth Every 6 (Six) Hours As Needed for Mild Pain .     • cevimeline (EVOXAC) 30 MG capsule Take 30 mg by mouth 3 (Three) Times a Day.     • ciprofloxacin (CIPRO) 250 MG tablet Take 1 tablet by mouth Daily. Take one every other day 90 tablet 1   • econazole nitrate (SPECTAZOLE) 1 % cream 2 (Two) Times a Day.     • gabapentin (NEURONTIN) 300 MG capsule Take 1 capsule by mouth 2 (Two) Times a Day. 180 capsule 0   • glipizide (GLUCOTROL XL) 5 MG ER tablet TAKE 1 TABLET BY MOUTH EVERY DAY 90 tablet 1   • HYDROcodone-acetaminophen (NORCO) 5-325 MG per tablet Take  by mouth See Admin Instructions. Take 1 tablet by mouth every 4-6 hours as needed for pain     • lisinopril-hydrochlorothiazide (PRINZIDE,ZESTORETIC) 20-12.5 MG per tablet TAKE 1 TABLET BY MOUTH EVERY DAY 90 tablet 0   • nicotine (NICODERM CQ) 21 MG/24HR patch Place 1 patch on the skin as directed by provider Daily. (Patient taking differently: Place 1 patch on the skin as directed by provider Daily. Has at home but hasn't started taking yet) 28 each 1   • OLANZapine (ZyPREXA) 2.5 MG tablet Take 1 tablet by mouth Every Night. 90 tablet 0   • ondansetron (ZOFRAN) 4 MG  "tablet Take 1 tablet by mouth Every 8 (Eight) Hours As Needed for Nausea or Vomiting. 30 tablet 2   • pantoprazole (Protonix) 40 MG EC tablet Take 1 tablet by mouth 2 (Two) Times a Day. 90 tablet 3   • pentoxifylline (TRENtal) 400 MG CR tablet TAKE 1 TABLET BY MOUTH TWICE A DAY WITH MEALS 90 tablet 0   • potassium chloride ER (K-TAB) 20 MEQ tablet controlled-release ER tablet Take 1 tablet by mouth Daily. 60 tablet 3   • Probiotic Product (PROBIOTIC DAILY PO) Take 1 tablet by mouth Daily.     • triamcinolone (KENALOG) 0.1 % ointment Apply 1 application topically to the appropriate area as directed 2 (Two) Times a Day. 30 g 1     No current facility-administered medications on file prior to visit.     Allergies   Allergen Reactions   • Oxaliplatin Anaphylaxis            Objective     Vitals:    03/30/22 0914   BP: 128/79   Pulse: 87   Resp: 18   Temp: 97.3 °F (36.3 °C)   TempSrc: Temporal   SpO2: 98%   Weight: 87.5 kg (193 lb)   Height: 175 cm (68.9\")   PainSc: 0-No pain     Current Status 3/30/2022   ECOG score 0         EXAM:  I HAVE PERSONALLY REVIEWED THE HISTORY OF THE PRESENT ILLNESS, PAST MEDICAL HISTORY, FAMILY HISTORY, SOCIAL HISTORY, ALLERGIES, MEDICATIONS STATED ABOVE IN THE  NOTE FROM TODAY.        GENERAL:  Well-developed, well-nourished  Patient  in no acute distress.   SKIN:  Warm, dry ,NO purpura ,NO petechiae, no rash.  HEENT:  Pupils were equal and reactive to light and accomodation, conjunctivae noninjected, no pterygium, normal extraocular movements, normal visual acuity.   NECK:  Supple with good range of motion; no thyromegaly , no other masses, no JVD or bruits,.No carotid artery pain, no carotid abnormal pulsation , NO arterial dance.  LYMPHATICS:  No cervical, NO supraclavicular, NO axillary,NO epitrochlear , NO inguinal adenopathies.  CARDIAC   normal rate , regular rhythm, without murmur,NO rubs NO S3 NO S4   LUNGS: DECREEASED breath sounds bilateral, no wheezing, NO rhonchi, NO crackles " ,NO rubs.  VASCULAR VENOUS: no cyanosis, NO collateral circulation, NO varicosities, NO edema, NO palpable cords, NO pain,NO erythema, NO pigmentation of the skin.  ABDOMEN:  Soft, NO pain,no hepatomegaly, no splenomegaly,no masses, no ascites, no collateral circulation,no distention,no [place] sign.  EXTREMITIES  AND SPINE:   clubbing, no cyanosis ,no deformities , no pain .No kyphosis,  no pain in spine, no pain in ribs , no pain in pelvic bone.  NEUROLOGICAL:  Patient was awake, alert, oriented to time, person and place.                                  RECENT LABS:  Hematology WBC   Date Value Ref Range Status   03/30/2022 6.65 3.40 - 10.80 10*3/mm3 Final   02/02/2019 13.4 (H) 3.4 - 10.8 x10E3/uL Final     RBC   Date Value Ref Range Status   03/30/2022 4.12 (L) 4.14 - 5.80 10*6/mm3 Final   02/02/2019 4.81 4.14 - 5.80 x10E6/uL Final     Hemoglobin   Date Value Ref Range Status   03/30/2022 12.7 (L) 13.0 - 17.7 g/dL Final     Hematocrit   Date Value Ref Range Status   03/30/2022 38.0 37.5 - 51.0 % Final     Platelets   Date Value Ref Range Status   03/30/2022 117 (L) 140 - 450 10*3/mm3 Final              CT CHEST, ABDOMEN AND PELVIS WITH CONTRAST     HISTORY: 66-year-old male with metastatic esophageal cancer. For  restaging.     TECHNIQUE: Axial CT images of the chest abdomen and pelvis were obtained  following administration of intravenous and oral contrast. Coronal and  sagittal reconstructions were then obtained.     COMPARISON: PET/CT dated 12/23/2021.     FINDINGS:     CT CHEST: A right-sided chest wall port has its tip at the cavoatrial  junction. Calcified coronary artery disease is present. No pleural or  pericardial effusion. There is persistent distal esophageal wall  thickening extending over a segment of at least 6 cm without significant  interim change from prior PET/CT. There is a small periesophageal lymph  node seen in the subcarinal region measuring 4 mm in short axis  dimension slightly decreased  in the interim from prior imaging. No  pathological axillary lymphadenopathy. The central airways are patent  without endobronchial lesion. There are new areas of interstitial  thickening associated with ground glass density demonstrated within  bilateral lower lobes in a basilar distribution and I suspect this may  represent postradiation change. There are upper lobe predominant  emphysematous changes identified.     CT ABDOMEN AND PELVIS:The recent PET/CT had demonstrated 3  hypermetabolic hepatic lesions. On comparison with CT images the largest  lesion within the left hepatic lobe appears to have decreased in size  now measuring up to 3.3 x 2.8 cm compared to prior measurement of 3.8 x  3.5 cm. The right hepatic lobe lesion on image 37 measures 1.6 x 2.0 cm  compared to 2.5 x 2.9 cm previously. There are additional liver lesions  that are favored to represent treated metastatic lesions. There has been  interval decrease in size of a lymph node in the peripancreatic  region/periportal region measuring 1.1 cm in short axis dimension  compared to 1.4 cm on the recent PET/CT. No enlarging lymphadenopathy is  identified. No new splenic lesions. The gallbladder is unremarkable. No  pancreatic ductal dilatation. Bilateral adrenal gland and kidneys are  normal. Sigmoid diverticulosis with findings of a colovesicular fistula  remains unchanged. Marked atherosclerotic disease within the abdominal  aorta and its branches. Stable lipoma within the third/horizontal  portion of the duodenum.     IMPRESSION:  1.  When compared to recent PET/CT there is interval decrease in size of  the previously demonstrated hypermetabolic hepatic metastases as well as  decrease in size of the periportal lymph node. The wall thickening  involving the distal esophagus is largely unchanged.  2. Persistent findings of colovesicular fistula.  3. Ill-defined interstitial and ground glass opacities within the  bilateral lower lobes favored to  represent postradiation  pneumonitis/change.     Radiation dose reduction techniques were utilized, including automated  exposure control and exposure modulation based on body size.     This report was finalized on 3/25/2022 1:09 PM by Dr. Court Hairston M.D.     Interpretation Summary echo    · Left ventricular wall thickness is consistent with mild concentric hypertrophy.  · Calculated left ventricular EF = 62% Estimated left ventricular EF was in agreement with the calculated left ventricular EF. Left ventricular systolic function is normal.  · Left ventricular diastolic function was normal.  · Normal global longitudinal LV strain (GLS) = -20.4%.  · C/w baseline study 7/30/19, there is no change          Assessment/Plan    1.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 Almost 90% of the liver WAS replaced by tumor. The patient has been undergoing chemotherapy with 5  fu and leucovorin  and Herceptin and has had excellent response clinically and radiologically. The patient was reviewed on 08/10/2020. I reviewed with the patient in the PAC system [facility] his PET scan that is dramatic. There is minimal uptake in the lower esophagus and most importantly complete resolution of his liver metastasis. Actually the only issue that is pertinent to the PET scan is still the visibility of his colovesical fistula.     Therefore from the point of view of his cancer of the esophagus, metastatic to the liver, he has no symptoms from the primary tumor in the esophagus and he has no symptoms related to his liver metastasis that has resolved altogether. His CEA level is stable.The patient raised the question about the CEA fluctuation. I pointed out to him that a lot of this is also related to his smoking. Smoking per se elevates CEA level.  • Upon further reviewing the patient on 04/20/2021, he has no symptoms pertinent to his metastatic cancer of the esophagus to the liver and he  has no difficulty swallowing. There are no side effects of the 5-FU, Leucovorin and Herceptin. The patient's cardiac function remains stable and he has not had any drop in the ejection fraction.   • I discussed with him on 05/18/2021 the fact that his cancer clinically is very quiet but I am afraid of the rise in his CEA level to 12. This could be an indicator of all of the cigarettes that he is smoking on a daily basis of indicator of cancer escaping control and not visible radiologically through his CT scan. I pointed out to him that he needs to continue making an effort to decrease his smoking and he is now down to 10 cigarettes a day. We will recheck another CEA level today. Given the circumstances of the previous CT scan I do not believe that I need to change the doses or plan of care in regard to his chemotherapy medication administration for the time being. I recommended for him to remain on his 5FU/leucovorin and Herceptin on a monthly basis for now.  • The patient was further reviewed on 06/15/2021 with a new PET scan that documented regrowth of the tumor in the lower esophagus without any new symptomatology, for example dysphagia or odynophagia. No regurgitation. The liver metastasis remains in remission and there are no new areas of disease in the bones or lungs or any other site. Given the excellent performance status I discussed with the patient and his sister today many options of therapy, including going back to FOLFOX regimen along with Herceptin, taking another sample of the esophagus with a new endoscopy and doing analysis of the tissue for [employer] that will be my favorite choice, or palliative treatment with radiation therapy and 5-FU continuous infusion that will be toxic to him and he does not prefer to have.     I had a discussion with Mary Brito MD, thoracic surgeon, who has agreed to see the patient tomorrow. I think the endoscopy, the new tissue analysis, and sending the tissue  for [facility] will be the way to go. Depending on what we find there, we can modify his treatment altogether. I do not think the patient will have any consequences missing chemotherapy for a couple of weeks or so.      In preparation for the endoscopy and biopsies I asked him to hold off on the Xarelto until he is seen by Dr. Brito tomorrow.     In regard to his colovesical fistula, he has had minimal symptomatology this week, maybe some activity there and I asked him to go back to the lab and take a urine specimen and culture. He knows that if this is abnormal he will need to initiate ciprofloxacin that he has at home.    In regard to his hypertension, this is under good control and I advised him to remain on his lisinopril and hydrochlorothiazide combination.    In regard to his smoking cessation, he has had conversations with NIRU Jacobson about this. He is using some nicotine CQ patch, here and there and also trying to work with nicotine gum and things of this nature but he has not been able to shake this issue altogether.     Otherwise I will review him back in a couple of weeks with a CBC, CMP, and a CEA level.    This case will be presented in the multidisciplinary thoracic conference by me this Thursday, and I will discuss any further advice to the patient.     I also mentioned to the patient that on the background of HER2-positive cancer of the esophagus the possibility of using a new medicine for HER2-based disease that is called Enhertu is a real possibility and maybe that will be the next step to go through.    • The patient was further reviewed on 07/01/2021. I reviewed with Dr. Brito the endoscopy that shows a noncircumferential abnormality in the lower esophagus that encompasses almost 6 cm in length. It is not blocking off the esophagus and that is why the patient has no symptoms. The pathology shows at least atypical cells consistent with cancer. Further Next Generation Sequencing has  been sent for [employer] [facility].     I discussed with the patient the fact that we have many other modalities of treatment that he could encounter. He prefers to continue his general chemotherapy to control the cancer in his liver using 5-FU, leucovorin and Herceptin today and agree with that. In consideration to be seen by radiation therapy, the patient is willing to listen to the possibility of undergoing continuous 5-FU infusion along with radiation therapy to the esophagus knowing that he will experiencing esophagitis that can give him significant issues for 2-3 weeks. I went ahead and made the appointment for him to be seen by radiation oncology for this purpose only.     Obviously if the Next Generation Sequencing gives us any other hints in regard to how to treat him this could be also utilized including the consideration of using Enhertu in the long run for achieving better control.     In regard to his smoking cessation he is not willing to change this phenomenon at this time. We have had NIRU Jacobson talking with him in this regard but he is not ready to make a decision when to quit.     Finally, I pointed out to him that during the previous visit we documented a urinary tract infection with streptococcus 50,000 colonies that in my appreciation was significant given the fact that he has a colovesical fistula. This was treated with antibiotics. He has not had any discomfort issues pertinent to this anymore. The urine today is completely clear. Nevertheless, I went ahead and sent a urinalysis at least to be sure that there is no need for any other repetitive infection therapy on him.     The patient also will remain on his anticoagulation at this time, his Xarelto for the time being. I have renewed as well his Neurontin. He has no need for pain medicine.  • The patient was further reviewed on 07/23/2021. He has no new symptomatology pertinent to his cancer of the esophagus with liver metastasis. He  has no difficulty swallowing. He has been presented in the multidisciplinary clinic on a couple of occasions and he has been seen by Radiation Oncology. Finally the analysis of [employer] is available now showing that the patient's tumor is PD-L1 positive and has high mutation burden. The tumor remains HER/2 positive. Given these findings I think it is very easy to make a choice in regard stopping the present regimen of chemotherapy and proceed with therapy with Keytruda every 3 weeks for at least 4 cycles and reassess him at that point. In preparation for Keytruda initiation next week and we will get the approval of the medicine by his insurance company the patient will require smoking cessation altogether to minimize modification of cigarettes on his immune system. I insisted in this fact to the patient.     Other than that he will remain on probiotics. We will discontinue the 5FU/leucovorin and Herceptin and will move onto Keytruda. I discussed with him side effects of the Keytruda in detail as below. He will require treatment every 3 weeks with CBC, CMP, TSH every 3 weeks and he will require formal chemotherapy teaching, education and consent for this medicine.     After 4 cycles of this, in other words 3 months he will be reassessed with a new PET scan.     The chances under the present circumstances that he will improve as long as he quit smoking, it is very hard and maybe he could have long term survivorship.    I begged for him to have smoking cessation. If any time during his time with me we have been talking about this and this is absolutely necessary now. We know that cigarettes kill immune system cells and minimize the benefit of treatments with these medicines.       • The patient was further reviewed on 08/19/2021 in regard to his cancer of the esophagus. As stated above he had upper GI bleeding dropping hemoglobin to 5 requiring admission, transfusion, discontinuation of anticoagulation and  endoscopy with local therapy by Naga Shelby MD. Since then he has completed 10 sessions of radiation therapy to the esophagus yesterday. He has not had any further bleed. His hemoglobin has bounced back from 5 to 14 and I have advised his this good news today.    I expect that the patient in a few days is going to develop an element of radiation esophagitis and he continues having some fatigue that will improve over time. So far no new issues have happened. I made him aware that if he has difficulty swallowing he will need to let us know, he will need to receive IV fluids in the office.    At least the radiation therapy will take care of the tumor in the esophagus and I do not believe that he will require any other GI endoscopy anymore.    Today the patient will proceed with his Keytruda that is the immunotherapy medicine that we are delivering to him at this time for the treatment of his liver metastasis and also the tumor in the esophagus. I pointed out to him that so far I do not see any side effects of the Keytruda and the Keytruda will improve and increase the benefit of radiation therapy into the primary tumor in the esophagus.     From the point of view of his anemia associated with GI bleeding, again this has been corrected. The hemoglobin today is 14 grams and I asked him to take his iron supplementation only twice a week 1 tablet. He has been taking 3 tablets everyday.     From the point of view of his thrombophilia associated with malignancy, he is no longer receiving any anticoagulants given the recent episode of GI bleeding. We will maintain him off anticoagulants as much as we can. Hopefully he will not have any other episodes of thrombosis.     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     From the point of view of the Keytruda toxicity so far nothing happened. We will continue monitoring  CBC, CMP and TSH periodically.     I will review him back in 3 and 6 weeks. When he comes back for the Keytruda he will continue receiving the medicine as the time goes by.    He understands that most of the benefit of radiation therapy in the tumor in the esophagus will be reached in 1 month and I am planning to give him a PET scan in more of less in 2 months from now when he will have almost 4 infusions of Keytruda under his belt and the completion of radiation therapy to the esophagus.     In regard to smoking cessation he has achieved a lot from 2 packs a day to 10 cigarettes a day. He will see Anh Felder RN, today in regard continuation of the benefit of the therapy for this condition at this point.   • The patient was further reviewed on 09/30/2021. His Keytruda is still ongoing but the patient has developed dermatitis associated with this. It is a grade 1 toxicity so far and I am wondering if this is extending a little bit and the topical medicine is not going to be sufficient. I will proceed with his Keytruda today but maybe this will be the last administration of this medicine unless that we get shashi and the rash quiets down. Obviously at the completion for Keytruda the patient will require PET scan for assessment not only of the benefit of radiation therapy on his recurrence in the esophagus but also benefit of the Keytruda on his liver metastasis. Therefore, he will be scheduled for this before the next visit. The patient also has been advised that his hemoglobin is acceptable and it will be okay for him to stop his iron supplementation.     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 I have advised him to go into Cipro 250 mg p.o. daily.     The patient has been advised in regard to the continuation of topical steroids in the skin in areas of involvement. This will remain an ongoing issue along with moisturizer to the skin in the form of Cetaphil.     In regard  to his hypertension, he remains on medicines for this by me. His blood pressure is under good control at this time.     In regard to previous anticoagulation, he is no longer receiving any given his GI bleeding. This will remain in observation.     In regard to smoking cessation the patient will further discuss with NIRU Jacobson, plans for further decrease. He is down to 10 cigarettes a day. This is already a major accomplishment in somebody who used to smoke almost 3 packs of cigarettes a day.     The patient will have a new insurance in 11/2021 and I will make the insurance ladies aware of this.     In 3 weeks he will have a CBC, CMP, TSH and the PET scan the week before.  • The patient was further reviewed on 10/21/2021. I reviewed with him his PET scan that shows still SUV activity in the lower esophagus but better than before but he has now 3 areas of abnormal uptake in the liver consistent with progressive liver metastasis. The lung anatomy is clear, the bone anatomy is clear. Colovesical fistula was visible still.    On the basis of these changes I do believe that this patient knowing that he has HER/2 positive esophageal cancer needs to change his therapy. Keytruda is not helpful at all and we will discontinue this medicine at this time. I do believe that the inability of Keytruda to overcome his disease process is related to the persistency of smoking and the abnormality related to smoking about bacterial derek. Further analysis recently published in [employer] has confirmed that the benefit of immunotherapy is further boosted by proper amount of derek in the gastrointestinal tract and how cigarettes are damaging to this issue. In any event the patient's issues in regard to cigarette smoking has been already a problem and he has not been able to quit in spite of all of the support available. Therefore stopping the Keytruda at this point including today the patient will move to [facility] that has been  approved in patients who have cancer of the esophagus with liver metastasis. The dose of this medicine will be the GI dose of this medication that will be properly calculated accordingly. He will have formal education and consent for this medicine. I pointed out the most important side effects of this medicine include cardiac toxicity, anemia, leukopenia, thrombocytopenia and pneumonitis that includes cough, shortness of breath, fever. In preparation to minimize pneumonitis the patient will take prednisone 10 mg on day 2 and 3 after each dose of Enhertu. He will receive this medicine every 3 weeks. We will deliver 3-4 cycles and reassess him not only by tumor markers but also radiologically. Hopefully this will have a positive impact not only in the tumor in the esophagus but also tumors metastatic in his liver.   • The patient was reviewed on 11/19/2021. Tolerance to the Enhertu 1st cycle was appropriate with more fatigue, no increased cough or shortness of breath and some anorexia. His white count, hemoglobin and platelets were normal and we advised him to proceed with his 2nd Enhertu infusion today. He understands that fatigue will be more prominent, that he could have chance for more hematological toxicity and he is starting to develop minor anemia. His white count and platelet count remain acceptable. I reminded him on many occasions today through the visit and insisted to the sister that if his cough pattern changes, increasing or his shortness of breath becomes worse he has to notify us immediately to be sure that he will not develop pneumonitis associated with Enhertu. He still will take 10 mg of prednisone tomorrow and Sunday to try to minimize this phenomenon from happening.     The patient will be having blood counts on a weekly basis with nurse visit to be sure to monitor hematological toxicity and he will return in 3 weeks to proceed with the next cycle. After the 3rd cycle the patient will proceed with  radiological assessment including PET scan.   • The patient was further reviewed on 12/30/2021. On clinical grounds the patient has not had any difficulty swallowing, his liver is not enlarged, nodular or tender, he has no jaundice and his CEA level has dropped further. His PET scan discloses resolution of his periportal lymph nodes and one area of resolution of liver metastasis. He has complete resolution of the tumor in the esophagus. He has still 2 active lesions in the liver with significant SUV activity pertinent to his metastatic disease. Given this fact the patient could be a candidate to further receive Enhertu but my concern is about the radiological analysis that shows minimal pulmonary infiltrate bilaterally. The radiologist suggests a radiation pneumonitis. I do not think that is the case. I think this is probably related to Enhertu interstitial lung disease and for this reason I feel the obligation to stop this medication at this point and to proceed with therapy with prednisone at 20 mg a day for the next 2 weeks until the patient returns back to see me. I made him aware that if the respiratory symptoms including his cough gets any worse or shortness of breath establishes and gets worse he will need to notify us immediately and he will require to be placed in the hospital to receive IV high dose steroid.    The question will be if the patient will be a candidate to receive any further Enhertu or not remains to be seen. Probably will not be the case.     Obviously this above statement opens the door in regard what else to do for his malignancy. Strong consideration will be to go back to Herceptin and also to consider to go back to FOLFOX, Herceptin like he received initially that gave him such a degree of resolution of symptoms. 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 I discussed this with him and his sister present in the room. I encouraged him to decrease his smoking.   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     In preparation for retaking treatment, I advised him that I would like to go back into FOLFOX, Herceptin every 3 weeks starting next week. This combination of medicines never failed him. It was discontinued because of toxicity and neuropathy. Therefore, I think it is worth it to go ahead and proceed with an echocardiogram and resume chemotherapy administration with this , giving him 3 cycles, each one of them 3 weeks apart and see how things evolve from the point of view tumor markers or radiological analysis of his liver through PET scan. He agrees to proceed.  • On 02/09/2022 I advised the patient to hold off his chemotherapy treatment with FOLFOX because his ANC was 1300 and we will decrease his dose of chemotherapy medicines by 15% for the next round of treatment next week. Hopefully the patient with this dose adjustment will be able to continue his treatment in a normal schedule of every 2 weeks. We have to watch his neuropathy very close. My advice will be eventually to pull out the Oxaliplatin and continue 5FU and leucovorin for a couple of cycles and eventually reintroduce the Oxaliplatin for 1-2 cycles back and forth. This has to be monitored clinically and also along with his response.   The patient was reviewed on 03/16/2022. Actually he feels terrific today and for the last 10 days after the anaphylactic reaction to oxaliplatin. He has no cancer-related pain. He has no hepatomegaly. His liver function tests have further improved and  surprising enough his CEA level has dramatically dropped as posted above. It was in the 200 category and now it is in the 30 category, pointing to the fact that I think the oxaliplatin and the FOLFOX regimen indeed has very significant benefit for him along with the Herceptin. Obviously he had an anaphylactic reaction to oxaliplatin and this medicine was discontinued. He will receive today his 5-FU, leucovorin and Herceptin.     I advised him to remain on this regimen for the time being and continue the sequence of events every couple of weeks. I went ahead and scheduled an echocardiogram to follow up on his ejection fraction while on Herceptin.  I reviewed with the patient and his sister today his CT scan that shows a favorable response to the regimen that he is receiving of 5FU and Herceptin. The tumor areas are shrinking and his CEA level is coming down. The patient actually feels very well. The tolerability to the regimen is excellent and he will continue the medicines at the same dosing and schedule for the time being. No plans to stop unless that he wants to take a trip with his sister to a different state and we will be glad to interrupt treatment for a week or so and no more than that. He understood this clearly.     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   • On 11/19/2021 he has no symptoms or signs of urinary tract infection and he remains on ciprofloxacin prophylactically everyday.   • On 12/30/2021 his colovesical fistula is still evident on the PET scan. He has gas in the bladder and he has had some hematuria. Therefore I do believe that the patient needs to remain on ciprofloxacin 1 tablet every other day to try to minimize any potential for any other urinary tract infection related to this. The patient under the present circumstances is not a candidate to  have any kind of surgery unless the symptoms get worse or clinical picture changes radically.   • His colovesical fistula remains active. He remains taking ciprofloxacin 250 mg p.o. every other day. He has not had any fevers or chills and I advised him to remain on this medicine for the time being. His pharmacy called in regard that they have not had Cipro anymore. The patient has tablets for almost 2 months supply. Therefore, he has no need for Levaquin or any other medicine or intervention at this point.  • His colovesical fistula is not active at this time. He has not had any pneumaturia of echolalia. He remains on ciprofloxacin prophylactically.   On 03/16/2022 the patient mentioned to me that he is now passing urine through the rectum. He also has fecaluria and pneumaturia still but in lesser amount. He is not having any fever. He has no pelvic pain. I think his colovesical fistula now in some way has bladder to colon  flow. It used to be fecal matter in the bladder and now it is urine through the anal canal. Obviously this will have less implications from the point of view of infection but obviously has the implications that the patient has to in some way be ready to defecate and urinate at the same time. He is going to see Dr. Cm Witt in a few days in regard to this fact. Given the fact that he has no abdominal pain and no fever, I would like for him to have a CT scan of the chest, abdomen and pelvis anyway that would not only show us the status of his cancer but also the status of his fistula. I prefer this over a PET scan for that purpose. This information will be shared with Dr. Cm Witt.   •   In the meantime I advised him to continue using his ciprofloxacin 250 mg every other day for prevention of UTI.  •   He has been seen by Cm Witt MD. He has advised the patient that he could not do too much for the fistula given the fact that it is just inconveniences of having urination  through the anal canal from time to time but no urinary tract infection. He asked the patient to postpone any intention for surgery unless that it is absolutely necessary. Postponement of chemotherapy for 3 months if any surgery is necessary will trigger dramatic progression of his tumor and obviously consequences.     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   • He remains on Neurontin for the treatment of his peripheral neuropathy. I advised him to remain on this medicine for the time being.  • On 01/12/2022, I advised the patient to remain on his gabapentin for the treatment of his sensory peripheral neuropathy in his feet.  • I went ahead and prescribed on 02/09/2022 his doses of gabapentin to take 300 mg twice a day.   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 This was not aggravated by the few doses of oxaliplatin that he was able to receive until an anaphylactic reaction.  He will remain on Neurontin for the treatment of his peripheral neuropathy associated with Oxaliplatin. The symptoms are under good control.     •   •   •   •   ·   4.In regard to his hypertension, he remains on lisinopril, hydrochlorothiazide provided by me.   • I reviewed him back on 11/19/2021. His blood pressure is 90/48 in spite of proper hydration. This is probably the effect of the combination of Trental and his blood pressure medication. I advised him to discontinue his blood pressure medication at this time and advised him to have proper hydration. He has a device to check his blood pressure at home. I asked him to check this on a daily basis at least a couple of times a day. If his blood pressure goes up above 130/90 he will take 1/2 of the dose of blood pressure medication that he was taking before. Those tablets have the way to be split.  He will require checking blood pressure. If the blood pressure drops again he will hold off blood pressure medication indefinitely.   • His hypertension has been reviewed on 12/30/2021. I think his blood pressure today is very good. He will remain on his blood pressure medication for the time being. I find no reason to change dosing.   • The patient was advised to remain on his blood pressure medication, Prinzide.  • On 02/09/2022 he continues doing his blood pressure medication almost on prn basis to minimize very dramatic drop that he has had before. So far he has not had any syncopal episodes or things of this nature.   On 03/16/2022 the patient is not taking blood pressure medication. Actually his blood pressure has been under relatively good control.  His blood pressure remains regulated. He actually is not taking any blood pressure medication at this time. I think he has learned to modify his diet, decrease the consumption of sodium and this probably will have a positive impact on this issue from now on.    The other good news is that this patient has not had any modification in his left ventricular ejection fraction documented through the echocardiogram posted above. Therefore Herceptin has not produced any cardiac toxicity and we will review another echocardiogram in 3 months from now.       •   •   •   •   •   ·   5.In regard to his previous GI bleeding associated with his cancer and esophagitis we advised the patient to remain on Protonix.   • On 11/19/2021 he has not had any evidence of GI bleeding and the hemoglobin remains stable. The minor drop obeys to toxicity from Enhertu and bone marrow suppression not because of effect of GI bleeding.   • He has not had any other episodes of GI bleeding as per 12/30/2021.   • On 01/12/2022 he has not had any other episodes of GI bleeding. He is no longer taking any anticoagulants.  • On 02/09/2022 he has not had episodes of melena or enterorrhagia, his hemoglobin  is stable. He has not had any use of anticoagulants since GI bleeding.   On 03/16/2022 the patient has not had any other episodes of GI bleeding. He is no longer taking anticoagulant.  Today this issue is not an issue anymore.     •   •   •   •   •   ·         6.In regard finally peripheral arterial disease I am going to send the patient a prescription for Trental to take 1 tablet twice a day. He has an appointment to be seen by vascular on 12/18/2021. I pointed out to the patient that if he wants to change the route of this process he must modify his cigarettes otherwise there is no hope. He was not able to take medication provided by [facility] because of side effects.   In regard to his peripheral arterial disease I advised him on 11/19/2021 to decrease his Trental to 1 tablet twice a day. He is now using topical Cetaphil on his feet to try to improve moisture and decrease cracking.   On 12/30/2021 his peripheral neuropathy actually is better through the Doppler study done by [facility]. I wonder if the trental is the one playing a role in this. I have asked him to remain on the trental for the time being.       He is not willing to entertain smoking cessation to help out peripheral arterial disease and we insisted into this in presence of his sister. He says that he is working on it. I do not feel any confidence in seeing this phenomenon anymore and that is a reality that we need to contemplate and just wait.   • In regard to his peripheral artery disease, the patient is still smoking 15 cigarettes a day. The Trental has made a big difference in regard to his ability to get around and walk. I advised him to remain on Trental.  • On 02/09/2022 his peripheral arterial disease is about the same, the hemoglobin is stable, the Trental is still ongoing, it is beneficial to him.   On 03/16/2022 his peripheral arterial disease remains about the same. He has no gangrene. He has an element of  claudication upon walking. We advised him to remain on the same medicines that he is taking. One of them is Trental. I also advised him about smoking cessation. This is not going to change. He remains on 15 cigarettes a day.  He remains on Trental. He has not had any claudication. He has minimal purplish discoloration of his toes that is not prominent with no gangrene and no ischemic neuropathy.     •   •   •   ·     7.This patient's blood sugar has remained in the 130's, 140's, 150's for the last several visits. His hemoglobin A1C today is 5.9.  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 He will require the utilization of Glucotrol XL 5 mg. I went ahead and sent this prescription to the pharmacy. I pointed out to him that he is eating a diet rich in carbohydrates mostly and I pointed out to him that he needs to eat more vegetables, some fruit and high complex carbohydrates that will minimize issues pertinent to his blood sugar. It is even more important now that he has initiated prednisone as posted.   • I reviewed with him on 01/12/2022 his hemoglobin A1c of 5.9. I advised him to remain on a diet that is not too much rich in sugar and stay on his glipizide 5 mg every day. He is very selective in his diet. He finds things that tastes okay and other ones that do not taste okay and I think we have no solution for this definitive problem.  • On 02/09/2022 the patient's glucose seems to be under control. He will remain on glipizide 5 mg oral daily.     I will review him back in 3 weeks. He will return for treatment next week and for the dose adjustments were discussed with Amber Lam RN.  On 03/16/2022 I asked the patient to continue to monitor his blood sugar and to continue taking his minimal dose of Glucotrol.  •   • RECOMMENDATIONS:    •   1. The patient will remain on his chemotherapy administration every  2 weeks as posted above. The medicines will be 5FU, leucovorin and Herceptin.  2. The rest of the medicines provided by me include neuropathy medication, antibiotic, glucotrol for elevation of blood sugar, pain medication, anxiety medication all will remain ongoing.  3. The patient will call if he has any fever associated with his fistula. Hopefully this will no be the case. We will review him back in a month or 6 weeks and he will see my nurse practitioner intermittently as well when he comes for his treatment. He will remain on a CBC, CMP schedule every 2 weeks and will monitor his CEA level in 6 weeks.     •   •   •   •   ·

## 2022-04-01 ENCOUNTER — INFUSION (OUTPATIENT)
Dept: ONCOLOGY | Facility: HOSPITAL | Age: 67
End: 2022-04-01

## 2022-04-01 DIAGNOSIS — C78.7 LIVER METASTASIS: ICD-10-CM

## 2022-04-01 DIAGNOSIS — C15.5 MALIGNANT NEOPLASM OF LOWER THIRD OF ESOPHAGUS: Primary | ICD-10-CM

## 2022-04-01 DIAGNOSIS — Z45.2 ENCOUNTER FOR ADJUSTMENT OR MANAGEMENT OF VASCULAR ACCESS DEVICE: ICD-10-CM

## 2022-04-01 DIAGNOSIS — D49.0 ESOPHAGUS NEOPLASM: ICD-10-CM

## 2022-04-01 PROCEDURE — 25010000002 HEPARIN LOCK FLUSH PER 10 UNITS: Performed by: INTERNAL MEDICINE

## 2022-04-01 RX ORDER — SODIUM CHLORIDE 0.9 % (FLUSH) 0.9 %
10 SYRINGE (ML) INJECTION AS NEEDED
Status: DISCONTINUED | OUTPATIENT
Start: 2022-04-01 | End: 2022-04-01 | Stop reason: HOSPADM

## 2022-04-01 RX ORDER — HEPARIN SODIUM (PORCINE) LOCK FLUSH IV SOLN 100 UNIT/ML 100 UNIT/ML
500 SOLUTION INTRAVENOUS AS NEEDED
Status: DISCONTINUED | OUTPATIENT
Start: 2022-04-01 | End: 2022-04-01 | Stop reason: HOSPADM

## 2022-04-01 RX ORDER — SODIUM CHLORIDE 0.9 % (FLUSH) 0.9 %
10 SYRINGE (ML) INJECTION AS NEEDED
Status: CANCELLED | OUTPATIENT
Start: 2022-04-01

## 2022-04-01 RX ORDER — HEPARIN SODIUM (PORCINE) LOCK FLUSH IV SOLN 100 UNIT/ML 100 UNIT/ML
500 SOLUTION INTRAVENOUS AS NEEDED
Status: CANCELLED | OUTPATIENT
Start: 2022-04-01

## 2022-04-01 RX ADMIN — Medication 10 ML: at 09:53

## 2022-04-01 RX ADMIN — Medication 500 UNITS: at 09:53

## 2022-04-13 ENCOUNTER — TELEPHONE (OUTPATIENT)
Dept: ONCOLOGY | Facility: CLINIC | Age: 67
End: 2022-04-13

## 2022-04-13 ENCOUNTER — INFUSION (OUTPATIENT)
Dept: ONCOLOGY | Facility: HOSPITAL | Age: 67
End: 2022-04-13

## 2022-04-13 VITALS
DIASTOLIC BLOOD PRESSURE: 74 MMHG | HEART RATE: 89 BPM | OXYGEN SATURATION: 94 % | SYSTOLIC BLOOD PRESSURE: 124 MMHG | RESPIRATION RATE: 16 BRPM | WEIGHT: 196 LBS | TEMPERATURE: 97.8 F | BODY MASS INDEX: 29.03 KG/M2

## 2022-04-13 DIAGNOSIS — C15.5 MALIGNANT NEOPLASM OF LOWER THIRD OF ESOPHAGUS: Primary | ICD-10-CM

## 2022-04-13 DIAGNOSIS — D49.0 ESOPHAGUS NEOPLASM: ICD-10-CM

## 2022-04-13 DIAGNOSIS — C78.7 LIVER METASTASIS: ICD-10-CM

## 2022-04-13 LAB
ALBUMIN SERPL-MCNC: 3.9 G/DL (ref 3.5–5.2)
ALBUMIN/GLOB SERPL: 1.3 G/DL (ref 1.1–2.4)
ALP SERPL-CCNC: 148 U/L (ref 38–116)
ALT SERPL W P-5'-P-CCNC: 66 U/L (ref 0–41)
ANION GAP SERPL CALCULATED.3IONS-SCNC: 9.9 MMOL/L (ref 5–15)
AST SERPL-CCNC: 53 U/L (ref 0–40)
BASOPHILS # BLD AUTO: 0.06 10*3/MM3 (ref 0–0.2)
BASOPHILS NFR BLD AUTO: 0.8 % (ref 0–1.5)
BILIRUB SERPL-MCNC: 0.6 MG/DL (ref 0.2–1.2)
BUN SERPL-MCNC: 11 MG/DL (ref 6–20)
BUN/CREAT SERPL: 14.9 (ref 7.3–30)
CALCIUM SPEC-SCNC: 9.4 MG/DL (ref 8.5–10.2)
CHLORIDE SERPL-SCNC: 104 MMOL/L (ref 98–107)
CO2 SERPL-SCNC: 25.1 MMOL/L (ref 22–29)
CREAT SERPL-MCNC: 0.74 MG/DL (ref 0.7–1.3)
DEPRECATED RDW RBC AUTO: 56.9 FL (ref 37–54)
EGFRCR SERPLBLD CKD-EPI 2021: 100.6 ML/MIN/1.73
EOSINOPHIL # BLD AUTO: 0.47 10*3/MM3 (ref 0–0.4)
EOSINOPHIL NFR BLD AUTO: 6.5 % (ref 0.3–6.2)
ERYTHROCYTE [DISTWIDTH] IN BLOOD BY AUTOMATED COUNT: 17 % (ref 12.3–15.4)
GLOBULIN UR ELPH-MCNC: 2.9 GM/DL (ref 1.8–3.5)
GLUCOSE SERPL-MCNC: 91 MG/DL (ref 74–124)
HCT VFR BLD AUTO: 38.1 % (ref 37.5–51)
HGB BLD-MCNC: 12.8 G/DL (ref 13–17.7)
IMM GRANULOCYTES # BLD AUTO: 0.12 10*3/MM3 (ref 0–0.05)
IMM GRANULOCYTES NFR BLD AUTO: 1.7 % (ref 0–0.5)
LYMPHOCYTES # BLD AUTO: 1.35 10*3/MM3 (ref 0.7–3.1)
LYMPHOCYTES NFR BLD AUTO: 18.8 % (ref 19.6–45.3)
MCH RBC QN AUTO: 30.7 PG (ref 26.6–33)
MCHC RBC AUTO-ENTMCNC: 33.6 G/DL (ref 31.5–35.7)
MCV RBC AUTO: 91.4 FL (ref 79–97)
MONOCYTES # BLD AUTO: 0.55 10*3/MM3 (ref 0.1–0.9)
MONOCYTES NFR BLD AUTO: 7.6 % (ref 5–12)
NEUTROPHILS NFR BLD AUTO: 4.65 10*3/MM3 (ref 1.7–7)
NEUTROPHILS NFR BLD AUTO: 64.6 % (ref 42.7–76)
NRBC BLD AUTO-RTO: 0 /100 WBC (ref 0–0.2)
PLATELET # BLD AUTO: 114 10*3/MM3 (ref 140–450)
PMV BLD AUTO: 9.3 FL (ref 6–12)
POTASSIUM SERPL-SCNC: 3.9 MMOL/L (ref 3.5–4.7)
PROT SERPL-MCNC: 6.8 G/DL (ref 6.3–8)
RBC # BLD AUTO: 4.17 10*6/MM3 (ref 4.14–5.8)
SODIUM SERPL-SCNC: 139 MMOL/L (ref 134–145)
WBC NRBC COR # BLD: 7.2 10*3/MM3 (ref 3.4–10.8)

## 2022-04-13 PROCEDURE — 25010000002 DEXAMETHASONE PER 1 MG: Performed by: INTERNAL MEDICINE

## 2022-04-13 PROCEDURE — 25010000002 LEUCOVORIN 500 MG RECONSTITUTED SOLUTION 1 EACH VIAL: Performed by: NURSE PRACTITIONER

## 2022-04-13 PROCEDURE — 25010000002 FLUOROURACIL PER 500 MG: Performed by: NURSE PRACTITIONER

## 2022-04-13 PROCEDURE — 25010000002 DIPHENHYDRAMINE PER 50 MG: Performed by: NURSE PRACTITIONER

## 2022-04-13 PROCEDURE — 96375 TX/PRO/DX INJ NEW DRUG ADDON: CPT

## 2022-04-13 PROCEDURE — 96367 TX/PROPH/DG ADDL SEQ IV INF: CPT

## 2022-04-13 PROCEDURE — 25010000002 PALONOSETRON PER 25 MCG: Performed by: NURSE PRACTITIONER

## 2022-04-13 PROCEDURE — 80053 COMPREHEN METABOLIC PANEL: CPT

## 2022-04-13 PROCEDURE — 96411 CHEMO IV PUSH ADDL DRUG: CPT

## 2022-04-13 PROCEDURE — 25010000002 TRASTUZUMAB-ANNS 420 MG RECONSTITUTED SOLUTION 1 EACH VIAL: Performed by: NURSE PRACTITIONER

## 2022-04-13 PROCEDURE — 85025 COMPLETE CBC W/AUTO DIFF WBC: CPT

## 2022-04-13 PROCEDURE — 96413 CHEMO IV INFUSION 1 HR: CPT

## 2022-04-13 PROCEDURE — 96416 CHEMO PROLONG INFUSE W/PUMP: CPT

## 2022-04-13 RX ORDER — SODIUM CHLORIDE 9 MG/ML
250 INJECTION, SOLUTION INTRAVENOUS ONCE
Status: COMPLETED | OUTPATIENT
Start: 2022-04-13 | End: 2022-04-13

## 2022-04-13 RX ORDER — FAMOTIDINE 10 MG/ML
20 INJECTION, SOLUTION INTRAVENOUS AS NEEDED
Status: CANCELLED | OUTPATIENT
Start: 2022-04-13

## 2022-04-13 RX ORDER — CIPROFLOXACIN 250 MG/1
250 TABLET, FILM COATED ORAL DAILY
Qty: 90 TABLET | Refills: 1 | Status: SHIPPED | OUTPATIENT
Start: 2022-04-13

## 2022-04-13 RX ORDER — DEXAMETHASONE SODIUM PHOSPHATE 4 MG/ML
4 INJECTION, SOLUTION INTRA-ARTICULAR; INTRALESIONAL; INTRAMUSCULAR; INTRAVENOUS; SOFT TISSUE ONCE
Status: COMPLETED | OUTPATIENT
Start: 2022-04-13 | End: 2022-04-13

## 2022-04-13 RX ORDER — FAMOTIDINE 10 MG/ML
20 INJECTION, SOLUTION INTRAVENOUS ONCE
Status: COMPLETED | OUTPATIENT
Start: 2022-04-13 | End: 2022-04-13

## 2022-04-13 RX ORDER — FLUOROURACIL 50 MG/ML
340 INJECTION, SOLUTION INTRAVENOUS ONCE
Status: COMPLETED | OUTPATIENT
Start: 2022-04-13 | End: 2022-04-13

## 2022-04-13 RX ORDER — PALONOSETRON 0.05 MG/ML
0.25 INJECTION, SOLUTION INTRAVENOUS ONCE
Status: COMPLETED | OUTPATIENT
Start: 2022-04-13 | End: 2022-04-13

## 2022-04-13 RX ADMIN — PALONOSETRON 0.25 MG: 0.05 INJECTION, SOLUTION INTRAVENOUS at 14:04

## 2022-04-13 RX ADMIN — LEUCOVORIN CALCIUM 700 MG: 500 INJECTION, POWDER, LYOPHILIZED, FOR SOLUTION INTRAMUSCULAR; INTRAVENOUS at 15:21

## 2022-04-13 RX ADMIN — TRASTUZUMAB-ANNS 360 MG: 420 INJECTION, POWDER, LYOPHILIZED, FOR SOLUTION INTRAVENOUS at 14:31

## 2022-04-13 RX ADMIN — FLUOROURACIL 700 MG: 50 INJECTION, SOLUTION INTRAVENOUS at 15:53

## 2022-04-13 RX ADMIN — FLUOROURACIL 4180 MG: 50 INJECTION, SOLUTION INTRAVENOUS at 15:53

## 2022-04-13 RX ADMIN — FAMOTIDINE 20 MG: 10 INJECTION INTRAVENOUS at 14:01

## 2022-04-13 RX ADMIN — SODIUM CHLORIDE 250 ML: 9 INJECTION, SOLUTION INTRAVENOUS at 13:57

## 2022-04-13 RX ADMIN — DEXAMETHASONE SODIUM PHOSPHATE 4 MG: 4 INJECTION INTRA-ARTICULAR; INTRALESIONAL; INTRAMUSCULAR; INTRAVENOUS; SOFT TISSUE at 13:59

## 2022-04-13 RX ADMIN — SODIUM CHLORIDE 25 MG: 9 INJECTION, SOLUTION INTRAVENOUS at 14:08

## 2022-04-13 NOTE — TELEPHONE ENCOUNTER
Returned call to Verena at [facility] to clarify Cipro prescription.  Cipro should be one every other day.  She v/u.

## 2022-04-15 ENCOUNTER — INFUSION (OUTPATIENT)
Dept: ONCOLOGY | Facility: HOSPITAL | Age: 67
End: 2022-04-15

## 2022-04-15 ENCOUNTER — APPOINTMENT (OUTPATIENT)
Dept: ONCOLOGY | Facility: HOSPITAL | Age: 67
End: 2022-04-15

## 2022-04-15 DIAGNOSIS — D49.0 ESOPHAGUS NEOPLASM: ICD-10-CM

## 2022-04-15 DIAGNOSIS — C78.7 LIVER METASTASIS: ICD-10-CM

## 2022-04-15 DIAGNOSIS — Z45.2 ENCOUNTER FOR ADJUSTMENT OR MANAGEMENT OF VASCULAR ACCESS DEVICE: ICD-10-CM

## 2022-04-15 DIAGNOSIS — C15.5 MALIGNANT NEOPLASM OF LOWER THIRD OF ESOPHAGUS: Primary | ICD-10-CM

## 2022-04-15 PROCEDURE — 25010000002 HEPARIN LOCK FLUSH PER 10 UNITS: Performed by: INTERNAL MEDICINE

## 2022-04-15 RX ORDER — SODIUM CHLORIDE 0.9 % (FLUSH) 0.9 %
10 SYRINGE (ML) INJECTION AS NEEDED
Status: CANCELLED | OUTPATIENT
Start: 2022-04-15

## 2022-04-15 RX ORDER — SODIUM CHLORIDE 0.9 % (FLUSH) 0.9 %
10 SYRINGE (ML) INJECTION AS NEEDED
Status: DISCONTINUED | OUTPATIENT
Start: 2022-04-15 | End: 2022-04-15 | Stop reason: HOSPADM

## 2022-04-15 RX ORDER — HEPARIN SODIUM (PORCINE) LOCK FLUSH IV SOLN 100 UNIT/ML 100 UNIT/ML
500 SOLUTION INTRAVENOUS AS NEEDED
Status: CANCELLED | OUTPATIENT
Start: 2022-04-15

## 2022-04-15 RX ORDER — HEPARIN SODIUM (PORCINE) LOCK FLUSH IV SOLN 100 UNIT/ML 100 UNIT/ML
500 SOLUTION INTRAVENOUS AS NEEDED
Status: DISCONTINUED | OUTPATIENT
Start: 2022-04-15 | End: 2022-04-15 | Stop reason: HOSPADM

## 2022-04-15 RX ADMIN — Medication 500 UNITS: at 13:04

## 2022-04-15 RX ADMIN — Medication 10 ML: at 13:04

## 2022-04-27 ENCOUNTER — OFFICE VISIT (OUTPATIENT)
Dept: ONCOLOGY | Facility: CLINIC | Age: 67
End: 2022-04-27

## 2022-04-27 ENCOUNTER — INFUSION (OUTPATIENT)
Dept: ONCOLOGY | Facility: HOSPITAL | Age: 67
End: 2022-04-27

## 2022-04-27 VITALS
HEART RATE: 77 BPM | OXYGEN SATURATION: 96 % | DIASTOLIC BLOOD PRESSURE: 76 MMHG | SYSTOLIC BLOOD PRESSURE: 124 MMHG | RESPIRATION RATE: 17 BRPM | HEIGHT: 69 IN | BODY MASS INDEX: 29.09 KG/M2 | WEIGHT: 196.4 LBS | TEMPERATURE: 97.8 F

## 2022-04-27 DIAGNOSIS — I10 ESSENTIAL HYPERTENSION: ICD-10-CM

## 2022-04-27 DIAGNOSIS — C78.7 LIVER METASTASIS: ICD-10-CM

## 2022-04-27 DIAGNOSIS — C15.5 MALIGNANT NEOPLASM OF LOWER THIRD OF ESOPHAGUS: Primary | ICD-10-CM

## 2022-04-27 DIAGNOSIS — N32.1 RECTO-BLADDERNECK FISTULA: ICD-10-CM

## 2022-04-27 DIAGNOSIS — J84.89 PNEUMONITIS, INTERSTITIAL: ICD-10-CM

## 2022-04-27 DIAGNOSIS — T45.1X5A PERIPHERAL NEUROPATHY DUE TO CHEMOTHERAPY: ICD-10-CM

## 2022-04-27 DIAGNOSIS — G62.0 PERIPHERAL NEUROPATHY DUE TO CHEMOTHERAPY: ICD-10-CM

## 2022-04-27 DIAGNOSIS — R73.01 IMPAIRED FASTING GLUCOSE: ICD-10-CM

## 2022-04-27 DIAGNOSIS — Z72.0 TOBACCO ABUSE: ICD-10-CM

## 2022-04-27 DIAGNOSIS — Z45.2 ENCOUNTER FOR ADJUSTMENT OR MANAGEMENT OF VASCULAR ACCESS DEVICE: ICD-10-CM

## 2022-04-27 DIAGNOSIS — D49.0 ESOPHAGUS NEOPLASM: ICD-10-CM

## 2022-04-27 DIAGNOSIS — C15.5 MALIGNANT NEOPLASM OF LOWER THIRD OF ESOPHAGUS: ICD-10-CM

## 2022-04-27 LAB
ALBUMIN SERPL-MCNC: 3.7 G/DL (ref 3.5–5.2)
ALBUMIN/GLOB SERPL: 1.3 G/DL (ref 1.1–2.4)
ALP SERPL-CCNC: 131 U/L (ref 38–116)
ALT SERPL W P-5'-P-CCNC: 51 U/L (ref 0–41)
ANION GAP SERPL CALCULATED.3IONS-SCNC: 10.8 MMOL/L (ref 5–15)
AST SERPL-CCNC: 38 U/L (ref 0–40)
BASOPHILS # BLD AUTO: 0.04 10*3/MM3 (ref 0–0.2)
BASOPHILS NFR BLD AUTO: 0.6 % (ref 0–1.5)
BILIRUB SERPL-MCNC: 0.6 MG/DL (ref 0.2–1.2)
BUN SERPL-MCNC: 9 MG/DL (ref 6–20)
BUN/CREAT SERPL: 12.9 (ref 7.3–30)
CALCIUM SPEC-SCNC: 9.3 MG/DL (ref 8.5–10.2)
CEA SERPL-MCNC: 12.9 NG/ML
CHLORIDE SERPL-SCNC: 104 MMOL/L (ref 98–107)
CO2 SERPL-SCNC: 24.2 MMOL/L (ref 22–29)
CREAT SERPL-MCNC: 0.7 MG/DL (ref 0.7–1.3)
DEPRECATED RDW RBC AUTO: 56.8 FL (ref 37–54)
EGFRCR SERPLBLD CKD-EPI 2021: 102.3 ML/MIN/1.73
EOSINOPHIL # BLD AUTO: 0.59 10*3/MM3 (ref 0–0.4)
EOSINOPHIL NFR BLD AUTO: 9.1 % (ref 0.3–6.2)
ERYTHROCYTE [DISTWIDTH] IN BLOOD BY AUTOMATED COUNT: 17.1 % (ref 12.3–15.4)
GLOBULIN UR ELPH-MCNC: 2.8 GM/DL (ref 1.8–3.5)
GLUCOSE SERPL-MCNC: 135 MG/DL (ref 74–124)
HBA1C MFR BLD: 5.4 % (ref 4.8–5.6)
HCT VFR BLD AUTO: 39.4 % (ref 37.5–51)
HGB BLD-MCNC: 13.3 G/DL (ref 13–17.7)
IMM GRANULOCYTES # BLD AUTO: 0.07 10*3/MM3 (ref 0–0.05)
IMM GRANULOCYTES NFR BLD AUTO: 1.1 % (ref 0–0.5)
LYMPHOCYTES # BLD AUTO: 1.02 10*3/MM3 (ref 0.7–3.1)
LYMPHOCYTES NFR BLD AUTO: 15.7 % (ref 19.6–45.3)
MCH RBC QN AUTO: 31.1 PG (ref 26.6–33)
MCHC RBC AUTO-ENTMCNC: 33.8 G/DL (ref 31.5–35.7)
MCV RBC AUTO: 92.1 FL (ref 79–97)
MONOCYTES # BLD AUTO: 0.51 10*3/MM3 (ref 0.1–0.9)
MONOCYTES NFR BLD AUTO: 7.9 % (ref 5–12)
NEUTROPHILS NFR BLD AUTO: 4.26 10*3/MM3 (ref 1.7–7)
NEUTROPHILS NFR BLD AUTO: 65.6 % (ref 42.7–76)
NRBC BLD AUTO-RTO: 0 /100 WBC (ref 0–0.2)
PLATELET # BLD AUTO: 110 10*3/MM3 (ref 140–450)
PMV BLD AUTO: 9.5 FL (ref 6–12)
POTASSIUM SERPL-SCNC: 3.8 MMOL/L (ref 3.5–4.7)
PROT SERPL-MCNC: 6.5 G/DL (ref 6.3–8)
RBC # BLD AUTO: 4.28 10*6/MM3 (ref 4.14–5.8)
SODIUM SERPL-SCNC: 139 MMOL/L (ref 134–145)
WBC NRBC COR # BLD: 6.49 10*3/MM3 (ref 3.4–10.8)

## 2022-04-27 PROCEDURE — 36415 COLL VENOUS BLD VENIPUNCTURE: CPT | Performed by: INTERNAL MEDICINE

## 2022-04-27 PROCEDURE — 85025 COMPLETE CBC W/AUTO DIFF WBC: CPT

## 2022-04-27 PROCEDURE — 25010000002 PALONOSETRON PER 25 MCG: Performed by: INTERNAL MEDICINE

## 2022-04-27 PROCEDURE — 25010000002 TRASTUZUMAB-ANNS 420 MG RECONSTITUTED SOLUTION 1 EACH VIAL: Performed by: INTERNAL MEDICINE

## 2022-04-27 PROCEDURE — 25010000002 LEUCOVORIN 500 MG RECONSTITUTED SOLUTION 1 EACH VIAL: Performed by: INTERNAL MEDICINE

## 2022-04-27 PROCEDURE — 80053 COMPREHEN METABOLIC PANEL: CPT

## 2022-04-27 PROCEDURE — 96367 TX/PROPH/DG ADDL SEQ IV INF: CPT

## 2022-04-27 PROCEDURE — 25010000002 DIPHENHYDRAMINE PER 50 MG: Performed by: INTERNAL MEDICINE

## 2022-04-27 PROCEDURE — 96413 CHEMO IV INFUSION 1 HR: CPT

## 2022-04-27 PROCEDURE — 83036 HEMOGLOBIN GLYCOSYLATED A1C: CPT | Performed by: INTERNAL MEDICINE

## 2022-04-27 PROCEDURE — 25010000002 DEXAMETHASONE PER 1 MG: Performed by: INTERNAL MEDICINE

## 2022-04-27 PROCEDURE — 96416 CHEMO PROLONG INFUSE W/PUMP: CPT

## 2022-04-27 PROCEDURE — 82378 CARCINOEMBRYONIC ANTIGEN: CPT | Performed by: INTERNAL MEDICINE

## 2022-04-27 PROCEDURE — 96375 TX/PRO/DX INJ NEW DRUG ADDON: CPT

## 2022-04-27 PROCEDURE — 96411 CHEMO IV PUSH ADDL DRUG: CPT

## 2022-04-27 PROCEDURE — 25010000002 FLUOROURACIL PER 500 MG: Performed by: INTERNAL MEDICINE

## 2022-04-27 RX ORDER — FAMOTIDINE 10 MG/ML
20 INJECTION, SOLUTION INTRAVENOUS AS NEEDED
Status: CANCELLED | OUTPATIENT
Start: 2022-04-27

## 2022-04-27 RX ORDER — PALONOSETRON 0.05 MG/ML
0.25 INJECTION, SOLUTION INTRAVENOUS ONCE
Status: COMPLETED | OUTPATIENT
Start: 2022-04-27 | End: 2022-04-27

## 2022-04-27 RX ORDER — FLUOROURACIL 50 MG/ML
340 INJECTION, SOLUTION INTRAVENOUS ONCE
Status: COMPLETED | OUTPATIENT
Start: 2022-04-27 | End: 2022-04-27

## 2022-04-27 RX ORDER — SODIUM CHLORIDE 9 MG/ML
250 INJECTION, SOLUTION INTRAVENOUS ONCE
Status: CANCELLED | OUTPATIENT
Start: 2022-04-27

## 2022-04-27 RX ORDER — PALONOSETRON 0.05 MG/ML
0.25 INJECTION, SOLUTION INTRAVENOUS ONCE
Status: CANCELLED | OUTPATIENT
Start: 2022-04-27

## 2022-04-27 RX ORDER — DEXAMETHASONE SODIUM PHOSPHATE 4 MG/ML
2 INJECTION, SOLUTION INTRA-ARTICULAR; INTRALESIONAL; INTRAMUSCULAR; INTRAVENOUS; SOFT TISSUE ONCE
Status: COMPLETED | OUTPATIENT
Start: 2022-04-27 | End: 2022-04-27

## 2022-04-27 RX ORDER — FLUOROURACIL 50 MG/ML
340 INJECTION, SOLUTION INTRAVENOUS ONCE
Status: CANCELLED | OUTPATIENT
Start: 2022-04-27

## 2022-04-27 RX ORDER — SODIUM CHLORIDE 9 MG/ML
250 INJECTION, SOLUTION INTRAVENOUS ONCE
Status: COMPLETED | OUTPATIENT
Start: 2022-04-27 | End: 2022-04-27

## 2022-04-27 RX ORDER — FAMOTIDINE 10 MG/ML
20 INJECTION, SOLUTION INTRAVENOUS ONCE
Status: COMPLETED | OUTPATIENT
Start: 2022-04-27 | End: 2022-04-27

## 2022-04-27 RX ORDER — FAMOTIDINE 10 MG/ML
20 INJECTION, SOLUTION INTRAVENOUS ONCE
Status: CANCELLED
Start: 2022-04-27 | End: 2022-04-27

## 2022-04-27 RX ADMIN — DEXAMETHASONE SODIUM PHOSPHATE 2 MG: 4 INJECTION INTRA-ARTICULAR; INTRALESIONAL; INTRAMUSCULAR; INTRAVENOUS; SOFT TISSUE at 11:05

## 2022-04-27 RX ADMIN — FAMOTIDINE 20 MG: 10 INJECTION INTRAVENOUS at 10:41

## 2022-04-27 RX ADMIN — SODIUM CHLORIDE 250 ML: 9 INJECTION, SOLUTION INTRAVENOUS at 10:41

## 2022-04-27 RX ADMIN — LEUCOVORIN CALCIUM 700 MG: 500 INJECTION, POWDER, LYOPHILIZED, FOR SOLUTION INTRAMUSCULAR; INTRAVENOUS at 11:58

## 2022-04-27 RX ADMIN — FLUOROURACIL 700 MG: 50 INJECTION, SOLUTION INTRAVENOUS at 12:30

## 2022-04-27 RX ADMIN — FLUOROURACIL 4180 MG: 50 INJECTION, SOLUTION INTRAVENOUS at 12:35

## 2022-04-27 RX ADMIN — TRASTUZUMAB-ANNS 360 MG: 420 INJECTION, POWDER, LYOPHILIZED, FOR SOLUTION INTRAVENOUS at 11:17

## 2022-04-27 RX ADMIN — DIPHENHYDRAMINE HYDROCHLORIDE 25 MG: 50 INJECTION, SOLUTION INTRAMUSCULAR; INTRAVENOUS at 10:45

## 2022-04-27 RX ADMIN — PALONOSETRON HYDROCHLORIDE 0.25 MG: 0.25 INJECTION INTRAVENOUS at 10:39

## 2022-04-27 NOTE — PROGRESS NOTES
"  Subjective     REASON FOR follow up:1.    1. 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  Currently receiving palliative 5 fu leucovorin  AND HERCEPTIN therapy once a month    2.COLOVESICAL FISTULA: chronic antibiotic therapy cipro, NO FURTHER PLANS FOR SURGERY AFTER VISIT AND PROCEDURE BY DR GM WITT 1/20    3. 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 NEURONTIN INITIATED.        DURING THE VISIT WITH THE PATIENT TODAY , PATIENT HAD FACE MASK, MY MEDICAL ASSISTANT AND I  HAD PROPPER PROTECTIVE EQUIPMENT, AND I DID HAND HYGIENE WITH SOAP AND WATER BEFORE AND AFTER THE VISIT.                                  Past Medical History:   Diagnosis Date   • Arthritis    • Cancer (HCC)     prostate cancer 2008   • Cancer (HCC)     esophageal   • Chronic anticoagulation     on xarelto   • Elevated PSA    • Esophageal mass    • Fistula     colon and bladder   • H/O Lung nodule    • Hepatitis     CHILD--PT STATED \"I THINK IT WAS A.\"   • History of chemotherapy    • History of pneumonia    • Hx of blood clots 08/10/2019   • Hyperlipidemia    • Hypertension    • Nail fungus    • Neuropathy    • PVD (peripheral vascular disease) (HCC)    • Recto-bladderneck fistula     on poab for recurrent uti        Past Surgical History:   Procedure Laterality Date   • COLONOSCOPY N/A 2/4/2020    Procedure: COLONOSCOPY;  Surgeon: Guy Sears MD;  Location: [facility] ENDOSCOPY;  Service: General;  Laterality: N/A;  PRE-COLOVESICAL FISTULA  POST-- DIVERTICULOSIS   • CYSTOSCOPY  02/06/2020   • CYSTOSCOPY Bilateral 2/26/2020    Procedure: CYSTOSCOPY RETROGRADE;  Surgeon: Gm Witt MD;  Location: [facility] MAIN OR;  Service: Urology;  Laterality: Bilateral;   • ENDOSCOPY     • ENDOSCOPY N/A 6/19/2021    Procedure: " ESOPHAGOGASTRODUODENOSCOPY WITH BIOPSY;  Surgeon: Mary Brito MD;  Location: [facility] MAIN OR;  Service: Gastroenterology;  Laterality: N/A;   • ENDOSCOPY N/A 8/5/2021    Procedure: ESOPHAGOGASTRODUODENOSCOPY HEMOSPRAY;  Surgeon: Naga Shelby MD;  Location: [facility] ENDOSCOPY;  Service: Gastroenterology;  Laterality: N/A;  HX OF ESOPHAGEAL  CANCER;MELENA  POST: ESOPHAGEAL BLEEDING; ESOPHAGEAL MASS   • HERNIA REPAIR Right 1999    inguinal hernia   • PROSTATE SURGERY  03/2008    prostatectectomy   • VENOUS ACCESS DEVICE (PORT) INSERTION N/A 7/16/2019    Procedure: INSERTION VENOUS ACCESS DEVICE WITH FLUORO AND EGD WITH BIOPSY;  Surgeon: Mary Brito MD;  Location: [facility] MAIN OR;  Service: Thoracic        Current Outpatient Medications on File Prior to Visit   Medication Sig Dispense Refill   • acetaminophen (TYLENOL) 500 MG tablet Take 500 mg by mouth Every 6 (Six) Hours As Needed for Mild Pain .     • cevimeline (EVOXAC) 30 MG capsule Take 30 mg by mouth 3 (Three) Times a Day.     • ciprofloxacin (CIPRO) 250 MG tablet Take 1 tablet by mouth Daily. Take one every other day 90 tablet 1   • econazole nitrate (SPECTAZOLE) 1 % cream 2 (Two) Times a Day.     • gabapentin (NEURONTIN) 300 MG capsule Take 1 capsule by mouth 2 (Two) Times a Day. 180 capsule 0   • glipizide (GLUCOTROL XL) 5 MG ER tablet TAKE 1 TABLET BY MOUTH EVERY DAY 90 tablet 1   • HYDROcodone-acetaminophen (NORCO) 5-325 MG per tablet Take  by mouth See Admin Instructions. Take 1 tablet by mouth every 4-6 hours as needed for pain     • lisinopril-hydrochlorothiazide (PRINZIDE,ZESTORETIC) 20-12.5 MG per tablet TAKE 1 TABLET BY MOUTH EVERY DAY 90 tablet 0   • nicotine (NICODERM CQ) 21 MG/24HR patch Place 1 patch on the skin as directed by provider Daily. (Patient taking differently: Place 1 patch on the skin as directed by provider Daily. Has at home but hasn't started taking yet) 28 each 1   • OLANZapine (ZyPREXA) 2.5 MG tablet Take 1 tablet by mouth  Every Night. 90 tablet 0   • ondansetron (ZOFRAN) 4 MG tablet Take 1 tablet by mouth Every 8 (Eight) Hours As Needed for Nausea or Vomiting. 30 tablet 2   • pantoprazole (Protonix) 40 MG EC tablet Take 1 tablet by mouth 2 (Two) Times a Day. 90 tablet 3   • pentoxifylline (TRENtal) 400 MG CR tablet TAKE 1 TABLET BY MOUTH TWICE A DAY WITH MEALS 90 tablet 0   • potassium chloride ER (K-TAB) 20 MEQ tablet controlled-release ER tablet Take 1 tablet by mouth Daily. 60 tablet 3   • Probiotic Product (PROBIOTIC DAILY PO) Take 1 tablet by mouth Daily.     • triamcinolone (KENALOG) 0.1 % ointment Apply 1 application topically to the appropriate area as directed 2 (Two) Times a Day. 30 g 1     No current facility-administered medications on file prior to visit.        ALLERGIES:    Allergies   Allergen Reactions   • Oxaliplatin Anaphylaxis        Social History     Socioeconomic History   • Marital status: Single   • Years of education: High school   Tobacco Use   • Smoking status: Current Every Day Smoker     Packs/day: 1.00     Years: 38.00     Pack years: 38.00     Types: Cigarettes     Start date: 1974   • Smokeless tobacco: Never Used   • Tobacco comment: Quit for a period of 8 years.    Vaping Use   • Vaping Use: Never used   Substance and Sexual Activity   • Alcohol use: Not Currently   • Drug use: Never   • Sexual activity: Defer        Family History   Problem Relation Age of Onset   • Hypertension Mother    • Stroke Mother    • Hypertension Other    • Lung disease Other    • Prostate cancer Other    • Lung cancer Father    • Malig Hyperthermia Neg Hx       Current Outpatient Medications on File Prior to Visit   Medication Sig Dispense Refill   • acetaminophen (TYLENOL) 500 MG tablet Take 500 mg by mouth Every 6 (Six) Hours As Needed for Mild Pain .     • cevimeline (EVOXAC) 30 MG capsule Take 30 mg by mouth 3 (Three) Times a Day.     • ciprofloxacin (CIPRO) 250 MG tablet Take 1 tablet by mouth Daily. Take one every  "other day 90 tablet 1   • econazole nitrate (SPECTAZOLE) 1 % cream 2 (Two) Times a Day.     • gabapentin (NEURONTIN) 300 MG capsule Take 1 capsule by mouth 2 (Two) Times a Day. 180 capsule 0   • glipizide (GLUCOTROL XL) 5 MG ER tablet TAKE 1 TABLET BY MOUTH EVERY DAY 90 tablet 1   • HYDROcodone-acetaminophen (NORCO) 5-325 MG per tablet Take  by mouth See Admin Instructions. Take 1 tablet by mouth every 4-6 hours as needed for pain     • lisinopril-hydrochlorothiazide (PRINZIDE,ZESTORETIC) 20-12.5 MG per tablet TAKE 1 TABLET BY MOUTH EVERY DAY 90 tablet 0   • nicotine (NICODERM CQ) 21 MG/24HR patch Place 1 patch on the skin as directed by provider Daily. (Patient taking differently: Place 1 patch on the skin as directed by provider Daily. Has at home but hasn't started taking yet) 28 each 1   • OLANZapine (ZyPREXA) 2.5 MG tablet Take 1 tablet by mouth Every Night. 90 tablet 0   • ondansetron (ZOFRAN) 4 MG tablet Take 1 tablet by mouth Every 8 (Eight) Hours As Needed for Nausea or Vomiting. 30 tablet 2   • pantoprazole (Protonix) 40 MG EC tablet Take 1 tablet by mouth 2 (Two) Times a Day. 90 tablet 3   • pentoxifylline (TRENtal) 400 MG CR tablet TAKE 1 TABLET BY MOUTH TWICE A DAY WITH MEALS 90 tablet 0   • potassium chloride ER (K-TAB) 20 MEQ tablet controlled-release ER tablet Take 1 tablet by mouth Daily. 60 tablet 3   • Probiotic Product (PROBIOTIC DAILY PO) Take 1 tablet by mouth Daily.     • triamcinolone (KENALOG) 0.1 % ointment Apply 1 application topically to the appropriate area as directed 2 (Two) Times a Day. 30 g 1     No current facility-administered medications on file prior to visit.     Allergies   Allergen Reactions   • Oxaliplatin Anaphylaxis            Objective     Vitals:    04/27/22 0948   Height: 175 cm (68.9\")   PainSc: 0-No pain     Current Status 4/27/2022   ECOG score 0         EXAM:    I HAVE PERSONALLY REVIEWED THE HISTORY OF THE PRESENT ILLNESS, PAST MEDICAL HISTORY, FAMILY HISTORY, " SOCIAL HISTORY, ALLERGIES, MEDICATIONS STATED ABOVE IN THE  NOTE FROM TODAY.        GENERAL:  Well-developed, well-nourished  Patient  in no acute distress.   SKIN:  Warm, dry ,NO purpura ,NO petechiae, no rash.  HEENT:  Pupils were equal and reactive to light and accomodation, conjunctivae noninjected, no pterygium, normal extraocular movements, normal visual acuity.   NECK:  Supple with good range of motion; no thyromegaly , no other masses, no JVD or bruits,.No carotid artery pain, no carotid abnormal pulsation , NO arterial dance.  LYMPHATICS:  No cervical, NO supraclavicular, NO axillary,NO epitrochlear , NO inguinal adenopathies.  CARDIAC   normal rate , regular rhythm, without murmur,NO rubs NO S3 NO S4   LUNGS: normal breath sounds bilateral, no wheezing, NO rhonchi, NO crackles ,NO rubs.  VASCULAR VENOUS: no cyanosis, NO collateral circulation, NO varicosities, NO edema, NO palpable cords, NO pain,NO erythema, NO pigmentation of the skin.  ABDOMEN:  Soft, NO pain,no hepatomegaly, no splenomegaly,no masses, no ascites, no collateral circulation,no distention,no Nottingham sign.  EXTREMITIES  AND SPINE:  No clubbing, no cyanosis ,no deformities , no pain .No kyphosis,  no pain in spine, no pain in ribs , no pain in pelvic bone.  NEUROLOGICAL:  Patient was awake, alert, oriented to time, person and place.                                    RECENT LABS:  Hematology WBC   Date Value Ref Range Status   04/27/2022 6.49 3.40 - 10.80 10*3/mm3 Final   02/02/2019 13.4 (H) 3.4 - 10.8 x10E3/uL Final     RBC   Date Value Ref Range Status   04/27/2022 4.28 4.14 - 5.80 10*6/mm3 Final   02/02/2019 4.81 4.14 - 5.80 x10E6/uL Final     Hemoglobin   Date Value Ref Range Status   04/27/2022 13.3 13.0 - 17.7 g/dL Final     Hematocrit   Date Value Ref Range Status   04/27/2022 39.4 37.5 - 51.0 % Final     Platelets   Date Value Ref Range Status   04/27/2022 110 (L) 140 - 450 10*3/mm3 Final                      Assessment/Plan     1.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 Almost 90% of the liver WAS replaced by tumor. The patient has been undergoing chemotherapy with 5  fu and leucovorin  and Herceptin and has had excellent response clinically and radiologically. The patient was reviewed on 08/10/2020. I reviewed with the patient in the PAC system [facility] his PET scan that is dramatic. There is minimal uptake in the lower esophagus and most importantly complete resolution of his liver metastasis. Actually the only issue that is pertinent to the PET scan is still the visibility of his colovesical fistula.     Therefore from the point of view of his cancer of the esophagus, metastatic to the liver, he has no symptoms from the primary tumor in the esophagus and he has no symptoms related to his liver metastasis that has resolved altogether. His CEA level is stable.The patient raised the question about the CEA fluctuation. I pointed out to him that a lot of this is also related to his smoking. Smoking per se elevates CEA level.  • Upon further reviewing the patient on 04/20/2021, he has no symptoms pertinent to his metastatic cancer of the esophagus to the liver and he has no difficulty swallowing. There are no side effects of the 5-FU, Leucovorin and Herceptin. The patient's cardiac function remains stable and he has not had any drop in the ejection fraction.   • I discussed with him on 05/18/2021 the fact that his cancer clinically is very quiet but I am afraid of the rise in his CEA level to 12. This could be an indicator of all of the cigarettes that he is smoking on a daily basis of indicator of cancer escaping control and not visible radiologically through his CT scan. I pointed out to him that he needs to continue making an effort to decrease his smoking and he is now down to 10 cigarettes a day. We will recheck another CEA level today. Given the circumstances of the previous CT scan I  do not believe that I need to change the doses or plan of care in regard to his chemotherapy medication administration for the time being. I recommended for him to remain on his 5FU/leucovorin and Herceptin on a monthly basis for now.  • The patient was further reviewed on 06/15/2021 with a new PET scan that documented regrowth of the tumor in the lower esophagus without any new symptomatology, for example dysphagia or odynophagia. No regurgitation. The liver metastasis remains in remission and there are no new areas of disease in the bones or lungs or any other site. Given the excellent performance status I discussed with the patient and his sister today many options of therapy, including going back to FOLFOX regimen along with Herceptin, taking another sample of the esophagus with a new endoscopy and doing analysis of the tissue for [employer] [employer] [facility] that will be my favorite choice, or palliative treatment with radiation therapy and 5-FU continuous infusion that will be toxic to him and he does not prefer to have.     I had a discussion with Mary Brito MD, thoracic surgeon, who has agreed to see the patient tomorrow. I think the endoscopy, the new tissue analysis, and sending the tissue for [employer] [employer] [facility] will be the way to go. Depending on what we find there, we can modify his treatment altogether. I do not think the patient will have any consequences missing chemotherapy for a couple of weeks or so.      In preparation for the endoscopy and biopsies I asked him to hold off on the Xarelto until he is seen by Dr. Brito tomorrow.     In regard to his colovesical fistula, he has had minimal symptomatology this week, maybe some activity there and I asked him to go back to the lab and take a urine specimen and culture. He knows that if this is abnormal he will need to initiate ciprofloxacin that he has at home.    In regard to his hypertension, this is under good control and I advised him to remain on his  lisinopril and hydrochlorothiazide combination.    In regard to his smoking cessation, he has had conversations with NIRU Jacobson about this. He is using some nicotine CQ patch, here and there and also trying to work with nicotine gum and things of this nature but he has not been able to shake this issue altogether.     Otherwise I will review him back in a couple of weeks with a CBC, CMP, and a CEA level.    This case will be presented in the multidisciplinary thoracic conference by me this Thursday, and I will discuss any further advice to the patient.     I also mentioned to the patient that on the background of HER2-positive cancer of the esophagus the possibility of using a new medicine for HER2-based disease that is called Enhertu is a real possibility and maybe that will be the next step to go through.    • The patient was further reviewed on 07/01/2021. I reviewed with Dr. Brito the endoscopy that shows a noncircumferential abnormality in the lower esophagus that encompasses almost 6 cm in length. It is not blocking off the esophagus and that is why the patient has no symptoms. The pathology shows at least atypical cells consistent with cancer. Further Next Generation Sequencing has been sent for [facility] [employer] [facility].     I discussed with the patient the fact that we have many other modalities of treatment that he could encounter. He prefers to continue his general chemotherapy to control the cancer in his liver using 5-FU, leucovorin and Herceptin today and agree with that. In consideration to be seen by radiation therapy, the patient is willing to listen to the possibility of undergoing continuous 5-FU infusion along with radiation therapy to the esophagus knowing that he will experiencing esophagitis that can give him significant issues for 2-3 weeks. I went ahead and made the appointment for him to be seen by radiation oncology for this purpose only.     Obviously if the Next Generation Sequencing  gives us any other hints in regard to how to treat him this could be also utilized including the consideration of using Enhertu in the long run for achieving better control.     In regard to his smoking cessation he is not willing to change this phenomenon at this time. We have had NIRU Jacobson talking with him in this regard but he is not ready to make a decision when to quit.     Finally, I pointed out to him that during the previous visit we documented a urinary tract infection with streptococcus 50,000 colonies that in my appreciation was significant given the fact that he has a colovesical fistula. This was treated with antibiotics. He has not had any discomfort issues pertinent to this anymore. The urine today is completely clear. Nevertheless, I went ahead and sent a urinalysis at least to be sure that there is no need for any other repetitive infection therapy on him.     The patient also will remain on his anticoagulation at this time, his Xarelto for the time being. I have renewed as well his Neurontin. He has no need for pain medicine.  • The patient was further reviewed on 07/23/2021. He has no new symptomatology pertinent to his cancer of the esophagus with liver metastasis. He has no difficulty swallowing. He has been presented in the multidisciplinary clinic on a couple of occasions and he has been seen by Radiation Oncology. Finally the analysis of [employer] [employer] [facility] is available now showing that the patient's tumor is PD-L1 positive and has high mutation burden. The tumor remains HER/2 positive. Given these findings I think it is very easy to make a choice in regard stopping the present regimen of chemotherapy and proceed with therapy with Keytruda every 3 weeks for at least 4 cycles and reassess him at that point. In preparation for Keytruda initiation next week and we will get the approval of the medicine by his insurance company the patient will require smoking cessation altogether to  minimize modification of cigarettes on his immune system. I insisted in this fact to the patient.     Other than that he will remain on probiotics. We will discontinue the 5FU/leucovorin and Herceptin and will move onto Keytruda. I discussed with him side effects of the Keytruda in detail as below. He will require treatment every 3 weeks with CBC, CMP, TSH every 3 weeks and he will require formal chemotherapy teaching, education and consent for this medicine.     After 4 cycles of this, in other words 3 months he will be reassessed with a new PET scan.     The chances under the present circumstances that he will improve as long as he quit smoking, it is very hard and maybe he could have long term survivorship.    I begged for him to have smoking cessation. If any time during his time with me we have been talking about this and this is absolutely necessary now. We know that cigarettes kill immune system cells and minimize the benefit of treatments with these medicines.       • The patient was further reviewed on 08/19/2021 in regard to his cancer of the esophagus. As stated above he had upper GI bleeding dropping hemoglobin to 5 requiring admission, transfusion, discontinuation of anticoagulation and endoscopy with local therapy by Naga Shelby MD. Since then he has completed 10 sessions of radiation therapy to the esophagus yesterday. He has not had any further bleed. His hemoglobin has bounced back from 5 to 14 and I have advised his this good news today.    I expect that the patient in a few days is going to develop an element of radiation esophagitis and he continues having some fatigue that will improve over time. So far no new issues have happened. I made him aware that if he has difficulty swallowing he will need to let us know, he will need to receive IV fluids in the office.    At least the radiation therapy will take care of the tumor in the esophagus and I do not believe that he will require any other GI  endoscopy anymore.    Today the patient will proceed with his Keytruda that is the immunotherapy medicine that we are delivering to him at this time for the treatment of his liver metastasis and also the tumor in the esophagus. I pointed out to him that so far I do not see any side effects of the Keytruda and the Keytruda will improve and increase the benefit of radiation therapy into the primary tumor in the esophagus.     From the point of view of his anemia associated with GI bleeding, again this has been corrected. The hemoglobin today is 14 grams and I asked him to take his iron supplementation only twice a week 1 tablet. He has been taking 3 tablets everyday.     From the point of view of his thrombophilia associated with malignancy, he is no longer receiving any anticoagulants given the recent episode of GI bleeding. We will maintain him off anticoagulants as much as we can. Hopefully he will not have any other episodes of thrombosis.     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     From the point of view of the Keytruda toxicity so far nothing happened. We will continue monitoring CBC, CMP and TSH periodically.     I will review him back in 3 and 6 weeks. When he comes back for the Keytruda he will continue receiving the medicine as the time goes by.    He understands that most of the benefit of radiation therapy in the tumor in the esophagus will be reached in 1 month and I am planning to give him a PET scan in more of less in 2 months from now when he will have almost 4 infusions of Keytruda under his belt and the completion of radiation therapy to the esophagus.     In regard to smoking cessation he has achieved a lot from 2 packs a day to 10 cigarettes a day. He will see Anh Felder RN, today in regard continuation of the benefit of the therapy for this condition at this point.   • The  patient was further reviewed on 09/30/2021. His Keytruda is still ongoing but the patient has developed dermatitis associated with this. It is a grade 1 toxicity so far and I am wondering if this is extending a little bit and the topical medicine is not going to be sufficient. I will proceed with his Keytruda today but maybe this will be the last administration of this medicine unless that we get shashi and the rash quiets down. Obviously at the completion for Keytruda the patient will require PET scan for assessment not only of the benefit of radiation therapy on his recurrence in the esophagus but also benefit of the Keytruda on his liver metastasis. Therefore, he will be scheduled for this before the next visit. The patient also has been advised that his hemoglobin is acceptable and it will be okay for him to stop his iron supplementation.     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 I have advised him to go into Cipro 250 mg p.o. daily.     The patient has been advised in regard to the continuation of topical steroids in the skin in areas of involvement. This will remain an ongoing issue along with moisturizer to the skin in the form of Cetaphil.     In regard to his hypertension, he remains on medicines for this by me. His blood pressure is under good control at this time.     In regard to previous anticoagulation, he is no longer receiving any given his GI bleeding. This will remain in observation.     In regard to smoking cessation the patient will further discuss with NIRU Jacobson, plans for further decrease. He is down to 10 cigarettes a day. This is already a major accomplishment in somebody who used to smoke almost 3 packs of cigarettes a day.     The patient will have a new insurance in 11/2021 and I will make the insurance ladies aware of this.     In 3 weeks he will have a CBC, CMP, TSH and the PET scan the week before.  • The patient was further reviewed  on 10/21/2021. I reviewed with him his PET scan that shows still SUV activity in the lower esophagus but better than before but he has now 3 areas of abnormal uptake in the liver consistent with progressive liver metastasis. The lung anatomy is clear, the bone anatomy is clear. Colovesical fistula was visible still.    On the basis of these changes I do believe that this patient knowing that he has HER/2 positive esophageal cancer needs to change his therapy. Keytruda is not helpful at all and we will discontinue this medicine at this time. I do believe that the inability of Keytruda to overcome his disease process is related to the persistency of smoking and the abnormality related to smoking about bacterial derek. Further analysis recently published in [employer] has confirmed that the benefit of immunotherapy is further boosted by proper amount of derek in the gastrointestinal tract and how cigarettes are damaging to this issue. In any event the patient's issues in regard to cigarette smoking has been already a problem and he has not been able to quit in spite of all of the support available. Therefore stopping the Keytruda at this point including today the patient will move to Enhertu that has been approved in patients who have cancer of the esophagus with liver metastasis. The dose of this medicine will be the GI dose of this medication that will be properly calculated accordingly. He will have formal education and consent for this medicine. I pointed out the most important side effects of this medicine include cardiac toxicity, anemia, leukopenia, thrombocytopenia and pneumonitis that includes cough, shortness of breath, fever. In preparation to minimize pneumonitis the patient will take prednisone 10 mg on day 2 and 3 after each dose of Enhertu. He will receive this medicine every 3 weeks. We will deliver 3-4 cycles and reassess him not only by tumor markers but also radiologically. Hopefully this will have a  positive impact not only in the tumor in the esophagus but also tumors metastatic in his liver.   • The patient was reviewed on 11/19/2021. Tolerance to the Enhertu 1st cycle was appropriate with more fatigue, no increased cough or shortness of breath and some anorexia. His white count, hemoglobin and platelets were normal and we advised him to proceed with his 2nd Enhertu infusion today. He understands that fatigue will be more prominent, that he could have chance for more hematological toxicity and he is starting to develop minor anemia. His white count and platelet count remain acceptable. I reminded him on many occasions today through the visit and insisted to the sister that if his cough pattern changes, increasing or his shortness of breath becomes worse he has to notify us immediately to be sure that he will not develop pneumonitis associated with Enhertu. He still will take 10 mg of prednisone tomorrow and Sunday to try to minimize this phenomenon from happening.     The patient will be having blood counts on a weekly basis with nurse visit to be sure to monitor hematological toxicity and he will return in 3 weeks to proceed with the next cycle. After the 3rd cycle the patient will proceed with radiological assessment including PET scan.   • The patient was further reviewed on 12/30/2021. On clinical grounds the patient has not had any difficulty swallowing, his liver is not enlarged, nodular or tender, he has no jaundice and his CEA level has dropped further. His PET scan discloses resolution of his periportal lymph nodes and one area of resolution of liver metastasis. He has complete resolution of the tumor in the esophagus. He has still 2 active lesions in the liver with significant SUV activity pertinent to his metastatic disease. Given this fact the patient could be a candidate to further receive Enhertu but my concern is about the radiological analysis that shows minimal pulmonary infiltrate  bilaterally. The radiologist suggests a radiation pneumonitis. I do not think that is the case. I think this is probably related to Enhertu interstitial lung disease and for this reason I feel the obligation to stop this medication at this point and to proceed with therapy with prednisone at 20 mg a day for the next 2 weeks until the patient returns back to see me. I made him aware that if the respiratory symptoms including his cough gets any worse or shortness of breath establishes and gets worse he will need to notify us immediately and he will require to be placed in the hospital to receive IV high dose steroid.    The question will be if the patient will be a candidate to receive any further Enhertu or not remains to be seen. Probably will not be the case.     Obviously this above statement opens the door in regard what else to do for his malignancy. Strong consideration will be to go back to Herceptin and also to consider to go back to FOLFOX, Herceptin like he received initially that gave him such a degree of resolution of symptoms. 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 I discussed this with him and his sister present in the room. I encouraged him to decrease his smoking.   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     In preparation for retaking treatment, I advised him that I would like to go back into FOLFOX, Herceptin every 3 weeks starting next week. This combination of medicines never failed him. It was discontinued because of toxicity and neuropathy. Therefore,  I think it is worth it to go ahead and proceed with an echocardiogram and resume chemotherapy administration with this , giving him 3 cycles, each one of them 3 weeks apart and see how things evolve from the point of view tumor markers or radiological analysis of his liver through PET scan. He agrees to proceed.  • On 02/09/2022 I advised the patient to hold off his chemotherapy treatment with FOLFOX because his ANC was 1300 and we will decrease his dose of chemotherapy medicines by 15% for the next round of treatment next week. Hopefully the patient with this dose adjustment will be able to continue his treatment in a normal schedule of every 2 weeks. We have to watch his neuropathy very close. My advice will be eventually to pull out the Oxaliplatin and continue 5FU and leucovorin for a couple of cycles and eventually reintroduce the Oxaliplatin for 1-2 cycles back and forth. This has to be monitored clinically and also along with his response.   The patient was reviewed on 03/16/2022. Actually he feels terrific today and for the last 10 days after the anaphylactic reaction to oxaliplatin. He has no cancer-related pain. He has no hepatomegaly. His liver function tests have further improved and surprising enough his CEA level has dramatically dropped as posted above. It was in the 200 category and now it is in the 30 category, pointing to the fact that I think the oxaliplatin and the FOLFOX regimen indeed has very significant benefit for him along with the Herceptin. Obviously he had an anaphylactic reaction to oxaliplatin and this medicine was discontinued. He will receive today his 5-FU, leucovorin and Herceptin.     I advised him to remain on this regimen for the time being and continue the sequence of events every couple of weeks. I went ahead and scheduled an echocardiogram to follow up on his ejection fraction while on Herceptin.  I reviewed with the patient and his sister today his CT scan that shows a  favorable response to the regimen that he is receiving of 5FU and Herceptin. The tumor areas are shrinking and his CEA level is coming down. The patient actually feels very well. The tolerability to the regimen is excellent and he will continue the medicines at the same dosing and schedule for the time being. No plans to stop unless that he wants to take a trip with his sister to a different state and we will be glad to interrupt treatment for a week or so and no more than that. He understood this clearly.     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   • On 11/19/2021 he has no symptoms or signs of urinary tract infection and he remains on ciprofloxacin prophylactically everyday.   • On 12/30/2021 his colovesical fistula is still evident on the PET scan. He has gas in the bladder and he has had some hematuria. Therefore I do believe that the patient needs to remain on ciprofloxacin 1 tablet every other day to try to minimize any potential for any other urinary tract infection related to this. The patient under the present circumstances is not a candidate to have any kind of surgery unless the symptoms get worse or clinical picture changes radically.   • His colovesical fistula remains active. He remains taking ciprofloxacin 250 mg p.o. every other day. He has not had any fevers or chills and I advised him to remain on this medicine for the time being. His pharmacy called in regard that they have not had Cipro anymore. The patient has tablets for almost 2 months supply. Therefore, he has no need for Levaquin or any other medicine or intervention at this point.  • His colovesical fistula is not active at this time. He has not had any pneumaturia of echolalia. He remains on ciprofloxacin prophylactically.   On 03/16/2022 the patient mentioned to me that he is now passing  urine through the rectum. He also has fecaluria and pneumaturia still but in lesser amount. He is not having any fever. He has no pelvic pain. I think his colovesical fistula now in some way has bladder to colon  flow. It used to be fecal matter in the bladder and now it is urine through the anal canal. Obviously this will have less implications from the point of view of infection but obviously has the implications that the patient has to in some way be ready to defecate and urinate at the same time. He is going to see Dr. Cm Witt in a few days in regard to this fact. Given the fact that he has no abdominal pain and no fever, I would like for him to have a CT scan of the chest, abdomen and pelvis anyway that would not only show us the status of his cancer but also the status of his fistula. I prefer this over a PET scan for that purpose. This information will be shared with Dr. Cm Witt.   •   In the meantime I advised him to continue using his ciprofloxacin 250 mg every other day for prevention of UTI.  •   He has been seen by Cm Witt MD. He has advised the patient that he could not do too much for the fistula given the fact that it is just inconveniences of having urination through the anal canal from time to time but no urinary tract infection. He asked the patient to postpone any intention for surgery unless that it is absolutely necessary. Postponement of chemotherapy for 3 months if any surgery is necessary will trigger dramatic progression of his tumor and obviously consequences.     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   • He remains on Neurontin for the treatment of his peripheral neuropathy. I advised him to remain on this medicine for the time being.  • On 01/12/2022, I advised the patient to remain on his gabapentin for the treatment of his  sensory peripheral neuropathy in his feet.  • I went ahead and prescribed on 02/09/2022 his doses of gabapentin to take 300 mg twice a day.   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 This was not aggravated by the few doses of oxaliplatin that he was able to receive until an anaphylactic reaction.  He will remain on Neurontin for the treatment of his peripheral neuropathy associated with Oxaliplatin. The symptoms are under good control.     •   •   •   •   ·   4.In regard to his hypertension, he remains on lisinopril, hydrochlorothiazide provided by me.   • I reviewed him back on 11/19/2021. His blood pressure is 90/48 in spite of proper hydration. This is probably the effect of the combination of Trental and his blood pressure medication. I advised him to discontinue his blood pressure medication at this time and advised him to have proper hydration. He has a device to check his blood pressure at home. I asked him to check this on a daily basis at least a couple of times a day. If his blood pressure goes up above 130/90 he will take 1/2 of the dose of blood pressure medication that he was taking before. Those tablets have the way to be split. He will require checking blood pressure. If the blood pressure drops again he will hold off blood pressure medication indefinitely.   • His hypertension has been reviewed on 12/30/2021. I think his blood pressure today is very good. He will remain on his blood pressure medication for the time being. I find no reason to change dosing.   • The patient was advised to remain on his blood pressure medication, Prinzide.  • On 02/09/2022 he continues doing his blood pressure medication almost on prn basis to minimize very dramatic drop that he has had before. So far he has not had any syncopal episodes or things of this nature.   On 03/16/2022 the patient is not taking blood pressure medication. Actually his blood pressure  has been under relatively good control.  His blood pressure remains regulated. He actually is not taking any blood pressure medication at this time. I think he has learned to modify his diet, decrease the consumption of sodium and this probably will have a positive impact on this issue from now on.    The other good news is that this patient has not had any modification in his left ventricular ejection fraction documented through the echocardiogram posted above. Therefore Herceptin has not produced any cardiac toxicity and we will review another echocardiogram in 3 months from now.       •   •   •   •   •   ·   5.In regard to his previous GI bleeding associated with his cancer and esophagitis we advised the patient to remain on Protonix.   • On 11/19/2021 he has not had any evidence of GI bleeding and the hemoglobin remains stable. The minor drop obeys to toxicity from Enhertu and bone marrow suppression not because of effect of GI bleeding.   • He has not had any other episodes of GI bleeding as per 12/30/2021.   • On 01/12/2022 he has not had any other episodes of GI bleeding. He is no longer taking any anticoagulants.  • On 02/09/2022 he has not had episodes of melena or enterorrhagia, his hemoglobin is stable. He has not had any use of anticoagulants since GI bleeding.   On 03/16/2022 the patient has not had any other episodes of GI bleeding. He is no longer taking anticoagulant.  Today this issue is not an issue anymore.     •   •   •   •   •   ·         6.In regard finally peripheral arterial disease I am going to send the patient a prescription for Trental to take 1 tablet twice a day. He has an appointment to be seen by vascular on 12/18/2021. I pointed out to the patient that if he wants to change the route of this process he must modify his cigarettes otherwise there is no hope. He was not able to take medication provided by [facility] because of side effects.   In regard to his peripheral arterial  disease I advised him on 11/19/2021 to decrease his Trental to 1 tablet twice a day. He is now using topical Cetaphil on his feet to try to improve moisture and decrease cracking.   On 12/30/2021 his peripheral neuropathy actually is better through the Doppler study done by [facility]. I wonder if the trental is the one playing a role in this. I have asked him to remain on the trental for the time being.       He is not willing to entertain smoking cessation to help out peripheral arterial disease and we insisted into this in presence of his sister. He says that he is working on it. I do not feel any confidence in seeing this phenomenon anymore and that is a reality that we need to contemplate and just wait.   • In regard to his peripheral artery disease, the patient is still smoking 15 cigarettes a day. The Trental has made a big difference in regard to his ability to get around and walk. I advised him to remain on Trental.  • On 02/09/2022 his peripheral arterial disease is about the same, the hemoglobin is stable, the Trental is still ongoing, it is beneficial to him.   On 03/16/2022 his peripheral arterial disease remains about the same. He has no gangrene. He has an element of claudication upon walking. We advised him to remain on the same medicines that he is taking. One of them is Trental. I also advised him about smoking cessation. This is not going to change. He remains on 15 cigarettes a day.  He remains on Trental. He has not had any claudication. He has minimal purplish discoloration of his toes that is not prominent with no gangrene and no ischemic neuropathy.     •   •   •   ·     7.This patient's blood sugar has remained in the 130's, 140's, 150's for the last several visits. His hemoglobin A1C today is 5.9.  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 He will require the utilization  of Glucotrol XL 5 mg. I went ahead and sent this prescription to the pharmacy. I pointed out to him that he is eating a diet rich in carbohydrates mostly and I pointed out to him that he needs to eat more vegetables, some fruit and high complex carbohydrates that will minimize issues pertinent to his blood sugar. It is even more important now that he has initiated prednisone as posted.   • I reviewed with him on 01/12/2022 his hemoglobin A1c of 5.9. I advised him to remain on a diet that is not too much rich in sugar and stay on his glipizide 5 mg every day. He is very selective in his diet. He finds things that tastes okay and other ones that do not taste okay and I think we have no solution for this definitive problem.  • On 02/09/2022 the patient's glucose seems to be under control. He will remain on glipizide 5 mg oral daily.     I will review him back in 3 weeks. He will return for treatment next week and for the dose adjustments were discussed with Amber Lam RN.  On 03/16/2022 I asked the patient to continue to monitor his blood sugar and to continue taking his minimal dose of Glucotrol.  •   • RECOMMENDATIONS:    •   1. The patient will remain on his chemotherapy administration every 2 weeks as posted above. The medicines will be 5FU, leucovorin and Herceptin.  2. The rest of the medicines provided by me include neuropathy medication, antibiotic, glucotrol for elevation of blood sugar, pain medication, anxiety medication all will remain ongoing.  3. The patient will call if he has any fever associated with his fistula. Hopefully this will no be the case. We will review him back in a month or 6 weeks and he will see my nurse practitioner intermittently as well when he comes for his treatment. He will remain on a CBC, CMP schedule every 2 weeks and will monitor his CEA level in 6 weeks.     •   •   •   •   ·

## 2022-04-29 ENCOUNTER — INFUSION (OUTPATIENT)
Dept: ONCOLOGY | Facility: HOSPITAL | Age: 67
End: 2022-04-29

## 2022-04-29 DIAGNOSIS — D49.0 ESOPHAGUS NEOPLASM: ICD-10-CM

## 2022-04-29 DIAGNOSIS — C15.5 MALIGNANT NEOPLASM OF LOWER THIRD OF ESOPHAGUS: Primary | ICD-10-CM

## 2022-04-29 DIAGNOSIS — C78.7 LIVER METASTASIS: ICD-10-CM

## 2022-04-29 DIAGNOSIS — Z45.2 ENCOUNTER FOR ADJUSTMENT OR MANAGEMENT OF VASCULAR ACCESS DEVICE: ICD-10-CM

## 2022-04-29 PROCEDURE — 25010000002 HEPARIN LOCK FLUSH PER 10 UNITS: Performed by: INTERNAL MEDICINE

## 2022-04-29 RX ORDER — SODIUM CHLORIDE 0.9 % (FLUSH) 0.9 %
10 SYRINGE (ML) INJECTION AS NEEDED
Status: DISCONTINUED | OUTPATIENT
Start: 2022-04-29 | End: 2022-04-29 | Stop reason: HOSPADM

## 2022-04-29 RX ORDER — HEPARIN SODIUM (PORCINE) LOCK FLUSH IV SOLN 100 UNIT/ML 100 UNIT/ML
500 SOLUTION INTRAVENOUS AS NEEDED
Status: CANCELLED | OUTPATIENT
Start: 2022-04-29

## 2022-04-29 RX ORDER — HEPARIN SODIUM (PORCINE) LOCK FLUSH IV SOLN 100 UNIT/ML 100 UNIT/ML
500 SOLUTION INTRAVENOUS AS NEEDED
Status: DISCONTINUED | OUTPATIENT
Start: 2022-04-29 | End: 2022-04-29 | Stop reason: HOSPADM

## 2022-04-29 RX ORDER — SODIUM CHLORIDE 0.9 % (FLUSH) 0.9 %
10 SYRINGE (ML) INJECTION AS NEEDED
Status: CANCELLED | OUTPATIENT
Start: 2022-04-29

## 2022-04-29 RX ADMIN — Medication 10 ML: at 10:28

## 2022-04-29 RX ADMIN — Medication 500 UNITS: at 10:28

## 2022-05-09 DIAGNOSIS — Z45.2 ENCOUNTER FOR ADJUSTMENT OR MANAGEMENT OF VASCULAR ACCESS DEVICE: ICD-10-CM

## 2022-05-09 DIAGNOSIS — I82.412 ACUTE DEEP VEIN THROMBOSIS (DVT) OF FEMORAL VEIN OF LEFT LOWER EXTREMITY: ICD-10-CM

## 2022-05-09 DIAGNOSIS — C15.5 MALIGNANT NEOPLASM OF LOWER THIRD OF ESOPHAGUS: ICD-10-CM

## 2022-05-09 DIAGNOSIS — Z72.0 TOBACCO ABUSE: ICD-10-CM

## 2022-05-09 DIAGNOSIS — N32.1 COLOVESICAL FISTULA: ICD-10-CM

## 2022-05-09 RX ORDER — GABAPENTIN 300 MG/1
CAPSULE ORAL
Qty: 180 CAPSULE | Refills: 0 | Status: SHIPPED | OUTPATIENT
Start: 2022-05-09 | End: 2022-08-12

## 2022-05-18 ENCOUNTER — OFFICE VISIT (OUTPATIENT)
Dept: ONCOLOGY | Facility: CLINIC | Age: 67
End: 2022-05-18

## 2022-05-18 ENCOUNTER — INFUSION (OUTPATIENT)
Dept: ONCOLOGY | Facility: HOSPITAL | Age: 67
End: 2022-05-18

## 2022-05-18 VITALS
OXYGEN SATURATION: 98 % | SYSTOLIC BLOOD PRESSURE: 126 MMHG | WEIGHT: 196.3 LBS | HEART RATE: 69 BPM | BODY MASS INDEX: 29.07 KG/M2 | DIASTOLIC BLOOD PRESSURE: 74 MMHG | RESPIRATION RATE: 16 BRPM | TEMPERATURE: 96.8 F | HEIGHT: 69 IN

## 2022-05-18 DIAGNOSIS — R73.01 IMPAIRED FASTING GLUCOSE: ICD-10-CM

## 2022-05-18 DIAGNOSIS — I10 ESSENTIAL HYPERTENSION: ICD-10-CM

## 2022-05-18 DIAGNOSIS — D49.0 ESOPHAGUS NEOPLASM: ICD-10-CM

## 2022-05-18 DIAGNOSIS — T45.1X5A PERIPHERAL NEUROPATHY DUE TO CHEMOTHERAPY: ICD-10-CM

## 2022-05-18 DIAGNOSIS — C15.5 MALIGNANT NEOPLASM OF LOWER THIRD OF ESOPHAGUS: ICD-10-CM

## 2022-05-18 DIAGNOSIS — N32.1 RECTO-BLADDERNECK FISTULA: ICD-10-CM

## 2022-05-18 DIAGNOSIS — C15.5 MALIGNANT NEOPLASM OF LOWER THIRD OF ESOPHAGUS: Primary | ICD-10-CM

## 2022-05-18 DIAGNOSIS — Z72.0 TOBACCO ABUSE: ICD-10-CM

## 2022-05-18 DIAGNOSIS — Z45.2 ENCOUNTER FOR ADJUSTMENT OR MANAGEMENT OF VASCULAR ACCESS DEVICE: ICD-10-CM

## 2022-05-18 DIAGNOSIS — G62.0 PERIPHERAL NEUROPATHY DUE TO CHEMOTHERAPY: ICD-10-CM

## 2022-05-18 DIAGNOSIS — C78.7 LIVER METASTASIS: ICD-10-CM

## 2022-05-18 LAB
ALBUMIN SERPL-MCNC: 4.1 G/DL (ref 3.5–5.2)
ALBUMIN/GLOB SERPL: 1.5 G/DL (ref 1.1–2.4)
ALP SERPL-CCNC: 136 U/L (ref 38–116)
ALT SERPL W P-5'-P-CCNC: 40 U/L (ref 0–41)
ANION GAP SERPL CALCULATED.3IONS-SCNC: 8.7 MMOL/L (ref 5–15)
AST SERPL-CCNC: 33 U/L (ref 0–40)
BASOPHILS # BLD AUTO: 0.05 10*3/MM3 (ref 0–0.2)
BASOPHILS NFR BLD AUTO: 0.8 % (ref 0–1.5)
BILIRUB SERPL-MCNC: 0.4 MG/DL (ref 0.2–1.2)
BUN SERPL-MCNC: 10 MG/DL (ref 6–20)
BUN/CREAT SERPL: 14.1 (ref 7.3–30)
CALCIUM SPEC-SCNC: 9.4 MG/DL (ref 8.5–10.2)
CEA SERPL-MCNC: 9.82 NG/ML
CHLORIDE SERPL-SCNC: 103 MMOL/L (ref 98–107)
CO2 SERPL-SCNC: 25.3 MMOL/L (ref 22–29)
CREAT SERPL-MCNC: 0.71 MG/DL (ref 0.7–1.3)
DEPRECATED RDW RBC AUTO: 57.7 FL (ref 37–54)
EGFRCR SERPLBLD CKD-EPI 2021: 101.8 ML/MIN/1.73
EOSINOPHIL # BLD AUTO: 0.41 10*3/MM3 (ref 0–0.4)
EOSINOPHIL NFR BLD AUTO: 6.5 % (ref 0.3–6.2)
ERYTHROCYTE [DISTWIDTH] IN BLOOD BY AUTOMATED COUNT: 16.4 % (ref 12.3–15.4)
GLOBULIN UR ELPH-MCNC: 2.8 GM/DL (ref 1.8–3.5)
GLUCOSE SERPL-MCNC: 94 MG/DL (ref 74–124)
HCT VFR BLD AUTO: 41.5 % (ref 37.5–51)
HGB BLD-MCNC: 13.6 G/DL (ref 13–17.7)
IMM GRANULOCYTES # BLD AUTO: 0.1 10*3/MM3 (ref 0–0.05)
IMM GRANULOCYTES NFR BLD AUTO: 1.6 % (ref 0–0.5)
LYMPHOCYTES # BLD AUTO: 1.3 10*3/MM3 (ref 0.7–3.1)
LYMPHOCYTES NFR BLD AUTO: 20.7 % (ref 19.6–45.3)
MCH RBC QN AUTO: 31 PG (ref 26.6–33)
MCHC RBC AUTO-ENTMCNC: 32.8 G/DL (ref 31.5–35.7)
MCV RBC AUTO: 94.5 FL (ref 79–97)
MONOCYTES # BLD AUTO: 0.54 10*3/MM3 (ref 0.1–0.9)
MONOCYTES NFR BLD AUTO: 8.6 % (ref 5–12)
NEUTROPHILS NFR BLD AUTO: 3.87 10*3/MM3 (ref 1.7–7)
NEUTROPHILS NFR BLD AUTO: 61.8 % (ref 42.7–76)
NRBC BLD AUTO-RTO: 0 /100 WBC (ref 0–0.2)
PLATELET # BLD AUTO: 146 10*3/MM3 (ref 140–450)
PMV BLD AUTO: 9.8 FL (ref 6–12)
POTASSIUM SERPL-SCNC: 4 MMOL/L (ref 3.5–4.7)
PROT SERPL-MCNC: 6.9 G/DL (ref 6.3–8)
RBC # BLD AUTO: 4.39 10*6/MM3 (ref 4.14–5.8)
SODIUM SERPL-SCNC: 137 MMOL/L (ref 134–145)
WBC NRBC COR # BLD: 6.27 10*3/MM3 (ref 3.4–10.8)

## 2022-05-18 PROCEDURE — 80053 COMPREHEN METABOLIC PANEL: CPT

## 2022-05-18 PROCEDURE — 96375 TX/PRO/DX INJ NEW DRUG ADDON: CPT

## 2022-05-18 PROCEDURE — 96367 TX/PROPH/DG ADDL SEQ IV INF: CPT

## 2022-05-18 PROCEDURE — 25010000002 FLUOROURACIL PER 500 MG: Performed by: NURSE PRACTITIONER

## 2022-05-18 PROCEDURE — 96416 CHEMO PROLONG INFUSE W/PUMP: CPT

## 2022-05-18 PROCEDURE — 96413 CHEMO IV INFUSION 1 HR: CPT

## 2022-05-18 PROCEDURE — 25010000002 TRASTUZUMAB-ANNS 420 MG RECONSTITUTED SOLUTION 1 EACH VIAL: Performed by: NURSE PRACTITIONER

## 2022-05-18 PROCEDURE — 85025 COMPLETE CBC W/AUTO DIFF WBC: CPT

## 2022-05-18 PROCEDURE — 82378 CARCINOEMBRYONIC ANTIGEN: CPT | Performed by: INTERNAL MEDICINE

## 2022-05-18 PROCEDURE — 25010000002 DEXAMETHASONE PER 1 MG: Performed by: NURSE PRACTITIONER

## 2022-05-18 PROCEDURE — 99214 OFFICE O/P EST MOD 30 MIN: CPT | Performed by: NURSE PRACTITIONER

## 2022-05-18 PROCEDURE — 25010000002 PALONOSETRON PER 25 MCG: Performed by: NURSE PRACTITIONER

## 2022-05-18 PROCEDURE — 96411 CHEMO IV PUSH ADDL DRUG: CPT

## 2022-05-18 PROCEDURE — 25010000002 DIPHENHYDRAMINE PER 50 MG: Performed by: NURSE PRACTITIONER

## 2022-05-18 PROCEDURE — 25010000002 LEUCOVORIN 500 MG RECONSTITUTED SOLUTION 1 EACH VIAL: Performed by: NURSE PRACTITIONER

## 2022-05-18 RX ORDER — FLUOROURACIL 50 MG/ML
340 INJECTION, SOLUTION INTRAVENOUS ONCE
Status: COMPLETED | OUTPATIENT
Start: 2022-05-18 | End: 2022-05-18

## 2022-05-18 RX ORDER — SODIUM CHLORIDE 9 MG/ML
250 INJECTION, SOLUTION INTRAVENOUS ONCE
Status: CANCELLED | OUTPATIENT
Start: 2022-05-18

## 2022-05-18 RX ORDER — FAMOTIDINE 10 MG/ML
20 INJECTION, SOLUTION INTRAVENOUS ONCE
Status: COMPLETED | OUTPATIENT
Start: 2022-05-18 | End: 2022-05-18

## 2022-05-18 RX ORDER — FAMOTIDINE 10 MG/ML
20 INJECTION, SOLUTION INTRAVENOUS AS NEEDED
Status: CANCELLED | OUTPATIENT
Start: 2022-05-18

## 2022-05-18 RX ORDER — SODIUM CHLORIDE 9 MG/ML
250 INJECTION, SOLUTION INTRAVENOUS ONCE
Status: COMPLETED | OUTPATIENT
Start: 2022-05-18 | End: 2022-05-18

## 2022-05-18 RX ORDER — PALONOSETRON 0.05 MG/ML
0.25 INJECTION, SOLUTION INTRAVENOUS ONCE
Status: CANCELLED | OUTPATIENT
Start: 2022-05-18

## 2022-05-18 RX ORDER — FAMOTIDINE 10 MG/ML
20 INJECTION, SOLUTION INTRAVENOUS ONCE
Status: CANCELLED
Start: 2022-05-18 | End: 2022-05-18

## 2022-05-18 RX ORDER — FLUOROURACIL 50 MG/ML
340 INJECTION, SOLUTION INTRAVENOUS ONCE
Status: CANCELLED | OUTPATIENT
Start: 2022-05-18

## 2022-05-18 RX ORDER — PALONOSETRON 0.05 MG/ML
0.25 INJECTION, SOLUTION INTRAVENOUS ONCE
Status: COMPLETED | OUTPATIENT
Start: 2022-05-18 | End: 2022-05-18

## 2022-05-18 RX ADMIN — SODIUM CHLORIDE 250 ML: 9 INJECTION, SOLUTION INTRAVENOUS at 11:38

## 2022-05-18 RX ADMIN — FLUOROURACIL 4180 MG: 50 INJECTION, SOLUTION INTRAVENOUS at 13:55

## 2022-05-18 RX ADMIN — PALONOSETRON 0.25 MG: 0.05 INJECTION, SOLUTION INTRAVENOUS at 11:47

## 2022-05-18 RX ADMIN — LEUCOVORIN CALCIUM 700 MG: 500 INJECTION, POWDER, LYOPHILIZED, FOR SOLUTION INTRAMUSCULAR; INTRAVENOUS at 13:19

## 2022-05-18 RX ADMIN — DIPHENHYDRAMINE HYDROCHLORIDE 25 MG: 50 INJECTION, SOLUTION INTRAMUSCULAR; INTRAVENOUS at 11:38

## 2022-05-18 RX ADMIN — FAMOTIDINE 20 MG: 10 INJECTION, SOLUTION INTRAVENOUS at 11:44

## 2022-05-18 RX ADMIN — DEXAMETHASONE SODIUM PHOSPHATE 2 MG: 10 INJECTION INTRAMUSCULAR; INTRAVENOUS at 11:52

## 2022-05-18 RX ADMIN — TRASTUZUMAB-ANNS 530 MG: 420 INJECTION, POWDER, LYOPHILIZED, FOR SOLUTION INTRAVENOUS at 12:49

## 2022-05-18 RX ADMIN — FLUOROURACIL 700 MG: 50 INJECTION, SOLUTION INTRAVENOUS at 13:55

## 2022-05-18 NOTE — PROGRESS NOTES
"  Subjective     REASON FOR FOLLOW UP:  1.  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  2. Colovesical fistula, chronic antibiotic therapy with Cipro.  3.  DVT of the right and left lower extremity. 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  Acute GI bleed      History of Present Illness    The patient is a 66 y.o. male with the above-mentioned here today for lab review and evaluation prior to treatment with 5-FU, leucovorin, trastuzumab.  Patient reports that he is doing well.  He has noticed a little bit of worsening swelling in his ankles, which has been present since the weather got warmer.  He is also been more active working in the yard.  He denies worsening shortness of breath.      Past Medical History:   Diagnosis Date   • Arthritis    • Cancer (HCC)     prostate cancer 2008   • Cancer (HCC)     esophageal   • Chronic anticoagulation     on xarelto   • Elevated PSA    • Esophageal mass    • Fistula     colon and bladder   • H/O Lung nodule    • Hepatitis     CHILD--PT STATED \"I THINK IT WAS A.\"   • History of chemotherapy    • History of pneumonia    • Hx of blood clots 08/10/2019   • Hyperlipidemia    • Hypertension    • Nail fungus    • Neuropathy    • PVD (peripheral vascular disease) (HCC)    • Recto-bladderneck fistula     on poab for recurrent uti        Past Surgical History:   Procedure Laterality Date   • COLONOSCOPY N/A 2/4/2020    Procedure: COLONOSCOPY;  Surgeon: Guy Sears MD;  Location: [facility] ENDOSCOPY;  Service: General;  Laterality: N/A;  PRE-COLOVESICAL FISTULA  POST-- DIVERTICULOSIS   • CYSTOSCOPY  02/06/2020   • CYSTOSCOPY Bilateral 2/26/2020    Procedure: CYSTOSCOPY RETROGRADE;  Surgeon: Cm Witt MD;  Location: [facility] OR;  Service: Urology;  Laterality: Bilateral;   • ENDOSCOPY     • ENDOSCOPY N/A 6/19/2021    Procedure: ESOPHAGOGASTRODUODENOSCOPY WITH BIOPSY;  Surgeon: Mary Brito MD;  " Location: [facility] MAIN OR;  Service: Gastroenterology;  Laterality: N/A;   • ENDOSCOPY N/A 8/5/2021    Procedure: ESOPHAGOGASTRODUODENOSCOPY HEMOSPRAY;  Surgeon: Naga Shelby MD;  Location: [facility] ENDOSCOPY;  Service: Gastroenterology;  Laterality: N/A;  HX OF ESOPHAGEAL  CANCER;MELENA  POST: ESOPHAGEAL BLEEDING; ESOPHAGEAL MASS   • HERNIA REPAIR Right 1999    inguinal hernia   • PROSTATE SURGERY  03/2008    prostatectectomy   • VENOUS ACCESS DEVICE (PORT) INSERTION N/A 7/16/2019    Procedure: INSERTION VENOUS ACCESS DEVICE WITH FLUORO AND EGD WITH BIOPSY;  Surgeon: Mary Brito MD;  Location: [facility] MAIN OR;  Service: Thoracic        Current Outpatient Medications on File Prior to Visit   Medication Sig Dispense Refill   • acetaminophen (TYLENOL) 500 MG tablet Take 500 mg by mouth Every 6 (Six) Hours As Needed for Mild Pain .     • cevimeline (EVOXAC) 30 MG capsule Take 30 mg by mouth 3 (Three) Times a Day.     • ciprofloxacin (CIPRO) 250 MG tablet Take 1 tablet by mouth Daily. Take one every other day 90 tablet 1   • econazole nitrate (SPECTAZOLE) 1 % cream 2 (Two) Times a Day.     • gabapentin (NEURONTIN) 300 MG capsule TAKE 1 CAPSULE BY MOUTH TWICE A  capsule 0   • glipizide (GLUCOTROL XL) 5 MG ER tablet TAKE 1 TABLET BY MOUTH EVERY DAY 90 tablet 1   • lisinopril-hydrochlorothiazide (PRINZIDE,ZESTORETIC) 20-12.5 MG per tablet TAKE 1 TABLET BY MOUTH EVERY DAY 90 tablet 0   • nicotine (NICODERM CQ) 21 MG/24HR patch Place 1 patch on the skin as directed by provider Daily. (Patient taking differently: Place 1 patch on the skin as directed by provider Daily. Has at home but hasn't started taking yet) 28 each 1   • OLANZapine (ZyPREXA) 2.5 MG tablet Take 1 tablet by mouth Every Night. 90 tablet 0   • ondansetron (ZOFRAN) 4 MG tablet Take 1 tablet by mouth Every 8 (Eight) Hours As Needed for Nausea or Vomiting. 30 tablet 2   • pantoprazole (Protonix) 40 MG EC tablet Take 1 tablet by mouth 2 (Two) Times a  Day. 90 tablet 3   • pentoxifylline (TRENtal) 400 MG CR tablet TAKE 1 TABLET BY MOUTH TWICE A DAY WITH MEALS 90 tablet 0   • potassium chloride ER (K-TAB) 20 MEQ tablet controlled-release ER tablet Take 1 tablet by mouth Daily. 60 tablet 3   • Probiotic Product (PROBIOTIC DAILY PO) Take 1 tablet by mouth Daily.     • triamcinolone (KENALOG) 0.1 % ointment Apply 1 application topically to the appropriate area as directed 2 (Two) Times a Day. 30 g 1     No current facility-administered medications on file prior to visit.        ALLERGIES:    Allergies   Allergen Reactions   • Oxaliplatin Anaphylaxis        Social History     Socioeconomic History   • Marital status: Single   • Years of education: High school   Tobacco Use   • Smoking status: Current Every Day Smoker     Packs/day: 1.00     Years: 38.00     Pack years: 38.00     Types: Cigarettes     Start date: 1974   • Smokeless tobacco: Never Used   • Tobacco comment: Quit for a period of 8 years.    Vaping Use   • Vaping Use: Never used   Substance and Sexual Activity   • Alcohol use: Not Currently   • Drug use: Never   • Sexual activity: Defer        Family History   Problem Relation Age of Onset   • Hypertension Mother    • Stroke Mother    • Hypertension Other    • Lung disease Other    • Prostate cancer Other    • Lung cancer Father    • Malig Hyperthermia Neg Hx       Current Outpatient Medications on File Prior to Visit   Medication Sig Dispense Refill   • acetaminophen (TYLENOL) 500 MG tablet Take 500 mg by mouth Every 6 (Six) Hours As Needed for Mild Pain .     • cevimeline (EVOXAC) 30 MG capsule Take 30 mg by mouth 3 (Three) Times a Day.     • ciprofloxacin (CIPRO) 250 MG tablet Take 1 tablet by mouth Daily. Take one every other day 90 tablet 1   • econazole nitrate (SPECTAZOLE) 1 % cream 2 (Two) Times a Day.     • gabapentin (NEURONTIN) 300 MG capsule TAKE 1 CAPSULE BY MOUTH TWICE A  capsule 0   • glipizide (GLUCOTROL XL) 5 MG ER tablet TAKE 1  "TABLET BY MOUTH EVERY DAY 90 tablet 1   • lisinopril-hydrochlorothiazide (PRINZIDE,ZESTORETIC) 20-12.5 MG per tablet TAKE 1 TABLET BY MOUTH EVERY DAY 90 tablet 0   • nicotine (NICODERM CQ) 21 MG/24HR patch Place 1 patch on the skin as directed by provider Daily. (Patient taking differently: Place 1 patch on the skin as directed by provider Daily. Has at home but hasn't started taking yet) 28 each 1   • OLANZapine (ZyPREXA) 2.5 MG tablet Take 1 tablet by mouth Every Night. 90 tablet 0   • ondansetron (ZOFRAN) 4 MG tablet Take 1 tablet by mouth Every 8 (Eight) Hours As Needed for Nausea or Vomiting. 30 tablet 2   • pantoprazole (Protonix) 40 MG EC tablet Take 1 tablet by mouth 2 (Two) Times a Day. 90 tablet 3   • pentoxifylline (TRENtal) 400 MG CR tablet TAKE 1 TABLET BY MOUTH TWICE A DAY WITH MEALS 90 tablet 0   • potassium chloride ER (K-TAB) 20 MEQ tablet controlled-release ER tablet Take 1 tablet by mouth Daily. 60 tablet 3   • Probiotic Product (PROBIOTIC DAILY PO) Take 1 tablet by mouth Daily.     • triamcinolone (KENALOG) 0.1 % ointment Apply 1 application topically to the appropriate area as directed 2 (Two) Times a Day. 30 g 1     No current facility-administered medications on file prior to visit.     Allergies   Allergen Reactions   • Oxaliplatin Anaphylaxis          Objective     Vitals:    05/18/22 1053   BP: 126/74   Pulse: 69   Resp: 16   Temp: 96.8 °F (36 °C)   TempSrc: Temporal   SpO2: 98%   Weight: 89 kg (196 lb 4.8 oz)   Height: 175 cm (68.9\")   PainSc: 0-No pain     Current Status 5/18/2022   ECOG score 0     Physical Exam  Vitals reviewed.   Constitutional:       General: He is not in acute distress.     Appearance: Normal appearance. He is well-developed.   HENT:      Head: Normocephalic and atraumatic.   Eyes:      Pupils: Pupils are equal, round, and reactive to light.   Cardiovascular:      Rate and Rhythm: Normal rate and regular rhythm.      Heart sounds: Normal heart sounds. No murmur " heard.  Pulmonary:      Effort: Pulmonary effort is normal. No respiratory distress.      Breath sounds: Normal breath sounds. No wheezing, rhonchi or rales.   Abdominal:      General: Bowel sounds are normal. There is no distension.      Palpations: Abdomen is soft.   Musculoskeletal:         General: Swelling present. Deformity: 1+ bilateral ankle edema. Normal range of motion.      Cervical back: Normal range of motion.   Skin:     General: Skin is warm and dry.      Findings: No rash.   Neurological:      Mental Status: He is alert and oriented to person, place, and time.       RECENT LABS:  Results from last 7 days   Lab Units 05/18/22  1018   WBC 10*3/mm3 6.27   NEUTROS ABS 10*3/mm3 3.87   HEMOGLOBIN g/dL 13.6   HEMATOCRIT % 41.5   PLATELETS 10*3/mm3 146     Results from last 7 days   Lab Units 05/18/22  1018   SODIUM mmol/L 137   POTASSIUM mmol/L 4.0   CHLORIDE mmol/L 103   CO2 mmol/L 25.3   BUN mg/dL 10   CREATININE mg/dL 0.71   CALCIUM mg/dL 9.4   ALBUMIN g/dL 4.10   BILIRUBIN mg/dL 0.4   ALK PHOS U/L 136*   ALT (SGPT) U/L 40   AST (SGOT) U/L 33   GLUCOSE mg/dL 94           Assessment & Plan    1.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 Almost 90% of the liver was replaced by tumor.  · HER-2 positive  · Initially treated with FOLFOX initiated July 2019  · Herceptin added with cycle 2  · Following 8 cycles of FOLFOX, oxaliplatin discontinued with continuation of 5-FU, leucovorin, Herceptin. This was continued through 7/7/2020  · Increasing CEA led to PET scan 6/9/2021.  Disease progression noted with growth of the tumor in the lower esophagus.  Continued stability of hepatic metastasis  · Repeat EGD 6/19/2021 for tissue specimen and Next Generation Sequencing.  · Endoscopy that shows a noncircumferential abnormality in the lower esophagus that encompasses almost 6 cm in length. It is not blocking off the esophagus and that is why the patient has no symptoms. The pathology  shows at least atypical cells consistent with cancer.  · NGS showing PD-L1 positive and high mutation burden.  The tumor remains HER-2 positive.  Treatment plan for Keytruda every 3 weeks x 4 cycles followed by repeat imaging  · Keytruda initiated 7/29/2021  · Hospitalization 8/2/2021 through 8/7/2021 secondary to acute GI bleeding from known malignancy.  · Completed 10 fractions of radiation to the lower esophagus 8/18/2021 after GI bleed  · PET scan following four cycles of Keytruda 10/14/2021 with improvement in the patient's known neoplasm involving the distal esophagus. However, known metastatic disease involving the liver is significantly worse. Keytruda discontinued  · Enhertu initiated 10/29/2021  · 12/23/2021 PET scan following three cycles of Enhertu discloses resolution of the tumor in the esophagus. He continues to have two active lesions in the liver with significant SUV activity pertinent to his metastatic disease. Minimal pulmonary infiltrates noted bilaterally. With concerns for Enhertu interstitial lung disease, Enhertu discontinued  · Treatment transitioned to FOLFOX Herceptin  · Borderline neutropenia 2/9/2022 with plans for 15% dose reduction to FOLFOX  · 2/16/2022, significant reaction to oxaliplatin which is discontinued  · Reevaluation 3/2/2022, with discontinue of oxaliplatin, the patient will continue on 5-FU, leucovorin, Kanjinti. Of note, the patient received 6 mg/kg of Kanjinti 2 weeks ago, therefore he cannot receive this medication today. He will receive only 5-FU leucovorin and return in 1 week for Kanjinti 2 mg/kg. In 2 weeks, he will resume his normal schedule of 5-FU, leucovorin, Kanjinti 4 mg/kg every 2 weeks.  · 3/16/2022 will continue on with 5-FU, leucovorin, Herceptin.  CEA dramatically improving.    2. Colovesical fistula.   · He requires intermittent Cipro when he notes stool in his urine  · He currently reports his symptoms are more progressive with more urine in his rectum.  He continues on Cipro and is scheduled for follow-up with urology, Dr. Witt later this month    3.  Thrombophilia secondary malignancy, DVT  · Currently anticoagulated with Xarelto 20 mg daily  · Following GI bleed 8/2/2021, Xarelto has been discontinued  · Plan no evidence of recurrent thrombosis    4. Acute GI bleeding  · Dark tarry stool initially began 7/29/2021  · 7/23/2021, hemoglobin of 11.0.  8/2/2021, hemoglobin of 5.7  · Patient admitted, received transfusion of 4 units packed red blood cells.  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   · Hemoglobin today of 12.4    5. Enhertu initiated interstitial lung disease  · The patient completed a prednisone taper  · He is without hypoxia or tachycardia currently. He has only minimal dyspnea on exertion    6. Significant oxaliplatin reaction  · 2/16/2022, significant reaction with shortness of breath, limited air movement, treated with high-dose Solu-Cortef. He is not a candidate for further oxaliplatin    7. Borderline thrombocytopenia  · 3/2/2021, the patient is a platelet count of 101,000. With the discontinuation of oxaliplatin, we can continue with 5-FU leucovorin which will likely not cause significant issues with his platelets.  · 5/18/2022 platelet count normalized at 146,000.    8.  Hypertension: He previously been on lisinopril/hydrochlorothiazide however this medication is currently on hold due to some issues with hypotension.  He now only takes the medication if his blood pressure is above 130/90.  · 5/18/2022 /74.    9.  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    10.  Lower extremity edema:  · Bilateral.  Patient has been on his feet much more recently and has been more active.  It seemed to have worsened once the weather got warmer.  He states he was told previously  by a cardiothoracic surgeon not to wear compression socks.  I have advised him to watch his salt intake, elevate his legs when he possible, and make sure he is drinking plenty of water.  Continue to monitor, and call if worsens.    PLAN:  1. Proceed with continued 5-FU, leucovorin, herceptin.    2. Return in 3 weeks for follow up with nurse practitioner with repeat labs due for his next treatment.  3. Patient scheduled 6/22/2022 for echocardiogram.  4. Return for follow-up on 6/29/2022 with Dr. Haynes with repeat labs, reevaluation prior to continued 5-FU, leucovorin, oxaliplatin.  5. Call/return sooner should he develop any concerns or problems.    The patient continues on high risk medication requiring close monitoring for toxicity.      Kristin Moss, APRN  05/18/2022

## 2022-05-20 ENCOUNTER — INFUSION (OUTPATIENT)
Dept: ONCOLOGY | Facility: HOSPITAL | Age: 67
End: 2022-05-20

## 2022-05-20 DIAGNOSIS — C78.7 LIVER METASTASIS: ICD-10-CM

## 2022-05-20 DIAGNOSIS — Z45.2 ENCOUNTER FOR ADJUSTMENT OR MANAGEMENT OF VASCULAR ACCESS DEVICE: ICD-10-CM

## 2022-05-20 DIAGNOSIS — C15.5 MALIGNANT NEOPLASM OF LOWER THIRD OF ESOPHAGUS: Primary | ICD-10-CM

## 2022-05-20 DIAGNOSIS — D49.0 ESOPHAGUS NEOPLASM: ICD-10-CM

## 2022-05-20 PROCEDURE — 25010000002 HEPARIN LOCK FLUSH PER 10 UNITS: Performed by: INTERNAL MEDICINE

## 2022-05-20 RX ORDER — SODIUM CHLORIDE 0.9 % (FLUSH) 0.9 %
10 SYRINGE (ML) INJECTION AS NEEDED
Status: CANCELLED | OUTPATIENT
Start: 2022-05-20

## 2022-05-20 RX ORDER — HEPARIN SODIUM (PORCINE) LOCK FLUSH IV SOLN 100 UNIT/ML 100 UNIT/ML
500 SOLUTION INTRAVENOUS AS NEEDED
Status: DISCONTINUED | OUTPATIENT
Start: 2022-05-20 | End: 2022-05-20 | Stop reason: HOSPADM

## 2022-05-20 RX ORDER — HEPARIN SODIUM (PORCINE) LOCK FLUSH IV SOLN 100 UNIT/ML 100 UNIT/ML
500 SOLUTION INTRAVENOUS AS NEEDED
Status: CANCELLED | OUTPATIENT
Start: 2022-05-20

## 2022-05-20 RX ORDER — SODIUM CHLORIDE 0.9 % (FLUSH) 0.9 %
10 SYRINGE (ML) INJECTION AS NEEDED
Status: DISCONTINUED | OUTPATIENT
Start: 2022-05-20 | End: 2022-05-20 | Stop reason: HOSPADM

## 2022-05-20 RX ADMIN — Medication 500 UNITS: at 12:05

## 2022-05-20 RX ADMIN — Medication 10 ML: at 12:05

## 2022-05-31 ENCOUNTER — TELEPHONE (OUTPATIENT)
Dept: ONCOLOGY | Facility: CLINIC | Age: 67
End: 2022-05-31

## 2022-05-31 RX ORDER — PENTOXIFYLLINE 400 MG/1
TABLET, EXTENDED RELEASE ORAL
Qty: 90 TABLET | Refills: 0 | Status: SHIPPED | OUTPATIENT
Start: 2022-05-31 | End: 2022-07-11

## 2022-05-31 RX ORDER — POTASSIUM CHLORIDE 1500 MG/1
20 TABLET, FILM COATED, EXTENDED RELEASE ORAL DAILY
Qty: 90 TABLET | Refills: 3 | Status: SHIPPED | OUTPATIENT
Start: 2022-05-31

## 2022-06-08 ENCOUNTER — INFUSION (OUTPATIENT)
Dept: ONCOLOGY | Facility: HOSPITAL | Age: 67
End: 2022-06-08

## 2022-06-08 ENCOUNTER — OFFICE VISIT (OUTPATIENT)
Dept: ONCOLOGY | Facility: CLINIC | Age: 67
End: 2022-06-08

## 2022-06-08 ENCOUNTER — APPOINTMENT (OUTPATIENT)
Dept: ONCOLOGY | Facility: HOSPITAL | Age: 67
End: 2022-06-08

## 2022-06-08 VITALS
DIASTOLIC BLOOD PRESSURE: 75 MMHG | HEIGHT: 69 IN | WEIGHT: 200.1 LBS | TEMPERATURE: 97.1 F | OXYGEN SATURATION: 95 % | HEART RATE: 73 BPM | BODY MASS INDEX: 29.64 KG/M2 | SYSTOLIC BLOOD PRESSURE: 128 MMHG | RESPIRATION RATE: 18 BRPM

## 2022-06-08 DIAGNOSIS — Z72.0 TOBACCO ABUSE: ICD-10-CM

## 2022-06-08 DIAGNOSIS — C15.5 MALIGNANT NEOPLASM OF LOWER THIRD OF ESOPHAGUS: ICD-10-CM

## 2022-06-08 DIAGNOSIS — C15.5 MALIGNANT NEOPLASM OF LOWER THIRD OF ESOPHAGUS: Primary | ICD-10-CM

## 2022-06-08 DIAGNOSIS — N32.1 RECTO-BLADDERNECK FISTULA: ICD-10-CM

## 2022-06-08 DIAGNOSIS — R73.01 IMPAIRED FASTING GLUCOSE: ICD-10-CM

## 2022-06-08 DIAGNOSIS — T45.1X5A PERIPHERAL NEUROPATHY DUE TO CHEMOTHERAPY: ICD-10-CM

## 2022-06-08 DIAGNOSIS — D49.0 ESOPHAGUS NEOPLASM: ICD-10-CM

## 2022-06-08 DIAGNOSIS — C78.7 LIVER METASTASIS: ICD-10-CM

## 2022-06-08 DIAGNOSIS — G62.0 PERIPHERAL NEUROPATHY DUE TO CHEMOTHERAPY: ICD-10-CM

## 2022-06-08 DIAGNOSIS — E78.2 MIXED HYPERLIPIDEMIA: ICD-10-CM

## 2022-06-08 DIAGNOSIS — Z45.2 ENCOUNTER FOR ADJUSTMENT OR MANAGEMENT OF VASCULAR ACCESS DEVICE: ICD-10-CM

## 2022-06-08 DIAGNOSIS — I10 ESSENTIAL HYPERTENSION: ICD-10-CM

## 2022-06-08 LAB
ALBUMIN SERPL-MCNC: 4 G/DL (ref 3.5–5.2)
ALBUMIN/GLOB SERPL: 1.4 G/DL (ref 1.1–2.4)
ALP SERPL-CCNC: 128 U/L (ref 38–116)
ALT SERPL W P-5'-P-CCNC: 32 U/L (ref 0–41)
ANION GAP SERPL CALCULATED.3IONS-SCNC: 12.2 MMOL/L (ref 5–15)
AST SERPL-CCNC: 28 U/L (ref 0–40)
BASOPHILS # BLD AUTO: 0.04 10*3/MM3 (ref 0–0.2)
BASOPHILS NFR BLD AUTO: 0.6 % (ref 0–1.5)
BILIRUB SERPL-MCNC: 0.6 MG/DL (ref 0.2–1.2)
BUN SERPL-MCNC: 10 MG/DL (ref 6–20)
BUN/CREAT SERPL: 14.7 (ref 7.3–30)
CALCIUM SPEC-SCNC: 9.4 MG/DL (ref 8.5–10.2)
CEA SERPL-MCNC: 8 NG/ML
CHLORIDE SERPL-SCNC: 104 MMOL/L (ref 98–107)
CO2 SERPL-SCNC: 20.8 MMOL/L (ref 22–29)
CREAT SERPL-MCNC: 0.68 MG/DL (ref 0.7–1.3)
DEPRECATED RDW RBC AUTO: 55.7 FL (ref 37–54)
EGFRCR SERPLBLD CKD-EPI 2021: 103.2 ML/MIN/1.73
EOSINOPHIL # BLD AUTO: 0.46 10*3/MM3 (ref 0–0.4)
EOSINOPHIL NFR BLD AUTO: 6.8 % (ref 0.3–6.2)
ERYTHROCYTE [DISTWIDTH] IN BLOOD BY AUTOMATED COUNT: 15.8 % (ref 12.3–15.4)
GLOBULIN UR ELPH-MCNC: 2.8 GM/DL (ref 1.8–3.5)
GLUCOSE SERPL-MCNC: 113 MG/DL (ref 74–124)
HCT VFR BLD AUTO: 43.1 % (ref 37.5–51)
HGB BLD-MCNC: 14 G/DL (ref 13–17.7)
IMM GRANULOCYTES # BLD AUTO: 0.07 10*3/MM3 (ref 0–0.05)
IMM GRANULOCYTES NFR BLD AUTO: 1 % (ref 0–0.5)
LYMPHOCYTES # BLD AUTO: 1.26 10*3/MM3 (ref 0.7–3.1)
LYMPHOCYTES NFR BLD AUTO: 18.7 % (ref 19.6–45.3)
MCH RBC QN AUTO: 30.9 PG (ref 26.6–33)
MCHC RBC AUTO-ENTMCNC: 32.5 G/DL (ref 31.5–35.7)
MCV RBC AUTO: 95.1 FL (ref 79–97)
MONOCYTES # BLD AUTO: 0.54 10*3/MM3 (ref 0.1–0.9)
MONOCYTES NFR BLD AUTO: 8 % (ref 5–12)
NEUTROPHILS NFR BLD AUTO: 4.37 10*3/MM3 (ref 1.7–7)
NEUTROPHILS NFR BLD AUTO: 64.9 % (ref 42.7–76)
NRBC BLD AUTO-RTO: 0 /100 WBC (ref 0–0.2)
PLATELET # BLD AUTO: 141 10*3/MM3 (ref 140–450)
PMV BLD AUTO: 10 FL (ref 6–12)
POTASSIUM SERPL-SCNC: 3.9 MMOL/L (ref 3.5–4.7)
PROT SERPL-MCNC: 6.8 G/DL (ref 6.3–8)
RBC # BLD AUTO: 4.53 10*6/MM3 (ref 4.14–5.8)
SODIUM SERPL-SCNC: 137 MMOL/L (ref 134–145)
WBC NRBC COR # BLD: 6.74 10*3/MM3 (ref 3.4–10.8)

## 2022-06-08 PROCEDURE — 25010000002 FLUOROURACIL PER 500 MG: Performed by: INTERNAL MEDICINE

## 2022-06-08 PROCEDURE — 80053 COMPREHEN METABOLIC PANEL: CPT

## 2022-06-08 PROCEDURE — 25010000002 DIPHENHYDRAMINE PER 50 MG: Performed by: INTERNAL MEDICINE

## 2022-06-08 PROCEDURE — 25010000002 TRASTUZUMAB-ANNS 420 MG RECONSTITUTED SOLUTION 1 EACH VIAL: Performed by: INTERNAL MEDICINE

## 2022-06-08 PROCEDURE — 96413 CHEMO IV INFUSION 1 HR: CPT

## 2022-06-08 PROCEDURE — 96375 TX/PRO/DX INJ NEW DRUG ADDON: CPT

## 2022-06-08 PROCEDURE — 96416 CHEMO PROLONG INFUSE W/PUMP: CPT

## 2022-06-08 PROCEDURE — 82378 CARCINOEMBRYONIC ANTIGEN: CPT | Performed by: INTERNAL MEDICINE

## 2022-06-08 PROCEDURE — 25010000002 LEUCOVORIN 500 MG RECONSTITUTED SOLUTION 1 EACH VIAL: Performed by: INTERNAL MEDICINE

## 2022-06-08 PROCEDURE — 25010000002 PALONOSETRON PER 25 MCG: Performed by: INTERNAL MEDICINE

## 2022-06-08 PROCEDURE — 96367 TX/PROPH/DG ADDL SEQ IV INF: CPT

## 2022-06-08 PROCEDURE — 85025 COMPLETE CBC W/AUTO DIFF WBC: CPT

## 2022-06-08 PROCEDURE — 96411 CHEMO IV PUSH ADDL DRUG: CPT

## 2022-06-08 PROCEDURE — 25010000002 DEXAMETHASONE PER 1 MG: Performed by: INTERNAL MEDICINE

## 2022-06-08 PROCEDURE — 99214 OFFICE O/P EST MOD 30 MIN: CPT | Performed by: INTERNAL MEDICINE

## 2022-06-08 RX ORDER — PALONOSETRON 0.05 MG/ML
0.25 INJECTION, SOLUTION INTRAVENOUS ONCE
Status: CANCELLED | OUTPATIENT
Start: 2022-06-08

## 2022-06-08 RX ORDER — FAMOTIDINE 20 MG/1
20 TABLET, FILM COATED ORAL ONCE
Status: CANCELLED
Start: 2022-06-08 | End: 2022-06-08

## 2022-06-08 RX ORDER — DEXAMETHASONE SODIUM PHOSPHATE 4 MG/ML
2 INJECTION, SOLUTION INTRA-ARTICULAR; INTRALESIONAL; INTRAMUSCULAR; INTRAVENOUS; SOFT TISSUE ONCE
Status: COMPLETED | OUTPATIENT
Start: 2022-06-08 | End: 2022-06-08

## 2022-06-08 RX ORDER — FLUOROURACIL 50 MG/ML
340 INJECTION, SOLUTION INTRAVENOUS ONCE
Status: CANCELLED | OUTPATIENT
Start: 2022-06-08

## 2022-06-08 RX ORDER — FLUOROURACIL 50 MG/ML
340 INJECTION, SOLUTION INTRAVENOUS ONCE
Status: COMPLETED | OUTPATIENT
Start: 2022-06-08 | End: 2022-06-08

## 2022-06-08 RX ORDER — PALONOSETRON 0.05 MG/ML
0.25 INJECTION, SOLUTION INTRAVENOUS ONCE
Status: COMPLETED | OUTPATIENT
Start: 2022-06-08 | End: 2022-06-08

## 2022-06-08 RX ORDER — SODIUM CHLORIDE 9 MG/ML
250 INJECTION, SOLUTION INTRAVENOUS ONCE
Status: COMPLETED | OUTPATIENT
Start: 2022-06-08 | End: 2022-06-08

## 2022-06-08 RX ORDER — FAMOTIDINE 10 MG/ML
20 INJECTION, SOLUTION INTRAVENOUS AS NEEDED
Status: CANCELLED | OUTPATIENT
Start: 2022-06-08

## 2022-06-08 RX ORDER — FAMOTIDINE 20 MG/1
20 TABLET, FILM COATED ORAL ONCE
Status: COMPLETED | OUTPATIENT
Start: 2022-06-08 | End: 2022-06-08

## 2022-06-08 RX ORDER — SODIUM CHLORIDE 9 MG/ML
250 INJECTION, SOLUTION INTRAVENOUS ONCE
Status: CANCELLED | OUTPATIENT
Start: 2022-06-08

## 2022-06-08 RX ADMIN — FAMOTIDINE 20 MG: 20 TABLET ORAL at 10:40

## 2022-06-08 RX ADMIN — DIPHENHYDRAMINE HYDROCHLORIDE 25 MG: 50 INJECTION, SOLUTION INTRAMUSCULAR; INTRAVENOUS at 10:40

## 2022-06-08 RX ADMIN — FLUOROURACIL 700 MG: 50 INJECTION, SOLUTION INTRAVENOUS at 12:10

## 2022-06-08 RX ADMIN — DEXAMETHASONE SODIUM PHOSPHATE 2 MG: 4 INJECTION, SOLUTION INTRA-ARTICULAR; INTRALESIONAL; INTRAMUSCULAR; INTRAVENOUS; SOFT TISSUE at 10:40

## 2022-06-08 RX ADMIN — LEUCOVORIN CALCIUM 700 MG: 500 INJECTION, POWDER, LYOPHILIZED, FOR SOLUTION INTRAMUSCULAR; INTRAVENOUS at 11:32

## 2022-06-08 RX ADMIN — SODIUM CHLORIDE 250 ML: 9 INJECTION, SOLUTION INTRAVENOUS at 10:40

## 2022-06-08 RX ADMIN — TRASTUZUMAB-ANNS 530 MG: 420 INJECTION, POWDER, LYOPHILIZED, FOR SOLUTION INTRAVENOUS at 10:57

## 2022-06-08 RX ADMIN — FLUOROURACIL 4180 MG: 50 INJECTION, SOLUTION INTRAVENOUS at 12:10

## 2022-06-08 RX ADMIN — PALONOSETRON 0.25 MG: 0.05 INJECTION, SOLUTION INTRAVENOUS at 10:40

## 2022-06-08 NOTE — PROGRESS NOTES
Subjective     REASON FOR follow up:1.    1. 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  Currently receiving palliative 5 fu leucovorin  AND HERCEPTIN therapy once a month    2.COLOVESICAL FISTULA: chronic antibiotic therapy cipro, NO FURTHER PLANS FOR SURGERY AFTER VISIT AND PROCEDURE BY DR GM CASTILLO 1/20    3. DVT R AND LEFT LE off ANTICOAGULANT : THROMBOPHILIA OF MALIGNANCY    4. 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 NEURONTIN INITIATED.        DURING THE VISIT WITH THE PATIENT TODAY , PATIENT HAD FACE MASK, MY MEDICAL ASSISTANT AND I  HAD PROPPER PROTECTIVE EQUIPMENT, AND I DID HAND HYGIENE WITH SOAP AND WATER BEFORE AND AFTER THE VISIT.    This patient returns today to the office on 06/08/2022 in order to proceed with further assessment of his metastatic cancer of the esophagus undergoing therapy with 5-FU, leucovorin and Herceptin every 3 weeks. The patient at this time actually feels very well. He has no difficulty with his appetite or swallowing. No GI bleeding. Normal bowel activity. Still passing some urine through the rectum due to his colovesical fistula but no urinary tract infection or fever. He has not had any further issues in his lower extremities. He has no claudication at this time. He continues smoking at least half a pack of cigarettes a day. He has hacking cough. No chest pain. He has had no blood pressure medication because most of the time his blood pressure is appropriate and he takes Lisinopril with hydrochlorothiazide on p.r.n. basis. His ECOG performance status is 1. He is able to drive his car, get around, buy groceries and hang out with his sister.                                  Past Medical History:   Diagnosis Date   • Arthritis    • Cancer (HCC)     prostate cancer 2008   • Cancer (HCC)     esophageal   • Chronic anticoagulation     on xarelto   •  "Elevated PSA    • Esophageal mass    • Fistula     colon and bladder   • H/O Lung nodule    • Hepatitis     CHILD--PT STATED \"I THINK IT WAS A.\"   • History of chemotherapy    • History of pneumonia    • Hx of blood clots 08/10/2019   • Hyperlipidemia    • Hypertension    • Nail fungus    • Neuropathy    • PVD (peripheral vascular disease) (HCC)    • Recto-bladderneck fistula     on poab for recurrent uti        Past Surgical History:   Procedure Laterality Date   • COLONOSCOPY N/A 2/4/2020    Procedure: COLONOSCOPY;  Surgeon: Guy Sears MD;  Location: [facility] ENDOSCOPY;  Service: General;  Laterality: N/A;  PRE-COLOVESICAL FISTULA  POST-- DIVERTICULOSIS   • CYSTOSCOPY  02/06/2020   • CYSTOSCOPY Bilateral 2/26/2020    Procedure: CYSTOSCOPY RETROGRADE;  Surgeon: Cm Witt MD;  Location: [facility] OR;  Service: Urology;  Laterality: Bilateral;   • ENDOSCOPY     • ENDOSCOPY N/A 6/19/2021    Procedure: ESOPHAGOGASTRODUODENOSCOPY WITH BIOPSY;  Surgeon: Mary Brito MD;  Location: [facility] OR;  Service: Gastroenterology;  Laterality: N/A;   • ENDOSCOPY N/A 8/5/2021    Procedure: ESOPHAGOGASTRODUODENOSCOPY HEMOSPRAY;  Surgeon: Naga Shelby MD;  Location: [facility] ENDOSCOPY;  Service: Gastroenterology;  Laterality: N/A;  HX OF ESOPHAGEAL  CANCER;MELENA  POST: ESOPHAGEAL BLEEDING; ESOPHAGEAL MASS   • HERNIA REPAIR Right 1999    inguinal hernia   • PROSTATE SURGERY  03/2008    prostatectectomy   • VENOUS ACCESS DEVICE (PORT) INSERTION N/A 7/16/2019    Procedure: INSERTION VENOUS ACCESS DEVICE WITH FLUORO AND EGD WITH BIOPSY;  Surgeon: Mary Brito MD;  Location: [facility] OR;  Service: Thoracic        Current Outpatient Medications on File Prior to Visit   Medication Sig Dispense Refill   • acetaminophen (TYLENOL) 500 MG tablet Take 500 mg by mouth Every 6 (Six) Hours As Needed for Mild Pain .     • cevimeline (EVOXAC) 30 MG capsule Take 30 mg by mouth 3 (Three) Times a Day.     • " ciprofloxacin (CIPRO) 250 MG tablet Take 1 tablet by mouth Daily. Take one every other day 90 tablet 1   • econazole nitrate (SPECTAZOLE) 1 % cream 2 (Two) Times a Day.     • gabapentin (NEURONTIN) 300 MG capsule TAKE 1 CAPSULE BY MOUTH TWICE A  capsule 0   • glipizide (GLUCOTROL XL) 5 MG ER tablet TAKE 1 TABLET BY MOUTH EVERY DAY 90 tablet 1   • lisinopril-hydrochlorothiazide (PRINZIDE,ZESTORETIC) 20-12.5 MG per tablet TAKE 1 TABLET BY MOUTH EVERY DAY 90 tablet 0   • nicotine (NICODERM CQ) 21 MG/24HR patch Place 1 patch on the skin as directed by provider Daily. (Patient taking differently: Place 1 patch on the skin as directed by provider Daily. Has at home but hasn't started taking yet) 28 each 1   • OLANZapine (ZyPREXA) 2.5 MG tablet Take 1 tablet by mouth Every Night. 90 tablet 0   • ondansetron (ZOFRAN) 4 MG tablet Take 1 tablet by mouth Every 8 (Eight) Hours As Needed for Nausea or Vomiting. 30 tablet 2   • pantoprazole (Protonix) 40 MG EC tablet Take 1 tablet by mouth 2 (Two) Times a Day. 90 tablet 3   • pentoxifylline (TRENtal) 400 MG CR tablet TAKE 1 TABLET BY MOUTH TWICE A DAY WITH MEALS 90 tablet 0   • potassium chloride ER (K-TAB) 20 MEQ tablet controlled-release ER tablet Take 1 tablet by mouth Daily. 90 tablet 3   • Probiotic Product (PROBIOTIC DAILY PO) Take 1 tablet by mouth Daily.     • triamcinolone (KENALOG) 0.1 % ointment Apply 1 application topically to the appropriate area as directed 2 (Two) Times a Day. 30 g 1     No current facility-administered medications on file prior to visit.        ALLERGIES:    Allergies   Allergen Reactions   • Oxaliplatin Anaphylaxis        Social History     Socioeconomic History   • Marital status: Single   • Years of education: High school   Tobacco Use   • Smoking status: Current Every Day Smoker     Packs/day: 1.00     Years: 38.00     Pack years: 38.00     Types: Cigarettes     Start date: 1974   • Smokeless tobacco: Never Used   • Tobacco comment:  Quit for a period of 8 years.    Vaping Use   • Vaping Use: Never used   Substance and Sexual Activity   • Alcohol use: Not Currently   • Drug use: Never   • Sexual activity: Defer        Family History   Problem Relation Age of Onset   • Hypertension Mother    • Stroke Mother    • Hypertension Other    • Lung disease Other    • Prostate cancer Other    • Lung cancer Father    • Malig Hyperthermia Neg Hx       Current Outpatient Medications on File Prior to Visit   Medication Sig Dispense Refill   • acetaminophen (TYLENOL) 500 MG tablet Take 500 mg by mouth Every 6 (Six) Hours As Needed for Mild Pain .     • cevimeline (EVOXAC) 30 MG capsule Take 30 mg by mouth 3 (Three) Times a Day.     • ciprofloxacin (CIPRO) 250 MG tablet Take 1 tablet by mouth Daily. Take one every other day 90 tablet 1   • econazole nitrate (SPECTAZOLE) 1 % cream 2 (Two) Times a Day.     • gabapentin (NEURONTIN) 300 MG capsule TAKE 1 CAPSULE BY MOUTH TWICE A  capsule 0   • glipizide (GLUCOTROL XL) 5 MG ER tablet TAKE 1 TABLET BY MOUTH EVERY DAY 90 tablet 1   • lisinopril-hydrochlorothiazide (PRINZIDE,ZESTORETIC) 20-12.5 MG per tablet TAKE 1 TABLET BY MOUTH EVERY DAY 90 tablet 0   • nicotine (NICODERM CQ) 21 MG/24HR patch Place 1 patch on the skin as directed by provider Daily. (Patient taking differently: Place 1 patch on the skin as directed by provider Daily. Has at home but hasn't started taking yet) 28 each 1   • OLANZapine (ZyPREXA) 2.5 MG tablet Take 1 tablet by mouth Every Night. 90 tablet 0   • ondansetron (ZOFRAN) 4 MG tablet Take 1 tablet by mouth Every 8 (Eight) Hours As Needed for Nausea or Vomiting. 30 tablet 2   • pantoprazole (Protonix) 40 MG EC tablet Take 1 tablet by mouth 2 (Two) Times a Day. 90 tablet 3   • pentoxifylline (TRENtal) 400 MG CR tablet TAKE 1 TABLET BY MOUTH TWICE A DAY WITH MEALS 90 tablet 0   • potassium chloride ER (K-TAB) 20 MEQ tablet controlled-release ER tablet Take 1 tablet by mouth Daily. 90  "tablet 3   • Probiotic Product (PROBIOTIC DAILY PO) Take 1 tablet by mouth Daily.     • triamcinolone (KENALOG) 0.1 % ointment Apply 1 application topically to the appropriate area as directed 2 (Two) Times a Day. 30 g 1     No current facility-administered medications on file prior to visit.     Allergies   Allergen Reactions   • Oxaliplatin Anaphylaxis            Objective     Vitals:    06/08/22 1002   BP: 128/75   Pulse: 73   Resp: 18   Temp: 97.1 °F (36.2 °C)   TempSrc: Temporal   SpO2: 95%   Weight: 90.8 kg (200 lb 1.6 oz)   Height: 175 cm (68.9\")   PainSc: 0-No pain     Current Status 6/8/2022   ECOG score 0         EXAM:          GENERAL:  Well-developed, well-nourished  Patient  in no acute distress.   SKIN:  Warm, dry ,NO purpura ,NO petechiae, no rash.  HEENT:  Pupils were equal and reactive to light and accomodation, conjunctivae noninjected, no pterygium, normal extraocular movements, normal visual acuity.   NECK:  Supple with good range of motion; no thyromegaly , no other masses, no JVD or bruits,.No carotid artery pain, no carotid abnormal pulsation , NO arterial dance.  LYMPHATICS:  No cervical, NO supraclavicular, NO axillary,NO epitrochlear , NO inguinal adenopathies.  CARDIAC   normal rate , regular rhythm, without murmur,NO rubs NO S3 NO S4   LUNGS: DECREASED breath sounds bilateral, no wheezing, NO rhonchi, NO crackles ,NO rubs.  VASCULAR VENOUS: no cyanosis, NO collateral circulation, NO varicosities, NO edema, NO palpable cords, NO pain,NO erythema, NO pigmentation of the skin.  ABDOMEN:  Soft, NO pain,no hepatomegaly, no splenomegaly,no masses, no ascites, no collateral circulation,no distention,no Dhruv sign.  EXTREMITIES  AND SPINE:   clubbing, no cyanosis ,no deformities , no pain .No kyphosis,  no pain in spine, no pain in ribs , no pain in pelvic bone.  NEUROLOGICAL:  Patient was awake, alert, oriented to time, person and place.            RECENT LABS:  Hematology WBC   Date Value Ref " Range Status   06/08/2022 6.74 3.40 - 10.80 10*3/mm3 Final   02/02/2019 13.4 (H) 3.4 - 10.8 x10E3/uL Final     RBC   Date Value Ref Range Status   06/08/2022 4.53 4.14 - 5.80 10*6/mm3 Final   02/02/2019 4.81 4.14 - 5.80 x10E6/uL Final     Hemoglobin   Date Value Ref Range Status   06/08/2022 14.0 13.0 - 17.7 g/dL Final     Hematocrit   Date Value Ref Range Status   06/08/2022 43.1 37.5 - 51.0 % Final     Platelets   Date Value Ref Range Status   06/08/2022 141 140 - 450 10*3/mm3 Final        Lab Results   Component Value Date    GLUCOSE 94 05/18/2022    BUN 10 05/18/2022    CREATININE 0.71 05/18/2022    EGFRIFNONA 149 02/16/2022    EGFRIFAFRI 100 02/02/2019    BCR 14.1 05/18/2022    K 4.0 05/18/2022    CO2 25.3 05/18/2022    CALCIUM 9.4 05/18/2022    PROTENTOTREF 7.1 02/02/2019    ALBUMIN 4.10 05/18/2022    LABIL2 1.6 02/02/2019    AST 33 05/18/2022    ALT 40 05/18/2022       CEA  Order: [id number]   Status: Final result     Visible to patient: Yes (seen)     Next appt: 06/22/2022 at 12:30 PM in Cardiology ([facility] )     Dx: Malignant neoplasm of lower third of ...    Specimen Information: Blood         0 Result Notes    Component   Ref Range & Units 3 wk ago 1 mo ago 2 mo ago 3 mo ago 5 mo ago 6 mo ago 7 mo ago   CEA   ng/mL 9.82  12.90  17.40  34.30  227.00  255.00  51.90    Resulting Agency  [facility]             Narrative  Performed by:  [facility]  CEA Reference Range:     Non Smokers:   Less than 3 ng/mL   Smokers:       Less than 5 ng/mL   Results may be falsely decreased if patient taking Biotin.       Specimen Collected: 05/18/22 10:18 Last Resulted: 05/18/22 16:54                     Assessment & Plan    1.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 Almost 90% of the liver WAS replaced by tumor. The patient has been undergoing chemotherapy with 5  fu and leucovorin  and Herceptin and has had  excellent response clinically and radiologically. The patient was reviewed on 08/10/2020. I reviewed with the patient in the PAC system [facility] his PET scan that is dramatic. There is minimal uptake in the lower esophagus and most importantly complete resolution of his liver metastasis. Actually the only issue that is pertinent to the PET scan is still the visibility of his colovesical fistula.     Therefore from the point of view of his cancer of the esophagus, metastatic to the liver, he has no symptoms from the primary tumor in the esophagus and he has no symptoms related to his liver metastasis that has resolved altogether. His CEA level is stable.The patient raised the question about the CEA fluctuation. I pointed out to him that a lot of this is also related to his smoking. Smoking per se elevates CEA level.  • Upon further reviewing the patient on 04/20/2021, he has no symptoms pertinent to his metastatic cancer of the esophagus to the liver and he has no difficulty swallowing. There are no side effects of the 5-FU, Leucovorin and Herceptin. The patient's cardiac function remains stable and he has not had any drop in the ejection fraction.   • I discussed with him on 05/18/2021 the fact that his cancer clinically is very quiet but I am afraid of the rise in his CEA level to 12. This could be an indicator of all of the cigarettes that he is smoking on a daily basis of indicator of cancer escaping control and not visible radiologically through his CT scan. I pointed out to him that he needs to continue making an effort to decrease his smoking and he is now down to 10 cigarettes a day. We will recheck another CEA level today. Given the circumstances of the previous CT scan I do not believe that I need to change the doses or plan of care in regard to his chemotherapy medication administration for the time being. I recommended for him to remain on his 5FU/leucovorin and Herceptin on a monthly  basis for now.  • The patient was further reviewed on 06/15/2021 with a new PET scan that documented regrowth of the tumor in the lower esophagus without any new symptomatology, for example dysphagia or odynophagia. No regurgitation. The liver metastasis remains in remission and there are no new areas of disease in the bones or lungs or any other site. Given the excellent performance status I discussed with the patient and his sister today many options of therapy, including going back to FOLFOX regimen along with Herceptin, taking another sample of the esophagus with a new endoscopy and doing analysis of the tissue for [employer]is Target Now that will be my favorite choice, or palliative treatment with radiation therapy and 5-FU continuous infusion that will be toxic to him and he does not prefer to have.     I had a discussion with Mary Brito MD, thoracic surgeon, who has agreed to see the patient tomorrow. I think the endoscopy, the new tissue analysis, and sending the tissue for [employer] Now will be the way to go. Depending on what we find there, we can modify his treatment altogether. I do not think the patient will have any consequences missing chemotherapy for a couple of weeks or so.      In preparation for the endoscopy and biopsies I asked him to hold off on the Xarelto until he is seen by Dr. Brito tomorrow.     In regard to his colovesical fistula, he has had minimal symptomatology this week, maybe some activity there and I asked him to go back to the lab and take a urine specimen and culture. He knows that if this is abnormal he will need to initiate ciprofloxacin that he has at home.    In regard to his hypertension, this is under good control and I advised him to remain on his lisinopril and hydrochlorothiazide combination.    In regard to his smoking cessation, he has had conversations with NIRU Jacobson about this. He is using some nicotine CQ patch, here and there and also trying to work  with nicotine gum and things of this nature but he has not been able to shake this issue altogether.     Otherwise I will review him back in a couple of weeks with a CBC, CMP, and a CEA level.    This case will be presented in the multidisciplinary thoracic conference by me this Thursday, and I will discuss any further advice to the patient.     I also mentioned to the patient that on the background of HER2-positive cancer of the esophagus the possibility of using a new medicine for HER2-based disease that is called Enhertu is a real possibility and maybe that will be the next step to go through.    • The patient was further reviewed on 07/01/2021. I reviewed with Dr. Brito the endoscopy that shows a noncircumferential abnormality in the lower esophagus that encompasses almost 6 cm in length. It is not blocking off the esophagus and that is why the patient has no symptoms. The pathology shows at least atypical cells consistent with cancer. Further Next Generation Sequencing has been sent for [employer]is [employer] Now.     I discussed with the patient the fact that we have many other modalities of treatment that he could encounter. He prefers to continue his general chemotherapy to control the cancer in his liver using 5-FU, leucovorin and Herceptin today and agree with that. In consideration to be seen by radiation therapy, the patient is willing to listen to the possibility of undergoing continuous 5-FU infusion along with radiation therapy to the esophagus knowing that he will experiencing esophagitis that can give him significant issues for 2-3 weeks. I went ahead and made the appointment for him to be seen by radiation oncology for this purpose only.     Obviously if the Next Generation Sequencing gives us any other hints in regard to how to treat him this could be also utilized including the consideration of using Enhertu in the long run for achieving better control.     In regard to his smoking cessation he is not  willing to change this phenomenon at this time. We have had NIRU Jacobson talking with him in this regard but he is not ready to make a decision when to quit.     Finally, I pointed out to him that during the previous visit we documented a urinary tract infection with streptococcus 50,000 colonies that in my appreciation was significant given the fact that he has a colovesical fistula. This was treated with antibiotics. He has not had any discomfort issues pertinent to this anymore. The urine today is completely clear. Nevertheless, I went ahead and sent a urinalysis at least to be sure that there is no need for any other repetitive infection therapy on him.     The patient also will remain on his anticoagulation at this time, his Xarelto for the time being. I have renewed as well his Neurontin. He has no need for pain medicine.  • The patient was further reviewed on 07/23/2021. He has no new symptomatology pertinent to his cancer of the esophagus with liver metastasis. He has no difficulty swallowing. He has been presented in the multidisciplinary clinic on a couple of occasions and he has been seen by Radiation Oncology. Finally the analysis of Caris [employer] Now is available now showing that the patient's tumor is PD-L1 positive and has high mutation burden. The tumor remains HER/2 positive. Given these findings I think it is very easy to make a choice in regard stopping the present regimen of chemotherapy and proceed with therapy with Keytruda every 3 weeks for at least 4 cycles and reassess him at that point. In preparation for Keytruda initiation next week and we will get the approval of the medicine by his insurance company the patient will require smoking cessation altogether to minimize modification of cigarettes on his immune system. I insisted in this fact to the patient.     Other than that he will remain on probiotics. We will discontinue the 5FU/leucovorin and Herceptin and will move onto  Keytruda. I discussed with him side effects of the Keytruda in detail as below. He will require treatment every 3 weeks with CBC, CMP, TSH every 3 weeks and he will require formal chemotherapy teaching, education and consent for this medicine.     After 4 cycles of this, in other words 3 months he will be reassessed with a new PET scan.     The chances under the present circumstances that he will improve as long as he quit smoking, it is very hard and maybe he could have long term survivorship.    I begged for him to have smoking cessation. If any time during his time with me we have been talking about this and this is absolutely necessary now. We know that cigarettes kill immune system cells and minimize the benefit of treatments with these medicines.       • The patient was further reviewed on 08/19/2021 in regard to his cancer of the esophagus. As stated above he had upper GI bleeding dropping hemoglobin to 5 requiring admission, transfusion, discontinuation of anticoagulation and endoscopy with local therapy by Naga Shelby MD. Since then he has completed 10 sessions of radiation therapy to the esophagus yesterday. He has not had any further bleed. His hemoglobin has bounced back from 5 to 14 and I have advised his this good news today.    I expect that the patient in a few days is going to develop an element of radiation esophagitis and he continues having some fatigue that will improve over time. So far no new issues have happened. I made him aware that if he has difficulty swallowing he will need to let us know, he will need to receive IV fluids in the office.    At least the radiation therapy will take care of the tumor in the esophagus and I do not believe that he will require any other GI endoscopy anymore.    Today the patient will proceed with his Keytruda that is the immunotherapy medicine that we are delivering to him at this time for the treatment of his liver metastasis and also the tumor in the  esophagus. I pointed out to him that so far I do not see any side effects of the Keytruda and the Keytruda will improve and increase the benefit of radiation therapy into the primary tumor in the esophagus.     From the point of view of his anemia associated with GI bleeding, again this has been corrected. The hemoglobin today is 14 grams and I asked him to take his iron supplementation only twice a week 1 tablet. He has been taking 3 tablets everyday.     From the point of view of his thrombophilia associated with malignancy, he is no longer receiving any anticoagulants given the recent episode of GI bleeding. We will maintain him off anticoagulants as much as we can. Hopefully he will not have any other episodes of thrombosis.     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     From the point of view of the Keytruda toxicity so far nothing happened. We will continue monitoring CBC, CMP and TSH periodically.     I will review him back in 3 and 6 weeks. When he comes back for the Keytruda he will continue receiving the medicine as the time goes by.    He understands that most of the benefit of radiation therapy in the tumor in the esophagus will be reached in 1 month and I am planning to give him a PET scan in more of less in 2 months from now when he will have almost 4 infusions of Keytruda under his belt and the completion of radiation therapy to the esophagus.     In regard to smoking cessation he has achieved a lot from 2 packs a day to 10 cigarettes a day. He will see Anh Felder RN, today in regard continuation of the benefit of the therapy for this condition at this point.   • The patient was further reviewed on 09/30/2021. His Keytruda is still ongoing but the patient has developed dermatitis associated with this. It is a grade 1 toxicity so far and I am wondering if this is extending a little  bit and the topical medicine is not going to be sufficient. I will proceed with his Keytruda today but maybe this will be the last administration of this medicine unless that we get shashi and the rash quiets down. Obviously at the completion for Keytruda the patient will require PET scan for assessment not only of the benefit of radiation therapy on his recurrence in the esophagus but also benefit of the Keytruda on his liver metastasis. Therefore, he will be scheduled for this before the next visit. The patient also has been advised that his hemoglobin is acceptable and it will be okay for him to stop his iron supplementation.     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 I have advised him to go into Cipro 250 mg p.o. daily.     The patient has been advised in regard to the continuation of topical steroids in the skin in areas of involvement. This will remain an ongoing issue along with moisturizer to the skin in the form of Cetaphil.     In regard to his hypertension, he remains on medicines for this by me. His blood pressure is under good control at this time.     In regard to previous anticoagulation, he is no longer receiving any given his GI bleeding. This will remain in observation.     In regard to smoking cessation the patient will further discuss with NIRU Jacobson, plans for further decrease. He is down to 10 cigarettes a day. This is already a major accomplishment in somebody who used to smoke almost 3 packs of cigarettes a day.     The patient will have a new insurance in 11/2021 and I will make the insurance ladies aware of this.     In 3 weeks he will have a CBC, CMP, TSH and the PET scan the week before.  • The patient was further reviewed on 10/21/2021. I reviewed with him his PET scan that shows still SUV activity in the lower esophagus but better than before but he has now 3 areas of abnormal uptake in the liver consistent with progressive liver  metastasis. The lung anatomy is clear, the bone anatomy is clear. Colovesical fistula was visible still.    On the basis of these changes I do believe that this patient knowing that he has HER/2 positive esophageal cancer needs to change his therapy. Keytruda is not helpful at all and we will discontinue this medicine at this time. I do believe that the inability of Keytruda to overcome his disease process is related to the persistency of smoking and the abnormality related to smoking about bacterial derek. Further analysis recently published in [employer] has confirmed that the benefit of immunotherapy is further boosted by proper amount of derek in the gastrointestinal tract and how cigarettes are damaging to this issue. In any event the patient's issues in regard to cigarette smoking has been already a problem and he has not been able to quit in spite of all of the support available. Therefore stopping the Keytruda at this point including today the patient will move to Enhertu that has been approved in patients who have cancer of the esophagus with liver metastasis. The dose of this medicine will be the GI dose of this medication that will be properly calculated accordingly. He will have formal education and consent for this medicine. I pointed out the most important side effects of this medicine include cardiac toxicity, anemia, leukopenia, thrombocytopenia and pneumonitis that includes cough, shortness of breath, fever. In preparation to minimize pneumonitis the patient will take prednisone 10 mg on day 2 and 3 after each dose of Enhertu. He will receive this medicine every 3 weeks. We will deliver 3-4 cycles and reassess him not only by tumor markers but also radiologically. Hopefully this will have a positive impact not only in the tumor in the esophagus but also tumors metastatic in his liver.   • The patient was reviewed on 11/19/2021. Tolerance to the Enhertu 1st cycle was appropriate with more fatigue, no  increased cough or shortness of breath and some anorexia. His white count, hemoglobin and platelets were normal and we advised him to proceed with his 2nd Enhertu infusion today. He understands that fatigue will be more prominent, that he could have chance for more hematological toxicity and he is starting to develop minor anemia. His white count and platelet count remain acceptable. I reminded him on many occasions today through the visit and insisted to the sister that if his cough pattern changes, increasing or his shortness of breath becomes worse he has to notify us immediately to be sure that he will not develop pneumonitis associated with Enhertu. He still will take 10 mg of prednisone tomorrow and Sunday to try to minimize this phenomenon from happening.     The patient will be having blood counts on a weekly basis with nurse visit to be sure to monitor hematological toxicity and he will return in 3 weeks to proceed with the next cycle. After the 3rd cycle the patient will proceed with radiological assessment including PET scan.   • The patient was further reviewed on 12/30/2021. On clinical grounds the patient has not had any difficulty swallowing, his liver is not enlarged, nodular or tender, he has no jaundice and his CEA level has dropped further. His PET scan discloses resolution of his periportal lymph nodes and one area of resolution of liver metastasis. He has complete resolution of the tumor in the esophagus. He has still 2 active lesions in the liver with significant SUV activity pertinent to his metastatic disease. Given this fact the patient could be a candidate to further receive Enhertu but my concern is about the radiological analysis that shows minimal pulmonary infiltrate bilaterally. The radiologist suggests a radiation pneumonitis. I do not think that is the case. I think this is probably related to Enhertu interstitial lung disease and for this reason I feel the obligation to stop this  medication at this point and to proceed with therapy with prednisone at 20 mg a day for the next 2 weeks until the patient returns back to see me. I made him aware that if the respiratory symptoms including his cough gets any worse or shortness of breath establishes and gets worse he will need to notify us immediately and he will require to be placed in the hospital to receive IV high dose steroid.    The question will be if the patient will be a candidate to receive any further Enhertu or not remains to be seen. Probably will not be the case.     Obviously this above statement opens the door in regard what else to do for his malignancy. Strong consideration will be to go back to Herceptin and also to consider to go back to FOLFOX, Herceptin like he received initially that gave him such a degree of resolution of symptoms. 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 I discussed this with him and his sister present in the room. I encouraged him to decrease his smoking.   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     In preparation for retaking treatment, I advised him that I would like to go back into FOLFOX, Herceptin every 3 weeks starting next week. This combination of medicines never failed him. It was discontinued because of toxicity and neuropathy. Therefore, I think it is worth it to go ahead and proceed with an echocardiogram and resume chemotherapy administration with this , giving him 3 cycles, each one of them 3 weeks apart and see how things evolve from the point of  view tumor markers or radiological analysis of his liver through PET scan. He agrees to proceed.  • On 02/09/2022 I advised the patient to hold off his chemotherapy treatment with FOLFOX because his ANC was 1300 and we will decrease his dose of chemotherapy medicines by 15% for the next round of treatment next week. Hopefully the patient with this dose adjustment will be able to continue his treatment in a normal schedule of every 2 weeks. We have to watch his neuropathy very close. My advice will be eventually to pull out the Oxaliplatin and continue 5FU and leucovorin for a couple of cycles and eventually reintroduce the Oxaliplatin for 1-2 cycles back and forth. This has to be monitored clinically and also along with his response.   The patient was reviewed on 03/16/2022. Actually he feels terrific today and for the last 10 days after the anaphylactic reaction to oxaliplatin. He has no cancer-related pain. He has no hepatomegaly. His liver function tests have further improved and surprising enough his CEA level has dramatically dropped as posted above. It was in the 200 category and now it is in the 30 category, pointing to the fact that I think the oxaliplatin and the FOLFOX regimen indeed has very significant benefit for him along with the Herceptin. Obviously he had an anaphylactic reaction to oxaliplatin and this medicine was discontinued. He will receive today his 5-FU, leucovorin and Herceptin.     I advised him to remain on this regimen for the time being and continue the sequence of events every couple of weeks. I went ahead and scheduled an echocardiogram to follow up on his ejection fraction while on Herceptin.  I reviewed with the patient and his sister today his CT scan that shows a favorable response to the regimen that he is receiving of 5FU and Herceptin. The tumor areas are shrinking and his CEA level is coming down. The patient actually feels very well. The tolerability to the regimen is  excellent and he will continue the medicines at the same dosing and schedule for the time being. No plans to stop unless that he wants to take a trip with his sister to a different state and we will be glad to interrupt treatment for a week or so and no more than that. He understood this clearly.   On 06/08/2022, the patient's cancer of the esophagus with liver metastasis remains very quiet clinically and biochemically with no abdominal pain, no hepatomegaly, no jaundice, drop in the CEA level. Excellent tolerance to the regimen of 5-FU, leucovorin and Herceptin that he is receiving every 3 weeks. In fact his white count, hemoglobin and platelets today are normal. He will proceed with echocardiogram in a few days already scheduled. I encouraged him to remain in the present regimen of chemotherapy treatment every 3 weeks being seen by nurse practitioner in 3 weeks, by me in 6 weeks, and continue laboratory testing including CBC and CMP every 3 weeks and a CEA level in 6 weeks.      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   • On 11/19/2021 he has no symptoms or signs of urinary tract infection and he remains on ciprofloxacin prophylactically everyday.   • On 12/30/2021 his colovesical fistula is still evident on the PET scan. He has gas in the bladder and he has had some hematuria. Therefore I do believe that the patient needs to remain on ciprofloxacin 1 tablet every other day to try to minimize any potential for any other urinary tract infection related to this. The patient under the present circumstances is not a candidate to have any kind of surgery unless the symptoms get worse or clinical picture changes radically.   • His colovesical fistula remains active. He remains taking ciprofloxacin 250 mg p.o. every other day. He has not had any fevers or chills and I advised  him to remain on this medicine for the time being. His pharmacy called in regard that they have not had Cipro anymore. The patient has tablets for almost 2 months supply. Therefore, he has no need for Levaquin or any other medicine or intervention at this point.  • His colovesical fistula is not active at this time. He has not had any pneumaturia of echolalia. He remains on ciprofloxacin prophylactically.   On 03/16/2022 the patient mentioned to me that he is now passing urine through the rectum. He also has fecaluria and pneumaturia still but in lesser amount. He is not having any fever. He has no pelvic pain. I think his colovesical fistula now in some way has bladder to colon  flow. It used to be fecal matter in the bladder and now it is urine through the anal canal. Obviously this will have less implications from the point of view of infection but obviously has the implications that the patient has to in some way be ready to defecate and urinate at the same time. He is going to see Dr. Cm Witt in a few days in regard to this fact. Given the fact that he has no abdominal pain and no fever, I would like for him to have a CT scan of the chest, abdomen and pelvis anyway that would not only show us the status of his cancer but also the status of his fistula. I prefer this over a PET scan for that purpose. This information will be shared with Dr. Cm Witt.   •   In the meantime I advised him to continue using his ciprofloxacin 250 mg every other day for prevention of UTI.  •   He has been seen by Cm Witt MD. He has advised the patient that he could not do too much for the fistula given the fact that it is just inconveniences of having urination through the anal canal from time to time but no urinary tract infection. He asked the patient to postpone any intention for surgery unless that it is absolutely necessary. Postponement of chemotherapy for 3 months if any surgery is necessary will  trigger dramatic progression of his tumor and obviously consequences.   On 06/08/2022, his colovesical fistula continues. Most of the output is urine through the rectum, no stool through the bladder. He has not had any urinary tract infection but he continues using prophylactically his ciprofloxacin 250 mg orally every other day. I asked him to remain on this medicine for the time being.      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   • He remains on Neurontin for the treatment of his peripheral neuropathy. I advised him to remain on this medicine for the time being.  • On 01/12/2022, I advised the patient to remain on his gabapentin for the treatment of his sensory peripheral neuropathy in his feet.  • I went ahead and prescribed on 02/09/2022 his doses of gabapentin to take 300 mg twice a day.   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 This was not aggravated by the few doses of oxaliplatin that he was able to receive until an anaphylactic reaction.  He will remain on Neurontin for the treatment of his peripheral neuropathy associated with Oxaliplatin. The symptoms are under good control.   On 06/08/2022, given the development of minimal edema in his lower extremities that could be related to the use of gabapentin or a medicine that is called Evoxac, I discussed with him maybe the possibility of stopping the gabapentin for a few days and see how things evolve from this point of view. He has no neuropathic symptomatology at this time. He agrees to try and see what happens.      •   •   •   •   ·   4.In regard to his hypertension, he remains on lisinopril, hydrochlorothiazide provided by me.   • I reviewed him back on 11/19/2021. His blood pressure is 90/48 in spite of proper hydration. This is probably the effect of the  combination of Trental and his blood pressure medication. I advised him to discontinue his blood pressure medication at this time and advised him to have proper hydration. He has a device to check his blood pressure at home. I asked him to check this on a daily basis at least a couple of times a day. If his blood pressure goes up above 130/90 he will take 1/2 of the dose of blood pressure medication that he was taking before. Those tablets have the way to be split. He will require checking blood pressure. If the blood pressure drops again he will hold off blood pressure medication indefinitely.   • His hypertension has been reviewed on 12/30/2021. I think his blood pressure today is very good. He will remain on his blood pressure medication for the time being. I find no reason to change dosing.   • The patient was advised to remain on his blood pressure medication, Prinzide.  • On 02/09/2022 he continues doing his blood pressure medication almost on prn basis to minimize very dramatic drop that he has had before. So far he has not had any syncopal episodes or things of this nature.   On 03/16/2022 the patient is not taking blood pressure medication. Actually his blood pressure has been under relatively good control.  His blood pressure remains regulated. He actually is not taking any blood pressure medication at this time. I think he has learned to modify his diet, decrease the consumption of sodium and this probably will have a positive impact on this issue from now on.    The other good news is that this patient has not had any modification in his left ventricular ejection fraction documented through the echocardiogram posted above. Therefore Herceptin has not produced any cardiac toxicity and we will review another echocardiogram in 3 months from now.   On 06/08/2022, the patient has not been using his blood pressure medication because most of his readings at home are below 120s. I asked him to continue watching  this and if he has numbers above 130/90, he will take 1 dose of his blood pressure medication every other day or twice a week. He has the freedom to use this at his necessity.        •   •   •   •   •   ·   5.In regard to his previous GI bleeding associated with his cancer and esophagitis we advised the patient to remain on Protonix.   • On 11/19/2021 he has not had any evidence of GI bleeding and the hemoglobin remains stable. The minor drop obeys to toxicity from Enhertu and bone marrow suppression not because of effect of GI bleeding.   • He has not had any other episodes of GI bleeding as per 12/30/2021.   • On 01/12/2022 he has not had any other episodes of GI bleeding. He is no longer taking any anticoagulants.  • On 02/09/2022 he has not had episodes of melena or enterorrhagia, his hemoglobin is stable. He has not had any use of anticoagulants since GI bleeding.   On 03/16/2022 the patient has not had any other episodes of GI bleeding. He is no longer taking anticoagulant.  Today this issue is not an issue anymore.   On 06/08/2022, the patient has no evidence of GI bleeding. Hemoglobin is stable. MCV remains normal. The patient is no longer receiving anticoagulants.      •   •   •   •   •   ·         6.In regard finally peripheral arterial disease I am going to send the patient a prescription for Trental to take 1 tablet twice a day. He has an appointment to be seen by vascular on 12/18/2021. I pointed out to the patient that if he wants to change the route of this process he must modify his cigarettes otherwise there is no hope. He was not able to take medication provided by Vascular Surgery because of side effects.   In regard to his peripheral arterial disease I advised him on 11/19/2021 to decrease his Trental to 1 tablet twice a day. He is now using topical Cetaphil on his feet to try to improve moisture and decrease cracking.   On 12/30/2021 his peripheral neuropathy actually is better through the  Doppler study done by [facility]. I wonder if the trental is the one playing a role in this. I have asked him to remain on the trental for the time being.       He is not willing to entertain smoking cessation to help out peripheral arterial disease and we insisted into this in presence of his sister. He says that he is working on it. I do not feel any confidence in seeing this phenomenon anymore and that is a reality that we need to contemplate and just wait.   • In regard to his peripheral artery disease, the patient is still smoking 15 cigarettes a day. The Trental has made a big difference in regard to his ability to get around and walk. I advised him to remain on Trental.  • On 02/09/2022 his peripheral arterial disease is about the same, the hemoglobin is stable, the Trental is still ongoing, it is beneficial to him.   On 03/16/2022 his peripheral arterial disease remains about the same. He has no gangrene. He has an element of claudication upon walking. We advised him to remain on the same medicines that he is taking. One of them is Trental. I also advised him about smoking cessation. This is not going to change. He remains on 15 cigarettes a day.  He remains on Trental. He has not had any claudication. He has minimal purplish discoloration of his toes that is not prominent with no gangrene and no ischemic neuropathy.   On 06/08/2022, his peripheral artery disease is quiet. He will see Vascular Surgery in a few days. He remains on medicine for this by me. That includes the use of Trental. I encouraged him to remain on this medicine for the time being.      •   •   •   ·     7.This patient's blood sugar has remained in the 130's, 140's, 150's for the last several visits. His hemoglobin A1C today is 5.9.  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 He will require the utilization of Glucotrol  XL 5 mg. I went ahead and sent this prescription to the pharmacy. I pointed out to him that he is eating a diet rich in carbohydrates mostly and I pointed out to him that he needs to eat more vegetables, some fruit and high complex carbohydrates that will minimize issues pertinent to his blood sugar. It is even more important now that he has initiated prednisone as posted.   • I reviewed with him on 01/12/2022 his hemoglobin A1c of 5.9. I advised him to remain on a diet that is not too much rich in sugar and stay on his glipizide 5 mg every day. He is very selective in his diet. He finds things that tastes okay and other ones that do not taste okay and I think we have no solution for this definitive problem.  • On 02/09/2022 the patient's glucose seems to be under control. He will remain on glipizide 5 mg oral daily.     I will review him back in 3 weeks. He will return for treatment next week and for the dose adjustments were discussed with Amber Lam RN.  On 03/16/2022 I asked the patient to continue to monitor his blood sugar and to continue taking his minimal dose of Glucotrol.  Since 06/08/2022, the blood sugar on this patient remains minimally elevated. He remains on Glucotrol. I do believe that the patient has diabetes type 2, very mild case, and encouraged him to try to watch sugar utilization, processed foods, too many carbs, and he is aware of that. I advised him to remain on his Glucotrol XL, 1 tablet a day.    8.Swelling in the lower extremities. The patient does not look to me like he has any congestive heart failure, kidney dysfunction, liver dysfunction to produce this, neither has hypoalbuminemia, increased creatinine or congestive heart failure. Most likely is the case of gabapentin and/or Evoxac triggering fluid retention. I asked him to work on this medicines, stopping and going and see if the swelling resolves spontaneously. If that is the case this will fix the problem. If that is not the  case and the swelling bothers him, he will require to take a low-dose diuretic like Lasix 20 mg on p.r.n. basis once or twice a week.      • RECOMMENDATIONS:    All the recommendations in this patient's care have been posted in each one of the medical problems above. I do not need to resummarize this and this has been discussed with the patient and his sister on 06/08/2022.    •

## 2022-06-10 ENCOUNTER — INFUSION (OUTPATIENT)
Dept: ONCOLOGY | Facility: HOSPITAL | Age: 67
End: 2022-06-10

## 2022-06-10 DIAGNOSIS — Z45.2 ENCOUNTER FOR ADJUSTMENT OR MANAGEMENT OF VASCULAR ACCESS DEVICE: ICD-10-CM

## 2022-06-10 DIAGNOSIS — C15.5 MALIGNANT NEOPLASM OF LOWER THIRD OF ESOPHAGUS: Primary | ICD-10-CM

## 2022-06-10 DIAGNOSIS — C78.7 LIVER METASTASIS: ICD-10-CM

## 2022-06-10 DIAGNOSIS — D49.0 ESOPHAGUS NEOPLASM: ICD-10-CM

## 2022-06-10 PROCEDURE — 25010000002 HEPARIN LOCK FLUSH PER 10 UNITS: Performed by: INTERNAL MEDICINE

## 2022-06-10 RX ORDER — SODIUM CHLORIDE 0.9 % (FLUSH) 0.9 %
10 SYRINGE (ML) INJECTION AS NEEDED
Status: DISCONTINUED | OUTPATIENT
Start: 2022-06-10 | End: 2022-06-10 | Stop reason: HOSPADM

## 2022-06-10 RX ORDER — HEPARIN SODIUM (PORCINE) LOCK FLUSH IV SOLN 100 UNIT/ML 100 UNIT/ML
500 SOLUTION INTRAVENOUS AS NEEDED
Status: CANCELLED | OUTPATIENT
Start: 2022-06-10

## 2022-06-10 RX ORDER — HEPARIN SODIUM (PORCINE) LOCK FLUSH IV SOLN 100 UNIT/ML 100 UNIT/ML
500 SOLUTION INTRAVENOUS AS NEEDED
Status: DISCONTINUED | OUTPATIENT
Start: 2022-06-10 | End: 2022-06-10 | Stop reason: HOSPADM

## 2022-06-10 RX ORDER — SODIUM CHLORIDE 0.9 % (FLUSH) 0.9 %
10 SYRINGE (ML) INJECTION AS NEEDED
Status: CANCELLED | OUTPATIENT
Start: 2022-06-10

## 2022-06-10 RX ADMIN — Medication 10 ML: at 10:06

## 2022-06-10 RX ADMIN — Medication 500 UNITS: at 10:06

## 2022-06-20 ENCOUNTER — TELEPHONE (OUTPATIENT)
Dept: ONCOLOGY | Facility: CLINIC | Age: 67
End: 2022-06-20

## 2022-06-20 RX ORDER — FUROSEMIDE 20 MG/1
20 TABLET ORAL EVERY OTHER DAY
Qty: 28 TABLET | Refills: 2 | Status: SHIPPED | OUTPATIENT
Start: 2022-06-20

## 2022-06-20 NOTE — TELEPHONE ENCOUNTER
Attempted to call Han back in response to a voicemail left on my direct. No answer. Left voicemail. Marisol Lam RN

## 2022-06-20 NOTE — TELEPHONE ENCOUNTER
Called pt who states if swelling had not resolved after trialing off of some medications with fluid retaining side effects then Dr. Haynes would like to order diuretics. Swelling has not improved. Discussed w/ Dr. Haynes. He will order lasix qevery other day. Pt v/u. Marisol Lam RN

## 2022-06-22 ENCOUNTER — HOSPITAL ENCOUNTER (OUTPATIENT)
Dept: CARDIOLOGY | Facility: HOSPITAL | Age: 67
Discharge: HOME | End: 2022-06-22
Admitting: INTERNAL MEDICINE

## 2022-06-22 VITALS
HEIGHT: 69 IN | BODY MASS INDEX: 29.62 KG/M2 | WEIGHT: 200 LBS | HEART RATE: 66 BPM | SYSTOLIC BLOOD PRESSURE: 130 MMHG | DIASTOLIC BLOOD PRESSURE: 80 MMHG

## 2022-06-22 DIAGNOSIS — I10 ESSENTIAL HYPERTENSION: ICD-10-CM

## 2022-06-22 DIAGNOSIS — Z45.2 ENCOUNTER FOR ADJUSTMENT OR MANAGEMENT OF VASCULAR ACCESS DEVICE: ICD-10-CM

## 2022-06-22 DIAGNOSIS — T45.1X5A PERIPHERAL NEUROPATHY DUE TO CHEMOTHERAPY: ICD-10-CM

## 2022-06-22 DIAGNOSIS — G62.0 PERIPHERAL NEUROPATHY DUE TO CHEMOTHERAPY: ICD-10-CM

## 2022-06-22 DIAGNOSIS — C15.5 MALIGNANT NEOPLASM OF LOWER THIRD OF ESOPHAGUS: ICD-10-CM

## 2022-06-22 DIAGNOSIS — N32.1 RECTO-BLADDERNECK FISTULA: ICD-10-CM

## 2022-06-22 DIAGNOSIS — Z72.0 TOBACCO ABUSE: ICD-10-CM

## 2022-06-22 DIAGNOSIS — R73.01 IMPAIRED FASTING GLUCOSE: ICD-10-CM

## 2022-06-22 LAB
AORTIC ARCH: 2.2 CM
AORTIC DIMENSIONLESS INDEX: 0.7 (DI)
ASCENDING AORTA: 2.6 CM
BH CV ECHO LEFT VENTRICLE GLOBAL LONGITUDINAL STRAIN: -21.3 %
BH CV ECHO MEAS - ACS: 1.82 CM
BH CV ECHO MEAS - AO MAX PG: 9.6 MMHG
BH CV ECHO MEAS - AO MEAN PG: 5.4 MMHG
BH CV ECHO MEAS - AO ROOT DIAM: 3.6 CM
BH CV ECHO MEAS - AO V2 MAX: 154.7 CM/SEC
BH CV ECHO MEAS - AO V2 VTI: 31.7 CM
BH CV ECHO MEAS - AVA(I,D): 2.13 CM2
BH CV ECHO MEAS - EDV(CUBED): 46.9 ML
BH CV ECHO MEAS - EDV(MOD-SP2): 100 ML
BH CV ECHO MEAS - EDV(MOD-SP4): 128 ML
BH CV ECHO MEAS - EF(MOD-BP): 61.8 %
BH CV ECHO MEAS - EF(MOD-SP2): 61 %
BH CV ECHO MEAS - EF(MOD-SP4): 62.5 %
BH CV ECHO MEAS - EF_3D-VOL: 55 %
BH CV ECHO MEAS - ESV(CUBED): 17.1 ML
BH CV ECHO MEAS - ESV(MOD-SP2): 39 ML
BH CV ECHO MEAS - ESV(MOD-SP4): 48 ML
BH CV ECHO MEAS - FS: 28.6 %
BH CV ECHO MEAS - IVS/LVPW: 0.86 CM
BH CV ECHO MEAS - IVSD: 1.01 CM
BH CV ECHO MEAS - LA 3D VOL INDEX: 26
BH CV ECHO MEAS - LAT PEAK E' VEL: 8.4 CM/SEC
BH CV ECHO MEAS - LV DIASTOLIC VOL/BSA (35-75): 62 CM2
BH CV ECHO MEAS - LV MASS(C)D: 123.3 GRAMS
BH CV ECHO MEAS - LV MAX PG: 4.1 MMHG
BH CV ECHO MEAS - LV MEAN PG: 2.5 MMHG
BH CV ECHO MEAS - LV SYSTOLIC VOL/BSA (12-30): 23.2 CM2
BH CV ECHO MEAS - LV V1 MAX: 101.5 CM/SEC
BH CV ECHO MEAS - LV V1 VTI: 21.1 CM
BH CV ECHO MEAS - LVIDD: 3.6 CM
BH CV ECHO MEAS - LVIDS: 2.6 CM
BH CV ECHO MEAS - LVOT AREA: 3.2 CM2
BH CV ECHO MEAS - LVOT DIAM: 2.02 CM
BH CV ECHO MEAS - LVPWD: 1.17 CM
BH CV ECHO MEAS - MED PEAK E' VEL: 9.4 CM/SEC
BH CV ECHO MEAS - MV A DUR: 0.17 SEC
BH CV ECHO MEAS - MV A MAX VEL: 119.1 CM/SEC
BH CV ECHO MEAS - MV DEC SLOPE: 233.1 CM/SEC2
BH CV ECHO MEAS - MV DEC TIME: 0.3 MSEC
BH CV ECHO MEAS - MV E MAX VEL: 96.9 CM/SEC
BH CV ECHO MEAS - MV E/A: 0.81
BH CV ECHO MEAS - MV MAX PG: 6.8 MMHG
BH CV ECHO MEAS - MV MEAN PG: 3.3 MMHG
BH CV ECHO MEAS - MV V2 VTI: 32.5 CM
BH CV ECHO MEAS - MVA(VTI): 2.07 CM2
BH CV ECHO MEAS - PA ACC TIME: 0.11 SEC
BH CV ECHO MEAS - PA PR(ACCEL): 31.5 MMHG
BH CV ECHO MEAS - PA V2 MAX: 120.8 CM/SEC
BH CV ECHO MEAS - PULM A REVS DUR: 0.15 SEC
BH CV ECHO MEAS - PULM A REVS VEL: 34 CM/SEC
BH CV ECHO MEAS - PULM DIAS VEL: 33.5 CM/SEC
BH CV ECHO MEAS - PULM S/D: 1.33
BH CV ECHO MEAS - PULM SYS VEL: 44.5 CM/SEC
BH CV ECHO MEAS - QP/QS: 1.13
BH CV ECHO MEAS - RAP SYSTOLE: 3 MMHG
BH CV ECHO MEAS - RV MAX PG: 3.3 MMHG
BH CV ECHO MEAS - RV V1 MAX: 90.8 CM/SEC
BH CV ECHO MEAS - RV V1 VTI: 18.2 CM
BH CV ECHO MEAS - RVOT DIAM: 2.31 CM
BH CV ECHO MEAS - SI(MOD-SP2): 29.5 ML/M2
BH CV ECHO MEAS - SI(MOD-SP4): 38.7 ML/M2
BH CV ECHO MEAS - SUP REN AO DIAM: 2.7 CM
BH CV ECHO MEAS - SV(LVOT): 67.4 ML
BH CV ECHO MEAS - SV(MOD-SP2): 61 ML
BH CV ECHO MEAS - SV(MOD-SP4): 80 ML
BH CV ECHO MEAS - SV(RVOT): 76.2 ML
BH CV ECHO MEAS - TAPSE (>1.6): 2.28 CM
BH CV ECHO MEASUREMENTS AVERAGE E/E' RATIO: 10.89
BH CV XLRA - RV BASE: 3.2 CM
BH CV XLRA - RV LENGTH: 7.2 CM
BH CV XLRA - RV MID: 2.7 CM
BH CV XLRA - TDI S': 19.3 CM/SEC
LEFT ATRIUM VOLUME INDEX: 22.4 ML/M2
LV EF 2D ECHO EST: 62 %
MAXIMAL PREDICTED HEART RATE: 155 BPM
SINUS: 3.3 CM
STJ: 2.6 CM
STRESS TARGET HR: 132 BPM

## 2022-06-22 PROCEDURE — 93356 MYOCRD STRAIN IMG SPCKL TRCK: CPT

## 2022-06-22 PROCEDURE — 25010000002 PERFLUTREN (DEFINITY) 8.476 MG IN SODIUM CHLORIDE (PF) 0.9 % 10 ML INJECTION: Performed by: INTERNAL MEDICINE

## 2022-06-22 PROCEDURE — 93306 TTE W/DOPPLER COMPLETE: CPT | Performed by: INTERNAL MEDICINE

## 2022-06-22 PROCEDURE — 93356 MYOCRD STRAIN IMG SPCKL TRCK: CPT | Performed by: INTERNAL MEDICINE

## 2022-06-22 PROCEDURE — 93306 TTE W/DOPPLER COMPLETE: CPT

## 2022-06-22 RX ADMIN — PERFLUTREN 2 ML: 6.52 INJECTION, SUSPENSION INTRAVENOUS at 12:58

## 2022-06-29 ENCOUNTER — INFUSION (OUTPATIENT)
Dept: ONCOLOGY | Facility: HOSPITAL | Age: 67
End: 2022-06-29

## 2022-06-29 ENCOUNTER — OFFICE VISIT (OUTPATIENT)
Dept: ONCOLOGY | Facility: CLINIC | Age: 67
End: 2022-06-29

## 2022-06-29 VITALS
RESPIRATION RATE: 16 BRPM | SYSTOLIC BLOOD PRESSURE: 118 MMHG | HEIGHT: 69 IN | BODY MASS INDEX: 29.43 KG/M2 | HEART RATE: 74 BPM | WEIGHT: 198.7 LBS | DIASTOLIC BLOOD PRESSURE: 72 MMHG | TEMPERATURE: 97.6 F | OXYGEN SATURATION: 96 %

## 2022-06-29 DIAGNOSIS — G62.0 PERIPHERAL NEUROPATHY DUE TO CHEMOTHERAPY: ICD-10-CM

## 2022-06-29 DIAGNOSIS — C78.7 LIVER METASTASIS: ICD-10-CM

## 2022-06-29 DIAGNOSIS — C15.5 MALIGNANT NEOPLASM OF LOWER THIRD OF ESOPHAGUS: Primary | ICD-10-CM

## 2022-06-29 DIAGNOSIS — C15.5 MALIGNANT NEOPLASM OF LOWER THIRD OF ESOPHAGUS: ICD-10-CM

## 2022-06-29 DIAGNOSIS — T45.1X5A PERIPHERAL NEUROPATHY DUE TO CHEMOTHERAPY: ICD-10-CM

## 2022-06-29 DIAGNOSIS — D49.0 ESOPHAGUS NEOPLASM: ICD-10-CM

## 2022-06-29 LAB
ALBUMIN SERPL-MCNC: 4.1 G/DL (ref 3.5–5.2)
ALBUMIN/GLOB SERPL: 1.5 G/DL (ref 1.1–2.4)
ALP SERPL-CCNC: 130 U/L (ref 38–116)
ALT SERPL W P-5'-P-CCNC: 34 U/L (ref 0–41)
ANION GAP SERPL CALCULATED.3IONS-SCNC: 9.9 MMOL/L (ref 5–15)
AST SERPL-CCNC: 30 U/L (ref 0–40)
BASOPHILS # BLD AUTO: 0.05 10*3/MM3 (ref 0–0.2)
BASOPHILS NFR BLD AUTO: 0.7 % (ref 0–1.5)
BILIRUB SERPL-MCNC: 0.5 MG/DL (ref 0.2–1.2)
BUN SERPL-MCNC: 12 MG/DL (ref 6–20)
BUN/CREAT SERPL: 16.7 (ref 7.3–30)
CALCIUM SPEC-SCNC: 9.4 MG/DL (ref 8.5–10.2)
CHLORIDE SERPL-SCNC: 103 MMOL/L (ref 98–107)
CO2 SERPL-SCNC: 24.1 MMOL/L (ref 22–29)
CREAT SERPL-MCNC: 0.72 MG/DL (ref 0.7–1.3)
DEPRECATED RDW RBC AUTO: 52.1 FL (ref 37–54)
EGFRCR SERPLBLD CKD-EPI 2021: 101.4 ML/MIN/1.73
EOSINOPHIL # BLD AUTO: 0.43 10*3/MM3 (ref 0–0.4)
EOSINOPHIL NFR BLD AUTO: 6.3 % (ref 0.3–6.2)
ERYTHROCYTE [DISTWIDTH] IN BLOOD BY AUTOMATED COUNT: 15.3 % (ref 12.3–15.4)
GLOBULIN UR ELPH-MCNC: 2.8 GM/DL (ref 1.8–3.5)
GLUCOSE SERPL-MCNC: 107 MG/DL (ref 74–124)
HCT VFR BLD AUTO: 41.9 % (ref 37.5–51)
HGB BLD-MCNC: 14.2 G/DL (ref 13–17.7)
IMM GRANULOCYTES # BLD AUTO: 0.07 10*3/MM3 (ref 0–0.05)
IMM GRANULOCYTES NFR BLD AUTO: 1 % (ref 0–0.5)
LYMPHOCYTES # BLD AUTO: 1.2 10*3/MM3 (ref 0.7–3.1)
LYMPHOCYTES NFR BLD AUTO: 17.7 % (ref 19.6–45.3)
MCH RBC QN AUTO: 31.6 PG (ref 26.6–33)
MCHC RBC AUTO-ENTMCNC: 33.9 G/DL (ref 31.5–35.7)
MCV RBC AUTO: 93.3 FL (ref 79–97)
MONOCYTES # BLD AUTO: 0.68 10*3/MM3 (ref 0.1–0.9)
MONOCYTES NFR BLD AUTO: 10 % (ref 5–12)
NEUTROPHILS NFR BLD AUTO: 4.36 10*3/MM3 (ref 1.7–7)
NEUTROPHILS NFR BLD AUTO: 64.3 % (ref 42.7–76)
NRBC BLD AUTO-RTO: 0 /100 WBC (ref 0–0.2)
PLATELET # BLD AUTO: 141 10*3/MM3 (ref 140–450)
PMV BLD AUTO: 10.1 FL (ref 6–12)
POTASSIUM SERPL-SCNC: 3.8 MMOL/L (ref 3.5–4.7)
PROT SERPL-MCNC: 6.9 G/DL (ref 6.3–8)
RBC # BLD AUTO: 4.49 10*6/MM3 (ref 4.14–5.8)
SODIUM SERPL-SCNC: 137 MMOL/L (ref 134–145)
WBC NRBC COR # BLD: 6.79 10*3/MM3 (ref 3.4–10.8)

## 2022-06-29 PROCEDURE — 25010000002 FLUOROURACIL PER 500 MG: Performed by: NURSE PRACTITIONER

## 2022-06-29 PROCEDURE — 85025 COMPLETE CBC W/AUTO DIFF WBC: CPT

## 2022-06-29 PROCEDURE — 99214 OFFICE O/P EST MOD 30 MIN: CPT | Performed by: NURSE PRACTITIONER

## 2022-06-29 PROCEDURE — 25010000002 LEUCOVORIN CALCIUM PER 50 MG: Performed by: NURSE PRACTITIONER

## 2022-06-29 PROCEDURE — 96411 CHEMO IV PUSH ADDL DRUG: CPT

## 2022-06-29 PROCEDURE — 96375 TX/PRO/DX INJ NEW DRUG ADDON: CPT

## 2022-06-29 PROCEDURE — 80053 COMPREHEN METABOLIC PANEL: CPT

## 2022-06-29 PROCEDURE — 96367 TX/PROPH/DG ADDL SEQ IV INF: CPT

## 2022-06-29 PROCEDURE — 25010000002 DIPHENHYDRAMINE PER 50 MG: Performed by: NURSE PRACTITIONER

## 2022-06-29 PROCEDURE — 25010000002 PALONOSETRON PER 25 MCG: Performed by: NURSE PRACTITIONER

## 2022-06-29 PROCEDURE — 96416 CHEMO PROLONG INFUSE W/PUMP: CPT

## 2022-06-29 PROCEDURE — 96413 CHEMO IV INFUSION 1 HR: CPT

## 2022-06-29 PROCEDURE — 25010000002 TRASTUZUMAB-ANNS 420 MG RECONSTITUTED SOLUTION 1 EACH VIAL: Performed by: NURSE PRACTITIONER

## 2022-06-29 RX ORDER — PALONOSETRON 0.05 MG/ML
0.25 INJECTION, SOLUTION INTRAVENOUS ONCE
Status: COMPLETED | OUTPATIENT
Start: 2022-06-29 | End: 2022-06-29

## 2022-06-29 RX ORDER — FAMOTIDINE 10 MG/ML
20 INJECTION, SOLUTION INTRAVENOUS AS NEEDED
Status: CANCELLED | OUTPATIENT
Start: 2022-06-29

## 2022-06-29 RX ORDER — FLUOROURACIL 50 MG/ML
340 INJECTION, SOLUTION INTRAVENOUS ONCE
Status: COMPLETED | OUTPATIENT
Start: 2022-06-29 | End: 2022-06-29

## 2022-06-29 RX ORDER — SODIUM CHLORIDE 9 MG/ML
250 INJECTION, SOLUTION INTRAVENOUS ONCE
Status: COMPLETED | OUTPATIENT
Start: 2022-06-29 | End: 2022-06-29

## 2022-06-29 RX ORDER — FAMOTIDINE 20 MG/1
20 TABLET, FILM COATED ORAL ONCE
Status: CANCELLED
Start: 2022-06-29 | End: 2022-06-29

## 2022-06-29 RX ORDER — PALONOSETRON 0.05 MG/ML
0.25 INJECTION, SOLUTION INTRAVENOUS ONCE
Status: CANCELLED | OUTPATIENT
Start: 2022-06-29

## 2022-06-29 RX ORDER — FAMOTIDINE 20 MG/1
20 TABLET, FILM COATED ORAL ONCE
Status: COMPLETED | OUTPATIENT
Start: 2022-06-29 | End: 2022-06-29

## 2022-06-29 RX ORDER — SODIUM CHLORIDE 9 MG/ML
250 INJECTION, SOLUTION INTRAVENOUS ONCE
Status: CANCELLED | OUTPATIENT
Start: 2022-06-29

## 2022-06-29 RX ORDER — FLUOROURACIL 50 MG/ML
340 INJECTION, SOLUTION INTRAVENOUS ONCE
Status: CANCELLED | OUTPATIENT
Start: 2022-06-29

## 2022-06-29 RX ADMIN — LEUCOVORIN CALCIUM 700 MG: 350 INJECTION, POWDER, LYOPHILIZED, FOR SUSPENSION INTRAMUSCULAR; INTRAVENOUS at 09:22

## 2022-06-29 RX ADMIN — DIPHENHYDRAMINE HYDROCHLORIDE 25 MG: 50 INJECTION, SOLUTION INTRAMUSCULAR; INTRAVENOUS at 08:30

## 2022-06-29 RX ADMIN — FLUOROURACIL 700 MG: 50 INJECTION, SOLUTION INTRAVENOUS at 09:58

## 2022-06-29 RX ADMIN — FLUOROURACIL 4180 MG: 50 INJECTION, SOLUTION INTRAVENOUS at 09:58

## 2022-06-29 RX ADMIN — PALONOSETRON 0.25 MG: 0.05 INJECTION, SOLUTION INTRAVENOUS at 08:29

## 2022-06-29 RX ADMIN — TRASTUZUMAB-ANNS 530 MG: 420 INJECTION, POWDER, LYOPHILIZED, FOR SOLUTION INTRAVENOUS at 08:49

## 2022-06-29 RX ADMIN — SODIUM CHLORIDE 250 ML: 9 INJECTION, SOLUTION INTRAVENOUS at 08:29

## 2022-06-29 RX ADMIN — FAMOTIDINE 20 MG: 20 TABLET ORAL at 08:29

## 2022-06-29 NOTE — PROGRESS NOTES
"  Subjective     REASON FOR FOLLOW UP:  1.  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  2. Colovesical fistula, chronic antibiotic therapy with Cipro.  3.  DVT of the right and left lower extremity. 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  Acute GI bleed      History of Present Illness    The patient is a 66 y.o. male the above-mentioned here today for lab review and evaluation due for continued treatment with 5-FU, leucovorin, trastuzumab.  Patient has had ongoing issues with swelling and cold last week.  He was prescribed Lasix 20 mg to use every other day.  He does report that that has helped.  He is also been trying to prop his feet above his heart twice a day, which also seems to be helping.  He denies issues with worsening shortness of breath.  He has a good appetite.  He denies new complaints of pain.  The patient did have an echocardiogram last week which we did review today.    Past Medical History:   Diagnosis Date   • Arthritis    • Cancer (HCC)     prostate cancer 2008   • Cancer (HCC)     esophageal   • Chronic anticoagulation     on xarelto   • Elevated PSA    • Esophageal mass    • Fistula     colon and bladder   • H/O Lung nodule    • Hepatitis     CHILD--PT STATED \"I THINK IT WAS A.\"   • History of chemotherapy    • History of pneumonia    • Hx of blood clots 08/10/2019   • Hyperlipidemia    • Hypertension    • Nail fungus    • Neuropathy    • PVD (peripheral vascular disease) (HCC)    • Recto-bladderneck fistula     on poab for recurrent uti        Past Surgical History:   Procedure Laterality Date   • COLONOSCOPY N/A 2/4/2020    Procedure: COLONOSCOPY;  Surgeon: Guy Sears MD;  Location: [facility] ENDOSCOPY;  Service: General;  Laterality: N/A;  PRE-COLOVESICAL FISTULA  POST-- DIVERTICULOSIS   • CYSTOSCOPY  02/06/2020   • CYSTOSCOPY Bilateral 2/26/2020    Procedure: CYSTOSCOPY RETROGRADE;  Surgeon: Cm Witt MD;  " Location: [facility] MAIN OR;  Service: Urology;  Laterality: Bilateral;   • ENDOSCOPY     • ENDOSCOPY N/A 6/19/2021    Procedure: ESOPHAGOGASTRODUODENOSCOPY WITH BIOPSY;  Surgeon: Mary Brito MD;  Location: [facility] MAIN OR;  Service: Gastroenterology;  Laterality: N/A;   • ENDOSCOPY N/A 8/5/2021    Procedure: ESOPHAGOGASTRODUODENOSCOPY HEMOSPRAY;  Surgeon: Naga Shelby MD;  Location: [facility] ENDOSCOPY;  Service: Gastroenterology;  Laterality: N/A;  HX OF ESOPHAGEAL  CANCER;MELENA  POST: ESOPHAGEAL BLEEDING; ESOPHAGEAL MASS   • HERNIA REPAIR Right 1999    inguinal hernia   • PROSTATE SURGERY  03/2008    prostatectectomy   • VENOUS ACCESS DEVICE (PORT) INSERTION N/A 7/16/2019    Procedure: INSERTION VENOUS ACCESS DEVICE WITH FLUORO AND EGD WITH BIOPSY;  Surgeon: Mary Brito MD;  Location: [facility] MAIN OR;  Service: Thoracic        Current Outpatient Medications on File Prior to Visit   Medication Sig Dispense Refill   • acetaminophen (TYLENOL) 500 MG tablet Take 500 mg by mouth Every 6 (Six) Hours As Needed for Mild Pain .     • cevimeline (EVOXAC) 30 MG capsule Take 30 mg by mouth 3 (Three) Times a Day.     • ciprofloxacin (CIPRO) 250 MG tablet Take 1 tablet by mouth Daily. Take one every other day 90 tablet 1   • econazole nitrate (SPECTAZOLE) 1 % cream 2 (Two) Times a Day.     • furosemide (LASIX) 20 MG tablet Take 1 tablet by mouth Every Other Day. 28 tablet 2   • gabapentin (NEURONTIN) 300 MG capsule TAKE 1 CAPSULE BY MOUTH TWICE A  capsule 0   • glipizide (GLUCOTROL XL) 5 MG ER tablet TAKE 1 TABLET BY MOUTH EVERY DAY 90 tablet 1   • lisinopril-hydrochlorothiazide (PRINZIDE,ZESTORETIC) 20-12.5 MG per tablet TAKE 1 TABLET BY MOUTH EVERY DAY 90 tablet 0   • nicotine (NICODERM CQ) 21 MG/24HR patch Place 1 patch on the skin as directed by provider Daily. (Patient taking differently: Place 1 patch on the skin as directed by provider Daily. Has at home but hasn't started taking yet) 28 each 1   • OLANZapine  (ZyPREXA) 2.5 MG tablet Take 1 tablet by mouth Every Night. 90 tablet 0   • ondansetron (ZOFRAN) 4 MG tablet Take 1 tablet by mouth Every 8 (Eight) Hours As Needed for Nausea or Vomiting. 30 tablet 2   • pantoprazole (Protonix) 40 MG EC tablet Take 1 tablet by mouth 2 (Two) Times a Day. 90 tablet 3   • pentoxifylline (TRENtal) 400 MG CR tablet TAKE 1 TABLET BY MOUTH TWICE A DAY WITH MEALS 90 tablet 0   • potassium chloride ER (K-TAB) 20 MEQ tablet controlled-release ER tablet Take 1 tablet by mouth Daily. 90 tablet 3   • Probiotic Product (PROBIOTIC DAILY PO) Take 1 tablet by mouth Daily.     • triamcinolone (KENALOG) 0.1 % ointment Apply 1 application topically to the appropriate area as directed 2 (Two) Times a Day. 30 g 1     Current Facility-Administered Medications on File Prior to Visit   Medication Dose Route Frequency Provider Last Rate Last Admin   • [COMPLETED] diphenhydrAMINE (BENADRYL) IVPB 25 mg  25 mg Intravenous Once Kristin Moss APRN   Stopped at 06/29/22 0845   • [COMPLETED] famotidine (PEPCID) tablet 20 mg  20 mg Oral Once Kristin Moss APRN   20 mg at 06/29/22 0829   • fluorouracil (ADRUCIL) 4,180 mg in sodium chloride 0.9 % 240 mL chemo infusion - FOR HOME USE  2,040 mg/m2 (Treatment Plan Recorded) Intravenous Once Kristin Moss APRN       • fluorouracil (ADRUCIL) chemo injection 700 mg  340 mg/m2 (Treatment Plan Recorded) Intravenous Once Kristin Moss APRN       • leucovorin 700 mg in sodium chloride 0.9 % 285 mL IVPB  340 mg/m2 (Treatment Plan Recorded) Intravenous Once Kristin Moss APRN 600 mL/hr at 06/29/22 0922 700 mg at 06/29/22 0922   • [COMPLETED] palonosetron (ALOXI) injection 0.25 mg  0.25 mg Intravenous Once Kristin Moss APRN   0.25 mg at 06/29/22 0829   • [COMPLETED] sodium chloride 0.9 % infusion 250 mL  250 mL Intravenous Once Kristin Moss APRN 20 mL/hr at 06/29/22 0829 250 mL at 06/29/22 0829   • [COMPLETED] trastuzumab-anns  (KANJINTI) 530 mg in sodium chloride 0.9 % 250 mL chemo IVPB  6 mg/kg (Treatment Plan Recorded) Intravenous Once Marilyn Mossnifer L, APRN   Stopped at 06/29/22 0919        ALLERGIES:    Allergies   Allergen Reactions   • Oxaliplatin Anaphylaxis        Social History     Socioeconomic History   • Marital status: Single   • Years of education: High school   Tobacco Use   • Smoking status: Current Every Day Smoker     Packs/day: 1.00     Years: 38.00     Pack years: 38.00     Types: Cigarettes     Start date: 1974   • Smokeless tobacco: Never Used   • Tobacco comment: Quit for a period of 8 years.    Vaping Use   • Vaping Use: Never used   Substance and Sexual Activity   • Alcohol use: Not Currently   • Drug use: Never   • Sexual activity: Defer        Family History   Problem Relation Age of Onset   • Hypertension Mother    • Stroke Mother    • Hypertension Other    • Lung disease Other    • Prostate cancer Other    • Lung cancer Father    • Malig Hyperthermia Neg Hx       Current Outpatient Medications on File Prior to Visit   Medication Sig Dispense Refill   • acetaminophen (TYLENOL) 500 MG tablet Take 500 mg by mouth Every 6 (Six) Hours As Needed for Mild Pain .     • cevimeline (EVOXAC) 30 MG capsule Take 30 mg by mouth 3 (Three) Times a Day.     • ciprofloxacin (CIPRO) 250 MG tablet Take 1 tablet by mouth Daily. Take one every other day 90 tablet 1   • econazole nitrate (SPECTAZOLE) 1 % cream 2 (Two) Times a Day.     • furosemide (LASIX) 20 MG tablet Take 1 tablet by mouth Every Other Day. 28 tablet 2   • gabapentin (NEURONTIN) 300 MG capsule TAKE 1 CAPSULE BY MOUTH TWICE A  capsule 0   • glipizide (GLUCOTROL XL) 5 MG ER tablet TAKE 1 TABLET BY MOUTH EVERY DAY 90 tablet 1   • lisinopril-hydrochlorothiazide (PRINZIDE,ZESTORETIC) 20-12.5 MG per tablet TAKE 1 TABLET BY MOUTH EVERY DAY 90 tablet 0   • nicotine (NICODERM CQ) 21 MG/24HR patch Place 1 patch on the skin as directed by provider Daily. (Patient  taking differently: Place 1 patch on the skin as directed by provider Daily. Has at home but hasn't started taking yet) 28 each 1   • OLANZapine (ZyPREXA) 2.5 MG tablet Take 1 tablet by mouth Every Night. 90 tablet 0   • ondansetron (ZOFRAN) 4 MG tablet Take 1 tablet by mouth Every 8 (Eight) Hours As Needed for Nausea or Vomiting. 30 tablet 2   • pantoprazole (Protonix) 40 MG EC tablet Take 1 tablet by mouth 2 (Two) Times a Day. 90 tablet 3   • pentoxifylline (TRENtal) 400 MG CR tablet TAKE 1 TABLET BY MOUTH TWICE A DAY WITH MEALS 90 tablet 0   • potassium chloride ER (K-TAB) 20 MEQ tablet controlled-release ER tablet Take 1 tablet by mouth Daily. 90 tablet 3   • Probiotic Product (PROBIOTIC DAILY PO) Take 1 tablet by mouth Daily.     • triamcinolone (KENALOG) 0.1 % ointment Apply 1 application topically to the appropriate area as directed 2 (Two) Times a Day. 30 g 1     Current Facility-Administered Medications on File Prior to Visit   Medication Dose Route Frequency Provider Last Rate Last Admin   • [COMPLETED] diphenhydrAMINE (BENADRYL) IVPB 25 mg  25 mg Intravenous Once Kristin Moss APRN   Stopped at 06/29/22 0845   • [COMPLETED] famotidine (PEPCID) tablet 20 mg  20 mg Oral Once Kristin Moss APRN   20 mg at 06/29/22 0829   • fluorouracil (ADRUCIL) 4,180 mg in sodium chloride 0.9 % 240 mL chemo infusion - FOR HOME USE  2,040 mg/m2 (Treatment Plan Recorded) Intravenous Once Kristin Moss APRN       • fluorouracil (ADRUCIL) chemo injection 700 mg  340 mg/m2 (Treatment Plan Recorded) Intravenous Once Kristin Moss APRN       • leucovorin 700 mg in sodium chloride 0.9 % 285 mL IVPB  340 mg/m2 (Treatment Plan Recorded) Intravenous Once Kristin Moss APRN 600 mL/hr at 06/29/22 0922 700 mg at 06/29/22 0922   • [COMPLETED] palonosetron (ALOXI) injection 0.25 mg  0.25 mg Intravenous Once Kristin Moss APRN   0.25 mg at 06/29/22 0829   • [COMPLETED] sodium chloride 0.9 % infusion  "250 mL  250 mL Intravenous Once Kristin Moss APRN 20 mL/hr at 06/29/22 0829 250 mL at 06/29/22 0829   • [COMPLETED] trastuzumab-anns (KANJINTI) 530 mg in sodium chloride 0.9 % 250 mL chemo IVPB  6 mg/kg (Treatment Plan Recorded) Intravenous Once Kristin Moss APRN   Stopped at 06/29/22 0919     Allergies   Allergen Reactions   • Oxaliplatin Anaphylaxis          Objective     Vitals:    06/29/22 0751   BP: 118/72   Pulse: 74   Resp: 16   Temp: 97.6 °F (36.4 °C)   TempSrc: Temporal   SpO2: 96%   Weight: 90.1 kg (198 lb 11.2 oz)   Height: 175.3 cm (69.02\")   PainSc: 0-No pain     Current Status 6/29/2022   ECOG score 1     Physical Exam  Vitals reviewed.   Constitutional:       General: He is not in acute distress.     Appearance: Normal appearance. He is well-developed.   HENT:      Head: Normocephalic and atraumatic.   Eyes:      Pupils: Pupils are equal, round, and reactive to light.   Cardiovascular:      Rate and Rhythm: Normal rate and regular rhythm.      Heart sounds: Normal heart sounds. No murmur heard.  Pulmonary:      Effort: Pulmonary effort is normal. No respiratory distress.      Breath sounds: Normal breath sounds. No wheezing, rhonchi or rales.   Abdominal:      General: Bowel sounds are normal. There is no distension.      Palpations: Abdomen is soft.   Musculoskeletal:         General: Swelling present. Deformity: 1+ bilateral ankle edema. Normal range of motion.      Cervical back: Normal range of motion.   Skin:     General: Skin is warm and dry.      Findings: No rash.   Neurological:      Mental Status: He is alert and oriented to person, place, and time.     06/29/2022 physical exam unchanged from above except as updated.  RECENT LABS:  Results from last 7 days   Lab Units 06/29/22  0724   WBC 10*3/mm3 6.79   NEUTROS ABS 10*3/mm3 4.36   HEMOGLOBIN g/dL 14.2   HEMATOCRIT % 41.9   PLATELETS 10*3/mm3 141     Results from last 7 days   Lab Units 06/29/22  0724   SODIUM mmol/L 137 "   POTASSIUM mmol/L 3.8   CHLORIDE mmol/L 103   CO2 mmol/L 24.1   BUN mg/dL 12   CREATININE mg/dL 0.72   CALCIUM mg/dL 9.4   ALBUMIN g/dL 4.10   BILIRUBIN mg/dL 0.5   ALK PHOS U/L 130*   ALT (SGPT) U/L 34   AST (SGOT) U/L 30   GLUCOSE mg/dL 107           Assessment & Plan    1.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 Almost 90% of the liver was replaced by tumor.  · HER-2 positive  · Initially treated with FOLFOX initiated July 2019  · Herceptin added with cycle 2  · Following 8 cycles of FOLFOX, oxaliplatin discontinued with continuation of 5-FU, leucovorin, Herceptin. This was continued through 7/7/2020  · Increasing CEA led to PET scan 6/9/2021.  Disease progression noted with growth of the tumor in the lower esophagus.  Continued stability of hepatic metastasis  · Repeat EGD 6/19/2021 for tissue specimen and Next Generation Sequencing.  · Endoscopy that shows a noncircumferential abnormality in the lower esophagus that encompasses almost 6 cm in length. It is not blocking off the esophagus and that is why the patient has no symptoms. The pathology shows at least atypical cells consistent with cancer.  · NGS showing PD-L1 positive and high mutation burden.  The tumor remains HER-2 positive.  Treatment plan for Keytruda every 3 weeks x 4 cycles followed by repeat imaging  · Keytruda initiated 7/29/2021  · Hospitalization 8/2/2021 through 8/7/2021 secondary to acute GI bleeding from known malignancy.  · Completed 10 fractions of radiation to the lower esophagus 8/18/2021 after GI bleed  · PET scan following four cycles of Keytruda 10/14/2021 with improvement in the patient's known neoplasm involving the distal esophagus. However, known metastatic disease involving the liver is significantly worse. Keytruda discontinued  · Enhertu initiated 10/29/2021  · 12/23/2021 PET scan following three cycles of Enhertu discloses resolution of the tumor in the esophagus. He continues to have two  active lesions in the liver with significant SUV activity pertinent to his metastatic disease. Minimal pulmonary infiltrates noted bilaterally. With concerns for Enhertu interstitial lung disease, Enhertu discontinued  · Treatment transitioned to FOLFOX Herceptin  · Borderline neutropenia 2/9/2022 with plans for 15% dose reduction to FOLFOX  · 2/16/2022, significant reaction to oxaliplatin which is discontinued  · Reevaluation 3/2/2022, with discontinue of oxaliplatin, the patient will continue on 5-FU, leucovorin, Kanjinti. Of note, the patient received 6 mg/kg of Kanjinti 2 weeks ago, therefore he cannot receive this medication today. He will receive only 5-FU leucovorin and return in 1 week for Kanjinti 2 mg/kg. In 2 weeks, he will resume his normal schedule of 5-FU, leucovorin, Kanjinti 4 mg/kg every 2 weeks.  · 3/16/2022 will continue on with 5-FU, leucovorin, Herceptin.  CEA dramatically improving.  · 3/25/2022 CT C/A/P When compared to recent PET/CT there is interval decrease in size of the previously demonstrated hypermetabolic hepatic metastases as well as decrease in size of the periportal lymph node. The wall thickening involving the distal esophagus is largely unchanged. 2. Persistent findings of colovesicular fistula. 3. Ill-defined interstitial and ground glass opacities within the bilateral lower lobes favored to represent postradiation pneumonitis/change  · 6/8/2022 CEA 8.0.  · 6/29/2022 here for continued 5-FU, leucovorin, Kanjanti, overall tolerating well.    2. Colovesical fistula.   · He requires intermittent Cipro when he notes stool in his urine  · He currently reports his symptoms are more progressive with more urine in his rectum. He continues on Cipro and is scheduled for follow-up with urology, Dr. Witt later this month  · He has been seen by Cm Witt MD. He has advised the patient that he could not do too much for the fistula given the fact that it is just inconveniences of  having urination through the anal canal from time to time but no urinary tract infection. He asked the patient to postpone any intention for surgery unless that it is absolutely necessary. Postponement of chemotherapy for 3 months if any surgery is necessary will trigger dramatic progression of his tumor and obviously consequences.   · On 06/08/2022, his colovesical fistula continues. Most of the output is urine through the rectum, no stool through the bladder. He has not had any urinary tract infection but he continues using prophylactically his ciprofloxacin 250 mg orally every other day. I asked him to remain on this medicine for the time being    3.  Thrombophilia secondary malignancy, DVT  · Currently anticoagulated with Xarelto 20 mg daily  · Following GI bleed 8/2/2021, Xarelto has been discontinued  · Plan no evidence of recurrent thrombosis    4. Acute GI bleeding  · Dark tarry stool initially began 7/29/2021  · 7/23/2021, hemoglobin of 11.0.  8/2/2021, hemoglobin of 5.7  · Patient admitted, received transfusion of 4 units packed red blood cells.  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   · Hemoglobin today of 14.2    5. Enhertu initiated interstitial lung disease  · The patient completed a prednisone taper  · He is without hypoxia or tachycardia currently. He has only minimal dyspnea on exertion    6. Significant oxaliplatin reaction  · 2/16/2022, significant reaction with shortness of breath, limited air movement, treated with high-dose Solu-Cortef. He is not a candidate for further oxaliplatin    7. Borderline thrombocytopenia  · 3/2/2021, the patient is a platelet count of 101,000. With the discontinuation of oxaliplatin, we can continue with 5-FU leucovorin which will likely not cause significant issues with his platelets.  · 5/18/2022 platelet count normalized at   141,000    8.  Hypertension: He previously been on lisinopril/hydrochlorothiazide however this medication is currently on hold due to some issues with hypotension.  He now only takes the medication if his blood pressure is above 130/90.  · 5/18/2022 /74.  · 6/29/2022 /72.    9.  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    10.  Lower extremity edema:  · Bilateral.  Patient has been on his feet much more recently and has been more active.  It seemed to have worsened once the weather got warmer.  He states he was told previously by a cardiothoracic surgeon not to wear compression socks.  I have advised him to watch his salt intake, elevate his legs when he possible, and make sure he is drinking plenty of water.  Continue to monitor, and call if worsens.  · 6/20/2022 patient was prescribed lasix 20 mg every other day, which is helping some.  He is also trying to elevate his legs above his heart twice a day, which he also feels is helping.     PLAN:  1. Proceed with continued 5-FU, leucovorin, herceptin.    2. Return in 3 weeks for follow-up with Dr. Haynes with repeat labs and reevaluation prior to continued 5-FU, leucovorin, Herceptin.  They can discuss the timing of the patient's next scans at that time.  3. Continue Lasix every other day as well as elevating his legs if needed for swelling.  4. Call/return sooner should he develop any new concerns or problems.    The patient continues on high risk medication requiring close monitoring for toxicity.      Kristin Moss, APRN  06/29/2022

## 2022-07-01 ENCOUNTER — INFUSION (OUTPATIENT)
Dept: ONCOLOGY | Facility: HOSPITAL | Age: 67
End: 2022-07-01

## 2022-07-01 DIAGNOSIS — C15.5 MALIGNANT NEOPLASM OF LOWER THIRD OF ESOPHAGUS: Primary | ICD-10-CM

## 2022-07-01 DIAGNOSIS — D49.0 ESOPHAGUS NEOPLASM: ICD-10-CM

## 2022-07-01 DIAGNOSIS — Z45.2 ENCOUNTER FOR ADJUSTMENT OR MANAGEMENT OF VASCULAR ACCESS DEVICE: ICD-10-CM

## 2022-07-01 DIAGNOSIS — C78.7 LIVER METASTASIS: ICD-10-CM

## 2022-07-01 PROCEDURE — 25010000002 HEPARIN LOCK FLUSH PER 10 UNITS: Performed by: INTERNAL MEDICINE

## 2022-07-01 RX ORDER — HEPARIN SODIUM (PORCINE) LOCK FLUSH IV SOLN 100 UNIT/ML 100 UNIT/ML
500 SOLUTION INTRAVENOUS AS NEEDED
Status: CANCELLED | OUTPATIENT
Start: 2022-07-01

## 2022-07-01 RX ORDER — SODIUM CHLORIDE 0.9 % (FLUSH) 0.9 %
10 SYRINGE (ML) INJECTION AS NEEDED
Status: CANCELLED | OUTPATIENT
Start: 2022-07-01

## 2022-07-01 RX ORDER — SODIUM CHLORIDE 0.9 % (FLUSH) 0.9 %
10 SYRINGE (ML) INJECTION AS NEEDED
Status: DISCONTINUED | OUTPATIENT
Start: 2022-07-01 | End: 2022-07-01 | Stop reason: HOSPADM

## 2022-07-01 RX ORDER — HEPARIN SODIUM (PORCINE) LOCK FLUSH IV SOLN 100 UNIT/ML 100 UNIT/ML
500 SOLUTION INTRAVENOUS AS NEEDED
Status: DISCONTINUED | OUTPATIENT
Start: 2022-07-01 | End: 2022-07-01 | Stop reason: HOSPADM

## 2022-07-01 RX ADMIN — Medication 10 ML: at 08:14

## 2022-07-01 RX ADMIN — Medication 500 UNITS: at 08:15

## 2022-07-11 RX ORDER — PENTOXIFYLLINE 400 MG/1
TABLET, EXTENDED RELEASE ORAL
Qty: 90 TABLET | Refills: 0 | Status: SHIPPED | OUTPATIENT
Start: 2022-07-11 | End: 2022-08-22

## 2022-07-20 ENCOUNTER — APPOINTMENT (OUTPATIENT)
Dept: ONCOLOGY | Facility: HOSPITAL | Age: 67
End: 2022-07-20

## 2022-07-20 ENCOUNTER — OFFICE VISIT (OUTPATIENT)
Dept: ONCOLOGY | Facility: CLINIC | Age: 67
End: 2022-07-20

## 2022-07-20 ENCOUNTER — INFUSION (OUTPATIENT)
Dept: ONCOLOGY | Facility: HOSPITAL | Age: 67
End: 2022-07-20

## 2022-07-20 VITALS
RESPIRATION RATE: 16 BRPM | DIASTOLIC BLOOD PRESSURE: 77 MMHG | OXYGEN SATURATION: 95 % | SYSTOLIC BLOOD PRESSURE: 133 MMHG | TEMPERATURE: 97.1 F | BODY MASS INDEX: 29.49 KG/M2 | WEIGHT: 199.1 LBS | HEART RATE: 82 BPM | HEIGHT: 69 IN

## 2022-07-20 DIAGNOSIS — T45.1X5A PERIPHERAL NEUROPATHY DUE TO CHEMOTHERAPY: ICD-10-CM

## 2022-07-20 DIAGNOSIS — D49.0 ESOPHAGUS NEOPLASM: ICD-10-CM

## 2022-07-20 DIAGNOSIS — R73.01 IMPAIRED FASTING GLUCOSE: ICD-10-CM

## 2022-07-20 DIAGNOSIS — C15.5 MALIGNANT NEOPLASM OF LOWER THIRD OF ESOPHAGUS: Primary | ICD-10-CM

## 2022-07-20 DIAGNOSIS — C78.7 LIVER METASTASIS: ICD-10-CM

## 2022-07-20 DIAGNOSIS — E78.2 MIXED HYPERLIPIDEMIA: ICD-10-CM

## 2022-07-20 DIAGNOSIS — G62.0 PERIPHERAL NEUROPATHY DUE TO CHEMOTHERAPY: ICD-10-CM

## 2022-07-20 DIAGNOSIS — Z72.0 TOBACCO ABUSE: ICD-10-CM

## 2022-07-20 DIAGNOSIS — N32.1 RECTO-BLADDERNECK FISTULA: ICD-10-CM

## 2022-07-20 DIAGNOSIS — I10 ESSENTIAL HYPERTENSION: ICD-10-CM

## 2022-07-20 DIAGNOSIS — C15.5 MALIGNANT NEOPLASM OF LOWER THIRD OF ESOPHAGUS: ICD-10-CM

## 2022-07-20 DIAGNOSIS — Z45.2 ENCOUNTER FOR ADJUSTMENT OR MANAGEMENT OF VASCULAR ACCESS DEVICE: ICD-10-CM

## 2022-07-20 LAB
ALBUMIN SERPL-MCNC: 4 G/DL (ref 3.5–5.2)
ALBUMIN/GLOB SERPL: 1.4 G/DL (ref 1.1–2.4)
ALP SERPL-CCNC: 132 U/L (ref 38–116)
ALT SERPL W P-5'-P-CCNC: 31 U/L (ref 0–41)
ANION GAP SERPL CALCULATED.3IONS-SCNC: 11.9 MMOL/L (ref 5–15)
AST SERPL-CCNC: 25 U/L (ref 0–40)
BASOPHILS # BLD AUTO: 0.04 10*3/MM3 (ref 0–0.2)
BASOPHILS NFR BLD AUTO: 0.5 % (ref 0–1.5)
BILIRUB SERPL-MCNC: 0.5 MG/DL (ref 0.2–1.2)
BUN SERPL-MCNC: 11 MG/DL (ref 6–20)
BUN/CREAT SERPL: 15.9 (ref 7.3–30)
CALCIUM SPEC-SCNC: 9.2 MG/DL (ref 8.5–10.2)
CEA SERPL-MCNC: 7.4 NG/ML
CHLORIDE SERPL-SCNC: 104 MMOL/L (ref 98–107)
CO2 SERPL-SCNC: 23.1 MMOL/L (ref 22–29)
CREAT SERPL-MCNC: 0.69 MG/DL (ref 0.7–1.3)
DEPRECATED RDW RBC AUTO: 52.3 FL (ref 37–54)
EGFRCR SERPLBLD CKD-EPI 2021: 102.7 ML/MIN/1.73
EOSINOPHIL # BLD AUTO: 0.41 10*3/MM3 (ref 0–0.4)
EOSINOPHIL NFR BLD AUTO: 5.6 % (ref 0.3–6.2)
ERYTHROCYTE [DISTWIDTH] IN BLOOD BY AUTOMATED COUNT: 15.2 % (ref 12.3–15.4)
GLOBULIN UR ELPH-MCNC: 2.8 GM/DL (ref 1.8–3.5)
GLUCOSE SERPL-MCNC: 129 MG/DL (ref 74–124)
HCT VFR BLD AUTO: 41.6 % (ref 37.5–51)
HGB BLD-MCNC: 14.1 G/DL (ref 13–17.7)
IMM GRANULOCYTES # BLD AUTO: 0.06 10*3/MM3 (ref 0–0.05)
IMM GRANULOCYTES NFR BLD AUTO: 0.8 % (ref 0–0.5)
LYMPHOCYTES # BLD AUTO: 1.4 10*3/MM3 (ref 0.7–3.1)
LYMPHOCYTES NFR BLD AUTO: 19 % (ref 19.6–45.3)
MCH RBC QN AUTO: 31.7 PG (ref 26.6–33)
MCHC RBC AUTO-ENTMCNC: 33.9 G/DL (ref 31.5–35.7)
MCV RBC AUTO: 93.5 FL (ref 79–97)
MONOCYTES # BLD AUTO: 0.58 10*3/MM3 (ref 0.1–0.9)
MONOCYTES NFR BLD AUTO: 7.9 % (ref 5–12)
NEUTROPHILS NFR BLD AUTO: 4.87 10*3/MM3 (ref 1.7–7)
NEUTROPHILS NFR BLD AUTO: 66.2 % (ref 42.7–76)
NRBC BLD AUTO-RTO: 0 /100 WBC (ref 0–0.2)
PLATELET # BLD AUTO: 142 10*3/MM3 (ref 140–450)
PMV BLD AUTO: 10.1 FL (ref 6–12)
POTASSIUM SERPL-SCNC: 3.7 MMOL/L (ref 3.5–4.7)
PROT SERPL-MCNC: 6.8 G/DL (ref 6.3–8)
RBC # BLD AUTO: 4.45 10*6/MM3 (ref 4.14–5.8)
SODIUM SERPL-SCNC: 139 MMOL/L (ref 134–145)
WBC NRBC COR # BLD: 7.36 10*3/MM3 (ref 3.4–10.8)

## 2022-07-20 PROCEDURE — 96367 TX/PROPH/DG ADDL SEQ IV INF: CPT

## 2022-07-20 PROCEDURE — 80053 COMPREHEN METABOLIC PANEL: CPT

## 2022-07-20 PROCEDURE — 25010000002 DIPHENHYDRAMINE PER 50 MG: Performed by: INTERNAL MEDICINE

## 2022-07-20 PROCEDURE — 25010000002 FLUOROURACIL PER 500 MG: Performed by: INTERNAL MEDICINE

## 2022-07-20 PROCEDURE — 96416 CHEMO PROLONG INFUSE W/PUMP: CPT

## 2022-07-20 PROCEDURE — 99214 OFFICE O/P EST MOD 30 MIN: CPT | Performed by: INTERNAL MEDICINE

## 2022-07-20 PROCEDURE — 25010000002 TRASTUZUMAB-ANNS 420 MG RECONSTITUTED SOLUTION 1 EACH VIAL: Performed by: INTERNAL MEDICINE

## 2022-07-20 PROCEDURE — 82378 CARCINOEMBRYONIC ANTIGEN: CPT | Performed by: INTERNAL MEDICINE

## 2022-07-20 PROCEDURE — 25010000002 LEUCOVORIN CALCIUM PER 50 MG: Performed by: INTERNAL MEDICINE

## 2022-07-20 PROCEDURE — 85025 COMPLETE CBC W/AUTO DIFF WBC: CPT

## 2022-07-20 PROCEDURE — 96375 TX/PRO/DX INJ NEW DRUG ADDON: CPT

## 2022-07-20 PROCEDURE — 96411 CHEMO IV PUSH ADDL DRUG: CPT

## 2022-07-20 PROCEDURE — 25010000002 PALONOSETRON PER 25 MCG: Performed by: INTERNAL MEDICINE

## 2022-07-20 PROCEDURE — 96413 CHEMO IV INFUSION 1 HR: CPT

## 2022-07-20 RX ORDER — FAMOTIDINE 20 MG/1
20 TABLET, FILM COATED ORAL ONCE
Status: CANCELLED | OUTPATIENT
Start: 2022-07-20 | End: 2022-07-20

## 2022-07-20 RX ORDER — FLUOROURACIL 50 MG/ML
340 INJECTION, SOLUTION INTRAVENOUS ONCE
Status: COMPLETED | OUTPATIENT
Start: 2022-07-20 | End: 2022-07-20

## 2022-07-20 RX ORDER — SODIUM CHLORIDE 9 MG/ML
250 INJECTION, SOLUTION INTRAVENOUS ONCE
Status: CANCELLED | OUTPATIENT
Start: 2022-07-20

## 2022-07-20 RX ORDER — FAMOTIDINE 10 MG/ML
20 INJECTION, SOLUTION INTRAVENOUS AS NEEDED
Status: CANCELLED | OUTPATIENT
Start: 2022-07-20

## 2022-07-20 RX ORDER — FLUOROURACIL 50 MG/ML
340 INJECTION, SOLUTION INTRAVENOUS ONCE
Status: CANCELLED | OUTPATIENT
Start: 2022-07-20

## 2022-07-20 RX ORDER — SODIUM CHLORIDE 9 MG/ML
250 INJECTION, SOLUTION INTRAVENOUS ONCE
Status: COMPLETED | OUTPATIENT
Start: 2022-07-20 | End: 2022-07-20

## 2022-07-20 RX ORDER — FAMOTIDINE 20 MG/1
20 TABLET, FILM COATED ORAL ONCE
Status: COMPLETED | OUTPATIENT
Start: 2022-07-20 | End: 2022-07-20

## 2022-07-20 RX ORDER — PALONOSETRON 0.05 MG/ML
0.25 INJECTION, SOLUTION INTRAVENOUS ONCE
Status: CANCELLED | OUTPATIENT
Start: 2022-07-20

## 2022-07-20 RX ORDER — PALONOSETRON 0.05 MG/ML
0.25 INJECTION, SOLUTION INTRAVENOUS ONCE
Status: COMPLETED | OUTPATIENT
Start: 2022-07-20 | End: 2022-07-20

## 2022-07-20 RX ADMIN — DIPHENHYDRAMINE HYDROCHLORIDE 25 MG: 50 INJECTION, SOLUTION INTRAMUSCULAR; INTRAVENOUS at 08:34

## 2022-07-20 RX ADMIN — TRASTUZUMAB-ANNS 530 MG: 420 INJECTION, POWDER, LYOPHILIZED, FOR SOLUTION INTRAVENOUS at 08:49

## 2022-07-20 RX ADMIN — SODIUM CHLORIDE 250 ML: 9 INJECTION, SOLUTION INTRAVENOUS at 08:32

## 2022-07-20 RX ADMIN — PALONOSETRON 0.25 MG: 0.05 INJECTION, SOLUTION INTRAVENOUS at 08:34

## 2022-07-20 RX ADMIN — FLUOROURACIL 4180 MG: 50 INJECTION, SOLUTION INTRAVENOUS at 09:57

## 2022-07-20 RX ADMIN — FAMOTIDINE 20 MG: 20 TABLET ORAL at 08:35

## 2022-07-20 RX ADMIN — LEUCOVORIN CALCIUM 700 MG: 350 INJECTION, POWDER, LYOPHILIZED, FOR SUSPENSION INTRAMUSCULAR; INTRAVENOUS at 09:26

## 2022-07-20 RX ADMIN — FLUOROURACIL 700 MG: 50 INJECTION, SOLUTION INTRAVENOUS at 09:56

## 2022-07-20 NOTE — PROGRESS NOTES
Subjective     REASON FOR follow up:1.    1. 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  Currently receiving palliative 5 fu leucovorin  AND HERCEPTIN therapy once a month    2.COLOVESICAL FISTULA: chronic antibiotic therapy cipro, NO FURTHER PLANS FOR SURGERY AFTER VISIT AND PROCEDURE BY DR GM CASTILLO 1/20    3. DVT R AND LEFT LE off ANTICOAGULANT : THROMBOPHILIA OF MALIGNANCY    4. 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 NEURONTIN INITIATED.        DURING THE VISIT WITH THE PATIENT TODAY , PATIENT HAD FACE MASK, MY MEDICAL ASSISTANT AND I  HAD PROPPER PROTECTIVE EQUIPMENT, AND I DID HAND HYGIENE WITH SOAP AND WATER BEFORE AND AFTER THE VISIT.    On 07/20/2022 I reviewed this 66-year-old white male who has history of smoking and stage IV adenocarcinoma of the esophagus who has been treated nowadays with 5FU, leucovorin and Herceptin. He has responded to this regimen extremely well with a CEA level measured months ago that is in the 300 category and now down to 8. He has received Enhertu in the past and also he has received Keytruda with no benefit. All of this in spite of his tumor being PD-L1 highly positive. I think the lack of benefit of this medicine was on the basis of persistent smoking. In any event the patient actually is doing well. The only problem that he has had is occasional choking with foods that are very dry especially toast. With foods that are moist like eggs, fruit or some other things, he has not had any problems. He has not had any odynophagia. He has not had regurgitation. He has not had any passage of blood in the stools or hematemesis. His weight remains stable. His colovesical fistula actually has become very quiet with no output through the bladder and no output through the rectum. He has not had any recent fevers. His blood pressure is relatively good, he has  "some degree of swelling in his lower extremities, still neuropathy and element of claudication in the lower extremities. He has not had any chest pain, palpitations or coughing. He has no abdominal pain or jaundice. He has not had any cardiac difficulties with Herceptin with no excessive amount of shortness of breath or any other new symptom. He feels well enough that he is going to travel to see his brother in [place] and spend time with him for the next several weeks.                                   Past Medical History:   Diagnosis Date   • Arthritis    • Cancer (HCC)     prostate cancer 2008   • Cancer (HCC)     esophageal   • Chronic anticoagulation     on xarelto   • Elevated PSA    • Esophageal mass    • Fistula     colon and bladder   • H/O Lung nodule    • Hepatitis     CHILD--PT STATED \"I THINK IT WAS A.\"   • History of chemotherapy    • History of pneumonia    • Hx of blood clots 08/10/2019   • Hyperlipidemia    • Hypertension    • Nail fungus    • Neuropathy    • PVD (peripheral vascular disease) (HCC)    • Recto-bladderneck fistula     on poab for recurrent uti        Past Surgical History:   Procedure Laterality Date   • COLONOSCOPY N/A 2/4/2020    Procedure: COLONOSCOPY;  Surgeon: Guy Sears MD;  Location: [facility] ENDOSCOPY;  Service: General;  Laterality: N/A;  PRE-COLOVESICAL FISTULA  POST-- DIVERTICULOSIS   • CYSTOSCOPY  02/06/2020   • CYSTOSCOPY Bilateral 2/26/2020    Procedure: CYSTOSCOPY RETROGRADE;  Surgeon: Cm Witt MD;  Location: [facility] OR;  Service: Urology;  Laterality: Bilateral;   • ENDOSCOPY     • ENDOSCOPY N/A 6/19/2021    Procedure: ESOPHAGOGASTRODUODENOSCOPY WITH BIOPSY;  Surgeon: Mary Brito MD;  Location: [facility] OR;  Service: Gastroenterology;  Laterality: N/A;   • ENDOSCOPY N/A 8/5/2021    Procedure: ESOPHAGOGASTRODUODENOSCOPY HEMOSPRAY;  Surgeon: Naga Shelby MD;  Location: [facility] ENDOSCOPY;  Service: Gastroenterology;  Laterality: N/A; "  HX OF ESOPHAGEAL  CANCER;MELENA  POST: ESOPHAGEAL BLEEDING; ESOPHAGEAL MASS   • HERNIA REPAIR Right 1999    inguinal hernia   • PROSTATE SURGERY  03/2008    prostatectectomy   • VENOUS ACCESS DEVICE (PORT) INSERTION N/A 7/16/2019    Procedure: INSERTION VENOUS ACCESS DEVICE WITH FLUORO AND EGD WITH BIOPSY;  Surgeon: Mary Brito MD;  Location: [facility] OR;  Service: Thoracic        Current Outpatient Medications on File Prior to Visit   Medication Sig Dispense Refill   • acetaminophen (TYLENOL) 500 MG tablet Take 500 mg by mouth Every 6 (Six) Hours As Needed for Mild Pain .     • cevimeline (EVOXAC) 30 MG capsule Take 30 mg by mouth 3 (Three) Times a Day.     • ciprofloxacin (CIPRO) 250 MG tablet Take 1 tablet by mouth Daily. Take one every other day 90 tablet 1   • econazole nitrate (SPECTAZOLE) 1 % cream 2 (Two) Times a Day.     • furosemide (LASIX) 20 MG tablet Take 1 tablet by mouth Every Other Day. 28 tablet 2   • gabapentin (NEURONTIN) 300 MG capsule TAKE 1 CAPSULE BY MOUTH TWICE A  capsule 0   • glipizide (GLUCOTROL XL) 5 MG ER tablet TAKE 1 TABLET BY MOUTH EVERY DAY 90 tablet 1   • lisinopril-hydrochlorothiazide (PRINZIDE,ZESTORETIC) 20-12.5 MG per tablet TAKE 1 TABLET BY MOUTH EVERY DAY 90 tablet 0   • nicotine (NICODERM CQ) 21 MG/24HR patch Place 1 patch on the skin as directed by provider Daily. (Patient taking differently: Place 1 patch on the skin as directed by provider Daily. Has at home but hasn't started taking yet) 28 each 1   • OLANZapine (ZyPREXA) 2.5 MG tablet Take 1 tablet by mouth Every Night. 90 tablet 0   • ondansetron (ZOFRAN) 4 MG tablet Take 1 tablet by mouth Every 8 (Eight) Hours As Needed for Nausea or Vomiting. 30 tablet 2   • pantoprazole (Protonix) 40 MG EC tablet Take 1 tablet by mouth 2 (Two) Times a Day. 90 tablet 3   • pentoxifylline (TRENtal) 400 MG CR tablet TAKE 1 TABLET BY MOUTH TWICE A DAY WITH MEALS 90 tablet 0   • potassium chloride ER (K-TAB) 20 MEQ tablet  controlled-release ER tablet Take 1 tablet by mouth Daily. 90 tablet 3   • Probiotic Product (PROBIOTIC DAILY PO) Take 1 tablet by mouth Daily.     • triamcinolone (KENALOG) 0.1 % ointment Apply 1 application topically to the appropriate area as directed 2 (Two) Times a Day. 30 g 1     No current facility-administered medications on file prior to visit.        ALLERGIES:    Allergies   Allergen Reactions   • Oxaliplatin Anaphylaxis        Social History     Socioeconomic History   • Marital status: Single   • Years of education: High school   Tobacco Use   • Smoking status: Current Every Day Smoker     Packs/day: 1.00     Years: 38.00     Pack years: 38.00     Types: Cigarettes     Start date: 1974   • Smokeless tobacco: Never Used   • Tobacco comment: Quit for a period of 8 years.    Vaping Use   • Vaping Use: Never used   Substance and Sexual Activity   • Alcohol use: Not Currently   • Drug use: Never   • Sexual activity: Defer        Family History   Problem Relation Age of Onset   • Hypertension Mother    • Stroke Mother    • Hypertension Other    • Lung disease Other    • Prostate cancer Other    • Lung cancer Father    • Malig Hyperthermia Neg Hx       Current Outpatient Medications on File Prior to Visit   Medication Sig Dispense Refill   • acetaminophen (TYLENOL) 500 MG tablet Take 500 mg by mouth Every 6 (Six) Hours As Needed for Mild Pain .     • cevimeline (EVOXAC) 30 MG capsule Take 30 mg by mouth 3 (Three) Times a Day.     • ciprofloxacin (CIPRO) 250 MG tablet Take 1 tablet by mouth Daily. Take one every other day 90 tablet 1   • econazole nitrate (SPECTAZOLE) 1 % cream 2 (Two) Times a Day.     • furosemide (LASIX) 20 MG tablet Take 1 tablet by mouth Every Other Day. 28 tablet 2   • gabapentin (NEURONTIN) 300 MG capsule TAKE 1 CAPSULE BY MOUTH TWICE A  capsule 0   • glipizide (GLUCOTROL XL) 5 MG ER tablet TAKE 1 TABLET BY MOUTH EVERY DAY 90 tablet 1   • lisinopril-hydrochlorothiazide  "(PRINZIDE,ZESTORETIC) 20-12.5 MG per tablet TAKE 1 TABLET BY MOUTH EVERY DAY 90 tablet 0   • nicotine (NICODERM CQ) 21 MG/24HR patch Place 1 patch on the skin as directed by provider Daily. (Patient taking differently: Place 1 patch on the skin as directed by provider Daily. Has at home but hasn't started taking yet) 28 each 1   • OLANZapine (ZyPREXA) 2.5 MG tablet Take 1 tablet by mouth Every Night. 90 tablet 0   • ondansetron (ZOFRAN) 4 MG tablet Take 1 tablet by mouth Every 8 (Eight) Hours As Needed for Nausea or Vomiting. 30 tablet 2   • pantoprazole (Protonix) 40 MG EC tablet Take 1 tablet by mouth 2 (Two) Times a Day. 90 tablet 3   • pentoxifylline (TRENtal) 400 MG CR tablet TAKE 1 TABLET BY MOUTH TWICE A DAY WITH MEALS 90 tablet 0   • potassium chloride ER (K-TAB) 20 MEQ tablet controlled-release ER tablet Take 1 tablet by mouth Daily. 90 tablet 3   • Probiotic Product (PROBIOTIC DAILY PO) Take 1 tablet by mouth Daily.     • triamcinolone (KENALOG) 0.1 % ointment Apply 1 application topically to the appropriate area as directed 2 (Two) Times a Day. 30 g 1     No current facility-administered medications on file prior to visit.     Allergies   Allergen Reactions   • Oxaliplatin Anaphylaxis            Objective     Vitals:    07/20/22 0745   BP: 133/77   Pulse: 82   Resp: 16   Temp: 97.1 °F (36.2 °C)   TempSrc: Temporal   SpO2: 95%   Weight: 90.3 kg (199 lb 1.6 oz)   Height: 175.3 cm (69.02\")   PainSc: 0-No pain     Current Status 6/29/2022   ECOG score 1         EXAM:                GENERAL:  Well-developed, well-nourished  Patient  in no acute distress.   SKIN:  Warm, dry ,NO purpura ,no rash.onychorrhexix  HEENT:  Pupils were equal and reactive to light and accomodation, conjunctivae noninjected, normal extraocular movements, normal visual acuity.   NECK:  Supple with good range of motion; no thyromegaly , no JVD or bruits,.No carotid artery pain, no carotid abnormal pulsation   LYMPHATICS:  No cervical, NO " supraclavicular, NO axillary, NO inguinal adenopathies.  CARDIAC   normal rate , regular rhythm, without murmur,NO rubs NO S3 NO S4   LUNGS: decreased breath sounds bilateral, no wheezing, NO rhonchi, NO crackles ,NO rubs.  VASCULAR VENOUS: no cyanosis, NO collateral circulation, NO varicosities, NO edema, NO palpable cords, NO pain,NO erythema, NO pigmentation of the skin.  ABDOMEN:  Soft, NO pain,no hepatomegaly, no splenomegaly,no masses, no ascites, no collateral circulation,no distention.  EXTREMITIES  AND SPINE:   clubbing, no cyanosis ,no deformities , no pain .No kyphosis,  no pain in spine, no pain in ribs , no pain in pelvic bone.  NEUROLOGICAL:  Patient was awake, alert, oriented to time, person and place.              RECENT LABS:  Hematology WBC   Date Value Ref Range Status   07/20/2022 7.36 3.40 - 10.80 10*3/mm3 Final   02/02/2019 13.4 (H) 3.4 - 10.8 x10E3/uL Final     RBC   Date Value Ref Range Status   07/20/2022 4.45 4.14 - 5.80 10*6/mm3 Final   02/02/2019 4.81 4.14 - 5.80 x10E6/uL Final     Hemoglobin   Date Value Ref Range Status   07/20/2022 14.1 13.0 - 17.7 g/dL Final     Hematocrit   Date Value Ref Range Status   07/20/2022 41.6 37.5 - 51.0 % Final     Platelets   Date Value Ref Range Status   07/20/2022 142 140 - 450 10*3/mm3 Final        Lab Results   Component Value Date    GLUCOSE 107 06/29/2022    BUN 12 06/29/2022    CREATININE 0.72 06/29/2022    EGFRIFNONA 149 02/16/2022    EGFRIFAFRI 100 02/02/2019    BCR 16.7 06/29/2022    K 3.8 06/29/2022    CO2 24.1 06/29/2022    CALCIUM 9.4 06/29/2022    PROTENTOTREF 7.1 02/02/2019    ALBUMIN 4.10 06/29/2022    LABIL2 1.6 02/02/2019    AST 30 06/29/2022    ALT 34 06/29/2022       CEA    1 mo ago   (6/8/22) 2 mo ago   (5/18/22) 2 mo ago   (4/27/22) 3 mo ago   (3/30/22) 4 mo ago   (3/2/22) 6 mo ago   (12/30/21) 8 mo ago   (11/19/21)    CEA   ng/mL 8.00  9.82  12.90  17.40  34.30  227.00  255.00    Resulting Agency  [facility]           Assessment & Plan    1.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 Almost 90% of the liver WAS replaced by tumor. The patient has been undergoing chemotherapy with 5  fu and leucovorin  and Herceptin and has had excellent response clinically and radiologically. The patient was reviewed on 08/10/2020. I reviewed with the patient in the PAC system [facility] his PET scan that is dramatic. There is minimal uptake in the lower esophagus and most importantly complete resolution of his liver metastasis. Actually the only issue that is pertinent to the PET scan is still the visibility of his colovesical fistula.     Therefore from the point of view of his cancer of the esophagus, metastatic to the liver, he has no symptoms from the primary tumor in the esophagus and he has no symptoms related to his liver metastasis that has resolved altogether. His CEA level is stable.The patient raised the question about the CEA fluctuation. I pointed out to him that a lot of this is also related to his smoking. Smoking per se elevates CEA level.  • Upon further reviewing the patient on 04/20/2021, he has no symptoms pertinent to his metastatic cancer of the esophagus to the liver and he has no difficulty swallowing. There are no side effects of the 5-FU, Leucovorin and Herceptin. The patient's cardiac function remains stable and he has not had any drop in the ejection fraction.   • I discussed with him on 05/18/2021 the fact that his cancer clinically is very quiet but I am afraid of the rise in his CEA level to 12. This could be an indicator of all of the cigarettes that he is smoking on a daily basis of indicator of cancer escaping control and not visible radiologically through his CT scan. I pointed out to him that he needs to continue making an effort to decrease his smoking and he is now down to 10 cigarettes a day. We will recheck  another CEA level today. Given the circumstances of the previous CT scan I do not believe that I need to change the doses or plan of care in regard to his chemotherapy medication administration for the time being. I recommended for him to remain on his 5FU/leucovorin and Herceptin on a monthly basis for now.  • The patient was further reviewed on 06/15/2021 with a new PET scan that documented regrowth of the tumor in the lower esophagus without any new symptomatology, for example dysphagia or odynophagia. No regurgitation. The liver metastasis remains in remission and there are no new areas of disease in the bones or lungs or any other site. Given the excellent performance status I discussed with the patient and his sister today many options of therapy, including going back to FOLFOX regimen along with Herceptin, taking another sample of the esophagus with a new endoscopy and doing analysis of the tissue for [employer] Now that will be my favorite choice, or palliative treatment with radiation therapy and 5-FU continuous infusion that will be toxic to him and he does not prefer to have.     I had a discussion with Mary Brito MD, thoracic surgeon, who has agreed to see the patient tomorrow. I think the endoscopy, the new tissue analysis, and sending the tissue for [employer] Now will be the way to go. Depending on what we find there, we can modify his treatment altogether. I do not think the patient will have any consequences missing chemotherapy for a couple of weeks or so.      In preparation for the endoscopy and biopsies I asked him to hold off on the Xarelto until he is seen by Dr. Brito tomorrow.     In regard to his colovesical fistula, he has had minimal symptomatology this week, maybe some activity there and I asked him to go back to the lab and take a urine specimen and culture. He knows that if this is abnormal he will need to initiate ciprofloxacin that he has at home.    In regard to his  hypertension, this is under good control and I advised him to remain on his lisinopril and hydrochlorothiazide combination.    In regard to his smoking cessation, he has had conversations with NIRU Jacobson about this. He is using some nicotine CQ patch, here and there and also trying to work with nicotine gum and things of this nature but he has not been able to shake this issue altogether.     Otherwise I will review him back in a couple of weeks with a CBC, CMP, and a CEA level.    This case will be presented in the multidisciplinary thoracic conference by me this Thursday, and I will discuss any further advice to the patient.     I also mentioned to the patient that on the background of HER2-positive cancer of the esophagus the possibility of using a new medicine for HER2-based disease that is called Enhertu is a real possibility and maybe that will be the next step to go through.    • The patient was further reviewed on 07/01/2021. I reviewed with Dr. Brito the endoscopy that shows a noncircumferential abnormality in the lower esophagus that encompasses almost 6 cm in length. It is not blocking off the esophagus and that is why the patient has no symptoms. The pathology shows at least atypical cells consistent with cancer. Further Next Generation Sequencing has been sent for [employer] Now.     I discussed with the patient the fact that we have many other modalities of treatment that he could encounter. He prefers to continue his general chemotherapy to control the cancer in his liver using 5-FU, leucovorin and Herceptin today and agree with that. In consideration to be seen by radiation therapy, the patient is willing to listen to the possibility of undergoing continuous 5-FU infusion along with radiation therapy to the esophagus knowing that he will experiencing esophagitis that can give him significant issues for 2-3 weeks. I went ahead and made the appointment for him to be seen by radiation  oncology for this purpose only.     Obviously if the Next Generation Sequencing gives us any other hints in regard to how to treat him this could be also utilized including the consideration of using Enhertu in the long run for achieving better control.     In regard to his smoking cessation he is not willing to change this phenomenon at this time. We have had NIRU Jacobson talking with him in this regard but he is not ready to make a decision when to quit.     Finally, I pointed out to him that during the previous visit we documented a urinary tract infection with streptococcus 50,000 colonies that in my appreciation was significant given the fact that he has a colovesical fistula. This was treated with antibiotics. He has not had any discomfort issues pertinent to this anymore. The urine today is completely clear. Nevertheless, I went ahead and sent a urinalysis at least to be sure that there is no need for any other repetitive infection therapy on him.     The patient also will remain on his anticoagulation at this time, his Xarelto for the time being. I have renewed as well his Neurontin. He has no need for pain medicine.  • The patient was further reviewed on 07/23/2021. He has no new symptomatology pertinent to his cancer of the esophagus with liver metastasis. He has no difficulty swallowing. He has been presented in the multidisciplinary clinic on a couple of occasions and he has been seen by Radiation Oncology. Finally the analysis of [employer] Now is available now showing that the patient's tumor is PD-L1 positive and has high mutation burden. The tumor remains HER/2 positive. Given these findings I think it is very easy to make a choice in regard stopping the present regimen of chemotherapy and proceed with therapy with Keytruda every 3 weeks for at least 4 cycles and reassess him at that point. In preparation for Keytruda initiation next week and we will get the approval of the medicine by his  insurance company the patient will require smoking cessation altogether to minimize modification of cigarettes on his immune system. I insisted in this fact to the patient.     Other than that he will remain on probiotics. We will discontinue the 5FU/leucovorin and Herceptin and will move onto Keytruda. I discussed with him side effects of the Keytruda in detail as below. He will require treatment every 3 weeks with CBC, CMP, TSH every 3 weeks and he will require formal chemotherapy teaching, education and consent for this medicine.     After 4 cycles of this, in other words 3 months he will be reassessed with a new PET scan.     The chances under the present circumstances that he will improve as long as he quit smoking, it is very hard and maybe he could have long term survivorship.    I begged for him to have smoking cessation. If any time during his time with me we have been talking about this and this is absolutely necessary now. We know that cigarettes kill immune system cells and minimize the benefit of treatments with these medicines.       • The patient was further reviewed on 08/19/2021 in regard to his cancer of the esophagus. As stated above he had upper GI bleeding dropping hemoglobin to 5 requiring admission, transfusion, discontinuation of anticoagulation and endoscopy with local therapy by Naga Shelby MD. Since then he has completed 10 sessions of radiation therapy to the esophagus yesterday. He has not had any further bleed. His hemoglobin has bounced back from 5 to 14 and I have advised his this good news today.    I expect that the patient in a few days is going to develop an element of radiation esophagitis and he continues having some fatigue that will improve over time. So far no new issues have happened. I made him aware that if he has difficulty swallowing he will need to let us know, he will need to receive IV fluids in the office.    At least the radiation therapy will take care of the  tumor in the esophagus and I do not believe that he will require any other GI endoscopy anymore.    Today the patient will proceed with his Keytruda that is the immunotherapy medicine that we are delivering to him at this time for the treatment of his liver metastasis and also the tumor in the esophagus. I pointed out to him that so far I do not see any side effects of the Keytruda and the Keytruda will improve and increase the benefit of radiation therapy into the primary tumor in the esophagus.     From the point of view of his anemia associated with GI bleeding, again this has been corrected. The hemoglobin today is 14 grams and I asked him to take his iron supplementation only twice a week 1 tablet. He has been taking 3 tablets everyday.     From the point of view of his thrombophilia associated with malignancy, he is no longer receiving any anticoagulants given the recent episode of GI bleeding. We will maintain him off anticoagulants as much as we can. Hopefully he will not have any other episodes of thrombosis.     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     From the point of view of the Keytruda toxicity so far nothing happened. We will continue monitoring CBC, CMP and TSH periodically.     I will review him back in 3 and 6 weeks. When he comes back for the Keytruda he will continue receiving the medicine as the time goes by.    He understands that most of the benefit of radiation therapy in the tumor in the esophagus will be reached in 1 month and I am planning to give him a PET scan in more of less in 2 months from now when he will have almost 4 infusions of Keytruda under his belt and the completion of radiation therapy to the esophagus.     In regard to smoking cessation he has achieved a lot from 2 packs a day to 10 cigarettes a day. He will see Anh Felder RN, today in regard  continuation of the benefit of the therapy for this condition at this point.   • The patient was further reviewed on 09/30/2021. His Keytruda is still ongoing but the patient has developed dermatitis associated with this. It is a grade 1 toxicity so far and I am wondering if this is extending a little bit and the topical medicine is not going to be sufficient. I will proceed with his Keytruda today but maybe this will be the last administration of this medicine unless that we get shashi and the rash quiets down. Obviously at the completion for Keytruda the patient will require PET scan for assessment not only of the benefit of radiation therapy on his recurrence in the esophagus but also benefit of the Keytruda on his liver metastasis. Therefore, he will be scheduled for this before the next visit. The patient also has been advised that his hemoglobin is acceptable and it will be okay for him to stop his iron supplementation.     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 I have advised him to go into Cipro 250 mg p.o. daily.     The patient has been advised in regard to the continuation of topical steroids in the skin in areas of involvement. This will remain an ongoing issue along with moisturizer to the skin in the form of Cetaphil.     In regard to his hypertension, he remains on medicines for this by me. His blood pressure is under good control at this time.     In regard to previous anticoagulation, he is no longer receiving any given his GI bleeding. This will remain in observation.     In regard to smoking cessation the patient will further discuss with NIRU Jacobson, plans for further decrease. He is down to 10 cigarettes a day. This is already a major accomplishment in somebody who used to smoke almost 3 packs of cigarettes a day.     The patient will have a new insurance in 11/2021 and I will make the insurance ladies aware of this.     In 3 weeks he will have a  CBC, CMP, TSH and the PET scan the week before.  • The patient was further reviewed on 10/21/2021. I reviewed with him his PET scan that shows still SUV activity in the lower esophagus but better than before but he has now 3 areas of abnormal uptake in the liver consistent with progressive liver metastasis. The lung anatomy is clear, the bone anatomy is clear. Colovesical fistula was visible still.    On the basis of these changes I do believe that this patient knowing that he has HER/2 positive esophageal cancer needs to change his therapy. Keytruda is not helpful at all and we will discontinue this medicine at this time. I do believe that the inability of Keytruda to overcome his disease process is related to the persistency of smoking and the abnormality related to smoking about bacterial derek. Further analysis recently published in [employer] has confirmed that the benefit of immunotherapy is further boosted by proper amount of derek in the gastrointestinal tract and how cigarettes are damaging to this issue. In any event the patient's issues in regard to cigarette smoking has been already a problem and he has not been able to quit in spite of all of the support available. Therefore stopping the Keytruda at this point including today the patient will move to Enhertu that has been approved in patients who have cancer of the esophagus with liver metastasis. The dose of this medicine will be the GI dose of this medication that will be properly calculated accordingly. He will have formal education and consent for this medicine. I pointed out the most important side effects of this medicine include cardiac toxicity, anemia, leukopenia, thrombocytopenia and pneumonitis that includes cough, shortness of breath, fever. In preparation to minimize pneumonitis the patient will take prednisone 10 mg on day 2 and 3 after each dose of Enhertu. He will receive this medicine every 3 weeks. We will deliver 3-4 cycles and reassess  him not only by tumor markers but also radiologically. Hopefully this will have a positive impact not only in the tumor in the esophagus but also tumors metastatic in his liver.   • The patient was reviewed on 11/19/2021. Tolerance to the Enhertu 1st cycle was appropriate with more fatigue, no increased cough or shortness of breath and some anorexia. His white count, hemoglobin and platelets were normal and we advised him to proceed with his 2nd Enhertu infusion today. He understands that fatigue will be more prominent, that he could have chance for more hematological toxicity and he is starting to develop minor anemia. His white count and platelet count remain acceptable. I reminded him on many occasions today through the visit and insisted to the sister that if his cough pattern changes, increasing or his shortness of breath becomes worse he has to notify us immediately to be sure that he will not develop pneumonitis associated with Enhertu. He still will take 10 mg of prednisone tomorrow and Sunday to try to minimize this phenomenon from happening.     The patient will be having blood counts on a weekly basis with nurse visit to be sure to monitor hematological toxicity and he will return in 3 weeks to proceed with the next cycle. After the 3rd cycle the patient will proceed with radiological assessment including PET scan.   • The patient was further reviewed on 12/30/2021. On clinical grounds the patient has not had any difficulty swallowing, his liver is not enlarged, nodular or tender, he has no jaundice and his CEA level has dropped further. His PET scan discloses resolution of his periportal lymph nodes and one area of resolution of liver metastasis. He has complete resolution of the tumor in the esophagus. He has still 2 active lesions in the liver with significant SUV activity pertinent to his metastatic disease. Given this fact the patient could be a candidate to further receive Enhertu but my concern is  about the radiological analysis that shows minimal pulmonary infiltrate bilaterally. The radiologist suggests a radiation pneumonitis. I do not think that is the case. I think this is probably related to Enhertu interstitial lung disease and for this reason I feel the obligation to stop this medication at this point and to proceed with therapy with prednisone at 20 mg a day for the next 2 weeks until the patient returns back to see me. I made him aware that if the respiratory symptoms including his cough gets any worse or shortness of breath establishes and gets worse he will need to notify us immediately and he will require to be placed in the hospital to receive IV high dose steroid.    The question will be if the patient will be a candidate to receive any further Enhertu or not remains to be seen. Probably will not be the case.     Obviously this above statement opens the door in regard what else to do for his malignancy. Strong consideration will be to go back to Herceptin and also to consider to go back to FOLFOX, Herceptin like he received initially that gave him such a degree of resolution of symptoms. 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 I discussed this with him and his sister present in the room. I encouraged him to decrease his smoking.   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     In preparation for retaking treatment, I advised him that I would like to go back into FOLFOX, Herceptin every 3 weeks starting next week. This combination of medicines never failed  him. It was discontinued because of toxicity and neuropathy. Therefore, I think it is worth it to go ahead and proceed with an echocardiogram and resume chemotherapy administration with this , giving him 3 cycles, each one of them 3 weeks apart and see how things evolve from the point of view tumor markers or radiological analysis of his liver through PET scan. He agrees to proceed.  • On 02/09/2022 I advised the patient to hold off his chemotherapy treatment with FOLFOX because his ANC was 1300 and we will decrease his dose of chemotherapy medicines by 15% for the next round of treatment next week. Hopefully the patient with this dose adjustment will be able to continue his treatment in a normal schedule of every 2 weeks. We have to watch his neuropathy very close. My advice will be eventually to pull out the Oxaliplatin and continue 5FU and leucovorin for a couple of cycles and eventually reintroduce the Oxaliplatin for 1-2 cycles back and forth. This has to be monitored clinically and also along with his response.   The patient was reviewed on 03/16/2022. Actually he feels terrific today and for the last 10 days after the anaphylactic reaction to oxaliplatin. He has no cancer-related pain. He has no hepatomegaly. His liver function tests have further improved and surprising enough his CEA level has dramatically dropped as posted above. It was in the 200 category and now it is in the 30 category, pointing to the fact that I think the oxaliplatin and the FOLFOX regimen indeed has very significant benefit for him along with the Herceptin. Obviously he had an anaphylactic reaction to oxaliplatin and this medicine was discontinued. He will receive today his 5-FU, leucovorin and Herceptin.     I advised him to remain on this regimen for the time being and continue the sequence of events every couple of weeks. I went ahead and scheduled an echocardiogram to follow up on his ejection fraction while on Herceptin.  I  reviewed with the patient and his sister today his CT scan that shows a favorable response to the regimen that he is receiving of 5FU and Herceptin. The tumor areas are shrinking and his CEA level is coming down. The patient actually feels very well. The tolerability to the regimen is excellent and he will continue the medicines at the same dosing and schedule for the time being. No plans to stop unless that he wants to take a trip with his sister to a different state and we will be glad to interrupt treatment for a week or so and no more than that. He understood this clearly.   On 06/08/2022, the patient's cancer of the esophagus with liver metastasis remains very quiet clinically and biochemically with no abdominal pain, no hepatomegaly, no jaundice, drop in the CEA level. Excellent tolerance to the regimen of 5-FU, leucovorin and Herceptin that he is receiving every 3 weeks. In fact his white count, hemoglobin and platelets today are normal. He will proceed with echocardiogram in a few days already scheduled. I encouraged him to remain in the present regimen of chemotherapy treatment every 3 weeks being seen by nurse practitioner in 3 weeks, by me in 6 weeks, and continue laboratory testing including CBC and CMP every 3 weeks and a CEA level in 6 weeks.  On 07/20/2022 the patient was further reviewed. His overall clinical status is excellent. Tolerance to the chemotherapy regimen once a month is excellent. He has not had any obvious cardiac toxicity, and he has not had any toxicity from 5FU and leucovorin. His white count, hemoglobin and platelets are normal. His chemistry profile remains normal. Most importantly his CEA level has dropped to the lowest number 8 during the visit a few weeks ago. This unequivocally proves the point that the patient is doing well from the point of view of his metastatic malignancy and proves the point that the regimen that he is receiving is helping him to control the process. For  this reason I advised him to continue the regimen with the same sequence of once a month, the same dosing of medications. Eventually we will need to do cardiac assessment.         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   • On 11/19/2021 he has no symptoms or signs of urinary tract infection and he remains on ciprofloxacin prophylactically everyday.   • On 12/30/2021 his colovesical fistula is still evident on the PET scan. He has gas in the bladder and he has had some hematuria. Therefore I do believe that the patient needs to remain on ciprofloxacin 1 tablet every other day to try to minimize any potential for any other urinary tract infection related to this. The patient under the present circumstances is not a candidate to have any kind of surgery unless the symptoms get worse or clinical picture changes radically.   • His colovesical fistula remains active. He remains taking ciprofloxacin 250 mg p.o. every other day. He has not had any fevers or chills and I advised him to remain on this medicine for the time being. His pharmacy called in regard that they have not had Cipro anymore. The patient has tablets for almost 2 months supply. Therefore, he has no need for Levaquin or any other medicine or intervention at this point.  • His colovesical fistula is not active at this time. He has not had any pneumaturia of echolalia. He remains on ciprofloxacin prophylactically.   On 03/16/2022 the patient mentioned to me that he is now passing urine through the rectum. He also has fecaluria and pneumaturia still but in lesser amount. He is not having any fever. He has no pelvic pain. I think his colovesical fistula now in some way has bladder to colon  flow. It used to be fecal matter in the bladder and now it is urine through the anal canal. Obviously this will have less  implications from the point of view of infection but obviously has the implications that the patient has to in some way be ready to defecate and urinate at the same time. He is going to see Dr. Cm Witt in a few days in regard to this fact. Given the fact that he has no abdominal pain and no fever, I would like for him to have a CT scan of the chest, abdomen and pelvis anyway that would not only show us the status of his cancer but also the status of his fistula. I prefer this over a PET scan for that purpose. This information will be shared with Dr. Cm Witt.   •   In the meantime I advised him to continue using his ciprofloxacin 250 mg every other day for prevention of UTI.  •   He has been seen by Cm Witt MD. He has advised the patient that he could not do too much for the fistula given the fact that it is just inconveniences of having urination through the anal canal from time to time but no urinary tract infection. He asked the patient to postpone any intention for surgery unless that it is absolutely necessary. Postponement of chemotherapy for 3 months if any surgery is necessary will trigger dramatic progression of his tumor and obviously consequences.   On 06/08/2022, his colovesical fistula continues. Most of the output is urine through the rectum, no stool through the bladder. He has not had any urinary tract infection but he continues using prophylactically his ciprofloxacin 250 mg orally every other day. I asked him to remain on this medicine for the time being.  On 07/20/2022 the colovesical fistula is extremely quiet. There is no output through the bladder, there is no output through the rectum. This probably tells us that this has healed and this is good news. He has not had the need to take any antibiotics.         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   • He remains on Neurontin for the treatment of his peripheral neuropathy. I advised him to remain on this medicine for the time being.  • On 01/12/2022, I advised the patient to remain on his gabapentin for the treatment of his sensory peripheral neuropathy in his feet.  • I went ahead and prescribed on 02/09/2022 his doses of gabapentin to take 300 mg twice a day.   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 This was not aggravated by the few doses of oxaliplatin that he was able to receive until an anaphylactic reaction.  He will remain on Neurontin for the treatment of his peripheral neuropathy associated with Oxaliplatin. The symptoms are under good control.   On 06/08/2022, given the development of minimal edema in his lower extremities that could be related to the use of gabapentin or a medicine that is called Evoxac, I discussed with him maybe the possibility of stopping the gabapentin for a few days and see how things evolve from this point of view. He has no neuropathic symptomatology at this time. He agrees to try and see what happens.  On 07/20/2022 his sensory neuropathy in his feet remains about the same. He remains on gabapentin that is useful and manageable in regard to symptom control.         •   •   •   •   ·   4.In regard to his hypertension, he remains on lisinopril, hydrochlorothiazide provided by me.   • I reviewed him back on 11/19/2021. His blood pressure is 90/48 in spite of proper hydration. This is probably the effect of the combination of Trental and his blood pressure medication. I advised him to discontinue his blood pressure medication at this time and advised him to have proper hydration. He has a device to check his blood pressure at home. I asked him to check this on a daily basis at least a couple of times a day. If his blood pressure goes up above 130/90 he will take 1/2 of the dose of blood pressure  medication that he was taking before. Those tablets have the way to be split. He will require checking blood pressure. If the blood pressure drops again he will hold off blood pressure medication indefinitely.   • His hypertension has been reviewed on 12/30/2021. I think his blood pressure today is very good. He will remain on his blood pressure medication for the time being. I find no reason to change dosing.   • The patient was advised to remain on his blood pressure medication, Prinzide.  • On 02/09/2022 he continues doing his blood pressure medication almost on prn basis to minimize very dramatic drop that he has had before. So far he has not had any syncopal episodes or things of this nature.   On 03/16/2022 the patient is not taking blood pressure medication. Actually his blood pressure has been under relatively good control.  His blood pressure remains regulated. He actually is not taking any blood pressure medication at this time. I think he has learned to modify his diet, decrease the consumption of sodium and this probably will have a positive impact on this issue from now on.    The other good news is that this patient has not had any modification in his left ventricular ejection fraction documented through the echocardiogram posted above. Therefore Herceptin has not produced any cardiac toxicity and we will review another echocardiogram in 3 months from now.   On 06/08/2022, the patient has not been using his blood pressure medication because most of his readings at home are below 120s. I asked him to continue watching this and if he has numbers above 130/90, he will take 1 dose of his blood pressure medication every other day or twice a week. He has the freedom to use this at his necessity.  On 07/20/2022 his blood pressure is in good control. He is not taking any medicine for this at this time.           •   •   •   •   •   ·   5.In regard to his previous GI bleeding associated with his cancer and  esophagitis we advised the patient to remain on Protonix.   • On 11/19/2021 he has not had any evidence of GI bleeding and the hemoglobin remains stable. The minor drop obeys to toxicity from Enhertu and bone marrow suppression not because of effect of GI bleeding.   • He has not had any other episodes of GI bleeding as per 12/30/2021.   • On 01/12/2022 he has not had any other episodes of GI bleeding. He is no longer taking any anticoagulants.  • On 02/09/2022 he has not had episodes of melena or enterorrhagia, his hemoglobin is stable. He has not had any use of anticoagulants since GI bleeding.   On 03/16/2022 the patient has not had any other episodes of GI bleeding. He is no longer taking anticoagulant.  Today this issue is not an issue anymore.   On 06/08/2022, the patient has no evidence of GI bleeding. Hemoglobin is stable. MCV remains normal. The patient is no longer receiving anticoagulants.  Hemoglobin remains stable as per 07/20/2022. No evidence of GI bleeding.         •   •   •   •   •   ·         6.In regard finally peripheral arterial disease I am going to send the patient a prescription for Trental to take 1 tablet twice a day. He has an appointment to be seen by vascular on 12/18/2021. I pointed out to the patient that if he wants to change the route of this process he must modify his cigarettes otherwise there is no hope. He was not able to take medication provided by [facility] because of side effects.   In regard to his peripheral arterial disease I advised him on 11/19/2021 to decrease his Trental to 1 tablet twice a day. He is now using topical Cetaphil on his feet to try to improve moisture and decrease cracking.   On 12/30/2021 his peripheral neuropathy actually is better through the Doppler study done by [facility]. I wonder if the trental is the one playing a role in this. I have asked him to remain on the trental for the time being.       He is not willing to entertain  smoking cessation to help out peripheral arterial disease and we insisted into this in presence of his sister. He says that he is working on it. I do not feel any confidence in seeing this phenomenon anymore and that is a reality that we need to contemplate and just wait.   • In regard to his peripheral artery disease, the patient is still smoking 15 cigarettes a day. The Trental has made a big difference in regard to his ability to get around and walk. I advised him to remain on Trental.  • On 02/09/2022 his peripheral arterial disease is about the same, the hemoglobin is stable, the Trental is still ongoing, it is beneficial to him.   On 03/16/2022 his peripheral arterial disease remains about the same. He has no gangrene. He has an element of claudication upon walking. We advised him to remain on the same medicines that he is taking. One of them is Trental. I also advised him about smoking cessation. This is not going to change. He remains on 15 cigarettes a day.  He remains on Trental. He has not had any claudication. He has minimal purplish discoloration of his toes that is not prominent with no gangrene and no ischemic neuropathy.   On 06/08/2022, his peripheral artery disease is quiet. He will see Vascular Surgery in a few days. He remains on medicine for this by me. That includes the use of Trental. I encouraged him to remain on this medicine for the time being.  His peripheral arterial disease remains ongoing. I keep insisting in regard to smoking cessation that is going to happen as soon as he goes to [place]. The hotel and the car rental that he had do not allow him to smoke and he has no option.         •   •   •   ·     7.This patient's blood sugar has remained in the 130's, 140's, 150's for the last several visits. His hemoglobin A1C today is 5.9.  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 He will  require the utilization of Glucotrol XL 5 mg. I went ahead and sent this prescription to the pharmacy. I pointed out to him that he is eating a diet rich in carbohydrates mostly and I pointed out to him that he needs to eat more vegetables, some fruit and high complex carbohydrates that will minimize issues pertinent to his blood sugar. It is even more important now that he has initiated prednisone as posted.   • I reviewed with him on 01/12/2022 his hemoglobin A1c of 5.9. I advised him to remain on a diet that is not too much rich in sugar and stay on his glipizide 5 mg every day. He is very selective in his diet. He finds things that tastes okay and other ones that do not taste okay and I think we have no solution for this definitive problem.  • On 02/09/2022 the patient's glucose seems to be under control. He will remain on glipizide 5 mg oral daily.     I will review him back in 3 weeks. He will return for treatment next week and for the dose adjustments were discussed with Amber Lam RN.  On 03/16/2022 I asked the patient to continue to monitor his blood sugar and to continue taking his minimal dose of Glucotrol.  Since 06/08/2022, the blood sugar on this patient remains minimally elevated. He remains on Glucotrol. I do believe that the patient has diabetes type 2, very mild case, and encouraged him to try to watch sugar utilization, processed foods, too many carbs, and he is aware of that. I advised him to remain on his Glucotrol XL, 1 tablet a day.  On 07/20/2022 his blood sugar has been under control. He remains on medicine for this.     ·   8.Swelling in the lower extremities. The patient does not look to me like he has any congestive heart failure, kidney dysfunction, liver dysfunction to produce this, neither has hypoalbuminemia, increased creatinine or congestive heart failure. Most likely is the case of gabapentin and/or Evoxac triggering fluid retention. I asked him to work on this medicines,  stopping and going and see if the swelling resolves spontaneously. If that is the case this will fix the problem. If that is not the case and the swelling bothers him, he will require to take a low-dose diuretic like Lasix 20 mg on p.r.n. basis once or twice a week.  On 07/20/2022 the patient has continued using the Lasix on prn basis. In preparation for the trip to see his brother I asked him to buy copper socks to have mild compression and minimize the swelling that he has in the lower extremities without cutting off his arterial circulation.     ·     • RECOMMENDATIONS:      Each one of the issues have been posted above for the 8 problems. The patient has a multitude of issues, not only the general management of the cancer and the general management of his primary care issues.

## 2022-07-22 ENCOUNTER — INFUSION (OUTPATIENT)
Dept: ONCOLOGY | Facility: HOSPITAL | Age: 67
End: 2022-07-22

## 2022-07-22 DIAGNOSIS — Z45.2 ENCOUNTER FOR ADJUSTMENT OR MANAGEMENT OF VASCULAR ACCESS DEVICE: ICD-10-CM

## 2022-07-22 DIAGNOSIS — D49.0 ESOPHAGUS NEOPLASM: ICD-10-CM

## 2022-07-22 DIAGNOSIS — C78.7 LIVER METASTASIS: ICD-10-CM

## 2022-07-22 DIAGNOSIS — C15.5 MALIGNANT NEOPLASM OF LOWER THIRD OF ESOPHAGUS: Primary | ICD-10-CM

## 2022-07-22 PROCEDURE — 25010000002 HEPARIN LOCK FLUSH PER 10 UNITS: Performed by: INTERNAL MEDICINE

## 2022-07-22 RX ORDER — SODIUM CHLORIDE 0.9 % (FLUSH) 0.9 %
10 SYRINGE (ML) INJECTION AS NEEDED
Status: DISCONTINUED | OUTPATIENT
Start: 2022-07-22 | End: 2022-07-22 | Stop reason: HOSPADM

## 2022-07-22 RX ORDER — SODIUM CHLORIDE 0.9 % (FLUSH) 0.9 %
10 SYRINGE (ML) INJECTION AS NEEDED
Status: CANCELLED | OUTPATIENT
Start: 2022-07-22

## 2022-07-22 RX ORDER — HEPARIN SODIUM (PORCINE) LOCK FLUSH IV SOLN 100 UNIT/ML 100 UNIT/ML
500 SOLUTION INTRAVENOUS AS NEEDED
Status: CANCELLED | OUTPATIENT
Start: 2022-07-22

## 2022-07-22 RX ORDER — HEPARIN SODIUM (PORCINE) LOCK FLUSH IV SOLN 100 UNIT/ML 100 UNIT/ML
500 SOLUTION INTRAVENOUS AS NEEDED
Status: DISCONTINUED | OUTPATIENT
Start: 2022-07-22 | End: 2022-07-22 | Stop reason: HOSPADM

## 2022-07-22 RX ADMIN — Medication 500 UNITS: at 08:11

## 2022-07-22 RX ADMIN — Medication 10 ML: at 08:10

## 2022-08-12 DIAGNOSIS — Z72.0 TOBACCO ABUSE: ICD-10-CM

## 2022-08-12 DIAGNOSIS — I82.412 ACUTE DEEP VEIN THROMBOSIS (DVT) OF FEMORAL VEIN OF LEFT LOWER EXTREMITY: ICD-10-CM

## 2022-08-12 DIAGNOSIS — C15.5 MALIGNANT NEOPLASM OF LOWER THIRD OF ESOPHAGUS: ICD-10-CM

## 2022-08-12 DIAGNOSIS — Z45.2 ENCOUNTER FOR ADJUSTMENT OR MANAGEMENT OF VASCULAR ACCESS DEVICE: ICD-10-CM

## 2022-08-12 DIAGNOSIS — N32.1 COLOVESICAL FISTULA: ICD-10-CM

## 2022-08-12 RX ORDER — GABAPENTIN 300 MG/1
CAPSULE ORAL
Qty: 180 CAPSULE | Refills: 0 | Status: SHIPPED | OUTPATIENT
Start: 2022-08-12 | End: 2022-11-14

## 2022-08-17 ENCOUNTER — INFUSION (OUTPATIENT)
Dept: ONCOLOGY | Facility: HOSPITAL | Age: 67
End: 2022-08-17

## 2022-08-17 ENCOUNTER — OFFICE VISIT (OUTPATIENT)
Dept: ONCOLOGY | Facility: CLINIC | Age: 67
End: 2022-08-17

## 2022-08-17 VITALS
TEMPERATURE: 98.2 F | WEIGHT: 199.5 LBS | DIASTOLIC BLOOD PRESSURE: 75 MMHG | SYSTOLIC BLOOD PRESSURE: 120 MMHG | HEART RATE: 71 BPM | BODY MASS INDEX: 29.55 KG/M2 | OXYGEN SATURATION: 96 % | RESPIRATION RATE: 16 BRPM | HEIGHT: 69 IN

## 2022-08-17 DIAGNOSIS — R73.01 IMPAIRED FASTING GLUCOSE: ICD-10-CM

## 2022-08-17 DIAGNOSIS — G62.0 PERIPHERAL NEUROPATHY DUE TO CHEMOTHERAPY: ICD-10-CM

## 2022-08-17 DIAGNOSIS — C15.5 MALIGNANT NEOPLASM OF LOWER THIRD OF ESOPHAGUS: Primary | ICD-10-CM

## 2022-08-17 DIAGNOSIS — C78.7 LIVER METASTASIS: ICD-10-CM

## 2022-08-17 DIAGNOSIS — T45.1X5A PERIPHERAL NEUROPATHY DUE TO CHEMOTHERAPY: ICD-10-CM

## 2022-08-17 DIAGNOSIS — N32.1 RECTO-BLADDERNECK FISTULA: ICD-10-CM

## 2022-08-17 DIAGNOSIS — I10 ESSENTIAL HYPERTENSION: ICD-10-CM

## 2022-08-17 DIAGNOSIS — Z72.0 TOBACCO ABUSE: ICD-10-CM

## 2022-08-17 DIAGNOSIS — D49.0 ESOPHAGUS NEOPLASM: ICD-10-CM

## 2022-08-17 DIAGNOSIS — Z45.2 ENCOUNTER FOR ADJUSTMENT OR MANAGEMENT OF VASCULAR ACCESS DEVICE: ICD-10-CM

## 2022-08-17 DIAGNOSIS — C15.5 MALIGNANT NEOPLASM OF LOWER THIRD OF ESOPHAGUS: ICD-10-CM

## 2022-08-17 LAB
ALBUMIN SERPL-MCNC: 4.1 G/DL (ref 3.5–5.2)
ALBUMIN/GLOB SERPL: 1.6 G/DL (ref 1.1–2.4)
ALP SERPL-CCNC: 135 U/L (ref 38–116)
ALT SERPL W P-5'-P-CCNC: 29 U/L (ref 0–41)
ANION GAP SERPL CALCULATED.3IONS-SCNC: 11.8 MMOL/L (ref 5–15)
AST SERPL-CCNC: 22 U/L (ref 0–40)
BASOPHILS # BLD AUTO: 0.05 10*3/MM3 (ref 0–0.2)
BASOPHILS NFR BLD AUTO: 0.6 % (ref 0–1.5)
BILIRUB SERPL-MCNC: 0.4 MG/DL (ref 0.2–1.2)
BUN SERPL-MCNC: 9 MG/DL (ref 6–20)
BUN/CREAT SERPL: 12.9 (ref 7.3–30)
CALCIUM SPEC-SCNC: 9.4 MG/DL (ref 8.5–10.2)
CEA SERPL-MCNC: 8.73 NG/ML
CHLORIDE SERPL-SCNC: 104 MMOL/L (ref 98–107)
CO2 SERPL-SCNC: 23.2 MMOL/L (ref 22–29)
CREAT SERPL-MCNC: 0.7 MG/DL (ref 0.7–1.3)
DEPRECATED RDW RBC AUTO: 50.6 FL (ref 37–54)
EGFRCR SERPLBLD CKD-EPI 2021: 102.3 ML/MIN/1.73
EOSINOPHIL # BLD AUTO: 0.45 10*3/MM3 (ref 0–0.4)
EOSINOPHIL NFR BLD AUTO: 5.7 % (ref 0.3–6.2)
ERYTHROCYTE [DISTWIDTH] IN BLOOD BY AUTOMATED COUNT: 14.6 % (ref 12.3–15.4)
GLOBULIN UR ELPH-MCNC: 2.6 GM/DL (ref 1.8–3.5)
GLUCOSE SERPL-MCNC: 105 MG/DL (ref 74–124)
HCT VFR BLD AUTO: 42.2 % (ref 37.5–51)
HGB BLD-MCNC: 14.6 G/DL (ref 13–17.7)
IMM GRANULOCYTES # BLD AUTO: 0.12 10*3/MM3 (ref 0–0.05)
IMM GRANULOCYTES NFR BLD AUTO: 1.5 % (ref 0–0.5)
LYMPHOCYTES # BLD AUTO: 1.59 10*3/MM3 (ref 0.7–3.1)
LYMPHOCYTES NFR BLD AUTO: 20.2 % (ref 19.6–45.3)
MCH RBC QN AUTO: 32.3 PG (ref 26.6–33)
MCHC RBC AUTO-ENTMCNC: 34.6 G/DL (ref 31.5–35.7)
MCV RBC AUTO: 93.4 FL (ref 79–97)
MONOCYTES # BLD AUTO: 0.61 10*3/MM3 (ref 0.1–0.9)
MONOCYTES NFR BLD AUTO: 7.8 % (ref 5–12)
NEUTROPHILS NFR BLD AUTO: 5.05 10*3/MM3 (ref 1.7–7)
NEUTROPHILS NFR BLD AUTO: 64.2 % (ref 42.7–76)
NRBC BLD AUTO-RTO: 0 /100 WBC (ref 0–0.2)
PLATELET # BLD AUTO: 119 10*3/MM3 (ref 140–450)
PMV BLD AUTO: 10.2 FL (ref 6–12)
POTASSIUM SERPL-SCNC: 3.7 MMOL/L (ref 3.5–4.7)
PROT SERPL-MCNC: 6.7 G/DL (ref 6.3–8)
RBC # BLD AUTO: 4.52 10*6/MM3 (ref 4.14–5.8)
SODIUM SERPL-SCNC: 139 MMOL/L (ref 134–145)
WBC NRBC COR # BLD: 7.87 10*3/MM3 (ref 3.4–10.8)

## 2022-08-17 PROCEDURE — 96411 CHEMO IV PUSH ADDL DRUG: CPT

## 2022-08-17 PROCEDURE — 82378 CARCINOEMBRYONIC ANTIGEN: CPT | Performed by: INTERNAL MEDICINE

## 2022-08-17 PROCEDURE — 96367 TX/PROPH/DG ADDL SEQ IV INF: CPT

## 2022-08-17 PROCEDURE — 25010000002 FLUOROURACIL PER 500 MG: Performed by: INTERNAL MEDICINE

## 2022-08-17 PROCEDURE — 25010000002 LEUCOVORIN CALCIUM PER 50 MG: Performed by: INTERNAL MEDICINE

## 2022-08-17 PROCEDURE — 85025 COMPLETE CBC W/AUTO DIFF WBC: CPT

## 2022-08-17 PROCEDURE — 96416 CHEMO PROLONG INFUSE W/PUMP: CPT

## 2022-08-17 PROCEDURE — 80053 COMPREHEN METABOLIC PANEL: CPT

## 2022-08-17 PROCEDURE — 25010000002 PALONOSETRON PER 25 MCG: Performed by: INTERNAL MEDICINE

## 2022-08-17 PROCEDURE — 25010000002 TRASTUZUMAB-ANNS 420 MG RECONSTITUTED SOLUTION 1 EACH VIAL: Performed by: INTERNAL MEDICINE

## 2022-08-17 PROCEDURE — 99214 OFFICE O/P EST MOD 30 MIN: CPT | Performed by: INTERNAL MEDICINE

## 2022-08-17 PROCEDURE — 96413 CHEMO IV INFUSION 1 HR: CPT

## 2022-08-17 PROCEDURE — 25010000002 DIPHENHYDRAMINE PER 50 MG: Performed by: INTERNAL MEDICINE

## 2022-08-17 PROCEDURE — 96375 TX/PRO/DX INJ NEW DRUG ADDON: CPT

## 2022-08-17 RX ORDER — SODIUM CHLORIDE 9 MG/ML
250 INJECTION, SOLUTION INTRAVENOUS ONCE
Status: CANCELLED | OUTPATIENT
Start: 2022-08-17

## 2022-08-17 RX ORDER — FLUOROURACIL 50 MG/ML
340 INJECTION, SOLUTION INTRAVENOUS ONCE
Status: CANCELLED | OUTPATIENT
Start: 2022-08-17

## 2022-08-17 RX ORDER — FAMOTIDINE 20 MG/1
20 TABLET, FILM COATED ORAL ONCE
Status: CANCELLED | OUTPATIENT
Start: 2022-08-17 | End: 2022-08-17

## 2022-08-17 RX ORDER — FAMOTIDINE 10 MG/ML
20 INJECTION, SOLUTION INTRAVENOUS AS NEEDED
Status: CANCELLED | OUTPATIENT
Start: 2022-08-17

## 2022-08-17 RX ORDER — FLUOROURACIL 50 MG/ML
340 INJECTION, SOLUTION INTRAVENOUS ONCE
Status: COMPLETED | OUTPATIENT
Start: 2022-08-17 | End: 2022-08-17

## 2022-08-17 RX ORDER — PALONOSETRON 0.05 MG/ML
0.25 INJECTION, SOLUTION INTRAVENOUS ONCE
Status: CANCELLED | OUTPATIENT
Start: 2022-08-17

## 2022-08-17 RX ORDER — PALONOSETRON 0.05 MG/ML
0.25 INJECTION, SOLUTION INTRAVENOUS ONCE
Status: COMPLETED | OUTPATIENT
Start: 2022-08-17 | End: 2022-08-17

## 2022-08-17 RX ORDER — SODIUM CHLORIDE 9 MG/ML
250 INJECTION, SOLUTION INTRAVENOUS ONCE
Status: COMPLETED | OUTPATIENT
Start: 2022-08-17 | End: 2022-08-17

## 2022-08-17 RX ORDER — FAMOTIDINE 20 MG/1
20 TABLET, FILM COATED ORAL ONCE
Status: COMPLETED | OUTPATIENT
Start: 2022-08-17 | End: 2022-08-17

## 2022-08-17 RX ADMIN — FLUOROURACIL 700 MG: 50 INJECTION, SOLUTION INTRAVENOUS at 11:15

## 2022-08-17 RX ADMIN — FLUOROURACIL 4180 MG: 50 INJECTION, SOLUTION INTRAVENOUS at 11:15

## 2022-08-17 RX ADMIN — DIPHENHYDRAMINE HYDROCHLORIDE 25 MG: 50 INJECTION, SOLUTION INTRAMUSCULAR; INTRAVENOUS at 09:25

## 2022-08-17 RX ADMIN — SODIUM CHLORIDE 250 ML: 9 INJECTION, SOLUTION INTRAVENOUS at 09:24

## 2022-08-17 RX ADMIN — TRASTUZUMAB-ANNS 530 MG: 420 INJECTION, POWDER, LYOPHILIZED, FOR SOLUTION INTRAVENOUS at 09:44

## 2022-08-17 RX ADMIN — PALONOSETRON 0.25 MG: 0.05 INJECTION, SOLUTION INTRAVENOUS at 09:24

## 2022-08-17 RX ADMIN — LEUCOVORIN CALCIUM 700 MG: 350 INJECTION, POWDER, LYOPHILIZED, FOR SUSPENSION INTRAMUSCULAR; INTRAVENOUS at 10:41

## 2022-08-17 RX ADMIN — FAMOTIDINE 20 MG: 20 TABLET ORAL at 09:24

## 2022-08-17 NOTE — PROGRESS NOTES
Subjective     REASON FOR follow up:1.    1. 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  Currently receiving palliative 5 fu leucovorin  AND HERCEPTIN therapy once a month    2.COLOVESICAL FISTULA: chronic antibiotic therapy cipro, NO FURTHER PLANS FOR SURGERY AFTER VISIT AND PROCEDURE BY DR GM CASTILLO 1/20    3. DVT R AND LEFT LE off ANTICOAGULANT : THROMBOPHILIA OF MALIGNANCY    4. 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 NEURONTIN INITIATED.        DURING THE VISIT WITH THE PATIENT TODAY , PATIENT HAD FACE MASK, MY MEDICAL ASSISTANT AND I  HAD PROPPER PROTECTIVE EQUIPMENT, AND I DID HAND HYGIENE WITH SOAP AND WATER BEFORE AND AFTER THE VISIT.    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 The patient at this time is undergoing therapy with 5-FU/leucovorin and Herceptin. The only symptom that he has today is occasional sensation of dysphagia when he eats bread or he eats very fast. Other than that, his appetite and his weight have remained stable. He has not had any heartburn, indigestion or regurgitation. He denies any hematemesis or melena. He completed a trip to [place] along with his brother successfully with no difficulties or infections or any other problems. He has good bowel activity. Urination is ongoing. Still passing urine through the rectum related to the colovesical fistula. No urinary tract infections recently. He has not had any new cardiac issues. The swelling in his lower extremities went away in [place], came back once that he got back to [place]. He has not had any claudication at this time. He remains on Trental for his peripheral arterial disease. He remains on gabapentin for his peripheral neuropathy from Eloxatin in the past.     Unfortunately he continues smoking  "cigarettes.                                  Past Medical History:   Diagnosis Date   • Arthritis    • Cancer (HCC)     prostate cancer 2008   • Cancer (HCC)     esophageal   • Chronic anticoagulation     on xarelto   • Elevated PSA    • Esophageal mass    • Fistula     colon and bladder   • H/O Lung nodule    • Hepatitis     CHILD--PT STATED \"I THINK IT WAS A.\"   • History of chemotherapy    • History of pneumonia    • Hx of blood clots 08/10/2019   • Hyperlipidemia    • Hypertension    • Nail fungus    • Neuropathy    • PVD (peripheral vascular disease) (HCC)    • Recto-bladderneck fistula     on poab for recurrent uti        Past Surgical History:   Procedure Laterality Date   • COLONOSCOPY N/A 2/4/2020    Procedure: COLONOSCOPY;  Surgeon: Guy Sears MD;  Location: [facility] ENDOSCOPY;  Service: General;  Laterality: N/A;  PRE-COLOVESICAL FISTULA  POST-- DIVERTICULOSIS   • CYSTOSCOPY  02/06/2020   • CYSTOSCOPY Bilateral 2/26/2020    Procedure: CYSTOSCOPY RETROGRADE;  Surgeon: Cm Witt MD;  Location: [facility] OR;  Service: Urology;  Laterality: Bilateral;   • ENDOSCOPY     • ENDOSCOPY N/A 6/19/2021    Procedure: ESOPHAGOGASTRODUODENOSCOPY WITH BIOPSY;  Surgeon: Mary Brito MD;  Location: [facility] MAIN OR;  Service: Gastroenterology;  Laterality: N/A;   • ENDOSCOPY N/A 8/5/2021    Procedure: ESOPHAGOGASTRODUODENOSCOPY HEMOSPRAY;  Surgeon: Naga Shelby MD;  Location: [facility] ENDOSCOPY;  Service: Gastroenterology;  Laterality: N/A;  HX OF ESOPHAGEAL  CANCER;MELENA  POST: ESOPHAGEAL BLEEDING; ESOPHAGEAL MASS   • HERNIA REPAIR Right 1999    inguinal hernia   • PROSTATE SURGERY  03/2008    prostatectectomy   • VENOUS ACCESS DEVICE (PORT) INSERTION N/A 7/16/2019    Procedure: INSERTION VENOUS ACCESS DEVICE WITH FLUORO AND EGD WITH BIOPSY;  Surgeon: Mary Brito MD;  Location: [facility] OR;  Service: Thoracic        Current Outpatient Medications on File Prior to Visit   Medication Sig " Dispense Refill   • acetaminophen (TYLENOL) 500 MG tablet Take 500 mg by mouth Every 6 (Six) Hours As Needed for Mild Pain .     • cevimeline (EVOXAC) 30 MG capsule Take 30 mg by mouth 3 (Three) Times a Day.     • ciprofloxacin (CIPRO) 250 MG tablet Take 1 tablet by mouth Daily. Take one every other day 90 tablet 1   • econazole nitrate (SPECTAZOLE) 1 % cream 2 (Two) Times a Day.     • furosemide (LASIX) 20 MG tablet Take 1 tablet by mouth Every Other Day. 28 tablet 2   • gabapentin (NEURONTIN) 300 MG capsule TAKE 1 CAPSULE BY MOUTH TWICE A  capsule 0   • glipizide (GLUCOTROL XL) 5 MG ER tablet TAKE 1 TABLET BY MOUTH EVERY DAY 90 tablet 1   • lisinopril-hydrochlorothiazide (PRINZIDE,ZESTORETIC) 20-12.5 MG per tablet TAKE 1 TABLET BY MOUTH EVERY DAY 90 tablet 0   • nicotine (NICODERM CQ) 21 MG/24HR patch Place 1 patch on the skin as directed by provider Daily. (Patient taking differently: Place 1 patch on the skin as directed by provider Daily. Has at home but hasn't started taking yet) 28 each 1   • OLANZapine (ZyPREXA) 2.5 MG tablet Take 1 tablet by mouth Every Night. 90 tablet 0   • ondansetron (ZOFRAN) 4 MG tablet Take 1 tablet by mouth Every 8 (Eight) Hours As Needed for Nausea or Vomiting. 30 tablet 2   • pantoprazole (Protonix) 40 MG EC tablet Take 1 tablet by mouth 2 (Two) Times a Day. 90 tablet 3   • pentoxifylline (TRENtal) 400 MG CR tablet TAKE 1 TABLET BY MOUTH TWICE A DAY WITH MEALS 90 tablet 0   • potassium chloride ER (K-TAB) 20 MEQ tablet controlled-release ER tablet Take 1 tablet by mouth Daily. 90 tablet 3   • Probiotic Product (PROBIOTIC DAILY PO) Take 1 tablet by mouth Daily.     • triamcinolone (KENALOG) 0.1 % ointment Apply 1 application topically to the appropriate area as directed 2 (Two) Times a Day. 30 g 1     No current facility-administered medications on file prior to visit.        ALLERGIES:    Allergies   Allergen Reactions   • Oxaliplatin Anaphylaxis        Social History      Socioeconomic History   • Marital status: Single   • Years of education: High school   Tobacco Use   • Smoking status: Current Every Day Smoker     Packs/day: 1.00     Years: 38.00     Pack years: 38.00     Types: Cigarettes     Start date: 1974   • Smokeless tobacco: Never Used   • Tobacco comment: Quit for a period of 8 years.    Vaping Use   • Vaping Use: Never used   Substance and Sexual Activity   • Alcohol use: Not Currently   • Drug use: Never   • Sexual activity: Defer        Family History   Problem Relation Age of Onset   • Hypertension Mother    • Stroke Mother    • Hypertension Other    • Lung disease Other    • Prostate cancer Other    • Lung cancer Father    • Malig Hyperthermia Neg Hx       Current Outpatient Medications on File Prior to Visit   Medication Sig Dispense Refill   • acetaminophen (TYLENOL) 500 MG tablet Take 500 mg by mouth Every 6 (Six) Hours As Needed for Mild Pain .     • cevimeline (EVOXAC) 30 MG capsule Take 30 mg by mouth 3 (Three) Times a Day.     • ciprofloxacin (CIPRO) 250 MG tablet Take 1 tablet by mouth Daily. Take one every other day 90 tablet 1   • econazole nitrate (SPECTAZOLE) 1 % cream 2 (Two) Times a Day.     • furosemide (LASIX) 20 MG tablet Take 1 tablet by mouth Every Other Day. 28 tablet 2   • gabapentin (NEURONTIN) 300 MG capsule TAKE 1 CAPSULE BY MOUTH TWICE A  capsule 0   • glipizide (GLUCOTROL XL) 5 MG ER tablet TAKE 1 TABLET BY MOUTH EVERY DAY 90 tablet 1   • lisinopril-hydrochlorothiazide (PRINZIDE,ZESTORETIC) 20-12.5 MG per tablet TAKE 1 TABLET BY MOUTH EVERY DAY 90 tablet 0   • nicotine (NICODERM CQ) 21 MG/24HR patch Place 1 patch on the skin as directed by provider Daily. (Patient taking differently: Place 1 patch on the skin as directed by provider Daily. Has at home but hasn't started taking yet) 28 each 1   • OLANZapine (ZyPREXA) 2.5 MG tablet Take 1 tablet by mouth Every Night. 90 tablet 0   • ondansetron (ZOFRAN) 4 MG tablet Take 1 tablet by  "mouth Every 8 (Eight) Hours As Needed for Nausea or Vomiting. 30 tablet 2   • pantoprazole (Protonix) 40 MG EC tablet Take 1 tablet by mouth 2 (Two) Times a Day. 90 tablet 3   • pentoxifylline (TRENtal) 400 MG CR tablet TAKE 1 TABLET BY MOUTH TWICE A DAY WITH MEALS 90 tablet 0   • potassium chloride ER (K-TAB) 20 MEQ tablet controlled-release ER tablet Take 1 tablet by mouth Daily. 90 tablet 3   • Probiotic Product (PROBIOTIC DAILY PO) Take 1 tablet by mouth Daily.     • triamcinolone (KENALOG) 0.1 % ointment Apply 1 application topically to the appropriate area as directed 2 (Two) Times a Day. 30 g 1     No current facility-administered medications on file prior to visit.     Allergies   Allergen Reactions   • Oxaliplatin Anaphylaxis            Objective     Vitals:    08/17/22 0841   BP: 120/75   Pulse: 71   Resp: 16   Temp: 98.2 °F (36.8 °C)   TempSrc: Temporal   SpO2: 96%   Weight: 90.5 kg (199 lb 8 oz)   Height: 175.3 cm (69.02\")   PainSc: 0-No pain     Current Status 8/17/2022   ECOG score 0   EXAM:          GENERAL:  Well-developed, well-nourished  Patient  in no acute distress.   SKIN:  Warm, dry ,NO purpura ,no rash.  HEENT:  Pupils were equal and reactive to light and accomodation, conjunctivae noninjected, normal extraocular movements, normal visual acuity.   NECK:  Supple with good range of motion; no thyromegaly , no JVD or bruits,.No carotid artery pain, no carotid abnormal pulsation   LYMPHATICS:  No cervical, NO supraclavicular, NO axillary, NO inguinal adenopathies.  CARDIAC   normal rate , regular rhythm, without murmur,NO rubs NO S3 NO S4   LUNGS: normal breath sounds bilateral, no wheezing, NO rhonchi, NO crackles ,NO rubs.  VASCULAR VENOUS: no cyanosis, NO collateral circulation, NO varicosities, NO edema, NO palpable cords, NO pain,NO erythema, NO pigmentation of the skin.  ABDOMEN:  Soft, NO pain,no hepatomegaly, no splenomegaly,no masses, no ascites, no collateral circulation,no " distention.  EXTREMITIES  AND SPINE:   clubbing, no cyanosis ,no deformities , no pain .No kyphosis,  no pain in spine, no pain in ribs , no pain in pelvic bone.  NEUROLOGICAL:  Patient was awake, alert, oriented to time, person and place.      RECENT LABS:  Hematology WBC   Date Value Ref Range Status   08/17/2022 7.87 3.40 - 10.80 10*3/mm3 Final   02/02/2019 13.4 (H) 3.4 - 10.8 x10E3/uL Final     RBC   Date Value Ref Range Status   08/17/2022 4.52 4.14 - 5.80 10*6/mm3 Final   02/02/2019 4.81 4.14 - 5.80 x10E6/uL Final     Hemoglobin   Date Value Ref Range Status   08/17/2022 14.6 13.0 - 17.7 g/dL Final     Hematocrit   Date Value Ref Range Status   08/17/2022 42.2 37.5 - 51.0 % Final     Platelets   Date Value Ref Range Status   08/17/2022 119 (L) 140 - 450 10*3/mm3 Final        Lab Results   Component Value Date    GLUCOSE 129 (H) 07/20/2022    BUN 11 07/20/2022    CREATININE 0.69 (L) 07/20/2022    EGFRIFNONA 149 02/16/2022    EGFRIFAFRI 100 02/02/2019    BCR 15.9 07/20/2022    K 3.7 07/20/2022    CO2 23.1 07/20/2022    CALCIUM 9.2 07/20/2022    PROTENTOTREF 7.1 02/02/2019    ALBUMIN 4.00 07/20/2022    LABIL2 1.6 02/02/2019    AST 25 07/20/2022    ALT 31 07/20/2022       Component   Ref Range & Units 08:10   (8/17/22) 4 wk ago   (7/20/22) 2 mo ago   (6/8/22) 3 mo ago   (5/18/22) 3 mo ago   (4/27/22) 4 mo ago   (3/30/22) 5 mo ago   (3/2/22)   CEA   ng/mL 8.73  7.40  8.00  9.82  12.90  17.40  34.30    Resulting Agency  [facility]  [facility]  [facility]             Narrative  Performed by:  [facility]  CEA Reference Range:     Non Smokers:   Less than 3 ng/mL   Smokers:       Less than 5 ng/mL   Results may be falsely decreased if patient taking Biotin.       Specimen Collected: 08/17/22 08:10 Last Resulted: 08/17/22 12:29                     Assessment & Plan    1.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 Almost 90% of the liver WAS replaced  by tumor. The patient has been undergoing chemotherapy with 5  fu and leucovorin  and Herceptin and has had excellent response clinically and radiologically. The patient was reviewed on 08/10/2020. I reviewed with the patient in the PAC system [facility] his PET scan that is dramatic. There is minimal uptake in the lower esophagus and most importantly complete resolution of his liver metastasis. Actually the only issue that is pertinent to the PET scan is still the visibility of his colovesical fistula.     Therefore from the point of view of his cancer of the esophagus, metastatic to the liver, he has no symptoms from the primary tumor in the esophagus and he has no symptoms related to his liver metastasis that has resolved altogether. His CEA level is stable.The patient raised the question about the CEA fluctuation. I pointed out to him that a lot of this is also related to his smoking. Smoking per se elevates CEA level.  • Upon further reviewing the patient on 04/20/2021, he has no symptoms pertinent to his metastatic cancer of the esophagus to the liver and he has no difficulty swallowing. There are no side effects of the 5-FU, Leucovorin and Herceptin. The patient's cardiac function remains stable and he has not had any drop in the ejection fraction.   • I discussed with him on 05/18/2021 the fact that his cancer clinically is very quiet but I am afraid of the rise in his CEA level to 12. This could be an indicator of all of the cigarettes that he is smoking on a daily basis of indicator of cancer escaping control and not visible radiologically through his CT scan. I pointed out to him that he needs to continue making an effort to decrease his smoking and he is now down to 10 cigarettes a day. We will recheck another CEA level today. Given the circumstances of the previous CT scan I do not believe that I need to change the doses or plan of care in regard to his chemotherapy medication  administration for the time being. I recommended for him to remain on his 5FU/leucovorin and Herceptin on a monthly basis for now.  • The patient was further reviewed on 06/15/2021 with a new PET scan that documented regrowth of the tumor in the lower esophagus without any new symptomatology, for example dysphagia or odynophagia. No regurgitation. The liver metastasis remains in remission and there are no new areas of disease in the bones or lungs or any other site. Given the excellent performance status I discussed with the patient and his sister today many options of therapy, including going back to FOLFOX regimen along with Herceptin, taking another sample of the esophagus with a new endoscopy and doing analysis of the tissue for Caris Target Now that will be my favorite choice, or palliative treatment with radiation therapy and 5-FU continuous infusion that will be toxic to him and he does not prefer to have.     I had a discussion with Mary Brito MD, thoracic surgeon, who has agreed to see the patient tomorrow. I think the endoscopy, the new tissue analysis, and sending the tissue for Caris [employer] Now will be the way to go. Depending on what we find there, we can modify his treatment altogether. I do not think the patient will have any consequences missing chemotherapy for a couple of weeks or so.      In preparation for the endoscopy and biopsies I asked him to hold off on the Xarelto until he is seen by Dr. Brito tomorrow.     In regard to his colovesical fistula, he has had minimal symptomatology this week, maybe some activity there and I asked him to go back to the lab and take a urine specimen and culture. He knows that if this is abnormal he will need to initiate ciprofloxacin that he has at home.    In regard to his hypertension, this is under good control and I advised him to remain on his lisinopril and hydrochlorothiazide combination.    In regard to his smoking cessation, he has had conversations  with NIRU Jacobson about this. He is using some nicotine CQ patch, here and there and also trying to work with nicotine gum and things of this nature but he has not been able to shake this issue altogether.     Otherwise I will review him back in a couple of weeks with a CBC, CMP, and a CEA level.    This case will be presented in the multidisciplinary thoracic conference by me this Thursday, and I will discuss any further advice to the patient.     I also mentioned to the patient that on the background of HER2-positive cancer of the esophagus the possibility of using a new medicine for HER2-based disease that is called Enhertu is a real possibility and maybe that will be the next step to go through.    • The patient was further reviewed on 07/01/2021. I reviewed with Dr. Brito the endoscopy that shows a noncircumferential abnormality in the lower esophagus that encompasses almost 6 cm in length. It is not blocking off the esophagus and that is why the patient has no symptoms. The pathology shows at least atypical cells consistent with cancer. Further Next Generation Sequencing has been sent for Caris Target Now.     I discussed with the patient the fact that we have many other modalities of treatment that he could encounter. He prefers to continue his general chemotherapy to control the cancer in his liver using 5-FU, leucovorin and Herceptin today and agree with that. In consideration to be seen by radiation therapy, the patient is willing to listen to the possibility of undergoing continuous 5-FU infusion along with radiation therapy to the esophagus knowing that he will experiencing esophagitis that can give him significant issues for 2-3 weeks. I went ahead and made the appointment for him to be seen by radiation oncology for this purpose only.     Obviously if the Next Generation Sequencing gives us any other hints in regard to how to treat him this could be also utilized including the consideration of  using Enhertu in the long run for achieving better control.     In regard to his smoking cessation he is not willing to change this phenomenon at this time. We have had NIRU Jacobson talking with him in this regard but he is not ready to make a decision when to quit.     Finally, I pointed out to him that during the previous visit we documented a urinary tract infection with streptococcus 50,000 colonies that in my appreciation was significant given the fact that he has a colovesical fistula. This was treated with antibiotics. He has not had any discomfort issues pertinent to this anymore. The urine today is completely clear. Nevertheless, I went ahead and sent a urinalysis at least to be sure that there is no need for any other repetitive infection therapy on him.     The patient also will remain on his anticoagulation at this time, his Xarelto for the time being. I have renewed as well his Neurontin. He has no need for pain medicine.  • The patient was further reviewed on 07/23/2021. He has no new symptomatology pertinent to his cancer of the esophagus with liver metastasis. He has no difficulty swallowing. He has been presented in the multidisciplinary clinic on a couple of occasions and he has been seen by Radiation Oncology. Finally the analysis of Caris Target Now is available now showing that the patient's tumor is PD-L1 positive and has high mutation burden. The tumor remains HER/2 positive. Given these findings I think it is very easy to make a choice in regard stopping the present regimen of chemotherapy and proceed with therapy with Keytruda every 3 weeks for at least 4 cycles and reassess him at that point. In preparation for Keytruda initiation next week and we will get the approval of the medicine by his insurance company the patient will require smoking cessation altogether to minimize modification of cigarettes on his immune system. I insisted in this fact to the patient.     Other than that  he will remain on probiotics. We will discontinue the 5FU/leucovorin and Herceptin and will move onto Keytruda. I discussed with him side effects of the Keytruda in detail as below. He will require treatment every 3 weeks with CBC, CMP, TSH every 3 weeks and he will require formal chemotherapy teaching, education and consent for this medicine.     After 4 cycles of this, in other words 3 months he will be reassessed with a new PET scan.     The chances under the present circumstances that he will improve as long as he quit smoking, it is very hard and maybe he could have long term survivorship.    I begged for him to have smoking cessation. If any time during his time with me we have been talking about this and this is absolutely necessary now. We know that cigarettes kill immune system cells and minimize the benefit of treatments with these medicines.       • The patient was further reviewed on 08/19/2021 in regard to his cancer of the esophagus. As stated above he had upper GI bleeding dropping hemoglobin to 5 requiring admission, transfusion, discontinuation of anticoagulation and endoscopy with local therapy by Naga Shelby MD. Since then he has completed 10 sessions of radiation therapy to the esophagus yesterday. He has not had any further bleed. His hemoglobin has bounced back from 5 to 14 and I have advised his this good news today.    I expect that the patient in a few days is going to develop an element of radiation esophagitis and he continues having some fatigue that will improve over time. So far no new issues have happened. I made him aware that if he has difficulty swallowing he will need to let us know, he will need to receive IV fluids in the office.    At least the radiation therapy will take care of the tumor in the esophagus and I do not believe that he will require any other GI endoscopy anymore.    Today the patient will proceed with his Keytruda that is the immunotherapy medicine that we are  delivering to him at this time for the treatment of his liver metastasis and also the tumor in the esophagus. I pointed out to him that so far I do not see any side effects of the Keytruda and the Keytruda will improve and increase the benefit of radiation therapy into the primary tumor in the esophagus.     From the point of view of his anemia associated with GI bleeding, again this has been corrected. The hemoglobin today is 14 grams and I asked him to take his iron supplementation only twice a week 1 tablet. He has been taking 3 tablets everyday.     From the point of view of his thrombophilia associated with malignancy, he is no longer receiving any anticoagulants given the recent episode of GI bleeding. We will maintain him off anticoagulants as much as we can. Hopefully he will not have any other episodes of thrombosis.     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     From the point of view of the Keytruda toxicity so far nothing happened. We will continue monitoring CBC, CMP and TSH periodically.     I will review him back in 3 and 6 weeks. When he comes back for the Keytruda he will continue receiving the medicine as the time goes by.    He understands that most of the benefit of radiation therapy in the tumor in the esophagus will be reached in 1 month and I am planning to give him a PET scan in more of less in 2 months from now when he will have almost 4 infusions of Keytruda under his belt and the completion of radiation therapy to the esophagus.     In regard to smoking cessation he has achieved a lot from 2 packs a day to 10 cigarettes a day. He will see Anh Felder RN, today in regard continuation of the benefit of the therapy for this condition at this point.   • The patient was further reviewed on 09/30/2021. His Keytruda is still ongoing but the patient has developed dermatitis  associated with this. It is a grade 1 toxicity so far and I am wondering if this is extending a little bit and the topical medicine is not going to be sufficient. I will proceed with his Keytruda today but maybe this will be the last administration of this medicine unless that we get shashi and the rash quiets down. Obviously at the completion for Keytruda the patient will require PET scan for assessment not only of the benefit of radiation therapy on his recurrence in the esophagus but also benefit of the Keytruda on his liver metastasis. Therefore, he will be scheduled for this before the next visit. The patient also has been advised that his hemoglobin is acceptable and it will be okay for him to stop his iron supplementation.     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 I have advised him to go into Cipro 250 mg p.o. daily.     The patient has been advised in regard to the continuation of topical steroids in the skin in areas of involvement. This will remain an ongoing issue along with moisturizer to the skin in the form of Cetaphil.     In regard to his hypertension, he remains on medicines for this by me. His blood pressure is under good control at this time.     In regard to previous anticoagulation, he is no longer receiving any given his GI bleeding. This will remain in observation.     In regard to smoking cessation the patient will further discuss with NIRU Jacobson, plans for further decrease. He is down to 10 cigarettes a day. This is already a major accomplishment in somebody who used to smoke almost 3 packs of cigarettes a day.     The patient will have a new insurance in 11/2021 and I will make the insurance ladies aware of this.     In 3 weeks he will have a CBC, CMP, TSH and the PET scan the week before.  • The patient was further reviewed on 10/21/2021. I reviewed with him his PET scan that shows still SUV activity in the lower esophagus but better than  before but he has now 3 areas of abnormal uptake in the liver consistent with progressive liver metastasis. The lung anatomy is clear, the bone anatomy is clear. Colovesical fistula was visible still.    On the basis of these changes I do believe that this patient knowing that he has HER/2 positive esophageal cancer needs to change his therapy. Keytruda is not helpful at all and we will discontinue this medicine at this time. I do believe that the inability of Keytruda to overcome his disease process is related to the persistency of smoking and the abnormality related to smoking about bacterial derek. Further analysis recently published in [employer] has confirmed that the benefit of immunotherapy is further boosted by proper amount of derek in the gastrointestinal tract and how cigarettes are damaging to this issue. In any event the patient's issues in regard to cigarette smoking has been already a problem and he has not been able to quit in spite of all of the support available. Therefore stopping the Keytruda at this point including today the patient will move to Enhertu that has been approved in patients who have cancer of the esophagus with liver metastasis. The dose of this medicine will be the GI dose of this medication that will be properly calculated accordingly. He will have formal education and consent for this medicine. I pointed out the most important side effects of this medicine include cardiac toxicity, anemia, leukopenia, thrombocytopenia and pneumonitis that includes cough, shortness of breath, fever. In preparation to minimize pneumonitis the patient will take prednisone 10 mg on day 2 and 3 after each dose of Enhertu. He will receive this medicine every 3 weeks. We will deliver 3-4 cycles and reassess him not only by tumor markers but also radiologically. Hopefully this will have a positive impact not only in the tumor in the esophagus but also tumors metastatic in his liver.   • The patient was  reviewed on 11/19/2021. Tolerance to the Enhertu 1st cycle was appropriate with more fatigue, no increased cough or shortness of breath and some anorexia. His white count, hemoglobin and platelets were normal and we advised him to proceed with his 2nd Enhertu infusion today. He understands that fatigue will be more prominent, that he could have chance for more hematological toxicity and he is starting to develop minor anemia. His white count and platelet count remain acceptable. I reminded him on many occasions today through the visit and insisted to the sister that if his cough pattern changes, increasing or his shortness of breath becomes worse he has to notify us immediately to be sure that he will not develop pneumonitis associated with Enhertu. He still will take 10 mg of prednisone tomorrow and Sunday to try to minimize this phenomenon from happening.     The patient will be having blood counts on a weekly basis with nurse visit to be sure to monitor hematological toxicity and he will return in 3 weeks to proceed with the next cycle. After the 3rd cycle the patient will proceed with radiological assessment including PET scan.   • The patient was further reviewed on 12/30/2021. On clinical grounds the patient has not had any difficulty swallowing, his liver is not enlarged, nodular or tender, he has no jaundice and his CEA level has dropped further. His PET scan discloses resolution of his periportal lymph nodes and one area of resolution of liver metastasis. He has complete resolution of the tumor in the esophagus. He has still 2 active lesions in the liver with significant SUV activity pertinent to his metastatic disease. Given this fact the patient could be a candidate to further receive Enhertu but my concern is about the radiological analysis that shows minimal pulmonary infiltrate bilaterally. The radiologist suggests a radiation pneumonitis. I do not think that is the case. I think this is probably  related to Enhertu interstitial lung disease and for this reason I feel the obligation to stop this medication at this point and to proceed with therapy with prednisone at 20 mg a day for the next 2 weeks until the patient returns back to see me. I made him aware that if the respiratory symptoms including his cough gets any worse or shortness of breath establishes and gets worse he will need to notify us immediately and he will require to be placed in the hospital to receive IV high dose steroid.    The question will be if the patient will be a candidate to receive any further Enhertu or not remains to be seen. Probably will not be the case.     Obviously this above statement opens the door in regard what else to do for his malignancy. Strong consideration will be to go back to Herceptin and also to consider to go back to FOLFOX, Herceptin like he received initially that gave him such a degree of resolution of symptoms. 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 I discussed this with him and his sister present in the room. I encouraged him to decrease his smoking.   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     In preparation for retaking treatment, I advised him that I would like to go back into FOLFOX, Herceptin every 3 weeks starting next week. This combination of medicines never failed him. It was discontinued because of toxicity and neuropathy. Therefore, I think it is worth it to go ahead and proceed with an echocardiogram and resume chemotherapy administration with this ,  giving him 3 cycles, each one of them 3 weeks apart and see how things evolve from the point of view tumor markers or radiological analysis of his liver through PET scan. He agrees to proceed.  • On 02/09/2022 I advised the patient to hold off his chemotherapy treatment with FOLFOX because his ANC was 1300 and we will decrease his dose of chemotherapy medicines by 15% for the next round of treatment next week. Hopefully the patient with this dose adjustment will be able to continue his treatment in a normal schedule of every 2 weeks. We have to watch his neuropathy very close. My advice will be eventually to pull out the Oxaliplatin and continue 5FU and leucovorin for a couple of cycles and eventually reintroduce the Oxaliplatin for 1-2 cycles back and forth. This has to be monitored clinically and also along with his response.   The patient was reviewed on 03/16/2022. Actually he feels terrific today and for the last 10 days after the anaphylactic reaction to oxaliplatin. He has no cancer-related pain. He has no hepatomegaly. His liver function tests have further improved and surprising enough his CEA level has dramatically dropped as posted above. It was in the 200 category and now it is in the 30 category, pointing to the fact that I think the oxaliplatin and the FOLFOX regimen indeed has very significant benefit for him along with the Herceptin. Obviously he had an anaphylactic reaction to oxaliplatin and this medicine was discontinued. He will receive today his 5-FU, leucovorin and Herceptin.     I advised him to remain on this regimen for the time being and continue the sequence of events every couple of weeks. I went ahead and scheduled an echocardiogram to follow up on his ejection fraction while on Herceptin.  I reviewed with the patient and his sister today his CT scan that shows a favorable response to the regimen that he is receiving of 5FU and Herceptin. The tumor areas are shrinking and his CEA level  is coming down. The patient actually feels very well. The tolerability to the regimen is excellent and he will continue the medicines at the same dosing and schedule for the time being. No plans to stop unless that he wants to take a trip with his sister to a different state and we will be glad to interrupt treatment for a week or so and no more than that. He understood this clearly.   On 06/08/2022, the patient's cancer of the esophagus with liver metastasis remains very quiet clinically and biochemically with no abdominal pain, no hepatomegaly, no jaundice, drop in the CEA level. Excellent tolerance to the regimen of 5-FU, leucovorin and Herceptin that he is receiving every 3 weeks. In fact his white count, hemoglobin and platelets today are normal. He will proceed with echocardiogram in a few days already scheduled. I encouraged him to remain in the present regimen of chemotherapy treatment every 3 weeks being seen by nurse practitioner in 3 weeks, by me in 6 weeks, and continue laboratory testing including CBC and CMP every 3 weeks and a CEA level in 6 weeks.  On 07/20/2022 the patient was further reviewed. His overall clinical status is excellent. Tolerance to the chemotherapy regimen once a month is excellent. He has not had any obvious cardiac toxicity, and he has not had any toxicity from 5FU and leucovorin. His white count, hemoglobin and platelets are normal. His chemistry profile remains normal. Most importantly his CEA level has dropped to the lowest number 8 during the visit a few weeks ago. This unequivocally proves the point that the patient is doing well from the point of view of his metastatic malignancy and proves the point that the regimen that he is receiving is helping him to control the process. For this reason I advised him to continue the regimen with the same sequence of once a month, the same dosing of medications. Eventually we will need to do cardiac assessment.   On 08/17/2022, the  patient continues doing well from the point of view of his treatment once a month with 5-FU/leucovorin and Herceptin. He has not developed any obvious cardiac toxicity. He will be due for another echocardiogram before visit in 8 weeks. The patient has a normal white count, hemoglobin and platelets today. His chemistry profile is pending to monitor alkaline phosphatase that has been minimally elevated given his liver metastasis. The patient also has had a CEA level that has been a good marker in regard to disease activity. Today his level is 8. He has been floating around this number. I advised him to continue his 5-FU/leucovorin and Herceptin once a month, returning to see us in 1, 2 and 3 months and continue medications in similar way. I am not planning radiological assessment unless that the patient has further raise in the CEA level.          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   • On 11/19/2021 he has no symptoms or signs of urinary tract infection and he remains on ciprofloxacin prophylactically everyday.   • On 12/30/2021 his colovesical fistula is still evident on the PET scan. He has gas in the bladder and he has had some hematuria. Therefore I do believe that the patient needs to remain on ciprofloxacin 1 tablet every other day to try to minimize any potential for any other urinary tract infection related to this. The patient under the present circumstances is not a candidate to have any kind of surgery unless the symptoms get worse or clinical picture changes radically.   • His colovesical fistula remains active. He remains taking ciprofloxacin 250 mg p.o. every other day. He has not had any fevers or chills and I advised him to remain on this medicine for the time being. His pharmacy called in regard that they have not had Cipro anymore. The patient has  tablets for almost 2 months supply. Therefore, he has no need for Levaquin or any other medicine or intervention at this point.  • His colovesical fistula is not active at this time. He has not had any pneumaturia of echolalia. He remains on ciprofloxacin prophylactically.   On 03/16/2022 the patient mentioned to me that he is now passing urine through the rectum. He also has fecaluria and pneumaturia still but in lesser amount. He is not having any fever. He has no pelvic pain. I think his colovesical fistula now in some way has bladder to colon  flow. It used to be fecal matter in the bladder and now it is urine through the anal canal. Obviously this will have less implications from the point of view of infection but obviously has the implications that the patient has to in some way be ready to defecate and urinate at the same time. He is going to see Dr. Cm Witt in a few days in regard to this fact. Given the fact that he has no abdominal pain and no fever, I would like for him to have a CT scan of the chest, abdomen and pelvis anyway that would not only show us the status of his cancer but also the status of his fistula. I prefer this over a PET scan for that purpose. This information will be shared with Dr. Cm Witt.   •   In the meantime I advised him to continue using his ciprofloxacin 250 mg every other day for prevention of UTI.  •   He has been seen by Cm Witt MD. He has advised the patient that he could not do too much for the fistula given the fact that it is just inconveniences of having urination through the anal canal from time to time but no urinary tract infection. He asked the patient to postpone any intention for surgery unless that it is absolutely necessary. Postponement of chemotherapy for 3 months if any surgery is necessary will trigger dramatic progression of his tumor and obviously consequences.   On 06/08/2022, his colovesical fistula continues. Most of the  output is urine through the rectum, no stool through the bladder. He has not had any urinary tract infection but he continues using prophylactically his ciprofloxacin 250 mg orally every other day. I asked him to remain on this medicine for the time being.  On 07/20/2022 the colovesical fistula is extremely quiet. There is no output through the bladder, there is no output through the rectum. This probably tells us that this has healed and this is good news. He has not had the need to take any antibiotics.   On 08/17/2022 the patient continues having colovesical fistula symptomatology intermittently, passing urine through the rectum and not passing feces through the bladder. He has not had any other urinary tract infections. He continues using his ciprofloxacin as per my instructions.          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   • He remains on Neurontin for the treatment of his peripheral neuropathy. I advised him to remain on this medicine for the time being.  • On 01/12/2022, I advised the patient to remain on his gabapentin for the treatment of his sensory peripheral neuropathy in his feet.  • I went ahead and prescribed on 02/09/2022 his doses of gabapentin to take 300 mg twice a day.   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 This was not aggravated by the few doses of oxaliplatin that he was able to receive until an anaphylactic reaction.  He will remain on Neurontin for the treatment of his peripheral neuropathy associated with Oxaliplatin. The symptoms are under good control.   On 06/08/2022, given the development of minimal edema in his lower extremities that could be related to the use of gabapentin or a medicine that is called Evoxac, I discussed with him maybe the possibility of stopping the gabapentin for  a few days and see how things evolve from this point of view. He has no neuropathic symptomatology at this time. He agrees to try and see what happens.  On 07/20/2022 his sensory neuropathy in his feet remains about the same. He remains on gabapentin that is useful and manageable in regard to symptom control.   On 08/17/2022, he continues using gabapentin for neuropathy associated with previous Eloxatin. Neuropathy is grade 1-2. The symptoms are under good control. He has no motor deficit.          •   •   •   •   ·   4.In regard to his hypertension, he remains on lisinopril, hydrochlorothiazide provided by me.   • I reviewed him back on 11/19/2021. His blood pressure is 90/48 in spite of proper hydration. This is probably the effect of the combination of Trental and his blood pressure medication. I advised him to discontinue his blood pressure medication at this time and advised him to have proper hydration. He has a device to check his blood pressure at home. I asked him to check this on a daily basis at least a couple of times a day. If his blood pressure goes up above 130/90 he will take 1/2 of the dose of blood pressure medication that he was taking before. Those tablets have the way to be split. He will require checking blood pressure. If the blood pressure drops again he will hold off blood pressure medication indefinitely.   • His hypertension has been reviewed on 12/30/2021. I think his blood pressure today is very good. He will remain on his blood pressure medication for the time being. I find no reason to change dosing.   • The patient was advised to remain on his blood pressure medication, Prinzide.  • On 02/09/2022 he continues doing his blood pressure medication almost on prn basis to minimize very dramatic drop that he has had before. So far he has not had any syncopal episodes or things of this nature.   On 03/16/2022 the patient is not taking blood pressure medication. Actually his blood pressure  has been under relatively good control.  His blood pressure remains regulated. He actually is not taking any blood pressure medication at this time. I think he has learned to modify his diet, decrease the consumption of sodium and this probably will have a positive impact on this issue from now on.    The other good news is that this patient has not had any modification in his left ventricular ejection fraction documented through the echocardiogram posted above. Therefore Herceptin has not produced any cardiac toxicity and we will review another echocardiogram in 3 months from now.   On 06/08/2022, the patient has not been using his blood pressure medication because most of his readings at home are below 120s. I asked him to continue watching this and if he has numbers above 130/90, he will take 1 dose of his blood pressure medication every other day or twice a week. He has the freedom to use this at his necessity.  On 07/20/2022 his blood pressure is in good control. He is not taking any medicine for this at this time.   On 08/17/2022 the patient remains on blood pressure medication. His blood pressure today is excellent at 120/75. He remains on the medicines by me at this time.            •   •   •   •   •   ·   5.In regard to his previous GI bleeding associated with his cancer and esophagitis we advised the patient to remain on Protonix.   • On 11/19/2021 he has not had any evidence of GI bleeding and the hemoglobin remains stable. The minor drop obeys to toxicity from Enhertu and bone marrow suppression not because of effect of GI bleeding.   • He has not had any other episodes of GI bleeding as per 12/30/2021.   • On 01/12/2022 he has not had any other episodes of GI bleeding. He is no longer taking any anticoagulants.  • On 02/09/2022 he has not had episodes of melena or enterorrhagia, his hemoglobin is stable. He has not had any use of anticoagulants since GI bleeding.   On 03/16/2022 the patient has not had  any other episodes of GI bleeding. He is no longer taking anticoagulant.  Today this issue is not an issue anymore.   On 06/08/2022, the patient has no evidence of GI bleeding. Hemoglobin is stable. MCV remains normal. The patient is no longer receiving anticoagulants.  Hemoglobin remains stable as per 07/20/2022. No evidence of GI bleeding.   On 08/17/2022, the patient has not had any other episodes of GI bleeding but he has episodes of occasional dysphagia especially when he eats bread. It seems that the bread cakes and sticks around the lower esophagus. Also this happens when he eats excessively fast. I told him that eventually he will require an endoscopy by Dr. Mary Brito and also stretching of the esophagus. 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 Obviously it is impossible to rule out local tumor growth in the location on clinical grounds only.          •   •   •   •   •   ·         6.In regard finally peripheral arterial disease I am going to send the patient a prescription for Trental to take 1 tablet twice a day. He has an appointment to be seen by vascular on 12/18/2021. I pointed out to the patient that if he wants to change the route of this process he must modify his cigarettes otherwise there is no hope. He was not able to take medication provided by V[facility] because of side effects.   In regard to his peripheral arterial disease I advised him on 11/19/2021 to decrease his Trental to 1 tablet twice a day. He is now using topical Cetaphil on his feet to try to improve moisture and decrease cracking.   On 12/30/2021 his peripheral neuropathy actually is better through the Doppler study done by [facility]. I wonder if the trental is the one playing a role in this. I have asked him to remain on the trental for the time being.       He is not willing to entertain smoking cessation to help out peripheral arterial disease and we  insisted into this in presence of his sister. He says that he is working on it. I do not feel any confidence in seeing this phenomenon anymore and that is a reality that we need to contemplate and just wait.   • In regard to his peripheral artery disease, the patient is still smoking 15 cigarettes a day. The Trental has made a big difference in regard to his ability to get around and walk. I advised him to remain on Trental.  • On 02/09/2022 his peripheral arterial disease is about the same, the hemoglobin is stable, the Trental is still ongoing, it is beneficial to him.   On 03/16/2022 his peripheral arterial disease remains about the same. He has no gangrene. He has an element of claudication upon walking. We advised him to remain on the same medicines that he is taking. One of them is Trental. I also advised him about smoking cessation. This is not going to change. He remains on 15 cigarettes a day.  He remains on Trental. He has not had any claudication. He has minimal purplish discoloration of his toes that is not prominent with no gangrene and no ischemic neuropathy.   On 06/08/2022, his peripheral artery disease is quiet. He will see Vascular Surgery in a few days. He remains on medicine for this by me. That includes the use of Trental. I encouraged him to remain on this medicine for the time being.  His peripheral arterial disease remains ongoing. I keep insisting in regard to smoking cessation that is going to happen as soon as he goes to [place]. The hotel and the car rental that he had do not allow him to smoke and he has no option.      On 08/17/2022, he remains on medication to favor circulation and rheology in regard to his perfusion of lower extremities. He has not encountered any problems with Trental at this time.        •   •   •   ·     7.This patient's blood sugar has remained in the 130's, 140's, 150's for the last several visits. His hemoglobin A1C today is 5.9.  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 He will require the utilization of Glucotrol XL 5 mg. I went ahead and sent this prescription to the pharmacy. I pointed out to him that he is eating a diet rich in carbohydrates mostly and I pointed out to him that he needs to eat more vegetables, some fruit and high complex carbohydrates that will minimize issues pertinent to his blood sugar. It is even more important now that he has initiated prednisone as posted.   • I reviewed with him on 01/12/2022 his hemoglobin A1c of 5.9. I advised him to remain on a diet that is not too much rich in sugar and stay on his glipizide 5 mg every day. He is very selective in his diet. He finds things that tastes okay and other ones that do not taste okay and I think we have no solution for this definitive problem.  • On 02/09/2022 the patient's glucose seems to be under control. He will remain on glipizide 5 mg oral daily.     I will review him back in 3 weeks. He will return for treatment next week and for the dose adjustments were discussed with Amber Lam RN.  On 03/16/2022 I asked the patient to continue to monitor his blood sugar and to continue taking his minimal dose of Glucotrol.  Since 06/08/2022, the blood sugar on this patient remains minimally elevated. He remains on Glucotrol. I do believe that the patient has diabetes type 2, very mild case, and encouraged him to try to watch sugar utilization, processed foods, too many carbs, and he is aware of that. I advised him to remain on his Glucotrol XL, 1 tablet a day.  On 07/20/2022 his blood sugar has been under control. He remains on medicine for this.   On 08/17/2022 the patient remains on Glucotrol to control his blood sugar. He is paying attention to what he is eating at this time to minimize excessive carbohydrate consumption.      ·   8.Swelling in the lower extremities. The patient does not  look to me like he has any congestive heart failure, kidney dysfunction, liver dysfunction to produce this, neither has hypoalbuminemia, increased creatinine or congestive heart failure. Most likely is the case of gabapentin and/or Evoxac triggering fluid retention. I asked him to work on this medicines, stopping and going and see if the swelling resolves spontaneously. If that is the case this will fix the problem. If that is not the case and the swelling bothers him, he will require to take a low-dose diuretic like Lasix 20 mg on p.r.n. basis once or twice a week.  On 07/20/2022 the patient has continued using the Lasix on prn basis. In preparation for the trip to see his brother I asked him to buy copper socks to have mild compression and minimize the swelling that he has in the lower extremities without cutting off his arterial circulation.   On 08/17/2022, he has edema in his lower extremities. I advised him to wear his elastic support.      ·     • RECOMMENDATIONS:    The plan of care is established for each one of the issues that have been discussed above in detail. There is no necessity for further summary and repetition of more of the same.

## 2022-08-19 ENCOUNTER — INFUSION (OUTPATIENT)
Dept: ONCOLOGY | Facility: HOSPITAL | Age: 67
End: 2022-08-19

## 2022-08-19 DIAGNOSIS — Z45.2 ENCOUNTER FOR ADJUSTMENT OR MANAGEMENT OF VASCULAR ACCESS DEVICE: ICD-10-CM

## 2022-08-19 DIAGNOSIS — C15.5 MALIGNANT NEOPLASM OF LOWER THIRD OF ESOPHAGUS: Primary | ICD-10-CM

## 2022-08-19 DIAGNOSIS — D49.0 ESOPHAGUS NEOPLASM: ICD-10-CM

## 2022-08-19 DIAGNOSIS — C78.7 LIVER METASTASIS: ICD-10-CM

## 2022-08-19 PROCEDURE — 25010000002 HEPARIN LOCK FLUSH PER 10 UNITS: Performed by: INTERNAL MEDICINE

## 2022-08-19 RX ORDER — SODIUM CHLORIDE 0.9 % (FLUSH) 0.9 %
10 SYRINGE (ML) INJECTION AS NEEDED
Status: DISCONTINUED | OUTPATIENT
Start: 2022-08-19 | End: 2022-08-19 | Stop reason: HOSPADM

## 2022-08-19 RX ORDER — SODIUM CHLORIDE 0.9 % (FLUSH) 0.9 %
10 SYRINGE (ML) INJECTION AS NEEDED
Status: CANCELLED | OUTPATIENT
Start: 2022-08-19

## 2022-08-19 RX ORDER — HEPARIN SODIUM (PORCINE) LOCK FLUSH IV SOLN 100 UNIT/ML 100 UNIT/ML
500 SOLUTION INTRAVENOUS AS NEEDED
Status: CANCELLED | OUTPATIENT
Start: 2022-08-19

## 2022-08-19 RX ORDER — HEPARIN SODIUM (PORCINE) LOCK FLUSH IV SOLN 100 UNIT/ML 100 UNIT/ML
500 SOLUTION INTRAVENOUS AS NEEDED
Status: DISCONTINUED | OUTPATIENT
Start: 2022-08-19 | End: 2022-08-19 | Stop reason: HOSPADM

## 2022-08-19 RX ADMIN — Medication 500 UNITS: at 08:53

## 2022-08-19 RX ADMIN — Medication 10 ML: at 08:52

## 2022-08-22 RX ORDER — PENTOXIFYLLINE 400 MG/1
TABLET, EXTENDED RELEASE ORAL
Qty: 90 TABLET | Refills: 0 | Status: SHIPPED | OUTPATIENT
Start: 2022-08-22 | End: 2022-09-28

## 2022-08-24 RX ORDER — PANTOPRAZOLE SODIUM 40 MG/1
TABLET, DELAYED RELEASE ORAL
Qty: 180 TABLET | Refills: 1 | OUTPATIENT
Start: 2022-08-24

## 2022-08-30 ENCOUNTER — OFFICE VISIT (OUTPATIENT)
Dept: GASTROENTEROLOGY | Facility: CLINIC | Age: 67
End: 2022-08-30

## 2022-08-30 VITALS
DIASTOLIC BLOOD PRESSURE: 78 MMHG | TEMPERATURE: 96.1 F | BODY MASS INDEX: 29.98 KG/M2 | SYSTOLIC BLOOD PRESSURE: 152 MMHG | HEIGHT: 69 IN | HEART RATE: 82 BPM | WEIGHT: 202.4 LBS

## 2022-08-30 DIAGNOSIS — C78.7 LIVER METASTASIS: ICD-10-CM

## 2022-08-30 DIAGNOSIS — R13.10 DYSPHAGIA, UNSPECIFIED TYPE: ICD-10-CM

## 2022-08-30 DIAGNOSIS — C15.5 MALIGNANT NEOPLASM OF LOWER THIRD OF ESOPHAGUS: Primary | ICD-10-CM

## 2022-08-30 DIAGNOSIS — K21.9 GASTROESOPHAGEAL REFLUX DISEASE WITHOUT ESOPHAGITIS: ICD-10-CM

## 2022-08-30 PROCEDURE — 99214 OFFICE O/P EST MOD 30 MIN: CPT | Performed by: NURSE PRACTITIONER

## 2022-08-30 RX ORDER — PANTOPRAZOLE SODIUM 40 MG/1
40 TABLET, DELAYED RELEASE ORAL 2 TIMES DAILY
Qty: 180 TABLET | Refills: 3 | Status: SHIPPED | OUTPATIENT
Start: 2022-08-30

## 2022-08-30 NOTE — PROGRESS NOTES
"Chief Complaint   Patient presents with   • Heartburn       Han Garcia is a  66 y.o. male here for a follow up visit for GERD.    HPI  66-year-old male presents today for follow-up visit for GERD.  He is a patient of Dr. Villavicencio.  He was last seen in the office by me on 8/20/2021.  He has a history of GERD and admits he doing really well on pantoprazole 40 mg daily.  He tells me he used to take it twice daily but he has been doing so well he is only been taking it daily.  He does admit that he has been noticing a little bit more trouble swallowing lately.  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  He is getting chemotherapy.  He has been getting chemotherapy for the last 3 years.  He is monitored closely by oncology.  He tells me his oncologist is already told him that he is going to make a referral back to Dr. Brito for possible EGD with dilation since he is having more trouble swallowing.  He tells me the trouble swallowing is not all the time but he does feel like it is happening more frequently.  He also has a history of prostate cancer.  He denies any abdominal pain, nausea and vomiting, diarrhea, constipation, rectal bleeding or melena.  He admits his appetite is okay and his weight appears stable.  His last EGD was on 8/2021.  His last colonoscopy was on 2/2020.He has a GI family history of his sister with esophageal cancer and liver cancer.  Past Medical History:   Diagnosis Date   • Arthritis    • Cancer (HCC)     prostate cancer 2008   • Cancer (HCC)     esophageal   • Chronic anticoagulation     on xarelto   • Elevated PSA    • Esophageal mass    • Esophageal varices (HCC) July 2019   • Fistula     colon and bladder   • H/O Lung nodule    • Hepatitis     CHILD--PT STATED \"I THINK IT WAS A.\"   • History of chemotherapy    • History of pneumonia    • Hx of blood clots 08/10/2019   • Hyperlipidemia    • Hypertension    • Nail fungus    • Neuropathy    • PVD (peripheral vascular " disease) (HCC)    • Recto-bladderneck fistula     on poab for recurrent uti       Past Surgical History:   Procedure Laterality Date   • COLONOSCOPY N/A 02/04/2020    Procedure: COLONOSCOPY;  Surgeon: Guy Sears MD;  Location: [facility] ENDOSCOPY;  Service: General;  Laterality: N/A;  PRE-COLOVESICAL FISTULA  POST-- DIVERTICULOSIS   • COLONOSCOPY  2/4/2020   • CYSTOSCOPY  02/06/2020   • CYSTOSCOPY Bilateral 02/26/2020    Procedure: CYSTOSCOPY RETROGRADE;  Surgeon: Cm Witt MD;  Location: [facility] OR;  Service: Urology;  Laterality: Bilateral;   • ENDOSCOPY     • ENDOSCOPY N/A 06/19/2021    Procedure: ESOPHAGOGASTRODUODENOSCOPY WITH BIOPSY;  Surgeon: Mary Brito MD;  Location: [facility] OR;  Service: Gastroenterology;  Laterality: N/A;   • ENDOSCOPY N/A 08/05/2021    Procedure: ESOPHAGOGASTRODUODENOSCOPY HEMOSPRAY;  Surgeon: Naga Shelby MD;  Location: [facility] ENDOSCOPY;  Service: Gastroenterology;  Laterality: N/A;  HX OF ESOPHAGEAL  CANCER;MELENA  POST: ESOPHAGEAL BLEEDING; ESOPHAGEAL MASS   • HERNIA REPAIR Right 1999    inguinal hernia   • PROSTATE SURGERY  03/2008    prostatectectomy   • UPPER GASTROINTESTINAL ENDOSCOPY  August 2021   • VENOUS ACCESS DEVICE (PORT) INSERTION N/A 07/16/2019    Procedure: INSERTION VENOUS ACCESS DEVICE WITH FLUORO AND EGD WITH BIOPSY;  Surgeon: Mary Brito MD;  Location: [facility] OR;  Service: Thoracic       Scheduled Meds:    Continuous Infusions:No current facility-administered medications for this visit.      PRN Meds:.    Allergies   Allergen Reactions   • Oxaliplatin Anaphylaxis       Social History     Socioeconomic History   • Marital status: Single   • Years of education: High school   Tobacco Use   • Smoking status: Current Every Day Smoker     Packs/day: 1.00     Years: 38.00     Pack years: 38.00     Types: Cigarettes     Start date: 1/1/1974   • Smokeless tobacco: Never Used   • Tobacco comment: Quit for a period of 8 years.     Vaping Use   • Vaping Use: Never used   Substance and Sexual Activity   • Alcohol use: Not Currently   • Drug use: Never   • Sexual activity: Not Currently     Partners: Female     Birth control/protection: None       Family History   Problem Relation Age of Onset   • Hypertension Mother    • Stroke Mother    • Hypertension Other    • Lung disease Other    • Prostate cancer Other    • Lung cancer Father    • Esophageal cancer Sister    • Liver cancer Sister    • Malig Hyperthermia Neg Hx        Review of Systems   Constitutional: Negative for appetite change, chills, diaphoresis, fatigue, fever and unexpected weight change.   HENT: Positive for trouble swallowing. Negative for nosebleeds, postnasal drip, sore throat and voice change.    Respiratory: Negative for cough, choking, chest tightness, shortness of breath, wheezing and stridor.    Cardiovascular: Negative for chest pain, palpitations and leg swelling.   Gastrointestinal: Negative for abdominal distention, abdominal pain, anal bleeding, blood in stool, constipation, diarrhea, nausea, rectal pain and vomiting.   Endocrine: Negative for polydipsia, polyphagia and polyuria.   Musculoskeletal: Negative for gait problem.   Skin: Negative for rash and wound.   Allergic/Immunologic: Negative for food allergies.   Neurological: Negative for dizziness, speech difficulty and light-headedness.   Psychiatric/Behavioral: Negative for confusion, self-injury, sleep disturbance and suicidal ideas.       Vitals:    08/30/22 0827   BP: 152/78   Pulse: 82   Temp: 96.1 °F (35.6 °C)       Physical Exam  Constitutional:       General: He is not in acute distress.     Appearance: He is well-developed. He is not ill-appearing.   HENT:      Head: Normocephalic.   Eyes:      Pupils: Pupils are equal, round, and reactive to light.   Cardiovascular:      Rate and Rhythm: Normal rate and regular rhythm.      Heart sounds: Normal heart sounds.   Pulmonary:      Effort: Pulmonary effort  is normal.      Breath sounds: Normal breath sounds.   Abdominal:      General: Bowel sounds are normal. There is no distension.      Palpations: Abdomen is soft. There is no mass.      Tenderness: There is no abdominal tenderness. There is no guarding or rebound.      Hernia: No hernia is present.   Musculoskeletal:         General: Normal range of motion.   Skin:     General: Skin is warm and dry.   Neurological:      Mental Status: He is alert and oriented to person, place, and time.   Psychiatric:         Speech: Speech normal.         Behavior: Behavior normal.         Judgment: Judgment normal.         No radiology results for the last 7 days     Diagnoses and all orders for this visit:    1. Malignant neoplasm of lower third of esophagus (HCC) (Primary)  Overview:  Added automatically from request for surgery [id number]      2. Liver metastasis (HCC)    3. Gastroesophageal reflux disease without esophagitis    4. Dysphagia, unspecified type    Other orders  -     pantoprazole (Protonix) 40 MG EC tablet; Take 1 tablet by mouth 2 (Two) Times a Day.  Dispense: 180 tablet; Refill: 3    Reviewed oncology notes with the patient today.  Still receiving chemotherapy for his esophageal cancer with mets to the liver.  He follows closely with oncology.  Starting to have some trouble swallowing on occasion.  His oncologist wants him to follow-up with Dr. Lawrence for possible EGD with dilation.  Patient is supposed to be making that appointment.  For now continue Protonix 40 mg twice daily.  Continue GERD precautions.  Bowels moving well.  Patient to call the office with any issues.  Patient to follow-up with me as needed.  Patient is agreeable to the plan.

## 2022-09-12 RX ORDER — GLIPIZIDE 5 MG/1
TABLET, FILM COATED, EXTENDED RELEASE ORAL
Qty: 90 TABLET | Refills: 1 | Status: SHIPPED | OUTPATIENT
Start: 2022-09-12 | End: 2023-03-22 | Stop reason: SDUPTHER

## 2022-09-14 ENCOUNTER — INFUSION (OUTPATIENT)
Dept: ONCOLOGY | Facility: HOSPITAL | Age: 67
End: 2022-09-14

## 2022-09-14 ENCOUNTER — OFFICE VISIT (OUTPATIENT)
Dept: ONCOLOGY | Facility: CLINIC | Age: 67
End: 2022-09-14

## 2022-09-14 VITALS
BODY MASS INDEX: 29.65 KG/M2 | OXYGEN SATURATION: 97 % | TEMPERATURE: 97.1 F | SYSTOLIC BLOOD PRESSURE: 142 MMHG | WEIGHT: 200.2 LBS | HEART RATE: 70 BPM | RESPIRATION RATE: 16 BRPM | DIASTOLIC BLOOD PRESSURE: 79 MMHG | HEIGHT: 69 IN

## 2022-09-14 DIAGNOSIS — G62.0 PERIPHERAL NEUROPATHY DUE TO CHEMOTHERAPY: ICD-10-CM

## 2022-09-14 DIAGNOSIS — C15.5 MALIGNANT NEOPLASM OF LOWER THIRD OF ESOPHAGUS: Primary | ICD-10-CM

## 2022-09-14 DIAGNOSIS — D49.0 ESOPHAGUS NEOPLASM: ICD-10-CM

## 2022-09-14 DIAGNOSIS — I10 ESSENTIAL HYPERTENSION: ICD-10-CM

## 2022-09-14 DIAGNOSIS — R73.01 IMPAIRED FASTING GLUCOSE: ICD-10-CM

## 2022-09-14 DIAGNOSIS — N32.1 RECTO-BLADDERNECK FISTULA: ICD-10-CM

## 2022-09-14 DIAGNOSIS — C78.7 LIVER METASTASIS: ICD-10-CM

## 2022-09-14 DIAGNOSIS — Z72.0 TOBACCO ABUSE: ICD-10-CM

## 2022-09-14 DIAGNOSIS — T45.1X5A PERIPHERAL NEUROPATHY DUE TO CHEMOTHERAPY: ICD-10-CM

## 2022-09-14 DIAGNOSIS — Z45.2 ENCOUNTER FOR ADJUSTMENT OR MANAGEMENT OF VASCULAR ACCESS DEVICE: ICD-10-CM

## 2022-09-14 DIAGNOSIS — C15.5 MALIGNANT NEOPLASM OF LOWER THIRD OF ESOPHAGUS: ICD-10-CM

## 2022-09-14 LAB
ALBUMIN SERPL-MCNC: 4.3 G/DL (ref 3.5–5.2)
ALBUMIN/GLOB SERPL: 1.6 G/DL (ref 1.1–2.4)
ALP SERPL-CCNC: 143 U/L (ref 38–116)
ALT SERPL W P-5'-P-CCNC: 27 U/L (ref 0–41)
ANION GAP SERPL CALCULATED.3IONS-SCNC: 11.8 MMOL/L (ref 5–15)
AST SERPL-CCNC: 23 U/L (ref 0–40)
BASOPHILS # BLD AUTO: 0.06 10*3/MM3 (ref 0–0.2)
BASOPHILS NFR BLD AUTO: 0.7 % (ref 0–1.5)
BILIRUB SERPL-MCNC: 0.5 MG/DL (ref 0.2–1.2)
BUN SERPL-MCNC: 15 MG/DL (ref 6–20)
BUN/CREAT SERPL: 20.5 (ref 7.3–30)
CALCIUM SPEC-SCNC: 9.4 MG/DL (ref 8.5–10.2)
CEA SERPL-MCNC: 13.4 NG/ML
CHLORIDE SERPL-SCNC: 103 MMOL/L (ref 98–107)
CO2 SERPL-SCNC: 23.2 MMOL/L (ref 22–29)
CREAT SERPL-MCNC: 0.73 MG/DL (ref 0.7–1.3)
DEPRECATED RDW RBC AUTO: 49 FL (ref 37–54)
EGFRCR SERPLBLD CKD-EPI 2021: 101 ML/MIN/1.73
EOSINOPHIL # BLD AUTO: 0.37 10*3/MM3 (ref 0–0.4)
EOSINOPHIL NFR BLD AUTO: 4.4 % (ref 0.3–6.2)
ERYTHROCYTE [DISTWIDTH] IN BLOOD BY AUTOMATED COUNT: 14.2 % (ref 12.3–15.4)
GLOBULIN UR ELPH-MCNC: 2.7 GM/DL (ref 1.8–3.5)
GLUCOSE SERPL-MCNC: 100 MG/DL (ref 74–124)
HCT VFR BLD AUTO: 43.6 % (ref 37.5–51)
HGB BLD-MCNC: 15 G/DL (ref 13–17.7)
IMM GRANULOCYTES # BLD AUTO: 0.11 10*3/MM3 (ref 0–0.05)
IMM GRANULOCYTES NFR BLD AUTO: 1.3 % (ref 0–0.5)
LYMPHOCYTES # BLD AUTO: 1.6 10*3/MM3 (ref 0.7–3.1)
LYMPHOCYTES NFR BLD AUTO: 18.8 % (ref 19.6–45.3)
MCH RBC QN AUTO: 31.8 PG (ref 26.6–33)
MCHC RBC AUTO-ENTMCNC: 34.4 G/DL (ref 31.5–35.7)
MCV RBC AUTO: 92.6 FL (ref 79–97)
MONOCYTES # BLD AUTO: 0.65 10*3/MM3 (ref 0.1–0.9)
MONOCYTES NFR BLD AUTO: 7.6 % (ref 5–12)
NEUTROPHILS NFR BLD AUTO: 5.71 10*3/MM3 (ref 1.7–7)
NEUTROPHILS NFR BLD AUTO: 67.2 % (ref 42.7–76)
NRBC BLD AUTO-RTO: 0 /100 WBC (ref 0–0.2)
PLATELET # BLD AUTO: 128 10*3/MM3 (ref 140–450)
PMV BLD AUTO: 9.9 FL (ref 6–12)
POTASSIUM SERPL-SCNC: 3.9 MMOL/L (ref 3.5–4.7)
PROT SERPL-MCNC: 7 G/DL (ref 6.3–8)
RBC # BLD AUTO: 4.71 10*6/MM3 (ref 4.14–5.8)
SODIUM SERPL-SCNC: 138 MMOL/L (ref 134–145)
WBC NRBC COR # BLD: 8.5 10*3/MM3 (ref 3.4–10.8)

## 2022-09-14 PROCEDURE — 85025 COMPLETE CBC W/AUTO DIFF WBC: CPT

## 2022-09-14 PROCEDURE — 96375 TX/PRO/DX INJ NEW DRUG ADDON: CPT

## 2022-09-14 PROCEDURE — G0498 CHEMO EXTEND IV INFUS W/PUMP: HCPCS

## 2022-09-14 PROCEDURE — 96411 CHEMO IV PUSH ADDL DRUG: CPT

## 2022-09-14 PROCEDURE — 25010000002 LEUCOVORIN CALCIUM PER 50 MG: Performed by: NURSE PRACTITIONER

## 2022-09-14 PROCEDURE — 25010000002 TRASTUZUMAB-ANNS 420 MG RECONSTITUTED SOLUTION 1 EACH VIAL: Performed by: NURSE PRACTITIONER

## 2022-09-14 PROCEDURE — 96416 CHEMO PROLONG INFUSE W/PUMP: CPT

## 2022-09-14 PROCEDURE — 25010000002 DIPHENHYDRAMINE PER 50 MG: Performed by: NURSE PRACTITIONER

## 2022-09-14 PROCEDURE — 96413 CHEMO IV INFUSION 1 HR: CPT

## 2022-09-14 PROCEDURE — 25010000002 PALONOSETRON PER 25 MCG: Performed by: NURSE PRACTITIONER

## 2022-09-14 PROCEDURE — 25010000002 FLUOROURACIL PER 500 MG: Performed by: NURSE PRACTITIONER

## 2022-09-14 PROCEDURE — 82378 CARCINOEMBRYONIC ANTIGEN: CPT | Performed by: INTERNAL MEDICINE

## 2022-09-14 PROCEDURE — 80053 COMPREHEN METABOLIC PANEL: CPT

## 2022-09-14 PROCEDURE — 99214 OFFICE O/P EST MOD 30 MIN: CPT | Performed by: NURSE PRACTITIONER

## 2022-09-14 PROCEDURE — 96367 TX/PROPH/DG ADDL SEQ IV INF: CPT

## 2022-09-14 RX ORDER — FAMOTIDINE 20 MG/1
20 TABLET, FILM COATED ORAL ONCE
Status: COMPLETED | OUTPATIENT
Start: 2022-09-14 | End: 2022-09-14

## 2022-09-14 RX ORDER — FLUOROURACIL 50 MG/ML
340 INJECTION, SOLUTION INTRAVENOUS ONCE
Status: CANCELLED | OUTPATIENT
Start: 2022-09-14

## 2022-09-14 RX ORDER — FAMOTIDINE 20 MG/1
20 TABLET, FILM COATED ORAL ONCE
Status: CANCELLED | OUTPATIENT
Start: 2022-09-14 | End: 2022-09-14

## 2022-09-14 RX ORDER — PALONOSETRON 0.05 MG/ML
0.25 INJECTION, SOLUTION INTRAVENOUS ONCE
Status: CANCELLED | OUTPATIENT
Start: 2022-09-14

## 2022-09-14 RX ORDER — FAMOTIDINE 10 MG/ML
20 INJECTION, SOLUTION INTRAVENOUS AS NEEDED
Status: CANCELLED | OUTPATIENT
Start: 2022-09-14

## 2022-09-14 RX ORDER — L.ACID/L.CASEI/B.BIF/B.LON/FOS 2B CELL-50
1 CAPSULE ORAL DAILY
COMMUNITY

## 2022-09-14 RX ORDER — FLUOROURACIL 50 MG/ML
340 INJECTION, SOLUTION INTRAVENOUS ONCE
Status: COMPLETED | OUTPATIENT
Start: 2022-09-14 | End: 2022-09-14

## 2022-09-14 RX ORDER — SODIUM CHLORIDE 9 MG/ML
250 INJECTION, SOLUTION INTRAVENOUS ONCE
Status: COMPLETED | OUTPATIENT
Start: 2022-09-14 | End: 2022-09-14

## 2022-09-14 RX ORDER — PALONOSETRON 0.05 MG/ML
0.25 INJECTION, SOLUTION INTRAVENOUS ONCE
Status: COMPLETED | OUTPATIENT
Start: 2022-09-14 | End: 2022-09-14

## 2022-09-14 RX ORDER — SODIUM CHLORIDE 9 MG/ML
250 INJECTION, SOLUTION INTRAVENOUS ONCE
Status: CANCELLED | OUTPATIENT
Start: 2022-09-14

## 2022-09-14 RX ADMIN — DIPHENHYDRAMINE HYDROCHLORIDE 25 MG: 50 INJECTION, SOLUTION INTRAMUSCULAR; INTRAVENOUS at 10:12

## 2022-09-14 RX ADMIN — FAMOTIDINE 20 MG: 20 TABLET ORAL at 10:11

## 2022-09-14 RX ADMIN — LEUCOVORIN CALCIUM 700 MG: 350 INJECTION, POWDER, LYOPHILIZED, FOR SOLUTION INTRAMUSCULAR; INTRAVENOUS at 11:26

## 2022-09-14 RX ADMIN — SODIUM CHLORIDE 250 ML: 9 INJECTION, SOLUTION INTRAVENOUS at 10:11

## 2022-09-14 RX ADMIN — TRASTUZUMAB-ANNS 530 MG: 420 INJECTION, POWDER, LYOPHILIZED, FOR SOLUTION INTRAVENOUS at 10:53

## 2022-09-14 RX ADMIN — FLUOROURACIL 700 MG: 50 INJECTION, SOLUTION INTRAVENOUS at 11:58

## 2022-09-14 RX ADMIN — FLUOROURACIL 4180 MG: 50 INJECTION, SOLUTION INTRAVENOUS at 11:58

## 2022-09-14 RX ADMIN — PALONOSETRON 0.25 MG: 0.05 INJECTION, SOLUTION INTRAVENOUS at 10:11

## 2022-09-14 NOTE — PROGRESS NOTES
"  Subjective     REASON FOR FOLLOW UP:  1.  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  2. Colovesical fistula, chronic antibiotic therapy with Cipro.  3.  DVT of the right and left lower extremity. 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  Acute GI bleed      History of Present Illness    The patient is a 66 y.o. male the above-mentioned history here today for lab review and evaluation due for continued treatment with 5-FU, leucovorin, Herceptin.  Patient reports that he has been doing well since his treatment last month.  He does have ongoing swelling intermittently.  He feels like it is typically worse in the summer.  He feels like is gotten better since we have had a few cooler days.  He denies any increase in shortness of breath.  He is also noticed some return of some nail fungus on his left hand and has started using Spectazole which was previously prescribed by his dermatologist.  He states that the medicine  in May and he was wondering if it was still efficacious.    Denies issues with nausea, vomiting, diarrhea, or constipation.  He denies any issues with skin rash.    Past Medical History:   Diagnosis Date   • Arthritis    • Cancer (HCC)     prostate cancer    • Cancer (HCC)     esophageal   • Chronic anticoagulation     on xarelto   • Elevated PSA    • Esophageal mass    • Esophageal varices (HCC) 2019   • Fistula     colon and bladder   • H/O Lung nodule    • Hepatitis     CHILD--PT STATED \"I THINK IT WAS A.\"   • History of chemotherapy    • History of pneumonia    • Hx of blood clots 08/10/2019   • Hyperlipidemia    • Hypertension    • Nail fungus    • Neuropathy    • PVD (peripheral vascular disease) (HCC)    • Recto-bladderneck fistula     on poab for recurrent uti        Past Surgical History:   Procedure Laterality Date   • COLONOSCOPY N/A 2020    Procedure: COLONOSCOPY;  Surgeon: Guy Sears MD;  " Location: [facility] ENDOSCOPY;  Service: General;  Laterality: N/A;  PRE-COLOVESICAL FISTULA  POST-- DIVERTICULOSIS   • COLONOSCOPY  2/4/2020   • CYSTOSCOPY  02/06/2020   • CYSTOSCOPY Bilateral 02/26/2020    Procedure: CYSTOSCOPY RETROGRADE;  Surgeon: mC Witt MD;  Location: [facility] MAIN OR;  Service: Urology;  Laterality: Bilateral;   • ENDOSCOPY     • ENDOSCOPY N/A 06/19/2021    Procedure: ESOPHAGOGASTRODUODENOSCOPY WITH BIOPSY;  Surgeon: Mary Brito MD;  Location: [facility] MAIN OR;  Service: Gastroenterology;  Laterality: N/A;   • ENDOSCOPY N/A 08/05/2021    Procedure: ESOPHAGOGASTRODUODENOSCOPY HEMOSPRAY;  Surgeon: Naga Shelby MD;  Location: [facility] ENDOSCOPY;  Service: Gastroenterology;  Laterality: N/A;  HX OF ESOPHAGEAL  CANCER;MELENA  POST: ESOPHAGEAL BLEEDING; ESOPHAGEAL MASS   • HERNIA REPAIR Right 1999    inguinal hernia   • PROSTATE SURGERY  03/2008    prostatectectomy   • UPPER GASTROINTESTINAL ENDOSCOPY  August 2021   • VENOUS ACCESS DEVICE (PORT) INSERTION N/A 07/16/2019    Procedure: INSERTION VENOUS ACCESS DEVICE WITH FLUORO AND EGD WITH BIOPSY;  Surgeon: Mary Brito MD;  Location: [facility] OR;  Service: Thoracic        Current Outpatient Medications on File Prior to Visit   Medication Sig Dispense Refill   • acetaminophen (TYLENOL) 500 MG tablet Take 500 mg by mouth Every 6 (Six) Hours As Needed for Mild Pain .     • cevimeline (EVOXAC) 30 MG capsule Take 30 mg by mouth 3 (Three) Times a Day.     • ciprofloxacin (CIPRO) 250 MG tablet Take 1 tablet by mouth Daily. Take one every other day 90 tablet 1   • furosemide (LASIX) 20 MG tablet Take 1 tablet by mouth Every Other Day. 28 tablet 2   • gabapentin (NEURONTIN) 300 MG capsule TAKE 1 CAPSULE BY MOUTH TWICE A  capsule 0   • glipizide (GLUCOTROL XL) 5 MG ER tablet TAKE 1 TABLET BY MOUTH EVERY DAY 90 tablet 1   • lisinopril-hydrochlorothiazide (PRINZIDE,ZESTORETIC) 20-12.5 MG per tablet TAKE 1 TABLET BY MOUTH EVERY DAY 90  tablet 0   • pantoprazole (Protonix) 40 MG EC tablet Take 1 tablet by mouth 2 (Two) Times a Day. 180 tablet 3   • pentoxifylline (TRENtal) 400 MG CR tablet TAKE 1 TABLET BY MOUTH TWICE A DAY WITH MEALS 90 tablet 0   • potassium chloride ER (K-TAB) 20 MEQ tablet controlled-release ER tablet Take 1 tablet by mouth Daily. 90 tablet 3   • Probiotic Product (Probiotic Blend) capsule Take  by mouth.     • [DISCONTINUED] econazole nitrate (SPECTAZOLE) 1 % cream 2 (Two) Times a Day.       No current facility-administered medications on file prior to visit.        ALLERGIES:    Allergies   Allergen Reactions   • Oxaliplatin Anaphylaxis        Social History     Socioeconomic History   • Marital status: Single   • Years of education: High school   Tobacco Use   • Smoking status: Current Every Day Smoker     Packs/day: 1.00     Years: 38.00     Pack years: 38.00     Types: Cigarettes     Start date: 1/1/1974   • Smokeless tobacco: Never Used   • Tobacco comment: Quit for a period of 8 years.    Vaping Use   • Vaping Use: Never used   Substance and Sexual Activity   • Alcohol use: Not Currently   • Drug use: Never   • Sexual activity: Not Currently     Partners: Female     Birth control/protection: None        Family History   Problem Relation Age of Onset   • Hypertension Mother    • Stroke Mother    • Lung cancer Father    • Hypertension Other    • Lung disease Other    • Prostate cancer Other    • Malig Hyperthermia Neg Hx       Current Outpatient Medications on File Prior to Visit   Medication Sig Dispense Refill   • acetaminophen (TYLENOL) 500 MG tablet Take 500 mg by mouth Every 6 (Six) Hours As Needed for Mild Pain .     • cevimeline (EVOXAC) 30 MG capsule Take 30 mg by mouth 3 (Three) Times a Day.     • ciprofloxacin (CIPRO) 250 MG tablet Take 1 tablet by mouth Daily. Take one every other day 90 tablet 1   • furosemide (LASIX) 20 MG tablet Take 1 tablet by mouth Every Other Day. 28 tablet 2   • gabapentin (NEURONTIN)  "300 MG capsule TAKE 1 CAPSULE BY MOUTH TWICE A  capsule 0   • glipizide (GLUCOTROL XL) 5 MG ER tablet TAKE 1 TABLET BY MOUTH EVERY DAY 90 tablet 1   • lisinopril-hydrochlorothiazide (PRINZIDE,ZESTORETIC) 20-12.5 MG per tablet TAKE 1 TABLET BY MOUTH EVERY DAY 90 tablet 0   • pantoprazole (Protonix) 40 MG EC tablet Take 1 tablet by mouth 2 (Two) Times a Day. 180 tablet 3   • pentoxifylline (TRENtal) 400 MG CR tablet TAKE 1 TABLET BY MOUTH TWICE A DAY WITH MEALS 90 tablet 0   • potassium chloride ER (K-TAB) 20 MEQ tablet controlled-release ER tablet Take 1 tablet by mouth Daily. 90 tablet 3   • Probiotic Product (Probiotic Blend) capsule Take  by mouth.     • [DISCONTINUED] econazole nitrate (SPECTAZOLE) 1 % cream 2 (Two) Times a Day.       No current facility-administered medications on file prior to visit.     Allergies   Allergen Reactions   • Oxaliplatin Anaphylaxis          Objective     Vitals:    09/14/22 0932   BP: 142/79   Pulse: 70   Resp: 16   Temp: 97.1 °F (36.2 °C)   TempSrc: Temporal   SpO2: 97%   Weight: 90.8 kg (200 lb 3.2 oz)   Height: 175.3 cm (69.02\")   PainSc: 0-No pain     Current Status 9/14/2022   ECOG score 0     Physical Exam  Vitals reviewed.   Constitutional:       General: He is not in acute distress.     Appearance: Normal appearance. He is well-developed.   HENT:      Head: Normocephalic and atraumatic.   Eyes:      Pupils: Pupils are equal, round, and reactive to light.   Cardiovascular:      Rate and Rhythm: Normal rate and regular rhythm.      Heart sounds: Normal heart sounds. No murmur heard.  Pulmonary:      Effort: Pulmonary effort is normal. No respiratory distress.      Breath sounds: Normal breath sounds. No wheezing, rhonchi or rales.   Abdominal:      General: Bowel sounds are normal. There is no distension.      Palpations: Abdomen is soft.   Musculoskeletal:         General: Swelling present. Deformity: 1+ bilateral ankle edema. Normal range of motion.      Cervical " back: Normal range of motion.   Skin:     General: Skin is warm and dry.      Findings: No rash.   Neurological:      Mental Status: He is alert and oriented to person, place, and time.     09/14/2022 physical exam unchanged from above except as updated.  RECENT LABS:  Results from last 7 days   Lab Units 09/14/22  0840   WBC 10*3/mm3 8.50   NEUTROS ABS 10*3/mm3 5.71   HEMOGLOBIN g/dL 15.0   HEMATOCRIT % 43.6   PLATELETS 10*3/mm3 128*     Results from last 7 days   Lab Units 09/14/22  0840   SODIUM mmol/L 138   POTASSIUM mmol/L 3.9   CHLORIDE mmol/L 103   CO2 mmol/L 23.2   BUN mg/dL 15   CREATININE mg/dL 0.73   CALCIUM mg/dL 9.4   ALBUMIN g/dL 4.30   BILIRUBIN mg/dL 0.5   ALK PHOS U/L 143*   ALT (SGPT) U/L 27   AST (SGOT) U/L 23   GLUCOSE mg/dL 100           Assessment & Plan    1.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 Almost 90% of the liver was replaced by tumor.  · HER-2 positive  · Initially treated with FOLFOX initiated July 2019  · Herceptin added with cycle 2  · Following 8 cycles of FOLFOX, oxaliplatin discontinued with continuation of 5-FU, leucovorin, Herceptin. This was continued through 7/7/2020  · Increasing CEA led to PET scan 6/9/2021.  Disease progression noted with growth of the tumor in the lower esophagus.  Continued stability of hepatic metastasis  · Repeat EGD 6/19/2021 for tissue specimen and Next Generation Sequencing.  · Endoscopy that shows a noncircumferential abnormality in the lower esophagus that encompasses almost 6 cm in length. It is not blocking off the esophagus and that is why the patient has no symptoms. The pathology shows at least atypical cells consistent with cancer.  · NGS showing PD-L1 positive and high mutation burden.  The tumor remains HER-2 positive.  Treatment plan for Keytruda every 3 weeks x 4 cycles followed by repeat imaging  · Keytruda initiated 7/29/2021  · Hospitalization 8/2/2021 through 8/7/2021 secondary to acute GI bleeding  from known malignancy.  · Completed 10 fractions of radiation to the lower esophagus 8/18/2021 after GI bleed  · PET scan following four cycles of Keytruda 10/14/2021 with improvement in the patient's known neoplasm involving the distal esophagus. However, known metastatic disease involving the liver is significantly worse. Keytruda discontinued  · Enhertu initiated 10/29/2021  · 12/23/2021 PET scan following three cycles of Enhertu discloses resolution of the tumor in the esophagus. He continues to have two active lesions in the liver with significant SUV activity pertinent to his metastatic disease. Minimal pulmonary infiltrates noted bilaterally. With concerns for Enhertu interstitial lung disease, Enhertu discontinued  · Treatment transitioned to FOLFOX Herceptin  · Borderline neutropenia 2/9/2022 with plans for 15% dose reduction to FOLFOX  · 2/16/2022, significant reaction to oxaliplatin which is discontinued  · Reevaluation 3/2/2022, with discontinue of oxaliplatin, the patient will continue on 5-FU, leucovorin, Kanjinti. Of note, the patient received 6 mg/kg of Kanjinti 2 weeks ago, therefore he cannot receive this medication today. He will receive only 5-FU leucovorin and return in 1 week for Kanjinti 2 mg/kg. In 2 weeks, he will resume his normal schedule of 5-FU, leucovorin, Kanjinti 4 mg/kg every 2 weeks.  · 3/16/2022 will continue on with 5-FU, leucovorin, Herceptin.  CEA dramatically improving.  · 3/25/2022 CT C/A/P When compared to recent PET/CT there is interval decrease in size of the previously demonstrated hypermetabolic hepatic metastases as well as decrease in size of the periportal lymph node. The wall thickening involving the distal esophagus is largely unchanged. 2. Persistent findings of colovesicular fistula. 3. Ill-defined interstitial and ground glass opacities within the bilateral lower lobes favored to represent postradiation pneumonitis/change  · 6/8/2022 CEA 8.0.  · 6/29/2022 here for  continued 5-FU, leucovorin, Kanjanti, overall tolerating well.  · 8/17/2022 continuing on 5-FU, leucovorin, Herceptin tolerating well.  CEA 8.  Patient was advised to continue with his current treatment.  Not planning a another radiological assessment unless patient has further rise in CEA level.  · 9/14/2022 here for continued 5-FU, leucovorin, Herceptin, continuing to tolerate quite well.  He is scheduled for an echocardiogram at the beginning of October.    2. Colovesical fistula.   · He requires intermittent Cipro when he notes stool in his urine  · He currently reports his symptoms are more progressive with more urine in his rectum. He continues on Cipro and is scheduled for follow-up with urology, Dr. Witt later this month  · He has been seen by Cm Witt MD. He has advised the patient that he could not do too much for the fistula given the fact that it is just inconveniences of having urination through the anal canal from time to time but no urinary tract infection. He asked the patient to postpone any intention for surgery unless that it is absolutely necessary. Postponement of chemotherapy for 3 months if any surgery is necessary will trigger dramatic progression of his tumor and obviously consequences.   · On 06/08/2022, his colovesical fistula continues. Most of the output is urine through the rectum, no stool through the bladder. He has not had any urinary tract infection but he continues using prophylactically his ciprofloxacin 250 mg orally every other day. I asked him to remain on this medicine for the time being  · 08/17/2022 the patient continues having colovesical fistula symptomatology intermittently, passing urine through the rectum and not passing feces through the bladder. He has not had any other urinary tract infections. He continues using his ciprofloxacin as per my instructions    3.  Thrombophilia secondary malignancy, DVT  · Currently anticoagulated with Xarelto 20 mg  daily  · Following GI bleed 8/2/2021, Xarelto has been discontinued  · Plan no evidence of recurrent thrombosis    4. Acute GI bleeding  · Dark tarry stool initially began 7/29/2021  · 7/23/2021, hemoglobin of 11.0.  8/2/2021, hemoglobin of 5.7  · Patient admitted, received transfusion of 4 units packed red blood cells.  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   · 9/14/2022 no further issues of bleeding, hemoglobin stable 15.0.    5. Enhertu initiated interstitial lung disease  · The patient completed a prednisone taper  · He is without hypoxia or tachycardia currently. He has only minimal dyspnea on exertion    6. Significant oxaliplatin reaction  · 2/16/2022, significant reaction with shortness of breath, limited air movement, treated with high-dose Solu-Cortef. He is not a candidate for further oxaliplatin    7. Borderline thrombocytopenia  · 3/2/2021, the patient is a platelet count of 101,000. With the discontinuation of oxaliplatin, we can continue with 5-FU leucovorin which will likely not cause significant issues with his platelets.  · 5/18/2022 platelet count normalized at  141,000  · 9/14/2022 platelet count 128,000.    8.  Hypertension: He previously been on lisinopril/hydrochlorothiazide however this medication is currently on hold due to some issues with hypotension.  He now only takes the medication if his blood pressure is above 130/90.  · 5/18/2022 /74.  · 6/29/2022 /72.  · 8/17/2022 /75.  · 14 2022 /79.    9.  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  · Symptoms are under good control now.    10.  Lower extremity edema:  · Bilateral.  Patient has been on his feet much more recently and has been more active.  It seemed to have worsened once the weather got warmer.  He states he was told previously by  a cardiothoracic surgeon not to wear compression socks.  I have advised him to watch his salt intake, elevate his legs when he possible, and make sure he is drinking plenty of water.  Continue to monitor, and call if worsens.  · 2022 patient was prescribed lasix 20 mg every other day, which is helping some.  He is also trying to elevate his legs above his heart twice a day, which he also feels is helping.   · 2022 patient does have intermittent swelling    11.  Nail fungus: Present on patient's left hand.  I did refill his Spectazole as his tube was .      PLAN:  1. Proceed with continued 5-FU, leucovorin, Herceptin today.  2. Patient scheduled for echocardiogram 10/5/2022.  3. Spectazole refilled.  4. Continue Lasix if needed.  5. Return in 4 weeks for follow-up with Dr. Haynes with repeat labs reevaluation due for continued 5-FU, leucovorin, Herceptin.  6. No plans to repeat scans unless patient has continued elevations in CEA       Patient on high risk medication requiring close member toxicity.    Kristin Moss, APRN  2022

## 2022-09-16 ENCOUNTER — INFUSION (OUTPATIENT)
Dept: ONCOLOGY | Facility: HOSPITAL | Age: 67
End: 2022-09-16

## 2022-09-16 DIAGNOSIS — D49.0 ESOPHAGUS NEOPLASM: ICD-10-CM

## 2022-09-16 DIAGNOSIS — C15.5 MALIGNANT NEOPLASM OF LOWER THIRD OF ESOPHAGUS: Primary | ICD-10-CM

## 2022-09-16 DIAGNOSIS — Z45.2 ENCOUNTER FOR ADJUSTMENT OR MANAGEMENT OF VASCULAR ACCESS DEVICE: ICD-10-CM

## 2022-09-16 DIAGNOSIS — C78.7 LIVER METASTASIS: ICD-10-CM

## 2022-09-16 PROCEDURE — 25010000002 HEPARIN LOCK FLUSH PER 10 UNITS: Performed by: INTERNAL MEDICINE

## 2022-09-16 RX ORDER — HEPARIN SODIUM (PORCINE) LOCK FLUSH IV SOLN 100 UNIT/ML 100 UNIT/ML
500 SOLUTION INTRAVENOUS AS NEEDED
Status: DISCONTINUED | OUTPATIENT
Start: 2022-09-16 | End: 2022-09-16 | Stop reason: HOSPADM

## 2022-09-16 RX ORDER — SODIUM CHLORIDE 0.9 % (FLUSH) 0.9 %
10 SYRINGE (ML) INJECTION AS NEEDED
Status: DISCONTINUED | OUTPATIENT
Start: 2022-09-16 | End: 2022-09-16 | Stop reason: HOSPADM

## 2022-09-16 RX ADMIN — Medication 10 ML: at 09:58

## 2022-09-16 RX ADMIN — Medication 500 UNITS: at 09:59

## 2022-09-28 RX ORDER — PENTOXIFYLLINE 400 MG/1
TABLET, EXTENDED RELEASE ORAL
Qty: 90 TABLET | Refills: 0 | Status: SHIPPED | OUTPATIENT
Start: 2022-09-28 | End: 2022-11-07

## 2022-10-05 ENCOUNTER — HOSPITAL ENCOUNTER (OUTPATIENT)
Dept: CARDIOLOGY | Facility: HOSPITAL | Age: 67
Discharge: HOME | End: 2022-10-05
Admitting: INTERNAL MEDICINE

## 2022-10-05 VITALS
DIASTOLIC BLOOD PRESSURE: 60 MMHG | HEIGHT: 69 IN | SYSTOLIC BLOOD PRESSURE: 110 MMHG | WEIGHT: 200 LBS | HEART RATE: 64 BPM | BODY MASS INDEX: 29.62 KG/M2

## 2022-10-05 DIAGNOSIS — R73.01 IMPAIRED FASTING GLUCOSE: ICD-10-CM

## 2022-10-05 DIAGNOSIS — Z72.0 TOBACCO ABUSE: ICD-10-CM

## 2022-10-05 DIAGNOSIS — T45.1X5A PERIPHERAL NEUROPATHY DUE TO CHEMOTHERAPY: ICD-10-CM

## 2022-10-05 DIAGNOSIS — Z45.2 ENCOUNTER FOR ADJUSTMENT OR MANAGEMENT OF VASCULAR ACCESS DEVICE: ICD-10-CM

## 2022-10-05 DIAGNOSIS — C15.5 MALIGNANT NEOPLASM OF LOWER THIRD OF ESOPHAGUS: ICD-10-CM

## 2022-10-05 DIAGNOSIS — D49.0 ESOPHAGUS NEOPLASM: ICD-10-CM

## 2022-10-05 DIAGNOSIS — C78.7 LIVER METASTASIS: ICD-10-CM

## 2022-10-05 DIAGNOSIS — G62.0 PERIPHERAL NEUROPATHY DUE TO CHEMOTHERAPY: ICD-10-CM

## 2022-10-05 DIAGNOSIS — N32.1 RECTO-BLADDERNECK FISTULA: ICD-10-CM

## 2022-10-05 DIAGNOSIS — I10 ESSENTIAL HYPERTENSION: ICD-10-CM

## 2022-10-05 LAB
AORTIC ARCH: 3 CM
AORTIC DIMENSIONLESS INDEX: 0.7 (DI)
ASCENDING AORTA: 2.6 CM
BH CV ECHO LEFT VENTRICLE GLOBAL LONGITUDINAL STRAIN: -19.2 %
BH CV ECHO MEAS - ACS: 1.8 CM
BH CV ECHO MEAS - AO MAX PG: 6.6 MMHG
BH CV ECHO MEAS - AO MEAN PG: 3 MMHG
BH CV ECHO MEAS - AO ROOT DIAM: 3.1 CM
BH CV ECHO MEAS - AO V2 MAX: 128 CM/SEC
BH CV ECHO MEAS - AO V2 VTI: 27.7 CM
BH CV ECHO MEAS - AVA(I,D): 1.9 CM2
BH CV ECHO MEAS - EDV(CUBED): 91.1 ML
BH CV ECHO MEAS - EDV(MOD-SP2): 149 ML
BH CV ECHO MEAS - EDV(MOD-SP4): 102 ML
BH CV ECHO MEAS - EF(MOD-BP): 58 %
BH CV ECHO MEAS - EF(MOD-SP2): 59.1 %
BH CV ECHO MEAS - EF(MOD-SP4): 56.9 %
BH CV ECHO MEAS - EF_3D-VOL: 60 %
BH CV ECHO MEAS - ESV(CUBED): 17.6 ML
BH CV ECHO MEAS - ESV(MOD-SP2): 61 ML
BH CV ECHO MEAS - ESV(MOD-SP4): 44 ML
BH CV ECHO MEAS - FS: 42.2 %
BH CV ECHO MEAS - IVS/LVPW: 1.22 CM
BH CV ECHO MEAS - IVSD: 1.1 CM
BH CV ECHO MEAS - LAT PEAK E' VEL: 8.4 CM/SEC
BH CV ECHO MEAS - LV DIASTOLIC VOL/BSA (35-75): 49.4 CM2
BH CV ECHO MEAS - LV MASS(C)D: 153.3 GRAMS
BH CV ECHO MEAS - LV MAX PG: 3.6 MMHG
BH CV ECHO MEAS - LV MEAN PG: 2 MMHG
BH CV ECHO MEAS - LV SYSTOLIC VOL/BSA (12-30): 21.3 CM2
BH CV ECHO MEAS - LV V1 MAX: 94.6 CM/SEC
BH CV ECHO MEAS - LV V1 VTI: 18.6 CM
BH CV ECHO MEAS - LVIDD: 4.5 CM
BH CV ECHO MEAS - LVIDS: 2.6 CM
BH CV ECHO MEAS - LVOT AREA: 2.8 CM2
BH CV ECHO MEAS - LVOT DIAM: 1.9 CM
BH CV ECHO MEAS - LVPWD: 0.9 CM
BH CV ECHO MEAS - MED PEAK E' VEL: 9.3 CM/SEC
BH CV ECHO MEAS - MV A DUR: 0.21 SEC
BH CV ECHO MEAS - MV A MAX VEL: 99 CM/SEC
BH CV ECHO MEAS - MV DEC SLOPE: 312 CM/SEC2
BH CV ECHO MEAS - MV DEC TIME: 0.21 MSEC
BH CV ECHO MEAS - MV E MAX VEL: 101 CM/SEC
BH CV ECHO MEAS - MV E/A: 1.02
BH CV ECHO MEAS - MV MAX PG: 4.2 MMHG
BH CV ECHO MEAS - MV MEAN PG: 2 MMHG
BH CV ECHO MEAS - MV P1/2T: 86.6 MSEC
BH CV ECHO MEAS - MV V2 VTI: 29.3 CM
BH CV ECHO MEAS - MVA(P1/2T): 2.5 CM2
BH CV ECHO MEAS - MVA(VTI): 1.8 CM2
BH CV ECHO MEAS - PA ACC TIME: 0.09 SEC
BH CV ECHO MEAS - PA PR(ACCEL): 39.8 MMHG
BH CV ECHO MEAS - PA V2 MAX: 86.9 CM/SEC
BH CV ECHO MEAS - PULM A REVS DUR: 0.15 SEC
BH CV ECHO MEAS - PULM A REVS VEL: 29.4 CM/SEC
BH CV ECHO MEAS - PULM DIAS VEL: 48.1 CM/SEC
BH CV ECHO MEAS - PULM S/D: 1.21
BH CV ECHO MEAS - PULM SYS VEL: 58.4 CM/SEC
BH CV ECHO MEAS - QP/QS: 0.93
BH CV ECHO MEAS - RAP SYSTOLE: 3 MMHG
BH CV ECHO MEAS - RV MAX PG: 1.47 MMHG
BH CV ECHO MEAS - RV V1 MAX: 60.7 CM/SEC
BH CV ECHO MEAS - RV V1 VTI: 14.1 CM
BH CV ECHO MEAS - RVOT DIAM: 2.1 CM
BH CV ECHO MEAS - SI(MOD-SP2): 42.6 ML/M2
BH CV ECHO MEAS - SI(MOD-SP4): 28.1 ML/M2
BH CV ECHO MEAS - SUP REN AO DIAM: 2.1 CM
BH CV ECHO MEAS - SV(LVOT): 52.7 ML
BH CV ECHO MEAS - SV(MOD-SP2): 88 ML
BH CV ECHO MEAS - SV(MOD-SP4): 58 ML
BH CV ECHO MEAS - SV(RVOT): 48.8 ML
BH CV ECHO MEAS - TAPSE (>1.6): 2 CM
BH CV ECHO MEASUREMENTS AVERAGE E/E' RATIO: 11.41
BH CV XLRA - RV BASE: 3.2 CM
BH CV XLRA - RV LENGTH: 6.6 CM
BH CV XLRA - RV MID: 2.45 CM
BH CV XLRA - TDI S': 14 CM/SEC
LEFT ATRIUM VOLUME INDEX: 21.3 ML/M2
MAXIMAL PREDICTED HEART RATE: 155 BPM
SINUS: 3.3 CM
STJ: 2.5 CM
STRESS TARGET HR: 132 BPM

## 2022-10-05 PROCEDURE — 93356 MYOCRD STRAIN IMG SPCKL TRCK: CPT | Performed by: INTERNAL MEDICINE

## 2022-10-05 PROCEDURE — 93306 TTE W/DOPPLER COMPLETE: CPT | Performed by: INTERNAL MEDICINE

## 2022-10-05 PROCEDURE — 93356 MYOCRD STRAIN IMG SPCKL TRCK: CPT

## 2022-10-05 PROCEDURE — 93306 TTE W/DOPPLER COMPLETE: CPT

## 2022-10-05 PROCEDURE — 25010000002 PERFLUTREN (DEFINITY) 8.476 MG IN SODIUM CHLORIDE (PF) 0.9 % 10 ML INJECTION: Performed by: INTERNAL MEDICINE

## 2022-10-05 RX ADMIN — PERFLUTREN 2 ML: 6.52 INJECTION, SUSPENSION INTRAVENOUS at 09:27

## 2022-10-12 ENCOUNTER — INFUSION (OUTPATIENT)
Dept: ONCOLOGY | Facility: HOSPITAL | Age: 67
End: 2022-10-12

## 2022-10-12 ENCOUNTER — OFFICE VISIT (OUTPATIENT)
Dept: ONCOLOGY | Facility: CLINIC | Age: 67
End: 2022-10-12

## 2022-10-12 VITALS
TEMPERATURE: 96.1 F | HEART RATE: 77 BPM | DIASTOLIC BLOOD PRESSURE: 81 MMHG | OXYGEN SATURATION: 99 % | BODY MASS INDEX: 29.36 KG/M2 | WEIGHT: 198.2 LBS | RESPIRATION RATE: 16 BRPM | HEIGHT: 69 IN | SYSTOLIC BLOOD PRESSURE: 138 MMHG

## 2022-10-12 DIAGNOSIS — C15.5 MALIGNANT NEOPLASM OF LOWER THIRD OF ESOPHAGUS: ICD-10-CM

## 2022-10-12 DIAGNOSIS — T45.1X5A PERIPHERAL NEUROPATHY DUE TO CHEMOTHERAPY: ICD-10-CM

## 2022-10-12 DIAGNOSIS — N32.1 RECTO-BLADDERNECK FISTULA: ICD-10-CM

## 2022-10-12 DIAGNOSIS — I10 ESSENTIAL HYPERTENSION: ICD-10-CM

## 2022-10-12 DIAGNOSIS — D49.0 ESOPHAGUS NEOPLASM: ICD-10-CM

## 2022-10-12 DIAGNOSIS — G62.0 PERIPHERAL NEUROPATHY DUE TO CHEMOTHERAPY: ICD-10-CM

## 2022-10-12 DIAGNOSIS — E78.2 MIXED HYPERLIPIDEMIA: ICD-10-CM

## 2022-10-12 DIAGNOSIS — C15.5 MALIGNANT NEOPLASM OF LOWER THIRD OF ESOPHAGUS: Primary | ICD-10-CM

## 2022-10-12 DIAGNOSIS — C78.7 LIVER METASTASIS: ICD-10-CM

## 2022-10-12 DIAGNOSIS — Z72.0 TOBACCO ABUSE: ICD-10-CM

## 2022-10-12 DIAGNOSIS — R73.01 IMPAIRED FASTING GLUCOSE: ICD-10-CM

## 2022-10-12 DIAGNOSIS — Z45.2 ENCOUNTER FOR ADJUSTMENT OR MANAGEMENT OF VASCULAR ACCESS DEVICE: ICD-10-CM

## 2022-10-12 LAB
ALBUMIN SERPL-MCNC: 4.1 G/DL (ref 3.5–5.2)
ALBUMIN/GLOB SERPL: 1.6 G/DL (ref 1.1–2.4)
ALP SERPL-CCNC: 156 U/L (ref 38–116)
ALT SERPL W P-5'-P-CCNC: 25 U/L (ref 0–41)
ANION GAP SERPL CALCULATED.3IONS-SCNC: 11.3 MMOL/L (ref 5–15)
AST SERPL-CCNC: 20 U/L (ref 0–40)
BASOPHILS # BLD AUTO: 0.05 10*3/MM3 (ref 0–0.2)
BASOPHILS NFR BLD AUTO: 0.5 % (ref 0–1.5)
BILIRUB SERPL-MCNC: 0.5 MG/DL (ref 0.2–1.2)
BUN SERPL-MCNC: 10 MG/DL (ref 6–20)
BUN/CREAT SERPL: 14.5 (ref 7.3–30)
CALCIUM SPEC-SCNC: 9.4 MG/DL (ref 8.5–10.2)
CHLORIDE SERPL-SCNC: 103 MMOL/L (ref 98–107)
CO2 SERPL-SCNC: 23.7 MMOL/L (ref 22–29)
CREAT SERPL-MCNC: 0.69 MG/DL (ref 0.7–1.3)
DEPRECATED RDW RBC AUTO: 49.2 FL (ref 37–54)
EGFRCR SERPLBLD CKD-EPI 2021: 102.7 ML/MIN/1.73
EOSINOPHIL # BLD AUTO: 0.23 10*3/MM3 (ref 0–0.4)
EOSINOPHIL NFR BLD AUTO: 2.5 % (ref 0.3–6.2)
ERYTHROCYTE [DISTWIDTH] IN BLOOD BY AUTOMATED COUNT: 14.6 % (ref 12.3–15.4)
GLOBULIN UR ELPH-MCNC: 2.6 GM/DL (ref 1.8–3.5)
GLUCOSE SERPL-MCNC: 107 MG/DL (ref 74–124)
HCT VFR BLD AUTO: 40.6 % (ref 37.5–51)
HGB BLD-MCNC: 14 G/DL (ref 13–17.7)
IMM GRANULOCYTES # BLD AUTO: 0.13 10*3/MM3 (ref 0–0.05)
IMM GRANULOCYTES NFR BLD AUTO: 1.4 % (ref 0–0.5)
LYMPHOCYTES # BLD AUTO: 1.29 10*3/MM3 (ref 0.7–3.1)
LYMPHOCYTES NFR BLD AUTO: 14 % (ref 19.6–45.3)
MCH RBC QN AUTO: 31.7 PG (ref 26.6–33)
MCHC RBC AUTO-ENTMCNC: 34.5 G/DL (ref 31.5–35.7)
MCV RBC AUTO: 91.9 FL (ref 79–97)
MONOCYTES # BLD AUTO: 0.66 10*3/MM3 (ref 0.1–0.9)
MONOCYTES NFR BLD AUTO: 7.2 % (ref 5–12)
NEUTROPHILS NFR BLD AUTO: 6.84 10*3/MM3 (ref 1.7–7)
NEUTROPHILS NFR BLD AUTO: 74.4 % (ref 42.7–76)
NRBC BLD AUTO-RTO: 0 /100 WBC (ref 0–0.2)
PLATELET # BLD AUTO: 133 10*3/MM3 (ref 140–450)
PMV BLD AUTO: 10.3 FL (ref 6–12)
POTASSIUM SERPL-SCNC: 4 MMOL/L (ref 3.5–4.7)
PROT SERPL-MCNC: 6.7 G/DL (ref 6.3–8)
RBC # BLD AUTO: 4.42 10*6/MM3 (ref 4.14–5.8)
SODIUM SERPL-SCNC: 138 MMOL/L (ref 134–145)
WBC NRBC COR # BLD: 9.2 10*3/MM3 (ref 3.4–10.8)

## 2022-10-12 PROCEDURE — 25010000002 LEUCOVORIN CALCIUM PER 50 MG: Performed by: INTERNAL MEDICINE

## 2022-10-12 PROCEDURE — 25010000002 FLUOROURACIL PER 500 MG: Performed by: INTERNAL MEDICINE

## 2022-10-12 PROCEDURE — 96413 CHEMO IV INFUSION 1 HR: CPT

## 2022-10-12 PROCEDURE — 85025 COMPLETE CBC W/AUTO DIFF WBC: CPT

## 2022-10-12 PROCEDURE — 96411 CHEMO IV PUSH ADDL DRUG: CPT

## 2022-10-12 PROCEDURE — 96416 CHEMO PROLONG INFUSE W/PUMP: CPT

## 2022-10-12 PROCEDURE — 80053 COMPREHEN METABOLIC PANEL: CPT

## 2022-10-12 PROCEDURE — 96367 TX/PROPH/DG ADDL SEQ IV INF: CPT

## 2022-10-12 PROCEDURE — 25010000002 TRASTUZUMAB-ANNS 420 MG RECONSTITUTED SOLUTION 1 EACH VIAL: Performed by: INTERNAL MEDICINE

## 2022-10-12 PROCEDURE — 99214 OFFICE O/P EST MOD 30 MIN: CPT | Performed by: INTERNAL MEDICINE

## 2022-10-12 PROCEDURE — 25010000002 DIPHENHYDRAMINE PER 50 MG: Performed by: INTERNAL MEDICINE

## 2022-10-12 PROCEDURE — 96375 TX/PRO/DX INJ NEW DRUG ADDON: CPT

## 2022-10-12 PROCEDURE — 25010000002 PALONOSETRON PER 25 MCG: Performed by: INTERNAL MEDICINE

## 2022-10-12 PROCEDURE — G0498 CHEMO EXTEND IV INFUS W/PUMP: HCPCS

## 2022-10-12 RX ORDER — FAMOTIDINE 20 MG/1
20 TABLET, FILM COATED ORAL ONCE
Status: COMPLETED | OUTPATIENT
Start: 2022-10-12 | End: 2022-10-12

## 2022-10-12 RX ORDER — FLUOROURACIL 50 MG/ML
340 INJECTION, SOLUTION INTRAVENOUS ONCE
Status: CANCELLED | OUTPATIENT
Start: 2022-10-12

## 2022-10-12 RX ORDER — PALONOSETRON 0.05 MG/ML
0.25 INJECTION, SOLUTION INTRAVENOUS ONCE
Status: CANCELLED | OUTPATIENT
Start: 2022-10-12

## 2022-10-12 RX ORDER — FAMOTIDINE 10 MG/ML
20 INJECTION, SOLUTION INTRAVENOUS AS NEEDED
Status: CANCELLED | OUTPATIENT
Start: 2022-10-12

## 2022-10-12 RX ORDER — FAMOTIDINE 20 MG/1
20 TABLET, FILM COATED ORAL ONCE
Status: CANCELLED | OUTPATIENT
Start: 2022-10-12 | End: 2022-10-12

## 2022-10-12 RX ORDER — FLUOROURACIL 50 MG/ML
340 INJECTION, SOLUTION INTRAVENOUS ONCE
Status: COMPLETED | OUTPATIENT
Start: 2022-10-12 | End: 2022-10-12

## 2022-10-12 RX ORDER — SODIUM CHLORIDE 9 MG/ML
250 INJECTION, SOLUTION INTRAVENOUS ONCE
Status: COMPLETED | OUTPATIENT
Start: 2022-10-12 | End: 2022-10-12

## 2022-10-12 RX ORDER — SODIUM CHLORIDE 9 MG/ML
250 INJECTION, SOLUTION INTRAVENOUS ONCE
Status: CANCELLED | OUTPATIENT
Start: 2022-10-12

## 2022-10-12 RX ORDER — PALONOSETRON 0.05 MG/ML
0.25 INJECTION, SOLUTION INTRAVENOUS ONCE
Status: COMPLETED | OUTPATIENT
Start: 2022-10-12 | End: 2022-10-12

## 2022-10-12 RX ADMIN — PALONOSETRON 0.25 MG: 0.05 INJECTION, SOLUTION INTRAVENOUS at 10:14

## 2022-10-12 RX ADMIN — DIPHENHYDRAMINE HYDROCHLORIDE 25 MG: 50 INJECTION, SOLUTION INTRAMUSCULAR; INTRAVENOUS at 10:14

## 2022-10-12 RX ADMIN — TRASTUZUMAB-ANNS 530 MG: 420 INJECTION, POWDER, LYOPHILIZED, FOR SOLUTION INTRAVENOUS at 10:31

## 2022-10-12 RX ADMIN — SODIUM CHLORIDE 250 ML: 9 INJECTION, SOLUTION INTRAVENOUS at 10:14

## 2022-10-12 RX ADMIN — LEUCOVORIN CALCIUM 700 MG: 350 INJECTION, POWDER, LYOPHILIZED, FOR SOLUTION INTRAMUSCULAR; INTRAVENOUS at 11:06

## 2022-10-12 RX ADMIN — FLUOROURACIL 700 MG: 50 INJECTION, SOLUTION INTRAVENOUS at 11:40

## 2022-10-12 RX ADMIN — FLUOROURACIL 4180 MG: 50 INJECTION, SOLUTION INTRAVENOUS at 11:40

## 2022-10-12 RX ADMIN — FAMOTIDINE 20 MG: 20 TABLET ORAL at 10:14

## 2022-10-12 NOTE — PROGRESS NOTES
Subjective     REASON FOR follow up:  1. 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  Currently receiving palliative 5 fu leucovorin  AND HERCEPTIN therapy once a month    2.COLOVESICAL FISTULA: chronic antibiotic therapy cipro, NO FURTHER PLANS FOR SURGERY AFTER VISIT AND PROCEDURE BY DR GM CASTILLO 1/20    3. DVT R AND LEFT LE off ANTICOAGULANT : THROMBOPHILIA OF MALIGNANCY    4. 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 NEURONTIN INITIATED.    4. Diabetes type 2 on medication with better control of blood sugar    DURING THE VISIT WITH THE PATIENT TODAY , PATIENT HAD FACE MASK, MY MEDICAL ASSISTANT AND I  HAD PROPPER PROTECTIVE EQUIPMENT, AND I DID HAND HYGIENE WITH SOAP AND WATER BEFORE AND AFTER THE VISIT.    On 10/12/2022 this 66-year-old white male who has history of cancer of the esophagus lower third with extensive liver metastasis who has been undergoing treatment for almost 4 years returns to the office. At this time the patient continues undergoing treatment with 5FU, leucovorin and Herceptin. The patient in the last 2 weeks has started to experience dysphagia for solids and sometimes even the potassium pills do not go through. He has not had any hematemesis or melena. The symptom remains in the lower part of the esophagus. Belching and burping helps him to relieve the symptom. Besides this he has not had any abdominal pain or jaundice, no modification in his energy level. He remains with a relatively good and stable weight and normal bowel activity. Minimal passage of stool in the urine with fecaluria and minimal passing of the urine in the stool. The fistula is not providing any fever or chills and he remains on ciprofloxacin prophylactically by me. He has not had any swelling in his lower extremities. No claudication. Neuropathy in his feet remains about the same related to  "Oxaliplatin and he remains on gabapentin. He has not had any obvious arterial peripheral disease manifestation. He remains on trental. He has modified his diet to minimize blood sugar elevation and he remains on Glucotrol by me.                                       Past Medical History:   Diagnosis Date   • Arthritis    • Cancer (HCC)     prostate cancer 2008   • Cancer (HCC)     esophageal   • Chronic anticoagulation     on xarelto   • Elevated PSA    • Esophageal mass    • Esophageal varices (HCC) July 2019   • Fistula     colon and bladder   • H/O Lung nodule    • Hepatitis     CHILD--PT STATED \"I THINK IT WAS A.\"   • History of chemotherapy    • History of pneumonia    • Hx of blood clots 08/10/2019   • Hyperlipidemia    • Hypertension    • Nail fungus    • Neuropathy    • PVD (peripheral vascular disease) (HCC)    • Recto-bladderneck fistula     on poab for recurrent uti        Past Surgical History:   Procedure Laterality Date   • COLONOSCOPY N/A 02/04/2020    Procedure: COLONOSCOPY;  Surgeon: Guy Sears MD;  Location: [facility] ENDOSCOPY;  Service: General;  Laterality: N/A;  PRE-COLOVESICAL FISTULA  POST-- DIVERTICULOSIS   • COLONOSCOPY  2/4/2020   • CYSTOSCOPY  02/06/2020   • CYSTOSCOPY Bilateral 02/26/2020    Procedure: CYSTOSCOPY RETROGRADE;  Surgeon: Cm Witt MD;  Location: [facility] OR;  Service: Urology;  Laterality: Bilateral;   • ENDOSCOPY     • ENDOSCOPY N/A 06/19/2021    Procedure: ESOPHAGOGASTRODUODENOSCOPY WITH BIOPSY;  Surgeon: Mary Brito MD;  Location: [facility] OR;  Service: Gastroenterology;  Laterality: N/A;   • ENDOSCOPY N/A 08/05/2021    Procedure: ESOPHAGOGASTRODUODENOSCOPY HEMOSPRAY;  Surgeon: Naga Shelby MD;  Location: [facility] ENDOSCOPY;  Service: Gastroenterology;  Laterality: N/A;  HX OF ESOPHAGEAL  CANCER;MELENA  POST: ESOPHAGEAL BLEEDING; ESOPHAGEAL MASS   • HERNIA REPAIR Right 1999    inguinal hernia   • PROSTATE SURGERY  03/2008    " prostatectectomy   • UPPER GASTROINTESTINAL ENDOSCOPY  August 2021   • VENOUS ACCESS DEVICE (PORT) INSERTION N/A 07/16/2019    Procedure: INSERTION VENOUS ACCESS DEVICE WITH FLUORO AND EGD WITH BIOPSY;  Surgeon: Mary Brito MD;  Location: [facility] OR;  Service: Thoracic        Current Outpatient Medications on File Prior to Visit   Medication Sig Dispense Refill   • acetaminophen (TYLENOL) 500 MG tablet Take 500 mg by mouth Every 6 (Six) Hours As Needed for Mild Pain .     • cevimeline (EVOXAC) 30 MG capsule Take 30 mg by mouth 3 (Three) Times a Day.     • ciprofloxacin (CIPRO) 250 MG tablet Take 1 tablet by mouth Daily. Take one every other day 90 tablet 1   • econazole nitrate (SPECTAZOLE) 1 % cream Apply  topically to the appropriate area as directed 2 (Two) Times a Day. 85 g 1   • furosemide (LASIX) 20 MG tablet Take 1 tablet by mouth Every Other Day. 28 tablet 2   • gabapentin (NEURONTIN) 300 MG capsule TAKE 1 CAPSULE BY MOUTH TWICE A  capsule 0   • glipizide (GLUCOTROL XL) 5 MG ER tablet TAKE 1 TABLET BY MOUTH EVERY DAY 90 tablet 1   • lisinopril-hydrochlorothiazide (PRINZIDE,ZESTORETIC) 20-12.5 MG per tablet TAKE 1 TABLET BY MOUTH EVERY DAY 90 tablet 0   • pantoprazole (Protonix) 40 MG EC tablet Take 1 tablet by mouth 2 (Two) Times a Day. 180 tablet 3   • pentoxifylline (TRENtal) 400 MG CR tablet TAKE 1 TABLET BY MOUTH TWICE A DAY WITH MEALS 90 tablet 0   • potassium chloride ER (K-TAB) 20 MEQ tablet controlled-release ER tablet Take 1 tablet by mouth Daily. 90 tablet 3   • Probiotic Product (Probiotic Blend) capsule Take  by mouth.       No current facility-administered medications on file prior to visit.        ALLERGIES:    Allergies   Allergen Reactions   • Oxaliplatin Anaphylaxis        Social History     Socioeconomic History   • Marital status: Single   • Years of education: High school   Tobacco Use   • Smoking status: Every Day     Packs/day: 1.00     Years: 38.00     Pack years: 38.00      Types: Cigarettes     Start date: 1/1/1974   • Smokeless tobacco: Never   • Tobacco comments:     Quit for a period of 8 years.    Vaping Use   • Vaping Use: Never used   Substance and Sexual Activity   • Alcohol use: Not Currently   • Drug use: Never   • Sexual activity: Not Currently     Partners: Female     Birth control/protection: None        Family History   Problem Relation Age of Onset   • Hypertension Mother    • Stroke Mother    • Lung cancer Father    • Hypertension Other    • Lung disease Other    • Prostate cancer Other    • Malig Hyperthermia Neg Hx       Current Outpatient Medications on File Prior to Visit   Medication Sig Dispense Refill   • acetaminophen (TYLENOL) 500 MG tablet Take 500 mg by mouth Every 6 (Six) Hours As Needed for Mild Pain .     • cevimeline (EVOXAC) 30 MG capsule Take 30 mg by mouth 3 (Three) Times a Day.     • ciprofloxacin (CIPRO) 250 MG tablet Take 1 tablet by mouth Daily. Take one every other day 90 tablet 1   • econazole nitrate (SPECTAZOLE) 1 % cream Apply  topically to the appropriate area as directed 2 (Two) Times a Day. 85 g 1   • furosemide (LASIX) 20 MG tablet Take 1 tablet by mouth Every Other Day. 28 tablet 2   • gabapentin (NEURONTIN) 300 MG capsule TAKE 1 CAPSULE BY MOUTH TWICE A  capsule 0   • glipizide (GLUCOTROL XL) 5 MG ER tablet TAKE 1 TABLET BY MOUTH EVERY DAY 90 tablet 1   • lisinopril-hydrochlorothiazide (PRINZIDE,ZESTORETIC) 20-12.5 MG per tablet TAKE 1 TABLET BY MOUTH EVERY DAY 90 tablet 0   • pantoprazole (Protonix) 40 MG EC tablet Take 1 tablet by mouth 2 (Two) Times a Day. 180 tablet 3   • pentoxifylline (TRENtal) 400 MG CR tablet TAKE 1 TABLET BY MOUTH TWICE A DAY WITH MEALS 90 tablet 0   • potassium chloride ER (K-TAB) 20 MEQ tablet controlled-release ER tablet Take 1 tablet by mouth Daily. 90 tablet 3   • Probiotic Product (Probiotic Blend) capsule Take  by mouth.       No current facility-administered medications on file prior to visit.  "    Allergies   Allergen Reactions   • Oxaliplatin Anaphylaxis            Objective     Vitals:    10/12/22 0935   BP: 138/81   Pulse: 77   Resp: 16   Temp: 96.1 °F (35.6 °C)   TempSrc: Temporal   SpO2: 99%   Weight: 89.9 kg (198 lb 3.2 oz)   Height: 175.3 cm (69.02\")   PainSc: 0-No pain     Current Status 10/12/2022   ECOG score 0   EXAM:              GENERAL:  Well-developed, well-nourished  Patient  in no acute distress.   SKIN:  Warm, dry ,NO purpura ,no rash.onychorrhexis  HEENT:  Pupils were equal and reactive to light and accomodation, conjunctivae noninjected, normal extraocular movements, normal visual acuity.   NECK:  Supple with good range of motion; no thyromegaly , no JVD or bruits,.No carotid artery pain, no carotid abnormal pulsation   LYMPHATICS:  No cervical, NO supraclavicular, NO axillary, NO inguinal adenopathies.  CARDIAC   normal rate , regular rhythm, without murmur,NO rubs NO S3 NO S4   LUNGS: normal breath sounds bilateral, no wheezing, NO rhonchi, NO crackles ,NO rubs.  VASCULAR VENOUS: no cyanosis, NO collateral circulation, NO varicosities, NO edema, NO palpable cords, NO pain,NO erythema, NO pigmentation of the skin.  ABDOMEN:  Soft, NO pain,no hepatomegaly, no splenomegaly,no masses, no ascites, no collateral circulation,no distention.  EXTREMITIES  AND SPINE:   clubbing, no cyanosis ,no deformities , no pain .No kyphosis,  no pain in spine, no pain in ribs , no pain in pelvic bone.  NEUROLOGICAL:  Patient was awake, alert, oriented to time, person and place.      RECENT LABS:  Hematology WBC   Date Value Ref Range Status   10/12/2022 9.20 3.40 - 10.80 10*3/mm3 Final   02/02/2019 13.4 (H) 3.4 - 10.8 x10E3/uL Final     RBC   Date Value Ref Range Status   10/12/2022 4.42 4.14 - 5.80 10*6/mm3 Final   02/02/2019 4.81 4.14 - 5.80 x10E6/uL Final     Hemoglobin   Date Value Ref Range Status   10/12/2022 14.0 13.0 - 17.7 g/dL Final     Hematocrit   Date Value Ref Range Status   10/12/2022 40.6 " 37.5 - 51.0 % Final     Platelets   Date Value Ref Range Status   10/12/2022 133 (L) 140 - 450 10*3/mm3 Final        Lab Results   Component Value Date    GLUCOSE 100 09/14/2022    BUN 15 09/14/2022    CREATININE 0.73 09/14/2022    EGFRIFNONA 149 02/16/2022    EGFRIFAFRI 100 02/02/2019    BCR 20.5 09/14/2022    K 3.9 09/14/2022    CO2 23.2 09/14/2022    CALCIUM 9.4 09/14/2022    PROTENTOTREF 7.1 02/02/2019    ALBUMIN 4.30 09/14/2022    LABIL2 1.6 02/02/2019    AST 23 09/14/2022    ALT 27 09/14/2022       CEA  Order: [id number]   Status: Final result      Visible to patient: Yes (seen)      Next appt: 11/09/2022 at 09:15 AM in Oncology ([facility] PORT [facility]AIR)      Dx: Malignant neoplasm of lower third of ...     Specimen Information: Blood    0 Result Notes  Component   Ref Range & Units 4 wk ago   (9/14/22) 1 mo ago   (8/17/22) 2 mo ago   (7/20/22) 4 mo ago   (6/8/22) 4 mo ago   (5/18/22) 5 mo ago   (4/27/22) 6 mo ago   (3/30/22)   CEA   ng/mL 13.40  8.73  7.40  8.00  9.82  12.90  17.40    Resulting Agency  [facility]  [facility]  [facility]  [facility]  [facility]  [facility]           Narrative  Performed by:  [facility]  CEA Reference Range:     Non Smokers:   Less than 3 ng/mL   Smokers:       Less than 5 ng/mL   Results may be falsely decreased if patient taking Biotin.       Specimen Collected: 09/14/22 08:40 EDT Last Resulted: 09/14/22 11:40 EDT           Interpretation Summary echo    • Calculated left ventricular EF = 58% Calculated left ventricular 3D EF = 60% Estimated left ventricular EF was in agreement with the calculated left ventricular EF. Left ventricular systolic function is normal.  • Left ventricular diastolic function was normal.  • Normal global longitudinal LV strain (GLS) = -19.2%.  • C/w baseline study 7/2019 and prior study 6/2022, there is no change          Assessment & Plan    1.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 Almost 90% of the liver WAS  replaced by tumor. The patient has been undergoing chemotherapy with 5  fu and leucovorin  and Herceptin and has had excellent response clinically and radiologically. The patient was reviewed on 08/10/2020. I reviewed with the patient in the PAC system [facility] his PET scan that is dramatic. There is minimal uptake in the lower esophagus and most importantly complete resolution of his liver metastasis. Actually the only issue that is pertinent to the PET scan is still the visibility of his colovesical fistula.     Therefore from the point of view of his cancer of the esophagus, metastatic to the liver, he has no symptoms from the primary tumor in the esophagus and he has no symptoms related to his liver metastasis that has resolved altogether. His CEA level is stable.The patient raised the question about the CEA fluctuation. I pointed out to him that a lot of this is also related to his smoking. Smoking per se elevates CEA level.  • Upon further reviewing the patient on 04/20/2021, he has no symptoms pertinent to his metastatic cancer of the esophagus to the liver and he has no difficulty swallowing. There are no side effects of the 5-FU, Leucovorin and Herceptin. The patient's cardiac function remains stable and he has not had any drop in the ejection fraction.   • I discussed with him on 05/18/2021 the fact that his cancer clinically is very quiet but I am afraid of the rise in his CEA level to 12. This could be an indicator of all of the cigarettes that he is smoking on a daily basis of indicator of cancer escaping control and not visible radiologically through his CT scan. I pointed out to him that he needs to continue making an effort to decrease his smoking and he is now down to 10 cigarettes a day. We will recheck another CEA level today. Given the circumstances of the previous CT scan I do not believe that I need to change the doses or plan of care in regard to his chemotherapy medication  administration for the time being. I recommended for him to remain on his 5FU/leucovorin and Herceptin on a monthly basis for now.  • The patient was further reviewed on 06/15/2021 with a new PET scan that documented regrowth of the tumor in the lower esophagus without any new symptomatology, for example dysphagia or odynophagia. No regurgitation. The liver metastasis remains in remission and there are no new areas of disease in the bones or lungs or any other site. Given the excellent performance status I discussed with the patient and his sister today many options of therapy, including going back to FOLFOX regimen along with Herceptin, taking another sample of the esophagus with a new endoscopy and doing analysis of the tissue for Caris [employer] Now that will be my favorite choice, or palliative treatment with radiation therapy and 5-FU continuous infusion that will be toxic to him and he does not prefer to have.     I had a discussion with Mary Brito MD, thoracic surgeon, who has agreed to see the patient tomorrow. I think the endoscopy, the new tissue analysis, and sending the tissue for [employer] Now will be the way to go. Depending on what we find there, we can modify his treatment altogether. I do not think the patient will have any consequences missing chemotherapy for a couple of weeks or so.      In preparation for the endoscopy and biopsies I asked him to hold off on the Xarelto until he is seen by Dr. Brito tomorrow.     In regard to his colovesical fistula, he has had minimal symptomatology this week, maybe some activity there and I asked him to go back to the lab and take a urine specimen and culture. He knows that if this is abnormal he will need to initiate ciprofloxacin that he has at home.    In regard to his hypertension, this is under good control and I advised him to remain on his lisinopril and hydrochlorothiazide combination.    In regard to his smoking cessation, he has had conversations  with NIRU Jacobson about this. He is using some nicotine CQ patch, here and there and also trying to work with nicotine gum and things of this nature but he has not been able to shake this issue altogether.     Otherwise I will review him back in a couple of weeks with a CBC, CMP, and a CEA level.    This case will be presented in the multidisciplinary thoracic conference by me this Thursday, and I will discuss any further advice to the patient.     I also mentioned to the patient that on the background of HER2-positive cancer of the esophagus the possibility of using a new medicine for HER2-based disease that is called Enhertu is a real possibility and maybe that will be the next step to go through.    • The patient was further reviewed on 07/01/2021. I reviewed with Dr. Brito the endoscopy that shows a noncircumferential abnormality in the lower esophagus that encompasses almost 6 cm in length. It is not blocking off the esophagus and that is why the patient has no symptoms. The pathology shows at least atypical cells consistent with cancer. Further Next Generation Sequencing has been sent for [employer] Now.     I discussed with the patient the fact that we have many other modalities of treatment that he could encounter. He prefers to continue his general chemotherapy to control the cancer in his liver using 5-FU, leucovorin and Herceptin today and agree with that. In consideration to be seen by radiation therapy, the patient is willing to listen to the possibility of undergoing continuous 5-FU infusion along with radiation therapy to the esophagus knowing that he will experiencing esophagitis that can give him significant issues for 2-3 weeks. I went ahead and made the appointment for him to be seen by radiation oncology for this purpose only.     Obviously if the Next Generation Sequencing gives us any other hints in regard to how to treat him this could be also utilized including the consideration of  using Enhertu in the long run for achieving better control.     In regard to his smoking cessation he is not willing to change this phenomenon at this time. We have had NIRU Jacobson talking with him in this regard but he is not ready to make a decision when to quit.     Finally, I pointed out to him that during the previous visit we documented a urinary tract infection with streptococcus 50,000 colonies that in my appreciation was significant given the fact that he has a colovesical fistula. This was treated with antibiotics. He has not had any discomfort issues pertinent to this anymore. The urine today is completely clear. Nevertheless, I went ahead and sent a urinalysis at least to be sure that there is no need for any other repetitive infection therapy on him.     The patient also will remain on his anticoagulation at this time, his Xarelto for the time being. I have renewed as well his Neurontin. He has no need for pain medicine.  • The patient was further reviewed on 07/23/2021. He has no new symptomatology pertinent to his cancer of the esophagus with liver metastasis. He has no difficulty swallowing. He has been presented in the multidisciplinary clinic on a couple of occasions and he has been seen by Radiation Oncology. Finally the analysis of Caris [employer] Now is available now showing that the patient's tumor is PD-L1 positive and has high mutation burden. The tumor remains HER/2 positive. Given these findings I think it is very easy to make a choice in regard stopping the present regimen of chemotherapy and proceed with therapy with Keytruda every 3 weeks for at least 4 cycles and reassess him at that point. In preparation for Keytruda initiation next week and we will get the approval of the medicine by his insurance company the patient will require smoking cessation altogether to minimize modification of cigarettes on his immune system. I insisted in this fact to the patient.     Other than that  he will remain on probiotics. We will discontinue the 5FU/leucovorin and Herceptin and will move onto Keytruda. I discussed with him side effects of the Keytruda in detail as below. He will require treatment every 3 weeks with CBC, CMP, TSH every 3 weeks and he will require formal chemotherapy teaching, education and consent for this medicine.     After 4 cycles of this, in other words 3 months he will be reassessed with a new PET scan.     The chances under the present circumstances that he will improve as long as he quit smoking, it is very hard and maybe he could have long term survivorship.    I begged for him to have smoking cessation. If any time during his time with me we have been talking about this and this is absolutely necessary now. We know that cigarettes kill immune system cells and minimize the benefit of treatments with these medicines.       • The patient was further reviewed on 08/19/2021 in regard to his cancer of the esophagus. As stated above he had upper GI bleeding dropping hemoglobin to 5 requiring admission, transfusion, discontinuation of anticoagulation and endoscopy with local therapy by Naga Shelby MD. Since then he has completed 10 sessions of radiation therapy to the esophagus yesterday. He has not had any further bleed. His hemoglobin has bounced back from 5 to 14 and I have advised his this good news today.    I expect that the patient in a few days is going to develop an element of radiation esophagitis and he continues having some fatigue that will improve over time. So far no new issues have happened. I made him aware that if he has difficulty swallowing he will need to let us know, he will need to receive IV fluids in the office.    At least the radiation therapy will take care of the tumor in the esophagus and I do not believe that he will require any other GI endoscopy anymore.    Today the patient will proceed with his Keytruda that is the immunotherapy medicine that we are  delivering to him at this time for the treatment of his liver metastasis and also the tumor in the esophagus. I pointed out to him that so far I do not see any side effects of the Keytruda and the Keytruda will improve and increase the benefit of radiation therapy into the primary tumor in the esophagus.     From the point of view of his anemia associated with GI bleeding, again this has been corrected. The hemoglobin today is 14 grams and I asked him to take his iron supplementation only twice a week 1 tablet. He has been taking 3 tablets everyday.     From the point of view of his thrombophilia associated with malignancy, he is no longer receiving any anticoagulants given the recent episode of GI bleeding. We will maintain him off anticoagulants as much as we can. Hopefully he will not have any other episodes of thrombosis.     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     From the point of view of the Keytruda toxicity so far nothing happened. We will continue monitoring CBC, CMP and TSH periodically.     I will review him back in 3 and 6 weeks. When he comes back for the Keytruda he will continue receiving the medicine as the time goes by.    He understands that most of the benefit of radiation therapy in the tumor in the esophagus will be reached in 1 month and I am planning to give him a PET scan in more of less in 2 months from now when he will have almost 4 infusions of Keytruda under his belt and the completion of radiation therapy to the esophagus.     In regard to smoking cessation he has achieved a lot from 2 packs a day to 10 cigarettes a day. He will see Anh Felder RN, today in regard continuation of the benefit of the therapy for this condition at this point.   • The patient was further reviewed on 09/30/2021. His Keytruda is still ongoing but the patient has developed dermatitis  associated with this. It is a grade 1 toxicity so far and I am wondering if this is extending a little bit and the topical medicine is not going to be sufficient. I will proceed with his Keytruda today but maybe this will be the last administration of this medicine unless that we get shashi and the rash quiets down. Obviously at the completion for Keytruda the patient will require PET scan for assessment not only of the benefit of radiation therapy on his recurrence in the esophagus but also benefit of the Keytruda on his liver metastasis. Therefore, he will be scheduled for this before the next visit. The patient also has been advised that his hemoglobin is acceptable and it will be okay for him to stop his iron supplementation.     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 I have advised him to go into Cipro 250 mg p.o. daily.     The patient has been advised in regard to the continuation of topical steroids in the skin in areas of involvement. This will remain an ongoing issue along with moisturizer to the skin in the form of Cetaphil.     In regard to his hypertension, he remains on medicines for this by me. His blood pressure is under good control at this time.     In regard to previous anticoagulation, he is no longer receiving any given his GI bleeding. This will remain in observation.     In regard to smoking cessation the patient will further discuss with NIRU Jacobson, plans for further decrease. He is down to 10 cigarettes a day. This is already a major accomplishment in somebody who used to smoke almost 3 packs of cigarettes a day.     The patient will have a new insurance in 11/2021 and I will make the insurance ladies aware of this.     In 3 weeks he will have a CBC, CMP, TSH and the PET scan the week before.  • The patient was further reviewed on 10/21/2021. I reviewed with him his PET scan that shows still SUV activity in the lower esophagus but better than  before but he has now 3 areas of abnormal uptake in the liver consistent with progressive liver metastasis. The lung anatomy is clear, the bone anatomy is clear. Colovesical fistula was visible still.    On the basis of these changes I do believe that this patient knowing that he has HER/2 positive esophageal cancer needs to change his therapy. Keytruda is not helpful at all and we will discontinue this medicine at this time. I do believe that the inability of Keytruda to overcome his disease process is related to the persistency of smoking and the abnormality related to smoking about bacterial derek. Further analysis recently published in [employer] has confirmed that the benefit of immunotherapy is further boosted by proper amount of derek in the gastrointestinal tract and how cigarettes are damaging to this issue. In any event the patient's issues in regard to cigarette smoking has been already a problem and he has not been able to quit in spite of all of the support available. Therefore stopping the Keytruda at this point including today the patient will move to Enhertu that has been approved in patients who have cancer of the esophagus with liver metastasis. The dose of this medicine will be the GI dose of this medication that will be properly calculated accordingly. He will have formal education and consent for this medicine. I pointed out the most important side effects of this medicine include cardiac toxicity, anemia, leukopenia, thrombocytopenia and pneumonitis that includes cough, shortness of breath, fever. In preparation to minimize pneumonitis the patient will take prednisone 10 mg on day 2 and 3 after each dose of Enhertu. He will receive this medicine every 3 weeks. We will deliver 3-4 cycles and reassess him not only by tumor markers but also radiologically. Hopefully this will have a positive impact not only in the tumor in the esophagus but also tumors metastatic in his liver.   • The patient was  reviewed on 11/19/2021. Tolerance to the Enhertu 1st cycle was appropriate with more fatigue, no increased cough or shortness of breath and some anorexia. His white count, hemoglobin and platelets were normal and we advised him to proceed with his 2nd Enhertu infusion today. He understands that fatigue will be more prominent, that he could have chance for more hematological toxicity and he is starting to develop minor anemia. His white count and platelet count remain acceptable. I reminded him on many occasions today through the visit and insisted to the sister that if his cough pattern changes, increasing or his shortness of breath becomes worse he has to notify us immediately to be sure that he will not develop pneumonitis associated with Enhertu. He still will take 10 mg of prednisone tomorrow and Sunday to try to minimize this phenomenon from happening.     The patient will be having blood counts on a weekly basis with nurse visit to be sure to monitor hematological toxicity and he will return in 3 weeks to proceed with the next cycle. After the 3rd cycle the patient will proceed with radiological assessment including PET scan.   • The patient was further reviewed on 12/30/2021. On clinical grounds the patient has not had any difficulty swallowing, his liver is not enlarged, nodular or tender, he has no jaundice and his CEA level has dropped further. His PET scan discloses resolution of his periportal lymph nodes and one area of resolution of liver metastasis. He has complete resolution of the tumor in the esophagus. He has still 2 active lesions in the liver with significant SUV activity pertinent to his metastatic disease. Given this fact the patient could be a candidate to further receive Enhertu but my concern is about the radiological analysis that shows minimal pulmonary infiltrate bilaterally. The radiologist suggests a radiation pneumonitis. I do not think that is the case. I think this is probably  related to Enhertu interstitial lung disease and for this reason I feel the obligation to stop this medication at this point and to proceed with therapy with prednisone at 20 mg a day for the next 2 weeks until the patient returns back to see me. I made him aware that if the respiratory symptoms including his cough gets any worse or shortness of breath establishes and gets worse he will need to notify us immediately and he will require to be placed in the hospital to receive IV high dose steroid.    The question will be if the patient will be a candidate to receive any further Enhertu or not remains to be seen. Probably will not be the case.     Obviously this above statement opens the door in regard what else to do for his malignancy. Strong consideration will be to go back to Herceptin and also to consider to go back to FOLFOX, Herceptin like he received initially that gave him such a degree of resolution of symptoms. 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 I discussed this with him and his sister present in the room. I encouraged him to decrease his smoking.   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     In preparation for retaking treatment, I advised him that I would like to go back into FOLFOX, Herceptin every 3 weeks starting next week. This combination of medicines never failed him. It was discontinued because of toxicity and neuropathy. Therefore, I think it is worth it to go ahead and proceed with an echocardiogram and resume chemotherapy administration with this ,  giving him 3 cycles, each one of them 3 weeks apart and see how things evolve from the point of view tumor markers or radiological analysis of his liver through PET scan. He agrees to proceed.  • On 02/09/2022 I advised the patient to hold off his chemotherapy treatment with FOLFOX because his ANC was 1300 and we will decrease his dose of chemotherapy medicines by 15% for the next round of treatment next week. Hopefully the patient with this dose adjustment will be able to continue his treatment in a normal schedule of every 2 weeks. We have to watch his neuropathy very close. My advice will be eventually to pull out the Oxaliplatin and continue 5FU and leucovorin for a couple of cycles and eventually reintroduce the Oxaliplatin for 1-2 cycles back and forth. This has to be monitored clinically and also along with his response.   The patient was reviewed on 03/16/2022. Actually he feels terrific today and for the last 10 days after the anaphylactic reaction to oxaliplatin. He has no cancer-related pain. He has no hepatomegaly. His liver function tests have further improved and surprising enough his CEA level has dramatically dropped as posted above. It was in the 200 category and now it is in the 30 category, pointing to the fact that I think the oxaliplatin and the FOLFOX regimen indeed has very significant benefit for him along with the Herceptin. Obviously he had an anaphylactic reaction to oxaliplatin and this medicine was discontinued. He will receive today his 5-FU, leucovorin and Herceptin.     I advised him to remain on this regimen for the time being and continue the sequence of events every couple of weeks. I went ahead and scheduled an echocardiogram to follow up on his ejection fraction while on Herceptin.  I reviewed with the patient and his sister today his CT scan that shows a favorable response to the regimen that he is receiving of 5FU and Herceptin. The tumor areas are shrinking and his CEA level  is coming down. The patient actually feels very well. The tolerability to the regimen is excellent and he will continue the medicines at the same dosing and schedule for the time being. No plans to stop unless that he wants to take a trip with his sister to a different state and we will be glad to interrupt treatment for a week or so and no more than that. He understood this clearly.   On 06/08/2022, the patient's cancer of the esophagus with liver metastasis remains very quiet clinically and biochemically with no abdominal pain, no hepatomegaly, no jaundice, drop in the CEA level. Excellent tolerance to the regimen of 5-FU, leucovorin and Herceptin that he is receiving every 3 weeks. In fact his white count, hemoglobin and platelets today are normal. He will proceed with echocardiogram in a few days already scheduled. I encouraged him to remain in the present regimen of chemotherapy treatment every 3 weeks being seen by nurse practitioner in 3 weeks, by me in 6 weeks, and continue laboratory testing including CBC and CMP every 3 weeks and a CEA level in 6 weeks.  On 07/20/2022 the patient was further reviewed. His overall clinical status is excellent. Tolerance to the chemotherapy regimen once a month is excellent. He has not had any obvious cardiac toxicity, and he has not had any toxicity from 5FU and leucovorin. His white count, hemoglobin and platelets are normal. His chemistry profile remains normal. Most importantly his CEA level has dropped to the lowest number 8 during the visit a few weeks ago. This unequivocally proves the point that the patient is doing well from the point of view of his metastatic malignancy and proves the point that the regimen that he is receiving is helping him to control the process. For this reason I advised him to continue the regimen with the same sequence of once a month, the same dosing of medications. Eventually we will need to do cardiac assessment.   On 08/17/2022, the  patient continues doing well from the point of view of his treatment once a month with 5-FU/leucovorin and Herceptin. He has not developed any obvious cardiac toxicity. He will be due for another echocardiogram before visit in 8 weeks. The patient has a normal white count, hemoglobin and platelets today. His chemistry profile is pending to monitor alkaline phosphatase that has been minimally elevated given his liver metastasis. The patient also has had a CEA level that has been a good marker in regard to disease activity. Today his level is 8. He has been floating around this number. I advised him to continue his 5-FU/leucovorin and Herceptin once a month, returning to see us in 1, 2 and 3 months and continue medications in similar way. I am not planning radiological assessment unless that the patient has further raise in the CEA level.  On 10/12/2022 the patient has complained of dysphagia for the last 2 weeks and I think I find the need for him to be seen by Mary Brito MD, independent of any situation or radiological assessments that we will need to perform anyway. I think he needs to proceed with a new upper endoscopy in the gastrointestinal tract. The question is that is this tumor recurrence or is it fibrosis associated with previous radiation therapy. The patient is keeping nutrition appropriately, he has not had any choking. Belching and burping helps the symptom. Also I discussed with him the minimal rise in his CEA level documented on the previous visit. This will trigger the need for us to proceed with a PET scan in the next 3 weeks before we see him back in 4 weeks. In the meantime the patient will proceed today with his 5FU, leucovorin and Herceptin. Most recent echocardiogram shows stable left ventricular ejection fraction and no modification in cardiac assessment according to the cardiologist.     I pointed out to the patient one more time on 10/12/2022 that I think the cigarettes are competing with  chemotherapy administration in regard to the benefit of the treatment and he realizes that again and again. He says that he will work on this, doubt that that will be the case.             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   • On 11/19/2021 he has no symptoms or signs of urinary tract infection and he remains on ciprofloxacin prophylactically everyday.   • On 12/30/2021 his colovesical fistula is still evident on the PET scan. He has gas in the bladder and he has had some hematuria. Therefore I do believe that the patient needs to remain on ciprofloxacin 1 tablet every other day to try to minimize any potential for any other urinary tract infection related to this. The patient under the present circumstances is not a candidate to have any kind of surgery unless the symptoms get worse or clinical picture changes radically.   • His colovesical fistula remains active. He remains taking ciprofloxacin 250 mg p.o. every other day. He has not had any fevers or chills and I advised him to remain on this medicine for the time being. His pharmacy called in regard that they have not had Cipro anymore. The patient has tablets for almost 2 months supply. Therefore, he has no need for Levaquin or any other medicine or intervention at this point.  • His colovesical fistula is not active at this time. He has not had any pneumaturia of echolalia. He remains on ciprofloxacin prophylactically.   On 03/16/2022 the patient mentioned to me that he is now passing urine through the rectum. He also has fecaluria and pneumaturia still but in lesser amount. He is not having any fever. He has no pelvic pain. I think his colovesical fistula now in some way has bladder to colon  flow. It used to be fecal matter in the bladder and now it is urine through the anal canal. Obviously this will have  less implications from the point of view of infection but obviously has the implications that the patient has to in some way be ready to defecate and urinate at the same time. He is going to see Dr. Cm Witt in a few days in regard to this fact. Given the fact that he has no abdominal pain and no fever, I would like for him to have a CT scan of the chest, abdomen and pelvis anyway that would not only show us the status of his cancer but also the status of his fistula. I prefer this over a PET scan for that purpose. This information will be shared with Dr. Cm Witt.   •   In the meantime I advised him to continue using his ciprofloxacin 250 mg every other day for prevention of UTI.  •   He has been seen by Cm Witt MD. He has advised the patient that he could not do too much for the fistula given the fact that it is just inconveniences of having urination through the anal canal from time to time but no urinary tract infection. He asked the patient to postpone any intention for surgery unless that it is absolutely necessary. Postponement of chemotherapy for 3 months if any surgery is necessary will trigger dramatic progression of his tumor and obviously consequences.   On 06/08/2022, his colovesical fistula continues. Most of the output is urine through the rectum, no stool through the bladder. He has not had any urinary tract infection but he continues using prophylactically his ciprofloxacin 250 mg orally every other day. I asked him to remain on this medicine for the time being.  On 07/20/2022 the colovesical fistula is extremely quiet. There is no output through the bladder, there is no output through the rectum. This probably tells us that this has healed and this is good news. He has not had the need to take any antibiotics.   On 08/17/2022 the patient continues having colovesical fistula symptomatology intermittently, passing urine through the rectum and not passing feces through the  bladder. He has not had any other urinary tract infections. He continues using his ciprofloxacin as per my instructions.  On 10/12/2022 he remains taking ciprofloxacin prophylactically. He has had fecaluria and he has had also passage of urine through his anal canal. He has not developed systemic symptoms and ciprofloxacin will remain ongoing for the time being.             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   • He remains on Neurontin for the treatment of his peripheral neuropathy. I advised him to remain on this medicine for the time being.  • On 01/12/2022, I advised the patient to remain on his gabapentin for the treatment of his sensory peripheral neuropathy in his feet.  • I went ahead and prescribed on 02/09/2022 his doses of gabapentin to take 300 mg twice a day.   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 This was not aggravated by the few doses of oxaliplatin that he was able to receive until an anaphylactic reaction.  He will remain on Neurontin for the treatment of his peripheral neuropathy associated with Oxaliplatin. The symptoms are under good control.   On 06/08/2022, given the development of minimal edema in his lower extremities that could be related to the use of gabapentin or a medicine that is called Evoxac, I discussed with him maybe the possibility of stopping the gabapentin for a few days and see how things evolve from this point of view. He has no neuropathic symptomatology at this time. He agrees to try and see what happens.  On 07/20/2022 his sensory neuropathy in his feet remains about the same. He remains on gabapentin that is useful and manageable in regard to symptom control.   On 08/17/2022, he continues using gabapentin for neuropathy associated with previous Eloxatin. Neuropathy is grade  1-2. The symptoms are under good control. He has no motor deficit.  On 10/12/2022 his sensory neuropathy remains on gabapentin. Medicine will remain ongoing for the time being prescribed by me.             •   •   •   •   ·   4.In regard to his hypertension, he remains on lisinopril, hydrochlorothiazide provided by me.   • I reviewed him back on 11/19/2021. His blood pressure is 90/48 in spite of proper hydration. This is probably the effect of the combination of Trental and his blood pressure medication. I advised him to discontinue his blood pressure medication at this time and advised him to have proper hydration. He has a device to check his blood pressure at home. I asked him to check this on a daily basis at least a couple of times a day. If his blood pressure goes up above 130/90 he will take 1/2 of the dose of blood pressure medication that he was taking before. Those tablets have the way to be split. He will require checking blood pressure. If the blood pressure drops again he will hold off blood pressure medication indefinitely.   • His hypertension has been reviewed on 12/30/2021. I think his blood pressure today is very good. He will remain on his blood pressure medication for the time being. I find no reason to change dosing.   • The patient was advised to remain on his blood pressure medication, Prinzide.  • On 02/09/2022 he continues doing his blood pressure medication almost on prn basis to minimize very dramatic drop that he has had before. So far he has not had any syncopal episodes or things of this nature.   On 03/16/2022 the patient is not taking blood pressure medication. Actually his blood pressure has been under relatively good control.  His blood pressure remains regulated. He actually is not taking any blood pressure medication at this time. I think he has learned to modify his diet, decrease the consumption of sodium and this probably will have a positive impact on this issue from now on.     The other good news is that this patient has not had any modification in his left ventricular ejection fraction documented through the echocardiogram posted above. Therefore Herceptin has not produced any cardiac toxicity and we will review another echocardiogram in 3 months from now.   On 06/08/2022, the patient has not been using his blood pressure medication because most of his readings at home are below 120s. I asked him to continue watching this and if he has numbers above 130/90, he will take 1 dose of his blood pressure medication every other day or twice a week. He has the freedom to use this at his necessity.  On 07/20/2022 his blood pressure is in good control. He is not taking any medicine for this at this time.   On 08/17/2022 the patient remains on blood pressure medication. His blood pressure today is excellent at 120/75. He remains on the medicines by me at this time.  On 10/12/2022 blood pressure under good control. This will remain in observation.               •   •   •   •   •   ·   5.In regard to his previous GI bleeding associated with his cancer and esophagitis we advised the patient to remain on Protonix.   • On 11/19/2021 he has not had any evidence of GI bleeding and the hemoglobin remains stable. The minor drop obeys to toxicity from Enhertu and bone marrow suppression not because of effect of GI bleeding.   • He has not had any other episodes of GI bleeding as per 12/30/2021.   • On 01/12/2022 he has not had any other episodes of GI bleeding. He is no longer taking any anticoagulants.  • On 02/09/2022 he has not had episodes of melena or enterorrhagia, his hemoglobin is stable. He has not had any use of anticoagulants since GI bleeding.   On 03/16/2022 the patient has not had any other episodes of GI bleeding. He is no longer taking anticoagulant.  Today this issue is not an issue anymore.   On 06/08/2022, the patient has no evidence of GI bleeding. Hemoglobin is stable. MCV remains  normal. The patient is no longer receiving anticoagulants.  Hemoglobin remains stable as per 07/20/2022. No evidence of GI bleeding.   On 08/17/2022, the patient has not had any other episodes of GI bleeding but he has episodes of occasional dysphagia especially when he eats bread. It seems that the bread cakes and sticks around the lower esophagus. Also this happens when he eats excessively fast. I told him that eventually he will require an endoscopy by Dr. Mary Brito and also stretching of the esophagus. 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 Obviously it is impossible to rule out local tumor growth in the location on clinical grounds only.  On 10/12/2022 no evidence of GI bleeding. The patient is no longer taking any anticoagulants, hemoglobin remains excellent at 14.             •   •   •   •   •   ·         6.In regard finally peripheral arterial disease I am going to send the patient a prescription for Trental to take 1 tablet twice a day. He has an appointment to be seen by vascular on 12/18/2021. I pointed out to the patient that if he wants to change the route of this process he must modify his cigarettes otherwise there is no hope. He was not able to take medication provided by [facility] because of side effects.   In regard to his peripheral arterial disease I advised him on 11/19/2021 to decrease his Trental to 1 tablet twice a day. He is now using topical Cetaphil on his feet to try to improve moisture and decrease cracking.   On 12/30/2021 his peripheral neuropathy actually is better through the Doppler study done by [facility]. I wonder if the trental is the one playing a role in this. I have asked him to remain on the trental for the time being.       He is not willing to entertain smoking cessation to help out peripheral arterial disease and we insisted into this in presence of his sister. He says that he is working on it. I  do not feel any confidence in seeing this phenomenon anymore and that is a reality that we need to contemplate and just wait.   • In regard to his peripheral artery disease, the patient is still smoking 15 cigarettes a day. The Trental has made a big difference in regard to his ability to get around and walk. I advised him to remain on Trental.  • On 02/09/2022 his peripheral arterial disease is about the same, the hemoglobin is stable, the Trental is still ongoing, it is beneficial to him.   On 03/16/2022 his peripheral arterial disease remains about the same. He has no gangrene. He has an element of claudication upon walking. We advised him to remain on the same medicines that he is taking. One of them is Trental. I also advised him about smoking cessation. This is not going to change. He remains on 15 cigarettes a day.  He remains on Trental. He has not had any claudication. He has minimal purplish discoloration of his toes that is not prominent with no gangrene and no ischemic neuropathy.   On 06/08/2022, his peripheral artery disease is quiet. He will see Vascular Surgery in a few days. He remains on medicine for this by me. That includes the use of Trental. I encouraged him to remain on this medicine for the time being.  His peripheral arterial disease remains ongoing. I keep insisting in regard to smoking cessation that is going to happen as soon as he goes to [place]. The hotel and the car rental that he had do not allow him to smoke and he has no option.      On 08/17/2022, he remains on medication to favor circulation and rheology in regard to his perfusion of lower extremities. He has not encountered any problems with Trental at this time.  On 10/12/2022 his peripheral arterial disease remains on Trental. He continues smoking. We advised him to stop smoking one more time.           •   •   •   ·     7.This patient's blood sugar has remained in the 130's, 140's, 150's for the last several visits. His  hemoglobin A1C today is 5.9.  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 He will require the utilization of Glucotrol XL 5 mg. I went ahead and sent this prescription to the pharmacy. I pointed out to him that he is eating a diet rich in carbohydrates mostly and I pointed out to him that he needs to eat more vegetables, some fruit and high complex carbohydrates that will minimize issues pertinent to his blood sugar. It is even more important now that he has initiated prednisone as posted.   • I reviewed with him on 01/12/2022 his hemoglobin A1c of 5.9. I advised him to remain on a diet that is not too much rich in sugar and stay on his glipizide 5 mg every day. He is very selective in his diet. He finds things that tastes okay and other ones that do not taste okay and I think we have no solution for this definitive problem.  • On 02/09/2022 the patient's glucose seems to be under control. He will remain on glipizide 5 mg oral daily.     I will review him back in 3 weeks. He will return for treatment next week and for the dose adjustments were discussed with Amber Lam RN.  On 03/16/2022 I asked the patient to continue to monitor his blood sugar and to continue taking his minimal dose of Glucotrol.  Since 06/08/2022, the blood sugar on this patient remains minimally elevated. He remains on Glucotrol. I do believe that the patient has diabetes type 2, very mild case, and encouraged him to try to watch sugar utilization, processed foods, too many carbs, and he is aware of that. I advised him to remain on his Glucotrol XL, 1 tablet a day.  On 07/20/2022 his blood sugar has been under control. He remains on medicine for this.   On 08/17/2022 the patient remains on Glucotrol to control his blood sugar. He is paying attention to what he is eating at this time to minimize excessive carbohydrate consumption.  On  10/12/2022 his blood sugar remains borderline elevated. He remains on Glucotrol XL 1 tablet a day. We advised him to remain on this medicine and still watch diet that is rich in carbohydrates.         ·   8.Swelling in the lower extremities. The patient does not look to me like he has any congestive heart failure, kidney dysfunction, liver dysfunction to produce this, neither has hypoalbuminemia, increased creatinine or congestive heart failure. Most likely is the case of gabapentin and/or Evoxac triggering fluid retention. I asked him to work on this medicines, stopping and going and see if the swelling resolves spontaneously. If that is the case this will fix the problem. If that is not the case and the swelling bothers him, he will require to take a low-dose diuretic like Lasix 20 mg on p.r.n. basis once or twice a week.  On 07/20/2022 the patient has continued using the Lasix on prn basis. In preparation for the trip to see his brother I asked him to buy copper socks to have mild compression and minimize the swelling that he has in the lower extremities without cutting off his arterial circulation.   On 08/17/2022, he has edema in his lower extremities. I advised him to wear his elastic support.  On 10/12/2022 now that the summer is over he is no longer taking Lasix for swelling in the lower extremities.     9.On 10/12/2022 dysphagia. The question is if this is related to tumor regrowth in the esophagus or effect of radiation therapy in the esophagus with fibrosis that will require dilatation. Appointment was made to see Mary Brito MD, in this regard.     Each one of these situations have been discussed in detail above and each one of them is the summary of benign situations that I see. This patient has multiple problems that I handle, all of them at the same time besides the chemotherapy administration, the assessment for toxicity and multiple other medications that are prescribed for him.

## 2022-10-14 ENCOUNTER — INFUSION (OUTPATIENT)
Dept: ONCOLOGY | Facility: HOSPITAL | Age: 67
End: 2022-10-14

## 2022-10-14 DIAGNOSIS — C15.5 MALIGNANT NEOPLASM OF LOWER THIRD OF ESOPHAGUS: Primary | ICD-10-CM

## 2022-10-14 DIAGNOSIS — Z45.2 ENCOUNTER FOR ADJUSTMENT OR MANAGEMENT OF VASCULAR ACCESS DEVICE: ICD-10-CM

## 2022-10-14 DIAGNOSIS — D49.0 ESOPHAGUS NEOPLASM: ICD-10-CM

## 2022-10-14 DIAGNOSIS — C78.7 LIVER METASTASIS: ICD-10-CM

## 2022-10-14 PROCEDURE — 25010000002 HEPARIN LOCK FLUSH PER 10 UNITS: Performed by: INTERNAL MEDICINE

## 2022-10-14 RX ORDER — HEPARIN SODIUM (PORCINE) LOCK FLUSH IV SOLN 100 UNIT/ML 100 UNIT/ML
500 SOLUTION INTRAVENOUS AS NEEDED
Status: DISCONTINUED | OUTPATIENT
Start: 2022-10-14 | End: 2022-10-14 | Stop reason: HOSPADM

## 2022-10-14 RX ORDER — SODIUM CHLORIDE 0.9 % (FLUSH) 0.9 %
10 SYRINGE (ML) INJECTION AS NEEDED
Status: DISCONTINUED | OUTPATIENT
Start: 2022-10-14 | End: 2022-10-14 | Stop reason: HOSPADM

## 2022-10-14 RX ADMIN — Medication 500 UNITS: at 09:46

## 2022-10-14 RX ADMIN — Medication 10 ML: at 09:45

## 2022-10-31 ENCOUNTER — OFFICE VISIT (OUTPATIENT)
Dept: SURGERY | Facility: CLINIC | Age: 67
End: 2022-10-31

## 2022-10-31 VITALS
DIASTOLIC BLOOD PRESSURE: 74 MMHG | BODY MASS INDEX: 29.33 KG/M2 | HEART RATE: 81 BPM | WEIGHT: 198 LBS | OXYGEN SATURATION: 98 % | SYSTOLIC BLOOD PRESSURE: 132 MMHG | HEIGHT: 69 IN

## 2022-10-31 DIAGNOSIS — R79.1 ABNORMAL COAGULATION PROFILE: ICD-10-CM

## 2022-10-31 DIAGNOSIS — C15.5 MALIGNANT NEOPLASM OF LOWER THIRD OF ESOPHAGUS: Primary | ICD-10-CM

## 2022-10-31 PROCEDURE — 99214 OFFICE O/P EST MOD 30 MIN: CPT | Performed by: THORACIC SURGERY (CARDIOTHORACIC VASCULAR SURGERY)

## 2022-10-31 RX ORDER — SODIUM CHLORIDE 0.9 % (FLUSH) 0.9 %
3-10 SYRINGE (ML) INJECTION AS NEEDED
Status: CANCELLED | OUTPATIENT
Start: 2022-10-31

## 2022-10-31 RX ORDER — SODIUM CHLORIDE 0.9 % (FLUSH) 0.9 %
3 SYRINGE (ML) INJECTION EVERY 12 HOURS SCHEDULED
Status: CANCELLED | OUTPATIENT
Start: 2022-10-31

## 2022-11-02 ENCOUNTER — HOSPITAL ENCOUNTER (OUTPATIENT)
Dept: PET IMAGING | Facility: HOSPITAL | Age: 67
Discharge: HOME | End: 2022-11-02

## 2022-11-02 ENCOUNTER — PRE-ADMISSION TESTING (OUTPATIENT)
Dept: PREADMISSION TESTING | Facility: HOSPITAL | Age: 67
End: 2022-11-02

## 2022-11-02 VITALS
RESPIRATION RATE: 20 BRPM | SYSTOLIC BLOOD PRESSURE: 145 MMHG | TEMPERATURE: 99 F | BODY MASS INDEX: 29.12 KG/M2 | WEIGHT: 196.6 LBS | OXYGEN SATURATION: 99 % | HEIGHT: 69 IN | HEART RATE: 89 BPM | DIASTOLIC BLOOD PRESSURE: 79 MMHG

## 2022-11-02 DIAGNOSIS — E78.2 MIXED HYPERLIPIDEMIA: ICD-10-CM

## 2022-11-02 DIAGNOSIS — I10 ESSENTIAL HYPERTENSION: ICD-10-CM

## 2022-11-02 DIAGNOSIS — T45.1X5A PERIPHERAL NEUROPATHY DUE TO CHEMOTHERAPY: ICD-10-CM

## 2022-11-02 DIAGNOSIS — Z45.2 ENCOUNTER FOR ADJUSTMENT OR MANAGEMENT OF VASCULAR ACCESS DEVICE: ICD-10-CM

## 2022-11-02 DIAGNOSIS — G62.0 PERIPHERAL NEUROPATHY DUE TO CHEMOTHERAPY: ICD-10-CM

## 2022-11-02 DIAGNOSIS — C78.7 LIVER METASTASIS: ICD-10-CM

## 2022-11-02 DIAGNOSIS — N32.1 RECTO-BLADDERNECK FISTULA: ICD-10-CM

## 2022-11-02 DIAGNOSIS — C15.5 MALIGNANT NEOPLASM OF LOWER THIRD OF ESOPHAGUS: ICD-10-CM

## 2022-11-02 DIAGNOSIS — D49.0 ESOPHAGUS NEOPLASM: ICD-10-CM

## 2022-11-02 DIAGNOSIS — Z72.0 TOBACCO ABUSE: ICD-10-CM

## 2022-11-02 DIAGNOSIS — R73.01 IMPAIRED FASTING GLUCOSE: ICD-10-CM

## 2022-11-02 LAB — GLUCOSE BLDC GLUCOMTR-MCNC: 88 MG/DL (ref 70–130)

## 2022-11-02 PROCEDURE — 0 FLUDEOXYGLUCOSE F18 SOLUTION: Performed by: INTERNAL MEDICINE

## 2022-11-02 PROCEDURE — 80048 BASIC METABOLIC PNL TOTAL CA: CPT | Performed by: THORACIC SURGERY (CARDIOTHORACIC VASCULAR SURGERY)

## 2022-11-02 PROCEDURE — 85610 PROTHROMBIN TIME: CPT | Performed by: THORACIC SURGERY (CARDIOTHORACIC VASCULAR SURGERY)

## 2022-11-02 PROCEDURE — 85730 THROMBOPLASTIN TIME PARTIAL: CPT | Performed by: THORACIC SURGERY (CARDIOTHORACIC VASCULAR SURGERY)

## 2022-11-02 PROCEDURE — 93010 ELECTROCARDIOGRAM REPORT: CPT | Performed by: INTERNAL MEDICINE

## 2022-11-02 PROCEDURE — 82962 GLUCOSE BLOOD TEST: CPT

## 2022-11-02 PROCEDURE — A9552 F18 FDG: HCPCS | Performed by: INTERNAL MEDICINE

## 2022-11-02 PROCEDURE — 85025 COMPLETE CBC W/AUTO DIFF WBC: CPT | Performed by: THORACIC SURGERY (CARDIOTHORACIC VASCULAR SURGERY)

## 2022-11-02 PROCEDURE — 78815 PET IMAGE W/CT SKULL-THIGH: CPT

## 2022-11-02 PROCEDURE — 93005 ELECTROCARDIOGRAM TRACING: CPT

## 2022-11-02 RX ADMIN — FLUDEOXYGLUCOSE F18 1 DOSE: 300 INJECTION INTRAVENOUS at 08:07

## 2022-11-02 NOTE — DISCHARGE INSTRUCTIONS
Take the following medications the morning of surgery:  GABAPENTIN, PROTONIX    ARRIVE TO MAIN OR DESK 11-4-22 AT 12:00PM    If you are on prescription narcotic pain medication to control your pain you may also take that medication the morning of surgery.    General Instructions:  Do not eat solid food after midnight the night before surgery.  You may drink clear liquids day of surgery but must stop at least one hour before your hospital arrival time.  It is beneficial for you to have a clear drink that contains carbohydrates the day of surgery.  We suggest a 12 to 20 ounce bottle of Gatorade or Powerade for non-diabetic patients or a 12 to 20 ounce bottle of G2 or Powerade Zero for diabetic patients. (Pediatric patients, are not advised to drink a 12 to 20 ounce carbohydrate drink)    Clear liquids are liquids you can see through.  Nothing red in color.     Plain water                               Sports drinks  Sodas                                   Gelatin (Jell-O)  Fruit juices without pulp such as white grape juice and apple juice  Popsicles that contain no fruit or yogurt  Tea or coffee (no cream or milk added)  Gatorade / Powerade  G2 / Powerade Zero    Infants may have breast milk up to four hours before surgery.  Infants drinking formula may drink formula up to six hours before surgery.   Patients who avoid smoking, chewing tobacco and alcohol for 4 weeks prior to surgery have a reduced risk of post-operative complications.  Quit smoking as many days before surgery as you can.  Do not smoke, use chewing tobacco or drink alcohol the day of surgery.   If applicable bring your C-PAP/ BI-PAP machine.  Bring any papers given to you in the doctor’s office.  Wear clean comfortable clothes.  Do not wear contact lenses, false eyelashes or make-up.  Bring a case for your glasses.   Bring crutches or walker if applicable.  Remove all piercings.  Leave jewelry and any other valuables at home.  Hair extensions with  metal clips must be removed prior to surgery.  The Pre-Admission Testing nurse will instruct you to bring medications if unable to obtain an accurate list in Pre-Admission Testing.        Preventing a Surgical Site Infection:  For 2 to 3 days before surgery, avoid shaving with a razor because the razor can irritate skin and make it easier to develop an infection.    Any areas of open skin can increase the risk of a post-operative wound infection by allowing bacteria to enter and travel throughout the body.  Notify your surgeon if you have any skin wounds / rashes even if it is not near the expected surgical site.  The area will need assessed to determine if surgery should be delayed until it is healed.  The night prior to surgery shower using a fresh bar of anti-bacterial soap (such as Dial) and clean washcloth.  Sleep in a clean bed with clean clothing.  Do not allow pets to sleep with you.  Shower on the morning of surgery using a fresh bar of anti-bacterial soap (such as Dial) and clean washcloth.  Dry with a clean towel and dress in clean clothing.  Ask your surgeon if you will be receiving antibiotics prior to surgery.  Make sure you, your family, and all healthcare providers clean their hands with soap and water or an alcohol based hand  before caring for you or your wound.    Day of surgery:  Your arrival time is approximately two hours before your scheduled surgery time.  Upon arrival, a Pre-op nurse and Anesthesiologist will review your health history, obtain vital signs, and answer questions you may have.  The only belongings needed at this time will be a list of your home medications and if applicable your C-PAP/BI-PAP machine.  A Pre-op nurse will start an IV and you may receive medication in preparation for surgery, including something to help you relax.     Please be aware that surgery does come with discomfort.  We want to make every effort to control your discomfort so please discuss any  uncontrolled symptoms with your nurse.   Your doctor will most likely have prescribed pain medications.      If you are going home after surgery you will receive individualized written care instructions before being discharged.  A responsible adult must drive you to and from the hospital on the day of your surgery and stay with you for 24 hours.  Discharge prescriptions can be filled by the hospital pharmacy during regular pharmacy hours.  If you are having surgery late in the day/evening your prescription may be e-prescribed to your pharmacy.  Please verify your pharmacy hours or chose a 24 hour pharmacy to avoid not having access to your prescription because your pharmacy has closed for the day.    If you are staying overnight following surgery, you will be transported to your hospital room following the recovery period.  [facility] has all private rooms.    If you have any questions please call Pre-Admission Testing at [phone].  Deductibles and co-payments are collected on the day of service. Please be prepared to pay the required co-pay, deductible or deposit on the day of service as defined by your plan.    Call your surgeon immediately if you experience any of the following symptoms:  Sore Throat  Shortness of Breath or difficulty breathing  Cough  Chills  Body soreness or muscle pain  Headache  Fever  New loss of taste or smell  Do not arrive for your surgery ill.  Your procedure will need to be rescheduled to another time.  You will need to call your physician before the day of surgery to avoid any unnecessary exposure to hospital staff as well as other patients.       CHLORHEXIDINE CLOTH INSTRUCTIONS  The morning of surgery follow these instructions using the Chlorhexidine cloths you've been given.  These steps reduce bacteria on the body.  Do not use the cloths near your eyes, ears mouth, genitalia or on open wounds.  Throw the cloths away after use but do not try to flush them down a  toilet.      Open and remove one cloth at a time from the package.    Leave the cloth unfolded and begin the bathing.  Massage the skin with the cloths using gentle pressure to remove bacteria.  Do not scrub harshly.   Follow the steps below with one 2% CHG cloth per area (6 total cloths).  One cloth for neck, shoulders and chest.  One cloth for both arms, hands, fingers and underarms (do underarms last).  One cloth for the abdomen followed by groin.  One cloth for right leg and foot including between the toes.  One cloth for left leg and foot including between the toes.  The last cloth is to be used for the back of the neck, back and buttocks.    Allow the CHG to air dry 3 minutes on the skin which will give it time to work and decrease the chance of irritation.  The skin may feel sticky until it is dry.  Do not rinse with water or any other liquid or you will lose the beneficial effects of the CHG.  If mild skin irritation occurs, do rinse the skin to remove the CHG.  Report this to the nurse at time of admission.  Do not apply lotions, creams, ointments, deodorants or perfumes after using the clothes. Dress in clean clothes before coming to the hospital.

## 2022-11-03 ENCOUNTER — ANESTHESIA EVENT (OUTPATIENT)
Dept: PERIOP | Facility: HOSPITAL | Age: 67
End: 2022-11-03

## 2022-11-03 LAB — QT INTERVAL: 394 MS

## 2022-11-04 ENCOUNTER — APPOINTMENT (OUTPATIENT)
Dept: GENERAL RADIOLOGY | Facility: HOSPITAL | Age: 67
End: 2022-11-04

## 2022-11-04 ENCOUNTER — HOSPITAL ENCOUNTER (OUTPATIENT)
Facility: HOSPITAL | Age: 67
Setting detail: HOSPITAL OUTPATIENT SURGERY
Discharge: HOME | End: 2022-11-04
Attending: THORACIC SURGERY (CARDIOTHORACIC VASCULAR SURGERY) | Admitting: THORACIC SURGERY (CARDIOTHORACIC VASCULAR SURGERY)

## 2022-11-04 ENCOUNTER — ANESTHESIA (OUTPATIENT)
Dept: PERIOP | Facility: HOSPITAL | Age: 67
End: 2022-11-04

## 2022-11-04 VITALS
HEART RATE: 74 BPM | OXYGEN SATURATION: 99 % | RESPIRATION RATE: 18 BRPM | SYSTOLIC BLOOD PRESSURE: 162 MMHG | DIASTOLIC BLOOD PRESSURE: 79 MMHG | TEMPERATURE: 97.8 F

## 2022-11-04 DIAGNOSIS — C15.5 MALIGNANT NEOPLASM OF LOWER THIRD OF ESOPHAGUS: ICD-10-CM

## 2022-11-04 DIAGNOSIS — C15.5 MALIGNANT NEOPLASM OF LOWER THIRD OF ESOPHAGUS: Primary | ICD-10-CM

## 2022-11-04 LAB — GLUCOSE BLDC GLUCOMTR-MCNC: 89 MG/DL (ref 70–130)

## 2022-11-04 PROCEDURE — 88360 TUMOR IMMUNOHISTOCHEM/MANUAL: CPT

## 2022-11-04 PROCEDURE — 71045 X-RAY EXAM CHEST 1 VIEW: CPT

## 2022-11-04 PROCEDURE — 82962 GLUCOSE BLOOD TEST: CPT

## 2022-11-04 PROCEDURE — 25010000002 SUCCINYLCHOLINE PER 20 MG: Performed by: STUDENT IN AN ORGANIZED HEALTH CARE EDUCATION/TRAINING PROGRAM

## 2022-11-04 PROCEDURE — 25010000002 ONDANSETRON PER 1 MG: Performed by: STUDENT IN AN ORGANIZED HEALTH CARE EDUCATION/TRAINING PROGRAM

## 2022-11-04 PROCEDURE — C1726 CATH, BAL DIL, NON-VASCULAR: HCPCS | Performed by: THORACIC SURGERY (CARDIOTHORACIC VASCULAR SURGERY)

## 2022-11-04 PROCEDURE — 88377 M/PHMTRC ALYS ISHQUANT/SEMIQ: CPT

## 2022-11-04 PROCEDURE — 88305 TISSUE EXAM BY PATHOLOGIST: CPT | Performed by: THORACIC SURGERY (CARDIOTHORACIC VASCULAR SURGERY)

## 2022-11-04 PROCEDURE — C1713 ANCHOR/SCREW BN/BN,TIS/BN: HCPCS | Performed by: THORACIC SURGERY (CARDIOTHORACIC VASCULAR SURGERY)

## 2022-11-04 PROCEDURE — 43249 ESOPH EGD DILATION <30 MM: CPT | Performed by: THORACIC SURGERY (CARDIOTHORACIC VASCULAR SURGERY)

## 2022-11-04 PROCEDURE — 25010000002 PROPOFOL 10 MG/ML EMULSION: Performed by: STUDENT IN AN ORGANIZED HEALTH CARE EDUCATION/TRAINING PROGRAM

## 2022-11-04 PROCEDURE — 25010000002 FENTANYL CITRATE (PF) 100 MCG/2ML SOLUTION: Performed by: STUDENT IN AN ORGANIZED HEALTH CARE EDUCATION/TRAINING PROGRAM

## 2022-11-04 RX ORDER — LIDOCAINE HYDROCHLORIDE 10 MG/ML
0.5 INJECTION, SOLUTION EPIDURAL; INFILTRATION; INTRACAUDAL; PERINEURAL ONCE AS NEEDED
Status: DISCONTINUED | OUTPATIENT
Start: 2022-11-04 | End: 2022-11-04 | Stop reason: HOSPADM

## 2022-11-04 RX ORDER — ROCURONIUM BROMIDE 10 MG/ML
INJECTION, SOLUTION INTRAVENOUS AS NEEDED
Status: DISCONTINUED | OUTPATIENT
Start: 2022-11-04 | End: 2022-11-04 | Stop reason: SURG

## 2022-11-04 RX ORDER — PROMETHAZINE HYDROCHLORIDE 25 MG/1
25 TABLET ORAL ONCE AS NEEDED
Status: DISCONTINUED | OUTPATIENT
Start: 2022-11-04 | End: 2022-11-04 | Stop reason: HOSPADM

## 2022-11-04 RX ORDER — LABETALOL HYDROCHLORIDE 5 MG/ML
5 INJECTION, SOLUTION INTRAVENOUS
Status: DISCONTINUED | OUTPATIENT
Start: 2022-11-04 | End: 2022-11-04 | Stop reason: HOSPADM

## 2022-11-04 RX ORDER — ONDANSETRON 2 MG/ML
INJECTION INTRAMUSCULAR; INTRAVENOUS AS NEEDED
Status: DISCONTINUED | OUTPATIENT
Start: 2022-11-04 | End: 2022-11-04 | Stop reason: SURG

## 2022-11-04 RX ORDER — SODIUM CHLORIDE 0.9 % (FLUSH) 0.9 %
3 SYRINGE (ML) INJECTION EVERY 12 HOURS SCHEDULED
Status: DISCONTINUED | OUTPATIENT
Start: 2022-11-04 | End: 2022-11-04 | Stop reason: HOSPADM

## 2022-11-04 RX ORDER — MIDAZOLAM HYDROCHLORIDE 1 MG/ML
1 INJECTION INTRAMUSCULAR; INTRAVENOUS
Status: DISCONTINUED | OUTPATIENT
Start: 2022-11-04 | End: 2022-11-04 | Stop reason: HOSPADM

## 2022-11-04 RX ORDER — FENTANYL CITRATE 50 UG/ML
50 INJECTION, SOLUTION INTRAMUSCULAR; INTRAVENOUS
Status: DISCONTINUED | OUTPATIENT
Start: 2022-11-04 | End: 2022-11-04 | Stop reason: HOSPADM

## 2022-11-04 RX ORDER — MIDAZOLAM HYDROCHLORIDE 1 MG/ML
0.5 INJECTION INTRAMUSCULAR; INTRAVENOUS
Status: DISCONTINUED | OUTPATIENT
Start: 2022-11-04 | End: 2022-11-04 | Stop reason: HOSPADM

## 2022-11-04 RX ORDER — HYDRALAZINE HYDROCHLORIDE 20 MG/ML
5 INJECTION INTRAMUSCULAR; INTRAVENOUS
Status: DISCONTINUED | OUTPATIENT
Start: 2022-11-04 | End: 2022-11-04 | Stop reason: HOSPADM

## 2022-11-04 RX ORDER — LIDOCAINE HYDROCHLORIDE 20 MG/ML
INJECTION, SOLUTION INFILTRATION; PERINEURAL AS NEEDED
Status: DISCONTINUED | OUTPATIENT
Start: 2022-11-04 | End: 2022-11-04 | Stop reason: SURG

## 2022-11-04 RX ORDER — CEFAZOLIN SODIUM 2 G/100ML
2 INJECTION, SOLUTION INTRAVENOUS ONCE
Status: DISCONTINUED | OUTPATIENT
Start: 2022-11-04 | End: 2022-11-04

## 2022-11-04 RX ORDER — PROPOFOL 10 MG/ML
VIAL (ML) INTRAVENOUS AS NEEDED
Status: DISCONTINUED | OUTPATIENT
Start: 2022-11-04 | End: 2022-11-04 | Stop reason: SURG

## 2022-11-04 RX ORDER — SUCCINYLCHOLINE CHLORIDE 20 MG/ML
INJECTION INTRAMUSCULAR; INTRAVENOUS AS NEEDED
Status: DISCONTINUED | OUTPATIENT
Start: 2022-11-04 | End: 2022-11-04 | Stop reason: SURG

## 2022-11-04 RX ORDER — FLUMAZENIL 0.1 MG/ML
0.2 INJECTION INTRAVENOUS AS NEEDED
Status: DISCONTINUED | OUTPATIENT
Start: 2022-11-04 | End: 2022-11-04 | Stop reason: HOSPADM

## 2022-11-04 RX ORDER — SODIUM CHLORIDE, SODIUM LACTATE, POTASSIUM CHLORIDE, CALCIUM CHLORIDE 600; 310; 30; 20 MG/100ML; MG/100ML; MG/100ML; MG/100ML
9 INJECTION, SOLUTION INTRAVENOUS CONTINUOUS
Status: DISCONTINUED | OUTPATIENT
Start: 2022-11-04 | End: 2022-11-04 | Stop reason: HOSPADM

## 2022-11-04 RX ORDER — HYDROCODONE BITARTRATE AND ACETAMINOPHEN 7.5; 325 MG/1; MG/1
1 TABLET ORAL ONCE AS NEEDED
Status: DISCONTINUED | OUTPATIENT
Start: 2022-11-04 | End: 2022-11-04 | Stop reason: HOSPADM

## 2022-11-04 RX ORDER — ONDANSETRON 2 MG/ML
4 INJECTION INTRAMUSCULAR; INTRAVENOUS ONCE AS NEEDED
Status: DISCONTINUED | OUTPATIENT
Start: 2022-11-04 | End: 2022-11-04 | Stop reason: HOSPADM

## 2022-11-04 RX ORDER — MAGNESIUM HYDROXIDE 1200 MG/15ML
LIQUID ORAL AS NEEDED
Status: DISCONTINUED | OUTPATIENT
Start: 2022-11-04 | End: 2022-11-04 | Stop reason: HOSPADM

## 2022-11-04 RX ORDER — FENTANYL CITRATE 50 UG/ML
INJECTION, SOLUTION INTRAMUSCULAR; INTRAVENOUS AS NEEDED
Status: DISCONTINUED | OUTPATIENT
Start: 2022-11-04 | End: 2022-11-04 | Stop reason: SURG

## 2022-11-04 RX ORDER — IBUPROFEN 600 MG/1
600 TABLET ORAL ONCE AS NEEDED
Status: DISCONTINUED | OUTPATIENT
Start: 2022-11-04 | End: 2022-11-04 | Stop reason: HOSPADM

## 2022-11-04 RX ORDER — OXYCODONE AND ACETAMINOPHEN 7.5; 325 MG/1; MG/1
1 TABLET ORAL EVERY 4 HOURS PRN
Status: DISCONTINUED | OUTPATIENT
Start: 2022-11-04 | End: 2022-11-04 | Stop reason: HOSPADM

## 2022-11-04 RX ORDER — SODIUM CHLORIDE 0.9 % (FLUSH) 0.9 %
3-10 SYRINGE (ML) INJECTION AS NEEDED
Status: DISCONTINUED | OUTPATIENT
Start: 2022-11-04 | End: 2022-11-04 | Stop reason: HOSPADM

## 2022-11-04 RX ORDER — PROMETHAZINE HYDROCHLORIDE 25 MG/1
25 SUPPOSITORY RECTAL ONCE AS NEEDED
Status: DISCONTINUED | OUTPATIENT
Start: 2022-11-04 | End: 2022-11-04 | Stop reason: HOSPADM

## 2022-11-04 RX ORDER — HYDROMORPHONE HYDROCHLORIDE 1 MG/ML
0.5 INJECTION, SOLUTION INTRAMUSCULAR; INTRAVENOUS; SUBCUTANEOUS
Status: DISCONTINUED | OUTPATIENT
Start: 2022-11-04 | End: 2022-11-04 | Stop reason: HOSPADM

## 2022-11-04 RX ORDER — EPHEDRINE SULFATE 50 MG/ML
5 INJECTION, SOLUTION INTRAVENOUS ONCE AS NEEDED
Status: DISCONTINUED | OUTPATIENT
Start: 2022-11-04 | End: 2022-11-04 | Stop reason: HOSPADM

## 2022-11-04 RX ORDER — DIPHENHYDRAMINE HYDROCHLORIDE 50 MG/ML
12.5 INJECTION INTRAMUSCULAR; INTRAVENOUS
Status: DISCONTINUED | OUTPATIENT
Start: 2022-11-04 | End: 2022-11-04 | Stop reason: HOSPADM

## 2022-11-04 RX ORDER — FAMOTIDINE 10 MG/ML
20 INJECTION, SOLUTION INTRAVENOUS ONCE
Status: COMPLETED | OUTPATIENT
Start: 2022-11-04 | End: 2022-11-04

## 2022-11-04 RX ORDER — DIPHENHYDRAMINE HCL 25 MG
25 CAPSULE ORAL
Status: DISCONTINUED | OUTPATIENT
Start: 2022-11-04 | End: 2022-11-04 | Stop reason: HOSPADM

## 2022-11-04 RX ORDER — NALOXONE HCL 0.4 MG/ML
0.2 VIAL (ML) INJECTION AS NEEDED
Status: DISCONTINUED | OUTPATIENT
Start: 2022-11-04 | End: 2022-11-04 | Stop reason: HOSPADM

## 2022-11-04 RX ADMIN — LIDOCAINE HYDROCHLORIDE 100 MG: 20 INJECTION, SOLUTION INFILTRATION; PERINEURAL at 13:40

## 2022-11-04 RX ADMIN — SODIUM CHLORIDE, POTASSIUM CHLORIDE, SODIUM LACTATE AND CALCIUM CHLORIDE 9 ML/HR: 600; 310; 30; 20 INJECTION, SOLUTION INTRAVENOUS at 13:27

## 2022-11-04 RX ADMIN — ROCURONIUM BROMIDE 20 MG: 50 INJECTION INTRAVENOUS at 13:50

## 2022-11-04 RX ADMIN — LIDOCAINE HYDROCHLORIDE 40 MG: 20 INJECTION, SOLUTION INFILTRATION; PERINEURAL at 14:02

## 2022-11-04 RX ADMIN — FAMOTIDINE 20 MG: 10 INJECTION INTRAVENOUS at 13:27

## 2022-11-04 RX ADMIN — FENTANYL CITRATE 25 MCG: 50 INJECTION, SOLUTION INTRAMUSCULAR; INTRAVENOUS at 13:57

## 2022-11-04 RX ADMIN — ROCURONIUM BROMIDE 5 MG: 50 INJECTION INTRAVENOUS at 13:40

## 2022-11-04 RX ADMIN — PROPOFOL 150 MG: 10 INJECTION, EMULSION INTRAVENOUS at 13:40

## 2022-11-04 RX ADMIN — SUCCINYLCHOLINE CHLORIDE 160 MG: 20 INJECTION, SOLUTION INTRAMUSCULAR; INTRAVENOUS; PARENTERAL at 13:40

## 2022-11-04 RX ADMIN — ONDANSETRON 4 MG: 2 INJECTION INTRAMUSCULAR; INTRAVENOUS at 13:45

## 2022-11-04 NOTE — ANESTHESIA PREPROCEDURE EVALUATION
Anesthesia Evaluation     Patient summary reviewed and Nursing notes reviewed   NPO Solid Status: > 8 hours             Airway   Mallampati: II  Dental      Comment: Lower partial    Pulmonary - normal exam   (+) a smoker Current Smoked day of surgery,   Cardiovascular - normal exam    ECG reviewed    (+) hypertension less than 2 medications, PVD,       Neuro/Psych  GI/Hepatic/Renal/Endo    (+)  GERD,  liver disease, diabetes mellitus type 2,     Musculoskeletal     Abdominal    Substance History      OB/GYN          Other      history of cancer active                    Anesthesia Plan    ASA 3     MAC       Anesthetic plan, risks, benefits, and alternatives have been provided, discussed and informed consent has been obtained with: patient.        CODE STATUS:

## 2022-11-04 NOTE — ANESTHESIA POSTPROCEDURE EVALUATION
Patient: Han Garcia    Procedure Summary     Date: 11/04/22 Room / Location: [facility] OR 09 / [facility] MAIN OR    Anesthesia Start: 1335 Anesthesia Stop: 1415    Procedure: ESOPHAGOGASTRODUODENOSCOPY WITH DILATATION (Esophagus) Diagnosis:       Malignant neoplasm of lower third of esophagus (HCC)      (Malignant neoplasm of lower third of esophagus (HCC) [C15.5])    Surgeons: Mary Brito MD Provider: Mehul Gonzalez MD    Anesthesia Type: MAC ASA Status: 3          Anesthesia Type: MAC    Vitals  Vitals Value Taken Time   /88 11/04/22 1501   Temp 36.6 °C (97.8 °F) 11/04/22 1413   Pulse 71 11/04/22 1503   Resp 20 11/04/22 1500   SpO2 98 % 11/04/22 1503   Vitals shown include unvalidated device data.        Post Anesthesia Care and Evaluation    Patient location during evaluation: bedside  Patient participation: complete - patient participated  Level of consciousness: sleepy but conscious  Pain score: 0  Pain management: adequate    Airway patency: patent  Anesthetic complications: No anesthetic complications    Cardiovascular status: acceptable  Respiratory status: acceptable  Hydration status: acceptable    Comments: /79   Pulse 74   Temp 36.6 °C (97.8 °F) (Oral)   Resp 18   SpO2 99%

## 2022-11-04 NOTE — ANESTHESIA PROCEDURE NOTES
Airway  Urgency: elective    Date/Time: 11/4/2022 1:43 PM  Airway not difficult    General Information and Staff    Patient location during procedure: OR  Anesthesiologist: Mehul Gonzalez MD  CRNA/CAA: Daniel Aly CRNA    Indications and Patient Condition  Indications for airway management: airway protection    Preoxygenated: yes  MILS not maintained throughout  Mask difficulty assessment: 0 - not attempted (RSI)    Final Airway Details  Final airway type: endotracheal airway      Successful airway: ETT  Cuffed: yes   Successful intubation technique: direct laryngoscopy  Endotracheal tube insertion site: oral  Blade: Clare  Blade size: 3  ETT size (mm): 7.0  Cormack-Lehane Classification: grade III - view of epiglottis only  Placement verified by: chest auscultation and capnometry   Cuff volume (mL): 7  Measured from: lips  ETT/EBT  to lips (cm): 21  Number of attempts at approach: 1  Assessment: lips, teeth, and gum same as pre-op and atraumatic intubation

## 2022-11-04 NOTE — OP NOTE
ESOPHAGOGASTRODUODENOSCOPY WITH DILATATION  Procedure Report    Patient Name:  Han Garcia  YOB: 1955    Date of Surgery:  11/4/2022     Indications: Dysphagia, stage IV esophageal cancer    Pre-op Diagnosis:   Malignant neoplasm of lower third of esophagus (HCC) [C15.5]       Post-Op Diagnosis Codes:     * Malignant neoplasm of lower third of esophagus (HCC) [C15.5]    Procedure/CPT® Codes:      Procedure(s):  ESOPHAGOGASTRODUODENOSCOPY WITH DILATATION    Staff:  Surgeon(s):  Mary Brito MD    Anesthesia: General    Estimated Blood Loss: minimal    Implants:    Nothing was implanted during the procedure    Specimen:          Specimens     ID Source Type Tests Collected By Collected At Frozen?    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 Garcia was identified in the preoperative holding area again his consent for the procedure was verified.  He was transported to the operating room and placed on the operating room table in supine position.  A general anesthetic was successfully administered he was intubated without difficulty.  A timeout was performed.  The Olympus videoendoscope was introduced in the patient's esophagus and there were no abnormalities noted until approximately 36 cm.  There was a fungating mass in the distal esophagus consistent with malignancy.  There was a tight stricture at approximately 38 cm at the GE junction.  Multiple biopsies were taken at 36 and 38 cm.  The esophagus was  dilated with a 10 to 12 mm pneumatic balloon dilator.  The stricture was traversed and the remainder of the stomach and the duodenum were within normal limits.  Multiple biopsies were taken at the GE junction.  A 12 to 15 mm pneumatic balloon dilator was used to dilate the stricture to 15 mm.  There was some mild bleeding after this dilation so elected not to continue.  The bleeding stopped and the scope was withdrawn.  I did consider a stent, however, with its location of the GE junction the distal portion of the stent would be in the stomach which makes this prone to migration so I elected not to place a stent.  We can consider another dilation to help with his stricture and we can consider treatment for his esophageal malignancy.  Patient tolerated the procedure well, was awakened and transferred to recovery room in stable condition.      Mary Brito MD     Date: 11/4/2022  Time: 14:16 EDT

## 2022-11-07 RX ORDER — PENTOXIFYLLINE 400 MG/1
TABLET, EXTENDED RELEASE ORAL
Qty: 90 TABLET | Refills: 0 | Status: SHIPPED | OUTPATIENT
Start: 2022-11-07 | End: 2022-12-15

## 2022-11-09 ENCOUNTER — OFFICE VISIT (OUTPATIENT)
Dept: ONCOLOGY | Facility: CLINIC | Age: 67
End: 2022-11-09

## 2022-11-09 ENCOUNTER — INFUSION (OUTPATIENT)
Dept: ONCOLOGY | Facility: HOSPITAL | Age: 67
End: 2022-11-09

## 2022-11-09 VITALS
HEART RATE: 84 BPM | BODY MASS INDEX: 28.82 KG/M2 | WEIGHT: 194.6 LBS | RESPIRATION RATE: 18 BRPM | TEMPERATURE: 97.3 F | DIASTOLIC BLOOD PRESSURE: 79 MMHG | HEIGHT: 69 IN | OXYGEN SATURATION: 99 % | SYSTOLIC BLOOD PRESSURE: 137 MMHG

## 2022-11-09 DIAGNOSIS — Z45.2 ENCOUNTER FOR ADJUSTMENT OR MANAGEMENT OF VASCULAR ACCESS DEVICE: ICD-10-CM

## 2022-11-09 DIAGNOSIS — I10 ESSENTIAL HYPERTENSION: ICD-10-CM

## 2022-11-09 DIAGNOSIS — R73.01 IMPAIRED FASTING GLUCOSE: ICD-10-CM

## 2022-11-09 DIAGNOSIS — C15.5 MALIGNANT NEOPLASM OF LOWER THIRD OF ESOPHAGUS: ICD-10-CM

## 2022-11-09 DIAGNOSIS — E78.2 MIXED HYPERLIPIDEMIA: ICD-10-CM

## 2022-11-09 DIAGNOSIS — G62.0 PERIPHERAL NEUROPATHY DUE TO CHEMOTHERAPY: ICD-10-CM

## 2022-11-09 DIAGNOSIS — C78.7 LIVER METASTASIS: ICD-10-CM

## 2022-11-09 DIAGNOSIS — D49.0 ESOPHAGUS NEOPLASM: ICD-10-CM

## 2022-11-09 DIAGNOSIS — N32.1 RECTO-BLADDERNECK FISTULA: ICD-10-CM

## 2022-11-09 DIAGNOSIS — T45.1X5A PERIPHERAL NEUROPATHY DUE TO CHEMOTHERAPY: ICD-10-CM

## 2022-11-09 DIAGNOSIS — C15.5 MALIGNANT NEOPLASM OF LOWER THIRD OF ESOPHAGUS: Primary | ICD-10-CM

## 2022-11-09 DIAGNOSIS — Z72.0 TOBACCO ABUSE: ICD-10-CM

## 2022-11-09 LAB
ALBUMIN SERPL-MCNC: 4.3 G/DL (ref 3.5–5.2)
ALBUMIN/GLOB SERPL: 1.5 G/DL (ref 1.1–2.4)
ALP SERPL-CCNC: 164 U/L (ref 38–116)
ALT SERPL W P-5'-P-CCNC: 21 U/L (ref 0–41)
ANION GAP SERPL CALCULATED.3IONS-SCNC: 12.3 MMOL/L (ref 5–15)
AST SERPL-CCNC: 23 U/L (ref 0–40)
BASOPHILS # BLD AUTO: 0.03 10*3/MM3 (ref 0–0.2)
BASOPHILS NFR BLD AUTO: 0.3 % (ref 0–1.5)
BILIRUB SERPL-MCNC: 0.6 MG/DL (ref 0.2–1.2)
BUN SERPL-MCNC: 10 MG/DL (ref 6–20)
BUN/CREAT SERPL: 14.5 (ref 7.3–30)
CALCIUM SPEC-SCNC: 9.6 MG/DL (ref 8.5–10.2)
CANCER AG19-9 SERPL-ACNC: 61.1 U/ML
CEA SERPL-MCNC: 32.6 NG/ML
CHLORIDE SERPL-SCNC: 102 MMOL/L (ref 98–107)
CO2 SERPL-SCNC: 22.7 MMOL/L (ref 22–29)
CREAT SERPL-MCNC: 0.69 MG/DL (ref 0.7–1.3)
DEPRECATED RDW RBC AUTO: 48.9 FL (ref 37–54)
EGFRCR SERPLBLD CKD-EPI 2021: 102.1 ML/MIN/1.73
EOSINOPHIL # BLD AUTO: 0.22 10*3/MM3 (ref 0–0.4)
EOSINOPHIL NFR BLD AUTO: 2.5 % (ref 0.3–6.2)
ERYTHROCYTE [DISTWIDTH] IN BLOOD BY AUTOMATED COUNT: 14.6 % (ref 12.3–15.4)
GLOBULIN UR ELPH-MCNC: 2.9 GM/DL (ref 1.8–3.5)
GLUCOSE SERPL-MCNC: 120 MG/DL (ref 74–124)
HCT VFR BLD AUTO: 43.8 % (ref 37.5–51)
HGB BLD-MCNC: 15.1 G/DL (ref 13–17.7)
IMM GRANULOCYTES # BLD AUTO: 0.1 10*3/MM3 (ref 0–0.05)
IMM GRANULOCYTES NFR BLD AUTO: 1.1 % (ref 0–0.5)
LYMPHOCYTES # BLD AUTO: 1.32 10*3/MM3 (ref 0.7–3.1)
LYMPHOCYTES NFR BLD AUTO: 15.1 % (ref 19.6–45.3)
MCH RBC QN AUTO: 31.7 PG (ref 26.6–33)
MCHC RBC AUTO-ENTMCNC: 34.5 G/DL (ref 31.5–35.7)
MCV RBC AUTO: 91.8 FL (ref 79–97)
MONOCYTES # BLD AUTO: 0.63 10*3/MM3 (ref 0.1–0.9)
MONOCYTES NFR BLD AUTO: 7.2 % (ref 5–12)
NEUTROPHILS NFR BLD AUTO: 6.44 10*3/MM3 (ref 1.7–7)
NEUTROPHILS NFR BLD AUTO: 73.8 % (ref 42.7–76)
NRBC BLD AUTO-RTO: 0 /100 WBC (ref 0–0.2)
PLATELET # BLD AUTO: 143 10*3/MM3 (ref 140–450)
PMV BLD AUTO: 10.3 FL (ref 6–12)
POTASSIUM SERPL-SCNC: 4 MMOL/L (ref 3.5–4.7)
PROT SERPL-MCNC: 7.2 G/DL (ref 6.3–8)
RBC # BLD AUTO: 4.77 10*6/MM3 (ref 4.14–5.8)
SODIUM SERPL-SCNC: 137 MMOL/L (ref 134–145)
WBC NRBC COR # BLD: 8.74 10*3/MM3 (ref 3.4–10.8)

## 2022-11-09 PROCEDURE — 80053 COMPREHEN METABOLIC PANEL: CPT

## 2022-11-09 PROCEDURE — 82378 CARCINOEMBRYONIC ANTIGEN: CPT | Performed by: INTERNAL MEDICINE

## 2022-11-09 PROCEDURE — 85025 COMPLETE CBC W/AUTO DIFF WBC: CPT

## 2022-11-09 PROCEDURE — 86301 IMMUNOASSAY TUMOR CA 19-9: CPT | Performed by: INTERNAL MEDICINE

## 2022-11-09 PROCEDURE — 36591 DRAW BLOOD OFF VENOUS DEVICE: CPT

## 2022-11-09 PROCEDURE — 99215 OFFICE O/P EST HI 40 MIN: CPT | Performed by: INTERNAL MEDICINE

## 2022-11-09 NOTE — NURSING NOTE
Pt treatment held today and will be changed to Taxol tx next week once insurance approval is done.  Pt's port deaccessed per protocol.

## 2022-11-09 NOTE — PROGRESS NOTES
Subjective     REASON FOR follow up:  1. 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  Currently receiving palliative 5 fu leucovorin  AND HERCEPTIN therapy once a month    2.COLOVESICAL FISTULA: chronic antibiotic therapy cipro, NO FURTHER PLANS FOR SURGERY AFTER VISIT AND PROCEDURE BY DR GM CASTILLO 1/20    3. DVT R AND LEFT LE off ANTICOAGULANT : THROMBOPHILIA OF MALIGNANCY    4. 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 NEURONTIN INITIATED.    4. Diabetes type 2 on medication with better control of blood sugar    DURING THE VISIT WITH THE PATIENT TODAY , PATIENT HAD FACE MASK, MY MEDICAL ASSISTANT AND I  HAD PROPPER PROTECTIVE EQUIPMENT, AND I DID HAND HYGIENE WITH SOAP AND WATER BEFORE AND AFTER THE VISIT.    On 11/09/2022 I had the opportunity to see this 67-year-old white male who has history of Stage IV adenocarcinoma of the esophagus, HER2 positive. Recently we have seen an elevation of his CEA level and he proceeded with radiological assessment including a PET scan. Also during the previous visit he was complaining of dysphagia in the lower esophagus and he proceeded with an upper GI endoscopy by Dr. Mary Brito. Pathological analysis has been available to me today and report of the endoscopy as well.     The patient states that he has been able to swallow much better since stretching of the esophagus took place. His appetite is about the same as before. He has not had any trouble with any special kind of food and he has not had any episodes of choking or aspiration. He has no abdominal pain. His energy level is acceptable. His bowel activity and urination remain unchanged. He has not had any episodes of infection from his colovesical fistula. He remains on ciprofloxacin every other day. He denies any clinical bleeding. He continues smoking 6-10 cigarettes a day. He has not had any  "cardiovascular or respiratory symptoms. He has not had any episodes of peripheral arterial disease flares.                                      Past Medical History:   Diagnosis Date    Arthritis     Cancer (HCC)     prostate cancer 2008    Cancer (HCC)     esophageal/LIVER    Diabetes mellitus (HCC)     Difficulty swallowing solids     Elevated PSA     Esophageal mass     Esophageal varices (HCC) July 2019    GERD (gastroesophageal reflux disease)     H/O Lung nodule     Hepatitis     CHILD--PT STATED \"I THINK IT WAS A.\"    History of esophageal varices with bleeding     SUMM34 2021    History of high blood pressure     History of pneumonia     Hx of blood clots 08/10/2019    LOWER LEGS BILAT    Maintenance chemotherapy     IV EVERY 4 WEEKS:  ESOPHAGEAL CANCER    Nail fungus     LEFT FINGERNAILS X 4    Neuropathy     PVD (peripheral vascular disease) (HCC)     Rash     ITCHY RED RASH ON RIGHT THIGH AREA-STATES IT COMES AND GOES.  USING PRESCRIPTION CREAM    Recto-bladderneck fistula     on poab for recurrent uti    Risk factors for obstructive sleep apnea         Past Surgical History:   Procedure Laterality Date    CATARACT EXTRACTION WITH INTRAOCULAR LENS IMPLANT Bilateral     COLONOSCOPY N/A 02/04/2020    Procedure: COLONOSCOPY;  Surgeon: Guy Sears MD;  Location: [facility] ENDOSCOPY;  Service: General;  Laterality: N/A;  PRE-COLOVESICAL FISTULA  POST-- DIVERTICULOSIS    COLONOSCOPY  2/4/2020    CYSTOSCOPY  02/06/2020    CYSTOSCOPY Bilateral 02/26/2020    Procedure: CYSTOSCOPY RETROGRADE;  Surgeon: Cm Witt MD;  Location: [facility] OR;  Service: Urology;  Laterality: Bilateral;    ENDOSCOPY      ENDOSCOPY N/A 06/19/2021    Procedure: ESOPHAGOGASTRODUODENOSCOPY WITH BIOPSY;  Surgeon: Mary Brito MD;  Location: [facility] OR;  Service: Gastroenterology;  Laterality: N/A;    ENDOSCOPY N/A 08/05/2021    Procedure: ESOPHAGOGASTRODUODENOSCOPY HEMOSPRAY;  Surgeon: Naga Shelby MD;  " Location: [facility] ENDOSCOPY;  Service: Gastroenterology;  Laterality: N/A;  HX OF ESOPHAGEAL  CANCER;MELENA  POST: ESOPHAGEAL BLEEDING; ESOPHAGEAL MASS    ENDOSCOPY N/A 11/4/2022    Procedure: ESOPHAGOGASTRODUODENOSCOPY WITH DILATATION;  Surgeon: Mary Brito MD;  Location: [facility] MAIN OR;  Service: Gastroenterology;  Laterality: N/A;    HERNIA REPAIR Right 1999    inguinal hernia    PROSTATE SURGERY  03/2008    prostatectectomy    UPPER GASTROINTESTINAL ENDOSCOPY  August 2021    VENOUS ACCESS DEVICE (PORT) INSERTION N/A 07/16/2019    Procedure: INSERTION VENOUS ACCESS DEVICE WITH FLUORO AND EGD WITH BIOPSY;  Surgeon: Mary Brito MD;  Location: [facility] OR;  Service: Thoracic        Current Outpatient Medications on File Prior to Visit   Medication Sig Dispense Refill    acetaminophen (TYLENOL) 500 MG tablet Take 500 mg by mouth Every 6 (Six) Hours As Needed for Mild Pain .      cevimeline (EVOXAC) 30 MG capsule Take 1 capsule by mouth 3 (Three) Times a Day. DRY MOUTH      Chlorhexidine Gluconate 2 % pads Apply  topically. AS DIRECTED PAT      ciprofloxacin (CIPRO) 250 MG tablet Take 1 tablet by mouth Daily. Take one every other day (Patient taking differently: Take 1 tablet by mouth Every Other Day. Take one every other day  FISTULA) 90 tablet 1    econazole nitrate (SPECTAZOLE) 1 % cream Apply  topically to the appropriate area as directed 2 (Two) Times a Day. (Patient taking differently: Apply 1 application topically to the appropriate area as directed 2 (Two) Times a Day. NAIL FUNGUS-4 FINGERS ON LEFT) 85 g 1    furosemide (LASIX) 20 MG tablet Take 1 tablet by mouth Every Other Day. (Patient taking differently: Take 1 tablet by mouth As Needed. LOWER LEG EDEMA) 28 tablet 2    gabapentin (NEURONTIN) 300 MG capsule TAKE 1 CAPSULE BY MOUTH TWICE A DAY (Patient taking differently: Take 1 capsule by mouth 2 (Two) Times a Day.) 180 capsule 0    glipizide (GLUCOTROL XL) 5 MG ER tablet TAKE 1 TABLET BY MOUTH  EVERY DAY (Patient taking differently: Take 1 tablet by mouth Daily.) 90 tablet 1    lisinopril-hydrochlorothiazide (PRINZIDE,ZESTORETIC) 20-12.5 MG per tablet TAKE 1 TABLET BY MOUTH EVERY DAY (Patient taking differently: Take 0.5 tablets by mouth As Needed. SYS GREATER THAN 130, TAKE 1/2 TAB) 90 tablet 0    pantoprazole (Protonix) 40 MG EC tablet Take 1 tablet by mouth 2 (Two) Times a Day. 180 tablet 3    pentoxifylline (TRENtal) 400 MG CR tablet TAKE 1 TABLET BY MOUTH TWICE A DAY WITH MEALS 90 tablet 0    potassium chloride ER (K-TAB) 20 MEQ tablet controlled-release ER tablet Take 1 tablet by mouth Daily. 90 tablet 3    Probiotic Product (Probiotic Blend) capsule Take 1 capsule by mouth Daily.       No current facility-administered medications on file prior to visit.        ALLERGIES:    Allergies   Allergen Reactions    Oxaliplatin Anaphylaxis        Social History     Socioeconomic History    Marital status: Single    Years of education: High school   Tobacco Use    Smoking status: Every Day     Packs/day: 1.00     Years: 38.00     Pack years: 38.00     Types: Cigarettes     Start date: 1/1/1974    Smokeless tobacco: Never    Tobacco comments:     Quit for a period of 8 years.    Vaping Use    Vaping Use: Never used   Substance and Sexual Activity    Alcohol use: Not Currently    Drug use: Never    Sexual activity: Not Currently     Partners: Female     Birth control/protection: None        Family History   Problem Relation Age of Onset    Hypertension Mother     Stroke Mother     Lung cancer Father     Hypertension Other     Lung disease Other     Prostate cancer Other     Malig Hyperthermia Neg Hx       Current Outpatient Medications on File Prior to Visit   Medication Sig Dispense Refill    acetaminophen (TYLENOL) 500 MG tablet Take 500 mg by mouth Every 6 (Six) Hours As Needed for Mild Pain .      cevimeline (EVOXAC) 30 MG capsule Take 1 capsule by mouth 3 (Three) Times a Day. DRY MOUTH      Chlorhexidine  Gluconate 2 % pads Apply  topically. AS DIRECTED PAT      ciprofloxacin (CIPRO) 250 MG tablet Take 1 tablet by mouth Daily. Take one every other day (Patient taking differently: Take 1 tablet by mouth Every Other Day. Take one every other day  FISTULA) 90 tablet 1    econazole nitrate (SPECTAZOLE) 1 % cream Apply  topically to the appropriate area as directed 2 (Two) Times a Day. (Patient taking differently: Apply 1 application topically to the appropriate area as directed 2 (Two) Times a Day. NAIL FUNGUS-4 FINGERS ON LEFT) 85 g 1    furosemide (LASIX) 20 MG tablet Take 1 tablet by mouth Every Other Day. (Patient taking differently: Take 1 tablet by mouth As Needed. LOWER LEG EDEMA) 28 tablet 2    gabapentin (NEURONTIN) 300 MG capsule TAKE 1 CAPSULE BY MOUTH TWICE A DAY (Patient taking differently: Take 1 capsule by mouth 2 (Two) Times a Day.) 180 capsule 0    glipizide (GLUCOTROL XL) 5 MG ER tablet TAKE 1 TABLET BY MOUTH EVERY DAY (Patient taking differently: Take 1 tablet by mouth Daily.) 90 tablet 1    lisinopril-hydrochlorothiazide (PRINZIDE,ZESTORETIC) 20-12.5 MG per tablet TAKE 1 TABLET BY MOUTH EVERY DAY (Patient taking differently: Take 0.5 tablets by mouth As Needed. SYS GREATER THAN 130, TAKE 1/2 TAB) 90 tablet 0    pantoprazole (Protonix) 40 MG EC tablet Take 1 tablet by mouth 2 (Two) Times a Day. 180 tablet 3    pentoxifylline (TRENtal) 400 MG CR tablet TAKE 1 TABLET BY MOUTH TWICE A DAY WITH MEALS 90 tablet 0    potassium chloride ER (K-TAB) 20 MEQ tablet controlled-release ER tablet Take 1 tablet by mouth Daily. 90 tablet 3    Probiotic Product (Probiotic Blend) capsule Take 1 capsule by mouth Daily.       No current facility-administered medications on file prior to visit.     Allergies   Allergen Reactions    Oxaliplatin Anaphylaxis            Objective     Vitals:    11/09/22 0933   BP: 137/79   Pulse: 84   Resp: 18   Temp: 97.3 °F (36.3 °C)   TempSrc: Temporal   SpO2: 99%   Weight: 88.3 kg (194 lb  "9.6 oz)   Height: 175.3 cm (69.02\")   PainSc: 0-No pain     Current Status 11/9/2022   ECOG score 0   EXAM:                  GENERAL:  Well-developed, well-nourished  Patient  in no acute distress.   SKIN:  Warm, dry ,NO purpura ,no rash.  HEENT:  Pupils were equal and reactive to light and accomodation, conjunctivae noninjected, normal extraocular movements, normal visual acuity.   NECK:  Supple with good range of motion; no thyromegaly , no JVD or bruits,.No carotid artery pain, no carotid abnormal pulsation   LYMPHATICS:  No cervical, NO supraclavicular, NO axillary, NO inguinal adenopathies.  CARDIAC   normal rate , regular rhythm, without murmur,NO rubs NO S3 NO S4   LUNGS: normal breath sounds bilateral, no wheezing, NO rhonchi, NO crackles ,NO rubs.  VASCULAR VENOUS: no cyanosis, NO collateral circulation, NO varicosities, NO edema, NO palpable cords, NO pain,NO erythema, NO pigmentation of the skin.  ABDOMEN:  Soft, NO pain,no hepatomegaly, no splenomegaly,no masses, no ascites, no collateral circulation,no distention.  EXTREMITIES  AND SPINE:  No clubbing, no cyanosis ,no deformities , no pain .No kyphosis,  no pain in spine, no pain in ribs , no pain in pelvic bone.  NEUROLOGICAL:  Patient was awake, alert, oriented to time, person and place.        RECENT LABS:  Hematology WBC   Date Value Ref Range Status   11/09/2022 8.74 3.40 - 10.80 10*3/mm3 Final   02/02/2019 13.4 (H) 3.4 - 10.8 x10E3/uL Final     RBC   Date Value Ref Range Status   11/09/2022 4.77 4.14 - 5.80 10*6/mm3 Final   02/02/2019 4.81 4.14 - 5.80 x10E6/uL Final     Hemoglobin   Date Value Ref Range Status   11/09/2022 15.1 13.0 - 17.7 g/dL Final     Hematocrit   Date Value Ref Range Status   11/09/2022 43.8 37.5 - 51.0 % Final     Platelets   Date Value Ref Range Status   11/09/2022 143 140 - 450 10*3/mm3 Final        Lab Results   Component Value Date    GLUCOSE 151 (H) 11/02/2022    BUN 9 11/02/2022    CREATININE 0.93 11/02/2022    " EGFRIFNONA 149 02/16/2022    EGFRIFAFRI 100 02/02/2019    BCR 9.7 11/02/2022    K 4.2 11/02/2022    CO2 26.1 11/02/2022    CALCIUM 8.9 11/02/2022    PROTENTOTREF 7.1 02/02/2019    ALBUMIN 4.10 10/12/2022    LABIL2 1.6 02/02/2019    AST 20 10/12/2022    ALT 25 10/12/2022       Tissue Pathology Exam: [id number]  Order: [id number]  Status: Final result     Visible to patient: No (scheduled for 11/10/2022  2:59 PM)     Next appt: 11/15/2022 at 09:00 AM in Radiation Oncology (Lisandro Colvin MD)     Dx: Malignant neoplasm of lower third of ...     Specimen Information: A: Esophagus; Tissue    B: Esophagus; Tissue    C: GE Junction; Tissue   0 Result Notes  Component    Case Report   Surgical Pathology Report                         Case: [id number]                                   Authorizing Provider:  Mary Brito MD        Collected:           11/04/2022 01:51 PM           [facility]ing Location[facility]  Received:            11/04/2022 02:50 PM                                  MAIN OR                                                                       Pathologist:           Marlo Langley MD                                                          Specimens:   1) - Esophagus, ESOPHAGUS AT 38cm                                                                    2) - Esophagus, ESOPHAGUS AT 35cm                                                                    3) - GE Junction, GE JUNCTION at 46cm                                                      Final Diagnosis   1. Esophagus at 38 cm Biopsy:  Squamous and glandular mucosa with               A. Predominantly low grade glandular dysplasia with focal high grade glandular dysplasia.               B. No microorganisms nor intestinal metaplasia identified by routine staining (see comment).               C. Separately identified detached necroinflammatory debris suggesting erosion/ulceration.               D. No invasive carcinoma identified (see  comment).     2. Esophagus at 35 cm Biopsy:  Glandular mucosa with               A. High grade glandular dysplasia.               B. Focal rare intestinal metaplasia identified by routine staining.               C. Focally suspicious for minimally invasive adenocarcinoma (see comment).     3. GE Junction at 46 cm Biopsy:                 A. Invasive adenocarcinoma with associated high grade glandular dysplasia.               B. Focal mucosal erosion/ulceration noted.               C. No definitive lymphovascular space invasion identified (see comment).               D. Associated ulcerative debris with fungal hyphae and rare yeast forms morphologically consistent with Candida        spp.     JAT/clm   Electronically signed by Marlo Langley MD on 11/7/2022 at 1459   Comment    All three biopsy specimens (parts 1-3) show some degree of focal high grade dysplasia with superficially invasive adenocarcinoma identified within the biopsy from part 3 (clinically GE junction at 46 cm).  Adenocarcinoma invades submucosa, but the exact depth of invasion cannot be ascertained from the superficial biopsy specimen. Additionally, detached fragment of degenerating necroinflammatory debris with fungal organisms morphologically consistent with Candida spp. and actinomyces organism are identified in the biopsy from part 3.   A rare focus suspicious for superficially invasive adenocarcinoma is noted in the biopsy from part 2. The biopsies were shared in consultation with Dr. Murrell, who concurred with the above diagnosis.  The biopsy from part 3 will be submitted to [facility] lab for PD-L1 and Her 2 emeli testing with separate report to follow.     Gross Description    1. Esophagus.  The specimen is received in formalin labeled with the patient's name and further designated 'esophagus @ 38 cm   biopsy' are multiple small fragments of gray-tan tissue. The specimen is submitted for embedding as received.     2. Esophagus.  The specimen is  received in formalin labeled with the patient's name and further designated 'esophagus @ 35 cm   biopsy' are multiple small fragments of gray-tan tissue. The specimen is submitted for embedding as received.     3. GE Junction.  The specimen is received in formalin labeled with the patient's name and further designated 'gastroesophageal junction   @ 46 cm biopsy' are multiple small fragments of gray-tan tissue. The specimen is submitted for embedding as received.      Resulting Agency [facility]              Specimen Collected: 11/04/22 13:51 EDT Last Resulted: 11/07/22 14:59 EST                 Assessment & Plan    1.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 Almost 90% of the liver WAS replaced by tumor. The patient has been undergoing chemotherapy with 5  fu and leucovorin  and Herceptin and has had excellent response clinically and radiologically. The patient was reviewed on 08/10/2020. I reviewed with the patient in the PAC system [facility] his PET scan that is dramatic. There is minimal uptake in the lower esophagus and most importantly complete resolution of his liver metastasis. Actually the only issue that is pertinent to the PET scan is still the visibility of his colovesical fistula.     Therefore from the point of view of his cancer of the esophagus, metastatic to the liver, he has no symptoms from the primary tumor in the esophagus and he has no symptoms related to his liver metastasis that has resolved altogether. His CEA level is stable.The patient raised the question about the CEA fluctuation. I pointed out to him that a lot of this is also related to his smoking. Smoking per se elevates CEA level.  Upon further reviewing the patient on 04/20/2021, he has no symptoms pertinent to his metastatic cancer of the esophagus to the liver and he has no difficulty swallowing. There are no side effects of the 5-FU, Leucovorin and Herceptin. The patient's cardiac  function remains stable and he has not had any drop in the ejection fraction.   I discussed with him on 05/18/2021 the fact that his cancer clinically is very quiet but I am afraid of the rise in his CEA level to 12. This could be an indicator of all of the cigarettes that he is smoking on a daily basis of indicator of cancer escaping control and not visible radiologically through his CT scan. I pointed out to him that he needs to continue making an effort to decrease his smoking and he is now down to 10 cigarettes a day. We will recheck another CEA level today. Given the circumstances of the previous CT scan I do not believe that I need to change the doses or plan of care in regard to his chemotherapy medication administration for the time being. I recommended for him to remain on his 5FU/leucovorin and Herceptin on a monthly basis for now.  The patient was further reviewed on 06/15/2021 with a new PET scan that documented regrowth of the tumor in the lower esophagus without any new symptomatology, for example dysphagia or odynophagia. No regurgitation. The liver metastasis remains in remission and there are no new areas of disease in the bones or lungs or any other site. Given the excellent performance status I discussed with the patient and his sister today many options of therapy, including going back to FOLFOX regimen along with Herceptin, taking another sample of the esophagus with a new endoscopy and doing analysis of the tissue for [employer] [employer] Now that will be my favorite choice, or palliative treatment with radiation therapy and 5-FU continuous infusion that will be toxic to him and he does not prefer to have.     I had a discussion with Mary Brito MD, thoracic surgeon, who has agreed to see the patient tomorrow. I think the endoscopy, the new tissue analysis, and sending the tissue for [employer] [employer] Now will be the way to go. Depending on what we find there, we can modify his treatment altogether. I do  not think the patient will have any consequences missing chemotherapy for a couple of weeks or so.      In preparation for the endoscopy and biopsies I asked him to hold off on the Xarelto until he is seen by Dr. Brito tomorrow.     In regard to his colovesical fistula, he has had minimal symptomatology this week, maybe some activity there and I asked him to go back to the lab and take a urine specimen and culture. He knows that if this is abnormal he will need to initiate ciprofloxacin that he has at home.    In regard to his hypertension, this is under good control and I advised him to remain on his lisinopril and hydrochlorothiazide combination.    In regard to his smoking cessation, he has had conversations with NIRU Jacobson about this. He is using some nicotine CQ patch, here and there and also trying to work with nicotine gum and things of this nature but he has not been able to shake this issue altogether.     Otherwise I will review him back in a couple of weeks with a CBC, CMP, and a CEA level.    This case will be presented in the multidisciplinary thoracic conference by me this Thursday, and I will discuss any further advice to the patient.     I also mentioned to the patient that on the background of HER2-positive cancer of the esophagus the possibility of using a new medicine for HER2-based disease that is called Enhertu is a real possibility and maybe that will be the next step to go through.    The patient was further reviewed on 07/01/2021. I reviewed with Dr. Brito the endoscopy that shows a noncircumferential abnormality in the lower esophagus that encompasses almost 6 cm in length. It is not blocking off the esophagus and that is why the patient has no symptoms. The pathology shows at least atypical cells consistent with cancer. Further Next Generation Sequencing has been sent for [employer] Now.     I discussed with the patient the fact that we have many other modalities of treatment  that he could encounter. He prefers to continue his general chemotherapy to control the cancer in his liver using 5-FU, leucovorin and Herceptin today and agree with that. In consideration to be seen by radiation therapy, the patient is willing to listen to the possibility of undergoing continuous 5-FU infusion along with radiation therapy to the esophagus knowing that he will experiencing esophagitis that can give him significant issues for 2-3 weeks. I went ahead and made the appointment for him to be seen by radiation oncology for this purpose only.     Obviously if the Next Generation Sequencing gives us any other hints in regard to how to treat him this could be also utilized including the consideration of using Enhertu in the long run for achieving better control.     In regard to his smoking cessation he is not willing to change this phenomenon at this time. We have had NIRU Jacobson talking with him in this regard but he is not ready to make a decision when to quit.     Finally, I pointed out to him that during the previous visit we documented a urinary tract infection with streptococcus 50,000 colonies that in my appreciation was significant given the fact that he has a colovesical fistula. This was treated with antibiotics. He has not had any discomfort issues pertinent to this anymore. The urine today is completely clear. Nevertheless, I went ahead and sent a urinalysis at least to be sure that there is no need for any other repetitive infection therapy on him.     The patient also will remain on his anticoagulation at this time, his Xarelto for the time being. I have renewed as well his Neurontin. He has no need for pain medicine.  The patient was further reviewed on 07/23/2021. He has no new symptomatology pertinent to his cancer of the esophagus with liver metastasis. He has no difficulty swallowing. He has been presented in the multidisciplinary clinic on a couple of occasions and he has been  seen by Radiation Oncology. Finally the analysis of [employer]is [employer] Now is available now showing that the patient's tumor is PD-L1 positive and has high mutation burden. The tumor remains HER/2 positive. Given these findings I think it is very easy to make a choice in regard stopping the present regimen of chemotherapy and proceed with therapy with Keytruda every 3 weeks for at least 4 cycles and reassess him at that point. In preparation for Keytruda initiation next week and we will get the approval of the medicine by his insurance company the patient will require smoking cessation altogether to minimize modification of cigarettes on his immune system. I insisted in this fact to the patient.     Other than that he will remain on probiotics. We will discontinue the 5FU/leucovorin and Herceptin and will move onto Keytruda. I discussed with him side effects of the Keytruda in detail as below. He will require treatment every 3 weeks with CBC, CMP, TSH every 3 weeks and he will require formal chemotherapy teaching, education and consent for this medicine.     After 4 cycles of this, in other words 3 months he will be reassessed with a new PET scan.     The chances under the present circumstances that he will improve as long as he quit smoking, it is very hard and maybe he could have long term survivorship.    I begged for him to have smoking cessation. If any time during his time with me we have been talking about this and this is absolutely necessary now. We know that cigarettes kill immune system cells and minimize the benefit of treatments with these medicines.       The patient was further reviewed on 08/19/2021 in regard to his cancer of the esophagus. As stated above he had upper GI bleeding dropping hemoglobin to 5 requiring admission, transfusion, discontinuation of anticoagulation and endoscopy with local therapy by Naga Shelby MD. Since then he has completed 10 sessions of radiation therapy to the esophagus  yesterday. He has not had any further bleed. His hemoglobin has bounced back from 5 to 14 and I have advised his this good news today.    I expect that the patient in a few days is going to develop an element of radiation esophagitis and he continues having some fatigue that will improve over time. So far no new issues have happened. I made him aware that if he has difficulty swallowing he will need to let us know, he will need to receive IV fluids in the office.    At least the radiation therapy will take care of the tumor in the esophagus and I do not believe that he will require any other GI endoscopy anymore.    Today the patient will proceed with his Keytruda that is the immunotherapy medicine that we are delivering to him at this time for the treatment of his liver metastasis and also the tumor in the esophagus. I pointed out to him that so far I do not see any side effects of the Keytruda and the Keytruda will improve and increase the benefit of radiation therapy into the primary tumor in the esophagus.     From the point of view of his anemia associated with GI bleeding, again this has been corrected. The hemoglobin today is 14 grams and I asked him to take his iron supplementation only twice a week 1 tablet. He has been taking 3 tablets everyday.     From the point of view of his thrombophilia associated with malignancy, he is no longer receiving any anticoagulants given the recent episode of GI bleeding. We will maintain him off anticoagulants as much as we can. Hopefully he will not have any other episodes of thrombosis.     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     From the point of view of the Keytruda toxicity so far nothing happened. We will continue monitoring CBC, CMP and TSH periodically.     I will review him back in 3 and 6 weeks. When he comes back for the Keytruda he will  continue receiving the medicine as the time goes by.    He understands that most of the benefit of radiation therapy in the tumor in the esophagus will be reached in 1 month and I am planning to give him a PET scan in more of less in 2 months from now when he will have almost 4 infusions of Keytruda under his belt and the completion of radiation therapy to the esophagus.     In regard to smoking cessation he has achieved a lot from 2 packs a day to 10 cigarettes a day. He will see Anh Felder RN, today in regard continuation of the benefit of the therapy for this condition at this point.   The patient was further reviewed on 09/30/2021. His Keytruda is still ongoing but the patient has developed dermatitis associated with this. It is a grade 1 toxicity so far and I am wondering if this is extending a little bit and the topical medicine is not going to be sufficient. I will proceed with his Keytruda today but maybe this will be the last administration of this medicine unless that we get shashi and the rash quiets down. Obviously at the completion for Keytruda the patient will require PET scan for assessment not only of the benefit of radiation therapy on his recurrence in the esophagus but also benefit of the Keytruda on his liver metastasis. Therefore, he will be scheduled for this before the next visit. The patient also has been advised that his hemoglobin is acceptable and it will be okay for him to stop his iron supplementation.     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 I have advised him to go into Cipro 250 mg p.o. daily.     The patient has been advised in regard to the continuation of topical steroids in the skin in areas of involvement. This will remain an ongoing issue along with moisturizer to the skin in the form of Cetaphil.     In regard to his hypertension, he remains on medicines for this by me. His blood pressure is under good control at this time.     In  regard to previous anticoagulation, he is no longer receiving any given his GI bleeding. This will remain in observation.     In regard to smoking cessation the patient will further discuss with NIRU Jacobson, plans for further decrease. He is down to 10 cigarettes a day. This is already a major accomplishment in somebody who used to smoke almost 3 packs of cigarettes a day.     The patient will have a new insurance in 11/2021 and I will make the insurance ladies aware of this.     In 3 weeks he will have a CBC, CMP, TSH and the PET scan the week before.  The patient was further reviewed on 10/21/2021. I reviewed with him his PET scan that shows still SUV activity in the lower esophagus but better than before but he has now 3 areas of abnormal uptake in the liver consistent with progressive liver metastasis. The lung anatomy is clear, the bone anatomy is clear. Colovesical fistula was visible still.    On the basis of these changes I do believe that this patient knowing that he has HER/2 positive esophageal cancer needs to change his therapy. Keytruda is not helpful at all and we will discontinue this medicine at this time. I do believe that the inability of Keytruda to overcome his disease process is related to the persistency of smoking and the abnormality related to smoking about bacterial derek. Further analysis recently published in [employer] has confirmed that the benefit of immunotherapy is further boosted by proper amount of derek in the gastrointestinal tract and how cigarettes are damaging to this issue. In any event the patient's issues in regard to cigarette smoking has been already a problem and he has not been able to quit in spite of all of the support available. Therefore stopping the Keytruda at this point including today the patient will move to Enhertu that has been approved in patients who have cancer of the esophagus with liver metastasis. The dose of this medicine will be the GI dose of  this medication that will be properly calculated accordingly. He will have formal education and consent for this medicine. I pointed out the most important side effects of this medicine include cardiac toxicity, anemia, leukopenia, thrombocytopenia and pneumonitis that includes cough, shortness of breath, fever. In preparation to minimize pneumonitis the patient will take prednisone 10 mg on day 2 and 3 after each dose of Enhertu. He will receive this medicine every 3 weeks. We will deliver 3-4 cycles and reassess him not only by tumor markers but also radiologically. Hopefully this will have a positive impact not only in the tumor in the esophagus but also tumors metastatic in his liver.   The patient was reviewed on 11/19/2021. Tolerance to the Enhertu 1st cycle was appropriate with more fatigue, no increased cough or shortness of breath and some anorexia. His white count, hemoglobin and platelets were normal and we advised him to proceed with his 2nd Enhertu infusion today. He understands that fatigue will be more prominent, that he could have chance for more hematological toxicity and he is starting to develop minor anemia. His white count and platelet count remain acceptable. I reminded him on many occasions today through the visit and insisted to the sister that if his cough pattern changes, increasing or his shortness of breath becomes worse he has to notify us immediately to be sure that he will not develop pneumonitis associated with Enhertu. He still will take 10 mg of prednisone tomorrow and Sunday to try to minimize this phenomenon from happening.     The patient will be having blood counts on a weekly basis with nurse visit to be sure to monitor hematological toxicity and he will return in 3 weeks to proceed with the next cycle. After the 3rd cycle the patient will proceed with radiological assessment including PET scan.   The patient was further reviewed on 12/30/2021. On clinical grounds the patient  has not had any difficulty swallowing, his liver is not enlarged, nodular or tender, he has no jaundice and his CEA level has dropped further. His PET scan discloses resolution of his periportal lymph nodes and one area of resolution of liver metastasis. He has complete resolution of the tumor in the esophagus. He has still 2 active lesions in the liver with significant SUV activity pertinent to his metastatic disease. Given this fact the patient could be a candidate to further receive Enhertu but my concern is about the radiological analysis that shows minimal pulmonary infiltrate bilaterally. The radiologist suggests a radiation pneumonitis. I do not think that is the case. I think this is probably related to Enhertu interstitial lung disease and for this reason I feel the obligation to stop this medication at this point and to proceed with therapy with prednisone at 20 mg a day for the next 2 weeks until the patient returns back to see me. I made him aware that if the respiratory symptoms including his cough gets any worse or shortness of breath establishes and gets worse he will need to notify us immediately and he will require to be placed in the hospital to receive IV high dose steroid.    The question will be if the patient will be a candidate to receive any further Enhertu or not remains to be seen. Probably will not be the case.     Obviously this above statement opens the door in regard what else to do for his malignancy. Strong consideration will be to go back to Herceptin and also to consider to go back to FOLFOX, Herceptin like he received initially that gave him such a degree of resolution of symptoms. 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 I discussed this with him and his  sister present in the room. I encouraged him to decrease his smoking.   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     In preparation for retaking treatment, I advised him that I would like to go back into FOLFOX, Herceptin every 3 weeks starting next week. This combination of medicines never failed him. It was discontinued because of toxicity and neuropathy. Therefore, I think it is worth it to go ahead and proceed with an echocardiogram and resume chemotherapy administration with this , giving him 3 cycles, each one of them 3 weeks apart and see how things evolve from the point of view tumor markers or radiological analysis of his liver through PET scan. He agrees to proceed.  On 02/09/2022 I advised the patient to hold off his chemotherapy treatment with FOLFOX because his ANC was 1300 and we will decrease his dose of chemotherapy medicines by 15% for the next round of treatment next week. Hopefully the patient with this dose adjustment will be able to continue his treatment in a normal schedule of every 2 weeks. We have to watch his neuropathy very close. My advice will be eventually to pull out the Oxaliplatin and continue 5FU and leucovorin for a couple of cycles and eventually reintroduce the Oxaliplatin for 1-2 cycles back and forth. This has to be monitored clinically and also along with his response.   The patient was reviewed on 03/16/2022. Actually he feels terrific today and for the last 10 days after the anaphylactic reaction to oxaliplatin. He has no cancer-related pain. He has no hepatomegaly. His liver function tests have further improved and surprising enough his CEA level has dramatically dropped as posted above. It was in the 200 category and now it is in the 30 category, pointing  to the fact that I think the oxaliplatin and the FOLFOX regimen indeed has very significant benefit for him along with the Herceptin. Obviously he had an anaphylactic reaction to oxaliplatin and this medicine was discontinued. He will receive today his 5-FU, leucovorin and Herceptin.     I advised him to remain on this regimen for the time being and continue the sequence of events every couple of weeks. I went ahead and scheduled an echocardiogram to follow up on his ejection fraction while on Herceptin.  I reviewed with the patient and his sister today his CT scan that shows a favorable response to the regimen that he is receiving of 5FU and Herceptin. The tumor areas are shrinking and his CEA level is coming down. The patient actually feels very well. The tolerability to the regimen is excellent and he will continue the medicines at the same dosing and schedule for the time being. No plans to stop unless that he wants to take a trip with his sister to a different state and we will be glad to interrupt treatment for a week or so and no more than that. He understood this clearly.   On 06/08/2022, the patient's cancer of the esophagus with liver metastasis remains very quiet clinically and biochemically with no abdominal pain, no hepatomegaly, no jaundice, drop in the CEA level. Excellent tolerance to the regimen of 5-FU, leucovorin and Herceptin that he is receiving every 3 weeks. In fact his white count, hemoglobin and platelets today are normal. He will proceed with echocardiogram in a few days already scheduled. I encouraged him to remain in the present regimen of chemotherapy treatment every 3 weeks being seen by nurse practitioner in 3 weeks, by me in 6 weeks, and continue laboratory testing including CBC and CMP every 3 weeks and a CEA level in 6 weeks.  On 07/20/2022 the patient was further reviewed. His overall clinical status is excellent. Tolerance to the chemotherapy regimen once a month is excellent. He  has not had any obvious cardiac toxicity, and he has not had any toxicity from 5FU and leucovorin. His white count, hemoglobin and platelets are normal. His chemistry profile remains normal. Most importantly his CEA level has dropped to the lowest number 8 during the visit a few weeks ago. This unequivocally proves the point that the patient is doing well from the point of view of his metastatic malignancy and proves the point that the regimen that he is receiving is helping him to control the process. For this reason I advised him to continue the regimen with the same sequence of once a month, the same dosing of medications. Eventually we will need to do cardiac assessment.   On 08/17/2022, the patient continues doing well from the point of view of his treatment once a month with 5-FU/leucovorin and Herceptin. He has not developed any obvious cardiac toxicity. He will be due for another echocardiogram before visit in 8 weeks. The patient has a normal white count, hemoglobin and platelets today. His chemistry profile is pending to monitor alkaline phosphatase that has been minimally elevated given his liver metastasis. The patient also has had a CEA level that has been a good marker in regard to disease activity. Today his level is 8. He has been floating around this number. I advised him to continue his 5-FU/leucovorin and Herceptin once a month, returning to see us in 1, 2 and 3 months and continue medications in similar way. I am not planning radiological assessment unless that the patient has further raise in the CEA level.  On 10/12/2022 the patient has complained of dysphagia for the last 2 weeks and I think I find the need for him to be seen by Mary Brito MD, independent of any situation or radiological assessments that we will need to perform anyway. I think he needs to proceed with a new upper endoscopy in the gastrointestinal tract. The question is that is this tumor recurrence or is it fibrosis  associated with previous radiation therapy. The patient is keeping nutrition appropriately, he has not had any choking. Belching and burping helps the symptom. Also I discussed with him the minimal rise in his CEA level documented on the previous visit. This will trigger the need for us to proceed with a PET scan in the next 3 weeks before we see him back in 4 weeks. In the meantime the patient will proceed today with his 5FU, leucovorin and Herceptin. Most recent echocardiogram shows stable left ventricular ejection fraction and no modification in cardiac assessment according to the cardiologist.     I pointed out to the patient one more time on 10/12/2022 that I think the cigarettes are competing with chemotherapy administration in regard to the benefit of the treatment and he realizes that again and again. He says that he will work on this, doubt that that will be the case.   On 11/09/2022 the patient was further reviewed. In the interim he proceeded with an upper GI endoscopy, the report of this is available in the operative note by Dr. Brito and I have reviewed this. It is very obvious that the patient was very tight in the lower esophagus. She was able to put the scope through and she was able to take multiple samples. From these samples one of them represents adenocarcinoma, from some other samples documented as well candidal infection. Also evidence of dysplasia was evident and areas of necrosis and inflammation were evident. Dr. Brito already has proceeded with consulting Radiation Oncology. I have also the opportunity to review the PET scan and not only shows the whole area of activity in the long segment of the esophagus but also 2 tumoral lesions in the liver, one in the left lobe and another one on right lobe that have very significant SUV activity. There is also an area of SUV activity close to the head of the pancreas. I do not know if this is a periportal lymph node. Most likely is the case more than  any tumor in the pancreas per se.     Based on the above information I do believe that the patient will discontinue chemotherapy with 5-FU/leucovorin and Herceptin and we will move into Taxol low dose weekly, 3 weeks out of 4 starting at some point next week. I will need to coordinate with Radiation Oncology the timing of this. I provided information to the patient in regard to Taxol administration and side effects that include anemia, leukopenia, thrombocytopenia, peripheral neuropathy worsening, skin rash, allergic reactions among others. I provided written information about  Taxol administration.     THE PLAN WILL BE TO DELIVER TAXOL SINGLE AGENT ALONG WITH RADIATION THERAPY AND THEN ONCE THE RADIATION THERAPY IF MINIMAL AMOUNT COULD BE PROVIDED TO THE PATIENT, WILL BE CONTINUED WITH THE SAME SCHEDULE. Plan to repeat radiological assessment with PET scan in 2-3 months. We will monitor as well tumor marker, CEA level that has been very useful in regard to telling us disease process.                          2.In regard to his colovesical fistula he is now receiving ciprofloxacin on a daily basis low dose and I advised him to remain on this medicine for the time being.   On 11/19/2021 he has no symptoms or signs of urinary tract infection and he remains on ciprofloxacin prophylactically everyday.   On 12/30/2021 his colovesical fistula is still evident on the PET scan. He has gas in the bladder and he has had some hematuria. Therefore I do believe that the patient needs to remain on ciprofloxacin 1 tablet every other day to try to minimize any potential for any other urinary tract infection related to this. The patient under the present circumstances is not a candidate to have any kind of surgery unless the symptoms get worse or clinical picture changes radically.   His colovesical fistula remains active. He remains taking ciprofloxacin 250 mg p.o. every other day. He has not had any fevers or chills and I advised  him to remain on this medicine for the time being. His pharmacy called in regard that they have not had Cipro anymore. The patient has tablets for almost 2 months supply. Therefore, he has no need for Levaquin or any other medicine or intervention at this point.  His colovesical fistula is not active at this time. He has not had any pneumaturia of echolalia. He remains on ciprofloxacin prophylactically.   On 03/16/2022 the patient mentioned to me that he is now passing urine through the rectum. He also has fecaluria and pneumaturia still but in lesser amount. He is not having any fever. He has no pelvic pain. I think his colovesical fistula now in some way has bladder to colon  flow. It used to be fecal matter in the bladder and now it is urine through the anal canal. Obviously this will have less implications from the point of view of infection but obviously has the implications that the patient has to in some way be ready to defecate and urinate at the same time. He is going to see Dr. Cm Witt in a few days in regard to this fact. Given the fact that he has no abdominal pain and no fever, I would like for him to have a CT scan of the chest, abdomen and pelvis anyway that would not only show us the status of his cancer but also the status of his fistula. I prefer this over a PET scan for that purpose. This information will be shared with Dr. Cm Witt.     In the meantime I advised him to continue using his ciprofloxacin 250 mg every other day for prevention of UTI.    He has been seen by Cm Witt MD. He has advised the patient that he could not do too much for the fistula given the fact that it is just inconveniences of having urination through the anal canal from time to time but no urinary tract infection. He asked the patient to postpone any intention for surgery unless that it is absolutely necessary. Postponement of chemotherapy for 3 months if any surgery is necessary will trigger  dramatic progression of his tumor and obviously consequences.   On 06/08/2022, his colovesical fistula continues. Most of the output is urine through the rectum, no stool through the bladder. He has not had any urinary tract infection but he continues using prophylactically his ciprofloxacin 250 mg orally every other day. I asked him to remain on this medicine for the time being.  On 07/20/2022 the colovesical fistula is extremely quiet. There is no output through the bladder, there is no output through the rectum. This probably tells us that this has healed and this is good news. He has not had the need to take any antibiotics.   On 08/17/2022 the patient continues having colovesical fistula symptomatology intermittently, passing urine through the rectum and not passing feces through the bladder. He has not had any other urinary tract infections. He continues using his ciprofloxacin as per my instructions.  On 10/12/2022 he remains taking ciprofloxacin prophylactically. He has had fecaluria and he has had also passage of urine through his anal canal. He has not developed systemic symptoms and ciprofloxacin will remain ongoing for the time being.   On 11/09/2022 his colovesical fistula activity is no different today than what it has been during the last several months. He has not had any fever or chills. He has not had any fecaluria. He remains on ciprofloxacin every other day.                        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   He remains on Neurontin for the treatment of his peripheral neuropathy. I advised him to remain on this medicine for the time being.  On 01/12/2022, I advised the patient to remain on his gabapentin for the treatment of his sensory peripheral neuropathy in his feet.  I went ahead and prescribed on 02/09/2022 his doses of gabapentin to take 300 mg twice a day.   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 This was not aggravated by the few doses of oxaliplatin that he was able to receive until an anaphylactic reaction.  He will remain on Neurontin for the treatment of his peripheral neuropathy associated with Oxaliplatin. The symptoms are under good control.   On 06/08/2022, given the development of minimal edema in his lower extremities that could be related to the use of gabapentin or a medicine that is called Evoxac, I discussed with him maybe the possibility of stopping the gabapentin for a few days and see how things evolve from this point of view. He has no neuropathic symptomatology at this time. He agrees to try and see what happens.  On 07/20/2022 his sensory neuropathy in his feet remains about the same. He remains on gabapentin that is useful and manageable in regard to symptom control.   On 08/17/2022, he continues using gabapentin for neuropathy associated with previous Eloxatin. Neuropathy is grade 1-2. The symptoms are under good control. He has no motor deficit.  On 10/12/2022 his sensory neuropathy remains on gabapentin. Medicine will remain ongoing for the time being prescribed by me.   On 11/09/2022 his sensory peripheral neuropathy remains a grade 1. I made him aware that with Taxol administration this could become worse but we will apply cold gloves and mittens to his feet during administration of Taxol to try to minimize exposure to his peripheral nerves. He remains on gabapentin to control this and he will renew this medication whenever the times comes.                        4.In regard to his hypertension, he remains on lisinopril, hydrochlorothiazide provided by me.   I reviewed him back on 11/19/2021. His blood pressure is 90/48 in spite of proper hydration. This is probably the effect of the combination of Trental and his blood pressure medication. I advised him to discontinue his blood  pressure medication at this time and advised him to have proper hydration. He has a device to check his blood pressure at home. I asked him to check this on a daily basis at least a couple of times a day. If his blood pressure goes up above 130/90 he will take 1/2 of the dose of blood pressure medication that he was taking before. Those tablets have the way to be split. He will require checking blood pressure. If the blood pressure drops again he will hold off blood pressure medication indefinitely.   His hypertension has been reviewed on 12/30/2021. I think his blood pressure today is very good. He will remain on his blood pressure medication for the time being. I find no reason to change dosing.   The patient was advised to remain on his blood pressure medication, Prinzide.  On 02/09/2022 he continues doing his blood pressure medication almost on prn basis to minimize very dramatic drop that he has had before. So far he has not had any syncopal episodes or things of this nature.   On 03/16/2022 the patient is not taking blood pressure medication. Actually his blood pressure has been under relatively good control.  His blood pressure remains regulated. He actually is not taking any blood pressure medication at this time. I think he has learned to modify his diet, decrease the consumption of sodium and this probably will have a positive impact on this issue from now on.    The other good news is that this patient has not had any modification in his left ventricular ejection fraction documented through the echocardiogram posted above. Therefore Herceptin has not produced any cardiac toxicity and we will review another echocardiogram in 3 months from now.   On 06/08/2022, the patient has not been using his blood pressure medication because most of his readings at home are below 120s. I asked him to continue watching this and if he has numbers above 130/90, he will take 1 dose of his blood pressure medication every  other day or twice a week. He has the freedom to use this at his necessity.  On 07/20/2022 his blood pressure is in good control. He is not taking any medicine for this at this time.   On 08/17/2022 the patient remains on blood pressure medication. His blood pressure today is excellent at 120/75. He remains on the medicines by me at this time.  On 10/12/2022 blood pressure under good control. This will remain in observation.   On 11/09/2022 his blood pressure remains under good control taking blood pressure medication by me at this time. He is not utilizing any Lasix because he has no swelling in his legs anymore.                            5.In regard to his previous GI bleeding associated with his cancer and esophagitis we advised the patient to remain on Protonix.   On 11/19/2021 he has not had any evidence of GI bleeding and the hemoglobin remains stable. The minor drop obeys to toxicity from Enhertu and bone marrow suppression not because of effect of GI bleeding.   He has not had any other episodes of GI bleeding as per 12/30/2021.   On 01/12/2022 he has not had any other episodes of GI bleeding. He is no longer taking any anticoagulants.  On 02/09/2022 he has not had episodes of melena or enterorrhagia, his hemoglobin is stable. He has not had any use of anticoagulants since GI bleeding.   On 03/16/2022 the patient has not had any other episodes of GI bleeding. He is no longer taking anticoagulant.  Today this issue is not an issue anymore.   On 06/08/2022, the patient has no evidence of GI bleeding. Hemoglobin is stable. MCV remains normal. The patient is no longer receiving anticoagulants.  Hemoglobin remains stable as per 07/20/2022. No evidence of GI bleeding.   On 08/17/2022, the patient has not had any other episodes of GI bleeding but he has episodes of occasional dysphagia especially when he eats bread. It seems that the bread cakes and sticks around the lower esophagus. Also this happens when he eats  excessively fast. I told him that eventually he will require an endoscopy by Dr. Mary Brito and also stretching of the esophagus. 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 Obviously it is impossible to rule out local tumor growth in the location on clinical grounds only.  On 10/12/2022 no evidence of GI bleeding. The patient is no longer taking any anticoagulants, hemoglobin remains excellent at 14.   He has not had any further episodes of GI bleeding. 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 I would prefer to treat this as a form of infection and I will send to the pharmacy, Diflucan 100 mg tablets, #10 to take 1 p.o. daily for 10 days.                                6.In regard finally peripheral arterial disease I am going to send the patient a prescription for Trental to take 1 tablet twice a day. He has an appointment to be seen by vascular on 12/18/2021. I pointed out to the patient that if he wants to change the route of this process he must modify his cigarettes otherwise there is no hope. He was not able to take medication provided by Vascular Surgery because of side effects.   In regard to his peripheral arterial disease I advised him on 11/19/2021 to decrease his Trental to 1 tablet twice a day. He is now using topical Cetaphil on his feet to try to improve moisture and decrease cracking.   On 12/30/2021 his peripheral neuropathy actually is better through the Doppler study done by [facility]. I wonder if the trental is the one playing a role in this. I have asked him to remain on the trental for the time being.       He is not willing to entertain smoking cessation to help out peripheral arterial disease and we insisted into this in presence of his sister. He says that he is working on it. I do not feel any confidence in seeing this phenomenon anymore and that is a reality that we need to  contemplate and just wait.   In regard to his peripheral artery disease, the patient is still smoking 15 cigarettes a day. The Trental has made a big difference in regard to his ability to get around and walk. I advised him to remain on Trental.  On 02/09/2022 his peripheral arterial disease is about the same, the hemoglobin is stable, the Trental is still ongoing, it is beneficial to him.   On 03/16/2022 his peripheral arterial disease remains about the same. He has no gangrene. He has an element of claudication upon walking. We advised him to remain on the same medicines that he is taking. One of them is Trental. I also advised him about smoking cessation. This is not going to change. He remains on 15 cigarettes a day.  He remains on Trental. He has not had any claudication. He has minimal purplish discoloration of his toes that is not prominent with no gangrene and no ischemic neuropathy.   On 06/08/2022, his peripheral artery disease is quiet. He will see Vascular Surgery in a few days. He remains on medicine for this by me. That includes the use of Trental. I encouraged him to remain on this medicine for the time being.  His peripheral arterial disease remains ongoing. I keep insisting in regard to smoking cessation that is going to happen as soon as he goes to [place]. The hotel and the car rental that he had do not allow him to smoke and he has no option.      On 08/17/2022, he remains on medication to favor circulation and rheology in regard to his perfusion of lower extremities. He has not encountered any problems with Trental at this time.  On 10/12/2022 his peripheral arterial disease remains on Trental. He continues smoking. We advised him to stop smoking one more time.   On 11/09/2022 in regard to his peripheral arterial disease this is very quiet clinically. He will remain on Trental for this condition at this time.                      7.This patient's blood sugar has remained in the 130's, 140's,  150's for the last several visits. His hemoglobin A1C today is 5.9.  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 He will require the utilization of Glucotrol XL 5 mg. I went ahead and sent this prescription to the pharmacy. I pointed out to him that he is eating a diet rich in carbohydrates mostly and I pointed out to him that he needs to eat more vegetables, some fruit and high complex carbohydrates that will minimize issues pertinent to his blood sugar. It is even more important now that he has initiated prednisone as posted.   I reviewed with him on 01/12/2022 his hemoglobin A1c of 5.9. I advised him to remain on a diet that is not too much rich in sugar and stay on his glipizide 5 mg every day. He is very selective in his diet. He finds things that tastes okay and other ones that do not taste okay and I think we have no solution for this definitive problem.  On 02/09/2022 the patient's glucose seems to be under control. He will remain on glipizide 5 mg oral daily.     I will review him back in 3 weeks. He will return for treatment next week and for the dose adjustments were discussed with Amber Lam RN.  On 03/16/2022 I asked the patient to continue to monitor his blood sugar and to continue taking his minimal dose of Glucotrol.  Since 06/08/2022, the blood sugar on this patient remains minimally elevated. He remains on Glucotrol. I do believe that the patient has diabetes type 2, very mild case, and encouraged him to try to watch sugar utilization, processed foods, too many carbs, and he is aware of that. I advised him to remain on his Glucotrol XL, 1 tablet a day.  On 07/20/2022 his blood sugar has been under control. He remains on medicine for this.   On 08/17/2022 the patient remains on Glucotrol to control his blood sugar. He is paying attention to what he is eating at this time to minimize  excessive carbohydrate consumption.  On 10/12/2022 his blood sugar remains borderline elevated. He remains on Glucotrol XL 1 tablet a day. We advised him to remain on this medicine and still watch diet that is rich in carbohydrates.   His blood sugar remains in the 140s all the time. He will remain on a minimal amount of glipizide XL 5 mg a day.            8.Swelling in the lower extremities. The patient does not look to me like he has any congestive heart failure, kidney dysfunction, liver dysfunction to produce this, neither has hypoalbuminemia, increased creatinine or congestive heart failure. Most likely is the case of gabapentin and/or Evoxac triggering fluid retention. I asked him to work on this medicines, stopping and going and see if the swelling resolves spontaneously. If that is the case this will fix the problem. If that is not the case and the swelling bothers him, he will require to take a low-dose diuretic like Lasix 20 mg on p.r.n. basis once or twice a week.  On 07/20/2022 the patient has continued using the Lasix on prn basis. In preparation for the trip to see his brother I asked him to buy copper socks to have mild compression and minimize the swelling that he has in the lower extremities without cutting off his arterial circulation.   On 08/17/2022, he has edema in his lower extremities. I advised him to wear his elastic support.  On 10/12/2022 now that the summer is over he is no longer taking Lasix for swelling in the lower extremities.   On 11/09/2022 no swelling in lower extremities. Lasix on hold.      9.On 10/12/2022 dysphagia. The question is if this is related to tumor regrowth in the esophagus or effect of radiation therapy in the esophagus with fibrosis that will require dilatation. Appointment was made to see Mary Brito MD, in this regard.   On 11/09/2022 we know what evades to the dysphagia in this patient including tumoral inflammation and stricture from previous radiation  therapy. On this date the patient is swallowing with no difficulties. He is keeping nutrition and hydration appropriately and new modality of therapy will be initiated for the cancer, for the candidal infection, and consideration of assuming more radiation therapy. Radiation Oncology department believes that meaningful treatment could be of utility for this patient. He already has received radiation therapy in the past.     My goal is to get him started on Taxol next week along with radiation therapy. Again the case will be discussed with Radiation Oncology.      Each one of these situations have been discussed in detail above and each one of them is the summary of benign situations that I see. This patient has multiple problems that I handle, all of them at the same time besides the chemotherapy administration, the assessment for toxicity and multiple other medications that are prescribed for him.

## 2022-11-11 LAB
LAB AP CASE REPORT: NORMAL
LAB AP DIAGNOSIS COMMENT: NORMAL
Lab: NORMAL
PATH REPORT.ADDENDUM SPEC: NORMAL
PATH REPORT.FINAL DX SPEC: NORMAL
PATH REPORT.GROSS SPEC: NORMAL

## 2022-11-12 DIAGNOSIS — I82.412 ACUTE DEEP VEIN THROMBOSIS (DVT) OF FEMORAL VEIN OF LEFT LOWER EXTREMITY: ICD-10-CM

## 2022-11-12 DIAGNOSIS — N32.1 COLOVESICAL FISTULA: ICD-10-CM

## 2022-11-12 DIAGNOSIS — C15.5 MALIGNANT NEOPLASM OF LOWER THIRD OF ESOPHAGUS: ICD-10-CM

## 2022-11-12 DIAGNOSIS — Z45.2 ENCOUNTER FOR ADJUSTMENT OR MANAGEMENT OF VASCULAR ACCESS DEVICE: ICD-10-CM

## 2022-11-12 DIAGNOSIS — Z72.0 TOBACCO ABUSE: ICD-10-CM

## 2022-11-14 RX ORDER — FLUCONAZOLE 100 MG/1
100 TABLET ORAL DAILY
Qty: 10 TABLET | Refills: 0 | Status: SHIPPED | OUTPATIENT
Start: 2022-11-14 | End: 2022-11-24

## 2022-11-14 RX ORDER — GABAPENTIN 300 MG/1
CAPSULE ORAL
Qty: 180 CAPSULE | Refills: 0 | Status: SHIPPED | OUTPATIENT
Start: 2022-11-14 | End: 2023-02-08 | Stop reason: SDUPTHER

## 2022-11-14 NOTE — PROGRESS NOTES
[facility] Radiation Oncology   Consult    Chief Complaint  Esophageal Cancer      Diagnosis: Metastatic esophageal cancer    Overall Stage Metastatic      Pacemaker: no  Prior History of Radiation: yes  Contraindications to Radiation: no  Patient Requires Pregnancy Test: No, patient is male    HPI:    Han Garcia is a 67-year-old male who was diagnosed with metastatic esophageal cancer in 2019.  He initially underwent chemotherapy without radiation therapy but developed local recurrence in 2021.  He was treated with palliative radiation 30 Gray in 10 fractions which finished in September 2021.  The patient has continued on systemic therapies with Dr. Haynes.  Recently he was treated for esophageal stricture by Dr. Brito.  Biopsy obtained at that time demonstrated again recurrent disease.  PET/CT was obtained which has demonstrated increased avidity in the distal esophagus as well as progression of his previous liver metastases.  Dr. Keller is planning on switching chemotherapy from 5-FU, leucovorin, Herceptin to low-dose Taxol.  He was referred patient for consideration of repeat radiation therapy to the distal esophagus.      Imaging:      IMPRESSION:  Development of significantly more intense hypermetabolic  activity at a 7.5 cm segment of distal esophagus which likely represents  recurrence of malignancy and there has been extensive progression of the  liver metastases. Significant increase in size and intensity of  metabolic activity of a 2.0 x 1.5 cm portacaval node and there is a new  hypermetabolic 1.0 x 0.7 cm retroperitoneal node which is likely  metastatic.    Pathology:    Esophagogastroduodenoscopy with dilation pathology 11/4/2022    Final Diagnosis   1. Esophagus at 38 cm Biopsy:  Squamous and glandular mucosa with               A. Predominantly low grade glandular dysplasia with focal high grade glandular dysplasia.               B. No microorganisms nor intestinal metaplasia identified by routine  staining (see comment).               C. Separately identified detached necroinflammatory debris suggesting erosion/ulceration.               D. No invasive carcinoma identified (see comment).     2. Esophagus at 35 cm Biopsy:  Glandular mucosa with               A. High grade glandular dysplasia.               B. Focal rare intestinal metaplasia identified by routine staining.               C. Focally suspicious for minimally invasive adenocarcinoma (see comment).     3. GE Junction at 46 cm Biopsy:                 A. Invasive adenocarcinoma with associated high grade glandular dysplasia.               B. Focal mucosal erosion/ulceration noted.               C. No definitive lymphovascular space invasion identified (see comment).               D. Associated ulcerative debris with fungal hyphae and rare yeast forms morphologically consistent with Candida          Labs:    Lab Results   Component Value Date    CREATININE 0.69 (L) 11/09/2022               Problem List:  Patient Active Problem List   Diagnosis   • Essential hypertension   • Hyperlipidemia   • Impaired fasting glucose   • History of prostate cancer   • Tobacco abuse   • Liver metastasis (HCC)   • Esophagus neoplasm   • Malignant neoplasm of lower third of esophagus (HCC)   • Encounter for adjustment or management of vascular access device   • Recto-bladderneck fistula   • Peripheral neuropathy due to chemotherapy (HCC)          Medications:  Current Outpatient Medications on File Prior to Visit   Medication Sig Dispense Refill   • cevimeline (EVOXAC) 30 MG capsule Take 1 capsule by mouth 3 (Three) Times a Day. DRY MOUTH     • ciprofloxacin (CIPRO) 250 MG tablet Take 1 tablet by mouth Daily. Take one every other day (Patient taking differently: Take 1 tablet by mouth Every Other Day. Take one every other day  FISTULA) 90 tablet 1   • econazole nitrate (SPECTAZOLE) 1 % cream Apply  topically to the appropriate area as directed 2 (Two) Times a Day.  (Patient taking differently: Apply 1 application topically to the appropriate area as directed 2 (Two) Times a Day. NAIL FUNGUS-4 FINGERS ON LEFT) 85 g 1   • gabapentin (NEURONTIN) 300 MG capsule TAKE 1 CAPSULE BY MOUTH TWICE A  capsule 0   • glipizide (GLUCOTROL XL) 5 MG ER tablet TAKE 1 TABLET BY MOUTH EVERY DAY (Patient taking differently: Take 1 tablet by mouth Daily.) 90 tablet 1   • lisinopril-hydrochlorothiazide (PRINZIDE,ZESTORETIC) 20-12.5 MG per tablet TAKE 1 TABLET BY MOUTH EVERY DAY (Patient taking differently: Take 0.5 tablets by mouth As Needed. SYS GREATER THAN 130, TAKE 1/2 TAB) 90 tablet 0   • pantoprazole (Protonix) 40 MG EC tablet Take 1 tablet by mouth 2 (Two) Times a Day. 180 tablet 3   • pentoxifylline (TRENtal) 400 MG CR tablet TAKE 1 TABLET BY MOUTH TWICE A DAY WITH MEALS 90 tablet 0   • potassium chloride ER (K-TAB) 20 MEQ tablet controlled-release ER tablet Take 1 tablet by mouth Daily. 90 tablet 3   • Probiotic Product (Probiotic Blend) capsule Take 1 capsule by mouth Daily.     • acetaminophen (TYLENOL) 500 MG tablet Take 500 mg by mouth Every 6 (Six) Hours As Needed for Mild Pain .     • Chlorhexidine Gluconate 2 % pads Apply  topically. AS DIRECTED PAT     • fluconazole (DIFLUCAN) 100 MG tablet Take 1 tablet by mouth Daily for 10 days. 10 tablet 0   • furosemide (LASIX) 20 MG tablet Take 1 tablet by mouth Every Other Day. (Patient taking differently: Take 1 tablet by mouth As Needed. LOWER LEG EDEMA) 28 tablet 2     No current facility-administered medications on file prior to visit.          Allergies:  Allergies   Allergen Reactions   • Oxaliplatin Anaphylaxis         Family History:  The patient has no family history of conditions which would be contraindications to radiation therapy      Social History:  Current Smoker    Distance From Clinic: 30 minutes - 1 hour    Patient has someone who can assist with transportation: yes      Review of Systems:    Review of Systems  "  Constitutional: Positive for appetite change, fatigue and unexpected weight change.   Musculoskeletal: Positive for arthralgias, back pain and neck pain.   All other systems reviewed and are negative.      Vital Signs:  /74   Pulse 87   Wt 89.2 kg (196 lb 9.6 oz)   SpO2 98%   BMI 29.02 kg/m²   Estimated body mass index is 29.02 kg/m² as calculated from the following:    Height as of 11/9/22: 175.3 cm (69.02\").    Weight as of this encounter: 89.2 kg (196 lb 9.6 oz).  Pain Score    11/15/22 0904   PainSc: 0-No pain         ECOG: Restricted in physically strenuous activity but ambulatory and able to carry out work of a light or sedentary nature, e.g., light house work, office work = 1    Physical Exam  Vitals reviewed.   Constitutional:       General: He is not in acute distress.     Appearance: Normal appearance.   HENT:      Head: Normocephalic and atraumatic.   Eyes:      Extraocular Movements: Extraocular movements intact.      Pupils: Pupils are equal, round, and reactive to light.   Pulmonary:      Effort: Pulmonary effort is normal.      Breath sounds: Normal breath sounds.   Abdominal:      General: Abdomen is flat.      Palpations: Abdomen is soft.   Musculoskeletal:      Cervical back: Normal range of motion and neck supple.   Skin:     General: Skin is warm and dry.   Neurological:      General: No focal deficit present.      Mental Status: He is alert and oriented to person, place, and time.   Psychiatric:         Mood and Affect: Mood normal.         Behavior: Behavior normal.        Result Review :  The following data was reviewed by: Lisandro Colvin MD on 11/15/2022:  Labs: Last Creatinine   Data reviewed: Radiologic studies PET CT            Diagnoses and all orders for this visit:    1. Malignant neoplasm of lower third of esophagus (HCC) (Primary)        Assessment:      Patient is a 67-year-old male with a known history of metastatic esophageal cancer. He has previously received numerous " systemic agents with Dr. Haynes.  He has also received prior palliative fractionation to the lower esophagus, 30 Gray in 10 fractions completed in September 2021.  The patient has had local symptomatic recurrence requiring esophageal dilation.  Dr. Haynes is considering switching the patient to Taxol.  He has been referred for consideration of repeat palliative radiation therapy of the distal esophagus.    I met with the patient in consultation and discussed the risk, benefits, side effects, and logistics of repeat palliative radiation therapy.  Overall given the dose he received I feel that it is safe to repeat the same dose to approximately the same area.  I emphasized that although he tolerated radiation therapy very well previously, he is at increased risk of toxicities with reirradiation.  I will discuss the case with Dr. Haynes to determine timing with regards to chemotherapy.    Plan:    Plan for repeat palliative radiation therapy to the distal esophagus for symptomatic progression, tentatively plan 30 Gray in 10 fractions       I spent 45 minutes caring for Han on this date of service. This time includes time spent by me in the following activities:preparing for the visit, reviewing tests, obtaining and/or reviewing a separately obtained history, documenting information in the medical record, independently interpreting results and communicating that information with the patient/family/caregiver and care coordination  Follow Up   No follow-ups on file.  Patient was given instructions and counseling regarding his condition or for health maintenance advice. Please see specific information pulled into the AVS if appropriate.     Lisandro Colvin MD

## 2022-11-15 ENCOUNTER — OFFICE VISIT (OUTPATIENT)
Dept: RADIATION ONCOLOGY | Facility: HOSPITAL | Age: 67
End: 2022-11-15

## 2022-11-15 ENCOUNTER — APPOINTMENT (OUTPATIENT)
Dept: LAB | Facility: HOSPITAL | Age: 67
End: 2022-11-15

## 2022-11-15 ENCOUNTER — OFFICE VISIT (OUTPATIENT)
Dept: ONCOLOGY | Facility: CLINIC | Age: 67
End: 2022-11-15

## 2022-11-15 ENCOUNTER — CONSULT (OUTPATIENT)
Dept: RADIATION ONCOLOGY | Facility: HOSPITAL | Age: 67
End: 2022-11-15

## 2022-11-15 ENCOUNTER — TELEPHONE (OUTPATIENT)
Dept: ONCOLOGY | Facility: CLINIC | Age: 67
End: 2022-11-15

## 2022-11-15 VITALS
DIASTOLIC BLOOD PRESSURE: 80 MMHG | WEIGHT: 195.2 LBS | RESPIRATION RATE: 18 BRPM | SYSTOLIC BLOOD PRESSURE: 164 MMHG | HEART RATE: 86 BPM | OXYGEN SATURATION: 98 % | HEIGHT: 69 IN | BODY MASS INDEX: 28.91 KG/M2 | TEMPERATURE: 97.1 F

## 2022-11-15 VITALS
DIASTOLIC BLOOD PRESSURE: 74 MMHG | BODY MASS INDEX: 29.02 KG/M2 | OXYGEN SATURATION: 98 % | HEART RATE: 87 BPM | SYSTOLIC BLOOD PRESSURE: 130 MMHG | WEIGHT: 196.6 LBS

## 2022-11-15 DIAGNOSIS — C15.5 MALIGNANT NEOPLASM OF LOWER THIRD OF ESOPHAGUS: Primary | ICD-10-CM

## 2022-11-15 PROCEDURE — 99214 OFFICE O/P EST MOD 30 MIN: CPT | Performed by: NURSE PRACTITIONER

## 2022-11-15 PROCEDURE — G0463 HOSPITAL OUTPT CLINIC VISIT: HCPCS

## 2022-11-15 PROCEDURE — 99215 OFFICE O/P EST HI 40 MIN: CPT | Performed by: STUDENT IN AN ORGANIZED HEALTH CARE EDUCATION/TRAINING PROGRAM

## 2022-11-15 NOTE — TELEPHONE ENCOUNTER
Caller: Han Garcia    Relationship: Self    Best call back number: [phone]      Who are you requesting to speak with (clinical staff, provider,  specific staff member): FABIANO LESLIE    What was the call regarding: PATIENT HAS SOME QUESTIONS FOR FABIANO LESLIE AND WOULD LIKE TO SPEAK TO HER     Do you require a callback: YES

## 2022-11-15 NOTE — TELEPHONE ENCOUNTER
Returned call to patient who wanted to know if his Covid booster will affect him getting his chemotherapy. I explained that it would not.  He wanted to know if with the Taxol, if he will be taking a chemo ball home with him and I told him No,  He wanted to know if he is to take all his regular medications as prescribed and I told him yes.  Explained that he can call back if he has any further questions.  He v/u.

## 2022-11-15 NOTE — PROGRESS NOTES
[facility] Hematology/Oncology Treatment Plan Summary    Name: Han Garcia  Acct# [id number]  MD: Dr. Haynes    Diagnosis:     ICD-10-CM ICD-9-CM   1. Malignant neoplasm of lower third of esophagus (HCC)  C15.5 150.5       Goal of treatment: disease control    Treatment Medication(s):   1. Taxol    Frequency: weekly for 3 out of 4 weeks    Number of cycles: to be determined    Starting on: 11/16/2022    Repeat after 2 cycles: PET or CT scan    Items for home use: Senokot-S (for constipation), Milk of Magnesia (for constipation), Imodium AD (for diarrhea), Tylenol (for fever and/or pain) and Thermometer    Rx written for: [x] Nausea    [] Pre-Treatment   ondansetron 8 mg by mouth every 8 hours as needed for nausea     Completing Provider: NIRU Wayne           Date/time: 11/15/2022    Please note: You will be seen by a provider frequently with your treatment plan. This plan may change depending on many factors, if so, this will be discussed with you by your physician.  Last update 03/2022.

## 2022-11-15 NOTE — PROGRESS NOTES
TREATMENT  PREPARATION    Han Garcia  [id number]  1955    Chief Complaint: Treatment preparation and needs assessment    History of present illness:  Han Garcia is a 67 y.o. year old male who is here today for treatment preparation and needs assessment.  The patient has been diagnosed with   Encounter Diagnosis   Name Primary?   • Malignant neoplasm of lower third of esophagus (HCC) Yes    and is scheduled to begin treatment with:     Oncology History:    Oncology/Hematology History   Liver metastasis (HCC)   7/9/2019 Initial Diagnosis    Liver metastases (CMS/HCC)     8/6/2019 - 7/28/2021 Chemotherapy    · Initially treated with FOLFOX initiated July 2019  · Herceptin added with cycle 2  · Following 8 cycles of FOLFOX, oxaliplatin discontinued with continuation of 5-FU, leucovorin, Herceptin. This was continued through 7/1/2021  OP GE Leucovorin / Fluorouracil / Trastuzumab as of 1/7/2020 per Dr Haynes     7/29/2021 - 9/30/2021 Chemotherapy    OP Pembrolizumab 200 mg     10/29/2021 - 12/10/2021 Chemotherapy    OP BREAST Fam-Trastuzumab Deruxtecan-nxki     1/19/2022 - 10/14/2022 Chemotherapy    OP GE mFOLFOX + Trastuzumab-anns (Leucovorin / Fluorouracil / Trastuzumab-anns)     11/16/2022 -  Chemotherapy    OP PACLitaxel 80 mg/m2 D1,8,15     Malignant neoplasm of lower third of esophagus (HCC)   7/2/2019 Imaging    ADDENDUM:     Inadvertently omitted from the report is the presence of a colovesicular  fistula. There is extensive sigmoid diverticulosis and there is a  fistulous tract between the sigmoid colon and the urinary bladder. There  is a moderate amount of air within the urinary bladder. There is no  convincing evidence for acute diverticulitis, but chronic diverticulitis  is suspected.     This report was finalized on 7/16/2019 3:47 PM by Dr. Almaz Rutledge M.D.      Signed by Almaz Rutledge MD on 7/16/2019  3:47 PM   Narrative & Impression   CT ABDOMEN AND PELVIS WITH IV CONTRAST     HISTORY:  63-year-old male with right lower quadrant pain. Diarrhea.  Prostate cancer history.     TECHNIQUE: Radiation dose reduction techniques were utilized, including  automated exposure control and exposure modulation based on body size.   3 mm images were obtained through the abdomen and pelvis after the  administration of IV contrast. There is no previous CT for comparison.     FINDINGS: There is masslike circumferential thickening of the distal  esophagus and there are multiple enlarged nodes adjacently. There are  many variable sized metastases scattered throughout the liver. There is  a confluent lesion in the right hepatic lobe which is best seen in its  entirety on the coronal reconstruction series and measures approximately  12 x 6 cm. The gallbladder appears unremarkable and there is no biliary  dilatation. The spleen, pancreas, adrenals, and kidneys appear  unremarkable. There is extensive colonic diverticulosis without evidence  for diverticulitis. The appendix appears normal. There is a tiny amount  of free fluid within the dependent aspect of the pelvis. There are  extensive atherosclerotic changes throughout the abdominal aorta with  luminal narrowing. Within the visualized lower lung fields, there is an  8 mm right lower lobe pulmonary nodule.     IMPRESSION:  1. There is extensive liver metastases and the primary malignancy is  suspected to be at the distal esophagus. There is masslike  circumferential thickening of the distal esophagus with surrounding  lymphadenopathy. The 8 mm right basilar pulmonary nodule is suspected to  represent a metastasis.  2. There is extensive colonic diverticulosis without evidence for  diverticulitis and there is no appendicitis.        7/8/2019 Imaging    CT CHEST W CONTRAST-     Radiation dose reduction techniques were utilized, including automated  exposure control and exposure modulation based on body size.     CLINICAL: 63-year-old male with possible esophageal  malignancy,  pulmonary nodule, lymphadenopathy and liver lesions.     FINDINGS: There is a 10 cm long segment of distal esophagus which  demonstrates thickening and luminal irregularity. This process does not  appear to extend into the stomach. At one point the wall demonstrates a  focal mass-like area with displacement of the lumen towards the left.  The findings are most worrisome for distal esophageal neoplasm,  infectious/inflammatory process is felt to be less likely.     On image #254 there is a periesophageal lymph node which measures 14 mm.  There are other smaller distal periesophageal lymph nodes with the  largest of these 9 mm.     Located within the right lower lobe on image #268 is an 8 mm  noncalcified pulmonary nodule. There are 2 tiny pleural plaques  demonstrated on the left major fissure, measuring only a few millimeters  in length each, seen on images 178 and 185. The lungs are otherwise  clear. There is bullae formation consistent with emphysema. No pleural  effusion.     Limited images through the upper abdomen demonstrate numerous liver  lesions.     No lytic or sclerotic bone lesion.     CONCLUSION:  1. Concentric as well as eccentric wall thickening involving the distal  10 cm of the esophagus, just above the GE junction. Findings most  worrisome for esophageal neoplasm.  2. Tiny right lower lobe 8 mm pulmonary nodule and 2 tiny pleural  plaques seen on the left major fissure. These are indeterminant.  3. Paraesophageal lymph nodes adjacent to the distal esophagus.     7/10/2019 Initial Diagnosis    Malignant neoplasm of lower third of esophagus (CMS/HCC)     7/16/2019 Biopsy    INSERTION VENOUS ACCESS DEVICE WITH FLUORO AND EGD WITH BIOPSY    1. Esophagus, 35 Cm from the Lip, Biopsy:               A. INVASIVE MODERATELY DIFFERENTIATED ADENOCARCINOMA (see Comment).    HER-2 amplification present at a level consistent with clinically significant enhancement of HER-2 expression by FISH      8/6/2019 - 7/28/2021 Chemotherapy    · Initially treated with FOLFOX initiated July 2019  · Herceptin added with cycle 2  · Following 8 cycles of FOLFOX, oxaliplatin discontinued with continuation of 5-FU, leucovorin, Herceptin. This was continued through 7/1/2021  OP GE Leucovorin / Fluorouracil / Trastuzumab as of 1/7/2020 per Dr Haynes     9/19/2019 Imaging    CT CHEST W CONTRAST-, CT ABDOMEN W CONTRAST-     CLINICAL HISTORY: 63 year old male with history of metastatic esophageal  carcinoma. Patient also has history of prostate carcinoma.     TECHNIQUE: Spiral CT images were obtained through the chest and abdomen  with IV contrast only and were reconstructed in 3 mm thick slices.     Radiation dose reduction techniques were utilized, including automated  exposure control and exposure modulation based on body size.     COMPARISON: PET/CT imaging dated 07/15/2019. CT imaging of the chest  dated 07/08/2019. CT imaging of the abdomen and pelvis dated 07/02/2019.     FINDINGS: Again seen is moderate circumferential wall thickening  involving the distal esophagus immediately superior to the  gastroesophageal junction consistent with esophageal carcinoma. The  overall degree of wall thickening has diminished since the preceding  imaging studies. Multiple mildly enlarged lymph nodes within the  posterior mediastinum adjacent to the esophageal neoplasm of also  diminished in size. No lymphadenopathy is identified elsewhere within  the mediastinum or either hilar region or either axilla. The previous CT  scan demonstrated an 8 mm diameter noncalcified pulmonary nodule in the  inferior aspect of the right lower lobe. This has nearly completely  resolved, which would suggest it represents a metastatic lesion  responding to therapy. No other lung nodules are identified. There are  no acute infiltrates or pleural effusions.     Numerous hepatic metastatic lesions show significant interval decrease  in size. For instance, a  conglomerate mass of adjacent metastatic  lesions in the left lobe of the liver that measured 6.8 x 5.4 cm in  diameter on the previous study now measure only 3.8 x 3.2 cm in  diameter. A large metastatic lesion in the inferior aspect of the right  lobe of the liver that measured 12.2 x 6.3 cm in diameter on the  previous study now measures 8.2 x 5.2 cm. The spleen and pancreas and  kidneys and adrenal glands are unremarkable. A few mildly enlarged  periaortic and pericaval lymph nodes in the retroperitoneum appear  slightly smaller. The visualized small and large bowel are unremarkable  except for multiple diverticuli in the left side of the colon..     IMPRESSION:  Overall significant improvement since the preceding PET/CT  and CT imaging performed in July of this year. The primary esophageal  neoplasm has diminished in size, as have several mildly enlarged lymph  nodes in the mediastinum adjacent to the neoplasm. A single small right  lower lobe pulmonary nodule has nearly completely resolved. Numerous  hepatic metastases also appear significantly smaller.     11/19/2019 Imaging    F-18 FDG PET FROM SKULL BASE TO MID THIGH WITH PET/CT FUSION     HISTORY: 64-year-old male with metastatic distal esophageal cancer.  Restaging.     TECHNIQUE: Radiation dose reduction techniques were utilized, including  automated exposure control and exposure modulation based on body size.   Blood glucose level at time of injection was 113 mg/dL.  6.8 mCi of F-18  FDG were injected and PET was performed from skull base to mid thigh. CT  was obtained for localization and attenuation correction. Time at  injection 7:35 AM.  PET start time 9:09 AM.  Compared with CTs from  09/19/2019 and PET/CT from 07/15/2019.     FINDINGS: There is a short segment of distal esophagus which has low  level activity with a maximal SUV of 5.1, previously 19.1 and a  significantly longer segment. There is a heterogeneous pattern of  activity throughout the  liver and there is a tiny relatively  hypermetabolic focus within the medial hepatic segment with a maximal  SUV of 5.2. The rest of the extensive hepatic metastases have  essentially resolved. There has been resolution of the hypermetabolic  nodes at the [place] hepatis and retroperitoneum. There is a single tiny  focus of hypermetabolic activity at the sigmoid colon, but the rest of  the sigmoid colon has low level activity. Again seen is a colovesicular  fistula.     IMPRESSION:  1. Dramatic improvement with near complete resolution of the hepatic  metastases, resolution of the hypermetabolic nodes at the [place] hepatis  and retroperitoneum, and there is a much shorter segment of  hypermetabolic activity at the distal esophagus and the activity is much  less intensely hypermetabolic.  2. Persisting colovesicular fistula. The focus of hypermetabolic  activity at the sigmoid colon may represent acute or chronic  diverticulitis. There is very extensive sigmoid diverticulosis     10/27/2020 Imaging    F-18 FDG PET FROM SKULL BASE TO MID THIGH WITH PET/CT FUSION     HISTORY: 64-year-old male with metastatic distal esophageal cancer.  Colovesicular fistula. Restaging.     TECHNIQUE: Radiation dose reduction techniques were utilized, including  automated exposure control and exposure modulation based on body size.   Blood glucose level at time of injection was 113 mg/dL.  6.3 mCi of F-18  FDG were injected and PET was performed from skull base to mid thigh. CT  was obtained for localization and attenuation correction. Time at  injection 8:08 AM. PET start time 9:41 AM. Compared with PET/CT from  08/03/2020.     FINDINGS: There is slightly more intense hypermetabolic activity at the  distal 3rd of the esophagus than previously with a current maximal SUV  of 5.3, previously 3.6. There is no hypermetabolic lymphadenopathy along  the esophagus or within the chest. There is no change in the speckled  pattern of activity  throughout the liver. No suspicious hypermetabolic  activity seen within the liver. There is no suspicious hypermetabolic  activity within the abdomen or pelvis. A colovesicular fistula is again  seen between the left posterior bladder wall in the sigmoid colon. There  is no hypermetabolic lymphadenopathy within the pelvis. There is no  suspicious hypermetabolic activity within the neck. There has been  development of hypermetabolic mucosal thickening within the left  maxillary sinus with a maximal SUV of 7.5.     IMPRESSION:  1. The more intense metabolic activity at the distal esophagus may be  radiation related, but this is indeterminate. Correlation with endoscopy  is recommended. There is no hypermetabolic lymphadenopathy or convincing  evidence for metastatic disease.  2. Development of hypermetabolic mucosal thickening within the left  maxillary sinus. Please correlate clinically for acute sinusitis.  3. There is no change in the appearance of the colovesicular fistula.     3/16/2021 Imaging    CT CHEST, ABDOMEN AND PELVIS WITH CONTRAST     HISTORY: 65-year-old male with follow-up for esophageal neoplasm. The  patient had hepatic metastatic disease and previously has had DVT. For  follow-up.     TECHNIQUE: Axial CT images of the chest abdomen and pelvis were obtained  following administration of intravenous and oral contrast. Coronal and  sagittal reconstructions were then obtained.     COMPARISON: PET/CT dated 10/27/2020     FINDINGS:     CT CHEST: Circumferential low-density wall thickening involving the  distal third of the esophagus extending at the level of the GE junction  is present. There are small periesophageal lymph nodes present with  index node measuring 6 mm on image 78. There is no dilatation of the  proximal esophagus. A superior mediastinal lymph node on image 23 is  without interim change. Subcentimeter subcarinal lymph nodes are also  without interim change. No pleural or pericardial  effusion is seen.  Calcified coronary artery disease is present. Mild ectasia of the  ascending thoracic aorta measuring up to 3.5 cm in AP diameter. The  central airways are patent without endobronchial lesion. Mild bronchial  wall thickening is demonstrated bilaterally. Background upper lobe  predominant emphysematous changes are present. No new or suspicious  pulmonary nodules are seen. No pathological axillary lymphadenopathy.     CT ABDOMEN AND PELVIS:Mild diffuse low attenuation of liver most  consistent with steatosis. Several hypoattenuating foci are seen within  the liver, index lesions are seen on image 94, 99, the largest lesion  within the inferior right hepatic lobe on image 113. Many of these are  associated with associated capsular retraction. I suspect these  represent treated hepatic metastases with fibrosis. The lesions were  compared to prior CT from 09/2019. The spleen is normal. Pancreas is  normal without ductal dilatation. The gallbladder is unremarkable.  Subcentimeter retroperitoneal lymph nodes are without interim change.  Bilateral adrenal gland and kidneys are stable. The urinary bladder is  partially distended and contains nondependent air. Colonic  diverticulosis is again demonstrated with a track seen extending from  the sigmoid colon to the posterior bladder wall. No evidence of bowel  obstruction. Appendix is normal. Marked calcified atherosclerotic plaque  is seen within the abdominal aorta and its branches. Moderate narrowing  of the SMA at its origin caused by predominantly calcified plaque. Large  calcification at the level of the right common femoral artery which  appears to be completely occluded at its origin. The air is seen to  recanalize with occlusion of the right superficial femoral artery. On  the left there is multifocal stenosis within the left external iliac  artery with occlusion of the left common femoral artery as well as  hemodynamically significant stenosis of  the left superficial femoral  artery. A lipoma is seen within the third portion of the duodenum.     IMPRESSION:  1. Distal esophageal wall thickening does not appear significantly  changed from the PET/CT with small periesophageal lymph nodes.  Subcentimeter retroperitoneal lymph nodes are without interim change.  The liver demonstrates evidence of treated hepatic metastatic disease  without convincing evidence of new disease.  2. Marked atherosclerotic disease with occlusion of bilateral common  femoral arteries and significant stenosis within the superficial femoral  arteries as above.  3. Colonic diverticulosis with stable linear track extending from the  sigmoid to the posterior wall of the urinary bladder. Air is again  demonstrated within the bladder lumen.   4. Additional findings as above.     6/9/2021 Imaging    FDG PET/CT IMAGING SKULL BASE TO MID THIGH      HISTORY: 65-year-old male with history of esophageal carcinoma with  known liver metastases. Restaging.     TECHNIQUE: Blood glucose level at time of injection of FDG was 136  mg/dl.  6.1 mCi of FDG was injected. Approximately 90 minutes later, PET  images were obtained. CT images were acquired for attenuation correction  and anatomic localization.     COMPARISON: CT scan of the chest, abdomen and pelvis dated 03/16/2021.  PET/CT imaging dated 10/27/2020.     FINDINGS: There are 2 adjacent oblong foci of moderately intense  hypermetabolism within the distal esophagus at the GE junction  corresponding to the patient's biopsy-proven esophageal neoplasm that  show significant increase in FDG avidity since the preceding PET/CT  study consistent with enlarging residual neoplasm. These have maximum  measured SUV of up to 11.2 g/mL. Previously there was only mild  hypermetabolism at the GE junction with maximum measured SUV of 5.2  g/mL. In addition, circumferential wall thickening within the distal  esophagus appears more prominent. No foci of pathologic  hypermetabolism  are identified elsewhere within the chest. In particular, there is no  hypermetabolic mediastinal or hilar lymphadenopathy. Lung window images  demonstrate no parenchymal nodules. No foci of pathologic  hypermetabolism are identified within the abdomen or pelvis. Multiple  treated hepatic metastases continue to show no discernible  hypermetabolism. There is no hypermetabolic lymphadenopathy in the  abdomen. There is physiologic distribution of the radiopharmaceutical  within the neck that is unchanged.     IMPRESSION:  Two adjacent oblong foci of moderately intense  hypermetabolism involving the distal esophagus at the GE junction  showing significant increase in FDG avidity since the most recent PET/CT  study dated 10/27/2020. Soft tissue thickening involving the distal  esophagus is also more prominent. Enlarging residual neoplasm is  suspected. No foci of pathologic hypermetabolism are identified  elsewhere within the neck, chest, abdomen and pelvis. Known extensive  treated hepatic metastatic disease continues to show no significant  hypermetabolism.     6/19/2021 Surgery    ESOPHAGOGASTRODUODENOSCOPY WITH BIOPSY    1. Gastroesophageal Junction, 38 cm, Biopsy:                A. SQUAMOCOLUMNAR MUCOSA WITH AT LEAST HIGH-GRADE DYSPLASIA AND FOCI SUSPICIOUS                    FOR INVASIVE ADENOCARCINOMA (SEE COMMENT).     2. Esophagus, 40 cm, Biopsy:                A. COLUMNAR MUCOSA WITH A RARE MINUTE FRAGMENT OF DETACHED        FRAGMENT OF HIGH-GRADE DYSPLASTIC EPIHTELIUM AND ULCERATION       (SEE COMMENT).     3. Esophagus, 34 cm, Biopsy:               A. SQUAMOCOLUMNAR MUCOSA WITH AT LEAST HIGH-GRADE DYSPLASIA AND FOCI SUSPICIOUS                    FOR INVASIVE ADENOCARCINOMA (SEE COMMENT).    [facility] next generation sequencing:  PD-L1 Positive, CPS:1  ERBB2 (Her2/Marcy): Amplified  TMB: High, 22 mut/Mb     7/29/2021 - 9/30/2021 Chemotherapy    OP Pembrolizumab 200 mg     8/2/2021 - 8/7/2021 Other  Event    Hospitalization 8/2/2021 through 8/7/2021 secondary to acute GI bleeding from known malignancy  · Dark tarry stool initially began 7/29/2021  · 7/23/2021, hemoglobin of 11.0.  8/2/2021, hemoglobin of 5.7  · Patient admitted, received transfusion of 4 units packed red blood cells.  EGD identified bleeding mass in the distal esophagus  · Status post radiation to esophageal lesion with no further issues with bleeding     8/5/2021 - 8/18/2021 Radiation    Radiation OncologyTreatment Course:  Han Garcia received 3000 cGy in 10 fractions to ESOPHAGUS.     8/6/2021 Imaging    Duplex scan of extremity veins  · Acute left upper extremity superficial thrombophlebitis noted in the cephalic (upper arm).  · All other left sided vessels appear normal.     10/14/2021 Imaging    FDG PET/CT IMAGING SKULL BASE TO MID THIGH      HISTORY: 65-year-old male with history of esophageal carcinoma with  known liver metastases. Restaging. Increasing CEA level.     TECHNIQUE: Blood glucose level at time of injection of FDG was 161  mg/dl.  6.0 mCi of FDG was injected. Approximately 90 minutes later, PET  images were obtained. CT images were acquired for attenuation correction  and anatomic localization.     COMPARISON: PET/CT study dated 06/09/2021.     FINDINGS: The patient's primary neoplasm at the gastroesophageal  junction shows less prominent circumferential wall thickening and also  less hypermetabolism. As before, there are 2 oblong foci of  hypermetabolism within the distal esophagus. On the previous study these  had a maximum measured SUV of 11.2 g/mL. Currently the maximum measured  SUV is 6.2 g/mL. The patient's known metastatic disease within the liver  shows significant worsening. The CT images now demonstrate a 3.9 x 3.7  cm diameter low-density mass in the left lobe of the liver that was not  evident on the previous study. This shows mild hypermetabolism with  maximum measured SUV of 6.5 g/mL predominantly within  its periphery.  Centrally it appears photopenic and likely chronic. There is also 2.9 cm  diameter low-density metastatic lesion in the anterior segment of the  right lobe of the liver that was not noted on the previous study. This  demonstrates only patchy low-level mild hypermetabolism which is also  new. A third small metastatic lesion in the posterior aspect of the left  lobe of the liver measuring 2.3 cm in diameter is also now evident, and  demonstrates mild patchy hypermetabolism. No other foci of pathologic  hypermetabolism are identified in the abdomen. There is physiologic  distribution of the radiopharmaceutical within the neck and chest and  pelvis. Lung window images demonstrate no parenchymal nodules or  infiltrates. There are numerous diverticuli in the left side of the  colon and sigmoid colon. There is no definite CT evidence of acute  diverticulitis.     IMPRESSION:  Overall improvement in the patient's known primary neoplasm  involving the distal esophagus since the preceding PET/CT study dated  06/09/2021. However, known metastatic disease involving the liver  demonstrate significant worsening as described in more detail above.  There is no metabolic evidence of metastatic disease elsewhere within  the neck, chest, abdomen and pelvis.     10/29/2021 - 12/10/2021 Chemotherapy    OP BREAST Fam-Trastuzumab Deruxtecan-nxki     1/19/2022 - 10/14/2022 Chemotherapy    OP GE mFOLFOX + Trastuzumab-anns (Leucovorin / Fluorouracil / Trastuzumab-anns)     11/16/2022 -  Chemotherapy    OP PACLitaxel 80 mg/m2 D1,8,15       The current medication list and allergy list were reviewed and reconciled.     Past Medical History, Past Surgical History, Social History, Family History have been reviewed and are without significant changes except as mentioned.    Physical Exam:    Vitals:    11/15/22 1145   BP: 164/80   Pulse: 86   Resp: 18   Temp: 97.1 °F (36.2 °C)   SpO2: 98%     Vitals:    11/15/22 1145   PainSc: 0-No  pain        ECOG score: 0             Physical Exam  HENT:      Head: Normocephalic and atraumatic.   Eyes:      Extraocular Movements: Extraocular movements intact.      Conjunctiva/sclera: Conjunctivae normal.   Pulmonary:      Effort: Pulmonary effort is normal. No respiratory distress.   Neurological:      General: No focal deficit present.      Mental Status: He is alert and oriented to person, place, and time.   Psychiatric:         Mood and Affect: Mood normal.         Behavior: Behavior normal.           NEEDS ASSESSMENTS    Genetics  The patient's new diagnosis and family history have been reviewed for genetic counseling needs. A genetic referral is not recommended.     Psychosocial and Barriers to care  The patient has completed a PHQ-9 Depression Screening and the Distress Thermometer (DT) today.  PHQ-9 results show PHQ-2 Total Score:   PHQ-9 Total Score: PHQ-9 Total Score:       The patient scored their distress today as   on a scale of 0-10 with 0 being no distress and 10 being extreme distress. Problems marked by the patient as being an issue for them within the last week include   .      Results were reviewed along with psychosocial resources offered by our cancer center.  Our Supportive Oncology team will be flagged for a score of 4 or above, and a same day call will be made for a score of 9 or 10.  A mental health referral is offered at that time. Patients who score less than 4 have been educated on our support services and can be referred to our  upon request.  The patient will not be referred to our .       Nutrition  The patient has completed the malnutrition screening today. They scored Malnutrition Screening Tool  Have you recently lost weight without trying?  If yes, how much weight have you lost?: 0--> No  Have you been eating poorly because of a decreased appetite?: 0--> No  MST score: 0   with a score of 0-1 meaning not at risk in a score of 2 or greater meaning at  risk.  Patients with a score of 3 or higher will be referred to our oncology dietitian for support. Patients beginning at risk treatment regimens or who have dietary concerns will also be referred to our oncology dietitian. NOT APPLICABLE    Functional Assessment  Persons who are age 70 or greater will be screened for qualification of a comprehensive geriatric assessment by our survivorship nurse practitioner.  Older adults with cancer face unique challenges. These may include an increased risk of drug reactions, financial burdens, and caregiver stress. The patient scored G8 Questionnaire  Has food intake declined over the past 3 months due to loss of appetite, digestive problems, chewing or swallowing difficulties?: No decrease in food intake  Weight loss during the last 3 months: Does not know  Mobility: Goes out  Neuropsychological Problems: No psychological problems  Body Mass Index (BMI (weight in kg) / (height in m2)): BMI 23 and > 23  Takes more than 3 medications a day: Yes, takes more than 3 prescription drugs per day  In comparison with other people of the same age, how does the patient consider his/her health status?: Does not know  Age: > 85  Total Score: 10.5 . Patients scoring 14 or lower will referred for an older adult functional assessment with the survivorship advanced practice registered nurse to ensure all needed support is provided as patients plan for their treatments. The patient will not be referred.    Intravenous Access Assessment  The patient and I discussed planned intravenous chemo/biotherapy as well as other IV treatments that are often needed throughout the course of treatment. These may include, but are not limited to blood transfusions, antibiotics, and IV hydration. Discussed that depending on selected treatment and vein assessment, patient may require venous access device (VAD) which could include but not limited to a Mediport or PICC line. Risks and benefits of VADs reviewed. The  "patient will be treated via Port.    Reproductive/Sexual Activity   People should avoid becoming pregnant and should not get a partner pregnant while undergoing chemo/biotherapy.  People of childbearing age should use effective contraception during active therapy. The best recommendation for all people is to use a barrier method for a minimum of 1 week after the last infusion of chemo/biotherapy to prevent your partner being exposed to byproducts from treatment medications in bodily fluids. Effective contraception should be discussed with your oncology team to make sure it is safe to take based on your diagnosis. Possible options include oral contraceptives, barrier methods. Chemo/biotherapy can change your ability to reproduce children in the future.  There are options for fertility preservation. NOT APPLICABLE    Advanced Care Planning  Advance Care Planning   The patient and I discussed advanced care planning, \"Conversations that Matter\".   This service is offered for development of advance directives with a certified ACP facilitator.  The patient does not have an up-to-date advanced directive. This document is not on file with our office. The patient is not interested in an appointment with one of our facilitators to create or update their advanced directives.     Smoking cessation  Tobacco Use: High Risk   • Smoking Tobacco Use: Every Day   • Smokeless Tobacco Use: Never   • Passive Exposure: Not on file       Patient and I discussed their tobacco use history. Referral will not be made for smoking cessation.      Palliative Care  When appropriate, the patient and I discussed the availability palliative care services and when appropriate Hospice care. Palliative care is not the same as Hospice care which was explained to the patient.  The patient is not interested in additional information from our  on these services.     Survivorship   When appropriate, we discussed that we will refer the patient " to survivorship clinic to discuss next steps following completion of planned treatment.  Reviewed this visit will include assessment of your physical, psychological, functional, and spiritual needs as a survivor and the need at attend this visit when scheduled.    TREATMENT EDUCATION    Today I met with the patient to discuss the chemo/biotherapy regimen recommended for treatment of Malignant neoplasm of lower third of esophagus (HCC)  .  The patient was given explanation of treatment premed side effects including office policy that prohibits patients to drive if sedating medications are administered, MD explanation given regarding benefits, side effects, toxicities and goals of treatment.  The patient received a Chemotherapy/Biotherapy Plan Summary including diagnosis and explanation of specific treatment plan.    SIDE EFFECTS:  Common side effects were discussed with the patient and/or significant other.  Discussion included where applicable hair loss/discoloration, anemia/fatigue, infection/chills/fever, appetite, bleeding risk/precautions, constipation, diarrhea, mouth sores, taste alteration, loss of appetite, nausea/vomiting, peripheral neuropathy, skin/nail changes, rash, muscle aches/weakness, photosensitivity, weight gain/loss, hearing loss, dizziness, menopausal symptoms, menstrual irregularity, sterility, high blood pressure, heart damage, liver damage, lung damage, kidney damage, DVT/PE risk, fluid retention, pleural/pericardial effusion, somnolence, electrolyte/LFT imbalance, vein exercises and/or the possible need for vascular access/port placement.  The patient was advised that although uncommon, leakage of an infused medication from the vein or venous access device may lead to skin breakdown and/or other tissue damage.  The patient was advised that he/she may have pain, bleeding, and/or bruising from the insertion of a needle in their vein or venous access device (port).  The patient was further  advised that, in spite of proper technique, infection with redness and irritation may rarely occur at the site where the needle was inserted.  The patient was advised that if complications occur, additional medical treatment is available.  Finally, where applicable we have reviewed rare but potential immune mediated side effects including shortness of breath, cough, chest pain (pneumonitis), abdominal pain, diarrhea (colitis), thyroiditis (hypothyroid or hyperthyroid), hepatitis and liver dysfunction, nephritis and renal dysfunction.    Discussion also included side effects specific to drugs in the treatment plan, specifically:    Treatment Plans     Name Type Plan Dates Plan Provider         Active    OP PACLitaxel 80 mg/m2 D1,8,15 ONCOLOGY TREATMENT  11/15/2022 - Present Alexey Haynes MD                    Questions answered and additional information discussed on topics including:  Anemia, Thrombocytopenia, Neutropenia, Nutrition and appetite changes, Constipation, Diarrhea, Nausea & vomiting, Mouth sores, Alopecia, Nervous system changes, Skin & nail changes, Organ toxicities and Hand/Foot Cooling       Assessment and Plan:    Diagnoses and all orders for this visit:    1. Malignant neoplasm of lower third of esophagus (HCC) (Primary)      No orders of the defined types were placed in this encounter.        1. The patient and I have reviewed their diagnosis and scheduled treatment plan. Needs assessment was completed where applicable including genetics, psychosocial needs, barriers to care, VAD evaluation, advanced care planning, survivorship, and palliative care services where indicated. Referrals have been ordered as appropriate based upon evaluation today and patient desires.   2. Chemo/biotherapy teaching was completed today and consent obtained. See separate documentation for further details.  3. Adequate time was given to answer questions.  Patient made aware of their care team members and contact  information if they have questions or problems throughout the treatment course.  4. Discussion held and written information provided describing frequency of office visits and ongoing monitoring throughout the treatment plan.     5. Reviewed with patient any prescribed medication sent to pharmacy.  Education provided regarding proper storage, safe handling, and proper disposal of unused medication.  6. Proper handling of body fluids and waste discussed and written information provided.  7. If appropriate, patient had pretreatment labs drawn today.    Learning assessment completed at initial patient encounter. See separate flowsheet. Chemo/biotherapy education comprehension assessed at today's visit.    I spent 35 minutes caring for Han on this date of service. This time includes time spent by me in the following activities: preparing for the visit, reviewing tests, obtaining and/or reviewing a separately obtained history, counseling and educating the patient/family/caregiver, documenting information in the medical record and care coordination.     Bushra Patrick, APRN   11/15/22

## 2022-11-16 ENCOUNTER — INFUSION (OUTPATIENT)
Dept: ONCOLOGY | Facility: HOSPITAL | Age: 67
End: 2022-11-16

## 2022-11-16 ENCOUNTER — OFFICE VISIT (OUTPATIENT)
Dept: ONCOLOGY | Facility: CLINIC | Age: 67
End: 2022-11-16

## 2022-11-16 VITALS — HEART RATE: 74 BPM | DIASTOLIC BLOOD PRESSURE: 74 MMHG | SYSTOLIC BLOOD PRESSURE: 153 MMHG

## 2022-11-16 VITALS
HEIGHT: 69 IN | OXYGEN SATURATION: 99 % | WEIGHT: 194.3 LBS | DIASTOLIC BLOOD PRESSURE: 81 MMHG | RESPIRATION RATE: 18 BRPM | SYSTOLIC BLOOD PRESSURE: 147 MMHG | BODY MASS INDEX: 28.78 KG/M2 | TEMPERATURE: 97.5 F | HEART RATE: 79 BPM

## 2022-11-16 DIAGNOSIS — I10 ESSENTIAL HYPERTENSION: ICD-10-CM

## 2022-11-16 DIAGNOSIS — G62.0 PERIPHERAL NEUROPATHY DUE TO CHEMOTHERAPY: ICD-10-CM

## 2022-11-16 DIAGNOSIS — C15.5 MALIGNANT NEOPLASM OF LOWER THIRD OF ESOPHAGUS: ICD-10-CM

## 2022-11-16 DIAGNOSIS — Z45.2 ENCOUNTER FOR ADJUSTMENT OR MANAGEMENT OF VASCULAR ACCESS DEVICE: ICD-10-CM

## 2022-11-16 DIAGNOSIS — D49.0 ESOPHAGUS NEOPLASM: ICD-10-CM

## 2022-11-16 DIAGNOSIS — Z79.899 HIGH RISK MEDICATION USE: ICD-10-CM

## 2022-11-16 DIAGNOSIS — T45.1X5A PERIPHERAL NEUROPATHY DUE TO CHEMOTHERAPY: ICD-10-CM

## 2022-11-16 DIAGNOSIS — C15.5 MALIGNANT NEOPLASM OF LOWER THIRD OF ESOPHAGUS: Primary | ICD-10-CM

## 2022-11-16 DIAGNOSIS — C78.7 LIVER METASTASIS: ICD-10-CM

## 2022-11-16 DIAGNOSIS — C78.7 LIVER METASTASIS: Primary | ICD-10-CM

## 2022-11-16 LAB
ALBUMIN SERPL-MCNC: 4.1 G/DL (ref 3.5–5.2)
ALBUMIN/GLOB SERPL: 1.4 G/DL (ref 1.1–2.4)
ALP SERPL-CCNC: 169 U/L (ref 38–116)
ALT SERPL W P-5'-P-CCNC: 19 U/L (ref 0–41)
ANION GAP SERPL CALCULATED.3IONS-SCNC: 11.5 MMOL/L (ref 5–15)
AST SERPL-CCNC: 18 U/L (ref 0–40)
BASOPHILS # BLD AUTO: 0.03 10*3/MM3 (ref 0–0.2)
BASOPHILS NFR BLD AUTO: 0.3 % (ref 0–1.5)
BILIRUB SERPL-MCNC: 0.6 MG/DL (ref 0.2–1.2)
BUN SERPL-MCNC: 11 MG/DL (ref 6–20)
BUN/CREAT SERPL: 16.4 (ref 7.3–30)
CALCIUM SPEC-SCNC: 9.3 MG/DL (ref 8.5–10.2)
CEA SERPL-MCNC: 34.9 NG/ML
CHLORIDE SERPL-SCNC: 103 MMOL/L (ref 98–107)
CO2 SERPL-SCNC: 22.5 MMOL/L (ref 22–29)
CREAT SERPL-MCNC: 0.67 MG/DL (ref 0.7–1.3)
DEPRECATED RDW RBC AUTO: 47.9 FL (ref 37–54)
EGFRCR SERPLBLD CKD-EPI 2021: 103 ML/MIN/1.73
EOSINOPHIL # BLD AUTO: 0.19 10*3/MM3 (ref 0–0.4)
EOSINOPHIL NFR BLD AUTO: 2 % (ref 0.3–6.2)
ERYTHROCYTE [DISTWIDTH] IN BLOOD BY AUTOMATED COUNT: 14.5 % (ref 12.3–15.4)
GLOBULIN UR ELPH-MCNC: 3 GM/DL (ref 1.8–3.5)
GLUCOSE SERPL-MCNC: 119 MG/DL (ref 74–124)
HCT VFR BLD AUTO: 41.1 % (ref 37.5–51)
HGB BLD-MCNC: 14.1 G/DL (ref 13–17.7)
IMM GRANULOCYTES # BLD AUTO: 0.1 10*3/MM3 (ref 0–0.05)
IMM GRANULOCYTES NFR BLD AUTO: 1 % (ref 0–0.5)
LYMPHOCYTES # BLD AUTO: 1.28 10*3/MM3 (ref 0.7–3.1)
LYMPHOCYTES NFR BLD AUTO: 13.3 % (ref 19.6–45.3)
MCH RBC QN AUTO: 31 PG (ref 26.6–33)
MCHC RBC AUTO-ENTMCNC: 34.3 G/DL (ref 31.5–35.7)
MCV RBC AUTO: 90.3 FL (ref 79–97)
MONOCYTES # BLD AUTO: 0.65 10*3/MM3 (ref 0.1–0.9)
MONOCYTES NFR BLD AUTO: 6.8 % (ref 5–12)
NEUTROPHILS NFR BLD AUTO: 7.35 10*3/MM3 (ref 1.7–7)
NEUTROPHILS NFR BLD AUTO: 76.6 % (ref 42.7–76)
NRBC BLD AUTO-RTO: 0 /100 WBC (ref 0–0.2)
PLATELET # BLD AUTO: 147 10*3/MM3 (ref 140–450)
PMV BLD AUTO: 9.9 FL (ref 6–12)
POTASSIUM SERPL-SCNC: 3.9 MMOL/L (ref 3.5–4.7)
PROT SERPL-MCNC: 7.1 G/DL (ref 6.3–8)
RBC # BLD AUTO: 4.55 10*6/MM3 (ref 4.14–5.8)
SODIUM SERPL-SCNC: 137 MMOL/L (ref 134–145)
WBC NRBC COR # BLD: 9.6 10*3/MM3 (ref 3.4–10.8)

## 2022-11-16 PROCEDURE — 96413 CHEMO IV INFUSION 1 HR: CPT

## 2022-11-16 PROCEDURE — 25010000002 HEPARIN LOCK FLUSH PER 10 UNITS: Performed by: INTERNAL MEDICINE

## 2022-11-16 PROCEDURE — 80053 COMPREHEN METABOLIC PANEL: CPT

## 2022-11-16 PROCEDURE — 25010000002 DIPHENHYDRAMINE PER 50 MG: Performed by: NURSE PRACTITIONER

## 2022-11-16 PROCEDURE — 96375 TX/PRO/DX INJ NEW DRUG ADDON: CPT

## 2022-11-16 PROCEDURE — 25010000002 PACLITAXEL PER 1 MG: Performed by: INTERNAL MEDICINE

## 2022-11-16 PROCEDURE — 25010000002 DEXAMETHASONE SODIUM PHOSPHATE 100 MG/10ML SOLUTION: Performed by: NURSE PRACTITIONER

## 2022-11-16 PROCEDURE — 85025 COMPLETE CBC W/AUTO DIFF WBC: CPT

## 2022-11-16 PROCEDURE — 77263 THER RADIOLOGY TX PLNG CPLX: CPT | Performed by: STUDENT IN AN ORGANIZED HEALTH CARE EDUCATION/TRAINING PROGRAM

## 2022-11-16 PROCEDURE — 82378 CARCINOEMBRYONIC ANTIGEN: CPT | Performed by: INTERNAL MEDICINE

## 2022-11-16 PROCEDURE — 99214 OFFICE O/P EST MOD 30 MIN: CPT | Performed by: NURSE PRACTITIONER

## 2022-11-16 RX ORDER — SODIUM CHLORIDE 9 MG/ML
250 INJECTION, SOLUTION INTRAVENOUS ONCE
Status: CANCELLED | OUTPATIENT
Start: 2022-11-16

## 2022-11-16 RX ORDER — SODIUM CHLORIDE 9 MG/ML
250 INJECTION, SOLUTION INTRAVENOUS ONCE
Status: CANCELLED | OUTPATIENT
Start: 2022-11-23

## 2022-11-16 RX ORDER — SODIUM CHLORIDE 0.9 % (FLUSH) 0.9 %
10 SYRINGE (ML) INJECTION AS NEEDED
Status: DISCONTINUED | OUTPATIENT
Start: 2022-11-16 | End: 2022-11-16 | Stop reason: HOSPADM

## 2022-11-16 RX ORDER — DIPHENHYDRAMINE HYDROCHLORIDE 50 MG/ML
50 INJECTION INTRAMUSCULAR; INTRAVENOUS AS NEEDED
Status: CANCELLED | OUTPATIENT
Start: 2022-11-16

## 2022-11-16 RX ORDER — DIPHENHYDRAMINE HYDROCHLORIDE 50 MG/ML
50 INJECTION INTRAMUSCULAR; INTRAVENOUS AS NEEDED
Status: CANCELLED | OUTPATIENT
Start: 2022-11-30

## 2022-11-16 RX ORDER — FAMOTIDINE 10 MG/ML
20 INJECTION, SOLUTION INTRAVENOUS AS NEEDED
Status: CANCELLED | OUTPATIENT
Start: 2022-11-23

## 2022-11-16 RX ORDER — FAMOTIDINE 10 MG/ML
20 INJECTION, SOLUTION INTRAVENOUS ONCE
Status: CANCELLED | OUTPATIENT
Start: 2022-11-23

## 2022-11-16 RX ORDER — SODIUM CHLORIDE 9 MG/ML
250 INJECTION, SOLUTION INTRAVENOUS ONCE
Status: CANCELLED | OUTPATIENT
Start: 2022-11-30

## 2022-11-16 RX ORDER — FAMOTIDINE 10 MG/ML
20 INJECTION, SOLUTION INTRAVENOUS AS NEEDED
Status: CANCELLED | OUTPATIENT
Start: 2022-11-16

## 2022-11-16 RX ORDER — HEPARIN SODIUM (PORCINE) LOCK FLUSH IV SOLN 100 UNIT/ML 100 UNIT/ML
500 SOLUTION INTRAVENOUS AS NEEDED
Status: DISCONTINUED | OUTPATIENT
Start: 2022-11-16 | End: 2022-11-16 | Stop reason: HOSPADM

## 2022-11-16 RX ORDER — DIPHENHYDRAMINE HYDROCHLORIDE 50 MG/ML
50 INJECTION INTRAMUSCULAR; INTRAVENOUS AS NEEDED
Status: CANCELLED | OUTPATIENT
Start: 2022-11-23

## 2022-11-16 RX ORDER — FAMOTIDINE 10 MG/ML
20 INJECTION, SOLUTION INTRAVENOUS ONCE
Status: COMPLETED | OUTPATIENT
Start: 2022-11-16 | End: 2022-11-16

## 2022-11-16 RX ORDER — FAMOTIDINE 10 MG/ML
20 INJECTION, SOLUTION INTRAVENOUS ONCE
Status: CANCELLED | OUTPATIENT
Start: 2022-11-16

## 2022-11-16 RX ORDER — FAMOTIDINE 10 MG/ML
20 INJECTION, SOLUTION INTRAVENOUS ONCE
Status: CANCELLED | OUTPATIENT
Start: 2022-11-30

## 2022-11-16 RX ORDER — FAMOTIDINE 10 MG/ML
20 INJECTION, SOLUTION INTRAVENOUS AS NEEDED
Status: CANCELLED | OUTPATIENT
Start: 2022-11-30

## 2022-11-16 RX ORDER — SODIUM CHLORIDE 9 MG/ML
250 INJECTION, SOLUTION INTRAVENOUS ONCE
Status: COMPLETED | OUTPATIENT
Start: 2022-11-16 | End: 2022-11-16

## 2022-11-16 RX ADMIN — Medication 10 ML: at 12:22

## 2022-11-16 RX ADMIN — DEXAMETHASONE SODIUM PHOSPHATE 12 MG: 10 INJECTION, SOLUTION INTRAMUSCULAR; INTRAVENOUS at 10:19

## 2022-11-16 RX ADMIN — DIPHENHYDRAMINE HYDROCHLORIDE 50 MG: 50 INJECTION, SOLUTION INTRAMUSCULAR; INTRAVENOUS at 09:52

## 2022-11-16 RX ADMIN — SODIUM CHLORIDE 250 ML: 9 INJECTION, SOLUTION INTRAVENOUS at 09:49

## 2022-11-16 RX ADMIN — FAMOTIDINE 20 MG: 10 INJECTION INTRAVENOUS at 09:50

## 2022-11-16 RX ADMIN — Medication 500 UNITS: at 12:22

## 2022-11-16 RX ADMIN — PACLITAXEL 165 MG: 6 INJECTION, SOLUTION INTRAVENOUS at 11:08

## 2022-11-16 NOTE — PROGRESS NOTES
"  Subjective     REASON FOR FOLLOW UP:  1.  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  2. Colovesical fistula, chronic antibiotic therapy with Cipro.  3.  DVT of the right and left lower extremity. 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  Acute GI bleed      History of Present Illness    The patient is a 67 y.o. male with above his history, who returns the office today to begin palliative treatment with Taxol which will be given weekly 3 out of 4 weeks due to progressive malignancy.  He reports he is overall feeling well.  He denies any new pain.  He is eating and drinking adequately.  He has some fatigue though this is overall unchanged.  He does have a baseline neuropathy and understands to monitor this closely for worsening symptoms.  His bowels are moving normally.    Past Medical History:   Diagnosis Date   • Arthritis    • Cancer (HCC)     prostate cancer 2008   • Cancer (HCC)     esophageal/LIVER   • Diabetes mellitus (HCC)    • Difficulty swallowing solids    • Elevated PSA    • Esophageal mass    • Esophageal varices (HCC) July 2019   • GERD (gastroesophageal reflux disease)    • H/O Lung nodule    • Hepatitis     CHILD--PT STATED \"I THINK IT WAS A.\"   • History of esophageal varices with bleeding     SUMM34 2021   • History of high blood pressure    • History of pneumonia    • Hx of blood clots 08/10/2019    LOWER LEGS BILAT   • Maintenance chemotherapy     IV EVERY 4 WEEKS:  ESOPHAGEAL CANCER   • Nail fungus     LEFT FINGERNAILS X 4   • Neuropathy    • PVD (peripheral vascular disease) (HCC)    • Rash     ITCHY RED RASH ON RIGHT THIGH AREA-STATES IT COMES AND GOES.  USING PRESCRIPTION CREAM   • Recto-bladderneck fistula     on poab for recurrent uti   • Risk factors for obstructive sleep apnea         Past Surgical History:   Procedure Laterality Date   • CATARACT EXTRACTION WITH INTRAOCULAR LENS IMPLANT Bilateral    • COLONOSCOPY N/A 02/04/2020 "    Procedure: COLONOSCOPY;  Surgeon: Guy Sears MD;  Location: [facility] ENDOSCOPY;  Service: General;  Laterality: N/A;  PRE-COLOVESICAL FISTULA  POST-- DIVERTICULOSIS   • COLONOSCOPY  2/4/2020   • CYSTOSCOPY  02/06/2020   • CYSTOSCOPY Bilateral 02/26/2020    Procedure: CYSTOSCOPY RETROGRADE;  Surgeon: Cm Witt MD;  Location: [facility] MAIN OR;  Service: Urology;  Laterality: Bilateral;   • ENDOSCOPY     • ENDOSCOPY N/A 06/19/2021    Procedure: ESOPHAGOGASTRODUODENOSCOPY WITH BIOPSY;  Surgeon: Mary Brito MD;  Location: [facility] MAIN OR;  Service: Gastroenterology;  Laterality: N/A;   • ENDOSCOPY N/A 08/05/2021    Procedure: ESOPHAGOGASTRODUODENOSCOPY HEMOSPRAY;  Surgeon: Naga Shelby MD;  Location: [facility] ENDOSCOPY;  Service: Gastroenterology;  Laterality: N/A;  HX OF ESOPHAGEAL  CANCER;MELENA  POST: ESOPHAGEAL BLEEDING; ESOPHAGEAL MASS   • ENDOSCOPY N/A 11/4/2022    Procedure: ESOPHAGOGASTRODUODENOSCOPY WITH DILATATION;  Surgeon: Mary Brito MD;  Location: [facility] MAIN OR;  Service: Gastroenterology;  Laterality: N/A;   • HERNIA REPAIR Right 1999    inguinal hernia   • PROSTATE SURGERY  03/2008    prostatectectomy   • UPPER GASTROINTESTINAL ENDOSCOPY  August 2021   • VENOUS ACCESS DEVICE (PORT) INSERTION N/A 07/16/2019    Procedure: INSERTION VENOUS ACCESS DEVICE WITH FLUORO AND EGD WITH BIOPSY;  Surgeon: Mary Brito MD;  Location: [facility] MAIN OR;  Service: Thoracic        Current Outpatient Medications on File Prior to Visit   Medication Sig Dispense Refill   • acetaminophen (TYLENOL) 500 MG tablet Take 500 mg by mouth Every 6 (Six) Hours As Needed for Mild Pain .     • cevimeline (EVOXAC) 30 MG capsule Take 1 capsule by mouth 3 (Three) Times a Day. DRY MOUTH     • Chlorhexidine Gluconate 2 % pads Apply  topically. AS DIRECTED PAT     • ciprofloxacin (CIPRO) 250 MG tablet Take 1 tablet by mouth Daily. Take one every other day (Patient taking differently: Take 250 mg by mouth  Every Other Day. Take one every other day  FISTULA) 90 tablet 1   • econazole nitrate (SPECTAZOLE) 1 % cream Apply  topically to the appropriate area as directed 2 (Two) Times a Day. (Patient taking differently: Apply 1 application topically to the appropriate area as directed 2 (Two) Times a Day. NAIL FUNGUS-4 FINGERS ON LEFT) 85 g 1   • fluconazole (DIFLUCAN) 100 MG tablet Take 1 tablet by mouth Daily for 10 days. 10 tablet 0   • furosemide (LASIX) 20 MG tablet Take 1 tablet by mouth Every Other Day. (Patient taking differently: Take 20 mg by mouth As Needed. LOWER LEG EDEMA) 28 tablet 2   • gabapentin (NEURONTIN) 300 MG capsule TAKE 1 CAPSULE BY MOUTH TWICE A  capsule 0   • glipizide (GLUCOTROL XL) 5 MG ER tablet TAKE 1 TABLET BY MOUTH EVERY DAY (Patient taking differently: Take 5 mg by mouth Daily.) 90 tablet 1   • lisinopril-hydrochlorothiazide (PRINZIDE,ZESTORETIC) 20-12.5 MG per tablet TAKE 1 TABLET BY MOUTH EVERY DAY (Patient taking differently: Take 0.5 tablets by mouth As Needed. SYS GREATER THAN 130, TAKE 1/2 TAB) 90 tablet 0   • pantoprazole (Protonix) 40 MG EC tablet Take 1 tablet by mouth 2 (Two) Times a Day. 180 tablet 3   • pentoxifylline (TRENtal) 400 MG CR tablet TAKE 1 TABLET BY MOUTH TWICE A DAY WITH MEALS 90 tablet 0   • potassium chloride ER (K-TAB) 20 MEQ tablet controlled-release ER tablet Take 1 tablet by mouth Daily. 90 tablet 3   • Probiotic Product (Probiotic Blend) capsule Take 1 capsule by mouth Daily.       No current facility-administered medications on file prior to visit.        ALLERGIES:    Allergies   Allergen Reactions   • Oxaliplatin Anaphylaxis        Social History     Socioeconomic History   • Marital status: Single   • Years of education: High school   Tobacco Use   • Smoking status: Every Day     Packs/day: 1.00     Years: 38.00     Pack years: 38.00     Types: Cigarettes     Start date: 1/1/1974   • Smokeless tobacco: Never   • Tobacco comments:     Quit for a  period of 8 years.    Vaping Use   • Vaping Use: Never used   Substance and Sexual Activity   • Alcohol use: Not Currently   • Drug use: Never   • Sexual activity: Not Currently     Partners: Female     Birth control/protection: None        Family History   Problem Relation Age of Onset   • Hypertension Mother    • Stroke Mother    • Lung cancer Father    • Hypertension Other    • Lung disease Other    • Prostate cancer Other    • Malig Hyperthermia Neg Hx       Current Outpatient Medications on File Prior to Visit   Medication Sig Dispense Refill   • acetaminophen (TYLENOL) 500 MG tablet Take 500 mg by mouth Every 6 (Six) Hours As Needed for Mild Pain .     • cevimeline (EVOXAC) 30 MG capsule Take 1 capsule by mouth 3 (Three) Times a Day. DRY MOUTH     • Chlorhexidine Gluconate 2 % pads Apply  topically. AS DIRECTED PAT     • ciprofloxacin (CIPRO) 250 MG tablet Take 1 tablet by mouth Daily. Take one every other day (Patient taking differently: Take 250 mg by mouth Every Other Day. Take one every other day  FISTULA) 90 tablet 1   • econazole nitrate (SPECTAZOLE) 1 % cream Apply  topically to the appropriate area as directed 2 (Two) Times a Day. (Patient taking differently: Apply 1 application topically to the appropriate area as directed 2 (Two) Times a Day. NAIL FUNGUS-4 FINGERS ON LEFT) 85 g 1   • fluconazole (DIFLUCAN) 100 MG tablet Take 1 tablet by mouth Daily for 10 days. 10 tablet 0   • furosemide (LASIX) 20 MG tablet Take 1 tablet by mouth Every Other Day. (Patient taking differently: Take 20 mg by mouth As Needed. LOWER LEG EDEMA) 28 tablet 2   • gabapentin (NEURONTIN) 300 MG capsule TAKE 1 CAPSULE BY MOUTH TWICE A  capsule 0   • glipizide (GLUCOTROL XL) 5 MG ER tablet TAKE 1 TABLET BY MOUTH EVERY DAY (Patient taking differently: Take 5 mg by mouth Daily.) 90 tablet 1   • lisinopril-hydrochlorothiazide (PRINZIDE,ZESTORETIC) 20-12.5 MG per tablet TAKE 1 TABLET BY MOUTH EVERY DAY (Patient taking  "differently: Take 0.5 tablets by mouth As Needed. SYS GREATER THAN 130, TAKE 1/2 TAB) 90 tablet 0   • pantoprazole (Protonix) 40 MG EC tablet Take 1 tablet by mouth 2 (Two) Times a Day. 180 tablet 3   • pentoxifylline (TRENtal) 400 MG CR tablet TAKE 1 TABLET BY MOUTH TWICE A DAY WITH MEALS 90 tablet 0   • potassium chloride ER (K-TAB) 20 MEQ tablet controlled-release ER tablet Take 1 tablet by mouth Daily. 90 tablet 3   • Probiotic Product (Probiotic Blend) capsule Take 1 capsule by mouth Daily.       No current facility-administered medications on file prior to visit.     Allergies   Allergen Reactions   • Oxaliplatin Anaphylaxis          Objective     Vitals:    11/16/22 0849   BP: 147/81   Pulse: 79   Resp: 18   Temp: 97.5 °F (36.4 °C)   TempSrc: Temporal   SpO2: 99%   Weight: 88.1 kg (194 lb 4.8 oz)   Height: 175.3 cm (69.02\")   PainSc: 0-No pain     Current Status 11/16/2022   ECOG score 0     Physical Exam  Vitals reviewed.   Constitutional:       General: He is not in acute distress.     Appearance: Normal appearance. He is well-developed.   HENT:      Head: Normocephalic and atraumatic.   Eyes:      Pupils: Pupils are equal, round, and reactive to light.   Cardiovascular:      Rate and Rhythm: Normal rate and regular rhythm.      Heart sounds: Normal heart sounds. No murmur heard.  Pulmonary:      Effort: Pulmonary effort is normal. No respiratory distress.      Breath sounds: Normal breath sounds. No wheezing, rhonchi or rales.   Abdominal:      General: Bowel sounds are normal. There is no distension.      Palpations: Abdomen is soft.   Musculoskeletal:         General: No swelling. Normal range of motion.      Cervical back: Normal range of motion.   Skin:     General: Skin is warm and dry.      Findings: No rash.   Neurological:      Mental Status: He is alert and oriented to person, place, and time.     11/16/2022 physical exam unchanged from above except as updated.    RECENT LABS:  Results from last 7 " days   Lab Units 11/16/22  0839   WBC 10*3/mm3 9.60   NEUTROS ABS 10*3/mm3 7.35*   HEMOGLOBIN g/dL 14.1   HEMATOCRIT % 41.1   PLATELETS 10*3/mm3 147     Results from last 7 days   Lab Units 11/16/22  0839   SODIUM mmol/L 137   POTASSIUM mmol/L 3.9   CHLORIDE mmol/L 103   CO2 mmol/L 22.5   BUN mg/dL 11   CREATININE mg/dL 0.67*   CALCIUM mg/dL 9.3   ALBUMIN g/dL 4.10   BILIRUBIN mg/dL 0.6   ALK PHOS U/L 169*   ALT (SGPT) U/L 19   AST (SGOT) U/L 18   GLUCOSE mg/dL 119           Assessment & Plan    1.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 Almost 90% of the liver was replaced by tumor.  · HER-2 positive  · Initially treated with FOLFOX initiated July 2019  · Herceptin added with cycle 2  · Following 8 cycles of FOLFOX, oxaliplatin discontinued with continuation of 5-FU, leucovorin, Herceptin. This was continued through 7/7/2020  · Increasing CEA led to PET scan 6/9/2021.  Disease progression noted with growth of the tumor in the lower esophagus.  Continued stability of hepatic metastasis  · Repeat EGD 6/19/2021 for tissue specimen and Next Generation Sequencing.  · Endoscopy that shows a noncircumferential abnormality in the lower esophagus that encompasses almost 6 cm in length. It is not blocking off the esophagus and that is why the patient has no symptoms. The pathology shows at least atypical cells consistent with cancer.  · NGS showing PD-L1 positive and high mutation burden.  The tumor remains HER-2 positive.  Treatment plan for Keytruda every 3 weeks x 4 cycles followed by repeat imaging  · Keytruda initiated 7/29/2021  · Hospitalization 8/2/2021 through 8/7/2021 secondary to acute GI bleeding from known malignancy.  · Completed 10 fractions of radiation to the lower esophagus 8/18/2021 after GI bleed  · PET scan following four cycles of Keytruda 10/14/2021 with improvement in the patient's known neoplasm involving the distal esophagus. However, known metastatic disease  involving the liver is significantly worse. Keytruda discontinued  · Enhertu initiated 10/29/2021  · 12/23/2021 PET scan following three cycles of Enhertu discloses resolution of the tumor in the esophagus. He continues to have two active lesions in the liver with significant SUV activity pertinent to his metastatic disease. Minimal pulmonary infiltrates noted bilaterally. With concerns for Enhertu interstitial lung disease, Enhertu discontinued  · Treatment transitioned to FOLFOX Herceptin  · Borderline neutropenia 2/9/2022 with plans for 15% dose reduction to FOLFOX  · 2/16/2022, significant reaction to oxaliplatin which is discontinued  · Reevaluation 3/2/2022, with discontinue of oxaliplatin, the patient will continue on 5-FU, leucovorin, Kanjinti. Of note, the patient received 6 mg/kg of Kanjinti 2 weeks ago, therefore he cannot receive this medication today. He will receive only 5-FU leucovorin and return in 1 week for Kanjinti 2 mg/kg. In 2 weeks, he will resume his normal schedule of 5-FU, leucovorin, Kanjinti 4 mg/kg every 2 weeks.  · 3/16/2022 will continue on with 5-FU, leucovorin, Herceptin.  CEA dramatically improving.  · 3/25/2022 CT C/A/P When compared to recent PET/CT there is interval decrease in size of the previously demonstrated hypermetabolic hepatic metastases as well as decrease in size of the periportal lymph node. The wall thickening involving the distal esophagus is largely unchanged. 2. Persistent findings of colovesicular fistula. 3. Ill-defined interstitial and ground glass opacities within the bilateral lower lobes favored to represent postradiation pneumonitis/change  · 6/8/2022 CEA 8.0.  · 6/29/2022 here for continued 5-FU, leucovorin, Kanjanti, overall tolerating well.  · 8/17/2022 continuing on 5-FU, leucovorin, Herceptin tolerating well.  CEA 8.  Patient was advised to continue with his current treatment.  Not planning a another radiological assessment unless patient has further  rise in CEA level.  · 9/14/2022 here for continued 5-FU, leucovorin, Herceptin, continuing to tolerate quite well.   · 10/12/2022, progressive dysphagia with minimal rise of his CEA with plans made for PET scan  · Endoscopy 11/4/2022 with findings very tight esophagus with strictures.  Biopsies with pathology consistent with adenocarcinoma.  · PET scan 11/2/2022 with a long segment of activity of the esophagus and additional 2 tumoral lesions of the liver or 1 in the left lobe and the other in the right lobe.  There is also a CV activity close to the head of the pancreas.  · 5-FU, leucovorin, Herceptin discontinued with plans to move to Taxol weekly 3 to 4 weeks coordinated with radiation therapy.  · Consult with radiation oncology 11/15/2022 with plans for 10 fractions to the distal esophagus  · Initiate single agent Taxol today, 11/16/2022    2. Colovesical fistula.   · He requires intermittent Cipro when he notes stool in his urine  · He currently reports his symptoms are more progressive with more urine in his rectum. He continues on Cipro and is scheduled for follow-up with urology, Dr. Witt later this month  · He has been seen by Cm Witt MD. He has advised the patient that he could not do too much for the fistula given the fact that it is just inconveniences of having urination through the anal canal from time to time but no urinary tract infection. He asked the patient to postpone any intention for surgery unless that it is absolutely necessary. Postponement of chemotherapy for 3 months if any surgery is necessary will trigger dramatic progression of his tumor and obviously consequences.   · On 06/08/2022, his colovesical fistula continues. Most of the output is urine through the rectum, no stool through the bladder. He has not had any urinary tract infection but he continues using prophylactically his ciprofloxacin 250 mg orally every other day. I asked him to remain on this medicine for the time  being  · 08/17/2022 the patient continues having colovesical fistula symptomatology intermittently, passing urine through the rectum and not passing feces through the bladder. He has not had any other urinary tract infections. He continues using his ciprofloxacin as per my instructions  · He continues on prophylactic ciprofloxacin every other day with no progressive symptoms today    3.  Thrombophilia secondary malignancy, DVT  · Currently anticoagulated with Xarelto 20 mg daily  · Following GI bleed 8/2/2021, Xarelto has been discontinued  · Plan no evidence of recurrent thrombosis    4. Acute GI bleeding  · Dark tarry stool initially began 7/29/2021  · 7/23/2021, hemoglobin of 11.0.  8/2/2021, hemoglobin of 5.7  · Patient admitted, received transfusion of 4 units packed red blood cells.  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   · Without signs or symptoms of bleeding, hemoglobin today 14.1    5. Enhertu initiated interstitial lung disease  · The patient completed a prednisone taper  · He is without hypoxia or tachycardia currently. He has only minimal dyspnea on exertion    6. Significant oxaliplatin reaction  · 2/16/2022, significant reaction with shortness of breath, limited air movement, treated with high-dose Solu-Cortef. He is not a candidate for further oxaliplatin    7. Borderline thrombocytopenia  · 3/2/2021, the patient is a platelet count of 101,000. With the discontinuation of oxaliplatin, we can continue with 5-FU leucovorin which will likely not cause significant issues with his platelets.  · 5/18/2022 platelet count normalized at  141,000  · Platelets within normal limits today, 11/16/2020 ,000    8.  Hypertension: He previously been on lisinopril/hydrochlorothiazide however this medication is currently on hold due to some issues with hypotension.  He now only takes  the medication if his blood pressure is above 130/90.  · Blood pressure today of 147/81      9.  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  · Symptoms are under good control now.  · The patient understands the risk of progressive neuropathy with the initiation of Taxol    10.  Lower extremity edema:  · Bilateral.  Patient has been on his feet much more recently and has been more active.  It seemed to have worsened once the weather got warmer.  He states he was told previously by a cardiothoracic surgeon not to wear compression socks.  I have advised him to watch his salt intake, elevate his legs when he possible, and make sure he is drinking plenty of water.  Continue to monitor, and call if worsens.  · 2022 patient was prescribed lasix 20 mg every other day, which is helping some.  He is also trying to elevate his legs above his heart twice a day, which he also feels is helping.   · He is currently without swelling today, 2022    11.  Nail fungus: Present on patient's left hand.  I did refill his Spectazole as his tube was .      12.  Candida on EGD biopsy  · 2022 prescribe Diflucan 100 mg x 10 days    PLAN:  1. Proceed today with cycle 1 day 1 palliative low-dose Taxol which will be given 3 to 4 weeks  2. The patient will follow up with radiation oncology with plans for 10 fractions of palliative radiation  3. Complete Diflucan course as prescribed  4. Continue Lasix if needed.  5. Baseline CEA pending today  6. Return in 1 week in 2 weeks for CBC and continued weekly Taxol  7. MD follow-up in 4 weeks with CBC, CMP, cycle 2-day 1 palliative Taxol  8. With the patient's baseline neuropathy, will monitor closely for worsening symptoms in light of Taxol initiation    Patient on high risk medication requiring close member toxicity.    Dana Paniagua, APRN  2022

## 2022-11-17 PROCEDURE — 77334 RADIATION TREATMENT AID(S): CPT | Performed by: STUDENT IN AN ORGANIZED HEALTH CARE EDUCATION/TRAINING PROGRAM

## 2022-11-17 PROCEDURE — 77470 SPECIAL RADIATION TREATMENT: CPT | Performed by: STUDENT IN AN ORGANIZED HEALTH CARE EDUCATION/TRAINING PROGRAM

## 2022-11-21 ENCOUNTER — OFFICE VISIT (OUTPATIENT)
Dept: SURGERY | Facility: CLINIC | Age: 67
End: 2022-11-21

## 2022-11-21 VITALS
HEART RATE: 88 BPM | DIASTOLIC BLOOD PRESSURE: 62 MMHG | BODY MASS INDEX: 28.73 KG/M2 | SYSTOLIC BLOOD PRESSURE: 132 MMHG | WEIGHT: 194 LBS | HEIGHT: 69 IN | OXYGEN SATURATION: 99 %

## 2022-11-21 DIAGNOSIS — C15.5 MALIGNANT NEOPLASM OF LOWER THIRD OF ESOPHAGUS: Primary | ICD-10-CM

## 2022-11-21 PROCEDURE — 99213 OFFICE O/P EST LOW 20 MIN: CPT | Performed by: THORACIC SURGERY (CARDIOTHORACIC VASCULAR SURGERY)

## 2022-11-23 ENCOUNTER — INFUSION (OUTPATIENT)
Dept: ONCOLOGY | Facility: HOSPITAL | Age: 67
End: 2022-11-23

## 2022-11-23 VITALS
TEMPERATURE: 97.5 F | HEART RATE: 80 BPM | DIASTOLIC BLOOD PRESSURE: 80 MMHG | SYSTOLIC BLOOD PRESSURE: 158 MMHG | WEIGHT: 192.2 LBS | RESPIRATION RATE: 16 BRPM | OXYGEN SATURATION: 99 % | BODY MASS INDEX: 28.47 KG/M2

## 2022-11-23 DIAGNOSIS — Z45.2 ENCOUNTER FOR ADJUSTMENT OR MANAGEMENT OF VASCULAR ACCESS DEVICE: ICD-10-CM

## 2022-11-23 DIAGNOSIS — C15.5 MALIGNANT NEOPLASM OF LOWER THIRD OF ESOPHAGUS: Primary | ICD-10-CM

## 2022-11-23 DIAGNOSIS — D49.0 ESOPHAGUS NEOPLASM: ICD-10-CM

## 2022-11-23 DIAGNOSIS — C78.7 LIVER METASTASIS: ICD-10-CM

## 2022-11-23 LAB
ALBUMIN SERPL-MCNC: 4.1 G/DL (ref 3.5–5.2)
ALBUMIN/GLOB SERPL: 1.4 G/DL (ref 1.1–2.4)
ALP SERPL-CCNC: 159 U/L (ref 38–116)
ALT SERPL W P-5'-P-CCNC: 25 U/L (ref 0–41)
ANION GAP SERPL CALCULATED.3IONS-SCNC: 12.4 MMOL/L (ref 5–15)
AST SERPL-CCNC: 19 U/L (ref 0–40)
BASOPHILS # BLD AUTO: 0.04 10*3/MM3 (ref 0–0.2)
BASOPHILS NFR BLD AUTO: 0.5 % (ref 0–1.5)
BILIRUB SERPL-MCNC: 0.4 MG/DL (ref 0.2–1.2)
BUN SERPL-MCNC: 12 MG/DL (ref 6–20)
BUN/CREAT SERPL: 20.3 (ref 7.3–30)
CALCIUM SPEC-SCNC: 9.4 MG/DL (ref 8.5–10.2)
CHLORIDE SERPL-SCNC: 103 MMOL/L (ref 98–107)
CO2 SERPL-SCNC: 22.6 MMOL/L (ref 22–29)
CREAT SERPL-MCNC: 0.59 MG/DL (ref 0.7–1.3)
DEPRECATED RDW RBC AUTO: 46.5 FL (ref 37–54)
EGFRCR SERPLBLD CKD-EPI 2021: 107 ML/MIN/1.73
EOSINOPHIL # BLD AUTO: 0.22 10*3/MM3 (ref 0–0.4)
EOSINOPHIL NFR BLD AUTO: 3 % (ref 0.3–6.2)
ERYTHROCYTE [DISTWIDTH] IN BLOOD BY AUTOMATED COUNT: 13.9 % (ref 12.3–15.4)
GLOBULIN UR ELPH-MCNC: 3 GM/DL (ref 1.8–3.5)
GLUCOSE SERPL-MCNC: 109 MG/DL (ref 74–124)
HCT VFR BLD AUTO: 41 % (ref 37.5–51)
HGB BLD-MCNC: 14.1 G/DL (ref 13–17.7)
IMM GRANULOCYTES # BLD AUTO: 0.14 10*3/MM3 (ref 0–0.05)
IMM GRANULOCYTES NFR BLD AUTO: 1.9 % (ref 0–0.5)
LYMPHOCYTES # BLD AUTO: 1.17 10*3/MM3 (ref 0.7–3.1)
LYMPHOCYTES NFR BLD AUTO: 15.9 % (ref 19.6–45.3)
MCH RBC QN AUTO: 31.3 PG (ref 26.6–33)
MCHC RBC AUTO-ENTMCNC: 34.4 G/DL (ref 31.5–35.7)
MCV RBC AUTO: 91.1 FL (ref 79–97)
MONOCYTES # BLD AUTO: 0.39 10*3/MM3 (ref 0.1–0.9)
MONOCYTES NFR BLD AUTO: 5.3 % (ref 5–12)
NEUTROPHILS NFR BLD AUTO: 5.41 10*3/MM3 (ref 1.7–7)
NEUTROPHILS NFR BLD AUTO: 73.4 % (ref 42.7–76)
NRBC BLD AUTO-RTO: 0 /100 WBC (ref 0–0.2)
PLATELET # BLD AUTO: 149 10*3/MM3 (ref 140–450)
PMV BLD AUTO: 9.7 FL (ref 6–12)
POTASSIUM SERPL-SCNC: 3.9 MMOL/L (ref 3.5–4.7)
PROT SERPL-MCNC: 7.1 G/DL (ref 6.3–8)
RBC # BLD AUTO: 4.5 10*6/MM3 (ref 4.14–5.8)
SODIUM SERPL-SCNC: 138 MMOL/L (ref 134–145)
WBC NRBC COR # BLD: 7.37 10*3/MM3 (ref 3.4–10.8)

## 2022-11-23 PROCEDURE — 25010000002 DEXAMETHASONE SODIUM PHOSPHATE 100 MG/10ML SOLUTION: Performed by: INTERNAL MEDICINE

## 2022-11-23 PROCEDURE — 25010000002 PACLITAXEL PER 1 MG: Performed by: INTERNAL MEDICINE

## 2022-11-23 PROCEDURE — 85025 COMPLETE CBC W/AUTO DIFF WBC: CPT

## 2022-11-23 PROCEDURE — 25010000002 DIPHENHYDRAMINE PER 50 MG: Performed by: INTERNAL MEDICINE

## 2022-11-23 PROCEDURE — 77300 RADIATION THERAPY DOSE PLAN: CPT | Performed by: STUDENT IN AN ORGANIZED HEALTH CARE EDUCATION/TRAINING PROGRAM

## 2022-11-23 PROCEDURE — 77301 RADIOTHERAPY DOSE PLAN IMRT: CPT | Performed by: STUDENT IN AN ORGANIZED HEALTH CARE EDUCATION/TRAINING PROGRAM

## 2022-11-23 PROCEDURE — 80053 COMPREHEN METABOLIC PANEL: CPT

## 2022-11-23 PROCEDURE — 25010000002 HEPARIN LOCK FLUSH PER 10 UNITS: Performed by: INTERNAL MEDICINE

## 2022-11-23 PROCEDURE — 96413 CHEMO IV INFUSION 1 HR: CPT

## 2022-11-23 PROCEDURE — 77338 DESIGN MLC DEVICE FOR IMRT: CPT | Performed by: STUDENT IN AN ORGANIZED HEALTH CARE EDUCATION/TRAINING PROGRAM

## 2022-11-23 PROCEDURE — 96375 TX/PRO/DX INJ NEW DRUG ADDON: CPT

## 2022-11-23 RX ORDER — SODIUM CHLORIDE 0.9 % (FLUSH) 0.9 %
10 SYRINGE (ML) INJECTION AS NEEDED
Status: CANCELLED | OUTPATIENT
Start: 2022-11-23

## 2022-11-23 RX ORDER — SODIUM CHLORIDE 0.9 % (FLUSH) 0.9 %
10 SYRINGE (ML) INJECTION AS NEEDED
Status: DISCONTINUED | OUTPATIENT
Start: 2022-11-23 | End: 2022-11-23 | Stop reason: HOSPADM

## 2022-11-23 RX ORDER — HEPARIN SODIUM (PORCINE) LOCK FLUSH IV SOLN 100 UNIT/ML 100 UNIT/ML
500 SOLUTION INTRAVENOUS AS NEEDED
Status: CANCELLED | OUTPATIENT
Start: 2022-11-23

## 2022-11-23 RX ORDER — SODIUM CHLORIDE 9 MG/ML
250 INJECTION, SOLUTION INTRAVENOUS ONCE
Status: COMPLETED | OUTPATIENT
Start: 2022-11-23 | End: 2022-11-23

## 2022-11-23 RX ORDER — HEPARIN SODIUM (PORCINE) LOCK FLUSH IV SOLN 100 UNIT/ML 100 UNIT/ML
500 SOLUTION INTRAVENOUS AS NEEDED
Status: DISCONTINUED | OUTPATIENT
Start: 2022-11-23 | End: 2022-11-23 | Stop reason: HOSPADM

## 2022-11-23 RX ORDER — FAMOTIDINE 10 MG/ML
20 INJECTION, SOLUTION INTRAVENOUS ONCE
Status: COMPLETED | OUTPATIENT
Start: 2022-11-23 | End: 2022-11-23

## 2022-11-23 RX ADMIN — Medication 10 ML: at 10:53

## 2022-11-23 RX ADMIN — FAMOTIDINE 20 MG: 10 INJECTION INTRAVENOUS at 08:50

## 2022-11-23 RX ADMIN — DIPHENHYDRAMINE HYDROCHLORIDE 25 MG: 50 INJECTION, SOLUTION INTRAMUSCULAR; INTRAVENOUS at 08:52

## 2022-11-23 RX ADMIN — DEXAMETHASONE SODIUM PHOSPHATE 12 MG: 10 INJECTION, SOLUTION INTRAMUSCULAR; INTRAVENOUS at 09:09

## 2022-11-23 RX ADMIN — SODIUM CHLORIDE 250 ML: 9 INJECTION, SOLUTION INTRAVENOUS at 08:50

## 2022-11-23 RX ADMIN — PACLITAXEL 165 MG: 6 INJECTION, SOLUTION INTRAVENOUS at 09:53

## 2022-11-23 RX ADMIN — Medication 500 UNITS: at 10:53

## 2022-11-24 LAB
RAD ONC ARIA COURSE END DATE: NORMAL
RAD ONC ARIA COURSE ID: NORMAL
RAD ONC ARIA COURSE INTENT: NORMAL
RAD ONC ARIA COURSE LAST TREATMENT DATE: NORMAL
RAD ONC ARIA COURSE START DATE: NORMAL
RAD ONC ARIA COURSE TREATMENT ELAPSED DAYS: 13
RAD ONC ARIA FIRST TREATMENT DATE: NORMAL
RAD ONC ARIA PLAN FRACTIONS TREATED TO DATE: 10
RAD ONC ARIA PLAN ID: NORMAL
RAD ONC ARIA PLAN NAME: NORMAL
RAD ONC ARIA PLAN PRESCRIBED DOSE PER FRACTION: 3 GY
RAD ONC ARIA PLAN PRIMARY REFERENCE POINT: NORMAL
RAD ONC ARIA PLAN TOTAL FRACTIONS PRESCRIBED: 10
RAD ONC ARIA PLAN TOTAL PRESCRIBED DOSE: 3000 CGY
RAD ONC ARIA REFERENCE POINT DOSAGE GIVEN TO DATE: 30 GY
RAD ONC ARIA REFERENCE POINT DOSAGE GIVEN TO DATE: 30 GY
RAD ONC ARIA REFERENCE POINT ID: NORMAL
RAD ONC ARIA REFERENCE POINT ID: NORMAL

## 2022-11-30 ENCOUNTER — TREATMENT (OUTPATIENT)
Dept: RADIATION ONCOLOGY | Facility: HOSPITAL | Age: 67
End: 2022-11-30

## 2022-11-30 ENCOUNTER — INFUSION (OUTPATIENT)
Dept: ONCOLOGY | Facility: HOSPITAL | Age: 67
End: 2022-11-30

## 2022-11-30 VITALS
BODY MASS INDEX: 28.23 KG/M2 | HEART RATE: 90 BPM | WEIGHT: 190.6 LBS | RESPIRATION RATE: 16 BRPM | OXYGEN SATURATION: 99 % | DIASTOLIC BLOOD PRESSURE: 64 MMHG | TEMPERATURE: 97.8 F | SYSTOLIC BLOOD PRESSURE: 100 MMHG

## 2022-11-30 DIAGNOSIS — D49.0 ESOPHAGUS NEOPLASM: ICD-10-CM

## 2022-11-30 DIAGNOSIS — C78.7 LIVER METASTASIS: ICD-10-CM

## 2022-11-30 DIAGNOSIS — C15.5 MALIGNANT NEOPLASM OF LOWER THIRD OF ESOPHAGUS: Primary | ICD-10-CM

## 2022-11-30 LAB
ALBUMIN SERPL-MCNC: 4.3 G/DL (ref 3.5–5.2)
ALBUMIN/GLOB SERPL: 1.7 G/DL (ref 1.1–2.4)
ALP SERPL-CCNC: 151 U/L (ref 38–116)
ALT SERPL W P-5'-P-CCNC: 29 U/L (ref 0–41)
ANION GAP SERPL CALCULATED.3IONS-SCNC: 14 MMOL/L (ref 5–15)
AST SERPL-CCNC: 20 U/L (ref 0–40)
BASOPHILS # BLD AUTO: 0.05 10*3/MM3 (ref 0–0.2)
BASOPHILS NFR BLD AUTO: 0.7 % (ref 0–1.5)
BILIRUB SERPL-MCNC: 0.5 MG/DL (ref 0.2–1.2)
BUN SERPL-MCNC: 10 MG/DL (ref 6–20)
BUN/CREAT SERPL: 16.1 (ref 7.3–30)
CALCIUM SPEC-SCNC: 9.5 MG/DL (ref 8.5–10.2)
CHLORIDE SERPL-SCNC: 103 MMOL/L (ref 98–107)
CO2 SERPL-SCNC: 22 MMOL/L (ref 22–29)
CREAT SERPL-MCNC: 0.62 MG/DL (ref 0.7–1.3)
DEPRECATED RDW RBC AUTO: 45.5 FL (ref 37–54)
EGFRCR SERPLBLD CKD-EPI 2021: 105.4 ML/MIN/1.73
EOSINOPHIL # BLD AUTO: 0.13 10*3/MM3 (ref 0–0.4)
EOSINOPHIL NFR BLD AUTO: 1.9 % (ref 0.3–6.2)
ERYTHROCYTE [DISTWIDTH] IN BLOOD BY AUTOMATED COUNT: 13.9 % (ref 12.3–15.4)
GLOBULIN UR ELPH-MCNC: 2.6 GM/DL (ref 1.8–3.5)
GLUCOSE SERPL-MCNC: 117 MG/DL (ref 74–124)
HCT VFR BLD AUTO: 39.8 % (ref 37.5–51)
HGB BLD-MCNC: 13.7 G/DL (ref 13–17.7)
IMM GRANULOCYTES # BLD AUTO: 0.17 10*3/MM3 (ref 0–0.05)
IMM GRANULOCYTES NFR BLD AUTO: 2.5 % (ref 0–0.5)
LYMPHOCYTES # BLD AUTO: 1.04 10*3/MM3 (ref 0.7–3.1)
LYMPHOCYTES NFR BLD AUTO: 15.1 % (ref 19.6–45.3)
MCH RBC QN AUTO: 31 PG (ref 26.6–33)
MCHC RBC AUTO-ENTMCNC: 34.4 G/DL (ref 31.5–35.7)
MCV RBC AUTO: 90 FL (ref 79–97)
MONOCYTES # BLD AUTO: 0.34 10*3/MM3 (ref 0.1–0.9)
MONOCYTES NFR BLD AUTO: 4.9 % (ref 5–12)
NEUTROPHILS NFR BLD AUTO: 5.16 10*3/MM3 (ref 1.7–7)
NEUTROPHILS NFR BLD AUTO: 74.9 % (ref 42.7–76)
NRBC BLD AUTO-RTO: 0 /100 WBC (ref 0–0.2)
PLATELET # BLD AUTO: 146 10*3/MM3 (ref 140–450)
PMV BLD AUTO: 10.2 FL (ref 6–12)
POTASSIUM SERPL-SCNC: 4.1 MMOL/L (ref 3.5–4.7)
PROT SERPL-MCNC: 6.9 G/DL (ref 6.3–8)
RAD ONC ARIA COURSE ID: NORMAL
RAD ONC ARIA COURSE INTENT: NORMAL
RAD ONC ARIA COURSE LAST TREATMENT DATE: NORMAL
RAD ONC ARIA COURSE START DATE: NORMAL
RAD ONC ARIA COURSE TREATMENT ELAPSED DAYS: 0
RAD ONC ARIA FIRST TREATMENT DATE: NORMAL
RAD ONC ARIA PLAN FRACTIONS TREATED TO DATE: 1
RAD ONC ARIA PLAN ID: NORMAL
RAD ONC ARIA PLAN PRESCRIBED DOSE PER FRACTION: 3 GY
RAD ONC ARIA PLAN PRIMARY REFERENCE POINT: NORMAL
RAD ONC ARIA PLAN TOTAL FRACTIONS PRESCRIBED: 10
RAD ONC ARIA PLAN TOTAL PRESCRIBED DOSE: 3000 CGY
RAD ONC ARIA REFERENCE POINT DOSAGE GIVEN TO DATE: 3 GY
RAD ONC ARIA REFERENCE POINT DOSAGE GIVEN TO DATE: 3.06 GY
RAD ONC ARIA REFERENCE POINT ID: NORMAL
RAD ONC ARIA REFERENCE POINT ID: NORMAL
RAD ONC ARIA REFERENCE POINT SESSION DOSAGE GIVEN: 3 GY
RAD ONC ARIA REFERENCE POINT SESSION DOSAGE GIVEN: 3.06 GY
RBC # BLD AUTO: 4.42 10*6/MM3 (ref 4.14–5.8)
SODIUM SERPL-SCNC: 139 MMOL/L (ref 134–145)
WBC NRBC COR # BLD: 6.89 10*3/MM3 (ref 3.4–10.8)

## 2022-11-30 PROCEDURE — 25010000002 DEXAMETHASONE SODIUM PHOSPHATE 100 MG/10ML SOLUTION: Performed by: INTERNAL MEDICINE

## 2022-11-30 PROCEDURE — 77427 RADIATION TX MANAGEMENT X5: CPT | Performed by: STUDENT IN AN ORGANIZED HEALTH CARE EDUCATION/TRAINING PROGRAM

## 2022-11-30 PROCEDURE — 77386 CHG INTENSITY MODULATED RADIATION TX DLVR COMPLEX: CPT | Performed by: STUDENT IN AN ORGANIZED HEALTH CARE EDUCATION/TRAINING PROGRAM

## 2022-11-30 PROCEDURE — 96375 TX/PRO/DX INJ NEW DRUG ADDON: CPT

## 2022-11-30 PROCEDURE — 96413 CHEMO IV INFUSION 1 HR: CPT

## 2022-11-30 PROCEDURE — 85025 COMPLETE CBC W/AUTO DIFF WBC: CPT

## 2022-11-30 PROCEDURE — 25010000002 PACLITAXEL PER 1 MG: Performed by: INTERNAL MEDICINE

## 2022-11-30 PROCEDURE — 80053 COMPREHEN METABOLIC PANEL: CPT

## 2022-11-30 PROCEDURE — 25010000002 DIPHENHYDRAMINE PER 50 MG: Performed by: INTERNAL MEDICINE

## 2022-11-30 PROCEDURE — 77386: CPT | Performed by: STUDENT IN AN ORGANIZED HEALTH CARE EDUCATION/TRAINING PROGRAM

## 2022-11-30 RX ORDER — SODIUM CHLORIDE 9 MG/ML
250 INJECTION, SOLUTION INTRAVENOUS ONCE
Status: COMPLETED | OUTPATIENT
Start: 2022-11-30 | End: 2022-11-30

## 2022-11-30 RX ORDER — FAMOTIDINE 10 MG/ML
20 INJECTION, SOLUTION INTRAVENOUS ONCE
Status: COMPLETED | OUTPATIENT
Start: 2022-11-30 | End: 2022-11-30

## 2022-11-30 RX ADMIN — DIPHENHYDRAMINE HYDROCHLORIDE 25 MG: 50 INJECTION, SOLUTION INTRAMUSCULAR; INTRAVENOUS at 09:19

## 2022-11-30 RX ADMIN — SODIUM CHLORIDE 250 ML: 9 INJECTION, SOLUTION INTRAVENOUS at 09:19

## 2022-11-30 RX ADMIN — PACLITAXEL 165 MG: 6 INJECTION, SOLUTION INTRAVENOUS at 10:22

## 2022-11-30 RX ADMIN — FAMOTIDINE 20 MG: 10 INJECTION INTRAVENOUS at 09:19

## 2022-11-30 RX ADMIN — DEXAMETHASONE SODIUM PHOSPHATE 12 MG: 10 INJECTION, SOLUTION INTRAMUSCULAR; INTRAVENOUS at 09:37

## 2022-12-01 ENCOUNTER — APPOINTMENT (OUTPATIENT)
Dept: RADIATION ONCOLOGY | Facility: HOSPITAL | Age: 67
End: 2022-12-01
Payer: MEDICARE

## 2022-12-01 LAB
RAD ONC ARIA COURSE ID: NORMAL
RAD ONC ARIA COURSE INTENT: NORMAL
RAD ONC ARIA COURSE LAST TREATMENT DATE: NORMAL
RAD ONC ARIA COURSE START DATE: NORMAL
RAD ONC ARIA COURSE TREATMENT ELAPSED DAYS: 1
RAD ONC ARIA FIRST TREATMENT DATE: NORMAL
RAD ONC ARIA PLAN FRACTIONS TREATED TO DATE: 2
RAD ONC ARIA PLAN ID: NORMAL
RAD ONC ARIA PLAN PRESCRIBED DOSE PER FRACTION: 3 GY
RAD ONC ARIA PLAN PRIMARY REFERENCE POINT: NORMAL
RAD ONC ARIA PLAN TOTAL FRACTIONS PRESCRIBED: 10
RAD ONC ARIA PLAN TOTAL PRESCRIBED DOSE: 3000 CGY
RAD ONC ARIA REFERENCE POINT DOSAGE GIVEN TO DATE: 6 GY
RAD ONC ARIA REFERENCE POINT DOSAGE GIVEN TO DATE: 6.12 GY
RAD ONC ARIA REFERENCE POINT ID: NORMAL
RAD ONC ARIA REFERENCE POINT ID: NORMAL
RAD ONC ARIA REFERENCE POINT SESSION DOSAGE GIVEN: 3 GY
RAD ONC ARIA REFERENCE POINT SESSION DOSAGE GIVEN: 3.06 GY

## 2022-12-01 PROCEDURE — 77386: CPT | Performed by: RADIOLOGY

## 2022-12-01 PROCEDURE — 77386 CHG INTENSITY MODULATED RADIATION TX DLVR COMPLEX: CPT | Performed by: RADIOLOGY

## 2022-12-02 ENCOUNTER — TREATMENT (OUTPATIENT)
Dept: RADIATION ONCOLOGY | Facility: HOSPITAL | Age: 67
End: 2022-12-02

## 2022-12-02 LAB
RAD ONC ARIA COURSE ID: NORMAL
RAD ONC ARIA COURSE INTENT: NORMAL
RAD ONC ARIA COURSE LAST TREATMENT DATE: NORMAL
RAD ONC ARIA COURSE START DATE: NORMAL
RAD ONC ARIA COURSE TREATMENT ELAPSED DAYS: 2
RAD ONC ARIA FIRST TREATMENT DATE: NORMAL
RAD ONC ARIA PLAN FRACTIONS TREATED TO DATE: 3
RAD ONC ARIA PLAN ID: NORMAL
RAD ONC ARIA PLAN PRESCRIBED DOSE PER FRACTION: 3 GY
RAD ONC ARIA PLAN PRIMARY REFERENCE POINT: NORMAL
RAD ONC ARIA PLAN TOTAL FRACTIONS PRESCRIBED: 10
RAD ONC ARIA PLAN TOTAL PRESCRIBED DOSE: 3000 CGY
RAD ONC ARIA REFERENCE POINT DOSAGE GIVEN TO DATE: 9 GY
RAD ONC ARIA REFERENCE POINT DOSAGE GIVEN TO DATE: 9.18 GY
RAD ONC ARIA REFERENCE POINT ID: NORMAL
RAD ONC ARIA REFERENCE POINT ID: NORMAL
RAD ONC ARIA REFERENCE POINT SESSION DOSAGE GIVEN: 3 GY
RAD ONC ARIA REFERENCE POINT SESSION DOSAGE GIVEN: 3.06 GY

## 2022-12-02 PROCEDURE — 77336 RADIATION PHYSICS CONSULT: CPT | Performed by: STUDENT IN AN ORGANIZED HEALTH CARE EDUCATION/TRAINING PROGRAM

## 2022-12-02 PROCEDURE — 77386 CHG INTENSITY MODULATED RADIATION TX DLVR COMPLEX: CPT | Performed by: STUDENT IN AN ORGANIZED HEALTH CARE EDUCATION/TRAINING PROGRAM

## 2022-12-02 PROCEDURE — 77386: CPT | Performed by: STUDENT IN AN ORGANIZED HEALTH CARE EDUCATION/TRAINING PROGRAM

## 2022-12-05 ENCOUNTER — RADIATION ONCOLOGY WEEKLY ASSESSMENT (OUTPATIENT)
Dept: RADIATION ONCOLOGY | Facility: HOSPITAL | Age: 67
End: 2022-12-05

## 2022-12-05 ENCOUNTER — TREATMENT (OUTPATIENT)
Dept: RADIATION ONCOLOGY | Facility: HOSPITAL | Age: 67
End: 2022-12-05

## 2022-12-05 VITALS — DIASTOLIC BLOOD PRESSURE: 84 MMHG | SYSTOLIC BLOOD PRESSURE: 172 MMHG | HEART RATE: 86 BPM | OXYGEN SATURATION: 99 %

## 2022-12-05 DIAGNOSIS — C15.5 MALIGNANT NEOPLASM OF LOWER THIRD OF ESOPHAGUS: Primary | ICD-10-CM

## 2022-12-05 LAB
RAD ONC ARIA COURSE ID: NORMAL
RAD ONC ARIA COURSE INTENT: NORMAL
RAD ONC ARIA COURSE LAST TREATMENT DATE: NORMAL
RAD ONC ARIA COURSE START DATE: NORMAL
RAD ONC ARIA COURSE TREATMENT ELAPSED DAYS: 5
RAD ONC ARIA FIRST TREATMENT DATE: NORMAL
RAD ONC ARIA PLAN FRACTIONS TREATED TO DATE: 4
RAD ONC ARIA PLAN ID: NORMAL
RAD ONC ARIA PLAN PRESCRIBED DOSE PER FRACTION: 3 GY
RAD ONC ARIA PLAN PRIMARY REFERENCE POINT: NORMAL
RAD ONC ARIA PLAN TOTAL FRACTIONS PRESCRIBED: 10
RAD ONC ARIA PLAN TOTAL PRESCRIBED DOSE: 3000 CGY
RAD ONC ARIA REFERENCE POINT DOSAGE GIVEN TO DATE: 12 GY
RAD ONC ARIA REFERENCE POINT DOSAGE GIVEN TO DATE: 12.24 GY
RAD ONC ARIA REFERENCE POINT ID: NORMAL
RAD ONC ARIA REFERENCE POINT ID: NORMAL
RAD ONC ARIA REFERENCE POINT SESSION DOSAGE GIVEN: 3 GY
RAD ONC ARIA REFERENCE POINT SESSION DOSAGE GIVEN: 3.06 GY

## 2022-12-05 PROCEDURE — 77386 CHG INTENSITY MODULATED RADIATION TX DLVR COMPLEX: CPT | Performed by: STUDENT IN AN ORGANIZED HEALTH CARE EDUCATION/TRAINING PROGRAM

## 2022-12-05 PROCEDURE — 77386: CPT | Performed by: STUDENT IN AN ORGANIZED HEALTH CARE EDUCATION/TRAINING PROGRAM

## 2022-12-05 NOTE — PROGRESS NOTES
Radiation Oncology  On-Treatment Note      Patient: Han Garcia    MRN: [id number]    Attending Physician: Lisandro Colvin MD    Diagnosis:     ICD-10-CM ICD-9-CM   1. Malignant neoplasm of lower third of esophagus (HCC)  C15.5 150.5       Radiation Therapy Visit:  Continue radiation therapy, Dosimetry plan remains acceptable, Films reviewed and remains acceptable, Pain assessed, Pain management planned, Radiation dose schedule reviewed and remains acceptable, Radiation technique remains acceptable and Symptoms within expected range    Radiation Treatments     Active   Plans   Esoph ReTx   Most recent treatment: Dose planned: 300 cGy (fraction 4 on 12/5/2022)   Total: Dose planned: 3,000 cGy (10 fractions)   Elapsed Days: 5      Reference Points   Rx: Esoph ReTx   Most recent treatment: Dose given: 300 cGy (on 12/5/2022)   Total: Dose given: 1,200 cGy   Elapsed Days: 5      calc Esoph ReTx   Most recent treatment: Dose given: 306 cGy (on 12/5/2022)   Total: Dose given: 1,224 cGy   Elapsed Days: 5                    Physical Examination:  Vitals: Blood pressure 172/84, pulse 86, SpO2 99 %.  Vitals:    12/05/22 1509   BP: 172/84   Pulse: 86   SpO2: 99%     Pain Score    12/05/22 1509   PainSc: 0-No pain       Fully active, able to carry on all pre-disease performance without restriction = 0    We examined the relevant areas: yes  Findings are within the expected range for this stage of treatment: yes  -------------------------------------------------------------------------------------------------------------------    ACTION ITEMS:  Patient tolerating treatment well and as expected for this stage in their treatment and Continue radiation therapy as planned    Estimated Completion Date: 12/12/2022      Lisandro Colvni MD  Radiation Oncology

## 2022-12-06 ENCOUNTER — TREATMENT (OUTPATIENT)
Dept: RADIATION ONCOLOGY | Facility: HOSPITAL | Age: 67
End: 2022-12-06

## 2022-12-06 LAB
RAD ONC ARIA COURSE ID: NORMAL
RAD ONC ARIA COURSE INTENT: NORMAL
RAD ONC ARIA COURSE LAST TREATMENT DATE: NORMAL
RAD ONC ARIA COURSE START DATE: NORMAL
RAD ONC ARIA COURSE TREATMENT ELAPSED DAYS: 6
RAD ONC ARIA FIRST TREATMENT DATE: NORMAL
RAD ONC ARIA PLAN FRACTIONS TREATED TO DATE: 5
RAD ONC ARIA PLAN ID: NORMAL
RAD ONC ARIA PLAN PRESCRIBED DOSE PER FRACTION: 3 GY
RAD ONC ARIA PLAN PRIMARY REFERENCE POINT: NORMAL
RAD ONC ARIA PLAN TOTAL FRACTIONS PRESCRIBED: 10
RAD ONC ARIA PLAN TOTAL PRESCRIBED DOSE: 3000 CGY
RAD ONC ARIA REFERENCE POINT DOSAGE GIVEN TO DATE: 15 GY
RAD ONC ARIA REFERENCE POINT DOSAGE GIVEN TO DATE: 15.3 GY
RAD ONC ARIA REFERENCE POINT ID: NORMAL
RAD ONC ARIA REFERENCE POINT ID: NORMAL
RAD ONC ARIA REFERENCE POINT SESSION DOSAGE GIVEN: 3 GY
RAD ONC ARIA REFERENCE POINT SESSION DOSAGE GIVEN: 3.06 GY

## 2022-12-06 PROCEDURE — 77386: CPT | Performed by: RADIOLOGY

## 2022-12-06 PROCEDURE — 77386 CHG INTENSITY MODULATED RADIATION TX DLVR COMPLEX: CPT | Performed by: RADIOLOGY

## 2022-12-07 ENCOUNTER — TREATMENT (OUTPATIENT)
Dept: RADIATION ONCOLOGY | Facility: HOSPITAL | Age: 67
End: 2022-12-07

## 2022-12-07 LAB
RAD ONC ARIA COURSE ID: NORMAL
RAD ONC ARIA COURSE INTENT: NORMAL
RAD ONC ARIA COURSE LAST TREATMENT DATE: NORMAL
RAD ONC ARIA COURSE START DATE: NORMAL
RAD ONC ARIA COURSE TREATMENT ELAPSED DAYS: 7
RAD ONC ARIA FIRST TREATMENT DATE: NORMAL
RAD ONC ARIA PLAN FRACTIONS TREATED TO DATE: 6
RAD ONC ARIA PLAN ID: NORMAL
RAD ONC ARIA PLAN PRESCRIBED DOSE PER FRACTION: 3 GY
RAD ONC ARIA PLAN PRIMARY REFERENCE POINT: NORMAL
RAD ONC ARIA PLAN TOTAL FRACTIONS PRESCRIBED: 10
RAD ONC ARIA PLAN TOTAL PRESCRIBED DOSE: 3000 CGY
RAD ONC ARIA REFERENCE POINT DOSAGE GIVEN TO DATE: 18 GY
RAD ONC ARIA REFERENCE POINT DOSAGE GIVEN TO DATE: 18.36 GY
RAD ONC ARIA REFERENCE POINT ID: NORMAL
RAD ONC ARIA REFERENCE POINT ID: NORMAL
RAD ONC ARIA REFERENCE POINT SESSION DOSAGE GIVEN: 3 GY
RAD ONC ARIA REFERENCE POINT SESSION DOSAGE GIVEN: 3.06 GY

## 2022-12-07 PROCEDURE — 77386: CPT | Performed by: STUDENT IN AN ORGANIZED HEALTH CARE EDUCATION/TRAINING PROGRAM

## 2022-12-07 PROCEDURE — 77386 CHG INTENSITY MODULATED RADIATION TX DLVR COMPLEX: CPT | Performed by: STUDENT IN AN ORGANIZED HEALTH CARE EDUCATION/TRAINING PROGRAM

## 2022-12-07 PROCEDURE — 77427 RADIATION TX MANAGEMENT X5: CPT | Performed by: STUDENT IN AN ORGANIZED HEALTH CARE EDUCATION/TRAINING PROGRAM

## 2022-12-08 ENCOUNTER — TREATMENT (OUTPATIENT)
Dept: RADIATION ONCOLOGY | Facility: HOSPITAL | Age: 67
End: 2022-12-08

## 2022-12-08 LAB
RAD ONC ARIA COURSE ID: NORMAL
RAD ONC ARIA COURSE INTENT: NORMAL
RAD ONC ARIA COURSE LAST TREATMENT DATE: NORMAL
RAD ONC ARIA COURSE START DATE: NORMAL
RAD ONC ARIA COURSE TREATMENT ELAPSED DAYS: 8
RAD ONC ARIA FIRST TREATMENT DATE: NORMAL
RAD ONC ARIA PLAN FRACTIONS TREATED TO DATE: 7
RAD ONC ARIA PLAN ID: NORMAL
RAD ONC ARIA PLAN PRESCRIBED DOSE PER FRACTION: 3 GY
RAD ONC ARIA PLAN PRIMARY REFERENCE POINT: NORMAL
RAD ONC ARIA PLAN TOTAL FRACTIONS PRESCRIBED: 10
RAD ONC ARIA PLAN TOTAL PRESCRIBED DOSE: 3000 CGY
RAD ONC ARIA REFERENCE POINT DOSAGE GIVEN TO DATE: 21 GY
RAD ONC ARIA REFERENCE POINT DOSAGE GIVEN TO DATE: 21.42 GY
RAD ONC ARIA REFERENCE POINT ID: NORMAL
RAD ONC ARIA REFERENCE POINT ID: NORMAL
RAD ONC ARIA REFERENCE POINT SESSION DOSAGE GIVEN: 3 GY
RAD ONC ARIA REFERENCE POINT SESSION DOSAGE GIVEN: 3.06 GY

## 2022-12-08 PROCEDURE — 77386: CPT | Performed by: RADIOLOGY

## 2022-12-08 PROCEDURE — 77386 CHG INTENSITY MODULATED RADIATION TX DLVR COMPLEX: CPT | Performed by: RADIOLOGY

## 2022-12-09 ENCOUNTER — TREATMENT (OUTPATIENT)
Dept: RADIATION ONCOLOGY | Facility: HOSPITAL | Age: 67
End: 2022-12-09

## 2022-12-09 LAB
RAD ONC ARIA COURSE ID: NORMAL
RAD ONC ARIA COURSE INTENT: NORMAL
RAD ONC ARIA COURSE LAST TREATMENT DATE: NORMAL
RAD ONC ARIA COURSE START DATE: NORMAL
RAD ONC ARIA COURSE TREATMENT ELAPSED DAYS: 9
RAD ONC ARIA FIRST TREATMENT DATE: NORMAL
RAD ONC ARIA PLAN FRACTIONS TREATED TO DATE: 8
RAD ONC ARIA PLAN ID: NORMAL
RAD ONC ARIA PLAN PRESCRIBED DOSE PER FRACTION: 3 GY
RAD ONC ARIA PLAN PRIMARY REFERENCE POINT: NORMAL
RAD ONC ARIA PLAN TOTAL FRACTIONS PRESCRIBED: 10
RAD ONC ARIA PLAN TOTAL PRESCRIBED DOSE: 3000 CGY
RAD ONC ARIA REFERENCE POINT DOSAGE GIVEN TO DATE: 24 GY
RAD ONC ARIA REFERENCE POINT DOSAGE GIVEN TO DATE: 24.48 GY
RAD ONC ARIA REFERENCE POINT ID: NORMAL
RAD ONC ARIA REFERENCE POINT ID: NORMAL
RAD ONC ARIA REFERENCE POINT SESSION DOSAGE GIVEN: 3 GY
RAD ONC ARIA REFERENCE POINT SESSION DOSAGE GIVEN: 3.06 GY

## 2022-12-09 PROCEDURE — 77386: CPT | Performed by: STUDENT IN AN ORGANIZED HEALTH CARE EDUCATION/TRAINING PROGRAM

## 2022-12-09 PROCEDURE — 77336 RADIATION PHYSICS CONSULT: CPT | Performed by: STUDENT IN AN ORGANIZED HEALTH CARE EDUCATION/TRAINING PROGRAM

## 2022-12-09 PROCEDURE — 77386 CHG INTENSITY MODULATED RADIATION TX DLVR COMPLEX: CPT | Performed by: STUDENT IN AN ORGANIZED HEALTH CARE EDUCATION/TRAINING PROGRAM

## 2022-12-12 ENCOUNTER — TREATMENT (OUTPATIENT)
Dept: RADIATION ONCOLOGY | Facility: HOSPITAL | Age: 67
End: 2022-12-12

## 2022-12-12 ENCOUNTER — RADIATION ONCOLOGY WEEKLY ASSESSMENT (OUTPATIENT)
Dept: RADIATION ONCOLOGY | Facility: HOSPITAL | Age: 67
End: 2022-12-12

## 2022-12-12 VITALS
WEIGHT: 192.4 LBS | BODY MASS INDEX: 28.5 KG/M2 | DIASTOLIC BLOOD PRESSURE: 83 MMHG | SYSTOLIC BLOOD PRESSURE: 147 MMHG | OXYGEN SATURATION: 98 % | HEART RATE: 84 BPM

## 2022-12-12 DIAGNOSIS — C15.5 MALIGNANT NEOPLASM OF LOWER THIRD OF ESOPHAGUS: Primary | ICD-10-CM

## 2022-12-12 LAB
RAD ONC ARIA COURSE ID: NORMAL
RAD ONC ARIA COURSE INTENT: NORMAL
RAD ONC ARIA COURSE LAST TREATMENT DATE: NORMAL
RAD ONC ARIA COURSE START DATE: NORMAL
RAD ONC ARIA COURSE TREATMENT ELAPSED DAYS: 12
RAD ONC ARIA FIRST TREATMENT DATE: NORMAL
RAD ONC ARIA PLAN FRACTIONS TREATED TO DATE: 9
RAD ONC ARIA PLAN ID: NORMAL
RAD ONC ARIA PLAN PRESCRIBED DOSE PER FRACTION: 3 GY
RAD ONC ARIA PLAN PRIMARY REFERENCE POINT: NORMAL
RAD ONC ARIA PLAN TOTAL FRACTIONS PRESCRIBED: 10
RAD ONC ARIA PLAN TOTAL PRESCRIBED DOSE: 3000 CGY
RAD ONC ARIA REFERENCE POINT DOSAGE GIVEN TO DATE: 27 GY
RAD ONC ARIA REFERENCE POINT DOSAGE GIVEN TO DATE: 27.54 GY
RAD ONC ARIA REFERENCE POINT ID: NORMAL
RAD ONC ARIA REFERENCE POINT ID: NORMAL
RAD ONC ARIA REFERENCE POINT SESSION DOSAGE GIVEN: 3 GY
RAD ONC ARIA REFERENCE POINT SESSION DOSAGE GIVEN: 3.06 GY

## 2022-12-12 PROCEDURE — 77386 CHG INTENSITY MODULATED RADIATION TX DLVR COMPLEX: CPT | Performed by: STUDENT IN AN ORGANIZED HEALTH CARE EDUCATION/TRAINING PROGRAM

## 2022-12-12 PROCEDURE — 77386: CPT | Performed by: STUDENT IN AN ORGANIZED HEALTH CARE EDUCATION/TRAINING PROGRAM

## 2022-12-12 NOTE — PROGRESS NOTES
Radiation Oncology  On-Treatment Note      Patient: Han Garcia    MRN: [id number]    Attending Physician: Lisandro Colvin MD    Diagnosis:     ICD-10-CM ICD-9-CM   1. Malignant neoplasm of lower third of esophagus (HCC)  C15.5 150.5       Radiation Therapy Visit:  Continue radiation therapy, Dosimetry plan remains acceptable, Films reviewed and remains acceptable, Pain assessed, Pain management planned, Radiation dose schedule reviewed and remains acceptable, Radiation technique remains acceptable and Symptoms within expected range    Radiation Treatments     Active   Plans   Esoph ReTx   Most recent treatment: Dose planned: 300 cGy (fraction 9 on 12/12/2022)   Total: Dose planned: 3,000 cGy (10 fractions)   Elapsed Days: 12      Reference Points   Rx: Esoph ReTx   Most recent treatment: Dose given: 300 cGy (on 12/12/2022)   Total: Dose given: 2,700 cGy   Elapsed Days: 12      calc Esoph ReTx   Most recent treatment: Dose given: 306 cGy (on 12/12/2022)   Total: Dose given: 2,754 cGy   Elapsed Days: 12                    Physical Examination:  Vitals: Blood pressure 147/83, pulse 84, weight 87.3 kg (192 lb 6.4 oz), SpO2 98 %.  Vitals:    12/12/22 1133   BP: 147/83   Pulse: 84   SpO2: 98%   Weight: 87.3 kg (192 lb 6.4 oz)     Pain Score    12/12/22 1133   PainSc: 0-No pain       Fully active, able to carry on all pre-disease performance without restriction = 0    We examined the relevant areas: yes  Findings are within the expected range for this stage of treatment: yes  -------------------------------------------------------------------------------------------------------------------    ACTION ITEMS:  Patient tolerating treatment well and as expected for this stage in their treatment and Continue radiation therapy as planned    Estimated Completion Date: 12/13/2022    Patient tolerating treatment very well, follow up PRN      Lisandro Colvin MD  Radiation Oncology

## 2022-12-13 ENCOUNTER — TREATMENT (OUTPATIENT)
Dept: RADIATION ONCOLOGY | Facility: HOSPITAL | Age: 67
End: 2022-12-13

## 2022-12-13 DIAGNOSIS — C15.5 MALIGNANT NEOPLASM OF LOWER THIRD OF ESOPHAGUS: Primary | ICD-10-CM

## 2022-12-13 LAB
RAD ONC ARIA COURSE ID: NORMAL
RAD ONC ARIA COURSE INTENT: NORMAL
RAD ONC ARIA COURSE LAST TREATMENT DATE: NORMAL
RAD ONC ARIA COURSE START DATE: NORMAL
RAD ONC ARIA COURSE TREATMENT ELAPSED DAYS: 13
RAD ONC ARIA FIRST TREATMENT DATE: NORMAL
RAD ONC ARIA PLAN FRACTIONS TREATED TO DATE: 10
RAD ONC ARIA PLAN ID: NORMAL
RAD ONC ARIA PLAN PRESCRIBED DOSE PER FRACTION: 3 GY
RAD ONC ARIA PLAN PRIMARY REFERENCE POINT: NORMAL
RAD ONC ARIA PLAN TOTAL FRACTIONS PRESCRIBED: 10
RAD ONC ARIA PLAN TOTAL PRESCRIBED DOSE: 3000 CGY
RAD ONC ARIA REFERENCE POINT DOSAGE GIVEN TO DATE: 30 GY
RAD ONC ARIA REFERENCE POINT DOSAGE GIVEN TO DATE: 30.6 GY
RAD ONC ARIA REFERENCE POINT ID: NORMAL
RAD ONC ARIA REFERENCE POINT ID: NORMAL
RAD ONC ARIA REFERENCE POINT SESSION DOSAGE GIVEN: 3 GY
RAD ONC ARIA REFERENCE POINT SESSION DOSAGE GIVEN: 3.06 GY

## 2022-12-13 PROCEDURE — 77386 CHG INTENSITY MODULATED RADIATION TX DLVR COMPLEX: CPT | Performed by: STUDENT IN AN ORGANIZED HEALTH CARE EDUCATION/TRAINING PROGRAM

## 2022-12-13 PROCEDURE — 77386: CPT | Performed by: STUDENT IN AN ORGANIZED HEALTH CARE EDUCATION/TRAINING PROGRAM

## 2022-12-14 ENCOUNTER — INFUSION (OUTPATIENT)
Dept: ONCOLOGY | Facility: HOSPITAL | Age: 67
End: 2022-12-14

## 2022-12-14 ENCOUNTER — OFFICE VISIT (OUTPATIENT)
Dept: ONCOLOGY | Facility: CLINIC | Age: 67
End: 2022-12-14

## 2022-12-14 VITALS
SYSTOLIC BLOOD PRESSURE: 111 MMHG | HEART RATE: 89 BPM | OXYGEN SATURATION: 99 % | RESPIRATION RATE: 16 BRPM | DIASTOLIC BLOOD PRESSURE: 70 MMHG | BODY MASS INDEX: 28.36 KG/M2 | HEIGHT: 69 IN | TEMPERATURE: 97.1 F | WEIGHT: 191.5 LBS

## 2022-12-14 DIAGNOSIS — D49.0 ESOPHAGUS NEOPLASM: ICD-10-CM

## 2022-12-14 DIAGNOSIS — G62.0 PERIPHERAL NEUROPATHY DUE TO CHEMOTHERAPY: ICD-10-CM

## 2022-12-14 DIAGNOSIS — C15.5 MALIGNANT NEOPLASM OF LOWER THIRD OF ESOPHAGUS: Primary | ICD-10-CM

## 2022-12-14 DIAGNOSIS — C78.7 LIVER METASTASIS: ICD-10-CM

## 2022-12-14 DIAGNOSIS — Z45.2 ENCOUNTER FOR ADJUSTMENT OR MANAGEMENT OF VASCULAR ACCESS DEVICE: ICD-10-CM

## 2022-12-14 DIAGNOSIS — N32.1 RECTO-BLADDERNECK FISTULA: ICD-10-CM

## 2022-12-14 DIAGNOSIS — Z72.0 TOBACCO ABUSE: ICD-10-CM

## 2022-12-14 DIAGNOSIS — C15.5 MALIGNANT NEOPLASM OF LOWER THIRD OF ESOPHAGUS: ICD-10-CM

## 2022-12-14 DIAGNOSIS — I10 ESSENTIAL HYPERTENSION: ICD-10-CM

## 2022-12-14 DIAGNOSIS — T45.1X5A PERIPHERAL NEUROPATHY DUE TO CHEMOTHERAPY: ICD-10-CM

## 2022-12-14 LAB
ALBUMIN SERPL-MCNC: 4 G/DL (ref 3.5–5.2)
ALBUMIN/GLOB SERPL: 1.5 G/DL (ref 1.1–2.4)
ALP SERPL-CCNC: 134 U/L (ref 38–116)
ALT SERPL W P-5'-P-CCNC: 11 U/L (ref 0–41)
ANION GAP SERPL CALCULATED.3IONS-SCNC: 11.4 MMOL/L (ref 5–15)
AST SERPL-CCNC: 12 U/L (ref 0–40)
BASOPHILS # BLD AUTO: 0.04 10*3/MM3 (ref 0–0.2)
BASOPHILS NFR BLD AUTO: 0.5 % (ref 0–1.5)
BILIRUB SERPL-MCNC: 0.4 MG/DL (ref 0.2–1.2)
BUN SERPL-MCNC: 10 MG/DL (ref 6–20)
BUN/CREAT SERPL: 13.7 (ref 7.3–30)
CALCIUM SPEC-SCNC: 9.3 MG/DL (ref 8.5–10.2)
CHLORIDE SERPL-SCNC: 104 MMOL/L (ref 98–107)
CO2 SERPL-SCNC: 23.6 MMOL/L (ref 22–29)
CREAT SERPL-MCNC: 0.73 MG/DL (ref 0.7–1.3)
DEPRECATED RDW RBC AUTO: 48.7 FL (ref 37–54)
EGFRCR SERPLBLD CKD-EPI 2021: 100.3 ML/MIN/1.73
EOSINOPHIL # BLD AUTO: 0.08 10*3/MM3 (ref 0–0.4)
EOSINOPHIL NFR BLD AUTO: 1 % (ref 0.3–6.2)
ERYTHROCYTE [DISTWIDTH] IN BLOOD BY AUTOMATED COUNT: 14.9 % (ref 12.3–15.4)
GLOBULIN UR ELPH-MCNC: 2.7 GM/DL (ref 1.8–3.5)
GLUCOSE SERPL-MCNC: 89 MG/DL (ref 74–124)
HCT VFR BLD AUTO: 38.9 % (ref 37.5–51)
HGB BLD-MCNC: 13.4 G/DL (ref 13–17.7)
IMM GRANULOCYTES # BLD AUTO: 0.13 10*3/MM3 (ref 0–0.05)
IMM GRANULOCYTES NFR BLD AUTO: 1.6 % (ref 0–0.5)
LYMPHOCYTES # BLD AUTO: 0.68 10*3/MM3 (ref 0.7–3.1)
LYMPHOCYTES NFR BLD AUTO: 8.5 % (ref 19.6–45.3)
MCH RBC QN AUTO: 31.3 PG (ref 26.6–33)
MCHC RBC AUTO-ENTMCNC: 34.4 G/DL (ref 31.5–35.7)
MCV RBC AUTO: 90.9 FL (ref 79–97)
MONOCYTES # BLD AUTO: 0.67 10*3/MM3 (ref 0.1–0.9)
MONOCYTES NFR BLD AUTO: 8.4 % (ref 5–12)
NEUTROPHILS NFR BLD AUTO: 6.41 10*3/MM3 (ref 1.7–7)
NEUTROPHILS NFR BLD AUTO: 80 % (ref 42.7–76)
NRBC BLD AUTO-RTO: 0 /100 WBC (ref 0–0.2)
PLATELET # BLD AUTO: 158 10*3/MM3 (ref 140–450)
PMV BLD AUTO: 9.4 FL (ref 6–12)
POTASSIUM SERPL-SCNC: 3.8 MMOL/L (ref 3.5–4.7)
PROT SERPL-MCNC: 6.7 G/DL (ref 6.3–8)
RAD ONC ARIA COURSE END DATE: NORMAL
RAD ONC ARIA COURSE ID: NORMAL
RAD ONC ARIA COURSE INTENT: NORMAL
RAD ONC ARIA COURSE LAST TREATMENT DATE: NORMAL
RAD ONC ARIA COURSE START DATE: NORMAL
RAD ONC ARIA COURSE TREATMENT ELAPSED DAYS: 13
RAD ONC ARIA FIRST TREATMENT DATE: NORMAL
RAD ONC ARIA PLAN FRACTIONS TREATED TO DATE: 10
RAD ONC ARIA PLAN ID: NORMAL
RAD ONC ARIA PLAN NAME: NORMAL
RAD ONC ARIA PLAN PRESCRIBED DOSE PER FRACTION: 3 GY
RAD ONC ARIA PLAN PRIMARY REFERENCE POINT: NORMAL
RAD ONC ARIA PLAN TOTAL FRACTIONS PRESCRIBED: 10
RAD ONC ARIA PLAN TOTAL PRESCRIBED DOSE: 3000 CGY
RAD ONC ARIA REFERENCE POINT DOSAGE GIVEN TO DATE: 30 GY
RAD ONC ARIA REFERENCE POINT DOSAGE GIVEN TO DATE: 30.6 GY
RAD ONC ARIA REFERENCE POINT ID: NORMAL
RAD ONC ARIA REFERENCE POINT ID: NORMAL
RBC # BLD AUTO: 4.28 10*6/MM3 (ref 4.14–5.8)
SODIUM SERPL-SCNC: 139 MMOL/L (ref 134–145)
WBC NRBC COR # BLD: 8.01 10*3/MM3 (ref 3.4–10.8)

## 2022-12-14 PROCEDURE — 99214 OFFICE O/P EST MOD 30 MIN: CPT | Performed by: INTERNAL MEDICINE

## 2022-12-14 PROCEDURE — 80053 COMPREHEN METABOLIC PANEL: CPT

## 2022-12-14 PROCEDURE — 85025 COMPLETE CBC W/AUTO DIFF WBC: CPT

## 2022-12-14 PROCEDURE — 25010000002 DEXAMETHASONE SODIUM PHOSPHATE 100 MG/10ML SOLUTION: Performed by: INTERNAL MEDICINE

## 2022-12-14 PROCEDURE — 96375 TX/PRO/DX INJ NEW DRUG ADDON: CPT

## 2022-12-14 PROCEDURE — 25010000002 DIPHENHYDRAMINE PER 50 MG: Performed by: INTERNAL MEDICINE

## 2022-12-14 PROCEDURE — 25010000002 PACLITAXEL PER 1 MG: Performed by: INTERNAL MEDICINE

## 2022-12-14 PROCEDURE — 96413 CHEMO IV INFUSION 1 HR: CPT

## 2022-12-14 RX ORDER — DIPHENHYDRAMINE HYDROCHLORIDE 50 MG/ML
50 INJECTION INTRAMUSCULAR; INTRAVENOUS AS NEEDED
Status: CANCELLED | OUTPATIENT
Start: 2022-12-21

## 2022-12-14 RX ORDER — SODIUM CHLORIDE 9 MG/ML
250 INJECTION, SOLUTION INTRAVENOUS ONCE
Status: COMPLETED | OUTPATIENT
Start: 2022-12-14 | End: 2022-12-14

## 2022-12-14 RX ORDER — FAMOTIDINE 10 MG/ML
20 INJECTION, SOLUTION INTRAVENOUS AS NEEDED
Status: CANCELLED | OUTPATIENT
Start: 2022-12-28

## 2022-12-14 RX ORDER — FAMOTIDINE 10 MG/ML
20 INJECTION, SOLUTION INTRAVENOUS AS NEEDED
Status: CANCELLED | OUTPATIENT
Start: 2022-12-21

## 2022-12-14 RX ORDER — FAMOTIDINE 10 MG/ML
20 INJECTION, SOLUTION INTRAVENOUS ONCE
Status: CANCELLED | OUTPATIENT
Start: 2022-12-14

## 2022-12-14 RX ORDER — FAMOTIDINE 10 MG/ML
20 INJECTION, SOLUTION INTRAVENOUS ONCE
Status: COMPLETED | OUTPATIENT
Start: 2022-12-14 | End: 2022-12-14

## 2022-12-14 RX ORDER — SODIUM CHLORIDE 9 MG/ML
250 INJECTION, SOLUTION INTRAVENOUS ONCE
Status: CANCELLED | OUTPATIENT
Start: 2022-12-14

## 2022-12-14 RX ORDER — DIPHENHYDRAMINE HYDROCHLORIDE 50 MG/ML
50 INJECTION INTRAMUSCULAR; INTRAVENOUS AS NEEDED
Status: CANCELLED | OUTPATIENT
Start: 2022-12-14

## 2022-12-14 RX ORDER — SODIUM CHLORIDE 9 MG/ML
250 INJECTION, SOLUTION INTRAVENOUS ONCE
Status: CANCELLED | OUTPATIENT
Start: 2022-12-21

## 2022-12-14 RX ORDER — FAMOTIDINE 10 MG/ML
20 INJECTION, SOLUTION INTRAVENOUS AS NEEDED
Status: CANCELLED | OUTPATIENT
Start: 2022-12-14

## 2022-12-14 RX ORDER — DIPHENHYDRAMINE HYDROCHLORIDE 50 MG/ML
50 INJECTION INTRAMUSCULAR; INTRAVENOUS AS NEEDED
Status: CANCELLED | OUTPATIENT
Start: 2022-12-28

## 2022-12-14 RX ORDER — FAMOTIDINE 10 MG/ML
20 INJECTION, SOLUTION INTRAVENOUS ONCE
Status: CANCELLED | OUTPATIENT
Start: 2022-12-21

## 2022-12-14 RX ORDER — SODIUM CHLORIDE 9 MG/ML
250 INJECTION, SOLUTION INTRAVENOUS ONCE
Status: CANCELLED | OUTPATIENT
Start: 2022-12-28

## 2022-12-14 RX ORDER — FAMOTIDINE 10 MG/ML
20 INJECTION, SOLUTION INTRAVENOUS ONCE
Status: CANCELLED | OUTPATIENT
Start: 2022-12-28

## 2022-12-14 RX ADMIN — PACLITAXEL 165 MG: 6 INJECTION, SOLUTION INTRAVENOUS at 14:21

## 2022-12-14 RX ADMIN — FAMOTIDINE 20 MG: 10 INJECTION INTRAVENOUS at 13:16

## 2022-12-14 RX ADMIN — DEXAMETHASONE SODIUM PHOSPHATE 12 MG: 10 INJECTION, SOLUTION INTRAMUSCULAR; INTRAVENOUS at 13:15

## 2022-12-14 RX ADMIN — DIPHENHYDRAMINE HYDROCHLORIDE 25 MG: 50 INJECTION, SOLUTION INTRAMUSCULAR; INTRAVENOUS at 13:31

## 2022-12-14 RX ADMIN — SODIUM CHLORIDE 250 ML: 9 INJECTION, SOLUTION INTRAVENOUS at 13:16

## 2022-12-14 NOTE — PROGRESS NOTES
Subjective     REASON FOR follow up:  1. 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  Currently receiving palliative 5 fu leucovorin  AND HERCEPTIN therapy once a month    2.COLOVESICAL FISTULA: chronic antibiotic therapy cipro, NO FURTHER PLANS FOR SURGERY AFTER VISIT AND PROCEDURE BY DR GM CASTILLO 1/20    3. DVT R AND LEFT LE off ANTICOAGULANT : THROMBOPHILIA OF MALIGNANCY    4. 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 NEURONTIN INITIATED.    4. Diabetes type 2 on medication with better control of blood sugar    DURING THE VISIT WITH THE PATIENT TODAY , PATIENT HAD FACE MASK, MY MEDICAL ASSISTANT AND I  HAD PROPPER PROTECTIVE EQUIPMENT, AND I DID HAND HYGIENE WITH SOAP AND WATER BEFORE AND AFTER THE VISIT.    On 12/14/2022 this 67-year-old white male smoker of 1 pack of cigarettes a day returns to the office for follow up of his stage IV carcinoma of the esophagus. In the meantime given relapse in the esophagus the patient has received a total of 10 radiation treatments to that anatomical site and he has initiated a month ago Taxol 3 weeks on, 1 week off for 3 cycles. We have discontinued Herceptin administration. I am a firm believer that once that you fail Herceptin medication I do not want to add anymore of the same to the same cocktail of medication utilization given the lack of benefit and the presence of toxicity. In any event the patient actually feels very well at this time. He has no difficulty swallowing as long as he takes time swallowing and drinking fluids after he eats. He has not had any problems and he has not had any radiation esophagitis. He has no abdominal pain, no jaundice. He has not developed any new urinary tract infections given his colovesical fistula. His blood pressure is under good control, his blood sugar is under good control and I provide all of these  "medicines for him. He has noticed minimal worsening of his sensory neuropathy in his feet associated with the Taxol administration. He already had developed neuropathy associated with Oxaliplatin administration. He has not required any modification in his medicine pertinent to this. He continues smoking a pack of cigarettes a day. He is functional, he is able to do anything that he pleases and he has no pain.                                     Past Medical History:   Diagnosis Date   • Arthritis    • Cancer (HCC)     prostate cancer 2008   • Cancer (HCC)     esophageal/LIVER   • Diabetes mellitus (HCC)    • Difficulty swallowing solids    • Elevated PSA    • Esophageal mass    • Esophageal varices (HCC) July 2019   • GERD (gastroesophageal reflux disease)    • H/O Lung nodule    • Hepatitis     CHILD--PT STATED \"I THINK IT WAS A.\"   • History of esophageal varices with bleeding     SUMM34 2021   • History of high blood pressure    • History of pneumonia    • Hx of blood clots 08/10/2019    LOWER LEGS BILAT   • Maintenance chemotherapy     IV EVERY 4 WEEKS:  ESOPHAGEAL CANCER   • Nail fungus     LEFT FINGERNAILS X 4   • Neuropathy    • PVD (peripheral vascular disease) (HCC)    • Rash     ITCHY RED RASH ON RIGHT THIGH AREA-STATES IT COMES AND GOES.  USING PRESCRIPTION CREAM   • Recto-bladderneck fistula     on poab for recurrent uti   • Risk factors for obstructive sleep apnea         Past Surgical History:   Procedure Laterality Date   • CATARACT EXTRACTION WITH INTRAOCULAR LENS IMPLANT Bilateral    • COLONOSCOPY N/A 02/04/2020    Procedure: COLONOSCOPY;  Surgeon: Guy Sears MD;  Location: [facility] ENDOSCOPY;  Service: General;  Laterality: N/A;  PRE-COLOVESICAL FISTULA  POST-- DIVERTICULOSIS   • COLONOSCOPY  2/4/2020   • CYSTOSCOPY  02/06/2020   • CYSTOSCOPY Bilateral 02/26/2020    Procedure: CYSTOSCOPY RETROGRADE;  Surgeon: Cm Witt MD;  Location: [facility] OR;  Service: Urology;  " Laterality: Bilateral;   • ENDOSCOPY     • ENDOSCOPY N/A 06/19/2021    Procedure: ESOPHAGOGASTRODUODENOSCOPY WITH BIOPSY;  Surgeon: Mary Brito MD;  Location: [facility] MAIN OR;  Service: Gastroenterology;  Laterality: N/A;   • ENDOSCOPY N/A 08/05/2021    Procedure: ESOPHAGOGASTRODUODENOSCOPY HEMOSPRAY;  Surgeon: Naga Shelby MD;  Location: [facility] ENDOSCOPY;  Service: Gastroenterology;  Laterality: N/A;  HX OF ESOPHAGEAL  CANCER;MELENA  POST: ESOPHAGEAL BLEEDING; ESOPHAGEAL MASS   • ENDOSCOPY N/A 11/4/2022    Procedure: ESOPHAGOGASTRODUODENOSCOPY WITH DILATATION;  Surgeon: Mary Brito MD;  Location: [facility] MAIN OR;  Service: Gastroenterology;  Laterality: N/A;   • HERNIA REPAIR Right 1999    inguinal hernia   • PROSTATE SURGERY  03/2008    prostatectectomy   • UPPER GASTROINTESTINAL ENDOSCOPY  August 2021   • VENOUS ACCESS DEVICE (PORT) INSERTION N/A 07/16/2019    Procedure: INSERTION VENOUS ACCESS DEVICE WITH FLUORO AND EGD WITH BIOPSY;  Surgeon: Mary Brito MD;  Location: [facility] MAIN OR;  Service: Thoracic        Current Outpatient Medications on File Prior to Visit   Medication Sig Dispense Refill   • acetaminophen (TYLENOL) 500 MG tablet Take 500 mg by mouth Every 6 (Six) Hours As Needed for Mild Pain .     • cevimeline (EVOXAC) 30 MG capsule Take 1 capsule by mouth 3 (Three) Times a Day. DRY MOUTH     • Chlorhexidine Gluconate 2 % pads Apply  topically. AS DIRECTED PAT     • ciprofloxacin (CIPRO) 250 MG tablet Take 1 tablet by mouth Daily. Take one every other day (Patient taking differently: Take 250 mg by mouth Every Other Day. Take one every other day  FISTULA) 90 tablet 1   • econazole nitrate (SPECTAZOLE) 1 % cream Apply  topically to the appropriate area as directed 2 (Two) Times a Day. (Patient taking differently: Apply 1 application topically to the appropriate area as directed 2 (Two) Times a Day. NAIL FUNGUS-4 FINGERS ON LEFT) 85 g 1   • furosemide (LASIX) 20 MG tablet Take 1 tablet by  mouth Every Other Day. (Patient taking differently: Take 20 mg by mouth As Needed. LOWER LEG EDEMA) 28 tablet 2   • gabapentin (NEURONTIN) 300 MG capsule TAKE 1 CAPSULE BY MOUTH TWICE A  capsule 0   • glipizide (GLUCOTROL XL) 5 MG ER tablet TAKE 1 TABLET BY MOUTH EVERY DAY (Patient taking differently: Take 5 mg by mouth Daily.) 90 tablet 1   • lisinopril-hydrochlorothiazide (PRINZIDE,ZESTORETIC) 20-12.5 MG per tablet TAKE 1 TABLET BY MOUTH EVERY DAY (Patient taking differently: Take 0.5 tablets by mouth As Needed. SYS GREATER THAN 130, TAKE 1/2 TAB) 90 tablet 0   • pantoprazole (Protonix) 40 MG EC tablet Take 1 tablet by mouth 2 (Two) Times a Day. 180 tablet 3   • pentoxifylline (TRENtal) 400 MG CR tablet TAKE 1 TABLET BY MOUTH TWICE A DAY WITH MEALS 90 tablet 0   • potassium chloride ER (K-TAB) 20 MEQ tablet controlled-release ER tablet Take 1 tablet by mouth Daily. 90 tablet 3   • Probiotic Product (Probiotic Blend) capsule Take 1 capsule by mouth Daily.       No current facility-administered medications on file prior to visit.        ALLERGIES:    Allergies   Allergen Reactions   • Oxaliplatin Anaphylaxis        Social History     Socioeconomic History   • Marital status: Single   • Years of education: High school   Tobacco Use   • Smoking status: Every Day     Packs/day: 1.00     Years: 38.00     Pack years: 38.00     Types: Cigarettes     Start date: 1/1/1974   • Smokeless tobacco: Never   • Tobacco comments:     Quit for a period of 8 years.    Vaping Use   • Vaping Use: Never used   Substance and Sexual Activity   • Alcohol use: Not Currently   • Drug use: Never   • Sexual activity: Not Currently     Partners: Female     Birth control/protection: None        Family History   Problem Relation Age of Onset   • Hypertension Mother    • Stroke Mother    • Lung cancer Father    • Hypertension Other    • Lung disease Other    • Prostate cancer Other    • Malig Hyperthermia Neg Hx       Current Outpatient  Medications on File Prior to Visit   Medication Sig Dispense Refill   • acetaminophen (TYLENOL) 500 MG tablet Take 500 mg by mouth Every 6 (Six) Hours As Needed for Mild Pain .     • cevimeline (EVOXAC) 30 MG capsule Take 1 capsule by mouth 3 (Three) Times a Day. DRY MOUTH     • Chlorhexidine Gluconate 2 % pads Apply  topically. AS DIRECTED PAT     • ciprofloxacin (CIPRO) 250 MG tablet Take 1 tablet by mouth Daily. Take one every other day (Patient taking differently: Take 250 mg by mouth Every Other Day. Take one every other day  FISTULA) 90 tablet 1   • econazole nitrate (SPECTAZOLE) 1 % cream Apply  topically to the appropriate area as directed 2 (Two) Times a Day. (Patient taking differently: Apply 1 application topically to the appropriate area as directed 2 (Two) Times a Day. NAIL FUNGUS-4 FINGERS ON LEFT) 85 g 1   • furosemide (LASIX) 20 MG tablet Take 1 tablet by mouth Every Other Day. (Patient taking differently: Take 20 mg by mouth As Needed. LOWER LEG EDEMA) 28 tablet 2   • gabapentin (NEURONTIN) 300 MG capsule TAKE 1 CAPSULE BY MOUTH TWICE A  capsule 0   • glipizide (GLUCOTROL XL) 5 MG ER tablet TAKE 1 TABLET BY MOUTH EVERY DAY (Patient taking differently: Take 5 mg by mouth Daily.) 90 tablet 1   • lisinopril-hydrochlorothiazide (PRINZIDE,ZESTORETIC) 20-12.5 MG per tablet TAKE 1 TABLET BY MOUTH EVERY DAY (Patient taking differently: Take 0.5 tablets by mouth As Needed. SYS GREATER THAN 130, TAKE 1/2 TAB) 90 tablet 0   • pantoprazole (Protonix) 40 MG EC tablet Take 1 tablet by mouth 2 (Two) Times a Day. 180 tablet 3   • pentoxifylline (TRENtal) 400 MG CR tablet TAKE 1 TABLET BY MOUTH TWICE A DAY WITH MEALS 90 tablet 0   • potassium chloride ER (K-TAB) 20 MEQ tablet controlled-release ER tablet Take 1 tablet by mouth Daily. 90 tablet 3   • Probiotic Product (Probiotic Blend) capsule Take 1 capsule by mouth Daily.       No current facility-administered medications on file prior to visit.  "    Allergies   Allergen Reactions   • Oxaliplatin Anaphylaxis            Objective     Vitals:    12/14/22 1216   BP: 111/70   Pulse: 89   Resp: 16   Temp: 97.1 °F (36.2 °C)   TempSrc: Temporal   SpO2: 99%   Weight: 86.9 kg (191 lb 8 oz)   Height: 175 cm (68.9\")   PainSc: 0-No pain     Current Status 12/14/2022   ECOG score 0   EXAM:                  GENERAL:  Well-developed, well-nourished  Patient  in no acute distress.   SKIN:  Warm, dry ,NO purpura ,no rash.ONYCHORRHEXIS  HEENT:  Pupils were equal and reactive to light and accomodation, conjunctivae noninjected, normal extraocular movements, normal visual acuity.   NECK:  Supple with good range of motion; no thyromegaly , no JVD or bruits,.No carotid artery pain, no carotid abnormal pulsation   LYMPHATICS:  No cervical, NO supraclavicular, NO axillary, NO inguinal adenopathies.  CARDIAC   normal rate , regular rhythm, without murmur,NO rubs NO S3 NO S4   LUNGS: DECREASED breath sounds bilateral, no wheezing, NO rhonchi, NO crackles ,NO rubs.  VASCULAR VENOUS: no cyanosis, NO collateral circulation, NO varicosities, NO edema, NO palpable cords, NO pain,NO erythema, NO pigmentation of the skin.  ABDOMEN:  Soft, NO pain,no hepatomegaly, no splenomegaly,no masses, no ascites, no collateral circulation,no distention.  EXTREMITIES  AND SPINE:  No clubbing, no cyanosis ,no deformities , no pain .No kyphosis,  no pain in spine, no pain in ribs , no pain in pelvic bone.  NEUROLOGICAL:  Patient was awake, alert, oriented to time, person and place.        RECENT LABS:  Hematology WBC   Date Value Ref Range Status   12/14/2022 8.01 3.40 - 10.80 10*3/mm3 Final   02/02/2019 13.4 (H) 3.4 - 10.8 x10E3/uL Final     RBC   Date Value Ref Range Status   12/14/2022 4.28 4.14 - 5.80 10*6/mm3 Final   02/02/2019 4.81 4.14 - 5.80 x10E6/uL Final     Hemoglobin   Date Value Ref Range Status   12/14/2022 13.4 13.0 - 17.7 g/dL Final     Hematocrit   Date Value Ref Range Status   12/14/2022 " 38.9 37.5 - 51.0 % Final     Platelets   Date Value Ref Range Status   12/14/2022 158 140 - 450 10*3/mm3 Final        Lab Results   Component Value Date    GLUCOSE 89 12/14/2022    BUN 10 12/14/2022    CREATININE 0.73 12/14/2022    EGFRIFNONA 149 02/16/2022    EGFRIFAFRI 100 02/02/2019    BCR 13.7 12/14/2022    K 3.8 12/14/2022    CO2 23.6 12/14/2022    CALCIUM 9.3 12/14/2022    PROTENTOTREF 7.1 02/02/2019    ALBUMIN 4.00 12/14/2022    LABIL2 1.6 02/02/2019    AST 12 12/14/2022    ALT 11 12/14/2022         Assessment & Plan    1.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 Almost 90% of the liver WAS replaced by tumor. The patient has been undergoing chemotherapy with 5  fu and leucovorin  and Herceptin and has had excellent response clinically and radiologically. The patient was reviewed on 08/10/2020. I reviewed with the patient in the PAC system [facility] his PET scan that is dramatic. There is minimal uptake in the lower esophagus and most importantly complete resolution of his liver metastasis. Actually the only issue that is pertinent to the PET scan is still the visibility of his colovesical fistula.     Therefore from the point of view of his cancer of the esophagus, metastatic to the liver, he has no symptoms from the primary tumor in the esophagus and he has no symptoms related to his liver metastasis that has resolved altogether. His CEA level is stable.The patient raised the question about the CEA fluctuation. I pointed out to him that a lot of this is also related to his smoking. Smoking per se elevates CEA level.  • Upon further reviewing the patient on 04/20/2021, he has no symptoms pertinent to his metastatic cancer of the esophagus to the liver and he has no difficulty swallowing. There are no side effects of the 5-FU, Leucovorin and Herceptin. The patient's cardiac function remains stable and he has not had any drop in the ejection fraction.   • I  discussed with him on 05/18/2021 the fact that his cancer clinically is very quiet but I am afraid of the rise in his CEA level to 12. This could be an indicator of all of the cigarettes that he is smoking on a daily basis of indicator of cancer escaping control and not visible radiologically through his CT scan. I pointed out to him that he needs to continue making an effort to decrease his smoking and he is now down to 10 cigarettes a day. We will recheck another CEA level today. Given the circumstances of the previous CT scan I do not believe that I need to change the doses or plan of care in regard to his chemotherapy medication administration for the time being. I recommended for him to remain on his 5FU/leucovorin and Herceptin on a monthly basis for now.  • The patient was further reviewed on 06/15/2021 with a new PET scan that documented regrowth of the tumor in the lower esophagus without any new symptomatology, for example dysphagia or odynophagia. No regurgitation. The liver metastasis remains in remission and there are no new areas of disease in the bones or lungs or any other site. Given the excellent performance status I discussed with the patient and his sister today many options of therapy, including going back to FOLFOX regimen along with Herceptin, taking another sample of the esophagus with a new endoscopy and doing analysis of the tissue for [employer] Now that will be my favorite choice, or palliative treatment with radiation therapy and 5-FU continuous infusion that will be toxic to him and he does not prefer to have.     I had a discussion with Mary Brito MD, thoracic surgeon, who has agreed to see the patient tomorrow. I think the endoscopy, the new tissue analysis, and sending the tissue for [employer] Now will be the way to go. Depending on what we find there, we can modify his treatment altogether. I do not think the patient will have any consequences missing chemotherapy for a  couple of weeks or so.      In preparation for the endoscopy and biopsies I asked him to hold off on the Xarelto until he is seen by Dr. Brito tomorrow.     In regard to his colovesical fistula, he has had minimal symptomatology this week, maybe some activity there and I asked him to go back to the lab and take a urine specimen and culture. He knows that if this is abnormal he will need to initiate ciprofloxacin that he has at home.    In regard to his hypertension, this is under good control and I advised him to remain on his lisinopril and hydrochlorothiazide combination.    In regard to his smoking cessation, he has had conversations with NIRU Jacobson about this. He is using some nicotine CQ patch, here and there and also trying to work with nicotine gum and things of this nature but he has not been able to shake this issue altogether.     Otherwise I will review him back in a couple of weeks with a CBC, CMP, and a CEA level.    This case will be presented in the multidisciplinary thoracic conference by me this Thursday, and I will discuss any further advice to the patient.     I also mentioned to the patient that on the background of HER2-positive cancer of the esophagus the possibility of using a new medicine for HER2-based disease that is called Enhertu is a real possibility and maybe that will be the next step to go through.    • The patient was further reviewed on 07/01/2021. I reviewed with Dr. Brito the endoscopy that shows a noncircumferential abnormality in the lower esophagus that encompasses almost 6 cm in length. It is not blocking off the esophagus and that is why the patient has no symptoms. The pathology shows at least atypical cells consistent with cancer. Further Next Generation Sequencing has been sent for [employer] Now.     I discussed with the patient the fact that we have many other modalities of treatment that he could encounter. He prefers to continue his general chemotherapy to  control the cancer in his liver using 5-FU, leucovorin and Herceptin today and agree with that. In consideration to be seen by radiation therapy, the patient is willing to listen to the possibility of undergoing continuous 5-FU infusion along with radiation therapy to the esophagus knowing that he will experiencing esophagitis that can give him significant issues for 2-3 weeks. I went ahead and made the appointment for him to be seen by radiation oncology for this purpose only.     Obviously if the Next Generation Sequencing gives us any other hints in regard to how to treat him this could be also utilized including the consideration of using Enhertu in the long run for achieving better control.     In regard to his smoking cessation he is not willing to change this phenomenon at this time. We have had NIRU Jacobson talking with him in this regard but he is not ready to make a decision when to quit.     Finally, I pointed out to him that during the previous visit we documented a urinary tract infection with streptococcus 50,000 colonies that in my appreciation was significant given the fact that he has a colovesical fistula. This was treated with antibiotics. He has not had any discomfort issues pertinent to this anymore. The urine today is completely clear. Nevertheless, I went ahead and sent a urinalysis at least to be sure that there is no need for any other repetitive infection therapy on him.     The patient also will remain on his anticoagulation at this time, his Xarelto for the time being. I have renewed as well his Neurontin. He has no need for pain medicine.  • The patient was further reviewed on 07/23/2021. He has no new symptomatology pertinent to his cancer of the esophagus with liver metastasis. He has no difficulty swallowing. He has been presented in the multidisciplinary clinic on a couple of occasions and he has been seen by Radiation Oncology. Finally the analysis of [employer] Now is  available now showing that the patient's tumor is PD-L1 positive and has high mutation burden. The tumor remains HER/2 positive. Given these findings I think it is very easy to make a choice in regard stopping the present regimen of chemotherapy and proceed with therapy with Keytruda every 3 weeks for at least 4 cycles and reassess him at that point. In preparation for Keytruda initiation next week and we will get the approval of the medicine by his insurance company the patient will require smoking cessation altogether to minimize modification of cigarettes on his immune system. I insisted in this fact to the patient.     Other than that he will remain on probiotics. We will discontinue the 5FU/leucovorin and Herceptin and will move onto Keytruda. I discussed with him side effects of the Keytruda in detail as below. He will require treatment every 3 weeks with CBC, CMP, TSH every 3 weeks and he will require formal chemotherapy teaching, education and consent for this medicine.     After 4 cycles of this, in other words 3 months he will be reassessed with a new PET scan.     The chances under the present circumstances that he will improve as long as he quit smoking, it is very hard and maybe he could have long term survivorship.    I begged for him to have smoking cessation. If any time during his time with me we have been talking about this and this is absolutely necessary now. We know that cigarettes kill immune system cells and minimize the benefit of treatments with these medicines.       • The patient was further reviewed on 08/19/2021 in regard to his cancer of the esophagus. As stated above he had upper GI bleeding dropping hemoglobin to 5 requiring admission, transfusion, discontinuation of anticoagulation and endoscopy with local therapy by Naga Shelby MD. Since then he has completed 10 sessions of radiation therapy to the esophagus yesterday. He has not had any further bleed. His hemoglobin has bounced  back from 5 to 14 and I have advised his this good news today.    I expect that the patient in a few days is going to develop an element of radiation esophagitis and he continues having some fatigue that will improve over time. So far no new issues have happened. I made him aware that if he has difficulty swallowing he will need to let us know, he will need to receive IV fluids in the office.    At least the radiation therapy will take care of the tumor in the esophagus and I do not believe that he will require any other GI endoscopy anymore.    Today the patient will proceed with his Keytruda that is the immunotherapy medicine that we are delivering to him at this time for the treatment of his liver metastasis and also the tumor in the esophagus. I pointed out to him that so far I do not see any side effects of the Keytruda and the Keytruda will improve and increase the benefit of radiation therapy into the primary tumor in the esophagus.     From the point of view of his anemia associated with GI bleeding, again this has been corrected. The hemoglobin today is 14 grams and I asked him to take his iron supplementation only twice a week 1 tablet. He has been taking 3 tablets everyday.     From the point of view of his thrombophilia associated with malignancy, he is no longer receiving any anticoagulants given the recent episode of GI bleeding. We will maintain him off anticoagulants as much as we can. Hopefully he will not have any other episodes of thrombosis.     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     From the point of view of the Keytruda toxicity so far nothing happened. We will continue monitoring CBC, CMP and TSH periodically.     I will review him back in 3 and 6 weeks. When he comes back for the Keytruda he will continue receiving the medicine as the time goes by.    He understands that  most of the benefit of radiation therapy in the tumor in the esophagus will be reached in 1 month and I am planning to give him a PET scan in more of less in 2 months from now when he will have almost 4 infusions of Keytruda under his belt and the completion of radiation therapy to the esophagus.     In regard to smoking cessation he has achieved a lot from 2 packs a day to 10 cigarettes a day. He will see Anh Felder RN, today in regard continuation of the benefit of the therapy for this condition at this point.   • The patient was further reviewed on 09/30/2021. His Keytruda is still ongoing but the patient has developed dermatitis associated with this. It is a grade 1 toxicity so far and I am wondering if this is extending a little bit and the topical medicine is not going to be sufficient. I will proceed with his Keytruda today but maybe this will be the last administration of this medicine unless that we get shashi and the rash quiets down. Obviously at the completion for Keytruda the patient will require PET scan for assessment not only of the benefit of radiation therapy on his recurrence in the esophagus but also benefit of the Keytruda on his liver metastasis. Therefore, he will be scheduled for this before the next visit. The patient also has been advised that his hemoglobin is acceptable and it will be okay for him to stop his iron supplementation.     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 I have advised him to go into Cipro 250 mg p.o. daily.     The patient has been advised in regard to the continuation of topical steroids in the skin in areas of involvement. This will remain an ongoing issue along with moisturizer to the skin in the form of Cetaphil.     In regard to his hypertension, he remains on medicines for this by me. His blood pressure is under good control at this time.     In regard to previous anticoagulation, he is no longer receiving any given  his GI bleeding. This will remain in observation.     In regard to smoking cessation the patient will further discuss with NIRU Jacobson, plans for further decrease. He is down to 10 cigarettes a day. This is already a major accomplishment in somebody who used to smoke almost 3 packs of cigarettes a day.     The patient will have a new insurance in 11/2021 and I will make the insurance ladies aware of this.     In 3 weeks he will have a CBC, CMP, TSH and the PET scan the week before.  • The patient was further reviewed on 10/21/2021. I reviewed with him his PET scan that shows still SUV activity in the lower esophagus but better than before but he has now 3 areas of abnormal uptake in the liver consistent with progressive liver metastasis. The lung anatomy is clear, the bone anatomy is clear. Colovesical fistula was visible still.    On the basis of these changes I do believe that this patient knowing that he has HER/2 positive esophageal cancer needs to change his therapy. Keytruda is not helpful at all and we will discontinue this medicine at this time. I do believe that the inability of Keytruda to overcome his disease process is related to the persistency of smoking and the abnormality related to smoking about bacterial derek. Further analysis recently published in [employer] has confirmed that the benefit of immunotherapy is further boosted by proper amount of derek in the gastrointestinal tract and how cigarettes are damaging to this issue. In any event the patient's issues in regard to cigarette smoking has been already a problem and he has not been able to quit in spite of all of the support available. Therefore stopping the Keytruda at this point including today the patient will move to Enhertu that has been approved in patients who have cancer of the esophagus with liver metastasis. The dose of this medicine will be the GI dose of this medication that will be properly calculated accordingly. He will  have formal education and consent for this medicine. I pointed out the most important side effects of this medicine include cardiac toxicity, anemia, leukopenia, thrombocytopenia and pneumonitis that includes cough, shortness of breath, fever. In preparation to minimize pneumonitis the patient will take prednisone 10 mg on day 2 and 3 after each dose of Enhertu. He will receive this medicine every 3 weeks. We will deliver 3-4 cycles and reassess him not only by tumor markers but also radiologically. Hopefully this will have a positive impact not only in the tumor in the esophagus but also tumors metastatic in his liver.   • The patient was reviewed on 11/19/2021. Tolerance to the Enhertu 1st cycle was appropriate with more fatigue, no increased cough or shortness of breath and some anorexia. His white count, hemoglobin and platelets were normal and we advised him to proceed with his 2nd Enhertu infusion today. He understands that fatigue will be more prominent, that he could have chance for more hematological toxicity and he is starting to develop minor anemia. His white count and platelet count remain acceptable. I reminded him on many occasions today through the visit and insisted to the sister that if his cough pattern changes, increasing or his shortness of breath becomes worse he has to notify us immediately to be sure that he will not develop pneumonitis associated with Enhertu. He still will take 10 mg of prednisone tomorrow and Sunday to try to minimize this phenomenon from happening.     The patient will be having blood counts on a weekly basis with nurse visit to be sure to monitor hematological toxicity and he will return in 3 weeks to proceed with the next cycle. After the 3rd cycle the patient will proceed with radiological assessment including PET scan.   • The patient was further reviewed on 12/30/2021. On clinical grounds the patient has not had any difficulty swallowing, his liver is not enlarged,  nodular or tender, he has no jaundice and his CEA level has dropped further. His PET scan discloses resolution of his periportal lymph nodes and one area of resolution of liver metastasis. He has complete resolution of the tumor in the esophagus. He has still 2 active lesions in the liver with significant SUV activity pertinent to his metastatic disease. Given this fact the patient could be a candidate to further receive Enhertu but my concern is about the radiological analysis that shows minimal pulmonary infiltrate bilaterally. The radiologist suggests a radiation pneumonitis. I do not think that is the case. I think this is probably related to Enhertu interstitial lung disease and for this reason I feel the obligation to stop this medication at this point and to proceed with therapy with prednisone at 20 mg a day for the next 2 weeks until the patient returns back to see me. I made him aware that if the respiratory symptoms including his cough gets any worse or shortness of breath establishes and gets worse he will need to notify us immediately and he will require to be placed in the hospital to receive IV high dose steroid.    The question will be if the patient will be a candidate to receive any further Enhertu or not remains to be seen. Probably will not be the case.     Obviously this above statement opens the door in regard what else to do for his malignancy. Strong consideration will be to go back to Herceptin and also to consider to go back to FOLFOX, Herceptin like he received initially that gave him such a degree of resolution of symptoms. 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 I discussed this with him and his sister present in the room. I encouraged him to decrease his smoking.    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     In preparation for retaking treatment, I advised him that I would like to go back into FOLFOX, Herceptin every 3 weeks starting next week. This combination of medicines never failed him. It was discontinued because of toxicity and neuropathy. Therefore, I think it is worth it to go ahead and proceed with an echocardiogram and resume chemotherapy administration with this , giving him 3 cycles, each one of them 3 weeks apart and see how things evolve from the point of view tumor markers or radiological analysis of his liver through PET scan. He agrees to proceed.  • On 02/09/2022 I advised the patient to hold off his chemotherapy treatment with FOLFOX because his ANC was 1300 and we will decrease his dose of chemotherapy medicines by 15% for the next round of treatment next week. Hopefully the patient with this dose adjustment will be able to continue his treatment in a normal schedule of every 2 weeks. We have to watch his neuropathy very close. My advice will be eventually to pull out the Oxaliplatin and continue 5FU and leucovorin for a couple of cycles and eventually reintroduce the Oxaliplatin for 1-2 cycles back and forth. This has to be monitored clinically and also along with his response.   The patient was reviewed on 03/16/2022. Actually he feels terrific today and for the last 10 days after the anaphylactic reaction to oxaliplatin. He has no cancer-related pain. He has no hepatomegaly. His liver function tests have further improved and surprising enough his CEA level has dramatically dropped as posted above. It was in the 200 category and now it is in the 30 category, pointing to the fact that I think the oxaliplatin and the FOLFOX regimen  indeed has very significant benefit for him along with the Herceptin. Obviously he had an anaphylactic reaction to oxaliplatin and this medicine was discontinued. He will receive today his 5-FU, leucovorin and Herceptin.     I advised him to remain on this regimen for the time being and continue the sequence of events every couple of weeks. I went ahead and scheduled an echocardiogram to follow up on his ejection fraction while on Herceptin.  • I reviewed with the patient and his sister today his CT scan that shows a favorable response to the regimen that he is receiving of 5FU and Herceptin. The tumor areas are shrinking and his CEA level is coming down. The patient actually feels very well. The tolerability to the regimen is excellent and he will continue the medicines at the same dosing and schedule for the time being. No plans to stop unless that he wants to take a trip with his sister to a different state and we will be glad to interrupt treatment for a week or so and no more than that. He understood this clearly.   • On 06/08/2022, the patient's cancer of the esophagus with liver metastasis remains very quiet clinically and biochemically with no abdominal pain, no hepatomegaly, no jaundice, drop in the CEA level. Excellent tolerance to the regimen of 5-FU, leucovorin and Herceptin that he is receiving every 3 weeks. In fact his white count, hemoglobin and platelets today are normal. He will proceed with echocardiogram in a few days already scheduled. I encouraged him to remain in the present regimen of chemotherapy treatment every 3 weeks being seen by nurse practitioner in 3 weeks, by me in 6 weeks, and continue laboratory testing including CBC and CMP every 3 weeks and a CEA level in 6 weeks.  • On 07/20/2022 the patient was further reviewed. His overall clinical status is excellent. Tolerance to the chemotherapy regimen once a month is excellent. He has not had any obvious cardiac toxicity, and he has not  had any toxicity from 5FU and leucovorin. His white count, hemoglobin and platelets are normal. His chemistry profile remains normal. Most importantly his CEA level has dropped to the lowest number 8 during the visit a few weeks ago. This unequivocally proves the point that the patient is doing well from the point of view of his metastatic malignancy and proves the point that the regimen that he is receiving is helping him to control the process. For this reason I advised him to continue the regimen with the same sequence of once a month, the same dosing of medications. Eventually we will need to do cardiac assessment.   • On 08/17/2022, the patient continues doing well from the point of view of his treatment once a month with 5-FU/leucovorin and Herceptin. He has not developed any obvious cardiac toxicity. He will be due for another echocardiogram before visit in 8 weeks. The patient has a normal white count, hemoglobin and platelets today. His chemistry profile is pending to monitor alkaline phosphatase that has been minimally elevated given his liver metastasis. The patient also has had a CEA level that has been a good marker in regard to disease activity. Today his level is 8. He has been floating around this number. I advised him to continue his 5-FU/leucovorin and Herceptin once a month, returning to see us in 1, 2 and 3 months and continue medications in similar way. I am not planning radiological assessment unless that the patient has further raise in the CEA level.  • On 10/12/2022 the patient has complained of dysphagia for the last 2 weeks and I think I find the need for him to be seen by Mary Brito MD, independent of any situation or radiological assessments that we will need to perform anyway. I think he needs to proceed with a new upper endoscopy in the gastrointestinal tract. The question is that is this tumor recurrence or is it fibrosis associated with previous radiation therapy. The patient is  keeping nutrition appropriately, he has not had any choking. Belching and burping helps the symptom. Also I discussed with him the minimal rise in his CEA level documented on the previous visit. This will trigger the need for us to proceed with a PET scan in the next 3 weeks before we see him back in 4 weeks. In the meantime the patient will proceed today with his 5FU, leucovorin and Herceptin. Most recent echocardiogram shows stable left ventricular ejection fraction and no modification in cardiac assessment according to the cardiologist.     I pointed out to the patient one more time on 10/12/2022 that I think the cigarettes are competing with chemotherapy administration in regard to the benefit of the treatment and he realizes that again and again. He says that he will work on this, doubt that that will be the case.   On 11/09/2022 the patient was further reviewed. In the interim he proceeded with an upper GI endoscopy, the report of this is available in the operative note by Dr. Brito and I have reviewed this. It is very obvious that the patient was very tight in the lower esophagus. She was able to put the scope through and she was able to take multiple samples. From these samples one of them represents adenocarcinoma, from some other samples documented as well candidal infection. Also evidence of dysplasia was evident and areas of necrosis and inflammation were evident. Dr. Brito already has proceeded with consulting Radiation Oncology. I have also the opportunity to review the PET scan and not only shows the whole area of activity in the long segment of the esophagus but also 2 tumoral lesions in the liver, one in the left lobe and another one on right lobe that have very significant SUV activity. There is also an area of SUV activity close to the head of the pancreas. I do not know if this is a periportal lymph node. Most likely is the case more than any tumor in the pancreas per se.     Based on the above  information I do believe that the patient will discontinue chemotherapy with 5-FU/leucovorin and Herceptin and we will move into Taxol low dose weekly, 3 weeks out of 4 starting at some point next week. I will need to coordinate with Radiation Oncology the timing of this. I provided information to the patient in regard to Taxol administration and side effects that include anemia, leukopenia, thrombocytopenia, peripheral neuropathy worsening, skin rash, allergic reactions among others. I provided written information about  Taxol administration.     THE PLAN WILL BE TO DELIVER TAXOL SINGLE AGENT ALONG WITH RADIATION THERAPY AND THEN ONCE THE RADIATION THERAPY IF MINIMAL AMOUNT COULD BE PROVIDED TO THE PATIENT, WILL BE CONTINUED WITH THE SAME SCHEDULE. Plan to repeat radiological assessment with PET scan in 2-3 months. We will monitor as well tumor marker, CEA level that has been very useful in regard to telling us disease process.  On 12/14/2022 discussed with the patient today the fact that he has tolerated his Taxol very well and he is ready for initiation of cycle #2 today. He has noted minimal worsening with sensory neuropathy in his feet but nothing to require any modification in the therapy for this. He has not developed any hematological toxicity and he has not had any allergic reaction. He raised the question about why we are not using Herceptin. I do believe and I am the group of physicians that believe that once that Herceptin administration is over because of progressive disease not only in breast cancer but also in any other malignancy HER/2 positive and knowing that the patient already has proceeded with Enhertu in the past with no benefit again I strongly believe that further utilization of this medicine has no obvious benefit. I pointed this out to him. He is willing to take the chance of going through Taxol administration as a single agent because is do not find other medicines to use besides what this  regimen is.     The patient is having lesser swallowing difficulty. He has no abdominal pain, his liver is not palpable like it used to be before and he has full performance status ECOG 0. Given this fact we advised him to continue with the treatment returning on a weekly basis for blood counts and chemotherapy administration according to protocol. I went ahead and scheduled his PET scan to be done in 7 weeks. I scheduled also his appointments with me in 1 month and 2 months.    He will require continuation of weekly CBC and CMP. We will monitor his CEA level as well that has been a good marker of disease status.     •   •   •   •   •   •   •   •   •   •   •   ·   2.In regard to his colovesical fistula he is now receiving ciprofloxacin on a daily basis low dose and I advised him to remain on this medicine for the time being.   • On 11/19/2021 he has no symptoms or signs of urinary tract infection and he remains on ciprofloxacin prophylactically everyday.   • On 12/30/2021 his colovesical fistula is still evident on the PET scan. He has gas in the bladder and he has had some hematuria. Therefore I do believe that the patient needs to remain on ciprofloxacin 1 tablet every other day to try to minimize any potential for any other urinary tract infection related to this. The patient under the present circumstances is not a candidate to have any kind of surgery unless the symptoms get worse or clinical picture changes radically.   • His colovesical fistula remains active. He remains taking ciprofloxacin 250 mg p.o. every other day. He has not had any fevers or chills and I advised him to remain on this medicine for the time being. His pharmacy called in regard that they have not had Cipro anymore. The patient has tablets for almost 2 months supply. Therefore, he has no need for Levaquin or any other medicine or intervention at this point.  • His colovesical fistula is not active at this time. He has not had any  pneumaturia of echolalia. He remains on ciprofloxacin prophylactically.   On 03/16/2022 the patient mentioned to me that he is now passing urine through the rectum. He also has fecaluria and pneumaturia still but in lesser amount. He is not having any fever. He has no pelvic pain. I think his colovesical fistula now in some way has bladder to colon  flow. It used to be fecal matter in the bladder and now it is urine through the anal canal. Obviously this will have less implications from the point of view of infection but obviously has the implications that the patient has to in some way be ready to defecate and urinate at the same time. He is going to see Dr. Cm Witt in a few days in regard to this fact. Given the fact that he has no abdominal pain and no fever, I would like for him to have a CT scan of the chest, abdomen and pelvis anyway that would not only show us the status of his cancer but also the status of his fistula. I prefer this over a PET scan for that purpose. This information will be shared with Dr. Cm Witt.   •   In the meantime I advised him to continue using his ciprofloxacin 250 mg every other day for prevention of UTI.  •   • He has been seen by Cm Witt MD. He has advised the patient that he could not do too much for the fistula given the fact that it is just inconveniences of having urination through the anal canal from time to time but no urinary tract infection. He asked the patient to postpone any intention for surgery unless that it is absolutely necessary. Postponement of chemotherapy for 3 months if any surgery is necessary will trigger dramatic progression of his tumor and obviously consequences.   • On 06/08/2022, his colovesical fistula continues. Most of the output is urine through the rectum, no stool through the bladder. He has not had any urinary tract infection but he continues using prophylactically his ciprofloxacin 250 mg orally every other day. I  asked him to remain on this medicine for the time being.  • On 07/20/2022 the colovesical fistula is extremely quiet. There is no output through the bladder, there is no output through the rectum. This probably tells us that this has healed and this is good news. He has not had the need to take any antibiotics.   • On 08/17/2022 the patient continues having colovesical fistula symptomatology intermittently, passing urine through the rectum and not passing feces through the bladder. He has not had any other urinary tract infections. He continues using his ciprofloxacin as per my instructions.  • On 10/12/2022 he remains taking ciprofloxacin prophylactically. He has had fecaluria and he has had also passage of urine through his anal canal. He has not developed systemic symptoms and ciprofloxacin will remain ongoing for the time being.   On 11/09/2022 his colovesical fistula activity is no different today than what it has been during the last several months. He has not had any fever or chills. He has not had any fecaluria. He remains on ciprofloxacin every other day.  On 12/14/2022 he remains on ciprofloxacin every other day for his colovesical fistula that has not been acting up. He still has some passage of urine in the rectum. He has not had any passage of fecal matter through his bladder lately. No fever.     •   •   •   •   •   •   •   •   •   •   ·   3,In regard to the treatment of his sensory peripheral neuropathy from previous chemotherapy with FOLFOX he will remain on Neurontin 300 mg twice a day.   • He remains on Neurontin for the treatment of his peripheral neuropathy. I advised him to remain on this medicine for the time being.  • On 01/12/2022, I advised the patient to remain on his gabapentin for the treatment of his sensory peripheral neuropathy in his feet.  • I went ahead and prescribed on 02/09/2022 his doses of gabapentin to take 300 mg twice a day.   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 This was not aggravated by the few doses of oxaliplatin that he was able to receive until an anaphylactic reaction.  • He will remain on Neurontin for the treatment of his peripheral neuropathy associated with Oxaliplatin. The symptoms are under good control.   • On 06/08/2022, given the development of minimal edema in his lower extremities that could be related to the use of gabapentin or a medicine that is called Evoxac, I discussed with him maybe the possibility of stopping the gabapentin for a few days and see how things evolve from this point of view. He has no neuropathic symptomatology at this time. He agrees to try and see what happens.  • On 07/20/2022 his sensory neuropathy in his feet remains about the same. He remains on gabapentin that is useful and manageable in regard to symptom control.   • On 08/17/2022, he continues using gabapentin for neuropathy associated with previous Eloxatin. Neuropathy is grade 1-2. The symptoms are under good control. He has no motor deficit.  • On 10/12/2022 his sensory neuropathy remains on gabapentin. Medicine will remain ongoing for the time being prescribed by me.   On 11/09/2022 his sensory peripheral neuropathy remains a grade 1. I made him aware that with Taxol administration this could become worse but we will apply cold gloves and mittens to his feet during administration of Taxol to try to minimize exposure to his peripheral nerves. He remains on gabapentin to control this and he will renew this medication whenever the times comes.  On 12/14/2022 minimal worsening of sensory neuropathy in his feet. We will monitor this as the time goes by to see how things evolve from this point of view. He is taking gabapentin for this already and maybe in the long run we will need to raise the amount of medicine.     •   •   •   •   •   •   •   •   •   •   ·   4.In regard to his hypertension, he remains on lisinopril,  hydrochlorothiazide provided by me.   • I reviewed him back on 11/19/2021. His blood pressure is 90/48 in spite of proper hydration. This is probably the effect of the combination of Trental and his blood pressure medication. I advised him to discontinue his blood pressure medication at this time and advised him to have proper hydration. He has a device to check his blood pressure at home. I asked him to check this on a daily basis at least a couple of times a day. If his blood pressure goes up above 130/90 he will take 1/2 of the dose of blood pressure medication that he was taking before. Those tablets have the way to be split. He will require checking blood pressure. If the blood pressure drops again he will hold off blood pressure medication indefinitely.   • His hypertension has been reviewed on 12/30/2021. I think his blood pressure today is very good. He will remain on his blood pressure medication for the time being. I find no reason to change dosing.   • The patient was advised to remain on his blood pressure medication, Prinzide.  • On 02/09/2022 he continues doing his blood pressure medication almost on prn basis to minimize very dramatic drop that he has had before. So far he has not had any syncopal episodes or things of this nature.   On 03/16/2022 the patient is not taking blood pressure medication. Actually his blood pressure has been under relatively good control.  • His blood pressure remains regulated. He actually is not taking any blood pressure medication at this time. I think he has learned to modify his diet, decrease the consumption of sodium and this probably will have a positive impact on this issue from now on.   •  The other good news is that this patient has not had any modification in his left ventricular ejection fraction documented through the echocardiogram posted above. Therefore Herceptin has not produced any cardiac toxicity and we will review another echocardiogram in 3 months  from now.   • On 06/08/2022, the patient has not been using his blood pressure medication because most of his readings at home are below 120s. I asked him to continue watching this and if he has numbers above 130/90, he will take 1 dose of his blood pressure medication every other day or twice a week. He has the freedom to use this at his necessity.  • On 07/20/2022 his blood pressure is in good control. He is not taking any medicine for this at this time.   • On 08/17/2022 the patient remains on blood pressure medication. His blood pressure today is excellent at 120/75. He remains on the medicines by me at this time.  • On 10/12/2022 blood pressure under good control. This will remain in observation.   On 11/09/2022 his blood pressure remains under good control taking blood pressure medication by me at this time. He is not utilizing any Lasix because he has no swelling in his legs anymore.  On 12/14/2022 his blood pressure is under excellent control and I advised him to remain on his medicine for this lisinopril/hydrochlorothiazide.     •   •   •   •   •   •   •   •   •   •   •   •   ·   5.In regard to his previous GI bleeding associated with his cancer and esophagitis we advised the patient to remain on Protonix.   • On 11/19/2021 he has not had any evidence of GI bleeding and the hemoglobin remains stable. The minor drop obeys to toxicity from Enhertu and bone marrow suppression not because of effect of GI bleeding.   • He has not had any other episodes of GI bleeding as per 12/30/2021.   • On 01/12/2022 he has not had any other episodes of GI bleeding. He is no longer taking any anticoagulants.  • On 02/09/2022 he has not had episodes of melena or enterorrhagia, his hemoglobin is stable. He has not had any use of anticoagulants since GI bleeding.   On 03/16/2022 the patient has not had any other episodes of GI bleeding. He is no longer taking anticoagulant.  • Today this issue is not an issue anymore.   • On  06/08/2022, the patient has no evidence of GI bleeding. Hemoglobin is stable. MCV remains normal. The patient is no longer receiving anticoagulants.  • Hemoglobin remains stable as per 07/20/2022. No evidence of GI bleeding.   • On 08/17/2022, the patient has not had any other episodes of GI bleeding but he has episodes of occasional dysphagia especially when he eats bread. It seems that the bread cakes and sticks around the lower esophagus. Also this happens when he eats excessively fast. I told him that eventually he will require an endoscopy by Dr. Mary Brito and also stretching of the esophagus. 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 Obviously it is impossible to rule out local tumor growth in the location on clinical grounds only.  • On 10/12/2022 no evidence of GI bleeding. The patient is no longer taking any anticoagulants, hemoglobin remains excellent at 14.   He has not had any further episodes of GI bleeding. 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 I would prefer to treat this as a form of infection and I will send to the pharmacy, Diflucan 100 mg tablets, #10 to take 1 p.o. daily for 10 days.  On 12/14/2022 hemoglobin remains stable, no evidence of GI bleeding associated with his neoplastic disease at this time.     •   •   •   •   •   •   •   •   •   •   ·         6.In regard finally peripheral arterial disease I am going to send the patient a prescription for Trental to take 1 tablet twice a day. He has an appointment to be seen by vascular on 12/18/2021. I pointed out to the patient that if he wants to change the route of this process he must modify his cigarettes otherwise there is no hope. He was not able to take medication provided by Vascular Surgery because of side effects.   In regard to his peripheral arterial disease I advised him on 11/19/2021 to decrease his Trental to 1 tablet twice a  day. He is now using topical Cetaphil on his feet to try to improve moisture and decrease cracking.   On 12/30/2021 his peripheral neuropathy actually is better through the Doppler study done by [facility]. I wonder if the trental is the one playing a role in this. I have asked him to remain on the trental for the time being.       He is not willing to entertain smoking cessation to help out peripheral arterial disease and we insisted into this in presence of his sister. He says that he is working on it. I do not feel any confidence in seeing this phenomenon anymore and that is a reality that we need to contemplate and just wait.   • In regard to his peripheral artery disease, the patient is still smoking 15 cigarettes a day. The Trental has made a big difference in regard to his ability to get around and walk. I advised him to remain on Trental.  • On 02/09/2022 his peripheral arterial disease is about the same, the hemoglobin is stable, the Trental is still ongoing, it is beneficial to him.   On 03/16/2022 his peripheral arterial disease remains about the same. He has no gangrene. He has an element of claudication upon walking. We advised him to remain on the same medicines that he is taking. One of them is Trental. I also advised him about smoking cessation. This is not going to change. He remains on 15 cigarettes a day.  • He remains on Trental. He has not had any claudication. He has minimal purplish discoloration of his toes that is not prominent with no gangrene and no ischemic neuropathy.   • On 06/08/2022, his peripheral artery disease is quiet. He will see Vascular Surgery in a few days. He remains on medicine for this by me. That includes the use of Trental. I encouraged him to remain on this medicine for the time being.  • His peripheral arterial disease remains ongoing. I keep insisting in regard to smoking cessation that is going to happen as soon as he goes to [place]. The hotel and the  car rental that he had do not allow him to smoke and he has no option.      • On 08/17/2022, he remains on medication to favor circulation and rheology in regard to his perfusion of lower extremities. He has not encountered any problems with Trental at this time.  • On 10/12/2022 his peripheral arterial disease remains on Trental. He continues smoking. We advised him to stop smoking one more time.   On 11/09/2022 in regard to his peripheral arterial disease this is very quiet clinically. He will remain on Trental for this condition at this time.  On 12/14/2022 in regard to his peripheral arterial disease he has no claudication at this time. He remains on Trental.     •   •   •   •   •   •   •   •   ·     7.This patient's blood sugar has remained in the 130's, 140's, 150's for the last several visits. His hemoglobin A1C today is 5.9.  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 He will require the utilization of Glucotrol XL 5 mg. I went ahead and sent this prescription to the pharmacy. I pointed out to him that he is eating a diet rich in carbohydrates mostly and I pointed out to him that he needs to eat more vegetables, some fruit and high complex carbohydrates that will minimize issues pertinent to his blood sugar. It is even more important now that he has initiated prednisone as posted.   • I reviewed with him on 01/12/2022 his hemoglobin A1c of 5.9. I advised him to remain on a diet that is not too much rich in sugar and stay on his glipizide 5 mg every day. He is very selective in his diet. He finds things that tastes okay and other ones that do not taste okay and I think we have no solution for this definitive problem.  • On 02/09/2022 the patient's glucose seems to be under control. He will remain on glipizide 5 mg oral daily.     I will review him back in 3 weeks. He will return for treatment next week  and for the dose adjustments were discussed with Amber Lam RN.  On 03/16/2022 I asked the patient to continue to monitor his blood sugar and to continue taking his minimal dose of Glucotrol.  Since 06/08/2022, the blood sugar on this patient remains minimally elevated. He remains on Glucotrol. I do believe that the patient has diabetes type 2, very mild case, and encouraged him to try to watch sugar utilization, processed foods, too many carbs, and he is aware of that. I advised him to remain on his Glucotrol XL, 1 tablet a day.  • On 07/20/2022 his blood sugar has been under control. He remains on medicine for this.   • On 08/17/2022 the patient remains on Glucotrol to control his blood sugar. He is paying attention to what he is eating at this time to minimize excessive carbohydrate consumption.  • On 10/12/2022 his blood sugar remains borderline elevated. He remains on Glucotrol XL 1 tablet a day. We advised him to remain on this medicine and still watch diet that is rich in carbohydrates.   His blood sugar remains in the 140s all the time. He will remain on a minimal amount of glipizide XL 5 mg a day.  On 12/14/2022 his blood sugar remains 130. He will remain on Glipizide 1 tablet a day.    •   •   •   •   ·   8.Swelling in the lower extremities. The patient does not look to me like he has any congestive heart failure, kidney dysfunction, liver dysfunction to produce this, neither has hypoalbuminemia, increased creatinine or congestive heart failure. Most likely is the case of gabapentin and/or Evoxac triggering fluid retention. I asked him to work on this medicines, stopping and going and see if the swelling resolves spontaneously. If that is the case this will fix the problem. If that is not the case and the swelling bothers him, he will require to take a low-dose diuretic like Lasix 20 mg on p.r.n. basis once or twice a week.  • On 07/20/2022 the patient has continued using the Lasix on prn basis. In  preparation for the trip to see his brother I asked him to buy copper socks to have mild compression and minimize the swelling that he has in the lower extremities without cutting off his arterial circulation.   • On 08/17/2022, he has edema in his lower extremities. I advised him to wear his elastic support.  • On 10/12/2022 now that the summer is over he is no longer taking Lasix for swelling in the lower extremities.   On 11/09/2022 no swelling in lower extremities. Lasix on hold.  On 12/14/2022 minimal swelling in the lower extremities using Lasix on prn basis.     •   •   9.On 10/12/2022 dysphagia. The question is if this is related to tumor regrowth in the esophagus or effect of radiation therapy in the esophagus with fibrosis that will require dilatation. Appointment was made to see Mary Brito MD, in this regard.   On 11/09/2022 we know what evades to the dysphagia in this patient including tumoral inflammation and stricture from previous radiation therapy. On this date the patient is swallowing with no difficulties. He is keeping nutrition and hydration appropriately and new modality of therapy will be initiated for the cancer, for the candidal infection, and consideration of assuming more radiation therapy. Radiation Oncology department believes that meaningful treatment could be of utility for this patient. He already has received radiation therapy in the past.     My goal is to get him started on Taxol next week along with radiation therapy. Again the case will be discussed with Radiation Oncology.  On 12/14/2022 dysphagia is much better. The patient completed his radiation therapy 10 sessions to the recurrent disease in the esophagus, no radiation esophagitis so forth. We advised him to remain on his PPI.     •     On 12/14/2022 I have summarized each one of the points above in regard to the care. He is receiving medication that requires frequent monitoring for toxicity assessment. He has multiple  other medicines that are high risk medications including chronic antibiotic use and monitoring blood pressure and diabetes mellitus as well as peripheral arterial disease at the same time. His colovesical fistula is relatively quiet still with urinary output through his rectum from time to time. No fecaluria.     Issues in regard discussion about chemotherapy administration with Herceptin posted.

## 2022-12-15 RX ORDER — PENTOXIFYLLINE 400 MG/1
TABLET, EXTENDED RELEASE ORAL
Qty: 90 TABLET | Refills: 0 | Status: SHIPPED | OUTPATIENT
Start: 2022-12-15 | End: 2023-01-23

## 2022-12-19 NOTE — PROGRESS NOTES
Radiation Treatment Summary Note      Patient Name: Han Garcia  : 1955    Attending Provider: Lisandro Colvin MD    Diagnosis:     ICD-10-CM ICD-9-CM   1. Malignant neoplasm of lower third of esophagus (HCC)  C15.5 150.5       Radiation Start Date: 2022    Radiation Completion Date: 2022      Prescription:     Site: Esophagus  Laterality: N/A  Total Dose: 3000cGy  Dose per Fraction: 300cGy  Total Fractions: 10  Daily or BID: Daily  Modality: Photon  Technique: IMRT/VMAT/Rapid Arc  Bolus: No      Final Delivered Dose Deviated From Initially Prescribed Dose: No    Concurrent Chemotherapy: No    Patient Tolerated Treatment Without Unexpected Side Effects/Complications: Yes    ECOG: Fully active, able to carry on all pre-disease performance without restriction = 0    Pain Management Plan: None Indicated/PRN OTC    Follow-Up Plan: PRN    Imaging Ordered for Follow-Up: None/NA        Lisandro Colvin MD

## 2022-12-21 ENCOUNTER — INFUSION (OUTPATIENT)
Dept: ONCOLOGY | Facility: HOSPITAL | Age: 67
End: 2022-12-21

## 2022-12-21 VITALS
RESPIRATION RATE: 16 BRPM | DIASTOLIC BLOOD PRESSURE: 72 MMHG | OXYGEN SATURATION: 98 % | SYSTOLIC BLOOD PRESSURE: 116 MMHG | BODY MASS INDEX: 28.14 KG/M2 | WEIGHT: 190 LBS | HEART RATE: 92 BPM | TEMPERATURE: 97.1 F

## 2022-12-21 DIAGNOSIS — C78.7 LIVER METASTASIS: ICD-10-CM

## 2022-12-21 DIAGNOSIS — C15.5 MALIGNANT NEOPLASM OF LOWER THIRD OF ESOPHAGUS: Primary | ICD-10-CM

## 2022-12-21 DIAGNOSIS — D49.0 ESOPHAGUS NEOPLASM: ICD-10-CM

## 2022-12-21 LAB
ALBUMIN SERPL-MCNC: 4 G/DL (ref 3.5–5.2)
ALBUMIN/GLOB SERPL: 1.5 G/DL (ref 1.1–2.4)
ALP SERPL-CCNC: 140 U/L (ref 38–116)
ALT SERPL W P-5'-P-CCNC: 15 U/L (ref 0–41)
ANION GAP SERPL CALCULATED.3IONS-SCNC: 10.8 MMOL/L (ref 5–15)
AST SERPL-CCNC: 15 U/L (ref 0–40)
BASOPHILS # BLD AUTO: 0.08 10*3/MM3 (ref 0–0.2)
BASOPHILS NFR BLD AUTO: 1 % (ref 0–1.5)
BILIRUB SERPL-MCNC: 0.4 MG/DL (ref 0.2–1.2)
BUN SERPL-MCNC: 10 MG/DL (ref 6–20)
BUN/CREAT SERPL: 17.2 (ref 7.3–30)
CALCIUM SPEC-SCNC: 9.5 MG/DL (ref 8.5–10.2)
CHLORIDE SERPL-SCNC: 102 MMOL/L (ref 98–107)
CO2 SERPL-SCNC: 23.2 MMOL/L (ref 22–29)
CREAT SERPL-MCNC: 0.58 MG/DL (ref 0.7–1.3)
DEPRECATED RDW RBC AUTO: 48.8 FL (ref 37–54)
EGFRCR SERPLBLD CKD-EPI 2021: 107.6 ML/MIN/1.73
EOSINOPHIL # BLD AUTO: 0.18 10*3/MM3 (ref 0–0.4)
EOSINOPHIL NFR BLD AUTO: 2.3 % (ref 0.3–6.2)
ERYTHROCYTE [DISTWIDTH] IN BLOOD BY AUTOMATED COUNT: 14.6 % (ref 12.3–15.4)
GLOBULIN UR ELPH-MCNC: 2.6 GM/DL (ref 1.8–3.5)
GLUCOSE SERPL-MCNC: 115 MG/DL (ref 74–124)
HCT VFR BLD AUTO: 39.7 % (ref 37.5–51)
HGB BLD-MCNC: 13.3 G/DL (ref 13–17.7)
IMM GRANULOCYTES # BLD AUTO: 0.42 10*3/MM3 (ref 0–0.05)
IMM GRANULOCYTES NFR BLD AUTO: 5.3 % (ref 0–0.5)
LYMPHOCYTES # BLD AUTO: 0.71 10*3/MM3 (ref 0.7–3.1)
LYMPHOCYTES NFR BLD AUTO: 9 % (ref 19.6–45.3)
MCH RBC QN AUTO: 30.6 PG (ref 26.6–33)
MCHC RBC AUTO-ENTMCNC: 33.5 G/DL (ref 31.5–35.7)
MCV RBC AUTO: 91.3 FL (ref 79–97)
MONOCYTES # BLD AUTO: 0.44 10*3/MM3 (ref 0.1–0.9)
MONOCYTES NFR BLD AUTO: 5.6 % (ref 5–12)
NEUTROPHILS NFR BLD AUTO: 6.09 10*3/MM3 (ref 1.7–7)
NEUTROPHILS NFR BLD AUTO: 76.8 % (ref 42.7–76)
NRBC BLD AUTO-RTO: 0 /100 WBC (ref 0–0.2)
PLATELET # BLD AUTO: 143 10*3/MM3 (ref 140–450)
PMV BLD AUTO: 10.1 FL (ref 6–12)
POTASSIUM SERPL-SCNC: 3.9 MMOL/L (ref 3.5–4.7)
PROT SERPL-MCNC: 6.6 G/DL (ref 6.3–8)
RBC # BLD AUTO: 4.35 10*6/MM3 (ref 4.14–5.8)
SODIUM SERPL-SCNC: 136 MMOL/L (ref 134–145)
WBC NRBC COR # BLD: 7.92 10*3/MM3 (ref 3.4–10.8)

## 2022-12-21 PROCEDURE — 25010000002 DIPHENHYDRAMINE PER 50 MG: Performed by: INTERNAL MEDICINE

## 2022-12-21 PROCEDURE — 96367 TX/PROPH/DG ADDL SEQ IV INF: CPT

## 2022-12-21 PROCEDURE — 25010000002 PACLITAXEL PER 1 MG: Performed by: INTERNAL MEDICINE

## 2022-12-21 PROCEDURE — 96375 TX/PRO/DX INJ NEW DRUG ADDON: CPT

## 2022-12-21 PROCEDURE — 25010000002 DEXAMETHASONE SODIUM PHOSPHATE 100 MG/10ML SOLUTION: Performed by: INTERNAL MEDICINE

## 2022-12-21 PROCEDURE — 96413 CHEMO IV INFUSION 1 HR: CPT

## 2022-12-21 PROCEDURE — 85025 COMPLETE CBC W/AUTO DIFF WBC: CPT

## 2022-12-21 PROCEDURE — 80053 COMPREHEN METABOLIC PANEL: CPT

## 2022-12-21 RX ORDER — FAMOTIDINE 10 MG/ML
20 INJECTION, SOLUTION INTRAVENOUS ONCE
Status: COMPLETED | OUTPATIENT
Start: 2022-12-21 | End: 2022-12-21

## 2022-12-21 RX ORDER — SODIUM CHLORIDE 9 MG/ML
250 INJECTION, SOLUTION INTRAVENOUS ONCE
Status: COMPLETED | OUTPATIENT
Start: 2022-12-21 | End: 2022-12-21

## 2022-12-21 RX ADMIN — FAMOTIDINE 20 MG: 10 INJECTION INTRAVENOUS at 08:26

## 2022-12-21 RX ADMIN — DEXAMETHASONE SODIUM PHOSPHATE 12 MG: 10 INJECTION, SOLUTION INTRAMUSCULAR; INTRAVENOUS at 08:46

## 2022-12-21 RX ADMIN — SODIUM CHLORIDE 250 ML: 9 INJECTION, SOLUTION INTRAVENOUS at 08:26

## 2022-12-21 RX ADMIN — PACLITAXEL 165 MG: 6 INJECTION, SOLUTION INTRAVENOUS at 09:42

## 2022-12-21 RX ADMIN — DIPHENHYDRAMINE HYDROCHLORIDE 25 MG: 50 INJECTION, SOLUTION INTRAMUSCULAR; INTRAVENOUS at 08:28

## 2022-12-27 NOTE — PROGRESS NOTES
"  Subjective     REASON FOR FOLLOW UP:  1.  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  2. Colovesical fistula, chronic antibiotic therapy with Cipro.  3.  DVT of the right and left lower extremity. 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  Acute GI bleed      History of Present Illness    The patient is a 67 y.o. male with above his history, who returns today for cycle 2-day 15 Taxol.    Patient completed 10 fractions of radiation to the esophagus 12/13/2022.  At this point he is eating and drinking well with weight that is stable.  He does continue on gabapentin.    Patient unfortunately is noting worsening neuropathy in his feet, specifically with more trouble feeling the ground.  Thankfully he has not fallen yet.  Neuropathy in the hands is stable.    Patient continues on Cipro every other day for his colovesical fistula which has been stable.  No fevers to report.    Denies other concerns at this time.    Past Medical History:   Diagnosis Date   • Arthritis    • Cancer (HCC)     prostate cancer 2008   • Cancer (HCC)     esophageal/LIVER   • Diabetes mellitus (HCC)    • Difficulty swallowing solids    • Elevated PSA    • Esophageal mass    • Esophageal varices (HCC) July 2019   • GERD (gastroesophageal reflux disease)    • H/O Lung nodule    • Hepatitis     CHILD--PT STATED \"I THINK IT WAS A.\"   • History of esophageal varices with bleeding     SUMM34 2021   • History of high blood pressure    • History of pneumonia    • Hx of blood clots 08/10/2019    LOWER LEGS BILAT   • Maintenance chemotherapy     IV EVERY 4 WEEKS:  ESOPHAGEAL CANCER   • Nail fungus     LEFT FINGERNAILS X 4   • Neuropathy    • PVD (peripheral vascular disease) (HCC)    • Rash     ITCHY RED RASH ON RIGHT THIGH AREA-STATES IT COMES AND GOES.  USING PRESCRIPTION CREAM   • Recto-bladderneck fistula     on poab for recurrent uti   • Risk factors for obstructive sleep apnea         Past " Surgical History:   Procedure Laterality Date   • CATARACT EXTRACTION WITH INTRAOCULAR LENS IMPLANT Bilateral    • COLONOSCOPY N/A 02/04/2020    Procedure: COLONOSCOPY;  Surgeon: Guy Sears MD;  Location: [facility];  Service: General;  Laterality: N/A;  PRE-COLOVESICAL FISTULA  POST-- DIVERTICULOSIS   • COLONOSCOPY  2/4/2020   • CYSTOSCOPY  02/06/2020   • CYSTOSCOPY Bilateral 02/26/2020    Procedure: CYSTOSCOPY RETROGRADE;  Surgeon: Cm Witt MD;  Location: [facility] OR;  Service: Urology;  Laterality: Bilateral;   • ENDOSCOPY     • ENDOSCOPY N/A 06/19/2021    Procedure: ESOPHAGOGASTRODUODENOSCOPY WITH BIOPSY;  Surgeon: Mary Brito MD;  Location: [facility] OR;  Service: Gastroenterology;  Laterality: N/A;   • ENDOSCOPY N/A 08/05/2021    Procedure: ESOPHAGOGASTRODUODENOSCOPY HEMOSPRAY;  Surgeon: Naga Shelby MD;  Location: [facility];  Service: Gastroenterology;  Laterality: N/A;  HX OF ESOPHAGEAL  CANCER;MELENA  POST: ESOPHAGEAL BLEEDING; ESOPHAGEAL MASS   • ENDOSCOPY N/A 11/4/2022    Procedure: ESOPHAGOGASTRODUODENOSCOPY WITH DILATATION;  Surgeon: Mary Brito MD;  Location: [facility] OR;  Service: Gastroenterology;  Laterality: N/A;   • HERNIA REPAIR Right 1999    inguinal hernia   • PROSTATE SURGERY  03/2008    prostatectectomy   • UPPER GASTROINTESTINAL ENDOSCOPY  August 2021   • VENOUS ACCESS DEVICE (PORT) INSERTION N/A 07/16/2019    Procedure: INSERTION VENOUS ACCESS DEVICE WITH FLUORO AND EGD WITH BIOPSY;  Surgeon: Mary Brito MD;  Location: [facility] OR;  Service: Thoracic        Current Outpatient Medications on File Prior to Visit   Medication Sig Dispense Refill   • acetaminophen (TYLENOL) 500 MG tablet Take 500 mg by mouth Every 6 (Six) Hours As Needed for Mild Pain .     • cevimeline (EVOXAC) 30 MG capsule Take 1 capsule by mouth 3 (Three) Times a Day. DRY MOUTH     • ciprofloxacin (CIPRO) 250 MG tablet Take 1 tablet by mouth Daily. Take one  every other day (Patient taking differently: Take 250 mg by mouth Every Other Day. Take one every other day  FISTULA) 90 tablet 1   • econazole nitrate (SPECTAZOLE) 1 % cream Apply  topically to the appropriate area as directed 2 (Two) Times a Day. (Patient taking differently: Apply 1 application topically to the appropriate area as directed 2 (Two) Times a Day. NAIL FUNGUS-4 FINGERS ON LEFT) 85 g 1   • furosemide (LASIX) 20 MG tablet Take 1 tablet by mouth Every Other Day. (Patient taking differently: Take 20 mg by mouth As Needed. LOWER LEG EDEMA) 28 tablet 2   • gabapentin (NEURONTIN) 300 MG capsule TAKE 1 CAPSULE BY MOUTH TWICE A  capsule 0   • glipizide (GLUCOTROL XL) 5 MG ER tablet TAKE 1 TABLET BY MOUTH EVERY DAY (Patient taking differently: Take 5 mg by mouth Daily.) 90 tablet 1   • lisinopril-hydrochlorothiazide (PRINZIDE,ZESTORETIC) 20-12.5 MG per tablet TAKE 1 TABLET BY MOUTH EVERY DAY (Patient taking differently: Take 0.5 tablets by mouth As Needed. SYS GREATER THAN 130, TAKE 1/2 TAB) 90 tablet 0   • pantoprazole (Protonix) 40 MG EC tablet Take 1 tablet by mouth 2 (Two) Times a Day. 180 tablet 3   • pentoxifylline (TRENtal) 400 MG CR tablet TAKE 1 TABLET BY MOUTH TWICE A DAY WITH MEALS 90 tablet 0   • potassium chloride ER (K-TAB) 20 MEQ tablet controlled-release ER tablet Take 1 tablet by mouth Daily. 90 tablet 3   • Probiotic Product (Probiotic Blend) capsule Take 1 capsule by mouth Daily.     • Chlorhexidine Gluconate 2 % pads Apply  topically. AS DIRECTED PAT       No current facility-administered medications on file prior to visit.        ALLERGIES:    Allergies   Allergen Reactions   • Oxaliplatin Anaphylaxis        Social History     Socioeconomic History   • Marital status: Single   • Years of education: High school   Tobacco Use   • Smoking status: Every Day     Packs/day: 1.00     Years: 38.00     Pack years: 38.00     Types: Cigarettes     Start date: 1/1/1974   • Smokeless tobacco:  Never   • Tobacco comments:     Quit for a period of 8 years.    Vaping Use   • Vaping Use: Never used   Substance and Sexual Activity   • Alcohol use: Not Currently   • Drug use: Never   • Sexual activity: Not Currently     Partners: Female     Birth control/protection: None        Family History   Problem Relation Age of Onset   • Hypertension Mother    • Stroke Mother    • Lung cancer Father    • Hypertension Other    • Lung disease Other    • Prostate cancer Other    • Malig Hyperthermia Neg Hx       Current Outpatient Medications on File Prior to Visit   Medication Sig Dispense Refill   • acetaminophen (TYLENOL) 500 MG tablet Take 500 mg by mouth Every 6 (Six) Hours As Needed for Mild Pain .     • cevimeline (EVOXAC) 30 MG capsule Take 1 capsule by mouth 3 (Three) Times a Day. DRY MOUTH     • ciprofloxacin (CIPRO) 250 MG tablet Take 1 tablet by mouth Daily. Take one every other day (Patient taking differently: Take 250 mg by mouth Every Other Day. Take one every other day  FISTULA) 90 tablet 1   • econazole nitrate (SPECTAZOLE) 1 % cream Apply  topically to the appropriate area as directed 2 (Two) Times a Day. (Patient taking differently: Apply 1 application topically to the appropriate area as directed 2 (Two) Times a Day. NAIL FUNGUS-4 FINGERS ON LEFT) 85 g 1   • furosemide (LASIX) 20 MG tablet Take 1 tablet by mouth Every Other Day. (Patient taking differently: Take 20 mg by mouth As Needed. LOWER LEG EDEMA) 28 tablet 2   • gabapentin (NEURONTIN) 300 MG capsule TAKE 1 CAPSULE BY MOUTH TWICE A  capsule 0   • glipizide (GLUCOTROL XL) 5 MG ER tablet TAKE 1 TABLET BY MOUTH EVERY DAY (Patient taking differently: Take 5 mg by mouth Daily.) 90 tablet 1   • lisinopril-hydrochlorothiazide (PRINZIDE,ZESTORETIC) 20-12.5 MG per tablet TAKE 1 TABLET BY MOUTH EVERY DAY (Patient taking differently: Take 0.5 tablets by mouth As Needed. SYS GREATER THAN 130, TAKE 1/2 TAB) 90 tablet 0   • pantoprazole (Protonix) 40 MG  "EC tablet Take 1 tablet by mouth 2 (Two) Times a Day. 180 tablet 3   • pentoxifylline (TRENtal) 400 MG CR tablet TAKE 1 TABLET BY MOUTH TWICE A DAY WITH MEALS 90 tablet 0   • potassium chloride ER (K-TAB) 20 MEQ tablet controlled-release ER tablet Take 1 tablet by mouth Daily. 90 tablet 3   • Probiotic Product (Probiotic Blend) capsule Take 1 capsule by mouth Daily.     • Chlorhexidine Gluconate 2 % pads Apply  topically. AS DIRECTED PAT       No current facility-administered medications on file prior to visit.     Allergies   Allergen Reactions   • Oxaliplatin Anaphylaxis          Objective     Vitals:    12/28/22 1158   BP: 121/75   Pulse: 99   Resp: 16   Temp: 97.5 °F (36.4 °C)   TempSrc: Temporal   SpO2: 99%   Weight: 85.3 kg (188 lb 1.6 oz)   Height: 175 cm (68.9\")   PainSc: 0-No pain     Current Status 12/28/2022   ECOG score 0     Physical Exam  Vitals reviewed.   Constitutional:       General: He is not in acute distress.     Appearance: Normal appearance. He is well-developed.   HENT:      Head: Normocephalic and atraumatic.   Eyes:      Pupils: Pupils are equal, round, and reactive to light.   Cardiovascular:      Rate and Rhythm: Normal rate and regular rhythm.      Heart sounds: Normal heart sounds. No murmur heard.  Pulmonary:      Effort: Pulmonary effort is normal. No respiratory distress.      Breath sounds: Normal breath sounds. No wheezing, rhonchi or rales.   Abdominal:      General: Bowel sounds are normal. There is no distension.      Palpations: Abdomen is soft.   Musculoskeletal:         General: No swelling. Normal range of motion.      Cervical back: Normal range of motion.   Skin:     General: Skin is warm and dry.      Findings: No rash.      Comments: alopecia   Neurological:      Mental Status: He is alert and oriented to person, place, and time.       I have reexamined the patient and the results are consistent with the previously documented exam. NIRU Wayne "       RECENT LABS:  Results from last 7 days   Lab Units 12/28/22  1139   WBC 10*3/mm3 7.12   NEUTROS ABS 10*3/mm3 5.86   HEMOGLOBIN g/dL 12.5*   HEMATOCRIT % 36.7*   PLATELETS 10*3/mm3 102*     Results from last 7 days   Lab Units 12/28/22  1139   SODIUM mmol/L 140   POTASSIUM mmol/L 3.9   CHLORIDE mmol/L 105   CO2 mmol/L 22.9   BUN mg/dL 12   CREATININE mg/dL 0.74   CALCIUM mg/dL 9.3   ALBUMIN g/dL 3.8   BILIRUBIN mg/dL 0.5   ALK PHOS U/L 125*   ALT (SGPT) U/L 15   AST (SGOT) U/L 14   GLUCOSE mg/dL 96           Assessment & Plan    1.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 Almost 90% of the liver was replaced by tumor.  · HER-2 positive  · Initially treated with FOLFOX initiated July 2019  · Herceptin added with cycle 2  · Following 8 cycles of FOLFOX, oxaliplatin discontinued with continuation of 5-FU, leucovorin, Herceptin. This was continued through 7/7/2020  · Increasing CEA led to PET scan 6/9/2021.  Disease progression noted with growth of the tumor in the lower esophagus.  Continued stability of hepatic metastasis  · Repeat EGD 6/19/2021 for tissue specimen and Next Generation Sequencing.  · Endoscopy that shows a noncircumferential abnormality in the lower esophagus that encompasses almost 6 cm in length. It is not blocking off the esophagus and that is why the patient has no symptoms. The pathology shows at least atypical cells consistent with cancer.  · NGS showing PD-L1 positive and high mutation burden.  The tumor remains HER-2 positive.  Treatment plan for Keytruda every 3 weeks x 4 cycles followed by repeat imaging  · Keytruda initiated 7/29/2021  · Hospitalization 8/2/2021 through 8/7/2021 secondary to acute GI bleeding from known malignancy.  · Completed 10 fractions of radiation to the lower esophagus 8/18/2021 after GI bleed  · PET scan following four cycles of Keytruda 10/14/2021 with improvement in the patient's known neoplasm involving the distal esophagus.  However, known metastatic disease involving the liver is significantly worse. Keytruda discontinued  · Enhertu initiated 10/29/2021  · 12/23/2021 PET scan following three cycles of Enhertu discloses resolution of the tumor in the esophagus. He continues to have two active lesions in the liver with significant SUV activity pertinent to his metastatic disease. Minimal pulmonary infiltrates noted bilaterally. With concerns for Enhertu interstitial lung disease, Enhertu discontinued  · Treatment transitioned to FOLFOX Herceptin  · Borderline neutropenia 2/9/2022 with plans for 15% dose reduction to FOLFOX  · 2/16/2022, significant reaction to oxaliplatin which is discontinued  · Reevaluation 3/2/2022, with discontinue of oxaliplatin, the patient will continue on 5-FU, leucovorin, Kanjinti. Of note, the patient received 6 mg/kg of Kanjinti 2 weeks ago, therefore he cannot receive this medication today. He will receive only 5-FU leucovorin and return in 1 week for Kanjinti 2 mg/kg. In 2 weeks, he will resume his normal schedule of 5-FU, leucovorin, Kanjinti 4 mg/kg every 2 weeks.  · 3/16/2022 will continue on with 5-FU, leucovorin, Herceptin.  CEA dramatically improving.  · 3/25/2022 CT C/A/P When compared to recent PET/CT there is interval decrease in size of the previously demonstrated hypermetabolic hepatic metastases as well as decrease in size of the periportal lymph node. The wall thickening involving the distal esophagus is largely unchanged. 2. Persistent findings of colovesicular fistula. 3. Ill-defined interstitial and ground glass opacities within the bilateral lower lobes favored to represent postradiation pneumonitis/change  · 6/8/2022 CEA 8.0.  · 6/29/2022 here for continued 5-FU, leucovorin, Kanjanti, overall tolerating well.  · 8/17/2022 continuing on 5-FU, leucovorin, Herceptin tolerating well.  CEA 8.  Patient was advised to continue with his current treatment.  Not planning a another radiological  assessment unless patient has further rise in CEA level.  · 9/14/2022 here for continued 5-FU, leucovorin, Herceptin, continuing to tolerate quite well.   · 10/12/2022, progressive dysphagia with minimal rise of his CEA with plans made for PET scan  · Endoscopy 11/4/2022 with findings very tight esophagus with strictures.  Biopsies with pathology consistent with adenocarcinoma.  · PET scan 11/2/2022 with a long segment of activity of the esophagus and additional 2 tumoral lesions of the liver or 1 in the left lobe and the other in the right lobe.  There is also a CV activity close to the head of the pancreas.  · 5-FU, leucovorin, Herceptin discontinued with plans to move to Taxol weekly 3 to 4 weeks coordinated with radiation therapy.  · Consult with radiation oncology 11/15/2022 with plans for 10 fractions to the distal esophagus  · Initiating single agent Taxol today, 11/16/2022  · 12/28/2022 due for cycle 2-day 15.  Neuropathy worsening specifically in the feet with more trouble feeling the ground.  No falls.  Per discussion with Dr. Haynes will adjust Taxol down by 15%.    2. Colovesical fistula.   · He requires intermittent Cipro when he notes stool in his urine  · He currently reports his symptoms are more progressive with more urine in his rectum. He continues on Cipro and is scheduled for follow-up with urology, Dr. Witt later this month  · He has been seen by Cm Witt MD. He has advised the patient that he could not do too much for the fistula given the fact that it is just inconveniences of having urination through the anal canal from time to time but no urinary tract infection. He asked the patient to postpone any intention for surgery unless that it is absolutely necessary. Postponement of chemotherapy for 3 months if any surgery is necessary will trigger dramatic progression of his tumor and obviously consequences.   · On 06/08/2022, his colovesical fistula continues. Most of the output is  urine through the rectum, no stool through the bladder. He has not had any urinary tract infection but he continues using prophylactically his ciprofloxacin 250 mg orally every other day. I asked him to remain on this medicine for the time being  · 08/17/2022 the patient continues having colovesical fistula symptomatology intermittently, passing urine through the rectum and not passing feces through the bladder. He has not had any other urinary tract infections. He continues using his ciprofloxacin as per my instructions  · He continues on prophylactic ciprofloxacin every other day with no progressive symptoms today    3.  Thrombophilia secondary malignancy, DVT  · Currently anticoagulated with Xarelto 20 mg daily  · Following GI bleed 8/2/2021, Xarelto has been discontinued  · Plan no evidence of recurrent thrombosis    4. Acute GI bleeding  · Dark tarry stool initially began 7/29/2021  · 7/23/2021, hemoglobin of 11.0.  8/2/2021, hemoglobin of 5.7  · Patient admitted, received transfusion of 4 units packed red blood cells.  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   · Without signs or symptoms of bleeding, hemoglobin today 12.5.    5. Enhertu initiated interstitial lung disease  · The patient completed a prednisone taper  · He is without hypoxia or tachycardia currently. He has only minimal dyspnea on exertion    6. Significant oxaliplatin reaction  · 2/16/2022, significant reaction with shortness of breath, limited air movement, treated with high-dose Solu-Cortef. He is not a candidate for further oxaliplatin    7. Borderline thrombocytopenia  · 3/2/2021, the patient is a platelet count of 101,000. With the discontinuation of oxaliplatin, we can continue with 5-FU leucovorin which will likely not cause significant issues with his platelets.  · 5/18/2022 platelet count normalized at   141,000  · Platelets today 102,000.  Chemotherapy related.  We are adjusting Taxol as outlined above however which should help.    8.  Hypertension: He previously been on lisinopril/hydrochlorothiazide however this medication is currently on hold due to some issues with hypotension.  He now only takes the medication if his blood pressure is above 130/90.  · Blood pressure today normal at 121/75.    9.  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  · As detailed above, neuropathy worsening on Taxol.  Plan to adjust Taxol down by 15%    10.  Lower extremity edema:  · Bilateral.  Patient has been on his feet much more recently and has been more active.  It seemed to have worsened once the weather got warmer.  He states he was told previously by a cardiothoracic surgeon not to wear compression socks.  I have advised him to watch his salt intake, elevate his legs when he possible, and make sure he is drinking plenty of water.  Continue to monitor, and call if worsens.  · 6/20/2022 patient was prescribed lasix 20 mg every other day, which is helping some.  He is also trying to elevate his legs above his heart twice a day, which he also feels is helping.   · He is currently without swelling today, 12/28/2022        PLAN:  1. Proceed with cycle 2-day 15 low-dose Taxol, receiving for 3 out of 4 weeks.  However adjust Taxol down by 15%.  2. Patient will return in 2 weeks for NP follow-up and cycle 3-day 1 Taxol.   3. Return in 3 weeks for cycle 3-day 8 Taxol.     4. Patient will see Dr. Haynes back for cycle 3-day 15 Taxol.    5. Patient is scheduled thereafter for repeat PET scan to assess response.    6. Repeat CEA in 2 weeks at the start of cycle 3.  7. Continue gabapentin as prescribed.  8. Continue to monitor neuropathy closely in light of Taxol.    Patient on high risk medication requiring close member toxicity.    Bushra Patrick, APRN  12/28/2022

## 2022-12-28 ENCOUNTER — INFUSION (OUTPATIENT)
Dept: ONCOLOGY | Facility: HOSPITAL | Age: 67
End: 2022-12-28

## 2022-12-28 ENCOUNTER — OFFICE VISIT (OUTPATIENT)
Dept: ONCOLOGY | Facility: CLINIC | Age: 67
End: 2022-12-28

## 2022-12-28 VITALS
DIASTOLIC BLOOD PRESSURE: 75 MMHG | HEIGHT: 69 IN | WEIGHT: 188.1 LBS | SYSTOLIC BLOOD PRESSURE: 121 MMHG | RESPIRATION RATE: 16 BRPM | HEART RATE: 99 BPM | OXYGEN SATURATION: 99 % | TEMPERATURE: 97.5 F | BODY MASS INDEX: 27.86 KG/M2

## 2022-12-28 DIAGNOSIS — C78.7 LIVER METASTASIS: ICD-10-CM

## 2022-12-28 DIAGNOSIS — D49.0 ESOPHAGUS NEOPLASM: ICD-10-CM

## 2022-12-28 DIAGNOSIS — Z79.899 HIGH RISK MEDICATION USE: ICD-10-CM

## 2022-12-28 DIAGNOSIS — C15.5 MALIGNANT NEOPLASM OF LOWER THIRD OF ESOPHAGUS: Primary | ICD-10-CM

## 2022-12-28 DIAGNOSIS — T45.1X5A CHEMOTHERAPY-INDUCED NEUROPATHY: ICD-10-CM

## 2022-12-28 DIAGNOSIS — C15.5 MALIGNANT NEOPLASM OF LOWER THIRD OF ESOPHAGUS: ICD-10-CM

## 2022-12-28 DIAGNOSIS — G62.0 CHEMOTHERAPY-INDUCED NEUROPATHY: ICD-10-CM

## 2022-12-28 LAB
ALBUMIN SERPL-MCNC: 3.8 G/DL (ref 3.5–5.2)
ALBUMIN/GLOB SERPL: 1.5 G/DL (ref 1.1–2.4)
ALP SERPL-CCNC: 125 U/L (ref 38–116)
ALT SERPL W P-5'-P-CCNC: 15 U/L (ref 0–41)
ANION GAP SERPL CALCULATED.3IONS-SCNC: 12.1 MMOL/L (ref 5–15)
AST SERPL-CCNC: 14 U/L (ref 0–40)
BASOPHILS # BLD AUTO: 0.04 10*3/MM3 (ref 0–0.2)
BASOPHILS NFR BLD AUTO: 0.6 % (ref 0–1.5)
BILIRUB SERPL-MCNC: 0.5 MG/DL (ref 0.2–1.2)
BUN SERPL-MCNC: 12 MG/DL (ref 6–20)
BUN/CREAT SERPL: 16.2 (ref 7.3–30)
CALCIUM SPEC-SCNC: 9.3 MG/DL (ref 8.5–10.2)
CHLORIDE SERPL-SCNC: 105 MMOL/L (ref 98–107)
CO2 SERPL-SCNC: 22.9 MMOL/L (ref 22–29)
CREAT SERPL-MCNC: 0.74 MG/DL (ref 0.7–1.3)
DEPRECATED RDW RBC AUTO: 48.8 FL (ref 37–54)
EGFRCR SERPLBLD CKD-EPI 2021: 99.9 ML/MIN/1.73
EOSINOPHIL # BLD AUTO: 0.1 10*3/MM3 (ref 0–0.4)
EOSINOPHIL NFR BLD AUTO: 1.4 % (ref 0.3–6.2)
ERYTHROCYTE [DISTWIDTH] IN BLOOD BY AUTOMATED COUNT: 15.1 % (ref 12.3–15.4)
GLOBULIN UR ELPH-MCNC: 2.6 GM/DL (ref 1.8–3.5)
GLUCOSE SERPL-MCNC: 96 MG/DL (ref 74–124)
HCT VFR BLD AUTO: 36.7 % (ref 37.5–51)
HGB BLD-MCNC: 12.5 G/DL (ref 13–17.7)
IMM GRANULOCYTES # BLD AUTO: 0.14 10*3/MM3 (ref 0–0.05)
IMM GRANULOCYTES NFR BLD AUTO: 2 % (ref 0–0.5)
LYMPHOCYTES # BLD AUTO: 0.58 10*3/MM3 (ref 0.7–3.1)
LYMPHOCYTES NFR BLD AUTO: 8.1 % (ref 19.6–45.3)
MCH RBC QN AUTO: 30.9 PG (ref 26.6–33)
MCHC RBC AUTO-ENTMCNC: 34.1 G/DL (ref 31.5–35.7)
MCV RBC AUTO: 90.8 FL (ref 79–97)
MONOCYTES # BLD AUTO: 0.4 10*3/MM3 (ref 0.1–0.9)
MONOCYTES NFR BLD AUTO: 5.6 % (ref 5–12)
NEUTROPHILS NFR BLD AUTO: 5.86 10*3/MM3 (ref 1.7–7)
NEUTROPHILS NFR BLD AUTO: 82.3 % (ref 42.7–76)
NRBC BLD AUTO-RTO: 0 /100 WBC (ref 0–0.2)
PLATELET # BLD AUTO: 102 10*3/MM3 (ref 140–450)
PMV BLD AUTO: 10.1 FL (ref 6–12)
POTASSIUM SERPL-SCNC: 3.9 MMOL/L (ref 3.5–4.7)
PROT SERPL-MCNC: 6.4 G/DL (ref 6.3–8)
RBC # BLD AUTO: 4.04 10*6/MM3 (ref 4.14–5.8)
SODIUM SERPL-SCNC: 140 MMOL/L (ref 134–145)
WBC NRBC COR # BLD: 7.12 10*3/MM3 (ref 3.4–10.8)

## 2022-12-28 PROCEDURE — 96375 TX/PRO/DX INJ NEW DRUG ADDON: CPT

## 2022-12-28 PROCEDURE — 25010000002 DEXAMETHASONE SODIUM PHOSPHATE 100 MG/10ML SOLUTION: Performed by: INTERNAL MEDICINE

## 2022-12-28 PROCEDURE — 99214 OFFICE O/P EST MOD 30 MIN: CPT | Performed by: NURSE PRACTITIONER

## 2022-12-28 PROCEDURE — 25010000002 PACLITAXEL PER 1 MG: Performed by: NURSE PRACTITIONER

## 2022-12-28 PROCEDURE — 25010000002 DIPHENHYDRAMINE PER 50 MG: Performed by: INTERNAL MEDICINE

## 2022-12-28 PROCEDURE — 96413 CHEMO IV INFUSION 1 HR: CPT

## 2022-12-28 PROCEDURE — 85025 COMPLETE CBC W/AUTO DIFF WBC: CPT

## 2022-12-28 PROCEDURE — 80053 COMPREHEN METABOLIC PANEL: CPT

## 2022-12-28 RX ORDER — FAMOTIDINE 10 MG/ML
20 INJECTION, SOLUTION INTRAVENOUS ONCE
Status: COMPLETED | OUTPATIENT
Start: 2022-12-28 | End: 2022-12-28

## 2022-12-28 RX ORDER — SODIUM CHLORIDE 9 MG/ML
250 INJECTION, SOLUTION INTRAVENOUS ONCE
Status: COMPLETED | OUTPATIENT
Start: 2022-12-28 | End: 2022-12-28

## 2022-12-28 RX ADMIN — PACLITAXEL 140 MG: 6 INJECTION, SOLUTION INTRAVENOUS at 13:56

## 2022-12-28 RX ADMIN — FAMOTIDINE 20 MG: 10 INJECTION INTRAVENOUS at 12:44

## 2022-12-28 RX ADMIN — SODIUM CHLORIDE 250 ML: 9 INJECTION, SOLUTION INTRAVENOUS at 12:43

## 2022-12-28 RX ADMIN — DEXAMETHASONE SODIUM PHOSPHATE 12 MG: 10 INJECTION, SOLUTION INTRAMUSCULAR; INTRAVENOUS at 12:44

## 2022-12-28 RX ADMIN — DIPHENHYDRAMINE HYDROCHLORIDE 25 MG: 50 INJECTION, SOLUTION INTRAMUSCULAR; INTRAVENOUS at 13:02

## 2022-12-29 ENCOUNTER — TELEPHONE (OUTPATIENT)
Dept: ONCOLOGY | Facility: CLINIC | Age: 67
End: 2022-12-29

## 2022-12-29 NOTE — TELEPHONE ENCOUNTER
Caller: Han Garcia    Relationship: Self    Best call back number: [phone]    What was the call regarding: HAN CALLED TO SPEAK TO TYE REGARDING HIS APPTS. HE HAS A COUPLE OF QUESTIONS.    Do you require a callback: YES

## 2023-01-09 NOTE — PROGRESS NOTES
"  Subjective     REASON FOR FOLLOW UP:  1.  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  2. Colovesical fistula, chronic antibiotic therapy with Cipro.  3.  DVT of the right and left lower extremity. 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  Acute GI bleed      History of Present Illness    The patient is a 67 y.o. male with above his history, who returns today for cycle 3-day 1 Taxol.    Patient completed 10 fractions of radiation to the esophagus 12/13/2022.  At this point he is eating and drinking well with weight that is stable.     When he was evaluated 2 weeks ago for cycle 2-day 15, he was struggling with neuropathy, Taxol was therefore dose reduced by 15%.  Return to the office today, 1/11/2023, the patient reports he does continue to struggle with neuropathy.  He reports his feet feel numb constantly.  This does not impede his walking and he is not having falls or imbalance.  He also has tingling in his fingertips.  He does not necessarily feel this is worsened over the past 2 weeks though it is certainly no improved.  He continues on gabapentin 300 mg twice daily.  He is without painful neuropathy.    He otherwise does tolerate chemotherapy reasonably well without nausea or diarrhea.  He denies signs or symptoms of bleeding.  He denies fevers or chills, signs or symptoms of infection.      Past Medical History:   Diagnosis Date   • Arthritis    • Cancer (HCC)     prostate cancer 2008   • Cancer (HCC)     esophageal/LIVER   • Diabetes mellitus (HCC)    • Difficulty swallowing solids    • Elevated PSA    • Esophageal mass    • Esophageal varices (HCC) July 2019   • GERD (gastroesophageal reflux disease)    • H/O Lung nodule    • Hepatitis     CHILD--PT STATED \"I THINK IT WAS A.\"   • History of esophageal varices with bleeding     SUMM34 2021   • History of high blood pressure    • History of pneumonia    • Hx of blood clots 08/10/2019    LOWER LEGS " BILAT   • Maintenance chemotherapy     IV EVERY 4 WEEKS:  ESOPHAGEAL CANCER   • Nail fungus     LEFT FINGERNAILS X 4   • Neuropathy    • PVD (peripheral vascular disease) (HCC)    • Rash     ITCHY RED RASH ON RIGHT THIGH AREA-STATES IT COMES AND GOES.  USING PRESCRIPTION CREAM   • Recto-bladderneck fistula     on poab for recurrent uti   • Risk factors for obstructive sleep apnea         Past Surgical History:   Procedure Laterality Date   • CATARACT EXTRACTION WITH INTRAOCULAR LENS IMPLANT Bilateral    • COLONOSCOPY N/A 02/04/2020    Procedure: COLONOSCOPY;  Surgeon: Guy Sears MD;  Location: [facility] ENDOSCOPY;  Service: General;  Laterality: N/A;  PRE-COLOVESICAL FISTULA  POST-- DIVERTICULOSIS   • COLONOSCOPY  2/4/2020   • CYSTOSCOPY  02/06/2020   • CYSTOSCOPY Bilateral 02/26/2020    Procedure: CYSTOSCOPY RETROGRADE;  Surgeon: Cm Witt MD;  Location: [facility] OR;  Service: Urology;  Laterality: Bilateral;   • ENDOSCOPY     • ENDOSCOPY N/A 06/19/2021    Procedure: ESOPHAGOGASTRODUODENOSCOPY WITH BIOPSY;  Surgeon: Mary Brito MD;  Location: [facility] OR;  Service: Gastroenterology;  Laterality: N/A;   • ENDOSCOPY N/A 08/05/2021    Procedure: ESOPHAGOGASTRODUODENOSCOPY HEMOSPRAY;  Surgeon: Naga Shelby MD;  Location: [facility] ENDOSCOPY;  Service: Gastroenterology;  Laterality: N/A;  HX OF ESOPHAGEAL  CANCER;MELENA  POST: ESOPHAGEAL BLEEDING; ESOPHAGEAL MASS   • ENDOSCOPY N/A 11/4/2022    Procedure: ESOPHAGOGASTRODUODENOSCOPY WITH DILATATION;  Surgeon: Mary Brito MD;  Location: [facility] OR;  Service: Gastroenterology;  Laterality: N/A;   • HERNIA REPAIR Right 1999    inguinal hernia   • PROSTATE SURGERY  03/2008    prostatectectomy   • UPPER GASTROINTESTINAL ENDOSCOPY  August 2021   • VENOUS ACCESS DEVICE (PORT) INSERTION N/A 07/16/2019    Procedure: INSERTION VENOUS ACCESS DEVICE WITH FLUORO AND EGD WITH BIOPSY;  Surgeon: Mary Brito MD;  Location: [facility];   Service: Thoracic        Current Outpatient Medications on File Prior to Visit   Medication Sig Dispense Refill   • acetaminophen (TYLENOL) 500 MG tablet Take 500 mg by mouth Every 6 (Six) Hours As Needed for Mild Pain .     • cevimeline (EVOXAC) 30 MG capsule Take 1 capsule by mouth 3 (Three) Times a Day. DRY MOUTH     • ciprofloxacin (CIPRO) 250 MG tablet Take 1 tablet by mouth Daily. Take one every other day (Patient taking differently: Take 250 mg by mouth Every Other Day. Take one every other day  FISTULA) 90 tablet 1   • econazole nitrate (SPECTAZOLE) 1 % cream Apply  topically to the appropriate area as directed 2 (Two) Times a Day. (Patient taking differently: Apply 1 application topically to the appropriate area as directed 2 (Two) Times a Day. NAIL FUNGUS-4 FINGERS ON LEFT) 85 g 1   • furosemide (LASIX) 20 MG tablet Take 1 tablet by mouth Every Other Day. (Patient taking differently: Take 20 mg by mouth As Needed. LOWER LEG EDEMA) 28 tablet 2   • gabapentin (NEURONTIN) 300 MG capsule TAKE 1 CAPSULE BY MOUTH TWICE A  capsule 0   • glipizide (GLUCOTROL XL) 5 MG ER tablet TAKE 1 TABLET BY MOUTH EVERY DAY (Patient taking differently: Take 5 mg by mouth Daily.) 90 tablet 1   • lisinopril-hydrochlorothiazide (PRINZIDE,ZESTORETIC) 20-12.5 MG per tablet TAKE 1 TABLET BY MOUTH EVERY DAY (Patient taking differently: Take 0.5 tablets by mouth As Needed. SYS GREATER THAN 130, TAKE 1/2 TAB) 90 tablet 0   • pantoprazole (Protonix) 40 MG EC tablet Take 1 tablet by mouth 2 (Two) Times a Day. 180 tablet 3   • pentoxifylline (TRENtal) 400 MG CR tablet TAKE 1 TABLET BY MOUTH TWICE A DAY WITH MEALS 90 tablet 0   • potassium chloride ER (K-TAB) 20 MEQ tablet controlled-release ER tablet Take 1 tablet by mouth Daily. 90 tablet 3   • Probiotic Product (Probiotic Blend) capsule Take 1 capsule by mouth Daily.     • [DISCONTINUED] Chlorhexidine Gluconate 2 % pads Apply  topically. AS DIRECTED PAT       Current  Facility-Administered Medications on File Prior to Visit   Medication Dose Route Frequency Provider Last Rate Last Admin   • dexamethasone (DECADRON) IVPB 12 mg  12 mg Intravenous Once Dana Paniagua APRN 200 mL/hr at 01/11/23 1017 12 mg at 01/11/23 1017   • [COMPLETED] diphenhydrAMINE (BENADRYL) IVPB 25 mg  25 mg Intravenous Once Dana Paniagua APRN   Stopped at 01/11/23 1016   • [COMPLETED] famotidine (PEPCID) injection 20 mg  20 mg Intravenous Once Dana Paniagua APRN   20 mg at 01/11/23 1003   • PACLitaxel (TAXOL) 140 mg in sodium chloride 0.9 % 273.3 mL chemo IVPB  68 mg/m2 (Treatment Plan Recorded) Intravenous Once Dana Paniagua APRN       • [COMPLETED] sodium chloride 0.9 % infusion 250 mL  250 mL Intravenous Once Dana Paniagua APRN 20 mL/hr at 01/11/23 1002 250 mL at 01/11/23 1002        ALLERGIES:    Allergies   Allergen Reactions   • Oxaliplatin Anaphylaxis        Social History     Socioeconomic History   • Marital status: Single   • Years of education: High school   Tobacco Use   • Smoking status: Every Day     Packs/day: 1.00     Years: 38.00     Pack years: 38.00     Types: Cigarettes     Start date: 1/1/1974   • Smokeless tobacco: Never   • Tobacco comments:     Quit for a period of 8 years.    Vaping Use   • Vaping Use: Never used   Substance and Sexual Activity   • Alcohol use: Not Currently   • Drug use: Never   • Sexual activity: Not Currently     Partners: Female     Birth control/protection: None        Family History   Problem Relation Age of Onset   • Hypertension Mother    • Stroke Mother    • Lung cancer Father    • Hypertension Other    • Lung disease Other    • Prostate cancer Other    • Malig Hyperthermia Neg Hx       Current Outpatient Medications on File Prior to Visit   Medication Sig Dispense Refill   • acetaminophen (TYLENOL) 500 MG tablet Take 500 mg by mouth Every 6 (Six) Hours As Needed for Mild Pain .     • cevimeline (EVOXAC)  30 MG capsule Take 1 capsule by mouth 3 (Three) Times a Day. DRY MOUTH     • ciprofloxacin (CIPRO) 250 MG tablet Take 1 tablet by mouth Daily. Take one every other day (Patient taking differently: Take 250 mg by mouth Every Other Day. Take one every other day  FISTULA) 90 tablet 1   • econazole nitrate (SPECTAZOLE) 1 % cream Apply  topically to the appropriate area as directed 2 (Two) Times a Day. (Patient taking differently: Apply 1 application topically to the appropriate area as directed 2 (Two) Times a Day. NAIL FUNGUS-4 FINGERS ON LEFT) 85 g 1   • furosemide (LASIX) 20 MG tablet Take 1 tablet by mouth Every Other Day. (Patient taking differently: Take 20 mg by mouth As Needed. LOWER LEG EDEMA) 28 tablet 2   • gabapentin (NEURONTIN) 300 MG capsule TAKE 1 CAPSULE BY MOUTH TWICE A  capsule 0   • glipizide (GLUCOTROL XL) 5 MG ER tablet TAKE 1 TABLET BY MOUTH EVERY DAY (Patient taking differently: Take 5 mg by mouth Daily.) 90 tablet 1   • lisinopril-hydrochlorothiazide (PRINZIDE,ZESTORETIC) 20-12.5 MG per tablet TAKE 1 TABLET BY MOUTH EVERY DAY (Patient taking differently: Take 0.5 tablets by mouth As Needed. SYS GREATER THAN 130, TAKE 1/2 TAB) 90 tablet 0   • pantoprazole (Protonix) 40 MG EC tablet Take 1 tablet by mouth 2 (Two) Times a Day. 180 tablet 3   • pentoxifylline (TRENtal) 400 MG CR tablet TAKE 1 TABLET BY MOUTH TWICE A DAY WITH MEALS 90 tablet 0   • potassium chloride ER (K-TAB) 20 MEQ tablet controlled-release ER tablet Take 1 tablet by mouth Daily. 90 tablet 3   • Probiotic Product (Probiotic Blend) capsule Take 1 capsule by mouth Daily.     • [DISCONTINUED] Chlorhexidine Gluconate 2 % pads Apply  topically. AS DIRECTED PAT       Current Facility-Administered Medications on File Prior to Visit   Medication Dose Route Frequency Provider Last Rate Last Admin   • dexamethasone (DECADRON) IVPB 12 mg  12 mg Intravenous Once Dana Paniagua APRN 200 mL/hr at 01/11/23 1017 12 mg at 01/11/23  "1017   • [COMPLETED] diphenhydrAMINE (BENADRYL) IVPB 25 mg  25 mg Intravenous Once Dana Paniagua APRN   Stopped at 01/11/23 1016   • [COMPLETED] famotidine (PEPCID) injection 20 mg  20 mg Intravenous Once Dana Paniagua APRN   20 mg at 01/11/23 1003   • PACLitaxel (TAXOL) 140 mg in sodium chloride 0.9 % 273.3 mL chemo IVPB  68 mg/m2 (Treatment Plan Recorded) Intravenous Once Dana Paniagua APRN       • [COMPLETED] sodium chloride 0.9 % infusion 250 mL  250 mL Intravenous Once Dana Paniagua APRN 20 mL/hr at 01/11/23 1002 250 mL at 01/11/23 1002     Allergies   Allergen Reactions   • Oxaliplatin Anaphylaxis          Objective     Vitals:    01/11/23 0908   BP: 100/68   Pulse: 90   Resp: 16   Temp: 98.2 °F (36.8 °C)   TempSrc: Temporal   SpO2: 98%   Weight: 85.4 kg (188 lb 3.2 oz)   Height: 175 cm (68.9\")   PainSc: 0-No pain     Body mass index is 27.87 kg/m².     Current Status 1/11/2023   ECOG score 0     Physical Exam  Vitals reviewed.   Constitutional:       General: He is not in acute distress.     Appearance: Normal appearance. He is well-developed.   HENT:      Head: Normocephalic and atraumatic.   Eyes:      Pupils: Pupils are equal, round, and reactive to light.   Cardiovascular:      Rate and Rhythm: Normal rate and regular rhythm.      Heart sounds: Normal heart sounds. No murmur heard.  Pulmonary:      Effort: Pulmonary effort is normal. No respiratory distress.      Breath sounds: Normal breath sounds. No wheezing, rhonchi or rales.   Abdominal:      General: Bowel sounds are normal. There is no distension.      Palpations: Abdomen is soft.   Musculoskeletal:         General: No swelling. Normal range of motion.      Cervical back: Normal range of motion.   Skin:     General: Skin is warm and dry.      Findings: No rash.      Comments: alopecia   Neurological:      Mental Status: He is alert and oriented to person, place, and time.       I have reexamined the " patient and the results are consistent with the previously documented exam. NIRU Okeefe       RECENT LABS:  Results from last 7 days   Lab Units 01/11/23  0847   WBC 10*3/mm3 6.91   NEUTROS ABS 10*3/mm3 5.21   HEMOGLOBIN g/dL 12.4*   HEMATOCRIT % 37.0*   PLATELETS 10*3/mm3 149     Results from last 7 days   Lab Units 01/11/23  0847   SODIUM mmol/L 137   POTASSIUM mmol/L 4.0   CHLORIDE mmol/L 104   CO2 mmol/L 23.3   BUN mg/dL 8   CREATININE mg/dL 0.63*   CALCIUM mg/dL 9.2   ALBUMIN g/dL 3.7   BILIRUBIN mg/dL 0.7   ALK PHOS U/L 137*   ALT (SGPT) U/L 12   AST (SGOT) U/L 15   GLUCOSE mg/dL 95           Assessment & Plan    1.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 Almost 90% of the liver was replaced by tumor.  · HER-2 positive  · Initially treated with FOLFOX initiated July 2019  · Herceptin added with cycle 2  · Following 8 cycles of FOLFOX, oxaliplatin discontinued with continuation of 5-FU, leucovorin, Herceptin. This was continued through 7/7/2020  · Increasing CEA led to PET scan 6/9/2021.  Disease progression noted with growth of the tumor in the lower esophagus.  Continued stability of hepatic metastasis  · Repeat EGD 6/19/2021 for tissue specimen and Next Generation Sequencing.  · Endoscopy that shows a noncircumferential abnormality in the lower esophagus that encompasses almost 6 cm in length. It is not blocking off the esophagus and that is why the patient has no symptoms. The pathology shows at least atypical cells consistent with cancer.  · NGS showing PD-L1 positive and high mutation burden.  The tumor remains HER-2 positive.  Treatment plan for Keytruda every 3 weeks x 4 cycles followed by repeat imaging  · Keytruda initiated 7/29/2021  · Hospitalization 8/2/2021 through 8/7/2021 secondary to acute GI bleeding from known malignancy.  · Completed 10 fractions of radiation to the lower esophagus 8/18/2021 after GI bleed  · PET scan following four cycles of  Keytruda 10/14/2021 with improvement in the patient's known neoplasm involving the distal esophagus. However, known metastatic disease involving the liver is significantly worse. Keytruda discontinued  · Enhertu initiated 10/29/2021  · 12/23/2021 PET scan following three cycles of Enhertu discloses resolution of the tumor in the esophagus. He continues to have two active lesions in the liver with significant SUV activity pertinent to his metastatic disease. Minimal pulmonary infiltrates noted bilaterally. With concerns for Enhertu interstitial lung disease, Enhertu discontinued  · Treatment transitioned to FOLFOX Herceptin  · Borderline neutropenia 2/9/2022 with plans for 15% dose reduction to FOLFOX  · 2/16/2022, significant reaction to oxaliplatin which is discontinued  · Reevaluation 3/2/2022, with discontinue of oxaliplatin, the patient will continue on 5-FU, leucovorin, Kanjinti. Of note, the patient received 6 mg/kg of Kanjinti 2 weeks ago, therefore he cannot receive this medication today. He will receive only 5-FU leucovorin and return in 1 week for Kanjinti 2 mg/kg. In 2 weeks, he will resume his normal schedule of 5-FU, leucovorin, Kanjinti 4 mg/kg every 2 weeks.  · 3/16/2022 will continue on with 5-FU, leucovorin, Herceptin.  CEA dramatically improving.  · 3/25/2022 CT C/A/P When compared to recent PET/CT there is interval decrease in size of the previously demonstrated hypermetabolic hepatic metastases as well as decrease in size of the periportal lymph node. The wall thickening involving the distal esophagus is largely unchanged. 2. Persistent findings of colovesicular fistula. 3. Ill-defined interstitial and ground glass opacities within the bilateral lower lobes favored to represent postradiation pneumonitis/change  · 6/8/2022 CEA 8.0.  · 6/29/2022 here for continued 5-FU, leucovorin, Kanjanti, overall tolerating well.  · 8/17/2022 continuing on 5-FU, leucovorin, Herceptin tolerating well.  CEA 8.   Patient was advised to continue with his current treatment.  Not planning a another radiological assessment unless patient has further rise in CEA level.  · 9/14/2022 here for continued 5-FU, leucovorin, Herceptin, continuing to tolerate quite well.   · 10/12/2022, progressive dysphagia with minimal rise of his CEA with plans made for PET scan  · Endoscopy 11/4/2022 with findings very tight esophagus with strictures.  Biopsies with pathology consistent with adenocarcinoma.  · PET scan 11/2/2022 with a long segment of activity of the esophagus and additional 2 tumoral lesions of the liver or 1 in the left lobe and the other in the right lobe.  There is also a CV activity close to the head of the pancreas.  · 5-FU, leucovorin, Herceptin discontinued with plans to move to Taxol weekly 3 to 4 weeks coordinated with radiation therapy.  · Consult with radiation oncology 11/15/2022 with plans for 10 fractions to the distal esophagus  · Initiating single agent Taxol today, 11/16/2022  · 12/28/2022 due for cycle 2-day 15.  Neuropathy worsening specifically in the feet with more trouble feeling the ground.  No falls.  Per discussion with Dr. Haynes will adjust Taxol down by 15%.  · Reevaluation today, 1/11/2023 due for cycle 3-day 1 Taxol.  He continues to have persistent neuropathy with constant tingling of his fingers and toes.  Further adjustment to Taxol which will now be given day 1 day 15 of a 28-day cycle.  Day 8 discontinued from the treatment plan.    2. Colovesical fistula.   · He requires intermittent Cipro when he notes stool in his urine  · He currently reports his symptoms are more progressive with more urine in his rectum. He continues on Cipro and is scheduled for follow-up with urology, Dr. Witt later this month  · He has been seen by Cm Witt MD. He has advised the patient that he could not do too much for the fistula given the fact that it is just inconveniences of having urination through the  anal canal from time to time but no urinary tract infection. He asked the patient to postpone any intention for surgery unless that it is absolutely necessary. Postponement of chemotherapy for 3 months if any surgery is necessary will trigger dramatic progression of his tumor and obviously consequences.   · On 06/08/2022, his colovesical fistula continues. Most of the output is urine through the rectum, no stool through the bladder. He has not had any urinary tract infection but he continues using prophylactically his ciprofloxacin 250 mg orally every other day. I asked him to remain on this medicine for the time being  · 08/17/2022 the patient continues having colovesical fistula symptomatology intermittently, passing urine through the rectum and not passing feces through the bladder. He has not had any other urinary tract infections. He continues using his ciprofloxacin as per my instructions  · He continues on prophylactic ciprofloxacin every other day with no progressive symptoms today    3.  Thrombophilia secondary malignancy, DVT  · Currently anticoagulated with Xarelto 20 mg daily  · Following GI bleed 8/2/2021, Xarelto has been discontinued  · Plan no evidence of recurrent thrombosis    4. Acute GI bleeding  · Dark tarry stool initially began 7/29/2021  · 7/23/2021, hemoglobin of 11.0.  8/2/2021, hemoglobin of 5.7  · Patient admitted, received transfusion of 4 units packed red blood cells.  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   · Without signs or symptoms of bleeding, hemoglobin today 12.4.    5. Enhertu initiated interstitial lung disease  · The patient completed a prednisone taper  · He is without hypoxia or tachycardia currently. He has only minimal dyspnea on exertion    6. Significant oxaliplatin reaction  · 2/16/2022, significant reaction with shortness of breath,  limited air movement, treated with high-dose Solu-Cortef. He is not a candidate for further oxaliplatin    7. Borderline thrombocytopenia  · 3/2/2021, the patient is a platelet count of 101,000. With the discontinuation of oxaliplatin, we can continue with 5-FU leucovorin which will likely not cause significant issues with his platelets.  · 5/18/2022 platelet count normalized at  141,000  · Platelets today 149,000, without signs or symptoms of bleeding    8.  Hypertension: He previously been on lisinopril/hydrochlorothiazide however this medication is currently on hold due to some issues with hypotension.  He now only takes the medication if his blood pressure is above 130/90.  · Blood pressure today normal at 121/75.    9.  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  · 12/28/2022, persistent neuropathy, Taxol reduced by 15%  · The patient reports persistent symptoms today with tingling of his fingers constantly and numbness of his feet.  He does not have painful neuropathy though this is constant interfering with activities of daily living.  Taxol further reduced with adjustment in schedule to day 1 day 15.    10.  Lower extremity edema:  · Bilateral.  Patient has been on his feet much more recently and has been more active.  It seemed to have worsened once the weather got warmer.  He states he was told previously by a cardiothoracic surgeon not to wear compression socks.  I have advised him to watch his salt intake, elevate his legs when he possible, and make sure he is drinking plenty of water.  Continue to monitor, and call if worsens.  · 6/20/2022 patient was prescribed lasix 20 mg every other day, which is helping some.  He is also trying to elevate his legs above his heart twice a day, which he also feels is helping.   · He is currently without swelling today, 1/11/2023        PLAN:  1. Proceed today with cycle 3-day 1 low-dose Taxol.  Maintain 15% dose  reduction, 68mg/m2  2. Treatment plan will be altered due to neuropathy with plans for Taxol every other week, day 1 day 15 of a 28-day cycle.  Day 8 has been discontinued from the treatment plan  3. Continue gabapentin 300 mg twice daily.  There is no need to adjustment as the patient is not having painful neuropathy  4. Patient will see Dr. Haynes back for cycle 3-day 15 Taxol.    5. Patient is scheduled thereafter for repeat PET scan to assess response.    6. Repeat CEA is pending today    Patient on high risk medication requiring close member toxicity.  Today's plan of care and adjustment in chemotherapy schedule was discussed and reviewed with Dr. Haynes who is in agreement    Dana Paniagua, APRN  01/11/2023

## 2023-01-11 ENCOUNTER — INFUSION (OUTPATIENT)
Dept: ONCOLOGY | Facility: HOSPITAL | Age: 68
End: 2023-01-11
Payer: MEDICARE

## 2023-01-11 ENCOUNTER — OFFICE VISIT (OUTPATIENT)
Dept: ONCOLOGY | Facility: CLINIC | Age: 68
End: 2023-01-11
Payer: MEDICARE

## 2023-01-11 VITALS
WEIGHT: 188.2 LBS | SYSTOLIC BLOOD PRESSURE: 100 MMHG | HEIGHT: 69 IN | BODY MASS INDEX: 27.88 KG/M2 | DIASTOLIC BLOOD PRESSURE: 68 MMHG | OXYGEN SATURATION: 98 % | RESPIRATION RATE: 16 BRPM | HEART RATE: 90 BPM | TEMPERATURE: 98.2 F

## 2023-01-11 DIAGNOSIS — D49.0 ESOPHAGUS NEOPLASM: ICD-10-CM

## 2023-01-11 DIAGNOSIS — Z79.899 HIGH RISK MEDICATION USE: ICD-10-CM

## 2023-01-11 DIAGNOSIS — C78.7 LIVER METASTASIS: ICD-10-CM

## 2023-01-11 DIAGNOSIS — C15.5 MALIGNANT NEOPLASM OF LOWER THIRD OF ESOPHAGUS: ICD-10-CM

## 2023-01-11 DIAGNOSIS — T45.1X5A CHEMOTHERAPY-INDUCED NEUROPATHY: ICD-10-CM

## 2023-01-11 DIAGNOSIS — C15.5 MALIGNANT NEOPLASM OF LOWER THIRD OF ESOPHAGUS: Primary | ICD-10-CM

## 2023-01-11 DIAGNOSIS — G62.0 CHEMOTHERAPY-INDUCED NEUROPATHY: ICD-10-CM

## 2023-01-11 LAB
ALBUMIN SERPL-MCNC: 3.7 G/DL (ref 3.5–5.2)
ALBUMIN/GLOB SERPL: 1.4 G/DL (ref 1.1–2.4)
ALP SERPL-CCNC: 137 U/L (ref 38–116)
ALT SERPL W P-5'-P-CCNC: 12 U/L (ref 0–41)
ANION GAP SERPL CALCULATED.3IONS-SCNC: 9.7 MMOL/L (ref 5–15)
AST SERPL-CCNC: 15 U/L (ref 0–40)
BASOPHILS # BLD AUTO: 0.03 10*3/MM3 (ref 0–0.2)
BASOPHILS NFR BLD AUTO: 0.4 % (ref 0–1.5)
BILIRUB SERPL-MCNC: 0.7 MG/DL (ref 0.2–1.2)
BUN SERPL-MCNC: 8 MG/DL (ref 6–20)
BUN/CREAT SERPL: 12.7 (ref 7.3–30)
CALCIUM SPEC-SCNC: 9.2 MG/DL (ref 8.5–10.2)
CEA SERPL-MCNC: 77.3 NG/ML
CHLORIDE SERPL-SCNC: 104 MMOL/L (ref 98–107)
CO2 SERPL-SCNC: 23.3 MMOL/L (ref 22–29)
CREAT SERPL-MCNC: 0.63 MG/DL (ref 0.7–1.3)
DEPRECATED RDW RBC AUTO: 56.4 FL (ref 37–54)
EGFRCR SERPLBLD CKD-EPI 2021: 104.9 ML/MIN/1.73
EOSINOPHIL # BLD AUTO: 0.08 10*3/MM3 (ref 0–0.4)
EOSINOPHIL NFR BLD AUTO: 1.2 % (ref 0.3–6.2)
ERYTHROCYTE [DISTWIDTH] IN BLOOD BY AUTOMATED COUNT: 17 % (ref 12.3–15.4)
GLOBULIN UR ELPH-MCNC: 2.6 GM/DL (ref 1.8–3.5)
GLUCOSE SERPL-MCNC: 95 MG/DL (ref 74–124)
HCT VFR BLD AUTO: 37 % (ref 37.5–51)
HGB BLD-MCNC: 12.4 G/DL (ref 13–17.7)
IMM GRANULOCYTES # BLD AUTO: 0.15 10*3/MM3 (ref 0–0.05)
IMM GRANULOCYTES NFR BLD AUTO: 2.2 % (ref 0–0.5)
LYMPHOCYTES # BLD AUTO: 0.69 10*3/MM3 (ref 0.7–3.1)
LYMPHOCYTES NFR BLD AUTO: 10 % (ref 19.6–45.3)
MCH RBC QN AUTO: 30.5 PG (ref 26.6–33)
MCHC RBC AUTO-ENTMCNC: 33.5 G/DL (ref 31.5–35.7)
MCV RBC AUTO: 91.1 FL (ref 79–97)
MONOCYTES # BLD AUTO: 0.75 10*3/MM3 (ref 0.1–0.9)
MONOCYTES NFR BLD AUTO: 10.9 % (ref 5–12)
NEUTROPHILS NFR BLD AUTO: 5.21 10*3/MM3 (ref 1.7–7)
NEUTROPHILS NFR BLD AUTO: 75.3 % (ref 42.7–76)
NRBC BLD AUTO-RTO: 0 /100 WBC (ref 0–0.2)
PLATELET # BLD AUTO: 149 10*3/MM3 (ref 140–450)
PMV BLD AUTO: 9.5 FL (ref 6–12)
POTASSIUM SERPL-SCNC: 4 MMOL/L (ref 3.5–4.7)
PROT SERPL-MCNC: 6.3 G/DL (ref 6.3–8)
RBC # BLD AUTO: 4.06 10*6/MM3 (ref 4.14–5.8)
SODIUM SERPL-SCNC: 137 MMOL/L (ref 134–145)
WBC NRBC COR # BLD: 6.91 10*3/MM3 (ref 3.4–10.8)

## 2023-01-11 PROCEDURE — 96413 CHEMO IV INFUSION 1 HR: CPT

## 2023-01-11 PROCEDURE — 25010000002 DIPHENHYDRAMINE PER 50 MG: Performed by: NURSE PRACTITIONER

## 2023-01-11 PROCEDURE — 80053 COMPREHEN METABOLIC PANEL: CPT

## 2023-01-11 PROCEDURE — 82378 CARCINOEMBRYONIC ANTIGEN: CPT | Performed by: NURSE PRACTITIONER

## 2023-01-11 PROCEDURE — 25010000002 DEXAMETHASONE SODIUM PHOSPHATE 100 MG/10ML SOLUTION: Performed by: NURSE PRACTITIONER

## 2023-01-11 PROCEDURE — 96375 TX/PRO/DX INJ NEW DRUG ADDON: CPT

## 2023-01-11 PROCEDURE — 25010000002 PACLITAXEL PER 1 MG: Performed by: NURSE PRACTITIONER

## 2023-01-11 PROCEDURE — 85025 COMPLETE CBC W/AUTO DIFF WBC: CPT

## 2023-01-11 PROCEDURE — 99214 OFFICE O/P EST MOD 30 MIN: CPT | Performed by: NURSE PRACTITIONER

## 2023-01-11 RX ORDER — FAMOTIDINE 10 MG/ML
20 INJECTION, SOLUTION INTRAVENOUS ONCE
Status: COMPLETED | OUTPATIENT
Start: 2023-01-11 | End: 2023-01-11

## 2023-01-11 RX ORDER — FAMOTIDINE 10 MG/ML
20 INJECTION, SOLUTION INTRAVENOUS ONCE
Status: CANCELLED | OUTPATIENT
Start: 2023-01-11

## 2023-01-11 RX ORDER — SODIUM CHLORIDE 9 MG/ML
250 INJECTION, SOLUTION INTRAVENOUS ONCE
Status: CANCELLED | OUTPATIENT
Start: 2023-01-11

## 2023-01-11 RX ORDER — FAMOTIDINE 10 MG/ML
20 INJECTION, SOLUTION INTRAVENOUS AS NEEDED
Status: CANCELLED | OUTPATIENT
Start: 2023-01-11

## 2023-01-11 RX ORDER — SODIUM CHLORIDE 9 MG/ML
250 INJECTION, SOLUTION INTRAVENOUS ONCE
Status: COMPLETED | OUTPATIENT
Start: 2023-01-11 | End: 2023-01-11

## 2023-01-11 RX ORDER — DIPHENHYDRAMINE HYDROCHLORIDE 50 MG/ML
50 INJECTION INTRAMUSCULAR; INTRAVENOUS AS NEEDED
Status: CANCELLED | OUTPATIENT
Start: 2023-01-11

## 2023-01-11 RX ADMIN — SODIUM CHLORIDE 250 ML: 9 INJECTION, SOLUTION INTRAVENOUS at 10:02

## 2023-01-11 RX ADMIN — DEXAMETHASONE SODIUM PHOSPHATE 12 MG: 10 INJECTION, SOLUTION INTRAMUSCULAR; INTRAVENOUS at 10:17

## 2023-01-11 RX ADMIN — DIPHENHYDRAMINE HYDROCHLORIDE 25 MG: 50 INJECTION, SOLUTION INTRAMUSCULAR; INTRAVENOUS at 10:03

## 2023-01-11 RX ADMIN — FAMOTIDINE 20 MG: 10 INJECTION INTRAVENOUS at 10:03

## 2023-01-11 RX ADMIN — PACLITAXEL 140 MG: 6 INJECTION, SOLUTION INTRAVENOUS at 11:03

## 2023-01-18 ENCOUNTER — APPOINTMENT (OUTPATIENT)
Dept: ONCOLOGY | Facility: HOSPITAL | Age: 68
End: 2023-01-18
Payer: MEDICARE

## 2023-01-23 RX ORDER — PENTOXIFYLLINE 400 MG/1
TABLET, EXTENDED RELEASE ORAL
Qty: 90 TABLET | Refills: 0 | Status: SHIPPED | OUTPATIENT
Start: 2023-01-23

## 2023-01-25 ENCOUNTER — INFUSION (OUTPATIENT)
Dept: ONCOLOGY | Facility: HOSPITAL | Age: 68
End: 2023-01-25
Payer: MEDICARE

## 2023-01-25 ENCOUNTER — OFFICE VISIT (OUTPATIENT)
Dept: ONCOLOGY | Facility: CLINIC | Age: 68
End: 2023-01-25
Payer: MEDICARE

## 2023-01-25 VITALS
RESPIRATION RATE: 16 BRPM | HEART RATE: 89 BPM | WEIGHT: 186.7 LBS | TEMPERATURE: 97.1 F | SYSTOLIC BLOOD PRESSURE: 116 MMHG | OXYGEN SATURATION: 98 % | HEIGHT: 69 IN | BODY MASS INDEX: 27.65 KG/M2 | DIASTOLIC BLOOD PRESSURE: 74 MMHG

## 2023-01-25 DIAGNOSIS — Z45.2 ENCOUNTER FOR ADJUSTMENT OR MANAGEMENT OF VASCULAR ACCESS DEVICE: ICD-10-CM

## 2023-01-25 DIAGNOSIS — C15.5 MALIGNANT NEOPLASM OF LOWER THIRD OF ESOPHAGUS: ICD-10-CM

## 2023-01-25 DIAGNOSIS — T45.1X5A PERIPHERAL NEUROPATHY DUE TO CHEMOTHERAPY: ICD-10-CM

## 2023-01-25 DIAGNOSIS — D49.0 ESOPHAGUS NEOPLASM: ICD-10-CM

## 2023-01-25 DIAGNOSIS — C78.7 LIVER METASTASIS: ICD-10-CM

## 2023-01-25 DIAGNOSIS — C15.5 MALIGNANT NEOPLASM OF LOWER THIRD OF ESOPHAGUS: Primary | ICD-10-CM

## 2023-01-25 DIAGNOSIS — G62.0 PERIPHERAL NEUROPATHY DUE TO CHEMOTHERAPY: ICD-10-CM

## 2023-01-25 DIAGNOSIS — R73.01 IMPAIRED FASTING GLUCOSE: ICD-10-CM

## 2023-01-25 DIAGNOSIS — N32.1 RECTO-BLADDERNECK FISTULA: ICD-10-CM

## 2023-01-25 DIAGNOSIS — I10 ESSENTIAL HYPERTENSION: ICD-10-CM

## 2023-01-25 DIAGNOSIS — Z72.0 TOBACCO ABUSE: ICD-10-CM

## 2023-01-25 DIAGNOSIS — Z45.2 ENCOUNTER FOR ADJUSTMENT OR MANAGEMENT OF VASCULAR ACCESS DEVICE: Primary | ICD-10-CM

## 2023-01-25 LAB
ALBUMIN SERPL-MCNC: 3.8 G/DL (ref 3.5–5.2)
ALBUMIN/GLOB SERPL: 1.5 G/DL (ref 1.1–2.4)
ALP SERPL-CCNC: 154 U/L (ref 38–116)
ALT SERPL W P-5'-P-CCNC: 12 U/L (ref 0–41)
ANION GAP SERPL CALCULATED.3IONS-SCNC: 11.2 MMOL/L (ref 5–15)
AST SERPL-CCNC: 16 U/L (ref 0–40)
BASOPHILS # BLD AUTO: 0.03 10*3/MM3 (ref 0–0.2)
BASOPHILS NFR BLD AUTO: 0.4 % (ref 0–1.5)
BILIRUB SERPL-MCNC: 0.5 MG/DL (ref 0.2–1.2)
BUN SERPL-MCNC: 11 MG/DL (ref 6–20)
BUN/CREAT SERPL: 16.7 (ref 7.3–30)
CALCIUM SPEC-SCNC: 9.1 MG/DL (ref 8.5–10.2)
CHLORIDE SERPL-SCNC: 105 MMOL/L (ref 98–107)
CO2 SERPL-SCNC: 22.8 MMOL/L (ref 22–29)
CREAT SERPL-MCNC: 0.66 MG/DL (ref 0.7–1.3)
DEPRECATED RDW RBC AUTO: 56 FL (ref 37–54)
EGFRCR SERPLBLD CKD-EPI 2021: 103.4 ML/MIN/1.73
EOSINOPHIL # BLD AUTO: 0.12 10*3/MM3 (ref 0–0.4)
EOSINOPHIL NFR BLD AUTO: 1.5 % (ref 0.3–6.2)
ERYTHROCYTE [DISTWIDTH] IN BLOOD BY AUTOMATED COUNT: 16.9 % (ref 12.3–15.4)
GLOBULIN UR ELPH-MCNC: 2.5 GM/DL (ref 1.8–3.5)
GLUCOSE SERPL-MCNC: 98 MG/DL (ref 74–124)
HCT VFR BLD AUTO: 38.5 % (ref 37.5–51)
HGB BLD-MCNC: 12.4 G/DL (ref 13–17.7)
IMM GRANULOCYTES # BLD AUTO: 0.16 10*3/MM3 (ref 0–0.05)
IMM GRANULOCYTES NFR BLD AUTO: 2 % (ref 0–0.5)
LYMPHOCYTES # BLD AUTO: 0.79 10*3/MM3 (ref 0.7–3.1)
LYMPHOCYTES NFR BLD AUTO: 10.1 % (ref 19.6–45.3)
MCH RBC QN AUTO: 29.8 PG (ref 26.6–33)
MCHC RBC AUTO-ENTMCNC: 32.2 G/DL (ref 31.5–35.7)
MCV RBC AUTO: 92.5 FL (ref 79–97)
MONOCYTES # BLD AUTO: 0.69 10*3/MM3 (ref 0.1–0.9)
MONOCYTES NFR BLD AUTO: 8.8 % (ref 5–12)
NEUTROPHILS NFR BLD AUTO: 6.02 10*3/MM3 (ref 1.7–7)
NEUTROPHILS NFR BLD AUTO: 77.2 % (ref 42.7–76)
NRBC BLD AUTO-RTO: 0 /100 WBC (ref 0–0.2)
PLATELET # BLD AUTO: 145 10*3/MM3 (ref 140–450)
PMV BLD AUTO: 9.8 FL (ref 6–12)
POTASSIUM SERPL-SCNC: 3.9 MMOL/L (ref 3.5–4.7)
PROT SERPL-MCNC: 6.3 G/DL (ref 6.3–8)
RBC # BLD AUTO: 4.16 10*6/MM3 (ref 4.14–5.8)
SODIUM SERPL-SCNC: 139 MMOL/L (ref 134–145)
WBC NRBC COR # BLD: 7.81 10*3/MM3 (ref 3.4–10.8)

## 2023-01-25 PROCEDURE — 25010000002 HEPARIN LOCK FLUSH PER 10 UNITS: Performed by: INTERNAL MEDICINE

## 2023-01-25 PROCEDURE — 80053 COMPREHEN METABOLIC PANEL: CPT

## 2023-01-25 PROCEDURE — 36591 DRAW BLOOD OFF VENOUS DEVICE: CPT

## 2023-01-25 PROCEDURE — 99214 OFFICE O/P EST MOD 30 MIN: CPT | Performed by: INTERNAL MEDICINE

## 2023-01-25 PROCEDURE — 85025 COMPLETE CBC W/AUTO DIFF WBC: CPT

## 2023-01-25 RX ORDER — HEPARIN SODIUM (PORCINE) LOCK FLUSH IV SOLN 100 UNIT/ML 100 UNIT/ML
500 SOLUTION INTRAVENOUS AS NEEDED
Status: DISCONTINUED | OUTPATIENT
Start: 2023-01-25 | End: 2023-01-25 | Stop reason: HOSPADM

## 2023-01-25 RX ORDER — SODIUM CHLORIDE 0.9 % (FLUSH) 0.9 %
10 SYRINGE (ML) INJECTION AS NEEDED
Status: CANCELLED | OUTPATIENT
Start: 2023-01-25

## 2023-01-25 RX ORDER — HEPARIN SODIUM (PORCINE) LOCK FLUSH IV SOLN 100 UNIT/ML 100 UNIT/ML
500 SOLUTION INTRAVENOUS AS NEEDED
Status: CANCELLED | OUTPATIENT
Start: 2023-01-25

## 2023-01-25 RX ORDER — SODIUM CHLORIDE 0.9 % (FLUSH) 0.9 %
10 SYRINGE (ML) INJECTION AS NEEDED
Status: DISCONTINUED | OUTPATIENT
Start: 2023-01-25 | End: 2023-01-25 | Stop reason: HOSPADM

## 2023-01-25 RX ADMIN — Medication 500 UNITS: at 09:47

## 2023-01-25 RX ADMIN — Medication 10 ML: at 09:46

## 2023-01-25 NOTE — PROGRESS NOTES
Subjective     REASON FOR FOLLOW UP:  1.  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  2. Colovesical fistula, chronic antibiotic therapy with Cipro.  3.  DVT of the right and left lower extremity. 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  Acute GI bleed      History of Present Illness      On 01/25/2023 this 67-year-old white male who has history of cancer of the esophagus metastatic to the liver now for almost 4 years returns to the office. At this time he is undergoing Taxol administration with decreasing dose and decreased frequency given the development of further peripheral neuropathy associated with this medication utilization and in the background of previous neuropathy by Oxaliplatin. The patient has encountered more difficulty with his hands to the point that he is having trouble buttoning his shirt and tying his shoes. His feet remain about the same in regard to neuropathy. Besides this he has no difficulty swallowing. Radiation therapy for palliation of the tumor in the esophagus was sufficient but he feels some sensation of fullness in the right upper quadrant of the abdomen and as well documented below in his clinical examination his liver is getting larger probably indicating progressive disease and no Taxol benefit. Besides this the patient has not had any issues pertinent to his fistula with no infections in the colovesical area. He continues taking his ciprofloxacin every other day. His appetite is relatively stable as well as his weight, he has  minimal swelling in his legs. He is not taking blood pressure medication at this time because his blood pressure has been on the low side spontaneously. He has more fatigue than usual as well. He continues smoking a pack of cigarettes a day. He continues having cough with clear sputum production, no hemoptysis.         Past Medical History:   Diagnosis Date   • Arthritis    • Cancer (HCC)      "prostate cancer 2008   • Cancer (HCC)     esophageal/LIVER   • Diabetes mellitus (HCC)    • Difficulty swallowing solids    • Elevated PSA    • Esophageal mass    • Esophageal varices (HCC) July 2019   • GERD (gastroesophageal reflux disease)    • H/O Lung nodule    • Hepatitis     CHILD--PT STATED \"I THINK IT WAS A.\"   • History of esophageal varices with bleeding     SUMM34 2021   • History of high blood pressure    • History of pneumonia    • Hx of blood clots 08/10/2019    LOWER LEGS BILAT   • Maintenance chemotherapy     IV EVERY 4 WEEKS:  ESOPHAGEAL CANCER   • Nail fungus     LEFT FINGERNAILS X 4   • Neuropathy    • PVD (peripheral vascular disease) (HCC)    • Rash     ITCHY RED RASH ON RIGHT THIGH AREA-STATES IT COMES AND GOES.  USING PRESCRIPTION CREAM   • Recto-bladderneck fistula     on poab for recurrent uti   • Risk factors for obstructive sleep apnea         Past Surgical History:   Procedure Laterality Date   • CATARACT EXTRACTION WITH INTRAOCULAR LENS IMPLANT Bilateral    • COLONOSCOPY N/A 02/04/2020    Procedure: COLONOSCOPY;  Surgeon: Guy Sears MD;  Location: [facility] ENDOSCOPY;  Service: General;  Laterality: N/A;  PRE-COLOVESICAL FISTULA  POST-- DIVERTICULOSIS   • COLONOSCOPY  2/4/2020   • CYSTOSCOPY  02/06/2020   • CYSTOSCOPY Bilateral 02/26/2020    Procedure: CYSTOSCOPY RETROGRADE;  Surgeon: Cm Witt MD;  Location: [facility] OR;  Service: Urology;  Laterality: Bilateral;   • ENDOSCOPY     • ENDOSCOPY N/A 06/19/2021    Procedure: ESOPHAGOGASTRODUODENOSCOPY WITH BIOPSY;  Surgeon: Mary Birto MD;  Location: [facility] OR;  Service: Gastroenterology;  Laterality: N/A;   • ENDOSCOPY N/A 08/05/2021    Procedure: ESOPHAGOGASTRODUODENOSCOPY HEMOSPRAY;  Surgeon: Naga Shelby MD;  Location: [facility] ENDOSCOPY;  Service: Gastroenterology;  Laterality: N/A;  HX OF ESOPHAGEAL  CANCER;MELENA  POST: ESOPHAGEAL BLEEDING; ESOPHAGEAL MASS   • ENDOSCOPY N/A 11/4/2022    Procedure: " ESOPHAGOGASTRODUODENOSCOPY WITH DILATATION;  Surgeon: Mary Brito MD;  Location: [facility];  Service: Gastroenterology;  Laterality: N/A;   • HERNIA REPAIR Right 1999    inguinal hernia   • PROSTATE SURGERY  03/2008    prostatectectomy   • UPPER GASTROINTESTINAL ENDOSCOPY  August 2021   • VENOUS ACCESS DEVICE (PORT) INSERTION N/A 07/16/2019    Procedure: INSERTION VENOUS ACCESS DEVICE WITH FLUORO AND EGD WITH BIOPSY;  Surgeon: Mary Brito MD;  Location: [facility];  Service: Thoracic        Current Outpatient Medications on File Prior to Visit   Medication Sig Dispense Refill   • acetaminophen (TYLENOL) 500 MG tablet Take 500 mg by mouth Every 6 (Six) Hours As Needed for Mild Pain .     • cevimeline (EVOXAC) 30 MG capsule Take 1 capsule by mouth 3 (Three) Times a Day. DRY MOUTH     • ciprofloxacin (CIPRO) 250 MG tablet Take 1 tablet by mouth Daily. Take one every other day (Patient taking differently: Take 250 mg by mouth Every Other Day. Take one every other day  FISTULA) 90 tablet 1   • econazole nitrate (SPECTAZOLE) 1 % cream Apply  topically to the appropriate area as directed 2 (Two) Times a Day. (Patient taking differently: Apply 1 application topically to the appropriate area as directed 2 (Two) Times a Day. NAIL FUNGUS-4 FINGERS ON LEFT) 85 g 1   • furosemide (LASIX) 20 MG tablet Take 1 tablet by mouth Every Other Day. (Patient taking differently: Take 20 mg by mouth As Needed. LOWER LEG EDEMA) 28 tablet 2   • gabapentin (NEURONTIN) 300 MG capsule TAKE 1 CAPSULE BY MOUTH TWICE A  capsule 0   • glipizide (GLUCOTROL XL) 5 MG ER tablet TAKE 1 TABLET BY MOUTH EVERY DAY (Patient taking differently: Take 5 mg by mouth Daily.) 90 tablet 1   • lisinopril-hydrochlorothiazide (PRINZIDE,ZESTORETIC) 20-12.5 MG per tablet TAKE 1 TABLET BY MOUTH EVERY DAY (Patient taking differently: Take 0.5 tablets by mouth As Needed. SYS GREATER THAN 130, TAKE 1/2 TAB) 90 tablet 0   • pantoprazole (Protonix)  40 MG EC tablet Take 1 tablet by mouth 2 (Two) Times a Day. 180 tablet 3   • pentoxifylline (TRENtal) 400 MG CR tablet TAKE 1 TABLET BY MOUTH TWICE A DAY WITH MEALS 90 tablet 0   • potassium chloride ER (K-TAB) 20 MEQ tablet controlled-release ER tablet Take 1 tablet by mouth Daily. 90 tablet 3   • Probiotic Product (Probiotic Blend) capsule Take 1 capsule by mouth Daily.       No current facility-administered medications on file prior to visit.        ALLERGIES:    Allergies   Allergen Reactions   • Oxaliplatin Anaphylaxis        Social History     Socioeconomic History   • Marital status: Single   • Years of education: High school   Tobacco Use   • Smoking status: Every Day     Packs/day: 1.00     Years: 38.00     Pack years: 38.00     Types: Cigarettes     Start date: 1/1/1974   • Smokeless tobacco: Never   • Tobacco comments:     Quit for a period of 8 years.    Vaping Use   • Vaping Use: Never used   Substance and Sexual Activity   • Alcohol use: Not Currently   • Drug use: Never   • Sexual activity: Not Currently     Partners: Female     Birth control/protection: None        Family History   Problem Relation Age of Onset   • Hypertension Mother    • Stroke Mother    • Lung cancer Father    • Hypertension Other    • Lung disease Other    • Prostate cancer Other    • Malig Hyperthermia Neg Hx       Current Outpatient Medications on File Prior to Visit   Medication Sig Dispense Refill   • acetaminophen (TYLENOL) 500 MG tablet Take 500 mg by mouth Every 6 (Six) Hours As Needed for Mild Pain .     • cevimeline (EVOXAC) 30 MG capsule Take 1 capsule by mouth 3 (Three) Times a Day. DRY MOUTH     • ciprofloxacin (CIPRO) 250 MG tablet Take 1 tablet by mouth Daily. Take one every other day (Patient taking differently: Take 250 mg by mouth Every Other Day. Take one every other day  FISTULA) 90 tablet 1   • econazole nitrate (SPECTAZOLE) 1 % cream Apply  topically to the appropriate area as directed 2 (Two) Times a Day.  "(Patient taking differently: Apply 1 application topically to the appropriate area as directed 2 (Two) Times a Day. NAIL FUNGUS-4 FINGERS ON LEFT) 85 g 1   • furosemide (LASIX) 20 MG tablet Take 1 tablet by mouth Every Other Day. (Patient taking differently: Take 20 mg by mouth As Needed. LOWER LEG EDEMA) 28 tablet 2   • gabapentin (NEURONTIN) 300 MG capsule TAKE 1 CAPSULE BY MOUTH TWICE A  capsule 0   • glipizide (GLUCOTROL XL) 5 MG ER tablet TAKE 1 TABLET BY MOUTH EVERY DAY (Patient taking differently: Take 5 mg by mouth Daily.) 90 tablet 1   • lisinopril-hydrochlorothiazide (PRINZIDE,ZESTORETIC) 20-12.5 MG per tablet TAKE 1 TABLET BY MOUTH EVERY DAY (Patient taking differently: Take 0.5 tablets by mouth As Needed. SYS GREATER THAN 130, TAKE 1/2 TAB) 90 tablet 0   • pantoprazole (Protonix) 40 MG EC tablet Take 1 tablet by mouth 2 (Two) Times a Day. 180 tablet 3   • pentoxifylline (TRENtal) 400 MG CR tablet TAKE 1 TABLET BY MOUTH TWICE A DAY WITH MEALS 90 tablet 0   • potassium chloride ER (K-TAB) 20 MEQ tablet controlled-release ER tablet Take 1 tablet by mouth Daily. 90 tablet 3   • Probiotic Product (Probiotic Blend) capsule Take 1 capsule by mouth Daily.       No current facility-administered medications on file prior to visit.     Allergies   Allergen Reactions   • Oxaliplatin Anaphylaxis          Objective     Vitals:    01/25/23 0920   BP: 116/74   Pulse: 89   Resp: 16   Temp: 97.1 °F (36.2 °C)   TempSrc: Temporal   SpO2: 98%   Weight: 84.7 kg (186 lb 11.2 oz)   Height: 175 cm (68.9\")   PainSc: 0-No pain     Body mass index is 27.65 kg/m².     Current Status 1/25/2023   ECOG score 0         EXAM:      GENERAL:  Well-developed, Patient  in no acute distress.   SKIN:  Warm, dry ,NO purpura ,no rash.  HEENT:  Pupils were equal and reactive to light and accomodation, conjunctivae noninjected,  normal visual acuity.   NECK:  Supple with good range of motion; no thyromegaly , no JVD or bruits,.No carotid " artery pain, no carotid abnormal pulsation   LYMPHATICS:  No cervical, NO supraclavicular, NO axillary, NO inguinal adenopathies.  CARDIAC   normal rate , regular rhythm, without murmur,NO rubs NO S3 NO S4   LUNGS: DECREASED breath sounds bilateral, no wheezing, NO rhonchi, NO crackles ,NO rubs.  VASCULAR VENOUS: no cyanosis, NO collateral circulation, NO varicosities, NO edema, NO palpable cords, NO pain,NO erythema, NO pigmentation of the skin.  ABDOMEN:  Soft, NO pain,noDULAR 4 CM BRCM hepatomegaly, no splenomegaly,no masses, no ascites, no collateral circulation,no distention.  EXTREMITIES  AND SPINE:   clubbing, no cyanosis ,no deformities , no pain .No kyphosis,  no pain in spine, no pain in ribs , no pain in pelvic bone.  NEUROLOGICAL:  Patient was awake, alert, oriented to time, person and place.WORSE SENSORY NEUROPATHY IN FINGERS THAN TOES FINGERS GRADE 2 TOES GRADE 1 NO MOTOR DEFICIT            RECENT LABS:  Results from last 7 days   Lab Units 01/25/23  0859   WBC 10*3/mm3 7.81   NEUTROS ABS 10*3/mm3 6.02   HEMOGLOBIN g/dL 12.4*   HEMATOCRIT % 38.5   PLATELETS 10*3/mm3 145          Lab Results   Component Value Date    CEA 77.30 01/11/2023     Lab Results   Component Value Date    GLUCOSE 95 01/11/2023    BUN 8 01/11/2023    CREATININE 0.63 (L) 01/11/2023    EGFRIFNONA 149 02/16/2022    EGFRIFAFRI 100 02/02/2019    BCR 12.7 01/11/2023    K 4.0 01/11/2023    CO2 23.3 01/11/2023    CALCIUM 9.2 01/11/2023    PROTENTOTREF 7.1 02/02/2019    ALBUMIN 3.7 01/11/2023    LABIL2 1.6 02/02/2019    AST 15 01/11/2023    ALT 12 01/11/2023           Assessment & Plan    1.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 Almost 90% of the liver was replaced by tumor.  · HER-2 positive  · Initially treated with FOLFOX initiated July 2019  · Herceptin added with cycle 2  · Following 8 cycles of FOLFOX, oxaliplatin discontinued with continuation of 5-FU, leucovorin, Herceptin. This was continued  through 7/7/2020  · Increasing CEA led to PET scan 6/9/2021.  Disease progression noted with growth of the tumor in the lower esophagus.  Continued stability of hepatic metastasis  · Repeat EGD 6/19/2021 for tissue specimen and Next Generation Sequencing.  · Endoscopy that shows a noncircumferential abnormality in the lower esophagus that encompasses almost 6 cm in length. It is not blocking off the esophagus and that is why the patient has no symptoms. The pathology shows at least atypical cells consistent with cancer.  · NGS showing PD-L1 positive and high mutation burden.  The tumor remains HER-2 positive.  Treatment plan for Keytruda every 3 weeks x 4 cycles followed by repeat imaging  · Keytruda initiated 7/29/2021  · Hospitalization 8/2/2021 through 8/7/2021 secondary to acute GI bleeding from known malignancy.  · Completed 10 fractions of radiation to the lower esophagus 8/18/2021 after GI bleed  · PET scan following four cycles of Keytruda 10/14/2021 with improvement in the patient's known neoplasm involving the distal esophagus. However, known metastatic disease involving the liver is significantly worse. Keytruda discontinued  · Enhertu initiated 10/29/2021  · 12/23/2021 PET scan following three cycles of Enhertu discloses resolution of the tumor in the esophagus. He continues to have two active lesions in the liver with significant SUV activity pertinent to his metastatic disease. Minimal pulmonary infiltrates noted bilaterally. With concerns for Enhertu interstitial lung disease, Enhertu discontinued  · Treatment transitioned to FOLFOX Herceptin  · Borderline neutropenia 2/9/2022 with plans for 15% dose reduction to FOLFOX  · 2/16/2022, significant reaction to oxaliplatin which is discontinued  · Reevaluation 3/2/2022, with discontinue of oxaliplatin, the patient will continue on 5-FU, leucovorin, Kanjinti. Of note, the patient received 6 mg/kg of Kanjinti 2 weeks ago, therefore he cannot receive this  medication today. He will receive only 5-FU leucovorin and return in 1 week for Kanjinti 2 mg/kg. In 2 weeks, he will resume his normal schedule of 5-FU, leucovorin, Kanjinti 4 mg/kg every 2 weeks.  · 3/16/2022 will continue on with 5-FU, leucovorin, Herceptin.  CEA dramatically improving.  · 3/25/2022 CT C/A/P When compared to recent PET/CT there is interval decrease in size of the previously demonstrated hypermetabolic hepatic metastases as well as decrease in size of the periportal lymph node. The wall thickening involving the distal esophagus is largely unchanged. 2. Persistent findings of colovesicular fistula. 3. Ill-defined interstitial and ground glass opacities within the bilateral lower lobes favored to represent postradiation pneumonitis/change  · 6/8/2022 CEA 8.0.  · 6/29/2022 here for continued 5-FU, leucovorin, Kanjanti, overall tolerating well.  · 8/17/2022 continuing on 5-FU, leucovorin, Herceptin tolerating well.  CEA 8.  Patient was advised to continue with his current treatment.  Not planning a another radiological assessment unless patient has further rise in CEA level.  · 9/14/2022 here for continued 5-FU, leucovorin, Herceptin, continuing to tolerate quite well.   · 10/12/2022, progressive dysphagia with minimal rise of his CEA with plans made for PET scan  · Endoscopy 11/4/2022 with findings very tight esophagus with strictures.  Biopsies with pathology consistent with adenocarcinoma.  · PET scan 11/2/2022 with a long segment of activity of the esophagus and additional 2 tumoral lesions of the liver or 1 in the left lobe and the other in the right lobe.  There is also a CV activity close to the head of the pancreas.  · 5-FU, leucovorin, Herceptin discontinued with plans to move to Taxol weekly 3 to 4 weeks coordinated with radiation therapy.  · Consult with radiation oncology 11/15/2022 with plans for 10 fractions to the distal esophagus  · Initiating single agent Taxol today,  11/16/2022  · 12/28/2022 due for cycle 2-day 15.  Neuropathy worsening specifically in the feet with more trouble feeling the ground.  No falls.  Per discussion with Dr. Haynes will adjust Taxol down by 15%.  · Reevaluation today, 1/11/2023 due for cycle 3-day 1 Taxol.  He continues to have persistent neuropathy with constant tingling of his fingers and toes.  Further adjustment to Taxol which will now be given day 1 day 15 of a 28-day cycle.  Day 8 discontinued from the treatment plan.  On 01/25/2023 the patient is having more fatigue associated with Taxol utilization but most importantly more sensory neuropathy especially in his hands to the point that he is having difficulty tying his shoes and buttoning his shirts. He has no motor deficit. Furthermore the patient's CEA level has gone from 35 to 77 and the alkaline phosphatase has risen. The liver has become now nodular, palpable again with minimal fullness. I do believe that this announces that Taxol is not effective in one hand and besides is giving him more neuropathy on the other hand. Given this fact I will discontinue the Taxol as per today, he will not receive any dose whatsoever and he already has an appointment for a PET scan to be done next Tuesday. I will review him back next Thursday in order to decide how to proceed. He already has had Enhertu administration in the past, he developed some pneumonitis associated with that. Raises the question if we need to go back to Herceptin/Perjeta. I think this will be the other possibility.     Given the above circumstances the chemotherapy treatment will be put on hold. Today his white count, hemoglobin and platelets are good. His chemistry profile is pending.    ·   2. Colovesical fistula.   · He requires intermittent Cipro when he notes stool in his urine  · He currently reports his symptoms are more progressive with more urine in his rectum. He continues on Cipro and is scheduled for follow-up with urology,  Dr. Witt later this month  · He has been seen by Cm Witt MD. He has advised the patient that he could not do too much for the fistula given the fact that it is just inconveniences of having urination through the anal canal from time to time but no urinary tract infection. He asked the patient to postpone any intention for surgery unless that it is absolutely necessary. Postponement of chemotherapy for 3 months if any surgery is necessary will trigger dramatic progression of his tumor and obviously consequences.   · On 06/08/2022, his colovesical fistula continues. Most of the output is urine through the rectum, no stool through the bladder. He has not had any urinary tract infection but he continues using prophylactically his ciprofloxacin 250 mg orally every other day. I asked him to remain on this medicine for the time being  · 08/17/2022 the patient continues having colovesical fistula symptomatology intermittently, passing urine through the rectum and not passing feces through the bladder. He has not had any other urinary tract infections. He continues using his ciprofloxacin as per my instructions  · He continues on prophylactic ciprofloxacin every other day with no progressive symptoms today  On 01/25/2023 his fistula is still an ongoing issue but it has not become infected again. He remains on prophylactic ciprofloxacin.    ·   3.  Thrombophilia secondary malignancy, DVT  · Currently anticoagulated with Xarelto 20 mg daily  · Following GI bleed 8/2/2021, Xarelto has been discontinued  · Plan no evidence of recurrent thrombosis  On 01/25/2023 no new thrombosis.    ·   4. Acute GI bleeding  · Dark tarry stool initially began 7/29/2021  · 7/23/2021, hemoglobin of 11.0.  8/2/2021, hemoglobin of 5.7  · Patient admitted, received transfusion of 4 units packed red blood cells.  EGD identified bleeding mass in the distal esophagus  · Status post radiation to esophageal lesion with no further issues with  bleeding  · 3/16/2022 no further episodes.   · Without signs or symptoms of bleeding, hemoglobin today 12.4.  On 01/25/2023 no clinical evidence of GI bleeding, stable hemoglobin.     ·   5. Enhertu initiated interstitial lung disease  · The patient completed a prednisone taper  · He is without hypoxia or tachycardia currently. He has only minimal dyspnea on exertion    6. Significant oxaliplatin reaction  · 2/16/2022, significant reaction with shortness of breath, limited air movement, treated with high-dose Solu-Cortef. He is not a candidate for further oxaliplatin    7. Borderline thrombocytopenia  · 3/2/2021, the patient is a platelet count of 101,000. With the discontinuation of oxaliplatin, we can continue with 5-FU leucovorin which will likely not cause significant issues with his platelets.  · 5/18/2022 platelet count normalized at  141,000  · Platelets today 149,000, without signs or symptoms of bleeding  On 01/25/2023 platelet count is normal.    ·   8.  Hypertension: He previously been on lisinopril/hydrochlorothiazide however this medication is currently on hold due to some issues with hypotension.  He now only takes the medication if his blood pressure is above 130/90.  · Blood pressure today normal at 121/75.  On 01/25/2023 the patient is not taking his blood pressure medication, his blood pressure has been spontaneously low. This is not surprising with liver metastasis worsening. This is one of the typical features clinically.    ·   9.  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  · 12/28/2022, persistent neuropathy, Taxol reduced by 15%  · The patient reports persistent symptoms today with tingling of his fingers constantly and numbness of his feet.  He does not have painful neuropathy though this is constant interfering with activities of daily living.  Taxol further reduced with adjustment in schedule to day 1 day 15.  On 01/25/2023 worsening  neuropathy along with Taxol grade 2 in fingers, grade 1 in feet. He remains on gabapentin, Taxol discontinued.    ·   10.  Lower extremity edema:  · Bilateral.  Patient has been on his feet much more recently and has been more active.  It seemed to have worsened once the weather got warmer.  He states he was told previously by a cardiothoracic surgeon not to wear compression socks.  I have advised him to watch his salt intake, elevate his legs when he possible, and make sure he is drinking plenty of water.  Continue to monitor, and call if worsens.  · 6/20/2022 patient was prescribed lasix 20 mg every other day, which is helping some.  He is also trying to elevate his legs above his heart twice a day, which he also feels is helping.   · He is currently without swelling today, 1/11/2023    On 01/25/2023 using Lasix on prn basis for swelling in his lower extremities.     ·     PLAN:    On 01/25/2023 Taxol discontinued. The patient will proceed with PET scan in days with clinical assessment 2 days after the PET scan for review. Consideration for Herceptin, Perjeta at that time.      Alexey Haynes MD  01/25/2023

## 2023-01-31 ENCOUNTER — HOSPITAL ENCOUNTER (OUTPATIENT)
Dept: PET IMAGING | Facility: HOSPITAL | Age: 68
Discharge: HOME | End: 2023-01-31
Payer: MEDICARE

## 2023-01-31 DIAGNOSIS — G62.0 PERIPHERAL NEUROPATHY DUE TO CHEMOTHERAPY: ICD-10-CM

## 2023-01-31 DIAGNOSIS — Z72.0 TOBACCO ABUSE: ICD-10-CM

## 2023-01-31 DIAGNOSIS — Z45.2 ENCOUNTER FOR ADJUSTMENT OR MANAGEMENT OF VASCULAR ACCESS DEVICE: ICD-10-CM

## 2023-01-31 DIAGNOSIS — I10 ESSENTIAL HYPERTENSION: ICD-10-CM

## 2023-01-31 DIAGNOSIS — D49.0 ESOPHAGUS NEOPLASM: ICD-10-CM

## 2023-01-31 DIAGNOSIS — C15.5 MALIGNANT NEOPLASM OF LOWER THIRD OF ESOPHAGUS: ICD-10-CM

## 2023-01-31 DIAGNOSIS — T45.1X5A PERIPHERAL NEUROPATHY DUE TO CHEMOTHERAPY: ICD-10-CM

## 2023-01-31 DIAGNOSIS — C78.7 LIVER METASTASIS: ICD-10-CM

## 2023-01-31 DIAGNOSIS — N32.1 RECTO-BLADDERNECK FISTULA: ICD-10-CM

## 2023-01-31 LAB — GLUCOSE BLDC GLUCOMTR-MCNC: 89 MG/DL (ref 70–130)

## 2023-01-31 PROCEDURE — 82962 GLUCOSE BLOOD TEST: CPT

## 2023-01-31 PROCEDURE — A9552 F18 FDG: HCPCS | Performed by: INTERNAL MEDICINE

## 2023-01-31 PROCEDURE — 0 FLUDEOXYGLUCOSE F18 SOLUTION: Performed by: INTERNAL MEDICINE

## 2023-01-31 PROCEDURE — 78815 PET IMAGE W/CT SKULL-THIGH: CPT

## 2023-01-31 RX ADMIN — FLUDEOXYGLUCOSE F18 1 DOSE: 300 INJECTION INTRAVENOUS at 07:54

## 2023-02-01 DIAGNOSIS — Z01.818 ENCOUNTER FOR ECHOCARDIOGRAM BEFORE INITIATION OF CHEMOTHERAPY: Primary | ICD-10-CM

## 2023-02-02 ENCOUNTER — OFFICE VISIT (OUTPATIENT)
Dept: ONCOLOGY | Facility: CLINIC | Age: 68
End: 2023-02-02
Payer: MEDICARE

## 2023-02-02 VITALS
TEMPERATURE: 97.1 F | HEIGHT: 69 IN | BODY MASS INDEX: 27.78 KG/M2 | DIASTOLIC BLOOD PRESSURE: 85 MMHG | HEART RATE: 98 BPM | WEIGHT: 187.6 LBS | RESPIRATION RATE: 18 BRPM | SYSTOLIC BLOOD PRESSURE: 138 MMHG | OXYGEN SATURATION: 99 %

## 2023-02-02 DIAGNOSIS — C78.7 LIVER METASTASIS: Primary | ICD-10-CM

## 2023-02-02 DIAGNOSIS — Z72.0 TOBACCO ABUSE: ICD-10-CM

## 2023-02-02 DIAGNOSIS — Z45.2 ENCOUNTER FOR ADJUSTMENT OR MANAGEMENT OF VASCULAR ACCESS DEVICE: ICD-10-CM

## 2023-02-02 DIAGNOSIS — C15.5 MALIGNANT NEOPLASM OF LOWER THIRD OF ESOPHAGUS: ICD-10-CM

## 2023-02-02 PROCEDURE — 99215 OFFICE O/P EST HI 40 MIN: CPT | Performed by: INTERNAL MEDICINE

## 2023-02-02 NOTE — PROGRESS NOTES
Subjective     REASON FOR FOLLOW UP:  1.  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  2. Colovesical fistula, chronic antibiotic therapy with Cipro.  3.  DVT of the right and left lower extremity. 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  Acute GI bleed      History of Present Illness      On 01/25/2023 this 67-year-old white male who has history of cancer of the esophagus metastatic to the liver now for almost 4 years returns to the office. At this time he is undergoing Taxol administration with decreasing dose and decreased frequency given the development of further peripheral neuropathy associated with this medication utilization and in the background of previous neuropathy by Oxaliplatin. The patient has encountered more difficulty with his hands to the point that he is having trouble buttoning his shirt and tying his shoes. His feet remain about the same in regard to neuropathy. Besides this he has no difficulty swallowing. Radiation therapy for palliation of the tumor in the esophagus was sufficient but he feels some sensation of fullness in the right upper quadrant of the abdomen and as well documented below in his clinical examination his liver is getting larger probably indicating progressive disease and no Taxol benefit. Besides this the patient has not had any issues pertinent to his fistula with no infections in the colovesical area. He continues taking his ciprofloxacin every other day. His appetite is relatively stable as well as his weight, he has  minimal swelling in his legs. He is not taking blood pressure medication at this time because his blood pressure has been on the low side spontaneously. He has more fatigue than usual as well. He continues smoking a pack of cigarettes a day. He continues having cough with clear sputum production, no hemoptysis.     The patient returned to the office on 02/02/2023 to review a PET scan that has been  "done recently. In the meantime nothing new in regard to symptoms besides his chronic cough associated with his cigarette smoking. He continues having passage of urine through the rectum due to his colovesical fistula. He has not developed any new urinary tract infections. His swallowing is good. He has proper nutrition, proper bowel activity, no swelling in lower extremities. No obvious pain.    ·     Past Medical History:   Diagnosis Date   • Arthritis    • Cancer (HCC)     prostate cancer 2008   • Cancer (HCC)     esophageal/LIVER   • Diabetes mellitus (HCC)    • Difficulty swallowing solids    • Elevated PSA    • Esophageal mass    • Esophageal varices (HCC) July 2019   • GERD (gastroesophageal reflux disease)    • H/O Lung nodule    • Hepatitis     CHILD--PT STATED \"I THINK IT WAS A.\"   • History of esophageal varices with bleeding     SUMM34 2021   • History of high blood pressure    • History of pneumonia    • Hx of blood clots 08/10/2019    LOWER LEGS BILAT   • Maintenance chemotherapy     IV EVERY 4 WEEKS:  ESOPHAGEAL CANCER   • Nail fungus     LEFT FINGERNAILS X 4   • Neuropathy    • PVD (peripheral vascular disease) (HCC)    • Rash     ITCHY RED RASH ON RIGHT THIGH AREA-STATES IT COMES AND GOES.  USING PRESCRIPTION CREAM   • Recto-bladderneck fistula     on poab for recurrent uti   • Risk factors for obstructive sleep apnea         Past Surgical History:   Procedure Laterality Date   • CATARACT EXTRACTION WITH INTRAOCULAR LENS IMPLANT Bilateral    • COLONOSCOPY N/A 02/04/2020    Procedure: COLONOSCOPY;  Surgeon: Guy Sears MD;  Location: [facility] ENDOSCOPY;  Service: General;  Laterality: N/A;  PRE-COLOVESICAL FISTULA  POST-- DIVERTICULOSIS   • COLONOSCOPY  2/4/2020   • CYSTOSCOPY  02/06/2020   • CYSTOSCOPY Bilateral 02/26/2020    Procedure: CYSTOSCOPY RETROGRADE;  Surgeon: Cm Witt MD;  Location: [facility] MAIN OR;  Service: Urology;  Laterality: Bilateral;   • ENDOSCOPY     • " ENDOSCOPY N/A 06/19/2021    Procedure: ESOPHAGOGASTRODUODENOSCOPY WITH BIOPSY;  Surgeon: Mary Brito MD;  Location: [facility] MAIN OR;  Service: Gastroenterology;  Laterality: N/A;   • ENDOSCOPY N/A 08/05/2021    Procedure: ESOPHAGOGASTRODUODENOSCOPY HEMOSPRAY;  Surgeon: Naga Shelby MD;  Location: [facility] ENDOSCOPY;  Service: Gastroenterology;  Laterality: N/A;  HX OF ESOPHAGEAL  CANCER;MELENA  POST: ESOPHAGEAL BLEEDING; ESOPHAGEAL MASS   • ENDOSCOPY N/A 11/4/2022    Procedure: ESOPHAGOGASTRODUODENOSCOPY WITH DILATATION;  Surgeon: Mary Brito MD;  Location: [facility] MAIN OR;  Service: Gastroenterology;  Laterality: N/A;   • HERNIA REPAIR Right 1999    inguinal hernia   • PROSTATE SURGERY  03/2008    prostatectectomy   • UPPER GASTROINTESTINAL ENDOSCOPY  August 2021   • VENOUS ACCESS DEVICE (PORT) INSERTION N/A 07/16/2019    Procedure: INSERTION VENOUS ACCESS DEVICE WITH FLUORO AND EGD WITH BIOPSY;  Surgeon: Mary Brito MD;  Location: [facility] MAIN OR;  Service: Thoracic        Current Outpatient Medications on File Prior to Visit   Medication Sig Dispense Refill   • acetaminophen (TYLENOL) 500 MG tablet Take 500 mg by mouth Every 6 (Six) Hours As Needed for Mild Pain .     • cevimeline (EVOXAC) 30 MG capsule Take 1 capsule by mouth 3 (Three) Times a Day. DRY MOUTH     • ciprofloxacin (CIPRO) 250 MG tablet Take 1 tablet by mouth Daily. Take one every other day (Patient taking differently: Take 250 mg by mouth Every Other Day. Take one every other day  FISTULA) 90 tablet 1   • econazole nitrate (SPECTAZOLE) 1 % cream Apply  topically to the appropriate area as directed 2 (Two) Times a Day. (Patient taking differently: Apply 1 application topically to the appropriate area as directed 2 (Two) Times a Day. NAIL FUNGUS-4 FINGERS ON LEFT) 85 g 1   • furosemide (LASIX) 20 MG tablet Take 1 tablet by mouth Every Other Day. (Patient taking differently: Take 20 mg by mouth As Needed. LOWER LEG EDEMA) 28 tablet 2   •  gabapentin (NEURONTIN) 300 MG capsule TAKE 1 CAPSULE BY MOUTH TWICE A  capsule 0   • glipizide (GLUCOTROL XL) 5 MG ER tablet TAKE 1 TABLET BY MOUTH EVERY DAY (Patient taking differently: Take 5 mg by mouth Daily.) 90 tablet 1   • lisinopril-hydrochlorothiazide (PRINZIDE,ZESTORETIC) 20-12.5 MG per tablet TAKE 1 TABLET BY MOUTH EVERY DAY (Patient taking differently: Take 0.5 tablets by mouth As Needed. SYS GREATER THAN 130, TAKE 1/2 TAB) 90 tablet 0   • pantoprazole (Protonix) 40 MG EC tablet Take 1 tablet by mouth 2 (Two) Times a Day. 180 tablet 3   • pentoxifylline (TRENtal) 400 MG CR tablet TAKE 1 TABLET BY MOUTH TWICE A DAY WITH MEALS 90 tablet 0   • potassium chloride ER (K-TAB) 20 MEQ tablet controlled-release ER tablet Take 1 tablet by mouth Daily. 90 tablet 3   • Probiotic Product (Probiotic Blend) capsule Take 1 capsule by mouth Daily.       No current facility-administered medications on file prior to visit.        ALLERGIES:    Allergies   Allergen Reactions   • Oxaliplatin Anaphylaxis        Social History     Socioeconomic History   • Marital status: Single   • Years of education: High school   Tobacco Use   • Smoking status: Every Day     Packs/day: 1.00     Years: 38.00     Pack years: 38.00     Types: Cigarettes     Start date: 1/1/1974   • Smokeless tobacco: Never   • Tobacco comments:     Quit for a period of 8 years.    Vaping Use   • Vaping Use: Never used   Substance and Sexual Activity   • Alcohol use: Not Currently   • Drug use: Never   • Sexual activity: Not Currently     Partners: Female     Birth control/protection: None        Family History   Problem Relation Age of Onset   • Hypertension Mother    • Stroke Mother    • Lung cancer Father    • Hypertension Other    • Lung disease Other    • Prostate cancer Other    • Malig Hyperthermia Neg Hx       Current Outpatient Medications on File Prior to Visit   Medication Sig Dispense Refill   • acetaminophen (TYLENOL) 500 MG tablet Take 500  mg by mouth Every 6 (Six) Hours As Needed for Mild Pain .     • cevimeline (EVOXAC) 30 MG capsule Take 1 capsule by mouth 3 (Three) Times a Day. DRY MOUTH     • ciprofloxacin (CIPRO) 250 MG tablet Take 1 tablet by mouth Daily. Take one every other day (Patient taking differently: Take 250 mg by mouth Every Other Day. Take one every other day  FISTULA) 90 tablet 1   • econazole nitrate (SPECTAZOLE) 1 % cream Apply  topically to the appropriate area as directed 2 (Two) Times a Day. (Patient taking differently: Apply 1 application topically to the appropriate area as directed 2 (Two) Times a Day. NAIL FUNGUS-4 FINGERS ON LEFT) 85 g 1   • furosemide (LASIX) 20 MG tablet Take 1 tablet by mouth Every Other Day. (Patient taking differently: Take 20 mg by mouth As Needed. LOWER LEG EDEMA) 28 tablet 2   • gabapentin (NEURONTIN) 300 MG capsule TAKE 1 CAPSULE BY MOUTH TWICE A  capsule 0   • glipizide (GLUCOTROL XL) 5 MG ER tablet TAKE 1 TABLET BY MOUTH EVERY DAY (Patient taking differently: Take 5 mg by mouth Daily.) 90 tablet 1   • lisinopril-hydrochlorothiazide (PRINZIDE,ZESTORETIC) 20-12.5 MG per tablet TAKE 1 TABLET BY MOUTH EVERY DAY (Patient taking differently: Take 0.5 tablets by mouth As Needed. SYS GREATER THAN 130, TAKE 1/2 TAB) 90 tablet 0   • pantoprazole (Protonix) 40 MG EC tablet Take 1 tablet by mouth 2 (Two) Times a Day. 180 tablet 3   • pentoxifylline (TRENtal) 400 MG CR tablet TAKE 1 TABLET BY MOUTH TWICE A DAY WITH MEALS 90 tablet 0   • potassium chloride ER (K-TAB) 20 MEQ tablet controlled-release ER tablet Take 1 tablet by mouth Daily. 90 tablet 3   • Probiotic Product (Probiotic Blend) capsule Take 1 capsule by mouth Daily.       No current facility-administered medications on file prior to visit.     Allergies   Allergen Reactions   • Oxaliplatin Anaphylaxis          Objective     Vitals:    02/02/23 1519   BP: 138/85   Pulse: 98   Resp: 18   Temp: 97.1 °F (36.2 °C)   TempSrc: Temporal   SpO2: 99%  "  Weight: 85.1 kg (187 lb 9.6 oz)   Height: 175 cm (68.9\")   PainSc: 0-No pain     Body mass index is 27.79 kg/m².     Current Status 2/2/2023   ECOG score 0         EXAM:                GENERAL:  Well-developed, Patient  in no acute distress.   SKIN:  Warm, dry ,NO purpura ,no rash.  HEENT:  Pupils were equal and reactive to light and accomodation, conjunctivae noninjected,  normal visual acuity.   NECK:  Supple with good range of motion; no thyromegaly , no JVD or bruits,.No carotid artery pain, no carotid abnormal pulsation   LYMPHATICS:  No cervical, NO supraclavicular, NO axillary, NO inguinal adenopathies.  CARDIAC   normal rate , regular rhythm, without murmur,NO rubs NO S3 NO S4   LUNGS: normal breath sounds bilateral, no wheezing, NO rhonchi, NO crackles ,NO rubs.  VASCULAR VENOUS: no cyanosis, NO collateral circulation, NO varicosities, NO edema, NO palpable cords, NO pain,NO erythema, NO pigmentation of the skin.  ABDOMEN:  Soft, NO pain,no hepatomegaly, no splenomegaly,no masses, no ascites, no collateral circulation,no distention.  EXTREMITIES  AND SPINE:  No clubbing, no cyanosis ,no deformities , no pain .No kyphosis,  no pain in spine, no pain in ribs , no pain in pelvic bone.  NEUROLOGICAL:  Patient was awake, alert, oriented to time, person and place.SENSORY NEUROPATHY IN FEET              RECENT LABS:  Lab Results   Component Value Date    WBC 7.81 01/25/2023    HGB 12.4 (L) 01/25/2023    HCT 38.5 01/25/2023    MCV 92.5 01/25/2023     01/25/2023              Lab Results   Component Value Date    CEA 77.30 01/11/2023     Lab Results   Component Value Date    GLUCOSE 98 01/25/2023    BUN 11 01/25/2023    CREATININE 0.66 (L) 01/25/2023    EGFRIFNONA 149 02/16/2022    EGFRIFAFRI 100 02/02/2019    BCR 16.7 01/25/2023    K 3.9 01/25/2023    CO2 22.8 01/25/2023    CALCIUM 9.1 01/25/2023    PROTENTOTREF 7.1 02/02/2019    ALBUMIN 3.8 01/25/2023    LABIL2 1.6 02/02/2019    AST 16 01/25/2023    ALT 12 " 01/25/2023         F-18 FDG PET SKULL BASE TO MID THIGH WITH PET CT FUSION.     HISTORY: 67-year-old male with metastatic distal esophageal cancer.  Rectovesicular fistula. Restaging.     TECHNIQUE: Radiation dose reduction techniques were utilized, including  automated exposure control and exposure modulation based on body size.   Blood glucose level at time of injection was 89 mg/dL. 6.9 mCi of F-18  FDG were injected and PET was performed from skull base to mid thigh. CT  was obtained for localization and attenuation correction. Time at  injection 7:54 AM. PET start time 9:16 AM. Compared with PET/CT  11/02/2022.     FINDINGS: There has been significant decrease in size of the distal  esophageal mass and the intensity of the metabolic activity has  decreased as well with a maximal SUV of 5.9, previously 13.4. There has  been increase in size of the approximately 6.5 x 6.3 cm lateral hepatic  segment metastasis, but there has been development of some internal  necrosis. There has been increase in size of the subcapsular anterior  hepatic segment metastasis measuring approximately 4.0 x 2.7 cm with a  maximal SUV of 9.4, previously 3.4 x 2.4 cm with a maximal SUV of 9.6.  There are multiple new hypermetabolic nodes at the [place] hepatis with  one of the nodes measuring 2.3 x 1.5 cm with a maximal SUV of 9.9. There  has also been increase in size and number of hypermetabolic  retroperitoneal nodes. The new enlarged retroperitoneal node is anterior  to the IVC measuring approximately 1.3 x 0.9 cm with a maximal SUV of  7.4. There is no hypermetabolic lymphadenopathy within the chest,  supraclavicular regions, or neck. There is nonspecific bowel activity.  There is low-level activity throughout the sigmoid colon with a small  focus of relative hypermetabolic activity, maximal SUV of 4.6. There is  a small amount of air within the urinary bladder. There is extensive  sigmoid diverticulosis with no evidence for acute  diverticulitis. There  is a fistulous tract containing bubbles of air extending from the  sigmoid colon to the bladder base, CT series images [id number].        IMPRESSION:  1. There are mixed findings. There has been regression of the distal  esophageal mass, but progression of the hepatic metastases and  metastatic lymphadenopathy at the [place] hepatis and retroperitoneum.  2. Colovesicular fistula. Slightly smaller volume of air within the  urinary bladder and less intense activity at the sigmoid colon.      This report was finalized on 2/2/2023 8:12 AM by Dr. Almaz Rutledge M.D.        Assessment & Plan    1.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 Almost 90% of the liver was replaced by tumor.  · HER-2 positive  · Initially treated with FOLFOX initiated July 2019  · Herceptin added with cycle 2  · Following 8 cycles of FOLFOX, oxaliplatin discontinued with continuation of 5-FU, leucovorin, Herceptin. This was continued through 7/7/2020  · Increasing CEA led to PET scan 6/9/2021.  Disease progression noted with growth of the tumor in the lower esophagus.  Continued stability of hepatic metastasis  · Repeat EGD 6/19/2021 for tissue specimen and Next Generation Sequencing.  · Endoscopy that shows a noncircumferential abnormality in the lower esophagus that encompasses almost 6 cm in length. It is not blocking off the esophagus and that is why the patient has no symptoms. The pathology shows at least atypical cells consistent with cancer.  · NGS showing PD-L1 positive and high mutation burden.  The tumor remains HER-2 positive.  Treatment plan for Keytruda every 3 weeks x 4 cycles followed by repeat imaging  · Keytruda initiated 7/29/2021  · Hospitalization 8/2/2021 through 8/7/2021 secondary to acute GI bleeding from known malignancy.  · Completed 10 fractions of radiation to the lower esophagus 8/18/2021 after GI bleed  · PET scan following four cycles of Keytruda 10/14/2021 with  improvement in the patient's known neoplasm involving the distal esophagus. However, known metastatic disease involving the liver is significantly worse. Keytruda discontinued  · Enhertu initiated 10/29/2021  · 12/23/2021 PET scan following three cycles of Enhertu discloses resolution of the tumor in the esophagus. He continues to have two active lesions in the liver with significant SUV activity pertinent to his metastatic disease. Minimal pulmonary infiltrates noted bilaterally. With concerns for Enhertu interstitial lung disease, Enhertu discontinued  · Treatment transitioned to FOLFOX Herceptin  · Borderline neutropenia 2/9/2022 with plans for 15% dose reduction to FOLFOX  · 2/16/2022, significant reaction to oxaliplatin which is discontinued  · Reevaluation 3/2/2022, with discontinue of oxaliplatin, the patient will continue on 5-FU, leucovorin, Kanjinti. Of note, the patient received 6 mg/kg of Kanjinti 2 weeks ago, therefore he cannot receive this medication today. He will receive only 5-FU leucovorin and return in 1 week for Kanjinti 2 mg/kg. In 2 weeks, he will resume his normal schedule of 5-FU, leucovorin, Kanjinti 4 mg/kg every 2 weeks.  · 3/16/2022 will continue on with 5-FU, leucovorin, Herceptin.  CEA dramatically improving.  · 3/25/2022 CT C/A/P When compared to recent PET/CT there is interval decrease in size of the previously demonstrated hypermetabolic hepatic metastases as well as decrease in size of the periportal lymph node. The wall thickening involving the distal esophagus is largely unchanged. 2. Persistent findings of colovesicular fistula. 3. Ill-defined interstitial and ground glass opacities within the bilateral lower lobes favored to represent postradiation pneumonitis/change  · 6/8/2022 CEA 8.0.  · 6/29/2022 here for continued 5-FU, leucovorin, Kanjanti, overall tolerating well.  · 8/17/2022 continuing on 5-FU, leucovorin, Herceptin tolerating well.  CEA 8.  Patient was advised to  continue with his current treatment.  Not planning a another radiological assessment unless patient has further rise in CEA level.  · 9/14/2022 here for continued 5-FU, leucovorin, Herceptin, continuing to tolerate quite well.   · 10/12/2022, progressive dysphagia with minimal rise of his CEA with plans made for PET scan  · Endoscopy 11/4/2022 with findings very tight esophagus with strictures.  Biopsies with pathology consistent with adenocarcinoma.  · PET scan 11/2/2022 with a long segment of activity of the esophagus and additional 2 tumoral lesions of the liver or 1 in the left lobe and the other in the right lobe.  There is also a CV activity close to the head of the pancreas.  · 5-FU, leucovorin, Herceptin discontinued with plans to move to Taxol weekly 3 to 4 weeks coordinated with radiation therapy.  · Consult with radiation oncology 11/15/2022 with plans for 10 fractions to the distal esophagus  · Initiating single agent Taxol today, 11/16/2022  · 12/28/2022 due for cycle 2-day 15.  Neuropathy worsening specifically in the feet with more trouble feeling the ground.  No falls.  Per discussion with Dr. Haynes will adjust Taxol down by 15%.  · Reevaluation today, 1/11/2023 due for cycle 3-day 1 Taxol.  He continues to have persistent neuropathy with constant tingling of his fingers and toes.  Further adjustment to Taxol which will now be given day 1 day 15 of a 28-day cycle.  Day 8 discontinued from the treatment plan.  On 01/25/2023 the patient is having more fatigue associated with Taxol utilization but most importantly more sensory neuropathy especially in his hands to the point that he is having difficulty tying his shoes and buttoning his shirts. He has no motor deficit. Furthermore the patient's CEA level has gone from 35 to 77 and the alkaline phosphatase has risen. The liver has become now nodular, palpable again with minimal fullness. I do believe that this announces that Taxol is not effective in one  hand and besides is giving him more neuropathy on the other hand. Given this fact I will discontinue the Taxol as per today, he will not receive any dose whatsoever and he already has an appointment for a PET scan to be done next Tuesday. I will review him back next Thursday in order to decide how to proceed. He already has had Enhertu administration in the past, he developed some pneumonitis associated with that. Raises the question if we need to go back to Herceptin/Perjeta. I think this will be the other possibility.     Given the above circumstances the chemotherapy treatment will be put on hold. Today his white count, hemoglobin and platelets are good. His chemistry profile is pending.  On 02/02/2023 I reviewed with the patient and his other sister the PET scan that documents very stable esophagus with minimal if any regrowth of tumor, normal pulmonary anatomy with no obvious tumor extension into this in the background of emphysema and obvious progressive liver metastasis. Also he has periportal adenopathy that is progressing. Pancreas, kidneys, spleen and bowel remain unremarkable. No obvious bone metastasis. Given all these changes I do believe that the patient has obvious progressive disease. His CEA level has risen to 77 during the previous visit. Given the fact that we have 2 fires now, liver metastasis growing, especially on the left lobe that eventually is going to become symptomatic given the large size and the SUV activity and given the fact that I cannot have access to Herceptin/Perjeta yet, I do believe that the patient could benefit from palliative radiation treatment to the liver, larger lesion, and I had a discussion with Dr. Colvin at this moment. He will be glad to see the patient in a few days and discuss with the patient the risks and benefits and see if that is a possibility. That probably can buy me some time to be able to consecute Perjeta.     The plan B will be if the patient ever decides  to stop smoking to go back into Enhertu in the future and hopefully with less cigarettes in the system he will have lesser chances to develop pneumonitis again. Therefore, it is up to the patient what he wants to do and I discussed these facts very clearly. If by any chance we cannot get Perjeta and Herceptin together in this patient and running out of medicines to provide to him because he has gone through most of the medicines utilized in this kind of setting, therefore the next option in case that Perjeta/Herceptin are not available would be to discuss options of Hospice care.     I kept the patient's appointment open for next week in case that we are able to get the medications provided and delivered to him next week on Wednesday. In preparation for that the patient will have an echocardiogram. This will be happening as early as tomorrow.      ·   2. Colovesical fistula.   · He requires intermittent Cipro when he notes stool in his urine  · He currently reports his symptoms are more progressive with more urine in his rectum. He continues on Cipro and is scheduled for follow-up with urology, Dr. Witt later this month  · He has been seen by Cm Witt MD. He has advised the patient that he could not do too much for the fistula given the fact that it is just inconveniences of having urination through the anal canal from time to time but no urinary tract infection. He asked the patient to postpone any intention for surgery unless that it is absolutely necessary. Postponement of chemotherapy for 3 months if any surgery is necessary will trigger dramatic progression of his tumor and obviously consequences.   · On 06/08/2022, his colovesical fistula continues. Most of the output is urine through the rectum, no stool through the bladder. He has not had any urinary tract infection but he continues using prophylactically his ciprofloxacin 250 mg orally every other day. I asked him to remain on this medicine for  the time being  · 08/17/2022 the patient continues having colovesical fistula symptomatology intermittently, passing urine through the rectum and not passing feces through the bladder. He has not had any other urinary tract infections. He continues using his ciprofloxacin as per my instructions  · He continues on prophylactic ciprofloxacin every other day with no progressive symptoms today  On 01/25/2023 his fistula is still an ongoing issue but it has not become infected again. He remains on prophylactic ciprofloxacin.  On 02/02/2023 his colovesical fistula has not become symptomatic in regard to infection. He continues taking his ciprofloxacin every other day and I advised him to continue this. He still has urinary output through his rectum time to time.      ·   3.  Thrombophilia secondary malignancy, DVT  · Currently anticoagulated with Xarelto 20 mg daily  · Following GI bleed 8/2/2021, Xarelto has been discontinued  · Plan no evidence of recurrent thrombosis  On 01/25/2023 no new thrombosis.  On 02/02/2023 surprising enough no new episodes of thrombophilia. He is not receiving any anticoagulants anymore.      ·   4. Acute GI bleeding  · Dark tarry stool initially began 7/29/2021  · 7/23/2021, hemoglobin of 11.0.  8/2/2021, hemoglobin of 5.7  · Patient admitted, received transfusion of 4 units packed red blood cells.  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   · Without signs or symptoms of bleeding, hemoglobin today 12.4.  On 01/25/2023 no clinical evidence of GI bleeding, stable hemoglobin.   On 02/02/2023 no episodes of GI bleeding.      ·   5. Enhertu initiated interstitial lung disease  · The patient completed a prednisone taper  · He is without hypoxia or tachycardia currently. He has only minimal dyspnea on exertion  On 02/02/2023 discussed with the patient consideration to go  back to [facility] as long as he stops smoking completely and we monitor his situation very close in case that Perjeta /Herceptin is not available for him.    ·   6. Significant oxaliplatin reaction  · 2/16/2022, significant reaction with shortness of breath, limited air movement, treated with high-dose Solu-Cortef. He is not a candidate for further oxaliplatin  On 02/02/2023 no plans for any further administration of oxaliplatin.    ·   7. Borderline thrombocytopenia  · 3/2/2021, the patient is a platelet count of 101,000. With the discontinuation of oxaliplatin, we can continue with 5-FU leucovorin which will likely not cause significant issues with his platelets.  · 5/18/2022 platelet count normalized at  141,000  · Platelets today 149,000, without signs or symptoms of bleeding  On 01/25/2023 platelet count is normal.  On 02/02/2023 stable platelet count.      ·   8.  Hypertension: He previously been on lisinopril/hydrochlorothiazide however this medication is currently on hold due to some issues with hypotension.  He now only takes the medication if his blood pressure is above 130/90.  · Blood pressure today normal at 121/75.  On 01/25/2023 the patient is not taking his blood pressure medication, his blood pressure has been spontaneously low. This is not surprising with liver metastasis worsening. This is one of the typical features clinically.  On 02/02/2023 he has no need to take blood pressure medicine at this time.      ·   9.  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  · 12/28/2022, persistent neuropathy, Taxol reduced by 15%  · The patient reports persistent symptoms today with tingling of his fingers constantly and numbness of his feet.  He does not have painful neuropathy though this is constant interfering with activities of daily living.  Taxol further reduced with adjustment in schedule to day 1 day 15.  On 01/25/2023 worsening neuropathy along with Taxol  grade 2 in fingers, grade 1 in feet. He remains on gabapentin, Taxol discontinued.  On 02/02/2023 neuropathy is still ongoing in his feet. Neurontin remains ongoing.      ·   10.  Lower extremity edema:  · Bilateral.  Patient has been on his feet much more recently and has been more active.  It seemed to have worsened once the weather got warmer.  He states he was told previously by a cardiothoracic surgeon not to wear compression socks.  I have advised him to watch his salt intake, elevate his legs when he possible, and make sure he is drinking plenty of water.  Continue to monitor, and call if worsens.  · 6/20/2022 patient was prescribed lasix 20 mg every other day, which is helping some.  He is also trying to elevate his legs above his heart twice a day, which he also feels is helping.   · He is currently without swelling today, 1/11/2023    On 01/25/2023 using Lasix on prn basis for swelling in his lower extremities.   On 02/02/2023 no swelling in his lower extremities. No need for use of Lasix at this time.      ·     PLAN:    Each one of the situations have been discussed in detail above and that is the summary of this visit today. The patient will proceed with his echocardiogram tomorrow. If possible Perjeta/Herceptin administration next Wednesday. He will see Dr. Colivn at some point next week. Consideration of palliative treatment to his larger liver metastasis on the left lobe. Maybe that can buy us some time until we are able to get Perjeta availability.     If Perjeta is not an option for many reasons Enhertu could be provided as long as the patient quits smoking completely. Otherwise, the next option will be Hospice care.        Alexey Haynes MD  02/02/2023

## 2023-02-07 DIAGNOSIS — Z72.0 TOBACCO ABUSE: ICD-10-CM

## 2023-02-07 DIAGNOSIS — N32.1 COLOVESICAL FISTULA: ICD-10-CM

## 2023-02-07 DIAGNOSIS — Z45.2 ENCOUNTER FOR ADJUSTMENT OR MANAGEMENT OF VASCULAR ACCESS DEVICE: ICD-10-CM

## 2023-02-07 DIAGNOSIS — C15.5 MALIGNANT NEOPLASM OF LOWER THIRD OF ESOPHAGUS: ICD-10-CM

## 2023-02-07 DIAGNOSIS — I82.412 ACUTE DEEP VEIN THROMBOSIS (DVT) OF FEMORAL VEIN OF LEFT LOWER EXTREMITY: ICD-10-CM

## 2023-02-07 RX ORDER — GABAPENTIN 300 MG/1
CAPSULE ORAL
Qty: 180 CAPSULE | Refills: 0 | OUTPATIENT
Start: 2023-02-07

## 2023-02-08 ENCOUNTER — OFFICE VISIT (OUTPATIENT)
Dept: ONCOLOGY | Facility: CLINIC | Age: 68
End: 2023-02-08
Payer: MEDICARE

## 2023-02-08 ENCOUNTER — INFUSION (OUTPATIENT)
Dept: ONCOLOGY | Facility: HOSPITAL | Age: 68
End: 2023-02-08
Payer: MEDICARE

## 2023-02-08 VITALS
DIASTOLIC BLOOD PRESSURE: 77 MMHG | RESPIRATION RATE: 16 BRPM | WEIGHT: 183.6 LBS | OXYGEN SATURATION: 100 % | HEART RATE: 87 BPM | BODY MASS INDEX: 27.19 KG/M2 | HEIGHT: 69 IN | SYSTOLIC BLOOD PRESSURE: 122 MMHG | TEMPERATURE: 96.9 F

## 2023-02-08 VITALS — SYSTOLIC BLOOD PRESSURE: 111 MMHG | HEART RATE: 92 BPM | DIASTOLIC BLOOD PRESSURE: 74 MMHG

## 2023-02-08 DIAGNOSIS — C15.5 MALIGNANT NEOPLASM OF LOWER THIRD OF ESOPHAGUS: Primary | ICD-10-CM

## 2023-02-08 DIAGNOSIS — C78.7 LIVER METASTASIS: ICD-10-CM

## 2023-02-08 DIAGNOSIS — I82.412 ACUTE DEEP VEIN THROMBOSIS (DVT) OF FEMORAL VEIN OF LEFT LOWER EXTREMITY: ICD-10-CM

## 2023-02-08 DIAGNOSIS — Z45.2 ENCOUNTER FOR ADJUSTMENT OR MANAGEMENT OF VASCULAR ACCESS DEVICE: ICD-10-CM

## 2023-02-08 DIAGNOSIS — N32.1 RECTO-BLADDERNECK FISTULA: ICD-10-CM

## 2023-02-08 DIAGNOSIS — D49.0 ESOPHAGUS NEOPLASM: ICD-10-CM

## 2023-02-08 DIAGNOSIS — T45.1X5A PERIPHERAL NEUROPATHY DUE TO CHEMOTHERAPY: ICD-10-CM

## 2023-02-08 DIAGNOSIS — N32.1 COLOVESICAL FISTULA: ICD-10-CM

## 2023-02-08 DIAGNOSIS — G62.0 PERIPHERAL NEUROPATHY DUE TO CHEMOTHERAPY: ICD-10-CM

## 2023-02-08 DIAGNOSIS — I10 ESSENTIAL HYPERTENSION: ICD-10-CM

## 2023-02-08 DIAGNOSIS — C15.5 MALIGNANT NEOPLASM OF LOWER THIRD OF ESOPHAGUS: ICD-10-CM

## 2023-02-08 DIAGNOSIS — R73.01 IMPAIRED FASTING GLUCOSE: ICD-10-CM

## 2023-02-08 DIAGNOSIS — Z72.0 TOBACCO ABUSE: ICD-10-CM

## 2023-02-08 LAB
ALBUMIN SERPL-MCNC: 3.9 G/DL (ref 3.5–5.2)
ALBUMIN/GLOB SERPL: 1.4 G/DL (ref 1.1–2.4)
ALP SERPL-CCNC: 168 U/L (ref 38–116)
ALT SERPL W P-5'-P-CCNC: 8 U/L (ref 0–41)
ANION GAP SERPL CALCULATED.3IONS-SCNC: 12.1 MMOL/L (ref 5–15)
AST SERPL-CCNC: 14 U/L (ref 0–40)
BASOPHILS # BLD AUTO: 0.05 10*3/MM3 (ref 0–0.2)
BASOPHILS NFR BLD AUTO: 0.6 % (ref 0–1.5)
BILIRUB SERPL-MCNC: 0.5 MG/DL (ref 0.2–1.2)
BUN SERPL-MCNC: 11 MG/DL (ref 6–20)
BUN/CREAT SERPL: 18 (ref 7.3–30)
CALCIUM SPEC-SCNC: 9.4 MG/DL (ref 8.5–10.2)
CHLORIDE SERPL-SCNC: 103 MMOL/L (ref 98–107)
CO2 SERPL-SCNC: 21.9 MMOL/L (ref 22–29)
CREAT SERPL-MCNC: 0.61 MG/DL (ref 0.7–1.3)
DEPRECATED RDW RBC AUTO: 53.7 FL (ref 37–54)
EGFRCR SERPLBLD CKD-EPI 2021: 105.9 ML/MIN/1.73
EOSINOPHIL # BLD AUTO: 0.18 10*3/MM3 (ref 0–0.4)
EOSINOPHIL NFR BLD AUTO: 2.1 % (ref 0.3–6.2)
ERYTHROCYTE [DISTWIDTH] IN BLOOD BY AUTOMATED COUNT: 15.9 % (ref 12.3–15.4)
GLOBULIN UR ELPH-MCNC: 2.8 GM/DL (ref 1.8–3.5)
GLUCOSE SERPL-MCNC: 90 MG/DL (ref 74–124)
HCT VFR BLD AUTO: 39.8 % (ref 37.5–51)
HGB BLD-MCNC: 12.6 G/DL (ref 13–17.7)
IMM GRANULOCYTES # BLD AUTO: 0.14 10*3/MM3 (ref 0–0.05)
IMM GRANULOCYTES NFR BLD AUTO: 1.6 % (ref 0–0.5)
LYMPHOCYTES # BLD AUTO: 0.84 10*3/MM3 (ref 0.7–3.1)
LYMPHOCYTES NFR BLD AUTO: 9.7 % (ref 19.6–45.3)
MCH RBC QN AUTO: 29 PG (ref 26.6–33)
MCHC RBC AUTO-ENTMCNC: 31.7 G/DL (ref 31.5–35.7)
MCV RBC AUTO: 91.7 FL (ref 79–97)
MONOCYTES # BLD AUTO: 0.64 10*3/MM3 (ref 0.1–0.9)
MONOCYTES NFR BLD AUTO: 7.4 % (ref 5–12)
NEUTROPHILS NFR BLD AUTO: 6.8 10*3/MM3 (ref 1.7–7)
NEUTROPHILS NFR BLD AUTO: 78.6 % (ref 42.7–76)
NRBC BLD AUTO-RTO: 0 /100 WBC (ref 0–0.2)
PLATELET # BLD AUTO: 159 10*3/MM3 (ref 140–450)
PMV BLD AUTO: 9.5 FL (ref 6–12)
POTASSIUM SERPL-SCNC: 3.9 MMOL/L (ref 3.5–4.7)
PROT SERPL-MCNC: 6.7 G/DL (ref 6.3–8)
RBC # BLD AUTO: 4.34 10*6/MM3 (ref 4.14–5.8)
SODIUM SERPL-SCNC: 137 MMOL/L (ref 134–145)
WBC NRBC COR # BLD: 8.65 10*3/MM3 (ref 3.4–10.8)

## 2023-02-08 PROCEDURE — 25010000002 DIPHENHYDRAMINE PER 50 MG: Performed by: INTERNAL MEDICINE

## 2023-02-08 PROCEDURE — 25010000002 PERTUZUMAB 420 MG/14ML SOLUTION 420 MG VIAL: Performed by: INTERNAL MEDICINE

## 2023-02-08 PROCEDURE — 96417 CHEMO IV INFUS EACH ADDL SEQ: CPT

## 2023-02-08 PROCEDURE — 25010000002 HYDROCORTISONE SOD SUC (PF) 100 MG RECONSTITUTED SOLUTION: Performed by: INTERNAL MEDICINE

## 2023-02-08 PROCEDURE — 96413 CHEMO IV INFUSION 1 HR: CPT

## 2023-02-08 PROCEDURE — 85025 COMPLETE CBC W/AUTO DIFF WBC: CPT

## 2023-02-08 PROCEDURE — 96375 TX/PRO/DX INJ NEW DRUG ADDON: CPT

## 2023-02-08 PROCEDURE — 96415 CHEMO IV INFUSION ADDL HR: CPT

## 2023-02-08 PROCEDURE — 99215 OFFICE O/P EST HI 40 MIN: CPT | Performed by: INTERNAL MEDICINE

## 2023-02-08 PROCEDURE — 0 MEPERIDINE PER 100 MG: Performed by: NURSE PRACTITIONER

## 2023-02-08 PROCEDURE — 25010000002 TRASTUZUMAB PER 10 MG: Performed by: INTERNAL MEDICINE

## 2023-02-08 PROCEDURE — 80053 COMPREHEN METABOLIC PANEL: CPT

## 2023-02-08 PROCEDURE — 25010000002 HEPARIN LOCK FLUSH PER 10 UNITS: Performed by: INTERNAL MEDICINE

## 2023-02-08 RX ORDER — FAMOTIDINE 10 MG/ML
20 INJECTION, SOLUTION INTRAVENOUS ONCE
Status: CANCELLED | OUTPATIENT
Start: 2023-02-08

## 2023-02-08 RX ORDER — SODIUM CHLORIDE 9 MG/ML
250 INJECTION, SOLUTION INTRAVENOUS ONCE
Status: COMPLETED | OUTPATIENT
Start: 2023-02-08 | End: 2023-02-08

## 2023-02-08 RX ORDER — GABAPENTIN 300 MG/1
300 CAPSULE ORAL 2 TIMES DAILY
Qty: 180 CAPSULE | Refills: 0 | Status: SHIPPED | OUTPATIENT
Start: 2023-02-08

## 2023-02-08 RX ORDER — HEPARIN SODIUM (PORCINE) LOCK FLUSH IV SOLN 100 UNIT/ML 100 UNIT/ML
500 SOLUTION INTRAVENOUS AS NEEDED
OUTPATIENT
Start: 2023-02-08

## 2023-02-08 RX ORDER — SODIUM CHLORIDE 0.9 % (FLUSH) 0.9 %
10 SYRINGE (ML) INJECTION AS NEEDED
OUTPATIENT
Start: 2023-02-08

## 2023-02-08 RX ORDER — SODIUM CHLORIDE 0.9 % (FLUSH) 0.9 %
10 SYRINGE (ML) INJECTION AS NEEDED
Status: DISCONTINUED | OUTPATIENT
Start: 2023-02-08 | End: 2023-02-08 | Stop reason: HOSPADM

## 2023-02-08 RX ORDER — SODIUM CHLORIDE 9 MG/ML
250 INJECTION, SOLUTION INTRAVENOUS ONCE
Status: CANCELLED | OUTPATIENT
Start: 2023-02-08

## 2023-02-08 RX ORDER — HEPARIN SODIUM (PORCINE) LOCK FLUSH IV SOLN 100 UNIT/ML 100 UNIT/ML
500 SOLUTION INTRAVENOUS AS NEEDED
Status: DISCONTINUED | OUTPATIENT
Start: 2023-02-08 | End: 2023-02-08 | Stop reason: HOSPADM

## 2023-02-08 RX ORDER — MEPERIDINE HYDROCHLORIDE 50 MG/ML
25 INJECTION INTRAMUSCULAR; INTRAVENOUS; SUBCUTANEOUS ONCE
Status: COMPLETED | OUTPATIENT
Start: 2023-02-08 | End: 2023-02-08

## 2023-02-08 RX ORDER — FAMOTIDINE 10 MG/ML
20 INJECTION, SOLUTION INTRAVENOUS ONCE
Status: COMPLETED | OUTPATIENT
Start: 2023-02-08 | End: 2023-02-08

## 2023-02-08 RX ADMIN — HYDROCORTISONE SODIUM SUCCINATE 100 MG: 100 INJECTION, POWDER, FOR SOLUTION INTRAMUSCULAR; INTRAVENOUS at 14:56

## 2023-02-08 RX ADMIN — MEPERIDINE HYDROCHLORIDE 25 MG: 50 INJECTION INTRAMUSCULAR; INTRAVENOUS; SUBCUTANEOUS at 14:42

## 2023-02-08 RX ADMIN — Medication 10 ML: at 15:36

## 2023-02-08 RX ADMIN — PERTUZUMAB 840 MG: 30 INJECTION, SOLUTION, CONCENTRATE INTRAVENOUS at 11:41

## 2023-02-08 RX ADMIN — FAMOTIDINE 20 MG: 10 INJECTION INTRAVENOUS at 10:53

## 2023-02-08 RX ADMIN — SODIUM CHLORIDE 250 ML: 9 INJECTION, SOLUTION INTRAVENOUS at 10:53

## 2023-02-08 RX ADMIN — DIPHENHYDRAMINE HYDROCHLORIDE 50 MG: 50 INJECTION, SOLUTION INTRAMUSCULAR; INTRAVENOUS at 10:53

## 2023-02-08 RX ADMIN — Medication 500 UNITS: at 15:36

## 2023-02-08 RX ADMIN — TRASTUZUMAB 680 MG: 150 INJECTION, POWDER, LYOPHILIZED, FOR SOLUTION INTRAVENOUS at 13:17

## 2023-02-08 NOTE — PROGRESS NOTES
Subjective     REASON FOR FOLLOW UP:  1.  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  2. Colovesical fistula, chronic antibiotic therapy with Cipro.  3.  DVT of the right and left lower extremity. 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  Acute GI bleed      History of Present Illness      On 01/25/2023 this 67-year-old white male who has history of cancer of the esophagus metastatic to the liver now for almost 4 years returns to the office. At this time he is undergoing Taxol administration with decreasing dose and decreased frequency given the development of further peripheral neuropathy associated with this medication utilization and in the background of previous neuropathy by Oxaliplatin. The patient has encountered more difficulty with his hands to the point that he is having trouble buttoning his shirt and tying his shoes. His feet remain about the same in regard to neuropathy. Besides this he has no difficulty swallowing. Radiation therapy for palliation of the tumor in the esophagus was sufficient but he feels some sensation of fullness in the right upper quadrant of the abdomen and as well documented below in his clinical examination his liver is getting larger probably indicating progressive disease and no Taxol benefit. Besides this the patient has not had any issues pertinent to his fistula with no infections in the colovesical area. He continues taking his ciprofloxacin every other day. His appetite is relatively stable as well as his weight, he has  minimal swelling in his legs. He is not taking blood pressure medication at this time because his blood pressure has been on the low side spontaneously. He has more fatigue than usual as well. He continues smoking a pack of cigarettes a day. He continues having cough with clear sputum production, no hemoptysis.     The patient returned to the office on 02/02/2023 to review a PET scan that has been  "done recently. In the meantime nothing new in regard to symptoms besides his chronic cough associated with his cigarette smoking. He continues having passage of urine through the rectum due to his colovesical fistula. He has not developed any new urinary tract infections. His swallowing is good. He has proper nutrition, proper bowel activity, no swelling in lower extremities. No obvious pain.  The patient returned to the office on 02/08/2023 after we have finally gotten approved his combination chemotherapy medications for the treatment of his cancer of the esophagus with liver metastasis. He will proceed today with Perjeta/Herceptin that he will receive every 3 weeks and in preparation for this he will proceed with an ultrasound of his heart this coming Monday. In the meantime the patient has not had any new symptoms. He is now starting to realize that he needs to help himself in regard to smoking cessation given the circumstances of his care and the clinical situation that we discussed with him during the visit last week.      ·     Past Medical History:   Diagnosis Date   • Arthritis    • Cancer (HCC)     prostate cancer 2008   • Cancer (HCC)     esophageal/LIVER   • Diabetes mellitus (HCC)    • Difficulty swallowing solids    • Elevated PSA    • Esophageal mass    • Esophageal varices (HCC) July 2019   • GERD (gastroesophageal reflux disease)    • H/O Lung nodule    • Hepatitis     CHILD--PT STATED \"I THINK IT WAS A.\"   • History of esophageal varices with bleeding     SUMM34 2021   • History of high blood pressure    • History of pneumonia    • Hx of blood clots 08/10/2019    LOWER LEGS BILAT   • Maintenance chemotherapy     IV EVERY 4 WEEKS:  ESOPHAGEAL CANCER   • Nail fungus     LEFT FINGERNAILS X 4   • Neuropathy    • PVD (peripheral vascular disease) (HCC)    • Rash     ITCHY RED RASH ON RIGHT THIGH AREA-STATES IT COMES AND GOES.  USING PRESCRIPTION CREAM   • Recto-bladderneck fistula     on poab for " recurrent uti   • Risk factors for obstructive sleep apnea         Past Surgical History:   Procedure Laterality Date   • CATARACT EXTRACTION WITH INTRAOCULAR LENS IMPLANT Bilateral    • COLONOSCOPY N/A 02/04/2020    Procedure: COLONOSCOPY;  Surgeon: Guy Sears MD;  Location: [facility] ENDOSCOPY;  Service: General;  Laterality: N/A;  PRE-COLOVESICAL FISTULA  POST-- DIVERTICULOSIS   • COLONOSCOPY  2/4/2020   • CYSTOSCOPY  02/06/2020   • CYSTOSCOPY Bilateral 02/26/2020    Procedure: CYSTOSCOPY RETROGRADE;  Surgeon: Cm Witt MD;  Location: [facility] OR;  Service: Urology;  Laterality: Bilateral;   • ENDOSCOPY     • ENDOSCOPY N/A 06/19/2021    Procedure: ESOPHAGOGASTRODUODENOSCOPY WITH BIOPSY;  Surgeon: Mary Brito MD;  Location: [facility] OR;  Service: Gastroenterology;  Laterality: N/A;   • ENDOSCOPY N/A 08/05/2021    Procedure: ESOPHAGOGASTRODUODENOSCOPY HEMOSPRAY;  Surgeon: Naga Shelby MD;  Location: [facility] ENDOSCOPY;  Service: Gastroenterology;  Laterality: N/A;  HX OF ESOPHAGEAL  CANCER;MELENA  POST: ESOPHAGEAL BLEEDING; ESOPHAGEAL MASS   • ENDOSCOPY N/A 11/4/2022    Procedure: ESOPHAGOGASTRODUODENOSCOPY WITH DILATATION;  Surgeon: Mary Brito MD;  Location: [facility] OR;  Service: Gastroenterology;  Laterality: N/A;   • HERNIA REPAIR Right 1999    inguinal hernia   • PROSTATE SURGERY  03/2008    prostatectectomy   • UPPER GASTROINTESTINAL ENDOSCOPY  August 2021   • VENOUS ACCESS DEVICE (PORT) INSERTION N/A 07/16/2019    Procedure: INSERTION VENOUS ACCESS DEVICE WITH FLUORO AND EGD WITH BIOPSY;  Surgeon: Mary Brito MD;  Location: [facility] OR;  Service: Thoracic        Current Outpatient Medications on File Prior to Visit   Medication Sig Dispense Refill   • acetaminophen (TYLENOL) 500 MG tablet Take 500 mg by mouth Every 6 (Six) Hours As Needed for Mild Pain .     • cevimeline (EVOXAC) 30 MG capsule Take 1 capsule by mouth 3 (Three) Times a Day. DRY MOUTH     •  ciprofloxacin (CIPRO) 250 MG tablet Take 1 tablet by mouth Daily. Take one every other day (Patient taking differently: Take 250 mg by mouth Every Other Day. Take one every other day  FISTULA) 90 tablet 1   • econazole nitrate (SPECTAZOLE) 1 % cream Apply  topically to the appropriate area as directed 2 (Two) Times a Day. (Patient taking differently: Apply 1 application topically to the appropriate area as directed 2 (Two) Times a Day. NAIL FUNGUS-4 FINGERS ON LEFT) 85 g 1   • furosemide (LASIX) 20 MG tablet Take 1 tablet by mouth Every Other Day. (Patient taking differently: Take 20 mg by mouth As Needed. LOWER LEG EDEMA) 28 tablet 2   • gabapentin (NEURONTIN) 300 MG capsule TAKE 1 CAPSULE BY MOUTH TWICE A  capsule 0   • glipizide (GLUCOTROL XL) 5 MG ER tablet TAKE 1 TABLET BY MOUTH EVERY DAY (Patient taking differently: Take 5 mg by mouth Daily.) 90 tablet 1   • lisinopril-hydrochlorothiazide (PRINZIDE,ZESTORETIC) 20-12.5 MG per tablet TAKE 1 TABLET BY MOUTH EVERY DAY (Patient taking differently: Take 0.5 tablets by mouth As Needed. SYS GREATER THAN 130, TAKE 1/2 TAB) 90 tablet 0   • pantoprazole (Protonix) 40 MG EC tablet Take 1 tablet by mouth 2 (Two) Times a Day. 180 tablet 3   • pentoxifylline (TRENtal) 400 MG CR tablet TAKE 1 TABLET BY MOUTH TWICE A DAY WITH MEALS 90 tablet 0   • potassium chloride ER (K-TAB) 20 MEQ tablet controlled-release ER tablet Take 1 tablet by mouth Daily. 90 tablet 3   • Probiotic Product (Probiotic Blend) capsule Take 1 capsule by mouth Daily.       No current facility-administered medications on file prior to visit.        ALLERGIES:    Allergies   Allergen Reactions   • Oxaliplatin Anaphylaxis        Social History     Socioeconomic History   • Marital status: Single   • Years of education: High school   Tobacco Use   • Smoking status: Every Day     Packs/day: 1.00     Years: 38.00     Pack years: 38.00     Types: Cigarettes     Start date: 1/1/1974   • Smokeless tobacco:  Never   • Tobacco comments:     Quit for a period of 8 years.    Vaping Use   • Vaping Use: Never used   Substance and Sexual Activity   • Alcohol use: Not Currently   • Drug use: Never   • Sexual activity: Not Currently     Partners: Female     Birth control/protection: None        Family History   Problem Relation Age of Onset   • Hypertension Mother    • Stroke Mother    • Lung cancer Father    • Hypertension Other    • Lung disease Other    • Prostate cancer Other    • Malig Hyperthermia Neg Hx       Current Outpatient Medications on File Prior to Visit   Medication Sig Dispense Refill   • acetaminophen (TYLENOL) 500 MG tablet Take 500 mg by mouth Every 6 (Six) Hours As Needed for Mild Pain .     • cevimeline (EVOXAC) 30 MG capsule Take 1 capsule by mouth 3 (Three) Times a Day. DRY MOUTH     • ciprofloxacin (CIPRO) 250 MG tablet Take 1 tablet by mouth Daily. Take one every other day (Patient taking differently: Take 250 mg by mouth Every Other Day. Take one every other day  FISTULA) 90 tablet 1   • econazole nitrate (SPECTAZOLE) 1 % cream Apply  topically to the appropriate area as directed 2 (Two) Times a Day. (Patient taking differently: Apply 1 application topically to the appropriate area as directed 2 (Two) Times a Day. NAIL FUNGUS-4 FINGERS ON LEFT) 85 g 1   • furosemide (LASIX) 20 MG tablet Take 1 tablet by mouth Every Other Day. (Patient taking differently: Take 20 mg by mouth As Needed. LOWER LEG EDEMA) 28 tablet 2   • gabapentin (NEURONTIN) 300 MG capsule TAKE 1 CAPSULE BY MOUTH TWICE A  capsule 0   • glipizide (GLUCOTROL XL) 5 MG ER tablet TAKE 1 TABLET BY MOUTH EVERY DAY (Patient taking differently: Take 5 mg by mouth Daily.) 90 tablet 1   • lisinopril-hydrochlorothiazide (PRINZIDE,ZESTORETIC) 20-12.5 MG per tablet TAKE 1 TABLET BY MOUTH EVERY DAY (Patient taking differently: Take 0.5 tablets by mouth As Needed. SYS GREATER THAN 130, TAKE 1/2 TAB) 90 tablet 0   • pantoprazole (Protonix) 40 MG  "EC tablet Take 1 tablet by mouth 2 (Two) Times a Day. 180 tablet 3   • pentoxifylline (TRENtal) 400 MG CR tablet TAKE 1 TABLET BY MOUTH TWICE A DAY WITH MEALS 90 tablet 0   • potassium chloride ER (K-TAB) 20 MEQ tablet controlled-release ER tablet Take 1 tablet by mouth Daily. 90 tablet 3   • Probiotic Product (Probiotic Blend) capsule Take 1 capsule by mouth Daily.       No current facility-administered medications on file prior to visit.     Allergies   Allergen Reactions   • Oxaliplatin Anaphylaxis          Objective     Vitals:    02/08/23 1001   BP: 122/77   Pulse: 87   Resp: 16   Temp: 96.9 °F (36.1 °C)   TempSrc: Temporal   SpO2: 100%   Weight: 83.3 kg (183 lb 9.6 oz)   Height: 175 cm (68.9\")   PainSc: 0-No pain     Body mass index is 27.19 kg/m².     Current Status 2/8/2023   ECOG score 0         EXAM:                    GENERAL:  Well-developed, Patient  in no acute distress.   SKIN:  Warm, dry ,NO purpura ,no rash.  HEENT:  Pupils were equal and reactive to light and accomodation, conjunctivae noninjected,  normal visual acuity.   NECK:  Supple with good range of motion; no thyromegaly , no JVD or bruits,.No carotid artery pain, no carotid abnormal pulsation   LYMPHATICS:  No cervical, NO supraclavicular, NO axillary, NO inguinal adenopathies.  CARDIAC   normal rate , regular rhythm, without murmur,NO rubs NO S3 NO S4   LUNGS: normal breath sounds bilateral, no wheezing, NO rhonchi, NO crackles ,NO rubs.  VASCULAR VENOUS: no cyanosis, NO collateral circulation, NO varicosities, NO edema, NO palpable cords, NO pain,NO erythema, NO pigmentation of the skin.  ABDOMEN:  Soft, NO pain,no hepatomegaly, no splenomegaly,no masses, no ascites, no collateral circulation,no distention.  EXTREMITIES  AND SPINE:  No clubbing, no cyanosis ,no deformities , no pain .No kyphosis,  no pain in spine, no pain in ribs , no pain in pelvic bone.  NEUROLOGICAL:  Patient was awake, alert, oriented to time, person and " place.sensory neuropathy in feet                RECENT LABS:  Lab Results   Component Value Date    WBC 7.81 01/25/2023    HGB 12.4 (L) 01/25/2023    HCT 38.5 01/25/2023    MCV 92.5 01/25/2023     01/25/2023              Lab Results   Component Value Date    CEA 77.30 01/11/2023     Lab Results   Component Value Date    GLUCOSE 98 01/25/2023    BUN 11 01/25/2023    CREATININE 0.66 (L) 01/25/2023    EGFRIFNONA 149 02/16/2022    EGFRIFAFRI 100 02/02/2019    BCR 16.7 01/25/2023    K 3.9 01/25/2023    CO2 22.8 01/25/2023    CALCIUM 9.1 01/25/2023    PROTENTOTREF 7.1 02/02/2019    ALBUMIN 3.8 01/25/2023    LABIL2 1.6 02/02/2019    AST 16 01/25/2023    ALT 12 01/25/2023            Assessment & Plan    1.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 Almost 90% of the liver was replaced by tumor.  · HER-2 positive  · Initially treated with FOLFOX initiated July 2019  · Herceptin added with cycle 2  · Following 8 cycles of FOLFOX, oxaliplatin discontinued with continuation of 5-FU, leucovorin, Herceptin. This was continued through 7/7/2020  · Increasing CEA led to PET scan 6/9/2021.  Disease progression noted with growth of the tumor in the lower esophagus.  Continued stability of hepatic metastasis  · Repeat EGD 6/19/2021 for tissue specimen and Next Generation Sequencing.  · Endoscopy that shows a noncircumferential abnormality in the lower esophagus that encompasses almost 6 cm in length. It is not blocking off the esophagus and that is why the patient has no symptoms. The pathology shows at least atypical cells consistent with cancer.  · NGS showing PD-L1 positive and high mutation burden.  The tumor remains HER-2 positive.  Treatment plan for Keytruda every 3 weeks x 4 cycles followed by repeat imaging  · Keytruda initiated 7/29/2021  · Hospitalization 8/2/2021 through 8/7/2021 secondary to acute GI bleeding from known malignancy.  · Completed 10 fractions of radiation to the lower  esophagus 8/18/2021 after GI bleed  · PET scan following four cycles of Keytruda 10/14/2021 with improvement in the patient's known neoplasm involving the distal esophagus. However, known metastatic disease involving the liver is significantly worse. Keytruda discontinued  · Enhertu initiated 10/29/2021  · 12/23/2021 PET scan following three cycles of Enhertu discloses resolution of the tumor in the esophagus. He continues to have two active lesions in the liver with significant SUV activity pertinent to his metastatic disease. Minimal pulmonary infiltrates noted bilaterally. With concerns for Enhertu interstitial lung disease, Enhertu discontinued  · Treatment transitioned to FOLFOX Herceptin  · Borderline neutropenia 2/9/2022 with plans for 15% dose reduction to FOLFOX  · 2/16/2022, significant reaction to oxaliplatin which is discontinued  · Reevaluation 3/2/2022, with discontinue of oxaliplatin, the patient will continue on 5-FU, leucovorin, Kanjinti. Of note, the patient received 6 mg/kg of Kanjinti 2 weeks ago, therefore he cannot receive this medication today. He will receive only 5-FU leucovorin and return in 1 week for Kanjinti 2 mg/kg. In 2 weeks, he will resume his normal schedule of 5-FU, leucovorin, Kanjinti 4 mg/kg every 2 weeks.  · 3/16/2022 will continue on with 5-FU, leucovorin, Herceptin.  CEA dramatically improving.  · 3/25/2022 CT C/A/P When compared to recent PET/CT there is interval decrease in size of the previously demonstrated hypermetabolic hepatic metastases as well as decrease in size of the periportal lymph node. The wall thickening involving the distal esophagus is largely unchanged. 2. Persistent findings of colovesicular fistula. 3. Ill-defined interstitial and ground glass opacities within the bilateral lower lobes favored to represent postradiation pneumonitis/change  · 6/8/2022 CEA 8.0.  · 6/29/2022 here for continued 5-FU, leucovorin, Kanjanti, overall tolerating  well.  · 8/17/2022 continuing on 5-FU, leucovorin, Herceptin tolerating well.  CEA 8.  Patient was advised to continue with his current treatment.  Not planning a another radiological assessment unless patient has further rise in CEA level.  · 9/14/2022 here for continued 5-FU, leucovorin, Herceptin, continuing to tolerate quite well.   · 10/12/2022, progressive dysphagia with minimal rise of his CEA with plans made for PET scan  · Endoscopy 11/4/2022 with findings very tight esophagus with strictures.  Biopsies with pathology consistent with adenocarcinoma.  · PET scan 11/2/2022 with a long segment of activity of the esophagus and additional 2 tumoral lesions of the liver or 1 in the left lobe and the other in the right lobe.  There is also a CV activity close to the head of the pancreas.  · 5-FU, leucovorin, Herceptin discontinued with plans to move to Taxol weekly 3 to 4 weeks coordinated with radiation therapy.  · Consult with radiation oncology 11/15/2022 with plans for 10 fractions to the distal esophagus  · Initiating single agent Taxol today, 11/16/2022  · 12/28/2022 due for cycle 2-day 15.  Neuropathy worsening specifically in the feet with more trouble feeling the ground.  No falls.  Per discussion with Dr. Haynes will adjust Taxol down by 15%.  · Reevaluation today, 1/11/2023 due for cycle 3-day 1 Taxol.  He continues to have persistent neuropathy with constant tingling of his fingers and toes.  Further adjustment to Taxol which will now be given day 1 day 15 of a 28-day cycle.  Day 8 discontinued from the treatment plan.  On 01/25/2023 the patient is having more fatigue associated with Taxol utilization but most importantly more sensory neuropathy especially in his hands to the point that he is having difficulty tying his shoes and buttoning his shirts. He has no motor deficit. Furthermore the patient's CEA level has gone from 35 to 77 and the alkaline phosphatase has risen. The liver has become now  nodular, palpable again with minimal fullness. I do believe that this announces that Taxol is not effective in one hand and besides is giving him more neuropathy on the other hand. Given this fact I will discontinue the Taxol as per today, he will not receive any dose whatsoever and he already has an appointment for a PET scan to be done next Tuesday. I will review him back next Thursday in order to decide how to proceed. He already has had Enhertu administration in the past, he developed some pneumonitis associated with that. Raises the question if we need to go back to Herceptin/Perjeta. I think this will be the other possibility.     Given the above circumstances the chemotherapy treatment will be put on hold. Today his white count, hemoglobin and platelets are good. His chemistry profile is pending.  On 02/02/2023 I reviewed with the patient and his other sister the PET scan that documents very stable esophagus with minimal if any regrowth of tumor, normal pulmonary anatomy with no obvious tumor extension into this in the background of emphysema and obvious progressive liver metastasis. Also he has periportal adenopathy that is progressing. Pancreas, kidneys, spleen and bowel remain unremarkable. No obvious bone metastasis. Given all these changes I do believe that the patient has obvious progressive disease. His CEA level has risen to 77 during the previous visit. Given the fact that we have 2 fires now, liver metastasis growing, especially on the left lobe that eventually is going to become symptomatic given the large size and the SUV activity and given the fact that I cannot have access to Herceptin/Perjeta yet, I do believe that the patient could benefit from palliative radiation treatment to the liver, larger lesion, and I had a discussion with Dr. Colvin at this moment. He will be glad to see the patient in a few days and discuss with the patient the risks and benefits and see if that is a possibility. That  probably can buy me some time to be able to consecute Perjeta.     The plan B will be if the patient ever decides to stop smoking to go back into [facility] in the future and hopefully with less cigarettes in the system he will have lesser chances to develop pneumonitis again. Therefore, it is up to the patient what he wants to do and I discussed these facts very clearly. If by any chance we cannot get Perjeta and Herceptin together in this patient and running out of medicines to provide to him because he has gone through most of the medicines utilized in this kind of setting, therefore the next option in case that Perjeta/Herceptin are not available would be to discuss options of Hospice care.     I kept the patient's appointment open for next week in case that we are able to get the medications provided and delivered to him next week on Wednesday. In preparation for that the patient will have an echocardiogram. This will be happening as early as tomorrow.  On 02/08/2023 the patient was reviewed after we finally got his Herceptin/Perjeta medications approved to be started as early as today. I discussed with him side effects of this combination of medicines including heart damage, diarrhea especially with the Perjeta that will require aggressive plan of care with Imodium that he has ready at home and also with Pedialyte or Gatorade. If he has more than 5 stools a day he will need to notify us immediately. Also he realizes that he needs to be aware of nausea, vomiting, allergic reactions and maybe even worsening of sensory neuropathy. The patient  is aware of all of these circumstances and he is ready to proceed. In preparation for that he will proceed with his echocardiogram this coming Monday. The previous echocardiogram in 2022 was very appropriate in regard to ejection fraction and I doubt that this will change overall because he has not received any other medication that will be effecting this area at this point.      I went ahead and asked him to sign the consent for the medication after proper education and I provided him written information about Trentonjeta. He already has signed consent for Herceptin administration at the time of his original diagnosis at least 3 years ago. He will require to be seen every 3 weeks. He will require CBC, CMP every 3 weeks and he will require CEA level every 3 weeks. We will obtain a baseline CEA level today.        ·   2. Colovesical fistula.   · He requires intermittent Cipro when he notes stool in his urine  · He currently reports his symptoms are more progressive with more urine in his rectum. He continues on Cipro and is scheduled for follow-up with urology, Dr. Witt later this month  · He has been seen by Cm Witt MD. He has advised the patient that he could not do too much for the fistula given the fact that it is just inconveniences of having urination through the anal canal from time to time but no urinary tract infection. He asked the patient to postpone any intention for surgery unless that it is absolutely necessary. Postponement of chemotherapy for 3 months if any surgery is necessary will trigger dramatic progression of his tumor and obviously consequences.   · On 06/08/2022, his colovesical fistula continues. Most of the output is urine through the rectum, no stool through the bladder. He has not had any urinary tract infection but he continues using prophylactically his ciprofloxacin 250 mg orally every other day. I asked him to remain on this medicine for the time being  · 08/17/2022 the patient continues having colovesical fistula symptomatology intermittently, passing urine through the rectum and not passing feces through the bladder. He has not had any other urinary tract infections. He continues using his ciprofloxacin as per my instructions  · He continues on prophylactic ciprofloxacin every other day with no progressive symptoms today  On 01/25/2023 his fistula  is still an ongoing issue but it has not become infected again. He remains on prophylactic ciprofloxacin.  On 02/02/2023 his colovesical fistula has not become symptomatic in regard to infection. He continues taking his ciprofloxacin every other day and I advised him to continue this. He still has urinary output through his rectum time to time.  On 02/08/2023 his colovesical fistula is not giving him any new difficulties and he continues taking ciprofloxacin every other day.        ·   3.  Thrombophilia secondary malignancy, DVT  · Currently anticoagulated with Xarelto 20 mg daily  · Following GI bleed 8/2/2021, Xarelto has been discontinued  · Plan no evidence of recurrent thrombosis  On 01/25/2023 no new thrombosis.  On 02/02/2023 surprising enough no new episodes of thrombophilia. He is not receiving any anticoagulants anymore.  On 02/08/2023 no recent blood clots in his lower extremities. He is not anticoagulated.        ·   4. Acute GI bleeding  · Dark tarry stool initially began 7/29/2021  · 7/23/2021, hemoglobin of 11.0.  8/2/2021, hemoglobin of 5.7  · Patient admitted, received transfusion of 4 units packed red blood cells.  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   · Without signs or symptoms of bleeding, hemoglobin today 12.4.  On 01/25/2023 no clinical evidence of GI bleeding, stable hemoglobin.   On 02/02/2023 no episodes of GI bleeding.  On 02/08/2023 no notion of GI bleeding recently with no nausea, vomiting, hematemesis or melena.        ·   5. Enhertu initiated interstitial lung disease  · The patient completed a prednisone taper  · He is without hypoxia or tachycardia currently. He has only minimal dyspnea on exertion  On 02/02/2023 discussed with the patient consideration [facility] as he stops smoking completely and we monitor his situation very close  in case that Perjeta /Herceptin is not available for him.  On 02/08/2023, the inflammatory position in his lung resolved altogether after prednisone.      ·   6. Significant oxaliplatin reaction  · 2/16/2022, significant reaction with shortness of breath, limited air movement, treated with high-dose Solu-Cortef. He is not a candidate for further oxaliplatin  On 02/02/2023 no plans for any further administration of oxaliplatin.  On 02/08/2023 the patient cannot handle Oxaliplatin.      ·   7. Borderline thrombocytopenia  · 3/2/2021, the patient is a platelet count of 101,000. With the discontinuation of oxaliplatin, we can continue with 5-FU leucovorin which will likely not cause significant issues with his platelets.  · 5/18/2022 platelet count normalized at  141,000  · Platelets today 149,000, without signs or symptoms of bleeding  On 01/25/2023 platelet count is normal.  On 02/02/2023 stable platelet count.  On 02/08/2023 platelet count appropriate for treatment today.        ·   8.  Hypertension: He previously been on lisinopril/hydrochlorothiazide however this medication is currently on hold due to some issues with hypotension.  He now only takes the medication if his blood pressure is above 130/90.  · Blood pressure today normal at 121/75.  On 01/25/2023 the patient is not taking his blood pressure medication, his blood pressure has been spontaneously low. This is not surprising with liver metastasis worsening. This is one of the typical features clinically.  On 02/02/2023 he has no need to take blood pressure medicine at this time.  He will continue monitoring his blood pressure as per 02/08/2023 and use medication accordingly.        ·   9.  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  · 12/28/2022, persistent neuropathy, Taxol reduced by 15%  · The patient reports persistent symptoms today with tingling of his fingers constantly and numbness of his feet.  He  does not have painful neuropathy though this is constant interfering with activities of daily living.  Taxol further reduced with adjustment in schedule to day 1 day 15.  On 01/25/2023 worsening neuropathy along with Taxol grade 2 in fingers, grade 1 in feet. He remains on gabapentin, Taxol discontinued.  On 02/02/2023 neuropathy is still ongoing in his feet. Neurontin remains ongoing.  On 02/08/2023 gabapentin will be renewed today, prescription sent to his pharmacy for 3 month supply.        ·   10.  Lower extremity edema:  · Bilateral.  Patient has been on his feet much more recently and has been more active.  It seemed to have worsened once the weather got warmer.  He states he was told previously by a cardiothoracic surgeon not to wear compression socks.  I have advised him to watch his salt intake, elevate his legs when he possible, and make sure he is drinking plenty of water.  Continue to monitor, and call if worsens.  · 6/20/2022 patient was prescribed lasix 20 mg every other day, which is helping some.  He is also trying to elevate his legs above his heart twice a day, which he also feels is helping.   · He is currently without swelling today, 1/11/2023    On 01/25/2023 using Lasix on prn basis for swelling in his lower extremities.   On 02/02/2023 no swelling in his lower extremities. No need for use of Lasix at this time.  On 02/08/2023 no edema in lower extremities. Use Lasix on prn basis.        ·     PLAN:    On 02/08/2023 the patient will proceed with Perjeta/Herceptin administration. He also will be seen by Lisandro Colvin MD. I would not oppose for him to receive stereotactic treatment to the larger lesion in the liver on the left lobe if Dr. Colvin considers that that is beneficial to the patient and with minimal side effects. I do not anticipate any increase of side effects of Perjeta/Herceptin in the background of radiation therapy to this anatomical site.     The patient will require clinical  assessment every 3 weeks and he will require radiological assessment after 3 rounds of therapy with Herceptin/Perjeta. We will monitor his CEA level every 3 weeks as well.     The patient is more aware of the need for smoking cessation at this time.     The rest of the issues pertinent to his care are discussed above in detail and new prescription for gabapentin has been sent today.        Alexey Haynes MD  02/08/2023

## 2023-02-08 NOTE — PROGRESS NOTES
1435 pt began c/o chills while receiving herceptin. Tx stopped.    Saadia MARRUFO notified and at bedside. V/o 25 mg demerol IV ordered.  /83 HR 99 RR 20.  1441 Demerol given, warm blanket placed.  1454 /82 hr 92 R:20, pt c/o chilling has resolved. Will speak to Dr. Haynes in regard to resuming medication.

## 2023-02-10 ENCOUNTER — OFFICE VISIT (OUTPATIENT)
Dept: RADIATION ONCOLOGY | Facility: HOSPITAL | Age: 68
End: 2023-02-10
Payer: MEDICARE

## 2023-02-10 ENCOUNTER — APPOINTMENT (OUTPATIENT)
Dept: RADIATION ONCOLOGY | Facility: HOSPITAL | Age: 68
End: 2023-02-10
Payer: MEDICARE

## 2023-02-10 VITALS
SYSTOLIC BLOOD PRESSURE: 126 MMHG | OXYGEN SATURATION: 98 % | DIASTOLIC BLOOD PRESSURE: 76 MMHG | BODY MASS INDEX: 27.49 KG/M2 | WEIGHT: 185.6 LBS | HEART RATE: 91 BPM

## 2023-02-10 DIAGNOSIS — C78.7 LIVER METASTASIS: Primary | ICD-10-CM

## 2023-02-10 PROCEDURE — 99215 OFFICE O/P EST HI 40 MIN: CPT | Performed by: STUDENT IN AN ORGANIZED HEALTH CARE EDUCATION/TRAINING PROGRAM

## 2023-02-10 PROCEDURE — G0463 HOSPITAL OUTPT CLINIC VISIT: HCPCS | Performed by: STUDENT IN AN ORGANIZED HEALTH CARE EDUCATION/TRAINING PROGRAM

## 2023-02-10 NOTE — PROGRESS NOTES
[facility] Radiation Oncology   Reevaluation     Chief Complaint  Metastatic Esophageal Cancer      Diagnosis: Metastatic Esophageal Cancer    Overall Stage Metastatic      Pacemaker: no  Prior History of Radiation: yes  Contraindications to Radiation: no  Patient Requires Pregnancy Test: No, patient is male     HPI:     Han Garcia is a 67-year-old male who was diagnosed with metastatic esophageal cancer in 2019.  He initially underwent chemotherapy without radiation therapy but developed local recurrence in 2021.  He was treated with palliative radiation 30 Gray in 10 fractions which finished in September 2021.  The patient has continued on systemic therapies with Dr. Haynes.  Recently he was treated for esophageal stricture by Dr. Briot.  Biopsy obtained at that time demonstrated again recurrent disease.  PET/CT was obtained which has demonstrated increased avidity in the distal esophagus as well as progression of his previous liver metastases.  Dr. Keller is planning on switching chemotherapy from 5-FU, leucovorin, Herceptin to low-dose Taxol.  He was referred patient for consideration of repeat radiation therapy to the distal esophagus in November 2022 and received 30Gy/10fxs to his distal esophagus on 12/19/2023. On new interval PET CT 1/31/2023 the patient had reduction in his esophageal disease, but significant progression in the liver, as well as in madison hepatis and retroperitoneal lymph nodes. Dr. Haynes contacted me regarding this patient and was planning to start Herceptin/Perjeta, but was concerned about possible insurance delay. In the meantime felt that perhaps palliative-intent radiation to the largest liver met may provide benefit while awaiting initiation of new systemic therapy. Fortunately, this insurance delay was minimized, and the patient has already started his next-line systemic therapy. The patient is so far tolerating this new systemic therapy well. He also denies any symptoms of  abdominal pain/discomfort or any other symptoms that I can attribute to his liver metastases.       Imaging:      PET CT 1/31/2023    IMPRESSION:  1. There are mixed findings. There has been regression of the distal  esophageal mass, but progression of the hepatic metastases and  metastatic lymphadenopathy at the madison hepatis and retroperitoneum.  2. Colovesicular fistula. Slightly smaller volume of air within the  urinary bladder and less intense activity at the sigmoid colon.      Pathology:      No new relevant pathology      Labs:    Lab Results   Component Value Date    CREATININE 0.61 (L) 02/08/2023             Problem List:  Patient Active Problem List   Diagnosis   • Essential hypertension   • Hyperlipidemia   • Impaired fasting glucose   • History of prostate cancer   • Tobacco abuse   • Liver metastasis (HCC)   • Esophagus neoplasm   • Malignant neoplasm of lower third of esophagus (HCC)   • Encounter for adjustment or management of vascular access device   • Recto-bladderneck fistula   • Peripheral neuropathy due to chemotherapy (HCC)          Medications:  Current Outpatient Medications on File Prior to Visit   Medication Sig Dispense Refill   • acetaminophen (TYLENOL) 500 MG tablet Take 500 mg by mouth Every 6 (Six) Hours As Needed for Mild Pain .     • cevimeline (EVOXAC) 30 MG capsule Take 1 capsule by mouth 3 (Three) Times a Day. DRY MOUTH     • ciprofloxacin (CIPRO) 250 MG tablet Take 1 tablet by mouth Daily. Take one every other day (Patient taking differently: Take 250 mg by mouth Every Other Day. Take one every other day  FISTULA) 90 tablet 1   • econazole nitrate (SPECTAZOLE) 1 % cream Apply  topically to the appropriate area as directed 2 (Two) Times a Day. (Patient taking differently: Apply 1 application topically to the appropriate area as directed 2 (Two) Times a Day. NAIL FUNGUS-4 FINGERS ON LEFT) 85 g 1   • furosemide (LASIX) 20 MG tablet Take 1 tablet by mouth Every Other Day. (Patient  "taking differently: Take 20 mg by mouth As Needed. LOWER LEG EDEMA) 28 tablet 2   • gabapentin (NEURONTIN) 300 MG capsule Take 1 capsule by mouth 2 (Two) Times a Day. 180 capsule 0   • glipizide (GLUCOTROL XL) 5 MG ER tablet TAKE 1 TABLET BY MOUTH EVERY DAY (Patient taking differently: Take 5 mg by mouth Daily.) 90 tablet 1   • lisinopril-hydrochlorothiazide (PRINZIDE,ZESTORETIC) 20-12.5 MG per tablet TAKE 1 TABLET BY MOUTH EVERY DAY (Patient taking differently: Take 0.5 tablets by mouth As Needed. SYS GREATER THAN 130, TAKE 1/2 TAB) 90 tablet 0   • pantoprazole (Protonix) 40 MG EC tablet Take 1 tablet by mouth 2 (Two) Times a Day. 180 tablet 3   • pentoxifylline (TRENtal) 400 MG CR tablet TAKE 1 TABLET BY MOUTH TWICE A DAY WITH MEALS 90 tablet 0   • potassium chloride ER (K-TAB) 20 MEQ tablet controlled-release ER tablet Take 1 tablet by mouth Daily. 90 tablet 3   • Probiotic Product (Probiotic Blend) capsule Take 1 capsule by mouth Daily.       No current facility-administered medications on file prior to visit.          Allergies:  Allergies   Allergen Reactions   • Oxaliplatin Anaphylaxis         Family History:  The patient has no family history of conditions which would be contraindications to radiation therapy        Social History:  Current Smoker     Distance From Clinic: 30 minutes - 1 hour     Patient has someone who can assist with transportation: yes      Vital Signs:  /76   Pulse 91   Wt 84.2 kg (185 lb 9.6 oz)   SpO2 98%   BMI 27.49 kg/m²   Estimated body mass index is 27.49 kg/m² as calculated from the following:    Height as of 2/8/23: 175 cm (68.9\").    Weight as of this encounter: 84.2 kg (185 lb 9.6 oz).  Pain Score    02/10/23 1509   PainSc: 0-No pain         ECOG: Restricted in physically strenuous activity but ambulatory and able to carry out work of a light or sedentary nature, e.g., light house work, office work = 1      Physical Exam  Vitals reviewed.   Constitutional:       " General: He is not in acute distress.     Appearance: Normal appearance.   HENT:      Head: Normocephalic and atraumatic.   Eyes:      Extraocular Movements: Extraocular movements intact.      Pupils: Pupils are equal, round, and reactive to light.   Pulmonary:      Effort: Pulmonary effort is normal.      Breath sounds: Normal breath sounds.   Abdominal:      General: Abdomen is flat.      Palpations: Abdomen is soft.   Musculoskeletal:      Cervical back: Normal range of motion and neck supple.   Skin:     General: Skin is warm and dry.   Neurological:      General: No focal deficit present.      Mental Status: He is alert and oriented to person, place, and time.   Psychiatric:         Mood and Affect: Mood normal.         Behavior: Behavior normal.          Result Review :  The following data was reviewed by: Lisandro Colvin MD on 02/10/2023:  Labs: Last Creatinine   Data reviewed: Radiologic studies PET CT            Diagnoses and all orders for this visit:    1. Liver metastasis (HCC) (Primary)        Assessment:    Han Garcia is a 67-year-old male who was diagnosed with metastatic esophageal cancer in 2019.  He initially underwent chemotherapy without radiation therapy but developed local recurrence in 2021.  He was treated with palliative radiation 30 Gray in 10 fractions which finished in September 2021.  The patient has continued on systemic therapies with Dr. Haynes.  Recently he was treated for esophageal stricture by Dr. Brito.  Biopsy obtained at that time demonstrated again recurrent disease.  PET/CT was obtained which has demonstrated increased avidity in the distal esophagus as well as progression of his previous liver metastases.  Dr. Keller is planning on switching chemotherapy from 5-FU, leucovorin, Herceptin to low-dose Taxol.  He was referred patient for consideration of repeat radiation therapy to the distal esophagus in November 2022 and received 30Gy/10fxs to his distal esophagus on  12/19/2023. On new interval PET CT 1/31/2023 the patient had reduction in his esophageal disease, but significant progression in the liver, as well as in madison hepatis and retroperitoneal lymph nodes. Dr. Haynes contacted me regarding this patient and was planning to start Herceptin/Perjeta, but was concerned about possible insurance delay. In the meantime felt that perhaps palliative-intent radiation to the largest liver met may provide benefit while awaiting initiation of new systemic therapy. Fortunately, this insurance delay was minimized, and the patient has already started his next-line systemic therapy. The patient is so far tolerating this new systemic therapy well. He also denies any symptoms of abdominal pain/discomfort or any other symptoms that I can attribute to his liver metastases.     I met with the patient in reevaluation and discussed the risks, benefits, side effects, and logistics of radiation to his largest liver lesion. Overall, I do think that the radiation could be delivered safely, but I am hesitant to recommend it at this time given his lack of symptoms related to this lesion, as well as his recent initiation of new systemic therapy. My preference would be to reserve radiation therapy at this time to allow him to respond to his new systemic therapy. Should he progress through systemic therapy and/or develop symptoms attributable to his hepatic disease, we could move forward with radiation at that point. The patient was amenable to this plan. I answered all of his questions to his satisfaction.       Plan:    -Plan to hold off on radiation therapy at this time. Will consider radiation should her progress and/or develop symptoms related to his hepatic metastases in the future       I spent 40 minutes caring for Han on this date of service. This time includes time spent by me in the following activities:preparing for the visit, reviewing tests, obtaining and/or reviewing a separately  obtained history, documenting information in the medical record, independently interpreting results and communicating that information with the patient/family/caregiver and care coordination  Follow Up   No follow-ups on file.  Patient was given instructions and counseling regarding his condition or for health maintenance advice. Please see specific information pulled into the AVS if appropriate.     Lisandro Colvin MD

## 2023-02-13 ENCOUNTER — HOSPITAL ENCOUNTER (OUTPATIENT)
Dept: CARDIOLOGY | Facility: HOSPITAL | Age: 68
Discharge: HOME | End: 2023-02-13
Admitting: INTERNAL MEDICINE
Payer: MEDICARE

## 2023-02-13 VITALS
SYSTOLIC BLOOD PRESSURE: 110 MMHG | DIASTOLIC BLOOD PRESSURE: 66 MMHG | BODY MASS INDEX: 27.4 KG/M2 | OXYGEN SATURATION: 98 % | HEART RATE: 95 BPM | HEIGHT: 69 IN | WEIGHT: 185 LBS

## 2023-02-13 DIAGNOSIS — Z01.818 ENCOUNTER FOR ECHOCARDIOGRAM BEFORE INITIATION OF CHEMOTHERAPY: ICD-10-CM

## 2023-02-13 LAB
AORTIC ARCH: 2.4 CM
ASCENDING AORTA: 3.4 CM
BH CV ECHO LEFT VENTRICLE GLOBAL LONGITUDINAL STRAIN: -19.9 %
BH CV ECHO MEAS - ACS: 1.85 CM
BH CV ECHO MEAS - AO MAX PG: 7.6 MMHG
BH CV ECHO MEAS - AO MEAN PG: 4 MMHG
BH CV ECHO MEAS - AO ROOT DIAM: 3.7 CM
BH CV ECHO MEAS - AO V2 MAX: 138 CM/SEC
BH CV ECHO MEAS - AO V2 VTI: 22 CM
BH CV ECHO MEAS - AVA(I,D): 2.16 CM2
BH CV ECHO MEAS - EDV(CUBED): 63.3 ML
BH CV ECHO MEAS - EDV(MOD-SP2): 87 ML
BH CV ECHO MEAS - EDV(MOD-SP4): 101 ML
BH CV ECHO MEAS - EF(MOD-BP): 61.5 %
BH CV ECHO MEAS - EF(MOD-SP2): 62.1 %
BH CV ECHO MEAS - EF(MOD-SP4): 58.4 %
BH CV ECHO MEAS - EF_3D-VOL: 53 %
BH CV ECHO MEAS - ESV(CUBED): 16.5 ML
BH CV ECHO MEAS - ESV(MOD-SP2): 33 ML
BH CV ECHO MEAS - ESV(MOD-SP4): 42 ML
BH CV ECHO MEAS - FS: 36.1 %
BH CV ECHO MEAS - IVS/LVPW: 1.05 CM
BH CV ECHO MEAS - IVSD: 1.23 CM
BH CV ECHO MEAS - LAT PEAK E' VEL: 9.2 CM/SEC
BH CV ECHO MEAS - LV DIASTOLIC VOL/BSA (35-75): 50.5 CM2
BH CV ECHO MEAS - LV MASS(C)D: 165.1 GRAMS
BH CV ECHO MEAS - LV MAX PG: 3.5 MMHG
BH CV ECHO MEAS - LV MEAN PG: 2 MMHG
BH CV ECHO MEAS - LV SYSTOLIC VOL/BSA (12-30): 21 CM2
BH CV ECHO MEAS - LV V1 MAX: 93 CM/SEC
BH CV ECHO MEAS - LV V1 VTI: 14.9 CM
BH CV ECHO MEAS - LVIDD: 4 CM
BH CV ECHO MEAS - LVIDS: 2.5 CM
BH CV ECHO MEAS - LVOT AREA: 3.2 CM2
BH CV ECHO MEAS - LVOT DIAM: 2.02 CM
BH CV ECHO MEAS - LVPWD: 1.18 CM
BH CV ECHO MEAS - MED PEAK E' VEL: 8.2 CM/SEC
BH CV ECHO MEAS - MR MAX PG: 39 MMHG
BH CV ECHO MEAS - MR MAX VEL: 312.2 CM/SEC
BH CV ECHO MEAS - MV A DUR: 0.13 SEC
BH CV ECHO MEAS - MV A MAX VEL: 118.3 CM/SEC
BH CV ECHO MEAS - MV DEC SLOPE: 357.1 CM/SEC2
BH CV ECHO MEAS - MV DEC TIME: 0.23 MSEC
BH CV ECHO MEAS - MV E MAX VEL: 78.8 CM/SEC
BH CV ECHO MEAS - MV E/A: 0.67
BH CV ECHO MEAS - MV MAX PG: 12.5 MMHG
BH CV ECHO MEAS - MV MEAN PG: 3.6 MMHG
BH CV ECHO MEAS - MV P1/2T: 76.8 MSEC
BH CV ECHO MEAS - MV V2 VTI: 25.2 CM
BH CV ECHO MEAS - MVA(P1/2T): 2.9 CM2
BH CV ECHO MEAS - MVA(VTI): 1.89 CM2
BH CV ECHO MEAS - PA ACC TIME: 0.1 SEC
BH CV ECHO MEAS - PA PR(ACCEL): 36.2 MMHG
BH CV ECHO MEAS - PA V2 MAX: 104.5 CM/SEC
BH CV ECHO MEAS - PULM A REVS DUR: 0.12 SEC
BH CV ECHO MEAS - PULM A REVS VEL: 24 CM/SEC
BH CV ECHO MEAS - PULM DIAS VEL: 37.7 CM/SEC
BH CV ECHO MEAS - PULM S/D: 1.52
BH CV ECHO MEAS - PULM SYS VEL: 57.4 CM/SEC
BH CV ECHO MEAS - QP/QS: 1.14
BH CV ECHO MEAS - RAP SYSTOLE: 3 MMHG
BH CV ECHO MEAS - RV MAX PG: 3.2 MMHG
BH CV ECHO MEAS - RV V1 MAX: 89.1 CM/SEC
BH CV ECHO MEAS - RV V1 VTI: 16.1 CM
BH CV ECHO MEAS - RVOT DIAM: 2.07 CM
BH CV ECHO MEAS - RVSP: 17 MMHG
BH CV ECHO MEAS - SI(MOD-SP2): 27 ML/M2
BH CV ECHO MEAS - SI(MOD-SP4): 29.5 ML/M2
BH CV ECHO MEAS - SUP REN AO DIAM: 2.3 CM
BH CV ECHO MEAS - SV(LVOT): 47.6 ML
BH CV ECHO MEAS - SV(MOD-SP2): 54 ML
BH CV ECHO MEAS - SV(MOD-SP4): 59 ML
BH CV ECHO MEAS - SV(RVOT): 54.3 ML
BH CV ECHO MEAS - TAPSE (>1.6): 1.89 CM
BH CV ECHO MEAS - TR MAX PG: 14 MMHG
BH CV ECHO MEAS - TR MAX VEL: 187.3 CM/SEC
BH CV ECHO MEASUREMENTS AVERAGE E/E' RATIO: 9.06
BH CV XLRA - RV BASE: 2.7 CM
BH CV XLRA - RV LENGTH: 7.8 CM
BH CV XLRA - RV MID: 2.6 CM
BH CV XLRA - TDI S': 14.6 CM/SEC
MAXIMAL PREDICTED HEART RATE: 154 BPM
SINUS: 3.3 CM
STJ: 3.2 CM
STRESS TARGET HR: 131 BPM

## 2023-02-13 PROCEDURE — 93356 MYOCRD STRAIN IMG SPCKL TRCK: CPT

## 2023-02-13 PROCEDURE — 93306 TTE W/DOPPLER COMPLETE: CPT | Performed by: INTERNAL MEDICINE

## 2023-02-13 PROCEDURE — 93356 MYOCRD STRAIN IMG SPCKL TRCK: CPT | Performed by: INTERNAL MEDICINE

## 2023-02-13 PROCEDURE — 93306 TTE W/DOPPLER COMPLETE: CPT

## 2023-02-13 PROCEDURE — 25010000002 PERFLUTREN (DEFINITY) 8.476 MG IN SODIUM CHLORIDE (PF) 0.9 % 10 ML INJECTION: Performed by: INTERNAL MEDICINE

## 2023-02-13 RX ADMIN — PERFLUTREN 1.5 ML: 6.52 INJECTION, SUSPENSION INTRAVENOUS at 14:20

## 2023-03-01 ENCOUNTER — INFUSION (OUTPATIENT)
Dept: ONCOLOGY | Facility: HOSPITAL | Age: 68
End: 2023-03-01
Payer: MEDICARE

## 2023-03-01 ENCOUNTER — OFFICE VISIT (OUTPATIENT)
Dept: ONCOLOGY | Facility: CLINIC | Age: 68
End: 2023-03-01
Payer: MEDICARE

## 2023-03-01 VITALS
HEART RATE: 87 BPM | HEIGHT: 69 IN | SYSTOLIC BLOOD PRESSURE: 138 MMHG | RESPIRATION RATE: 16 BRPM | BODY MASS INDEX: 27.33 KG/M2 | TEMPERATURE: 97.1 F | WEIGHT: 184.5 LBS | OXYGEN SATURATION: 98 % | DIASTOLIC BLOOD PRESSURE: 77 MMHG

## 2023-03-01 DIAGNOSIS — C78.7 LIVER METASTASIS: ICD-10-CM

## 2023-03-01 DIAGNOSIS — D49.0 ESOPHAGUS NEOPLASM: ICD-10-CM

## 2023-03-01 DIAGNOSIS — C15.5 MALIGNANT NEOPLASM OF LOWER THIRD OF ESOPHAGUS: Primary | ICD-10-CM

## 2023-03-01 DIAGNOSIS — N32.1 RECTO-BLADDERNECK FISTULA: ICD-10-CM

## 2023-03-01 DIAGNOSIS — T45.1X5A PERIPHERAL NEUROPATHY DUE TO CHEMOTHERAPY: ICD-10-CM

## 2023-03-01 DIAGNOSIS — C15.5 MALIGNANT NEOPLASM OF LOWER THIRD OF ESOPHAGUS: ICD-10-CM

## 2023-03-01 DIAGNOSIS — Z72.0 TOBACCO ABUSE: ICD-10-CM

## 2023-03-01 DIAGNOSIS — Z45.2 ENCOUNTER FOR ADJUSTMENT OR MANAGEMENT OF VASCULAR ACCESS DEVICE: ICD-10-CM

## 2023-03-01 DIAGNOSIS — R73.01 IMPAIRED FASTING GLUCOSE: ICD-10-CM

## 2023-03-01 DIAGNOSIS — I10 ESSENTIAL HYPERTENSION: ICD-10-CM

## 2023-03-01 DIAGNOSIS — G62.0 PERIPHERAL NEUROPATHY DUE TO CHEMOTHERAPY: ICD-10-CM

## 2023-03-01 LAB
ALBUMIN SERPL-MCNC: 4.1 G/DL (ref 3.5–5.2)
ALBUMIN/GLOB SERPL: 1.6 G/DL (ref 1.1–2.4)
ALP SERPL-CCNC: 180 U/L (ref 38–116)
ALT SERPL W P-5'-P-CCNC: 18 U/L (ref 0–41)
ANION GAP SERPL CALCULATED.3IONS-SCNC: 12.1 MMOL/L (ref 5–15)
AST SERPL-CCNC: 17 U/L (ref 0–40)
BASOPHILS # BLD AUTO: 0.04 10*3/MM3 (ref 0–0.2)
BASOPHILS NFR BLD AUTO: 0.5 % (ref 0–1.5)
BILIRUB SERPL-MCNC: 0.5 MG/DL (ref 0.2–1.2)
BUN SERPL-MCNC: 9 MG/DL (ref 6–20)
BUN/CREAT SERPL: 13.4 (ref 7.3–30)
CALCIUM SPEC-SCNC: 9.2 MG/DL (ref 8.5–10.2)
CEA SERPL-MCNC: 35.2 NG/ML
CHLORIDE SERPL-SCNC: 103 MMOL/L (ref 98–107)
CO2 SERPL-SCNC: 22.9 MMOL/L (ref 22–29)
CREAT SERPL-MCNC: 0.67 MG/DL (ref 0.7–1.3)
DEPRECATED RDW RBC AUTO: 48.7 FL (ref 37–54)
EGFRCR SERPLBLD CKD-EPI 2021: 103 ML/MIN/1.73
EOSINOPHIL # BLD AUTO: 0.18 10*3/MM3 (ref 0–0.4)
EOSINOPHIL NFR BLD AUTO: 2.1 % (ref 0.3–6.2)
ERYTHROCYTE [DISTWIDTH] IN BLOOD BY AUTOMATED COUNT: 14.6 % (ref 12.3–15.4)
GLOBULIN UR ELPH-MCNC: 2.6 GM/DL (ref 1.8–3.5)
GLUCOSE SERPL-MCNC: 141 MG/DL (ref 74–124)
HCT VFR BLD AUTO: 42.6 % (ref 37.5–51)
HGB BLD-MCNC: 13.9 G/DL (ref 13–17.7)
IMM GRANULOCYTES # BLD AUTO: 0.08 10*3/MM3 (ref 0–0.05)
IMM GRANULOCYTES NFR BLD AUTO: 0.9 % (ref 0–0.5)
LYMPHOCYTES # BLD AUTO: 0.93 10*3/MM3 (ref 0.7–3.1)
LYMPHOCYTES NFR BLD AUTO: 10.6 % (ref 19.6–45.3)
MCH RBC QN AUTO: 29.4 PG (ref 26.6–33)
MCHC RBC AUTO-ENTMCNC: 32.6 G/DL (ref 31.5–35.7)
MCV RBC AUTO: 90.3 FL (ref 79–97)
MONOCYTES # BLD AUTO: 0.4 10*3/MM3 (ref 0.1–0.9)
MONOCYTES NFR BLD AUTO: 4.6 % (ref 5–12)
NEUTROPHILS NFR BLD AUTO: 7.13 10*3/MM3 (ref 1.7–7)
NEUTROPHILS NFR BLD AUTO: 81.3 % (ref 42.7–76)
NRBC BLD AUTO-RTO: 0 /100 WBC (ref 0–0.2)
PLATELET # BLD AUTO: 149 10*3/MM3 (ref 140–450)
PMV BLD AUTO: 10.3 FL (ref 6–12)
POTASSIUM SERPL-SCNC: 3.4 MMOL/L (ref 3.5–4.7)
PROT SERPL-MCNC: 6.7 G/DL (ref 6.3–8)
RBC # BLD AUTO: 4.72 10*6/MM3 (ref 4.14–5.8)
SODIUM SERPL-SCNC: 138 MMOL/L (ref 134–145)
WBC NRBC COR # BLD: 8.76 10*3/MM3 (ref 3.4–10.8)

## 2023-03-01 PROCEDURE — 96417 CHEMO IV INFUS EACH ADDL SEQ: CPT

## 2023-03-01 PROCEDURE — 96413 CHEMO IV INFUSION 1 HR: CPT

## 2023-03-01 PROCEDURE — 85025 COMPLETE CBC W/AUTO DIFF WBC: CPT

## 2023-03-01 PROCEDURE — 25010000002 PERTUZUMAB 420 MG/14ML SOLUTION 420 MG VIAL: Performed by: INTERNAL MEDICINE

## 2023-03-01 PROCEDURE — 80053 COMPREHEN METABOLIC PANEL: CPT

## 2023-03-01 PROCEDURE — 96375 TX/PRO/DX INJ NEW DRUG ADDON: CPT

## 2023-03-01 PROCEDURE — 25010000002 HYDROCORTISONE SOD SUC (PF) 100 MG RECONSTITUTED SOLUTION: Performed by: INTERNAL MEDICINE

## 2023-03-01 PROCEDURE — 82378 CARCINOEMBRYONIC ANTIGEN: CPT | Performed by: INTERNAL MEDICINE

## 2023-03-01 PROCEDURE — 25010000002 TRASTUZUMAB-DTTB 420 MG RECONSTITUTED SOLUTION 1 EACH VIAL: Performed by: INTERNAL MEDICINE

## 2023-03-01 PROCEDURE — 63710000001 DIPHENHYDRAMINE PER 50 MG: Performed by: INTERNAL MEDICINE

## 2023-03-01 PROCEDURE — 99214 OFFICE O/P EST MOD 30 MIN: CPT | Performed by: INTERNAL MEDICINE

## 2023-03-01 RX ORDER — FAMOTIDINE 20 MG/1
20 TABLET, FILM COATED ORAL ONCE
Status: COMPLETED | OUTPATIENT
Start: 2023-03-01 | End: 2023-03-01

## 2023-03-01 RX ORDER — DIPHENHYDRAMINE HCL 25 MG
25 CAPSULE ORAL ONCE
Status: CANCELLED | OUTPATIENT
Start: 2023-03-01

## 2023-03-01 RX ORDER — DIPHENHYDRAMINE HCL 25 MG
25 CAPSULE ORAL ONCE
Status: COMPLETED | OUTPATIENT
Start: 2023-03-01 | End: 2023-03-01

## 2023-03-01 RX ORDER — SODIUM CHLORIDE 9 MG/ML
250 INJECTION, SOLUTION INTRAVENOUS ONCE
Status: COMPLETED | OUTPATIENT
Start: 2023-03-01 | End: 2023-03-01

## 2023-03-01 RX ORDER — SODIUM CHLORIDE 9 MG/ML
250 INJECTION, SOLUTION INTRAVENOUS ONCE
Status: CANCELLED | OUTPATIENT
Start: 2023-03-01

## 2023-03-01 RX ORDER — FAMOTIDINE 20 MG/1
20 TABLET, FILM COATED ORAL ONCE
Status: CANCELLED
Start: 2023-03-01 | End: 2023-03-01

## 2023-03-01 RX ADMIN — DIPHENHYDRAMINE HYDROCHLORIDE 25 MG: 25 CAPSULE ORAL at 13:48

## 2023-03-01 RX ADMIN — ONTRUZANT 510 MG: KIT INTRAVENOUS at 15:17

## 2023-03-01 RX ADMIN — FAMOTIDINE 20 MG: 20 TABLET ORAL at 13:48

## 2023-03-01 RX ADMIN — SODIUM CHLORIDE 250 ML: 9 INJECTION, SOLUTION INTRAVENOUS at 13:48

## 2023-03-01 RX ADMIN — HYDROCORTISONE SODIUM SUCCINATE 100 MG: 100 INJECTION, POWDER, FOR SOLUTION INTRAMUSCULAR; INTRAVENOUS at 13:49

## 2023-03-01 RX ADMIN — PERTUZUMAB 420 MG: 30 INJECTION, SOLUTION, CONCENTRATE INTRAVENOUS at 14:14

## 2023-03-01 NOTE — PROGRESS NOTES
"  Subjective     REASON FOR FOLLOW UP:  1.  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  2. Colovesical fistula, chronic antibiotic therapy with Cipro.  3.  DVT of the right and left lower extremity. 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  Acute GI bleed      History of Present Illness   On 03/01/2023 this 67-year-old white male who has metastatic adenocarcinoma of the esophagus to the liver returns to the office. He has been undergoing therapy with Perjeta/Herceptin every 3 weeks and he is due for the 2nd infusion of this. Overall the patient is doing extremely well. He has not had any abdominal pain, he has not had any major difficulty swallowing unless he eats very quickly and very fast. He has not had any further palliative radiation therapy to the esophagus waiting for this combination of medicines to take effect. He has not developed any diarrhea, cardiac toxicity. His sensory neuropathy is no different than before and his colovesical fistula is no different than before. He has not had any fever or systemic infection. He has been able to drop his cigarettes to 1/2 a pack of cigarettes a day, he is coughing less. He feels well in general terms.     ·     Past Medical History:   Diagnosis Date   • Arthritis    • Cancer (HCC)     prostate cancer 2008   • Cancer (HCC)     esophageal/LIVER   • Diabetes mellitus (HCC)    • Difficulty swallowing solids    • Elevated PSA    • Esophageal mass    • Esophageal varices (HCC) July 2019   • GERD (gastroesophageal reflux disease)    • H/O Lung nodule    • Hepatitis     CHILD--PT STATED \"I THINK IT WAS A.\"   • History of esophageal varices with bleeding     SUMM34 2021   • History of high blood pressure    • History of pneumonia    • Hx of blood clots 08/10/2019    LOWER LEGS BILAT   • Maintenance chemotherapy     IV EVERY 4 WEEKS:  ESOPHAGEAL CANCER   • Nail fungus     LEFT FINGERNAILS X 4   • Neuropathy    • PVD " (peripheral vascular disease) (HCC)    • Rash     ITCHY RED RASH ON RIGHT THIGH AREA-STATES IT COMES AND GOES.  USING PRESCRIPTION CREAM   • Recto-bladderneck fistula     on poab for recurrent uti   • Risk factors for obstructive sleep apnea         Past Surgical History:   Procedure Laterality Date   • CATARACT EXTRACTION WITH INTRAOCULAR LENS IMPLANT Bilateral    • COLONOSCOPY N/A 02/04/2020    Procedure: COLONOSCOPY;  Surgeon: Guy Sears MD;  Location: [facility] ENDOSCOPY;  Service: General;  Laterality: N/A;  PRE-COLOVESICAL FISTULA  POST-- DIVERTICULOSIS   • COLONOSCOPY  2/4/2020   • CYSTOSCOPY  02/06/2020   • CYSTOSCOPY Bilateral 02/26/2020    Procedure: CYSTOSCOPY RETROGRADE;  Surgeon: Cm Witt MD;  Location: [facility] OR;  Service: Urology;  Laterality: Bilateral;   • ENDOSCOPY     • ENDOSCOPY N/A 06/19/2021    Procedure: ESOPHAGOGASTRODUODENOSCOPY WITH BIOPSY;  Surgeon: Mary Brito MD;  Location: [facility] OR;  Service: Gastroenterology;  Laterality: N/A;   • ENDOSCOPY N/A 08/05/2021    Procedure: ESOPHAGOGASTRODUODENOSCOPY HEMOSPRAY;  Surgeon: Naga Shelby MD;  Location: [facility] ENDOSCOPY;  Service: Gastroenterology;  Laterality: N/A;  HX OF ESOPHAGEAL  CANCER;MELENA  POST: ESOPHAGEAL BLEEDING; ESOPHAGEAL MASS   • ENDOSCOPY N/A 11/4/2022    Procedure: ESOPHAGOGASTRODUODENOSCOPY WITH DILATATION;  Surgeon: Mary Brito MD;  Location: [facility] OR;  Service: Gastroenterology;  Laterality: N/A;   • HERNIA REPAIR Right 1999    inguinal hernia   • PROSTATE SURGERY  03/2008    prostatectectomy   • UPPER GASTROINTESTINAL ENDOSCOPY  August 2021   • VENOUS ACCESS DEVICE (PORT) INSERTION N/A 07/16/2019    Procedure: INSERTION VENOUS ACCESS DEVICE WITH FLUORO AND EGD WITH BIOPSY;  Surgeon: Mary Brito MD;  Location: [facility] OR;  Service: Thoracic        Current Outpatient Medications on File Prior to Visit   Medication Sig Dispense Refill   • acetaminophen (TYLENOL) 500  MG tablet Take 1 tablet by mouth Every 6 (Six) Hours As Needed for Mild Pain.     • cevimeline (EVOXAC) 30 MG capsule Take 1 capsule by mouth 3 (Three) Times a Day. DRY MOUTH     • ciprofloxacin (CIPRO) 250 MG tablet Take 1 tablet by mouth Daily. Take one every other day (Patient taking differently: Take 1 tablet by mouth Every Other Day. Take one every other day  FISTULA) 90 tablet 1   • econazole nitrate (SPECTAZOLE) 1 % cream Apply  topically to the appropriate area as directed 2 (Two) Times a Day. (Patient taking differently: Apply 1 application topically to the appropriate area as directed 2 (Two) Times a Day. NAIL FUNGUS-4 FINGERS ON LEFT) 85 g 1   • furosemide (LASIX) 20 MG tablet Take 1 tablet by mouth Every Other Day. (Patient taking differently: Take 1 tablet by mouth As Needed. LOWER LEG EDEMA) 28 tablet 2   • gabapentin (NEURONTIN) 300 MG capsule Take 1 capsule by mouth 2 (Two) Times a Day. 180 capsule 0   • glipizide (GLUCOTROL XL) 5 MG ER tablet TAKE 1 TABLET BY MOUTH EVERY DAY (Patient taking differently: Take 1 tablet by mouth Daily.) 90 tablet 1   • lisinopril-hydrochlorothiazide (PRINZIDE,ZESTORETIC) 20-12.5 MG per tablet TAKE 1 TABLET BY MOUTH EVERY DAY (Patient taking differently: Take 0.5 tablets by mouth As Needed. SYS GREATER THAN 130, TAKE 1/2 TAB) 90 tablet 0   • pantoprazole (Protonix) 40 MG EC tablet Take 1 tablet by mouth 2 (Two) Times a Day. 180 tablet 3   • pentoxifylline (TRENtal) 400 MG CR tablet TAKE 1 TABLET BY MOUTH TWICE A DAY WITH MEALS 90 tablet 0   • potassium chloride ER (K-TAB) 20 MEQ tablet controlled-release ER tablet Take 1 tablet by mouth Daily. 90 tablet 3   • Probiotic Product (Probiotic Blend) capsule Take 1 capsule by mouth Daily.       No current facility-administered medications on file prior to visit.        ALLERGIES:    Allergies   Allergen Reactions   • Oxaliplatin Anaphylaxis        Social History     Socioeconomic History   • Marital status: Single   • Years  of education: High school   Tobacco Use   • Smoking status: Every Day     Packs/day: 1.00     Years: 38.00     Pack years: 38.00     Types: Cigarettes     Start date: 1/1/1974   • Smokeless tobacco: Never   • Tobacco comments:     Quit for a period of 8 years.    Vaping Use   • Vaping Use: Never used   Substance and Sexual Activity   • Alcohol use: Not Currently   • Drug use: Never   • Sexual activity: Not Currently     Partners: Female     Birth control/protection: None        Family History   Problem Relation Age of Onset   • Hypertension Mother    • Stroke Mother    • Lung cancer Father    • Hypertension Other    • Lung disease Other    • Prostate cancer Other    • Malig Hyperthermia Neg Hx       Current Outpatient Medications on File Prior to Visit   Medication Sig Dispense Refill   • acetaminophen (TYLENOL) 500 MG tablet Take 1 tablet by mouth Every 6 (Six) Hours As Needed for Mild Pain.     • cevimeline (EVOXAC) 30 MG capsule Take 1 capsule by mouth 3 (Three) Times a Day. DRY MOUTH     • ciprofloxacin (CIPRO) 250 MG tablet Take 1 tablet by mouth Daily. Take one every other day (Patient taking differently: Take 1 tablet by mouth Every Other Day. Take one every other day  FISTULA) 90 tablet 1   • econazole nitrate (SPECTAZOLE) 1 % cream Apply  topically to the appropriate area as directed 2 (Two) Times a Day. (Patient taking differently: Apply 1 application topically to the appropriate area as directed 2 (Two) Times a Day. NAIL FUNGUS-4 FINGERS ON LEFT) 85 g 1   • furosemide (LASIX) 20 MG tablet Take 1 tablet by mouth Every Other Day. (Patient taking differently: Take 1 tablet by mouth As Needed. LOWER LEG EDEMA) 28 tablet 2   • gabapentin (NEURONTIN) 300 MG capsule Take 1 capsule by mouth 2 (Two) Times a Day. 180 capsule 0   • glipizide (GLUCOTROL XL) 5 MG ER tablet TAKE 1 TABLET BY MOUTH EVERY DAY (Patient taking differently: Take 1 tablet by mouth Daily.) 90 tablet 1   • lisinopril-hydrochlorothiazide  "(PRINZIDE,ZESTORETIC) 20-12.5 MG per tablet TAKE 1 TABLET BY MOUTH EVERY DAY (Patient taking differently: Take 0.5 tablets by mouth As Needed. SYS GREATER THAN 130, TAKE 1/2 TAB) 90 tablet 0   • pantoprazole (Protonix) 40 MG EC tablet Take 1 tablet by mouth 2 (Two) Times a Day. 180 tablet 3   • pentoxifylline (TRENtal) 400 MG CR tablet TAKE 1 TABLET BY MOUTH TWICE A DAY WITH MEALS 90 tablet 0   • potassium chloride ER (K-TAB) 20 MEQ tablet controlled-release ER tablet Take 1 tablet by mouth Daily. 90 tablet 3   • Probiotic Product (Probiotic Blend) capsule Take 1 capsule by mouth Daily.       No current facility-administered medications on file prior to visit.     Allergies   Allergen Reactions   • Oxaliplatin Anaphylaxis          Objective     Vitals:    03/01/23 1300   BP: 138/77   Pulse: 87   Resp: 16   Temp: 97.1 °F (36.2 °C)   TempSrc: Temporal   SpO2: 98%   Weight: 83.7 kg (184 lb 8 oz)   Height: 175.3 cm (69.02\")   PainSc: 0-No pain     Body mass index is 27.23 kg/m².     Current Status 3/1/2023   ECOG score 0         EXAM:                      GENERAL:  Well-developed, Patient  in no acute distress.   SKIN:  Warm, dry ,NO purpura ,no rash.  HEENT:  Pupils were equal and reactive to light and accomodation, conjunctivae noninjected,  normal visual acuity.   NECK:  Supple with good range of motion; no thyromegaly , no JVD or bruits,.No carotid artery pain, no carotid abnormal pulsation   LYMPHATICS:  No cervical, NO supraclavicular, NO axillary, NO inguinal adenopathies.  CARDIAC   normal rate , regular rhythm, without murmur,NO rubs NO S3 NO S4   LUNGS: normal breath sounds bilateral, no wheezing, NO rhonchi, NO crackles ,NO rubs.  VASCULAR VENOUS: no cyanosis, NO collateral circulation, NO varicosities, NO edema, NO palpable cords, NO pain,NO erythema, NO pigmentation of the skin.  ABDOMEN:  Soft, NO pain,no hepatomegaly, no splenomegaly,no masses, no ascites, no collateral circulation,no " distention.  EXTREMITIES  AND SPINE:  No clubbing, no cyanosis ,no deformities , no pain .No kyphosis,  no pain in spine, no pain in ribs , no pain in pelvic bone.  NEUROLOGICAL:  Patient was awake, alert, oriented to time, person and place.sensory neuropathy in feet                  RECENT LABS:  Lab Results   Component Value Date    WBC 8.76 03/01/2023    HGB 13.9 03/01/2023    HCT 42.6 03/01/2023    MCV 90.3 03/01/2023     03/01/2023     Results from last 7 days   Lab Units 03/01/23  1216   WBC 10*3/mm3 8.76   NEUTROS ABS 10*3/mm3 7.13*   HEMOGLOBIN g/dL 13.9   HEMATOCRIT % 42.6   PLATELETS 10*3/mm3 149     Results from last 7 days   Lab Units 03/01/23  1216   SODIUM mmol/L 138   POTASSIUM mmol/L 3.4*   CHLORIDE mmol/L 103   CO2 mmol/L 22.9   BUN mg/dL 9   CREATININE mg/dL 0.67*   CALCIUM mg/dL 9.2   ALBUMIN g/dL 4.1   BILIRUBIN mg/dL 0.5   ALK PHOS U/L 180*   ALT (SGPT) U/L 18   AST (SGOT) U/L 17   GLUCOSE mg/dL 141*      Lab Results   Component Value Date    CEA 77.30 01/11/2023            Assessment & Plan    1.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 Almost 90% of the liver was replaced by tumor.  · HER-2 positive  · Initially treated with FOLFOX initiated July 2019  · Herceptin added with cycle 2  · Following 8 cycles of FOLFOX, oxaliplatin discontinued with continuation of 5-FU, leucovorin, Herceptin. This was continued through 7/7/2020  · Increasing CEA led to PET scan 6/9/2021.  Disease progression noted with growth of the tumor in the lower esophagus.  Continued stability of hepatic metastasis  · Repeat EGD 6/19/2021 for tissue specimen and Next Generation Sequencing.  · Endoscopy that shows a noncircumferential abnormality in the lower esophagus that encompasses almost 6 cm in length. It is not blocking off the esophagus and that is why the patient has no symptoms. The pathology shows at least atypical cells consistent with cancer.  · NGS showing PD-L1 positive  and high mutation burden.  The tumor remains HER-2 positive.  Treatment plan for Keytruda every 3 weeks x 4 cycles followed by repeat imaging  · Keytruda initiated 7/29/2021  · Hospitalization 8/2/2021 through 8/7/2021 secondary to acute GI bleeding from known malignancy.  · Completed 10 fractions of radiation to the lower esophagus 8/18/2021 after GI bleed  · PET scan following four cycles of Keytruda 10/14/2021 with improvement in the patient's known neoplasm involving the distal esophagus. However, known metastatic disease involving the liver is significantly worse. Keytruda discontinued  · Enhertu initiated 10/29/2021  · 12/23/2021 PET scan following three cycles of Enhertu discloses resolution of the tumor in the esophagus. He continues to have two active lesions in the liver with significant SUV activity pertinent to his metastatic disease. Minimal pulmonary infiltrates noted bilaterally. With concerns for Enhertu interstitial lung disease, Enhertu discontinued  · Treatment transitioned to FOLFOX Herceptin  · Borderline neutropenia 2/9/2022 with plans for 15% dose reduction to FOLFOX  · 2/16/2022, significant reaction to oxaliplatin which is discontinued  · Reevaluation 3/2/2022, with discontinue of oxaliplatin, the patient will continue on 5-FU, leucovorin, Kanjinti. Of note, the patient received 6 mg/kg of Kanjinti 2 weeks ago, therefore he cannot receive this medication today. He will receive only 5-FU leucovorin and return in 1 week for Kanjinti 2 mg/kg. In 2 weeks, he will resume his normal schedule of 5-FU, leucovorin, Kanjinti 4 mg/kg every 2 weeks.  · 3/16/2022 will continue on with 5-FU, leucovorin, Herceptin.  CEA dramatically improving.  · 3/25/2022 CT C/A/P When compared to recent PET/CT there is interval decrease in size of the previously demonstrated hypermetabolic hepatic metastases as well as decrease in size of the periportal lymph node. The wall thickening involving the distal esophagus is  largely unchanged. 2. Persistent findings of colovesicular fistula. 3. Ill-defined interstitial and ground glass opacities within the bilateral lower lobes favored to represent postradiation pneumonitis/change  · 6/8/2022 CEA 8.0.  · 6/29/2022 here for continued 5-FU, leucovorin, Kanjanti, overall tolerating well.  · 8/17/2022 continuing on 5-FU, leucovorin, Herceptin tolerating well.  CEA 8.  Patient was advised to continue with his current treatment.  Not planning a another radiological assessment unless patient has further rise in CEA level.  · 9/14/2022 here for continued 5-FU, leucovorin, Herceptin, continuing to tolerate quite well.   · 10/12/2022, progressive dysphagia with minimal rise of his CEA with plans made for PET scan  · Endoscopy 11/4/2022 with findings very tight esophagus with strictures.  Biopsies with pathology consistent with adenocarcinoma.  · PET scan 11/2/2022 with a long segment of activity of the esophagus and additional 2 tumoral lesions of the liver or 1 in the left lobe and the other in the right lobe.  There is also a CV activity close to the head of the pancreas.  · 5-FU, leucovorin, Herceptin discontinued with plans to move to Taxol weekly 3 to 4 weeks coordinated with radiation therapy.  · Consult with radiation oncology 11/15/2022 with plans for 10 fractions to the distal esophagus  · Initiating single agent Taxol today, 11/16/2022  · 12/28/2022 due for cycle 2-day 15.  Neuropathy worsening specifically in the feet with more trouble feeling the ground.  No falls.  Per discussion with Dr. Haynes will adjust Taxol down by 15%.  · Reevaluation today, 1/11/2023 due for cycle 3-day 1 Taxol.  He continues to have persistent neuropathy with constant tingling of his fingers and toes.  Further adjustment to Taxol which will now be given day 1 day 15 of a 28-day cycle.  Day 8 discontinued from the treatment plan.  On 01/25/2023 the patient is having more fatigue associated with Taxol  utilization but most importantly more sensory neuropathy especially in his hands to the point that he is having difficulty tying his shoes and buttoning his shirts. He has no motor deficit. Furthermore the patient's CEA level has gone from 35 to 77 and the alkaline phosphatase has risen. The liver has become now nodular, palpable again with minimal fullness. I do believe that this announces that Taxol is not effective in one hand and besides is giving him more neuropathy on the other hand. Given this fact I will discontinue the Taxol as per today, he will not receive any dose whatsoever and he already has an appointment for a PET scan to be done next Tuesday. I will review him back next Thursday in order to decide how to proceed. He already has had Enhertu administration in the past, he developed some pneumonitis associated with that. Raises the question if we need to go back to Herceptin/Perjeta. I think this will be the other possibility.     Given the above circumstances the chemotherapy treatment will be put on hold. Today his white count, hemoglobin and platelets are good. His chemistry profile is pending.  On 02/02/2023 I reviewed with the patient and his other sister the PET scan that documents very stable esophagus with minimal if any regrowth of tumor, normal pulmonary anatomy with no obvious tumor extension into this in the background of emphysema and obvious progressive liver metastasis. Also he has periportal adenopathy that is progressing. Pancreas, kidneys, spleen and bowel remain unremarkable. No obvious bone metastasis. Given all these changes I do believe that the patient has obvious progressive disease. His CEA level has risen to 77 during the previous visit. Given the fact that we have 2 fires now, liver metastasis growing, especially on the left lobe that eventually is going to become symptomatic given the large size and the SUV activity and given the fact that I cannot have access to  Herceptin/Perjeta yet, I do believe that the patient could benefit from palliative radiation treatment to the liver, larger lesion, and I had a discussion with Dr. Colvin at this moment. He will be glad to see the patient in a few days and discuss with the patient the risks and benefits and see if that is a possibility. That probably can buy me some time to be able to consecute Perjeta.     The plan B will be if the patient ever decides to stop smoking to go back into [facility] in the future and hopefully with less cigarettes in the system he will have lesser chances to develop pneumonitis again. Therefore, it is up to the patient what he wants to do and I discussed these facts very clearly. If by any chance we cannot get Perjeta and Herceptin together in this patient and running out of medicines to provide to him because he has gone through most of the medicines utilized in this kind of setting, therefore the next option in case that Perjeta/Herceptin are not available would be to discuss options of Hospice care.     I kept the patient's appointment open for next week in case that we are able to get the medications provided and delivered to him next week on Wednesday. In preparation for that the patient will have an echocardiogram. This will be happening as early as tomorrow.  On 02/08/2023 the patient was reviewed after we finally got his Herceptin/Perjeta medications approved to be started as early as today. I discussed with him side effects of this combination of medicines including heart damage, diarrhea especially with the Perjeta that will require aggressive plan of care with Imodium that he has ready at home and also with Pedialyte or Gatorade. If he has more than 5 stools a day he will need to notify us immediately. Also he realizes that he needs to be aware of nausea, vomiting, allergic reactions and maybe even worsening of sensory neuropathy. The patient  is aware of all of these circumstances and he is ready  to proceed. In preparation for that he will proceed with his echocardiogram this coming Monday. The previous echocardiogram in 2022 was very appropriate in regard to ejection fraction and I doubt that this will change overall because he has not received any other medication that will be effecting this area at this point.     I went ahead and asked him to sign the consent for the medication after proper education and I provided him written information about Perjeta. He already has signed consent for Herceptin administration at the time of his original diagnosis at least 3 years ago. He will require to be seen every 3 weeks. He will require CBC, CMP every 3 weeks and he will require CEA level every 3 weeks. We will obtain a baseline CEA level today.  On 03/01/2023 the patient has not encountered any side effects of Herceptin/Perjeta including no diarrhea and he will continue with the regimen of medicines every 3 weeks. Plan to see him back in 3, 6 and 9 weeks with continued laboratory testing including CBC, CMP and CEA level. It calls my attention his physical examination today. The patient during the previous visit before the initiation of this combination of medicines are nodular palpable liver today. He has no nodular or palpable liver at all. That is good news. He is swallowing better. Maybe these 2 issues 1 symptom and 1 sign are encouraging in regard to his overall benefit of the medicine.          ·   2. Colovesical fistula.   · He requires intermittent Cipro when he notes stool in his urine  · He currently reports his symptoms are more progressive with more urine in his rectum. He continues on Cipro and is scheduled for follow-up with urology, Dr. Witt later this month  · He has been seen by Cm Witt MD. He has advised the patient that he could not do too much for the fistula given the fact that it is just inconveniences of having urination through the anal canal from time to time but no urinary  tract infection. He asked the patient to postpone any intention for surgery unless that it is absolutely necessary. Postponement of chemotherapy for 3 months if any surgery is necessary will trigger dramatic progression of his tumor and obviously consequences.   · On 06/08/2022, his colovesical fistula continues. Most of the output is urine through the rectum, no stool through the bladder. He has not had any urinary tract infection but he continues using prophylactically his ciprofloxacin 250 mg orally every other day. I asked him to remain on this medicine for the time being  · 08/17/2022 the patient continues having colovesical fistula symptomatology intermittently, passing urine through the rectum and not passing feces through the bladder. He has not had any other urinary tract infections. He continues using his ciprofloxacin as per my instructions  · He continues on prophylactic ciprofloxacin every other day with no progressive symptoms today  On 01/25/2023 his fistula is still an ongoing issue but it has not become infected again. He remains on prophylactic ciprofloxacin.  On 02/02/2023 his colovesical fistula has not become symptomatic in regard to infection. He continues taking his ciprofloxacin every other day and I advised him to continue this. He still has urinary output through his rectum time to time.  On 02/08/2023 his colovesical fistula is not giving him any new difficulties and he continues taking ciprofloxacin every other day.  On 03/01/2023 no issues pertinent to this. He remains on his ciprofloxacin every other day.          ·   3.  Thrombophilia secondary malignancy, DVT  · Currently anticoagulated with Xarelto 20 mg daily  · Following GI bleed 8/2/2021, Xarelto has been discontinued  · Plan no evidence of recurrent thrombosis  On 01/25/2023 no new thrombosis.  On 02/02/2023 surprising enough no new episodes of thrombophilia. He is not receiving any anticoagulants anymore.  On 02/08/2023 no  recent blood clots in his lower extremities. He is not anticoagulated.  On 03/01/2023 no thrombophilia.           ·   4. Acute GI bleeding  · Dark tarry stool initially began 7/29/2021  · 7/23/2021, hemoglobin of 11.0.  8/2/2021, hemoglobin of 5.7  · Patient admitted, received transfusion of 4 units packed red blood cells.  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   · Without signs or symptoms of bleeding, hemoglobin today 12.4.  On 01/25/2023 no clinical evidence of GI bleeding, stable hemoglobin.   On 02/02/2023 no episodes of GI bleeding.  On 02/08/2023 no notion of GI bleeding recently with no nausea, vomiting, hematemesis or melena.  On 03/01/2023 no GI bleeding.          ·   5. Enhertu initiated interstitial lung disease  · The patient completed a prednisone taper  · He is without hypoxia or tachycardia currently. He has only minimal dyspnea on exertion  On 02/02/2023 discussed with the patient consideration to go back to Enhertu as long as he stops smoking completely and we monitor his situation very close in case that Perjeta /Herceptin is not available for him.  On 02/08/2023, the inflammatory position in his lung resolved altogether after prednisone.                    ·   6.  Hypertension: He previously been on lisinopril/hydrochlorothiazide however this medication is currently on hold due to some issues with hypotension.  He now only takes the medication if his blood pressure is above 130/90.  · Blood pressure today normal at 121/75.  On 01/25/2023 the patient is not taking his blood pressure medication, his blood pressure has been spontaneously low. This is not surprising with liver metastasis worsening. This is one of the typical features clinically.  On 02/02/2023 he has no need to take blood pressure medicine at this time.  He will continue monitoring his blood pressure as  per 02/08/2023 and use medication accordingly.  On 03/01/2023 continue monitoring blood pressure and using Lasix for swelling on prn basis.          ·   7.  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  · 12/28/2022, persistent neuropathy, Taxol reduced by 15%  · The patient reports persistent symptoms today with tingling of his fingers constantly and numbness of his feet.  He does not have painful neuropathy though this is constant interfering with activities of daily living.  Taxol further reduced with adjustment in schedule to day 1 day 15.  On 01/25/2023 worsening neuropathy along with Taxol grade 2 in fingers, grade 1 in feet. He remains on gabapentin, Taxol discontinued.  On 02/02/2023 neuropathy is still ongoing in his feet. Neurontin remains ongoing.  On 02/08/2023 gabapentin will be renewed today, prescription sent to his pharmacy for 3 month supply.  On 03/01/2023 no issues pertinent to his neuropathy with no changes.          ·   8.  Lower extremity edema:  · Bilateral.  Patient has been on his feet much more recently and has been more active.  It seemed to have worsened once the weather got warmer.  He states he was told previously by a cardiothoracic surgeon not to wear compression socks.  I have advised him to watch his salt intake, elevate his legs when he possible, and make sure he is drinking plenty of water.  Continue to monitor, and call if worsens.  · 6/20/2022 patient was prescribed lasix 20 mg every other day, which is helping some.  He is also trying to elevate his legs above his heart twice a day, which he also feels is helping.   · He is currently without swelling today, 1/11/2023    On 01/25/2023 using Lasix on prn basis for swelling in his lower extremities.   On 02/02/2023 no swelling in his lower extremities. No need for use of Lasix at this time.  On 02/08/2023 no edema in lower extremities. Use Lasix on prn basis.  On 03/01/2023 swelling  remains on prn basis, treatment with Lasix.          ·     PLAN:    On 03/01/2023 it is encouraging to feel no liver palpable today, no palpable hepatomegaly and no significant dysphagia. This symptom and sign respectively are suggestive of response to this combination of medicines and this will remain an ongoing treatment. Appointment to be seen in 3, 6 and 9 weeks, continuation of the therapy in the same way. Laboratory workup, CBC, CMP and CEA level every 3 weeks. After 4 rounds of this medication combination he will require radiological assessment.            Alexey Haynes MD  03/01/2023

## 2023-03-22 ENCOUNTER — INFUSION (OUTPATIENT)
Dept: ONCOLOGY | Facility: HOSPITAL | Age: 68
End: 2023-03-22
Payer: MEDICARE

## 2023-03-22 ENCOUNTER — OFFICE VISIT (OUTPATIENT)
Dept: ONCOLOGY | Facility: CLINIC | Age: 68
End: 2023-03-22
Payer: MEDICARE

## 2023-03-22 VITALS
OXYGEN SATURATION: 97 % | HEIGHT: 69 IN | DIASTOLIC BLOOD PRESSURE: 79 MMHG | RESPIRATION RATE: 16 BRPM | SYSTOLIC BLOOD PRESSURE: 134 MMHG | BODY MASS INDEX: 27.18 KG/M2 | HEART RATE: 81 BPM | TEMPERATURE: 97.8 F | WEIGHT: 183.5 LBS

## 2023-03-22 DIAGNOSIS — C15.5 MALIGNANT NEOPLASM OF LOWER THIRD OF ESOPHAGUS: ICD-10-CM

## 2023-03-22 DIAGNOSIS — G62.0 PERIPHERAL NEUROPATHY DUE TO CHEMOTHERAPY: ICD-10-CM

## 2023-03-22 DIAGNOSIS — R73.01 IMPAIRED FASTING GLUCOSE: ICD-10-CM

## 2023-03-22 DIAGNOSIS — Z79.899 HIGH RISK MEDICATION USE: ICD-10-CM

## 2023-03-22 DIAGNOSIS — D49.0 ESOPHAGUS NEOPLASM: ICD-10-CM

## 2023-03-22 DIAGNOSIS — N32.1 RECTO-BLADDERNECK FISTULA: ICD-10-CM

## 2023-03-22 DIAGNOSIS — C78.7 LIVER METASTASIS: ICD-10-CM

## 2023-03-22 DIAGNOSIS — Z45.2 ENCOUNTER FOR ADJUSTMENT OR MANAGEMENT OF VASCULAR ACCESS DEVICE: ICD-10-CM

## 2023-03-22 DIAGNOSIS — I10 ESSENTIAL HYPERTENSION: ICD-10-CM

## 2023-03-22 DIAGNOSIS — C15.5 MALIGNANT NEOPLASM OF LOWER THIRD OF ESOPHAGUS: Primary | ICD-10-CM

## 2023-03-22 DIAGNOSIS — T45.1X5A PERIPHERAL NEUROPATHY DUE TO CHEMOTHERAPY: ICD-10-CM

## 2023-03-22 DIAGNOSIS — I82.412 ACUTE DEEP VEIN THROMBOSIS (DVT) OF FEMORAL VEIN OF LEFT LOWER EXTREMITY: ICD-10-CM

## 2023-03-22 DIAGNOSIS — N32.1 COLOVESICAL FISTULA: ICD-10-CM

## 2023-03-22 DIAGNOSIS — Z72.0 TOBACCO ABUSE: ICD-10-CM

## 2023-03-22 LAB
ALBUMIN SERPL-MCNC: 4.1 G/DL (ref 3.5–5.2)
ALBUMIN/GLOB SERPL: 1.5 G/DL (ref 1.1–2.4)
ALP SERPL-CCNC: 187 U/L (ref 38–116)
ALT SERPL W P-5'-P-CCNC: 17 U/L (ref 0–41)
ANION GAP SERPL CALCULATED.3IONS-SCNC: 12 MMOL/L (ref 5–15)
AST SERPL-CCNC: 20 U/L (ref 0–40)
BASOPHILS # BLD AUTO: 0.05 10*3/MM3 (ref 0–0.2)
BASOPHILS NFR BLD AUTO: 0.5 % (ref 0–1.5)
BILIRUB SERPL-MCNC: 0.4 MG/DL (ref 0.2–1.2)
BUN SERPL-MCNC: 9 MG/DL (ref 6–20)
BUN/CREAT SERPL: 12.5 (ref 7.3–30)
CALCIUM SPEC-SCNC: 9.5 MG/DL (ref 8.5–10.2)
CEA SERPL-MCNC: 31.8 NG/ML
CHLORIDE SERPL-SCNC: 103 MMOL/L (ref 98–107)
CO2 SERPL-SCNC: 24 MMOL/L (ref 22–29)
CREAT SERPL-MCNC: 0.72 MG/DL (ref 0.7–1.3)
DEPRECATED RDW RBC AUTO: 46.1 FL (ref 37–54)
EGFRCR SERPLBLD CKD-EPI 2021: 100.8 ML/MIN/1.73
EOSINOPHIL # BLD AUTO: 0.28 10*3/MM3 (ref 0–0.4)
EOSINOPHIL NFR BLD AUTO: 2.6 % (ref 0.3–6.2)
ERYTHROCYTE [DISTWIDTH] IN BLOOD BY AUTOMATED COUNT: 14.4 % (ref 12.3–15.4)
GLOBULIN UR ELPH-MCNC: 2.7 GM/DL (ref 1.8–3.5)
GLUCOSE SERPL-MCNC: 92 MG/DL (ref 74–124)
HCT VFR BLD AUTO: 43.7 % (ref 37.5–51)
HGB BLD-MCNC: 14.3 G/DL (ref 13–17.7)
IMM GRANULOCYTES # BLD AUTO: 0.08 10*3/MM3 (ref 0–0.05)
IMM GRANULOCYTES NFR BLD AUTO: 0.7 % (ref 0–0.5)
LYMPHOCYTES # BLD AUTO: 1.02 10*3/MM3 (ref 0.7–3.1)
LYMPHOCYTES NFR BLD AUTO: 9.4 % (ref 19.6–45.3)
MCH RBC QN AUTO: 29.2 PG (ref 26.6–33)
MCHC RBC AUTO-ENTMCNC: 32.7 G/DL (ref 31.5–35.7)
MCV RBC AUTO: 89.2 FL (ref 79–97)
MONOCYTES # BLD AUTO: 0.61 10*3/MM3 (ref 0.1–0.9)
MONOCYTES NFR BLD AUTO: 5.6 % (ref 5–12)
NEUTROPHILS NFR BLD AUTO: 8.82 10*3/MM3 (ref 1.7–7)
NEUTROPHILS NFR BLD AUTO: 81.2 % (ref 42.7–76)
NRBC BLD AUTO-RTO: 0 /100 WBC (ref 0–0.2)
PLATELET # BLD AUTO: 153 10*3/MM3 (ref 140–450)
PMV BLD AUTO: 9.7 FL (ref 6–12)
POTASSIUM SERPL-SCNC: 3.8 MMOL/L (ref 3.5–4.7)
PROT SERPL-MCNC: 6.8 G/DL (ref 6.3–8)
RBC # BLD AUTO: 4.9 10*6/MM3 (ref 4.14–5.8)
SODIUM SERPL-SCNC: 139 MMOL/L (ref 134–145)
WBC NRBC COR # BLD: 10.86 10*3/MM3 (ref 3.4–10.8)

## 2023-03-22 PROCEDURE — 85025 COMPLETE CBC W/AUTO DIFF WBC: CPT

## 2023-03-22 PROCEDURE — 1159F MED LIST DOCD IN RCRD: CPT | Performed by: NURSE PRACTITIONER

## 2023-03-22 PROCEDURE — 1160F RVW MEDS BY RX/DR IN RCRD: CPT | Performed by: NURSE PRACTITIONER

## 2023-03-22 PROCEDURE — 80053 COMPREHEN METABOLIC PANEL: CPT

## 2023-03-22 PROCEDURE — 25010000002 PERTUZUMAB 420 MG/14ML SOLUTION 420 MG VIAL: Performed by: NURSE PRACTITIONER

## 2023-03-22 PROCEDURE — 96375 TX/PRO/DX INJ NEW DRUG ADDON: CPT

## 2023-03-22 PROCEDURE — 96417 CHEMO IV INFUS EACH ADDL SEQ: CPT

## 2023-03-22 PROCEDURE — 25010000002 HYDROCORTISONE SOD SUC (PF) 100 MG RECONSTITUTED SOLUTION: Performed by: NURSE PRACTITIONER

## 2023-03-22 PROCEDURE — 3075F SYST BP GE 130 - 139MM HG: CPT | Performed by: NURSE PRACTITIONER

## 2023-03-22 PROCEDURE — 3078F DIAST BP <80 MM HG: CPT | Performed by: NURSE PRACTITIONER

## 2023-03-22 PROCEDURE — 1126F AMNT PAIN NOTED NONE PRSNT: CPT | Performed by: NURSE PRACTITIONER

## 2023-03-22 PROCEDURE — 96413 CHEMO IV INFUSION 1 HR: CPT

## 2023-03-22 PROCEDURE — 25010000002 TRASTUZUMAB PER 10 MG: Performed by: NURSE PRACTITIONER

## 2023-03-22 PROCEDURE — 82378 CARCINOEMBRYONIC ANTIGEN: CPT | Performed by: INTERNAL MEDICINE

## 2023-03-22 PROCEDURE — 63710000001 DIPHENHYDRAMINE PER 50 MG: Performed by: NURSE PRACTITIONER

## 2023-03-22 PROCEDURE — 99214 OFFICE O/P EST MOD 30 MIN: CPT | Performed by: NURSE PRACTITIONER

## 2023-03-22 RX ORDER — SODIUM CHLORIDE 9 MG/ML
250 INJECTION, SOLUTION INTRAVENOUS ONCE
Status: COMPLETED | OUTPATIENT
Start: 2023-03-22 | End: 2023-03-22

## 2023-03-22 RX ORDER — GLIPIZIDE 5 MG/1
5 TABLET, FILM COATED, EXTENDED RELEASE ORAL DAILY
Qty: 90 TABLET | Refills: 1 | Status: SHIPPED | OUTPATIENT
Start: 2023-03-22

## 2023-03-22 RX ORDER — DIPHENHYDRAMINE HCL 25 MG
25 CAPSULE ORAL ONCE
Status: CANCELLED | OUTPATIENT
Start: 2023-03-22

## 2023-03-22 RX ORDER — FAMOTIDINE 20 MG/1
20 TABLET, FILM COATED ORAL ONCE
Status: COMPLETED | OUTPATIENT
Start: 2023-03-22 | End: 2023-03-22

## 2023-03-22 RX ORDER — DIPHENHYDRAMINE HCL 25 MG
25 CAPSULE ORAL ONCE
Status: COMPLETED | OUTPATIENT
Start: 2023-03-22 | End: 2023-03-22

## 2023-03-22 RX ORDER — FAMOTIDINE 20 MG/1
20 TABLET, FILM COATED ORAL ONCE
Status: CANCELLED
Start: 2023-03-22 | End: 2023-03-22

## 2023-03-22 RX ORDER — SODIUM CHLORIDE 9 MG/ML
250 INJECTION, SOLUTION INTRAVENOUS ONCE
Status: CANCELLED | OUTPATIENT
Start: 2023-03-22

## 2023-03-22 RX ADMIN — HYDROCORTISONE SODIUM SUCCINATE 100 MG: 100 INJECTION, POWDER, FOR SOLUTION INTRAMUSCULAR; INTRAVENOUS at 10:07

## 2023-03-22 RX ADMIN — TRASTUZUMAB 510 MG: 150 INJECTION, POWDER, LYOPHILIZED, FOR SOLUTION INTRAVENOUS at 11:30

## 2023-03-22 RX ADMIN — PERTUZUMAB 420 MG: 30 INJECTION, SOLUTION, CONCENTRATE INTRAVENOUS at 10:25

## 2023-03-22 RX ADMIN — FAMOTIDINE 20 MG: 20 TABLET ORAL at 10:07

## 2023-03-22 RX ADMIN — DIPHENHYDRAMINE HYDROCHLORIDE 25 MG: 25 CAPSULE ORAL at 10:07

## 2023-03-22 RX ADMIN — SODIUM CHLORIDE 250 ML: 9 INJECTION, SOLUTION INTRAVENOUS at 10:07

## 2023-03-22 NOTE — PROGRESS NOTES
"  Subjective     REASON FOR FOLLOW UP:  1.  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  2. Colovesical fistula, chronic antibiotic therapy with Cipro.  3.  DVT of the right and left lower extremity. 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  Acute GI bleed      History of Present Illness    The patient is a 67 y.o. male with above his history, who returns to the office today in anticipation of his third cycle of Herceptin Perjeta.  He reports he is overall tolerating this very well.  He is without diarrhea.  He reports he is eating and drinking without difficulty.  He has to take it slow though does not feel food getting stuck.  He denies any new pain.  He continues to struggle with neuropathy from previous Taxol.  This is primarily numbness of his fingers and toes.  He is taking gabapentin 300 mg twice daily though has not noticed much change.  He does not have painful neuropathy.  He has no lower extremity swelling, shortness of breath or chest pain.      Past Medical History:   Diagnosis Date   • Arthritis    • Cancer (HCC)     prostate cancer 2008   • Cancer (HCC)     esophageal/LIVER   • Diabetes mellitus (HCC)    • Difficulty swallowing solids    • Elevated PSA    • Esophageal mass    • Esophageal varices (HCC) July 2019   • GERD (gastroesophageal reflux disease)    • H/O Lung nodule    • Hepatitis     CHILD--PT STATED \"I THINK IT WAS A.\"   • History of esophageal varices with bleeding     SUMM34 2021   • History of high blood pressure    • History of pneumonia    • Hx of blood clots 08/10/2019    LOWER LEGS BILAT   • Maintenance chemotherapy     IV EVERY 4 WEEKS:  ESOPHAGEAL CANCER   • Nail fungus     LEFT FINGERNAILS X 4   • Neuropathy    • PVD (peripheral vascular disease) (HCC)    • Rash     ITCHY RED RASH ON RIGHT THIGH AREA-STATES IT COMES AND GOES.  USING PRESCRIPTION CREAM   • Recto-bladderneck fistula     on poab for recurrent uti   • Risk " factors for obstructive sleep apnea         Past Surgical History:   Procedure Laterality Date   • CATARACT EXTRACTION WITH INTRAOCULAR LENS IMPLANT Bilateral    • COLONOSCOPY N/A 02/04/2020    Procedure: COLONOSCOPY;  Surgeon: Guy Sears MD;  Location: [facility] ENDOSCOPY;  Service: General;  Laterality: N/A;  PRE-COLOVESICAL FISTULA  POST-- DIVERTICULOSIS   • COLONOSCOPY  2/4/2020   • CYSTOSCOPY  02/06/2020   • CYSTOSCOPY Bilateral 02/26/2020    Procedure: CYSTOSCOPY RETROGRADE;  Surgeon: Cm Witt MD;  Location: [facility] MAIN OR;  Service: Urology;  Laterality: Bilateral;   • ENDOSCOPY     • ENDOSCOPY N/A 06/19/2021    Procedure: ESOPHAGOGASTRODUODENOSCOPY WITH BIOPSY;  Surgeon: Mary Brito MD;  Location: [facility] OR;  Service: Gastroenterology;  Laterality: N/A;   • ENDOSCOPY N/A 08/05/2021    Procedure: ESOPHAGOGASTRODUODENOSCOPY HEMOSPRAY;  Surgeon: Naga Shelby MD;  Location: [facility] ENDOSCOPY;  Service: Gastroenterology;  Laterality: N/A;  HX OF ESOPHAGEAL  CANCER;MELENA  POST: ESOPHAGEAL BLEEDING; ESOPHAGEAL MASS   • ENDOSCOPY N/A 11/4/2022    Procedure: ESOPHAGOGASTRODUODENOSCOPY WITH DILATATION;  Surgeon: Mary Brito MD;  Location: [facility] OR;  Service: Gastroenterology;  Laterality: N/A;   • HERNIA REPAIR Right 1999    inguinal hernia   • PROSTATE SURGERY  03/2008    prostatectectomy   • UPPER GASTROINTESTINAL ENDOSCOPY  August 2021   • VENOUS ACCESS DEVICE (PORT) INSERTION N/A 07/16/2019    Procedure: INSERTION VENOUS ACCESS DEVICE WITH FLUORO AND EGD WITH BIOPSY;  Surgeon: Mary Brito MD;  Location: [facility] OR;  Service: Thoracic        Current Outpatient Medications on File Prior to Visit   Medication Sig Dispense Refill   • acetaminophen (TYLENOL) 500 MG tablet Take 1 tablet by mouth Every 6 (Six) Hours As Needed for Mild Pain.     • cevimeline (EVOXAC) 30 MG capsule Take 1 capsule by mouth 3 (Three) Times a Day. DRY MOUTH     • ciprofloxacin (CIPRO)  250 MG tablet Take 1 tablet by mouth Daily. Take one every other day (Patient taking differently: Take 1 tablet by mouth Every Other Day. Take one every other day  FISTULA) 90 tablet 1   • econazole nitrate (SPECTAZOLE) 1 % cream Apply  topically to the appropriate area as directed 2 (Two) Times a Day. (Patient taking differently: Apply 1 application topically to the appropriate area as directed 2 (Two) Times a Day. NAIL FUNGUS-4 FINGERS ON LEFT) 85 g 1   • furosemide (LASIX) 20 MG tablet Take 1 tablet by mouth Every Other Day. (Patient taking differently: Take 1 tablet by mouth As Needed. LOWER LEG EDEMA) 28 tablet 2   • gabapentin (NEURONTIN) 300 MG capsule Take 1 capsule by mouth 2 (Two) Times a Day. 180 capsule 0   • lisinopril-hydrochlorothiazide (PRINZIDE,ZESTORETIC) 20-12.5 MG per tablet TAKE 1 TABLET BY MOUTH EVERY DAY (Patient taking differently: Take 0.5 tablets by mouth As Needed. SYS GREATER THAN 130, TAKE 1/2 TAB) 90 tablet 0   • pantoprazole (Protonix) 40 MG EC tablet Take 1 tablet by mouth 2 (Two) Times a Day. 180 tablet 3   • pentoxifylline (TRENtal) 400 MG CR tablet TAKE 1 TABLET BY MOUTH TWICE A DAY WITH MEALS 90 tablet 0   • potassium chloride ER (K-TAB) 20 MEQ tablet controlled-release ER tablet Take 1 tablet by mouth Daily. 90 tablet 3   • Probiotic Product (Probiotic Blend) capsule Take 1 capsule by mouth Daily.     • [DISCONTINUED] glipizide (GLUCOTROL XL) 5 MG ER tablet TAKE 1 TABLET BY MOUTH EVERY DAY (Patient taking differently: Take 1 tablet by mouth Daily.) 90 tablet 1     No current facility-administered medications on file prior to visit.        ALLERGIES:    Allergies   Allergen Reactions   • Oxaliplatin Anaphylaxis        Social History     Socioeconomic History   • Marital status: Single   • Years of education: High school   Tobacco Use   • Smoking status: Every Day     Packs/day: 1.00     Years: 38.00     Pack years: 38.00     Types: Cigarettes     Start date: 1/1/1974   • Smokeless  tobacco: Never   • Tobacco comments:     Quit for a period of 8 years.    Vaping Use   • Vaping Use: Never used   Substance and Sexual Activity   • Alcohol use: Not Currently   • Drug use: Never   • Sexual activity: Not Currently     Partners: Female     Birth control/protection: None        Family History   Problem Relation Age of Onset   • Hypertension Mother    • Stroke Mother    • Lung cancer Father    • Hypertension Other    • Lung disease Other    • Prostate cancer Other    • Malig Hyperthermia Neg Hx       Current Outpatient Medications on File Prior to Visit   Medication Sig Dispense Refill   • acetaminophen (TYLENOL) 500 MG tablet Take 1 tablet by mouth Every 6 (Six) Hours As Needed for Mild Pain.     • cevimeline (EVOXAC) 30 MG capsule Take 1 capsule by mouth 3 (Three) Times a Day. DRY MOUTH     • ciprofloxacin (CIPRO) 250 MG tablet Take 1 tablet by mouth Daily. Take one every other day (Patient taking differently: Take 1 tablet by mouth Every Other Day. Take one every other day  FISTULA) 90 tablet 1   • econazole nitrate (SPECTAZOLE) 1 % cream Apply  topically to the appropriate area as directed 2 (Two) Times a Day. (Patient taking differently: Apply 1 application topically to the appropriate area as directed 2 (Two) Times a Day. NAIL FUNGUS-4 FINGERS ON LEFT) 85 g 1   • furosemide (LASIX) 20 MG tablet Take 1 tablet by mouth Every Other Day. (Patient taking differently: Take 1 tablet by mouth As Needed. LOWER LEG EDEMA) 28 tablet 2   • gabapentin (NEURONTIN) 300 MG capsule Take 1 capsule by mouth 2 (Two) Times a Day. 180 capsule 0   • lisinopril-hydrochlorothiazide (PRINZIDE,ZESTORETIC) 20-12.5 MG per tablet TAKE 1 TABLET BY MOUTH EVERY DAY (Patient taking differently: Take 0.5 tablets by mouth As Needed. SYS GREATER THAN 130, TAKE 1/2 TAB) 90 tablet 0   • pantoprazole (Protonix) 40 MG EC tablet Take 1 tablet by mouth 2 (Two) Times a Day. 180 tablet 3   • pentoxifylline (TRENtal) 400 MG CR tablet TAKE 1  "TABLET BY MOUTH TWICE A DAY WITH MEALS 90 tablet 0   • potassium chloride ER (K-TAB) 20 MEQ tablet controlled-release ER tablet Take 1 tablet by mouth Daily. 90 tablet 3   • Probiotic Product (Probiotic Blend) capsule Take 1 capsule by mouth Daily.     • [DISCONTINUED] glipizide (GLUCOTROL XL) 5 MG ER tablet TAKE 1 TABLET BY MOUTH EVERY DAY (Patient taking differently: Take 1 tablet by mouth Daily.) 90 tablet 1     No current facility-administered medications on file prior to visit.     Allergies   Allergen Reactions   • Oxaliplatin Anaphylaxis          Objective     Vitals:    03/22/23 0933   BP: 134/79   Pulse: 81   Resp: 16   Temp: 97.8 °F (36.6 °C)   TempSrc: Temporal   SpO2: 97%   Weight: 83.2 kg (183 lb 8 oz)   Height: 175.3 cm (69.02\")   PainSc: 0-No pain     Body mass index is 27.09 kg/m².     Current Status 3/22/2023   ECOG score 0     Physical Exam  Vitals reviewed.   Constitutional:       General: He is not in acute distress.     Appearance: Normal appearance. He is well-developed.   HENT:      Head: Normocephalic and atraumatic.   Eyes:      Pupils: Pupils are equal, round, and reactive to light.   Cardiovascular:      Rate and Rhythm: Normal rate and regular rhythm.      Heart sounds: Normal heart sounds. No murmur heard.  Pulmonary:      Effort: Pulmonary effort is normal. No respiratory distress.      Breath sounds: Normal breath sounds. No wheezing, rhonchi or rales.   Abdominal:      General: Bowel sounds are normal. There is no distension.      Palpations: Abdomen is soft. There is no hepatomegaly.      Comments: I am unable to appreciate the hepatic edge   Musculoskeletal:         General: No swelling. Normal range of motion.      Cervical back: Normal range of motion.   Skin:     General: Skin is warm and dry.      Findings: No rash.      Comments: alopecia   Neurological:      Mental Status: He is alert and oriented to person, place, and time.       I have reexamined the patient and the results " are consistent with the previously documented exam. NIRU Okeefe       RECENT LABS:  Results from last 7 days   Lab Units 03/22/23  0906   WBC 10*3/mm3 10.86*   NEUTROS ABS 10*3/mm3 8.82*   HEMOGLOBIN g/dL 14.3   HEMATOCRIT % 43.7   PLATELETS 10*3/mm3 153     Results from last 7 days   Lab Units 03/22/23  0906   SODIUM mmol/L 139   POTASSIUM mmol/L 3.8   CHLORIDE mmol/L 103   CO2 mmol/L 24.0   BUN mg/dL 9   CREATININE mg/dL 0.72   CALCIUM mg/dL 9.5   ALBUMIN g/dL 4.1   BILIRUBIN mg/dL 0.4   ALK PHOS U/L 187*   ALT (SGPT) U/L 17   AST (SGOT) U/L 20   GLUCOSE mg/dL 92           Assessment & Plan    1.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 Almost 90% of the liver was replaced by tumor.  · HER-2 positive  · Initially treated with FOLFOX initiated July 2019  · Herceptin added with cycle 2  · Following 8 cycles of FOLFOX, oxaliplatin discontinued with continuation of 5-FU, leucovorin, Herceptin. This was continued through 7/7/2020  · Increasing CEA led to PET scan 6/9/2021.  Disease progression noted with growth of the tumor in the lower esophagus.  Continued stability of hepatic metastasis  · Repeat EGD 6/19/2021 for tissue specimen and Next Generation Sequencing.  · Endoscopy that shows a noncircumferential abnormality in the lower esophagus that encompasses almost 6 cm in length. It is not blocking off the esophagus and that is why the patient has no symptoms. The pathology shows at least atypical cells consistent with cancer.  · NGS showing PD-L1 positive and high mutation burden.  The tumor remains HER-2 positive.  Treatment plan for Keytruda every 3 weeks x 4 cycles followed by repeat imaging  · Keytruda initiated 7/29/2021  · Hospitalization 8/2/2021 through 8/7/2021 secondary to acute GI bleeding from known malignancy.  · Completed 10 fractions of radiation to the lower esophagus 8/18/2021 after GI bleed  · PET scan following four cycles of Keytruda 10/14/2021  with improvement in the patient's known neoplasm involving the distal esophagus. However, known metastatic disease involving the liver is significantly worse. Keytruda discontinued  · Enhertu initiated 10/29/2021  · 12/23/2021 PET scan following three cycles of Enhertu discloses resolution of the tumor in the esophagus. He continues to have two active lesions in the liver with significant SUV activity pertinent to his metastatic disease. Minimal pulmonary infiltrates noted bilaterally. With concerns for Enhertu interstitial lung disease, Enhertu discontinued  · Treatment transitioned to FOLFOX Herceptin  · Borderline neutropenia 2/9/2022 with plans for 15% dose reduction to FOLFOX  · 2/16/2022, significant reaction to oxaliplatin which is discontinued  · Reevaluation 3/2/2022, with discontinue of oxaliplatin, the patient will continue on 5-FU, leucovorin, Kanjinti. Of note, the patient received 6 mg/kg of Kanjinti 2 weeks ago, therefore he cannot receive this medication today. He will receive only 5-FU leucovorin and return in 1 week for Kanjinti 2 mg/kg. In 2 weeks, he will resume his normal schedule of 5-FU, leucovorin, Kanjinti 4 mg/kg every 2 weeks.  · 3/16/2022 will continue on with 5-FU, leucovorin, Herceptin.  CEA dramatically improving.  · 3/25/2022 CT C/A/P When compared to recent PET/CT there is interval decrease in size of the previously demonstrated hypermetabolic hepatic metastases as well as decrease in size of the periportal lymph node. The wall thickening involving the distal esophagus is largely unchanged. 2. Persistent findings of colovesicular fistula. 3. Ill-defined interstitial and ground glass opacities within the bilateral lower lobes favored to represent postradiation pneumonitis/change  · 6/8/2022 CEA 8.0.  · 6/29/2022 here for continued 5-FU, leucovorin, Kanjanti, overall tolerating well.  · 8/17/2022 continuing on 5-FU, leucovorin, Herceptin tolerating well.  CEA 8.  Patient was advised to  continue with his current treatment.  Not planning a another radiological assessment unless patient has further rise in CEA level.  · 9/14/2022 here for continued 5-FU, leucovorin, Herceptin, continuing to tolerate quite well.   · 10/12/2022, progressive dysphagia with minimal rise of his CEA with plans made for PET scan  · Endoscopy 11/4/2022 with findings very tight esophagus with strictures.  Biopsies with pathology consistent with adenocarcinoma.  · PET scan 11/2/2022 with a long segment of activity of the esophagus and additional 2 tumoral lesions of the liver or 1 in the left lobe and the other in the right lobe.  There is also a CV activity close to the head of the pancreas.  · 5-FU, leucovorin, Herceptin discontinued with plans to move to Taxol weekly 3 to 4 weeks coordinated with radiation therapy.  · Consult with radiation oncology 11/15/2022 with plans for 10 fractions to the distal esophagus  · Initiating single agent Taxol today, 11/16/2022  · 12/28/2022 due for cycle 2-day 15.  Neuropathy worsening specifically in the feet with more trouble feeling the ground.  No falls.  Per discussion with Dr. Haynes will adjust Taxol down by 15%.  · Reevaluation 1/11/2023 due for cycle 3-day 1 Taxol.  Further adjustment to Taxol which will now be given day 1 day 15 of a 28-day cycle.  Day 8 discontinued from the treatment plan.  · 1/11/2023, CEA increased to 77.3.  · Taxol discontinued  · PET scan 1/31/2023 with mixed findings.  Regression of the distal esophageal mass, however, progression of hepatic metastasis and metastatic lymphadenopathy in the [place] hepatis and retroperitoneum.  · Herceptin Perjeta initiated 2/8/2023  · Baseline echocardiogram 2/13/2023 with left ventricular systolic function 61.5%  · Following initiation of Herceptin Perjeta, nodular hepatic edge improved with CEA declined to 35.2 3/1/2023  · Proceed today, 3/22/2023 with cycle 3 Herceptin Perjeta which is continued every 21 days    2.  Colovesical fistula.   · He requires intermittent Cipro when he notes stool in his urine  · He currently reports his symptoms are more progressive with more urine in his rectum. He continues on Cipro and is scheduled for follow-up with urology, Dr. Witt later this month  · He has been seen by Cm Witt MD. He has advised the patient that he could not do too much for the fistula given the fact that it is just inconveniences of having urination through the anal canal from time to time but no urinary tract infection. He asked the patient to postpone any intention for surgery unless that it is absolutely necessary. Postponement of chemotherapy for 3 months if any surgery is necessary will trigger dramatic progression of his tumor and obviously consequences.   · On 06/08/2022, his colovesical fistula continues. Most of the output is urine through the rectum, no stool through the bladder. He has not had any urinary tract infection but he continues using prophylactically his ciprofloxacin 250 mg orally every other day. I asked him to remain on this medicine for the time being  · 08/17/2022 the patient continues having colovesical fistula symptomatology intermittently, passing urine through the rectum and not passing feces through the bladder. He has not had any other urinary tract infections. He continues using his ciprofloxacin as per my instructions  · He continues on prophylactic ciprofloxacin every other day with no progressive symptoms today, afebrile without leukocytosis    3.  Thrombophilia secondary malignancy, DVT  · Currently anticoagulated with Xarelto 20 mg daily  · Following GI bleed 8/2/2021, Xarelto has been discontinued  · Plan no evidence of recurrent thrombosis    4. Acute GI bleeding  · Dark tarry stool initially began 7/29/2021  · 7/23/2021, hemoglobin of 11.0.  8/2/2021, hemoglobin of 5.7  · Patient admitted, received transfusion of 4 units packed red blood cells.  EGD identified bleeding  mass in the distal esophagus  · Status post radiation to esophageal lesion with no further issues with bleeding  · 3/16/2022 no further episodes.   · Without signs or symptoms of bleeding, hemoglobin today 14.3    5. Enhertu initiated interstitial lung disease  · The patient completed a prednisone taper  · He is without hypoxia or tachycardia currently. He has only minimal dyspnea on exertion  · As long as he continues to smoke he is not a candidate for resumption of Enhertu    6. Significant oxaliplatin reaction  · 2/16/2022, significant reaction with shortness of breath, limited air movement, treated with high-dose Solu-Cortef. He is not a candidate for further oxaliplatin    7. Borderline thrombocytopenia  · 3/2/2021, the patient is a platelet count of 101,000. With the discontinuation of oxaliplatin, we can continue with 5-FU leucovorin which will likely not cause significant issues with his platelets.  · 5/18/2022 platelet count normalized at  141,000  · Platelets today 153,000, without signs or symptoms of bleeding    8.  Hypertension: He previously been on lisinopril/hydrochlorothiazide however this medication is currently on hold due to some issues with hypotension.  He now only takes the medication if his blood pressure is above 130/90.  · Blood pressure today normal at 134/79.    9.  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  · 12/28/2022, persistent neuropathy, Taxol reduced by 15%  · Ongoing neuropathy from previous oxaliplatin and Taxol.  This is unchanged currently    10.  Lower extremity edema:  · Bilateral.  Patient has been on his feet much more recently and has been more active.  It seemed to have worsened once the weather got warmer.  He states he was told previously by a cardiothoracic surgeon not to wear compression socks.  I have advised him to watch his salt intake, elevate his legs when he possible, and make sure he is drinking plenty of water.   Continue to monitor, and call if worsens.  · 6/20/2022 patient was prescribed lasix 20 mg every other day, which is helping some.  He is also trying to elevate his legs above his heart twice a day, which he also feels is helping.   · He is currently without swelling today, 3/22/2023      PLAN:  1. Proceed today with Herceptin Perjeta which is continued every 21 days, this is his third cycle  2. Continue gabapentin 300 mg twice daily.  There is no need to adjustment as the patient is not having painful neuropathy  3. CEA is pending today  4. Return in 3 weeks for CBC, CMP, MD follow-up with Dr. Haynes and continued Herceptin Perjeta    The patient is on high risk medication requiring close monitoring for toxicity    Dana Paniagua, APRN  03/22/2023

## 2023-04-11 RX ORDER — CIPROFLOXACIN 250 MG/1
TABLET, FILM COATED ORAL
Qty: 90 TABLET | Refills: 0 | Status: SHIPPED | OUTPATIENT
Start: 2023-04-11

## 2023-04-11 RX ORDER — PENTOXIFYLLINE 400 MG/1
TABLET, EXTENDED RELEASE ORAL
Qty: 90 TABLET | Refills: 0 | Status: SHIPPED | OUTPATIENT
Start: 2023-04-11

## 2023-04-12 ENCOUNTER — INFUSION (OUTPATIENT)
Dept: ONCOLOGY | Facility: HOSPITAL | Age: 68
End: 2023-04-12
Payer: MEDICARE

## 2023-04-12 ENCOUNTER — OFFICE VISIT (OUTPATIENT)
Dept: ONCOLOGY | Facility: CLINIC | Age: 68
End: 2023-04-12
Payer: MEDICARE

## 2023-04-12 VITALS
DIASTOLIC BLOOD PRESSURE: 81 MMHG | OXYGEN SATURATION: 97 % | RESPIRATION RATE: 16 BRPM | HEART RATE: 84 BPM | HEIGHT: 69 IN | BODY MASS INDEX: 26.94 KG/M2 | WEIGHT: 181.9 LBS | SYSTOLIC BLOOD PRESSURE: 136 MMHG | TEMPERATURE: 97.7 F

## 2023-04-12 DIAGNOSIS — C78.7 MALIGNANT NEOPLASM METASTATIC TO LIVER: ICD-10-CM

## 2023-04-12 DIAGNOSIS — C15.5 MALIGNANT NEOPLASM OF LOWER THIRD OF ESOPHAGUS: Primary | ICD-10-CM

## 2023-04-12 DIAGNOSIS — D49.0 ESOPHAGUS NEOPLASM: ICD-10-CM

## 2023-04-12 DIAGNOSIS — R73.01 IMPAIRED FASTING GLUCOSE: ICD-10-CM

## 2023-04-12 DIAGNOSIS — G62.0 PERIPHERAL NEUROPATHY DUE TO CHEMOTHERAPY: ICD-10-CM

## 2023-04-12 DIAGNOSIS — T45.1X5A PERIPHERAL NEUROPATHY DUE TO CHEMOTHERAPY: ICD-10-CM

## 2023-04-12 DIAGNOSIS — N32.1 RECTO-BLADDERNECK FISTULA: ICD-10-CM

## 2023-04-12 DIAGNOSIS — C15.5 MALIGNANT NEOPLASM OF LOWER THIRD OF ESOPHAGUS: ICD-10-CM

## 2023-04-12 DIAGNOSIS — I10 ESSENTIAL HYPERTENSION: ICD-10-CM

## 2023-04-12 DIAGNOSIS — Z72.0 TOBACCO ABUSE: ICD-10-CM

## 2023-04-12 DIAGNOSIS — Z45.2 ENCOUNTER FOR ADJUSTMENT OR MANAGEMENT OF VASCULAR ACCESS DEVICE: ICD-10-CM

## 2023-04-12 LAB
ALBUMIN SERPL-MCNC: 4.1 G/DL (ref 3.5–5.2)
ALBUMIN/GLOB SERPL: 1.4 G/DL (ref 1.1–2.4)
ALP SERPL-CCNC: 235 U/L (ref 38–116)
ALT SERPL W P-5'-P-CCNC: 18 U/L (ref 0–41)
ANION GAP SERPL CALCULATED.3IONS-SCNC: 11.8 MMOL/L (ref 5–15)
AST SERPL-CCNC: 21 U/L (ref 0–40)
BASOPHILS # BLD AUTO: 0.04 10*3/MM3 (ref 0–0.2)
BASOPHILS NFR BLD AUTO: 0.5 % (ref 0–1.5)
BILIRUB SERPL-MCNC: 0.5 MG/DL (ref 0.2–1.2)
BUN SERPL-MCNC: 10 MG/DL (ref 6–20)
BUN/CREAT SERPL: 15.9 (ref 7.3–30)
CALCIUM SPEC-SCNC: 9.5 MG/DL (ref 8.5–10.2)
CEA SERPL-MCNC: 37.7 NG/ML
CHLORIDE SERPL-SCNC: 103 MMOL/L (ref 98–107)
CO2 SERPL-SCNC: 23.2 MMOL/L (ref 22–29)
CREAT SERPL-MCNC: 0.63 MG/DL (ref 0.7–1.3)
DEPRECATED RDW RBC AUTO: 44.8 FL (ref 37–54)
EGFRCR SERPLBLD CKD-EPI 2021: 104.9 ML/MIN/1.73
EOSINOPHIL # BLD AUTO: 0.2 10*3/MM3 (ref 0–0.4)
EOSINOPHIL NFR BLD AUTO: 2.3 % (ref 0.3–6.2)
ERYTHROCYTE [DISTWIDTH] IN BLOOD BY AUTOMATED COUNT: 14.3 % (ref 12.3–15.4)
GLOBULIN UR ELPH-MCNC: 2.9 GM/DL (ref 1.8–3.5)
GLUCOSE SERPL-MCNC: 132 MG/DL (ref 74–124)
HCT VFR BLD AUTO: 44.1 % (ref 37.5–51)
HGB BLD-MCNC: 14.3 G/DL (ref 13–17.7)
IMM GRANULOCYTES # BLD AUTO: 0.07 10*3/MM3 (ref 0–0.05)
IMM GRANULOCYTES NFR BLD AUTO: 0.8 % (ref 0–0.5)
LYMPHOCYTES # BLD AUTO: 0.8 10*3/MM3 (ref 0.7–3.1)
LYMPHOCYTES NFR BLD AUTO: 9.1 % (ref 19.6–45.3)
MCH RBC QN AUTO: 27.9 PG (ref 26.6–33)
MCHC RBC AUTO-ENTMCNC: 32.4 G/DL (ref 31.5–35.7)
MCV RBC AUTO: 86 FL (ref 79–97)
MONOCYTES # BLD AUTO: 0.6 10*3/MM3 (ref 0.1–0.9)
MONOCYTES NFR BLD AUTO: 6.8 % (ref 5–12)
NEUTROPHILS NFR BLD AUTO: 7.12 10*3/MM3 (ref 1.7–7)
NEUTROPHILS NFR BLD AUTO: 80.5 % (ref 42.7–76)
NRBC BLD AUTO-RTO: 0 /100 WBC (ref 0–0.2)
PLATELET # BLD AUTO: 158 10*3/MM3 (ref 140–450)
PMV BLD AUTO: 10.1 FL (ref 6–12)
POTASSIUM SERPL-SCNC: 3.7 MMOL/L (ref 3.5–4.7)
PROT SERPL-MCNC: 7 G/DL (ref 6.3–8)
RBC # BLD AUTO: 5.13 10*6/MM3 (ref 4.14–5.8)
SODIUM SERPL-SCNC: 138 MMOL/L (ref 134–145)
WBC NRBC COR # BLD: 8.83 10*3/MM3 (ref 3.4–10.8)

## 2023-04-12 PROCEDURE — 63710000001 DIPHENHYDRAMINE PER 50 MG: Performed by: INTERNAL MEDICINE

## 2023-04-12 PROCEDURE — 3079F DIAST BP 80-89 MM HG: CPT | Performed by: INTERNAL MEDICINE

## 2023-04-12 PROCEDURE — 96413 CHEMO IV INFUSION 1 HR: CPT

## 2023-04-12 PROCEDURE — 99214 OFFICE O/P EST MOD 30 MIN: CPT | Performed by: INTERNAL MEDICINE

## 2023-04-12 PROCEDURE — 3075F SYST BP GE 130 - 139MM HG: CPT | Performed by: INTERNAL MEDICINE

## 2023-04-12 PROCEDURE — 96417 CHEMO IV INFUS EACH ADDL SEQ: CPT

## 2023-04-12 PROCEDURE — 25010000002 TRASTUZUMAB PER 10 MG: Performed by: INTERNAL MEDICINE

## 2023-04-12 PROCEDURE — 96375 TX/PRO/DX INJ NEW DRUG ADDON: CPT

## 2023-04-12 PROCEDURE — 85025 COMPLETE CBC W/AUTO DIFF WBC: CPT

## 2023-04-12 PROCEDURE — 25010000002 HYDROCORTISONE SOD SUC (PF) 100 MG RECONSTITUTED SOLUTION: Performed by: INTERNAL MEDICINE

## 2023-04-12 PROCEDURE — 1159F MED LIST DOCD IN RCRD: CPT | Performed by: INTERNAL MEDICINE

## 2023-04-12 PROCEDURE — 25010000002 PERTUZUMAB 420 MG/14ML SOLUTION 420 MG VIAL: Performed by: INTERNAL MEDICINE

## 2023-04-12 PROCEDURE — 1160F RVW MEDS BY RX/DR IN RCRD: CPT | Performed by: INTERNAL MEDICINE

## 2023-04-12 PROCEDURE — 1126F AMNT PAIN NOTED NONE PRSNT: CPT | Performed by: INTERNAL MEDICINE

## 2023-04-12 PROCEDURE — 80053 COMPREHEN METABOLIC PANEL: CPT

## 2023-04-12 PROCEDURE — 82378 CARCINOEMBRYONIC ANTIGEN: CPT | Performed by: INTERNAL MEDICINE

## 2023-04-12 RX ORDER — FAMOTIDINE 20 MG/1
20 TABLET, FILM COATED ORAL ONCE
Status: CANCELLED
Start: 2023-04-12 | End: 2023-04-12

## 2023-04-12 RX ORDER — FAMOTIDINE 20 MG/1
20 TABLET, FILM COATED ORAL ONCE
Status: COMPLETED | OUTPATIENT
Start: 2023-04-12 | End: 2023-04-12

## 2023-04-12 RX ORDER — DIPHENHYDRAMINE HCL 25 MG
25 CAPSULE ORAL ONCE
Status: CANCELLED | OUTPATIENT
Start: 2023-04-12

## 2023-04-12 RX ORDER — SODIUM CHLORIDE 9 MG/ML
250 INJECTION, SOLUTION INTRAVENOUS ONCE
Status: CANCELLED | OUTPATIENT
Start: 2023-04-12

## 2023-04-12 RX ORDER — SODIUM CHLORIDE 9 MG/ML
250 INJECTION, SOLUTION INTRAVENOUS ONCE
Status: COMPLETED | OUTPATIENT
Start: 2023-04-12 | End: 2023-04-12

## 2023-04-12 RX ORDER — DIPHENHYDRAMINE HCL 25 MG
25 CAPSULE ORAL ONCE
Status: COMPLETED | OUTPATIENT
Start: 2023-04-12 | End: 2023-04-12

## 2023-04-12 RX ADMIN — HYDROCORTISONE SODIUM SUCCINATE 100 MG: 100 INJECTION, POWDER, FOR SOLUTION INTRAMUSCULAR; INTRAVENOUS at 08:29

## 2023-04-12 RX ADMIN — DIPHENHYDRAMINE HYDROCHLORIDE 25 MG: 25 CAPSULE ORAL at 08:28

## 2023-04-12 RX ADMIN — FAMOTIDINE 20 MG: 20 TABLET, FILM COATED ORAL at 08:28

## 2023-04-12 RX ADMIN — TRASTUZUMAB 510 MG: 150 INJECTION, POWDER, LYOPHILIZED, FOR SOLUTION INTRAVENOUS at 09:50

## 2023-04-12 RX ADMIN — SODIUM CHLORIDE 250 ML: 9 INJECTION, SOLUTION INTRAVENOUS at 08:28

## 2023-04-12 RX ADMIN — PERTUZUMAB 420 MG: 30 INJECTION, SOLUTION, CONCENTRATE INTRAVENOUS at 08:50

## 2023-04-12 NOTE — PROGRESS NOTES
"  Subjective     REASON FOR FOLLOW UP:  1.  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  2. Colovesical fistula, chronic antibiotic therapy with Cipro.  3.  DVT of the right and left lower extremity. 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  Acute GI bleed      History of Present Illness     On 04/12/2023 this 67-year-old male returns to the office for follow up of treatment of his stage IV adenocarcinoma HER/2 positive of the esophagus with liver metastasis undergoing Herceptin/Perjeta at this time. The patient has had an excellent tolerance to the regimen with minimal diarrhea controlled with Imodium. His performance status has improved, he has gained more mobility and he is smoking a little bit less than before. He also has stopped eating junk food. He has no difficulty swallowing. He has no abdominal pain, no jaundice, no abdominal distention and he continues having occasional output of his colovesical fistula in the rectum in the form of urine. He has not had any fever or infection in his bladder. He denies any chronic cough or shortness of breath. His neuropathy from previous chemotherapy with FOLFOX is no different than before. He is requiring very minimal amount of Lasix if any at all to control swelling in his lower extremities. He otherwise remains functional with no cancer related pain and ECOG performance status of 0.    ·     Past Medical History:   Diagnosis Date   • Arthritis    • Cancer     prostate cancer 2008   • Cancer     esophageal/LIVER   • Diabetes mellitus    • Difficulty swallowing solids    • Elevated PSA    • Esophageal mass    • Esophageal varices July 2019   • GERD (gastroesophageal reflux disease)    • H/O Lung nodule    • Hepatitis     CHILD--PT STATED \"I THINK IT WAS A.\"   • History of esophageal varices with bleeding     SUMM34 2021   • History of high blood pressure    • History of pneumonia    • Hx of blood clots 08/10/2019 "    LOWER LEGS BILAT   • Maintenance chemotherapy     IV EVERY 4 WEEKS:  ESOPHAGEAL CANCER   • Nail fungus     LEFT FINGERNAILS X 4   • Neuropathy    • PVD (peripheral vascular disease)    • Rash     ITCHY RED RASH ON RIGHT THIGH AREA-STATES IT COMES AND GOES.  USING PRESCRIPTION CREAM   • Recto-bladderneck fistula     on poab for recurrent uti   • Risk factors for obstructive sleep apnea         Past Surgical History:   Procedure Laterality Date   • CATARACT EXTRACTION WITH INTRAOCULAR LENS IMPLANT Bilateral    • COLONOSCOPY N/A 02/04/2020    Procedure: COLONOSCOPY;  Surgeon: Guy Sears MD;  Location: [facility] ENDOSCOPY;  Service: General;  Laterality: N/A;  PRE-COLOVESICAL FISTULA  POST-- DIVERTICULOSIS   • COLONOSCOPY  2/4/2020   • CYSTOSCOPY  02/06/2020   • CYSTOSCOPY Bilateral 02/26/2020    Procedure: CYSTOSCOPY RETROGRADE;  Surgeon: Cm Witt MD;  Location: [facility];  Service: Urology;  Laterality: Bilateral;   • ENDOSCOPY     • ENDOSCOPY N/A 06/19/2021    Procedure: ESOPHAGOGASTRODUODENOSCOPY WITH BIOPSY;  Surgeon: Mary Brito MD;  Location: [facility] OR;  Service: Gastroenterology;  Laterality: N/A;   • ENDOSCOPY N/A 08/05/2021    Procedure: ESOPHAGOGASTRODUODENOSCOPY HEMOSPRAY;  Surgeon: Naga Shelby MD;  Location: [facility] ENDOSCOPY;  Service: Gastroenterology;  Laterality: N/A;  HX OF ESOPHAGEAL  CANCER;MELENA  POST: ESOPHAGEAL BLEEDING; ESOPHAGEAL MASS   • ENDOSCOPY N/A 11/4/2022    Procedure: ESOPHAGOGASTRODUODENOSCOPY WITH DILATATION;  Surgeon: Mary Brito MD;  Location: [facility] OR;  Service: Gastroenterology;  Laterality: N/A;   • HERNIA REPAIR Right 1999    inguinal hernia   • PROSTATE SURGERY  03/2008    prostatectectomy   • UPPER GASTROINTESTINAL ENDOSCOPY  August 2021   • VENOUS ACCESS DEVICE (PORT) INSERTION N/A 07/16/2019    Procedure: INSERTION VENOUS ACCESS DEVICE WITH FLUORO AND EGD WITH BIOPSY;  Surgeon: Mary Brito MD;  Location: [facility]  OR;  Service: Thoracic        Current Outpatient Medications on File Prior to Visit   Medication Sig Dispense Refill   • acetaminophen (TYLENOL) 500 MG tablet Take 1 tablet by mouth Every 6 (Six) Hours As Needed for Mild Pain.     • cevimeline (EVOXAC) 30 MG capsule Take 1 capsule by mouth 3 (Three) Times a Day. DRY MOUTH     • ciprofloxacin (CIPRO) 250 MG tablet TAKE 1 TABLET BY MOUTH EVERY OTHER DAY 90 tablet 0   • econazole nitrate (SPECTAZOLE) 1 % cream Apply  topically to the appropriate area as directed 2 (Two) Times a Day. (Patient taking differently: Apply 1 application topically to the appropriate area as directed 2 (Two) Times a Day. NAIL FUNGUS-4 FINGERS ON LEFT) 85 g 1   • furosemide (LASIX) 20 MG tablet Take 1 tablet by mouth Every Other Day. (Patient taking differently: Take 1 tablet by mouth As Needed. LOWER LEG EDEMA) 28 tablet 2   • gabapentin (NEURONTIN) 300 MG capsule Take 1 capsule by mouth 2 (Two) Times a Day. 180 capsule 0   • glipizide (GLUCOTROL XL) 5 MG ER tablet Take 1 tablet by mouth Daily. 90 tablet 1   • lisinopril-hydrochlorothiazide (PRINZIDE,ZESTORETIC) 20-12.5 MG per tablet TAKE 1 TABLET BY MOUTH EVERY DAY (Patient taking differently: Take 0.5 tablets by mouth As Needed. SYS GREATER THAN 130, TAKE 1/2 TAB) 90 tablet 0   • pantoprazole (Protonix) 40 MG EC tablet Take 1 tablet by mouth 2 (Two) Times a Day. 180 tablet 3   • pentoxifylline (TRENtal) 400 MG CR tablet TAKE 1 TABLET BY MOUTH TWICE A DAY WITH MEALS 90 tablet 0   • potassium chloride ER (K-TAB) 20 MEQ tablet controlled-release ER tablet Take 1 tablet by mouth Daily. 90 tablet 3   • Probiotic Product (Probiotic Blend) capsule Take 1 capsule by mouth Daily.       No current facility-administered medications on file prior to visit.        ALLERGIES:    Allergies   Allergen Reactions   • Oxaliplatin Anaphylaxis        Social History     Socioeconomic History   • Marital status: Single   • Years of education: High school   Tobacco  Use   • Smoking status: Every Day     Packs/day: 1.00     Years: 38.00     Pack years: 38.00     Types: Cigarettes     Start date: 1/1/1974   • Smokeless tobacco: Never   • Tobacco comments:     Quit for a period of 8 years.    Vaping Use   • Vaping Use: Never used   Substance and Sexual Activity   • Alcohol use: Not Currently   • Drug use: Never   • Sexual activity: Not Currently     Partners: Female     Birth control/protection: None        Family History   Problem Relation Age of Onset   • Hypertension Mother    • Stroke Mother    • Lung cancer Father    • Hypertension Other    • Lung disease Other    • Prostate cancer Other    • Malig Hyperthermia Neg Hx       Current Outpatient Medications on File Prior to Visit   Medication Sig Dispense Refill   • acetaminophen (TYLENOL) 500 MG tablet Take 1 tablet by mouth Every 6 (Six) Hours As Needed for Mild Pain.     • cevimeline (EVOXAC) 30 MG capsule Take 1 capsule by mouth 3 (Three) Times a Day. DRY MOUTH     • ciprofloxacin (CIPRO) 250 MG tablet TAKE 1 TABLET BY MOUTH EVERY OTHER DAY 90 tablet 0   • econazole nitrate (SPECTAZOLE) 1 % cream Apply  topically to the appropriate area as directed 2 (Two) Times a Day. (Patient taking differently: Apply 1 application topically to the appropriate area as directed 2 (Two) Times a Day. NAIL FUNGUS-4 FINGERS ON LEFT) 85 g 1   • furosemide (LASIX) 20 MG tablet Take 1 tablet by mouth Every Other Day. (Patient taking differently: Take 1 tablet by mouth As Needed. LOWER LEG EDEMA) 28 tablet 2   • gabapentin (NEURONTIN) 300 MG capsule Take 1 capsule by mouth 2 (Two) Times a Day. 180 capsule 0   • glipizide (GLUCOTROL XL) 5 MG ER tablet Take 1 tablet by mouth Daily. 90 tablet 1   • lisinopril-hydrochlorothiazide (PRINZIDE,ZESTORETIC) 20-12.5 MG per tablet TAKE 1 TABLET BY MOUTH EVERY DAY (Patient taking differently: Take 0.5 tablets by mouth As Needed. SYS GREATER THAN 130, TAKE 1/2 TAB) 90 tablet 0   • pantoprazole (Protonix) 40 MG  "EC tablet Take 1 tablet by mouth 2 (Two) Times a Day. 180 tablet 3   • pentoxifylline (TRENtal) 400 MG CR tablet TAKE 1 TABLET BY MOUTH TWICE A DAY WITH MEALS 90 tablet 0   • potassium chloride ER (K-TAB) 20 MEQ tablet controlled-release ER tablet Take 1 tablet by mouth Daily. 90 tablet 3   • Probiotic Product (Probiotic Blend) capsule Take 1 capsule by mouth Daily.       No current facility-administered medications on file prior to visit.     Allergies   Allergen Reactions   • Oxaliplatin Anaphylaxis          Objective     Vitals:    04/12/23 0758   BP: 136/81   Pulse: 84   Resp: 16   Temp: 97.7 °F (36.5 °C)   TempSrc: Temporal   SpO2: 97%   Weight: 82.5 kg (181 lb 14.4 oz)   Height: 175.3 cm (69.02\")   PainSc: 0-No pain     Body mass index is 26.85 kg/m².         4/12/2023     7:48 AM   Current Status   ECOG score 0         EXAM:                          GENERAL:  Well-developed, Patient  in no acute distress.   SKIN:  Warm, dry ,NO purpura ,no rash.  HEENT:  Pupils were equal and reactive to light and accomodation, conjunctivae noninjected,  normal visual acuity.   NECK:  Supple with good range of motion; no thyromegaly , no JVD or bruits,.No carotid artery pain, no carotid abnormal pulsation   LYMPHATICS:  No cervical, NO supraclavicular, NO axillary, NO inguinal adenopathies.  CARDIAC   normal rate , regular rhythm, without murmur,NO rubs NO S3 NO S4   LUNGS: normal breath sounds bilateral, no wheezing, NO rhonchi, NO crackles ,NO rubs.  VASCULAR VENOUS: no cyanosis, NO collateral circulation, NO varicosities, NO edema, NO palpable cords, NO pain,NO erythema, NO pigmentation of the skin.  ABDOMEN:  Soft, NO pain,no hepatomegaly, no splenomegaly,no masses, no ascites, no collateral circulation,no distention.  EXTREMITIES  AND SPINE:  No clubbing, no cyanosis ,no deformities , no pain .No kyphosis,  no pain in spine, no pain in ribs , no pain in pelvic bone.  NEUROLOGICAL:  Patient was awake, alert, oriented to " time, person and place.sensory neuropathy in feet                    RECENT LABS:  Lab Results   Component Value Date    WBC 8.83 04/12/2023    HGB 14.3 04/12/2023    HCT 44.1 04/12/2023    MCV 86.0 04/12/2023     04/12/2023     Results from last 7 days   Lab Units 04/12/23  0738   WBC 10*3/mm3 8.83   NEUTROS ABS 10*3/mm3 7.12*   HEMOGLOBIN g/dL 14.3   HEMATOCRIT % 44.1   PLATELETS 10*3/mm3 158          Lab Results   Component Value Date    CEA 31.80 03/22/2023            Assessment & Plan    1.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 Almost 90% of the liver was replaced by tumor.  · HER-2 positive  · Initially treated with FOLFOX initiated July 2019  · Herceptin added with cycle 2  · Following 8 cycles of FOLFOX, oxaliplatin discontinued with continuation of 5-FU, leucovorin, Herceptin. This was continued through 7/7/2020  · Increasing CEA led to PET scan 6/9/2021.  Disease progression noted with growth of the tumor in the lower esophagus.  Continued stability of hepatic metastasis  · Repeat EGD 6/19/2021 for tissue specimen and Next Generation Sequencing.  · Endoscopy that shows a noncircumferential abnormality in the lower esophagus that encompasses almost 6 cm in length. It is not blocking off the esophagus and that is why the patient has no symptoms. The pathology shows at least atypical cells consistent with cancer.  · NGS showing PD-L1 positive and high mutation burden.  The tumor remains HER-2 positive.  Treatment plan for Keytruda every 3 weeks x 4 cycles followed by repeat imaging  · Keytruda initiated 7/29/2021  · Hospitalization 8/2/2021 through 8/7/2021 secondary to acute GI bleeding from known malignancy.  · Completed 10 fractions of radiation to the lower esophagus 8/18/2021 after GI bleed  · PET scan following four cycles of Keytruda 10/14/2021 with improvement in the patient's known neoplasm involving the distal esophagus. However, known metastatic disease  involving the liver is significantly worse. Keytruda discontinued  · Enhertu initiated 10/29/2021  · 12/23/2021 PET scan following three cycles of Enhertu discloses resolution of the tumor in the esophagus. He continues to have two active lesions in the liver with significant SUV activity pertinent to his metastatic disease. Minimal pulmonary infiltrates noted bilaterally. With concerns for Enhertu interstitial lung disease, Enhertu discontinued  · Treatment transitioned to FOLFOX Herceptin  · Borderline neutropenia 2/9/2022 with plans for 15% dose reduction to FOLFOX  · 2/16/2022, significant reaction to oxaliplatin which is discontinued  · Reevaluation 3/2/2022, with discontinue of oxaliplatin, the patient will continue on 5-FU, leucovorin, Kanjinti. Of note, the patient received 6 mg/kg of Kanjinti 2 weeks ago, therefore he cannot receive this medication today. He will receive only 5-FU leucovorin and return in 1 week for Kanjinti 2 mg/kg. In 2 weeks, he will resume his normal schedule of 5-FU, leucovorin, Kanjinti 4 mg/kg every 2 weeks.  · 3/16/2022 will continue on with 5-FU, leucovorin, Herceptin.  CEA dramatically improving.  · 3/25/2022 CT C/A/P When compared to recent PET/CT there is interval decrease in size of the previously demonstrated hypermetabolic hepatic metastases as well as decrease in size of the periportal lymph node. The wall thickening involving the distal esophagus is largely unchanged. 2. Persistent findings of colovesicular fistula. 3. Ill-defined interstitial and ground glass opacities within the bilateral lower lobes favored to represent postradiation pneumonitis/change  · 6/8/2022 CEA 8.0.  · 6/29/2022 here for continued 5-FU, leucovorin, Kanjanti, overall tolerating well.  · 8/17/2022 continuing on 5-FU, leucovorin, Herceptin tolerating well.  CEA 8.  Patient was advised to continue with his current treatment.  Not planning a another radiological assessment unless patient has further  rise in CEA level.  · 9/14/2022 here for continued 5-FU, leucovorin, Herceptin, continuing to tolerate quite well.   · 10/12/2022, progressive dysphagia with minimal rise of his CEA with plans made for PET scan  · Endoscopy 11/4/2022 with findings very tight esophagus with strictures.  Biopsies with pathology consistent with adenocarcinoma.  · PET scan 11/2/2022 with a long segment of activity of the esophagus and additional 2 tumoral lesions of the liver or 1 in the left lobe and the other in the right lobe.  There is also a CV activity close to the head of the pancreas.  · 5-FU, leucovorin, Herceptin discontinued with plans to move to Taxol weekly 3 to 4 weeks coordinated with radiation therapy.  · Consult with radiation oncology 11/15/2022 with plans for 10 fractions to the distal esophagus  · Initiating single agent Taxol today, 11/16/2022  · 12/28/2022 due for cycle 2-day 15.  Neuropathy worsening specifically in the feet with more trouble feeling the ground.  No falls.  Per discussion with Dr. Haynes will adjust Taxol down by 15%.  · Reevaluation today, 1/11/2023 due for cycle 3-day 1 Taxol.  He continues to have persistent neuropathy with constant tingling of his fingers and toes.  Further adjustment to Taxol which will now be given day 1 day 15 of a 28-day cycle.  Day 8 discontinued from the treatment plan.  On 01/25/2023 the patient is having more fatigue associated with Taxol utilization but most importantly more sensory neuropathy especially in his hands to the point that he is having difficulty tying his shoes and buttoning his shirts. He has no motor deficit. Furthermore the patient's CEA level has gone from 35 to 77 and the alkaline phosphatase has risen. The liver has become now nodular, palpable again with minimal fullness. I do believe that this announces that Taxol is not effective in one hand and besides is giving him more neuropathy on the other hand. Given this fact I will discontinue the Taxol  as per today, he will not receive any dose whatsoever and he already has an appointment for a PET scan to be done next Tuesday. I will review him back next Thursday in order to decide how to proceed. He already has had Enhertu administration in the past, he developed some pneumonitis associated with that. Raises the question if we need to go back to Herceptin/Perjeta. I think this will be the other possibility.     Given the above circumstances the chemotherapy treatment will be put on hold. Today his white count, hemoglobin and platelets are good. His chemistry profile is pending.  On 02/02/2023 I reviewed with the patient and his other sister the PET scan that documents very stable esophagus with minimal if any regrowth of tumor, normal pulmonary anatomy with no obvious tumor extension into this in the background of emphysema and obvious progressive liver metastasis. Also he has periportal adenopathy that is progressing. Pancreas, kidneys, spleen and bowel remain unremarkable. No obvious bone metastasis. Given all these changes I do believe that the patient has obvious progressive disease. His CEA level has risen to 77 during the previous visit. Given the fact that we have 2 fires now, liver metastasis growing, especially on the left lobe that eventually is going to become symptomatic given the large size and the SUV activity and given the fact that I cannot have access to Herceptin/Perjeta yet, I do believe that the patient could benefit from palliative radiation treatment to the liver, larger lesion, and I had a discussion with Dr. Colvin at this moment. He will be glad to see the patient in a few days and discuss with the patient the risks and benefits and see if that is a possibility. That probably can buy me some time to be able to consecute Perjeta.     The plan B will be if the patient ever decides to stop smoking to go back into Enhertu in the future and hopefully with less cigarettes in the system he will  have lesser chances to develop pneumonitis again. Therefore, it is up to the patient what he wants to do and I discussed these facts very clearly. If by any chance we cannot get Perjeta and Herceptin together in this patient and running out of medicines to provide to him because he has gone through most of the medicines utilized in this kind of setting, therefore the next option in case that Perjeta/Herceptin are not available would be to discuss options of Hospice care.     I kept the patient's appointment open for next week in case that we are able to get the medications provided and delivered to him next week on Wednesday. In preparation for that the patient will have an echocardiogram. This will be happening as early as tomorrow.  On 02/08/2023 the patient was reviewed after we finally got his Herceptin/Perjeta medications approved to be started as early as today. I discussed with him side effects of this combination of medicines including heart damage, diarrhea especially with the Perjeta that will require aggressive plan of care with Imodium that he has ready at home and also with Pedialyte or Gatorade. If he has more than 5 stools a day he will need to notify us immediately. Also he realizes that he needs to be aware of nausea, vomiting, allergic reactions and maybe even worsening of sensory neuropathy. The patient  is aware of all of these circumstances and he is ready to proceed. In preparation for that he will proceed with his echocardiogram this coming Monday. The previous echocardiogram in 2022 was very appropriate in regard to ejection fraction and I doubt that this will change overall because he has not received any other medication that will be effecting this area at this point.     I went ahead and asked him to sign the consent for the medication after proper education and I provided him written information about Perjeta. He already has signed consent for Herceptin administration at the time of his  original diagnosis at least 3 years ago. He will require to be seen every 3 weeks. He will require CBC, CMP every 3 weeks and he will require CEA level every 3 weeks. We will obtain a baseline CEA level today.  On 03/01/2023 the patient has not encountered any side effects of Herceptin/Perjeta including no diarrhea and he will continue with the regimen of medicines every 3 weeks. Plan to see him back in 3, 6 and 9 weeks with continued laboratory testing including CBC, CMP and CEA level. It calls my attention his physical examination today. The patient during the previous visit before the initiation of this combination of medicines are nodular palpable liver today. He has no nodular or palpable liver at all. That is good news. He is swallowing better. Maybe these 2 issues 1 symptom and 1 sign are encouraging in regard to his overall benefit of the medicine.  On 04/12/2023 the patient actually feels terrific in regard to his overall performance status with no caner related pain, good appetite, more activity and having no liver enlargement. His CEA level has dropped to 31 recently from 77 in the first of the year. I do believe that the regimen of Perjeta/Herceptin is making an impact in regard to his liver metastasis because he is not having any pain. His alkaline phosphatase is also stable.     He has minimal diarrhea associated with his Perjeta that he controls easily with an Imodium tablet here and there that does not go to a level 1 toxicity. He has no evidence of cardiac dysfunction. His recent echocardiogram in 02/2023 disclosed a stable ejection fraction. His white count, hemoglobin and platelets remain normal. Given all of these positive situations the patient will remain on this regimen for the time being receiving the regimen today, 3 weeks, 6 weeks and 9 weeks and eventually plan to repeat radiological assessment in 11 weeks. I went ahead and scheduled his laboratory testing, CBC, CMP, CEA level every 3  weeks along with an echocardiogram in 2 months.     In this patient the CEA level has been an excellent tool to follow up on his disease process besides the clinical examination.             ·   2. Colovesical fistula.   · He requires intermittent Cipro when he notes stool in his urine  · He currently reports his symptoms are more progressive with more urine in his rectum. He continues on Cipro and is scheduled for follow-up with urology, Dr. Witt later this month  · He has been seen by Cm Witt MD. He has advised the patient that he could not do too much for the fistula given the fact that it is just inconveniences of having urination through the anal canal from time to time but no urinary tract infection. He asked the patient to postpone any intention for surgery unless that it is absolutely necessary. Postponement of chemotherapy for 3 months if any surgery is necessary will trigger dramatic progression of his tumor and obviously consequences.   · On 06/08/2022, his colovesical fistula continues. Most of the output is urine through the rectum, no stool through the bladder. He has not had any urinary tract infection but he continues using prophylactically his ciprofloxacin 250 mg orally every other day. I asked him to remain on this medicine for the time being  · 08/17/2022 the patient continues having colovesical fistula symptomatology intermittently, passing urine through the rectum and not passing feces through the bladder. He has not had any other urinary tract infections. He continues using his ciprofloxacin as per my instructions  · He continues on prophylactic ciprofloxacin every other day with no progressive symptoms today  On 01/25/2023 his fistula is still an ongoing issue but it has not become infected again. He remains on prophylactic ciprofloxacin.  On 02/02/2023 his colovesical fistula has not become symptomatic in regard to infection. He continues taking his ciprofloxacin every other day  and I advised him to continue this. He still has urinary output through his rectum time to time.  On 02/08/2023 his colovesical fistula is not giving him any new difficulties and he continues taking ciprofloxacin every other day.  On 03/01/2023 no issues pertinent to this. He remains on his ciprofloxacin every other day.  On 04/12/2023 so far his colovesical fistula has not produced any new urinary tract infections. He has minimal output through this in the form of urine going through the rectum with no implications at this time. He will remain on prophylactic ciprofloxacin for the time being.             ·   3.  Thrombophilia secondary malignancy, DVT  · Currently anticoagulated with Xarelto 20 mg daily  · Following GI bleed 8/2/2021, Xarelto has been discontinued  · Plan no evidence of recurrent thrombosis  On 01/25/2023 no new thrombosis.  On 02/02/2023 surprising enough no new episodes of thrombophilia. He is not receiving any anticoagulants anymore.  On 02/08/2023 no recent blood clots in his lower extremities. He is not anticoagulated.  On 03/01/2023 no thrombophilia.   On 04/12/2023 no episodes of thrombophilia. He remains off anticoagulant.             ·   4. Acute GI bleeding  · Dark tarry stool initially began 7/29/2021  · 7/23/2021, hemoglobin of 11.0.  8/2/2021, hemoglobin of 5.7  · Patient admitted, received transfusion of 4 units packed red blood cells.  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   · Without signs or symptoms of bleeding, hemoglobin today 12.4.  On 01/25/2023 no clinical evidence of GI bleeding, stable hemoglobin.   On 02/02/2023 no episodes of GI bleeding.  On 02/08/2023 no notion of GI bleeding recently with no nausea, vomiting, hematemesis or melena.  On 03/01/2023 no GI bleeding.  On 04/12/2023 no episodes of GI bleeding.            ·   5. Enhertu initiated  interstitial lung disease  · The patient completed a prednisone taper  · He is without hypoxia or tachycardia currently. He has only minimal dyspnea on exertion  On 02/02/2023 discussed with the patient consideration to go back to Enhertu as long as he stops smoking completely and we monitor his situation very close in case that Perjeta /Herceptin is not available for him.  On 02/08/2023, the inflammatory position in his lung resolved altogether after prednisone.  On 04/12/2023 given this fact that patient cannot receive Enhertu in the future.                       ·   6.  Hypertension: He previously been on lisinopril/hydrochlorothiazide however this medication is currently on hold due to some issues with hypotension.  He now only takes the medication if his blood pressure is above 130/90.  · Blood pressure today normal at 121/75.  On 01/25/2023 the patient is not taking his blood pressure medication, his blood pressure has been spontaneously low. This is not surprising with liver metastasis worsening. This is one of the typical features clinically.  On 02/02/2023 he has no need to take blood pressure medicine at this time.  He will continue monitoring his blood pressure as per 02/08/2023 and use medication accordingly.  On 03/01/2023 continue monitoring blood pressure and using Lasix for swelling on prn basis.  On 04/12/2023 blood pressure under excellent control.             ·   7.  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  · 12/28/2022, persistent neuropathy, Taxol reduced by 15%  · The patient reports persistent symptoms today with tingling of his fingers constantly and numbness of his feet.  He does not have painful neuropathy though this is constant interfering with activities of daily living.  Taxol further reduced with adjustment in schedule to day 1 day 15.  On 01/25/2023 worsening neuropathy along with Taxol grade 2 in fingers, grade 1 in feet. He remains  on gabapentin, Taxol discontinued.  On 02/02/2023 neuropathy is still ongoing in his feet. Neurontin remains ongoing.  On 02/08/2023 gabapentin will be renewed today, prescription sent to his pharmacy for 3 month supply.  On 03/01/2023 no issues pertinent to his neuropathy with no changes.  On 04/12/2023 neuropathy unchanged. He continues taking medicine for this.             ·   8.  Lower extremity edema:  · Bilateral.  Patient has been on his feet much more recently and has been more active.  It seemed to have worsened once the weather got warmer.  He states he was told previously by a cardiothoracic surgeon not to wear compression socks.  I have advised him to watch his salt intake, elevate his legs when he possible, and make sure he is drinking plenty of water.  Continue to monitor, and call if worsens.  · 6/20/2022 patient was prescribed lasix 20 mg every other day, which is helping some.  He is also trying to elevate his legs above his heart twice a day, which he also feels is helping.   · He is currently without swelling today, 1/11/2023    On 01/25/2023 using Lasix on prn basis for swelling in his lower extremities.   On 02/02/2023 no swelling in his lower extremities. No need for use of Lasix at this time.  On 02/08/2023 no edema in lower extremities. Use Lasix on prn basis.  On 03/01/2023 swelling remains on prn basis, treatment with Lasix.  On 04/12/2023 using Lasix on prn basis.             ·     PLAN:      Each one of the situations have been described above in detail. The patient will remain on the same plan seeing nurse practitioner in 3 weeks, me in 6 weeks, nurse practitioner in 9 weeks, me in 12 weeks planning for a PET scan in 10-11 weeks and echocardiogram in 8 weeks. Laboratory testing every 3 weeks including CBC, CMP and CEA level.   The rest of the complimentary medicines for his other comorbidities remain ongoing by me.           Alexey Haynes MD  04/12/2023

## 2023-05-03 ENCOUNTER — INFUSION (OUTPATIENT)
Dept: ONCOLOGY | Facility: HOSPITAL | Age: 68
End: 2023-05-03
Payer: MEDICARE

## 2023-05-03 ENCOUNTER — OFFICE VISIT (OUTPATIENT)
Dept: ONCOLOGY | Facility: CLINIC | Age: 68
End: 2023-05-03
Payer: MEDICARE

## 2023-05-03 VITALS
SYSTOLIC BLOOD PRESSURE: 146 MMHG | OXYGEN SATURATION: 98 % | RESPIRATION RATE: 16 BRPM | BODY MASS INDEX: 25.8 KG/M2 | HEIGHT: 69 IN | HEART RATE: 88 BPM | WEIGHT: 174.2 LBS | DIASTOLIC BLOOD PRESSURE: 87 MMHG | TEMPERATURE: 97.5 F

## 2023-05-03 DIAGNOSIS — N32.1 RECTO-BLADDERNECK FISTULA: ICD-10-CM

## 2023-05-03 DIAGNOSIS — C15.5 MALIGNANT NEOPLASM OF LOWER THIRD OF ESOPHAGUS: ICD-10-CM

## 2023-05-03 DIAGNOSIS — R73.01 IMPAIRED FASTING GLUCOSE: ICD-10-CM

## 2023-05-03 DIAGNOSIS — C15.5 MALIGNANT NEOPLASM OF LOWER THIRD OF ESOPHAGUS: Primary | ICD-10-CM

## 2023-05-03 DIAGNOSIS — T45.1X5A PERIPHERAL NEUROPATHY DUE TO CHEMOTHERAPY: ICD-10-CM

## 2023-05-03 DIAGNOSIS — D49.0 ESOPHAGUS NEOPLASM: ICD-10-CM

## 2023-05-03 DIAGNOSIS — Z79.899 HIGH RISK MEDICATION USE: ICD-10-CM

## 2023-05-03 DIAGNOSIS — C78.7 MALIGNANT NEOPLASM METASTATIC TO LIVER: ICD-10-CM

## 2023-05-03 DIAGNOSIS — G62.0 PERIPHERAL NEUROPATHY DUE TO CHEMOTHERAPY: ICD-10-CM

## 2023-05-03 DIAGNOSIS — I10 ESSENTIAL HYPERTENSION: ICD-10-CM

## 2023-05-03 DIAGNOSIS — Z72.0 TOBACCO ABUSE: ICD-10-CM

## 2023-05-03 DIAGNOSIS — Z45.2 ENCOUNTER FOR ADJUSTMENT OR MANAGEMENT OF VASCULAR ACCESS DEVICE: ICD-10-CM

## 2023-05-03 LAB
ALBUMIN SERPL-MCNC: 4.1 G/DL (ref 3.5–5.2)
ALBUMIN/GLOB SERPL: 1.3 G/DL (ref 1.1–2.4)
ALP SERPL-CCNC: 266 U/L (ref 38–116)
ALT SERPL W P-5'-P-CCNC: 17 U/L (ref 0–41)
ANION GAP SERPL CALCULATED.3IONS-SCNC: 12.9 MMOL/L (ref 5–15)
AST SERPL-CCNC: 21 U/L (ref 0–40)
BASOPHILS # BLD AUTO: 0.04 10*3/MM3 (ref 0–0.2)
BASOPHILS NFR BLD AUTO: 0.4 % (ref 0–1.5)
BILIRUB SERPL-MCNC: 0.5 MG/DL (ref 0.2–1.2)
BUN SERPL-MCNC: 12 MG/DL (ref 6–20)
BUN/CREAT SERPL: 19.4 (ref 7.3–30)
CALCIUM SPEC-SCNC: 9.7 MG/DL (ref 8.5–10.2)
CEA SERPL-MCNC: 45.4 NG/ML
CHLORIDE SERPL-SCNC: 103 MMOL/L (ref 98–107)
CO2 SERPL-SCNC: 22.1 MMOL/L (ref 22–29)
CREAT SERPL-MCNC: 0.62 MG/DL (ref 0.7–1.3)
DEPRECATED RDW RBC AUTO: 46.1 FL (ref 37–54)
EGFRCR SERPLBLD CKD-EPI 2021: 105.4 ML/MIN/1.73
EOSINOPHIL # BLD AUTO: 0.17 10*3/MM3 (ref 0–0.4)
EOSINOPHIL NFR BLD AUTO: 1.8 % (ref 0.3–6.2)
ERYTHROCYTE [DISTWIDTH] IN BLOOD BY AUTOMATED COUNT: 14.6 % (ref 12.3–15.4)
GLOBULIN UR ELPH-MCNC: 3.1 GM/DL (ref 1.8–3.5)
GLUCOSE SERPL-MCNC: 143 MG/DL (ref 74–124)
HCT VFR BLD AUTO: 45.9 % (ref 37.5–51)
HGB BLD-MCNC: 14.9 G/DL (ref 13–17.7)
IMM GRANULOCYTES # BLD AUTO: 0.08 10*3/MM3 (ref 0–0.05)
IMM GRANULOCYTES NFR BLD AUTO: 0.9 % (ref 0–0.5)
LYMPHOCYTES # BLD AUTO: 0.82 10*3/MM3 (ref 0.7–3.1)
LYMPHOCYTES NFR BLD AUTO: 8.9 % (ref 19.6–45.3)
MCH RBC QN AUTO: 28.1 PG (ref 26.6–33)
MCHC RBC AUTO-ENTMCNC: 32.5 G/DL (ref 31.5–35.7)
MCV RBC AUTO: 86.4 FL (ref 79–97)
MONOCYTES # BLD AUTO: 0.58 10*3/MM3 (ref 0.1–0.9)
MONOCYTES NFR BLD AUTO: 6.3 % (ref 5–12)
NEUTROPHILS NFR BLD AUTO: 7.55 10*3/MM3 (ref 1.7–7)
NEUTROPHILS NFR BLD AUTO: 81.7 % (ref 42.7–76)
NRBC BLD AUTO-RTO: 0 /100 WBC (ref 0–0.2)
PLATELET # BLD AUTO: 146 10*3/MM3 (ref 140–450)
PMV BLD AUTO: 9.9 FL (ref 6–12)
POTASSIUM SERPL-SCNC: 3.8 MMOL/L (ref 3.5–4.7)
PROT SERPL-MCNC: 7.2 G/DL (ref 6.3–8)
RBC # BLD AUTO: 5.31 10*6/MM3 (ref 4.14–5.8)
SODIUM SERPL-SCNC: 138 MMOL/L (ref 134–145)
WBC NRBC COR # BLD: 9.24 10*3/MM3 (ref 3.4–10.8)

## 2023-05-03 PROCEDURE — 82378 CARCINOEMBRYONIC ANTIGEN: CPT | Performed by: INTERNAL MEDICINE

## 2023-05-03 PROCEDURE — 25010000002 PERTUZUMAB 420 MG/14ML SOLUTION 420 MG VIAL: Performed by: NURSE PRACTITIONER

## 2023-05-03 PROCEDURE — 96375 TX/PRO/DX INJ NEW DRUG ADDON: CPT

## 2023-05-03 PROCEDURE — 25010000002 TRASTUZUMAB PER 10 MG: Performed by: NURSE PRACTITIONER

## 2023-05-03 PROCEDURE — 63710000001 DIPHENHYDRAMINE PER 50 MG: Performed by: NURSE PRACTITIONER

## 2023-05-03 PROCEDURE — 25010000002 HEPARIN LOCK FLUSH PER 10 UNITS: Performed by: INTERNAL MEDICINE

## 2023-05-03 PROCEDURE — 80053 COMPREHEN METABOLIC PANEL: CPT

## 2023-05-03 PROCEDURE — 25010000002 HYDROCORTISONE SOD SUC (PF) 100 MG RECONSTITUTED SOLUTION: Performed by: NURSE PRACTITIONER

## 2023-05-03 PROCEDURE — 96417 CHEMO IV INFUS EACH ADDL SEQ: CPT

## 2023-05-03 PROCEDURE — 85025 COMPLETE CBC W/AUTO DIFF WBC: CPT

## 2023-05-03 PROCEDURE — 96413 CHEMO IV INFUSION 1 HR: CPT

## 2023-05-03 RX ORDER — HEPARIN SODIUM (PORCINE) LOCK FLUSH IV SOLN 100 UNIT/ML 100 UNIT/ML
500 SOLUTION INTRAVENOUS AS NEEDED
Status: DISCONTINUED | OUTPATIENT
Start: 2023-05-03 | End: 2023-05-03 | Stop reason: HOSPADM

## 2023-05-03 RX ORDER — SODIUM CHLORIDE 9 MG/ML
250 INJECTION, SOLUTION INTRAVENOUS ONCE
Status: COMPLETED | OUTPATIENT
Start: 2023-05-03 | End: 2023-05-03

## 2023-05-03 RX ORDER — FAMOTIDINE 20 MG/1
20 TABLET, FILM COATED ORAL ONCE
Status: CANCELLED
Start: 2023-05-03 | End: 2023-05-03

## 2023-05-03 RX ORDER — SODIUM CHLORIDE 0.9 % (FLUSH) 0.9 %
10 SYRINGE (ML) INJECTION AS NEEDED
Status: DISCONTINUED | OUTPATIENT
Start: 2023-05-03 | End: 2023-05-03 | Stop reason: HOSPADM

## 2023-05-03 RX ORDER — DIPHENHYDRAMINE HCL 25 MG
25 CAPSULE ORAL ONCE
Status: CANCELLED | OUTPATIENT
Start: 2023-05-03

## 2023-05-03 RX ORDER — SODIUM CHLORIDE 9 MG/ML
250 INJECTION, SOLUTION INTRAVENOUS ONCE
Status: CANCELLED | OUTPATIENT
Start: 2023-05-03

## 2023-05-03 RX ORDER — SODIUM CHLORIDE 0.9 % (FLUSH) 0.9 %
10 SYRINGE (ML) INJECTION AS NEEDED
OUTPATIENT
Start: 2023-05-03

## 2023-05-03 RX ORDER — FAMOTIDINE 20 MG/1
20 TABLET, FILM COATED ORAL ONCE
Status: COMPLETED | OUTPATIENT
Start: 2023-05-03 | End: 2023-05-03

## 2023-05-03 RX ORDER — HEPARIN SODIUM (PORCINE) LOCK FLUSH IV SOLN 100 UNIT/ML 100 UNIT/ML
500 SOLUTION INTRAVENOUS AS NEEDED
OUTPATIENT
Start: 2023-05-03

## 2023-05-03 RX ORDER — DIPHENHYDRAMINE HCL 25 MG
25 CAPSULE ORAL ONCE
Status: COMPLETED | OUTPATIENT
Start: 2023-05-03 | End: 2023-05-03

## 2023-05-03 RX ADMIN — SODIUM CHLORIDE 250 ML: 9 INJECTION, SOLUTION INTRAVENOUS at 09:55

## 2023-05-03 RX ADMIN — Medication 500 UNITS: at 12:44

## 2023-05-03 RX ADMIN — HYDROCORTISONE SODIUM SUCCINATE 100 MG: 100 INJECTION, POWDER, FOR SOLUTION INTRAMUSCULAR; INTRAVENOUS at 09:55

## 2023-05-03 RX ADMIN — FAMOTIDINE 20 MG: 20 TABLET, FILM COATED ORAL at 09:55

## 2023-05-03 RX ADMIN — Medication 10 ML: at 12:44

## 2023-05-03 RX ADMIN — PERTUZUMAB 420 MG: 30 INJECTION, SOLUTION, CONCENTRATE INTRAVENOUS at 10:52

## 2023-05-03 RX ADMIN — DIPHENHYDRAMINE HYDROCHLORIDE 25 MG: 25 CAPSULE ORAL at 09:55

## 2023-05-03 RX ADMIN — TRASTUZUMAB 510 MG: 150 INJECTION, POWDER, LYOPHILIZED, FOR SOLUTION INTRAVENOUS at 12:01

## 2023-05-03 NOTE — PROGRESS NOTES
"  Subjective     REASON FOR FOLLOW UP:  1.  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  2. Colovesical fistula, chronic antibiotic therapy with Cipro.  3.  DVT of the right and left lower extremity. 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  Acute GI bleed      History of Present Illness    The patient is a 67 y.o. male with above his history, who returns to the office today in anticipation of his 5th cycle of Herceptin Perjeta.  He overall tolerates treatment reasonably well.  He does report over the past few weeks he has had more of an upset stomach.  He has continued on a PPI daily.  He does have Zofran to utilize as needed though describes this as a lower abdominal upset rather than nausea.  He reports feeling full quickly.  He describes his stomach is being gurgly.  He is not having vomiting.  He is noted to have lost 8 pounds since he was last here due to decreased oral intake from this upset stomach.  His bowels are loose though he is without significant diarrhea.  He has no shortness of breath or lower extremity swelling.  He has no new pain.    Past Medical History:   Diagnosis Date   • Arthritis    • Cancer     prostate cancer 2008   • Cancer     esophageal/LIVER   • Diabetes mellitus    • Difficulty swallowing solids    • Elevated PSA    • Esophageal mass    • Esophageal varices July 2019   • GERD (gastroesophageal reflux disease)    • H/O Lung nodule    • Hepatitis     CHILD--PT STATED \"I THINK IT WAS A.\"   • History of esophageal varices with bleeding     SUMM34 2021   • History of high blood pressure    • History of pneumonia    • Hx of blood clots 08/10/2019    LOWER LEGS BILAT   • Maintenance chemotherapy     IV EVERY 4 WEEKS:  ESOPHAGEAL CANCER   • Nail fungus     LEFT FINGERNAILS X 4   • Neuropathy    • PVD (peripheral vascular disease)    • Rash     ITCHY RED RASH ON RIGHT THIGH AREA-STATES IT COMES AND GOES.  USING PRESCRIPTION CREAM   • " Recto-bladderneck fistula     on poab for recurrent uti   • Risk factors for obstructive sleep apnea         Past Surgical History:   Procedure Laterality Date   • CATARACT EXTRACTION WITH INTRAOCULAR LENS IMPLANT Bilateral    • COLONOSCOPY N/A 02/04/2020    Procedure: COLONOSCOPY;  Surgeon: Guy Sears MD;  Location: [facility] ENDOSCOPY;  Service: General;  Laterality: N/A;  PRE-COLOVESICAL FISTULA  POST-- DIVERTICULOSIS   • COLONOSCOPY  2/4/2020   • CYSTOSCOPY  02/06/2020   • CYSTOSCOPY Bilateral 02/26/2020    Procedure: CYSTOSCOPY RETROGRADE;  Surgeon: Cm Witt MD;  Location: [facility] MAIN OR;  Service: Urology;  Laterality: Bilateral;   • ENDOSCOPY     • ENDOSCOPY N/A 06/19/2021    Procedure: ESOPHAGOGASTRODUODENOSCOPY WITH BIOPSY;  Surgeon: Mary Brito MD;  Location: [facility] OR;  Service: Gastroenterology;  Laterality: N/A;   • ENDOSCOPY N/A 08/05/2021    Procedure: ESOPHAGOGASTRODUODENOSCOPY HEMOSPRAY;  Surgeon: Naga Shelby MD;  Location: [facility] ENDOSCOPY;  Service: Gastroenterology;  Laterality: N/A;  HX OF ESOPHAGEAL  CANCER;MELENA  POST: ESOPHAGEAL BLEEDING; ESOPHAGEAL MASS   • ENDOSCOPY N/A 11/4/2022    Procedure: ESOPHAGOGASTRODUODENOSCOPY WITH DILATATION;  Surgeon: Mary Brito MD;  Location: [facility] OR;  Service: Gastroenterology;  Laterality: N/A;   • HERNIA REPAIR Right 1999    inguinal hernia   • PROSTATE SURGERY  03/2008    prostatectectomy   • UPPER GASTROINTESTINAL ENDOSCOPY  August 2021   • VENOUS ACCESS DEVICE (PORT) INSERTION N/A 07/16/2019    Procedure: INSERTION VENOUS ACCESS DEVICE WITH FLUORO AND EGD WITH BIOPSY;  Surgeon: Mary Brito MD;  Location: [facility] OR;  Service: Thoracic        Current Outpatient Medications on File Prior to Visit   Medication Sig Dispense Refill   • acetaminophen (TYLENOL) 500 MG tablet Take 1 tablet by mouth Every 6 (Six) Hours As Needed for Mild Pain.     • cevimeline (EVOXAC) 30 MG capsule Take 1 capsule by  mouth 3 (Three) Times a Day. DRY MOUTH     • ciprofloxacin (CIPRO) 250 MG tablet TAKE 1 TABLET BY MOUTH EVERY OTHER DAY 90 tablet 0   • econazole nitrate (SPECTAZOLE) 1 % cream Apply  topically to the appropriate area as directed 2 (Two) Times a Day. (Patient taking differently: Apply 1 application topically to the appropriate area as directed 2 (Two) Times a Day. NAIL FUNGUS-4 FINGERS ON LEFT) 85 g 1   • furosemide (LASIX) 20 MG tablet Take 1 tablet by mouth Every Other Day. (Patient taking differently: Take 1 tablet by mouth As Needed. LOWER LEG EDEMA) 28 tablet 2   • gabapentin (NEURONTIN) 300 MG capsule Take 1 capsule by mouth 2 (Two) Times a Day. 180 capsule 0   • glipizide (GLUCOTROL XL) 5 MG ER tablet Take 1 tablet by mouth Daily. 90 tablet 1   • lisinopril-hydrochlorothiazide (PRINZIDE,ZESTORETIC) 20-12.5 MG per tablet TAKE 1 TABLET BY MOUTH EVERY DAY (Patient taking differently: Take 0.5 tablets by mouth As Needed. SYS GREATER THAN 130, TAKE 1/2 TAB) 90 tablet 0   • pantoprazole (Protonix) 40 MG EC tablet Take 1 tablet by mouth 2 (Two) Times a Day. 180 tablet 3   • pentoxifylline (TRENtal) 400 MG CR tablet TAKE 1 TABLET BY MOUTH TWICE A DAY WITH MEALS 90 tablet 0   • potassium chloride ER (K-TAB) 20 MEQ tablet controlled-release ER tablet Take 1 tablet by mouth Daily. 90 tablet 3   • Probiotic Product (Probiotic Blend) capsule Take 1 capsule by mouth Daily.       No current facility-administered medications on file prior to visit.        ALLERGIES:    Allergies   Allergen Reactions   • Oxaliplatin Anaphylaxis        Social History     Socioeconomic History   • Marital status: Single   • Years of education: High school   Tobacco Use   • Smoking status: Every Day     Packs/day: 1.00     Years: 38.00     Pack years: 38.00     Types: Cigarettes     Start date: 1/1/1974   • Smokeless tobacco: Never   • Tobacco comments:     Quit for a period of 8 years.    Vaping Use   • Vaping Use: Never used   Substance and  Sexual Activity   • Alcohol use: Not Currently   • Drug use: Never   • Sexual activity: Not Currently     Partners: Female     Birth control/protection: None        Family History   Problem Relation Age of Onset   • Hypertension Mother    • Stroke Mother    • Lung cancer Father    • Hypertension Other    • Lung disease Other    • Prostate cancer Other    • Malig Hyperthermia Neg Hx       Current Outpatient Medications on File Prior to Visit   Medication Sig Dispense Refill   • acetaminophen (TYLENOL) 500 MG tablet Take 1 tablet by mouth Every 6 (Six) Hours As Needed for Mild Pain.     • cevimeline (EVOXAC) 30 MG capsule Take 1 capsule by mouth 3 (Three) Times a Day. DRY MOUTH     • ciprofloxacin (CIPRO) 250 MG tablet TAKE 1 TABLET BY MOUTH EVERY OTHER DAY 90 tablet 0   • econazole nitrate (SPECTAZOLE) 1 % cream Apply  topically to the appropriate area as directed 2 (Two) Times a Day. (Patient taking differently: Apply 1 application topically to the appropriate area as directed 2 (Two) Times a Day. NAIL FUNGUS-4 FINGERS ON LEFT) 85 g 1   • furosemide (LASIX) 20 MG tablet Take 1 tablet by mouth Every Other Day. (Patient taking differently: Take 1 tablet by mouth As Needed. LOWER LEG EDEMA) 28 tablet 2   • gabapentin (NEURONTIN) 300 MG capsule Take 1 capsule by mouth 2 (Two) Times a Day. 180 capsule 0   • glipizide (GLUCOTROL XL) 5 MG ER tablet Take 1 tablet by mouth Daily. 90 tablet 1   • lisinopril-hydrochlorothiazide (PRINZIDE,ZESTORETIC) 20-12.5 MG per tablet TAKE 1 TABLET BY MOUTH EVERY DAY (Patient taking differently: Take 0.5 tablets by mouth As Needed. SYS GREATER THAN 130, TAKE 1/2 TAB) 90 tablet 0   • pantoprazole (Protonix) 40 MG EC tablet Take 1 tablet by mouth 2 (Two) Times a Day. 180 tablet 3   • pentoxifylline (TRENtal) 400 MG CR tablet TAKE 1 TABLET BY MOUTH TWICE A DAY WITH MEALS 90 tablet 0   • potassium chloride ER (K-TAB) 20 MEQ tablet controlled-release ER tablet Take 1 tablet by mouth Daily. 90  "tablet 3   • Probiotic Product (Probiotic Blend) capsule Take 1 capsule by mouth Daily.       No current facility-administered medications on file prior to visit.     Allergies   Allergen Reactions   • Oxaliplatin Anaphylaxis          Objective     Vitals:    05/03/23 0904   BP: 146/87   Pulse: 88   Resp: 16   Temp: 97.5 °F (36.4 °C)   TempSrc: Temporal   SpO2: 98%   Weight: 79 kg (174 lb 3.2 oz)   Height: 175.3 cm (69.02\")   PainSc: 0-No pain     Body mass index is 25.71 kg/m².         5/3/2023     9:00 AM   Current Status   ECOG score 0     Physical Exam  Vitals reviewed.   Constitutional:       General: He is not in acute distress.     Appearance: Normal appearance. He is well-developed.   HENT:      Head: Normocephalic and atraumatic.   Eyes:      Pupils: Pupils are equal, round, and reactive to light.   Cardiovascular:      Rate and Rhythm: Normal rate and regular rhythm.      Heart sounds: Normal heart sounds. No murmur heard.  Pulmonary:      Effort: Pulmonary effort is normal. No respiratory distress.      Breath sounds: Normal breath sounds. No wheezing, rhonchi or rales.   Abdominal:      General: Bowel sounds are normal. There is no distension.      Palpations: Abdomen is soft. There is no hepatomegaly.      Comments: I am unable to appreciate the hepatic edge   Musculoskeletal:         General: No swelling. Normal range of motion.      Cervical back: Normal range of motion.   Skin:     General: Skin is warm and dry.      Findings: No rash.      Comments: alopecia   Neurological:      Mental Status: He is alert and oriented to person, place, and time.       I have reexamined the patient and the results are consistent with the previously documented exam. NIRU Okeefe       RECENT LABS:  Results from last 7 days   Lab Units 05/03/23  0850   WBC 10*3/mm3 9.24   NEUTROS ABS 10*3/mm3 7.55*   HEMOGLOBIN g/dL 14.9   HEMATOCRIT % 45.9   PLATELETS 10*3/mm3 146     Results from last 7 days   Lab " Units 05/03/23  0850   SODIUM mmol/L 138   POTASSIUM mmol/L 3.8   CHLORIDE mmol/L 103   CO2 mmol/L 22.1   BUN mg/dL 12   CREATININE mg/dL 0.62*   CALCIUM mg/dL 9.7   ALBUMIN g/dL 4.1   BILIRUBIN mg/dL 0.5   ALK PHOS U/L 266*   ALT (SGPT) U/L 17   AST (SGOT) U/L 21   GLUCOSE mg/dL 143*           Assessment & Plan    1.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 Almost 90% of the liver was replaced by tumor.  · HER-2 positive  · Initially treated with FOLFOX initiated July 2019  · Herceptin added with cycle 2  · Following 8 cycles of FOLFOX, oxaliplatin discontinued with continuation of 5-FU, leucovorin, Herceptin. This was continued through 7/7/2020  · Increasing CEA led to PET scan 6/9/2021.  Disease progression noted with growth of the tumor in the lower esophagus.  Continued stability of hepatic metastasis  · Repeat EGD 6/19/2021 for tissue specimen and Next Generation Sequencing.  · Endoscopy that shows a noncircumferential abnormality in the lower esophagus that encompasses almost 6 cm in length. It is not blocking off the esophagus and that is why the patient has no symptoms. The pathology shows at least atypical cells consistent with cancer.  · NGS showing PD-L1 positive and high mutation burden.  The tumor remains HER-2 positive.  Treatment plan for Keytruda every 3 weeks x 4 cycles followed by repeat imaging  · Keytruda initiated 7/29/2021  · Hospitalization 8/2/2021 through 8/7/2021 secondary to acute GI bleeding from known malignancy.  · Completed 10 fractions of radiation to the lower esophagus 8/18/2021 after GI bleed  · PET scan following four cycles of Keytruda 10/14/2021 with improvement in the patient's known neoplasm involving the distal esophagus. However, known metastatic disease involving the liver is significantly worse. Keytruda discontinued  · Enhertu initiated 10/29/2021  · 12/23/2021 PET scan following three cycles of Enhertu discloses resolution of the tumor in  the esophagus. He continues to have two active lesions in the liver with significant SUV activity pertinent to his metastatic disease. Minimal pulmonary infiltrates noted bilaterally. With concerns for Enhertu interstitial lung disease, Enhertu discontinued  · Treatment transitioned to FOLFOX Herceptin  · Borderline neutropenia 2/9/2022 with plans for 15% dose reduction to FOLFOX  · 2/16/2022, significant reaction to oxaliplatin which is discontinued  · Reevaluation 3/2/2022, with discontinue of oxaliplatin, the patient will continue on 5-FU, leucovorin, Kanjinti. Of note, the patient received 6 mg/kg of Kanjinti 2 weeks ago, therefore he cannot receive this medication today. He will receive only 5-FU leucovorin and return in 1 week for Kanjinti 2 mg/kg. In 2 weeks, he will resume his normal schedule of 5-FU, leucovorin, Kanjinti 4 mg/kg every 2 weeks.  · 3/16/2022 will continue on with 5-FU, leucovorin, Herceptin.  CEA dramatically improving.  · 3/25/2022 CT C/A/P When compared to recent PET/CT there is interval decrease in size of the previously demonstrated hypermetabolic hepatic metastases as well as decrease in size of the periportal lymph node. The wall thickening involving the distal esophagus is largely unchanged. 2. Persistent findings of colovesicular fistula. 3. Ill-defined interstitial and ground glass opacities within the bilateral lower lobes favored to represent postradiation pneumonitis/change  · 6/8/2022 CEA 8.0.  · 6/29/2022 here for continued 5-FU, leucovorin, Kanjanti, overall tolerating well.  · 8/17/2022 continuing on 5-FU, leucovorin, Herceptin tolerating well.  CEA 8.  Patient was advised to continue with his current treatment.  Not planning a another radiological assessment unless patient has further rise in CEA level.  · 9/14/2022 here for continued 5-FU, leucovorin, Herceptin, continuing to tolerate quite well.   · 10/12/2022, progressive dysphagia with minimal rise of his CEA with plans  made for PET scan  · Endoscopy 11/4/2022 with findings very tight esophagus with strictures.  Biopsies with pathology consistent with adenocarcinoma.  · PET scan 11/2/2022 with a long segment of activity of the esophagus and additional 2 tumoral lesions of the liver or 1 in the left lobe and the other in the right lobe.  There is also a CV activity close to the head of the pancreas.  · 5-FU, leucovorin, Herceptin discontinued with plans to move to Taxol weekly 3 to 4 weeks coordinated with radiation therapy.  · Consult with radiation oncology 11/15/2022 with plans for 10 fractions to the distal esophagus  · Initiating single agent Taxol today, 11/16/2022  · 12/28/2022 due for cycle 2-day 15.  Neuropathy worsening specifically in the feet with more trouble feeling the ground.  No falls.  Per discussion with Dr. Haynes will adjust Taxol down by 15%.  · Reevaluation 1/11/2023 due for cycle 3-day 1 Taxol.  Further adjustment to Taxol which will now be given day 1 day 15 of a 28-day cycle.  Day 8 discontinued from the treatment plan.  · 1/11/2023, CEA increased to 77.3.  · Taxol discontinued  · PET scan 1/31/2023 with mixed findings.  Regression of the distal esophageal mass, however, progression of hepatic metastasis and metastatic lymphadenopathy in the [place] hepatis and retroperitoneum.  · Herceptin Perjeta initiated 2/8/2023  · Baseline echocardiogram 2/13/2023 with left ventricular systolic function 61.5%  · Following initiation of Herceptin Perjeta, nodular hepatic edge improved with CEA declined to 35.2 3/1/2023  · Proceed  3/22/2023 with cycle 3 Herceptin Perjeta which is continued every 21 days  · 4/12/2023 CEA 37.7  · Proceed today, 5/3/2023 with Herceptin Perjeta.  Repeat CEA is pending.  He does report increasing GI discomfort.  Additional supportive care adjusted as outlined below.    2. Colovesical fistula.   · He requires intermittent Cipro when he notes stool in his urine  · He currently reports his  symptoms are more progressive with more urine in his rectum. He continues on Cipro and is scheduled for follow-up with urology, Dr. Witt later this month  · He has been seen by Cm Witt MD. He has advised the patient that he could not do too much for the fistula given the fact that it is just inconveniences of having urination through the anal canal from time to time but no urinary tract infection. He asked the patient to postpone any intention for surgery unless that it is absolutely necessary. Postponement of chemotherapy for 3 months if any surgery is necessary will trigger dramatic progression of his tumor and obviously consequences.   · On 06/08/2022, his colovesical fistula continues. Most of the output is urine through the rectum, no stool through the bladder. He has not had any urinary tract infection but he continues using prophylactically his ciprofloxacin 250 mg orally every other day. I asked him to remain on this medicine for the time being  · 08/17/2022 the patient continues having colovesical fistula symptomatology intermittently, passing urine through the rectum and not passing feces through the bladder. He has not had any other urinary tract infections. He continues using his ciprofloxacin as per my instructions  · He continues on prophylactic ciprofloxacin every other day with no progressive symptoms today, afebrile without leukocytosis    3.  Thrombophilia secondary malignancy, DVT  · Currently anticoagulated with Xarelto 20 mg daily  · Following GI bleed 8/2/2021, Xarelto has been discontinued  · Plan no evidence of recurrent thrombosis    4. Acute GI bleeding  · Dark tarry stool initially began 7/29/2021  · 7/23/2021, hemoglobin of 11.0.  8/2/2021, hemoglobin of 5.7  · Patient admitted, received transfusion of 4 units packed red blood cells.  EGD identified bleeding mass in the distal esophagus  · Status post radiation to esophageal lesion with no further issues with  bleeding  · 3/16/2022 no further episodes.   · Without signs or symptoms of bleeding, hemoglobin today 14.9    5. Enhertu initiated interstitial lung disease  · The patient completed a prednisone taper  · He is without hypoxia or tachycardia currently. He has only minimal dyspnea on exertion  · As long as he continues to smoke he is not a candidate for resumption of Enhertu    6. Significant oxaliplatin reaction  · 2/16/2022, significant reaction with shortness of breath, limited air movement, treated with high-dose Solu-Cortef. He is not a candidate for further oxaliplatin    7. Borderline thrombocytopenia  · 3/2/2021, the patient is a platelet count of 101,000. With the discontinuation of oxaliplatin, we can continue with 5-FU leucovorin which will likely not cause significant issues with his platelets.  · 5/18/2022 platelet count normalized at  141,000  · Platelets today 146,000, without signs or symptoms of bleeding    8.  Hypertension: He previously been on lisinopril/hydrochlorothiazide however this medication is currently on hold due to some issues with hypotension.  He now only takes the medication if his blood pressure is above 130/90.  · Blood pressure today normal at 146/87.    9.  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  · 12/28/2022, persistent neuropathy, Taxol reduced by 15%  · Ongoing neuropathy from previous oxaliplatin and Taxol.  This is unchanged currently    10.  Lower extremity edema:  · Bilateral.  Patient has been on his feet much more recently and has been more active.  It seemed to have worsened once the weather got warmer.  He states he was told previously by a cardiothoracic surgeon not to wear compression socks.  I have advised him to watch his salt intake, elevate his legs when he possible, and make sure he is drinking plenty of water.  Continue to monitor, and call if worsens.  · 6/20/2022 patient was prescribed lasix 20 mg every other  "day, which is helping some.  He is also trying to elevate his legs above his heart twice a day, which he also feels is helping.   · He is currently without swelling today, 5/3/2023    11.  Decreased oral intake with GI upset  · He states this is not a nausea though in early satiety and \"grumbly\" stomach we will add Pepcid 20 mg twice daily and Gas-X.  · The patient is noted to have lost 8 pounds today.  If his appetite continues to struggle he will call prior to 3-week follow-up so he can be reevaluated.      PLAN:  1. Proceed today with Herceptin Perjeta which is continued every 21 days, this is his fifth cycle  2. Add Pepcid 20 mg twice daily  3. Begin Gas-X prior to meals  4. We discussed small frequent meals.  I did offer referral to oncology dietitian though he declines at this time  5. Continue Zofran as needed  6. Continue gabapentin 300 mg twice daily.  There is no need to adjustment as the patient is not having painful neuropathy  7. CEA is pending today  8. Return in 3 weeks for CBC, CMP, MD follow-up with nurse practitioner and continued Herceptin Perjeta    The patient is on high risk medication requiring close monitoring for toxicity    Addendum: CEA is increased at 45.4 compared to 37.73 weeks ago.  This was reviewed with Dr. Haynes.  We will proceed with a PET scan for further evaluation of the patient's malignancy.    Dana Paniagua, APRN  05/03/2023      "

## 2023-05-03 NOTE — PATIENT INSTRUCTIONS
Begin Pepcid 20mg twice a day.  Add Gas X up to 4x daily. I would start taking it before meals each day to see if symtpoms improve, then can decrease.

## 2023-05-09 DIAGNOSIS — C15.5 MALIGNANT NEOPLASM OF LOWER THIRD OF ESOPHAGUS: ICD-10-CM

## 2023-05-09 DIAGNOSIS — I82.412 ACUTE DEEP VEIN THROMBOSIS (DVT) OF FEMORAL VEIN OF LEFT LOWER EXTREMITY: ICD-10-CM

## 2023-05-09 DIAGNOSIS — Z72.0 TOBACCO ABUSE: ICD-10-CM

## 2023-05-09 DIAGNOSIS — Z45.2 ENCOUNTER FOR ADJUSTMENT OR MANAGEMENT OF VASCULAR ACCESS DEVICE: ICD-10-CM

## 2023-05-09 DIAGNOSIS — N32.1 COLOVESICAL FISTULA: ICD-10-CM

## 2023-05-09 RX ORDER — GABAPENTIN 300 MG/1
CAPSULE ORAL
Qty: 180 CAPSULE | Refills: 0 | Status: SHIPPED | OUTPATIENT
Start: 2023-05-09

## 2023-05-10 ENCOUNTER — HOSPITAL ENCOUNTER (OUTPATIENT)
Dept: PET IMAGING | Facility: HOSPITAL | Age: 68
Discharge: HOME | End: 2023-05-10
Payer: MEDICARE

## 2023-05-10 DIAGNOSIS — C15.5 MALIGNANT NEOPLASM OF LOWER THIRD OF ESOPHAGUS: ICD-10-CM

## 2023-05-10 LAB — GLUCOSE BLDC GLUCOMTR-MCNC: 82 MG/DL (ref 70–130)

## 2023-05-10 PROCEDURE — 0 FLUDEOXYGLUCOSE F18 SOLUTION: Performed by: NURSE PRACTITIONER

## 2023-05-10 PROCEDURE — 78815 PET IMAGE W/CT SKULL-THIGH: CPT

## 2023-05-10 PROCEDURE — 82948 REAGENT STRIP/BLOOD GLUCOSE: CPT

## 2023-05-10 PROCEDURE — A9552 F18 FDG: HCPCS | Performed by: NURSE PRACTITIONER

## 2023-05-10 RX ADMIN — FLUDEOXYGLUCOSE F18 1 DOSE: 300 INJECTION INTRAVENOUS at 13:01

## 2023-05-12 ENCOUNTER — OFFICE VISIT (OUTPATIENT)
Dept: ONCOLOGY | Facility: CLINIC | Age: 68
End: 2023-05-12
Payer: MEDICARE

## 2023-05-12 VITALS
SYSTOLIC BLOOD PRESSURE: 125 MMHG | HEART RATE: 98 BPM | DIASTOLIC BLOOD PRESSURE: 78 MMHG | HEIGHT: 69 IN | WEIGHT: 176.2 LBS | TEMPERATURE: 97.8 F | BODY MASS INDEX: 26.1 KG/M2 | OXYGEN SATURATION: 97 %

## 2023-05-12 DIAGNOSIS — C78.7 MALIGNANT NEOPLASM METASTATIC TO LIVER: ICD-10-CM

## 2023-05-12 DIAGNOSIS — I10 ESSENTIAL HYPERTENSION: ICD-10-CM

## 2023-05-12 DIAGNOSIS — C15.5 MALIGNANT NEOPLASM OF LOWER THIRD OF ESOPHAGUS: Primary | ICD-10-CM

## 2023-05-12 DIAGNOSIS — Z72.0 TOBACCO ABUSE: ICD-10-CM

## 2023-05-12 DIAGNOSIS — T45.1X5A PERIPHERAL NEUROPATHY DUE TO CHEMOTHERAPY: ICD-10-CM

## 2023-05-12 DIAGNOSIS — G62.0 PERIPHERAL NEUROPATHY DUE TO CHEMOTHERAPY: ICD-10-CM

## 2023-05-12 DIAGNOSIS — N32.1 RECTO-BLADDERNECK FISTULA: ICD-10-CM

## 2023-05-12 DIAGNOSIS — R73.01 IMPAIRED FASTING GLUCOSE: ICD-10-CM

## 2023-05-12 DIAGNOSIS — E78.2 MIXED HYPERLIPIDEMIA: ICD-10-CM

## 2023-05-12 NOTE — PROGRESS NOTES
"  Subjective     REASON FOR FOLLOW UP:  1.  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  2. Colovesical fistula, chronic antibiotic therapy with Cipro.  3.  DVT of the right and left lower extremity. 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  Acute GI bleed      History of Present Illness      On 05/12/2023 this 67-year-old male returns today to the office for follow-up of his Stage IV cancer of the esophagus with liver metastasis, undergoing Herceptin/Perjeta. In the interim because of the raise in his CEA level we have proceeded with a PET scan for review today. The patient is feeling more fatigued than usual and he is having some epigastric discomfort and sensation of early satiety and fullness. No jaundice. No fevers or chills. Bowel activity is appropriate. His colovesical fistula is not acting up with infection even though he still has occasional urination through the rectum. No cardiac or respiratory issues. He is more tired than usual. He is eating less.        Past Medical History:   Diagnosis Date   • Arthritis    • Cancer     prostate cancer 2008   • Cancer     esophageal/LIVER   • Diabetes mellitus    • Difficulty swallowing solids    • Elevated PSA    • Esophageal mass    • Esophageal varices July 2019   • GERD (gastroesophageal reflux disease)    • H/O Lung nodule    • Hepatitis     CHILD--PT STATED \"I THINK IT WAS A.\"   • History of esophageal varices with bleeding     SUMM34 2021   • History of high blood pressure    • History of pneumonia    • Hx of blood clots 08/10/2019    LOWER LEGS BILAT   • Maintenance chemotherapy     IV EVERY 4 WEEKS:  ESOPHAGEAL CANCER   • Nail fungus     LEFT FINGERNAILS X 4   • Neuropathy    • PVD (peripheral vascular disease)    • Rash     ITCHY RED RASH ON RIGHT THIGH AREA-STATES IT COMES AND GOES.  USING PRESCRIPTION CREAM   • Recto-bladderneck fistula     on poab for recurrent uti   • Risk factors for obstructive " sleep apnea         Past Surgical History:   Procedure Laterality Date   • CATARACT EXTRACTION WITH INTRAOCULAR LENS IMPLANT Bilateral    • COLONOSCOPY N/A 02/04/2020    Procedure: COLONOSCOPY;  Surgeon: Guy Sears MD;  Location: [facility] ENDOSCOPY;  Service: General;  Laterality: N/A;  PRE-COLOVESICAL FISTULA  POST-- DIVERTICULOSIS   • COLONOSCOPY  2/4/2020   • CYSTOSCOPY  02/06/2020   • CYSTOSCOPY Bilateral 02/26/2020    Procedure: CYSTOSCOPY RETROGRADE;  Surgeon: Cm Wtit MD;  Location: [facility] OR;  Service: Urology;  Laterality: Bilateral;   • ENDOSCOPY     • ENDOSCOPY N/A 06/19/2021    Procedure: ESOPHAGOGASTRODUODENOSCOPY WITH BIOPSY;  Surgeon: Mary Brito MD;  Location: [facility] OR;  Service: Gastroenterology;  Laterality: N/A;   • ENDOSCOPY N/A 08/05/2021    Procedure: ESOPHAGOGASTRODUODENOSCOPY HEMOSPRAY;  Surgeon: Naga Shelby MD;  Location: [facility] ENDOSCOPY;  Service: Gastroenterology;  Laterality: N/A;  HX OF ESOPHAGEAL  CANCER;MELENA  POST: ESOPHAGEAL BLEEDING; ESOPHAGEAL MASS   • ENDOSCOPY N/A 11/4/2022    Procedure: ESOPHAGOGASTRODUODENOSCOPY WITH DILATATION;  Surgeon: Mary Brito MD;  Location: [facility] OR;  Service: Gastroenterology;  Laterality: N/A;   • HERNIA REPAIR Right 1999    inguinal hernia   • PROSTATE SURGERY  03/2008    prostatectectomy   • UPPER GASTROINTESTINAL ENDOSCOPY  August 2021   • VENOUS ACCESS DEVICE (PORT) INSERTION N/A 07/16/2019    Procedure: INSERTION VENOUS ACCESS DEVICE WITH FLUORO AND EGD WITH BIOPSY;  Surgeon: Mary Brito MD;  Location: [facility] OR;  Service: Thoracic        Current Outpatient Medications on File Prior to Visit   Medication Sig Dispense Refill   • acetaminophen (TYLENOL) 500 MG tablet Take 1 tablet by mouth Every 6 (Six) Hours As Needed for Mild Pain.     • cevimeline (EVOXAC) 30 MG capsule Take 1 capsule by mouth 3 (Three) Times a Day. DRY MOUTH     • ciprofloxacin (CIPRO) 250 MG tablet TAKE 1  TABLET BY MOUTH EVERY OTHER DAY 90 tablet 0   • econazole nitrate (SPECTAZOLE) 1 % cream Apply  topically to the appropriate area as directed 2 (Two) Times a Day. (Patient taking differently: Apply 1 application topically to the appropriate area as directed 2 (Two) Times a Day. NAIL FUNGUS-4 FINGERS ON LEFT) 85 g 1   • furosemide (LASIX) 20 MG tablet Take 1 tablet by mouth Every Other Day. (Patient taking differently: Take 1 tablet by mouth As Needed. LOWER LEG EDEMA) 28 tablet 2   • gabapentin (NEURONTIN) 300 MG capsule TAKE 1 CAPSULE BY MOUTH TWICE A  capsule 0   • glipizide (GLUCOTROL XL) 5 MG ER tablet Take 1 tablet by mouth Daily. 90 tablet 1   • lisinopril-hydrochlorothiazide (PRINZIDE,ZESTORETIC) 20-12.5 MG per tablet TAKE 1 TABLET BY MOUTH EVERY DAY (Patient taking differently: Take 0.5 tablets by mouth As Needed. SYS GREATER THAN 130, TAKE 1/2 TAB) 90 tablet 0   • pantoprazole (Protonix) 40 MG EC tablet Take 1 tablet by mouth 2 (Two) Times a Day. 180 tablet 3   • pentoxifylline (TRENtal) 400 MG CR tablet TAKE 1 TABLET BY MOUTH TWICE A DAY WITH MEALS 90 tablet 0   • potassium chloride ER (K-TAB) 20 MEQ tablet controlled-release ER tablet Take 1 tablet by mouth Daily. 90 tablet 3   • Probiotic Product (Probiotic Blend) capsule Take 1 capsule by mouth Daily.       No current facility-administered medications on file prior to visit.        ALLERGIES:    Allergies   Allergen Reactions   • Oxaliplatin Anaphylaxis        Social History     Socioeconomic History   • Marital status: Single   • Years of education: High school   Tobacco Use   • Smoking status: Every Day     Packs/day: 1.00     Years: 38.00     Pack years: 38.00     Types: Cigarettes     Start date: 1/1/1974   • Smokeless tobacco: Never   • Tobacco comments:     Quit for a period of 8 years.    Vaping Use   • Vaping Use: Never used   Substance and Sexual Activity   • Alcohol use: Not Currently   • Drug use: Never   • Sexual activity: Not  Currently     Partners: Female     Birth control/protection: None        Family History   Problem Relation Age of Onset   • Hypertension Mother    • Stroke Mother    • Lung cancer Father    • Hypertension Other    • Lung disease Other    • Prostate cancer Other    • Malig Hyperthermia Neg Hx       Current Outpatient Medications on File Prior to Visit   Medication Sig Dispense Refill   • acetaminophen (TYLENOL) 500 MG tablet Take 1 tablet by mouth Every 6 (Six) Hours As Needed for Mild Pain.     • cevimeline (EVOXAC) 30 MG capsule Take 1 capsule by mouth 3 (Three) Times a Day. DRY MOUTH     • ciprofloxacin (CIPRO) 250 MG tablet TAKE 1 TABLET BY MOUTH EVERY OTHER DAY 90 tablet 0   • econazole nitrate (SPECTAZOLE) 1 % cream Apply  topically to the appropriate area as directed 2 (Two) Times a Day. (Patient taking differently: Apply 1 application topically to the appropriate area as directed 2 (Two) Times a Day. NAIL FUNGUS-4 FINGERS ON LEFT) 85 g 1   • furosemide (LASIX) 20 MG tablet Take 1 tablet by mouth Every Other Day. (Patient taking differently: Take 1 tablet by mouth As Needed. LOWER LEG EDEMA) 28 tablet 2   • gabapentin (NEURONTIN) 300 MG capsule TAKE 1 CAPSULE BY MOUTH TWICE A  capsule 0   • glipizide (GLUCOTROL XL) 5 MG ER tablet Take 1 tablet by mouth Daily. 90 tablet 1   • lisinopril-hydrochlorothiazide (PRINZIDE,ZESTORETIC) 20-12.5 MG per tablet TAKE 1 TABLET BY MOUTH EVERY DAY (Patient taking differently: Take 0.5 tablets by mouth As Needed. SYS GREATER THAN 130, TAKE 1/2 TAB) 90 tablet 0   • pantoprazole (Protonix) 40 MG EC tablet Take 1 tablet by mouth 2 (Two) Times a Day. 180 tablet 3   • pentoxifylline (TRENtal) 400 MG CR tablet TAKE 1 TABLET BY MOUTH TWICE A DAY WITH MEALS 90 tablet 0   • potassium chloride ER (K-TAB) 20 MEQ tablet controlled-release ER tablet Take 1 tablet by mouth Daily. 90 tablet 3   • Probiotic Product (Probiotic Blend) capsule Take 1 capsule by mouth Daily.       No  "current facility-administered medications on file prior to visit.     Allergies   Allergen Reactions   • Oxaliplatin Anaphylaxis          Objective     Vitals:    05/12/23 1415   BP: 125/78   Pulse: 98   Temp: 97.8 °F (36.6 °C)   TempSrc: Temporal   SpO2: 97%   Weight: 79.9 kg (176 lb 3.2 oz)   Height: 175.3 cm (69.02\")   PainSc: 0-No pain     Body mass index is 26.01 kg/m².         5/12/2023     2:14 PM   Current Status   ECOG score 0      exam      GENERAL:  Well-developed, Patient  in no acute distress.   SKIN:  Warm, dry ,NO purpura ,no rash.  HEENT:  Pupils were equal and reactive to light and accomodation, conjunctivae noninjected,  normal visual acuity.   NECK:  Supple with good range of motion; no thyromegaly , no JVD or bruits,.No carotid artery pain, no carotid abnormal pulsation   LYMPHATICS:  No cervical, NO supraclavicular, NO axillary, NO inguinal adenopathies.  CARDIAC   normal rate , regular rhythm, without murmur,NO rubs NO S3 NO S4   LUNGS: normal breath sounds bilateral, no wheezing, NO rhonchi, NO crackles ,NO rubs.  VASCULAR VENOUS: no cyanosis, NO collateral circulation, NO varicosities, NO edema, NO palpable cords, NO pain,NO erythema, NO pigmentation of the skin.  ABDOMEN:  Soft, NO pain,nodular hepatomegaly, no splenomegaly,no masses, no ascites, no collateral circulation,no distention.  EXTREMITIES  AND SPINE:  No clubbing, no cyanosis ,no deformities , no pain .No kyphosis,  no pain in spine, no pain in ribs , no pain in pelvic bone.  NEUROLOGICAL:  Patient was awake, alert, oriented to time, person and place.        RECENT LABS:  Lab Results   Component Value Date    WBC 9.24 05/03/2023    HGB 14.9 05/03/2023    HCT 45.9 05/03/2023    MCV 86.4 05/03/2023     05/03/2023     Lab Results   Component Value Date    GLUCOSE 143 (H) 05/03/2023    BUN 12 05/03/2023    CREATININE 0.62 (L) 05/03/2023    EGFR 105.4 05/03/2023    BCR 19.4 05/03/2023    K 3.8 05/03/2023    CO2 22.1 05/03/2023 "    CALCIUM 9.7 05/03/2023    PROTENTOTREF 7.1 02/02/2019    ALBUMIN 4.1 05/03/2023    BILITOT 0.5 05/03/2023    AST 21 05/03/2023    ALT 17 05/03/2023     Lab Results   Component Value Date    CEA 45.40 05/03/2023                F-18 FDG PET SKULL BASE TO MID THIGH WITH PET CT FUSION.     HISTORY: 67-year-old male with metastatic distal esophageal cancer.  Rectovesicular fistula. Restaging.     TECHNIQUE: Radiation dose reduction techniques were utilized, including  automated exposure control and exposure modulation based on body size.   Blood glucose level at time of injection was 82 mg/dL. 6.6 mCi of F-18  FDG were injected and PET was performed from skull base to mid thigh. CT  was obtained for localization and attenuation correction. Time at  injection 12:56 PM. PET start time 2:28 PM. Compared with PET/CT  01/31/2023.     FINDINGS: There has been significant increase in the intensity of  metabolic activity at the thickened distal esophagus with a maximal SUV  of 9.2, previously 5.9. There has been significant increase in the  intensity of metabolic activity of the previously seen liver metastases  and there are multiple new hypermetabolic metastases. The dominant  metastasis at the lateral hepatic segment is significantly larger and  has a maximal SUV of 13.7, previously 10.7. A right lateral hepatic lobe  metastasis has a maximal SUV of 10.4, previously 9.4. There has been  marked progression of the hypermetabolic lymphadenopathy at the [place]  hepatis, celiac axis, and retroperitoneum and there is new  hypermetabolic retroperitoneal lymphadenopathy as well. There has been  progression of the hypermetabolic retrodiaphragmatic lymphadenopathy and  the right epicardial fat pad lymphadenopathy. There is a new 8 mm  pleural-based right basilar pulmonary nodule. There has been marked  increase in size of a 2.4 x 1.4 cm anterior mediastinal node, previously  measuring up to 5 mm and the maximal SUV is 7.8. There are  also new  shotty hypermetabolic bilateral supraclavicular nodes. There is no  suspicious hypermetabolic activity within the neck.     IMPRESSION:  Marked progression of metastatic disease. The activity at  the distal esophagus is significantly more hypermetabolic and there has  been progression in the size and number of liver metastases, metastatic  lymphadenopathy throughout the abdomen, retrodiaphragmatic regions,  right epicardial fat pad, and anterior mediastinum. There is also new  metastatic bilateral supraclavicular lymphadenopathy and the new  subcentimeter right basilar pulmonary nodule is likely a metastasis as  well.     This report was finalized on 5/12/2023 11:44 AM by Dr. Almaz Rutledge M.D.       Assessment & Plan    1.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 Almost 90% of the liver was replaced by tumor.  · HER-2 positive  · Initially treated with FOLFOX initiated July 2019  · Herceptin added with cycle 2  · Following 8 cycles of FOLFOX, oxaliplatin discontinued with continuation of 5-FU, leucovorin, Herceptin. This was continued through 7/7/2020  · Increasing CEA led to PET scan 6/9/2021.  Disease progression noted with growth of the tumor in the lower esophagus.  Continued stability of hepatic metastasis  · Repeat EGD 6/19/2021 for tissue specimen and Next Generation Sequencing.  · Endoscopy that shows a noncircumferential abnormality in the lower esophagus that encompasses almost 6 cm in length. It is not blocking off the esophagus and that is why the patient has no symptoms. The pathology shows at least atypical cells consistent with cancer.  · NGS showing PD-L1 positive and high mutation burden.  The tumor remains HER-2 positive.  Treatment plan for Keytruda every 3 weeks x 4 cycles followed by repeat imaging  · Keytruda initiated 7/29/2021  · Hospitalization 8/2/2021 through 8/7/2021 secondary to acute GI bleeding from known malignancy.  · Completed 10 fractions of  radiation to the lower esophagus 8/18/2021 after GI bleed  · PET scan following four cycles of Keytruda 10/14/2021 with improvement in the patient's known neoplasm involving the distal esophagus. However, known metastatic disease involving the liver is significantly worse. Keytruda discontinued  · Enhertu initiated 10/29/2021  · 12/23/2021 PET scan following three cycles of Enhertu discloses resolution of the tumor in the esophagus. He continues to have two active lesions in the liver with significant SUV activity pertinent to his metastatic disease. Minimal pulmonary infiltrates noted bilaterally. With concerns for Enhertu interstitial lung disease, Enhertu discontinued  · Treatment transitioned to FOLFOX Herceptin  · Borderline neutropenia 2/9/2022 with plans for 15% dose reduction to FOLFOX  · 2/16/2022, significant reaction to oxaliplatin which is discontinued  · Reevaluation 3/2/2022, with discontinue of oxaliplatin, the patient will continue on 5-FU, leucovorin, Kanjinti. Of note, the patient received 6 mg/kg of Kanjinti 2 weeks ago, therefore he cannot receive this medication today. He will receive only 5-FU leucovorin and return in 1 week for Kanjinti 2 mg/kg. In 2 weeks, he will resume his normal schedule of 5-FU, leucovorin, Kanjinti 4 mg/kg every 2 weeks.  · 3/16/2022 will continue on with 5-FU, leucovorin, Herceptin.  CEA dramatically improving.  · 3/25/2022 CT C/A/P When compared to recent PET/CT there is interval decrease in size of the previously demonstrated hypermetabolic hepatic metastases as well as decrease in size of the periportal lymph node. The wall thickening involving the distal esophagus is largely unchanged. 2. Persistent findings of colovesicular fistula. 3. Ill-defined interstitial and ground glass opacities within the bilateral lower lobes favored to represent postradiation pneumonitis/change  · 6/8/2022 CEA 8.0.  · 6/29/2022 here for continued 5-FU, leucovorin, Kanjanti, overall  tolerating well.  · 8/17/2022 continuing on 5-FU, leucovorin, Herceptin tolerating well.  CEA 8.  Patient was advised to continue with his current treatment.  Not planning a another radiological assessment unless patient has further rise in CEA level.  · 9/14/2022 here for continued 5-FU, leucovorin, Herceptin, continuing to tolerate quite well.   · 10/12/2022, progressive dysphagia with minimal rise of his CEA with plans made for PET scan  · Endoscopy 11/4/2022 with findings very tight esophagus with strictures.  Biopsies with pathology consistent with adenocarcinoma.  · PET scan 11/2/2022 with a long segment of activity of the esophagus and additional 2 tumoral lesions of the liver or 1 in the left lobe and the other in the right lobe.  There is also a CV activity close to the head of the pancreas.  · 5-FU, leucovorin, Herceptin discontinued with plans to move to Taxol weekly 3 to 4 weeks coordinated with radiation therapy.  · Consult with radiation oncology 11/15/2022 with plans for 10 fractions to the distal esophagus  · Initiating single agent Taxol today, 11/16/2022  · 12/28/2022 due for cycle 2-day 15.  Neuropathy worsening specifically in the feet with more trouble feeling the ground.  No falls.  Per discussion with Dr. Haynes will adjust Taxol down by 15%.  · Reevaluation 1/11/2023 due for cycle 3-day 1 Taxol.  Further adjustment to Taxol which will now be given day 1 day 15 of a 28-day cycle.  Day 8 discontinued from the treatment plan.  · 1/11/2023, CEA increased to 77.3.  · Taxol discontinued  · PET scan 1/31/2023 with mixed findings.  Regression of the distal esophageal mass, however, progression of hepatic metastasis and metastatic lymphadenopathy in the [place] hepatis and retroperitoneum.  · Herceptin Perjeta initiated 2/8/2023  · Baseline echocardiogram 2/13/2023 with left ventricular systolic function 61.5%  · Following initiation of Herceptin Perjeta, nodular hepatic edge improved with CEA declined  to 35.2 3/1/2023  · Proceed  3/22/2023 with cycle 3 Herceptin Perjeta which is continued every 21 days  · 4/12/2023 CEA 37.7  · Proceed today, 5/3/2023 with Herceptin Perjeta.  Repeat CEA is pending.  He does report increasing GI discomfort.  Additional supportive care adjusted as outlined below.  On 05/12/2023 I discussed with the patient the dramatic change clinically. Most importantly the elevation of his CEA level. I reviewed with him his PET scan that shows very dramatic progression of liver metastasis and intraperitoneal adenopathy. Now he has an anterior mediastinal adenopathy and cervical adenopathies. This progression is very dramatic with very significant SUV activity telling us fast growth of tumor. Obviously under these circumstances I do believe that in the last 4 years since he presented with Stage IV disease we have run through all the medicines available for the treatment of his condition and unfortunately he has come to the end of this situation. I do not believe any further treatment is available for him. I ran out of medications. I do not believe further immunotherapy that he has taken will be beneficial and the patient had very significant side effects with Enhertu. Given these facts the patient will be made a Hospice patient at this time and my nurse, Amber Lam, will be calling Hospice for referral.     Given the fact that the patient has been living by himself and he has the support of a sister, I would like for him to discuss this with her because he needs to take care of his spiritual needs, family needs and economical and material things. He understood this clearly. He raised the question in regard to how long do we have left and I would say we will be very shashi if we have 2-3 months.     He is not in pain; therefore, I find no need for pain medication at this point and given the fact that his liver is the site of fastest progression maybe in the long run he will develop metabolic  encephalopathy that will lead into coma and he will not struggle and suffer with pain. On the other hand I think we will go ahead and cancel all the future appointments for laboratory testing, port flushing, echocardiogram, and chemotherapy.     We will go ahead and refer him for Hospice for terminal care. We will initiate a program of symptom control at this time.     Most of the medicine that he is taking he will remain on including ciprofloxacin to keep his colovesical fistula from acting up.     The rest of the medications will remain ongoing for the time being.     I discussed this with him after reviewing the PET scan in detail.     ·   2. Colovesical fistula.   · He requires intermittent Cipro when he notes stool in his urine  · He currently reports his symptoms are more progressive with more urine in his rectum. He continues on Cipro and is scheduled for follow-up with urology, Dr. Witt later this month  · He has been seen by Cm Witt MD. He has advised the patient that he could not do too much for the fistula given the fact that it is just inconveniences of having urination through the anal canal from time to time but no urinary tract infection. He asked the patient to postpone any intention for surgery unless that it is absolutely necessary. Postponement of chemotherapy for 3 months if any surgery is necessary will trigger dramatic progression of his tumor and obviously consequences.   · On 06/08/2022, his colovesical fistula continues. Most of the output is urine through the rectum, no stool through the bladder. He has not had any urinary tract infection but he continues using prophylactically his ciprofloxacin 250 mg orally every other day. I asked him to remain on this medicine for the time being  · 08/17/2022 the patient continues having colovesical fistula symptomatology intermittently, passing urine through the rectum and not passing feces through the bladder. He has not had any other  urinary tract infections. He continues using his ciprofloxacin as per my instructions  · He continues on prophylactic ciprofloxacin every other day with no progressive symptoms today, afebrile without leukocytosis    3.  Thrombophilia secondary malignancy, DVT  · Currently anticoagulated with Xarelto 20 mg daily  · Following GI bleed 8/2/2021, Xarelto has been discontinued  · Plan no evidence of recurrent thrombosis    4. Acute GI bleeding  · Dark tarry stool initially began 7/29/2021  · 7/23/2021, hemoglobin of 11.0.  8/2/2021, hemoglobin of 5.7  · Patient admitted, received transfusion of 4 units packed red blood cells.  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   · Without signs or symptoms of bleeding, hemoglobin today 14.9    5. Enhertu initiated interstitial lung disease  · The patient completed a prednisone taper  · He is without hypoxia or tachycardia currently. He has only minimal dyspnea on exertion  · As long as he continues to smoke he is not a candidate for resumption of Enhertu    6. Significant oxaliplatin reaction  · 2/16/2022, significant reaction with shortness of breath, limited air movement, treated with high-dose Solu-Cortef. He is not a candidate for further oxaliplatin    7. Borderline thrombocytopenia  · 3/2/2021, the patient is a platelet count of 101,000. With the discontinuation of oxaliplatin, we can continue with 5-FU leucovorin which will likely not cause significant issues with his platelets.  · 5/18/2022 platelet count normalized at  141,000  · Platelets today 146,000, without signs or symptoms of bleeding    8.  Hypertension: He previously been on lisinopril/hydrochlorothiazide however this medication is currently on hold due to some issues with hypotension.  He now only takes the medication if his blood pressure is above 130/90.  · Blood pressure today  "normal at 146/87.    9.  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  · 12/28/2022, persistent neuropathy, Taxol reduced by 15%  · Ongoing neuropathy from previous oxaliplatin and Taxol.  This is unchanged currently    10.  Lower extremity edema:  · Bilateral.  Patient has been on his feet much more recently and has been more active.  It seemed to have worsened once the weather got warmer.  He states he was told previously by a cardiothoracic surgeon not to wear compression socks.  I have advised him to watch his salt intake, elevate his legs when he possible, and make sure he is drinking plenty of water.  Continue to monitor, and call if worsens.  · 6/20/2022 patient was prescribed lasix 20 mg every other day, which is helping some.  He is also trying to elevate his legs above his heart twice a day, which he also feels is helping.   · He is currently without swelling today, 5/3/2023    11.  Decreased oral intake with GI upset  · He states this is not a nausea though in early satiety and \"grumbly\" stomach we will add Pepcid 20 mg twice daily and Gas-X.  · The patient is noted to have lost 8 pounds today.  If his appetite continues to struggle he will call prior to 3-week follow-up so he can be reevaluated.      As posted above the patient will initiate Hospice referral at this time. There is no further medication available for the treatment of his malignancy. For the last 4 years he has been almost a miracle person. That survived 4 years with metastatic disease since the original consultation.     I pointed out to him that he will require conversation with his sister about all the issues pertinent to the care that he will need in the next several weeks. He is welcome to come to see us back in a month. We will cancel the rest of other appointments for port flush, chemotherapy, echocardiogram, laboratory testing.      Alexey Haynes MD  05/12/2023      "

## 2023-05-19 RX ORDER — PENTOXIFYLLINE 400 MG/1
TABLET, EXTENDED RELEASE ORAL
Qty: 90 TABLET | Refills: 0 | Status: SHIPPED | OUTPATIENT
Start: 2023-05-19

## 2023-05-19 RX ORDER — POTASSIUM CHLORIDE 1500 MG/1
TABLET, FILM COATED, EXTENDED RELEASE ORAL
Qty: 90 TABLET | Refills: 3 | Status: SHIPPED | OUTPATIENT
Start: 2023-05-19

## 2023-05-23 ENCOUNTER — TELEPHONE (OUTPATIENT)
Dept: ONCOLOGY | Facility: CLINIC | Age: 68
End: 2023-05-23
Payer: MEDICARE

## 2023-05-23 NOTE — TELEPHONE ENCOUNTER
Returned call to [facility] who are admitting the patient to their services.  The team will manage his port flushes, so that appointment will need to be cancelled.  As far as his appointment on 6/14/2023 with Dr. Haynes, the team is asking if that is a necessary appointment.  No labs will be able to be performed, but a waiver can be obtained for the MD visit if needed.  I read Dr. Haynes note and told her that it really is up to the patient if he wants to be seen that day, I explained that Dr. Haynes is out of the office and will check once he is back and if appointment needed, we just need to call the patient and let him know.  The port flush appointment will need to be cancelled, since they will be managing.

## 2023-05-26 NOTE — TELEPHONE ENCOUNTER
Called patient to find out if he still wanted to come in for visits. Patient states if not necessary he would prefer to cancel. Message sent to scheduling pool to cancel appts. Marisol Lam RN

## 2023-07-06 ENCOUNTER — TELEPHONE (OUTPATIENT)
Dept: ONCOLOGY | Facility: CLINIC | Age: 68
End: 2023-07-06

## 2023-07-06 NOTE — TELEPHONE ENCOUNTER
"  “Please be informed that patient has passed. Patient has been marked  in the system. The date of death is: 23\".    Caller: SAEID PETERSON    Relationship: Emergency Contact    Best call back number: [phone]    "

## 2023-09-21 NOTE — PROGRESS NOTES
"  Subjective     REASON FOR follow up:1.    1. 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  Currently receiving palliative FOLFOX AND HERCEPTIN therapy.    2.COLOVESICAL FISTULA: chronic antibiotic therapy cipro, NO FURTHER PLANS FOR SURGERY AFTER VISIT AND PROCEDURE BY DR GM CASTILLO 1/20    3. 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 NEURONTIN INITIATED.        History of Present Illness patient returns the office today for cycle 20 5-FU, leucovorin, and Herceptin.  His weight is up and he states he has been eating more sweets recently.  He also has been smoking more cigarettes, now up to a pack per day as opposed to 3 cigarettes which she reported last time.  He knows he needs to stop smoking he reports.  He also discussed getting more exercise.  We talked about trying to exercise in place of the cigarettes as that would be a healthy stress reliever habit.    He denies any new areas of pain, increase shortness of breath, or bowel changes.  He did have an episode of hemoptysis however this was following getting choked on a hot dog and going to the ER later in the day due to coughing up blood.  Not had an episode of this since that time.  Has been taking Prilosec daily as well.      Past Medical History:   Diagnosis Date   • Arthritis    • Elevated PSA    • Esophageal cancer (CMS/HCC)    • Esophageal mass    • Fistula    • H/O Lung nodule    • Hepatitis     CHILD--PT STATED \"I THINK IT WAS A.\"   • History of pneumonia    • Hx of blood clots 08/10/2019   • Hyperlipidemia    • Hypertension    • Neuropathy    • Prostate cancer (CMS/HCC)    • PVD (peripheral vascular disease) (CMS/HCC)         Past Surgical History:   Procedure Laterality Date   • COLONOSCOPY N/A 2/4/2020    Procedure: COLONOSCOPY;  Surgeon: Guy Sears " MD Gabino;  Location: [facility] ENDOSCOPY;  Service: General;  Laterality: N/A;  PRE-COLOVESICAL FISTULA  POST-- DIVERTICULOSIS   • CYSTOSCOPY  02/06/2020   • CYSTOSCOPY Bilateral 2/26/2020    Procedure: CYSTOSCOPY RETROGRADE;  Surgeon: Cm Witt MD;  Location: [facility] OR;  Service: Urology;  Laterality: Bilateral;   • HERNIA REPAIR  1999   • PROSTATE SURGERY  03/2008   • VENOUS ACCESS DEVICE (PORT) INSERTION N/A 7/16/2019    Procedure: INSERTION VENOUS ACCESS DEVICE WITH FLUORO AND EGD WITH BIOPSY;  Surgeon: Mary Brito MD;  Location: [facility] OR;  Service: Thoracic        Current Outpatient Medications on File Prior to Visit   Medication Sig Dispense Refill   • cevimeline (EVOXAC) 30 MG capsule Take 30 mg by mouth 3 (Three) Times a Day.     • ciprofloxacin (CIPRO) 250 MG tablet Take 1 tablet by mouth Daily. 90 tablet 1   • clotrimazole (MYCELEX) 10 MG obie 3 TIMES A DAY 90 tablet 1   • gabapentin (NEURONTIN) 300 MG capsule Take 2 capsules by mouth every night at bedtime. 60 capsule 0   • lisinopril-hydrochlorothiazide (PRINZIDE,ZESTORETIC) 20-12.5 MG per tablet TAKE 1 TABLET BY MOUTH EVERY DAY 90 tablet 2   • ondansetron (ZOFRAN) 4 MG tablet Take 1 tablet by mouth Every 8 (Eight) Hours As Needed for Nausea or Vomiting. 30 tablet 2   • Probiotic Product (PROBIOTIC DAILY PO) Take 1 tablet by mouth Daily.     • triamcinolone (KENALOG) 0.1 % ointment Apply  topically to the appropriate area as directed 2 (Two) Times a Day. (Patient taking differently: Apply 1 application topically to the appropriate area as directed As Needed.) 30 g 2   • Xarelto 20 MG tablet TAKE 1 TABLET BY MOUTH EVERY DAY**STOP LOVENOX** 90 tablet 1     No current facility-administered medications on file prior to visit.         ALLERGIES:  No Known Allergies     Social History     Socioeconomic History   • Marital status: Single     Spouse name: Not on file   • Number of children: Not on file   • Years of education: High  school   • Highest education level: Not on file   Occupational History   • Occupation: [employer]     Employer: [employer]   Tobacco Use   • Smoking status: Current Every Day Smoker     Packs/day: 1.00     Years: 40.00     Pack years: 40.00     Types: Cigarettes   • Smokeless tobacco: Never Used   Substance and Sexual Activity   • Alcohol use: Not Currently     Comment: NOT RECENTLY- none since september   • Drug use: No   • Sexual activity: Defer        Family History   Problem Relation Age of Onset   • Hypertension Mother    • Stroke Mother    • Hypertension Other    • Lung disease Other    • Prostate cancer Other    • Lung cancer Father    • Malig Hyperthermia Neg Hx       Current Outpatient Medications on File Prior to Visit   Medication Sig Dispense Refill   • cevimeline (EVOXAC) 30 MG capsule Take 30 mg by mouth 3 (Three) Times a Day.     • ciprofloxacin (CIPRO) 250 MG tablet Take 1 tablet by mouth Daily. 90 tablet 1   • clotrimazole (MYCELEX) 10 MG obie 3 TIMES A DAY 90 tablet 1   • gabapentin (NEURONTIN) 300 MG capsule Take 2 capsules by mouth every night at bedtime. 60 capsule 0   • lisinopril-hydrochlorothiazide (PRINZIDE,ZESTORETIC) 20-12.5 MG per tablet TAKE 1 TABLET BY MOUTH EVERY DAY 90 tablet 2   • ondansetron (ZOFRAN) 4 MG tablet Take 1 tablet by mouth Every 8 (Eight) Hours As Needed for Nausea or Vomiting. 30 tablet 2   • Probiotic Product (PROBIOTIC DAILY PO) Take 1 tablet by mouth Daily.     • triamcinolone (KENALOG) 0.1 % ointment Apply  topically to the appropriate area as directed 2 (Two) Times a Day. (Patient taking differently: Apply 1 application topically to the appropriate area as directed As Needed.) 30 g 2   • Xarelto 20 MG tablet TAKE 1 TABLET BY MOUTH EVERY DAY**STOP LOVENOX** 90 tablet 1     No current facility-administered medications on file prior to visit.    No Known Allergies     Review of Systems:  Review of Systems   Constitutional: Positive for activity change (decreased)  "and fatigue. Negative for appetite change (eating more sweets) and fever.   HENT: Positive for trouble swallowing (choked on a hotdog, no other issues). Negative for nosebleeds.    Respiratory: Negative for cough and shortness of breath.    Cardiovascular: Negative for chest pain and leg swelling.   Gastrointestinal: Negative for anal bleeding, constipation and nausea.   Genitourinary: Negative for difficulty urinating and dysuria.   Skin: Negative for pallor and rash.   Neurological: Negative for weakness and headaches.   Hematological: Negative for adenopathy. Does not bruise/bleed easily.   Psychiatric/Behavioral: Negative for confusion. The patient is not nervous/anxious.          Objective     Vitals:    10/06/20 0849   BP: 132/78   Pulse: 82   Resp: 19   Temp: 97 °F (36.1 °C)   TempSrc: Temporal   SpO2: 99%   Weight: 91.7 kg (202 lb 3.2 oz)   Height: 175.3 cm (69.02\")   PainSc: 0-No pain     Current Status 10/6/2020   ECOG score 0         Physical Exam  Vitals signs reviewed.   Constitutional:       General: He is not in acute distress.  HENT:      Head: Normocephalic and atraumatic.   Eyes:      General: No scleral icterus.     Conjunctiva/sclera: Conjunctivae normal.   Neck:      Musculoskeletal: Normal range of motion and neck supple.   Cardiovascular:      Rate and Rhythm: Normal rate and regular rhythm.   Pulmonary:      Effort: Pulmonary effort is normal.      Breath sounds: Normal breath sounds. No wheezing.   Abdominal:      General: Bowel sounds are normal. There is no distension.      Tenderness: There is no abdominal tenderness.   Musculoskeletal: Normal range of motion.         General: No swelling.   Skin:     General: Skin is warm and dry.      Findings: No bruising or rash.   Neurological:      General: No focal deficit present.      Mental Status: He is alert and oriented to person, place, and time.   Psychiatric:         Mood and Affect: Mood normal.         Behavior: Behavior normal. "       Physical exam is unchanged from previous as of 10/6/2020                RECENT LABS:  Hematology WBC   Date Value Ref Range Status   10/06/2020 9.26 3.40 - 10.80 10*3/mm3 Final   02/02/2019 13.4 (H) 3.4 - 10.8 x10E3/uL Final     RBC   Date Value Ref Range Status   10/06/2020 3.68 (L) 4.14 - 5.80 10*6/mm3 Final   02/02/2019 4.81 4.14 - 5.80 x10E6/uL Final     Hemoglobin   Date Value Ref Range Status   10/06/2020 11.9 (L) 13.0 - 17.7 g/dL Final     Hematocrit   Date Value Ref Range Status   10/06/2020 35.1 (L) 37.5 - 51.0 % Final     Platelets   Date Value Ref Range Status   10/06/2020 136 (L) 140 - 450 10*3/mm3 Final            Interpretation Summary echo    · Calculated right ventricular systolic pressure from tricuspid regurgitation is 22 mmHg.  · Left ventricular wall thickness is consistent with mild concentric hypertrophy.  · Left ventricular systolic function is normal.  · There is calcification of the aortic valve.  · Calculated EF = 62.4%.  · Global Longitudinal LV strain = -21.5%.  · C/w baseline study 7/30/2019, there is no change. There is no change from study done 5/19/2020.              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 Almost 90% of the liver WAS replaced by tumor. The patient has been undergoing chemotherapy with 5  fu and leucovorin  and Herceptin and has had excellent response clinically and radiologically. The patient was reviewed on 08/10/2020. I reviewed with the patient in the PAC system [facility] his PET scan that is dramatic. There is minimal uptake in the lower esophagus and most importantly complete resolution of his liver metastasis. Actually the only issue that is pertinent to the PET scan is still the visibility of his colovesical fistula.     Therefore from the point of view of his cancer of the esophagus, metastatic to the liver, he has no symptoms from the primary tumor in the esophagus and he has no  symptoms related to his liver metastasis that has resolved altogether. His CEA level is low at 5.7.     Tolerance to the regimen of 5-FU, leucovorin and Herceptin is excellent. He is receiving this once a month and this will remain ongoing for the time being. Somebody will raise the question if eventually we could get rid of the 5-FU and leucovorin and maybe that is a possibility down the road.     In regard to cardiac toxicity from Herceptin, the patient has not developed any. New echocardiogram documents a normal left ventricular ejection fraction. These numbers have not changed in comparison with the original baseline and the one done in 05/2020.     In regard to the sensory peripheral neuropathy of his lower extremities, he remains on Neurontin. The dose that he is receiving is appropriate. He has no need for dose adjustment.     In regard to his thrombophilia associated with malignancy, the patient remains on anticoagulation orally after being on Lovenox for a long time. He has not had any clinical bleeding or new thrombosis.     In regard to his colovesical fistula, he remains on a minimal dose of ciprofloxacin to keep infections from happening. So far he has not encountered any problems with the medicine and neither developed any new symptoms that suggests activation of the fistula.     In regard to his history of prostate cancer, we measured during the previous visit a PSA that was 0.01. This is a very low number that means that his prostate cancer is not an issue at all.     In regard to disability, I think the patient is completely and permanently disabled. Given his Stage IV malignancy, he will decide what to do along with [employer] about disability from now on.     Patient is seen once again on 10/6/2020 continuing to tolerate treatment very well.  Unfortunately, he has increased his cigarettes per day up to a pack per day now and has been eating more sweets.  We discussed both of these things  today in which she should curbing them.  We discussed replacing them with increased activity.  The patient states he knows he needs to back off on both the cigarettes and sweets and will work on doing so.  Otherwise he denies any recent pain.  He did have an episode choking on a hot dog followed by hemoptysis however this resolved thereafter.  He has not had any further difficulty swallowing.  We will proceed with treatment as planned.  We will schedule an echocardiogram for December.    RECOMMENDATIONS: Continue monthly 5-FU, leucovorin, and Herceptin.  We will continue lab monitoring of CBC, CMP, and CEA monthly.  We have ordered an echocardiogram to be repeated in December.      I have asked the patient to decrease his sweet food intake as well as to return to decreasing his cigarettes per day.  We discussed how he was doing well when he had backed off only 3 cigarettes/day and I have encouraged him to do that once again with eventual cessation.  We discussed increasing physical activity to help curb these bad habits.  Otherwise, the patient is doing well with no new pain and tolerating treatment well.    He will follow-up with Dr. Haynes in 4 weeks with PET in 3 weeks.    Elements for critical care included direct patient care (not related to procedure), additional history taking, interpretation of diagnostic studies, documentation, consultation with other physicians, consult with the patient's family directly relating to the patient's condition, etc.

## 2023-10-10 NOTE — ANESTHESIA PREPROCEDURE EVALUATION
Anesthesia Evaluation     Patient summary reviewed and Nursing notes reviewed   NPO Solid Status: > 8 hours  NPO Liquid Status: > 2 hours           Airway   Mallampati: I  TM distance: >3 FB  Neck ROM: full  No difficulty expected  Dental - normal exam     Pulmonary - normal exam   (+) a smoker Current,   Cardiovascular - normal exam    (+) hypertension, PVD, DVT, hyperlipidemia,       Neuro/Psych  (+) numbness,     GI/Hepatic/Renal/Endo    (+)  GI bleeding , hepatitis, liver disease,     Musculoskeletal     Abdominal  - normal exam    Bowel sounds: normal.   Substance History - negative use     OB/GYN negative ob/gyn ROS         Other   arthritis,    history of cancer                  Anesthesia Plan    ASA 3     MAC       Anesthetic plan, all risks, benefits, and alternatives have been provided, discussed and informed consent has been obtained with: patient.       Addended by: LOTTIE BUSTOS on: 10/10/2023 06:27 PM     Modules accepted: Orders

## 2023-11-02 NOTE — ANESTHESIA PREPROCEDURE EVALUATION
Anesthesia Evaluation     no history of anesthetic complications:  NPO Solid Status: > 8 hours  NPO Liquid Status: > 2 hours           Airway   Mallampati: II  Neck ROM: full  no difficulty expected  Dental      Comment: Lower partial out; missing some lower teeth    Pulmonary - normal exam   (+) a smoker Current Abstained day of surgery,   (-) COPD, asthma, sleep apnea    PE comment: nonlabored  Cardiovascular - normal exam    Rhythm: regular  Rate: normal    (+) hypertension, PVD, DVT, hyperlipidemia,   (-) valvular problems/murmurs, past MI, CAD, dysrhythmias, angina      Neuro/Psych  (+) numbness,     (-) seizures, TIA, CVA  GI/Hepatic/Renal/Endo    (+)   hepatitis (in childhood, likely HepA), liver disease (h/o Hepatitis; Liver mets),   (-) GERD, no renal disease, diabetes, no thyroid disorder    Musculoskeletal     (+) arthralgias,   Abdominal    Substance History      OB/GYN          Other   arthritis,    history of cancer (Esophageal Cancer w/ liver mets; h/o prostate cancer)                    Anesthesia Plan    ASA 3     general     intravenous induction     Anesthetic plan, all risks, benefits, and alternatives have been provided, discussed and informed consent has been obtained with: patient.      
Kathryn Antonio, SANDEEP, CDN, ext [phone]

## (undated) DEVICE — BNDR ABD 4PANEL 12IN MED/LG

## (undated) DEVICE — BITEBLOCK OMNI BLOC

## (undated) DEVICE — LN SMPL CO2 SHTRM SD STREAM W/M LUER

## (undated) DEVICE — CANN O2 ETCO2 FITS ALL CONN CO2 SMPL A/ 7IN DISP LF

## (undated) DEVICE — THE AQUASHIELD SYSTEM IS INTENDED TO BE USED WITH AN AIR SOURCE FROM AN ENDOSCOPE WITH THE PURPOSE OF SUPPLYING STERILE WATER TO THE ENDOSCOPE DURING ENDOSCOPIC PROCEDURES.: Brand: AQUASHIELD

## (undated) DEVICE — FRCP BX RADJAW4 NDL 2.8 240CM LG OG BX40

## (undated) DEVICE — ADAPT CLN BIOGUARD AIR/H2O DISP

## (undated) DEVICE — ESOPHAGEAL BALLOON DILATATION CATHETER: Brand: CRE FIXED WIRE

## (undated) DEVICE — GASTROINTESTINAL ANCHOR SET, SAF-T-PEXY* T-FASTENERS: Brand: GASTROINTESTINAL ANCHOR SET, SAF-T-PEXY* T-FASTENERS

## (undated) DEVICE — SINGLE-USE BIOPSY FORCEPS: Brand: RADIAL JAW 4

## (undated) DEVICE — DEV HEMOSPRAY ENDO HEMOST 7F 220CM

## (undated) DEVICE — KT ORCA ORCAPOD DISP STRL

## (undated) DEVICE — TB FEED JEJUNAL 18F

## (undated) DEVICE — SUT SILK 0 PSL 18IN 580H

## (undated) DEVICE — NDL PERC 1PRT THNWALL W/BASEPLT 18G 7CM

## (undated) DEVICE — INTENDED FOR TISSUE SEPARATION, AND OTHER PROCEDURES THAT REQUIRE A SHARP SURGICAL BLADE TO PUNCTURE OR CUT.: Brand: BARD-PARKER ® CARBON RIB-BACK BLADES

## (undated) DEVICE — LOU MINOR PROCEDURE: Brand: MEDLINE INDUSTRIES, INC.

## (undated) DEVICE — LOU CYSTO: Brand: MEDLINE INDUSTRIES, INC.

## (undated) DEVICE — APPL CHLORAPREP W/TINT 10.5ML PERC STRL

## (undated) DEVICE — ELECTRD BLD EDGE/INSUL1P 2.4X5.1MM STRL

## (undated) DEVICE — TUBING, SUCTION, 1/4" X 10', STRAIGHT: Brand: MEDLINE

## (undated) DEVICE — SENSR O2 OXIMAX FNGR A/ 18IN NONSTR

## (undated) DEVICE — SUT SILK 2/0 SH CR5 18IN C0125

## (undated) DEVICE — TIDISHIELD UROLOGY DRAIN BAGS FROSTY VINYL STERILE FITS SIEMENS UROSKOP ACCESS 20 PER CASE: Brand: TIDISHIELD

## (undated) DEVICE — ADHS SKIN DERMABOND TOP ADVANCED

## (undated) DEVICE — SYR LUERLOK 5CC

## (undated) DEVICE — GLV SURG BIOGEL LTX PF 6

## (undated) DEVICE — DEV INFL CRE STERIFLATE 60CC DISP

## (undated) DEVICE — GLV SURG BIOGEL LTX PF 7

## (undated) DEVICE — NDL HYPO PRECISIONGLIDE REG 25G 1 1/2

## (undated) DEVICE — Device

## (undated) DEVICE — 3M™ IOBAN™ 2 ANTIMICROBIAL INCISE DRAPE 6650EZ: Brand: IOBAN™ 2

## (undated) DEVICE — SYR LUERLOK 20CC BX/50

## (undated) DEVICE — ANTIBACTERIAL UNDYED BRAIDED (POLYGLACTIN 910), SYNTHETIC ABSORBABLE SUTURE: Brand: COATED VICRYL

## (undated) DEVICE — DECANT BG O JET

## (undated) DEVICE — SUT MNCRYL 4/0 PS2 18 IN

## (undated) DEVICE — CVR PROB 96IN LF STRL

## (undated) DEVICE — DRP C/ARM 41X74IN

## (undated) DEVICE — THE TORRENT IRRIGATION SCOPE CONNECTOR IS USED WITH THE TORRENT IRRIGATION TUBING TO PROVIDE IRRIGATION FLUIDS SUCH AS STERILE WATER DURING GASTROINTESTINAL ENDOSCOPIC PROCEDURES WHEN USED IN CONJUNCTION WITH AN IRRIGATION PUMP (OR ELECTROSURGICAL UNIT).: Brand: TORRENT

## (undated) DEVICE — NITINOL WIRE WITH HYDROPHILIC TIP: Brand: SENSOR

## (undated) DEVICE — CATH URETRL FLXITP POLLACK STD 5F 70CM